# Patient Record
Sex: FEMALE | Race: BLACK OR AFRICAN AMERICAN | NOT HISPANIC OR LATINO | Employment: UNEMPLOYED | URBAN - METROPOLITAN AREA
[De-identification: names, ages, dates, MRNs, and addresses within clinical notes are randomized per-mention and may not be internally consistent; named-entity substitution may affect disease eponyms.]

---

## 2019-01-25 ENCOUNTER — RECORDS - HEALTHEAST (OUTPATIENT)
Dept: LAB | Facility: CLINIC | Age: 59
End: 2019-01-25

## 2019-01-25 LAB
ANION GAP SERPL CALCULATED.3IONS-SCNC: 9 MMOL/L (ref 5–18)
BASOPHILS # BLD AUTO: 0 THOU/UL (ref 0–0.2)
BASOPHILS NFR BLD AUTO: 0 % (ref 0–2)
BUN SERPL-MCNC: 16 MG/DL (ref 8–22)
CALCIUM SERPL-MCNC: 9.4 MG/DL (ref 8.5–10.5)
CHLORIDE BLD-SCNC: 108 MMOL/L (ref 98–107)
CO2 SERPL-SCNC: 23 MMOL/L (ref 22–31)
CREAT SERPL-MCNC: 0.95 MG/DL (ref 0.6–1.1)
EOSINOPHIL # BLD AUTO: 0.1 THOU/UL (ref 0–0.4)
EOSINOPHIL NFR BLD AUTO: 3 % (ref 0–6)
ERYTHROCYTE [DISTWIDTH] IN BLOOD BY AUTOMATED COUNT: 12.9 % (ref 11–14.5)
GFR SERPL CREATININE-BSD FRML MDRD: >60 ML/MIN/1.73M2
GLUCOSE BLD-MCNC: 84 MG/DL (ref 70–125)
HCT VFR BLD AUTO: 34.9 % (ref 35–47)
HGB BLD-MCNC: 11.5 G/DL (ref 12–16)
LYMPHOCYTES # BLD AUTO: 1.4 THOU/UL (ref 0.8–4.4)
LYMPHOCYTES NFR BLD AUTO: 32 % (ref 20–40)
MCH RBC QN AUTO: 31.4 PG (ref 27–34)
MCHC RBC AUTO-ENTMCNC: 33 G/DL (ref 32–36)
MCV RBC AUTO: 95 FL (ref 80–100)
MONOCYTES # BLD AUTO: 0.4 THOU/UL (ref 0–0.9)
MONOCYTES NFR BLD AUTO: 9 % (ref 2–10)
NEUTROPHILS # BLD AUTO: 2.4 THOU/UL (ref 2–7.7)
NEUTROPHILS NFR BLD AUTO: 56 % (ref 50–70)
PLATELET # BLD AUTO: 197 THOU/UL (ref 140–440)
PMV BLD AUTO: 11.7 FL (ref 8.5–12.5)
POTASSIUM BLD-SCNC: 4.1 MMOL/L (ref 3.5–5)
RBC # BLD AUTO: 3.66 MILL/UL (ref 3.8–5.4)
SODIUM SERPL-SCNC: 140 MMOL/L (ref 136–145)
WBC: 4.4 THOU/UL (ref 4–11)

## 2019-02-11 ENCOUNTER — RECORDS - HEALTHEAST (OUTPATIENT)
Dept: LAB | Facility: CLINIC | Age: 59
End: 2019-02-11

## 2019-02-11 LAB
CHOLEST SERPL-MCNC: 161 MG/DL
FASTING STATUS PATIENT QL REPORTED: NORMAL
HDLC SERPL-MCNC: 60 MG/DL
LDLC SERPL CALC-MCNC: 71 MG/DL
TRIGL SERPL-MCNC: 149 MG/DL

## 2019-04-26 ENCOUNTER — HOSPITAL ENCOUNTER (EMERGENCY)
Facility: CLINIC | Age: 59
Discharge: OTHER INSTITUTION | End: 2019-04-27
Attending: EMERGENCY MEDICINE | Admitting: EMERGENCY MEDICINE
Payer: COMMERCIAL

## 2019-04-26 DIAGNOSIS — F20.0 ACUTE EXACERBATION OF CHRONIC PARANOID SCHIZOPHRENIA (H): ICD-10-CM

## 2019-04-26 PROCEDURE — 90791 PSYCH DIAGNOSTIC EVALUATION: CPT

## 2019-04-26 PROCEDURE — 99285 EMERGENCY DEPT VISIT HI MDM: CPT | Mod: 25

## 2019-04-26 RX ORDER — DIPHENHYDRAMINE HCL 25 MG
25 CAPSULE ORAL ONCE
Status: DISCONTINUED | OUTPATIENT
Start: 2019-04-26 | End: 2019-04-27 | Stop reason: HOSPADM

## 2019-04-26 RX ORDER — OLANZAPINE 10 MG/1
10 TABLET, ORALLY DISINTEGRATING ORAL
Status: DISCONTINUED | OUTPATIENT
Start: 2019-04-26 | End: 2019-04-27 | Stop reason: HOSPADM

## 2019-04-26 RX ORDER — TRIHEXYPHENIDYL HYDROCHLORIDE 5 MG/1
5 TABLET ORAL ONCE
Status: DISCONTINUED | OUTPATIENT
Start: 2019-04-26 | End: 2019-04-27 | Stop reason: HOSPADM

## 2019-04-26 RX ORDER — OLANZAPINE 10 MG/2ML
10 INJECTION, POWDER, FOR SOLUTION INTRAMUSCULAR
Status: DISCONTINUED | OUTPATIENT
Start: 2019-04-26 | End: 2019-04-27 | Stop reason: HOSPADM

## 2019-04-26 RX ORDER — CHLORPROMAZINE HYDROCHLORIDE 50 MG/1
400 TABLET, FILM COATED ORAL ONCE
Status: DISCONTINUED | OUTPATIENT
Start: 2019-04-26 | End: 2019-04-27 | Stop reason: HOSPADM

## 2019-04-26 RX ORDER — OLANZAPINE 10 MG/1
10 TABLET, ORALLY DISINTEGRATING ORAL AT BEDTIME
Status: DISCONTINUED | OUTPATIENT
Start: 2019-04-26 | End: 2019-04-26

## 2019-04-26 RX ORDER — LANOLIN ALCOHOL/MO/W.PET/CERES
3 CREAM (GRAM) TOPICAL
Status: DISCONTINUED | OUTPATIENT
Start: 2019-04-26 | End: 2019-04-27 | Stop reason: HOSPADM

## 2019-04-26 ASSESSMENT — ENCOUNTER SYMPTOMS
DIARRHEA: 0
ABDOMINAL PAIN: 0
HEADACHES: 0

## 2019-04-27 ENCOUNTER — HOSPITAL ENCOUNTER (INPATIENT)
Facility: CLINIC | Age: 59
LOS: 39 days | Discharge: HOME | DRG: 885 | End: 2019-06-05
Attending: PSYCHIATRY & NEUROLOGY | Admitting: PSYCHIATRY & NEUROLOGY
Payer: COMMERCIAL

## 2019-04-27 VITALS
WEIGHT: 140 LBS | SYSTOLIC BLOOD PRESSURE: 164 MMHG | RESPIRATION RATE: 24 BRPM | OXYGEN SATURATION: 98 % | HEART RATE: 106 BPM | DIASTOLIC BLOOD PRESSURE: 86 MMHG | TEMPERATURE: 98.4 F

## 2019-04-27 DIAGNOSIS — E61.8: ICD-10-CM

## 2019-04-27 DIAGNOSIS — E78.5 HYPERLIPIDEMIA LDL GOAL <100: ICD-10-CM

## 2019-04-27 DIAGNOSIS — I10 HYPERTENSION, UNSPECIFIED TYPE: ICD-10-CM

## 2019-04-27 DIAGNOSIS — R68.2 DRY MOUTH: Primary | ICD-10-CM

## 2019-04-27 DIAGNOSIS — E56.9 VITAMIN DEFICIENCY: ICD-10-CM

## 2019-04-27 DIAGNOSIS — F20.0 SCHIZOPHRENIA, PARANOID TYPE (H): ICD-10-CM

## 2019-04-27 PROCEDURE — 99207 ZZC CONSULT E&M CHANGED TO INITIAL LEVEL: CPT | Performed by: PHYSICIAN ASSISTANT

## 2019-04-27 PROCEDURE — 99222 1ST HOSP IP/OBS MODERATE 55: CPT | Performed by: PHYSICIAN ASSISTANT

## 2019-04-27 PROCEDURE — 25000132 ZZH RX MED GY IP 250 OP 250 PS 637: Performed by: NURSE PRACTITIONER

## 2019-04-27 PROCEDURE — 99223 1ST HOSP IP/OBS HIGH 75: CPT | Mod: AI | Performed by: NURSE PRACTITIONER

## 2019-04-27 PROCEDURE — 12400001 ZZH R&B MH UMMC

## 2019-04-27 RX ORDER — TRIHEXYPHENIDYL HYDROCHLORIDE 5 MG/1
5 TABLET ORAL 2 TIMES DAILY
Status: DISCONTINUED | OUTPATIENT
Start: 2019-04-27 | End: 2019-04-30

## 2019-04-27 RX ORDER — DIPHENHYDRAMINE HCL 25 MG
25 CAPSULE ORAL EVERY 4 HOURS PRN
Status: DISCONTINUED | OUTPATIENT
Start: 2019-04-27 | End: 2019-05-01

## 2019-04-27 RX ORDER — ATORVASTATIN CALCIUM 40 MG/1
40 TABLET, FILM COATED ORAL EVERY EVENING
Status: DISCONTINUED | OUTPATIENT
Start: 2019-04-27 | End: 2019-06-05 | Stop reason: HOSPADM

## 2019-04-27 RX ORDER — CHOLECALCIFEROL (VITAMIN D3) 1250 MCG
50000 CAPSULE ORAL
Status: DISCONTINUED | OUTPATIENT
Start: 2019-04-27 | End: 2019-06-05 | Stop reason: HOSPADM

## 2019-04-27 RX ORDER — HYDROXYZINE HYDROCHLORIDE 25 MG/1
25 TABLET, FILM COATED ORAL EVERY 4 HOURS PRN
Status: DISCONTINUED | OUTPATIENT
Start: 2019-04-27 | End: 2019-06-05 | Stop reason: HOSPADM

## 2019-04-27 RX ORDER — MULTIVITAMIN,THERAPEUTIC
1 TABLET ORAL DAILY
Status: DISCONTINUED | OUTPATIENT
Start: 2019-04-27 | End: 2019-06-05 | Stop reason: HOSPADM

## 2019-04-27 RX ORDER — DOCUSATE SODIUM 100 MG/1
100 CAPSULE, LIQUID FILLED ORAL 2 TIMES DAILY PRN
Status: DISCONTINUED | OUTPATIENT
Start: 2019-04-27 | End: 2019-06-05 | Stop reason: HOSPADM

## 2019-04-27 RX ORDER — LOSARTAN POTASSIUM 50 MG/1
100 TABLET ORAL DAILY
Status: DISCONTINUED | OUTPATIENT
Start: 2019-04-27 | End: 2019-06-05 | Stop reason: HOSPADM

## 2019-04-27 RX ORDER — ACETAMINOPHEN 325 MG/1
650 TABLET ORAL EVERY 4 HOURS PRN
Status: DISCONTINUED | OUTPATIENT
Start: 2019-04-27 | End: 2019-06-05 | Stop reason: HOSPADM

## 2019-04-27 RX ORDER — OLANZAPINE 10 MG/1
10 TABLET ORAL
Status: DISCONTINUED | OUTPATIENT
Start: 2019-04-27 | End: 2019-06-05 | Stop reason: HOSPADM

## 2019-04-27 RX ORDER — ALUMINA, MAGNESIA, AND SIMETHICONE 2400; 2400; 240 MG/30ML; MG/30ML; MG/30ML
30 SUSPENSION ORAL EVERY 4 HOURS PRN
Status: DISCONTINUED | OUTPATIENT
Start: 2019-04-27 | End: 2019-06-05 | Stop reason: HOSPADM

## 2019-04-27 RX ORDER — TEMAZEPAM 15 MG/1
15 CAPSULE ORAL
Status: DISCONTINUED | OUTPATIENT
Start: 2019-04-27 | End: 2019-06-05 | Stop reason: HOSPADM

## 2019-04-27 RX ORDER — OLANZAPINE 10 MG/2ML
10 INJECTION, POWDER, FOR SOLUTION INTRAMUSCULAR
Status: DISCONTINUED | OUTPATIENT
Start: 2019-04-27 | End: 2019-06-05 | Stop reason: HOSPADM

## 2019-04-27 RX ORDER — METOPROLOL SUCCINATE 50 MG/1
50 TABLET, EXTENDED RELEASE ORAL DAILY
Status: DISCONTINUED | OUTPATIENT
Start: 2019-04-27 | End: 2019-06-05 | Stop reason: HOSPADM

## 2019-04-27 RX ORDER — CHLORPROMAZINE HYDROCHLORIDE 25 MG/1
25 TABLET, FILM COATED ORAL 3 TIMES DAILY
Status: DISCONTINUED | OUTPATIENT
Start: 2019-04-27 | End: 2019-04-28

## 2019-04-27 RX ORDER — CALCIUM CARBONATE 500(1250)
500 TABLET ORAL 2 TIMES DAILY WITH MEALS
Status: DISCONTINUED | OUTPATIENT
Start: 2019-04-27 | End: 2019-05-16

## 2019-04-27 RX ADMIN — NICOTINE POLACRILEX 2 MG: 2 GUM, CHEWING BUCCAL at 12:49

## 2019-04-27 ASSESSMENT — ACTIVITIES OF DAILY LIVING (ADL)
FALL_HISTORY_WITHIN_LAST_SIX_MONTHS: NO
SWALLOWING: 0-->SWALLOWS FOODS/LIQUIDS WITHOUT DIFFICULTY
DRESS: PROMPTS
TRANSFERRING: 0-->INDEPENDENT
DRESS: 0-->INDEPENDENT
DRESS: SCRUBS (BEHAVIORAL HEALTH);INDEPENDENT
BATHING: 0-->INDEPENDENT
LAUNDRY: UNABLE TO COMPLETE
COGNITION: 0 - NO COGNITION ISSUES REPORTED
TOILETING: 0-->INDEPENDENT
RETIRED_COMMUNICATION: 0-->UNDERSTANDS/COMMUNICATES WITHOUT DIFFICULTY
AMBULATION: 0-->INDEPENDENT
ORAL_HYGIENE: PROMPTS
ORAL_HYGIENE: INDEPENDENT
RETIRED_EATING: 0-->INDEPENDENT
HYGIENE/GROOMING: PROMPTS
HYGIENE/GROOMING: INDEPENDENT

## 2019-04-27 ASSESSMENT — MIFFLIN-ST. JEOR: SCORE: 1212.31
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
## 2019-04-27 NOTE — ED NOTES
Video observation initiated, patient informed.  Patient did not verbalize understanding of this information.    Charissa Garcia RN

## 2019-04-27 NOTE — ED NOTES
"Patient cooperatively changed into  scrubs however refused to remove her moe shorts stating \"these are my underwear\"  Security wanded and check patient's pockets as well as safety precaution.  "

## 2019-04-27 NOTE — PROGRESS NOTES
"ADMISSION    59yo Black female admitted on a 72H hold from [facility] ED. Pt arrived per EMS on a gurney.     called the police yesterday when pt became increasingly agitated and threatened to kill him.    Pt reportedly has a long history of Paranoid Schizophrenia and was last hospitalized in February of this year at [facility].  Pt has been non compliant with her medications and according to her  she has not taken them for the past 2 weeks.   also reports that she has not slept for 3 days.    On admission pt is directable and cooperative with safety search and vitals. However she became agitated when staff attempted to interview her displaying loud, abrupt and repetitive speech. She was extremely guarded and had difficulty answering even basic questions.  Such as when asked which  country she was from she repeatedly states \"I am an American citizen!\" \"I am an American Citizen!\". She is an unreliable historian in that she answers \"no\" to all questions related to any mental health issues she might be having.  She denies that she is on any medications (though records indicate otherwise) saying \"water is my medication\".  She also denies any medical concerns though records indicate that she has taken antihypertensive and related medications.     Following attempted interview pt was given a brief orientation to the unit and was settled to bed due to the hour of the day.  "
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
## 2019-04-27 NOTE — ED NOTES
Attempt made to call RN report to [facility] 12; staff is currently unable to take report at this time and reportedly will not be able to take report until after midnight.

## 2019-04-27 NOTE — PLAN OF CARE
"Patient has show more agitation as the day progressed. She is refusing to take her scheduled medications.  She is yelling when talking to staff. She refused to acknowledge what country she is from. She stated \"I am an American citizen and I speak English.\"  Patient does speak English but due to the pressured speech she can be difficult to understand.She is spending most free time in her room.  Has needed redirection when yelling at staff in [facility].  An attempt to talk to her family will be made when their phone numbers are obtained.    A second set of vitals was taken at 1300. Her BP was down slightly.  She continues to refuse all medications.  Internal Medicine and Noemy Jack  are aware of situation and can be contacted if vitals go back up.  "
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
## 2019-04-27 NOTE — PROGRESS NOTES
BP recheck at 1715 was 177/94 and . Pt is denying any pain or discomfort. Denies any symptoms. Will continue to monitor closely. Dr Birnk informed.  1930: Pt refused recheck of BP.

## 2019-04-27 NOTE — ED NOTES
"Patient refuses bedtime medication that was ordered after multiple administration attempts patient states \"The only medication I'm to take is water\"  \"My doctor said I am to take only water\"  \"God told me to stop taking all of my medication.\"    "

## 2019-04-27 NOTE — PROGRESS NOTES
04/27/19 0548   Patient Belongings   Did you bring any home meds/supplements to the hospital?  No   Patient Belongings locker;remains with patient   Patient Belongings Remaining with Patient other (see comments)  (Black hair net.)   Patient Belongings Put in Hospital Secure Location (Security or Locker, etc.) clothing;plastic bag;other (see comments);shoes   Belongings Search Yes   Clothing Search Yes   Second Staff Sarah and Bettye   Comment Patient came in pink underwear short, a pair of sock, and a pair of grey tennis shoe.  There was no money and no cell phone in belonging.   A               5.26 daughter brought in purple jacket, 3 shirts, 2 pants, belt, cell phone, , black wallet- in locker    Security- [facility] debit visa, 2 [place] licenses, $8, 1.62 cents      Admission:  I am responsible for any personal items that are not sent to the safe or pharmacy.  [facility] is not responsible for loss, theft or damage of any property in my possession.    Signature:  _________________________________ Date: _______  Time: _____                                              Staff Signature:  ____________________________ Date: ________  Time: _____      2nd Staff person, if patient is unable/unwilling to sign:    Signature: ________________________________ Date: ________  Time: _____     Discharge:  [facility] has returned all of my personal belongings:    Signature: _________________________________ Date: ________  Time: _____                                          Staff Signature:  ____________________________ Date: ________  Time: _____       ALL ITEMS ACCOUNTED FOR AT TIME OF TRANSFER TO

## 2019-04-27 NOTE — ED NOTES
"Patient sitting on floor despite provided bed and chair that are available.  Patient intermittently yells out for needed requests such as \"food\" \"water\"  Patient repetitive with topics and thoughts during conversation.  Patient is easily excitable however able to be redirected easily with multiple attempts.  "

## 2019-04-27 NOTE — PROGRESS NOTES
"  Initial Psychosocial Assessment    I have reviewed the chart, met with the patient, and developed Care Plan. Information for assessment was obtained from: Pt, medical record      Presenting Problem:     Per H&P:Michelle Pino is a 58-year-old female admitted to [address] on 4/26/2019.  She was admitted on a 72-hour hold through [facility], brought in via EMS after her  made multiple calls to the police due to the patient's bizarre behaviors, level of agitation and threats to kill him.  She informed her  that she believed he had killed her parents and her sister and intended to kill her as well.  She had been leaving the stove on for hours.  When the police arrived, she screamed and threatened to kill them.  She was handcuffed for transport.   [place] [facility] [place] December 2018 through January 2019 in the context of medication noncompliance and was committed and Jarvised.  She had not been taking her medications x 2 weeks prior to her present admission and informed ER staff that \"my doctor said I am only to take water\" and that \"God told me to stop taking all of my medication.\"  Thus far on the unit she has been refusing all medications.  She was guarded and participated minimally in the admission assessment.       Writer met with patient in her room to complete assessment. Patient declined to turn the TV down so writer could hear her. Patient appeared confused. Her answers were difficult for writer to understand due to heavy accent and trying to hear over the TV. Most of the assessment is taken from the chart.       History of Mental Health and Chemical Dependency:  [place] January 2019- Patient was committed and patino      Significant Life Events   (Illness, Abuse, Trauma, Death): not able to assess    Family: patient is  with two children per notes    Living Situation: lives with "       Educational Background: patient stated she went to  and some college       Financial Status: not able to assess    Legal Issues[facility]  [facility]    Ethnic/Cultural Issues: no issues    Spiritual Orientation:  Baptist      Service History: not able to assess    Social Functioning (organization, interests): not able to assess    Current Treatment Providers are:    Dr. Yoshi Stevens's [facility]    Social Service Assessment/Plan:   Provide a psychological assessment and manage medications per psychiatry. CTC to contact CM to obtain civil commitment paperwork. CTC to contact  for additional information. Staff to provide safe environment and observe for behavior changes.

## 2019-04-27 NOTE — ED NOTES
Patient arrives via EMS in handcuffs secondary to erratic and threatening behavior on scene.  ED staff and security to bedside, patient verbalizes circumstances surrounding removal of restraints; verbalized understanding.  EMS removed their handcuffs and patient was slid over to ED bed at this time.  Patient cooperative with staff.

## 2019-04-27 NOTE — ED NOTES
Bed: BH3  Expected date: 4/26/19  Expected time: 8:20 PM  Means of arrival: Ambulance  Comments:  HEMS [id number] MH eval; restrained

## 2019-04-27 NOTE — PROGRESS NOTES
Pt seen to have consistently elevated BP (see flow sheet). Pt's 1300 BP: 175/92, P: 104. Pt denies headache, pounding in ears and SOB. Pt continues to refuse BP medications (see MAR).

## 2019-04-27 NOTE — PROGRESS NOTES
"Patient refused to take her scheduled AM medications.  She is Jarvised but we do not have a copy of the Hatch order to see what meds are included. She states that the pills are \"poison.\"  She said that water is her only medicine.  She also said that she needs to eat lunch and dinner to be strong enough to go home because she has lost weight.  She denied that she has had trouble sleeping.  Patient did give consent for staff to talk to her daughter.  This was done in front of writer, [facility] and Noemy Jack, LILIANA.    We will make an effort to obtain a copy of the current Hatch.  "

## 2019-04-27 NOTE — ED NOTES
Bed placement:  [place] [address], Dr. Zaman accepting physician.  RN report to be called to [phone].

## 2019-04-27 NOTE — PROGRESS NOTES
"Dr Brink called about pt BP  180/108.  She was cooperative with vitals but refused medication of any kind.  When the issue was strongly encouraged, she stepped suddenly to writer and yelled \"medication is poison\".  She is rambling about needing to \"go to gym and exercise\"  Inferring that is what she does to manage her blood pressure.  "

## 2019-04-28 PROCEDURE — 25000128 H RX IP 250 OP 636: Performed by: NURSE PRACTITIONER

## 2019-04-28 PROCEDURE — 25000128 H RX IP 250 OP 636: Performed by: PSYCHIATRY & NEUROLOGY

## 2019-04-28 PROCEDURE — 12400001 ZZH R&B MH UMMC

## 2019-04-28 RX ORDER — CHLORPROMAZINE HYDROCHLORIDE 25 MG/ML
25 INJECTION INTRAMUSCULAR 3 TIMES DAILY
Status: DISCONTINUED | OUTPATIENT
Start: 2019-04-28 | End: 2019-04-29

## 2019-04-28 RX ORDER — CHLORPROMAZINE HYDROCHLORIDE 25 MG/1
25 TABLET, FILM COATED ORAL 3 TIMES DAILY
Status: DISCONTINUED | OUTPATIENT
Start: 2019-04-28 | End: 2019-04-29

## 2019-04-28 RX ADMIN — CHLORPROMAZINE HYDROCHLORIDE 25 MG: 25 INJECTION INTRAMUSCULAR at 19:25

## 2019-04-28 RX ADMIN — OLANZAPINE 10 MG: 10 INJECTION, POWDER, FOR SOLUTION INTRAMUSCULAR at 21:08

## 2019-04-28 RX ADMIN — CHLORPROMAZINE HYDROCHLORIDE 25 MG: 25 INJECTION INTRAMUSCULAR at 13:05

## 2019-04-28 ASSESSMENT — ACTIVITIES OF DAILY LIVING (ADL)
ORAL_HYGIENE: PROMPTS
HYGIENE/GROOMING: PROMPTS
LAUNDRY: UNABLE TO COMPLETE
ORAL_HYGIENE: PROMPTS
DRESS: SCRUBS (BEHAVIORAL HEALTH)
DRESS: SCRUBS (BEHAVIORAL HEALTH);PROMPTS
HYGIENE/GROOMING: PROMPTS

## 2019-04-28 NOTE — PROGRESS NOTES
"Pt was irritable and verbally abusive to staff. Observed paranoid when pt or staff members were out in the milieu with her she would yell get away. Pt was given IM today and after pt stated that \"you guys are trying to kill me\". Anytime pt was redirected or told no pt would get really irritated and yell at pt. Writer was unable to do a in person check in- AFSHAN SI/SIB/AH/VH, depression or anxiety      04/28/19 1421   Behavioral Health   Hallucinations denies / not responding to hallucinations   Thinking poor concentration   Orientation person: oriented   Memory confabulation   Insight poor   Judgement impaired   Affect irritable   Mood irritable   Hygiene well groomed   Suicidality other (see comments)  (AFSHAN)   1. Wish to be Dead   (AFSHAN )   2. Non-Specific Active Suicidal Thoughts    (AFSHAN)   Self Injury other (see comment)  (AFSHAN)   Elopement   (none observed )   Activity restless;isolative   Speech rambling;pressured;incoherent   Medication Sensitivity no stated side effects;no observed side effects   Psychomotor / Gait balanced;steady   Activities of Daily Living   Hygiene/Grooming prompts   Oral Hygiene prompts   Dress scrubs (behavioral health);prompts   Room Organization prompts     "

## 2019-04-28 NOTE — PROGRESS NOTES
Pt has been in room most of the shift except dinner and riding on stationary bike.  The rest of the evening, she was lying on bed talking to herself.  She is uncooperative with most requests and refuses medication as she thinks it is poison.  Her conversation is disorganized and pressured.

## 2019-04-28 NOTE — PROGRESS NOTES
Pt is consistently and strongly refusing to allow vital signs assessment or medication administration. Pt is yelling loudly at staff and other patients. Pt is argumentative with redirection at times. Staff helped pt fill our ROIs for pt's children, at pt's request. Pt was cooperative with this and expressed understanding the intent of ROIs.   Pt currently taking a shower. At this time pt denies any concerns associated with hypertension. Pt reports she is constipated and declined all interventions offered by writer. Pt also refused lab draw. Writer will continue to pursue vital signs assessment and offer medications. Lab rescheduled tests for tomorrow morning, as labs were fasting.    At approx 1040 pt came out of her room, demanding to see the Dr. because she wants to be discharged. Writer informed pt that her dr would be back tomorrow. Pt then yelled very loudly at writer and took steps toward writer with clenched fists. Writer and other staff directed pt back to her room, and pt complied. Currently sitting on her bed.   Writer paged on-call provider to inquire about emergency medication order. Pt is consistently refusing assessments and medications vital to her physical and mental welfare, and her behavior is aggressive and threatening toward others. According to Epic documentation and pt's daughter Laquita, pt has a Hatch order. We are awaiting paperwork for this. Nursing continues to monitor closely.      1300- thorazine 25mg IM administered per physicians order. PO form was offered and pt refused while yelling loudly and slapping writer's hand away. Pt was speaking in rapid, pressured tone and content was nonsensical. Code green was paged to ensure adequate staff presence for patient and staff safety while medication was given. Staff placed hands on pt's arms and legs to ensure safety and prevent injury. Pt did not resist IM administration and experienced no adverse sequelae. Pt currently resting in her room.  Writer will continue to monitor closely.

## 2019-04-28 NOTE — PROGRESS NOTES
"   04/27/19 2116   Behavioral Health   Hallucinations appears responding   Thinking poor concentration;distractable;delusional;paranoid   Orientation person: oriented   Memory confabulation   Insight poor   Judgement impaired   Eye Contact at examiner   Affect tense;irritable   Mood labile;irritable   Physical Appearance/Attire untidy;disheveled   Hygiene neglected grooming - unclean body, hair, teeth   Suicidality other (see comments)  (AFSHAN)   1. Wish to be Dead   (AFSHAN)   2. Non-Specific Active Suicidal Thoughts    (AFSHAN)   Self Injury other (see comment)  (AFSHAN)   Elopement   (none noted)   Activity isolative;restless   Speech pressured;rambling;incoherent   Medication Sensitivity no stated side effects;no observed side effects   Psychomotor / Gait balanced;steady   Activities of Daily Living   Hygiene/Grooming prompts   Oral Hygiene prompts   Dress scrubs (behavioral health);independent   Laundry unable to complete   Room Organization independent     Pt remained isolative to her room. Pt was agitated, labile and irritable throughout the shift. Author attempted to check in with pt. Pt stated she was feeling \"sleepy,\" and made delusional statements like, \"My  is dead.\" Pt was telling a story, which was essentially incoherent, and author would attempt to ask a question. Pt would become very angry and tell author to listen, and at one point got off the bed and started coming towards author yelling with her finger raised. At this point author backed out of the room and ended the conversation.  "

## 2019-04-29 LAB
ALBUMIN SERPL-MCNC: 3.7 G/DL (ref 3.4–5)
ALP SERPL-CCNC: 92 U/L (ref 40–150)
ALT SERPL W P-5'-P-CCNC: 31 U/L (ref 0–50)
ANION GAP SERPL CALCULATED.3IONS-SCNC: 9 MMOL/L (ref 3–14)
AST SERPL W P-5'-P-CCNC: 19 U/L (ref 0–45)
BASOPHILS # BLD AUTO: 0 10E9/L (ref 0–0.2)
BASOPHILS NFR BLD AUTO: 0.2 %
BILIRUB SERPL-MCNC: 0.7 MG/DL (ref 0.2–1.3)
BUN SERPL-MCNC: 15 MG/DL (ref 7–30)
CALCIUM SERPL-MCNC: 8.8 MG/DL (ref 8.5–10.1)
CHLORIDE SERPL-SCNC: 105 MMOL/L (ref 94–109)
CHOLEST SERPL-MCNC: 207 MG/DL
CO2 SERPL-SCNC: 27 MMOL/L (ref 20–32)
CREAT SERPL-MCNC: 1.1 MG/DL (ref 0.52–1.04)
DIFFERENTIAL METHOD BLD: NORMAL
EOSINOPHIL # BLD AUTO: 0.1 10E9/L (ref 0–0.7)
EOSINOPHIL NFR BLD AUTO: 1.6 %
ERYTHROCYTE [DISTWIDTH] IN BLOOD BY AUTOMATED COUNT: 13.5 % (ref 10–15)
GFR SERPL CREATININE-BSD FRML MDRD: 56 ML/MIN/{1.73_M2}
GLUCOSE SERPL-MCNC: 90 MG/DL (ref 70–99)
HCT VFR BLD AUTO: 44.9 % (ref 35–47)
HDLC SERPL-MCNC: 64 MG/DL
HGB BLD-MCNC: 15.2 G/DL (ref 11.7–15.7)
IMM GRANULOCYTES # BLD: 0 10E9/L (ref 0–0.4)
IMM GRANULOCYTES NFR BLD: 0.2 %
LDLC SERPL CALC-MCNC: 123 MG/DL
LYMPHOCYTES # BLD AUTO: 2.3 10E9/L (ref 0.8–5.3)
LYMPHOCYTES NFR BLD AUTO: 42 %
MCH RBC QN AUTO: 30.3 PG (ref 26.5–33)
MCHC RBC AUTO-ENTMCNC: 33.9 G/DL (ref 31.5–36.5)
MCV RBC AUTO: 90 FL (ref 78–100)
MONOCYTES # BLD AUTO: 0.4 10E9/L (ref 0–1.3)
MONOCYTES NFR BLD AUTO: 7.7 %
NEUTROPHILS # BLD AUTO: 2.6 10E9/L (ref 1.6–8.3)
NEUTROPHILS NFR BLD AUTO: 48.3 %
NONHDLC SERPL-MCNC: 143 MG/DL
NRBC # BLD AUTO: 0 10*3/UL
NRBC BLD AUTO-RTO: 0 /100
PLATELET # BLD AUTO: 190 10E9/L (ref 150–450)
POTASSIUM SERPL-SCNC: 3.4 MMOL/L (ref 3.4–5.3)
PROT SERPL-MCNC: 7.5 G/DL (ref 6.8–8.8)
RBC # BLD AUTO: 5.01 10E12/L (ref 3.8–5.2)
SODIUM SERPL-SCNC: 141 MMOL/L (ref 133–144)
TRIGL SERPL-MCNC: 98 MG/DL
TSH SERPL DL<=0.005 MIU/L-ACNC: 0.67 MU/L (ref 0.4–4)
WBC # BLD AUTO: 5.5 10E9/L (ref 4–11)

## 2019-04-29 PROCEDURE — 85025 COMPLETE CBC W/AUTO DIFF WBC: CPT | Performed by: PSYCHIATRY & NEUROLOGY

## 2019-04-29 PROCEDURE — 80061 LIPID PANEL: CPT | Performed by: PSYCHIATRY & NEUROLOGY

## 2019-04-29 PROCEDURE — 25000132 ZZH RX MED GY IP 250 OP 250 PS 637: Performed by: NURSE PRACTITIONER

## 2019-04-29 PROCEDURE — 12400001 ZZH R&B MH UMMC

## 2019-04-29 PROCEDURE — 25000132 ZZH RX MED GY IP 250 OP 250 PS 637: Performed by: PSYCHIATRY & NEUROLOGY

## 2019-04-29 PROCEDURE — 25000128 H RX IP 250 OP 636: Performed by: PSYCHIATRY & NEUROLOGY

## 2019-04-29 PROCEDURE — 84443 ASSAY THYROID STIM HORMONE: CPT | Performed by: PSYCHIATRY & NEUROLOGY

## 2019-04-29 PROCEDURE — 99233 SBSQ HOSP IP/OBS HIGH 50: CPT | Performed by: PSYCHIATRY & NEUROLOGY

## 2019-04-29 PROCEDURE — 80053 COMPREHEN METABOLIC PANEL: CPT | Performed by: PSYCHIATRY & NEUROLOGY

## 2019-04-29 PROCEDURE — 36415 COLL VENOUS BLD VENIPUNCTURE: CPT | Performed by: PSYCHIATRY & NEUROLOGY

## 2019-04-29 RX ORDER — CHLORPROMAZINE HYDROCHLORIDE 25 MG/1
50 TABLET, FILM COATED ORAL 3 TIMES DAILY
Status: DISCONTINUED | OUTPATIENT
Start: 2019-04-29 | End: 2019-04-29

## 2019-04-29 RX ORDER — CHLORPROMAZINE HYDROCHLORIDE 25 MG/ML
50 INJECTION INTRAMUSCULAR 3 TIMES DAILY
Status: DISCONTINUED | OUTPATIENT
Start: 2019-04-29 | End: 2019-04-29

## 2019-04-29 RX ORDER — CHLORPROMAZINE HYDROCHLORIDE 25 MG/ML
25 INJECTION INTRAMUSCULAR 3 TIMES DAILY
Status: DISCONTINUED | OUTPATIENT
Start: 2019-04-29 | End: 2019-04-30

## 2019-04-29 RX ORDER — CHLORPROMAZINE HYDROCHLORIDE 25 MG/1
50 TABLET, FILM COATED ORAL 3 TIMES DAILY
Status: DISCONTINUED | OUTPATIENT
Start: 2019-04-29 | End: 2019-04-30

## 2019-04-29 RX ADMIN — NICOTINE POLACRILEX 2 MG: 2 GUM, CHEWING BUCCAL at 11:37

## 2019-04-29 RX ADMIN — TRIHEXYPHENIDYL HYDROCHLORIDE 5 MG: 5 TABLET ORAL at 08:54

## 2019-04-29 RX ADMIN — CHLORPROMAZINE HYDROCHLORIDE 50 MG: 25 TABLET, SUGAR COATED ORAL at 14:41

## 2019-04-29 RX ADMIN — OLANZAPINE 10 MG: 10 INJECTION, POWDER, FOR SOLUTION INTRAMUSCULAR at 00:17

## 2019-04-29 RX ADMIN — CHLORPROMAZINE HYDROCHLORIDE 50 MG: 25 TABLET, SUGAR COATED ORAL at 18:25

## 2019-04-29 RX ADMIN — OLANZAPINE 10 MG: 10 INJECTION, POWDER, FOR SOLUTION INTRAMUSCULAR at 19:08

## 2019-04-29 RX ADMIN — CHLORPROMAZINE HYDROCHLORIDE 50 MG: 25 TABLET, SUGAR COATED ORAL at 08:50

## 2019-04-29 ASSESSMENT — ACTIVITIES OF DAILY LIVING (ADL)
DRESS: SCRUBS (BEHAVIORAL HEALTH)
HYGIENE/GROOMING: INDEPENDENT
ORAL_HYGIENE: INDEPENDENT

## 2019-04-29 NOTE — PLAN OF CARE
BEHAVIORAL TEAM DISCUSSION    Participants: Dr. Ida Zaman, Mathieu Henry RN, Kal Karimi RN, [facility]- Opal Cooper LMFT, [facility]   Progress: continue to assess,  pt just admitted over the weekend.    Continued Stay Criteria/Rationale: pt had behavioral code today, continues to be agitated and need inpatient hospitalization for mental symptoms and safety.  Medical/Physical: see medical chart   Precautions:   Behavioral Orders   Procedures    Assault precautions    Code 1 - Restrict to Unit    Elopement precautions    Routine Programming     As clinically indicated    Status 15     Every 15 minutes.     Plan: asked OP CCM to revoke pt's provisional discharge.  Rationale for change in precautions or plan: no change

## 2019-04-29 NOTE — PLAN OF CARE
"Pt continues to have symptoms of psychosis and behavioral dysregulation. Pt is tense and irritable throughout the shift. Pt was very resistant to taking her patino'd medications this shift. Pt took both doses under threat of getting the injection. The afternoon dose required staff to call a code in order to convince her to take the medication. Pt did not display physically aggressive behavior this shift but she was extremely agitated when she interacted with staff. Pt refused physical assessment including vital signs. Pt unwilling to talk about her blood pressure, when asked she yelled at RN writer, \"NO, NO, NO, Go away.\" She got out her bed and raised her hands so RN writer terminated the interview. No other physical health concern or side effects from medications noted.   "

## 2019-04-29 NOTE — PROGRESS NOTES
This writer spoke to pt's OP CCM - she is revoking provisional discharge and will submit this paperwork for that.

## 2019-04-29 NOTE — PROVIDER NOTIFICATION
04/29/19 0015   Seclusion or Restraint Order   Order Obtained Yes   Pt entered the lounge loudly asking to use the BR.  Staff opened the BR door for her but she declined to enter the BR indicating she wanted another staff to open the door for her.  When the original staff remained with the door open pt pulled down her scrubs in the hallway and squatted as if she would urinate on the floor of the chen.  Pt was physically escorted into the BR and assisted with sitting on the toilet.  After pt voided on the toilet she came out into the hallway yelling and attempted to strike staff.  She then entered her room and attempted to bang her door X3.  Her behavior is threatening and she is in poor control.  Additional staff was called and pt was given emergency IM medication.  Pt was uncooperative with medication and continued to attempt to strike staff.  Hands on restraint used for medication administration.

## 2019-04-29 NOTE — PROVIDER NOTIFICATION
04/28/19 2130   Restraint Monitoring Q15 Minutes   Psychological Status O  (responding to internal stimuli)   Pt is laying on the mattress and alternating between crying and laughing. Pt is continually responding to internal stimuli and refusing to respond verbally to writer.

## 2019-04-29 NOTE — PROVIDER NOTIFICATION
"   04/28/19 2221   Debriefing   Debriefing DO   Does patient understand why the event happened? Yes   Does patient agree to safe behaviors? Yes   What can we do differently so this doesn't happen again? Patient unable to answer   Plan of care reviewed and modified Yes   Pt is calm at this time. She is asking to go to her room and sleep. Pt responds \"yes\" when asked if she can remain calm and not threaten or hit anyone. Seclusion discontinued at this time.  "

## 2019-04-29 NOTE — PROVIDER NOTIFICATION
"   04/28/19 2100   Justification   Clinical Justification Others   Pt sat down on the lounge floor hallway and started screaming \"I like cheese! I like cheese!\" Staff attempted to redirect her from sitting in the middle of the hallway, for safety. Pt stood up and started boxing at staff, stating she was going to teach staff how to box. She went up to two separate staff and started boxing at their faces. She was having significant difficulty being redirected, and continued the behavior. Due to another agitated patient being in the lounge (XG), staff attempted to redirect pt to her room. Peer was eventually redirected to his room. Due to continued agitation and threatening behaviors, code 21 was called and pt received Zyprexa 10 mg IM. Pt was walked to seclusion, as she continued to attempt to come into [facility] in an agitated, physically posturing manner. Dr Brink gave order for seclusion.  "

## 2019-04-29 NOTE — PROVIDER NOTIFICATION
"   04/28/19 1935   Debriefing   Debriefing DO   Does patient understand why the event happened? No   Does patient agree to safe behaviors? Yes   What can we do differently so this doesn't happen again? Patient unable to answer   Plan of care reviewed and modified Yes   Staff discontinued holding pt's door shut, as she agreed to not be aggressive towards others. Pt then walked to the bathroom, though she threatened that staff would be \"dead\" if they came into her room again or gave her an IM.     "

## 2019-04-29 NOTE — PROGRESS NOTES
Called [facility] Atty's office to request a copy of pt's commitment and patino order.  The pt is Committed as Mentally Ill to [facility] [place] [facility] and the Commissioner.  She will need a local remote PD.    Her committed started 12/31/2018 and expires 6/28/2019.  Jarvised Meds include:  Thorazine, Haldol, Risperdal and Abilify.       Left vm for CCM- Asked to revoke the PD.  : Minna Calles, [phone], Fax: [phone].

## 2019-04-29 NOTE — PROVIDER NOTIFICATION
"   04/28/19 1925   Justification   Clinical Justification Others   Pt refusing PO neuroleptic emergency medication as scheduled. Code green called to administer IM as ordered. Pt initially stayed on her bed and IM was administered, however as staff were leaving her room, she became agitated and started kicking at staff. Pt's door held shut, effectively continuing seclusion event. She continued pushing and kicking at door. She then asked to use the restroom. Agreed to remain safe, and door was opened. Pt walked to the restroom. Continued to be verbally agitated at times, though she did not engage in further aggression. Told staff \"if you give me another shot, you'll be dead.\"   "

## 2019-04-29 NOTE — PROGRESS NOTES
"[facility], [place]   Psychiatric Progress Note  Hospital Day: 2        Interim History:   The patient's care was discussed with the treatment team during the daily team meeting and/or staff's chart notes were reviewed.  Staff report patient required seclusion x 2 yesterday for aggressive/violent behaviors. She is isolative to her room, easily agitated, labile, and irritable. She reports that medication is \"poison.\" She has had consistently elevated BP though refuses to take BP medications. She made delusional statements about her  being dead. She has pressured speech and tangential thought process. She is uncooperative with most requests. She refused all scheduled neuroleptic medications throughout weekend (emergency scheduled and thus was given IM Thorazine), though accepted oral thorazine this AM.     Upon interview, the patient was initially lying in bed with eyes closed. She did not respond to questions after multiple attempts. I attempted to meet with her in the early afternoon and she was eating lunch alone in the lounge. As I approached her, she said \"Leave me the hell alone I am eating!\" I introduced myself and informed her that this is her opportunity to ask questions/address concerns. She said \"Get away I am eating lunch!\" Interview was terminated per her request.         Medications:       atorvastatin  40 mg Oral QPM     calcium carbonate (OS-ALEK) 500 mg (elemental) tablet  500 mg Oral BID w/meals     chlorproMAZINE  50 mg Intramuscular TID    Or     chlorproMAZINE  50 mg Oral TID     cholecalciferol  50,000 Units Oral Q7 Days     losartan  100 mg Oral Daily     metoprolol succinate ER  50 mg Oral Daily     multivitamin, therapeutic  1 tablet Oral Daily     trihexyphenidyl  5 mg Oral BID          Allergies:     Allergies   Allergen Reactions     Lisinopril Cough          Labs:     Recent Results (from the past 24 hour(s))   CBC with platelets differential    " Collection Time: 04/29/19  7:48 AM   Result Value Ref Range    WBC 5.5 4.0 - 11.0 10e9/L    RBC Count 5.01 3.8 - 5.2 10e12/L    Hemoglobin 15.2 11.7 - 15.7 g/dL    Hematocrit 44.9 35.0 - 47.0 %    MCV 90 78 - 100 fl    MCH 30.3 26.5 - 33.0 pg    MCHC 33.9 31.5 - 36.5 g/dL    RDW 13.5 10.0 - 15.0 %    Platelet Count 190 150 - 450 10e9/L    Diff Method Automated Method     % Neutrophils 48.3 %    % Lymphocytes 42.0 %    % Monocytes 7.7 %    % Eosinophils 1.6 %    % Basophils 0.2 %    % Immature Granulocytes 0.2 %    Nucleated RBCs 0 0 /100    Absolute Neutrophil 2.6 1.6 - 8.3 10e9/L    Absolute Lymphocytes 2.3 0.8 - 5.3 10e9/L    Absolute Monocytes 0.4 0.0 - 1.3 10e9/L    Absolute Eosinophils 0.1 0.0 - 0.7 10e9/L    Absolute Basophils 0.0 0.0 - 0.2 10e9/L    Abs Immature Granulocytes 0.0 0 - 0.4 10e9/L    Absolute Nucleated RBC 0.0    Comprehensive metabolic panel    Collection Time: 04/29/19  7:48 AM   Result Value Ref Range    Sodium 141 133 - 144 mmol/L    Potassium 3.4 3.4 - 5.3 mmol/L    Chloride 105 94 - 109 mmol/L    Carbon Dioxide 27 20 - 32 mmol/L    Anion Gap 9 3 - 14 mmol/L    Glucose 90 70 - 99 mg/dL    Urea Nitrogen 15 7 - 30 mg/dL    Creatinine 1.10 (H) 0.52 - 1.04 mg/dL    GFR Estimate 56 (L) >60 mL/min/[1.73_m2]    GFR Estimate If Black 65 >60 mL/min/[1.73_m2]    Calcium 8.8 8.5 - 10.1 mg/dL    Bilirubin Total 0.7 0.2 - 1.3 mg/dL    Albumin 3.7 3.4 - 5.0 g/dL    Protein Total 7.5 6.8 - 8.8 g/dL    Alkaline Phosphatase 92 40 - 150 U/L    ALT 31 0 - 50 U/L    AST 19 0 - 45 U/L   Lipid panel    Collection Time: 04/29/19  7:48 AM   Result Value Ref Range    Cholesterol 207 (H) <200 mg/dL    Triglycerides 98 <150 mg/dL    HDL Cholesterol 64 >49 mg/dL    LDL Cholesterol Calculated 123 (H) <100 mg/dL    Non HDL Cholesterol 143 (H) <130 mg/dL   TSH with free T4 reflex and/or T3 as indicated    Collection Time: 04/29/19  7:48 AM   Result Value Ref Range    TSH 0.67 0.40 - 4.00 mU/L          Psychiatric  "Examination:     BP (!) 177/92 (BP Location: Right arm)   Pulse 104   Temp 98.4  F (36.9  C) (Oral)   Resp 16   Ht 1.6 m (5' 3\")   Wt 65.3 kg (144 lb)   SpO2 100%   BMI 25.51 kg/m    Weight is 144 lbs 0 oz  Body mass index is 25.51 kg/m .    Weight over time:  Vitals:    04/27/19 0144   Weight: 65.3 kg (144 lb)       Orthostatic Vitals     None            Cardiometabolic risk assessment. 04/29/19      Reviewed patient profile for cardiometabolic risk factors    Date taken /Value  REFERENCE RANGE   Abdominal Obesity  (Waist Circumference)   See nursing flowsheet Women ?35 in (88 cm)   Men ?40 in (102 cm)      Triglycerides  Triglycerides   Date Value Ref Range Status   04/29/2019 98 <150 mg/dL Final       ?150 mg/dL (1.7 mmol/L) or current treatment for elevated triglycerides   HDL cholesterol  HDL Cholesterol   Date Value Ref Range Status   04/29/2019 64 >49 mg/dL Final   ]   Women <50 mg/dL (1.3 mmol/L) in women or current treatment for low HDL cholesterol  Men <40 mg/dL (1 mmol/L) in men or current treatment for low HDL cholesterol     Fasting plasma glucose (FPG) Lab Results   Component Value Date    GLC 90 04/29/2019      FPG ?100 mg/dL (5.6 mmol/L) or treatment for elevated blood glucose   Blood pressure  BP Readings from Last 3 Encounters:   04/27/19 (!) 177/92   04/26/19 164/86    Blood pressure ?130/85 mmHg or treatment for elevated blood pressure   Family History  See family history     Appearance: awake, alert and unkempt  Attitude:  guarded and uncooperative  Eye Contact:  poor   Mood:  angry  Affect:  mood congruent, intensity is heightened and labile  Speech:  rambling  Language: fluent and intact in English  Psychomotor, Gait, Musculoskeletal:  no evidence of tardive dyskinesia, dystonia, or tics  Throught Process:  disorganized and illogical  Associations:  no loose associations, though difficult to fully assess  Thought Content:  patient appears to be responding to internal stimuli  Insight:  " limited  Judgement:  limited  Oriented to:  unable to fully assess  Attention Span and Concentration:  limited  Recent and Remote Memory:  limited  Fund of Knowledge:  low-normal    Clinical Global Impressions  First:  Considering your total clinical experience with this particular patient population, how severe are the patient's symptoms at this time?: 7 (04/27/19 0750)  Compared to the patient's condition at the START of treatment, this patient's condition is:: 4 (04/27/19 0750)  Most recent:  Considering your total clinical experience with this particular patient population, how severe are the patient's symptoms at this time?: 7 (04/27/19 0750)  Compared to the patient's condition at the START of treatment, this patient's condition is:: 4 (04/27/19 0750)           Precautions:     Behavioral Orders   Procedures     Assault precautions     Code 1 - Restrict to Unit     Elopement precautions     Routine Programming     As clinically indicated     Status 15     Every 15 minutes.          Diagnoses:     Schizophrenia  Hypertension  Hyperlipidemia  CVA 2017  Right tibial plateau fracture s/p ORIF 1/8/2019         Assessment & Plan:     Assessment and hospital summary:  Michelle Pino is a 58-year-old female admitted to [facility] on 4/26/2019.  She was admitted on a 72-hour hold through [facility], brought in via EMS after her  made multiple calls to the police due to the patient's bizarre behaviors, level of agitation and threats to kill him.  She informed her  that she believed he had killed her parents and her sister and intended to kill her as well.  She had been leaving the stove on for hours.  When the police arrived, she screamed and threatened to kill them.  She was handcuffed for transport.   She was most recently hospitalized at [facility] [place] [place] for about 6 weeks from December 2018 through January 2019 in the context of medication noncompliance and was committed and Jarvised.  She  had not been taking her medications x 2 weeks prior to her present admission.    Target psychiatric symptoms and interventions:  Increase Thorazine to 50 mg TID with IM backup 25 mg TID. Plan to increase as tolerated. Discussed with PharmD. Appreciate assistance.    Medical Problems and Treatments:  Poorly controlled HTN. Pt declining all scheduled anti-hypertensives. Will continue to monitor.    Behavioral/Psychological/Social:  Encourage participation in groups after improvement    Legal: Revoking PD. Hatch in place with following medications: Thorazine, Haldol, Risperdal, Abilify    Safety:  - Continue precautions as noted above  - Status 15 minute checks    Disposition:    Gabby Zaman MD  [facility]

## 2019-04-29 NOTE — PROGRESS NOTES
"Patient presents as guarded, paranoid, and hypervigilant with no appreciable insight into her mental health relapse.  Her mood is quite labile at this time, and her behavior is unpredictable.  She is angry, hostile, and irritable on approach.  Her speech is loud and pressured.  Given her recent history of aggressive behaviors, RN writer approached Michelle in her room accompanied by two male staff members.  She became very agitated when offered her HS medications, initially referring to them as \"poison\" and adamantly refusing to accept them. At this point, she made an aggressive gesture toward RN writer- suddenly lunging toward me while swinging a clenched fist wildly- and she was given very firm verbal redirection to refrain from this or any other aggressive behavior.   When it was further explained to Michelle that she will receive an IM injection of the Thorazine if she does not accept it orally, she reluctantly agreed to take it.  She adamantly refused to accept any other medications.  She then made several derogatory remarks toward RN writer, and she demanded that unit staff leave her room at once.  About 5 minutes later, she emerged from her room, calmly and politely asking a staff member if there were any groups this evening.  Later in the shift, when prompted to cooperate with vital signs assessment, she refused to allow her vital signs to be assessed.  She has been refusing to answer any interview questions, making it difficult to conduct a MSE.  She has also refused to provide a urine specimen for task completion of the UA.  Will continue to monitor closely.  "

## 2019-04-29 NOTE — PROVIDER NOTIFICATION
"   04/28/19 2140   Seclusion or Restraint Order   In Person Face to Face Assessment Conducted Yes-Eval of pt's immediate situation, reaction to intervention, complete review of systems assessment, behavioral assessment & review/assessment of hx, drugs & meds, recent labs, etc, behavioral condition, need to continue/terminate restraint/seclusion   Patient Experienced No adverse physical outcome from seclusion/restraint initiation   Continuation of Seclus/Restraint indicated at this time Yes     Face to face examination completed and on call provider, Dr Brink, notified of results.  Pt is lying on mat.  She is able to communicate and move around.  She had urinated in corner of room.  That was cleaned by staff.  When asked if she knew why she was brought to the room, stated \"I am in the hospital\".  No physical injuries observed or stated.  "

## 2019-04-29 NOTE — PROVIDER NOTIFICATION
04/29/19 0015   Seclusion or Restraint Order   In Person Face to Face Assessment Conducted Yes-Eval of pt's immediate situation, reaction to intervention, complete review of systems assessment, behavioral assessment & review/assessment of hx, drugs & meds, recent labs, etc, behavioral condition, need to continue/terminate restraint/seclusion   Pt experienced no injury during medication administration.  Dr. Brink is aware of pt's status.

## 2019-04-29 NOTE — PROVIDER NOTIFICATION
"   04/28/19 1925   Seclusion or Restraint Order   In Person Face to Face Assessment Conducted Yes-Eval of pt's immediate situation, reaction to intervention, complete review of systems assessment, behavioral assessment & review/assessment of hx, drugs & meds, recent labs, etc, behavioral condition, need to continue/terminate restraint/seclusion   Patient Experienced No adverse physical outcome from seclusion/restraint initiation   Continuation of Seclus/Restraint indicated at this time No   Describe actions taken physical restraint to administer emergency medications     On call provider Dr. Brink notified of face to face results with no injuries noted. Physical restraint had been discontinued after administration of the medications. Patient after discontinuation of physical restraint angry not indicating any injuries only stating to staff \"nobody touch me again or die\". Patient was given space and able to calm and go to room.  "

## 2019-04-29 NOTE — PLAN OF CARE
Problem: Psychotic Symptoms  Goal: Psychotic Symptoms  Description  Signs and symptoms of listed problems will be absent or manageable.  Outcome: No Change   Pt continues to be tense, angry, and hostile on approach. Thought process is paranoid and delusional. She is physically threatening and aggressive at times. She spent the first half of the shift laying in bed, and observed to be responding to internal stimuli. She becomes agitated and hostile when RN offers scheduled medications or prns. She is refusing all medications. Pt received scheduled Thorazine IM, due to refusing PO Thorazine (ordered as neuroleptic emergency). No observed adverse effects at this time. She is refusing all vitals assessments. Will continue to monitor closely for safety.

## 2019-04-30 PROCEDURE — 25000132 ZZH RX MED GY IP 250 OP 250 PS 637: Performed by: PSYCHIATRY & NEUROLOGY

## 2019-04-30 PROCEDURE — 99233 SBSQ HOSP IP/OBS HIGH 50: CPT | Performed by: PSYCHIATRY & NEUROLOGY

## 2019-04-30 PROCEDURE — 12400001 ZZH R&B MH UMMC

## 2019-04-30 PROCEDURE — 25000132 ZZH RX MED GY IP 250 OP 250 PS 637: Performed by: NURSE PRACTITIONER

## 2019-04-30 RX ORDER — CHLORPROMAZINE HYDROCHLORIDE 25 MG/ML
25 INJECTION INTRAMUSCULAR AT BEDTIME
Status: DISCONTINUED | OUTPATIENT
Start: 2019-04-30 | End: 2019-04-30

## 2019-04-30 RX ORDER — HALOPERIDOL 5 MG/1
5 TABLET ORAL 2 TIMES DAILY
Status: DISCONTINUED | OUTPATIENT
Start: 2019-04-30 | End: 2019-04-30

## 2019-04-30 RX ORDER — POLYETHYLENE GLYCOL 3350 17 G/17G
17 POWDER, FOR SOLUTION ORAL DAILY
Status: DISCONTINUED | OUTPATIENT
Start: 2019-05-01 | End: 2019-05-15

## 2019-04-30 RX ORDER — HALOPERIDOL 5 MG/ML
5 INJECTION INTRAMUSCULAR 2 TIMES DAILY
Status: DISCONTINUED | OUTPATIENT
Start: 2019-04-30 | End: 2019-05-06

## 2019-04-30 RX ORDER — HALOPERIDOL 5 MG/1
5 TABLET ORAL 2 TIMES DAILY
Status: DISCONTINUED | OUTPATIENT
Start: 2019-04-30 | End: 2019-05-06

## 2019-04-30 RX ORDER — FLUORIDE TOOTHPASTE
15 TOOTHPASTE DENTAL 4 TIMES DAILY PRN
Status: DISCONTINUED | OUTPATIENT
Start: 2019-04-30 | End: 2019-06-05 | Stop reason: HOSPADM

## 2019-04-30 RX ORDER — TRIHEXYPHENIDYL HYDROCHLORIDE 5 MG/1
5 TABLET ORAL 3 TIMES DAILY
Status: DISCONTINUED | OUTPATIENT
Start: 2019-04-30 | End: 2019-05-16

## 2019-04-30 RX ORDER — CHLORPROMAZINE HYDROCHLORIDE 25 MG/ML
25 INJECTION INTRAMUSCULAR AT BEDTIME
Status: DISCONTINUED | OUTPATIENT
Start: 2019-04-30 | End: 2019-05-10

## 2019-04-30 RX ADMIN — HALOPERIDOL 5 MG: 5 TABLET ORAL at 13:43

## 2019-04-30 RX ADMIN — CALCIUM 500 MG: 500 TABLET ORAL at 18:40

## 2019-04-30 RX ADMIN — CHLORPROMAZINE HYDROCHLORIDE 150 MG: 100 TABLET, FILM COATED ORAL at 20:34

## 2019-04-30 RX ADMIN — TRIHEXYPHENIDYL HYDROCHLORIDE 5 MG: 5 TABLET ORAL at 13:43

## 2019-04-30 RX ADMIN — CHLORPROMAZINE HYDROCHLORIDE 50 MG: 25 TABLET, SUGAR COATED ORAL at 10:56

## 2019-04-30 RX ADMIN — NICOTINE POLACRILEX 2 MG: 2 GUM, CHEWING BUCCAL at 12:54

## 2019-04-30 RX ADMIN — MAGNESIUM HYDROXIDE 30 ML: 400 SUSPENSION ORAL at 09:50

## 2019-04-30 RX ADMIN — TRIHEXYPHENIDYL HYDROCHLORIDE 5 MG: 5 TABLET ORAL at 20:34

## 2019-04-30 RX ADMIN — NICOTINE POLACRILEX 2 MG: 2 GUM, CHEWING BUCCAL at 11:30

## 2019-04-30 ASSESSMENT — ACTIVITIES OF DAILY LIVING (ADL)
LAUNDRY: UNABLE TO COMPLETE
ORAL_HYGIENE: INDEPENDENT
HYGIENE/GROOMING: INDEPENDENT
DRESS: SCRUBS (BEHAVIORAL HEALTH)

## 2019-04-30 NOTE — PROGRESS NOTES
Ex Parte Order for Emergency return to facility was received today.  Therefore, her provisional discharge is revoked.  She is technically committed to [facility], but court order reads she be held here.

## 2019-04-30 NOTE — PROGRESS NOTES
"[facility], [place]   Psychiatric Progress Note  Hospital Day: 3        Interim History:   The patient's care was discussed with the treatment team during the daily team meeting and/or staff's chart notes were reviewed.  Staff report patient continues to exhibit symptoms of psychosis and behavioral dysregulation. She remains tense and very irritable. She exhibits paranoia, mood lability, hostility, and intermittent aggressive behavior. She continues to refer to medications as \"poison.\" When offered at bedtime medications, she lunged toward RN with clenched fists. She barricaded herself in the bathroom. She was subsequently placed in seclusion. While in seclusion, she was uncooperative, spitting on seclusion room door, ground, seclusion mat, etc.     Upon interview, the patient was very agitated. She was yelling and making nonsensical statements. She would not answer my questions directly. She reported dry mouth and constipation, and states that she did not want to take oral neuroleptics until she was given a medication for constipation. At one point, she quickly attempted to get out of her bed toward this writer. She was agitated when I would not sit down. She appeared confused about my role and focused mostly on physical health concerns. She said that she was given thorazine by her outpatient provider because \"I was mad and crazy.\" She denies any mental health concerns currently. She abruptly yelled \"Leave now!\" Writer then exited her room for safety.          Medications:       atorvastatin  40 mg Oral QPM     calcium carbonate (OS-ALEK) 500 mg (elemental) tablet  500 mg Oral BID w/meals     chlorproMAZINE  150 mg Oral At Bedtime    Or     chlorproMAZINE  25 mg Intramuscular At Bedtime     cholecalciferol  50,000 Units Oral Q7 Days     haloperidol  5 mg Oral BID    Or     haloperidol lactate  5 mg Intramuscular BID     losartan  100 mg Oral Daily     metoprolol succinate ER  50 mg Oral " "Daily     multivitamin, therapeutic  1 tablet Oral Daily     trihexyphenidyl  5 mg Oral TID          Allergies:     Allergies   Allergen Reactions     Lisinopril Cough          Labs:     No results found for this or any previous visit (from the past 24 hour(s)).       Psychiatric Examination:     BP (!) 177/92 (BP Location: Right arm)   Pulse 104   Temp 98.4  F (36.9  C) (Oral)   Resp 16   Ht 1.6 m (5' 3\")   Wt 65.3 kg (144 lb)   SpO2 100%   BMI 25.51 kg/m    Weight is 144 lbs 0 oz  Body mass index is 25.51 kg/m .    Weight over time:  Vitals:    04/27/19 0144   Weight: 65.3 kg (144 lb)       Orthostatic Vitals     None            Cardiometabolic risk assessment. 04/29/19      Reviewed patient profile for cardiometabolic risk factors    Date taken /Value  REFERENCE RANGE   Abdominal Obesity  (Waist Circumference)   See nursing flowsheet Women ?35 in (88 cm)   Men ?40 in (102 cm)      Triglycerides  Triglycerides   Date Value Ref Range Status   04/29/2019 98 <150 mg/dL Final       ?150 mg/dL (1.7 mmol/L) or current treatment for elevated triglycerides   HDL cholesterol  HDL Cholesterol   Date Value Ref Range Status   04/29/2019 64 >49 mg/dL Final   ]   Women <50 mg/dL (1.3 mmol/L) in women or current treatment for low HDL cholesterol  Men <40 mg/dL (1 mmol/L) in men or current treatment for low HDL cholesterol     Fasting plasma glucose (FPG) Lab Results   Component Value Date    GLC 90 04/29/2019      FPG ?100 mg/dL (5.6 mmol/L) or treatment for elevated blood glucose   Blood pressure  BP Readings from Last 3 Encounters:   04/27/19 (!) 177/92   04/26/19 164/86    Blood pressure ?130/85 mmHg or treatment for elevated blood pressure   Family History  See family history     Appearance: awake, alert and unkempt  Attitude:  guarded and uncooperative, agitated, demanding  Eye Contact:  poor   Mood:  angry  Affect:  mood congruent, intensity is heightened and labile  Speech:  rambling  Language: fluent and intact in " English  Psychomotor, Gait, Musculoskeletal:  no evidence of tardive dyskinesia, dystonia, or tics  Throught Process:  disorganized and illogical  Associations:  no loose associations, though difficult to fully assess  Thought Content:  patient appears to be responding to internal stimuli  Insight:  limited  Judgement:  limited  Oriented to:  unable to fully assess  Attention Span and Concentration:  limited  Recent and Remote Memory:  limited  Fund of Knowledge:  low-normal    Clinical Global Impressions  First:  Considering your total clinical experience with this particular patient population, how severe are the patient's symptoms at this time?: 7 (04/27/19 0750)  Compared to the patient's condition at the START of treatment, this patient's condition is:: 4 (04/27/19 0750)  Most recent:  Considering your total clinical experience with this particular patient population, how severe are the patient's symptoms at this time?: 7 (04/27/19 0750)  Compared to the patient's condition at the START of treatment, this patient's condition is:: 4 (04/27/19 0750)           Precautions:     Behavioral Orders   Procedures     Assault precautions     Code 1 - Restrict to Unit     Elopement precautions     Routine Programming     As clinically indicated     Status 15     Every 15 minutes.          Diagnoses:     Schizophrenia  Hypertension  Hyperlipidemia  CVA 2017  Right tibial plateau fracture s/p ORIF 1/8/2019         Assessment & Plan:     Assessment and hospital summary:  Michelle Pino is a 58-year-old female admitted to [address] on 4/26/2019.  She was admitted on a 72-hour hold through [facility], brought in via EMS after her  made multiple calls to the police due to the patient's bizarre behaviors, level of agitation and threats to kill him.  She informed her  that she believed he had killed her parents and her sister and intended to kill her as well.  She had been leaving the stove  on for hours.  When the police arrived, she screamed and threatened to kill them.  She was handcuffed for transport.   She was most recently hospitalized at [facility] [place] about 6 weeks from December 2018 through January 2019 in the context of medication noncompliance and was committed and Jarvised.  She had not been taking her medications x 2 weeks prior to her present admission.    Target psychiatric symptoms and interventions:  Will schedule Thorazine 150 mg at bedtime, backup with 25 mg IM. Jarvised medication.  Will add Haldol 5 mg BID (0800 and 1400) to target ongoing aggression and psychosis while monitoring closely for side effects. Jarvised medication. Will plan on initiating Haldol dec after ensuring she is able to tolerate oral medication. She had been on Haldol Dec for period of time w/o significant side effects per chart review.  Will increase Artane to 5 mg TID for EPSE (none noted during interview)    Medical Problems and Treatments:  Poorly controlled HTN. Pt declining all scheduled anti-hypertensives. Will continue to monitor.  Dry mouth- Add biotene QID prn  Constipation-Schedule Miralax 17 g daily      Behavioral/Psychological/Social:  Encourage participation in groups after improvement    Legal: Revoking PD. Hatch in place with following medications: Thorazine, Haldol, Risperdal, Abilify    Safety:  - Continue precautions as noted above  - Status 15 minute checks    Disposition:    Gabby Zaman MD  [facility]

## 2019-04-30 NOTE — PROGRESS NOTES
"Pt at seclusion window, banging on door.  Repeatedly asking to open the door, and making threatening statements--\"you can't do this, you see when I get out.  What's your name?\"  Pt then began spitting on floor and walls.  "

## 2019-04-30 NOTE — PROVIDER NOTIFICATION
"   04/29/19 1915   Justification   Clinical Justification Others     Patient became abruptly agitated in [facility], demanding to be let into the bathroom.  Unit staff began to open the bathroom door while simultaneously providing reassurance and support.  Michelle continued to escalate.  A female staff member attempted to provide verbal de-escalation; however, Michelle became extremely aggressive very quickly, swinging her clenched fist toward the female staff member's face- which she missed- and hitting the staff member in the arm.  At this point, a duress beeper was triggered and a \"Code 21\" was soon called overheard.  Michelle then elected to barricade herself in the bathroom in [facility] as the code team assembled.  Eventually, Michelle was advised that unit staff would be opening the door.  Michelle could be seen standing in front of the sink and dumping water on her head.  She would not follow instructions to walk out of the bathroom.  Eventually, unit staff were able to walk in, take control of her arms, and walk her safely down to the seclusion room.  Once in the seclusion room, Michelle was given an IM injection of PRN Zyprexa 10 mg.  No apparent injury occurred during the initiation of seclusion.  RN writer reviewed these events with Dr. Zaman and obtained an order for seclusion with a start time of 19:15.  Will continue to monitor closely.  "

## 2019-04-30 NOTE — PROGRESS NOTES
RN writer made another attempt to engage in debriefing with Michelle and take the next steps toward processing her out of seclusion.  Unfortunately, she remains agitated and continues to engage in threatening behavior. Michelle repeatedly made direct death threats toward RN writer.  She is either unable or unwilling to process the events leading up to her seclusion event.  Will continue her seclusion for now.

## 2019-04-30 NOTE — PROVIDER NOTIFICATION
04/29/19 1931   Seclusion or Restraint Order   In Person Face to Face Assessment Conducted Yes-Eval of pt's immediate situation, reaction to intervention, complete review of systems assessment, behavioral assessment & review/assessment of hx, drugs & meds, recent labs, etc, behavioral condition, need to continue/terminate restraint/seclusion   Patient Experienced No adverse physical outcome from seclusion/restraint initiation   Continuation of Seclus/Restraint indicated at this time Yes     Patient was sitting on the seclusion room mat, facing the window.  She appeared in NAD.  When writer would ask the patient a question, the patient would spit on the seclusion room, door, the ground, seclusion, mat, etc.  She would not process with writer.  She would not answer if she had an injury, but she had no apparent injury.    Writer assessed patient as still a threat to others.  Seclusion still indicated at this time.    Writer reviewed patient's chart.    On call provider was updated on the results of the face to face and that there were no apparent injuries to the patient and none to staff.

## 2019-04-30 NOTE — PROGRESS NOTES
04/30/19 1300   Behavioral Health   Hallucinations denies / not responding to hallucinations   Thinking confused;paranoid   Orientation person: oriented;place: oriented   Memory baseline memory   Insight insight appropriate to situation;insight appropriate to events   Judgement impaired   Eye Contact at examiner   Affect irritable   Mood irritable   Physical Appearance/Attire appears stated age;attire appropriate to age and situation   Suicidality   (denies)   Speech incoherent;pressured     Pt was irritable and verbally abusive to staff and doctor,she refused to listen to the doctor and never cooperative in taking her medications saying that its a poison.pt complained of constipation but was later given medication to take care of that. Pt ate all her complete meals and went back to her room.pt did tell staff that she is okay and only needs to rest. No SI,SIB,Observed.

## 2019-04-30 NOTE — PROVIDER NOTIFICATION
04/29/19 2144   Restraint Monitoring Q15 Minutes   Psychological Status O  (Pt is calm, agrees to safe behavior)   Physical Comfort Other  (Pt does not appear in any acute physical distress)   Circulation NS   Continuous Observation Yes   Restraint Type   Seclusion (BH) Discontinued   Debriefing   Debriefing DO   Does patient understand why the event happened? Yes   Does patient agree to safe behaviors? Yes   What can we do differently so this doesn't happen again? Patient refuses to answer   Plan of care reviewed and modified Yes

## 2019-05-01 PROCEDURE — 25000128 H RX IP 250 OP 636: Performed by: PSYCHIATRY & NEUROLOGY

## 2019-05-01 PROCEDURE — 12400001 ZZH R&B MH UMMC

## 2019-05-01 RX ORDER — DIPHENHYDRAMINE HCL 25 MG
50 CAPSULE ORAL EVERY 4 HOURS PRN
Status: DISCONTINUED | OUTPATIENT
Start: 2019-05-01 | End: 2019-06-05 | Stop reason: HOSPADM

## 2019-05-01 RX ORDER — DIPHENHYDRAMINE HYDROCHLORIDE 50 MG/ML
25 INJECTION INTRAMUSCULAR; INTRAVENOUS EVERY 4 HOURS PRN
Status: DISCONTINUED | OUTPATIENT
Start: 2019-05-01 | End: 2019-06-05 | Stop reason: HOSPADM

## 2019-05-01 RX ADMIN — HALOPERIDOL LACTATE 5 MG: 5 INJECTION, SOLUTION INTRAMUSCULAR at 15:31

## 2019-05-01 RX ADMIN — OLANZAPINE 10 MG: 10 INJECTION, POWDER, FOR SOLUTION INTRAMUSCULAR at 11:50

## 2019-05-01 RX ADMIN — DIPHENHYDRAMINE HYDROCHLORIDE 25 MG: 50 INJECTION, SOLUTION INTRAMUSCULAR; INTRAVENOUS at 20:29

## 2019-05-01 RX ADMIN — HALOPERIDOL LACTATE 5 MG: 5 INJECTION, SOLUTION INTRAMUSCULAR at 09:31

## 2019-05-01 RX ADMIN — CHLORPROMAZINE HYDROCHLORIDE 25 MG: 25 INJECTION INTRAMUSCULAR at 20:17

## 2019-05-01 ASSESSMENT — ACTIVITIES OF DAILY LIVING (ADL)
ORAL_HYGIENE: PROMPTS
ORAL_HYGIENE: PROMPTS
HYGIENE/GROOMING: PROMPTS
DRESS: SCRUBS (BEHAVIORAL HEALTH)
DRESS: SCRUBS (BEHAVIORAL HEALTH)
HYGIENE/GROOMING: PROMPTS
LAUNDRY: UNABLE TO COMPLETE

## 2019-05-01 NOTE — PLAN OF CARE
"Patient isolative to room majority of the evening. Patient early in the evening was out in the milieu appearing tense with pressured speech at times yelling without provocations. PT while in the milieu was using the phone talking to her son, during which she was heard yelling and appearing agitated. During the conversation she requested the RN writer talk to her son. He was seeking an update on her care. Son was concerned of patients unwillingness to let staff assess vital signs and address hypertension that was noted in past days charting. Son attempted to call back to encourage PT to cooperate with vitals monitoring thought after the phone call patient again refused assessment stating \"go away\". Patient later in the evening was offered and accepting of her HS psych medications with some slight hesitation and paranoia of accepting water from the RN. PT refused scheduled lipitor and after medications refused to check in and again told staff to \"go away\". PT not displaying observable signs of hypertension and stated no related symptoms. No observed aggression, SI/SIB, or thoughts of harming others observed this shift.  "

## 2019-05-01 NOTE — PROGRESS NOTES
Patient refused her scheduled AM meds. After three attempts to get her to take oral meds she was given an IM of Haldol which is in place due to her Hatch order.  As the morning progressed she began yelling and was very disruptive.  She was asked repeatedly to go to her room because of her high level of agitation to 2:1 SIO patient in lounge area Michelle needed to go to her room.  She refused. A short time later she requested that writer/RN meet with her in her room.  She immediately said that she wanted to leave the hospital. RN responded by saying that she would need to talk with the MD about what her discontinue plan is. She then abruptly lunged toward this writer. As writer moved away from patient she then also lunged at a second staff. The door to her room was closed and she attempted to come out of room. At that time lauren Weinberg was called. SInce only two staff responded a 21 was called. A total of only 3 staff responded.  Once patient was on backboard more staff including Security responded. Patient was given an IM of Zyprexa at that time due to her out of control behavior and her lunges at staff.   Writer attempted to debrief patient numerous times.  She was spitting at window as staff attempted to talk with her. She yelled and hissed at staff repeatedly.  She was told that she needed to take her scheduled medication prior to coming out and she said she would not.  Ultimately she was given her scheduled Haldol IM since there is a Hatch. She remained in seclusion at end of shift.

## 2019-05-01 NOTE — PROGRESS NOTES
"Approached patient to assess mental health status, safety and readiness for reintegration into the milieu. Michelle continues to bang on the door to seclusion, and is shouting \"let me out!\" She appears to have significant difficulty tracking, and repeatedly shouted \"Do you want to die!? You want to die. If you dont get me out of here you will die!\" Attempted to assess thoughts of harming others, and Michelle is unable to participate in the assessment. Continues to shout belligerently, making threats and is unable to verbalize understanding of or ability to engage in safe behaviors. She remains at imminent risk of harming others. No overt physical distress observed, however unable to assess VS due to refusal and ongoing agitation. Order for seclusion recently renewed by previous shift. Will continue to monitor closely and reassess.   "

## 2019-05-01 NOTE — PROVIDER NOTIFICATION
"   05/01/19 4131   Debriefing   Debriefing DO   Does patient understand why the event happened? Patient unable to answer   Does patient agree to safe behaviors? Patient unable to answer   What can we do differently so this doesn't happen again? Patient unable to answer   Plan of care reviewed and modified Yes     Michelle remains tense and shouting when responding to questions though is no longer pounding on doors or threatening people to to \"kill\" others. She remains disorganized, continues to shout \"let me out! I need to shit!\"  and is unable to verbalize understanding of events leading to seclusion and is unable to describe alternative methods of meeting her needs. However, she has demonstrated improved behavioral control and denies wanting to hurt others at this time. Seclusion discontinued and patient walked back to her room, given water, a snack and used the restroom.   "

## 2019-05-01 NOTE — PROVIDER NOTIFICATION
05/01/19 1200   Seclusion or Restraint Order   In Person Face to Face Assessment Conducted Yes-Eval of pt's immediate situation, reaction to intervention, complete review of systems assessment, behavioral assessment & review/assessment of hx, drugs & meds, recent labs, etc, behavioral condition, need to continue/terminate restraint/seclusion   Patient Experienced No adverse physical outcome from seclusion/restraint initiation   Continuation of Seclus/Restraint indicated at this time Yes   Pt is lying on seclusion room mattress and refusing to respond verbally to writer. Pt does not appear to be in any physical distress and does not appear to have any injuries. All MD orders, labs, and medication reviewed. Physician notified of face to face results. Will continue to monitor closely for any signs of distress.

## 2019-05-02 LAB
ALBUMIN UR-MCNC: NEGATIVE MG/DL
AMORPH CRY #/AREA URNS HPF: ABNORMAL /HPF
APPEARANCE UR: ABNORMAL
BILIRUB UR QL STRIP: NEGATIVE
COLOR UR AUTO: ABNORMAL
GLUCOSE UR STRIP-MCNC: NEGATIVE MG/DL
HGB UR QL STRIP: NEGATIVE
KETONES UR STRIP-MCNC: NEGATIVE MG/DL
LEUKOCYTE ESTERASE UR QL STRIP: NEGATIVE
NITRATE UR QL: NEGATIVE
PH UR STRIP: 7.5 PH (ref 5–7)
RBC #/AREA URNS AUTO: <1 /HPF (ref 0–2)
SOURCE: ABNORMAL
SP GR UR STRIP: 1.01 (ref 1–1.03)
SQUAMOUS #/AREA URNS AUTO: <1 /HPF (ref 0–1)
TRANS CELLS #/AREA URNS HPF: <1 /HPF (ref 0–1)
UROBILINOGEN UR STRIP-MCNC: NORMAL MG/DL (ref 0–2)
WBC #/AREA URNS AUTO: 1 /HPF (ref 0–5)

## 2019-05-02 PROCEDURE — G0177 OPPS/PHP; TRAIN & EDUC SERV: HCPCS

## 2019-05-02 PROCEDURE — 12400001 ZZH R&B MH UMMC

## 2019-05-02 PROCEDURE — 25000132 ZZH RX MED GY IP 250 OP 250 PS 637: Performed by: NURSE PRACTITIONER

## 2019-05-02 PROCEDURE — 25000132 ZZH RX MED GY IP 250 OP 250 PS 637: Performed by: PSYCHIATRY & NEUROLOGY

## 2019-05-02 PROCEDURE — 99233 SBSQ HOSP IP/OBS HIGH 50: CPT | Mod: GC | Performed by: PSYCHIATRY & NEUROLOGY

## 2019-05-02 PROCEDURE — 81001 URINALYSIS AUTO W/SCOPE: CPT | Performed by: PSYCHIATRY & NEUROLOGY

## 2019-05-02 RX ADMIN — CALCIUM 500 MG: 500 TABLET ORAL at 18:00

## 2019-05-02 RX ADMIN — TRIHEXYPHENIDYL HYDROCHLORIDE 5 MG: 5 TABLET ORAL at 08:24

## 2019-05-02 RX ADMIN — OLANZAPINE 10 MG: 10 TABLET, FILM COATED ORAL at 00:35

## 2019-05-02 RX ADMIN — HALOPERIDOL 5 MG: 5 TABLET ORAL at 08:24

## 2019-05-02 RX ADMIN — HALOPERIDOL 5 MG: 5 TABLET ORAL at 13:24

## 2019-05-02 RX ADMIN — TEMAZEPAM 15 MG: 15 CAPSULE ORAL at 00:35

## 2019-05-02 RX ADMIN — CHLORPROMAZINE HYDROCHLORIDE 150 MG: 100 TABLET, FILM COATED ORAL at 19:18

## 2019-05-02 ASSESSMENT — ACTIVITIES OF DAILY LIVING (ADL)
DRESS: SCRUBS (BEHAVIORAL HEALTH)
ORAL_HYGIENE: INDEPENDENT
LAUNDRY: UNABLE TO COMPLETE
HYGIENE/GROOMING: INDEPENDENT

## 2019-05-02 NOTE — PROVIDER NOTIFICATION
"   05/01/19 2045   Seclusion or Restraint Order   In Person Face to Face Assessment Conducted Yes-Eval of pt's immediate situation, reaction to intervention, complete review of systems assessment, behavioral assessment & review/assessment of hx, drugs & meds, recent labs, etc, behavioral condition, need to continue/terminate restraint/seclusion   Patient Experienced No adverse physical outcome from seclusion/restraint initiation   Continuation of Seclus/Restraint indicated at this time Yes   Pt is sitting on the seclusion room mattress, angrily staring at writer. When writer asks pt if she has any pain or discomfort, she yells \"Bastard! Idiot! Get out of here!\" Refuses to answer face to face questions. Pt does not appear to have any injuries and does not appear to be in any distress. Will continue to monitor closely and assess. MD orders, medications, and lab results reviewed. Physician notified of face to face results.  "

## 2019-05-02 NOTE — PROGRESS NOTES
PT pushed the urine on her floor towards the door. She hollered at staff through the glass and pounded on the glass and spit on the glass window.
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
## 2019-05-02 NOTE — PROGRESS NOTES
Staff report patient is laying on the shira, with the leg wrap covering herself. She refuses to speak with this writer, and during attempts she either ignores staff, or shouts for them to let her out of the seclusion room. She refuses to participate in discussion of safety and appears paranoid, tense and unable to verbalize or demonstrate ability to engage in safe behaviors at this time. Will continue to monitor closely and reassess.

## 2019-05-02 NOTE — PROVIDER NOTIFICATION
"   05/02/19 1149   Vital Signs   Pulse 88   Pulse/Heart Rate Source Radial   /85  (manual)   BP Location Right arm     Patient allowed a manual BP check, because it was \"the right equipment\".  Patient denied blurred vision, headache, dizziness, and pain.  "
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
## 2019-05-02 NOTE — PROVIDER NOTIFICATION
"Again attempted to assess safety and when asked if she was calm, she said \"no!\" Then pt began shouting, threatening to \"kill that nurse!\" Michlele continued to shout, effectively ending the conversation. Will continue to monitor.   "

## 2019-05-02 NOTE — PROGRESS NOTES
Pt standing at the door and yelling at staff. Pt continues to screaming and pounding the door continuously for approximately 10 minutes. Pt is name calling and cursing at staff.

## 2019-05-02 NOTE — PROGRESS NOTES
Pt is pounding the door and screaming continuously. Pt spilled/flooded water mixed with saliva under the seclusion door towards the staff.

## 2019-05-02 NOTE — PROGRESS NOTES
At 2330 pt was standing at the door of the seclusion room yelling, demanding to be let out and use the toilet. There was a bedpan in the room. Pt turned away and began yelling at the wall, denied hearing voices when asked who she was talking to. Discontinuation criteria explained to pt again. Pt did calm herself over about 1/2 hr and laid down to rest. After 30 min of rest, criteria explained again including taking PO meds, Zyprexa 10 mg and Restoril 15 mg. Pt was told that she would have a BR in a new room and could go there to sleep. Pt refused initially but then accepted the meds PO, cooperated with mouth check and used the toilet in the seclusion area. Unable to obtain UA despite collection pan in use (will position differently in her room). Pt walked to rm 154 without comment. SIO continues. Pt seems to respond to  Americans with more anger and paranoia so staff assignments adjusted to reduce stimulation. Pt sat on bed looking out the window for awhile then laid down and appeared to sleep. Has awakened and used the BR x2 and returned to bed.

## 2019-05-02 NOTE — PROVIDER NOTIFICATION
"   05/02/19 0045   Debriefing   Debriefing DO   Patient initially led out of seclusion after she took 2 PO medications and then went into the bathroom at approx. 12:40PM.  Patient did void in the bathroom, but apparently did not void into the \"hat\" that was in the toilet (attempted to get a UA). Patient then led to her new room (one with a bathroom) by her RN, and settled into her room.  "

## 2019-05-02 NOTE — PROGRESS NOTES
Pt urinated and then drove the urine content towards the door aiming to slide it under the seclusion door with  the intent to expose urine to staff sitting by the door (this case this writer).

## 2019-05-03 PROCEDURE — 25000132 ZZH RX MED GY IP 250 OP 250 PS 637: Performed by: NURSE PRACTITIONER

## 2019-05-03 PROCEDURE — 25000132 ZZH RX MED GY IP 250 OP 250 PS 637: Performed by: PSYCHIATRY & NEUROLOGY

## 2019-05-03 PROCEDURE — 12400001 ZZH R&B MH UMMC

## 2019-05-03 PROCEDURE — 99232 SBSQ HOSP IP/OBS MODERATE 35: CPT | Mod: GC | Performed by: PSYCHIATRY & NEUROLOGY

## 2019-05-03 RX ADMIN — HALOPERIDOL 5 MG: 5 TABLET ORAL at 08:41

## 2019-05-03 RX ADMIN — CHLORPROMAZINE HYDROCHLORIDE 150 MG: 100 TABLET, FILM COATED ORAL at 19:12

## 2019-05-03 RX ADMIN — NICOTINE POLACRILEX 2 MG: 2 GUM, CHEWING BUCCAL at 16:53

## 2019-05-03 RX ADMIN — HALOPERIDOL 5 MG: 5 TABLET ORAL at 13:35

## 2019-05-03 NOTE — PLAN OF CARE
"48 hour nursing assessment    SI/SIB/HI: none reported or observed  Hallucinations: pt does not appear to be responding to internal stimuli  Depression: none reported   Anxiety: none reported   Other symptoms reported: pt shows some paranoia (angry at staff for taking lid off of meal tray, wanting drinking water to be boiled), is isolative to room and suspicious of others. Pt is taking medications PO though needs strong encouragement.    Shift summary:  Pt presents with tense, angry and irritable affect. Pt continues to decline VS assessment and all meds that are not on patino order. Pt's verbal interactions with staff are short and insulting (calling staff \"idiots\", etc). No overt threats noted. Pt did grab at writer's hand during med administration this morning but not make any attempts to cause injury. Pt tried to take a chair into her room, but was cooperative with redirection. Pt asked for meals and snacks, ate in the milieu, declined water as \"it needs to be boiled\", and returned to room after eating. Pt spent almost the entire shift in her room.   SIO was discontinued at 1030 this morning as pt has shown no aggressive behavior and been cooperative with redirection since yesterday. Staff continue to monitor patient closely for safety. Pt has been taking PO medications. Suicide assessment not required after SIO discontinuation per policy as pt was on SIO for aggressive behavior only. Pt is not on suicide or SIB precautions.   No other concerns at this time. Nursing will continue to monitor and assess.   "

## 2019-05-03 NOTE — PROGRESS NOTES
Pt presented to the morning OT group, first asking for nail polish a few times, then to play bingo.  When told we could do bingo another time soon, pt was easily redirected with another activity.  Spent most of the session calmly looking through glass beads, and was able to select ones she liked, and set up on a board, then string independently.  Little conversation, appeared relaxed by the [place] music a peer was selecting on the ipad.  Chose to go back to her room when project was completed.
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
## 2019-05-04 PROCEDURE — 12400001 ZZH R&B MH UMMC

## 2019-05-04 PROCEDURE — 25000132 ZZH RX MED GY IP 250 OP 250 PS 637: Performed by: PSYCHIATRY & NEUROLOGY

## 2019-05-04 RX ADMIN — CHLORPROMAZINE HYDROCHLORIDE 150 MG: 100 TABLET, FILM COATED ORAL at 18:20

## 2019-05-04 RX ADMIN — HALOPERIDOL 5 MG: 5 TABLET ORAL at 15:03

## 2019-05-04 RX ADMIN — HALOPERIDOL 5 MG: 5 TABLET ORAL at 08:34

## 2019-05-04 ASSESSMENT — ACTIVITIES OF DAILY LIVING (ADL)
LAUNDRY: UNABLE TO COMPLETE
DRESS: SCRUBS (BEHAVIORAL HEALTH)
HYGIENE/GROOMING: PROMPTS
ORAL_HYGIENE: PROMPTS

## 2019-05-04 NOTE — PROGRESS NOTES
"Patient was isolative to her room throughout the shift.  Labile and irritable mood when interacting with staff, calling them \"idiots\".  She displayed no insight into mental, or physical health, refusing VS and physical health meds.  Patient was extremely agitated during HS medication pass, specifically when checking that she was not diverting medications.  When writer asked patient to open her mouth, the patient started yelling and then patient swung  a bedsheet at writer, but remained on her bed and was yelling  Stupid! Idiot! Get out! .  Writer assessed that patient had taken the medications as she was yelling loudly and persistently and had drank ample water with her medications.  Patient did not rush at writer and self-limited as writer left the room, staying on her bed, so seclusion was not initiated.    "

## 2019-05-04 NOTE — PROGRESS NOTES
"Patient presents as loud, hostile, agitated, and belligerent.  She became extremely disruptive in the milieu this morning while perseverating on obtaining immediate discharge.  She could be seen loitering in the hallway, near the far end of pod #1, and in close proximity to the unit exit doors.  She could be seen turning in circles and chanting something that could not be deciphered while making bizarre motions with her hands.  RN writer approached her and offered morning medications, which she initially refused to accept.  She continued to refuse- even when informed that she is court-ordered to take the Haldol.  At this point, Michelle was advised that she would be receiving an IM injection of the Haldol, per her Hatch order.  Michelle then agreed to take the Haldol, but she demanded that RN writer place the medication cup on the floor prior to administration.  This request was refused out of concern for infection control.  With extensive encouragement, Michelle eventually took the oral dose of Haldol; however, she referred to this medication as \"poison\" and threatened to kill RN writer.  It took very firm redirection to get Michelle away from the unit exit doors and to get safely back on lounge #2.  At this point, the unit dividing doors were closed.  Michelle continued to perseverate on obtaining immediate discharge.  She repeatedly stated, \"I want to go home right now!\", with loud, pressured speech.  Review of her legal status and giving her a copy of her civil commitment paperwork was not sufficient to reduce the perseverative behavior.  She has no appreciable insight into her psychotic symptoms or legal status.  She refused vital signs assessment and to accept any of her medications that were not forced through court order.  Michelle continues on elopement and assault precautions.  Will continue to monitor closely.  "

## 2019-05-04 NOTE — PROGRESS NOTES
Pt was irritable and tense each time interaction was initiated by staff. Pt was observed laying across the vent in her room by the window instead of her bed, with blankets wrapped around her. Pt would stare into space as she did this, and was rambling, appeared to be responding to AH. Pt refused dinner this evening.      05/04/19 1837   Behavioral Health   Hallucinations auditory;appears responding   Thinking confused;distractable;poor concentration;delusional;paranoid   Orientation person: oriented;place: oriented;situation, disoriented   Memory other (see comment)  (devyn)   Insight poor   Judgement impaired   Eye Contact at examiner;staring;into space   Affect angry;blunted, flat;tense;irritable   Mood labile   Physical Appearance/Attire disheveled;untidy   Hygiene neglected grooming - unclean body, hair, teeth   1. Wish to be Dead No   2. Non-Specific Active Suicidal Thoughts  No   Activity isolative;withdrawn   Speech rambling;pressured;flight of ideas   Medication Sensitivity no observed side effects   Psychomotor / Gait balanced;steady   Activities of Daily Living   Hygiene/Grooming prompts   Oral Hygiene prompts   Dress scrubs (behavioral health)   Laundry unable to complete   Room Organization independent

## 2019-05-05 PROCEDURE — 25000132 ZZH RX MED GY IP 250 OP 250 PS 637: Performed by: NURSE PRACTITIONER

## 2019-05-05 PROCEDURE — 25000132 ZZH RX MED GY IP 250 OP 250 PS 637: Performed by: PSYCHIATRY & NEUROLOGY

## 2019-05-05 PROCEDURE — 12400001 ZZH R&B MH UMMC

## 2019-05-05 RX ADMIN — NICOTINE POLACRILEX 2 MG: 2 GUM, CHEWING BUCCAL at 17:57

## 2019-05-05 RX ADMIN — CHLORPROMAZINE HYDROCHLORIDE 150 MG: 100 TABLET, FILM COATED ORAL at 19:00

## 2019-05-05 RX ADMIN — HALOPERIDOL 5 MG: 5 TABLET ORAL at 14:07

## 2019-05-05 RX ADMIN — CALCIUM 500 MG: 500 TABLET ORAL at 17:57

## 2019-05-05 RX ADMIN — HALOPERIDOL 5 MG: 5 TABLET ORAL at 09:09

## 2019-05-05 ASSESSMENT — ACTIVITIES OF DAILY LIVING (ADL)
DRESS: SCRUBS (BEHAVIORAL HEALTH)
DRESS: INDEPENDENT;SCRUBS (BEHAVIORAL HEALTH)
ORAL_HYGIENE: PROMPTS
HYGIENE/GROOMING: PROMPTS
ORAL_HYGIENE: INDEPENDENT
LAUNDRY: UNABLE TO COMPLETE
HYGIENE/GROOMING: INDEPENDENT
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
## 2019-05-05 NOTE — PROGRESS NOTES
Pt in and out of sleep all day, ate some of her meals.  Angry, irritable, tense.  Fowl language with staff.  No SI/SIB observed.       05/05/19 1400   Behavioral Health   Hallucinations appears responding   Thinking poor concentration;paranoid;distractable   Insight poor   Judgement impaired   Affect angry;tense;irritable   Mood labile;irritable   Physical Appearance/Attire attire appropriate to age and situation   Hygiene neglected grooming - unclean body, hair, teeth   Suicidality other (see comments)  (none observed)   1. Wish to be Dead No   2. Non-Specific Active Suicidal Thoughts  No   Self Injury other (see comment)  (none observed)   Activity isolative   Speech flight of ideas;rambling;pressured   Medication Sensitivity no observed side effects   Psychomotor / Gait balanced;steady   Psycho Education   Type of Intervention 1:1 intervention   Response observes from a distance   Activities of Daily Living   Hygiene/Grooming independent   Oral Hygiene independent   Dress independent;scrubs (behavioral health)   Room Organization independent   Behavioral Health Interventions   Social and Therapeutic Interventions (Psychotic Symptoms) encourage effective boundaries with peers;encourage socialization with peers

## 2019-05-06 PROCEDURE — 99233 SBSQ HOSP IP/OBS HIGH 50: CPT | Mod: GC | Performed by: PSYCHIATRY & NEUROLOGY

## 2019-05-06 PROCEDURE — 25000132 ZZH RX MED GY IP 250 OP 250 PS 637: Performed by: STUDENT IN AN ORGANIZED HEALTH CARE EDUCATION/TRAINING PROGRAM

## 2019-05-06 PROCEDURE — 25000132 ZZH RX MED GY IP 250 OP 250 PS 637: Performed by: PSYCHIATRY & NEUROLOGY

## 2019-05-06 PROCEDURE — 12400001 ZZH R&B MH UMMC

## 2019-05-06 RX ORDER — HALOPERIDOL 5 MG/ML
10 INJECTION INTRAMUSCULAR
Status: DISCONTINUED | OUTPATIENT
Start: 2019-05-06 | End: 2019-05-06

## 2019-05-06 RX ORDER — HALOPERIDOL 10 MG/1
10 TABLET ORAL
Status: DISCONTINUED | OUTPATIENT
Start: 2019-05-06 | End: 2019-05-06

## 2019-05-06 RX ORDER — HALOPERIDOL 10 MG/1
10 TABLET ORAL AT BEDTIME
Status: DISCONTINUED | OUTPATIENT
Start: 2019-05-06 | End: 2019-05-06

## 2019-05-06 RX ORDER — HALOPERIDOL 5 MG/ML
10 INJECTION INTRAMUSCULAR AT BEDTIME
Status: DISCONTINUED | OUTPATIENT
Start: 2019-05-06 | End: 2019-05-09

## 2019-05-06 RX ORDER — HALOPERIDOL 5 MG/ML
5 INJECTION INTRAMUSCULAR DAILY
Status: DISCONTINUED | OUTPATIENT
Start: 2019-05-07 | End: 2019-05-07

## 2019-05-06 RX ORDER — HALOPERIDOL 5 MG/1
5 TABLET ORAL DAILY
Status: DISCONTINUED | OUTPATIENT
Start: 2019-05-07 | End: 2019-05-07

## 2019-05-06 RX ORDER — HALOPERIDOL 10 MG/1
10 TABLET ORAL AT BEDTIME
Status: DISCONTINUED | OUTPATIENT
Start: 2019-05-06 | End: 2019-05-09

## 2019-05-06 RX ADMIN — HALOPERIDOL 10 MG: 10 TABLET ORAL at 19:09

## 2019-05-06 RX ADMIN — OLANZAPINE 10 MG: 10 TABLET, FILM COATED ORAL at 12:37

## 2019-05-06 RX ADMIN — CHLORPROMAZINE HYDROCHLORIDE 150 MG: 100 TABLET, FILM COATED ORAL at 19:09

## 2019-05-06 RX ADMIN — HALOPERIDOL 5 MG: 5 TABLET ORAL at 09:21

## 2019-05-06 ASSESSMENT — ACTIVITIES OF DAILY LIVING (ADL)
DRESS: INDEPENDENT
ORAL_HYGIENE: INDEPENDENT
HYGIENE/GROOMING: INDEPENDENT
DRESS: INDEPENDENT
HYGIENE/GROOMING: INDEPENDENT
ORAL_HYGIENE: INDEPENDENT
LAUNDRY: UNABLE TO COMPLETE
LAUNDRY: UNABLE TO COMPLETE

## 2019-05-06 NOTE — PROGRESS NOTES
Patient cooperatively took am scheduled Haldol. Patient refused all other scheduled medications and refused vital signs. Patient requesting to speak with the doctor when they arrive.

## 2019-05-06 NOTE — PROGRESS NOTES
05/06/19 1505   Behavioral Health   Hallucinations appears responding   Thinking delusional;distractable;confused;poor concentration   Orientation place: oriented;date: oriented   Memory confabulation;baseline memory   Insight poor   Judgement impaired   Eye Contact at examiner   Affect full range affect;tense;irritable   Mood irritable   Hygiene other (see comment)   1. Wish to be Dead No   2. Non-Specific Active Suicidal Thoughts  No   Activities of Daily Living   Hygiene/Grooming independent   Oral Hygiene independent   Dress independent   Laundry unable to complete   Room Organization independent   Patient had poor boundaries with staff and others during this shift. She wouldn't listen to staff's redirections and at some point staff had to walk her to her room because she was being disruptive in the [facility] [place]. She ate all meals without any issues and she is currently in her room.
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
## 2019-05-06 NOTE — PLAN OF CARE
Problem: Psychotic Symptoms  Goal: Psychotic Symptoms  Outcome: No Change     Patient was isolative and withdrawn to her room all shift.  She only entered the milieu to ask for nicotine gum.  She was less irritable this evening and had only one instance of calling staff ?stupid? and slamming her room door.  She appears to have trouble expressing her needs.  Writer offered to have an  scheduled, but patient adamantly declined.  She denied all mental health concerns.  She was guarded with her answers.  She eventually told writer to ?get out? cutting the interview short.    Patient did not argue about taking her HS Thorazine.  She stated it was helpful, but was unable to elaborate.  She denied feelings of sedation, or other medication side effects.  She denied acute physical concerns.    Patient accepted her 1800 calcium supplement, because she wanted to keep her bones strong.  She refused VS assessment (manual blood pressure) and other physical health medications, because ?I have a strong heart?.  She denied that she has a history of a stroke and appeared suspicious of writer when the question was asked.
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
## 2019-05-07 PROCEDURE — 12400001 ZZH R&B MH UMMC

## 2019-05-07 PROCEDURE — 25000132 ZZH RX MED GY IP 250 OP 250 PS 637: Performed by: PSYCHIATRY & NEUROLOGY

## 2019-05-07 PROCEDURE — 99233 SBSQ HOSP IP/OBS HIGH 50: CPT | Mod: GC | Performed by: PSYCHIATRY & NEUROLOGY

## 2019-05-07 PROCEDURE — 25000132 ZZH RX MED GY IP 250 OP 250 PS 637: Performed by: STUDENT IN AN ORGANIZED HEALTH CARE EDUCATION/TRAINING PROGRAM

## 2019-05-07 PROCEDURE — 25000132 ZZH RX MED GY IP 250 OP 250 PS 637: Performed by: NURSE PRACTITIONER

## 2019-05-07 RX ORDER — HALOPERIDOL 10 MG/1
10 TABLET ORAL DAILY
Status: DISCONTINUED | OUTPATIENT
Start: 2019-05-08 | End: 2019-05-09

## 2019-05-07 RX ORDER — HALOPERIDOL 5 MG/ML
10 INJECTION INTRAMUSCULAR DAILY
Status: DISCONTINUED | OUTPATIENT
Start: 2019-05-08 | End: 2019-05-09

## 2019-05-07 RX ADMIN — NICOTINE POLACRILEX 2 MG: 2 GUM, CHEWING BUCCAL at 20:26

## 2019-05-07 RX ADMIN — CHLORPROMAZINE HYDROCHLORIDE 150 MG: 100 TABLET, FILM COATED ORAL at 21:02

## 2019-05-07 RX ADMIN — METOPROLOL SUCCINATE 50 MG: 50 TABLET, EXTENDED RELEASE ORAL at 08:52

## 2019-05-07 RX ADMIN — HALOPERIDOL 5 MG: 5 TABLET ORAL at 08:22

## 2019-05-07 RX ADMIN — TRIHEXYPHENIDYL HYDROCHLORIDE 5 MG: 5 TABLET ORAL at 09:07

## 2019-05-07 RX ADMIN — HALOPERIDOL 10 MG: 10 TABLET ORAL at 21:02

## 2019-05-07 ASSESSMENT — ACTIVITIES OF DAILY LIVING (ADL)
ORAL_HYGIENE: INDEPENDENT
ORAL_HYGIENE: INDEPENDENT
DRESS: SCRUBS (BEHAVIORAL HEALTH)
HYGIENE/GROOMING: INDEPENDENT
LAUNDRY: WITH SUPERVISION
DRESS: SCRUBS (BEHAVIORAL HEALTH);INDEPENDENT
HYGIENE/GROOMING: HANDWASHING;INDEPENDENT
LAUNDRY: UNABLE TO COMPLETE

## 2019-05-07 NOTE — PROGRESS NOTES
05/07/19 1440   Behavioral Health   Thoughts/Cognition (WDL) WDL   Hallucinations denies / not responding to hallucinations   Thinking poor concentration   Orientation person: oriented   Memory baseline memory   Insight poor   Judgement impaired   Eye Contact at examiner   Affect/Mood (WDL) WDL   Affect irritable;blunted, flat   Mood irritable   ADL Assessment (WDL) WDL   Physical Appearance/Attire attire appropriate to age and situation   Hygiene neglected grooming - unclean body, hair, teeth   Suicidality (WDL) WDL   Suicidality other (see comments)  (AFSHAN)   Wish to be Dead Description   (AFSHAN)   Activities of Daily Living   Hygiene/Grooming independent   Oral Hygiene independent   Dress scrubs (behavioral health);independent   Laundry with supervision   Room Organization independent       Patient was irritable and demanding for the entirety of the shift. Patient would shout at staff. Patient allowed staff to take her vitals and talked to her doctor but would not answer questions from staff. Patient was visible out in the milieu but she was not social with staff or other patients. Patient did not receive visitors during this shift. Patient made a couple of phone calls but kept to herself for the rest of the day. Patient did not agree to check in with staff therefore staff was unable to assess for SI/SIB but staff did not notice any signs to harm herself. Patient did not communicate any concerns to staff.

## 2019-05-07 NOTE — PROGRESS NOTES
Pt isolative to room, sleeping most of evening.  Irritable during periods awake.  Difficult when taking medications, spat at nurse.  No SI/SIB observed.       05/06/19 2200   Behavioral Health   Hallucinations appears responding   Thinking distractable;delusional;poor concentration   Orientation place: oriented   Insight poor   Judgement impaired   Eye Contact at examiner   Affect irritable   Mood irritable   Physical Appearance/Attire untidy   Hygiene neglected grooming - unclean body, hair, teeth;body odor   Suicidality other (see comments)  (none observed)   1. Wish to be Dead No   2. Non-Specific Active Suicidal Thoughts  No   Self Injury other (see comment)  (none observed)   Activity isolative;withdrawn   Speech pressured   Medication Sensitivity no observed side effects   Psychomotor / Gait balanced;steady   Psycho Education   Type of Intervention 1:1 intervention   Response observes from a distance   Activities of Daily Living   Hygiene/Grooming independent   Oral Hygiene independent   Dress independent   Laundry unable to complete   Room Organization independent   Behavioral Health Interventions   Social and Therapeutic Interventions (Psychotic Symptoms) encourage socialization with peers;encourage effective boundaries with peers;encourage participation in therapeutic groups and milieu activities

## 2019-05-07 NOTE — PROGRESS NOTES
"Nursing, psych associate, and Neville met with patient his morning in her room. Patient pleaded to be discharged. Patient eventually agreed to take her scheduled Haldol and metoprolol with at least 30 minutes of encouragement. Patient allowed VS to also be taken. Patient HTN. Sitting /87 P 128   Standing /84 P unable to obtain. Patient then attempted to follow MD down the chen while requesting to discharge. Patient then yelled \"kill everyone here\" and proceeded to tell multiple staff that they \"smell\". Patient redirected to her room.    "

## 2019-05-07 NOTE — PROGRESS NOTES
"Writer brought patient her HS Hatch medications.  Patient initiallyresisted to taking the medications, but then took the Thorazine with no prompting.  Patient then refused to take the Hatch Haldol.  Writer provided education about Hatch medication.     Patient then spat 3 times at writer in quick succession, calling writer a \"bastard\" and that he smelled \"like rubbish\".  Writer retreated to the doorway, but patient then took the PO medication and laid down.  Patient remained in her room.  "

## 2019-05-07 NOTE — PROGRESS NOTES
Patient requested a list of her currently prescribed medications, and this request was granted.  Upon review of the medication list, Michelle noted that she did not have Artane this morning and would like to take it now.  Her scheduled AM dose of Artane was then given without incident.  Will continue to monitor closely, encourage adherence with medications and vital signs assessment, providing patient education as indicated and tolerated by the patient.
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
## 2019-05-08 PROCEDURE — 25000128 H RX IP 250 OP 636: Performed by: STUDENT IN AN ORGANIZED HEALTH CARE EDUCATION/TRAINING PROGRAM

## 2019-05-08 PROCEDURE — 25000132 ZZH RX MED GY IP 250 OP 250 PS 637: Performed by: PSYCHIATRY & NEUROLOGY

## 2019-05-08 PROCEDURE — 25000132 ZZH RX MED GY IP 250 OP 250 PS 637: Performed by: NURSE PRACTITIONER

## 2019-05-08 PROCEDURE — 25000128 H RX IP 250 OP 636: Performed by: PSYCHIATRY & NEUROLOGY

## 2019-05-08 PROCEDURE — 12400001 ZZH R&B MH UMMC

## 2019-05-08 PROCEDURE — 25000132 ZZH RX MED GY IP 250 OP 250 PS 637: Performed by: STUDENT IN AN ORGANIZED HEALTH CARE EDUCATION/TRAINING PROGRAM

## 2019-05-08 RX ADMIN — CHLORPROMAZINE HYDROCHLORIDE 150 MG: 100 TABLET, FILM COATED ORAL at 21:13

## 2019-05-08 RX ADMIN — TRIHEXYPHENIDYL HYDROCHLORIDE 5 MG: 5 TABLET ORAL at 08:39

## 2019-05-08 RX ADMIN — DIPHENHYDRAMINE HYDROCHLORIDE 25 MG: 50 INJECTION, SOLUTION INTRAMUSCULAR; INTRAVENOUS at 21:16

## 2019-05-08 RX ADMIN — HALOPERIDOL 10 MG: 10 TABLET ORAL at 08:39

## 2019-05-08 RX ADMIN — HALOPERIDOL LACTATE 10 MG: 5 INJECTION, SOLUTION INTRAMUSCULAR at 21:16

## 2019-05-08 RX ADMIN — METOPROLOL SUCCINATE 50 MG: 50 TABLET, EXTENDED RELEASE ORAL at 08:39

## 2019-05-08 ASSESSMENT — ACTIVITIES OF DAILY LIVING (ADL)
ORAL_HYGIENE: INDEPENDENT
HYGIENE/GROOMING: INDEPENDENT
DRESS: SCRUBS (BEHAVIORAL HEALTH)
LAUNDRY: UNABLE TO COMPLETE

## 2019-05-08 NOTE — PROGRESS NOTES
"Brief Progress Note  5/8/2019    59yo F with h/o schizophrenia, HTN, admitted with psychosis, agitation. Ongoing irritability noted by staff, making frequent demands to staff, no aggression in last 24h hours. Today, remains preoccupied with frustration over team recommending Haldol, stating \"I take Thorazine, not Haldol!\" Calm initially though ongoing lability; quiet speech initially, and quickly escalates during interview. Able to remain behaviorally appropriate. Denies medication side effects; VSS, now taking scheduled metoprolol. No changes to plan today, see most recent MD progress note 5/7/2019 for details.     Hai Carrillo MD  PGY2 psychiatry resident  "

## 2019-05-08 NOTE — PLAN OF CARE
Patient visible in the milieu majority of the evening appearing isolative to self refusing conversations with staff and peers. Upon approach throughout the evening patient would often ignore and not verbally respond or make eye contact with staff sometimes walking away when staff attempted to talk to her. Patient spent the majority of the evening sitting in a chair outside of her door often staring at staff and peers. Patient had numerous phone call from family which she was accepting of and demonstrated no agitation. Patient was cooperative with vital signs this evening accepting with a female psych associate. No hypertension or symptoms of noted this shift. Patient refused all but her Hatch ordered Haldol and Thorazine which she was accepting of when informed that they were court ordered. Patient denied and demonstrated no side effects this shift. Patient refused to check in with staff. No observed SI/SIB, AH/VH, or threatening behavior noted this shift. Patient still exhibiting tense and at times irritable behavior at times yelling at staff as they attempted to talk to her.

## 2019-05-09 PROCEDURE — 25000132 ZZH RX MED GY IP 250 OP 250 PS 637: Performed by: NURSE PRACTITIONER

## 2019-05-09 PROCEDURE — 25000132 ZZH RX MED GY IP 250 OP 250 PS 637: Performed by: PSYCHIATRY & NEUROLOGY

## 2019-05-09 PROCEDURE — 99233 SBSQ HOSP IP/OBS HIGH 50: CPT | Mod: GC | Performed by: PSYCHIATRY & NEUROLOGY

## 2019-05-09 PROCEDURE — 25000132 ZZH RX MED GY IP 250 OP 250 PS 637: Performed by: STUDENT IN AN ORGANIZED HEALTH CARE EDUCATION/TRAINING PROGRAM

## 2019-05-09 PROCEDURE — 12400001 ZZH R&B MH UMMC

## 2019-05-09 RX ORDER — HALOPERIDOL DECANOATE 100 MG/ML
100 INJECTION INTRAMUSCULAR
Status: DISCONTINUED | OUTPATIENT
Start: 2019-05-10 | End: 2019-06-04

## 2019-05-09 RX ORDER — HALOPERIDOL 10 MG/1
20 TABLET ORAL AT BEDTIME
Status: DISCONTINUED | OUTPATIENT
Start: 2019-05-09 | End: 2019-05-30

## 2019-05-09 RX ORDER — HALOPERIDOL 5 MG/ML
10 INJECTION INTRAMUSCULAR AT BEDTIME
Status: DISCONTINUED | OUTPATIENT
Start: 2019-05-09 | End: 2019-05-30

## 2019-05-09 RX ADMIN — CHLORPROMAZINE HYDROCHLORIDE 150 MG: 100 TABLET, FILM COATED ORAL at 22:03

## 2019-05-09 RX ADMIN — ATORVASTATIN CALCIUM 40 MG: 40 TABLET, FILM COATED ORAL at 22:03

## 2019-05-09 RX ADMIN — HALOPERIDOL 10 MG: 10 TABLET ORAL at 09:26

## 2019-05-09 RX ADMIN — METOPROLOL SUCCINATE 50 MG: 50 TABLET, EXTENDED RELEASE ORAL at 09:15

## 2019-05-09 RX ADMIN — TRIHEXYPHENIDYL HYDROCHLORIDE 5 MG: 5 TABLET ORAL at 09:15

## 2019-05-09 RX ADMIN — HALOPERIDOL 20 MG: 10 TABLET ORAL at 22:04

## 2019-05-09 RX ADMIN — THERA TABS 1 TABLET: TAB at 09:29

## 2019-05-09 RX ADMIN — NICOTINE POLACRILEX 2 MG: 2 GUM, CHEWING BUCCAL at 08:15

## 2019-05-09 ASSESSMENT — ACTIVITIES OF DAILY LIVING (ADL)
DRESS: SCRUBS (BEHAVIORAL HEALTH)
LAUNDRY: UNABLE TO COMPLETE
HYGIENE/GROOMING: INDEPENDENT
ORAL_HYGIENE: INDEPENDENT

## 2019-05-09 NOTE — PLAN OF CARE
Problem: Psychotic Symptoms  Goal: Psychotic Symptoms  Outcome: No Change    Patient had a good start to the evening shift.  She asked to have her VS checked unprompted.  She was visible on the periphery of the milieu, sitting on a chair and observing the hallway.  She said ?please? and ?thank you? to staff.  Mood was calm.  At 2100, Patient accepted her HS Thorazine, but refused Hatch Haldol and started spitting and yelling at staff (see previous notes).  Haldol given with a code team with Benadryl in addition, due to agitation.   Patient did not require seclusion.    Patient denied physical concerns and medication side effects.

## 2019-05-09 NOTE — PROVIDER NOTIFICATION
"   05/08/19 2130   Seclusion or Restraint Order   Length of Order 4   Order Obtained Yes   Attending Physician Notified Yes   Attending Physician's Name Debbie Naegele     Patient was offered her Hatch Haldol.  Patient refused to take the medication PO. She stated \"That is not my medication\".  She was given education that it was a Hatch medications.  She did not believe writer.  Writer re-approached the patient and she again refused the medication and then started spitting at writer.    A code green was paged.  A code 21 was paged, due to a poor response to the code green.  Patient was again offered to take the PO medication. She refused and was yelling and agitated.  The code team used [facility] protocol and escorted the patient to her bed and used a physical hold.  Unit RNs administered Hatch Haldol and PRN benadryl to target agitation.  As the code team left the patient's room, the patient continued to yell, but remained in her room.    The on-call provider(Naegele) was notified of the incident.  An order for the physical hold was placed, starting at 2130. The order ended at 2131.  Provider was notified that there were no apparent injuries to the patient and none to staff.  "

## 2019-05-09 NOTE — PROGRESS NOTES
"Writer met with patient along with the doctors as patient has been unwilling to speak to this writer all morning. Patient insisted that she did not need to take her metoprolol as she is no longer feeling stroke symptoms. Education provided to patient about this. Patient also proceeded to refuse Haldol and insisted that the doctor was lying about the haldol being on her patino order. She stated that she believes the haldol is causing her to loose weight. Patient was reminded that she refused breakfast this morning and perhaps skipping meals might be contributing to her weight loss. Patient had no insight towards this. Patient proceeded to also beg the doctor for discharge. She told the doctor that if she continued to prescribe her haldol, which she feels is a medication she \"does not need\", the doctor would \"loose your license\".   Patient eventually agreed to take the metoprolol and artate, but she threw the haldol pill at writer. She stated it was not on her court order. She stated if writer brought her a shot \"you will die, you will all die\"  Writer brought patient a copy of her court order with her Jarvised medications highlighted, including the haldol.  Patient asked if she took the haldol when she could discharge. Writer reiterated what the doctor had told her, that she needed to continue to take the haldol every day without issue and then she would be safe to eventually discharge. After almost 40 minutes of talking to patient, she agreed to take the PO haldol. She stated she is expecting to now \"discharge next week\".   "

## 2019-05-09 NOTE — PROGRESS NOTES
"[facility], [place]   Psychiatric Progress Note  Hospital Day: 12        Interim History:   The patient's care was discussed with the treatment team during the daily team meeting and/or staff's chart notes were reviewed.  Staff report patient refused Hatch medications yesterday evening, required Code 21 response and administration of Hatch medication and PRN benadryl with physical hold. She did not require seclusion. No further acute events.     Upon interview, patient refusing to meet with resident until attending present. She endorses a good night's sleep, and denies any acute concerns. Denies medication side effects including headache, vision changes, dizziness, stiffness, difficulty ambulating.   Discussed importance of taking prescribed medical and psychiatric medications. Patient perseverative regarding her stroke history and then her history of tibial plateau fracture. Attempted to have a discussion about plan to initiate Haldol Decanoate per Hatch order. Patient becomes agitated and adamant that Haldol is the wrong medication for her. States \"I lose weight on Haldol!\" States \"I take Thorazine for mental health!\" Discussed that per chart review, team notes that she has had a period of stability in the past on BAIG Haldol. She continues to firmly state \"No Haldol!\" \"You will lose your license if you give me Haldol!\" \"Discharge me!\" She agrees with plan to review a copy of the Hatch order. No other issues discussed today.          Medications:       atorvastatin  40 mg Oral QPM     calcium carbonate (OS-ALEK) 500 mg (elemental) tablet  500 mg Oral BID w/meals     chlorproMAZINE  150 mg Oral At Bedtime    Or     chlorproMAZINE  25 mg Intramuscular At Bedtime     cholecalciferol  50,000 Units Oral Q7 Days     haloperidol  20 mg Oral At Bedtime    Or     haloperidol lactate  10 mg Intramuscular At Bedtime     [START ON 5/10/2019] haloperidol decanoate  100 mg Intramuscular Q30 Days " "    losartan  100 mg Oral Daily     metoprolol succinate ER  50 mg Oral Daily     multivitamin, therapeutic  1 tablet Oral Daily     polyethylene glycol  17 g Oral Daily     trihexyphenidyl  5 mg Oral TID          Allergies:     Allergies   Allergen Reactions     Lisinopril Cough          Labs:     No results found for this or any previous visit (from the past 24 hour(s)).       Psychiatric Examination:     /71 (BP Location: Right arm)   Pulse 70   Temp 98.5  F (36.9  C) (Tympanic)   Resp 16   Ht 1.6 m (5' 3\")   Wt 65.3 kg (144 lb)   SpO2 100%   BMI 25.51 kg/m    Weight is 144 lbs 0 oz  Body mass index is 25.51 kg/m .    Weight over time:  Vitals:    04/27/19 0144   Weight: 65.3 kg (144 lb)       Orthostatic Vitals     None        Cardiometabolic risk assessment. 04/29/19    Reviewed patient profile for cardiometabolic risk factors    Date taken /Value  REFERENCE RANGE   Abdominal Obesity  (Waist Circumference)   See nursing flowsheet Women ?35 in (88 cm)   Men ?40 in (102 cm)      Triglycerides  Triglycerides   Date Value Ref Range Status   04/29/2019 98 <150 mg/dL Final       ?150 mg/dL (1.7 mmol/L) or current treatment for elevated triglycerides   HDL cholesterol  HDL Cholesterol   Date Value Ref Range Status   04/29/2019 64 >49 mg/dL Final   ]   Women <50 mg/dL (1.3 mmol/L) in women or current treatment for low HDL cholesterol  Men <40 mg/dL (1 mmol/L) in men or current treatment for low HDL cholesterol     Fasting plasma glucose (FPG) Lab Results   Component Value Date    GLC 90 04/29/2019      FPG ?100 mg/dL (5.6 mmol/L) or treatment for elevated blood glucose   Blood pressure  BP Readings from Last 3 Encounters:   05/08/19 133/71   04/26/19 164/86    Blood pressure ?130/85 mmHg or treatment for elevated blood pressure   Family History  See family history     Appearance: Awake, alert, dressed in hospital scrubs, seated in chair outside bedroom door, moves into room during interview, initially " "seated but stands and points hand at interviewer repeatedly throughout  Attitude: irritable  Eye Contact:  Poor, looking down for most of interview  Mood:  \"fine\"  Affect:  Incongruent with stated mood, Irritable, labile, periods of heightened intensity  Speech:  Elevated rate and volume, stuttering, difficult at times to understand  Language: fluent and intact in English  Psychomotor, Gait, Musculoskeletal:  no evidence of tardive dyskinesia, dystonia, or tics  Throught Process: tangential, concrete, disorganized and illogical  Associations:  no loose associations, though difficult to fully assess  Thought Content:  No SI/HI elicited, no AVH/paranoia elicited, hyperfocused on discharge  Insight:  limited with regards to MH symptoms necessitating hospitalization and medication adjustments and goals needed to meet in order to justify discharge  Judgement:  limited  Oriented to:  unable to fully assess  Attention Span and Concentration:  limited  Recent and Remote Memory:  limited  Fund of Knowledge:  low-normal    Clinical Global Impressions  First:  Considering your total clinical experience with this particular patient population, how severe are the patient's symptoms at this time?: 7 (04/27/19 0750)  Compared to the patient's condition at the START of treatment, this patient's condition is:: 4 (04/27/19 0750)  Most recent:  Considering your total clinical experience with this particular patient population, how severe are the patient's symptoms at this time?: 7 (04/27/19 0750)  Compared to the patient's condition at the START of treatment, this patient's condition is:: 4 (04/27/19 0750)           Precautions:     Behavioral Orders   Procedures     Assault precautions     Code 1 - Restrict to Unit     Elopement precautions     Routine Programming     As clinically indicated     Status 15     Every 15 minutes.          Diagnoses:     Schizophrenia  Hypertension  Hyperlipidemia  CVA 2017  Right tibial plateau fracture " s/p ORIF 1/8/2019         Assessment & Plan:     Assessment and hospital summary:  Michelle Pino is a 58-year-old female admitted to [facility] 12[facility] 4/26/2019 on a 72-hour hold from [facility] ER BIBA in handcuffs after her  made multiple calls to the police due to the patient's bizarre behaviors, level of agitation and threats to kill him.  She informed her  that she believed he had killed her parents and her sister and intended to kill her as well.  She had been leaving the stove on for hours.  When the police arrived, she screamed and threatened to kill them. She was most recently hospitalized at [facility] for about 6 weeks from December 2018 through January 2019 in the context of medication noncompliance and was committed and Jarvised.  She had not been taking her medications x 2 weeks prior to her present admission.    Target psychiatric symptoms and interventions:  Scheduled Thorazine 150 mg at bedtime, backup with 25 mg IM. Jarvised medication.  Consolidate Haldol to 20 mg at bedtime to target ongoing aggression and psychosis. Jarvised medication.   Initiate Haldol Dec 100 mg IM tomorrow 5/10. She had been on Haldol Dec for period of time w/o significant side effects per chart review.  Will increase Artane to 5 mg TID for EPSE (none noted during interview)    Medical Problems and Treatments:  Poorly controlled HTN. Pt declining scheduled Losartan, Atorvastatin, now taking Metoprolol with improvement in SBPs/DBPs. Will continue to monitor. Requested Nursing make attempt for manual blood pressure cuffs if refusing machine.  Dry mouth- Add biotene QID prn  Constipation-Schedule Miralax 17 g daily    Behavioral/Psychological/Social:  Encourage participation in groups after improvement    Legal: Revoking PD. Hatch in place with following medications: Thorazine, Haldol, Risperdal, Abilify    Safety:  - Continue precautions as noted above  - Status 15 minute checks  - SIO for assault risk, severe  intrusiveness    Disposition: pending further evaluation, treatment, and improvement in episodic behavioral dysregulation    Pt seen and discussed with my attending, Ida Zaman MD*  Hia Carrillo MD  PGY2 Resident  Pager: [phone]

## 2019-05-10 PROCEDURE — 12400001 ZZH R&B MH UMMC

## 2019-05-10 PROCEDURE — G0177 OPPS/PHP; TRAIN & EDUC SERV: HCPCS

## 2019-05-10 PROCEDURE — 25000132 ZZH RX MED GY IP 250 OP 250 PS 637: Performed by: STUDENT IN AN ORGANIZED HEALTH CARE EDUCATION/TRAINING PROGRAM

## 2019-05-10 PROCEDURE — 99233 SBSQ HOSP IP/OBS HIGH 50: CPT | Performed by: PSYCHIATRY & NEUROLOGY

## 2019-05-10 PROCEDURE — 25000128 H RX IP 250 OP 636: Performed by: STUDENT IN AN ORGANIZED HEALTH CARE EDUCATION/TRAINING PROGRAM

## 2019-05-10 PROCEDURE — 25000132 ZZH RX MED GY IP 250 OP 250 PS 637: Performed by: PSYCHIATRY & NEUROLOGY

## 2019-05-10 PROCEDURE — 25000132 ZZH RX MED GY IP 250 OP 250 PS 637: Performed by: NURSE PRACTITIONER

## 2019-05-10 RX ADMIN — TRIHEXYPHENIDYL HYDROCHLORIDE 5 MG: 5 TABLET ORAL at 21:50

## 2019-05-10 RX ADMIN — METOPROLOL SUCCINATE 50 MG: 50 TABLET, EXTENDED RELEASE ORAL at 08:16

## 2019-05-10 RX ADMIN — LOSARTAN POTASSIUM 100 MG: 100 TABLET, FILM COATED ORAL at 08:27

## 2019-05-10 RX ADMIN — NICOTINE POLACRILEX 2 MG: 2 GUM, CHEWING BUCCAL at 08:49

## 2019-05-10 RX ADMIN — HALOPERIDOL DECANOATE 100 MG: 100 INJECTION INTRAMUSCULAR at 10:38

## 2019-05-10 RX ADMIN — TRIHEXYPHENIDYL HYDROCHLORIDE 5 MG: 5 TABLET ORAL at 08:16

## 2019-05-10 RX ADMIN — ATORVASTATIN CALCIUM 40 MG: 40 TABLET, FILM COATED ORAL at 21:50

## 2019-05-10 RX ADMIN — CALCIUM 500 MG: 500 TABLET ORAL at 08:26

## 2019-05-10 RX ADMIN — CHLORPROMAZINE HYDROCHLORIDE 150 MG: 100 TABLET, FILM COATED ORAL at 21:50

## 2019-05-10 RX ADMIN — THERA TABS 1 TABLET: TAB at 08:26

## 2019-05-10 RX ADMIN — HALOPERIDOL 20 MG: 10 TABLET ORAL at 21:50

## 2019-05-10 ASSESSMENT — ACTIVITIES OF DAILY LIVING (ADL)
HYGIENE/GROOMING: INDEPENDENT
DRESS: SCRUBS (BEHAVIORAL HEALTH)
ORAL_HYGIENE: INDEPENDENT
DRESS: SCRUBS (BEHAVIORAL HEALTH)
ORAL_HYGIENE: INDEPENDENT
HYGIENE/GROOMING: INDEPENDENT

## 2019-05-10 NOTE — PROGRESS NOTES
"Pt had asked to play bingo last week.  When invited to group today (she has not been coming at all), she asked to play bingo.  Writer agreed to play bingo in the afternoon group, which we did.    Pt participated, quiet, no conversation or interaction with writer or peers, though followed along, watched 2 bingo cards carefully, and called out \"bingo\" at appropriate times.  Independently selected prizes, asked if there was gum for a prize a couple times, though accepting that there wasn't without getting angry.  "

## 2019-05-10 NOTE — PROGRESS NOTES
Patient approached writer and asked to talk. She proceeded to state that for the past 2 nights a poisonous snake has been entering her room. She stated she must change rooms.

## 2019-05-10 NOTE — PROGRESS NOTES
"Patient told this writer the same story that she told the other nurse earlier in the shift that there were snakes in her room during the night.  She was told that she is experiencing hallucinations and that there were not snakes inside her room.  When writer approached patient with her AM meds a male staff was also present. Patient immediately told the male staff member to \"Go away.\"  Patient was yelling at staff and told that if she continued to yell and be disruptive she would need to go to her room. Patient yelled briefly then took her meds.  Patient was moved to different room due to the needs of another patient not because of her concerns about snakes.  Patient moved a chair in front of her room and sat there for most of the day.  Patient denied suicidal ideation. When asked about hallucinations she said \"I don't want to talk to you..\"  "

## 2019-05-10 NOTE — PLAN OF CARE
Pt spent most of this evening in the [facility] area watching TV. She was calm and pleasant but became agitated when staff tried to engage her in conversation.  She refused her scheduled 1800 hrs calcium carbonate. Noted some paranoia type behaviors as she requested that her blood pressure be check several times this shift.  She became agitated when her blood pressure reading was 150/80. She asked that another blood pressure machine be used to recheck her blood pressure. No significant difference with the two reading, pt became agitated wondering why her blood pressure was high today compared to yesterday. She insisted that her blood pressure was 125/65 yesterday.  Pt shows understanding that anxiety may elevate her blood pressure. Pt took her scheduled bedtime medication at 10 pm except for Artane.  No aggressive behaviors towards staff or peers. Pt refused 1:1 conservation this evening.

## 2019-05-10 NOTE — PROGRESS NOTES
"[facility], [place]   Psychiatric Progress Note  Hospital Day: 13        Interim History:   The patient's care was discussed with the treatment team during the daily team meeting and/or staff's chart notes were reviewed.  Staff report patient was agitated and verbally abusive/threatening to staff for most of the day yesterday. In the evening, she was calm and pleasant unless staff attempted to engage her in conversation. No aggressive behaviors overnight. No behavioral codes. Patient expressed concern about poisonous snakes in her room and requested room change.     Upon interview, patient was calmer and mostly cooperative. She remained seated on her bed in her room. She said that she is concerned that her  is dead. She said that her  wanted to be , \"and so they murdered him.\" She said that she suspects this is why she has not heard from him. She also said that he has been paying for their apartment with \"bounced checks\" for the past 3 years, and because of this, they have been evicted from their apartment. She said that her  wanted her to go with him elsewhere, though she refused \"so he brought me here instead.\" She said that she did not want to leave her apartment. She again expressed desire to return home as soon as possible. She agreed to take scheduled medications. She did admit that she feels the medications have been helpful. She understands that Haldol is on the Hatch. She did not voice any concerns about Haldol Dec today and appears to be amenable with plan to initiate. She denies any side effects or acute medical concerns. She agreed to notify staff if feeling unsafe. Room change was made. She had no additional requests or concerns.          Medications:       atorvastatin  40 mg Oral QPM     calcium carbonate (OS-ALEK) 500 mg (elemental) tablet  500 mg Oral BID w/meals     chlorproMAZINE  150 mg Oral At Bedtime    Or     " "chlorproMAZINE  25 mg Intramuscular At Bedtime     cholecalciferol  50,000 Units Oral Q7 Days     haloperidol  20 mg Oral At Bedtime    Or     haloperidol lactate  10 mg Intramuscular At Bedtime     haloperidol decanoate  100 mg Intramuscular Q30 Days     losartan  100 mg Oral Daily     metoprolol succinate ER  50 mg Oral Daily     multivitamin, therapeutic  1 tablet Oral Daily     polyethylene glycol  17 g Oral Daily     trihexyphenidyl  5 mg Oral TID          Allergies:     Allergies   Allergen Reactions     Lisinopril Cough          Labs:     No results found for this or any previous visit (from the past 24 hour(s)).       Psychiatric Examination:     /79 (BP Location: Left arm)   Pulse 74   Temp 97.6  F (36.4  C) (Tympanic)   Resp 16   Ht 1.6 m (5' 3\")   Wt 65.3 kg (144 lb)   SpO2 98%   BMI 25.51 kg/m    Weight is 144 lbs 0 oz  Body mass index is 25.51 kg/m .    Weight over time:  Vitals:    04/27/19 0144   Weight: 65.3 kg (144 lb)       Orthostatic Vitals     None        Cardiometabolic risk assessment. 04/29/19    Reviewed patient profile for cardiometabolic risk factors    Date taken /Value  REFERENCE RANGE   Abdominal Obesity  (Waist Circumference)   See nursing flowsheet Women ?35 in (88 cm)   Men ?40 in (102 cm)      Triglycerides  Triglycerides   Date Value Ref Range Status   04/29/2019 98 <150 mg/dL Final       ?150 mg/dL (1.7 mmol/L) or current treatment for elevated triglycerides   HDL cholesterol  HDL Cholesterol   Date Value Ref Range Status   04/29/2019 64 >49 mg/dL Final   ]   Women <50 mg/dL (1.3 mmol/L) in women or current treatment for low HDL cholesterol  Men <40 mg/dL (1 mmol/L) in men or current treatment for low HDL cholesterol     Fasting plasma glucose (FPG) Lab Results   Component Value Date    GLC 90 04/29/2019      FPG ?100 mg/dL (5.6 mmol/L) or treatment for elevated blood glucose   Blood pressure  BP Readings from Last 3 Encounters:   05/09/19 147/79   04/26/19 164/86    " "Blood pressure ?130/85 mmHg or treatment for elevated blood pressure   Family History  See family history     Appearance: Awake, alert, dressed in hospital scrubs, seated on bed  Attitude: more cooperative, calmer  Eye Contact:  better  Mood:  \"fine\"  Affect:  Incongruent with stated mood, Irritable, labile, periods of heightened intensity  Speech:  Elevated rate and volume, stuttering, difficult at times to understand  Language: fluent and intact in English  Psychomotor, Gait, Musculoskeletal:  no evidence of tardive dyskinesia, dystonia, or tics  Throught Process: tangential, concrete, disorganized and illogical  Associations:  no loose associations, though difficult to fully assess  Thought Content:  No SI/HI elicited, patient reports visual hallucinations of snakes and delusional thinking  Insight:  limited with regards to MH symptoms necessitating hospitalization and medication adjustments and goals needed to meet in order to justify discharge  Judgement:  limited  Oriented to:  unable to fully assess  Attention Span and Concentration:  limited  Recent and Remote Memory:  limited  Fund of Knowledge:  low-normal    Clinical Global Impressions  First:  Considering your total clinical experience with this particular patient population, how severe are the patient's symptoms at this time?: 7 (04/27/19 0750)  Compared to the patient's condition at the START of treatment, this patient's condition is:: 4 (04/27/19 0750)  Most recent:  Considering your total clinical experience with this particular patient population, how severe are the patient's symptoms at this time?: 7 (04/27/19 0750)  Compared to the patient's condition at the START of treatment, this patient's condition is:: 4 (04/27/19 0750)           Precautions:     Behavioral Orders   Procedures     Assault precautions     Code 1 - Restrict to Unit     Elopement precautions     Routine Programming     As clinically indicated     Status 15     Every 15 minutes. "          Diagnoses:     Schizophrenia  Hypertension  Hyperlipidemia  CVA 2017  Right tibial plateau fracture s/p ORIF 1/8/2019         Assessment & Plan:     Assessment and hospital summary:  Michelle Pino is a 58-year-old female admitted to [facility] 12N 4/26/2019 on a 72-hour hold from [facility] ER BIBA in handcuffs after her  made multiple calls to the police due to the patient's bizarre behaviors, level of agitation and threats to kill him.  She informed her  that she believed he had killed her parents and her sister and intended to kill her as well.  She had been leaving the stove on for hours.  When the police arrived, she screamed and threatened to kill them. She was most recently hospitalized at [facility] for about 6 weeks from December 2018 through January 2019 in the context of medication noncompliance and was committed and Jarvised.  She had not been taking her medications x 2 weeks prior to her present admission.    Target psychiatric symptoms and interventions:  Continue Thorazine 150 mg at bedtime  Continue Haldol to 20 mg at bedtime to target ongoing aggression and psychosis. Jarvised medication.   Initiate Haldol Dec 100 mg IM today 5/10. She had been on Haldol Dec for period of time w/o significant side effects per chart review.  Continue Artane 5 mg TID for EPSE (none noted during interview)    Medical Problems and Treatments:  Poorly controlled HTN. Pt declining scheduled Losartan, Atorvastatin, now taking Metoprolol with improvement in SBPs/DBPs. Will continue to monitor. Requested Nursing make attempt for manual blood pressure cuffs if refusing machine.  Dry mouth- Add biotene QID prn  Constipation-Schedule Miralax 17 g daily    Behavioral/Psychological/Social:  Encourage participation in groups after improvement    Legal: Committed with Hatch in place with following medications: Thorazine, Haldol, Risperdal, Abilify    Safety:  - Continue precautions as noted above  - Status 15  minute checks  - SIO for assault risk, severe intrusiveness    Disposition: pending further evaluation, treatment, and improvement in episodic behavioral dysregulation    Gabby Zaman MD  [facility]

## 2019-05-11 PROCEDURE — 25000132 ZZH RX MED GY IP 250 OP 250 PS 637: Performed by: STUDENT IN AN ORGANIZED HEALTH CARE EDUCATION/TRAINING PROGRAM

## 2019-05-11 PROCEDURE — 25000132 ZZH RX MED GY IP 250 OP 250 PS 637: Performed by: PSYCHIATRY & NEUROLOGY

## 2019-05-11 PROCEDURE — 12400001 ZZH R&B MH UMMC

## 2019-05-11 PROCEDURE — 25000132 ZZH RX MED GY IP 250 OP 250 PS 637: Performed by: NURSE PRACTITIONER

## 2019-05-11 RX ADMIN — HALOPERIDOL 20 MG: 10 TABLET ORAL at 21:12

## 2019-05-11 RX ADMIN — NICOTINE POLACRILEX 2 MG: 2 GUM, CHEWING BUCCAL at 07:44

## 2019-05-11 RX ADMIN — POLYETHYLENE GLYCOL 3350 17 G: 17 POWDER, FOR SOLUTION ORAL at 08:50

## 2019-05-11 RX ADMIN — LOSARTAN POTASSIUM 100 MG: 100 TABLET, FILM COATED ORAL at 08:50

## 2019-05-11 RX ADMIN — METOPROLOL SUCCINATE 50 MG: 50 TABLET, EXTENDED RELEASE ORAL at 08:50

## 2019-05-11 RX ADMIN — CALCIUM 500 MG: 500 TABLET ORAL at 08:50

## 2019-05-11 RX ADMIN — TRIHEXYPHENIDYL HYDROCHLORIDE 5 MG: 5 TABLET ORAL at 21:12

## 2019-05-11 RX ADMIN — THERA TABS 1 TABLET: TAB at 08:50

## 2019-05-11 RX ADMIN — ATORVASTATIN CALCIUM 40 MG: 40 TABLET, FILM COATED ORAL at 21:12

## 2019-05-11 RX ADMIN — CHOLECALCIFEROL CAP 1.25 MG (50000 UNIT) 50000 UNITS: 1.25 CAP at 08:51

## 2019-05-11 RX ADMIN — TRIHEXYPHENIDYL HYDROCHLORIDE 5 MG: 5 TABLET ORAL at 08:50

## 2019-05-11 RX ADMIN — CALCIUM 500 MG: 500 TABLET ORAL at 18:26

## 2019-05-11 RX ADMIN — CHLORPROMAZINE HYDROCHLORIDE 150 MG: 100 TABLET, FILM COATED ORAL at 21:12

## 2019-05-11 ASSESSMENT — ACTIVITIES OF DAILY LIVING (ADL)
ORAL_HYGIENE: INDEPENDENT
DRESS: SCRUBS (BEHAVIORAL HEALTH)
HYGIENE/GROOMING: INDEPENDENT
HYGIENE/GROOMING: INDEPENDENT
DRESS: SCRUBS (BEHAVIORAL HEALTH)
ORAL_HYGIENE: INDEPENDENT

## 2019-05-11 NOTE — PLAN OF CARE
Patient has shown slight improvement as evidenced by no episodes of agitation, yelling or threatening today.  She took all of her meds cooperatively.  She continues to spend much of the day sitting on a chair that she has placed in the hallway in front of her room. She later did sit in the lounge when there were no other patients present.  Patient did not say much when she was asked if she thougth the medications were helping and asked about hallucinations.  Patient did deny suicidal ideation.

## 2019-05-11 NOTE — PROGRESS NOTES
Pt was visible in the milieu and social with selected staff. Pt was calm and pleasant. No agitation or paranoia type behaviors this evening. She refused her scheduled calcium carbonate. Good appetite as she ate 100% of her dinner. She took her HS scheduled medication and denied SE s.

## 2019-05-12 PROCEDURE — 25000132 ZZH RX MED GY IP 250 OP 250 PS 637: Performed by: STUDENT IN AN ORGANIZED HEALTH CARE EDUCATION/TRAINING PROGRAM

## 2019-05-12 PROCEDURE — 25000132 ZZH RX MED GY IP 250 OP 250 PS 637: Performed by: NURSE PRACTITIONER

## 2019-05-12 PROCEDURE — 25000132 ZZH RX MED GY IP 250 OP 250 PS 637: Performed by: PSYCHIATRY & NEUROLOGY

## 2019-05-12 PROCEDURE — 12400001 ZZH R&B MH UMMC

## 2019-05-12 RX ADMIN — HALOPERIDOL 20 MG: 10 TABLET ORAL at 20:52

## 2019-05-12 RX ADMIN — TRIHEXYPHENIDYL HYDROCHLORIDE 5 MG: 5 TABLET ORAL at 08:45

## 2019-05-12 RX ADMIN — ATORVASTATIN CALCIUM 40 MG: 40 TABLET, FILM COATED ORAL at 20:52

## 2019-05-12 RX ADMIN — CHLORPROMAZINE HYDROCHLORIDE 150 MG: 100 TABLET, FILM COATED ORAL at 20:51

## 2019-05-12 RX ADMIN — TRIHEXYPHENIDYL HYDROCHLORIDE 5 MG: 5 TABLET ORAL at 20:51

## 2019-05-12 RX ADMIN — METOPROLOL SUCCINATE 50 MG: 50 TABLET, EXTENDED RELEASE ORAL at 08:45

## 2019-05-12 ASSESSMENT — ACTIVITIES OF DAILY LIVING (ADL)
LAUNDRY: UNABLE TO COMPLETE
HYGIENE/GROOMING: INDEPENDENT
DRESS: SCRUBS (BEHAVIORAL HEALTH)
LAUNDRY: UNABLE TO COMPLETE
HYGIENE/GROOMING: INDEPENDENT
ORAL_HYGIENE: INDEPENDENT
DRESS: INDEPENDENT
ORAL_HYGIENE: INDEPENDENT

## 2019-05-12 NOTE — PROGRESS NOTES
Pt spent most of the shift in the lounge watching the news, withdrawn from peers. Pt appeared calmer today and less labile, was seen laughing with staff. No concerns to be noted this shift.       05/12/19 1409   Behavioral Health   Hallucinations denies / not responding to hallucinations   Thinking distractable;poor concentration;paranoid   Orientation person: oriented;place: oriented   Memory other (see comment)  (devyn)   Insight poor   Judgement impaired   Eye Contact at examiner   Affect blunted, flat;tense   Mood irritable;labile   Physical Appearance/Attire attire appropriate to age and situation   Hygiene other (see comment)  (adequate)   Suicidality other (see comments)  (none stated)   1. Wish to be Dead No   2. Non-Specific Active Suicidal Thoughts  No   Self Injury other (see comment)  (denies)   Elopement   (none indicated)   Activity withdrawn   Speech rambling;clear;coherent   Psychomotor / Gait balanced;steady   Activities of Daily Living   Hygiene/Grooming independent   Oral Hygiene independent   Dress independent   Laundry unable to complete   Room Organization independent

## 2019-05-12 NOTE — PROGRESS NOTES
"Pt was present in the milieu for the majority of the evening, but was socially withdrawn. She spent most of her time sitting on a chair that she positioned outside of her room, scanning the environment. She is tense and guarded. Her affect is blunted, flat, and she often becomes irritable when engaged in conversation. At one point, the lounge needed to be shut down due to an urgent situation, and pt refused to return to her room. Instead, she remained seated in the lounge, and said something to the affect of, \"If the governor is not here, then I will not be governed!\". Pt refused several attempted check-ins with staff, \"shooing\" them away, and stating, \"I just want to get out of here.\" This writer did not observe pt responding to internal stimuli during this shift. No observed SI/SIB/HI. No restraints or seclusions.   "

## 2019-05-13 PROCEDURE — 25000132 ZZH RX MED GY IP 250 OP 250 PS 637: Performed by: NURSE PRACTITIONER

## 2019-05-13 PROCEDURE — 12400001 ZZH R&B MH UMMC

## 2019-05-13 PROCEDURE — 99232 SBSQ HOSP IP/OBS MODERATE 35: CPT | Performed by: PSYCHIATRY & NEUROLOGY

## 2019-05-13 PROCEDURE — 25000132 ZZH RX MED GY IP 250 OP 250 PS 637: Performed by: PSYCHIATRY & NEUROLOGY

## 2019-05-13 PROCEDURE — 25000132 ZZH RX MED GY IP 250 OP 250 PS 637: Performed by: STUDENT IN AN ORGANIZED HEALTH CARE EDUCATION/TRAINING PROGRAM

## 2019-05-13 RX ORDER — HYDROXYZINE HYDROCHLORIDE 25 MG/1
25-50 TABLET, FILM COATED ORAL EVERY 4 HOURS PRN
Status: DISCONTINUED | OUTPATIENT
Start: 2019-05-13 | End: 2019-05-13 | Stop reason: CLARIF

## 2019-05-13 RX ADMIN — CHLORPROMAZINE HYDROCHLORIDE 150 MG: 100 TABLET, FILM COATED ORAL at 20:45

## 2019-05-13 RX ADMIN — NICOTINE POLACRILEX 2 MG: 2 GUM, CHEWING BUCCAL at 16:52

## 2019-05-13 RX ADMIN — NICOTINE POLACRILEX 2 MG: 2 GUM, CHEWING BUCCAL at 07:57

## 2019-05-13 RX ADMIN — HALOPERIDOL 20 MG: 10 TABLET ORAL at 20:45

## 2019-05-13 RX ADMIN — POLYETHYLENE GLYCOL 3350 17 G: 17 POWDER, FOR SOLUTION ORAL at 08:38

## 2019-05-13 RX ADMIN — THERA TABS 1 TABLET: TAB at 08:39

## 2019-05-13 RX ADMIN — TRIHEXYPHENIDYL HYDROCHLORIDE 5 MG: 5 TABLET ORAL at 20:45

## 2019-05-13 RX ADMIN — TRIHEXYPHENIDYL HYDROCHLORIDE 5 MG: 5 TABLET ORAL at 08:39

## 2019-05-13 RX ADMIN — ATORVASTATIN CALCIUM 40 MG: 40 TABLET, FILM COATED ORAL at 20:45

## 2019-05-13 RX ADMIN — CALCIUM 500 MG: 500 TABLET ORAL at 08:38

## 2019-05-13 RX ADMIN — METOPROLOL SUCCINATE 50 MG: 50 TABLET, EXTENDED RELEASE ORAL at 08:38

## 2019-05-13 RX ADMIN — LOSARTAN POTASSIUM 100 MG: 100 TABLET, FILM COATED ORAL at 08:39

## 2019-05-13 ASSESSMENT — ACTIVITIES OF DAILY LIVING (ADL)
ORAL_HYGIENE: INDEPENDENT
HYGIENE/GROOMING: INDEPENDENT
LAUNDRY: UNABLE TO COMPLETE
DRESS: SCRUBS (BEHAVIORAL HEALTH)

## 2019-05-13 NOTE — PROGRESS NOTES
Patient sat in the lounge and watched television. She was withdrawn, but appeared less tense than previous days. No seclusions or restraint, and no visitors this evening.        05/12/19 2200   Behavioral Health   Hallucinations denies / not responding to hallucinations   Thinking poor concentration   Orientation person: oriented;place: oriented   Insight poor   Judgement impaired   Eye Contact at examiner   Affect blunted, flat   Mood mood is calm   Physical Appearance/Attire attire appropriate to age and situation   Hygiene neglected grooming - unclean body, hair, teeth;body odor   1. Wish to be Dead No   2. Non-Specific Active Suicidal Thoughts  No   Self Injury other (see comment)  (none reported or observed )   Activity withdrawn   Speech rambling   Psychomotor / Gait balanced;steady   Activities of Daily Living   Hygiene/Grooming independent   Oral Hygiene independent   Dress scrubs (behavioral health)   Laundry unable to complete   Room Organization independent

## 2019-05-13 NOTE — PROGRESS NOTES
"[facility], [place]   Psychiatric Progress Note  Hospital Day: 16        Interim History:   The patient's care was discussed with the treatment team during the daily team meeting and/or staff's chart notes were reviewed.  Staff report patient has been more visible in the milieu and social with select that staff.  Behavior is improving with no evidence of significant aggressive behavior or need for seclusion/restraints.  Patient has good appetite and is sleeping well.  Has been more accepting of scheduled neuroleptic medications.  No observed SI/HI.  Appears less tense than previous days.  She is now consistently taking metoprolol, though only intermittently taking Cozaar.  Intermittently taking Artane.    Upon interview, patient was calmer and mostly cooperative. She continues to voice concerns that her  is dead.  When asked where she plans on living after discharge, she encourage this writer to contact \"[facility] the [facility].\"  She states that it is a facility in [place] and that she has been there in the past, though she was unable to recall the name.  She states that she has no desire to contact her , and explains that she has been hospitalized in the past for extended periods of time and her  never has visited her.  She feels that he does not support her or care about her.  She is amenable with plan for care team to contact family members to discuss aftercare planning.  Patient said repeatedly \"I need to be discharged by Thursday.\"  She was informed that this may or may not be a possibility, and will be dependent on how she is responding to medications and whether or not there is a safe aftercare plan in place.  She accepted this information without any evidence of agitation.  She acknowledged that Haldol has been helpful at managing her symptoms.  She denies SI/HI.  She denies side effects from medications.  She had no additional requests or " "concerns.           Medications:       atorvastatin  40 mg Oral QPM     calcium carbonate (OS-ALEK) 500 mg (elemental) tablet  500 mg Oral BID w/meals     chlorproMAZINE  150 mg Oral At Bedtime     cholecalciferol  50,000 Units Oral Q7 Days     haloperidol  20 mg Oral At Bedtime    Or     haloperidol lactate  10 mg Intramuscular At Bedtime     haloperidol decanoate  100 mg Intramuscular Q30 Days     losartan  100 mg Oral Daily     metoprolol succinate ER  50 mg Oral Daily     multivitamin, therapeutic  1 tablet Oral Daily     polyethylene glycol  17 g Oral Daily     trihexyphenidyl  5 mg Oral TID          Allergies:     Allergies   Allergen Reactions     Lisinopril Cough          Labs:     No results found for this or any previous visit (from the past 24 hour(s)).       Psychiatric Examination:     /52   Pulse 77   Temp 97  F (36.1  C) (Tympanic)   Resp 16   Ht 1.6 m (5' 3\")   Wt 65.3 kg (144 lb)   SpO2 97%   BMI 25.51 kg/m    Weight is 144 lbs 0 oz  Body mass index is 25.51 kg/m .    Weight over time:  Vitals:    04/27/19 0144   Weight: 65.3 kg (144 lb)       Orthostatic Vitals     None        Cardiometabolic risk assessment. 04/29/19    Reviewed patient profile for cardiometabolic risk factors    Date taken /Value  REFERENCE RANGE   Abdominal Obesity  (Waist Circumference)   See nursing flowsheet Women ?35 in (88 cm)   Men ?40 in (102 cm)      Triglycerides  Triglycerides   Date Value Ref Range Status   04/29/2019 98 <150 mg/dL Final       ?150 mg/dL (1.7 mmol/L) or current treatment for elevated triglycerides   HDL cholesterol  HDL Cholesterol   Date Value Ref Range Status   04/29/2019 64 >49 mg/dL Final   ]   Women <50 mg/dL (1.3 mmol/L) in women or current treatment for low HDL cholesterol  Men <40 mg/dL (1 mmol/L) in men or current treatment for low HDL cholesterol     Fasting plasma glucose (FPG) Lab Results   Component Value Date    GLC 90 04/29/2019      FPG ?100 mg/dL (5.6 mmol/L) or treatment " "for elevated blood glucose   Blood pressure  BP Readings from Last 3 Encounters:   19 127/52   19 164/86    Blood pressure ?130/85 mmHg or treatment for elevated blood pressure   Family History  See family history     Appearance: Awake, alert, dressed in hospital scrubs, seated on bed  Attitude: more cooperative, calmer, smiled appropriately at times  Eye Contact:  better  Mood:  \"fine\"  Affect: Congruent with stated mood, less irritable, brighter  Speech:  Normal rate and volume, stuttering, difficult at times to understand  Language: fluent and intact in English  Psychomotor, Gait, Musculoskeletal:  no evidence of tardive dyskinesia, dystonia, or tics  Throught Process: linear, goal oriented, illogical  Associations:  no loose associations, though difficult to fully assess  Thought Content:  No SI/HI elicited, evidence of delusional thinking as she continues to suspect that her  is   Insight: fair  Judgement:  fair  Oriented to:  Person, place  Attention Span and Concentration: fair  Recent and Remote Memory:  limited  Fund of Knowledge:  low-normal    Clinical Global Impressions  First:  Considering your total clinical experience with this particular patient population, how severe are the patient's symptoms at this time?: 7 (19)  Compared to the patient's condition at the START of treatment, this patient's condition is:: 4 (19)  Most recent:  Considering your total clinical experience with this particular patient population, how severe are the patient's symptoms at this time?: 7 (19)  Compared to the patient's condition at the START of treatment, this patient's condition is:: 4 (19)           Precautions:     Behavioral Orders   Procedures     Assault precautions     Code 1 - Restrict to Unit     Elopement precautions     Routine Programming     As clinically indicated     Status 15     Every 15 minutes.          Diagnoses:     Schizophrenia, " decompensated  Hypertension  Hyperlipidemia  CVA 2017  Right tibial plateau fracture s/p ORIF 1/8/2019         Assessment & Plan:     Assessment and hospital summary:  Michelle Pino is a 58-year-old female admitted to [facility] 12N 4/26/2019 on a 72-hour hold from [facility] ER BIBA in handcuffs after her  made multiple calls to the police due to the patient's bizarre behaviors, level of agitation and threats to kill him.  She informed her  that she believed he had killed her parents and her sister and intended to kill her as well.  She had been leaving the stove on for hours.  When the police arrived, she screamed and threatened to kill them. She was most recently hospitalized at [facility] for about 6 weeks from December 2018 through January 2019 in the context of medication noncompliance and was committed and Jarvised.  She had not been taking her medications x 2 weeks prior to her present admission.    Target psychiatric symptoms and interventions:  No medication changes will be made today  Continue Thorazine 150 mg at bedtime  Continue Haldol to 20 mg at bedtime to target ongoing aggression and psychosis. Jarvised medication.    Initiated Haldol Dec 100 mg IM on 5/10. She had been on Haldol Dec for period of time w/o significant side effects per chart review.  Continue Artane 5 mg TID for EPSE (none noted during interview)    Medical Problems and Treatments:  Poorly controlled HTN. Pt declining scheduled Losartan, Atorvastatin, now taking Metoprolol with improvement in SBPs/DBPs. Will continue to monitor. Requested Nursing make attempt for manual blood pressure cuffs if refusing machine.  Dry mouth- Added biotene QID prn  Constipation-Scheduled Miralax 17 g daily     Behavioral/Psychological/Social:  Encourage participation in groups    Legal: Committed with Hatch in place with following medications: Thorazine, Haldol, Risperdal, Abilify    Safety:  - Continue precautions as noted above  - Status  15 minute checks    Disposition: pending further evaluation, treatment, and improvement in episodic behavioral dysregulation and development of safe aftercare plan.       Gabby Zaman MD  [facility] Psychiatry

## 2019-05-13 NOTE — PLAN OF CARE
RN Assessment: Patient was calm and cooperative during the shift. Took her morning medication with no issue. Denied having SI/HI/SIB/Hallucinations. She denied having medication side effects. She was isolative and withdrawn much of the shift. Patient making progress towards treatment goal. She was encouraged to socialize with peers and attend OT groups. Patient did not require R&S to manage her behavior or maintain safety in the unit. Staff will continue to monitor.

## 2019-05-14 PROCEDURE — 25000132 ZZH RX MED GY IP 250 OP 250 PS 637: Performed by: NURSE PRACTITIONER

## 2019-05-14 PROCEDURE — 25000132 ZZH RX MED GY IP 250 OP 250 PS 637: Performed by: STUDENT IN AN ORGANIZED HEALTH CARE EDUCATION/TRAINING PROGRAM

## 2019-05-14 PROCEDURE — 25000132 ZZH RX MED GY IP 250 OP 250 PS 637: Performed by: PSYCHIATRY & NEUROLOGY

## 2019-05-14 PROCEDURE — 25000128 H RX IP 250 OP 636: Performed by: PSYCHIATRY & NEUROLOGY

## 2019-05-14 PROCEDURE — 99233 SBSQ HOSP IP/OBS HIGH 50: CPT | Mod: GC | Performed by: PSYCHIATRY & NEUROLOGY

## 2019-05-14 PROCEDURE — 12400001 ZZH R&B MH UMMC

## 2019-05-14 RX ORDER — HALOPERIDOL DECANOATE 100 MG/ML
100 INJECTION INTRAMUSCULAR ONCE
Status: COMPLETED | OUTPATIENT
Start: 2019-05-14 | End: 2019-05-14

## 2019-05-14 RX ADMIN — ATORVASTATIN CALCIUM 40 MG: 40 TABLET, FILM COATED ORAL at 21:01

## 2019-05-14 RX ADMIN — TRIHEXYPHENIDYL HYDROCHLORIDE 5 MG: 5 TABLET ORAL at 21:01

## 2019-05-14 RX ADMIN — HALOPERIDOL DECANOATE 100 MG: 100 INJECTION INTRAMUSCULAR at 14:45

## 2019-05-14 RX ADMIN — CHLORPROMAZINE HYDROCHLORIDE 150 MG: 100 TABLET, FILM COATED ORAL at 21:01

## 2019-05-14 RX ADMIN — CALCIUM 500 MG: 500 TABLET ORAL at 08:42

## 2019-05-14 RX ADMIN — HALOPERIDOL 20 MG: 10 TABLET ORAL at 21:01

## 2019-05-14 RX ADMIN — TRIHEXYPHENIDYL HYDROCHLORIDE 5 MG: 5 TABLET ORAL at 08:42

## 2019-05-14 RX ADMIN — THERA TABS 1 TABLET: TAB at 08:42

## 2019-05-14 RX ADMIN — METOPROLOL SUCCINATE 50 MG: 50 TABLET, EXTENDED RELEASE ORAL at 08:42

## 2019-05-14 RX ADMIN — LOSARTAN POTASSIUM 100 MG: 100 TABLET, FILM COATED ORAL at 08:42

## 2019-05-14 ASSESSMENT — ACTIVITIES OF DAILY LIVING (ADL)
ORAL_HYGIENE: PROMPTS
DRESS: SCRUBS (BEHAVIORAL HEALTH)
HYGIENE/GROOMING: PROMPTS
LAUNDRY: UNABLE TO COMPLETE
DRESS: INDEPENDENT
ORAL_HYGIENE: INDEPENDENT
HYGIENE/GROOMING: INDEPENDENT

## 2019-05-14 NOTE — PROGRESS NOTES
"[facility], [place]   Psychiatric Progress Note  Hospital Day: 17        Interim History:   The patient's care was discussed with the treatment team during the daily team meeting and/or staff's chart notes were reviewed.  Staff report patient was withdrawn, calm and cooperative yesterday. Took AM medications without issue, denied SI/HI/SIB/Hallucinations. Denied medication side effects. Met with financial dept to work on [employer] application. Became agitated and verbally agitated when asked about marital status. No other acute events.     Upon interview, patient seen in her room, seated on bed. She states she's doing \"good\" today and denies any medication side effects. She is sleeping well and feels rested this am. She endorsed ongoing belief that he  is dead. She states \"He is in the mortuary.\" States that he wanted to become \"[employer]\" and was assassinated by Americans. She gives team permission to reach out to her family for collateral information. Discussed administration of a second dose of Haldol Dec for a target loading dose of 200 mg. She states \"you are trying to kill me with Haldol\" and remains adamantly opposed to taking any more Haldol. Attempted to have further discussion surrounding this Hatch medication, however patient began verbally escalating and demanding that resident exit her room. Interview was terminated at this time.          Medications:       atorvastatin  40 mg Oral QPM     calcium carbonate (OS-ALEK) 500 mg (elemental) tablet  500 mg Oral BID w/meals     chlorproMAZINE  150 mg Oral At Bedtime     cholecalciferol  50,000 Units Oral Q7 Days     haloperidol  20 mg Oral At Bedtime    Or     haloperidol lactate  10 mg Intramuscular At Bedtime     haloperidol decanoate  100 mg Intramuscular Q30 Days     losartan  100 mg Oral Daily     metoprolol succinate ER  50 mg Oral Daily     multivitamin, therapeutic  1 tablet Oral Daily     polyethylene glycol  17 " "g Oral Daily     trihexyphenidyl  5 mg Oral TID          Allergies:     Allergies   Allergen Reactions     Lisinopril Cough          Labs:     No results found for this or any previous visit (from the past 24 hour(s)).       Psychiatric Examination:     /52   Pulse 77   Temp 99.1  F (37.3  C) (Tympanic)   Resp 16   Ht 1.6 m (5' 3\")   Wt 65.3 kg (144 lb)   SpO2 99%   BMI 25.51 kg/m    Weight is 144 lbs 0 oz  Body mass index is 25.51 kg/m .    Weight over time:  Vitals:    04/27/19 0144   Weight: 65.3 kg (144 lb)       Orthostatic Vitals     None        Cardiometabolic risk assessment. 04/29/19    Reviewed patient profile for cardiometabolic risk factors    Date taken /Value  REFERENCE RANGE   Abdominal Obesity  (Waist Circumference)   See nursing flowsheet Women ?35 in (88 cm)   Men ?40 in (102 cm)      Triglycerides  Triglycerides   Date Value Ref Range Status   04/29/2019 98 <150 mg/dL Final       ?150 mg/dL (1.7 mmol/L) or current treatment for elevated triglycerides   HDL cholesterol  HDL Cholesterol   Date Value Ref Range Status   04/29/2019 64 >49 mg/dL Final   ]   Women <50 mg/dL (1.3 mmol/L) in women or current treatment for low HDL cholesterol  Men <40 mg/dL (1 mmol/L) in men or current treatment for low HDL cholesterol     Fasting plasma glucose (FPG) Lab Results   Component Value Date    GLC 90 04/29/2019      FPG ?100 mg/dL (5.6 mmol/L) or treatment for elevated blood glucose   Blood pressure  BP Readings from Last 3 Encounters:   05/12/19 127/52   04/26/19 164/86    Blood pressure ?130/85 mmHg or treatment for elevated blood pressure   Family History  See family history     Appearance: Awake, alert, dressed in hospital scrubs, seated on bed  Attitude: cooperative and calm initially, though becomes agitated during discussion of treatment recommendations  Eye Contact:  better  Mood:  \"fine\"  Affect: Congruent with stated mood, brighter, smiles at appropriate times occasionally during " interview, increased irritability at end of interview  Speech:  Normal rate and volume, stuttering, difficult at times to understand  Language: fluent and intact in English  Psychomotor, Gait, Musculoskeletal:  no evidence of tardive dyskinesia, dystonia, or tics  Throught Process: linear, goal oriented, illogical  Associations:  no loose associations, though difficult to fully assess  Thought Content:  No SI/HI elicited, evidence of delusional thinking as she continues to suspect that her  is   Insight: poor  Judgement:  fair  Oriented to:  Person, place  Attention Span and Concentration: fair  Recent and Remote Memory:  limited  Fund of Knowledge:  low-normal    Clinical Global Impressions  First:  Considering your total clinical experience with this particular patient population, how severe are the patient's symptoms at this time?: 7 (19)  Compared to the patient's condition at the START of treatment, this patient's condition is:: 4 (19)  Most recent:  Considering your total clinical experience with this particular patient population, how severe are the patient's symptoms at this time?: 7 (19)  Compared to the patient's condition at the START of treatment, this patient's condition is:: 4 (19)           Precautions:     Behavioral Orders   Procedures     Assault precautions     Code 1 - Restrict to Unit     Elopement precautions     Routine Programming     As clinically indicated     Status 15     Every 15 minutes.          Diagnoses:     Schizophrenia, decompensated  Hypertension  Hyperlipidemia  CVA 2017  Right tibial plateau fracture s/p ORIF 2019         Assessment & Plan:     Assessment and hospital summary:  Michelle Pino is a 58-year-old female admitted to [facility] 12[facility] 2019 on a 72-hour hold from [facility] ER BIBA in handcuffs after her  made multiple calls to the police due to the patient's bizarre behaviors, level of  agitation and threats to kill him.  She informed her  that she believed he had killed her parents and her sister and intended to kill her as well.  She had been leaving the stove on for hours.  When the police arrived, she screamed and threatened to kill them. She was most recently hospitalized at [facility] for about 6 weeks from December 2018 through January 2019 in the context of medication noncompliance and was committed and Jarvised.  She had not been taking her medications x 2 weeks prior to her present admission.    Target psychiatric symptoms and interventions:  No changes today  Continue Thorazine 150 mg at bedtime  Continue Haldol to 20 mg at bedtime to target ongoing aggression and psychosis. Jarvised medication.    Initiated Haldol Dec 100 mg IM on 5/10. She had been on Haldol Dec for period of time w/o significant side effects per chart review.  Continue Artane 5 mg TID for EPSE (none noted during interview)    Medical Problems and Treatments:  Poorly controlled HTN. Pt declining scheduled Losartan, Atorvastatin, now taking Metoprolol with improvement in SBPs/DBPs. Will continue to monitor. Requested Nursing make attempt for manual blood pressure cuffs if refusing machine.  Dry mouth- Added biotene QID prn  Constipation-Scheduled Miralax 17 g daily     Behavioral/Psychological/Social:  Encourage participation in groups, medication compliance    Legal: Committed with Hatch in place with following medications: Thorazine, Haldol, Risperdal, Abilify    Safety:  - Continue precautions as noted above  - Status 15 minute checks    Disposition: pending further evaluation, treatment, and improvement in episodic behavioral dysregulation and development of safe aftercare plan.       Pt seen and discussed with my attending, Ida Khoury*  Hai Carrillo MD  PGY2 Resident  Pager: [phone]

## 2019-05-14 NOTE — PROGRESS NOTES
[facility] financial department was on the units this evening to help patient apply for [employer] insurance. She became agitated and verbally aggressive when asked about her current marital status. The interview discontinued as she became more agitated and uncooperative to answer questions.  Pt s daughter (Laquita Payne [phone]) called this evening; she wanted Dr. Oro to call her tomorrow morning. No change with patient presentation status. Flat and blunted affect. She declined to check in with staff. She refused her scheduled calcium carbonate; she wanted the medication to be scheduled with her HS medication. She took her scheduled HS medication without any issue. Vital sign stable. She denied any discomfort. Good appetite as she ate 100% of her dinner.

## 2019-05-14 NOTE — PROGRESS NOTES
05/14/19 1521   Significant Event   Significant Event Other (see comments)  (shift summary)   Pt had no aggressive behaviors this shift. She was calm and in and out of the milieu appropriately. However, twice when she was asked to do something by staff (go to her room for safety from an aggressive pt) she did not respond whatsoever, staring into space. She did interact with some staff at some times, but was unit(s) uncooperative/unresponsive these two times, for no known reason.

## 2019-05-14 NOTE — PLAN OF CARE
BEHAVIORAL TEAM DISCUSSION    Participants: Dr. Ida Zaman, Hai Carrillo MD (Resident), Aide Edwards RN, TRAVON- BOB Orozco, [facility]   Progress: pt still exhibiting paranoia, agitation and mh symptoms.   Continued Stay Criteria/Rationale: pt needs further stabilization of MH symptoms   Medical/Physical: see medical chart   Precautions:   Behavioral Orders   Procedures    Assault precautions    Code 1 - Restrict to Unit    Elopement precautions    Routine Programming     As clinically indicated    Status 15     Every 15 minutes.     Plan: plan is for likely discharge home with additional follow-up in the community.   Rationale for change in precautions or plan: no change.

## 2019-05-14 NOTE — PROGRESS NOTES
Left  for pt's Daugther Laquita Graf  - Phone: [phone].  Went straight to  asked for call back.

## 2019-05-15 PROCEDURE — 12400001 ZZH R&B MH UMMC

## 2019-05-15 PROCEDURE — 25000132 ZZH RX MED GY IP 250 OP 250 PS 637: Performed by: PSYCHIATRY & NEUROLOGY

## 2019-05-15 PROCEDURE — 25000132 ZZH RX MED GY IP 250 OP 250 PS 637: Performed by: NURSE PRACTITIONER

## 2019-05-15 PROCEDURE — 25000132 ZZH RX MED GY IP 250 OP 250 PS 637: Performed by: STUDENT IN AN ORGANIZED HEALTH CARE EDUCATION/TRAINING PROGRAM

## 2019-05-15 RX ORDER — POLYETHYLENE GLYCOL 3350 17 G/17G
17 POWDER, FOR SOLUTION ORAL DAILY PRN
Status: DISCONTINUED | OUTPATIENT
Start: 2019-05-15 | End: 2019-06-05 | Stop reason: HOSPADM

## 2019-05-15 RX ADMIN — LOSARTAN POTASSIUM 100 MG: 100 TABLET, FILM COATED ORAL at 08:15

## 2019-05-15 RX ADMIN — THERA TABS 1 TABLET: TAB at 08:15

## 2019-05-15 RX ADMIN — CALCIUM 500 MG: 500 TABLET ORAL at 08:15

## 2019-05-15 RX ADMIN — CHLORPROMAZINE HYDROCHLORIDE 150 MG: 100 TABLET, FILM COATED ORAL at 20:57

## 2019-05-15 RX ADMIN — TRIHEXYPHENIDYL HYDROCHLORIDE 5 MG: 5 TABLET ORAL at 08:15

## 2019-05-15 RX ADMIN — METOPROLOL SUCCINATE 50 MG: 50 TABLET, EXTENDED RELEASE ORAL at 08:35

## 2019-05-15 RX ADMIN — TRIHEXYPHENIDYL HYDROCHLORIDE 5 MG: 5 TABLET ORAL at 20:57

## 2019-05-15 RX ADMIN — HALOPERIDOL 20 MG: 10 TABLET ORAL at 20:57

## 2019-05-15 RX ADMIN — ATORVASTATIN CALCIUM 40 MG: 40 TABLET, FILM COATED ORAL at 20:57

## 2019-05-15 ASSESSMENT — ACTIVITIES OF DAILY LIVING (ADL)
HYGIENE/GROOMING: INDEPENDENT
DRESS: INDEPENDENT
HYGIENE/GROOMING: INDEPENDENT
DRESS: INDEPENDENT
DRESS: INDEPENDENT
HYGIENE/GROOMING: INDEPENDENT
ORAL_HYGIENE: INDEPENDENT
LAUNDRY: UNABLE TO COMPLETE

## 2019-05-15 NOTE — PROGRESS NOTES
Pt was visible in the milieu, however was not social with peers, and minimal interactions with staff.  Affect appeared blunted, mood was calm.  Pt ate most of dinner, had visitors--went well.  No psychotic behaviors observed, denies SI/SIB.  No behavioral concerns this shift.     05/14/19 2200   Behavioral Health   Hallucinations denies / not responding to hallucinations   Orientation person: oriented;place: oriented   Insight poor   Judgement impaired   Eye Contact at floor;into space   Affect blunted, flat   Mood mood is calm   Physical Appearance/Attire attire appropriate to age and situation   Hygiene neglected grooming - unclean body, hair, teeth   1. Wish to be Dead No   2. Non-Specific Active Suicidal Thoughts  No   Self Injury other (see comment)   Activity isolative;withdrawn   Speech rambling   Medication Sensitivity no stated side effects;no observed side effects   Psychomotor / Gait balanced;steady   Activities of Daily Living   Hygiene/Grooming independent   Oral Hygiene independent   Dress independent   Laundry unable to complete   Room Organization prompts

## 2019-05-15 NOTE — PROGRESS NOTES
"Brief Progress note    Spoke with daughterLaquita on phone. She states that patient has stated she is discharging this week. Patient has also told her that her father is  and his burial is next week. She states this is not true, and has been concerning to daughter. She notes they don't get along well, but there's been no history of physical abuse. States she's always seen her  as her enemy. Daughter states that her father is now very fed up with dealing with patient. He is planning on  from patient and leaving the home they live in. She states \"it's been very hard on the family.\" She notes a history of similar delusional paranoia; patient has accused daughter of \"being in agreement with Dad to kill her\" in the past.      Discussed risks and benefits of treatment plan including transition to [facility], daughter agreeable to this plan. She is very relieved that team is not planning on discharge at this time. She states that family is very concerned about her returning home, and that at this time, that is not an option. They have been telling her to move out for some time, and now that their father is planning on leaving, patient's children do not feel that it would be safe for her to return home.     She is planning on visiting some time late next week, and would be happy to arrange a family meeting as these plans are firmed up.     Hai Carrillo MD  PGY2 Psychiatry Resident  "

## 2019-05-15 NOTE — PROGRESS NOTES
"Patient was sitting in the lounge area watching TV and this writer brought patient her lunch. Patient immediatly got agitated with writing saying \"why did you take the cover off, you idiot\" staff told patient that taking covers off tray is the unit rule. Patient got up from her seat and walked toward staff and got close to staff's face and  said \"don't you know am the president of the world? You stupid nigger! You are going to hell, you good for nothing dumb idiot\" staff tried to redirect patient but patient refused to listen to staff's redirection and continued calling staff a nigger, a bitch and more. Staff walked away and [facility]. Change RN was notified of this and RN talked with patient.  "

## 2019-05-15 NOTE — PLAN OF CARE
"Pt has been ut in the lounge for most of the shift. Pt doesn't talk to other pt's but will talk to some staff. Pt had an issue at lunch time with the cover being taken off her food, see PA note. Pt was sent to her room for a time out. When pt returned she was appropriate. Pt refused her 2nd dose of Artane this afternoon saying \"I only take it 2 times per day, let me talk to the Dr.\" Told pt that is right to refuse this medication but she should talk to the Dr about it tomorrow. Pt denies all MH sx. Will continue to monitor for safety.   "

## 2019-05-16 PROCEDURE — 25000132 ZZH RX MED GY IP 250 OP 250 PS 637: Performed by: STUDENT IN AN ORGANIZED HEALTH CARE EDUCATION/TRAINING PROGRAM

## 2019-05-16 PROCEDURE — 12400001 ZZH R&B MH UMMC

## 2019-05-16 PROCEDURE — 25000132 ZZH RX MED GY IP 250 OP 250 PS 637: Performed by: NURSE PRACTITIONER

## 2019-05-16 PROCEDURE — 99232 SBSQ HOSP IP/OBS MODERATE 35: CPT | Mod: GC | Performed by: PSYCHIATRY & NEUROLOGY

## 2019-05-16 PROCEDURE — 25000132 ZZH RX MED GY IP 250 OP 250 PS 637: Performed by: PSYCHIATRY & NEUROLOGY

## 2019-05-16 RX ORDER — CALCIUM CARBONATE 500(1250)
500 TABLET ORAL 2 TIMES DAILY WITH MEALS
Status: DISCONTINUED | OUTPATIENT
Start: 2019-05-16 | End: 2019-06-05 | Stop reason: HOSPADM

## 2019-05-16 RX ORDER — TRIHEXYPHENIDYL HYDROCHLORIDE 5 MG/1
5 TABLET ORAL 2 TIMES DAILY
Status: DISCONTINUED | OUTPATIENT
Start: 2019-05-16 | End: 2019-06-05 | Stop reason: HOSPADM

## 2019-05-16 RX ADMIN — TRIHEXYPHENIDYL HYDROCHLORIDE 5 MG: 5 TABLET ORAL at 08:49

## 2019-05-16 RX ADMIN — THERA TABS 1 TABLET: TAB at 08:49

## 2019-05-16 RX ADMIN — METOPROLOL SUCCINATE 50 MG: 50 TABLET, EXTENDED RELEASE ORAL at 08:49

## 2019-05-16 RX ADMIN — CALCIUM 500 MG: 500 TABLET ORAL at 08:49

## 2019-05-16 RX ADMIN — CALCIUM 500 MG: 500 TABLET ORAL at 20:38

## 2019-05-16 RX ADMIN — HALOPERIDOL 20 MG: 10 TABLET ORAL at 20:38

## 2019-05-16 RX ADMIN — ATORVASTATIN CALCIUM 40 MG: 40 TABLET, FILM COATED ORAL at 20:38

## 2019-05-16 RX ADMIN — TRIHEXYPHENIDYL HYDROCHLORIDE 5 MG: 5 TABLET ORAL at 20:38

## 2019-05-16 RX ADMIN — CHLORPROMAZINE HYDROCHLORIDE 150 MG: 100 TABLET, FILM COATED ORAL at 20:38

## 2019-05-16 RX ADMIN — LOSARTAN POTASSIUM 100 MG: 100 TABLET, FILM COATED ORAL at 08:49

## 2019-05-16 ASSESSMENT — ACTIVITIES OF DAILY LIVING (ADL)
DRESS: SCRUBS (BEHAVIORAL HEALTH)
HYGIENE/GROOMING: INDEPENDENT
LAUNDRY: UNABLE TO COMPLETE
DRESS: SCRUBS (BEHAVIORAL HEALTH)
HYGIENE/GROOMING: INDEPENDENT
ORAL_HYGIENE: INDEPENDENT
ORAL_HYGIENE: INDEPENDENT

## 2019-05-16 NOTE — PROGRESS NOTES
Pt was visible in the milieu this evening, watching television, not social with peers.  Affect appears blunted.  No psychotic symptoms observed.  No verbal outbursts or behavioral concerns this shift.     05/15/19 2100   Behavioral Health   Hallucinations denies / not responding to hallucinations   Orientation person: oriented;place: oriented   Insight poor   Judgement impaired   Eye Contact at floor;into space   Affect blunted, flat   Mood mood is calm   Physical Appearance/Attire appears stated age   Hygiene neglected grooming - unclean body, hair, teeth   1. Wish to be Dead No   2. Non-Specific Active Suicidal Thoughts  No   Activity isolative;withdrawn   Medication Sensitivity no stated side effects;no observed side effects   Psychomotor / Gait balanced;steady   Activities of Daily Living   Hygiene/Grooming independent   Oral Hygiene independent   Dress independent   Laundry unable to complete   Room Organization independent

## 2019-05-16 NOTE — PROGRESS NOTES
This writer left  for pt's Stevan Bueno- explained that we feel like this pt will need Adult foster care/group home or assisted living.  Told her that we would ask she look into funding/CADI for possibly placement.  Explained family does not feel she can care for self,  is filing for divorce.

## 2019-05-16 NOTE — PROGRESS NOTES
"[facility], [place]   Psychiatric Progress Note  Hospital Day: 19        Interim History:   The patient's care was discussed with the treatment team during the daily team meeting and/or staff's chart notes were reviewed.  Staff report patient continues to exhibit occasional periods of agitation. Acting contrary toward staff when request made that patient go to her room to facilitate patient care with a peer. Was asked to go to her room after using a racial slur toward another patient yesterday, and did so willingly. Refused groups, mostly withdrawn when in milieu.     Upon interview, patient states she is \"good.\" Denies AVH or paranoia. States her mood is \"good.\" Reports ongoing frustration with being in the hospital, and wants to be discharged home immediately. Curious about why she was admitted when she didn't have health insurance. Denied having had any MH symptoms prior to admission, and denies any difficulties caring for herself or others prior to admission. Adamant that she shouldn't have been admitted without health insurance. Had discussion with patient that treatment team would like to aid her in applying for insurance while in the hospital. She states she needs to be discharged to get health insurance because only she is aware of where certain legal documents are kept in her home. At one point, states \"my  does not know where my medical passport is, only I know.\" Denies any medication side effects. No other issues discussed.          Medications:       atorvastatin  40 mg Oral QPM     calcium carbonate (OS-ALEK) 500 mg (elemental) tablet  500 mg Oral BID w/meals     chlorproMAZINE  150 mg Oral At Bedtime     cholecalciferol  50,000 Units Oral Q7 Days     haloperidol  20 mg Oral At Bedtime    Or     haloperidol lactate  10 mg Intramuscular At Bedtime     haloperidol decanoate  100 mg Intramuscular Q30 Days     losartan  100 mg Oral Daily     metoprolol succinate ER  50 mg " "Oral Daily     multivitamin, therapeutic  1 tablet Oral Daily     trihexyphenidyl  5 mg Oral BID          Allergies:     Allergies   Allergen Reactions     Lisinopril Cough          Labs:     No results found for this or any previous visit (from the past 24 hour(s)).       Psychiatric Examination:     /56   Pulse 62   Temp 98.2  F (36.8  C) (Tympanic)   Resp 16   Ht 1.6 m (5' 3\")   Wt 65.3 kg (144 lb)   SpO2 96%   BMI 25.51 kg/m    Weight is 144 lbs 0 oz  Body mass index is 25.51 kg/m .    Weight over time:  Vitals:    04/27/19 0144   Weight: 65.3 kg (144 lb)       Orthostatic Vitals     None        Cardiometabolic risk assessment. 04/29/19    Reviewed patient profile for cardiometabolic risk factors    Date taken /Value  REFERENCE RANGE   Abdominal Obesity  (Waist Circumference)   See nursing flowsheet Women ?35 in (88 cm)   Men ?40 in (102 cm)      Triglycerides  Triglycerides   Date Value Ref Range Status   04/29/2019 98 <150 mg/dL Final       ?150 mg/dL (1.7 mmol/L) or current treatment for elevated triglycerides   HDL cholesterol  HDL Cholesterol   Date Value Ref Range Status   04/29/2019 64 >49 mg/dL Final   ]   Women <50 mg/dL (1.3 mmol/L) in women or current treatment for low HDL cholesterol  Men <40 mg/dL (1 mmol/L) in men or current treatment for low HDL cholesterol     Fasting plasma glucose (FPG) Lab Results   Component Value Date    GLC 90 04/29/2019      FPG ?100 mg/dL (5.6 mmol/L) or treatment for elevated blood glucose   Blood pressure  BP Readings from Last 3 Encounters:   05/16/19 128/56   04/26/19 164/86    Blood pressure ?130/85 mmHg or treatment for elevated blood pressure   Family History  See family history     Appearance: Awake, alert, dressed in hospital scrubs, seated on bed  Attitude: cooperative though intermittently irritable  Eye Contact:  Fair, looking down much of interview  Mood:  \"good\"  Affect: incongruent with stated mood, irritable though less so than last week, more " easily self regulates when becoming upset  Speech:  Normal rate and volume, stuttering, difficult at times to understand  Language: fluent and intact in English  Psychomotor, Gait, Musculoskeletal:  no evidence of tardive dyskinesia, dystonia, or tics  Throught Process: linear and goal oriented toward discharge, however very concrete about discharge plans, illogical  Associations:  no loose associations, though difficult to fully assess  Thought Content:  No SI/HI elicited. Discussed  as though he is living today, which is reality based but at odds with delusional statements about his being  made in the past few days  Insight: poor  Judgement:  fair  Oriented to:  Person, place  Attention Span and Concentration: fair  Recent and Remote Memory:  limited  Fund of Knowledge:  low-normal    Clinical Global Impressions  First:  Considering your total clinical experience with this particular patient population, how severe are the patient's symptoms at this time?: 7 (19)  Compared to the patient's condition at the START of treatment, this patient's condition is:: 4 (19)  Most recent:  Considering your total clinical experience with this particular patient population, how severe are the patient's symptoms at this time?: 7 (19)  Compared to the patient's condition at the START of treatment, this patient's condition is:: 4 (19)           Precautions:     Behavioral Orders   Procedures     Assault precautions     Code 1 - Restrict to Unit     Elopement precautions     Routine Programming     As clinically indicated     Status 15     Every 15 minutes.          Diagnoses:     Schizophrenia, decompensated  Hypertension  Hyperlipidemia  CVA 2017  Right tibial plateau fracture s/p ORIF 2019         Assessment & Plan:     Assessment and hospital summary:  Michelle Pino is a 58-year-old female admitted to [facility] 12[facility] 2019 on a 72-hour hold from [facility] ER  BIBA in handcuffs after her  made multiple calls to the police due to the patient's bizarre behaviors, level of agitation and threats to kill him.  She informed her  that she believed he had killed her parents and her sister and intended to kill her as well.  She had been leaving the stove on for hours.  When the police arrived, she screamed and threatened to kill them. She was most recently hospitalized at [facility] for about 6 weeks from December 2018 through January 2019 in the context of medication noncompliance and was committed and Jarvised.  She had not been taking her medications x 2 weeks prior to her present admission.    Target psychiatric symptoms and interventions:  No changes today  Continue Thorazine 150 mg at bedtime  Continue Haldol to 20 mg at bedtime to target ongoing aggression and psychosis. Jarvised medication.    Initiated Haldol Dec 100 mg IM on 5/10, second 100 mg loading dose administered 5/14. She had been on Haldol Dec for period of time w/o significant side effects per chart review.  Decrease Artane 5 mg to BID for EPSE (none noted during interview, pt preference to decrease)    Medical Problems and Treatments:  Poorly controlled HTN. Pt now compliant with scheduled Losartan, Atorvastatin, Metoprolol, with sustained improvement in BPs. Will continue to monitor. Requested Nursing make attempt for manual blood pressure cuffs if refusing machine.  Dry mouth- Added biotene QID prn  Constipation-changed Miralax 17 g daily to PRN given pt request     Behavioral/Psychological/Social:  Encourage participation in groups, medication compliance    Legal: Committed with Hatch in place with following medications: Thorazine, Haldol, Risperdal, Abilify    Safety:  - Continue precautions as noted above  - Status 15 minute checks    Disposition: pending further evaluation, treatment, and improvement in episodic behavioral dysregulation and development of safe aftercare plan. Given current level  of function, returning to home would not be appropriate from a safety perspective. May consider transfer to less restrictive unit, possibly geriatric unit, moving forward.    Pt seen and discussed with my attending, Ida Khoury*  Hai Carrillo MD  PGY2 Resident  Pager: [phone]

## 2019-05-16 NOTE — PROGRESS NOTES
Pt had mostly calm shift, had some trouble following direction to stay in room while lounge was closed down for other patients to be out of room. Refuses to check in with staff, although says they are doing okay and deny having any concerns at this time. Paranoid behaviors, ex: not allowing certain staff to open bathroom door for her.No obvious signs of responding.      05/15/19 2200   Behavioral Health   Hallucinations denies / not responding to hallucinations   Thinking paranoid   Orientation person: oriented;place: oriented   Insight poor   Judgement impaired   Eye Contact at examiner   Affect tense   Mood irritable   Physical Appearance/Attire attire appropriate to age and situation   Hygiene neglected grooming - unclean body, hair, teeth   1. Wish to be Dead No   2. Non-Specific Active Suicidal Thoughts  No   Self Injury other (see comment)  (none )   Activity withdrawn   Speech clear;coherent   Medication Sensitivity no observed side effects   Psychomotor / Gait balanced;steady   Psycho Education   Type of Intervention 1:1 intervention   Response refuses   Group Therapy Session   Group Attendance refused to attend group session   Activities of Daily Living   Hygiene/Grooming independent   Oral Hygiene independent   Dress independent   Room Organization independent

## 2019-05-17 PROCEDURE — 12400001 ZZH R&B MH UMMC

## 2019-05-17 PROCEDURE — 25000132 ZZH RX MED GY IP 250 OP 250 PS 637: Performed by: PSYCHIATRY & NEUROLOGY

## 2019-05-17 PROCEDURE — 25000132 ZZH RX MED GY IP 250 OP 250 PS 637: Performed by: NURSE PRACTITIONER

## 2019-05-17 PROCEDURE — 99232 SBSQ HOSP IP/OBS MODERATE 35: CPT | Mod: GC | Performed by: PSYCHIATRY & NEUROLOGY

## 2019-05-17 PROCEDURE — G0177 OPPS/PHP; TRAIN & EDUC SERV: HCPCS

## 2019-05-17 PROCEDURE — 25000132 ZZH RX MED GY IP 250 OP 250 PS 637: Performed by: STUDENT IN AN ORGANIZED HEALTH CARE EDUCATION/TRAINING PROGRAM

## 2019-05-17 RX ADMIN — TRIHEXYPHENIDYL HYDROCHLORIDE 5 MG: 5 TABLET ORAL at 21:12

## 2019-05-17 RX ADMIN — CHLORPROMAZINE HYDROCHLORIDE 150 MG: 100 TABLET, FILM COATED ORAL at 21:12

## 2019-05-17 RX ADMIN — METOPROLOL SUCCINATE 50 MG: 50 TABLET, EXTENDED RELEASE ORAL at 08:46

## 2019-05-17 RX ADMIN — TRIHEXYPHENIDYL HYDROCHLORIDE 5 MG: 5 TABLET ORAL at 08:47

## 2019-05-17 RX ADMIN — CALCIUM 500 MG: 500 TABLET ORAL at 21:12

## 2019-05-17 RX ADMIN — ATORVASTATIN CALCIUM 40 MG: 40 TABLET, FILM COATED ORAL at 21:12

## 2019-05-17 RX ADMIN — HALOPERIDOL 20 MG: 10 TABLET ORAL at 21:12

## 2019-05-17 RX ADMIN — THERA TABS 1 TABLET: TAB at 08:47

## 2019-05-17 RX ADMIN — CALCIUM 500 MG: 500 TABLET ORAL at 08:47

## 2019-05-17 RX ADMIN — LOSARTAN POTASSIUM 100 MG: 100 TABLET, FILM COATED ORAL at 08:46

## 2019-05-17 ASSESSMENT — ACTIVITIES OF DAILY LIVING (ADL)
LAUNDRY: UNABLE TO COMPLETE
HYGIENE/GROOMING: INDEPENDENT
DRESS: SCRUBS (BEHAVIORAL HEALTH)
ORAL_HYGIENE: INDEPENDENT
DRESS: SCRUBS (BEHAVIORAL HEALTH)
ORAL_HYGIENE: INDEPENDENT
HYGIENE/GROOMING: INDEPENDENT

## 2019-05-17 NOTE — PROGRESS NOTES
"[facility], [place]   Psychiatric Progress Note  Hospital Day: 20        Interim History:   The patient's care was discussed with the treatment team during the daily team meeting and/or staff's chart notes were reviewed.  Staff report patient spent most of day in lounge watching TV, withdrawn to self, guarded when engaging with staff. Met with unit CTC who discussed placement options considered, explained to her family's concerns for her ability to care for herself, and that her  is filing for divorce.     Upon interview, patient states she is doing \"fine\". Slept well last night, denies any paranoia, low mood, AVH, SI or HI. Denies medication side effects including stiffness, difficulty walking, vision changes, lightheadedness. States medications are helpful, but is unable to describe benefits any further. Had discussion surrounding disposition planning. Informed patient that it is treatment team's recommendation that she not discharge to home given concerns for her ability to care for herself, concerns for lack of appropriate supports in this environment. Patient visibly frustrated by this discussion, repeating \"no, no, no\" and stating that treatment team is a \"disgrace, a disgrace to [place], a disgrace to the world.\" She agrees with plan to discuss this issue further with her family and follow up with team after this weekend. No other issues discussed.          Medications:       atorvastatin  40 mg Oral QPM     calcium carbonate (OS-ALEK) 500 mg (elemental) tablet  500 mg Oral BID w/meals     chlorproMAZINE  150 mg Oral At Bedtime     cholecalciferol  50,000 Units Oral Q7 Days     haloperidol  20 mg Oral At Bedtime    Or     haloperidol lactate  10 mg Intramuscular At Bedtime     haloperidol decanoate  100 mg Intramuscular Q30 Days     losartan  100 mg Oral Daily     metoprolol succinate ER  50 mg Oral Daily     multivitamin, therapeutic  1 tablet Oral Daily     trihexyphenidyl " " 5 mg Oral BID          Allergies:     Allergies   Allergen Reactions     Lisinopril Cough          Labs:     No results found for this or any previous visit (from the past 24 hour(s)).       Psychiatric Examination:     /63 (BP Location: Left arm)   Pulse 67   Temp 98.2  F (36.8  C) (Tympanic)   Resp 16   Ht 1.6 m (5' 3\")   Wt 65.3 kg (144 lb)   SpO2 96%   BMI 25.51 kg/m    Weight is 144 lbs 0 oz  Body mass index is 25.51 kg/m .    Weight over time:  Vitals:    04/27/19 0144   Weight: 65.3 kg (144 lb)       Orthostatic Vitals     None        Cardiometabolic risk assessment. 04/29/19    Reviewed patient profile for cardiometabolic risk factors    Date taken /Value  REFERENCE RANGE   Abdominal Obesity  (Waist Circumference)   See nursing flowsheet Women ?35 in (88 cm)   Men ?40 in (102 cm)      Triglycerides  Triglycerides   Date Value Ref Range Status   04/29/2019 98 <150 mg/dL Final       ?150 mg/dL (1.7 mmol/L) or current treatment for elevated triglycerides   HDL cholesterol  HDL Cholesterol   Date Value Ref Range Status   04/29/2019 64 >49 mg/dL Final   ]   Women <50 mg/dL (1.3 mmol/L) in women or current treatment for low HDL cholesterol  Men <40 mg/dL (1 mmol/L) in men or current treatment for low HDL cholesterol     Fasting plasma glucose (FPG) Lab Results   Component Value Date    GLC 90 04/29/2019      FPG ?100 mg/dL (5.6 mmol/L) or treatment for elevated blood glucose   Blood pressure  BP Readings from Last 3 Encounters:   05/17/19 124/63   04/26/19 164/86    Blood pressure ?130/85 mmHg or treatment for elevated blood pressure   Family History  See family history     Appearance: Awake, alert, dressed in hospital scrubs, seated on bed, standing occasionally when upset, grooming fair  Attitude: cooperative though intermittently irritable at points  Eye Contact:  Fair, at times intense  Mood:  \"fine\"  Affect: congruent with stated mood although irritable at points, continues to more easily self " regulates when becoming upset  Speech:  Normal rate and volume, stuttering, difficult at times to understand  Language: fluent and intact in English  Psychomotor, Gait, Musculoskeletal:  no evidence of tardive dyskinesia, dystonia, or tics  Throught Process: goal oriented toward discharge, illogical and concrete  Associations:  no loose associations, though difficult to fully assess  Thought Content:  No SI/HI elicited. No AVH or paranoid TC elicited today. Perseverative on discharging to home, unable to provide reasonable explanation of her thoughts surrounding the appropriateness of doing so  Insight: poor  Judgement:  Fair with regards to regulating behaviors while inpatient versus her   Initial presentation  Oriented to:  Person, place  Attention Span and Concentration: fair  Recent and Remote Memory:  limited  Fund of Knowledge:  low-normal    Clinical Global Impressions  First:  Considering your total clinical experience with this particular patient population, how severe are the patient's symptoms at this time?: 7 (04/27/19 0750)  Compared to the patient's condition at the START of treatment, this patient's condition is:: 4 (04/27/19 0750)  Most recent:  Considering your total clinical experience with this particular patient population, how severe are the patient's symptoms at this time?: 7 (04/27/19 0750)  Compared to the patient's condition at the START of treatment, this patient's condition is:: 4 (04/27/19 0750)           Precautions:     Behavioral Orders   Procedures     Assault precautions     Code 1 - Restrict to Unit     Elopement precautions     Routine Programming     As clinically indicated     Status 15     Every 15 minutes.          Diagnoses:     Schizophrenia, decompensated  Hypertension  Hyperlipidemia  CVA 2017  Right tibial plateau fracture s/p ORIF 1/8/2019         Assessment & Plan:     Assessment and hospital summary:  Michelle Pino is a 58-year-old female admitted to [facility]  12N 4/26/2019 on a 72-hour hold from [facility] ER BIBA in handcuffs after her  made multiple calls to the police due to the patient's bizarre behaviors, level of agitation and threats to kill him.  She informed her  that she believed he had killed her parents and her sister and intended to kill her as well.  She had been leaving the stove on for hours.  When the police arrived, she screamed and threatened to kill them. She was most recently hospitalized at [facility] for about 6 weeks from December 2018 through January 2019 in the context of medication noncompliance and was committed and Jarvised.  She had not been taking her medications x 2 weeks prior to her present admission.    Target psychiatric symptoms and interventions:  No changes today  Continue Thorazine 150 mg at bedtime  Continue Haldol to 20 mg at bedtime to target ongoing aggression and psychosis. Jarvised medication.    Initiated Haldol Dec 100 mg IM on 5/10, second 100 mg loading dose administered 5/14. She had been on Haldol Dec for period of time w/o significant side effects per chart review.  Decrease Artane 5 mg to BID for EPSE (asymptomatic per patient report)    Medical Problems and Treatments:  Poorly controlled HTN. Pt now compliant with scheduled Losartan, Atorvastatin, Metoprolol, with sustained improvement in BPs. Will continue to monitor. Requested Nursing make attempt for manual blood pressure cuffs if refusing machine.  Dry mouth- Added biotene QID prn  Constipation-changed Miralax 17 g daily to PRN given pt request     Behavioral/Psychological/Social:  Encourage participation in groups, medication compliance    Legal: Committed with Hatch in place with following medications: Thorazine, Haldol, Risperdal, Abilify    Safety:  - Continue precautions as noted above  - Status 15 minute checks    Disposition: exact date pending further evaluation, treatment, and improvement in episodic behavioral dysregulation and development of safe  aftercare plan. Given current level of function, returning to home would not be appropriate from a safety perspective. May consider transfer to less restrictive unit, possibly geriatric unit, moving forward. Placing referrals for CADI and group home placement    Pt seen and discussed with my attending, Ida Khoury*  Hai Carrillo MD  PGY2 Resident  Pager: [phone]

## 2019-05-17 NOTE — PROGRESS NOTES
Patient sat in lounge the entirety of the shift watching TV. She made no contact with other patients on the unit nor with staff at any point during the shift. Very guarded when speaking with staff not willing to answer any questions.

## 2019-05-17 NOTE — PLAN OF CARE
Problem: Psychotic Symptoms  Goal: Psychotic Symptoms  Outcome: No Change     Patient was visible in the milieu, but socially withdrawn and guarded from peers and staff.  She had a flat affect on approach, but was labile and irritable for extended conversations.   She mostly gave minimal answers.  She denied mental health concerns.  She had a moment of agitation and shouted at the treatment team.  No overt signs of psychosis (no placement of curses on staff, etc.)    Patient was compliant with her medications as prescribed.  She denied acute physical concerns and medication side effects.

## 2019-05-18 PROCEDURE — 25000132 ZZH RX MED GY IP 250 OP 250 PS 637: Performed by: STUDENT IN AN ORGANIZED HEALTH CARE EDUCATION/TRAINING PROGRAM

## 2019-05-18 PROCEDURE — 25000132 ZZH RX MED GY IP 250 OP 250 PS 637: Performed by: NURSE PRACTITIONER

## 2019-05-18 PROCEDURE — 25000132 ZZH RX MED GY IP 250 OP 250 PS 637: Performed by: PSYCHIATRY & NEUROLOGY

## 2019-05-18 PROCEDURE — 12400001 ZZH R&B MH UMMC

## 2019-05-18 RX ADMIN — THERA TABS 1 TABLET: TAB at 08:57

## 2019-05-18 RX ADMIN — ATORVASTATIN CALCIUM 40 MG: 40 TABLET, FILM COATED ORAL at 21:03

## 2019-05-18 RX ADMIN — CHOLECALCIFEROL CAP 1.25 MG (50000 UNIT) 50000 UNITS: 1.25 CAP at 09:02

## 2019-05-18 RX ADMIN — CHLORPROMAZINE HYDROCHLORIDE 150 MG: 100 TABLET, FILM COATED ORAL at 21:03

## 2019-05-18 RX ADMIN — HALOPERIDOL 20 MG: 10 TABLET ORAL at 21:03

## 2019-05-18 RX ADMIN — CALCIUM 500 MG: 500 TABLET ORAL at 21:03

## 2019-05-18 RX ADMIN — CALCIUM 500 MG: 500 TABLET ORAL at 08:57

## 2019-05-18 RX ADMIN — TRIHEXYPHENIDYL HYDROCHLORIDE 5 MG: 5 TABLET ORAL at 08:58

## 2019-05-18 RX ADMIN — METOPROLOL SUCCINATE 50 MG: 50 TABLET, EXTENDED RELEASE ORAL at 08:58

## 2019-05-18 RX ADMIN — TRIHEXYPHENIDYL HYDROCHLORIDE 5 MG: 5 TABLET ORAL at 21:03

## 2019-05-18 RX ADMIN — LOSARTAN POTASSIUM 100 MG: 100 TABLET, FILM COATED ORAL at 08:58

## 2019-05-18 ASSESSMENT — ACTIVITIES OF DAILY LIVING (ADL)
LAUNDRY: WITH SUPERVISION
HYGIENE/GROOMING: INDEPENDENT
ORAL_HYGIENE: INDEPENDENT
ORAL_HYGIENE: INDEPENDENT
HYGIENE/GROOMING: INDEPENDENT
LAUNDRY: WITH SUPERVISION
DRESS: INDEPENDENT
DRESS: INDEPENDENT

## 2019-05-18 NOTE — PROGRESS NOTES
Pt refused to check-in with this writer. Pt spent the day in the milieu, mostly sitting in the lounge. She is compliant, but irritable. She often refuses to engage with others. This writer was unable to assess SI/SIB/HI or AVH, however pt is able to answer questions appropriately when she does engage, and there were no incidences of aggression this shift. Pt did not appear to be responding to internal stimuli this evening. Nothing further to note.         05/17/19 2200   Behavioral Health   Hallucinations denies / not responding to hallucinations   Thinking poor concentration   Orientation person: oriented;place: oriented   Memory baseline memory   Insight poor   Judgement impaired   Eye Contact at examiner   Affect blunted, flat;irritable   Mood mood is calm   Physical Appearance/Attire attire appropriate to age and situation   Hygiene other (see comment)  (fair)   Suicidality other (see comments)  (AFSHAN)   1. Wish to be Dead   (AFSHAN)   2. Non-Specific Active Suicidal Thoughts    (AFSHAN)   Self Injury other (see comment)  (AFSHAN)   Activity withdrawn   Speech clear;coherent   Medication Sensitivity no stated side effects;no observed side effects   Psychomotor / Gait balanced;steady   Activities of Daily Living   Hygiene/Grooming independent   Oral Hygiene independent   Dress scrubs (behavioral health)   Room Organization independent

## 2019-05-18 NOTE — PROGRESS NOTES
"   05/18/19 1417   Behavioral Health   Hallucinations denies / not responding to hallucinations   Thinking intact   Orientation person: oriented;place: oriented;date: oriented;time: oriented   Memory other (see comment)  (AFSHAN)   Insight other (see comment)  (AFSHAN)   Judgement intact   Eye Contact at examiner   Affect blunted, flat   Mood mood is calm   Physical Appearance/Attire attire appropriate to age and situation;appears stated age   Hygiene other (see comment)  (fair)   Suicidality other (see comments)  (Pt denies.)   Wish to be Dead Description no   Non-Specific Active Suicidal Thought Description no   Self Injury other (see comment)  (Pt denies.)   Elopement   (Pt didn't exhibit these behaviors this shift.)   Activity other (see comment)  (quiet but present in milieu)   Speech coherent;clear   Psychomotor / Gait balanced;steady   Coping/Psychosocial   Verbalized Emotional State happiness;hopefulness;other (see comments)  (\"feeling good\")   Activities of Daily Living   Hygiene/Grooming independent   Oral Hygiene independent   Dress independent   Laundry with supervision   Room Organization independent   Significant Event   Significant Event Other (see comments)  (Shift Summary)   Behavioral Health Interventions   Social and Therapeutic Interventions (Psychotic Symptoms) encourage socialization with peers;encourage effective boundaries with peers;encourage participation in therapeutic groups and milieu activities   Pt denies SI and SIB thoughts. Pt states that she doesn't feel depressed or anxious. Pt states that she feels good-happy and hopeful. Pt wants to know what she needs to do in order to discharge; she really wants to go home. She would like her RNs and/or provider to discuss this with her. Pt was quiet but present in milieu throughout the shift. Pt was polite, pleasant and cooperative with the writer this shift.  "

## 2019-05-19 PROCEDURE — 25000132 ZZH RX MED GY IP 250 OP 250 PS 637: Performed by: NURSE PRACTITIONER

## 2019-05-19 PROCEDURE — 25000132 ZZH RX MED GY IP 250 OP 250 PS 637: Performed by: STUDENT IN AN ORGANIZED HEALTH CARE EDUCATION/TRAINING PROGRAM

## 2019-05-19 PROCEDURE — 25000132 ZZH RX MED GY IP 250 OP 250 PS 637: Performed by: PSYCHIATRY & NEUROLOGY

## 2019-05-19 PROCEDURE — 12400001 ZZH R&B MH UMMC

## 2019-05-19 RX ADMIN — ATORVASTATIN CALCIUM 40 MG: 40 TABLET, FILM COATED ORAL at 20:45

## 2019-05-19 RX ADMIN — TRIHEXYPHENIDYL HYDROCHLORIDE 5 MG: 5 TABLET ORAL at 20:45

## 2019-05-19 RX ADMIN — CHLORPROMAZINE HYDROCHLORIDE 150 MG: 100 TABLET, FILM COATED ORAL at 20:44

## 2019-05-19 RX ADMIN — TRIHEXYPHENIDYL HYDROCHLORIDE 5 MG: 5 TABLET ORAL at 08:30

## 2019-05-19 RX ADMIN — CALCIUM 500 MG: 500 TABLET ORAL at 20:45

## 2019-05-19 RX ADMIN — HALOPERIDOL 20 MG: 10 TABLET ORAL at 20:45

## 2019-05-19 RX ADMIN — CALCIUM 500 MG: 500 TABLET ORAL at 08:30

## 2019-05-19 RX ADMIN — THERA TABS 1 TABLET: TAB at 08:30

## 2019-05-19 ASSESSMENT — ACTIVITIES OF DAILY LIVING (ADL)
HYGIENE/GROOMING: INDEPENDENT
ORAL_HYGIENE: INDEPENDENT
DRESS: SCRUBS (BEHAVIORAL HEALTH)

## 2019-05-19 NOTE — PROGRESS NOTES
Pt denied thoughts of SI/SIB. Pt was active in milieu during earlier parts of the shift, but spent majority of evening shift in her room.     05/18/19 5819   Behavioral Health   Hallucinations denies / not responding to hallucinations   Thinking intact   Orientation person: oriented;place: oriented;date: oriented;time: oriented   Memory other (see comment)  (AFSHAN)   Insight insight appropriate to events   Judgement intact   Eye Contact at examiner   Affect blunted, flat   Mood mood is calm   Physical Appearance/Attire attire appropriate to age and situation   Hygiene well groomed   1. Wish to be Dead No   2. Non-Specific Active Suicidal Thoughts  No   Self Injury other (see comment)  (denies)   Activity other (see comment)  (active in milieu)   Speech clear;coherent   Psychomotor / Gait balanced;steady   Activities of Daily Living   Hygiene/Grooming independent   Oral Hygiene independent   Dress independent   Laundry with supervision   Room Organization independent

## 2019-05-19 NOTE — PLAN OF CARE
"Pt is present in the milieu, but not interacting with other patients and only selective staff.  She continues to have poor insight into her mental health, and her need to be in the hospital.  She reports that she does not need to be here and doesn't understand why she can't just go home now.  She was extremely difficult to get her to go to her room during a situation when the lounge needed to be closed.  She argued with staff and said \"Is that patient a killer? If not, then I do not need to leave the area.\"  She went on to say that we force her to shout and that she was never a shouter prior to being held in the hospital.  She takes no responsibility for her actions PTA or while on the unit.  She was medication compliant and denies SI/SIB/HI or any psychotic symptoms.    "

## 2019-05-20 PROCEDURE — 99232 SBSQ HOSP IP/OBS MODERATE 35: CPT | Mod: GC | Performed by: PSYCHIATRY & NEUROLOGY

## 2019-05-20 PROCEDURE — 12400001 ZZH R&B MH UMMC

## 2019-05-20 PROCEDURE — 25000132 ZZH RX MED GY IP 250 OP 250 PS 637: Performed by: STUDENT IN AN ORGANIZED HEALTH CARE EDUCATION/TRAINING PROGRAM

## 2019-05-20 PROCEDURE — H2032 ACTIVITY THERAPY, PER 15 MIN: HCPCS

## 2019-05-20 PROCEDURE — G0177 OPPS/PHP; TRAIN & EDUC SERV: HCPCS

## 2019-05-20 PROCEDURE — 25000132 ZZH RX MED GY IP 250 OP 250 PS 637: Performed by: NURSE PRACTITIONER

## 2019-05-20 PROCEDURE — 25000132 ZZH RX MED GY IP 250 OP 250 PS 637: Performed by: PSYCHIATRY & NEUROLOGY

## 2019-05-20 RX ADMIN — THERA TABS 1 TABLET: TAB at 08:45

## 2019-05-20 RX ADMIN — TRIHEXYPHENIDYL HYDROCHLORIDE 5 MG: 5 TABLET ORAL at 20:59

## 2019-05-20 RX ADMIN — TRIHEXYPHENIDYL HYDROCHLORIDE 5 MG: 5 TABLET ORAL at 08:45

## 2019-05-20 RX ADMIN — HALOPERIDOL 20 MG: 10 TABLET ORAL at 20:59

## 2019-05-20 RX ADMIN — CHLORPROMAZINE HYDROCHLORIDE 150 MG: 100 TABLET, FILM COATED ORAL at 20:59

## 2019-05-20 RX ADMIN — METOPROLOL SUCCINATE 50 MG: 50 TABLET, EXTENDED RELEASE ORAL at 08:45

## 2019-05-20 RX ADMIN — LOSARTAN POTASSIUM 100 MG: 100 TABLET, FILM COATED ORAL at 08:45

## 2019-05-20 RX ADMIN — CALCIUM 500 MG: 500 TABLET ORAL at 20:59

## 2019-05-20 RX ADMIN — CALCIUM 500 MG: 500 TABLET ORAL at 08:45

## 2019-05-20 RX ADMIN — ATORVASTATIN CALCIUM 40 MG: 40 TABLET, FILM COATED ORAL at 20:59

## 2019-05-20 ASSESSMENT — ACTIVITIES OF DAILY LIVING (ADL)
DRESS: SCRUBS (BEHAVIORAL HEALTH)
LAUNDRY: UNABLE TO COMPLETE
LAUNDRY: WITH SUPERVISION
DRESS: INDEPENDENT;SCRUBS (BEHAVIORAL HEALTH)
HYGIENE/GROOMING: INDEPENDENT
HYGIENE/GROOMING: INDEPENDENT
ORAL_HYGIENE: INDEPENDENT
ORAL_HYGIENE: INDEPENDENT

## 2019-05-20 NOTE — PROGRESS NOTES
"[facility], [place]   Psychiatric Progress Note  Hospital Day: 23        Interim History:   The patient's care was discussed with the treatment team during the daily team meeting and/or staff's chart notes were reviewed.  Staff report patient compliant with medications over weekend. Refused check ins intermittently, refused to engage with others intermittently. Noted to be irritable however no incidences of aggression. Demonstrated poor insight with staff regarding why she is still in hospital. At one point, staff noted difficulty getting patient to go into her room at a point when milieu needed to be be closed. Per verbal report from nursing, patient now more consistently cooperative with vitals and medications.    Upon interview, patient states she is doing \"good.\" She slept well and is feeling rested. She was happy to share awards including a journal and a thermos which she won playing [employer] this weekend. Also shared some artwork which she'd done in group over the weekend. She states she enjoyed participating in these activities. She denies any current mental health issues. States the medications \"are good\" and denies having any stiffness, difficulty moving limbs, difficulty walking. Again discussed treatment team recommendations that she discharge to a transitional care unit such as a group home or adult foster care. Patient continues to not be in agreement with this. States she has multiple important documents at home such as her passport, of which only she known the location. Expresses concern that she is going to lose her apartment if she does not discharge and pay back the $1317 which she owes for rent by end of this week. Team discussed plan to involve her  as well as her children in disposition coordination and planning. No other issues discussed.          Medications:       atorvastatin  40 mg Oral QPM     calcium carbonate (OS-ALEK) 500 mg (elemental) tablet  500 " "mg Oral BID w/meals     chlorproMAZINE  150 mg Oral At Bedtime     cholecalciferol  50,000 Units Oral Q7 Days     haloperidol  20 mg Oral At Bedtime    Or     haloperidol lactate  10 mg Intramuscular At Bedtime     haloperidol decanoate  100 mg Intramuscular Q30 Days     losartan  100 mg Oral Daily     metoprolol succinate ER  50 mg Oral Daily     multivitamin, therapeutic  1 tablet Oral Daily     trihexyphenidyl  5 mg Oral BID          Allergies:     Allergies   Allergen Reactions     Lisinopril Cough          Labs:     No results found for this or any previous visit (from the past 24 hour(s)).       Psychiatric Examination:     /66 (BP Location: Left arm)   Pulse 64   Temp 97.1  F (36.2  C) (Tympanic)   Resp 16   Ht 1.6 m (5' 3\")   Wt 65.3 kg (144 lb)   SpO2 99%   BMI 25.51 kg/m    Weight is 144 lbs 0 oz  Body mass index is 25.51 kg/m .    Weight over time:  Vitals:    04/27/19 0144   Weight: 65.3 kg (144 lb)       Orthostatic Vitals     None        Cardiometabolic risk assessment. 04/29/19    Reviewed patient profile for cardiometabolic risk factors    Date taken /Value  REFERENCE RANGE   Abdominal Obesity  (Waist Circumference)   See nursing flowsheet Women ?35 in (88 cm)   Men ?40 in (102 cm)      Triglycerides  Triglycerides   Date Value Ref Range Status   04/29/2019 98 <150 mg/dL Final       ?150 mg/dL (1.7 mmol/L) or current treatment for elevated triglycerides   HDL cholesterol  HDL Cholesterol   Date Value Ref Range Status   04/29/2019 64 >49 mg/dL Final   ]   Women <50 mg/dL (1.3 mmol/L) in women or current treatment for low HDL cholesterol  Men <40 mg/dL (1 mmol/L) in men or current treatment for low HDL cholesterol     Fasting plasma glucose (FPG) Lab Results   Component Value Date    GLC 90 04/29/2019      FPG ?100 mg/dL (5.6 mmol/L) or treatment for elevated blood glucose   Blood pressure  BP Readings from Last 3 Encounters:   05/20/19 133/66   04/26/19 164/86    Blood pressure ?130/85 " "mmHg or treatment for elevated blood pressure   Family History  See family history     Appearance: Awake, alert, dressed in hospital scrubs and yellow sweatshirt, seated on bed, grooming fair  Attitude: cooperative, more cheerful initially, though intermittently irritable at points  Eye Contact:  Fair, less intense  Mood:  \"good\"  Affect: congruent with stated mood. Although irritable at points, less intense than previous days. Much brighter at initiation of interview than any other point in admission, smiling and cheerfully discussing bingo.   Speech:  Normal rate and volume, stuttering, difficult at times to understand  Language: fluent and intact in English  Psychomotor, Gait, Musculoskeletal:  no evidence of tardive dyskinesia, dystonia, or tics  Throught Process: goal oriented toward discharge, illogical and concrete  Associations:  no loose associations, though difficult to fully assess  Thought Content:  No SI/HI elicited. No AVH or paranoid TC elicited today. Perseverative on discharging to home, unable to provide reasonable explanation of her thoughts surrounding the appropriateness of doing so  Insight: poor  Judgement:  Fair with regards to regulating behaviors while inpatient versus her   Initial presentation  Oriented to:  Person, place  Attention Span and Concentration: fair  Recent and Remote Memory:  limited  Fund of Knowledge:  low-normal    Clinical Global Impressions  First:  Considering your total clinical experience with this particular patient population, how severe are the patient's symptoms at this time?: 7 (04/27/19 0750)  Compared to the patient's condition at the START of treatment, this patient's condition is:: 4 (04/27/19 0750)  Most recent:  Considering your total clinical experience with this particular patient population, how severe are the patient's symptoms at this time?: 7 (04/27/19 0750)  Compared to the patient's condition at the START of treatment, this patient's condition is:: " 4 (04/27/19 8450)           Precautions:     Behavioral Orders   Procedures     Assault precautions     Code 1 - Restrict to Unit     Elopement precautions     Routine Programming     As clinically indicated     Status 15     Every 15 minutes.          Diagnoses:     Schizophrenia, decompensated  Hypertension  Hyperlipidemia  CVA 2017  Right tibial plateau fracture s/p ORIF 1/8/2019         Assessment & Plan:     Assessment and hospital summary:  Michelle Pino is a 58-year-old female admitted to [address] 4/26/2019 on a 72-hour hold from [facility] ER BIBA in handcuffs after her  made multiple calls to the police due to the patient's bizarre behaviors, level of agitation and threats to kill him.  She informed her  that she believed he had killed her parents and her sister and intended to kill her as well.  She had been leaving the stove on for hours.  When the police arrived, she screamed and threatened to kill them. [place] [facility] [place] from December 2018 through January 2019 in the context of medication noncompliance and was committed and Jarvised.  She had not been taking her medications x 2 weeks prior to her present admission.    Target psychiatric symptoms and interventions:  No changes today  Continue Thorazine 150 mg at bedtime  Continue Haldol to 20 mg at bedtime to target ongoing aggression and psychosis. Jarvised medication.    Initiated Haldol Dec 100 mg IM q30d on 5/10, second 100 mg loading dose administered 5/14. She had been on Haldol Dec for period of time w/o significant side effects per chart review. Next dose due 6/9  Decrease Artane 5 mg to BID for EPSE (asymptomatic per patient report)    Medical Problems and Treatments:  Poorly controlled HTN. Pt now compliant with scheduled Losartan, Atorvastatin, Metoprolol, with sustained improvement in BPs. Will continue to monitor. Requested Nursing make attempt for manual blood pressure cuffs  if refusing machine.  Dry mouth- Added biotene QID prn  Constipation-changed Miralax 17 g daily to PRN given pt request     Behavioral/Psychological/Social:  Encourage participation in groups, medication compliance    Legal: Committed with Hatch in place with following medications: Thorazine, Haldol, Risperdal, Abilify    Safety:  - Continue precautions as noted above  - Status 15 minute checks    Disposition: exact date pending further evaluation, treatment, and improvement in episodic behavioral dysregulation and development of safe aftercare plan. Given current level of function, returning to home would not be appropriate from a safety perspective. May consider transfer to less restrictive unit, possibly geriatric unit, moving forward. Placing referrals for CADI and group home placement    Pt seen and discussed with my attending, Ida Khoury*  Hai Carrillo MD  PGY2 Resident  Pager: [phone]

## 2019-05-20 NOTE — PROGRESS NOTES
Pt had a quiet shift; pt observed flat. Pt was present in the milieu and went to groups. Pt ate her meals today, showered and clipped her nails. Writer was unable to AFSHAN depression, anxiety, SI/SIB/AH/ and VH.        05/20/19 1420   Behavioral Health   Hallucinations denies / not responding to hallucinations   Thinking distractable   Orientation person: oriented;place: oriented;date: oriented;time: oriented   Memory baseline memory   Insight poor;denial of illness   Judgement impaired   Affect blunted, flat;irritable   Mood mood is calm   Physical Appearance/Attire attire appropriate to age and situation   Hygiene well groomed   Suicidality other (see comments)  (AFSHAN )   1. Wish to be Dead   (AFSHAN)   2. Non-Specific Active Suicidal Thoughts    (AFSHAN)   Self Injury other (see comment)  (AFSHAN)   Elopement   (none observed )   Activity other (see comment)  (present in the milieu and groups )   Speech coherent;clear   Medication Sensitivity no observed side effects;no stated side effects   Psychomotor / Gait balanced;steady   Activities of Daily Living   Hygiene/Grooming independent   Oral Hygiene independent   Dress scrubs (behavioral health)   Laundry unable to complete   Room Organization independent

## 2019-05-20 NOTE — PROGRESS NOTES
"Participated in Music Therapy group with focus on mood elevation, validation and decreasing anxiety and improved group cohesiveness. Engaged and cooperative in music listening interventions.   Showed progress in session goals.  More passive participation in group, but did request \"Shinto music\".    "

## 2019-05-20 NOTE — PLAN OF CARE
Problem: OT General Care Plan  Goal: OT Goal 1  Description  Work with pt to increase awareness of how symptoms can impact performance on goal-directed tasks and life management skills. Will provide opportunities to build on coping strategies to manage symptoms during hospitalization and after d/c.      Pt attended 2 of 2 OT groups.  Pt attended the OT discussion group, which involved an abstract thought as patients were given a list of 40 road signs with instruction to select once each to represent their past, present,and future.  Pt had difficulty understanding exactly what she was to do, and instead circled signs she recognized that were near her home.  Pt was eager to share her signs, at one point she somewhat interrupted a peer, asking if he would be talking the whole time, as she wanted to share her answers too.    Later attended OT clinic group, took time to carefully plan out a pattern for a bracelet, and was content with the design.  Worked quietly while others selected music and sang along.  Pt opted to pass each time peers asked if she had music she wanted to listen to.      Outcome: Improving

## 2019-05-21 PROCEDURE — 25000132 ZZH RX MED GY IP 250 OP 250 PS 637: Performed by: PSYCHIATRY & NEUROLOGY

## 2019-05-21 PROCEDURE — 25000132 ZZH RX MED GY IP 250 OP 250 PS 637: Performed by: NURSE PRACTITIONER

## 2019-05-21 PROCEDURE — 99232 SBSQ HOSP IP/OBS MODERATE 35: CPT | Mod: GC | Performed by: PSYCHIATRY & NEUROLOGY

## 2019-05-21 PROCEDURE — G0177 OPPS/PHP; TRAIN & EDUC SERV: HCPCS

## 2019-05-21 PROCEDURE — 12400001 ZZH R&B MH UMMC

## 2019-05-21 PROCEDURE — 99207 ZZC CDG-UP CODE -MED NECESSITY: CPT | Performed by: PSYCHIATRY & NEUROLOGY

## 2019-05-21 PROCEDURE — 25000132 ZZH RX MED GY IP 250 OP 250 PS 637: Performed by: STUDENT IN AN ORGANIZED HEALTH CARE EDUCATION/TRAINING PROGRAM

## 2019-05-21 RX ADMIN — CALCIUM 500 MG: 500 TABLET ORAL at 20:59

## 2019-05-21 RX ADMIN — THERA TABS 1 TABLET: TAB at 08:35

## 2019-05-21 RX ADMIN — TRIHEXYPHENIDYL HYDROCHLORIDE 5 MG: 5 TABLET ORAL at 20:59

## 2019-05-21 RX ADMIN — ATORVASTATIN CALCIUM 40 MG: 40 TABLET, FILM COATED ORAL at 20:58

## 2019-05-21 RX ADMIN — HALOPERIDOL 20 MG: 10 TABLET ORAL at 20:58

## 2019-05-21 RX ADMIN — METOPROLOL SUCCINATE 50 MG: 50 TABLET, EXTENDED RELEASE ORAL at 08:34

## 2019-05-21 RX ADMIN — CALCIUM 500 MG: 500 TABLET ORAL at 08:35

## 2019-05-21 RX ADMIN — LOSARTAN POTASSIUM 100 MG: 100 TABLET, FILM COATED ORAL at 08:35

## 2019-05-21 RX ADMIN — CHLORPROMAZINE HYDROCHLORIDE 150 MG: 100 TABLET, FILM COATED ORAL at 20:58

## 2019-05-21 RX ADMIN — TRIHEXYPHENIDYL HYDROCHLORIDE 5 MG: 5 TABLET ORAL at 08:34

## 2019-05-21 ASSESSMENT — ACTIVITIES OF DAILY LIVING (ADL)
HYGIENE/GROOMING: INDEPENDENT
DRESS: INDEPENDENT
HYGIENE/GROOMING: INDEPENDENT
ORAL_HYGIENE: INDEPENDENT
LAUNDRY: UNABLE TO COMPLETE
DRESS: SCRUBS (BEHAVIORAL HEALTH)
ORAL_HYGIENE: INDEPENDENT
LAUNDRY: WITH SUPERVISION

## 2019-05-21 NOTE — PLAN OF CARE
Problem: OT General Care Plan  Goal: OT Goal 1  Description  Work with pt to increase awareness of how symptoms can impact performance on goal-directed tasks and life management skills. Will provide opportunities to build on coping strategies to manage symptoms during hospitalization and after d/c.      Pt attended 3 of 3 OT groups.  Pt is very attentive during discussion gorups - works hard on worksheet during group, frequently asking writer for help understanding things, and with spelling.  Eager to share her answers, even though she tends to write the same sort of answer for most questions.  Groups members are very supportive of pt, and give her time to share answers, which appear beneficial for her, and seem to increase her recent group frequency.    Pt participated in an active gross motor toss game in leisure group, smiled as she came from behind and scored higher, beating all the young males she was playing against.  Laughed together with group as she was named the champion.    In the afternoon, pt was the only person in OT clinic group, some difficulty selecting a task project, though once set up by writer, took her time with a painting project, then proceeded to carefully decorate with gemstones in an alternating pattern.  Looking forward to finishing tomorrow.  Pt came to the afternoon group at 1330, still carrying her worksheet she completed at 1015 this morning - seemed proud of her work.    Outcome: Improving

## 2019-05-21 NOTE — PROGRESS NOTES
[facility], [place]   Psychiatric Progress Note  Hospital Day: 24        Interim History:   The patient's care was discussed with the treatment team during the daily team meeting and/or staff's chart notes were reviewed.  Staff report patient had quiet shift during day, took shower and performed self cares. Attended OT group and noted to have difficulty understanding instructions. Attended music therapy and demonstrated passive participation. Mostly withdrawn in lounge, blunted and irritable affect noted by staff. This am, did not verbally engage with nursing during interactions.     Upon interview, patient seen in milieu. When asked to join team for interview, she luke from her chair, walked to her room, closed the door behind team, and sat down on bed. She then looked down at floor, glancing periodically at attending. Asked patient how she was feeling, and patient did not respond. Team stated that patient appeared sad, and patient did not respond. Informed patient that team would like to schedule a family meeting later this week, and asked patient her thoughts on this. Again, patient did provide a verbal or nonverbal response. Informed patient that this was her opportunity to discuss her care with team, and after she did not respond, interview was ended.          Medications:       atorvastatin  40 mg Oral QPM     calcium carbonate (OS-ALEK) 500 mg (elemental) tablet  500 mg Oral BID w/meals     chlorproMAZINE  150 mg Oral At Bedtime     cholecalciferol  50,000 Units Oral Q7 Days     haloperidol  20 mg Oral At Bedtime    Or     haloperidol lactate  10 mg Intramuscular At Bedtime     haloperidol decanoate  100 mg Intramuscular Q30 Days     losartan  100 mg Oral Daily     metoprolol succinate ER  50 mg Oral Daily     multivitamin, therapeutic  1 tablet Oral Daily     trihexyphenidyl  5 mg Oral BID          Allergies:     Allergies   Allergen Reactions     Lisinopril Cough          Labs:  "    No results found for this or any previous visit (from the past 24 hour(s)).       Psychiatric Examination:     /65   Pulse 78   Temp 98.6  F (37  C)   Resp 16   Ht 1.6 m (5' 3\")   Wt 65.3 kg (144 lb)   SpO2 99%   BMI 25.51 kg/m    Weight is 144 lbs 0 oz  Body mass index is 25.51 kg/m .    Weight over time:  Vitals:    04/27/19 0144   Weight: 65.3 kg (144 lb)       Orthostatic Vitals     None        Cardiometabolic risk assessment. 04/29/19    Reviewed patient profile for cardiometabolic risk factors    Date taken /Value  REFERENCE RANGE   Abdominal Obesity  (Waist Circumference)   See nursing flowsheet Women ?35 in (88 cm)   Men ?40 in (102 cm)      Triglycerides  Triglycerides   Date Value Ref Range Status   04/29/2019 98 <150 mg/dL Final       ?150 mg/dL (1.7 mmol/L) or current treatment for elevated triglycerides   HDL cholesterol  HDL Cholesterol   Date Value Ref Range Status   04/29/2019 64 >49 mg/dL Final   ]   Women <50 mg/dL (1.3 mmol/L) in women or current treatment for low HDL cholesterol  Men <40 mg/dL (1 mmol/L) in men or current treatment for low HDL cholesterol     Fasting plasma glucose (FPG) Lab Results   Component Value Date    GLC 90 04/29/2019      FPG ?100 mg/dL (5.6 mmol/L) or treatment for elevated blood glucose   Blood pressure  BP Readings from Last 3 Encounters:   05/21/19 128/65   04/26/19 164/86    Blood pressure ?130/85 mmHg or treatment for elevated blood pressure   Family History  See family history     Appearance: Awake, alert, dressed in hospital scrubs and yellow sweatshirt, wearing hair net, grooming fair  Attitude: uncooperative  Eye Contact:  poor  Mood:  \"...\"  Affect: blunted appearing, frustrated appearing, unable to further assess   Speech:  Nonverbal for duration of interview  Language: unable to assess  Psychomotor, Gait, Musculoskeletal:  no evidence of tardive dyskinesia, dystonia, or tics  Throught Process: unable to assess  Associations:  Unable to " assess  Thought Content:  Unable to assess  Insight: unable to assess  Judgement:  Fair with regards to behavioral regulation on unit. Unable to further assess  Oriented to:    Attention Span and Concentration: Unable to assess  Recent and Remote Memory:  Unable to assess  Fund of Knowledge:  Low normal    Clinical Global Impressions  First:  Considering your total clinical experience with this particular patient population, how severe are the patient's symptoms at this time?: 7 (04/27/19 0750)  Compared to the patient's condition at the START of treatment, this patient's condition is:: 4 (04/27/19 0750)  Most recent:  Considering your total clinical experience with this particular patient population, how severe are the patient's symptoms at this time?: 7 (04/27/19 0750)  Compared to the patient's condition at the START of treatment, this patient's condition is:: 4 (04/27/19 0750)           Precautions:     Behavioral Orders   Procedures     Assault precautions     Code 1 - Restrict to Unit     Elopement precautions     Routine Programming     As clinically indicated     Status 15     Every 15 minutes.          Diagnoses:     Schizophrenia, decompensated  Hypertension  Hyperlipidemia  CVA 2017  Right tibial plateau fracture s/p ORIF 1/8/2019         Assessment & Plan:     Assessment and hospital summary:  Michelle Pino is a 58-year-old female admitted to [facility] 12 4/26/2019 on a 72-hour hold from [facility] ER BIBA in handcuffs after her  made multiple calls to the police due to the patient's bizarre behaviors, level of agitation and threats to kill him.  She informed her  that she believed he had killed her parents and her sister and intended to kill her as well.  She had been leaving the stove on for hours.  When the police arrived, she screamed and threatened to kill them. She was most recently hospitalized at [facility] for about 6 weeks from December 2018 through January 2019 in the context of  medication noncompliance and was committed and Jarvised.  She had not been taking her medications x 2 weeks prior to her present admission.    Target psychiatric symptoms and interventions:  No changes today  Continue Thorazine 150 mg at bedtime  Continue Haldol to 20 mg at bedtime to target ongoing aggression and psychosis. Jarvised medication.    Initiated Haldol Dec 100 mg IM q30d on 5/10, second 100 mg loading dose administered 5/14. She had been on Haldol Dec for period of time w/o significant side effects per chart review. Next dose due 6/9  Decrease Artane 5 mg to BID for EPSE (asymptomatic per patient report)    Medical Problems and Treatments:  Poorly controlled HTN. Pt now compliant with scheduled Losartan, Atorvastatin, Metoprolol, with sustained improvement in BPs. Will continue to monitor. Requested Nursing make attempt for manual blood pressure cuffs if refusing machine.  Dry mouth- Added biotene QID prn  Constipation-changed Miralax 17 g daily to PRN given pt request     Behavioral/Psychological/Social:  Encourage participation in groups, medication compliance    Legal: Committed with Hatch in place with following medications: Thorazine, Haldol, Risperdal, Abilify    Safety:  - Continue precautions as noted above  - Status 15 minute checks    Disposition: exact date pending further evaluation, treatment, and improvement in episodic behavioral dysregulation and development of safe aftercare plan. Given current level of function, returning to home would not be appropriate from a safety perspective. Will attempt to schedule family meeting later this week.  May consider transfer to less restrictive unit, possibly geriatric unit, moving forward. Placing referrals for CADI and group home placement.     Pt seen and discussed with my attending, Ida Khoury*  Hai Carrillo MD  PGY2 Resident  Pager: [phone]

## 2019-05-21 NOTE — PROGRESS NOTES
Brief Progress Note  5/21/19    Spoke with Laquita, patient's daughter, at [phone]. She is currently planning on coming to [place] Friday 5/24 and returning home Monday 5/27. She is open to scheduling a family meeting either on the phone or in person. She is hopeful that other siblings can be involved over the phone as well. Provided an update on Michelle's clinical progress, and current treatment team recommendation that she eventually discharge to a residential care facility such as adult foster care or a group home. Laquita agrees with this plan. She continues to feel that patient returning home would be unsafe. She again notes that her father's current plan is to file for divorce and not involve himself in Michelle's care moving forward. Patient plans to further consider travel options and possible times to meet with team. She would like to speak with unit CTC about possibilities for a family meeting.     Hai Carrillo MD  PGY2 Psychiatry Resident

## 2019-05-21 NOTE — PLAN OF CARE
BEHAVIORAL TEAM DISCUSSION    Participants: Dr. Ida Zaman, Hai Carrillo MD (Resident), Radha Nuno RN, [facility]- Opal Cooper LM, Formerly named [facility]  Progress: pt is gradually improving  Continued Stay Criteria/Rationale: pt is under commitment and needs further MH stabilization with safe discharge plan  Medical/Physical: see medical chart   Precautions:   Behavioral Orders   Procedures    Assault precautions    Code 1 - Restrict to Unit    Elopement precautions    Routine Programming     As clinically indicated    Status 15     Every 15 minutes.     Plan: plan is for coordinate with OP CCM to get group home/AFC in place.   Rationale for change in precautions or plan: no change

## 2019-05-21 NOTE — PROGRESS NOTES
Isolative to self in lounge. Not social. Mostly mute, except short reply and request. Blunted, irritable affect. Calm, no outbursts. Ate meal.       05/20/19 2000   Behavioral Health   Hallucinations denies / not responding to hallucinations   Thinking intact   Orientation person: oriented;place: oriented;date: oriented;time: oriented   Memory baseline memory   Insight denial of illness   Judgement impaired   Eye Contact out of corner of eyes   Affect blunted, flat;irritable   Mood mood is calm   Physical Appearance/Attire attire appropriate to age and situation   Hygiene other (see comment)  (adequate)   Activity isolative;withdrawn   Speech clear;other (see comments)  (short request and reply)   Psychomotor / Gait balanced;steady   Activities of Daily Living   Hygiene/Grooming independent   Oral Hygiene independent   Dress independent;scrubs (behavioral health)   Laundry with supervision   Room Organization independent

## 2019-05-21 NOTE — PROGRESS NOTES
Phone conference set up with pt's Daughter and son.  They will need to to it via phone conference.   Daughter Elizabeth's number- - [phone]  Son Scott's number - [phone]    Friday @ 1pm.

## 2019-05-21 NOTE — PLAN OF CARE
48 hour assessment:  Pt refuses to talk with writer. Pt has blunted, flat affect. Pt was irritable and withdrawn this shift. Pt was in milieu for meals.  Pt was cooperative during medication administration.

## 2019-05-22 PROCEDURE — 25000132 ZZH RX MED GY IP 250 OP 250 PS 637: Performed by: NURSE PRACTITIONER

## 2019-05-22 PROCEDURE — 12400001 ZZH R&B MH UMMC

## 2019-05-22 PROCEDURE — 25000132 ZZH RX MED GY IP 250 OP 250 PS 637: Performed by: PSYCHIATRY & NEUROLOGY

## 2019-05-22 PROCEDURE — 25000132 ZZH RX MED GY IP 250 OP 250 PS 637: Performed by: STUDENT IN AN ORGANIZED HEALTH CARE EDUCATION/TRAINING PROGRAM

## 2019-05-22 PROCEDURE — G0177 OPPS/PHP; TRAIN & EDUC SERV: HCPCS

## 2019-05-22 RX ADMIN — LOSARTAN POTASSIUM 100 MG: 100 TABLET, FILM COATED ORAL at 08:22

## 2019-05-22 RX ADMIN — CALCIUM 500 MG: 500 TABLET ORAL at 21:00

## 2019-05-22 RX ADMIN — CALCIUM 500 MG: 500 TABLET ORAL at 08:22

## 2019-05-22 RX ADMIN — THERA TABS 1 TABLET: TAB at 08:22

## 2019-05-22 RX ADMIN — CHLORPROMAZINE HYDROCHLORIDE 150 MG: 100 TABLET, FILM COATED ORAL at 21:00

## 2019-05-22 RX ADMIN — TRIHEXYPHENIDYL HYDROCHLORIDE 5 MG: 5 TABLET ORAL at 08:22

## 2019-05-22 RX ADMIN — METOPROLOL SUCCINATE 50 MG: 50 TABLET, EXTENDED RELEASE ORAL at 08:22

## 2019-05-22 RX ADMIN — ATORVASTATIN CALCIUM 40 MG: 40 TABLET, FILM COATED ORAL at 21:00

## 2019-05-22 RX ADMIN — TRIHEXYPHENIDYL HYDROCHLORIDE 5 MG: 5 TABLET ORAL at 21:00

## 2019-05-22 RX ADMIN — HALOPERIDOL 20 MG: 10 TABLET ORAL at 21:00

## 2019-05-22 ASSESSMENT — ACTIVITIES OF DAILY LIVING (ADL)
LAUNDRY: WITH SUPERVISION
LAUNDRY: WITH SUPERVISION
HYGIENE/GROOMING: INDEPENDENT
DRESS: SCRUBS (BEHAVIORAL HEALTH)
ORAL_HYGIENE: INDEPENDENT
ORAL_HYGIENE: INDEPENDENT
DRESS: SCRUBS (BEHAVIORAL HEALTH)
HYGIENE/GROOMING: INDEPENDENT

## 2019-05-22 NOTE — PLAN OF CARE
Problem: OT General Care Plan  Goal: OT Goal 1  Description  Work with pt to increase awareness of how symptoms can impact performance on goal-directed tasks and life management skills. Will provide opportunities to build on coping strategies to manage symptoms during hospitalization and after d/c.      Pt attended 2 of 3 OT groups.  When peers in the morning group were asking to play a game, instead of having a discussion group, pt suggested bingo.  Peers agreed, and pt seemed pleased to play a couple rounds of bingo again.    When done, pt asked to finish her picture frame, and carefully finished gluing gemstones in lines all over the frame.  Received many positive compliments on her work and smiled.  Outcome: Improving

## 2019-05-22 NOTE — PLAN OF CARE
Pt affect blunted and tense. Irritable on approach. Reports frustration that she won't be discharging this week, and disagrees with discharge disposition. She had no behavioral outbursts or agitation. Visible in the lounge most of the evening, though she was withdrawn and quiet. Quiet on approach, and gave brief answers during check in with staff. She was compliant with scheduled medications and denies any side effects. She declined music therapy group this afternoon. Will continue to monitor closely.

## 2019-05-22 NOTE — PROGRESS NOTES
Pt was visible but withdrawn . Irritable and dismissive on approach , limited interaction with staff and peers. Pt attended group and played BinYattos, Pt also made a picture frame. AFSHAN SI/SIB thoughts and all mental health symptoms , declined 1:1 check in . Pt ate all meals and completed personal hygiene needs, no further concerns.          05/22/19 1344   Behavioral Health   Hallucinations denies / not responding to hallucinations   Thinking poor concentration   Orientation time: oriented;date: oriented;place: oriented;person: oriented   Memory baseline memory   Insight poor;denial of illness   Judgement impaired   Eye Contact at floor   Affect irritable;blunted, flat   Mood irritable   Physical Appearance/Attire attire appropriate to age and situation   Hygiene other (see comment)  (adequte)   Suicidality other (see comments)  (AFSHAN PT declined check in )   1. Wish to be Dead   (AFSHAN pt declined )   Wish to be Dead Description   (none stated)   2. Non-Specific Active Suicidal Thoughts    (AFSHAN pt declined check in )   Self Injury other (see comment)  (AFSHAN pt declined check in )   Elopement Statements about wanting to leave   Activity other (see comment)  (visible attending groups, limited interaction with peers)   Speech rambling   Medication Sensitivity no stated side effects;no observed side effects   Psychomotor / Gait balanced;steady   Psycho Education   Type of Intervention other (see comment)  (Pt declined)   Response refuses   Hours 0.5   Treatment Detail   (declined check in )   Activities of Daily Living   Hygiene/Grooming independent   Oral Hygiene independent   Dress scrubs (behavioral health)   Laundry with supervision   Room Organization independent

## 2019-05-22 NOTE — PROGRESS NOTES
Brief Progress Note  May 22, 2019     57yo F with acute decompensation of chronic schizophrenia, stabilizing after initiation of BAIG Haldol. Ongoing safety concerns for ability to care for self at home, necessitating consideration of dispo to adult foster care vs group home.  Irritable and withdrawn yesterday, though did attend and participate in all OT groups yesterday. Found eating breakfast seated in milieu this morning. She continues to be unwilling to engage verbally in interview. Informed her that a family meeting has been arranged at end of this week. No changes to plan per most recent MD progress note dated 5/21/19.     Hai Carrillo MD  PGY2 Psychiatry Resident

## 2019-05-22 NOTE — PROGRESS NOTES
Pt's OP CCM responded to me saying that she has had issues engaging with Michelle in the community as well.  She is planning to visit her tomorrow and will try to discuss options for housing with her, CADI etc.  IF her  divorces her she will also lose her insurance and will need to apply for MA.  She will update me after she meets with her.

## 2019-05-22 NOTE — PROGRESS NOTES
This writer left  for pt's CCM Minna Bueno.  She has concerns about if the pt will be willing to complete a needed [facility]'s assessment for obtaining funding for AF/.  IF she is unwilling to do this or not agreeable to these placements this could create a barrier.       Team will discuss these things with her family tomorrow.

## 2019-05-23 PROCEDURE — 12400001 ZZH R&B MH UMMC

## 2019-05-23 PROCEDURE — G0177 OPPS/PHP; TRAIN & EDUC SERV: HCPCS

## 2019-05-23 PROCEDURE — 99231 SBSQ HOSP IP/OBS SF/LOW 25: CPT | Mod: GC | Performed by: PSYCHIATRY & NEUROLOGY

## 2019-05-23 PROCEDURE — 25000132 ZZH RX MED GY IP 250 OP 250 PS 637: Performed by: NURSE PRACTITIONER

## 2019-05-23 PROCEDURE — 25000132 ZZH RX MED GY IP 250 OP 250 PS 637: Performed by: STUDENT IN AN ORGANIZED HEALTH CARE EDUCATION/TRAINING PROGRAM

## 2019-05-23 PROCEDURE — 25000132 ZZH RX MED GY IP 250 OP 250 PS 637: Performed by: HOSPITALIST

## 2019-05-23 PROCEDURE — 25000132 ZZH RX MED GY IP 250 OP 250 PS 637: Performed by: PSYCHIATRY & NEUROLOGY

## 2019-05-23 PROCEDURE — H2032 ACTIVITY THERAPY, PER 15 MIN: HCPCS

## 2019-05-23 RX ORDER — PROCHLORPERAZINE MALEATE 5 MG
5 TABLET ORAL EVERY 6 HOURS PRN
Status: DISCONTINUED | OUTPATIENT
Start: 2019-05-23 | End: 2019-06-05 | Stop reason: HOSPADM

## 2019-05-23 RX ORDER — BISACODYL 10 MG
10 SUPPOSITORY, RECTAL RECTAL DAILY PRN
Status: DISCONTINUED | OUTPATIENT
Start: 2019-05-23 | End: 2019-06-05 | Stop reason: HOSPADM

## 2019-05-23 RX ORDER — HYDRALAZINE HYDROCHLORIDE 10 MG/1
10 TABLET, FILM COATED ORAL 3 TIMES DAILY PRN
Status: DISCONTINUED | OUTPATIENT
Start: 2019-05-23 | End: 2019-05-24

## 2019-05-23 RX ADMIN — CALCIUM 500 MG: 500 TABLET ORAL at 08:44

## 2019-05-23 RX ADMIN — METOPROLOL SUCCINATE 50 MG: 50 TABLET, EXTENDED RELEASE ORAL at 08:44

## 2019-05-23 RX ADMIN — MAGNESIUM HYDROXIDE 30 ML: 400 SUSPENSION ORAL at 19:14

## 2019-05-23 RX ADMIN — TRIHEXYPHENIDYL HYDROCHLORIDE 5 MG: 5 TABLET ORAL at 21:20

## 2019-05-23 RX ADMIN — THERA TABS 1 TABLET: TAB at 08:44

## 2019-05-23 RX ADMIN — POLYETHYLENE GLYCOL 3350 17 G: 17 POWDER, FOR SOLUTION ORAL at 19:14

## 2019-05-23 RX ADMIN — ATORVASTATIN CALCIUM 40 MG: 40 TABLET, FILM COATED ORAL at 21:20

## 2019-05-23 RX ADMIN — CHLORPROMAZINE HYDROCHLORIDE 150 MG: 100 TABLET, FILM COATED ORAL at 21:19

## 2019-05-23 RX ADMIN — TRIHEXYPHENIDYL HYDROCHLORIDE 5 MG: 5 TABLET ORAL at 08:46

## 2019-05-23 RX ADMIN — LOSARTAN POTASSIUM 100 MG: 100 TABLET, FILM COATED ORAL at 08:46

## 2019-05-23 RX ADMIN — HALOPERIDOL 20 MG: 10 TABLET ORAL at 21:20

## 2019-05-23 RX ADMIN — HYDRALAZINE HYDROCHLORIDE 10 MG: 10 TABLET ORAL at 19:41

## 2019-05-23 RX ADMIN — CALCIUM 500 MG: 500 TABLET ORAL at 21:20

## 2019-05-23 NOTE — PROGRESS NOTES
CTC: Family meeting will occur with the physician/team tomorrow at 1:00pm. Daughter is planning on visiting next week.

## 2019-05-23 NOTE — PLAN OF CARE
Pt was visible in the milieu watching TV with peers. She was calm and pleasant. No Symptoms of psychosis observed. Flat and blunted affect. Pt attends both groups. She denied any discomfort and remains medication compliant. She denies all mental health symptoms and does not appear responding to internal stimuli. Good hygiene maintenance.

## 2019-05-23 NOTE — PLAN OF CARE
"  Problem: OT General Care Plan  Goal: OT Goal 1  Description  Work with pt to increase awareness of how symptoms can impact performance on goal-directed tasks and life management skills. Will provide opportunities to build on coping strategies to manage symptoms during hospitalization and after d/c.      Pt attended the morning OT group - there was one seat left at the table across from a peer, though stated \"I'm not sitting here, he called me [the n-word] yesterday\" to which peer responded \"well, you were calling me the n-word\", the two started to briefly argue about who started it (no charting about any incident from yesterday), though were quickly redirected.  Michelle was set up with an 11 x 17 canvas painting of an owl a few feet away, and proceeded to work on it.  Appeared relaxed and calm, smiled as a few staff walked by and commented on the nice job she was doing.    Outcome: Improving     "

## 2019-05-23 NOTE — PROGRESS NOTES
She complained of being sick to her stomach after having pudding for snacks. She later vomited twices. Pt blood pressure was elevated at the time she was vomiting 199/84, 60 and temp 98.2. Pt denied having diarrhea and body ache/pain. She was given a can of ginger ale which helped to settle her stomach.  Pt blood pressure rechecked 163/73, 56 and temp 98.2. Pt denied any discomfort. She was resting comfortably in her room at this time.     Dr. Oro was updated, she gave the order to check patient blood pressure every two hours, and if systolic is over 180 and diastolic 110 to call internal medicine.     Dr. Augustine later called back and wanted nursing staff to update internal medicine because of pt history of stroke.    Pt reassessed for possible sign and symptoms of stroke. Pt vital sign stable 151/79, 61, 97.8 and 16. Upon assessment, pt had no numbness or weakness of the face, arm or leg. No confusion, trouble speaking or understanding speech. No problem seeing in one or both eyes. She denied dizziness and headache.     Internal medicine updated and orders to give hydralazine for SBP more than 150.  Give Milk of Magnesia and Miralax for constipation due to her hx of constipation. Nursing staff will continue to monitor patient closely.

## 2019-05-23 NOTE — PROGRESS NOTES
Pt was isolative to room and withdrawn throughout shift.  Pt occasionally came out and sat in lounge.  Pt did not report SI/SIB or hallucinations.  Pt had adequate hygiene and declined to check in with writer.     05/22/19 7952   Behavioral Health   Hallucinations other (see comment)  (AFSHAN)   Thinking poor concentration   Orientation person: oriented   Memory baseline memory   Insight poor   Judgement impaired   Eye Contact at floor   Affect blunted, flat;irritable   Mood irritable   Physical Appearance/Attire attire appropriate to age and situation   Hygiene other (see comment)  (adequate)   Suicidality other (see comments)  (none stated)   1. Wish to be Dead No   2. Non-Specific Active Suicidal Thoughts  No   Enviromental Risk Factors None   Self Injury other (see comment)  (none stated)   Elopement Statements about wanting to leave   Activity withdrawn   Speech rambling   Medication Sensitivity no stated side effects   Psychomotor / Gait balanced;steady   Psycho Education   Type of Intervention other (see comment)   Response refuses   Safety   Suicidality Status 15   Assault status 15   Elopement status 15   Activities of Daily Living   Hygiene/Grooming independent   Oral Hygiene independent   Dress scrubs (behavioral health)   Laundry with supervision   Room Organization independent

## 2019-05-23 NOTE — PROGRESS NOTES
Pt quietly joined the session and occasionally smiled.  Minimal personal expression.         05/23/19 1330   Dance Movement Therapy   Type of Intervention structured groups   Response participates with encouragement   Hours 0.5

## 2019-05-23 NOTE — PROGRESS NOTES
"[facility], [place]   Psychiatric Progress Note  Hospital Day: 26        Interim History:   The patient's care was discussed with the treatment team during the daily team meeting and/or staff's chart notes were reviewed.  Staff report patient visible in milieu but largely withdrawn, irritable on approach. Did attend [employer], and made a picture frame in group. Completed personal hygeine ADLs.  Declined repeated check-ins with staff.     Upon interview, patient invites team into her room, where she is seated on her bed.  She is noted to be smiling, and proudly shows team a picture frame which she made in group yesterday.  She states she plans to use the picture frame for a picture of her , herself, and her children which she has at home.  She states that she is doing \"much better\" today, and apologizes to team for being \"upset on Tuesday\" and not willing to talk to team.  She states that she has \"looked to my heart\" and is no longer feeling upset about being in the hospital. She states her symptoms are well controlled on medication, and in particular she is feeling less agitated. She provides an example in which a peer directed use of the 'n-word' toward her, and she was able to ignore this rather than become agitated.  Team spent time unpacking this incident with patient, thanked her for her ability to remain calm, and requested that she reach out to staff should such an incident occur in the future. She denies any medication side effects.  Discussed tentative plan for family meeting over the phone tomorrow, as well as a potential transfer to a less restrictive unit such as  after this meeting.  Patient is in agreement with this plan.  No other issues discussed.         Medications:       atorvastatin  40 mg Oral QPM     calcium carbonate (OS-ALEK) 500 mg (elemental) tablet  500 mg Oral BID w/meals     chlorproMAZINE  150 mg Oral At Bedtime     cholecalciferol  50,000 Units Oral Q7 " "Days     haloperidol  20 mg Oral At Bedtime    Or     haloperidol lactate  10 mg Intramuscular At Bedtime     haloperidol decanoate  100 mg Intramuscular Q30 Days     losartan  100 mg Oral Daily     metoprolol succinate ER  50 mg Oral Daily     multivitamin, therapeutic  1 tablet Oral Daily     trihexyphenidyl  5 mg Oral BID          Allergies:     Allergies   Allergen Reactions     Lisinopril Cough          Labs:     No results found for this or any previous visit (from the past 24 hour(s)).       Psychiatric Examination:     /59 (BP Location: Left arm)   Pulse 69   Temp 97.2  F (36.2  C) (Oral)   Resp 16   Ht 1.6 m (5' 3\")   Wt 65.3 kg (144 lb)   SpO2 99%   BMI 25.51 kg/m    Weight is 144 lbs 0 oz  Body mass index is 25.51 kg/m .    Weight over time:  Vitals:    04/27/19 0144   Weight: 65.3 kg (144 lb)       Orthostatic Vitals     None        Cardiometabolic risk assessment. 04/29/19    Reviewed patient profile for cardiometabolic risk factors    Date taken /Value  REFERENCE RANGE   Abdominal Obesity  (Waist Circumference)   See nursing flowsheet Women ?35 in (88 cm)   Men ?40 in (102 cm)      Triglycerides  Triglycerides   Date Value Ref Range Status   04/29/2019 98 <150 mg/dL Final       ?150 mg/dL (1.7 mmol/L) or current treatment for elevated triglycerides   HDL cholesterol  HDL Cholesterol   Date Value Ref Range Status   04/29/2019 64 >49 mg/dL Final   ]   Women <50 mg/dL (1.3 mmol/L) in women or current treatment for low HDL cholesterol  Men <40 mg/dL (1 mmol/L) in men or current treatment for low HDL cholesterol     Fasting plasma glucose (FPG) Lab Results   Component Value Date    GLC 90 04/29/2019      FPG ?100 mg/dL (5.6 mmol/L) or treatment for elevated blood glucose   Blood pressure  BP Readings from Last 3 Encounters:   05/22/19 134/59   04/26/19 164/86    Blood pressure ?130/85 mmHg or treatment for elevated blood pressure   Family History  See family history     Appearance: Awake, " "alert, dressed in hospital scrubs and yellow sweatshirt, wearing hair net, grooming fair  Attitude: cooperative  Eye Contact:  good  Mood:  \"much better\"  Affect: Congruent, remains blunted, however much more bright and pleasant than previous days, smiling and joking with team. No irritability  Speech: Normal rate and volume, not perseverative today  Language: Speaks English appropriately with accent  Psychomotor, Gait, Musculoskeletal:  no evidence of tardive dyskinesia, dystonia, or tics  Throught Process: Remains disorganized and concrete, however more organized than previous days  Associations: No loose associations  Thought Content: No SI, HI elicited.  Denies auditory visual hallucinations, paranoia.  Expresses remorse regarding her unwillingness to engage with team in previous days, is apologetic, and appears to express increased willingness to work with team moving forward  Insight: Poor, improving from previous days  Judgement:  Fair, improving   Oriented to person, place, time, situation  Attention Span and Concentration: Fair, improved  Recent and Remote Memory: Intact  Fund of Knowledge:  Low normal    Clinical Global Impressions  First:  Considering your total clinical experience with this particular patient population, how severe are the patient's symptoms at this time?: 7 (04/27/19 0750)  Compared to the patient's condition at the START of treatment, this patient's condition is:: 4 (04/27/19 0750)  Most recent:  Considering your total clinical experience with this particular patient population, how severe are the patient's symptoms at this time?: 7 (04/27/19 0750)  Compared to the patient's condition at the START of treatment, this patient's condition is:: 4 (04/27/19 0750)           Precautions:     Behavioral Orders   Procedures     Assault precautions     Code 1 - Restrict to Unit     Elopement precautions     Routine Programming     As clinically indicated     Status 15     Every 15 minutes.       "    Diagnoses:     Schizophrenia, decompensated  Hypertension  Hyperlipidemia  CVA 2017  Right tibial plateau fracture s/p ORIF 1/8/2019         Assessment & Plan:     Assessment and hospital summary:  Michelle Pino is a 58-year-old female admitted to [facility] 12N 4/26/2019 on a 72-hour hold from [facility] ER BIBA in handcuffs after her  made multiple calls to the police due to the patient's bizarre behaviors, level of agitation and threats to kill him.  She informed her  that she believed he had killed her parents and her sister and intended to kill her as well.  She had been leaving the stove on for hours.  When the police arrived, she screamed and threatened to kill them. She was most recently hospitalized at [facility] for about 6 weeks from December 2018 through January 2019 in the context of medication noncompliance and was committed and Jarvised.  She had not been taking her medications x 2 weeks prior to her present admission.    Target psychiatric symptoms and interventions:  No changes today  Continue Thorazine 150 mg at bedtime  Continue Haldol to 20 mg at bedtime to target ongoing aggression and psychosis. Jarvised medication.    Initiated Haldol Dec 100 mg IM q30d on 5/10, second 100 mg loading dose administered 5/14. She had been on Haldol Dec for period of time w/o significant side effects per chart review. Next dose due 6/9  Decrease Artane 5 mg to BID for EPSE (asymptomatic per patient report)    Medical Problems and Treatments:  Poorly controlled HTN. Pt now compliant with scheduled Losartan, Atorvastatin, Metoprolol, with sustained improvement in BPs. Will continue to monitor. Requested Nursing make attempt for manual blood pressure cuffs if refusing machine.  Dry mouth- Added biotene QID prn  Constipation-changed Miralax 17 g daily to PRN given pt request     Behavioral/Psychological/Social:  Encourage participation in groups, medication compliance    Legal: Committed with Hatch in  place with following medications: Thorazine, Haldol, Risperdal, Abilify    Safety:  - Continue precautions as noted above  - Status 15 minute checks    Disposition: Exact date pending further evaluation, treatment, and improvement in episodic behavioral dysregulation and development of safe aftercare plan. Given current level of function, returning to home would not be appropriate from a safety perspective.  Family meeting scheduled for tomorrow.  Will then pursue transfer to less restrictive unit, possibly geriatric unit. Placing referrals for CADI and group home placement.     Pt seen and discussed with my attending, Ida Khoury*  Hai Carrillo MD  PGY2 Resident  Pager: [phone]

## 2019-05-23 NOTE — PROGRESS NOTES
Pt vomited after eating pudding. BP checked was high afterwards. Until then it was well controlled on losartan and metoprolol. She has not pooped for few days. She is also on multiple psychiatry medications.    In general looks like BP was controlled well. My be BP got high due to vomiting. Give prn compazine. Give MOM and Miralax to move bowel. If does not work, give suppository or enema.     An hour after vomiting subsided, the BP has come down to 151 systolic over 75 own its own. Likely it will settle down. We will not add schedule medication for now. Add prn hydralazine. If BP is sustained high, then we will add Amlodipine 5 mg po daily as a next step in BP management. hydrochlorothiazide 12.5 mg is another option, but if she is not drinking enough, may not be best choice.     IM to do full consult tomorrow. Call MOD if continues to have issues.     Dr Benites,   MOD

## 2019-05-23 NOTE — PROGRESS NOTES
"Behavioral Health  Note    Behavioral Health  Spirituality Group Note    [address]    Name: Michelle Pino YOB: 1960   MRN: [id number] Age: 58 year old      Patient attended -led group, which included discussion of spirituality, coping with illness and building resilience.    Patient attended group for 1.0 hrs.    The patient actively participated in group discussion and patient demonstrated an appreciation of topic's application for their personal circumstances. Michelle talked about her gratitude for \"God,\" children in her neighborhood, and her family. She was a pleasant and thoughtful participant in group. She noted after group that she is an [employer], and feels good support from her  and others. She agreed that she would like to be kept in prayer.      Thao Russo MDiv, [facility]  Lead , Adult Behavioral Health  Pager [phone]          "

## 2019-05-24 ENCOUNTER — TELEPHONE (OUTPATIENT)
Dept: BEHAVIORAL HEALTH | Facility: CLINIC | Age: 59
End: 2019-05-24

## 2019-05-24 PROCEDURE — 25000132 ZZH RX MED GY IP 250 OP 250 PS 637: Performed by: NURSE PRACTITIONER

## 2019-05-24 PROCEDURE — 25000132 ZZH RX MED GY IP 250 OP 250 PS 637: Performed by: STUDENT IN AN ORGANIZED HEALTH CARE EDUCATION/TRAINING PROGRAM

## 2019-05-24 PROCEDURE — 25000132 ZZH RX MED GY IP 250 OP 250 PS 637: Performed by: PSYCHIATRY & NEUROLOGY

## 2019-05-24 PROCEDURE — 99232 SBSQ HOSP IP/OBS MODERATE 35: CPT | Mod: GC | Performed by: PSYCHIATRY & NEUROLOGY

## 2019-05-24 PROCEDURE — 12400001 ZZH R&B MH UMMC

## 2019-05-24 RX ADMIN — TRIHEXYPHENIDYL HYDROCHLORIDE 5 MG: 5 TABLET ORAL at 08:24

## 2019-05-24 RX ADMIN — ATORVASTATIN CALCIUM 40 MG: 40 TABLET, FILM COATED ORAL at 21:43

## 2019-05-24 RX ADMIN — CALCIUM 500 MG: 500 TABLET ORAL at 08:24

## 2019-05-24 RX ADMIN — CALCIUM 500 MG: 500 TABLET ORAL at 21:44

## 2019-05-24 RX ADMIN — TRIHEXYPHENIDYL HYDROCHLORIDE 5 MG: 5 TABLET ORAL at 21:44

## 2019-05-24 RX ADMIN — HALOPERIDOL 20 MG: 10 TABLET ORAL at 21:43

## 2019-05-24 RX ADMIN — LOSARTAN POTASSIUM 100 MG: 100 TABLET, FILM COATED ORAL at 08:24

## 2019-05-24 RX ADMIN — METOPROLOL SUCCINATE 50 MG: 50 TABLET, EXTENDED RELEASE ORAL at 08:24

## 2019-05-24 RX ADMIN — THERA TABS 1 TABLET: TAB at 08:24

## 2019-05-24 RX ADMIN — CHLORPROMAZINE HYDROCHLORIDE 150 MG: 100 TABLET, FILM COATED ORAL at 21:44

## 2019-05-24 NOTE — PROGRESS NOTES
2030 hrs.  Pt denied any discomfort. She was resting comfortable in her bed. She is fully alert and oriented. PRN MOM, Miralax and hydralazine was given at 1941 hrs. Vital sign at 2030 hrs 149/75, 71, 16 and temp 98.1. Nursing staff will continue to monitor closely.

## 2019-05-24 NOTE — PROGRESS NOTES
"[facility], [place]   Psychiatric Progress Note  Hospital Day: 27        Interim History:   The patient's care was discussed with the treatment team during the daily team meeting and/or staff's chart notes were reviewed.  Staff report patient calm yesterday, visible in milieu, watching TV. Attended all groups, engaged in activities. Briefly argued with peer, next to whom was the only available seat in group, stating she didn't want to sit there because that patient called her \"the n-word.\" Was provided another seat, and worked calmly and pleasantly in group. Later in afternoon, patient c/o feeling sick to stomach after pudding; vomited twice, and noted to have BPs elevated at the time to 199/84. Discussed with internal medicine, who thought likely elevated BPs secondary to emesis. IM started PRN hydralazine for elevated BPs, gave MOM and Miralax, as well as compazine. IM recommended a formal IM consult in AM. BPs noted to be downtrending through rest of evening. No acute events through rest of evening.     Upon interview, patient states she is doing well today. Notes she had emesis x2 last night, none since that time. Denies nausea presently, and ate a good breakfast. States she also had a large BM last night and is not currently feeling constipated. Agrees to see internal medicine today. Denies vision changes, headache, LOC, dizziness. Denies medication side effects. States medications are helping her to be calm, and feels that she is making good progress. Agreeable with plan to participate in family meeting today, and then to potentially transfer to a less restrictive unit such as  (pending arrangement). No other issues discussed today.     Family Meeting occurred with treatment team, patient, and family (via conference call). Discussed current plan to continue with Haldol Decanoate, pursue transfer to more appropriate milieu such as , and plan for disposition to group home for " "higher level of care. Daughter Laquita continues to feel strongly that going home would not be safe and that patient needs a supervised living situation. Patient agrees with plan for transfer but continues to not be on board with plan for group home. She admitted that she does not currently have an apartment, and made delusional statements that \"marty has provided me a home,\" \"I am a Armstrong,\" \"ask Trump and Obama, go on, ask Trump and Obama.\" Patient and family are both ultimately agreeable with plan to pursue transfer to a less restrictive unit, and patient agrees with plan to continue to process team's recommendation for group home placement.          Medications:       atorvastatin  40 mg Oral QPM     calcium carbonate (OS-ALEK) 500 mg (elemental) tablet  500 mg Oral BID w/meals     chlorproMAZINE  150 mg Oral At Bedtime     cholecalciferol  50,000 Units Oral Q7 Days     haloperidol  20 mg Oral At Bedtime    Or     haloperidol lactate  10 mg Intramuscular At Bedtime     haloperidol decanoate  100 mg Intramuscular Q30 Days     losartan  100 mg Oral Daily     metoprolol succinate ER  50 mg Oral Daily     multivitamin, therapeutic  1 tablet Oral Daily     trihexyphenidyl  5 mg Oral BID          Allergies:     Allergies   Allergen Reactions     Lisinopril Cough          Labs:     No results found for this or any previous visit (from the past 24 hour(s)).       Psychiatric Examination:     /74 (BP Location: Right arm)   Pulse 72   Temp 98.2  F (36.8  C) (Tympanic)   Resp 16   Ht 1.6 m (5' 3\")   Wt 65.3 kg (144 lb)   SpO2 99%   BMI 25.51 kg/m    Weight is 144 lbs 0 oz  Body mass index is 25.51 kg/m .    Weight over time:  Vitals:    04/27/19 0144   Weight: 65.3 kg (144 lb)       Orthostatic Vitals     None        Cardiometabolic risk assessment. 04/29/19    Reviewed patient profile for cardiometabolic risk factors    Date taken /Value  REFERENCE RANGE   Abdominal Obesity  (Waist Circumference)   See nursing " "flowsheet Women ?35 in (88 cm)   Men ?40 in (102 cm)      Triglycerides  Triglycerides   Date Value Ref Range Status   04/29/2019 98 <150 mg/dL Final       ?150 mg/dL (1.7 mmol/L) or current treatment for elevated triglycerides   HDL cholesterol  HDL Cholesterol   Date Value Ref Range Status   04/29/2019 64 >49 mg/dL Final   ]   Women <50 mg/dL (1.3 mmol/L) in women or current treatment for low HDL cholesterol  Men <40 mg/dL (1 mmol/L) in men or current treatment for low HDL cholesterol     Fasting plasma glucose (FPG) Lab Results   Component Value Date    GLC 90 04/29/2019      FPG ?100 mg/dL (5.6 mmol/L) or treatment for elevated blood glucose   Blood pressure  BP Readings from Last 3 Encounters:   05/23/19 133/74   04/26/19 164/86    Blood pressure ?130/85 mmHg or treatment for elevated blood pressure   Family History  See family history     Appearance: Awake, alert, dressed in hospital scrubs and yellow sweatshirt, wearing hair net, grooming fair  Attitude: cooperative  Eye Contact:  good  Mood:  \"good\"  Affect: Congruent, remains blunted, however remains more bright and pleasant than previous days, smiling frequently, no irritability  Speech: Normal rate and volume, not perseverative today  Language: Speaks English appropriately with accent  Psychomotor, Gait, Musculoskeletal:  no evidence of tardive dyskinesia, dystonia, or tics  Throught Process: Remains disorganized and concrete, however more organized than previous days  Associations: No loose associations  Thought Content: No SI, HI elicited.  Denies auditory visual hallucinations, paranoia. Made delusional and grandiose statements during team meeting that \"I am queen\" and that presidents melisa and Jermain have provided a house for her to live in after discharge.   Insight: Poor, improving from previous days  Judgement:  Fair, improving with regards to sustained period of safe behaviors on unit. Ongoing poor judgement with regard to discharge " placement  Oriented to person, place, time, situation  Attention Span and Concentration: Fair, improved  Recent and Remote Memory: Intact  Fund of Knowledge:  Low normal    Clinical Global Impressions  First:  Considering your total clinical experience with this particular patient population, how severe are the patient's symptoms at this time?: 7 (04/27/19 0750)  Compared to the patient's condition at the START of treatment, this patient's condition is:: 4 (04/27/19 0750)  Most recent:  Considering your total clinical experience with this particular patient population, how severe are the patient's symptoms at this time?: 7 (04/27/19 0750)  Compared to the patient's condition at the START of treatment, this patient's condition is:: 4 (04/27/19 0750)           Precautions:     Behavioral Orders   Procedures     Assault precautions     Code 1 - Restrict to Unit     Elopement precautions     Routine Programming     As clinically indicated     Status 15     Every 15 minutes.          Diagnoses:     Schizophrenia, decompensated  Hypertension  Hyperlipidemia  CVA 2017  Right tibial plateau fracture s/p ORIF 1/8/2019         Assessment & Plan:     Assessment and hospital summary:  Michelle Pino is a 58-year-old female admitted to [facility] 12N 4/26/2019 on a 72-hour hold from [facility] ER BIBA in handcuffs after her  made multiple calls to the police due to the patient's bizarre behaviors, level of agitation and threats to kill him.  She informed her  that she believed he had killed her parents and her sister and intended to kill her as well.  She had been leaving the stove on for hours.  When the police arrived, she screamed and threatened to kill them. She was most recently hospitalized at [facility] for about 6 weeks from December 2018 through January 2019 in the context of medication noncompliance and was committed and Jarvised.  She had not been taking her medications x 2 weeks prior to her present  admission.    Target psychiatric symptoms and interventions:  No changes today  Continue Thorazine 150 mg at bedtime  Continue Haldol to 20 mg at bedtime to target ongoing aggression and psychosis. Jarvised medication.    Initiated Haldol Dec 100 mg IM q30d on 5/10, second 100 mg loading dose administered 5/14. She had been on Haldol Dec for period of time w/o significant side effects per chart review. Next dose due 6/9  Decrease Artane 5 mg to BID for EPSE (asymptomatic per patient report)    Medical Problems and Treatments:  Poorly controlled HTN. Pt now compliant with scheduled Losartan, Atorvastatin, Metoprolol, with sustained improvement in BPs. Will continue to monitor. Requested Nursing make attempt for manual blood pressure cuffs if refusing machine.  Dry mouth- Added biotene QID prn  Constipation-changed Miralax 17 g daily to PRN given pt request     Behavioral/Psychological/Social:  Encourage participation in groups, medication compliance    Legal: Committed with Hatch in place with following medications: Thorazine, Haldol, Risperdal, Abilify    Safety:  - Continue precautions as noted above  - Status 15 minute checks    Disposition: Exact date pending further evaluation, treatment, and improvement in episodic behavioral dysregulation and development of safe aftercare plan. Given current level of function, returning to home would not be appropriate from a safety perspective.  Family meeting today. Will then pursue transfer to less restrictive unit, possibly geriatric unit. Placing referrals for CADI and group home placement.     Pt seen and discussed with my attending, Ida Khoury*  Hai Carrillo MD  PGY2 Resident  Pager: [phone]

## 2019-05-24 NOTE — CONSULTS
"Brief medicine follow up note:  - Please refer to initial consult note dated 4/27 by Helen Mao. Primary team has asked medicine to see pt regarding elevated BPs, including SBP 190s last night. However, after review of her BPs since admission, there have been more BPs at goal than elevated BPs. Upon entering pt's room with female RN, pt states \"I don't want to talk to you\", despite introducing myself and why I am seeing her. In interim, continue scheduled PTA Cozaar and metoprolol as ordered. Continue with prn oral hydralazine ordered last night by moonlighter but increase dose and change parameters of when to give. Medicine will continue to follow pt with daily chart check of her BPs. Please feel free to call with questions.     ADDENDUM: BPs since isolated  several days ago now relatively stable and at goal. Medicine signing off. Please re-consult medicine if BPs persistently >150/100.       Casper Hdz PA-C  Internal Medicine Hospitalist   [facility] Hospitalist group  [phone]     "

## 2019-05-24 NOTE — TELEPHONE ENCOUNTER
S: Spoke with Riley about transferring this pt to . Stated she will have the charge nurse review the chart and let us know if it is an appropriate transfer. Awaiting call back.

## 2019-05-24 NOTE — TELEPHONE ENCOUNTER
S: Dr. Zaman called stating this pt is appropriate to transfer off of station 12.     B: Pt is stable and not needing a high acuity setting. Junie thinks that 3b would be a good spot and pt can have a roommate. Pt does have a conference call for a family meeting at 1pm and pt would be ready to transfer after. Intake will reach out to provider for a doc to doc.     R:

## 2019-05-24 NOTE — PROGRESS NOTES
"   05/23/19 1942   Behavioral Health   Hallucinations denies / not responding to hallucinations   Thinking poor concentration;confused   Orientation person: oriented;place: oriented;date: oriented;time: oriented   Memory baseline memory   Insight poor   Judgement   (unable to assess)   Eye Contact at examiner   Affect blunted, flat;irritable   Mood mood is calm;irritable   Physical Appearance/Attire disheveled   Hygiene other (see comment)  (adequately groomed)   Suicidality other (see comments)  (denies)   1. Wish to be Dead No   2. Non-Specific Active Suicidal Thoughts  No   Enviromental Risk Factors None   Self Injury other (see comment)  (denies)   Elopement   (no indication of increased elopement risk)   Activity isolative;withdrawn   Speech pressured   Medication Sensitivity no stated side effects;no observed side effects   Psychomotor / Gait balanced;steady   Michelle spent much of the shift in her room. She states that she is feeling ill; she vomited earlier in the shift and states that she continues to feel nauseated. She denies depression, anxiety, hallucinations, or suicidal ideation. She states that she is feeling less depressed than she was at the beginning of her hospitalization; she believes that medications are helping her. She at times seemed to have difficulty understanding staff. She was difficult to understand at times. She appeared confused during her check-in and became frustrated with staff, saying \"Repeat the question\" several times.    "

## 2019-05-25 PROCEDURE — 25000132 ZZH RX MED GY IP 250 OP 250 PS 637: Performed by: STUDENT IN AN ORGANIZED HEALTH CARE EDUCATION/TRAINING PROGRAM

## 2019-05-25 PROCEDURE — H2032 ACTIVITY THERAPY, PER 15 MIN: HCPCS

## 2019-05-25 PROCEDURE — 25000132 ZZH RX MED GY IP 250 OP 250 PS 637: Performed by: PSYCHIATRY & NEUROLOGY

## 2019-05-25 PROCEDURE — 12400002 ZZH R&B MH SENIOR/ADOLESCENT

## 2019-05-25 PROCEDURE — 25000132 ZZH RX MED GY IP 250 OP 250 PS 637: Performed by: NURSE PRACTITIONER

## 2019-05-25 RX ADMIN — TRIHEXYPHENIDYL HYDROCHLORIDE 5 MG: 5 TABLET ORAL at 21:28

## 2019-05-25 RX ADMIN — METOPROLOL SUCCINATE 50 MG: 50 TABLET, EXTENDED RELEASE ORAL at 08:00

## 2019-05-25 RX ADMIN — LOSARTAN POTASSIUM 100 MG: 100 TABLET, FILM COATED ORAL at 08:00

## 2019-05-25 RX ADMIN — TRIHEXYPHENIDYL HYDROCHLORIDE 5 MG: 5 TABLET ORAL at 08:00

## 2019-05-25 RX ADMIN — CHOLECALCIFEROL CAP 1.25 MG (50000 UNIT) 50000 UNITS: 1.25 CAP at 08:06

## 2019-05-25 RX ADMIN — CHLORPROMAZINE HYDROCHLORIDE 150 MG: 100 TABLET, FILM COATED ORAL at 21:27

## 2019-05-25 RX ADMIN — CALCIUM 500 MG: 500 TABLET ORAL at 08:00

## 2019-05-25 RX ADMIN — THERA TABS 1 TABLET: TAB at 08:00

## 2019-05-25 RX ADMIN — HALOPERIDOL 20 MG: 10 TABLET ORAL at 21:27

## 2019-05-25 RX ADMIN — CALCIUM 500 MG: 500 TABLET ORAL at 21:28

## 2019-05-25 RX ADMIN — ATORVASTATIN CALCIUM 40 MG: 40 TABLET, FILM COATED ORAL at 21:28

## 2019-05-25 ASSESSMENT — ACTIVITIES OF DAILY LIVING (ADL)
DRESS: SCRUBS (BEHAVIORAL HEALTH)
ORAL_HYGIENE: PROMPTS
DRESS: SCRUBS (BEHAVIORAL HEALTH)
DRESS: SCRUBS (BEHAVIORAL HEALTH);INDEPENDENT
ORAL_HYGIENE: PROMPTS
HYGIENE/GROOMING: PROMPTS
HYGIENE/GROOMING: PROMPTS
ORAL_HYGIENE: INDEPENDENT
HYGIENE/GROOMING: INDEPENDENT
LAUNDRY: UNABLE TO COMPLETE

## 2019-05-25 NOTE — PROGRESS NOTES
Pt transfers to  from st 12. Pt is cooperative and directable. Pt denies hallucinations,Pt denies feeling suicidal. Pt with History of high blood pressure. BP currently 149/73. Pt is confused as to date/day.

## 2019-05-25 NOTE — PLAN OF CARE
Patient was asking when she was going to transfer to 3[facility]. Report from last evening was that 3B declined the transfer.  This morning call from intake reported that patient was accepted for transfer.  Patient was happy about news that she was being transferred. She is cooperative with taking her scheduled meds. Also cooperative with having vitals taken to monitor her blood pressure.  Affect remains flat majority of time.  She will smile when given compliments especially for things like the picture frame that she made in OT.  Patient continues to sit in the lounge for most of the shift. She generally does not interact with others unless initiated by others.  Report was given to VALERIE Tan on [facility].

## 2019-05-25 NOTE — TELEPHONE ENCOUNTER
R: 11:30pm RN called from unit 3B reports unit can't accommodate currently no private room available   Unit 12 notified

## 2019-05-25 NOTE — PLAN OF CARE
Pt had a pleasant evening shift. She came to the [place] window requesting when she will be transferring to station 3B. Pt blood pressure is within normal range 137/68 and 56, 149/76 and 61 this evening. She took her scheduled medication without any issue. Pt denied any discomfort. Pt had no emesis this evening. Pt had an order to check vital sign every 4 hrs. See protocol for elevated blood pressure. No agitation or aggression. She denied SI/SIB.

## 2019-05-26 PROCEDURE — 25000132 ZZH RX MED GY IP 250 OP 250 PS 637: Performed by: STUDENT IN AN ORGANIZED HEALTH CARE EDUCATION/TRAINING PROGRAM

## 2019-05-26 PROCEDURE — 12400002 ZZH R&B MH SENIOR/ADOLESCENT

## 2019-05-26 PROCEDURE — H2032 ACTIVITY THERAPY, PER 15 MIN: HCPCS

## 2019-05-26 PROCEDURE — 25000132 ZZH RX MED GY IP 250 OP 250 PS 637: Performed by: PSYCHIATRY & NEUROLOGY

## 2019-05-26 PROCEDURE — 25000132 ZZH RX MED GY IP 250 OP 250 PS 637: Performed by: NURSE PRACTITIONER

## 2019-05-26 RX ADMIN — CALCIUM 500 MG: 500 TABLET ORAL at 08:55

## 2019-05-26 RX ADMIN — CHLORPROMAZINE HYDROCHLORIDE 150 MG: 100 TABLET, FILM COATED ORAL at 21:47

## 2019-05-26 RX ADMIN — METOPROLOL SUCCINATE 50 MG: 50 TABLET, EXTENDED RELEASE ORAL at 08:55

## 2019-05-26 RX ADMIN — TRIHEXYPHENIDYL HYDROCHLORIDE 5 MG: 5 TABLET ORAL at 21:49

## 2019-05-26 RX ADMIN — LOSARTAN POTASSIUM 100 MG: 100 TABLET, FILM COATED ORAL at 08:55

## 2019-05-26 RX ADMIN — CALCIUM 500 MG: 500 TABLET ORAL at 21:49

## 2019-05-26 RX ADMIN — THERA TABS 1 TABLET: TAB at 08:55

## 2019-05-26 RX ADMIN — ATORVASTATIN CALCIUM 40 MG: 40 TABLET, FILM COATED ORAL at 21:48

## 2019-05-26 RX ADMIN — TRIHEXYPHENIDYL HYDROCHLORIDE 5 MG: 5 TABLET ORAL at 08:55

## 2019-05-26 RX ADMIN — HALOPERIDOL 20 MG: 10 TABLET ORAL at 21:47

## 2019-05-26 ASSESSMENT — MIFFLIN-ST. JEOR: SCORE: 1258.12

## 2019-05-26 ASSESSMENT — ACTIVITIES OF DAILY LIVING (ADL)
HYGIENE/GROOMING: INDEPENDENT
DRESS: INDEPENDENT
DRESS: INDEPENDENT
ORAL_HYGIENE: INDEPENDENT
ORAL_HYGIENE: INDEPENDENT
LAUNDRY: UNABLE TO COMPLETE
HYGIENE/GROOMING: INDEPENDENT

## 2019-05-26 NOTE — PLAN OF CARE
Pt in her room awake prior to dinner, pt was expecting a visit from daughter. Pt quiet, withdrawn, guarded, and flat affect. Pt daughter and one other male visitor spent about 1-1.5 hour with pt, pt appeared supported by her visitors and told writer she enjoyed the company. Pt observed in the milieu, mostly the lounge, the rest of the evening until HS. Pt sat amongst peers and staff but did not socialize. Pt attended music therapy, pt observed smiling while the song she chose played. Pt asked staff if her daughter could bring in a bag for her. Staff told pt yes, but that bag would need to be stored in her locker. Writer overheard pt calling daughter, pt asked daughter to bring her bag. Pt was cooperative and calm throughout the shift.

## 2019-05-26 NOTE — PROGRESS NOTES
Pt has been up in lounge much of shift. Pt present at meals x 2 and appears to be eating/drinking well.Pt, when approached to talk, somewhat irritated at question if she had any thoughts about suicide. Pt denies feeling suicidal. Pt also denies having any hallucinations. Pt presently watching T.V.

## 2019-05-26 NOTE — PROGRESS NOTES
"Participated in Music Therapy group with focus on mood elevation, validation and decreasing anxiety and improved group cohesiveness. Engaged and cooperative in music listening interventions.   Showed progress in session goals.     Was unable to identify a song she wanted to hear, but was able to specify \"Anglican music\", so \"I Smile\" by Shashank Marques was played and she did smile along to the song.  Quiet.  "

## 2019-05-27 PROCEDURE — 25000132 ZZH RX MED GY IP 250 OP 250 PS 637: Performed by: STUDENT IN AN ORGANIZED HEALTH CARE EDUCATION/TRAINING PROGRAM

## 2019-05-27 PROCEDURE — 25000132 ZZH RX MED GY IP 250 OP 250 PS 637: Performed by: PHYSICIAN ASSISTANT

## 2019-05-27 PROCEDURE — 25000132 ZZH RX MED GY IP 250 OP 250 PS 637: Performed by: HOSPITALIST

## 2019-05-27 PROCEDURE — 25000132 ZZH RX MED GY IP 250 OP 250 PS 637: Performed by: NURSE PRACTITIONER

## 2019-05-27 PROCEDURE — 25000132 ZZH RX MED GY IP 250 OP 250 PS 637: Performed by: PSYCHIATRY & NEUROLOGY

## 2019-05-27 PROCEDURE — 12400002 ZZH R&B MH SENIOR/ADOLESCENT

## 2019-05-27 RX ADMIN — CALCIUM 500 MG: 500 TABLET ORAL at 21:44

## 2019-05-27 RX ADMIN — HYDRALAZINE HYDROCHLORIDE 25 MG: 25 TABLET, FILM COATED ORAL at 03:04

## 2019-05-27 RX ADMIN — PROCHLORPERAZINE MALEATE 5 MG: 5 TABLET, FILM COATED ORAL at 14:03

## 2019-05-27 RX ADMIN — ATORVASTATIN CALCIUM 40 MG: 40 TABLET, FILM COATED ORAL at 21:44

## 2019-05-27 RX ADMIN — CALCIUM 500 MG: 500 TABLET ORAL at 09:44

## 2019-05-27 RX ADMIN — METOPROLOL SUCCINATE 50 MG: 50 TABLET, EXTENDED RELEASE ORAL at 09:44

## 2019-05-27 RX ADMIN — DIPHENHYDRAMINE HYDROCHLORIDE 50 MG: 25 CAPSULE ORAL at 04:40

## 2019-05-27 RX ADMIN — TRIHEXYPHENIDYL HYDROCHLORIDE 5 MG: 5 TABLET ORAL at 09:43

## 2019-05-27 RX ADMIN — LOSARTAN POTASSIUM 100 MG: 100 TABLET, FILM COATED ORAL at 09:44

## 2019-05-27 RX ADMIN — TRIHEXYPHENIDYL HYDROCHLORIDE 5 MG: 5 TABLET ORAL at 21:43

## 2019-05-27 RX ADMIN — CHLORPROMAZINE HYDROCHLORIDE 150 MG: 100 TABLET, FILM COATED ORAL at 21:43

## 2019-05-27 RX ADMIN — HALOPERIDOL 20 MG: 10 TABLET ORAL at 21:43

## 2019-05-27 RX ADMIN — THERA TABS 1 TABLET: TAB at 09:44

## 2019-05-27 RX ADMIN — PROCHLORPERAZINE MALEATE 5 MG: 5 TABLET, FILM COATED ORAL at 03:04

## 2019-05-27 ASSESSMENT — ACTIVITIES OF DAILY LIVING (ADL)
DRESS: INDEPENDENT
ORAL_HYGIENE: INDEPENDENT
HYGIENE/GROOMING: INDEPENDENT

## 2019-05-27 NOTE — PROGRESS NOTES
"Patient has been sitting in the milieu the entire shift. She is not social with peers or staff. She did attend one group [employer]. When this writer attempted to check in with her she was reluctant. She denies SI and SIB. She denies anxiety and depression. She denies pain. She states she slept well last night. Her appetite is good. When asked if she had concerns she stated \"yes, I want to go home\". Her affect is tense and her mood is irritable.   "

## 2019-05-27 NOTE — PROGRESS NOTES
0235 Vomited large amount of previously ingested food on the floor. Reported feeling dizzy.  /89 P 77  0304 Hydralazine 25mg and compazine 5 mg given.  Few seconds after, pt vomited, vomitus mostly saliva. Pills did not come out.  0435 /73 P 73  Still complained of feeling dizzy, restless in bed.  0440 Benadryl 50 mg given.  Encouraged to call for assistance when getting up specially when feeling dizzy.  Made comfortable in bed.

## 2019-05-27 NOTE — PROGRESS NOTES
"   05/27/19 1200   Behavioral Health   Hallucinations denies / not responding to hallucinations   Thinking distractable;poor concentration   Orientation person: oriented;place: oriented   Memory baseline memory   Insight poor   Judgement impaired   Eye Contact at examiner   Affect blunted, flat;irritable   Mood irritable   Physical Appearance/Attire attire appropriate to age and situation   Hygiene well groomed   Suicidality other (see comments)  (None)   1. Wish to be Dead No   2. Non-Specific Active Suicidal Thoughts  No     Pt stayed in room for most of the day. Pt did not attend any meetings that were held. Pt was irritable when staff asked which country she was from, saying \"why does it matter where I am from, I am sick\". Pt ate breakfast and lunch in her room. Pt complained of feeling dizzy still stating \"the room is spinning\" and she is also still feeling nauseous as well. Pt stated that she was not feeling depressed but that she is feeling sad today. Pt denied any SI/SIB as well as no auditory or visual hallucinations.  "

## 2019-05-27 NOTE — PLAN OF CARE
"Patient had emesis following breakfast this am. Was given Compazine 5 mg and Hydralazine 25 mg at 0304 this am. Patient states \"That made me dizzy feeling.\" BP on day shift this am =164/83, P=72. Morning medications were give when patient was not nauseated. Had another emesis after lunch. Called Pharmacist for Compazine refill, did not come soon, called Pharmacy Tech. Line, did not arrive, and called again. Compazine 5 mg  this afternoon when med arrived with relief.   "

## 2019-05-27 NOTE — PROGRESS NOTES
05/26/19 2200   Therapeutic Recreation   Type of Intervention structured groups   Activity game   Response Participates, initiates socially appropriate   Hours 1     Pt participated in Therapeutic Recreation group with focus on leisure participation and strategic cognitive reasoning.  Engaged and cooperative in group recreational intervention via a group game.  Pt remained focused and engaged throughout full duration of group. Showed progress in session goals. Pt mood was calm.

## 2019-05-28 PROCEDURE — 25000132 ZZH RX MED GY IP 250 OP 250 PS 637: Performed by: STUDENT IN AN ORGANIZED HEALTH CARE EDUCATION/TRAINING PROGRAM

## 2019-05-28 PROCEDURE — 99232 SBSQ HOSP IP/OBS MODERATE 35: CPT | Performed by: PSYCHIATRY & NEUROLOGY

## 2019-05-28 PROCEDURE — 12400002 ZZH R&B MH SENIOR/ADOLESCENT

## 2019-05-28 PROCEDURE — 25000132 ZZH RX MED GY IP 250 OP 250 PS 637: Performed by: PSYCHIATRY & NEUROLOGY

## 2019-05-28 PROCEDURE — 25000132 ZZH RX MED GY IP 250 OP 250 PS 637: Performed by: NURSE PRACTITIONER

## 2019-05-28 RX ADMIN — METOPROLOL SUCCINATE 50 MG: 50 TABLET, EXTENDED RELEASE ORAL at 08:45

## 2019-05-28 RX ADMIN — TRIHEXYPHENIDYL HYDROCHLORIDE 5 MG: 5 TABLET ORAL at 21:31

## 2019-05-28 RX ADMIN — CALCIUM 500 MG: 500 TABLET ORAL at 21:32

## 2019-05-28 RX ADMIN — CALCIUM 500 MG: 500 TABLET ORAL at 08:45

## 2019-05-28 RX ADMIN — THERA TABS 1 TABLET: TAB at 08:45

## 2019-05-28 RX ADMIN — LOSARTAN POTASSIUM 100 MG: 100 TABLET, FILM COATED ORAL at 08:44

## 2019-05-28 RX ADMIN — TRIHEXYPHENIDYL HYDROCHLORIDE 5 MG: 5 TABLET ORAL at 08:45

## 2019-05-28 RX ADMIN — ATORVASTATIN CALCIUM 40 MG: 40 TABLET, FILM COATED ORAL at 21:31

## 2019-05-28 RX ADMIN — CHLORPROMAZINE HYDROCHLORIDE 150 MG: 100 TABLET, FILM COATED ORAL at 21:30

## 2019-05-28 RX ADMIN — HALOPERIDOL 20 MG: 10 TABLET ORAL at 21:32

## 2019-05-28 ASSESSMENT — MIFFLIN-ST. JEOR: SCORE: 1246.33

## 2019-05-28 ASSESSMENT — ACTIVITIES OF DAILY LIVING (ADL)
DRESS: INDEPENDENT
HYGIENE/GROOMING: INDEPENDENT
DRESS: INDEPENDENT
HYGIENE/GROOMING: INDEPENDENT
LAUNDRY: UNABLE TO COMPLETE
ORAL_HYGIENE: INDEPENDENT
ORAL_HYGIENE: INDEPENDENT

## 2019-05-28 NOTE — PROGRESS NOTES
"[facility], [place]   Psychiatric Progress Note  Hospital Day: 31        Interim History:   The patient's care was discussed with the treatment team during the daily team meeting and/or staff's chart notes were reviewed.  Staff report patient is complaining of nausea. Had emesis on Sunday. She reported dizziness this AM. No emesis today.    Upon interview, the patient states that she wants to go home. She does not feel that a group home is right for her. She believes that her psychosis will return if she goes to a group home. \"How can the mother of Mima live in a group home?\" She indicates that I should speak with \"Trump or Obama\" to have it verified that the [facility] government will be providing a place for her. She believes that she already has a place she just hasn't been told where it is.    Psychiatric Symptoms: ongoing delusions    Medication side effects reported: none    Other issues reported by patient: nausea, vomiting, dizziness         Medications:       atorvastatin  40 mg Oral QPM     calcium carbonate (OS-ALEK) 500 mg (elemental) tablet  500 mg Oral BID w/meals     chlorproMAZINE  150 mg Oral At Bedtime     cholecalciferol  50,000 Units Oral Q7 Days     haloperidol  20 mg Oral At Bedtime    Or     haloperidol lactate  10 mg Intramuscular At Bedtime     haloperidol decanoate  100 mg Intramuscular Q30 Days     losartan  100 mg Oral Daily     metoprolol succinate ER  50 mg Oral Daily     multivitamin, therapeutic  1 tablet Oral Daily     trihexyphenidyl  5 mg Oral BID          Allergies:     Allergies   Allergen Reactions     Lisinopril Cough          Labs:   No results found for this or any previous visit (from the past 48 hour(s)).       Psychiatric Examination:     /68   Pulse 80   Temp 97.8  F (36.6  C) (Tympanic)   Resp 16   Ht 1.6 m (5' 3\")   Wt 68.7 kg (151 lb 8 oz)   SpO2 99%   BMI 26.84 kg/m    Weight is 151 lbs 8 oz  Body mass index is 26.84 " kg/m .    Orthostatic Vitals       Most Recent      Sitting Orthostatic /73 05/28 0854    Sitting Orthostatic Pulse (bpm) 76 05/28 0854    Standing Orthostatic BP --  Comment: Unable to stand 05/27 1214            Appearance: awake, alert, adequately groomed and dressed in hospital scrubs  Attitude:  cooperative  Eye Contact:  good  Mood:  anxious  Affect:  mood congruent  Speech:  clear, coherent and normal prosody  Language: fluent and intact in English  Psychomotor, Gait, Musculoskeletal:  no evidence of tardive dyskinesia, dystonia, or tics and intact station, gait and muscle tone  Throught Process:  illogical  Associations:  loosening of associations present  Thought Content:  ongoing delusions  Insight:  absent  Judgement:  limited  Oriented to:  person and place  Attention Span and Concentration:  intact  Recent and Remote Memory:  intact  Fund of Knowledge:  appropriate    Clinical Global Impressions  First:  Considering your total clinical experience with this particular patient population, how severe are the patient's symptoms at this time?: 7 (04/27/19 0750)  Compared to the patient's condition at the START of treatment, this patient's condition is:: 4 (04/27/19 0750)  Most recent:  Considering your total clinical experience with this particular patient population, how severe are the patient's symptoms at this time?: 7 (05/28/19 1304)  Compared to the patient's condition at the START of treatment, this patient's condition is:: 3 (05/28/19 1304)           Precautions:     Behavioral Orders   Procedures     Assault precautions     Code 1 - Restrict to Unit     Elopement precautions     Routine Programming     As clinically indicated     Status 15     Every 15 minutes.          Diagnoses:   Schizophrenia  Hypertension  Hyperlipidemia  CVA 2017  Right tibial plateau fracture s/p ORIF 1/8/2019           Assessment & Plan:       Target psychiatric symptoms and interventions:  Ongoing psychosis  -continue  with current medications.  -patient continues to be delusional and requires a high level of care outside of the hospital.    Medical Problems and Treatments:  Dizziness, nausea, vomiting - continue to monitor as improved this AM.    Behavioral/Psychological/Social:  Encourage unit programming        Disposition Plan   Reason for ongoing admission: is unable to care for self due to severe psychosis or tad  Discharge location: group home  Discharge Medications: not ordered  Follow-up Appointments: not scheduled  Legal Status: full commitment  Entered by: Jarett Pack on 5/28/2019 at 1:04 PM

## 2019-05-28 NOTE — PROGRESS NOTES
No emesis this evening. Med compliant. Isolative to her bed. Denies SI/wish to die. Denies depression.

## 2019-05-28 NOTE — PROGRESS NOTES
Phone call with ASHVIN Minna Goins ([phone]) to inform that patient was transferred to  and to provide unit contact information. Writer informed Minna that care team is recommending Adult Foster Care or group home and requested Minna start the CADI process. Minna agreed to refer patient for CADI and this writer will seek consent form patient for CADI assessment.    Writer met with patient to discuss discharge plans. Patient reported that her  is being evicted from their apartment and the government is providing a place for her. She was unable to provide an address or location of the apartment. She was willing to sign an JESSICA for [facility] to determine her housing status. Writer asked for an JESSICA to give consent for a CADI assessment which will provide funding for customized housing or group home. Patient reported that she did not want to go to a group home and continues to insist the [facility] already has housing for her.

## 2019-05-28 NOTE — PROGRESS NOTES
05/28/19 1200   Behavioral Health   Hallucinations denies / not responding to hallucinations   Thinking poor concentration   Orientation person: oriented;place: oriented   Memory baseline memory   Insight poor   Judgement impaired   Eye Contact at examiner   Affect blunted, flat;sad   Mood mood is calm   Physical Appearance/Attire attire appropriate to age and situation   Hygiene well groomed   Suicidality other (see comments)  (None)   1. Wish to be Dead No   2. Non-Specific Active Suicidal Thoughts  No   Activities of Daily Living   Hygiene/Grooming independent   Oral Hygiene independent   Dress independent   Laundry unable to complete   Room Organization independent     Pt was isolative but active in the milieu today. Pt had a good appetite eating breakfast this morning. Pt is feeling sad today but has denied any depression. Pt has also denied any SI/SIB as well as no auditory or visual hallucinations. Pt is complaining of dizziness and feeling somewhat unsteady. Pt is also stating that she feels tired today as well.

## 2019-05-29 PROCEDURE — 12400002 ZZH R&B MH SENIOR/ADOLESCENT

## 2019-05-29 PROCEDURE — 25000132 ZZH RX MED GY IP 250 OP 250 PS 637: Performed by: STUDENT IN AN ORGANIZED HEALTH CARE EDUCATION/TRAINING PROGRAM

## 2019-05-29 PROCEDURE — 25000132 ZZH RX MED GY IP 250 OP 250 PS 637: Performed by: NURSE PRACTITIONER

## 2019-05-29 PROCEDURE — 25000132 ZZH RX MED GY IP 250 OP 250 PS 637: Performed by: PSYCHIATRY & NEUROLOGY

## 2019-05-29 RX ADMIN — CHLORPROMAZINE HYDROCHLORIDE 150 MG: 100 TABLET, FILM COATED ORAL at 21:39

## 2019-05-29 RX ADMIN — METOPROLOL SUCCINATE 50 MG: 50 TABLET, EXTENDED RELEASE ORAL at 09:08

## 2019-05-29 RX ADMIN — HALOPERIDOL 20 MG: 10 TABLET ORAL at 21:39

## 2019-05-29 RX ADMIN — CALCIUM 500 MG: 500 TABLET ORAL at 21:39

## 2019-05-29 RX ADMIN — TRIHEXYPHENIDYL HYDROCHLORIDE 5 MG: 5 TABLET ORAL at 21:39

## 2019-05-29 RX ADMIN — CALCIUM 500 MG: 500 TABLET ORAL at 09:08

## 2019-05-29 RX ADMIN — ATORVASTATIN CALCIUM 40 MG: 40 TABLET, FILM COATED ORAL at 21:39

## 2019-05-29 RX ADMIN — THERA TABS 1 TABLET: TAB at 09:09

## 2019-05-29 RX ADMIN — TRIHEXYPHENIDYL HYDROCHLORIDE 5 MG: 5 TABLET ORAL at 09:09

## 2019-05-29 RX ADMIN — LOSARTAN POTASSIUM 100 MG: 100 TABLET, FILM COATED ORAL at 09:08

## 2019-05-29 ASSESSMENT — ACTIVITIES OF DAILY LIVING (ADL)
ORAL_HYGIENE: INDEPENDENT
HYGIENE/GROOMING: INDEPENDENT
LAUNDRY: UNABLE TO COMPLETE
DRESS: INDEPENDENT
ORAL_HYGIENE: INDEPENDENT
HYGIENE/GROOMING: INDEPENDENT
DRESS: INDEPENDENT

## 2019-05-29 NOTE — PROGRESS NOTES
More engaging with writer. Med compliant. Denies depression/SI/wish to die. Isolative to her bed except for dinner.

## 2019-05-29 NOTE — PLAN OF CARE
"48 HOUR ASSESSMENT   Patient's mood, anxiety, thoughts and behavior continue to be assessed.   Encourage participation in groups and developing healthy coping skills. She denies concerns regarding sleep, appetite. She does report feeling dizzy.      Goal for Shift:  talk to the doctor about dizziness     Goal status:  not met    D: Pt woke up around 0800 and completed morning vitals and received morning medication. 0800 BP was 149/83 ans 1200 BP was 143/83. Patient continues to report feeling dizzy. Writer encouraged patients to drink fluids and provided education regarding the side effects of beta blockers. Patients affect is flat.   Patient is guarded in conversation. Writer attempted to inquire about discharge plans but patient stated \"The  is working on it\" and would not offer any additional information. Documentation from yesterday indicates the patient believes she has government housing in place by [employer] or [employer] which indicates some delusional thinking is still present.   Patient is isolative and did not attend groups.   Patient continues to be medication compliant. She slept 8 hours last night.   Patient denies SI/SIB/HI.,  anxiety, depression, Ah, VH.     A: Provided active listening, emotional encouragement and goal setting, safety planning safety checks q 15min, and medication administration.    R: Patient was behaviorally appropriate during this shift. Did not require any restraints or seclusion.    "

## 2019-05-30 PROCEDURE — 25000132 ZZH RX MED GY IP 250 OP 250 PS 637: Performed by: STUDENT IN AN ORGANIZED HEALTH CARE EDUCATION/TRAINING PROGRAM

## 2019-05-30 PROCEDURE — 99231 SBSQ HOSP IP/OBS SF/LOW 25: CPT | Performed by: PSYCHIATRY & NEUROLOGY

## 2019-05-30 PROCEDURE — 12400002 ZZH R&B MH SENIOR/ADOLESCENT

## 2019-05-30 PROCEDURE — 25000132 ZZH RX MED GY IP 250 OP 250 PS 637: Performed by: PSYCHIATRY & NEUROLOGY

## 2019-05-30 PROCEDURE — 25000132 ZZH RX MED GY IP 250 OP 250 PS 637: Performed by: NURSE PRACTITIONER

## 2019-05-30 RX ORDER — HALOPERIDOL 5 MG/ML
15 INJECTION INTRAMUSCULAR AT BEDTIME
Status: DISCONTINUED | OUTPATIENT
Start: 2019-05-30 | End: 2019-06-05 | Stop reason: HOSPADM

## 2019-05-30 RX ADMIN — THERA TABS 1 TABLET: TAB at 08:36

## 2019-05-30 RX ADMIN — LOSARTAN POTASSIUM 100 MG: 100 TABLET, FILM COATED ORAL at 08:36

## 2019-05-30 RX ADMIN — HALOPERIDOL 15 MG: 10 TABLET ORAL at 21:27

## 2019-05-30 RX ADMIN — TRIHEXYPHENIDYL HYDROCHLORIDE 5 MG: 5 TABLET ORAL at 08:36

## 2019-05-30 RX ADMIN — ATORVASTATIN CALCIUM 40 MG: 40 TABLET, FILM COATED ORAL at 21:28

## 2019-05-30 RX ADMIN — CALCIUM 500 MG: 500 TABLET ORAL at 08:35

## 2019-05-30 RX ADMIN — TRIHEXYPHENIDYL HYDROCHLORIDE 5 MG: 5 TABLET ORAL at 21:28

## 2019-05-30 RX ADMIN — METOPROLOL SUCCINATE 50 MG: 50 TABLET, EXTENDED RELEASE ORAL at 08:36

## 2019-05-30 RX ADMIN — CALCIUM 500 MG: 500 TABLET ORAL at 21:27

## 2019-05-30 RX ADMIN — CHLORPROMAZINE HYDROCHLORIDE 150 MG: 100 TABLET, FILM COATED ORAL at 21:27

## 2019-05-30 ASSESSMENT — ACTIVITIES OF DAILY LIVING (ADL)
HYGIENE/GROOMING: INDEPENDENT
DRESS: INDEPENDENT
HYGIENE/GROOMING: INDEPENDENT
ORAL_HYGIENE: INDEPENDENT
DRESS: INDEPENDENT
ORAL_HYGIENE: INDEPENDENT

## 2019-05-30 ASSESSMENT — MIFFLIN-ST. JEOR: SCORE: 1249.96

## 2019-05-30 NOTE — PROGRESS NOTES
Phone call with ASHVIN Goins to discuss discharge planning. Plan is to for Minna to come Monday at noon for meeting with patient and CTC to make discharge plan.    Court paperwork received indicating there will be a court hearing on June 17 to determine patient's continued need for commitment.

## 2019-05-30 NOTE — PROGRESS NOTES
05/29/19 2054   Behavioral Health   Hallucinations denies / not responding to hallucinations   Thinking poor concentration;distractable   Orientation person: oriented;place: oriented   Memory baseline memory   Insight poor   Judgement impaired   Eye Contact at examiner   Affect blunted, flat   Mood mood is calm   Physical Appearance/Attire appears stated age   Hygiene well groomed  (pt showered earlier in day)   Suicidality other (see comments)  (pt denies)   1. Wish to be Dead No   2. Non-Specific Active Suicidal Thoughts  No   Self Injury other (see comment)  (pt denies)   Elopement   (none)   Activity isolative;withdrawn   Speech clear;coherent;pressured   Medication Sensitivity no stated side effects;no observed side effects   Psychomotor / Gait balanced;steady   Activities of Daily Living   Hygiene/Grooming independent   Oral Hygiene independent   Dress independent   Laundry unable to complete   Room Organization independent       Pt denies SI/SIB. Pt denies feeling anxious or depressed. She has been isolative and withdrawn this shift, only coming out for meals and snacks. She denies having and problems with sleep or appetite.

## 2019-05-30 NOTE — PROGRESS NOTES
Pt stated she feels good. Pt stated she has felt dizzy today. Pt present in milieu though withdrawn, did not attend groups. Pt denied depression, anxiety, SI, intent to harm self or others, or hallucinations.       05/30/19 1417   Behavioral Health   Hallucinations denies / not responding to hallucinations   Thinking distractable   Orientation person: oriented;place: oriented;date: oriented   Memory baseline memory   Insight insight appropriate to situation   Judgement impaired   Eye Contact at examiner   Affect blunted, flat   Mood mood is calm   Physical Appearance/Attire attire appropriate to age and situation   Hygiene well groomed   Suicidality other (see comments)  (Denies)   1. Wish to be Dead No   2. Non-Specific Active Suicidal Thoughts  No   Self Injury other (see comment)  (Denies)   Elopement   (None)   Activity withdrawn   Speech clear;coherent   Medication Sensitivity other (see comment)  (Dizzy)   Psychomotor / Gait steady;balanced   Psycho Education   Type of Intervention 1:1 intervention   Response participates, initiates socially appropriate   Hours 0.5   Treatment Detail Check-in   Safety   Suicidality Status 15   Activities of Daily Living   Hygiene/Grooming independent   Oral Hygiene independent   Dress independent   Room Organization independent   Activity   Activity Assistance Provided independent

## 2019-05-30 NOTE — PROGRESS NOTES
"[facility], [place]   Psychiatric Progress Note  Hospital Day: 33        Interim History:       As per nurse/psych associate note: Pt denies SI/SIB. Pt denies feeling anxious or depressed. She has been isolative and withdrawn this shift, only coming out for meals and snacks. She denies having and problems with sleep or appetite.     As per today's interview: Patient was seen sitting by herself in the milieu. She was quietly observing others, but appeared tense and withdrawn. She was open to speak to me today. She was rather guarded upon interview, and did not reveal much to me regarding her psychiatric symptoms. Observations from staff did mention the patient still delusional. The patient's main complaint was her dizziness/nausea, which seems to have improved relative to yesterday. Denies SI/HI/AH/VH.             Psychiatric Symptoms: ongoing delusions    Medication side effects reported: none    Other issues reported by patient: nausea, vomiting, dizziness         Medications:       atorvastatin  40 mg Oral QPM     calcium carbonate (OS-ALEK) 500 mg (elemental) tablet  500 mg Oral BID w/meals     chlorproMAZINE  150 mg Oral At Bedtime     cholecalciferol  50,000 Units Oral Q7 Days     haloperidol  15 mg Oral At Bedtime    Or     haloperidol lactate  15 mg Intramuscular At Bedtime     haloperidol decanoate  100 mg Intramuscular Q30 Days     losartan  100 mg Oral Daily     metoprolol succinate ER  50 mg Oral Daily     multivitamin, therapeutic  1 tablet Oral Daily     trihexyphenidyl  5 mg Oral BID          Allergies:     Allergies   Allergen Reactions     Lisinopril Cough          Labs:   No results found for this or any previous visit (from the past 48 hour(s)).       Psychiatric Examination:     /76   Pulse 67   Temp 95.6  F (35.3  C) (Oral)   Resp 16   Ht 1.6 m (5' 3\")   Wt 69.1 kg (152 lb 4.8 oz)   SpO2 94%   BMI 26.98 kg/m    Weight is 152 lbs 4.8 oz  Body mass index is " 26.98 kg/m .    Orthostatic Vitals       Most Recent      Sitting Orthostatic /73 05/28 0854    Sitting Orthostatic Pulse (bpm) 76 05/28 0854    Standing Orthostatic BP --  Comment: Unable to stand 05/27 1214            Appearance: awake, alert, adequately groomed and dressed in hospital scrubs  Attitude:  cooperative  Eye Contact:  good  Mood:  anxious  Affect:  mood congruent  Speech:  clear, coherent and normal prosody  Language: fluent and intact in English  Psychomotor, Gait, Musculoskeletal:  no evidence of tardive dyskinesia, dystonia, or tics and intact station, gait and muscle tone  Throught Process:  illogical  Associations:  loosening of associations present  Thought Content:  ongoing delusions. Denies SI/HI/AH/VH.   Insight:  absent  Judgement:  limited  Oriented to:  person and place  Attention Span and Concentration:  intact  Recent and Remote Memory:  intact  Fund of Knowledge:  appropriate    Clinical Global Impressions  First:  Considering your total clinical experience with this particular patient population, how severe are the patient's symptoms at this time?: 7 (04/27/19 0750)  Compared to the patient's condition at the START of treatment, this patient's condition is:: 4 (04/27/19 0750)  Most recent:  Considering your total clinical experience with this particular patient population, how severe are the patient's symptoms at this time?: 7 (05/28/19 1304)  Compared to the patient's condition at the START of treatment, this patient's condition is:: 3 (05/28/19 1304)           Precautions:     Behavioral Orders   Procedures     Assault precautions     Code 1 - Restrict to Unit     Elopement precautions     Routine Programming     As clinically indicated     Status 15     Every 15 minutes.          Diagnoses:   Schizophrenia  Hypertension  Hyperlipidemia  CVA 2017  Right tibial plateau fracture s/p ORIF 1/8/2019           Assessment & Plan:       The patient is committed and is getting treatment  for her psychosis via neuroleptic medications and the stable hospital setting. Due to her current delusions, the elevated state of that her behavior can become on the outside, along with uncertain dispositional plan, having the patient hospitalized would be the safest place for her to be. We will continue to monitor, make medication adjustment and hope for further stability and planning of her longitudinal dispositional care.     Target psychiatric symptoms and interventions:  Ongoing psychosis  -continue with current medications.  -patient continues to be delusional and requires a high level of care outside of the hospital.    Medical Problems and Treatments:  Dizziness, nausea, vomiting - continue to monitor as improved this AM.    Behavioral/Psychological/Social:  Encourage unit programming        Disposition Plan   Reason for ongoing admission: is unable to care for self due to severe psychosis or tad  Discharge location: group home  Discharge Medications: not ordered  Follow-up Appointments: not scheduled  Legal Status: full commitment  Entered by: Jj Brink on 5/28/2019 at 1:25 PM

## 2019-05-30 NOTE — PLAN OF CARE
Problem: Psychotic Symptoms  Goal: Psychotic Symptoms  Description  Signs and symptoms of listed problems will be absent or manageable.  Outcome: Improving  Goal: Social and Therapeutic (Psychotic Symptoms)  Description  Signs and symptoms of listed problems will be absent or manageable.  Outcome: Improving     Problem: Behavior Regulation Impairment (Disruptive Behavior)  Goal: Improved Impulse and Aggression Control (Disruptive Behavior)  Outcome: Improving     Problem: Emotion/Temperament Impairment (Disruptive Behavior)  Goal: Improved Emotion/Temperament/Mood Symptoms (Disruptive Behavior)  Outcome: Improving   Pt has been calm and cooperative.  Pleasant when approached.  Conversation has been appropriate.

## 2019-05-30 NOTE — PLAN OF CARE
Problem: Adult Behavioral Health Plan of Care  Goal: Team Discussion  Description  Team Plan:  Outcome: Improving  Note:   BEHAVIORAL TEAM DISCUSSION    Participants: Dr. Jj Brink MD< Lauren Cha RN  Progress: Some improvement  Continued Stay Criteria/Rationale: Delusions  Medical/Physical: Hypertension, Hyperlipidemia, CVA 2017, Right tibial plateau fracture s/p ORIF 1/8/2019    Precautions: Behavioral Orders   Procedures    Assault precautions    Code 1 - Restrict to Unit    Elopement precautions    Routine Programming     As clinically indicated    Status 15     Every 15 minutes.     Plan: Psychiatric Assessment. Medication Management. Therapeutic Mileu. Individual and group support.   Rationale for change in precautions or plan: No change

## 2019-05-31 PROCEDURE — 99231 SBSQ HOSP IP/OBS SF/LOW 25: CPT | Performed by: PSYCHIATRY & NEUROLOGY

## 2019-05-31 PROCEDURE — 25000132 ZZH RX MED GY IP 250 OP 250 PS 637: Performed by: STUDENT IN AN ORGANIZED HEALTH CARE EDUCATION/TRAINING PROGRAM

## 2019-05-31 PROCEDURE — 12400002 ZZH R&B MH SENIOR/ADOLESCENT

## 2019-05-31 PROCEDURE — 25000132 ZZH RX MED GY IP 250 OP 250 PS 637: Performed by: PSYCHIATRY & NEUROLOGY

## 2019-05-31 PROCEDURE — 25000132 ZZH RX MED GY IP 250 OP 250 PS 637: Performed by: NURSE PRACTITIONER

## 2019-05-31 RX ADMIN — METOPROLOL SUCCINATE 50 MG: 50 TABLET, EXTENDED RELEASE ORAL at 08:31

## 2019-05-31 RX ADMIN — LOSARTAN POTASSIUM 100 MG: 100 TABLET, FILM COATED ORAL at 08:31

## 2019-05-31 RX ADMIN — CALCIUM 500 MG: 500 TABLET ORAL at 08:30

## 2019-05-31 RX ADMIN — ATORVASTATIN CALCIUM 40 MG: 40 TABLET, FILM COATED ORAL at 21:01

## 2019-05-31 RX ADMIN — CHLORPROMAZINE HYDROCHLORIDE 150 MG: 100 TABLET, FILM COATED ORAL at 21:01

## 2019-05-31 RX ADMIN — TRIHEXYPHENIDYL HYDROCHLORIDE 5 MG: 5 TABLET ORAL at 21:01

## 2019-05-31 RX ADMIN — THERA TABS 1 TABLET: TAB at 08:31

## 2019-05-31 RX ADMIN — CALCIUM 500 MG: 500 TABLET ORAL at 21:02

## 2019-05-31 RX ADMIN — HALOPERIDOL 15 MG: 10 TABLET ORAL at 21:01

## 2019-05-31 RX ADMIN — TRIHEXYPHENIDYL HYDROCHLORIDE 5 MG: 5 TABLET ORAL at 08:31

## 2019-05-31 ASSESSMENT — ACTIVITIES OF DAILY LIVING (ADL)
DRESS: SCRUBS (BEHAVIORAL HEALTH)
DRESS: SCRUBS (BEHAVIORAL HEALTH)
ORAL_HYGIENE: INDEPENDENT
ORAL_HYGIENE: INDEPENDENT
HYGIENE/GROOMING: INDEPENDENT
LAUNDRY: UNABLE TO COMPLETE
HYGIENE/GROOMING: INDEPENDENT

## 2019-05-31 NOTE — PROGRESS NOTES
Phone call with patient's daughter Elizabeth's number ([phone]) to report that insurance coverage ended on May 29 and inquired about family plan for patient. She reported there are no definite plans yet. [facility] asked her to work on it over the weekend and plans to call her Monday.    LVM with ASHVIN Goins to update on lack of insurance coverage and patient ready for discharge. Requested help with discharge plan.

## 2019-05-31 NOTE — PLAN OF CARE
Patient calm and cooperative, visible in milieu,isolated at times.  Flat affect, denies SI/SIB, depression/anxiety.  Patient told staff she's happy today.   Answered No to some of the question asked.  No aggressive behavior noted so far.  Patient resting comfortably in room.  Will continue to monitor closely.

## 2019-05-31 NOTE — PROVIDER NOTIFICATION
05/30/19 1600   Behavioral Health   Thoughts/Cognition (WDL) ex   Hallucinations denies / not responding to hallucinations   Thinking intact   Orientation person: oriented;place: oriented;date: oriented   Memory baseline memory   Insight insight appropriate to situation   Judgement impaired   Eye Contact at examiner   Affect/Mood (WDL) WDL   Affect blunted, flat   Mood mood is calm   ADL Assessment (WDL) WDL   Physical Appearance/Attire attire appropriate to age and situation   Hygiene well groomed   Suicidality (WDL) WDL   Suicidality other (see comments)  (denies)   1. Wish to be Dead No   2. Non-Specific Active Suicidal Thoughts  No   Elopement (WDL) WDL   Activity (WDL) WDL   Speech (WDL) WDL   Speech clear;coherent   Medication Sensitivity (WDL) WDL   Psychomotor Gait (WDL) WDL   Psychomotor / Gait steady   Overt Agression (WDL) WDL     Pt has been calm, cooperative, quiet on the unit.  She spends most of her free time in her room. Observed to be sitting on her bed.  She did receive a call from a family member.  Her interaction is appropriate.  She is guarded, but pleasant.  Appetite good and gait is steady.  Refused 8 pm blood pressure check.

## 2019-05-31 NOTE — PROGRESS NOTES
[facility], [place]   Psychiatric Progress Note  Hospital Day: 34        Interim History:       As per nurse/psych associate note: Patient calm and cooperative, visible in milieu,isolated at times.  Flat affect, denies SI/SIB, depression/anxiety.  Patient told staff she's happy today.   Answered No to some of the question asked.  No aggressive behavior noted so far.  Patient resting comfortably in room.  Will continue to monitor closely.        As per today's interview: Patient was seen sitting by herself in the milieu. She was quietly observing others, but appeared tense and withdrawn. She was open to speak to me today. She was rather guarded upon interview, and did not reveal much to me regarding her psychiatric symptoms. Observations from staff did mention the patient still somewhat delusional. The patient's main complaint was her dizziness/nausea, which seems to have improved relative to yesterday. Denies SI/HI/AH/VH.             Psychiatric Symptoms: ongoing delusions    Medication side effects reported: none    Other issues reported by patient: nausea, vomiting, dizziness         Medications:       atorvastatin  40 mg Oral QPM     calcium carbonate (OS-ALEK) 500 mg (elemental) tablet  500 mg Oral BID w/meals     chlorproMAZINE  150 mg Oral At Bedtime     cholecalciferol  50,000 Units Oral Q7 Days     haloperidol  15 mg Oral At Bedtime    Or     haloperidol lactate  15 mg Intramuscular At Bedtime     haloperidol decanoate  100 mg Intramuscular Q30 Days     losartan  100 mg Oral Daily     metoprolol succinate ER  50 mg Oral Daily     multivitamin, therapeutic  1 tablet Oral Daily     trihexyphenidyl  5 mg Oral BID          Allergies:     Allergies   Allergen Reactions     Lisinopril Cough          Labs:   No results found for this or any previous visit (from the past 48 hour(s)).       Psychiatric Examination:     /69   Pulse 69   Temp 98.4  F (36.9  C) (Oral)   Resp 16   Ht  "1.6 m (5' 3\")   Wt 69.1 kg (152 lb 4.8 oz)   SpO2 99%   BMI 26.98 kg/m    Weight is 152 lbs 4.8 oz  Body mass index is 26.98 kg/m .    Orthostatic Vitals       Most Recent      Sitting Orthostatic /73 05/28 0854    Sitting Orthostatic Pulse (bpm) 76 05/28 0854    Standing Orthostatic BP --  Comment: Unable to stand 05/27 1214            Appearance: awake, alert, adequately groomed and dressed in hospital scrubs  Attitude:  cooperative  Eye Contact:  good  Mood:  anxious  Affect:  mood congruent  Speech:  clear, coherent and normal prosody  Language: fluent and intact in English  Psychomotor, Gait, Musculoskeletal:  no evidence of tardive dyskinesia, dystonia, or tics and intact station, gait and muscle tone  Throught Process:  illogical  Associations:  loosening of associations present  Thought Content:  ongoing delusions. Denies SI/HI/AH/VH.   Insight:  absent  Judgement:  limited  Oriented to:  person and place  Attention Span and Concentration:  intact  Recent and Remote Memory:  intact  Fund of Knowledge:  appropriate    Clinical Global Impressions  First:  Considering your total clinical experience with this particular patient population, how severe are the patient's symptoms at this time?: 7 (04/27/19 0750)  Compared to the patient's condition at the START of treatment, this patient's condition is:: 4 (04/27/19 0750)  Most recent:  Considering your total clinical experience with this particular patient population, how severe are the patient's symptoms at this time?: 7 (05/28/19 1304)  Compared to the patient's condition at the START of treatment, this patient's condition is:: 3 (05/28/19 1304)           Precautions:     Behavioral Orders   Procedures     Assault precautions     Code 1 - Restrict to Unit     Elopement precautions     Routine Programming     As clinically indicated     Status 15     Every 15 minutes.          Diagnoses:   Schizophrenia  Hypertension  Hyperlipidemia  CVA 2017  Right " tibial plateau fracture s/p ORIF 1/8/2019           Assessment & Plan:       The patient is committed and is getting treatment for her psychosis via neuroleptic medications and the stable hospital setting. Due to her current delusions, the elevated state of that her behavior can become on the outside, along with uncertain dispositional plan, having the patient hospitalized would be the safest place for her to be. The patient is not allowed to be back home since she is , her ex- is being evicted, and the lack of adequate/safe placement for patient. We will continue to monitor, make medication adjustment and hope for further stability and planning of her longitudinal dispositional care.     Target psychiatric symptoms and interventions:  Ongoing psychosis  -continue with current medications.  -patient continues to be delusional and requires a high level of care outside of the hospital.    Medical Problems and Treatments:  Dizziness, nausea, vomiting - continue to monitor as improved this AM.    Behavioral/Psychological/Social:  Encourage unit programming        Disposition Plan   Reason for ongoing admission: is unable to care for self due to severe psychosis or tad  Discharge location: group home  Discharge Medications: not ordered  Follow-up Appointments: not scheduled  Legal Status: full commitment  Entered by: Jj Brink on 5/28/2019 at 5:26 PM

## 2019-05-31 NOTE — PLAN OF CARE
Problem: Adult Behavioral Health Plan of Care  Goal: Adheres to Safety Considerations for Self and Others  Outcome: Improving     Problem: Adult Behavioral Health Plan of Care  Goal: Optimized Coping Skills in Response to Life Stressors  Outcome: Improving     Problem: Psychotic Symptoms  Goal: Psychotic Symptoms  Description  Signs and symptoms of listed problems will be absent or manageable.  Outcome: Improving   Pt is improving.  Her behavior on the unit is appropriate.  Likes to isolate in her room.

## 2019-06-01 PROCEDURE — 12400002 ZZH R&B MH SENIOR/ADOLESCENT

## 2019-06-01 PROCEDURE — 25000132 ZZH RX MED GY IP 250 OP 250 PS 637: Performed by: PSYCHIATRY & NEUROLOGY

## 2019-06-01 PROCEDURE — 25000132 ZZH RX MED GY IP 250 OP 250 PS 637: Performed by: NURSE PRACTITIONER

## 2019-06-01 PROCEDURE — 25000132 ZZH RX MED GY IP 250 OP 250 PS 637: Performed by: STUDENT IN AN ORGANIZED HEALTH CARE EDUCATION/TRAINING PROGRAM

## 2019-06-01 RX ADMIN — LOSARTAN POTASSIUM 100 MG: 100 TABLET, FILM COATED ORAL at 08:32

## 2019-06-01 RX ADMIN — METOPROLOL SUCCINATE 50 MG: 50 TABLET, EXTENDED RELEASE ORAL at 08:32

## 2019-06-01 RX ADMIN — CALCIUM 500 MG: 500 TABLET ORAL at 21:20

## 2019-06-01 RX ADMIN — TRIHEXYPHENIDYL HYDROCHLORIDE 5 MG: 5 TABLET ORAL at 08:32

## 2019-06-01 RX ADMIN — ATORVASTATIN CALCIUM 40 MG: 40 TABLET, FILM COATED ORAL at 21:20

## 2019-06-01 RX ADMIN — THERA TABS 1 TABLET: TAB at 08:32

## 2019-06-01 RX ADMIN — CALCIUM 500 MG: 500 TABLET ORAL at 08:32

## 2019-06-01 RX ADMIN — TRIHEXYPHENIDYL HYDROCHLORIDE 5 MG: 5 TABLET ORAL at 21:19

## 2019-06-01 RX ADMIN — CHOLECALCIFEROL CAP 1.25 MG (50000 UNIT) 50000 UNITS: 1.25 CAP at 08:33

## 2019-06-01 RX ADMIN — HALOPERIDOL 15 MG: 10 TABLET ORAL at 21:19

## 2019-06-01 RX ADMIN — CHLORPROMAZINE HYDROCHLORIDE 150 MG: 100 TABLET, FILM COATED ORAL at 21:20

## 2019-06-01 ASSESSMENT — ACTIVITIES OF DAILY LIVING (ADL)
HYGIENE/GROOMING: INDEPENDENT
ORAL_HYGIENE: INDEPENDENT
DRESS: INDEPENDENT
LAUNDRY: WITH SUPERVISION

## 2019-06-01 NOTE — PROGRESS NOTES
06/01/19 1327   Behavioral Health   Hallucinations denies / not responding to hallucinations   Thinking poor concentration   Orientation person: oriented;date: oriented   Memory baseline memory   Insight poor   Judgement impaired   Eye Contact at examiner   Affect blunted, flat   Mood mood is calm   Physical Appearance/Attire attire appropriate to age and situation   Hygiene well groomed   Suicidality other (see comments)  (Denies)   1. Wish to be Dead No   2. Non-Specific Active Suicidal Thoughts  No   Self Injury other (see comment)  (Denies)   Elopement   (None observed)   Activity withdrawn   Speech coherent;clear   Medication Sensitivity no observed side effects;no stated side effects   Psychomotor / Gait steady;balanced   Activities of Daily Living   Hygiene/Grooming independent   Oral Hygiene independent   Dress independent   Laundry with supervision   Room Organization loli Martinez had a calm but withdrawn shift this morning. She was observed spending time in the milieu early this morning, watching the news and waiting for breakfast. She remained quiet while in the lounge, ate breakfast, and retired to her room. She remained awake in her room until lunch, at which time she came out and ate. Afterwards she went back to her room. She initially denied to check in but she ended up answering this writer's questions. She denied SI, SIB, anxiety, depression, and hallucinations. She denied pain and said she is doing well. She presented with a flat affect but polite when approached. There were no behavioral concerns this evening.

## 2019-06-01 NOTE — PROGRESS NOTES
05/31/19 1900   Behavioral [facility]   Hallucinations denies / not responding to hallucinations   Thinking poor concentration   Orientation place: oriented;date: oriented   Memory baseline memory   Insight poor   Judgement impaired   Affect blunted, flat;tense   Mood mood is calm   Physical Appearance/Attire attire appropriate to age and situation   Hygiene well groomed   Suicidality   (unable to assess )   Self Injury   (none observed )   Elopement   (none )   Activity withdrawn;isolative   Speech clear   Medication Sensitivity no stated side effects;no observed side effects   Psychomotor / Gait balanced;steady   Activities of Daily Living   Hygiene/Grooming independent   Oral Hygiene independent   Dress scrubs (behavioral health)   Laundry unable to complete   Room Organization independent   Activity   Activity Assistance Provided independent     Pt was pretty isolative and withdrawn in her room most of the shift this evening. Pt came out for meals but did not attend any groups. Pt refused to check-in thus unable to assess any further. There were no concerns observed and none stated.

## 2019-06-02 PROCEDURE — 12400002 ZZH R&B MH SENIOR/ADOLESCENT

## 2019-06-02 PROCEDURE — 25000132 ZZH RX MED GY IP 250 OP 250 PS 637: Performed by: NURSE PRACTITIONER

## 2019-06-02 PROCEDURE — 25000132 ZZH RX MED GY IP 250 OP 250 PS 637: Performed by: PSYCHIATRY & NEUROLOGY

## 2019-06-02 PROCEDURE — 25000132 ZZH RX MED GY IP 250 OP 250 PS 637: Performed by: STUDENT IN AN ORGANIZED HEALTH CARE EDUCATION/TRAINING PROGRAM

## 2019-06-02 RX ADMIN — CHLORPROMAZINE HYDROCHLORIDE 150 MG: 100 TABLET, FILM COATED ORAL at 21:24

## 2019-06-02 RX ADMIN — ATORVASTATIN CALCIUM 40 MG: 40 TABLET, FILM COATED ORAL at 21:25

## 2019-06-02 RX ADMIN — TRIHEXYPHENIDYL HYDROCHLORIDE 5 MG: 5 TABLET ORAL at 21:26

## 2019-06-02 RX ADMIN — LOSARTAN POTASSIUM 100 MG: 100 TABLET, FILM COATED ORAL at 08:44

## 2019-06-02 RX ADMIN — METOPROLOL SUCCINATE 50 MG: 50 TABLET, EXTENDED RELEASE ORAL at 08:43

## 2019-06-02 RX ADMIN — THERA TABS 1 TABLET: TAB at 08:43

## 2019-06-02 RX ADMIN — CALCIUM 500 MG: 500 TABLET ORAL at 08:43

## 2019-06-02 RX ADMIN — HALOPERIDOL 15 MG: 10 TABLET ORAL at 21:25

## 2019-06-02 RX ADMIN — TRIHEXYPHENIDYL HYDROCHLORIDE 5 MG: 5 TABLET ORAL at 08:43

## 2019-06-02 RX ADMIN — CALCIUM 500 MG: 500 TABLET ORAL at 21:24

## 2019-06-02 ASSESSMENT — ACTIVITIES OF DAILY LIVING (ADL)
ORAL_HYGIENE: INDEPENDENT
LAUNDRY: WITH SUPERVISION
HYGIENE/GROOMING: INDEPENDENT
DRESS: INDEPENDENT
HYGIENE/GROOMING: INDEPENDENT
DRESS: SCRUBS (BEHAVIORAL HEALTH);INDEPENDENT
ORAL_HYGIENE: INDEPENDENT

## 2019-06-02 ASSESSMENT — MIFFLIN-ST. JEOR: SCORE: 1255.4

## 2019-06-02 NOTE — PLAN OF CARE
"  Problem: Behavior Regulation Impairment (Disruptive Behavior)  Goal: Improved Impulse and Aggression Control (Disruptive Behavior)  Outcome: Improving   Nursing assessment    Pt observed to not have impulse or aggressive behavior. Pt was med compliant and ate 100% of her meals.  Pt has flat, blunted affect, but did smile when checking in with writer. Pt currently denies SI/SIB/hallucinations/anxiety/depression.  However, pt disclosed that her  is not supportive and \"threw away the love.\" Pt looked sad when talking about her marriage. Pt reports her children are supportive and she plans to move to [place] when she discharges.      Pt reports her medications are effective and she has no adverse side effects. States she no longer feels dizzy. Pt is happy that her blood pressure is within normal range.    Pt is quiet and keeps to self on the unit. Stated she will join groups.    VSS     06/02/19 0838   Vital Signs   Temp 96.9  F (36.1  C)   Temp src Tympanic   Resp 16   Pulse 66   Pulse/Heart Rate Source Monitor   /65   BP Location Right arm   Sitting Orthostatic BP   Sitting Orthostatic /65   Sitting Orthostatic Pulse 66 bpm   Standing Orthostatic BP   Standing Orthostatic /68   Oxygen Therapy   SpO2 98 %   O2 Device None (Room air)   Pain/Comfort   Patient Currently in Pain denies         "

## 2019-06-03 PROCEDURE — 90853 GROUP PSYCHOTHERAPY: CPT

## 2019-06-03 PROCEDURE — 25000132 ZZH RX MED GY IP 250 OP 250 PS 637: Performed by: PSYCHIATRY & NEUROLOGY

## 2019-06-03 PROCEDURE — 25000132 ZZH RX MED GY IP 250 OP 250 PS 637: Performed by: STUDENT IN AN ORGANIZED HEALTH CARE EDUCATION/TRAINING PROGRAM

## 2019-06-03 PROCEDURE — G0177 OPPS/PHP; TRAIN & EDUC SERV: HCPCS

## 2019-06-03 PROCEDURE — 25000132 ZZH RX MED GY IP 250 OP 250 PS 637: Performed by: NURSE PRACTITIONER

## 2019-06-03 PROCEDURE — 12400002 ZZH R&B MH SENIOR/ADOLESCENT

## 2019-06-03 PROCEDURE — 99232 SBSQ HOSP IP/OBS MODERATE 35: CPT | Performed by: NURSE PRACTITIONER

## 2019-06-03 RX ADMIN — CALCIUM 500 MG: 500 TABLET ORAL at 08:32

## 2019-06-03 RX ADMIN — ATORVASTATIN CALCIUM 40 MG: 40 TABLET, FILM COATED ORAL at 21:37

## 2019-06-03 RX ADMIN — THERA TABS 1 TABLET: TAB at 08:32

## 2019-06-03 RX ADMIN — TRIHEXYPHENIDYL HYDROCHLORIDE 5 MG: 5 TABLET ORAL at 21:37

## 2019-06-03 RX ADMIN — CALCIUM 500 MG: 500 TABLET ORAL at 21:37

## 2019-06-03 RX ADMIN — CHLORPROMAZINE HYDROCHLORIDE 150 MG: 100 TABLET, FILM COATED ORAL at 21:37

## 2019-06-03 RX ADMIN — HALOPERIDOL 15 MG: 10 TABLET ORAL at 21:37

## 2019-06-03 RX ADMIN — TRIHEXYPHENIDYL HYDROCHLORIDE 5 MG: 5 TABLET ORAL at 08:32

## 2019-06-03 RX ADMIN — LOSARTAN POTASSIUM 100 MG: 100 TABLET, FILM COATED ORAL at 08:32

## 2019-06-03 RX ADMIN — METOPROLOL SUCCINATE 50 MG: 50 TABLET, EXTENDED RELEASE ORAL at 08:32

## 2019-06-03 ASSESSMENT — ACTIVITIES OF DAILY LIVING (ADL)
LAUNDRY: UNABLE TO COMPLETE
HYGIENE/GROOMING: INDEPENDENT
ORAL_HYGIENE: INDEPENDENT
DRESS: SCRUBS (BEHAVIORAL HEALTH);INDEPENDENT
LAUNDRY: UNABLE TO COMPLETE
HYGIENE/GROOMING: INDEPENDENT
DRESS: SCRUBS (BEHAVIORAL HEALTH)
ORAL_HYGIENE: INDEPENDENT

## 2019-06-03 NOTE — PROGRESS NOTES
06/03/19 1407   Behavioral [facility]   Hallucinations denies / not responding to hallucinations   Thinking poor concentration;distractable;confused   Orientation person: oriented;place: oriented   Memory baseline memory   Insight poor   Judgement impaired   Eye Contact at examiner   Affect blunted, flat   Mood mood is calm   Physical Appearance/Attire attire appropriate to age and situation   Hygiene well groomed   Suicidality other (see comments)  (denied)   1. Wish to be Dead No   2. Non-Specific Active Suicidal Thoughts  No   Speech coherent;rambling   Psychomotor / Gait balanced;steady   Psycho Education   Type of Intervention structured groups   Response participates with encouragement   Hours 0.5   Activities of Daily Living   Hygiene/Grooming independent   Oral Hygiene independent   Dress scrubs (behavioral health);independent   Laundry unable to complete   Room Organization independent   Activity   Activity Assistance Provided independent     Pt denied SI/SIB/HI. Pt attended some groups and both meals. Pt is withdraw. Pt is hard to understand at times due to rambling. Pt was pleasant and cooperative. Pt is wanting to go home but has not heard anything about her discharge yet. No concerns or issues to report otherwise.

## 2019-06-03 NOTE — PROGRESS NOTES
"[facility]   Psychiatric Progress Note        Interim History:   From H&P:  Michelle Pino is a 58-year-old female admitted to [facility] on 4/26/2019.  She was admitted on a 72-hour hold through [facility] ER, brought in via EMS after her  made multiple calls to the police due to the patient's bizarre behaviors, level of agitation and threats to kill him.  She informed her  that she believed he had killed her parents and her sister and intended to kill her as well.  She had been leaving the stove on for hours.  When the police arrived, she screamed and threatened to kill them.  She was handcuffed for transport.   She was most recently hospitalized at [facility] for about 6 weeks from December 2018 through January 2019 in the context of medication noncompliance and was committed and Jarvised.  She had not been taking her medications x 2 weeks prior to her present admission and informed ER staff that \"my doctor said I am only to take water\" and that \"God told me to stop taking all of my medication.\"  Thus far on the unit she has been refusing all medications.  She was guarded and participated minimally in the admission assessment.    The patient's care was discussed with the treatment team during the daily team meeting and/or staff's chart notes were reviewed.  Staff report patient has been calm, pleasant, cooperative. Patient is attending groups and participating appropriately. Patient is eating well and taking medications as prescribed. Denies MH symptoms. Slept all night.     Met with patient. She is guarded. Does not want to respond to questions about her family or place of origin. Worries that she has seen too many providers since admission. Denies any mental health symptoms. Eating and sleeping well. Discussed discharge. She is planning to go to [place] and live with her son.          Medications:       atorvastatin  40 mg Oral QPM     calcium " carbonate (OS-ALEK) 500 mg (elemental) tablet  500 mg Oral BID w/meals     chlorproMAZINE  150 mg Oral At Bedtime     cholecalciferol  50,000 Units Oral Q7 Days     haloperidol  15 mg Oral At Bedtime    Or     haloperidol lactate  15 mg Intramuscular At Bedtime     haloperidol decanoate  100 mg Intramuscular Q30 Days     losartan  100 mg Oral Daily     metoprolol succinate ER  50 mg Oral Daily     multivitamin, therapeutic  1 tablet Oral Daily     trihexyphenidyl  5 mg Oral BID          Allergies:     Allergies   Allergen Reactions     Lisinopril Cough          Labs:     No results found for this or any previous visit (from the past 672 hour(s)).         Psychiatric Examination:   Temp: 97.9  F (36.6  C) Temp src: Tympanic BP: 132/72 Pulse: 74   Resp: 16 SpO2: 97 % O2 Device: None (Room air)    Weight is 153 lbs 8 oz  Body mass index is 27.19 kg/m .    Appearance: adequately groomed, awake, alert, cooperative, mild distress and normal weight  Attitude:  cooperative and guarded  Eye Contact:  good  Mood:  good  Affect:  appropriate and in normal range  Speech:  clear, coherent  Psychomotor Behavior:  no evidence of tardive dyskinesia, dystonia, or tics  Throught Process:  logical and goal oriented  Associations:  no loose associations  Thought Content:  no evidence of suicidal ideation or homicidal ideation  Insight:  good  Judgement:  intact  Oriented to:  time, person, and place  Attention Span and Concentration:  intact  Recent and Remote Memory:  intact         Precautions:     Behavioral Orders   Procedures     Assault precautions     Code 1 - Restrict to Unit     Elopement precautions     Routine Programming     As clinically indicated     Status 15     Every 15 minutes.          DIagnoses:   Schizophrenia  Hypertension  Hyperlipidemia  CVA 2017  Right tibial plateau fracture s/p ORIF 1/8/2019         Plan:   --Thorazine 150 mg, at bedtime  --Haldol 15 mg, at bedtime  --Haldol Decanoate, injection, 100 mg, q30  days. Last 5/10/19  --Artane 5 mg, bid  --PRN: Benadryl, Hydroxyzine, Zyprexa, Restoril     Disposition Plan   Reason for ongoing admission: is unable to care for self due to paranoid schizophrenia and noncompliance with medications.   Disposition: TBD  Estimated length of stay: TBD  Legal Status:  Committed and Jarvicesd.   Discharge will be granted once the psychotic and mood symptoms improved.    Riley MONDRAGON, CNP  Date: 06/03/19  Time: 2:11 PM

## 2019-06-03 NOTE — PROGRESS NOTES
Spent more time out of her room after dinner. Denies depression. Denies SI /wish to die. Pleasant with writer. Med compliant.

## 2019-06-03 NOTE — PLAN OF CARE
"Problem: OT General Care Plan  Goal: OT Goal 1  Work with pt to increase awareness of how symptoms can impact performance on goal-directed tasks and life management skills. Will provide opportunities to build on coping strategies to manage symptoms during hospitalization and after d/c.      Pt attended 1 out of 2 OT groups offered. Pt actively participated in a structured occupational therapy group with a focus on self-awareness and social engagement. Pt appeared comfortable sharing positive personal information, and was respectful in listening and responding to peers. Pt identified her son as an important part of her support system, and smiled while explaining \"he's my best friend.\" She shared that \"cooking\" is a hobby and strength of hers. Attentive and engaged for the full duration of group. Pleasant and appeared to brighten in interactions. Pt was given and completed a written self-assessment form. OT staff reviewed with pt and explained the value of having them involved in their treatment plan, and provided options to meet current needs/self-identified goals. Pt stated reason for admission was \"I am in the hospital to improve my health.\" Selected goals including improve self-esteem, improve time management, improve problem solving and independence, and express needs more assertively.     PLAN: Will provide structure, support, and encouragement. Offer education on coping strategies and life management skills. Assist pt to increase self awareness regarding mental health issues and expand ideas. Will assess further in the areas of organization, problem solving, and concentration.            "

## 2019-06-03 NOTE — PROGRESS NOTES
Care Conference with ASHVIN Goins, her colleague Adelia, [facility] and patient to discuss discharge plans. Patient reported that she can go to her son's home In [place] temporarily while her family looks for housing in [place]. Patient signed an JESSICA for her son and writer called to verify. He verified that patient can stay at his home and a family friend Yosi will pick her up for the hospital to transport her to the airport. He plans to purchase a ticket for her arrival on Wednesday evening when he is back in town. Minna and Adelia were in agreement with the plan as long as their supervisor approves. All were in agreement that there are no other options due to lack of funding. Her  makes too much for her to qualify for MA.    Phone call with patient's daughter Laquita to discuss discharge plans. She reported that the family wants to pay for a senior apartment for patient. Writer referred her to [facility] ot help them secure housing.

## 2019-06-04 PROCEDURE — 12400002 ZZH R&B MH SENIOR/ADOLESCENT

## 2019-06-04 PROCEDURE — 25000132 ZZH RX MED GY IP 250 OP 250 PS 637: Performed by: STUDENT IN AN ORGANIZED HEALTH CARE EDUCATION/TRAINING PROGRAM

## 2019-06-04 PROCEDURE — 25000132 ZZH RX MED GY IP 250 OP 250 PS 637: Performed by: NURSE PRACTITIONER

## 2019-06-04 PROCEDURE — 99232 SBSQ HOSP IP/OBS MODERATE 35: CPT | Performed by: NURSE PRACTITIONER

## 2019-06-04 PROCEDURE — 25000132 ZZH RX MED GY IP 250 OP 250 PS 637: Performed by: PSYCHIATRY & NEUROLOGY

## 2019-06-04 PROCEDURE — G0177 OPPS/PHP; TRAIN & EDUC SERV: HCPCS

## 2019-06-04 RX ORDER — LOSARTAN POTASSIUM 100 MG/1
100 TABLET ORAL DAILY
Qty: 30 TABLET | Refills: 0 | Status: ON HOLD | OUTPATIENT
Start: 2019-06-04 | End: 2019-10-23

## 2019-06-04 RX ORDER — HALOPERIDOL DECANOATE 100 MG/ML
100 INJECTION INTRAMUSCULAR
Status: DISCONTINUED | OUTPATIENT
Start: 2019-06-05 | End: 2019-06-05 | Stop reason: HOSPADM

## 2019-06-04 RX ORDER — HALOPERIDOL DECANOATE 100 MG/ML
100 INJECTION INTRAMUSCULAR
Qty: 1 ML | Refills: 11 | Status: ON HOLD
Start: 2019-06-05 | End: 2019-10-23

## 2019-06-04 RX ORDER — MULTIVITAMIN,THERAPEUTIC
1 TABLET ORAL DAILY
Qty: 30 TABLET | Refills: 0 | Status: ON HOLD | OUTPATIENT
Start: 2019-06-04 | End: 2019-10-23

## 2019-06-04 RX ORDER — HALOPERIDOL 5 MG/1
15 TABLET ORAL AT BEDTIME
Qty: 15 TABLET | Refills: 0 | Status: ON HOLD | OUTPATIENT
Start: 2019-06-04 | End: 2019-10-23

## 2019-06-04 RX ORDER — CALCIUM CARBONATE 500(1250)
500 TABLET ORAL 2 TIMES DAILY WITH MEALS
Qty: 60 TABLET | Refills: 0 | Status: ON HOLD | OUTPATIENT
Start: 2019-06-04 | End: 2019-10-23

## 2019-06-04 RX ORDER — TRIHEXYPHENIDYL HYDROCHLORIDE 5 MG/1
5 TABLET ORAL 2 TIMES DAILY
Qty: 60 TABLET | Refills: 0 | Status: ON HOLD | OUTPATIENT
Start: 2019-06-04 | End: 2019-10-23

## 2019-06-04 RX ORDER — CHLORPROMAZINE HYDROCHLORIDE 50 MG/1
150 TABLET, FILM COATED ORAL AT BEDTIME
Qty: 90 TABLET | Refills: 0 | Status: ON HOLD | OUTPATIENT
Start: 2019-06-04 | End: 2019-10-23

## 2019-06-04 RX ORDER — ATORVASTATIN CALCIUM 40 MG/1
40 TABLET, FILM COATED ORAL EVERY EVENING
Qty: 30 TABLET | Refills: 0 | Status: ON HOLD | OUTPATIENT
Start: 2019-06-04 | End: 2019-10-23

## 2019-06-04 RX ORDER — FLUORIDE TOOTHPASTE
15 TOOTHPASTE DENTAL 4 TIMES DAILY PRN
Qty: 1 BOTTLE | Refills: 0 | Status: ON HOLD | OUTPATIENT
Start: 2019-06-04 | End: 2019-10-23

## 2019-06-04 RX ORDER — METOPROLOL SUCCINATE 50 MG/1
50 TABLET, EXTENDED RELEASE ORAL DAILY
Qty: 30 TABLET | Refills: 0 | Status: ON HOLD | OUTPATIENT
Start: 2019-06-04 | End: 2019-10-23

## 2019-06-04 RX ADMIN — HALOPERIDOL 15 MG: 10 TABLET ORAL at 21:46

## 2019-06-04 RX ADMIN — TRIHEXYPHENIDYL HYDROCHLORIDE 5 MG: 5 TABLET ORAL at 08:21

## 2019-06-04 RX ADMIN — CALCIUM 500 MG: 500 TABLET ORAL at 21:47

## 2019-06-04 RX ADMIN — LOSARTAN POTASSIUM 100 MG: 100 TABLET, FILM COATED ORAL at 08:20

## 2019-06-04 RX ADMIN — ATORVASTATIN CALCIUM 40 MG: 40 TABLET, FILM COATED ORAL at 21:47

## 2019-06-04 RX ADMIN — CALCIUM 500 MG: 500 TABLET ORAL at 08:20

## 2019-06-04 RX ADMIN — METOPROLOL SUCCINATE 50 MG: 50 TABLET, EXTENDED RELEASE ORAL at 08:21

## 2019-06-04 RX ADMIN — CHLORPROMAZINE HYDROCHLORIDE 150 MG: 100 TABLET, FILM COATED ORAL at 21:47

## 2019-06-04 RX ADMIN — TRIHEXYPHENIDYL HYDROCHLORIDE 5 MG: 5 TABLET ORAL at 21:47

## 2019-06-04 RX ADMIN — THERA TABS 1 TABLET: TAB at 08:21

## 2019-06-04 ASSESSMENT — ACTIVITIES OF DAILY LIVING (ADL)
HYGIENE/GROOMING: PROMPTS
ORAL_HYGIENE: INDEPENDENT
LAUNDRY: UNABLE TO COMPLETE
DRESS: SCRUBS (BEHAVIORAL HEALTH)
ORAL_HYGIENE: INDEPENDENT
HYGIENE/GROOMING: INDEPENDENT
DRESS: INDEPENDENT

## 2019-06-04 ASSESSMENT — MIFFLIN-ST. JEOR: SCORE: 1265.38

## 2019-06-04 NOTE — PLAN OF CARE
Problem: OT General Care Plan  Goal: OT Goal 1  Work with pt to increase awareness of how symptoms can impact performance on goal-directed tasks and life management skills. Will provide opportunities to build on coping strategies to manage symptoms during hospitalization and after d/c.      Pt attended 1 out of 2 OT groups offered. Pt actively participated in occupational therapy clinic. Pt was able to ask for assistance as needed, and returned to a creative expression task with assistance in task set-up. Pt demonstrated good focus and planning. Pt mostly kept to herself throughout this group, though accepted compliments from peers regarding her project. Calm, pleasant, and cooperative throughout this group. Appeared to brighten on approach.

## 2019-06-04 NOTE — PLAN OF CARE
Patient pleasant and cooperative, visible in milieu.  Flat affect, denies SI/SIB, depression/anxiety.  Good appetite at breakfast and lunch.  Took scheduled medications with no problem.  Patient looking forward to discharge tomorrow, Wednesday.  No aggressive behavior noted.  Will continue to monitor closely.

## 2019-06-04 NOTE — PROGRESS NOTES
"   06/03/19 1900   Group Therapy Session   Group Attendance attended group session   Total Time (minutes) 50   Group Type psychotherapeutic   Group Topic Covered emotions/expression   Patient Participation/Contribution cooperative with task;other (see comments)  (withdrawn)   Patient participated in psychotherapy group which focused on feelings expression in-the-moment through drawing and then processing her \"feelings heart.\" Michelle seemed somewhat confused about the activity but began to understand as others shared their pictures. She presented as depressed, withdrawn, affect flat.    "

## 2019-06-04 NOTE — DISCHARGE INSTRUCTIONS
Behavioral Discharge Planning and Instructions      Summary:  You were admitted on 4/27/2019  due to Schizophrenia.  You were treated by Dr. Ida Zaman MD and Riley MONDRAGON DNP and discharged on 6/5/2019 from [facility] [address] to your son's home:   [address], [place] [address]  Phone: [phone]    Principal Diagnosis:   Schizophrenia      Health Care Follow-up Appointments:     : Minna Goins with [facility], [phone], Fax: [phone].     No appointments were scheduled due to patient discharging to son's home in [place].  Resource for Senior Housing: [facility],  [phone]    Attend all scheduled appointments with your outpatient providers. Call at least 24 hours in advance if you need to reschedule an appointment to ensure continued access to your outpatient providers.   Major Treatments, Procedures and Findings:  You were provided with: a psychiatric assessment, assessed for medical stability, medication evaluation and/or management, group therapy and milieu management    Symptoms to Report: feeling more aggressive, increased confusion, losing more sleep, mood getting worse or thoughts of suicide    Early warning signs can include: increased depression or anxiety sleep disturbances increased thoughts or behaviors of suicide or self-harm  increased unusual thinking, such as paranoia or hearing voices    Safety and Wellness:  Take all medicines as directed.  Make no changes unless your doctor suggests them.      Follow treatment recommendations.  Refrain from alcohol and non-prescribed drugs.  If there is a concern for safety, call 911.    Resources:   Crisis Intervention: 333.968.5938 or 426-965-0697 (TTY: 837.688.8774).  Call anytime for help.  National [employer] on Mental Illness (www.mn.trevor.org): 423.951.6016 or 228-042-2025.  [place] Association for Children's Mental Health (www.macmh.org): 352.839.5946.  Alcoholics Anonymous (www.alcoholics-anonymous.org):  "Check your phone book for your local chapter.  Suicide Awareness Voices of Education (SAVE) (www.save.org): 386-578-KWDA (4232)  National Suicide Prevention Line (www.mentalhealthmn.org): 898-585-SEMM (0319)  Mental Health Consumer/Survivor Network of [place] (www.mhcsn.net): 777.400.6318 or 283-680-7588  Mental Health Association of [place] (www.mentalhealth.org): 181.817.3801 or 248-370-7660  [facility] Crisis (COPE) Response - Adult 941 708-2992  [facility] Crisis Response - Adult 790 306-1599  [facility] Rapid Response 688-475-5583  Text 4 Life: txt \"LIFE\" to 77066 for immediate support and crisis intervention  Crisis text line: Text \"[place]\" to 958675. Free, confidential, 24/7.  Crisis Intervention: 470.558.1544 or 224-930-7960. Call anytime for help.   [facility] Crisis Team - Child: [phone]  [facility] Mental Health Crisis Response Team - Child: [phone]      The treatment team has appreciated the opportunity to work with you.     If you have any questions or concerns our unit number is [phone].      "

## 2019-06-04 NOTE — PROGRESS NOTES
Patient reported to writer that her son scheduled a flight to [place] tomorrow around 7:30 pm and her friend Yosi will pick her up at 1:00 pm so she can pack her belongings before heading to the airport. Writer left voice message with patient's son Alisia ([phone]) requesting a call back to confirm the plan.    Writer called [facility] to coordinate the provisional discharge. Missy spoke to Vinny Solorzano RN and explained situation. Vinny requested PD be emailed to Marcie Dixon for viewing and recommendations on the wording. Marcie approved the wording of the PD.

## 2019-06-04 NOTE — PLAN OF CARE
Problem: Psychotic Symptoms  Goal: Psychotic Symptoms  Description  Signs and symptoms of listed problems will be absent or manageable.  Outcome: Improving  Note:   No delusional statements observed. Denies having hallucinations.     Problem: Emotion/Temperament Impairment (Disruptive Behavior)  Goal: Improved Emotion/Temperament/Mood Symptoms (Disruptive Behavior)  Outcome: Improving  Note:   Patient appears to be flat and neutral/somewhat depressed, however smiles in interactions with writer. She denies feeling anxious or depressed, and denies having suicidal ideation. She speaks softly, and spends time sitting in the lounge by herself. She shares with writer that Yosi is able to pick her up from the hospital at 1pm on Wednesday, she is to be at the airport at 4pm, and her flight leaves to [place] at 7:36pm. She wanted me to share this with the provider and the CTC. When asked the last time she saw her son, she said it was in December and smiles while sharing that she is excited to see him.

## 2019-06-04 NOTE — PROGRESS NOTES
"[facility]   Psychiatric Progress Note        Interim History:   From H&P:  Michelle Pino is a 58-year-old female admitted to [facility] on 4/26/2019.  She was admitted on a 72-hour hold through [facility] ER, brought in via EMS after her  made multiple calls to the police due to the patient's bizarre behaviors, level of agitation and threats to kill him.  She informed her  that she believed he had killed her parents and her sister and intended to kill her as well.  She had been leaving the stove on for hours.  When the police arrived, she screamed and threatened to kill them.  She was handcuffed for transport.   She was most recently hospitalized at [facility] for about 6 weeks from December 2018 through January 2019 in the context of medication noncompliance and was committed and Jar[place].  She had not been taking her medications x 2 weeks prior to her present admission and informed ER staff that \"my doctor said I am only to take water\" and that \"God told me to stop taking all of my medication.\"  Thus far on the unit she has been refusing all medications.  She was guarded and participated minimally in the admission assessment.    The patient's care was discussed with the treatment team during the daily team meeting and/or staff's chart notes were reviewed.  Staff report patient has been calm, pleasant, cooperative. Patient is attending groups and participating appropriately. Patient is eating well and taking medications as prescribed. Denies MH symptoms. Slept all night.     Met with patient. Doing well. Smiling. Still guarded. Provides minimal or no responses to questions. Feels good and ready to discharge. The patient is aware that she will have Haldol Decanoate injection tomorrow and discharge in the afternoon.          Medications:       atorvastatin  40 mg Oral QPM     calcium carbonate (OS-ALEK) 500 mg (elemental) tablet  500 mg Oral BID " w/meals     chlorproMAZINE  150 mg Oral At Bedtime     cholecalciferol  50,000 Units Oral Q7 Days     haloperidol  15 mg Oral At Bedtime    Or     haloperidol lactate  15 mg Intramuscular At Bedtime     haloperidol decanoate  100 mg Intramuscular Q30 Days     losartan  100 mg Oral Daily     metoprolol succinate ER  50 mg Oral Daily     multivitamin, therapeutic  1 tablet Oral Daily     trihexyphenidyl  5 mg Oral BID          Allergies:     Allergies   Allergen Reactions     Lisinopril Cough          Labs:     No results found for this or any previous visit (from the past 672 hour(s)).         Psychiatric Examination:   Temp: 97.1  F (36.2  C) Temp src: Tympanic BP: 138/74 Pulse: 69   Resp: 16 SpO2: 100 % O2 Device: None (Room air)    Weight is 153 lbs 8 oz  Body mass index is 27.19 kg/m .    Appearance: adequately groomed, awake, alert, cooperative, mild distress and normal weight  Attitude:  cooperative and guarded  Eye Contact:  good  Mood:  good  Affect:  appropriate and in normal range  Speech:  clear, coherent  Psychomotor Behavior:  no evidence of tardive dyskinesia, dystonia, or tics  Throught Process:  logical and goal oriented  Associations:  no loose associations  Thought Content:  no evidence of suicidal ideation or homicidal ideation  Insight:  good  Judgement:  intact  Oriented to:  time, person, and place  Attention Span and Concentration:  intact  Recent and Remote Memory:  intact         Precautions:     Behavioral Orders   Procedures     Assault precautions     Code 1 - Restrict to Unit     Elopement precautions     Routine Programming     As clinically indicated     Status 15     Every 15 minutes.          DIagnoses:   Schizophrenia  Hypertension  Hyperlipidemia  CVA 2017  Right tibial plateau fracture s/p ORIF 1/8/2019         Plan:   --Thorazine 150 mg, at bedtime  --Haldol 15 mg, at bedtime  --Haldol Decanoate, injection, 100 mg, q30 days. Will have an injection on 6/5/19 before discharge.    --Artane 5 mg, bid  --PRN: Benadryl, Hydroxyzine, Zyprexa, Restoril     Disposition Plan   Reason for ongoing admission: is unable to care for self due to paranoid schizophrenia and noncompliance with medications.   Disposition: TBD  Estimated length of stay: TBD  Legal Status:  Committed and Jarvicesd.   Discharge will be granted once the psychotic and mood symptoms improved.      Riley MONDRAGON CNP  Date: 06/04/19  Time: 1:51 PM

## 2019-06-05 VITALS
TEMPERATURE: 98.1 F | DIASTOLIC BLOOD PRESSURE: 71 MMHG | WEIGHT: 155.7 LBS | HEART RATE: 70 BPM | SYSTOLIC BLOOD PRESSURE: 131 MMHG | BODY MASS INDEX: 27.59 KG/M2 | OXYGEN SATURATION: 100 % | HEIGHT: 63 IN | RESPIRATION RATE: 16 BRPM

## 2019-06-05 PROCEDURE — 25000132 ZZH RX MED GY IP 250 OP 250 PS 637: Performed by: NURSE PRACTITIONER

## 2019-06-05 PROCEDURE — 25000128 H RX IP 250 OP 636: Performed by: NURSE PRACTITIONER

## 2019-06-05 PROCEDURE — G0177 OPPS/PHP; TRAIN & EDUC SERV: HCPCS

## 2019-06-05 PROCEDURE — 99238 HOSP IP/OBS DSCHRG MGMT 30/<: CPT | Performed by: NURSE PRACTITIONER

## 2019-06-05 PROCEDURE — 25000132 ZZH RX MED GY IP 250 OP 250 PS 637: Performed by: STUDENT IN AN ORGANIZED HEALTH CARE EDUCATION/TRAINING PROGRAM

## 2019-06-05 RX ADMIN — THERA TABS 1 TABLET: TAB at 08:41

## 2019-06-05 RX ADMIN — METOPROLOL SUCCINATE 50 MG: 50 TABLET, EXTENDED RELEASE ORAL at 08:41

## 2019-06-05 RX ADMIN — LOSARTAN POTASSIUM 100 MG: 100 TABLET, FILM COATED ORAL at 08:41

## 2019-06-05 RX ADMIN — CALCIUM 500 MG: 500 TABLET ORAL at 08:41

## 2019-06-05 RX ADMIN — HALOPERIDOL DECANOATE 100 MG: 100 INJECTION INTRAMUSCULAR at 08:44

## 2019-06-05 RX ADMIN — TRIHEXYPHENIDYL HYDROCHLORIDE 5 MG: 5 TABLET ORAL at 08:41

## 2019-06-05 ASSESSMENT — ACTIVITIES OF DAILY LIVING (ADL)
DRESS: INDEPENDENT
ORAL_HYGIENE: INDEPENDENT
HYGIENE/GROOMING: INDEPENDENT

## 2019-06-05 NOTE — DISCHARGE SUMMARY
"Psychiatric Discharge Summary    Michelle Pino MRN# [id number]   Age: 58 year old YOB: 1960     Date of Admission:  4/27/2019  Date of Discharge:  6/5/2019  1:16 PM  Admitting Provider:  Miguel Sotomayor MD  Discharge Provider:  ALANNA Sol CNP         Event Leading to Hospitalization:   Michelle Pino is a 58-year-old female admitted to [address] on 4/26/2019.  She was admitted on a 72-hour hold through [facility] ER, brought in via EMS after her  made multiple calls to the police due to the patient's bizarre behaviors, level of agitation and threats to kill him.  She informed her  that she believed he had killed her parents and her sister and intended to kill her as well.  She had been leaving the stove on for hours.  When the police arrived, she screamed and threatened to kill them.  She was handcuffed for transport.   She was most recently hospitalized at [facility] for about 6 weeks from December 2018 through January 2019 in the context of medication noncompliance and was committed and Jarvised.  She had not been taking her medications x 2 weeks prior to her present admission and informed ER staff that \"my doctor said I am only to take water\" and that \"God told me to stop taking all of my medication.\"  Thus far on the unit she has been refusing all medications.  She was guarded and participated minimally in the admission assessment.Her  reports she has been sleeping poorly, but she states her sleep is adequate.  She has paranoid thoughts that medications are poison.  She also has paranoid thoughts regarding her .  She denies hallucinations.  She said her mood is \"okay.\"  She says she has been eating well.  She denies suicidal and homicidal ideation.  No further information was able to be obtained due to her level of cooperation.        See Admission note by Erin Jack APRN CNP, on 4/27/19 for additional details.          DIagnoses: "   Schizophrenia  Hypertension  Hyperlipidemia  CVA 2017  Right tibial plateau fracture s/p ORIF 1/8/2019           Labs:     Recent Results (from the past 1008 hour(s))   CBC with platelets differential    Collection Time: 04/29/19  7:48 AM   Result Value Ref Range    WBC 5.5 4.0 - 11.0 10e9/L    RBC Count 5.01 3.8 - 5.2 10e12/L    Hemoglobin 15.2 11.7 - 15.7 g/dL    Hematocrit 44.9 35.0 - 47.0 %    MCV 90 78 - 100 fl    MCH 30.3 26.5 - 33.0 pg    MCHC 33.9 31.5 - 36.5 g/dL    RDW 13.5 10.0 - 15.0 %    Platelet Count 190 150 - 450 10e9/L    Diff Method Automated Method     % Neutrophils 48.3 %    % Lymphocytes 42.0 %    % Monocytes 7.7 %    % Eosinophils 1.6 %    % Basophils 0.2 %    % Immature Granulocytes 0.2 %    Nucleated RBCs 0 0 /100    Absolute Neutrophil 2.6 1.6 - 8.3 10e9/L    Absolute Lymphocytes 2.3 0.8 - 5.3 10e9/L    Absolute Monocytes 0.4 0.0 - 1.3 10e9/L    Absolute Eosinophils 0.1 0.0 - 0.7 10e9/L    Absolute Basophils 0.0 0.0 - 0.2 10e9/L    Abs Immature Granulocytes 0.0 0 - 0.4 10e9/L    Absolute Nucleated RBC 0.0    Comprehensive metabolic panel    Collection Time: 04/29/19  7:48 AM   Result Value Ref Range    Sodium 141 133 - 144 mmol/L    Potassium 3.4 3.4 - 5.3 mmol/L    Chloride 105 94 - 109 mmol/L    Carbon Dioxide 27 20 - 32 mmol/L    Anion Gap 9 3 - 14 mmol/L    Glucose 90 70 - 99 mg/dL    Urea Nitrogen 15 7 - 30 mg/dL    Creatinine 1.10 (H) 0.52 - 1.04 mg/dL    GFR Estimate 56 (L) >60 mL/min/[1.73_m2]    GFR Estimate If Black 65 >60 mL/min/[1.73_m2]    Calcium 8.8 8.5 - 10.1 mg/dL    Bilirubin Total 0.7 0.2 - 1.3 mg/dL    Albumin 3.7 3.4 - 5.0 g/dL    Protein Total 7.5 6.8 - 8.8 g/dL    Alkaline Phosphatase 92 40 - 150 U/L    ALT 31 0 - 50 U/L    AST 19 0 - 45 U/L   Lipid panel    Collection Time: 04/29/19  7:48 AM   Result Value Ref Range    Cholesterol 207 (H) <200 mg/dL    Triglycerides 98 <150 mg/dL    HDL Cholesterol 64 >49 mg/dL    LDL Cholesterol Calculated 123 (H) <100 mg/dL    Non  HDL Cholesterol 143 (H) <130 mg/dL   TSH with free T4 reflex and/or T3 as indicated    Collection Time: 04/29/19  7:48 AM   Result Value Ref Range    TSH 0.67 0.40 - 4.00 mU/L   UA with Microscopic    Collection Time: 05/02/19  7:15 AM   Result Value Ref Range    Color Urine Light Yellow     Appearance Urine Slightly Cloudy     Glucose Urine Negative NEG^Negative mg/dL    Bilirubin Urine Negative NEG^Negative    Ketones Urine Negative NEG^Negative mg/dL    Specific Gravity Urine 1.010 1.003 - 1.035    Blood Urine Negative NEG^Negative    pH Urine 7.5 (H) 5.0 - 7.0 pH    Protein Albumin Urine Negative NEG^Negative mg/dL    Urobilinogen mg/dL Normal 0.0 - 2.0 mg/dL    Nitrite Urine Negative NEG^Negative    Leukocyte Esterase Urine Negative NEG^Negative    Source Midstream Urine     WBC Urine 1 0 - 5 /HPF    RBC Urine <1 0 - 2 /HPF    Squamous Epithelial /HPF Urine <1 0 - 1 /HPF    Transitional Epi <1 0 - 1 /HPF    Amorphous Crystals Few (A) NEG^Negative /HPF              Consults:   Consultation during this admission received from internal medicine.  No medical intervention was indicated.           Hospital Course:   Michelle Pino was initially admitted to station 12 and later transferred to [address] [place] [address].  She has been committed and [place] in early January.  Her provisional discharge was revoked.  The patient is still under commitment until the end of June.  The patient was placed under status 15 (15 minute checks) to ensure patient safety.     The following medication changes took place:   --Thorazine 150 mg, at bedtime  --Haldol 15 mg, at bedtime  --Haldol Decanoate, injection, 100 mg, q30 days. Will have an injection on 6/5/19 before discharge.   --Artane 5 mg, bid  --PRN: Benadryl, Hydroxyzine, Zyprexa, Restoril  The patient tolerated medications well. Reported mood symptoms resolved.  The patient continued to be somewhat guarded.  The responses to questions were minimal.  The patient was semi-active on the unit.  She  preferred to stay in her room and reads the Bible.  She was pleasant and cooperative.  Patient was excited to be discharged.  She appears tired however, reported sleeping all night and having good energy.  She denied any mental health symptoms including auditory visual hallucinations, paranoia, delusions, suicidal and homicidal ideation.  She denies depression and anxiety. Future oriented, feeling hopeful for the future. Appetite was intact. The patient was compliant with medications and care.     Michelle Pino was released to the care of her friend who will drive her to the airport.  To the patient will stay with her son in [place] for about 3 weeks.  Patient reports that she will be back to [place] within a month.  She will follow-up with her psychiatrist, Dr. Zabala.  The patient was sent out with a month supply of her medications including an additional dose of Haldol decanoate.  Patient received Haldol decanoate this morning.  Her next injection is due on July 5.. At the time of this encounter, Michelle Pino was determined to not be a danger to herself or others and symptoms did not meet criteria for involuntary hospitalization.      Safety plan, post discharge recommendations and relapse prevention were discussed with the patient. The patient agreed to call 911 or present to ED if symptoms worsen or developed thoughts of suicide, self harm or homicide.  The patient agreed to continue medications and outpatient care.         Discharge Medications:     Discharge Medication List as of 6/5/2019  1:10 PM      START taking these medications    Details   artificial saliva (BIOTENE DRY MOUTHWASH) LIQD liquid Swish and spit 15 mLs in mouth 4 times daily as needed for dry mouth, Disp-1 Bottle, R-0, E-Prescribe      atorvastatin (LIPITOR) 40 MG tablet Take 1 tablet (40 mg) by mouth every evening, Disp-30 tablet, R-0, E-Prescribe      calcium carbonate 500 mg, elemental, (OSCAL;OYSTER SHELL CALCIUM) 500 MG tablet Take 1  tablet (500 mg) by mouth 2 times daily (with meals), Disp-60 tablet, R-0, E-Prescribe      chlorproMAZINE (THORAZINE) 50 MG tablet Take 3 tablets (150 mg) by mouth At Bedtime, Disp-90 tablet, R-0, E-Prescribe      cholecalciferol (CHOLECALCIFEROL) 08852 units capsule Take 1 capsule (50,000 Units) by mouth every 7 days, Disp-4 capsule, R-0, E-Prescribe      haloperidol (HALDOL) 5 MG tablet Take 3 tablets (15 mg) by mouth At Bedtime, Disp-15 tablet, R-0, E-PrescribeTake 2 tab for 5 days, than 1 tab for 5 days, than stop.      haloperidol decanoate (HALDOL DECANOATE) 100 MG/ML injection Inject 100 mg into the muscle every 30 days, Disp-1 mL, R-11, No Print Out      losartan (COZAAR) 100 MG tablet Take 1 tablet (100 mg) by mouth daily, Disp-30 tablet, R-0, E-Prescribe      metoprolol succinate ER (TOPROL-XL) 50 MG 24 hr tablet Take 1 tablet (50 mg) by mouth daily, Disp-30 tablet, R-0, E-Prescribe      multivitamin, therapeutic (THERA-VIT) TABS tablet Take 1 tablet by mouth daily, Disp-30 tablet, R-0, E-Prescribe      trihexyphenidyl (ARTANE) 5 MG tablet Take 1 tablet (5 mg) by mouth 2 times daily, Disp-60 tablet, R-0, E-Prescribe                  Psychiatric and Physical Examinations:   Appearance:  adequately groomed, dressed in hospital scrubs, appeared as age stated, awake, alert, cooperative, no apparent distress and normal weight  Attitude:  cooperative  Eye Contact:  good  Mood:  good  Affect:  intensity is flat  Speech:  decreased prosody  Psychomotor Behavior:  no evidence of tardive dyskinesia, dystonia, or tics  Thought Process:  linear  Associations:  no loose associations  Thought Content:  no evidence of suicidal ideation or homicidal ideation, no auditory hallucinations present and no visual hallucinations present  Insight:  fair  Judgment:  fair  Oriented to:  time, person, and place  Attention Span and Concentration:  intact  Recent and Remote Memory:  fair  Language and Fund of Knowledge:  low-normal  Muscle Strength and Tone: normal  Gait and Station: Normal  Vitals:    06/03/19 2127 06/04/19 0820 06/04/19 1648 06/05/19 0826   BP: 138/74  131/71    BP Location: Right arm      Pulse: 69  70    Resp:   16    Temp: 97.1  F (36.2  C) 98.6  F (37  C) 98.5  F (36.9  C) 98.1  F (36.7  C)   TempSrc: Tympanic Tympanic Tympanic Tympanic   SpO2: 100% 99% 98% 100%   Weight:  70.6 kg (155 lb 11.2 oz)     Height:                Discharge Plan:     Follow up Appointment:  : Minna Calles, [phone], Fax: [phone].      No appointments were scheduled due to patient discharging to son's home in [place].  Resource for Senior Housing: [facility],  [phone]    Attestation:  The patient has been seen and evaluated by me,  Riley MONDRAGON, CNP

## 2019-06-05 NOTE — PROGRESS NOTES
Patient appeared responding but denied this later in check in. She was pleasant and appropriate on the unit this shift. Denies Si and SIB as well as depression or anxiety. Flat affect but pleasant.

## 2019-06-05 NOTE — PROGRESS NOTES
Attended 1 of 2 OT groups. She worked quietly and independently on a familiar one step task. She took time to plan colors and followed through in an organized manner with color combinations. She stated looking forward to discharge with having the opportunity to garden and cook. Affect brightened some with interactions. She asked for clarifications or for comments to be repeated more slowly when seeming needed. Answers were brief. She was pleasant with others.

## 2019-06-05 NOTE — PROGRESS NOTES
Pt is discharged. She is with a family friend who is transporting her to the airport. Pt flying to [place]. Son will pick pt up at the airport. Pt has her meds, AVS, and all belongings.

## 2019-06-05 NOTE — PROGRESS NOTES
Writer met with the patient to cover the Provisional Discharge Agreement. Patient agreed and signed the PD. A copy of the PD was faxed to ASHVIN Goins for her signature. Writer LVM with Minna to inform of fax and request she fax it back with her signature.    Writer gave copy of Provisional Discharge to patient.    Update: Phone call with ASHVIN Goins who confirmed that her supervisor approved of the plan and they are going with it. She will sign PD and fax to writer.    Phone call with patient's daughter Laquita ([phone]) to confirm the plan for patient to discharge at 1:00 with  by Yosi who will take her to airport for flight to [place]. She confirmed the plan.

## 2019-06-05 NOTE — PROGRESS NOTES
Discussed discharge plan of Pt discharging at 1300, being picked up by family friend, driven to airport where Pt will embark on airplane to [place] where her son will meet her, with supervisor Thao FERRERA,  Pili CASTILLO, and risk management [facility]. Discharge is a go as long as [place]  is on board with plan.  Pili CASTILLOreports this is so.     Pt up for breakfast this AM,med compliant including Haldol Decanoate. Pt reports being happy with discharge plan.

## 2019-09-21 ENCOUNTER — HOSPITAL ENCOUNTER (INPATIENT)
Facility: CLINIC | Age: 59
LOS: 33 days | Discharge: HOME | DRG: 885 | End: 2019-10-25
Attending: FAMILY MEDICINE | Admitting: PSYCHIATRY & NEUROLOGY
Payer: COMMERCIAL

## 2019-09-21 DIAGNOSIS — F20.9 SCHIZOPHRENIA, UNSPECIFIED TYPE (H): ICD-10-CM

## 2019-09-21 DIAGNOSIS — F20.0 SCHIZOPHRENIA, PARANOID TYPE (H): ICD-10-CM

## 2019-09-21 DIAGNOSIS — E78.5 HYPERLIPIDEMIA LDL GOAL <100: ICD-10-CM

## 2019-09-21 DIAGNOSIS — R45.1 AGITATION: ICD-10-CM

## 2019-09-21 LAB
AMPHETAMINES UR QL SCN: NEGATIVE
BARBITURATES UR QL: NEGATIVE
BENZODIAZ UR QL: NEGATIVE
CANNABINOIDS UR QL SCN: NEGATIVE
COCAINE UR QL: NEGATIVE
ETHANOL UR QL SCN: NEGATIVE
OPIATES UR QL SCN: NEGATIVE

## 2019-09-21 PROCEDURE — 25000128 H RX IP 250 OP 636: Performed by: FAMILY MEDICINE

## 2019-09-21 PROCEDURE — 96372 THER/PROPH/DIAG INJ SC/IM: CPT | Performed by: FAMILY MEDICINE

## 2019-09-21 PROCEDURE — 96374 THER/PROPH/DIAG INJ IV PUSH: CPT | Performed by: FAMILY MEDICINE

## 2019-09-21 PROCEDURE — 80307 DRUG TEST PRSMV CHEM ANLYZR: CPT | Performed by: FAMILY MEDICINE

## 2019-09-21 PROCEDURE — 80320 DRUG SCREEN QUANTALCOHOLS: CPT | Performed by: FAMILY MEDICINE

## 2019-09-21 PROCEDURE — 99284 EMERGENCY DEPT VISIT MOD MDM: CPT | Mod: Z6 | Performed by: FAMILY MEDICINE

## 2019-09-21 PROCEDURE — 99285 EMERGENCY DEPT VISIT HI MDM: CPT | Mod: 25 | Performed by: FAMILY MEDICINE

## 2019-09-21 RX ORDER — HALOPERIDOL 5 MG/ML
5 INJECTION INTRAMUSCULAR ONCE
Status: COMPLETED | OUTPATIENT
Start: 2019-09-21 | End: 2019-09-21

## 2019-09-21 RX ORDER — LORAZEPAM 2 MG/ML
1 INJECTION INTRAMUSCULAR ONCE
Status: COMPLETED | OUTPATIENT
Start: 2019-09-21 | End: 2019-09-21

## 2019-09-21 RX ORDER — OLANZAPINE 10 MG/2ML
INJECTION, POWDER, FOR SOLUTION INTRAMUSCULAR
Status: DISCONTINUED
Start: 2019-09-21 | End: 2019-09-21 | Stop reason: HOSPADM

## 2019-09-21 RX ORDER — LORAZEPAM 1 MG/1
1 TABLET ORAL ONCE
Status: DISCONTINUED | OUTPATIENT
Start: 2019-09-21 | End: 2019-09-21

## 2019-09-21 RX ORDER — HALOPERIDOL 5 MG/ML
INJECTION INTRAMUSCULAR
Status: DISCONTINUED
Start: 2019-09-21 | End: 2019-09-21 | Stop reason: HOSPADM

## 2019-09-21 RX ORDER — LORAZEPAM 2 MG/ML
INJECTION INTRAMUSCULAR
Status: DISCONTINUED
Start: 2019-09-21 | End: 2019-09-21 | Stop reason: HOSPADM

## 2019-09-21 RX ORDER — HALOPERIDOL 5 MG/ML
10 INJECTION INTRAMUSCULAR ONCE
Status: COMPLETED | OUTPATIENT
Start: 2019-09-21 | End: 2019-09-21

## 2019-09-21 RX ADMIN — HALOPERIDOL LACTATE 5 MG: 5 INJECTION, SOLUTION INTRAMUSCULAR at 13:57

## 2019-09-21 RX ADMIN — LORAZEPAM 1 MG: 2 INJECTION INTRAMUSCULAR; INTRAVENOUS at 11:00

## 2019-09-21 RX ADMIN — HALOPERIDOL LACTATE 5 MG: 5 INJECTION INTRAMUSCULAR at 15:45

## 2019-09-21 RX ADMIN — HALOPERIDOL LACTATE 10 MG: 5 INJECTION, SOLUTION INTRAMUSCULAR at 10:59

## 2019-09-21 RX ADMIN — LORAZEPAM 1 MG: 2 INJECTION INTRAMUSCULAR; INTRAVENOUS at 15:46

## 2019-09-21 NOTE — ED PROVIDER NOTES
History     Chief Complaint   Patient presents with     Manic Behavior     not taking meds or sleeping     HPI  Michelle Pino is a 59 year old female who came by police and paramedics. Son found agitated and violent at home. Son reports not taking medications.   The pt was last admitted to [facility] June 2019. At that time she was discharged with thorazine daily, haldol daily and IM haldol monthly.  I have reviewed the Medications, Allergies, Past Medical and Surgical History, and Social History in the Epic system.  The pt has a hx of htn and there is notes of an old cva in 2017.   The present medications see discharge summary- losartan 100 mg every day and metoprolol er 50 mg per day    No hx of street drugs or etoh  Review of Systems   Reason unable to perform ROS: too agitated and agressive.       Physical Exam   BP: (pt is not cooperative and is refusing to cooperate)      Physical Exam   Constitutional: She appears well-developed and well-nourished.   Yelling and screaming in broken English- difficult to understand    Physically aggressive refusing to let go of purse and actively striking out when attempting to take   Nursing note and vitals reviewed.      ED Course        Procedures        21 called. The pt given 10 mg of haldol and 1 mg of ativan IM at approximately 1045 pm  At 130 pm the pt is sleepy and much more calm when not stimulated but still refusing blood draw and vital signs. Striking out against nurse. Will give another dose of im haldol 5 mg  Hopefully this will allow vitals and blood draw and provide me with safety more through physical exam    At 330 the pt is still pacing and violent in spite of 15 mg haldol IM and 1 mg of ativan  Will give another 5 mg im haldol and 1 mg of IM ativan  Care to Dr Arenas  72 hours hold and admission requested  Labs Ordered and Resulted from Time of ED Arrival Up to the Time of Departure from the ED   DRUG ABUSE SCREEN 6 CHEM DEP URINE ([facility])   CBC WITH PLATELETS  DIFFERENTIAL   COMPREHENSIVE METABOLIC PANEL            Assessments & Plan (with Medical Decision Making)   Agitation with hx of schizophrenia- likely not taking medications at home  72 hour hold    I have reviewed the nursing notes.    I have reviewed the findings, diagnosis, plan and need for follow up with the patient.    New Prescriptions    No medications on file       Final diagnoses:   Agitation       9/21/2019   [facility], [place], EMERGENCY DEPARTMENT     Andrews Mariscal MD  09/21/19 1756       Andrews Mariscal MD  09/21/19 1485
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
## 2019-09-21 NOTE — ED NOTES
"Code 21 called for medication administration, pt immediately got up from cart and requested to go to bathroom, pt returned to room chanting \"you are going to die.\"  "

## 2019-09-21 NOTE — ED NOTES
Pt continues to come out of room and chant loudly, unable to understand pt, pt will not lie on cart for medication administration, continues to move in and out of room.

## 2019-09-21 NOTE — ED NOTES
Patient continues to come out of room and yell at staff. Refuses to lie down on bed. patient sits on edge of bed

## 2019-09-21 NOTE — ED NOTES
Pt starting to appear sleepy, placed on cart with side rails x 2, son Emelia arrived and requested to be called with any updates, he states she is unable to live in her apartment and requesting a  be involved for placement upon discharge.

## 2019-09-21 NOTE — ED NOTES
Patient was ssearched and mecication given. Patient was able to sit and chair, asking frequently for her purse. Still refusing to answer questions or have vs taken

## 2019-09-21 NOTE — ED NOTES
Upon arrival, patient is yelling at staff and is not willing to follow staff directions. MD in the room with sushant. Pt is able to answer MD questions easily.

## 2019-09-21 NOTE — ED NOTES
ED to Behavioral Floor Handoff    SITUATION  Michelle Pino is a 59 year old female who speaks Data Unavailable and lives in a home alone The patient arrived in the ED by ambulance from home with a complaint of Manic Behavior (not taking meds or sleeping)  .The patient's current symptoms started/worsened 3 week(s) ago and during this time the symptoms have increased.   In the ED, pt was diagnosed with   Final diagnoses:   Agitation        Initial vitals were: BP: (pt is not cooperative and is refusing to cooperate)   --------  Is the patient diabetic? No   If yes, last blood glucose? --     If yes, was this treated in the ED? --  --------  Is the patient inebriated (ETOH) No or Impaired on other substances? No  MSSA done? N/A  Last MSSA score: --    Were withdrawal symptoms treated? N/A  Does the patient have a seizure history? No. If yes, date of most recent seizure--  --------  Is the patient patient experiencing suicidal ideation? denies current or recent suicidal ideation     Homicidal ideation? denies current or recent homicidal ideation or behaviors.    Self-injurious behavior/urges? denies current or recent self injurious behavior or ideation.  ------  Was pt aggressive in the ED No  Was a code called Yes  Is the pt now cooperative? No  -------  Meds given in ED:   Medications   OLANZapine (zyPREXA) 10 MG injection (  Not Given 9/21/19 1101)   LORazepam (ATIVAN) 2 MG/ML injection (  Not Given 9/21/19 1100)   haloperidol lactate (HALDOL) injection 10 mg (10 mg Intramuscular Given 9/21/19 1059)   LORazepam (ATIVAN) injection 1 mg (1 mg Intramuscular Given 9/21/19 1100)   haloperidol lactate (HALDOL) injection 5 mg (5 mg Intravenous Given 9/21/19 1357)      Family present during ED course? No  Family currently present? No    BACKGROUND  Does the patient have a cognitive impairment or developmental disability? No  Allergies:   Allergies   Allergen Reactions     Lisinopril Cough   .   Social demographics are   Social  History     Socioeconomic History     Marital status: Not on file     Spouse name: Not on file     Number of children: Not on file     Years of education: Not on file     Highest education level: Not on file   Occupational History     Not on file   Social Needs     Financial resource strain: Not on file     Food insecurity:     Worry: Not on file     Inability: Not on file     Transportation needs:     Medical: Not on file     Non-medical: Not on file   Tobacco Use     Smoking status: Not on file   Substance and Sexual Activity     Alcohol use: Not on file     Drug use: Not on file     Sexual activity: Not on file   Lifestyle     Physical activity:     Days per week: Not on file     Minutes per session: Not on file     Stress: Not on file   Relationships     Social connections:     Talks on phone: Not on file     Gets together: Not on file     Attends Hinduism service: Not on file     Active member of club or organization: Not on file     Attends meetings of clubs or organizations: Not on file     Relationship status: Not on file     Intimate partner violence:     Fear of current or ex partner: Not on file     Emotionally abused: Not on file     Physically abused: Not on file     Forced sexual activity: Not on file   Other Topics Concern     Not on file   Social History Narrative     Not on file        ASSESSMENT  Labs results   Labs Ordered and Resulted from Time of ED Arrival Up to the Time of Departure from the ED   DRUG ABUSE SCREEN 6 CHEM DEP URINE ([facility])   CBC WITH PLATELETS DIFFERENTIAL   COMPREHENSIVE METABOLIC PANEL      Imaging Studies: No results found for this or any previous visit (from the past 24 hour(s)).   Most recent vital signs LMP  (LMP Unknown)    Abnormal labs/tests/findings requiring intervention:---   Pain control: pt had none  Nausea control: pt had none    RECOMMENDATION  Are any infection precautions needed (MRSA, VRE, etc.)? No If yes, what infection? --  ---  Does the patient have  mobility issues? independently. If yes, what device does the pt use? ---  ---  Is patient on 72 hour hold or commitment? No If on 72 hour hold, have hold and rights been given to patient? N/A  Are admitting orders written if after 10 p.m. ?N/A  Tasks needing to be completed:---     Mylene Main RN   [facility]-- [phone] [place] [facility]   [phone] [facility]

## 2019-09-21 NOTE — ED NOTES
Patient continues to refuse to be searched. MD at beside. Code 21 called for medication and search

## 2019-09-21 NOTE — ED NOTES
Bed: ED12  Expected date: 9/21/19  Expected time: 10:03 AM  Means of arrival: Ambulance  Comments:  [id number] Manic, possibly schizo, hold

## 2019-09-22 PROCEDURE — 25000128 H RX IP 250 OP 636: Performed by: FAMILY MEDICINE

## 2019-09-22 PROCEDURE — 12400001 ZZH R&B MH UMMC

## 2019-09-22 RX ORDER — TRIHEXYPHENIDYL HYDROCHLORIDE 5 MG/1
5 TABLET ORAL 2 TIMES DAILY
Status: DISCONTINUED | OUTPATIENT
Start: 2019-09-22 | End: 2019-10-25 | Stop reason: HOSPADM

## 2019-09-22 RX ORDER — CHOLECALCIFEROL (VITAMIN D3) 1250 MCG
50000 CAPSULE ORAL
Status: DISCONTINUED | OUTPATIENT
Start: 2019-09-22 | End: 2019-10-25 | Stop reason: HOSPADM

## 2019-09-22 RX ORDER — HYDROXYZINE HYDROCHLORIDE 25 MG/1
25 TABLET, FILM COATED ORAL EVERY 4 HOURS PRN
Status: DISCONTINUED | OUTPATIENT
Start: 2019-09-22 | End: 2019-10-25 | Stop reason: HOSPADM

## 2019-09-22 RX ORDER — LORAZEPAM 2 MG/1
2 TABLET ORAL EVERY 4 HOURS PRN
Status: DISCONTINUED | OUTPATIENT
Start: 2019-09-22 | End: 2019-10-25 | Stop reason: HOSPADM

## 2019-09-22 RX ORDER — ATORVASTATIN CALCIUM 10 MG/1
40 TABLET, FILM COATED ORAL EVERY EVENING
Status: DISCONTINUED | OUTPATIENT
Start: 2019-09-22 | End: 2019-10-25 | Stop reason: HOSPADM

## 2019-09-22 RX ORDER — FLUORIDE TOOTHPASTE
15 TOOTHPASTE DENTAL 4 TIMES DAILY PRN
Status: DISCONTINUED | OUTPATIENT
Start: 2019-09-22 | End: 2019-10-25 | Stop reason: HOSPADM

## 2019-09-22 RX ORDER — HALOPERIDOL 5 MG/ML
5 INJECTION INTRAMUSCULAR EVERY 4 HOURS PRN
Status: DISCONTINUED | OUTPATIENT
Start: 2019-09-22 | End: 2019-10-25 | Stop reason: HOSPADM

## 2019-09-22 RX ORDER — MULTIVITAMIN,THERAPEUTIC
1 TABLET ORAL DAILY
Status: DISCONTINUED | OUTPATIENT
Start: 2019-09-22 | End: 2019-10-25 | Stop reason: HOSPADM

## 2019-09-22 RX ORDER — DIPHENHYDRAMINE HCL 50 MG
50 CAPSULE ORAL EVERY 4 HOURS PRN
Status: DISCONTINUED | OUTPATIENT
Start: 2019-09-22 | End: 2019-10-25 | Stop reason: HOSPADM

## 2019-09-22 RX ORDER — LOSARTAN POTASSIUM 100 MG/1
100 TABLET ORAL DAILY
Status: DISCONTINUED | OUTPATIENT
Start: 2019-09-22 | End: 2019-10-25 | Stop reason: HOSPADM

## 2019-09-22 RX ORDER — ALUMINA, MAGNESIA, AND SIMETHICONE 2400; 2400; 240 MG/30ML; MG/30ML; MG/30ML
30 SUSPENSION ORAL EVERY 4 HOURS PRN
Status: DISCONTINUED | OUTPATIENT
Start: 2019-09-22 | End: 2019-10-25 | Stop reason: HOSPADM

## 2019-09-22 RX ORDER — ACETAMINOPHEN 325 MG/1
650 TABLET ORAL EVERY 4 HOURS PRN
Status: DISCONTINUED | OUTPATIENT
Start: 2019-09-22 | End: 2019-10-25 | Stop reason: HOSPADM

## 2019-09-22 RX ORDER — LORAZEPAM 2 MG/ML
2 INJECTION INTRAMUSCULAR EVERY 4 HOURS PRN
Status: DISCONTINUED | OUTPATIENT
Start: 2019-09-22 | End: 2019-10-25 | Stop reason: HOSPADM

## 2019-09-22 RX ORDER — HALOPERIDOL 5 MG/1
5 TABLET ORAL EVERY 4 HOURS PRN
Status: DISCONTINUED | OUTPATIENT
Start: 2019-09-22 | End: 2019-10-25 | Stop reason: HOSPADM

## 2019-09-22 RX ORDER — OLANZAPINE 10 MG/2ML
10 INJECTION, POWDER, FOR SOLUTION INTRAMUSCULAR ONCE
Status: COMPLETED | OUTPATIENT
Start: 2019-09-22 | End: 2019-09-22

## 2019-09-22 RX ORDER — CALCIUM CARBONATE 500(1250)
500 TABLET ORAL 2 TIMES DAILY WITH MEALS
Status: DISCONTINUED | OUTPATIENT
Start: 2019-09-22 | End: 2019-10-25 | Stop reason: HOSPADM

## 2019-09-22 RX ORDER — TRAZODONE HYDROCHLORIDE 50 MG/1
50 TABLET, FILM COATED ORAL
Status: DISCONTINUED | OUTPATIENT
Start: 2019-09-22 | End: 2019-10-25 | Stop reason: HOSPADM

## 2019-09-22 RX ORDER — METOPROLOL SUCCINATE 25 MG/1
50 TABLET, EXTENDED RELEASE ORAL DAILY
Status: DISCONTINUED | OUTPATIENT
Start: 2019-09-22 | End: 2019-10-25 | Stop reason: HOSPADM

## 2019-09-22 RX ORDER — BISACODYL 10 MG
10 SUPPOSITORY, RECTAL RECTAL DAILY PRN
Status: DISCONTINUED | OUTPATIENT
Start: 2019-09-22 | End: 2019-10-25 | Stop reason: HOSPADM

## 2019-09-22 RX ORDER — DIPHENHYDRAMINE HYDROCHLORIDE 50 MG/ML
50 INJECTION INTRAMUSCULAR; INTRAVENOUS EVERY 6 HOURS PRN
Status: DISCONTINUED | OUTPATIENT
Start: 2019-09-22 | End: 2019-10-25 | Stop reason: HOSPADM

## 2019-09-22 RX ADMIN — OLANZAPINE 10 MG: 10 INJECTION, POWDER, LYOPHILIZED, FOR SOLUTION INTRAMUSCULAR at 14:31

## 2019-09-22 ASSESSMENT — ACTIVITIES OF DAILY LIVING (ADL)
DRESS: 0-->INDEPENDENT
BATHING: 0-->INDEPENDENT
COGNITION: 2 - DIFFICULTY WITH ORGANIZING THOUGHTS
AMBULATION: 0-->INDEPENDENT
TRANSFERRING: 0-->INDEPENDENT
TOILETING: 0-->INDEPENDENT
FALL_HISTORY_WITHIN_LAST_SIX_MONTHS: NO
RETIRED_COMMUNICATION: 0-->UNDERSTANDS/COMMUNICATES WITHOUT DIFFICULTY
SWALLOWING: 0-->SWALLOWS FOODS/LIQUIDS WITHOUT DIFFICULTY
RETIRED_EATING: 0-->INDEPENDENT

## 2019-09-22 NOTE — ED NOTES
"Patient awoke yelling claming \"I'm praying.\" Patient declined all comfort measures claiming 'It's dirty.\" patient given phone and spoke with son. Patient upset claming doctor \"is a liar and stupid.\" Patient sitting in chair in room.   "

## 2019-09-22 NOTE — ED NOTES
Pt sitting on the end of the cart with her eyes closed.  MD udated on pt's poor intake status.  Dr Ramone jon who is assigned to pt if she gets admitted to station 12.

## 2019-09-22 NOTE — ED NOTES
"Patient yelling loudly, this writer asked if there is something I can do for patient, \"I'm praying.\" Patient standing in room, patient redirectable to lay in bed.   "
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
## 2019-09-22 NOTE — ED NOTES
Aide from station 12 called to get report. Nurse to talk with staff and call back when patient can transport to unit.

## 2019-09-22 NOTE — ED NOTES
Pt given phone and she talked to her brother.  Brother states she will eat rice with chicken and propel water.

## 2019-09-22 NOTE — ED NOTES
Patient family member Elmer called wanting to speak to patient. Patient sleeping and family informed patient will given the message when patient available. Elmer # [phone]

## 2019-09-22 NOTE — ED NOTES
Spoke with Dr Pack.  No change in care plan.  States you can not force people to eat or drink.  States eventually they will start taking fluids.  States people generally will not go without food for too long that they will starve themselves to death.

## 2019-09-22 NOTE — DOWNTIME EVENT NOTE
The EMR was down for 3.5 hours on 9/22/2019.    Myriam Duarte was responsible for completing the paper charting during this time period.     The following information was re-entered into the system by LENO CAPPS RN: Flowsheet data    The following information will remain in the paper chart: n/a    LENO CAPPS RN  9/22/2019

## 2019-09-23 PROCEDURE — 12400001 ZZH R&B MH UMMC

## 2019-09-23 PROCEDURE — 99223 1ST HOSP IP/OBS HIGH 75: CPT | Mod: AI | Performed by: PSYCHIATRY & NEUROLOGY

## 2019-09-23 ASSESSMENT — ACTIVITIES OF DAILY LIVING (ADL)
HYGIENE/GROOMING: PROMPTS
DRESS: PROMPTS
DRESS: PROMPTS
LAUNDRY: UNABLE TO COMPLETE
ORAL_HYGIENE: PROMPTS
ORAL_HYGIENE: PROMPTS
HYGIENE/GROOMING: PROMPTS

## 2019-09-23 NOTE — PROGRESS NOTES
09/22/19 2028   Patient Belongings   Did you bring any home meds/supplements to the hospital?  Yes   Disposition of meds  Sent to security/pharmacy per site process   Patient Belongings remains with patient;locker;sent to security per site process   Patient Belongings Remaining with Patient other (see comments)   Patient Belongings Put in Hospital Secure Location (Security or Locker, etc.) cash/credit card;cell phone/electronics;clothing;keys;medication(s);money (see comment);shoes;purse/wallet;other (see comments)   Belongings Search Yes   Clothing Search Yes   Second Staff Shanta REYES and Noemy PHELPS     With patient: Wig/hair net    In patient locker: silver Samsung cell phone, white phone /wall block, black wallet, 1 set keys, lip gloss, black purse, 2 [place] ID, black sandals, black jacket, purple coat, 1 pair black gloves, miscellaneous change, toothbrush, toothpaste, bible, 1 pair tennis shoes, comb, body lotion, 1.5 rolls of quarters, 3 long sleeve tops, 3 pair pants, 2 pair underwear, 1 t-shirt, 1 pair shorts    To security (#[id number]): 1 metro mobility card, 1 metro transit card, 1 [employer] card (7253), 1 visa debit card (4868), $26 (1's), $30 (5's) $40 (20's) (total $96 cash)    [facility] #[id number] - Patient's medications, list inside and in paper chart.    A               Admission:  I am responsible for any personal items that are not sent to the safe or pharmacy.  [facility] is not responsible for loss, theft or damage of any property in my possession.    Signature:  _________________________________ Date: _______  Time: _____                                              Staff Signature:  ____________________________ Date: ________  Time: _____      2nd Staff person, if patient is unable/unwilling to sign:    Signature: ________________________________ Date: ________  Time: _____     Discharge:  [facility] has returned all of my personal belongings:    Signature: _________________________________ Date: ________   Time: _____                                          Staff Signature:  ____________________________ Date: ________  Time: _____

## 2019-09-23 NOTE — PLAN OF CARE
BEHAVIORAL TEAM DISCUSSION    Participants: TRAVON Randall, Jarett Pack MD,  Tom Mace RN  Progress: Minimal, just admitted  Anticipated length of stay: Unknown  Continued Stay Criteria/Rationale: Pt is psychotic  Medical/Physical: None  Precautions:   Behavioral Orders   Procedures    Assault precautions    Code 1 - Restrict to Unit    Elopement precautions    Routine Programming     As clinically indicated    Status 15     Every 15 minutes.     Plan: Psychiatric evaluation, medication management, case management  Rationale for change in precautions or plan: No changes.
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
## 2019-09-23 NOTE — PROGRESS NOTES
09/23/19 1352   Significant Event   Significant Event Other (see comments)  (shift summary)   Pt was intermittently sleeping and out in the lounge this shift. When out in the lounge she was tense and irritable though had no anger outbursts. Hygiene maintenance is poor.

## 2019-09-23 NOTE — PROGRESS NOTES
Patient presents as guarded, paranoid, and disorganized with a tense affect.  She refused to answer any interview questions from RN writer and adamantly refused to accept any of her scheduled AM medications.  She likewise refused to cooperate with routine vital signs assessment.  She was noted to be eating and drinking well this morning with the majority of her breakfast meal tray consumed.  She consumed her oatmeal, banana bread, and blueberry muffin.  She drank the entire container of her Lactaid milk.  Currently, she is in the shower.  Will continue to monitor closely.

## 2019-09-23 NOTE — PROGRESS NOTES
Huber from [facility] Pre-Petition will come Tuesday morning to complete pre-petition screen.

## 2019-09-24 PROCEDURE — 99232 SBSQ HOSP IP/OBS MODERATE 35: CPT | Mod: GC | Performed by: PSYCHIATRY & NEUROLOGY

## 2019-09-24 PROCEDURE — 12400001 ZZH R&B MH UMMC

## 2019-09-24 ASSESSMENT — ACTIVITIES OF DAILY LIVING (ADL)
DRESS: SCRUBS (BEHAVIORAL HEALTH)
HYGIENE/GROOMING: PROMPTS
ORAL_HYGIENE: PROMPTS

## 2019-09-24 NOTE — PROGRESS NOTES
Pt withdrawn and isolative to room most of shift.  When pt did interact with staff, pt presented with pressured speech and expressed that she wanted to go home.  Pt was agitated, refused vital signs, declined comfort measures.  No SI/SIB reported.  Pt seen talking to herself in her room throughout shift when pt wasn't sleeping.       09/23/19 2137   Behavioral Health   Hallucinations appears responding   Thinking delusional;paranoid;poor concentration   Orientation situation, disoriented   Memory confabulation   Insight poor   Judgement impaired   Eye Contact at examiner   Affect tense   Mood labile;irritable   Physical Appearance/Attire untidy   Hygiene body odor   Suicidality other (see comments)  (none stated)   1. Wish to be Dead (Past Month) No   2. Non-Specific Active Suicidal Thoughts (Past Month) No   Enviromental Risk Factors None   Self Injury other (see comment)  (none stated or observed)   Elopement Statements about wanting to leave   Activity withdrawn   Speech pressured;rambling   Medication Sensitivity no stated side effects   Psychomotor / Gait balanced;agitated   Safety   Assault status 15;private room   Elopement status 15;no shoes;behavioral scrubs (pajamas);signs posted on unit entrance / exit doors   Activities of Daily Living   Hygiene/Grooming prompts   Oral Hygiene prompts   Dress prompts   Laundry unable to complete   Room Organization prompts

## 2019-09-24 NOTE — PROGRESS NOTES
"[facility], [place]   Psychiatric Progress Note  Hospital Day: 2        Interim History:   The patient's care was discussed with the treatment team during the daily team meeting and/or staff's chart notes were reviewed.  Staff report patient has been intermittently irritable, isolative and withdrawn. She was observed to be talking to herself on several occasions. Declined vitals and comfort measures.   Slept 2 hours (09/24/19 0600)     Upon interview, Michelle initially answered \"good\" to all questions regarding sleep, mood, anxiety and physical symptoms. She showed the card given to her by the pre-petition screener and explained that she was previously on commitment and that she does not want to be committed again. She said her commitment ended in June and that she had to take medications while comitted but that food is medicine for her. She contradicted herself within the interview, at one point saying she was taking medications and at another point saying she is not and that she does not want them. She became upset when discussing the commitment and said again that she does not want it and that she is going to fuad the hospital. She denied being aggressive towards her son and stated that she was just trying to talk to him.     Psychiatric Symptoms: irritability, delusions, paranoia, poor hygiene, disorganization    Medication side effects reported: None   Other issues reported by patient: None          Medications:       atorvastatin  40 mg Oral QPM     calcium carbonate 500 mg (elemental)  500 mg Oral BID w/meals     chlorproMAZINE  150 mg Oral At Bedtime     cholecalciferol  50,000 Units Oral Q7 Days     losartan  100 mg Oral Daily     metoprolol succinate ER  50 mg Oral Daily     multivitamin, therapeutic  1 tablet Oral Daily     trihexyphenidyl  5 mg Oral BID          Allergies:     Allergies   Allergen Reactions     Lisinopril Cough          Labs:   No results found for this or any " "previous visit (from the past 48 hour(s)).       Psychiatric Examination:     BP (!) 146/105   Pulse 132   Temp 100  F (37.8  C) (Tympanic)   Resp 18   LMP  (LMP Unknown)   SpO2 100%   Weight is 0 lbs 0 oz  There is no height or weight on file to calculate BMI.    Orthostatic Vitals     None        Appearance:  female who appears younger then her stated age, wearing a cap over her hair, is awake and alert   Attitude:  Somewhat cooperative and guarded   Eye Contact:  intense at times   Mood:  \"good\"  Affect:  intensity is blunted  Speech: some dysarthria and a thick accent making her difficulty to understand at times   Psychomotor Behavior:  no evidence of tardive dyskinesia, dystonia, or tics  Thought Process:  logical and linear, minimal disorganization   Associations:  no loose associations  Thought Content:  no evidence of suicidal ideation or homicidal ideation, no auditory hallucinations present, no visual hallucinations present and possible deulsional thought content regarding medications being poisioned etc.   Insight:  poor, denies MH symptoms   Judgment:  poor, as evidenced by poor social boundaries and declining medications   Oriented to:  time, person, and place  Attention Span and Concentration:  sufficient enough to participate in the interview   Recent and Remote Memory:  fair  Language:  english with appropriate and vocabulary  Fund of Knowledge: low-normal  Muscle Strength and Tone: normal  Gait and Station: Normal    Clinical Global Impressions  First:     Most recent:            Precautions:     Behavioral Orders   Procedures     Assault precautions     Code 1 - Restrict to Unit     Elopement precautions     Routine Programming     As clinically indicated     Status 15     Every 15 minutes.          Diagnoses:      Agitation  Schizophrenia, unspecified type (H)         Assessment & Plan:   Assessment and hospital summary:  Michelle Pino is a 59 year old female with prior psychiatric " diagnosis of schizophrenia and schizoaffective disorder who presents with psychosis and violence in the context of medication non-adherence. Her last psychiatric hospitalization was in from April-June of this year. She was most recently followed by Dr. Yoshi Zabala at [facility]. Current psychosocial stressors include chronic mental health issues, family dynamics and medical issues which she has been coping with by discontinuing her medications. Patient's support system includes family, county and outpatient team.  Substance use does not appear to be playing a contributing role in the patient's presentation.  There is genetic loading for none known. Medical history does appear to be significant for Vitamin D deficiency and prior CVA. The MSE on admission was notable for lack of SI, HI and evidence of psychosis. She denies self injurious behaviors. Her current presentation is consistent with her historic diagnosis of schizophrenia.     Target psychiatric symptoms and interventions:  Psychosis   - continue Thorazine 150 mg at bedtime (patient has been declining to take)   - petition for commitment filed on 9/23  - PO/IM B52's PRN for agitation or aggression     Medical Problems and Treatments:  No acute medical issues at this time.      #. Hyperlipidemia- continue PTA atorvastatin   #. Vitamin D Deficiency - continue PTA Cholecalciferol   #. Hypertension- continue PTA metoprolol      Behavioral/Psychological/Social:  Encourage unit programming    There has been no seclusion or restraints used in the past 24 hours    Disposition Plan   Reason for ongoing admission: poses an imminent risk to others and is unable to care for self due to severe psychosis or tad  Discharge location: [facility] facility  Discharge Medications: not ordered  Follow-up Appointments: not scheduled  Legal Status: 72 hour hold  Entered by: Bijal Varner on 09/24/2019at 8:16 AM         The patient was seen and plan was discussed with the attending  physician.    Bijal Varner MD  Psychiatry PGY-2 Resident

## 2019-09-24 NOTE — PLAN OF CARE
Patient presents as angry, tense, and irritable with no appreciable insight into her chronic mental and physical health conditions.  He affect and eye contact are notable for increased intensity.  She continues to refuse scheduled medications, routine vital signs assessment, and the RN admission assessment.  She has been loud, belligerent, and verbally aggressive at times this shift; however, she has not directly threatened anyone today and denies any self harm or aggressive ideations.  At other times, she presents as socially withdrawn and isolates in her room.  She remains quite psychotic- disorganized, paranoid, and clearly RIS (as evidenced by holding conversations with unseen persons in her room).  She has been eating and drinking in adequate amounts.  Her personal hygiene maintenance remains poor.  She denies any acute physical concerns.

## 2019-09-24 NOTE — PROGRESS NOTES
Patient slept for 2 hours this shift. Intermittently, she was noted to be talking loudly to herself in her room. Patient was asked if she needed assistance from staff, but she became agitated and told staff to leave here alone. Staff then left her alone. She later calmed down on her own. Otherwise, no other complaints or concerns voiced by patient or noted by staff. Will continue to monitor and update if there are changes.

## 2019-09-25 PROCEDURE — 99233 SBSQ HOSP IP/OBS HIGH 50: CPT | Mod: GC | Performed by: PSYCHIATRY & NEUROLOGY

## 2019-09-25 PROCEDURE — 25000132 ZZH RX MED GY IP 250 OP 250 PS 637: Performed by: PSYCHIATRY & NEUROLOGY

## 2019-09-25 PROCEDURE — 12400001 ZZH R&B MH UMMC

## 2019-09-25 ASSESSMENT — ACTIVITIES OF DAILY LIVING (ADL)
DRESS: SCRUBS (BEHAVIORAL HEALTH)
HYGIENE/GROOMING: INDEPENDENT
ORAL_HYGIENE: INDEPENDENT
ORAL_HYGIENE: INDEPENDENT
DRESS: SCRUBS (BEHAVIORAL HEALTH)
ORAL_HYGIENE: INDEPENDENT
LAUNDRY: UNABLE TO COMPLETE
HYGIENE/GROOMING: INDEPENDENT
LAUNDRY: UNABLE TO COMPLETE
HYGIENE/GROOMING: INDEPENDENT
DRESS: SCRUBS (BEHAVIORAL HEALTH)

## 2019-09-25 NOTE — PROGRESS NOTES
09/25/19 1100   Behavioral Health   Hallucinations appears responding   Thinking poor concentration;paranoid   Orientation person: oriented;place: oriented;date: oriented;time: oriented   Memory baseline memory   Insight denial of illness   Judgement impaired   Eye Contact at examiner   Affect full range affect   Mood irritable   Physical Appearance/Attire disheveled   Hygiene neglected grooming - unclean body, hair, teeth   1. Wish to be Dead (Past Month) No   2. Non-Specific Active Suicidal Thoughts (Past Month) No   Self Injury other (see comment)  (denies)   Activity isolative   Speech tangential   Psychomotor / Gait balanced   Activities of Daily Living   Hygiene/Grooming independent   Oral Hygiene independent   Dress scrubs (behavioral health)   Laundry unable to complete   Room Organization independent   Patient stayed in her room for most part of her shift. She came out a few times to request for some items and medication. She refused to provide answers to staff's questions and demanded to only talk to her Doctor. She appear irritable and was heard yelling at the TV in her room.

## 2019-09-25 NOTE — PROGRESS NOTES
09/24/19 2100   Behavioral Health   Hallucinations appears responding   Thinking paranoid;delusional   Orientation person: oriented;place: oriented   Memory short term   Insight denial of illness   Judgement impaired   Eye Contact at examiner   Affect angry;tense;full range affect   Mood irritable   Physical Appearance/Attire untidy   Hygiene body odor   1. Wish to be Dead (Past Month) No   2. Non-Specific Active Suicidal Thoughts (Past Month) No   Self Injury other (see comment)  (Denies)   Activity isolative;withdrawn   Speech pressured;rambling   Psychomotor / Gait agitated     Pt. Eating and Sleeping habits are standard. Pt. Was irritable and argumentative towards staff. Pt. Appears responding while in room alone. Observed pt. Screaming at the TV for multiple hours of shift. Pt. Had multiple outbursts of anger in response to redirection. Denies SI and SIB.

## 2019-09-25 NOTE — PROVIDER NOTIFICATION
"Pt was up all night, sleeping zero hour last night. Pt was pacing her room, talking to herself. Pt refused prn meds, stating \" I'm good.\"  Pt looks preoccupied and very tired.   "
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
## 2019-09-25 NOTE — PROGRESS NOTES
Summer with [facility] notified by phone that patient is on a court hold as of 9/25/2019 at 1608.

## 2019-09-26 PROCEDURE — 12400001 ZZH R&B MH UMMC

## 2019-09-26 ASSESSMENT — ACTIVITIES OF DAILY LIVING (ADL)
DRESS: STREET CLOTHES;SCRUBS (BEHAVIORAL HEALTH)
DRESS: SCRUBS (BEHAVIORAL HEALTH)
LAUNDRY: WITH SUPERVISION
ORAL_HYGIENE: INDEPENDENT
LAUNDRY: UNABLE TO COMPLETE
ORAL_HYGIENE: INDEPENDENT
HYGIENE/GROOMING: INDEPENDENT
HYGIENE/GROOMING: INDEPENDENT

## 2019-09-26 NOTE — PROGRESS NOTES
Court papers were received today.   Pt's prelim is set for Monday Sept 30th at 9am.    Final (if needed) will be Thursday Oct 3rd.    Note:  *Pt's address is [address]  [facility]  [id number]     called and left a message for pt's Stevan RAE Minna Goins  [phone].   Update given.  I also called and spoke with pt's son, Emelia.   I gave him an update.

## 2019-09-26 NOTE — PLAN OF CARE
Pt was irritable throughout the evening. Pt had requested multiple times to go into locker and was concerned about when her court date would be. Pt was able to be redirected and calmed down when staff gave her time alone. Pt denied all meds and vitals this evening. Pt spent most the evening in her room other than meal time and when requesting items from staff. Pt appeared to be responding in room talking to self and pointing towards items in her room but pt denied any AH/VH. Pt denies any SI or SIB at this time. Pt was put on a court hold today at 1608. Pt has not yet been informed until paperwork has been received from [facility]. Pt is currently resting in her room.

## 2019-09-26 NOTE — PROGRESS NOTES
Pt was irritable on approach all shift. Pt was withdrawn and isolative to her room , except during meals and request. Pt was verbally aggressive and demanding  when making request and snatched items from writer once request was met . Pt was observed talking and laughing to herself while in her room and also during meals in the dinner area. Denied SI/SIB thoughts, HI and all mental health symptoms, dismissive on approach . No further concerns.        09/26/19 1407   Behavioral Health   Hallucinations appears responding   Thinking poor concentration   Orientation person: oriented;place: oriented;date: oriented;time: oriented   Memory baseline memory   Insight poor;denial of illness   Judgement impaired   Eye Contact staring   Affect irritable;tense;angry   Mood irritable   Physical Appearance/Attire attire appropriate to age and situation   Hygiene other (see comment)  (adequate)   Suicidality other (see comments)  (Pt denies)   1. Wish to be Dead (Past Month) No   Wish to be Dead Description (Recent)   (none stated or observed)   2. Non-Specific Active Suicidal Thoughts (Past Month) No   Self Injury other (see comment)  (none atated or observed)   Activity isolative;withdrawn;hyperactive (agitated, impulsive)   Speech pressured;rambling   Medication Sensitivity no stated side effects;no observed side effects   Psychomotor / Gait balanced;steady   Psycho Education   Type of Intervention other (see comment)  (observational/check in )   Response refuses   Treatment Detail   (observational )   Activities of Daily Living   Hygiene/Grooming independent   Oral Hygiene independent   Dress street clothes;scrubs (behavioral health)   Laundry with supervision   Room Organization independent

## 2019-09-26 NOTE — PROGRESS NOTES
"[facility], [place]   Psychiatric Progress Note  Hospital Day: 4        Interim History:   The patient's care was discussed with the treatment team during the daily team meeting and/or staff's chart notes were reviewed.  Staff report patient has been isolative, irritable and fixated on leaving. She declined to check in with staff, asking only to speak with her doctor. She was observed to be in yelling at the TV in her room. She has been eating and drinking. She has declined all medications and vital signs assessments. She was somewhat agitated when she received court hold documents and wanted to know the name of the deputy who delivered him. She asked to have him come back in the room which staff declined. She did eventually deescalate. She was observed this morning to be yelling loudly at the med room door requesting to have access to her locker.     Slept 2 hours (09/26/19 0600)     Michelle was interviewed in her room. Writer asked to check in and she loudly and rapidly stated \"what do you want?  Why are you here?\"  She stood up from her bed and declined to sit down when asked instead walking closer towards the writer.  She stated \"I do not want to go to court.  I want to wear my own clothes!\"  Writer backed up and asked her to sit back down however she stated \"No!\" and continued to come closer yelling \"I want my own clothes!\" several times before saying \"Go away\" at which point the interview was terminated.     Psychiatric Symptoms: irritability, delusions, paranoia, poor hygiene, disorganization, response to internal stimuli     Medication side effects reported: None, patient is not taking any medications   Other issues reported by patient: None          Medications:       atorvastatin  40 mg Oral QPM     calcium carbonate 500 mg (elemental)  500 mg Oral BID w/meals     chlorproMAZINE  150 mg Oral At Bedtime     cholecalciferol  50,000 Units Oral Q7 Days     losartan  100 mg Oral Daily "     metoprolol succinate ER  50 mg Oral Daily     multivitamin, therapeutic  1 tablet Oral Daily     trihexyphenidyl  5 mg Oral BID          Allergies:     Allergies   Allergen Reactions     Lisinopril Cough          Labs:   No results found for this or any previous visit (from the past 48 hour(s)).          Psychiatric Examination:     BP (!) 146/105   Pulse 132   Temp 100  F (37.8  C) (Tympanic)   Resp 18   LMP  (LMP Unknown)   SpO2 100%   Weight is 0 lbs 0 oz  There is no height or weight on file to calculate BMI.    Orthostatic Vitals     None             Orthostatic Vitals     None      Appearance:  female who appears her stated age, wearing a cap over her hair, is awake and alert   Attitude:  uncooperative and guarded   Eye Contact:  intense  Mood: appears angry   Affect:  irritable and angry appearing, intensity is heightened, labile    Speech: some dysarthria and a thick accent making her difficulty to understand at times   Psychomotor Behavior:  no evidence of tardive dyskinesia, dystonia, or tics  Thought Process:   Perseverative on certain subjects, somewhat disorganized  Associations:  no loose associations  Thought Content:  no evidence of suicidal ideation or homicidal ideation, appears to be responding to internal stimuli as evident by her having conversations with people who are not there, yelling at the TV  Insight:  poor, denies MH symptoms   Judgment:  poor, as evidenced by poor social boundaries and declining medications   Oriented to:  time, person, and place  Attention Span and Concentration:  sufficient enough to participate in the interview   Recent and Remote Memory:  fair  Language:  english with appropriate and vocabulary  Fund of Knowledge: low-normal  Muscle Strength and Tone: normal  Gait and Station: Normal    Clinical Global Impressions  First:  Considering your total clinical experience with this particular patient population, how severe are the patient's symptoms at this  time?: 7 (09/25/19 1414)  Compared to the patient's condition at the START of treatment, this patient's condition is:: 4 (09/25/19 1414)  Most recent:  Considering your total clinical experience with this particular patient population, how severe are the patient's symptoms at this time?: 7 (09/25/19 1414)  Compared to the patient's condition at the START of treatment, this patient's condition is:: 4 (09/25/19 1414)         Precautions:     Behavioral Orders   Procedures     Assault precautions     Code 1 - Restrict to Unit     Elopement precautions     Routine Programming     As clinically indicated     Status 15     Every 15 minutes.          Diagnoses:      Agitation  Schizophrenia, unspecified type (H)         Assessment & Plan:   Assessment and hospital summary:  Michelle Pino is a 59 year old female with prior psychiatric diagnosis of schizophrenia and schizoaffective disorder who presents with psychosis and violence in the context of medication non-adherence. Her last psychiatric hospitalization was from April-June of this year. She was most recently followed by Dr. Yoshi Zabala at [facility]. Current psychosocial stressors include chronic mental health issues, family dynamics and medical issues. Patient's support system includes family, county and outpatient team. Substance use does not appear to be playing a contributing role in the patient's presentation.  There is no known genetic loading. Medical history does appear to be significant for Vitamin D deficiency and prior CVA. The MSE on admission was notable for lack of SI or HI, psychosis, irritability and lack of insight. She denies self injurious behaviors. Her current presentation is consistent with a schizophrenia spectrum disorder. It is unclear if there is a mood component to her illness.     Target psychiatric symptoms and interventions:  Psychosis with aggression   - continue Thorazine 150 mg at bedtime (patient has been declining to take)   - PO/IM  B52's PRN for agitation or aggression     Medical Problems and Treatments:  No acute medical issues at this time.      #. Hyperlipidemia- continue PTA atorvastatin   #. Vitamin D Deficiency - continue PTA Cholecalciferol   #. Hypertension- continue PTA metoprolol      Behavioral/Psychological/Social:  Encourage unit programming    There has been no seclusion or restraints used in the past 24 hours    Disposition Plan   Reason for ongoing admission: poses an imminent risk to others and is unable to care for self due to severe psychosis or tad  Discharge location: [facility] facility  Discharge Medications: not ordered  Follow-up Appointments: not scheduled  Legal Status: court hold as of 9/25/19 - preliminary hearing set for Monday Sept 30th at 9am.    Final (if needed) will be Thursday Oct 3rd.  Entered by: Bijal Varner on 09/26/2019at 8:22 AM       Bijal Varner MD  Psychiatry PGY-2 Resident

## 2019-09-27 PROCEDURE — 99232 SBSQ HOSP IP/OBS MODERATE 35: CPT | Mod: GC | Performed by: PSYCHIATRY & NEUROLOGY

## 2019-09-27 PROCEDURE — 12400001 ZZH R&B MH UMMC

## 2019-09-27 PROCEDURE — 25000128 H RX IP 250 OP 636: Performed by: PSYCHIATRY & NEUROLOGY

## 2019-09-27 RX ADMIN — LORAZEPAM 2 MG: 2 INJECTION INTRAMUSCULAR; INTRAVENOUS at 06:02

## 2019-09-27 RX ADMIN — HALOPERIDOL LACTATE 5 MG: 5 INJECTION, SOLUTION INTRAMUSCULAR at 06:02

## 2019-09-27 RX ADMIN — DIPHENHYDRAMINE HYDROCHLORIDE 50 MG: 50 INJECTION, SOLUTION INTRAMUSCULAR; INTRAVENOUS at 06:06

## 2019-09-27 ASSESSMENT — ACTIVITIES OF DAILY LIVING (ADL)
HYGIENE/GROOMING: INDEPENDENT;PROMPTS
HYGIENE/GROOMING: INDEPENDENT;PROMPTS
LAUNDRY: UNABLE TO COMPLETE
LAUNDRY: WITH SUPERVISION
ORAL_HYGIENE: PROMPTS
DRESS: SCRUBS (BEHAVIORAL HEALTH)
ORAL_HYGIENE: INDEPENDENT
DRESS: SCRUBS (BEHAVIORAL HEALTH);STREET CLOTHES

## 2019-09-27 NOTE — PROGRESS NOTES
RN writer attempted to meet with patient to complete admission assessment. Patient refusing to talk to writer and and unwilling to answer any of the assessment questions.

## 2019-09-27 NOTE — PROGRESS NOTES
"   09/26/19 1938   Behavioral Health   Hallucinations appears responding   Thinking paranoid;delusional;distractable;confused   Orientation person: oriented   Memory baseline memory;confabulation;other (see comment)   Insight poor   Judgement impaired   Eye Contact at examiner   Affect angry;tense;full range affect;irritable   Mood irritable   Hygiene body odor   Activities of Daily Living   Hygiene/Grooming independent   Oral Hygiene independent   Dress scrubs (behavioral health)   Laundry unable to complete   Room Organization independent   Pt spent most of the evening pacing the hallway. She has an angry affect and yelled at staff saying \"get out of my room stupid girl\" while attempting to get out of her bed and heaven staff. Staff walked out of her room and could hear patient still yelling and swearing. She did not take a shower during this shift and has BETZY. She ate dinner without any issue.   "
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
## 2019-09-28 PROCEDURE — 12400001 ZZH R&B MH UMMC

## 2019-09-28 ASSESSMENT — ACTIVITIES OF DAILY LIVING (ADL)
DRESS: INDEPENDENT
ORAL_HYGIENE: INDEPENDENT
LAUNDRY: UNABLE TO COMPLETE
HYGIENE/GROOMING: HANDWASHING
HYGIENE/GROOMING: INDEPENDENT
ORAL_HYGIENE: INDEPENDENT
DRESS: SCRUBS (BEHAVIORAL HEALTH)

## 2019-09-28 NOTE — PLAN OF CARE
"Patient isolative to self in room majority of the evening. Patient visible in the milieu during meals and requests. Patient upon approach appeared less tense and demanding than in past days. PT still refuses vitals, medications, and verbal assessments denying any symptoms. Patient visibly walking with a limp on her right leg. Upon assessment patient acknowledges the symptom but refuses assessment or intervention by RN writer stating \"I am a doctor, I will fix it\". Patient ADL's appeared neglected and she refused any prompting by staff for showering or dental hygiene.  "

## 2019-09-28 NOTE — PROGRESS NOTES
09/28/19 1514   Behavioral [facility]   Hallucinations denies / not responding to hallucinations   Thinking poor concentration   Orientation time: oriented;date: oriented;person: oriented;place: oriented   Memory baseline memory   Insight poor   Judgement impaired   Eye Contact at examiner;staring   Affect blunted, flat   Mood mood is calm   Physical Appearance/Attire attire appropriate to age and situation   Hygiene neglected grooming - unclean body, hair, teeth   Suicidality other (see comments)  (none observed )   1. Wish to be Dead (Past Month) No   2. Non-Specific Active Suicidal Thoughts (Past Month) No   Self Injury other (see comment)  (none observed )   Elopement   (none observed )   Activity isolative   Speech coherent;clear   Medication Sensitivity no stated side effects;no observed side effects   Psychomotor / Gait balanced;steady   Psycho Education   Type of Intervention 1:1 intervention   Response refuses   Hours 0.5   Treatment Detail   (check in )   Activities of Daily Living   Hygiene/Grooming handwashing   Oral Hygiene independent   Dress scrubs (behavioral health)   Room Organization independent     Pt was isolative in her room and refused to check in with staff. Pt was flat affect and staff did not observed any SI/SIB this shift. Staff offer showering , but Michelle refused to take it.

## 2019-09-29 PROCEDURE — 12400001 ZZH R&B MH UMMC

## 2019-09-29 ASSESSMENT — ACTIVITIES OF DAILY LIVING (ADL)
LAUNDRY: UNABLE TO COMPLETE
DRESS: INDEPENDENT
ORAL_HYGIENE: INDEPENDENT
HYGIENE/GROOMING: INDEPENDENT
ORAL_HYGIENE: INDEPENDENT
HYGIENE/GROOMING: INDEPENDENT
DRESS: SCRUBS (BEHAVIORAL HEALTH)
LAUNDRY: UNABLE TO COMPLETE

## 2019-09-29 NOTE — PROGRESS NOTES
"   09/29/19 1522   Behavioral Health   Hallucinations appears responding   Thinking poor concentration   Orientation other (see comment)  (AFSHAN)   Memory other (see comment)  (AFSHAN)   Insight poor   Judgement impaired   Eye Contact at examiner   Affect blunted, flat   Mood labile   Physical Appearance/Attire untidy   Hygiene neglected grooming - unclean body, hair, teeth   Suicidality other (see comments)  (None Observed)   1. Wish to be Dead (Past Month) No   2. Non-Specific Active Suicidal Thoughts (Past Month) No   Self Injury other (see comment)  (None Observed)   Elopement   (None Observed)   Activity isolative;withdrawn   Speech pressured   Medication Sensitivity no observed side effects   Psychomotor / Gait balanced;steady   Psycho Education   Type of Intervention 1:1 intervention   Response refuses   Hours 0.5   Treatment Detail Check-In   Activities of Daily Living   Hygiene/Grooming independent   Oral Hygiene independent   Dress independent   Laundry unable to complete   Room Organization independent     Pt was isolative in her room for the majority of the shift. Pt only came out of her room for meals, medications, and to check the time. Throughout the shift, pt was observed smiling, laughing, and talking to herself in her room. Pt also yelled racial slurs to a staff in two separate occasions. Pt yelled comments, such as \"nigger nigger and go back to [place].\"   "

## 2019-09-29 NOTE — PLAN OF CARE
Pt was isolative to her room this evening. She is disorganized and had no insight into her current mental status. She was observed standing in front of the TV in her room talking back at the TV. She refused to answer questions regarding her ongoing mental health issue. She yells at this writer, asking this writer to leave her room. She refused all scheduled medication. Poor appetite and poor hygiene maintenance.

## 2019-09-30 PROCEDURE — 99232 SBSQ HOSP IP/OBS MODERATE 35: CPT | Mod: GC | Performed by: PSYCHIATRY & NEUROLOGY

## 2019-09-30 PROCEDURE — 12400001 ZZH R&B MH UMMC

## 2019-09-30 ASSESSMENT — ACTIVITIES OF DAILY LIVING (ADL)
HYGIENE/GROOMING: HANDWASHING;INDEPENDENT
LAUNDRY: WITH SUPERVISION
HYGIENE/GROOMING: INDEPENDENT
DRESS: SCRUBS (BEHAVIORAL HEALTH);INDEPENDENT
LAUNDRY: UNABLE TO COMPLETE
ORAL_HYGIENE: INDEPENDENT
DRESS: SCRUBS (BEHAVIORAL HEALTH);INDEPENDENT
ORAL_HYGIENE: INDEPENDENT
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
## 2019-09-30 NOTE — PROGRESS NOTES
"[facility], [place]   Psychiatric Progress Note  Hospital Day: 8        Interim History:   The patient's care was discussed with the treatment team during the daily team meeting and/or staff's chart notes were reviewed.  Staff report that through the weekend Michelle was isolative to her room aside from meals and requests.  She has declined vital sign assessment, medications and check ins with staff at times becoming verbally aggressive during staff attempts to check in.  On 2 occasions she yelled racial slurs at a staff member.  She has appeared to be responding to internal stimuli and has been observed talking to her TV and arguing with a commercial.  She received no PRNs over the weekend.    Slept 0 hours (09/30/19 0600)     Michelle was interviewed in her room.  She reported that it is \"cold and my heart\"testing to her chest and that she is concerned she may be getting pneumonia.  She denied having any symptoms of pneumonia and declined to have any vitals assessed.  She reported that she needs to leave so she can drink a warm African drink to make herself better.  She perseverated on this topic for some time.  She then switched over to the subject of court and her commitment and stated numerous times that she does not want to go to court and that she does not want to be committed and that she does not want to take any medications.  She was encouraged to talk to her  which she had previously declined to do.  She continued to perseverate on the subject and the interview was terminated after attempts at explanation were unsuccessful.    Psychiatric Symptoms: irritability, aggressive behavior, delusions, paranoia, poor hygiene, disorganization, response to internal stimuli     Medication side effects reported: None, patient is not taking any medications   Other issues reported by patient: None          Medications:       atorvastatin  40 mg Oral QPM     calcium carbonate 500 mg " "(elemental)  500 mg Oral BID w/meals     chlorproMAZINE  150 mg Oral At Bedtime     cholecalciferol  50,000 Units Oral Q7 Days     losartan  100 mg Oral Daily     metoprolol succinate ER  50 mg Oral Daily     multivitamin, therapeutic  1 tablet Oral Daily     trihexyphenidyl  5 mg Oral BID          Allergies:     Allergies   Allergen Reactions     Lisinopril Cough          Labs:   No results found for this or any previous visit (from the past 48 hour(s)).          Psychiatric Examination:     BP (!) 146/105   Pulse 132   Temp 100  F (37.8  C) (Tympanic)   Resp 18   LMP  (LMP Unknown)   SpO2 100%   Weight is 0 lbs 0 oz  There is no height or weight on file to calculate BMI.    Orthostatic Vitals     None        Appearance:  female who appears her stated age, wearing a cap over her hair, resting in bed prior to the interview, malodorous   Attitude:  uncooperative and guarded   Eye Contact:  intense  Mood: \"I'm good\"  Affect:  labile, between calm and irritable, intensity is heightened  Speech: some dysarthria and a thick accent making her difficulty to understand at times   Psychomotor Behavior:  no evidence of tardive dyskinesia, dystonia, or tics  Thought Process:   Perseverative on certain subjects, somewhat disorganized  Associations:  no loose associations  Thought Content:  no evidence of suicidal ideation or homicidal ideation, appears to be responding to internal stimuli at times   Insight:  poor, denies MH symptoms   Judgment:  poor, as evidenced by poor social boundaries, declining medications and to talk with her , declines medical assessment   Oriented to:  time, person, and place  Attention Span and Concentration:  sufficient enough to participate in the interview   Recent and Remote Memory:  fair  Language:  english with appropriate and vocabulary  Fund of Knowledge: low-normal  Muscle Strength and Tone: normal  Gait and Station: Normal    Clinical Global " Impressions  First:  Considering your total clinical experience with this particular patient population, how severe are the patient's symptoms at this time?: 7 (09/25/19 1414)  Compared to the patient's condition at the START of treatment, this patient's condition is:: 4 (09/25/19 1414)  Most recent:  Considering your total clinical experience with this particular patient population, how severe are the patient's symptoms at this time?: 7 (09/25/19 1414)  Compared to the patient's condition at the START of treatment, this patient's condition is:: 4 (09/25/19 1414)           Precautions:     Behavioral Orders   Procedures     Assault precautions     Code 1 - Restrict to Unit     Elopement precautions     Routine Programming     As clinically indicated     Status 15     Every 15 minutes.          Diagnoses:   Agitation  Schizophrenia, unspecified type (H)         Assessment & Plan:   Assessment and hospital summary:  Michelle Pino is a 59 year old female with prior psychiatric diagnosis of schizophrenia and schizoaffective disorder who presents with psychosis and violence in the context of medication non-adherence. Her last psychiatric hospitalization was from April-June of this year. She was most recently followed by Dr. Yoshi Zabala at [facility]. Current psychosocial stressors include chronic mental health issues, family dynamics and medical issues. Patient's support system includes family, county and outpatient team. Substance use does not appear to be playing a contributing role in the patient's presentation.  There is no known genetic loading. Medical history does appear to be significant for Vitamin D deficiency and prior CVA. The MSE on admission was notable for lack of SI or HI, psychosis, irritability, aggression and lack of insight. She denied self injurious behaviors. Her current presentation is consistent with a schizophrenia spectrum disorder. It is unclear if there is a mood component to her illness. Her  ongoing decreased need for sleep does raise concern for this.     Target psychiatric symptoms and interventions:  Psychosis with aggression   - continue Thorazine 150 mg at bedtime (patient has been consistently declined to take)    - PO/IM B52's PRN for agitation or aggression     Medical Problems and Treatments:  No acute medical issues at this time.      #. Hyperlipidemia- continue PTA atorvastatin   #. Vitamin D Deficiency - continue PTA Cholecalciferol   #. Hypertension- continue PTA metoprolol      Behavioral/Psychological/Social:  Encourage unit programming    She has not been in seclusion or restraints in the past 24 hours     Disposition Plan   Reason for ongoing admission: poses an imminent risk to others and is unable to care for self due to severe psychosis or tad  Discharge location: [facility] facility  Discharge Medications: not ordered  Follow-up Appointments: not scheduled  Legal Status: court hold as of 9/25/19 - preliminary hearing set was today Monday Sept 30th at 9am.    Final (if needed) will be Thursday Oct 3rd.  Entered by: Bijal Varner on 09/30/2019at 8:23 AM     The patient was seen and the plan was discussed with the attending physician.     Bijal Varner MD  Psychiatry PGY-2 Resident

## 2019-09-30 NOTE — PROGRESS NOTES
"Patient has not slept at all during the night. She has been isolative to her room, frequently very loud, and when asked by staff to please quiet down she has yelled \"I am praying\".  She appeared to be responding to auditory & visual hallucinations, and was even noted to be standing in front of her TV and arguing with a commercial.  She at times was standing motionless in front of her door and appeared to be staring out at the hallway. Patient was difficult to redirect with becoming more quiet, but finally did become so.  Reassurance given & attempts to update patient on the date & time, but patient simply yelled at staff \"NO!\" & turned around giving staff her back.  "

## 2019-09-30 NOTE — PLAN OF CARE
RN ASSESSMENT   Patient isolative to self and her room this shift. Appears responding when in her room, taking to self loudly, arguing, sometimes waling. She did not go to court today. Patient  refused all cares and refused all scheduled medications. She refused phone call from her . Denied suicidal thoughts, and told this writer to leave her alone. Mood labile, mostly irritable and angry.  Demonstrated agitation this shift, yelling at the provider and pointing her fingers at the provider. Affect angry and tense. She verbalized frustration with court commitment procedure. Insight poor. Sleep remains poor. Stayed awake all night last night and only slept 3 hours a night before. Current legal status court hold. She continues to refuse VS assessment, despite all attempts. Last VS elevated and abnormal on 9/22 BP (!) 146/105   Pulse 132   Temp 100  F (37.8  C) (Tympanic)   Resp 18   LMP  (LMP Unknown)   SpO2 100%   Refer to case manger notes for discharge planing and recommendations. Continue with current treatment plan and recommendations. Continue to monitor and reassess symptoms. Monitor response to medications. Monitor progress towards treatment goals. Encourage groups and participation.

## 2019-10-01 PROCEDURE — 12400001 ZZH R&B MH UMMC

## 2019-10-01 PROCEDURE — 99232 SBSQ HOSP IP/OBS MODERATE 35: CPT | Mod: GC | Performed by: PSYCHIATRY & NEUROLOGY

## 2019-10-01 ASSESSMENT — ACTIVITIES OF DAILY LIVING (ADL)
HYGIENE/GROOMING: PROMPTS;INDEPENDENT
ORAL_HYGIENE: INDEPENDENT;PROMPTS
DRESS: SCRUBS (BEHAVIORAL HEALTH)
ORAL_HYGIENE: PROMPTS
DRESS: SCRUBS (BEHAVIORAL HEALTH)
HYGIENE/GROOMING: PROMPTS

## 2019-10-01 NOTE — PROGRESS NOTES
"Writer simply went to show pt she had a phone message [from her son, though was not even able to tell her that] as she was standing in the hallway by her room, though she started screaming at writer \"get away from me, who do you think you are, you are not my nurse!\" And continued walking toward writing yelling.  "

## 2019-10-01 NOTE — PROGRESS NOTES
Pt isolative, withdrawn to room all shift, resting, sleeping on and off.  Pt declined when offered dinner.  Neglected grooming.  No SI/SIB or medication side effects observed.         09/30/19 2100   Behavioral Health   Insight poor   Judgement impaired   Eye Contact at examiner   Affect blunted, flat   Mood labile   Physical Appearance/Attire untidy   Hygiene neglected grooming - unclean body, hair, teeth   Suicidality other (see comments)  (none observed)   1. Wish to be Dead (Past Month) No   2. Non-Specific Active Suicidal Thoughts (Past Month) No   Self Injury other (see comment)  (none observed)   Activity isolative;withdrawn   Speech clear;coherent   Medication Sensitivity no observed side effects   Psychomotor / Gait balanced;steady   Psycho Education   Type of Intervention 1:1 intervention   Response observes from a distance   Activities of Daily Living   Hygiene/Grooming independent   Oral Hygiene independent   Dress scrubs (behavioral health);independent   Laundry unable to complete   Room Organization independent   Behavioral Health Interventions   Social and Therapeutic Interventions (Psychotic Symptoms) encourage socialization with peers;encourage participation in therapeutic groups and milieu activities

## 2019-10-01 NOTE — PROGRESS NOTES
10/01/19 7628   Significant Event   Significant Event Other (see comments)  (shift summary)   Pt was very irritable, tense, and paranoid this shift. During any interaction initiated by another person she would begin shouting loudly at them. She has also been spending the majority of the day close to the exit doors, hypervigilant. Staff redirected and watched her closely, and she did not make any actual attempts to elope. At one point she was hovering closely over a peer who was on the phone. When redirected by staff she glared at staff and silently moved away.  Refusing all vital signs and all social interactions whatsoever.

## 2019-10-01 NOTE — PLAN OF CARE
BEHAVIORAL TEAM DISCUSSION    Participants: dr hicks, resident dr thurston, shilo constantino rn, benja antunez [facility]    Progress: None/poor.   Remains floridly psychotic, often agitated, and refuses all meds.   She mostly isolates to her room.   She is observed talking to herself, laughing to self, arguing with TV, and wailing sometimes.   Poor ADL's, poor sleep, poor appitite, poor insight.   She is not connecting with others - staff nor peers.  She refused to go to her prelim hearing yesterday and refused to talk with her  who called her.    Anticipated length of stay: unknown  Continued Stay Criteria/Rationale: see above  Medical/Physical: no new issues  Precautions:   Behavioral Orders   Procedures    Assault precautions    Code 1 - Restrict to Unit    Elopement precautions    Routine Programming     As clinically indicated    Status 15     Every 15 minutes.     Plan:    Meds per psychiatrists.    Seeking MI commitment and Hatch order so we can force treatment.    Discharge plan is unable to be worked on at this time as pt is too psychotic and agitated.      She will either go back to her son's apartment in [place], or accept an IRT's placement, go to a friends/family members, or go to a homeless shelter.  Assist with outpt psychiatry appointment.  Final commitment hearing is Thursday Oct 3rd at 1:30pm    Rationale for change in precautions or plan: no change at this time.

## 2019-10-01 NOTE — PROGRESS NOTES
"[facility], [place]   Psychiatric Progress Note  Hospital Day: 9        Interim History:   The patient's care was discussed with the treatment team during the daily team meeting and/or staff's chart notes were reviewed.  Staff report that Michelle was isolative, withdrawn and irritable with poor hygiene.  She was observed to be responding to internal stimuli.  She declined to have any dinner, vital signs assessments or medications.     Slept 0 hours (09/30/19 2100)     Michelle was interviewed in her room.  She was sitting on her bed prior to the interview then stood up when the team came. She shouted \"What do you want?! What do you want to check?\" Attempted to engage her in an interview however she just stood there with a blank look on her face and did not answer questions. Team ultimately terminated the interview at which point she said \"Don't come back! Don't come back! I'm not talking to you!\"    Psychiatric Symptoms: irritability, aggressive behavior, delusions, paranoia, poor hygiene, disorganization, response to internal stimuli     Medication side effects reported: None, patient is not taking any medications   Other issues reported by patient: None          Medications:       atorvastatin  40 mg Oral QPM     calcium carbonate 500 mg (elemental)  500 mg Oral BID w/meals     chlorproMAZINE  150 mg Oral At Bedtime     cholecalciferol  50,000 Units Oral Q7 Days     losartan  100 mg Oral Daily     metoprolol succinate ER  50 mg Oral Daily     multivitamin, therapeutic  1 tablet Oral Daily     trihexyphenidyl  5 mg Oral BID          Allergies:     Allergies   Allergen Reactions     Lisinopril Cough          Labs:   No results found for this or any previous visit (from the past 48 hour(s)).          Psychiatric Examination:     BP (!) 146/105   Pulse 132   Temp 100  F (37.8  C) (Tympanic)   Resp 18   LMP  (LMP Unknown)   SpO2 100%   Weight is 0 lbs 0 oz  There is no height or weight on " file to calculate BMI.    Orthostatic Vitals     None        Appearance:  female who appears her stated age, wearing a cap over her hair, resting in bed prior to the interview, stood up during interview with blank stare on her face, malodorous   Attitude:  uncooperative and guarded   Eye Contact:  intense  Mood: Did not respond  Affect:  labile, irritable, upset, intensity is heightened  Speech: some dysarthria and a thick accent making her difficulty to understand at times   Psychomotor Behavior:  no evidence of tardive dyskinesia, dystonia, or tics  Thought Process:   Perseverative on certain subjects, somewhat disorganized  Associations:  no loose associations  Thought Content:  no evidence of suicidal ideation or homicidal ideation, appears to be responding to internal stimuli at times   Insight:  poor, denies MH symptoms   Judgment:  poor, as evidenced by poor social boundaries, declining medications and to talk with her , declines medical assessment   Oriented to:  time, person, and place  Attention Span and Concentration:  sufficient enough to participate in the interview   Recent and Remote Memory:  fair  Language:  english with appropriate and vocabulary  Fund of Knowledge: low-normal  Muscle Strength and Tone: normal  Gait and Station: Normal    Clinical Global Impressions  First:  Considering your total clinical experience with this particular patient population, how severe are the patient's symptoms at this time?: 7 (09/25/19 1414)  Compared to the patient's condition at the START of treatment, this patient's condition is:: 4 (09/25/19 1414)  Most recent:  Considering your total clinical experience with this particular patient population, how severe are the patient's symptoms at this time?: 7 (09/25/19 1414)  Compared to the patient's condition at the START of treatment, this patient's condition is:: 4 (09/25/19 1414)           Precautions:     Behavioral Orders   Procedures     Assault  precautions     Code 1 - Restrict to Unit     Elopement precautions     Routine Programming     As clinically indicated     Status 15     Every 15 minutes.          Diagnoses:   Agitation  Schizophrenia, unspecified type (H)         Assessment & Plan:   Assessment and hospital summary:  Michelle Pino is a 59 year old female with prior psychiatric diagnosis of schizophrenia and schizoaffective disorder who presents with psychosis and violence in the context of medication non-adherence. Her last psychiatric hospitalization was from April-June of this year. She was most recently followed by Dr. Yoshi Zabala at [facility]. Current psychosocial stressors include chronic mental health issues, family dynamics and medical issues. Patient's support system includes family, county and outpatient team. Substance use does not appear to be playing a contributing role in the patient's presentation.  There is no known genetic loading. Medical history does appear to be significant for Vitamin D deficiency and prior CVA. The MSE on admission was notable for lack of SI or HI, psychosis, irritability, aggression and lack of insight. She denied self injurious behaviors. Her current presentation is consistent with a schizophrenia spectrum disorder. It is unclear if there is a mood component to her illness. Her ongoing decreased need for sleep does raise concern for this.     Target psychiatric symptoms and interventions:  Psychosis with aggression   - continue Thorazine 150 mg at bedtime (patient has been consistently declined to take)    - PO/IM B52's PRN for agitation or aggression     Medical Problems and Treatments:  No acute medical issues at this time.      #. Hyperlipidemia- continue PTA atorvastatin   #. Vitamin D Deficiency - continue PTA Cholecalciferol   #. Hypertension- continue PTA metoprolol      Behavioral/Psychological/Social:  Encourage unit programming    She has not been in seclusion or restraints in the past 24 hours      Disposition Plan   Reason for ongoing admission: poses an imminent risk to others and is unable to care for self due to severe psychosis or tad  Discharge location: [facility] facility  Discharge Medications: not ordered  Follow-up Appointments: not scheduled  Legal Status: court hold as of 9/25/19 - preliminary hearing set was today Monday Sept 30th at 9am.    Final (if needed) will be Thursday Oct 3rd.  Entered by: Bijal Varner on 10/01/2019at 8:26 AM     The patient was seen and the plan was discussed with the attending physician.     Bijal Varner MD  Psychiatry PGY-2 Resident

## 2019-10-01 NOTE — PROGRESS NOTES
Pt is isolative and withdrawn to her room. She was dismissive when this writer tried to engage her in conversation. She appeared responding to internal stimuli as she was observed talking to herself. She refused her scheduled medication.  She did not go to court today. No agitation or aggression this evening. Poor appetite as she refused her dinner.  Poor hygiene maintenance.  No SI/SIB.

## 2019-10-02 PROCEDURE — 12400001 ZZH R&B MH UMMC

## 2019-10-02 PROCEDURE — 25000128 H RX IP 250 OP 636: Performed by: STUDENT IN AN ORGANIZED HEALTH CARE EDUCATION/TRAINING PROGRAM

## 2019-10-02 PROCEDURE — 99233 SBSQ HOSP IP/OBS HIGH 50: CPT | Mod: GC | Performed by: PSYCHIATRY & NEUROLOGY

## 2019-10-02 RX ORDER — CHLORPROMAZINE HYDROCHLORIDE 25 MG/ML
50 INJECTION INTRAMUSCULAR 2 TIMES DAILY
Status: DISCONTINUED | OUTPATIENT
Start: 2019-10-02 | End: 2019-10-07

## 2019-10-02 RX ADMIN — CHLORPROMAZINE HYDROCHLORIDE 50 MG: 25 INJECTION INTRAMUSCULAR at 20:25

## 2019-10-02 ASSESSMENT — ACTIVITIES OF DAILY LIVING (ADL)
HYGIENE/GROOMING: INDEPENDENT
ORAL_HYGIENE: PROMPTS
ORAL_HYGIENE: INDEPENDENT
HYGIENE/GROOMING: PROMPTS
DRESS: INDEPENDENT
DRESS: SCRUBS (BEHAVIORAL HEALTH)

## 2019-10-02 NOTE — PROGRESS NOTES
"Pt was isolative and withdrawn this shift. Pt is very paranoid and guarded. She spent almost the entire shift hiding/sleeping in a corner of her room where it is difficult to see her. Pt is irritable upon approach. She refused to check in with this writer, shouting, \"Get out of my room!\" when writer approached her. Pt appears to be responding to internal stimuli. She is slow to respond to questions. Pt refused dinner. No signs of SI/SIB/HI.  "

## 2019-10-02 NOTE — PROGRESS NOTES
"[facility], [place]   Psychiatric Progress Note  Hospital Day: 10        Interim History:   The patient's care was discussed with the treatment team during the daily team meeting and/or staff's chart notes were reviewed.  Staff report that Michelle continues to be isolative, paranoid and irritable with poor hygiene.  She spent part of the day near the exit doors appearing hypervigilant but did not attempt to elope.  She also spent part of the day in her room in the corner where it is hard to see her.  She was observed to be responding to internal stimuli.  She declined to have any meals, vital signs assessments or medications.  Nursing reports that she has been observed to eat or drink for several days.     This morning requested to call a cab so she can leave.      Slept 5.5 hours (10/02/19 0600)     Michelle was interviewed in her room.  She was laying in the corner of her room prior to the interview.  She reported that she is \"good\"and that she slept \"good.  \"She reported that she wants to go home to eat food and get a job. Team expressed our concerns that she is not eating or drinking for the past few days.  She stated \"I am not going to eat and drink here because the food is giving me a cold in my heart.\"  Encouraged her to allow staff to check her vital signs however she declined this as well.  She was reminded of her court day tomorrow to which she replied \"I am not going to court.  I do not want commitment.\"     Psychiatric Symptoms: irritability, aggressive behavior, delusions, paranoia, poor hygiene, disorganization, response to internal stimuli     Medication side effects reported: None, patient is not taking any medications   Other issues reported by patient: None          Medications:       atorvastatin  40 mg Oral QPM     calcium carbonate 500 mg (elemental)  500 mg Oral BID w/meals     chlorproMAZINE  150 mg Oral At Bedtime     cholecalciferol  50,000 Units Oral Q7 Days     " losartan  100 mg Oral Daily     metoprolol succinate ER  50 mg Oral Daily     multivitamin, therapeutic  1 tablet Oral Daily     trihexyphenidyl  5 mg Oral BID          Allergies:     Allergies   Allergen Reactions     Lisinopril Cough          Labs:   No results found for this or any previous visit (from the past 48 hour(s)).          Psychiatric Examination:     BP (!) 146/105   Pulse 132   Temp 100  F (37.8  C) (Tympanic)   Resp 18   LMP  (LMP Unknown)   SpO2 100%   Weight is 0 lbs 0 oz  There is no height or weight on file to calculate BMI.    Orthostatic Vitals     None        Appearance:  female who appears her stated age, wearing a cap over her hair, laying on the floor in the corner of her room prior to the interview, malodorous   Attitude:  uncooperative and guarded   Eye Contact:  intense  Mood: Did not respond  Affect:  labile, irritable, upset, intensity is heightened  Speech: some dysarthria and a thick accent making her difficulty to understand at times   Psychomotor Behavior:  no evidence of tardive dyskinesia, dystonia, or tics  Thought Process:   Perseverative on certain subjects, somewhat disorganized  Associations:  no loose associations  Thought Content:  no evidence of suicidal ideation or homicidal ideation, appears to be responding to internal stimuli at times   Insight:  poor, denies MH symptoms   Judgment:  poor, as evidenced by poor social boundaries, declining medications and to eat or drink.   Oriented to:  time, person, and place  Attention Span and Concentration:  sufficient enough to participate in the interview   Recent and Remote Memory:  fair  Language:  english with appropriate and vocabulary  Fund of Knowledge: low-normal  Muscle Strength and Tone: normal  Gait and Station: Normal    Clinical Global Impressions  First:  Considering your total clinical experience with this particular patient population, how severe are the patient's symptoms at this time?: 7 (09/25/19  1414)  Compared to the patient's condition at the START of treatment, this patient's condition is:: 4 (09/25/19 1414)  Most recent:  Considering your total clinical experience with this particular patient population, how severe are the patient's symptoms at this time?: 7 (09/25/19 1414)  Compared to the patient's condition at the START of treatment, this patient's condition is:: 4 (09/25/19 1414)         Precautions:     Behavioral Orders   Procedures     Assault precautions     Code 1 - Restrict to Unit     Elopement precautions     Routine Programming     As clinically indicated     Status 15     Every 15 minutes.          Diagnoses:   Agitation  Schizophrenia, unspecified type (H)         Assessment & Plan:   Assessment and hospital summary:  Michelle Pino is a 59 year old female with prior psychiatric diagnosis of schizophrenia and schizoaffective disorder who presents with psychosis and violence in the context of medication non-adherence. Her last psychiatric hospitalization was from April-June of this year. She was most recently followed by Dr. Yoshi Zabala at [facility]. Current psychosocial stressors include chronic mental health issues, family dynamics and medical issues. Patient's support system includes family, county and outpatient team. Substance use does not appear to be playing a contributing role in the patient's presentation.  There is no known genetic loading. Medical history does appear to be significant for Vitamin D deficiency and prior CVA. The MSE on admission was notable for lack of SI or HI, psychosis, irritability, aggression and lack of insight. She denied self injurious behaviors. Her current presentation is consistent with a schizophrenia spectrum disorder. It is unclear if there is a mood component to her illness. Her ongoing decreased need for sleep does raise concern for this.     Target psychiatric symptoms and interventions:  Psychosis with aggression, refusal to eat food or drink fluids    - Thorazine 75 mg PO BID or 50 mg BID IM backup  - PO/IM B52's PRN for agitation or aggression   - Emergency forced medications were initiated on 10/2/19 due to her ongoing refusal to eat or drink for at least 3 days due to paranoid ideation. She has been witnessed to be unsteady on her feet and has refused to allow any medical or vital signs assessments to be completed. There is concern of imminent harm to herself due to medical complications from the above, hence the initiation of EFMs prior to her Hatch hearing which is scheduled for 10/3.     Medical Problems and Treatments:  No acute medical issues at this time.      #. Hyperlipidemia- continue PTA atorvastatin   #. Vitamin D Deficiency - continue PTA Cholecalciferol   #. Hypertension- continue PTA metoprolol      Behavioral/Psychological/Social:  Encourage unit programming    She has not been in seclusion or restraints in the past 24 hours     Disposition Plan   Reason for ongoing admission: poses an imminent risk to others and is unable to care for self due to severe psychosis or tad  Discharge location: [facility] facility  Discharge Medications: not ordered  Follow-up Appointments: not scheduled  Legal Status: court hold as of 9/25/19 - preliminary hearing was Monday Sept 30th. Final hearing is Thursday Oct 3rd @1:30PM   Entered by: Bijal Varner on 10/02/2019at 8:20 AM     The patient was seen and the plan was discussed with the attending physician.     Bijal Varner MD  Psychiatry PGY-2 Resident

## 2019-10-02 NOTE — PROGRESS NOTES
Diagnostic Assessment was completed and faxed yesterday to [facility].    ([facility] [employer] closed this case prior to pt's admission.)

## 2019-10-02 NOTE — PROGRESS NOTES
Several staff as well as RN writer approached patient encouraging and prompting to eat dinner. Patient in all attempts yelled staff out of her room. RN writer attempted to leave the tray in her room but patient immediately returned it untouched. Will continue to monitor.

## 2019-10-02 NOTE — PLAN OF CARE
"  Pt presents with distractible thinking, poor concentration, delusional thought pattern, and paranoid behavior. Affect angry, tense and irritable. Mood irritable. Pt untidy and neglecting personal hygiene. Activity isolative, withdrawn and hyperactive. Speech pressured.     Pt seen to be highly observant while staring at individuals in milieu. Pt seen to loiter near exit doors at times but shows improvement in this behavior from yesterday am shift. Pt hypervigilant of individuals going by her door or entering/exiting the unit. Pt heard screaming \"get away from my door!\" When staff walks by door at times.      Pt seen pacing in the chen at times. Pt seen standing in her room yelling \"stop it grandma! Stop it grandma!\" While being directly in front of her TV and pointing at the TV.     Pt continues to refuse vital sign assessment, medications and meals. Pt states she will not take medications, eat or have vitals assessed but wants to discharge.     At approximately 1045, Pt stated to staff that she wanted a cab called so she could leave the facility.\"  "

## 2019-10-03 PROCEDURE — 25000132 ZZH RX MED GY IP 250 OP 250 PS 637: Performed by: STUDENT IN AN ORGANIZED HEALTH CARE EDUCATION/TRAINING PROGRAM

## 2019-10-03 PROCEDURE — 25000128 H RX IP 250 OP 636: Performed by: STUDENT IN AN ORGANIZED HEALTH CARE EDUCATION/TRAINING PROGRAM

## 2019-10-03 PROCEDURE — 12400001 ZZH R&B MH UMMC

## 2019-10-03 RX ADMIN — CHLORPROMAZINE HYDROCHLORIDE 50 MG: 25 INJECTION INTRAMUSCULAR at 09:43

## 2019-10-03 RX ADMIN — CHLORPROMAZINE HYDROCHLORIDE 75 MG: 50 TABLET, SUGAR COATED ORAL at 20:17

## 2019-10-03 ASSESSMENT — ACTIVITIES OF DAILY LIVING (ADL)
ORAL_HYGIENE: PROMPTS
LAUNDRY: UNABLE TO COMPLETE
LAUNDRY: UNABLE TO COMPLETE
HYGIENE/GROOMING: PROMPTS
DRESS: SCRUBS (BEHAVIORAL HEALTH)
DRESS: SCRUBS (BEHAVIORAL HEALTH)
ORAL_HYGIENE: PROMPTS
HYGIENE/GROOMING: PROMPTS

## 2019-10-03 NOTE — PROGRESS NOTES
Pt refused to go to court multiple times today. Multiple staff attempted to speak with Pt to go to court. Pt refused to dress in street clothes to prepare for court. [facility] staff present at 1305 to transport Pt to court. Pt refused. Pt continued to refuse to have vital sign assessment, eat food, drink water and take scheduled medications (Pt did receive emergent IM Thorazine). Pt educated on importance of proper self care (eating, drinking, medication, vital sign assessment, etc.) by multiple staff throughout shift.

## 2019-10-03 NOTE — PROVIDER NOTIFICATION
"   10/02/19 2030   Seclusion or Restraint Order   Length of Order 4   Order Obtained Yes   Attending Physician Notified Yes   Attending Physician's Name Bran   Leadership   Seclus/Restraint >12H or 2x/24H Notified   Assessment   Less Restrictive Alternative Verbal de-escalation;Immediate action required, no least restrictive measures could be attempted   Justification   Clinical Justification Others   Education   Discontinuation Criteria Other (see comments)  (physical hold by staff to administer IM emergency medication)   Criteria Explained Yes   Patient's Response RT   Family Notification D   Restraint Monitoring Q15 Minutes   Psychological Status YE   Debriefing   Debriefing DO   Does patient understand why the event happened? No   Does patient agree to safe behaviors? Patient unable to answer   What can we do differently so this doesn't happen again? Patient unable to answer   Plan of care reviewed and modified Yes     Due to patients poor self care and no observed intake in 3 day provider ordered administration of IM Thorazine if patient refusing oral form of medication. 2 RN's attempted on several occasions to offer and educate patient of medication with patient responding in all situation by yelling and demanding to get out of her room. Code green which was increased to a code 21 called due to patient standing appearing to be attempting to barricade door yelling \"paralyzed, call the police\". With presence of code team patient still refusing to accept medication. Staff made entry into patients room and took control of patients arm walking her to her bed to facilitate administration by RN writer of Intramuscular Thorazine. Medication administered and staff able to exit patient room with no further issues. No further physical hold, restraints, or seclusion needed after administration of medication.  "

## 2019-10-03 NOTE — PROGRESS NOTES
"   10/02/19 0512   Behavioral Health   Hallucinations denies / not responding to hallucinations   Thinking paranoid;delusional;confused;distractable   Orientation person: oriented   Insight poor;denial of illness   Judgement impaired   Eye Contact at examiner;staring   Affect irritable;tense;angry   Mood anxious;irritable   Physical Appearance/Attire disheveled   Hygiene neglected grooming - unclean body, hair, teeth   Suicidality other (see comments)  (AFSHAN)   Elopement   (No signs.)   Activity withdrawn;isolative   Speech rambling;pressured   Medication Sensitivity no observed side effects;no stated side effects   Psychomotor / Gait balanced;steady   Activities of Daily Living   Hygiene/Grooming prompts   Oral Hygiene prompts   Dress scrubs (behavioral health)   Room Organization independent   Activity   Activity Assistance Provided independent     Pt came out of room a few times, but writer could not make out what she was saying. Pt did not seem to understand  what a room search was, even after writer explained it to her. Pt was asked if she wanted to eat her dinner, she   said \"no eat.\" A few minutes later RN brought in tray and uncovered meal in front of her. Pt shouted at RN and   shortly after brought her tray out. Her meal was undisturbed. She mumbled something to writer and went back   into her room.  "

## 2019-10-03 NOTE — PROVIDER NOTIFICATION
In person face to face assessment completed for physical hold for administering Hatch. Patient experienced no adverse physical outcome from restraint. MD Varner aware. Continuation of restraints not indicated once Hatch IM medication was given.       10/03/19 0956   Seclusion or Restraint Order   In Person Face to Face Assessment Conducted Yes-Eval of pt's immediate situation, reaction to intervention, complete review of systems assessment, behavioral assessment & review/assessment of hx, drugs & meds, recent labs, etc, behavioral condition, need to continue/terminate restraint/seclusion   Patient Experienced No adverse physical outcome from seclusion/restraint initiation   Continuation of Seclus/Restraint indicated at this time No   Describe actions taken physical hold to administer Hatch medications

## 2019-10-03 NOTE — PROVIDER NOTIFICATION
10/03/19 0955   Justification   Clinical Justification Others     Pt currently ordered Thorazine BID for emergent medication (see MAR). Pt refused PO medication X4 during morning. Pt was given moderate time in between multiple offerings of the PO medication. Writer attempted to educate Pt on importance of medication intake. Pt shows no evidence of learning. Writer also attempted distraction and empathetic listening during PO offerings. Code green called. Pt was in her room while staff and writer entered. Pt seen sitting in the corner of her floor, under her TV. Writer offered PO medication (fourth time). Pt refused. Staff went hands on using [employer] training. Pt brought to her bed and put in prone position. IM medication administered. Physical hold was from [id number]. Pt was refusing medication/process with raised and pressured voice. No injury occurred. Provider aware of situation.

## 2019-10-03 NOTE — PROGRESS NOTES
"[facility], [place]   Psychiatric Progress Note  Hospital Day: 11        Interim History:   The patient's care was discussed with the treatment team during the daily team meeting and/or staff's chart notes were reviewed.  Staff report that Michelle has been isolative, tense, paranoid, distracted and irritable with poor hygiene.  Several attempts were made to encourage her to eat and drink however she has continued to decline, often yelling at staff.  When staff approached her regarding medications she declined orals and was educated about the IM backup.  Ultimately a code green was called and escalated to a code 21 after she attempted to barricade her door.  She continued to refuse medications despite the code team being present and IM Thorazine was ultimately delivered with help with the code team.  Please see nursing documentation for further details.  She has continued to decline all other medications and vital sign assessments.    This morning she was out in the chen making racial slurs towards a staff member and she declined oral meds 3 times.     Slept 5 hours (10/03/19 0600)     Today Michelle reports her mood is \"good\" and that she slept last night. She said she is upset because the staff gave her an IM medication. Discussed our concerns that she is not eating or drinking and that this can be dangerous and potentially result in death. She continued to state that she will not eat or drink. She asked to discharge and was reminded that she has a court hearing this afternoon. She stated she does not want to go to court and that she wants to discharge. She began raising her voice and walking towards writer so the interview was terminated and asking me to enter her room.     Psychiatric Symptoms: irritability, aggressive behavior, delusions, paranoia, poor hygiene, disorganization, response to internal stimuli     Medication side effects reported: None, patient is not taking any medications " "  Other issues reported by patient: None          Medications:       atorvastatin  40 mg Oral QPM     calcium carbonate 500 mg (elemental)  500 mg Oral BID w/meals     chlorproMAZINE  75 mg Oral BID    Or     chlorproMAZINE  50 mg Intramuscular BID     cholecalciferol  50,000 Units Oral Q7 Days     losartan  100 mg Oral Daily     metoprolol succinate ER  50 mg Oral Daily     multivitamin, therapeutic  1 tablet Oral Daily     trihexyphenidyl  5 mg Oral BID          Allergies:     Allergies   Allergen Reactions     Lisinopril Cough          Labs:   No results found for this or any previous visit (from the past 48 hour(s)).          Psychiatric Examination:     BP (!) 146/105   Pulse 132   Temp 100  F (37.8  C) (Tympanic)   Resp 18   LMP  (LMP Unknown)   SpO2 100%   Weight is 0 lbs 0 oz  There is no height or weight on file to calculate BMI.    Orthostatic Vitals     None        Appearance:  female who appears her stated age, wearing a cap over her hair, laying on the floor in the corner of her room prior to the interview, wakes easily to door knock, malodorous   Attitude:  uncooperative and guarded   Eye Contact:  intense  Mood: \"good\"  Affect:  labile, irritable, upset, intensity is heightened  Speech: some dysarthria and a thick accent making her difficulty to understand at times   Psychomotor Behavior:  no evidence of tardive dyskinesia, dystonia, or tics  Thought Process:   Perseverative on certain subjects, somewhat disorganized  Associations:  no loose associations  Thought Content:  no evidence of suicidal ideation or homicidal ideation, appears to be responding to internal stimuli at times   Insight:  poor, denies MH symptoms   Judgment:  poor, as evidenced by poor social boundaries, declining medications and to eat or drink.   Oriented to:  time, person, and place  Attention Span and Concentration:  sufficient enough to participate in the interview   Recent and Remote Memory:  fair  Language:  " english with appropriate and vocabulary  Fund of Knowledge: low-normal  Muscle Strength and Tone: normal  Gait and Station: Normal    Clinical Global Impressions  First:  Considering your total clinical experience with this particular patient population, how severe are the patient's symptoms at this time?: 7 (09/25/19 1414)  Compared to the patient's condition at the START of treatment, this patient's condition is:: 4 (09/25/19 1414)  Most recent:  Considering your total clinical experience with this particular patient population, how severe are the patient's symptoms at this time?: 7 (09/25/19 1414)  Compared to the patient's condition at the START of treatment, this patient's condition is:: 4 (09/25/19 1414)         Precautions:     Behavioral Orders   Procedures     Assault precautions     Code 1 - Restrict to Unit     Elopement precautions     Routine Programming     As clinically indicated     Status 15     Every 15 minutes.          Diagnoses:   Agitation  Schizophrenia, unspecified type (H)         Assessment & Plan:   Assessment and hospital summary:  Michelle Pino is a 59 year old female with prior psychiatric diagnosis of schizophrenia and schizoaffective disorder who presents with psychosis and violence in the context of medication non-adherence. Her last psychiatric hospitalization was from April-June of this year. She was most recently followed by Dr. Yoshi Zabala at [facility]. Current psychosocial stressors include chronic mental health issues, family dynamics and medical issues. Patient's support system includes family, county and outpatient team. Substance use does not appear to be playing a contributing role in the patient's presentation.  There is no known genetic loading. Medical history does appear to be significant for Vitamin D deficiency and prior CVA. The MSE on admission was notable for lack of SI or HI, psychosis, irritability, aggression and lack of insight. She denied self injurious  behaviors. Her current presentation is consistent with a schizophrenia spectrum disorder. It is unclear if there is a mood component to her illness. Her ongoing decreased need for sleep does raise concern for this.     Target psychiatric symptoms and interventions:  Psychosis with aggression, refusal to eat food or drink fluids   - Thorazine 75 mg PO BID or 50 mg BID IM backup  - PO/IM B52's PRN for agitation or aggression   - Emergency forced medications were initiated on 10/2/19 due to her ongoing refusal to eat or drink for at least 3 days due to paranoid ideation. She has been witnessed to be unsteady on her feet and has refused to allow any medical or vital signs assessments to be completed. There is concern of imminent harm to herself due to medical complications from the above, hence the initiation of EFMs prior to her Hatch hearing which is scheduled for 10/3.     Medical Problems and Treatments:  No acute medical issues at this time.      #. Hyperlipidemia- continue PTA atorvastatin   #. Vitamin D Deficiency - continue PTA Cholecalciferol   #. Hypertension- continue PTA metoprolol      Behavioral/Psychological/Social:  Encourage unit programming    She has not been in seclusion or restraints in the past 24 hours     Disposition Plan   Reason for ongoing admission: poses an imminent risk to self, poses an imminent risk to others and is unable to care for self due to severe psychosis or tad  Discharge location: [facility] facility  Discharge Medications: not ordered  Follow-up Appointments: not scheduled  Legal Status: court hold as of 9/25/19 - preliminary hearing was Monday Sept 30th. Final hearing is today - Thursday Oct 3rd @1:30PM   Entered by: Bijal Varner on 10/03/2019at 8:16 AM     The patient was seen independently by the resident.     Bijal Varner MD  Psychiatry PGY-2 Resident

## 2019-10-03 NOTE — PROVIDER NOTIFICATION
"   10/03/19 0957   Debriefing   Debriefing DO   Does patient understand why the event happened? No   Does patient agree to safe behaviors? Patient unable to answer   What can we do differently so this doesn't happen again? Patient unable to answer   Plan of care reviewed and modified Yes     Physical hold from [id number] for IM Thorazine administration (see MAR and writer's previous note for clinical justification). Staff able to use [employer] training to go hands on for injection. Staff able to exit room without incident. Pt shows no evidence of learning. Pt unable to communicate what can be done differently. Pt exhibits inability to answer debriefing questions. Pt seen to exit room after medication administration. Pt seen to be irritable and posturing; however, seclusion was not needed. Pt continues to be perseverative on not eating, drinking, having vital signs assessed and taking medications. Pt states \"no , no , no . Committment, Committment, Commitment\" when writer spoke with her about going to court today.  "

## 2019-10-03 NOTE — PROVIDER NOTIFICATION
"   10/02/19 2031   Seclusion or Restraint Order   In Person Face to Face Assessment Conducted Yes-Eval of pt's immediate situation, reaction to intervention, complete review of systems assessment, behavioral assessment & review/assessment of hx, drugs & meds, recent labs, etc, behavioral condition, need to continue/terminate restraint/seclusion   Patient Experienced No adverse physical outcome from seclusion/restraint initiation   Continuation of Seclus/Restraint indicated at this time No       Patient was lying on the ground in her room, reporting she was \"Sleeping\".  Patient denied pain.  She appeared in no acute distress.      She stated she was unsure why she received IM medications.  Writer told the patient that she had not been eating, or drinking and the purpose the neuroleptic emergency.  Patient responded with \"I am never going to drink or eat\".      On-call provider (Dr. Sotomayor) notified of the results of the face to face.  "

## 2019-10-03 NOTE — PROGRESS NOTES
Patient appears to have slept a total of 5 hours during the night - mainly at the start of the night.  Patient appeared to be talking to herself a lot when awake, and is now talking to the TV.  Appears less angry than previously, but still refusing all food or fluids offered.

## 2019-10-04 PROCEDURE — 25000132 ZZH RX MED GY IP 250 OP 250 PS 637: Performed by: PSYCHIATRY & NEUROLOGY

## 2019-10-04 PROCEDURE — 25000132 ZZH RX MED GY IP 250 OP 250 PS 637: Performed by: STUDENT IN AN ORGANIZED HEALTH CARE EDUCATION/TRAINING PROGRAM

## 2019-10-04 PROCEDURE — 99233 SBSQ HOSP IP/OBS HIGH 50: CPT | Mod: GC | Performed by: PSYCHIATRY & NEUROLOGY

## 2019-10-04 PROCEDURE — 12400001 ZZH R&B MH UMMC

## 2019-10-04 RX ADMIN — CHLORPROMAZINE HYDROCHLORIDE 75 MG: 50 TABLET, SUGAR COATED ORAL at 20:19

## 2019-10-04 RX ADMIN — CHLORPROMAZINE HYDROCHLORIDE 75 MG: 50 TABLET, SUGAR COATED ORAL at 09:56

## 2019-10-04 ASSESSMENT — ACTIVITIES OF DAILY LIVING (ADL)
DRESS: INDEPENDENT
ORAL_HYGIENE: INDEPENDENT
HYGIENE/GROOMING: INDEPENDENT
LAUNDRY: UNABLE TO COMPLETE
DRESS: SCRUBS (BEHAVIORAL HEALTH)
ORAL_HYGIENE: INDEPENDENT;PROMPTS
LAUNDRY: UNABLE TO COMPLETE
HYGIENE/GROOMING: INDEPENDENT;PROMPTS

## 2019-10-04 NOTE — PLAN OF CARE
Problem: Psychotic Symptoms  Goal: Psychotic Symptoms  Outcome: Improving     Patient had a much improved shift.  She ate part (75%) of her supper and drank a cup of tea plus one additional cup of water at 2100.  Patient currently has a neuroleptic emergency, as she had not been eating, or drinking.  Patient took her Thorazine PO, but stated  This is too much for me, it is making my brain into air .  She refused all other medications.  Patient had two moments of agitation, where she was yelling on the phone  I kill you! I kill you! I kill you!   Patient continued to yell into the phone, even once the phone was turned off.  She persistently asked to be discharged.    Patient refused VS.  She reports dry skin, but refused unit supply lotion.

## 2019-10-04 NOTE — PROGRESS NOTES
"[facility], [place]   Psychiatric Progress Note  Hospital Day: 12        Interim History:   The patient's care was discussed with the treatment team during the daily team meeting and/or staff's chart notes were reviewed.  Staff report that Michelle has been isolative, tense, paranoid and irritable with poor hygiene.  A code green with hands on was required in the morning for administration of IM Thorazine.  She declined to go to court on the 's office arrived.  She was heard yelling \"I kill you!\" repeatedly on the phone and also yelled derogatory things at staff.  She continued to refuse eating or drinking during the day and will not allow her vital signs to be taken.  She did eat 75% of her dinner and had a cup of tea. She did except p.o. Thorazine in the evening though declined to take all the medications.    Slept 4.25 hours (10/04/19 0600)     Today Michelle reports her mood is \"good.\" She asked the team why were are meeting with her. She stated that \"I did not go to court yesterday.\"  She stated \"no\" when asked if she is having problems with her medications.  She stated \"I am 100% okay.  I do not need to be in the hospital.  \"She asked how long she would be in the hospital for and became very upset when told was expected to be a couple of weeks.  She began yelling at the team that she does not want to stay here that long and the interview was terminated.  She continued to follow up team down the hallway yelling at them.    Psychiatric Symptoms: irritability, aggressive behavior, delusions, paranoia, poor hygiene, disorganization, response to internal stimuli, minimal eating or drinking     Medication side effects reported: None, patient is not taking any medications   Other issues reported by patient: None          Medications:       atorvastatin  40 mg Oral QPM     calcium carbonate 500 mg (elemental)  500 mg Oral BID w/meals     chlorproMAZINE  75 mg Oral BID    Or     " "chlorproMAZINE  50 mg Intramuscular BID     cholecalciferol  50,000 Units Oral Q7 Days     losartan  100 mg Oral Daily     metoprolol succinate ER  50 mg Oral Daily     multivitamin, therapeutic  1 tablet Oral Daily     trihexyphenidyl  5 mg Oral BID          Allergies:     Allergies   Allergen Reactions     Lisinopril Cough          Labs:   No results found for this or any previous visit (from the past 48 hour(s)).          Psychiatric Examination:     BP (!) 146/105   Pulse 132   Temp 100  F (37.8  C) (Tympanic)   Resp 18   LMP  (LMP Unknown)   SpO2 100%   Weight is 0 lbs 0 oz  There is no height or weight on file to calculate BMI.    Orthostatic Vitals     None      Appearance:  female who appears her stated age, wearing a cap over her hair, staring ahead and sitting on her bed, malodorous with poor hygiene   Attitude:  uncooperative and guarded   Eye Contact:  intense  Mood: \"good\" \"I'm 100% okay\"  Affect:  labile, irritable, upset, intensity is heightened  Speech: some dysarthria and a thick accent making her difficulty to understand at times   Psychomotor Behavior:  no evidence of tardive dyskinesia, dystonia, or tics  Thought Process:   Perseverative on certain subjects, somewhat disorganized  Associations:  no loose associations  Thought Content:  no evidence of suicidal ideation or homicidal ideation, appears to be responding to internal stimuli at times.   Insight:  poor, denies MH symptoms   Judgment:  poor, as evidenced by poor social boundaries, making derogatory comments towards staff  Oriented to:  time, person, and place  Attention Span and Concentration:  sufficient enough to participate in the interview   Recent and Remote Memory:  fair  Language:  English with appropriate and vocabulary  Fund of Knowledge: low-normal  Muscle Strength and Tone: normal  Gait and Station: Normal    Clinical Global Impressions  First:  Considering your total clinical experience with this particular patient " population, how severe are the patient's symptoms at this time?: 7 (09/25/19 1414)  Compared to the patient's condition at the START of treatment, this patient's condition is:: 4 (09/25/19 1414)  Most recent:  Considering your total clinical experience with this particular patient population, how severe are the patient's symptoms at this time?: 7 (09/25/19 1414)  Compared to the patient's condition at the START of treatment, this patient's condition is:: 4 (09/25/19 1414)         Precautions:     Behavioral Orders   Procedures     Assault precautions     Code 1 - Restrict to Unit     Elopement precautions     Routine Programming     As clinically indicated     Status 15     Every 15 minutes.          Diagnoses:   Agitation  Schizophrenia, unspecified type (H)         Assessment & Plan:   Assessment and hospital summary:  Michelle Pino is a 59 year old female with prior psychiatric diagnosis of schizophrenia and schizoaffective disorder who presents with psychosis and violence in the context of medication non-adherence. Her last psychiatric hospitalization was from April-June of this year. She was most recently followed by Dr. Yoshi Zabala at [facility]. Current psychosocial stressors include chronic mental health issues, family dynamics and medical issues. Patient's support system includes family, county and outpatient team. Substance use does not appear to be playing a contributing role in the patient's presentation.  There is no known genetic loading. Medical history does appear to be significant for Vitamin D deficiency and prior CVA. The MSE on admission was notable for lack of SI or HI, psychosis, irritability, aggression and lack of insight. She denied self injurious behaviors. Her current presentation is consistent with a schizophrenia spectrum disorder. It is unclear if there is a mood component to her illness. Her ongoing decreased need for sleep does raise concern for this.     Target psychiatric symptoms and  interventions:  Psychosis with aggression, refusal to eat food or drink fluids   - Thorazine 75 mg PO BID or 50 mg BID IM backup   - PO/IM B52's PRN for agitation or aggression     Medical Problems and Treatments:  #concern for malnutrition - patient declined to eat or drink for several days. Decline any vitals assessments or labs.   - nutrition consulted      #. Hyperlipidemia- continue PTA atorvastatin   #. Vitamin D Deficiency - continue PTA Cholecalciferol   #. Hypertension- continue PTA metoprolol      Behavioral/Psychological/Social:  Encourage unit programming    She has not been in seclusion or restraints in the past 24 hours     Disposition Plan   Reason for ongoing admission: poses an imminent risk to self, poses an imminent risk to others and is unable to care for self due to severe psychosis or tad  Discharge location: [facility] facility  Discharge Medications: not ordered  Follow-up Appointments: not scheduled  Legal Status: full commitment with Hatch for Thorazine, Zyprexa, Risperidone, Haldol as of Entered by: Bijal Varner on 10/04/2019at 12:04 PM     The patient was seen and discussed with attending physician.     Bijal Varner MD  Psychiatry PGY-2 Resident

## 2019-10-04 NOTE — PROGRESS NOTES
"CLINICAL NUTRITION SERVICES - ASSESSMENT NOTE     Nutrition Prescription    RECOMMENDATIONS FOR MDs/PROVIDERS TO ORDER:  Encourage PO intake    Malnutrition Status:    Patient does not meet two of the above criteria necessary for diagnosing malnutrition but is at risk for malnutrition    Recommendations already ordered by Registered Dietitian (RD):  None at this time     Future/Additional Recommendations:  Continue to monitor PO intake and wt trends  Adjust supplement regimen as desired by the pt  Calculate estimated needs when wt is documented  Re-offer supplements as able      REASON FOR ASSESSMENT  Michelle Pino is a/an 59 year old female assessed by the dietitian for Provider Order - Patient has not been eating or drinking    NUTRITION HISTORY  Pt refused RD visit so a nutrition history could not be obtained.     CURRENT NUTRITION ORDERS  Diet: Regular  Intake/Tolerance:   - Per RN note from 10/3: Pt continued to refuse to have vital sign assessment, eat food, drink water and take scheduled medications (Pt did receive emergent IM Thorazine). Pt educated on importance of proper self care (eating, drinking, medication, vital sign assessment, etc.) by multiple staff throughout shift.  - Per staff verbal report from 10/4, pt ate dinner last night as well as breakfast and lunch today. She has eaten >50% of these meals and ate 3 oranges as a snack today as well. Pt paranoid that she is being poisoned here so staff does not thing she would consume any supplements.      LABS  Labs reviewed    MEDICATIONS  Medications reviewed  Thera-vit   Vit D3    ANTHROPOMETRICS  Height:  Ht Readings from Last 1 Encounters:   04/27/19 1.6 m (5' 3\")     Most Recent Weight: no wt documented within the last 3 months      IBW: 52.3 kg  BMI: unable to assess d/t no wt documented within the last 3 months   Weight History: Between the last two documented wts, the pt has gained wt.   Wt Readings from Last 10 Encounters:   06/04/19 70.6 kg (155 " lb 11.2 oz)   04/27/19 63.5 kg (140 lb)     Dosing Weight: unable to determine d/t no wt documented within the last 3 months    ASSESSED NUTRITION NEEDS  Estimated Energy Needs: (25 - 30 kcals/kg)  Justification: Maintenance  Estimated Protein Needs: (0.8 - 1 grams of pro/kg)  Justification: Maintenance  Estimated Fluid Needs: (1 mL/kcal)   Justification: Maintenance    PHYSICAL FINDINGS  See malnutrition section below.    MALNUTRITION  % Intake: < 75% for >/= 1 month (non-severe)  % Weight Loss: Unable to assess  Subcutaneous Fat Loss: None observed  Muscle Loss: None observed  Fluid Accumulation/Edema: None noted  Malnutrition Diagnosis: Patient does not meet two of the above criteria necessary for diagnosing malnutrition but is at risk for malnutrition    NUTRITION DIAGNOSIS  Predicted inadequate nutrient intake related to pt eating well now but h/o poor PO.    INTERVENTIONS  Implementation  - Nutrition Education: Unable to complete due to pt's mental status   - Order wt check    Goals  Patient to consume % of nutritionally adequate meal trays TID, or the equivalent with supplements/snacks.     Monitoring/Evaluation  Progress toward goals will be monitored and evaluated per protocol.      Myriam Hyman RD, LD  Pager: [phone]

## 2019-10-04 NOTE — PROGRESS NOTES
Pt observed to eat approximately 75% of breakfast, and nearly all of lunch.  Pt also had five oranges, and ate again at snack time without prompting.

## 2019-10-04 NOTE — PROGRESS NOTES
"   10/04/19 1320   Behavioral Health   Hallucinations appears responding   Thinking distractable;delusional;paranoid;poor concentration   Orientation other (see comment)  (AFSHAN)   Memory other (see comment)  (AFSHAN)   Insight poor   Judgement impaired   Eye Contact at examiner   Affect irritable;tense;angry   Mood anxious;irritable   Physical Appearance/Attire appears stated age;attire appropriate to age and situation   Hygiene neglected grooming - unclean body, hair, teeth   Suicidality other (see comments)  (None stated)   Wish to be Dead Description (Recent)   (none stated)   Non-Specific Active Suicidal Thought Description (Recent)   (none stated)   Self Injury other (see comment)  (None stated)   Elopement   (Pt didn't exhibit these behaviors this shift.)   Activity restless;hyperactive (agitated, impulsive)   Speech pressured;rambling;clear;coherent   Psychomotor / Gait steady;balanced   Coping/Psychosocial   Verbalized Emotional State other (see comments)  (none stated)   Activities of Daily Living   Hygiene/Grooming independent;prompts   Oral Hygiene independent;prompts   Dress scrubs (behavioral health)   Laundry unable to complete   Room Organization independent   Significant Event   Significant Event Other (see comments)  (Shift Summary)   Behavioral Health Interventions   Social and Therapeutic Interventions (Psychotic Symptoms) encourage socialization with peers;encourage effective boundaries with peers;encourage participation in therapeutic groups and milieu activities   Pt is still refusing her medications. Pt is tense and irritable. Pt is fixated on her room smelling and her bathroom being dirty despite the fact that housekeeping clean both for her today. Pt was also fixated on the other provider that's not her provider as well as certain staff and was walking up to them and calling them \"trash\". Pt still isn't eating very much but did ask for and eat some oranges.   "

## 2019-10-05 PROCEDURE — 25000128 H RX IP 250 OP 636: Performed by: PSYCHIATRY & NEUROLOGY

## 2019-10-05 PROCEDURE — 12400001 ZZH R&B MH UMMC

## 2019-10-05 PROCEDURE — 25000132 ZZH RX MED GY IP 250 OP 250 PS 637: Performed by: PSYCHIATRY & NEUROLOGY

## 2019-10-05 RX ADMIN — CHLORPROMAZINE HYDROCHLORIDE 75 MG: 50 TABLET, SUGAR COATED ORAL at 09:12

## 2019-10-05 RX ADMIN — CHLORPROMAZINE HYDROCHLORIDE 50 MG: 25 INJECTION INTRAMUSCULAR at 20:51

## 2019-10-05 ASSESSMENT — ACTIVITIES OF DAILY LIVING (ADL)
ORAL_HYGIENE: INDEPENDENT
ORAL_HYGIENE: INDEPENDENT
HYGIENE/GROOMING: INDEPENDENT
DRESS: SCRUBS (BEHAVIORAL HEALTH)
LAUNDRY: UNABLE TO COMPLETE
HYGIENE/GROOMING: INDEPENDENT

## 2019-10-05 NOTE — PROGRESS NOTES
"Patient presents as angry, tense, and irritable with no appreciable insight into her mental and physical health.  She continues to refuse medications, vital signs assessment, and attempts to obtain her current weight.  She appears to be eating and drinking in adequate amounts.  She reluctantly accepted her scheduled AM dose of Thorazine, but only after being informed that she does not have the right to refuse this Hatch order medication- and that she would be receiving the IM back-up if she does not comply with oral medication administration.  She eventually agreed to accept the oral tablets of Thorazine; however, she was quite agitated and verbally aggressive while taking it, repeatedly insulting RN writer (\"you smell!\") and yelling various obscenities in this writer's face.  Over the course of the next couple hours, Michelle was loitering by the medication room windows and demanding immediate discharge.  Attempts to educate her on her current legal status were ineffective.  "

## 2019-10-05 NOTE — PROGRESS NOTES
Pt appears to be consistently tense and irritable. She walks up and down the chen to the [facility] window rambling and demanding to be discharged. Pt did eat her lunch this shift. She was irritating other patients in the milieu because of her behaviors.        10/05/19 1400   Behavioral Health   Hallucinations appears responding   Thinking distractable;delusional;paranoid   Orientation person: oriented;place: oriented   Insight denial of illness   Judgement impaired   Eye Contact at examiner   Affect angry;tense;irritable   Mood anxious;irritable   Physical Appearance/Attire disheveled;untidy   Hygiene neglected grooming - unclean body, hair, teeth   Activities of Daily Living   Hygiene/Grooming independent   Oral Hygiene independent   Dress scrubs (behavioral health)

## 2019-10-05 NOTE — PLAN OF CARE
Patient remains isolated to room, occasionally coming out of room to yell at staff and demand things. Appears guarded, paranoid. Angry, tense affect, irritable mood. Writer attempted to give patient HS medication, patient became manipulative, telling writer to come back at ten, attempting to get writer to come into her room and then into her bathroom and then finally coming to the door to take medication. Patient yelled at writer throughout medication administration, attempted to get close to writer and point finger in writer's face, and then abruptly grabbed medication from writer's hand but only took two of the medications in the cup and refused the rest. Patient did take her scheduled Thorazine but refused her Lipitor and Artane. Because of aggressive behavior of patient, writer was unable to fully watch and ensure patient actually swallowed the medications she took. Patient became volatile and shut the door to her room after accepting medications. Patient appears untidy, malodorous, changed into clean scrubs but refuses to shower. Eats and drinks minimally. Will continue to monitor for safety.

## 2019-10-06 PROCEDURE — 25000128 H RX IP 250 OP 636: Performed by: PSYCHIATRY & NEUROLOGY

## 2019-10-06 PROCEDURE — 12400001 ZZH R&B MH UMMC

## 2019-10-06 RX ADMIN — CHLORPROMAZINE HYDROCHLORIDE 50 MG: 25 INJECTION INTRAMUSCULAR at 17:38

## 2019-10-06 RX ADMIN — CHLORPROMAZINE HYDROCHLORIDE 50 MG: 25 INJECTION INTRAMUSCULAR at 08:54

## 2019-10-06 ASSESSMENT — ACTIVITIES OF DAILY LIVING (ADL)
HYGIENE/GROOMING: INDEPENDENT
DRESS: SCRUBS (BEHAVIORAL HEALTH)
HYGIENE/GROOMING: INDEPENDENT
ORAL_HYGIENE: INDEPENDENT
ORAL_HYGIENE: INDEPENDENT
DRESS: SCRUBS (BEHAVIORAL HEALTH)
LAUNDRY: UNABLE TO COMPLETE
LAUNDRY: UNABLE TO COMPLETE

## 2019-10-06 NOTE — PROGRESS NOTES
10/06/19 1500   Behavioral Health   Hallucinations appears responding   Thinking poor concentration;paranoid;delusional   Orientation person: oriented;place: oriented   Memory temporary   Insight denial of illness   Judgement impaired   Eye Contact at examiner   Affect angry;irritable   Mood irritable   Physical Appearance/Attire disheveled;untidy   Hygiene neglected grooming - unclean body, hair, teeth   Suicidality other (see comments)  (no behavior observed, pt wouldn't check in)   1. Wish to be Dead (Past Month) No   2. Non-Specific Active Suicidal Thoughts (Past Month) No   Self Injury other (see comment)  (none observed)   Elopement Statements about wanting to leave   Activity isolative   Speech rambling;tangential;pressured   Medication Sensitivity no observed side effects   Psychomotor / Gait balanced;steady   Activities of Daily Living   Hygiene/Grooming independent   Oral Hygiene independent   Dress scrubs (behavioral health)   Laundry unable to complete   Room Organization independent       Patient is agitated and inappropriate. Pt wants an orange for breakfast, lunch and dinner if that need is not met she will start cursing at staff. Pt said many inappropriate things to writer. Pt does not seem to get better and does not seek treatment from person of color. Does not seek help or respond to people of color. Pt is responding to internal stimuli and is agitated every hour. No SI observed, per observation. Pt would not check in. No SIB, per observation also. Pt made inappropriate sexual comments and gestures to writer. Pt kept rambling and would not stop unless orange was mentioned as a rewarding behavior unable for her to stop cursing at staff, and to stop being inappropriate.

## 2019-10-06 NOTE — PROVIDER NOTIFICATION
10/06/19 1735   Restraint Type   Seclusion (BH) Discontinued   Debriefing   Debriefing DO   Does patient understand why the event happened? Patient refuses to answer   Does patient agree to safe behaviors? Patient refuses to answer   What can we do differently so this doesn't happen again? Patient refuses to answer   Plan of care reviewed and modified Yes     Physical hold lasted for 5 minutes.  The physical hold was released and patient was able to reenter the milieu.  Patient was agitated, but was able to be verbally de-escalated.    Patient was unable to process why the Hatch medications were administered, or why she can't enter the hallway during a behavioral code.  Patient denies having a mental illness.
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
## 2019-10-06 NOTE — PROVIDER NOTIFICATION
"   10/05/19 2032   Seclusion or Restraint Order   In Person Face to Face Assessment Conducted Yes-Eval of pt's immediate situation, reaction to intervention, complete review of systems assessment, behavioral assessment & review/assessment of hx, drugs & meds, recent labs, etc, behavioral condition, need to continue/terminate restraint/seclusion   Patient Experienced No adverse physical outcome from seclusion/restraint initiation   Continuation of Seclus/Restraint indicated at this time No     RN face to face:  Patient had been physically held to give Hatch Thorazine IM, after she had refused it multiple times.  Face to face is after the physical hold was completed.  Patient was extremely tense, yelling down the hallway after the medication was administered, \"Fuck you I put a curse on you and I hope God slays you!\".  Patient \"mooned\" the code team.  Patient stated that she could \"Fight staff\", but ultimately returned to her room of her own volition.  She denied pain.  About 30 minutes later she was in the milieu and calmer.      Dr. Pack was notified of the results of the face to face and that there was no apparent injury to the patient and none to staff.  "

## 2019-10-06 NOTE — PROVIDER NOTIFICATION
10/05/19 2032   Debriefing   Debriefing DO   Does patient understand why the event happened? Other (comment)   Does patient agree to safe behaviors? Patient refuses to answer   What can we do differently so this doesn't happen again? Patient refuses to answer   Plan of care reviewed and modified Yes     Debriefing occurred with patient at 2032. Patient refuses to answer if she understands why the event happened. Patient refuses to agree to safe behaviors but is not attempting to harm anyone. Patient refuses to answer when asked what can be done differently so to avoid this from happening again. Plan of care reviewed and modified. Will continue to monitor for safety.

## 2019-10-06 NOTE — PROVIDER NOTIFICATION
10/06/19 0845 10/06/19 0850   Justification   Clinical Justification Others  --    Restraint Type   Other (Comment)  --  Discontinued   Debriefing   Debriefing  --  DO   Does patient understand why the event happened?  --  Patient refuses to answer   Does patient agree to safe behaviors?  --  Patient refuses to answer   What can we do differently so this doesn't happen again?  --  Patient refuses to answer   Plan of care reviewed and modified  --  Yes
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
## 2019-10-06 NOTE — PROVIDER NOTIFICATION
"   10/05/19 2030   Seclusion or Restraint Order   Length of Order 4   Order Obtained Yes   Attending Physician Notified Yes   Attending Physician's Name Dr. Pack   Leadership   Seclus/Restraint >12H or 2x/24H Notified   Assessment   Less Restrictive Alternative Decreased stimulation;Verbal de-escalation;Reassurance / Support;Modified programming;Reality orientation   Risk Factors N   Justification   Clinical Justification Others   Education   Discontinuation Criteria Cessation of behavior that precipitated seclusion or restraint   Criteria Explained Yes   Patient's Response NL   Family Notification D   Restraint Type   Other (Comment) Start  (Physical hold)     Patient has scheduled Hatch medication Thorazine 75mg. Patient was offered PO medication by writer twice on two separate occasions and by two other RN's also on two separate occasions. Patient was standing in room, refusing medication, yelling at staff reporting, \"I am not going to take the medication. I am not going to get a shot, I am not going to take the medication.\" Patient reported to Charge RN that she had already taken medications, that staff had been putting medications in her food, and reported, \"that's why the orange is the color orange, from the medication.\" Code green called to initiate physical hold for administration of medication, IM Thorazine 50mg. Order received from MD Jarett Pack. Will continue to monitor for safety.   "

## 2019-10-06 NOTE — PROVIDER NOTIFICATION
"   10/06/19 0845   Justification   Clinical Justification Others     Patient has been agitated, hostile, and verbally aggressive throughout the morning hours.  She has been offered oral Thorazine- a Hatch ordered medication- several times this morning.  Initially, she was stating that she would take it later in the morning- after she finished eating her orange.  She then asked for several additional oranges and RN writer allowed her to finish consuming them.  Once Michelle had finished eating, she was once again prompted to accept the Thorazine orally.  At this point, she became extremely agitated, pointing a finger at RN writer and accusing writer of mal-intent and being a barrier to discharge from the hospital (patient states, \"you told Dr. Pack not to discharge me!\").  Patient was given several additional opportunities to accept the Thorazine orally, but she continued to insult RN writer while referring to the Thorazine as \"rubbish!\" and \"poison!\".  At this point, the decision was made to give the IM back-up.  A \"code green\" was called to facilitate safe medication administration.  RN writer then gave the Thorazine by IM injection in the setting of a physical hold.  Michelle was agitated at the time of the injection, but she was not physically aggressive; as such, she was deemed to not represent an imminent risk of danger to self or others.  Seclusion was not indicated.  RN writer reviewed these events with on-call provider, Dr. Dumont.  Order for physical hold obtained.    "
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
## 2019-10-07 PROCEDURE — 99233 SBSQ HOSP IP/OBS HIGH 50: CPT | Performed by: PSYCHIATRY & NEUROLOGY

## 2019-10-07 PROCEDURE — 25000128 H RX IP 250 OP 636: Performed by: PSYCHIATRY & NEUROLOGY

## 2019-10-07 PROCEDURE — H2032 ACTIVITY THERAPY, PER 15 MIN: HCPCS

## 2019-10-07 PROCEDURE — 12400001 ZZH R&B MH UMMC

## 2019-10-07 RX ORDER — CHLORPROMAZINE HYDROCHLORIDE 100 MG/1
100 TABLET, FILM COATED ORAL 2 TIMES DAILY
Status: DISCONTINUED | OUTPATIENT
Start: 2019-10-07 | End: 2019-10-25 | Stop reason: HOSPADM

## 2019-10-07 RX ORDER — CHLORPROMAZINE HYDROCHLORIDE 25 MG/ML
50 INJECTION INTRAMUSCULAR 2 TIMES DAILY
Status: DISCONTINUED | OUTPATIENT
Start: 2019-10-07 | End: 2019-10-08

## 2019-10-07 RX ADMIN — CHLORPROMAZINE HYDROCHLORIDE 50 MG: 25 INJECTION INTRAMUSCULAR at 09:00

## 2019-10-07 RX ADMIN — CHLORPROMAZINE HYDROCHLORIDE 50 MG: 25 INJECTION INTRAMUSCULAR at 20:43

## 2019-10-07 ASSESSMENT — ACTIVITIES OF DAILY LIVING (ADL)
HYGIENE/GROOMING: INDEPENDENT
LAUNDRY: UNABLE TO COMPLETE
ORAL_HYGIENE: INDEPENDENT
DRESS: INDEPENDENT
ORAL_HYGIENE: INDEPENDENT
HYGIENE/GROOMING: INDEPENDENT
DRESS: SCRUBS (BEHAVIORAL HEALTH)

## 2019-10-07 NOTE — PROVIDER NOTIFICATION
"   10/07/19 0855   Justification   Clinical Justification Others     Patient denies that she is currently under civil commitment with a Hatch order.  RN writer provided her with the court documents to confirm her legal status and inability to refuse certain scheduled neuroleptic medications, including Thorazine.  Despite these interventions, Michelle is refusing to accept oral Thorazine- a Hatch ordered medication- which has been offered several times this morning.   Patient was given several additional opportunities to accept the Thorazine orally, but she continued to refuse.  At this point, the decision was made to give the IM back-up.  A \"code green\" was called to facilitate safe medication administration.  RN writer then gave the Thorazine by IM injection in the setting of a physical hold.  Michelle was agitated at the time of the injection, but she was not physically aggressive; as such, she was deemed to not represent an imminent risk of danger to self or others.  Seclusion was not indicated.  RN writer reviewed these events with attending psychiatrist, Dr. Pack.  Order for physical hold obtained.    "
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
## 2019-10-07 NOTE — PLAN OF CARE
Patient was out of room majority of shift, in milieu yet isolated to self. Appeared tired, frequently resting in chair in lounge. Patient refused vital signs when asked by both psych associate and writer several times. Patient refused PO Calcium, and refused to take PO Hatch medication Thorazine when offered several times. Patient refused to make eye contact or answer this writer when approached about taking her PO medication. Code green called and IM Hatch medication Thorazine given, see note regarding restraint. Patient refused check in with this writer. Blunted affect, calm mood due to being tired yet appears irritated when spoken to. Continues to present with poor hygiene and grooming. Will continue to monitor for safety.

## 2019-10-07 NOTE — PROGRESS NOTES
Pt has been present in the milieu. She ate her breakfast this morning. She was more calm this morning and then escalated when she was offered to take her medication. Pt presents a blunted/flat affect.        10/07/19 1300   Behavioral Health   Hallucinations appears responding   Thinking distractable;delusional   Orientation person: oriented;place: oriented   Memory confabulation   Insight poor   Judgement impaired   Affect blunted, flat;tense;irritable   Mood irritable;mood is calm   Physical Appearance/Attire untidy;disheveled   Hygiene neglected grooming - unclean body, hair, teeth   Activities of Daily Living   Hygiene/Grooming independent   Oral Hygiene independent   Dress scrubs (behavioral health)

## 2019-10-08 PROCEDURE — 99233 SBSQ HOSP IP/OBS HIGH 50: CPT | Mod: GC | Performed by: PSYCHIATRY & NEUROLOGY

## 2019-10-08 PROCEDURE — 25000128 H RX IP 250 OP 636: Performed by: STUDENT IN AN ORGANIZED HEALTH CARE EDUCATION/TRAINING PROGRAM

## 2019-10-08 PROCEDURE — 25000128 H RX IP 250 OP 636: Performed by: PSYCHIATRY & NEUROLOGY

## 2019-10-08 PROCEDURE — 12400001 ZZH R&B MH UMMC

## 2019-10-08 PROCEDURE — 25000132 ZZH RX MED GY IP 250 OP 250 PS 637: Performed by: PSYCHIATRY & NEUROLOGY

## 2019-10-08 RX ORDER — HALOPERIDOL 5 MG/1
5 TABLET ORAL 2 TIMES DAILY
Status: DISCONTINUED | OUTPATIENT
Start: 2019-10-08 | End: 2019-10-09

## 2019-10-08 RX ORDER — HALOPERIDOL 5 MG/ML
5 INJECTION INTRAMUSCULAR 2 TIMES DAILY
Status: DISCONTINUED | OUTPATIENT
Start: 2019-10-08 | End: 2019-10-09

## 2019-10-08 RX ADMIN — CHLORPROMAZINE HYDROCHLORIDE 50 MG: 25 INJECTION INTRAMUSCULAR at 09:00

## 2019-10-08 RX ADMIN — HALOPERIDOL LACTATE 5 MG: 5 INJECTION, SOLUTION INTRAMUSCULAR at 20:55

## 2019-10-08 ASSESSMENT — ACTIVITIES OF DAILY LIVING (ADL)
DRESS: SCRUBS (BEHAVIORAL HEALTH)
HYGIENE/GROOMING: INDEPENDENT;PROMPTS
DRESS: SCRUBS (BEHAVIORAL HEALTH)
ORAL_HYGIENE: PROMPTS;INDEPENDENT
HYGIENE/GROOMING: INDEPENDENT
ORAL_HYGIENE: INDEPENDENT
LAUNDRY: UNABLE TO COMPLETE

## 2019-10-08 NOTE — PROGRESS NOTES
"[facility], [place]   Psychiatric Progress Note  Hospital Day: 15        Interim History:   The patient's care was discussed with the treatment team during the daily team meeting and/or staff's chart notes were reviewed.  Staff report that patient continues to decline oral medications at times. She has required injections. She was making very vulgar sexually explicit comments toward staff over the weekend.     Slept 4.5 hours (10/07/19 0600)      Michelle remains adamant that she can leave. She denies that there is a court order for medications. I brought her a copy of the court order including Hatch. She then asked me to show her the part that discussed the medications. She then said that it wasn't true and that she wasn't really committed. She repeated \"discharge me\" over and over quite loudly as I walked away due to her increasing agitation.     Psychiatric Symptoms: irritability, aggressive behavior, delusions, paranoia, poor hygiene, disorganization, response to internal stimuli, minimal eating or drinking      Medication side effects reported: None seen thus far.   Other issues reported by patient: None          Medications:       atorvastatin  40 mg Oral QPM     calcium carbonate 500 mg (elemental)  500 mg Oral BID w/meals     chlorproMAZINE  100 mg Oral BID    Or     chlorproMAZINE  50 mg Intramuscular BID     cholecalciferol  50,000 Units Oral Q7 Days     losartan  100 mg Oral Daily     metoprolol succinate ER  50 mg Oral Daily     multivitamin, therapeutic  1 tablet Oral Daily     trihexyphenidyl  5 mg Oral BID          Allergies:     Allergies   Allergen Reactions     Lisinopril Cough          Labs:   No results found for this or any previous visit (from the past 48 hour(s)).       Psychiatric Examination:     BP (!) 146/105   Pulse 132   Temp 100  F (37.8  C) (Tympanic)   Resp 18   LMP  (LMP Unknown)   SpO2 100%   Weight is 0 lbs 0 oz  There is no height or weight on file " to calculate BMI.    Orthostatic Vitals     None            Appearance: awake, alert, dressed in hospital scrubs and unkempt  Attitude:  uncooperative  Eye Contact:  intense  Mood:  angry  Affect:  labile  Speech:  pressured speech  Language: fluent and intact in English  Psychomotor, Gait, Musculoskeletal:  no evidence of tardive dyskinesia, dystonia, or tics and intact station, gait and muscle tone  Throught Process:  disorganized and illogical  Associations:  loosening of associations present  Thought Content:  patient appears to be responding to internal stimuli and paranoid  Insight:  absent  Judgement:  poor  Oriented to:  time, person, and place  Attention Span and Concentration:  limited  Recent and Remote Memory:  limited  Fund of Knowledge:  adequate    Clinical Global Impressions  First:  Considering your total clinical experience with this particular patient population, how severe are the patient's symptoms at this time?: 7 (09/25/19 1414)  Compared to the patient's condition at the START of treatment, this patient's condition is:: 4 (09/25/19 1414)  Most recent:  Considering your total clinical experience with this particular patient population, how severe are the patient's symptoms at this time?: 7 (10/03/19 1213)  Compared to the patient's condition at the START of treatment, this patient's condition is:: 4 (10/03/19 1213)           Precautions:     Behavioral Orders   Procedures     Assault precautions     Code 1 - Restrict to Unit     Elopement precautions     Routine Programming     As clinically indicated     Sexual precautions     Status 15     Every 15 minutes.          Diagnoses:      Schizophrenia         Assessment & Plan:       Target psychiatric symptoms and interventions:  Psychosis  -increase oral Thorazine to 100 mg BID and continue IM backup of 50 mg  -continue with haloperidol, lorazepam, diphenhydramine as needed    Medical Problems and Treatments:  Continue to encourage PO  intake    Behavioral/Psychological/Social:  Encourage unit programming    Patient has required temporary restraints for Hatch medications. Oral dose increased to target psychosis.    Patient was sexually inappropriate with staff, added sexual precautions.        Disposition Plan   Reason for ongoing admission: is unable to care for self due to severe psychosis or tad  Discharge location: TBD. Likely IRTS  Discharge Medications: not ordered  Follow-up Appointments: not scheduled  Legal Status: full commitment with Hatch for olanzapine, chlorpromazine, risperidone, haloperidol  Entered by: Jarett Pack on 10/7/2019 at 8:51 PM

## 2019-10-08 NOTE — PROGRESS NOTES
called and spoke with pt's son, Emelia Pino.    He is in [place] and tells me the rent is still being paid for the  apartment in [place].    But he and the rest of the family don't think patient should return there at time of discharge -- at least not until she is stable on meds and hopefully successful at an IRT's first.  I told him we agree.   I also helped him understand, we cannot force her to go to an IRT's, even under commitment.   So we are hoping with symptom stabilization, she will agree to it and then we can refer her.

## 2019-10-08 NOTE — PROVIDER NOTIFICATION
10/07/19 2046   Restraint Type   Other (Comment) Discontinued   Debriefing   Debriefing DO   Does patient understand why the event happened? Patient refuses to answer   Does patient agree to safe behaviors? Patient refuses to answer   What can we do differently so this doesn't happen again? Patient refuses to answer   Plan of care reviewed and modified Yes     Restraints discontinued at 2046, debriefing occurred. Patient refuses to answer if she understands why event happened, patient refuses to answer if she agrees to safe behavior, patient refuses to answer what could be done differently so this does not happen again. Plan of care reviewed and modified as needed. Will continue to monitor for safety.

## 2019-10-08 NOTE — PROGRESS NOTES
"   10/08/19 1111 Behavioral Health   Hallucinations denies / not responding to hallucinations   Insight poor;denial of illness   Judgement impaired   Affect tense;irritable;angry   Mood labile;irritable   1. Wish to be Dead (Past Month) No   2. Non-Specific Active Suicidal Thoughts (Past Month) No   Elopement Statements about wanting to leave   Activity withdrawn   Speech coherent   Psycho Education   Type of Intervention 1:1 intervention   Response refuses   Activities of Daily Living   Hygiene/Grooming independent   Oral Hygiene independent   Dress scrubs (behavioral health)   Room Organization independent   Pt continues to be labile and irritable. She spent most of the shift in the lounge, but she has minimal interactions with peers and staff. She has a low frustration tolerance and demands that her needs are met immediately. Pt refused her medications this morning and so was given a shot. Afterwards she was shouting and cursing staff. Stating repeatedly, \"You're going to die today!\" Pt was redirected and was able to calm and maintain safe behavior. Pt has poor insight. She denies all symptoms and does not seem to understand that she is court ordered to take medications.   "

## 2019-10-08 NOTE — PROVIDER NOTIFICATION
"   10/08/19 0900   Justification   Clinical Justification Others        Patient presents as irritable and physically tense with no appreciable insight into her psychotic symptoms or current legal status.  Patient continues to deny that she is currently under civil commitment with a Hatch order.  RN writer provided her with the court documents to confirm her legal status and inability to refuse certain scheduled neuroleptic medications, including Thorazine.  Despite these interventions, Michelle is refusing to accept oral Thorazine- a Hatch ordered medication- which has been offered several times this morning.   Patient was given several additional opportunities to accept the Thorazine orally, but she continued to refuse.  At this point, the decision was made to give the IM back-up.  A \"code green\" was called to facilitate safe medication administration.  Nursing staff then gave the Thorazine by IM injection in the setting of a physical hold.  No apparent injury occurred during the initiation of the brief physical hold.  Michelle was agitated at the time of the injection, but she was not physically aggressive; as such, she was deemed to not represent an imminent risk of danger to self or others.  Seclusion was not indicated.  RN writer reviewed these events with attending psychiatrist, Dr. Varner.  Order for physical hold obtained.    "

## 2019-10-08 NOTE — PROVIDER NOTIFICATION
10/08/19 0905   Restraint Type   Other (Comment) Discontinued   Debriefing   Debriefing DO   Does patient understand why the event happened? Patient refuses to answer   Does patient agree to safe behaviors? Patient refuses to answer   What can we do differently so this doesn't happen again? Patient refuses to answer   Plan of care reviewed and modified Yes

## 2019-10-08 NOTE — PLAN OF CARE
BEHAVIORAL TEAM DISCUSSION    Participants:  dr hicks, resident dr thurston, blas lehman rn, aspen calixto rn, benja antunez [facility]  Progress:   None/poor.  She remains psychotic, angry, hostile, agitated, physically aggressive toward staff, uncooperative with treatment, and hypersexual.    Anticipated length of stay:  unknown  Continued Stay Criteria/Rationale:   not stable  Medical/Physical:  no new issues  Precautions:   Behavioral Orders   Procedures    Assault precautions    Code 1 - Restrict to Unit    Elopement precautions    Routine Programming     As clinically indicated    Sexual precautions    Status 15     Every 15 minutes.     Plan: Med adjustments are underway.    Haldol being added.  Pt is completely unable to engage in conversations regarding discharge/aftercare planning because she's too psychotic and hostile.  As of 10-3-19, pt is committed as [facility] to [facility].   Hatch order includes:  Thorazine, Zyprexa, Risperdal, and Haldol.     called and spoke with pt's son, Emelia Pino.    He is in [place] and tells me the rent is still being paid for the  apartment in [place].    But he and the rest of the family don't think patient should return there at time of discharge -- at least not until she is stable on meds and hopefully successful at an IRT's first.  I told him we agree.   I also helped him understand, we cannot force her to go to an IRT's, even under commitment.   So we are hoping with symptom stabilization, she will agree to it and then we can refer her.     Rationale for change in precautions or plan: no change at this time

## 2019-10-08 NOTE — PROVIDER NOTIFICATION
10/07/19 2040   Seclusion or Restraint Order   Length of Order 4   Order Obtained Yes   Attending Physician Notified Yes   Attending Physician's Name MD Ida Zaman   Leadership   Seclus/Restraint >12H or 2x/24H Notified   Assessment   Less Restrictive Alternative Medication administration;Reassurance / Support;Modified programming;Reality orientation;Decreased stimulation   Risk Factors N   Justification   Clinical Justification Others   Education   Discontinuation Criteria Other (see comments)  (Discontinuation criteria until after med admin)   Criteria Explained Yes   Patient's Response RT   Family Notification D   Restraint Type   Wrist - R (BH) Start   Wrist - L (BH) Start   Ankle - R (BH) Start   Ankle - L (BH) Start     Code green called due to patient's refusal of PO Hatch medication, Patient continued to refuse PO medication and was asked to walk to room several times and refused. Patient was put in physical hold at 2040, and began struggling and refusing to comply with walking to room. Patient was placed in five points on backboard and brought to room, medication was administered. MD Ida Zaman made aware and order received. Will continue to monitor for safety.

## 2019-10-08 NOTE — PROGRESS NOTES
"[facility], [place]   Psychiatric Progress Note  Hospital Day: 16        Interim History:   The patient's care was discussed with the treatment team during the daily team meeting and/or staff's chart notes were reviewed.  Staff report that patient continues to decline oral medications at times. She required a code green to be called and a physical hold to be used for administration of IM thorazine on both occasions yesterday. She has been isolative. She continues to deny that she is committed. Staff reports she has been eating & drinking.     Slept Night Time # Hours: 6.25 hours (10/08/19 0600)      Michelle was eating an apple in the lounge prior to the interview. She continued to cut the apple with a plastic knife and take bites, looking down and not speaking as team attempted to engage her. She was informed that we're working towards getting her on an appropriate medication regimen so she can leave the hospital. Mentioned that we were considering adding haldol since she has previously done well on this. She was calm for a few more moments then began to scream \"You're a prostitute!!\" multiple times. At that point the interview was terminated.      Psychiatric Symptoms: irritability, aggressive behavior, delusions, paranoia, poor hygiene, disorganization, response to internal stimuli, minimal eating or drinking (improved)     Medication side effects reported: None seen thus far.   Other issues reported by patient: None          Medications:       atorvastatin  40 mg Oral QPM     calcium carbonate 500 mg (elemental)  500 mg Oral BID w/meals     chlorproMAZINE  100 mg Oral BID    Or     chlorproMAZINE  50 mg Intramuscular BID     cholecalciferol  50,000 Units Oral Q7 Days     losartan  100 mg Oral Daily     metoprolol succinate ER  50 mg Oral Daily     multivitamin, therapeutic  1 tablet Oral Daily     trihexyphenidyl  5 mg Oral BID          Allergies:     Allergies   Allergen Reactions     " Lisinopril Cough          Labs:   No results found for this or any previous visit (from the past 48 hour(s)).       Psychiatric Examination:     BP (!) 146/105   Pulse 132   Temp 100  F (37.8  C) (Tympanic)   Resp 18   LMP  (LMP Unknown)   SpO2 100%   Weight is 0 lbs 0 oz  There is no height or weight on file to calculate BMI.    Orthostatic Vitals     None        Appearance: awake, alert, dressed in hospital scrubs and unkempt. Cutting up an apple and eating it.   Attitude:  uncooperative, guarded   Eye Contact:  Poor, looks down the entire time.   Mood:  angry  Affect:  labile, upset   Speech:  pressured speech, frequently yells   Language: fluent and intact in English  Psychomotor, Gait, Musculoskeletal:  no evidence of tardive dyskinesia, dystonia, or tics and intact station, gait and muscle tone  Throught Process:  disorganized and illogical  Associations:  loosening of associations present  Thought Content:  patient appears to be responding to internal stimuli and paranoid  Insight:  absent  Judgement:  poor  Oriented to:  Not formally tested, sensorium appears intact   Attention Span and Concentration:  limited  Recent and Remote Memory:  limited  Fund of Knowledge:  Not formally tested    Clinical Global Impressions  First:  First:  Considering your total clinical experience with this particular patient population, how severe are the patient's symptoms at this time?: 7 (09/25/19 1414)  Compared to the patient's condition at the START of treatment, this patient's condition is:: 4 (09/25/19 1414)  Most recent:  Considering your total clinical experience with this particular patient population, how severe are the patient's symptoms at this time?: 7 (10/03/19 1213)  Compared to the patient's condition at the START of treatment, this patient's condition is:: 4 (10/03/19 1213)         Precautions:     Behavioral Orders   Procedures     Assault precautions     Code 1 - Restrict to Unit     Elopement  precautions     Routine Programming     As clinically indicated     Sexual precautions     Status 15     Every 15 minutes.          Diagnoses:   Schizophrenia         Assessment & Plan:   Assessment and hospital summary:  Michelle Pino is a 59 year old female with prior psychiatric diagnosis of schizophrenia and schizoaffective disorder who presents with psychosis and violence in the context of medication non-adherence. Her last psychiatric hospitalization was from April-June of this year. She was most recently followed by Dr. Yoshi Zabala at [facility]. Current psychosocial stressors include chronic mental health issues, family dynamics and medical issues. Patient's support system includes family, county and outpatient team. Substance use does not appear to be playing a contributing role in the patient's presentation.  There is no known genetic loading. Medical history does appear to be significant for Vitamin D deficiency and prior CVA. The MSE on admission was notable for lack of SI or HI, psychosis, irritability, aggression and lack of insight. She denied self injurious behaviors. Her current presentation is consistent with a schizophrenia spectrum disorder. It is unclear if there is a mood component to her illness. Her ongoing decreased need for sleep does raise concern for this.       Target psychiatric symptoms and interventions:  Psychosis  -continue oral Thorazine to 100 mg BID - discontinued IM backup of 50 mg  - start haldol 5 mg PO BID with IM back up   -continue with haloperidol, lorazepam, diphenhydramine as needed    Medical Problems and Treatments:  Continue to encourage PO intake    Behavioral/Psychological/Social:  Encourage unit programming    Patient has required temporary restraints for Hatch medications. Oral dose increased to target psychosis.    Patient has been making sexually inappropriate comments toward staff and remains on sexual precautions.     Disposition Plan   Reason for ongoing  admission: is unable to care for self due to severe psychosis or tad  Discharge location: TBD. Likely IRTS  Discharge Medications: not ordered  Follow-up Appointments: not scheduled  Legal Status: full commitment with Hatch for olanzapine, chlorpromazine, risperidone, haloperidol  Entered by: Bijal Varner on 10/08/2019at 8:32 AM        Patient was seen and plan was discussed with the attending physician.     Bijal Varner MD  Psychiatry PGY-2 Resident

## 2019-10-09 PROCEDURE — 99232 SBSQ HOSP IP/OBS MODERATE 35: CPT | Mod: GC | Performed by: PSYCHIATRY & NEUROLOGY

## 2019-10-09 PROCEDURE — 25000128 H RX IP 250 OP 636: Performed by: STUDENT IN AN ORGANIZED HEALTH CARE EDUCATION/TRAINING PROGRAM

## 2019-10-09 PROCEDURE — 12400001 ZZH R&B MH UMMC

## 2019-10-09 RX ORDER — HALOPERIDOL 5 MG/1
5 TABLET ORAL DAILY
Status: DISCONTINUED | OUTPATIENT
Start: 2019-10-10 | End: 2019-10-11

## 2019-10-09 RX ORDER — HALOPERIDOL 5 MG/ML
10 INJECTION INTRAMUSCULAR AT BEDTIME
Status: DISCONTINUED | OUTPATIENT
Start: 2019-10-09 | End: 2019-10-11

## 2019-10-09 RX ORDER — HALOPERIDOL 5 MG/ML
5 INJECTION INTRAMUSCULAR DAILY
Status: DISCONTINUED | OUTPATIENT
Start: 2019-10-10 | End: 2019-10-11

## 2019-10-09 RX ORDER — HALOPERIDOL 10 MG/1
10 TABLET ORAL AT BEDTIME
Status: DISCONTINUED | OUTPATIENT
Start: 2019-10-09 | End: 2019-10-11

## 2019-10-09 RX ADMIN — HALOPERIDOL LACTATE 5 MG: 5 INJECTION, SOLUTION INTRAMUSCULAR at 09:03

## 2019-10-09 RX ADMIN — HALOPERIDOL LACTATE 10 MG: 5 INJECTION, SOLUTION INTRAMUSCULAR at 21:10

## 2019-10-09 ASSESSMENT — ACTIVITIES OF DAILY LIVING (ADL)
DRESS: SCRUBS (BEHAVIORAL HEALTH)
ORAL_HYGIENE: INDEPENDENT;PROMPTS
LAUNDRY: UNABLE TO COMPLETE
DRESS: SCRUBS (BEHAVIORAL HEALTH)
HYGIENE/GROOMING: INDEPENDENT;PROMPTS
HYGIENE/GROOMING: PROMPTS;INDEPENDENT
ORAL_HYGIENE: PROMPTS;INDEPENDENT
LAUNDRY: UNABLE TO COMPLETE

## 2019-10-09 NOTE — PROGRESS NOTES
10/09/19 1300   Behavioral Health   Hallucinations denies / not responding to hallucinations   Thinking poor concentration   Orientation date: oriented;place: oriented;time: oriented   Memory baseline memory   Insight poor;denial of illness   Judgement impaired   Eye Contact out of corner of eyes;staring   Affect angry;tense;irritable   Mood irritable   Physical Appearance/Attire attire appropriate to age and situation   Hygiene neglected grooming - unclean body, hair, teeth   Suicidality   (no statements made)   Self Injury   (none observed)   Elopement Statements about wanting to leave   Activity isolative;withdrawn   Speech coherent;pressured   Medication Sensitivity no observed side effects;no stated side effects   Psychomotor / Gait balanced;steady   Psycho Education   Type of Intervention 1:1 intervention   Response refuses   Hours 0.5   Activities of Daily Living   Hygiene/Grooming independent;prompts   Oral Hygiene independent;prompts   Dress scrubs (behavioral health)   Laundry unable to complete   Room Organization independent     At the beginning of the shift, pt had to be redirected many times for use of inappropriate language. She was very oppositional to staff's request. Pt was visible in the milieu, but not present in groups. There were no behaviors of SI and SIB observed, nor any feelings of depression or anxiety stated.

## 2019-10-09 NOTE — PROGRESS NOTES
"[facility], [place]   Psychiatric Progress Note  Hospital Day: 17        Interim History:   The patient's care was discussed with the treatment team during the daily team meeting and/or staff's chart notes were reviewed.  Staff report that Michelle has continued to be irritable, tense and paranoid.  She frequently yells at staff, sometimes obscene things. She required a code green physical hold to be used for both administrations of her IM medications yesterday. She is eating and drinking. She continues to have poor insight into her mental health symptoms and the fact that she is under commitment.    Slept Night Time # Hours: 5 hours (10/09/19 0600)      Michelle was interviewed in her room.  She agreed that she has been sleeping better.  She denied that she has any nightmares or flashbacks.  She reports that her mood is \"good \".  Attempted to discuss her medications and encouraged her to take them in order to work towards being discharged.  She stated she does not want to take medications because she is eating now and does not understand why she is committed.  She then yelled several times \"I am eating! Discharge me now!\"  She  began walking towards the team yelling and demanding to be discharged.  As providers backed away she walked closer and reached out and grabbed Dr. Pack's wrist though was redirected from this behavior. She asked the team to return to her room however her offer was declined and the interview was ended. She continued to follow the team part way down the hallway.      Psychiatric Symptoms: irritability, aggressive behavior, delusions, paranoia, poor hygiene, disorganization, response to internal stimuli, minimal eating or drinking (improved)     Medication side effects reported: None seen thus far.   Other issues reported by patient: None          Medications:       atorvastatin  40 mg Oral QPM     calcium carbonate 500 mg (elemental)  500 mg Oral BID w/meals     " "chlorproMAZINE  100 mg Oral BID     cholecalciferol  50,000 Units Oral Q7 Days     haloperidol  5 mg Oral BID    Or     haloperidol lactate  5 mg Intramuscular BID     losartan  100 mg Oral Daily     metoprolol succinate ER  50 mg Oral Daily     multivitamin, therapeutic  1 tablet Oral Daily     trihexyphenidyl  5 mg Oral BID          Allergies:     Allergies   Allergen Reactions     Lisinopril Cough          Labs:   No results found for this or any previous visit (from the past 48 hour(s)).       Psychiatric Examination:     BP (!) 146/105   Pulse 132   Temp 100  F (37.8  C) (Tympanic)   Resp 18   LMP  (LMP Unknown)   SpO2 100%   Weight is 0 lbs 0 oz  There is no height or weight on file to calculate BMI.    Orthostatic Vitals     None        Appearance: awake, alert, dressed in hospital scrubs and unkempt   Attitude:  uncooperative, guarded   Eye Contact:  Poor, looks down the entire time.   Mood:  \"good\"   Affect:  labile, was calm for about one minute at the beginning of the interview before becoming upset & agitated   Speech:  pressured speech, frequently yells   Language: fluent and intact in English  Psychomotor, Gait, Musculoskeletal:  no evidence of tardive dyskinesia, dystonia, or tics and intact station, gait and muscle tone  Throught Process:  disorganized and illogical  Associations:  no loose associations  Thought Content:  patient appears to be responding to internal stimuli and paranoid  Insight:  absent  Judgement:  poor   Oriented to:  Not formally tested, sensorium appears intact   Attention Span and Concentration:  limited  Recent and Remote Memory:  limited  Fund of Knowledge:  Not formally tested    Clinical Global Impressions  First:  Considering your total clinical experience with this particular patient population, how severe are the patient's symptoms at this time?: 7 (09/25/19 1414)  Compared to the patient's condition at the START of treatment, this patient's condition is:: 4 " (09/25/19 2757)  Most recent:  Considering your total clinical experience with this particular patient population, how severe are the patient's symptoms at this time?: 7 (10/03/19 1213)  Compared to the patient's condition at the START of treatment, this patient's condition is:: 4 (10/03/19 1213)         Precautions:     Behavioral Orders   Procedures     Assault precautions     Code 1 - Restrict to Unit     Elopement precautions     Routine Programming     As clinically indicated     Sexual precautions     Status 15     Every 15 minutes.          Diagnoses:   Schizophrenia         Assessment & Plan:   Assessment and hospital summary:  Michelle Pino is a 59 year old female with prior psychiatric diagnosis of schizophrenia and schizoaffective disorder who presents with psychosis and violence in the context of medication non-adherence. Her last psychiatric hospitalization was from April-June of this year. She was most recently followed by Dr. Yoshi Zabala at [facility]. Current psychosocial stressors include chronic mental health issues, family dynamics and medical issues. Patient's support system includes family, county and outpatient team. Substance use does not appear to be playing a contributing role in the patient's presentation.  There is no known genetic loading. Medical history does appear to be significant for Vitamin D deficiency and prior CVA. The MSE on admission was notable for lack of SI or HI, psychosis, irritability, aggression and lack of insight. She denied self injurious behaviors. Her current presentation is consistent with a schizophrenia spectrum disorder. It is unclear if there is a mood component to her illness. Her ongoing decreased need for sleep does raise concern for this.     Target psychiatric symptoms and interventions:  Psychosis  -continue oral Thorazine to 100 mg BID - discontinued IM backup of 50 mg on 10/8, switched to haldol IM   -increase haldol to 5 mg PO QAM & 10 mg PO QPM with IM  back up per Hatch order  -continue with haloperidol, lorazepam, diphenhydramine as needed    Medical Problems and Treatments:  Continue to encourage PO intake    Behavioral/Psychological/Social:  Encourage unit programming    Patient has required temporary restraints for Hatch medications. Oral dose increased to target psychosis.    Patient has been making sexually inappropriate comments toward staff and remains on sexual precautions.     Disposition Plan   Reason for ongoing admission: is unable to care for self due to severe psychosis or tad   Discharge location: TBD. Likely IRTS  Discharge Medications: not ordered  Follow-up Appointments: not scheduled  Legal Status: full commitment with [facility] for olanzapine, chlorpromazine, risperidone, haloperidol  Entered by: Bijal Varner on 10/09/2019at 8:24 AM        Patient was seen and plan was discussed with the attending physician.     Bijal Varner MD  Psychiatry PGY-2 Resident

## 2019-10-09 NOTE — PROVIDER NOTIFICATION
10/09/19 0915   Justification   Clinical Justification Others     Patient was agitated and shouting in the unit. Offered morning medications (orally) twice but refused all of them and tried to hit staff. Code green called and physical restraint initiated per [facility] to administer Hatch IM Haldol with staff holding arms and legs. Once medication administered physical restraint discontinued with no further problems. On call provider Dr. Varner notified and provided orders for the restraint.

## 2019-10-09 NOTE — PROVIDER NOTIFICATION
"Face To Face Assessment:  Writer was present during procedure where Michelle was being physically held by Arsalan Weinberg Staff in order to administer Hatch ordered IM medication as Pt continually refused oral medication ordered.    Pt struggled somewhat, moving her hips back and forth. Staff was able to still Michelle's movement so IM haldol could be administered. Pt did not appear to have sustained any injury. Staff physical hold was released and staff left pt's room when med administration was completed. Pt came out of her room shortly after staff left and she made arm gestures and said \"I'm cursing all of you\". On call provider Dr. Mcdonnell was paged and when he phoned in at 21:10 he was informed of the physical hold and of Pt's physical condition.     "

## 2019-10-09 NOTE — PROVIDER NOTIFICATION
10/08/19 2055   Seclusion or Restraint Order   Length of Order 4   Order Obtained Yes   Attending Physician Notified Yes   Attending Physician's Name Dr. Mcdonnell   Continuation of Seclus/Restraint indicated at this time No   Describe actions taken physical hold per [facility] while patient in bed to administer Hatch IM medication   Leadership   Seclus/Restraint >12H or 2x/24H Notified   Assessment   Less Restrictive Alternative Medication administration;Reality orientation;Reassurance / Support   Risk Factors N   Justification   Clinical Justification Others   Education   Discontinuation Criteria Other (see comments)  (Hatch medication administered and physical hold then ended)   Criteria Explained Yes   Patient's Response NL   Family Notification D   Restraint Monitoring Q15 Minutes   Psychological Status YE;O  (spitting at staff)   Physical Comfort D   Circulation NS   Restraint Type   Wrist - R (BH) Start   Wrist - L (BH) Start   Ankle - R (BH) Start   Ankle - L (BH) Start   Debriefing   Debriefing DO   Does patient understand why the event happened? Patient refuses to answer   Does patient agree to safe behaviors? Patient refuses to answer   What can we do differently so this doesn't happen again? Patient refuses to answer   Plan of care reviewed and modified Yes     Physical restraint with staff holding arms and legs per [facility] to administer Hatch ordered IM Haldol. Patient refused several attempts by 2 separate RN's to offer oral forms of medication. When staff went in to aske patient again to take medications without physical hold she spit at staff and staff took control. Patient initially attempted to resist restraint. Once medication administered physical restraint discontinued with no further problems. On call provider Dr. Mcdonnell notified and provided orders for the restraint.

## 2019-10-09 NOTE — PROVIDER NOTIFICATION
10/09/19 0917   Seclusion or Restraint Order   In Person Face to Face Assessment Conducted Yes-Eval of pt's immediate situation, reaction to intervention, complete review of systems assessment, behavioral assessment & review/assessment of hx, drugs & meds, recent labs, etc, behavioral condition, need to continue/terminate restraint/seclusion   Patient Experienced No adverse physical outcome from seclusion/restraint initiation   Continuation of Seclus/Restraint indicated at this time No   Describe actions taken physical hold to administer Jarvised IM medications     RN face to face assessment completed. Patient refused her PO scheduled Jarvised medication, which required a physical hold do administer the medication IM. No s/s of injury. MD aware. Continue to monitor and assess.

## 2019-10-09 NOTE — PLAN OF CARE
Patient isolative to room all evening only coming into the milieu during meals. Patient upon approach calm, appearing paranoid, and agitated often demanding staff to leave her room. Patient verbalized no symptoms but demonstrating paranoid behavior and showing no insight into hospitalization or commitment. PT had several RN's attempt to administer HS Hatch medication but she refused and demanded staff to leave her room in all attempts, in in one instance turned away from staff appearing to aim her flatulence in staff's direction. Code green and physical hold to administer Hatch medication.

## 2019-10-10 PROCEDURE — 25000128 H RX IP 250 OP 636: Performed by: STUDENT IN AN ORGANIZED HEALTH CARE EDUCATION/TRAINING PROGRAM

## 2019-10-10 PROCEDURE — 12400001 ZZH R&B MH UMMC

## 2019-10-10 PROCEDURE — 25000132 ZZH RX MED GY IP 250 OP 250 PS 637: Performed by: PSYCHIATRY & NEUROLOGY

## 2019-10-10 RX ADMIN — HALOPERIDOL LACTATE 5 MG: 5 INJECTION, SOLUTION INTRAMUSCULAR at 09:19

## 2019-10-10 RX ADMIN — HALOPERIDOL LACTATE 10 MG: 5 INJECTION, SOLUTION INTRAMUSCULAR at 20:16

## 2019-10-10 ASSESSMENT — ACTIVITIES OF DAILY LIVING (ADL)
LAUNDRY: UNABLE TO COMPLETE
ORAL_HYGIENE: INDEPENDENT
HYGIENE/GROOMING: INDEPENDENT
ORAL_HYGIENE: INDEPENDENT
DRESS: SCRUBS (BEHAVIORAL HEALTH);INDEPENDENT
DRESS: SCRUBS (BEHAVIORAL HEALTH)
LAUNDRY: UNABLE TO COMPLETE
HYGIENE/GROOMING: INDEPENDENT

## 2019-10-10 NOTE — PROGRESS NOTES
Pt remained in room by corner side/comes out during meal times, pt dismissive/guarded/affect angry/tense/controlled behavior with no outburst, pt encouraged to groups/ADLs/vital signs, pt needs are offered/known.     10/10/19 1257   Behavioral Health   Thoughts/Cognition (WDL) ex   Hallucinations denies / not responding to hallucinations   Thinking poor concentration;confused;paranoid   Orientation person: oriented;place: oriented   Memory baseline memory   Insight denial of illness   Judgement impaired   Eye Contact at examiner   Affect/Mood (WDL) ex   Affect angry;tense;irritable   Mood irritable   ADL Assessment (WDL) ex   Physical Appearance/Attire attire appropriate to age and situation   Hygiene neglected grooming - unclean body, hair, teeth   Suicidality (WDL) WDL   Suicidality other (see comments)  (none)   1. Wish to be Dead (Past Month) No   2. Non-Specific Active Suicidal Thoughts (Past Month) No   Enviromental Risk Factors None   Self Injury other (see comment)  (none)   Elopement (WDL) WDL   Activity (WDL) WDL   Activity isolative;withdrawn;hyperactive (agitated, impulsive)   Speech (WDL) ex   Speech pressured   Medication Sensitivity (WDL) WDL   Psychomotor Gait (WDL) WDL   Overt Agression (WDL) WDL   Substance Withdrawal   Substance Withdrawal None   Activities of Daily Living   Hygiene/Grooming independent   Oral Hygiene independent   Dress scrubs (behavioral health);independent   Laundry unable to complete   Room Organization independent   Activity   Activity Assistance Provided independent   Behavioral Health Interventions   Social and Therapeutic Interventions (Psychotic Symptoms) encourage socialization with peers;encourage effective boundaries with peers;encourage participation in therapeutic groups and milieu activities

## 2019-10-10 NOTE — PROVIDER NOTIFICATION
10/09/19 2107   Seclusion or Restraint Order   In Person Face to Face Assessment Conducted Yes-Eval of pt's immediate situation, reaction to intervention, complete review of systems assessment, behavioral assessment & review/assessment of hx, drugs & meds, recent labs, etc, behavioral condition, need to continue/terminate restraint/seclusion   Patient Experienced No adverse physical outcome from seclusion/restraint initiation   Continuation of Seclus/Restraint indicated at this time No   Pt does not appear to have any injuries from physical hold completed for medication administration. Pt does not appear to be in any distress. All lab results, medication orders, and MD orders reviewed. Dr Sotomayor notified of face to face results. Once physical hold for medication administration was completed, restraint discontinued.

## 2019-10-10 NOTE — PROGRESS NOTES
"[facility], [place]   Psychiatric Progress Note  Hospital Day: 18        Interim History:   The patient's care was discussed with the treatment team during the daily team meeting and/or staff's chart notes were reviewed.  Staff report that Michelle has continued to be isolative, tense and paranoid.  She declined check ins and continues to frequently yell at staff, sometimes obscene things. She has continued to decline any PO medications and has required code greens with a physical hold to be used for administrations of her IM Hatch medications.     Slept Night Time # Hours: 1.75 hours (10/10/19 0600)      Michelle was interviewed in her room where she was again laying on blankets in the corner where it is difficult to see her. After knocking on her door she said \"who is it?\" and What do you want? Then when informed she stated \"I don't want to talk to you. I want to talk to Dr. Pack.\" She was informed he is not here today at which point she stated \"I want to discharge. I don't want to talk to you.\" Her wishes were respected and the interview was ended.      Psychiatric Symptoms: irritability, aggressive behavior, delusions, paranoia, poor hygiene, disorganization, response to internal stimuli, minimal eating or drinking (improved)     Medication side effects reported: None seen thus far.   Other issues reported by patient: None          Medications:       atorvastatin  40 mg Oral QPM     calcium carbonate 500 mg (elemental)  500 mg Oral BID w/meals     chlorproMAZINE  100 mg Oral BID     cholecalciferol  50,000 Units Oral Q7 Days     haloperidol  10 mg Oral At Bedtime    Or     haloperidol lactate  10 mg Intramuscular At Bedtime     haloperidol  5 mg Oral Daily    Or     haloperidol lactate  5 mg Intramuscular Daily     losartan  100 mg Oral Daily     metoprolol succinate ER  50 mg Oral Daily     multivitamin, therapeutic  1 tablet Oral Daily     trihexyphenidyl  5 mg Oral BID          " "Allergies:     Allergies   Allergen Reactions     Lisinopril Cough          Labs:   No results found for this or any previous visit (from the past 48 hour(s)).       Psychiatric Examination:     BP (!) 146/105   Pulse 132   Temp 100  F (37.8  C) (Tympanic)   Resp 18   LMP  (LMP Unknown)   SpO2 100%   Weight is 0 lbs 0 oz  There is no height or weight on file to calculate BMI.    Orthostatic Vitals     None        Appearance: awake, alert, dressed in hospital scrubs and unkempt   Attitude:  uncooperative, guarded   Eye Contact:  Poor, looks down the entire time.   Mood:  \"okay\"  Affect:  Tense, sligtly more controlled and less labile. Did not yell during the interview   Speech: somewhat pressured speech  Language: fluent and intact in English  Psychomotor, Gait, Musculoskeletal:  no evidence of tardive dyskinesia, dystonia, or tics and intact station, gait and muscle tone  Throught Process:  disorganized and illogical  Associations:  no loose associations  Thought Content:  patient appears to be responding to internal stimuli and paranoid  Insight:  absent  Judgement:  poor   Oriented to:  Not formally tested, sensorium appears intact   Attention Span and Concentration:  limited  Recent and Remote Memory:  limited  Fund of Knowledge:  Not formally tested    Clinical Global Impressions  First:  Considering your total clinical experience with this particular patient population, how severe are the patient's symptoms at this time?: 7 (09/25/19 1414)  Compared to the patient's condition at the START of treatment, this patient's condition is:: 4 (09/25/19 1414)  Most recent:  Considering your total clinical experience with this particular patient population, how severe are the patient's symptoms at this time?: 7 (10/03/19 1213)  Compared to the patient's condition at the START of treatment, this patient's condition is:: 4 (10/03/19 1213)         Precautions:     Behavioral Orders   Procedures     Assault precautions "     Code 1 - Restrict to Unit     Elopement precautions     Routine Programming     As clinically indicated     Sexual precautions     Status 15     Every 15 minutes.          Diagnoses:   Schizophrenia         Assessment & Plan:   Assessment and hospital summary:  Michelle Pino is a 59 year old female with prior psychiatric diagnosis of schizophrenia and schizoaffective disorder who presents with psychosis and violence in the context of medication non-adherence. Her last psychiatric hospitalization was from April-June of this year. She was most recently followed by Dr. Yoshi Zabala at [facility]. Current psychosocial stressors include chronic mental health issues, family dynamics and medical issues. Patient's support system includes family, county and outpatient team. Substance use does not appear to be playing a contributing role in the patient's presentation.  There is no known genetic loading. Medical history does appear to be significant for Vitamin D deficiency and prior CVA. The MSE on admission was notable for lack of SI or HI, psychosis, irritability, aggression and lack of insight. She denied self injurious behaviors. Her current presentation is consistent with a schizophrenia spectrum disorder. It is unclear if there is a mood component to her illness. Her ongoing decreased need for sleep does raise concern for this.     Target psychiatric symptoms and interventions:  Psychosis  -continue oral Thorazine to 100 mg BID - discontinued IM backup of 50 mg on 10/8, switched to haldol IM   -continue haldol to 5 mg PO QAM & 10 mg PO QPM with IM back up per [facility] order  -continue with haloperidol, lorazepam, diphenhydramine as needed    Medical Problems and Treatments:  Continue to encourage PO intake    Behavioral/Psychological/Social:  Encourage unit programming    Patient has required temporary restraints for Hatch medications. Oral dose increased to target psychosis.    Disposition Plan   Reason for ongoing  admission: is unable to care for self due to severe psychosis or tad   Discharge location: TBD. Likely IRTS  Discharge Medications: not ordered  Follow-up Appointments: not scheduled  Legal Status: full commitment with [facility] for olanzapine, chlorpromazine, risperidone, haloperidol  Entered by: Bijal Varner on 10/10/2019at 8:36 AM        Patient was seen independently by the resident.     Bijal Varner MD  Psychiatry PGY-2 Resident

## 2019-10-10 NOTE — PROVIDER NOTIFICATION
10/09/19 2106   Seclusion or Restraint Order   Length of Order 4   Order Obtained Yes   Attending Physician Notified Yes   Attending Physician's Name Bran   Describe actions taken physical hold per [facility] to administer Patino medication by IM   Leadership   Seclus/Restraint >12H or 2x/24H Notified   Assessment   Less Restrictive Alternative Medication administration;Reality orientation;Reassurance / Support;Immediate action required, no least restrictive measures could be attempted   Risk Factors N   Justification   Clinical Justification Others  (Hands on for staff safety to administer IM patino medication)   Education   Discontinuation Criteria Other (see comments)  (physical hold only during IM administration)   Criteria Explained Yes   Patient's Response NL   Family Notification D   Restraint Type   Wrist - R (BH) Start   Wrist - L (BH) Start   Ankle - R (BH) Start   Ankle - L (BH) Start   Debriefing   Debriefing DO   Does patient understand why the event happened? Patient refuses to answer   Does patient agree to safe behaviors? Patient refuses to answer   What can we do differently so this doesn't happen again? Patient refuses to answer   Plan of care reviewed and modified Yes     Due to patient refusing on several attempts oral form of court ordered Patino medication haldol code green and hands on intervention needed to administer IM medication. Upon several attempts the talk/educate patient about the oral form of the medication and indication by court to be administered patient responds by refusing to talk, demanding staff leave her room by yelling and placing blanket over her head. Code green called and staff took control of arms and legs to safely administer IM medication. Upon completion of med administration physical hold by staff discontinued. On call provider Dr. Sotomayor notified and authorized orders.

## 2019-10-10 NOTE — PROVIDER NOTIFICATION
10/10/19 0915   Justification   Clinical Justification Others     Pt was offered her patino medication orally and refused to take it. Pt was educated about patino medication. Pt became hostile and escalated to yelling at staff. It was decided at that time that pt needed to receive her patino medication by injection. A code green was called. Staff brought pt to her room and she was briefly put in a physical hold until her patino medication of 5 mg Haldol was administered. Pt was released. Pt appeared not to experience any physical injury from the physical hold. Got an order from Dr. Varner for the physical hold.

## 2019-10-10 NOTE — PLAN OF CARE
Patient isolative to room all evening only in the chen during meals. Patient upon approach in room agitated and yelling at staff refusing verbal assessments and Patino ordered medications. Patient unwilling to participate in verbal assessment with no stated symptoms or evidence of insight into care. Patient majority of the evening appears irritable, tense, and paranoid. Patient much of the evening was observed walking around her room with a blanket covering her head. Patient refused several attempts to offer oral form of paitno ordered medications resulting in a code green with physical hold to administer IM medication. Patient ADL's appear poor with notable body odor and no cooperation with prompting.

## 2019-10-11 PROCEDURE — 99232 SBSQ HOSP IP/OBS MODERATE 35: CPT | Mod: GC | Performed by: PSYCHIATRY & NEUROLOGY

## 2019-10-11 PROCEDURE — 25000128 H RX IP 250 OP 636: Performed by: STUDENT IN AN ORGANIZED HEALTH CARE EDUCATION/TRAINING PROGRAM

## 2019-10-11 PROCEDURE — 12400001 ZZH R&B MH UMMC

## 2019-10-11 RX ORDER — HALOPERIDOL 5 MG/ML
15 INJECTION INTRAMUSCULAR AT BEDTIME
Status: DISCONTINUED | OUTPATIENT
Start: 2019-10-11 | End: 2019-10-14

## 2019-10-11 RX ADMIN — HALOPERIDOL LACTATE 15 MG: 5 INJECTION, SOLUTION INTRAMUSCULAR at 20:39

## 2019-10-11 ASSESSMENT — ACTIVITIES OF DAILY LIVING (ADL)
LAUNDRY: UNABLE TO COMPLETE
ORAL_HYGIENE: INDEPENDENT
HYGIENE/GROOMING: INDEPENDENT
ORAL_HYGIENE: INDEPENDENT
LAUNDRY: UNABLE TO COMPLETE
HYGIENE/GROOMING: INDEPENDENT
DRESS: SCRUBS (BEHAVIORAL HEALTH)
DRESS: SCRUBS (BEHAVIORAL HEALTH)

## 2019-10-11 NOTE — PROGRESS NOTES
No change in presentation. Pt is tense and dismissive this evening.  She remains disorganized and responding to internal. She was isolative to her room for most of this evening. She refused to check in with this RN writer. She refused oral medication. Holden Brown forced. Pt hygiene maintenance remains poor at this time.

## 2019-10-11 NOTE — PLAN OF CARE
"48 hour nursing assessment completed. Patient awake and sitting in her room for the majority of the shift. Patient is not observed yelling or screaming during this shift. Patient is not interested in engaging with staff or other patients, however she does appear to be calmer during this shift than previous shifts. Writer approaches patient to check in and she states, \"who are you\" \"don't see me\" \"I don't need you\" \"leave now\". Patient stated this to writer, but did not yell. Writer left patient alone. Patient was sitting in her room on her floor on blankets. Patient refused all am medications. Scheduled am haldol changed to larger HS dose, so no Jarvised medications scheduled during this shift. No s/s SI/SIB. Continue to monitor and assess.   "

## 2019-10-11 NOTE — PROGRESS NOTES
Pt has a newly assigned [facility] CM:  Artemio Villa at [phone].   Fx:  [phone].   He plans to meet/visit pt on Tues Oct 15th at 1pm.

## 2019-10-11 NOTE — PROGRESS NOTES
"[facility], [place]   Psychiatric Progress Note  Hospital Day: 19        Interim History:   The patient's care was discussed with the treatment team during the daily team meeting and/or staff's chart notes were reviewed.  Staff report that Michelle has mostly kept to herself aside from coming out of her room for meals. She declined check ins and vitals assessments though was less frequently yelling at staff. She has continued declining all medications and has required code greens with a physical hold to be used for administrations of her IM Hatch medications.     Slept Night Time # Hours: 6 hours (10/11/19 0600)      Michelle was interviewed in her room. She asked \"what did you come for?\" She was asked if she still feels like she has a cold though did not respond to this question. She then asked \"when are you going to discharge me?\" She was informed about concerns that she is not taking medications she then began to yell \"No commitment! No commitment!\" multiple times and was pointing at the team and walking toward them. We backed up out of the room and she continued to follow down the hallway and yelled the same statement 10-12 times \"Dr. Pack discharge me!\"      Psychiatric Symptoms: irritability, aggressive behavior, delusions, paranoia, poor hygiene, disorganization, response to internal stimuli, minimal eating or drinking (improved)     Medication side effects reported: None seen thus far   Other issues reported by patient: None          Medications:       atorvastatin  40 mg Oral QPM     calcium carbonate 500 mg (elemental)  500 mg Oral BID w/meals     chlorproMAZINE  100 mg Oral BID     cholecalciferol  50,000 Units Oral Q7 Days     haloperidol  10 mg Oral At Bedtime    Or     haloperidol lactate  10 mg Intramuscular At Bedtime     haloperidol  5 mg Oral Daily    Or     haloperidol lactate  5 mg Intramuscular Daily     losartan  100 mg Oral Daily     metoprolol succinate ER  50 mg Oral " "Daily     multivitamin, therapeutic  1 tablet Oral Daily     trihexyphenidyl  5 mg Oral BID          Allergies:     Allergies   Allergen Reactions     Lisinopril Cough          Labs:   No results found for this or any previous visit (from the past 48 hour(s)).       Psychiatric Examination:     BP (!) 146/105   Pulse 132   Temp 100  F (37.8  C) (Tympanic)   Resp 18   LMP  (LMP Unknown)   SpO2 100%   Weight is 0 lbs 0 oz  There is no height or weight on file to calculate BMI.    Orthostatic Vitals     None        Appearance: awake, alert, dressed in hospital scrubs and unkempt, poor hygiene   Attitude:  uncooperative, guarded   Eye Contact:  Intense at times   Mood:  \"discharge me\"   Affect:  Tense, intermittently appears less labile. Yelling during the interview today. Speech: somewhat pressured speech, repeats the same phrases multiple times   Language: fluent and intact in English  Psychomotor, Gait, Musculoskeletal:  no evidence of tardive dyskinesia, dystonia, or tics and intact station, gait and muscle tone  Throught Process:  disorganized and illogical  Associations:  no loose associations  Thought Content:  patient appears to be responding to internal stimuli and paranoid  Insight:  absent  Judgement:  poor   Oriented to:  Not formally tested, sensorium appears intact   Attention Span and Concentration:  limited  Recent and Remote Memory:  limited  Fund of Knowledge:  Not formally tested    Clinical Global Impressions  First:  Considering your total clinical experience with this particular patient population, how severe are the patient's symptoms at this time?: 7 (09/25/19 1414)  Compared to the patient's condition at the START of treatment, this patient's condition is:: 4 (09/25/19 1414)  Most recent:  Considering your total clinical experience with this particular patient population, how severe are the patient's symptoms at this time?: 7 (10/03/19 1213)  Compared to the patient's condition at the START " of treatment, this patient's condition is:: 4 (10/03/19 1213)         Precautions:     Behavioral Orders   Procedures     Assault precautions     Code 1 - Restrict to Unit     Elopement precautions     Routine Programming     As clinically indicated     Sexual precautions     Status 15     Every 15 minutes.          Diagnoses:   Schizophrenia         Assessment & Plan:   Assessment and hospital summary:  Michelle Pino is a 59 year old female with prior psychiatric diagnosis of schizophrenia and schizoaffective disorder who presents with psychosis and violence in the context of medication non-adherence. Her last psychiatric hospitalization was from April-June of this year. She was most recently followed by Dr. Yoshi Zabala at [facility]. Current psychosocial stressors include chronic mental health issues, family dynamics and medical issues. Patient's support system includes family, county and outpatient team. Substance use does not appear to be playing a contributing role in the patient's presentation.  There is no known genetic loading. Medical history does appear to be significant for Vitamin D deficiency and prior CVA. The MSE on admission was notable for lack of SI or HI, psychosis, irritability, aggression and lack of insight. She denied self injurious behaviors. Her current presentation is consistent with a schizophrenia spectrum disorder.    Target psychiatric symptoms and interventions:  Psychosis  Of note, regimen she was stable on at discharge from her last hospitalization was Haldol 15 mg at bedtime, Thorazine 150 mg at bedtime and Haldol Decanoate 100 mg Q30 days     -continue oral Thorazine to 100 mg BID - discontinued IM backup of 50 mg on 10/8, switched to haldol IM   -switch haldol to all at bedtime dosing 15 mg PO QHS to minimize IM injections IM back up per Hatch order  -continue with haloperidol, lorazepam, diphenhydramine as needed    Medical Problems and Treatments:  Continue to encourage PO  intake    Behavioral/Psychological/Social:  Encourage unit programming    Patient has required temporary restraints for Hatch medications.     Disposition Plan   Reason for ongoing admission: is unable to care for self due to severe psychosis or tad   Discharge location: TBD. Likely [facility]  Discharge Medications: not ordered  Follow-up Appointments: not scheduled  Legal Status: full commitment with Hatch for olanzapine, chlorpromazine, risperidone, haloperidol  Entered by: Bijal Varner on 10/11/2019at 8:30 AM        The patient was seen and the plan was discussed with the attending physician.     Bijal Varner MD  Psychiatry PGY-2 Resident

## 2019-10-11 NOTE — PROGRESS NOTES
Pt stayed in room sitting on blanket on floor. Out briefly to ask about dinner. Code called to administer evening medications.     10/10/19 2100   Behavioral Health   Hallucinations denies / not responding to hallucinations   Thinking confused;paranoid;poor concentration   Orientation   (difficult to assess, generally non-verbal on approach)   Memory baseline memory   Insight denial of illness;poor   Judgement impaired   Eye Contact at examiner   Affect angry;tense;irritable   Mood irritable   Physical Appearance/Attire other (see comment)  (wnl)   Hygiene neglected grooming - unclean body, hair, teeth   Suicidality   (declined to answer)   Self Injury other (see comment)  (none observed or reported)   Elopement   (none noted)   Activity isolative;withdrawn;refusal   Speech pressured   Psycho Education   Type of Intervention 1:1 intervention   Response refuses   Hours 0.5   Treatment Detail check in   Activities of Daily Living   Hygiene/Grooming independent   Oral Hygiene independent   Dress scrubs (behavioral health)   Laundry unable to complete   Room Organization independent

## 2019-10-12 PROCEDURE — 25000128 H RX IP 250 OP 636: Performed by: STUDENT IN AN ORGANIZED HEALTH CARE EDUCATION/TRAINING PROGRAM

## 2019-10-12 PROCEDURE — 12400001 ZZH R&B MH UMMC

## 2019-10-12 RX ADMIN — HALOPERIDOL LACTATE 15 MG: 5 INJECTION, SOLUTION INTRAMUSCULAR at 20:08

## 2019-10-12 ASSESSMENT — ACTIVITIES OF DAILY LIVING (ADL)
HYGIENE/GROOMING: INDEPENDENT;PROMPTS
HYGIENE/GROOMING: INDEPENDENT
ORAL_HYGIENE: INDEPENDENT
DRESS: SCRUBS (BEHAVIORAL HEALTH)
ORAL_HYGIENE: INDEPENDENT;PROMPTS
LAUNDRY: UNABLE TO COMPLETE
DRESS: SCRUBS (BEHAVIORAL HEALTH)

## 2019-10-12 NOTE — PROVIDER NOTIFICATION
10/11/19 2040   Justification   Clinical Justification Others     Pt refused oral Hatch medication. Code green called and Hatch IM Stephanie forced. Pt was held down to force the medication.

## 2019-10-12 NOTE — PROGRESS NOTES
"Pt is isolative and withdrawn. Her affect is guarded. She appears paranoid, and slightly tense. Pt asked this writer repeatedly when dinner would arrive and was visibly upset each time as writer explained what time dinner usually is served. Pt continues to sit in the corner of her room on the floor with a blanket. As writer knocked on door to check in, pt shouted, \"Who is it? Who is there? Who are you??\" Pt then stopped yelling and said, \"Do not chart on me. I am fine.\" Pt thanked writer as writer left the room. No signs of SI/SIB/HI. Pt appears to be preoccupied with internal stimuli. Nothing further to report at this time.   "

## 2019-10-12 NOTE — PROVIDER NOTIFICATION
10/11/19 2045   Debriefing   Debriefing DO   Does patient understand why the event happened? Patient refuses to answer   Does patient agree to safe behaviors? Patient refuses to answer   What can we do differently so this doesn't happen again? Patient refuses to answer   Plan of care reviewed and modified Yes   Pt refused to debriefing.

## 2019-10-12 NOTE — PROGRESS NOTES
"   10/12/19 1511   Significant Event   Significant Event Other (see comments)  (shift summary)   Pt was isolative to room for entire shift. For a couple of hours she stood inside her room at the door and every time someone walked by she would yell \"Get out of here!\" She became even angrier and more adamant when staff had to enter her room for environmental checks, and during rounds. Did not come out of room except to retrieve meals.   "

## 2019-10-12 NOTE — PROGRESS NOTES
Pt remains disorganized and agitated. She is responding to internal stimuli. She is withdrawn and isolative to her room. Pt affect was tense and guarded. She was dismissive and verbally aggressive when this writer tried to engage her in conversation  stop talking to me! Go back to your country . She came out of her room to ask for her dinner tray and became upset with staff when told that the dinner tray is not on the unit yet. Pt continues to sit in the corner of her room on the floor with a blanket and became upset when staff tried to check up on her. Pt refused all scheduled medication, and Hatch Haldol was forced. Pt is not attending to personal hygiene at this time. No SI/SIB.

## 2019-10-12 NOTE — PROVIDER NOTIFICATION
10/11/19 2045   Seclusion or Restraint Order   In Person Face to Face Assessment Conducted Yes-Eval of pt's immediate situation, reaction to intervention, complete review of systems assessment, behavioral assessment & review/assessment of hx, drugs & meds, recent labs, etc, behavioral condition, need to continue/terminate restraint/seclusion   Patient Experienced No adverse physical outcome from seclusion/restraint initiation     In person face-to-face assessment conducted. Patient does not appear to have experienced any adverse physical outcome from restraint or appear to be in any distress.  All lab results, medication orders, and MD orders reviewed. On call provider Michelle Jack notified of face to face results. Once physical hold for medication administration was completed, restraint discontinued. Will continue to monitor for safety.

## 2019-10-13 PROCEDURE — 25000128 H RX IP 250 OP 636: Performed by: PSYCHIATRY & NEUROLOGY

## 2019-10-13 PROCEDURE — 12400001 ZZH R&B MH UMMC

## 2019-10-13 PROCEDURE — 25000132 ZZH RX MED GY IP 250 OP 250 PS 637: Performed by: STUDENT IN AN ORGANIZED HEALTH CARE EDUCATION/TRAINING PROGRAM

## 2019-10-13 RX ADMIN — HALOPERIDOL 15 MG: 5 TABLET ORAL at 19:52

## 2019-10-13 RX ADMIN — LORAZEPAM 2 MG: 2 INJECTION INTRAMUSCULAR; INTRAVENOUS at 12:20

## 2019-10-13 RX ADMIN — HALOPERIDOL LACTATE 5 MG: 5 INJECTION, SOLUTION INTRAMUSCULAR at 12:20

## 2019-10-13 RX ADMIN — DIPHENHYDRAMINE HYDROCHLORIDE 50 MG: 50 INJECTION, SOLUTION INTRAMUSCULAR; INTRAVENOUS at 12:20

## 2019-10-13 ASSESSMENT — ACTIVITIES OF DAILY LIVING (ADL)
ORAL_HYGIENE: INDEPENDENT
ORAL_HYGIENE: INDEPENDENT
LAUNDRY: UNABLE TO COMPLETE
HYGIENE/GROOMING: INDEPENDENT
DRESS: SCRUBS (BEHAVIORAL HEALTH)
LAUNDRY: UNABLE TO COMPLETE
HYGIENE/GROOMING: INDEPENDENT
DRESS: SCRUBS (BEHAVIORAL HEALTH)

## 2019-10-13 NOTE — PROGRESS NOTES
"   10/13/19 1317   Behavioral Health   Hallucinations appears responding   Thinking delusional;paranoid;poor concentration;confused   Orientation person: oriented   Memory baseline memory   Insight poor   Judgement impaired   Eye Contact at examiner   Affect irritable;blunted, flat;angry;tense   Mood labile;irritable   Hygiene body odor   Activities of Daily Living   Hygiene/Grooming independent   Oral Hygiene independent   Dress scrubs (behavioral health)   Laundry unable to complete   Room Organization independent   Pt. Was unable to follow direction or listen to staff's redirections. She was preoccupy most of the morning pacing the hallway/ standing in from of the med window saying \"where is Stefan my brother\". Staff asked if she needed anything but instead she told staff \"shut up nigger and go back to your country\". She ate all meals without any issues. She did not take a shower during this shift and she has BETZY.   "

## 2019-10-13 NOTE — PROGRESS NOTES
Patient was easily agitated from the start of the shift.  She began making inappropriate racist remarks to a new patient.  She was asked to go to her room so that an altercation would not occur.  Patient would not follow through with with staff directive.  She was wandering about the unit and yelling at patients and staff.  She refused to take her scheduled medications in the AM which she refuses daily. Patient then began approaching and harassing the .  She again was asked to go to her room. She did not follow through.  Code was called on another patient and Michelle refused to go to her room. At that time decision was made to give PRN. Pt refused oral meds. Was given IM Haldol, Ativan and Benadryl. She was cooperative when being given IM. She was not restrained after getting the injections.

## 2019-10-13 NOTE — PROVIDER NOTIFICATION
10/13/19 1230   Seclusion or Restraint Order   In Person Face to Face Assessment Conducted Yes-Eval of pt's immediate situation, reaction to intervention, complete review of systems assessment, behavioral assessment & review/assessment of hx, drugs & meds, recent labs, etc, behavioral condition, need to continue/terminate restraint/seclusion   Patient Experienced No adverse physical outcome from seclusion/restraint initiation   Continuation of Seclus/Restraint indicated at this time No   Describe actions taken Physical hold for IM medication     RN face to face assessment completed. No seclusion or restraints. Physical hold for IM PRN medications only. No s/s of injury observed. Continue to monitor and assess.

## 2019-10-13 NOTE — PROVIDER NOTIFICATION
10/12/19 2006   Seclusion or Restraint Order   In Person Face to Face Assessment Conducted Yes-Eval of pt's immediate situation, reaction to intervention, complete review of systems assessment, behavioral assessment & review/assessment of hx, drugs & meds, recent labs, etc, behavioral condition, need to continue/terminate restraint/seclusion   Patient Experienced No adverse physical outcome from seclusion/restraint initiation     In person face-to-face assessment completed. Patient does not appear to have experienced any adverse physical outcome or distress from restraint.  All lab results, medication orders, and MD orders reviewed. On call MD Miguel Sotomayor notified of face to face results. Once physical hold for medication administration was completed, restraint discontinued. Will continue to monitor for safety.

## 2019-10-13 NOTE — PLAN OF CARE
Pt was isolative to her room and withdrawn. She spent most of this evening sitting in the corner of her room on the floor with a blanket and became upset when staff tried to check up on her. Affect was tense and guarded. She was rude and dismissive upon approach  get out of my room. Stefan, go back to your country . Pt appetite is improving; she came out of her room to request dinner and was argumentative with staff when told dinner tray will be on the unit around 5:30 pm. Pt ate about 70% of her dinner. She refused HS scheduled medication, Hatch Haldol, IM, was forced. Pt is not attending to personal hygiene at this time. No SI/SIB.

## 2019-10-14 PROCEDURE — 99232 SBSQ HOSP IP/OBS MODERATE 35: CPT | Performed by: PSYCHIATRY & NEUROLOGY

## 2019-10-14 PROCEDURE — 25000132 ZZH RX MED GY IP 250 OP 250 PS 637: Performed by: STUDENT IN AN ORGANIZED HEALTH CARE EDUCATION/TRAINING PROGRAM

## 2019-10-14 PROCEDURE — H2032 ACTIVITY THERAPY, PER 15 MIN: HCPCS

## 2019-10-14 PROCEDURE — 25000132 ZZH RX MED GY IP 250 OP 250 PS 637: Performed by: PSYCHIATRY & NEUROLOGY

## 2019-10-14 PROCEDURE — 12400001 ZZH R&B MH UMMC

## 2019-10-14 RX ORDER — HALOPERIDOL 5 MG/ML
10 INJECTION INTRAMUSCULAR AT BEDTIME
Status: DISCONTINUED | OUTPATIENT
Start: 2019-10-14 | End: 2019-10-25 | Stop reason: HOSPADM

## 2019-10-14 RX ADMIN — HALOPERIDOL 15 MG: 10 TABLET ORAL at 18:46

## 2019-10-14 RX ADMIN — CHLORPROMAZINE HYDROCHLORIDE 100 MG: 100 TABLET, SUGAR COATED ORAL at 13:54

## 2019-10-14 ASSESSMENT — ACTIVITIES OF DAILY LIVING (ADL)
HYGIENE/GROOMING: INDEPENDENT
HYGIENE/GROOMING: HANDWASHING;INDEPENDENT
DRESS: SCRUBS (BEHAVIORAL HEALTH)
DRESS: SCRUBS (BEHAVIORAL HEALTH)
ORAL_HYGIENE: INDEPENDENT
LAUNDRY: WITH SUPERVISION
ORAL_HYGIENE: INDEPENDENT

## 2019-10-14 NOTE — PROGRESS NOTES
"CLINICAL NUTRITION SERVICES - REASSESSMENT NOTE     Nutrition Prescription    RECOMMENDATIONS FOR MDs/PROVIDERS TO ORDER:  Encourage po intakes of a variety of foods    Malnutrition Status:    Patient does not meet two of the criteria necessary for diagnosing malnutrition but is at risk for malnutrition    Recommendations already ordered by Registered Dietitian (RD):  Allow for pt to request 2 oranges at meal times, and send 2 oranges TID at snack times per pt/staff request     Future/Additional Recommendations:  Continue to monitor PO intakes  Calculate estimated needs when weight available   Re-offer supplements as able      EVALUATION OF THE PROGRESS TOWARD GOALS   Diet: Regular with 2 oranges on meal trays   Intake: Pt reports meal intakes are \"fine\", prefers oranges and requests to be offered more oranges. Refused to be weighed even with RD encouragement, stating \"you do not need this\". Offered supplements, pt refused.  Nursing staff report pt is taking more of meals now, and they also request to allow pt more oranges as she take these well and is constantly asking for them.      NEW FINDINGS   Wt Readings from Last 10 Encounters:   06/04/19 70.6 kg (155 lb 11.2 oz)   04/27/19 63.5 kg (140 lb)     MALNUTRITION  % Intake: Unable to assess  % Weight Loss: Unable to assess - pt refuses to be weighed   Subcutaneous Fat Loss: Unable to assess  Muscle Loss: None observed  Fluid Accumulation/Edema: None noted  Malnutrition Diagnosis: Patient does not meet two of the above criteria necessary for diagnosing malnutrition but is at risk for malnutrition    Previous Goals   Patient to consume % of nutritionally adequate meal trays TID, or the equivalent with supplements/snacks.  Evaluation: Somewhat improving    Previous Nutrition Diagnosis  Predicted inadequate nutrient intake related to pt eating well now but h/o poor PO.  Evaluation: Improving    CURRENT NUTRITION DIAGNOSIS  Predicted inadequate nutrient intake " related to pt varying acceptance of meals and pt preferences and  as evidenced by staff report of varying intakes and pt requests for specific food items.       INTERVENTIONS  Implementation  Modify composition of meals/snacks to allow pt request for ample supple of oranges - allowed 2 oranges at meals and will add oranges as snacks TID.     Goals  Patient to consume % of nutritionally adequate meal trays TID, or the equivalent with supplements/snacks.    Monitoring/Evaluation  Progress toward goals will be monitored and evaluated per protocol.

## 2019-10-14 NOTE — PLAN OF CARE
"Pt was isolative and withdrawn for most of this evening. She requests to talk with this RN writer. Pt asked what she needs to do for her to discharge from the hospital. After a lengthy conversation, she agreed to take oral scheduled Haldol, which she took without any problem. She refused other meds. During the discussion, pt appeared internal preoccupied with Catholic issue  God asked me not to take those medications. Only Germain is the answer. These people are trying to kill me with those medications, but my God is bigger than them\".  Pt is insisting that she is not committed and Hatch. She said her 72 hrs hold already . Insight into her mental status remains poor.  She was seen sleeping on her bed this evening for the first time since admission. She ate about 50% of her dinner. Pt hygiene maintenance remains poor, and she had multiple layers of cloths.  "

## 2019-10-14 NOTE — PLAN OF CARE
JAYLONAR      S = Situation:     Patient declined all of her medications during the a.m. shift today. She later approached RN and requested that she takes her thorazine. Accepted medication without any problems. She declined to meet with me this shift, and accepted Haldol PO from another nurse while I was on break. She wanted make sure that I document that she was medication compliant tonight so that her provider can see it tomorrow. Mostly isolative to her room with minimal milieu appearance. Affect blunted, hostile and tense  and mood congruent. Mood irritable. She remains non compliant with her physical health medication and vs assessment.  Patient sleep remains variable and poor, randing from 2-5 hours per night. Appetite adequate She does prefer oranges with every meal and snack and met with dietitian today to accomodate her request. Patient denies current or recent suicidal ideation . I was unable to discuss goals with pateint due to her level of cooperation.   B  = Background:     Legal Status: Patient is committed and Jarvised.  VS: Has been generally not cooperative with nursing care and assessment. Last VS from 9/22/2019 : BP (!) 146/105   Pulse 132   Temp 100  F (37.8  C) (Tympanic)   Resp 18   LMP  (LMP Unknown)   SpO2 100%    We do not have a elana and the weight on the patient as she has been refusing ( Last documented refusal 10/14/2019 )  Diet: Patient receives  2 oranges at meal times, and also 2 oranges TID at snack times   A  =  Assessment:     Paranoid. Uncooperative. Partial and inconsistent medication compliance. Poor sleep.   R =   Request or Recommendation:     Refer to case manger notes for discharge planing and recommendations. Continue with current treatment plan and recommendations. Continue to monitor and reassess symptoms. Monitor adherence and response to medications. Monitor progress towards treatment goals. Encourage groups and participation.

## 2019-10-14 NOTE — PROGRESS NOTES
"   10/14/19 1251   Behavioral Health   Hallucinations denies / not responding to hallucinations   Thinking other (see comment)  (appears fairly intact this shift)   Orientation other (see comment)  (AFSHAN)   Memory other (see comment)  (AFSHAN)   Insight poor   Judgement   (fair)   Eye Contact at examiner   Affect blunted, flat;full range affect   Mood mood is calm   Physical Appearance/Attire appears stated age;attire appropriate to age and situation   Hygiene well groomed   Suicidality other (see comments)  (Pt denies.)   Wish to be Dead Description (Recent) no   Non-Specific Active Suicidal Thought Description (Recent) no   Self Injury other (see comment)  (Pt denies.)   Elopement   (Pt didn't exhibit these behaviors this shift.)   Activity isolative;withdrawn;other (see comment)  (however also attended a group)   Speech coherent;clear   Psychomotor / Gait balanced;steady   Coping/Psychosocial   Verbalized Emotional State other (see comments)  (\"feeling fine\")   Activities of Daily Living   Hygiene/Grooming independent   Oral Hygiene independent   Dress scrubs (behavioral health)   Room Organization independent   Significant Event   Significant Event Other (see comments)  (Shift Summary)   Behavioral Health Interventions   Social and Therapeutic Interventions (Psychotic Symptoms) encourage socialization with peers;encourage effective boundaries with peers;encourage participation in therapeutic groups and milieu activities   Pt denies SI and SIB thoughts. Pt states that she feels fine. Pt was seen eating her lunch, which is an improvement from some previous shifts when she was only eating fruit. Pt has appeared fairly calm this shift so far. Pt was somewhat withdrawn to her room but also was present in OT group and in the lounge at times. Pt appears to be doing much better than in past shifts.  "

## 2019-10-15 PROCEDURE — G0177 OPPS/PHP; TRAIN & EDUC SERV: HCPCS

## 2019-10-15 PROCEDURE — 25000132 ZZH RX MED GY IP 250 OP 250 PS 637: Performed by: STUDENT IN AN ORGANIZED HEALTH CARE EDUCATION/TRAINING PROGRAM

## 2019-10-15 PROCEDURE — 25000132 ZZH RX MED GY IP 250 OP 250 PS 637: Performed by: PSYCHIATRY & NEUROLOGY

## 2019-10-15 PROCEDURE — 12400001 ZZH R&B MH UMMC

## 2019-10-15 PROCEDURE — 99231 SBSQ HOSP IP/OBS SF/LOW 25: CPT | Performed by: PSYCHIATRY & NEUROLOGY

## 2019-10-15 RX ADMIN — CHLORPROMAZINE HYDROCHLORIDE 100 MG: 100 TABLET, SUGAR COATED ORAL at 08:36

## 2019-10-15 RX ADMIN — HALOPERIDOL 15 MG: 10 TABLET ORAL at 19:21

## 2019-10-15 ASSESSMENT — ACTIVITIES OF DAILY LIVING (ADL)
DRESS: INDEPENDENT
HYGIENE/GROOMING: INDEPENDENT
LAUNDRY: WITH SUPERVISION
LAUNDRY: WITH SUPERVISION
ORAL_HYGIENE: INDEPENDENT
HYGIENE/GROOMING: INDEPENDENT
ORAL_HYGIENE: INDEPENDENT
DRESS: INDEPENDENT

## 2019-10-15 NOTE — PROGRESS NOTES
Pt independently came to the morning OT group, asked to make a bracelet, then made a necklace.  Independently selected beads and created a simple pattern, and later strung beads.  Politely asked for help as needed, calling writer by name.

## 2019-10-15 NOTE — PROGRESS NOTES
"[facility], [place]   Psychiatric Progress Note  Hospital Day: 22        Interim History:   The patient's care was discussed with the treatment team during the daily team meeting and/or staff's chart notes were reviewed.  Staff report that Michelle has finally started to improve. Took oral medications the night before. Attended groups this AM.       Michelle asks about length of stay. I indicate that for many people, these medications may take 2 to 3 weeks for effect. She states \"Three weeks!?\" but is able to remain calm overall. I indicate that there is no set time and that her duration of stay will be based on how well she is doing. She walks me down to the group area to point out the project she made.     Psychiatric Symptoms: Ongoing psychosis, agitation.     Medication side effects reported: None seen thus far   Other issues reported by patient: None          Medications:       atorvastatin  40 mg Oral QPM     calcium carbonate 500 mg (elemental)  500 mg Oral BID w/meals     chlorproMAZINE  100 mg Oral BID     cholecalciferol  50,000 Units Oral Q7 Days     haloperidol lactate  10 mg Intramuscular At Bedtime    Or     haloperidol  15 mg Oral At Bedtime     losartan  100 mg Oral Daily     metoprolol succinate ER  50 mg Oral Daily     multivitamin, therapeutic  1 tablet Oral Daily     trihexyphenidyl  5 mg Oral BID          Allergies:     Allergies   Allergen Reactions     Lisinopril Cough          Labs:   No results found for this or any previous visit (from the past 48 hour(s)).       Psychiatric Examination:     BP (!) 146/105   Pulse 132   Temp 100  F (37.8  C) (Tympanic)   Resp 18   LMP  (LMP Unknown)   SpO2 100%   Weight is 0 lbs 0 oz  There is no height or weight on file to calculate BMI.    Orthostatic Vitals     None        Appearance: awake, alert, dressed in hospital scrubs and unkempt, poor hygiene   Attitude:  cooperative, guarded   Eye Contact:  Intense at times   Mood: " improved, still angry  Affect:  Less labile  Speech: somewhat not pressured toda  Language: fluent and intact in English  Psychomotor, Gait, Musculoskeletal:  no evidence of tardive dyskinesia, dystonia, or tics and intact station, gait and muscle tone  Throught Process:  improved organization  Associations:  no loose associations  Thought Content:  patient appears to be responding to internal stimuli and paranoid  Insight:  absent  Judgement:  poor   Oriented to:  Not formally tested, sensorium appears intact   Attention Span and Concentration:  limited  Recent and Remote Memory:  limited  Fund of Knowledge:  Not formally tested    Clinical Global Impressions  First:  Considering your total clinical experience with this particular patient population, how severe are the patient's symptoms at this time?: 7 (09/25/19 1414)  Compared to the patient's condition at the START of treatment, this patient's condition is:: 4 (09/25/19 1414)  Most recent:  Considering your total clinical experience with this particular patient population, how severe are the patient's symptoms at this time?: 7 (10/14/19 1240)  Compared to the patient's condition at the START of treatment, this patient's condition is:: 4 (10/14/19 1240)           Precautions:     Behavioral Orders   Procedures     Assault precautions     Code 1 - Restrict to Unit     Elopement precautions     Routine Programming     As clinically indicated     Sexual precautions     Status 15     Every 15 minutes.          Diagnoses:   Schizophrenia         Assessment & Plan:   Assessment and hospital summary:  Michelle Pino is a 59 year old female with prior psychiatric diagnosis of schizophrenia and schizoaffective disorder who presents with psychosis and violence in the context of medication non-adherence. Her last psychiatric hospitalization was from April-June of this year. She was most recently followed by Dr. Yoshi Zabala at [facility]. Current psychosocial stressors  include chronic mental health issues, family dynamics and medical issues. Patient's support system includes family, county and outpatient team. Substance use does not appear to be playing a contributing role in the patient's presentation.  There is no known genetic loading. Medical history does appear to be significant for Vitamin D deficiency and prior CVA. The MSE on admission was notable for lack of SI or HI, psychosis, irritability, aggression and lack of insight. She denied self injurious behaviors. Her current presentation is consistent with a schizophrenia spectrum disorder.    Target psychiatric symptoms and interventions:  Psychosis  Of note, regimen she was stable on at discharge from her last hospitalization was Haldol 15 mg at bedtime, Thorazine 150 mg at bedtime and Haldol Decanoate 100 mg Q30 days     -continue oral Thorazine to 100 mg BID  -haldol 15 mg PO QHS to minimize IM injections IM back up per [facility] order  -continue with haloperidol, lorazepam, diphenhydramine as needed    Medical Problems and Treatments:  Continue to encourage PO intake    Behavioral/Psychological/Social:  Encourage unit programming    Patient has required temporary restraints for [facility] medications.     Disposition Plan   Reason for ongoing admission: is unable to care for self due to severe psychosis or tad   Discharge location: TBD. Likely IRTS  Discharge Medications: not ordered  Follow-up Appointments: not scheduled  Legal Status: full commitment with [facility] for olanzapine, chlorpromazine, risperidone, haloperidol  Entered by: Jarett Pack on 10/15/2019at 11:27 AM

## 2019-10-15 NOTE — PROGRESS NOTES
Pt did not formally attend the OT group, though approached writer toward the end of group, and pointed to a coloring sheet, and asked for one.  Proceeded to color for 10 minutes, then asked writer if she should writer her name on it.  Replied it was up to her, and she signed her name on the bottom, and asked for tape.  Pt then hung it in the lounge.    This is the first time since admission she has spoken to writer, she normally ignores me, or has raised her voice at me if she perceives I am in the lounge too long doing group. She appears much more calm, and has expressed interest in coming to group again tomorrow and doing a beading project, as she remembers doing that the last time she was here.

## 2019-10-15 NOTE — PLAN OF CARE
BEHAVIORAL TEAM DISCUSSION    Participants: jose west rn, benja antunez [facility]  Progress: Pt has just started to improve.   There is now partial medication compliance regarding oral tablets as opposed to needing every dose of neuroleptics forced, under the authorization of the [facility] court order.    Her psychosis has improved.    Less paranoid, fewer [employer] and paranoid delusions.   Fewer instances of denying reality, eg, court/commitment orders.   She is more complete meals rather than just oranges.  Yesterday, she attended a group and participated, which is a big improvement.    She is starting to ask about discharge.   Her sleep is still rather poor.    Anticipated length of stay: a week  Continued Stay Criteria/Rationale: needs more stabilization  Medical/Physical:  no new issues  Precautions:   Behavioral Orders   Procedures    Assault precautions    Code 1 - Restrict to Unit    Elopement precautions    Routine Programming     As clinically indicated    Sexual precautions    Status 15     Every 15 minutes.     Plan:Meds per dr hicks.    As of 10-3-19, pt is committed as MI to [facility] [facility], through [place] [address] [facility].   There is also a [facility] order which includes Thorazine, Zyprexa, Risperdal, and Haldol.   I will now begin talking with pt about IRT's referral and whether she'd consider that.    Pt has a newly assigned [facility] CM:  Artemio Villa at [phone].  Pt will need Provisional Discharge Agreement at the time of discharge.    Rationale for change in precautions or plan:   no change at this time.

## 2019-10-15 NOTE — PROGRESS NOTES
"Pt was visible in and out of the milieu, blunted/flat affect . Pt attended activity group and was visible for meals, minimal interaction with staff and peers. Declined to check in with staff \"get out of my face\". No behavioral concerns .          10/15/19 1440   Behavioral Health   Hallucinations denies / not responding to hallucinations   Thinking poor concentration   Orientation person: oriented;place: oriented;date: oriented;time: oriented   Memory baseline memory   Insight poor   Judgement impaired   Eye Contact at examiner   Affect blunted, flat;tense   Mood mood is calm   Physical Appearance/Attire attire appropriate to age and situation   Hygiene other (see comment)  (adequate)   Suicidality other (see comments)  (AFSHAN pt decline check in )   Self Injury other (see comment)  (no stated or observed.)   Activity withdrawn   Medication Sensitivity no stated side effects;no observed side effects   Psychomotor / Gait steady;balanced   Psycho Education   Type of Intervention other (see comment)  (observational)   Response refuses   Activities of Daily Living   Hygiene/Grooming independent   Oral Hygiene independent   Dress independent   Laundry with supervision   Room Organization independent     "

## 2019-10-16 PROCEDURE — 99233 SBSQ HOSP IP/OBS HIGH 50: CPT | Performed by: PSYCHIATRY & NEUROLOGY

## 2019-10-16 PROCEDURE — 25000132 ZZH RX MED GY IP 250 OP 250 PS 637: Performed by: STUDENT IN AN ORGANIZED HEALTH CARE EDUCATION/TRAINING PROGRAM

## 2019-10-16 PROCEDURE — 12400001 ZZH R&B MH UMMC

## 2019-10-16 PROCEDURE — 25000132 ZZH RX MED GY IP 250 OP 250 PS 637: Performed by: PSYCHIATRY & NEUROLOGY

## 2019-10-16 RX ADMIN — CHLORPROMAZINE HYDROCHLORIDE 100 MG: 100 TABLET, SUGAR COATED ORAL at 08:40

## 2019-10-16 RX ADMIN — HALOPERIDOL 15 MG: 10 TABLET ORAL at 19:06

## 2019-10-16 ASSESSMENT — ACTIVITIES OF DAILY LIVING (ADL)
HYGIENE/GROOMING: INDEPENDENT
LAUNDRY: UNABLE TO COMPLETE
HYGIENE/GROOMING: INDEPENDENT
LAUNDRY: UNABLE TO COMPLETE
ORAL_HYGIENE: INDEPENDENT
DRESS: SCRUBS (BEHAVIORAL HEALTH)
DRESS: SCRUBS (BEHAVIORAL HEALTH);INDEPENDENT
ORAL_HYGIENE: INDEPENDENT

## 2019-10-16 NOTE — PLAN OF CARE
Pt continues to isolate to her room for most of the day. She was irritable on approach this morning, yelling at writer to leave her room so she could finish breakfast. She later approached the medication room and requested her scheduled Thorazine. She refused all other morning medications, and continues to refuse vitals assessment. She reports that her main concerns are that she gets two oranges with each meal, and that she wants to discharge as soon as possible. Shows minimal insight into her mental health symptoms, continuing to deny the need for any medications, though she does agree to take [facility] medications. No aggression observed. No side effects observed from medications. Will continue to monitor closely.

## 2019-10-16 NOTE — PROGRESS NOTES
Pt continues to be isolative and withdrawn to her room. Pt was highly irritable with writer when I introduced myself to her. Later, pt was polite with writer when medications and oranges were brought to her. Pt denies all mental health symptoms. Pt denies pain and states she is eating and sleeping well. No behavioral concerns this shift.     Pt refused all HS medications besides Haldol.

## 2019-10-16 NOTE — PROGRESS NOTES
[facility], [place]   Psychiatric Progress Note  Hospital Day: 24        Interim History:   The patient's care was discussed with the treatment team during the daily team meeting and/or staff's chart notes were reviewed.  Staff report patient remains irritable. She demands to leave. She continues to lack insight and believes that she can just go home. She doesn't believe she has a mental illness and although she is taking medications, she doesn't want to do so. She has been eating more. Although she takes her psych medications, she refuses her medications for her medical issues. She continues to refuse vital signs.    Upon interview, the patient again demands to leave. She doesn't believe that she is under commitment. She doesn't believe that she needs medications. She indicates that she won't take them later. Patient is agitated and terminates the interview due to my indication she isn't ready for discharge.    Psychiatric Symptoms: paranoia, lack of insight as evidenced by denial she is under commitment and unwillingness to accept medications for medical issues.    Medication side effects reported: none reported or observed    Other issues reported by patient: none         Medications:       atorvastatin  40 mg Oral QPM     calcium carbonate 500 mg (elemental)  500 mg Oral BID w/meals     chlorproMAZINE  100 mg Oral BID     cholecalciferol  50,000 Units Oral Q7 Days     haloperidol lactate  10 mg Intramuscular At Bedtime    Or     haloperidol  15 mg Oral At Bedtime     losartan  100 mg Oral Daily     metoprolol succinate ER  50 mg Oral Daily     multivitamin, therapeutic  1 tablet Oral Daily     trihexyphenidyl  5 mg Oral BID          Allergies:     Allergies   Allergen Reactions     Lisinopril Cough          Labs:   No results found for this or any previous visit (from the past 48 hour(s)).       Psychiatric Examination:       Recent Vital signs:                   Height: (refused )  "Weight: (refused )  Estimated body mass index is 27.58 kg/m  as calculated from the following:    Height as of 4/27/19: 1.6 m (5' 3\").    Weight as of 6/4/19: 70.6 kg (155 lb 11.2 oz).      Orthostatic Vitals     None            Appearance: awake, alert, dressed in hospital scrubs and unkempt  Attitude:  uncooperative  Eye Contact:  intense  Mood:  angry  Affect:  labile  Speech:  angry tone. No longer repeating same phrase continuously  Language: fluent and intact in English  Psychomotor, Gait, Musculoskeletal:  no evidence of tardive dyskinesia, dystonia, or tics and intact station, gait and muscle tone  Throught Process:  illogical  Associations:  no loose associations  Thought Content:  paranoia evident. Denies hallucinations. Denies SI and HI  Insight:  absent  Judgement:  poor  Oriented to:  person and place  Attention Span and Concentration:  limited  Recent and Remote Memory:  limited  Fund of Knowledge:  Adequate      Clinical Global Impressions  First:  Considering your total clinical experience with this particular patient population, how severe are the patient's symptoms at this time?: 7 (09/25/19 1414)  Compared to the patient's condition at the START of treatment, this patient's condition is:: 4 (09/25/19 1414)  Most recent:  Considering your total clinical experience with this particular patient population, how severe are the patient's symptoms at this time?: 7 (10/14/19 1240)  Compared to the patient's condition at the START of treatment, this patient's condition is:: 4 (10/14/19 1240)           Precautions:     Behavioral Orders   Procedures     Assault precautions     Code 1 - Restrict to Unit     Elopement precautions     Routine Programming     As clinically indicated     Sexual precautions     Status 15     Every 15 minutes.          Diagnoses:      Schizophrenia paranoid type  Hypertension  Hyperlipidemia  Vitamin D deficiency         Assessment & Plan:   Assessment and hospital " summary:  59-year-old woman admitted due to decompensation of schizophrenia in setting of discontinuation of medications. She appears to have stopped them as soon as her commitment . Currently, she lacks any insight into medical or psychiatric issues. She is only taking medications on [facility] and nothing else. She would be unable to care for herself in her current state and requires further stabilization. When she begins to show substantial improvement we can start decanoate again.    Target psychiatric symptoms and interventions:  Ongoing psychosis  -continue thorazine 100 mg BID  -continue haloperidol 15 mg at bedtime with IM backup of 10 mg if she refuses  -continue artane to prevent EPSE    Historically stabilized on Thorazine 150 mg at bedtime and haloperidol decanoate 100 mg q30 days.    Medical Problems and Treatments:  Continue to offer medications for medical issues. She is currently refusing them.    Behavioral/Psychological/Social:  Encourage unit programming          Disposition Plan   Reason for ongoing admission: is unable to care for self due to severe psychosis or tad  Discharge location: TBD. Possibly home if son continues to pay for appartment. Patient unwilling to consier IRTS in current state.  Discharge Medications: not ordered  Follow-up Appointments: not scheduled  Legal Status: full commitment with [facility] for olanzapine, chlorpromazine, risperidone, haloperidol  Entered by: Jarett Pack on 10/16/2019 at 3:29 PM

## 2019-10-16 NOTE — PROGRESS NOTES
"Late entry:  Patient came out of her room, appeared somewhat anxious, but then asked for \"??Orange juice??\". Patient then kept saying \"I did not have a snack, I did not have a snack tonight\".  Reassurance given to patient that it was OK to ask for what she needed, and staff finally understood that she wanted an Orange.  Patient was informed by staff that she really looked a lot better & that they (staff) were glad to see her looking improved. Also that another staff went into the kitchen to find her an Orange, and then came back with an orange.  Patient gave a big smile and thanked staff for getting her her orange, then told staff \"good night & thank you\" and went back to her room.    "

## 2019-10-17 PROCEDURE — 12400001 ZZH R&B MH UMMC

## 2019-10-17 PROCEDURE — 25000132 ZZH RX MED GY IP 250 OP 250 PS 637: Performed by: PSYCHIATRY & NEUROLOGY

## 2019-10-17 PROCEDURE — 25000132 ZZH RX MED GY IP 250 OP 250 PS 637: Performed by: STUDENT IN AN ORGANIZED HEALTH CARE EDUCATION/TRAINING PROGRAM

## 2019-10-17 PROCEDURE — G0177 OPPS/PHP; TRAIN & EDUC SERV: HCPCS

## 2019-10-17 RX ADMIN — HALOPERIDOL 15 MG: 10 TABLET ORAL at 20:13

## 2019-10-17 RX ADMIN — CHLORPROMAZINE HYDROCHLORIDE 100 MG: 100 TABLET, SUGAR COATED ORAL at 08:33

## 2019-10-17 ASSESSMENT — ACTIVITIES OF DAILY LIVING (ADL)
LAUNDRY: UNABLE TO COMPLETE
HYGIENE/GROOMING: INDEPENDENT
HYGIENE/GROOMING: INDEPENDENT;PROMPTS
DRESS: SCRUBS (BEHAVIORAL HEALTH)
ORAL_HYGIENE: INDEPENDENT;PROMPTS
DRESS: INDEPENDENT
ORAL_HYGIENE: INDEPENDENT

## 2019-10-17 NOTE — PROGRESS NOTES
"Patient only appears to have slept a total of 2.5 hrs during the night, but was noted to be actually sleeping in her bed, instead of on her blanket, on the floor. Patient refused all PRNs offered, but did tell this RN \"thank you\" for offering the medications.  "

## 2019-10-17 NOTE — PROGRESS NOTES
Pt was perseverating on seeing Dr Pack this morning. When reminded that Dr Pack was off today, she then started going to every RN on the unit and demanding that they discharge her. She was tense and irritable. Pt then became fixated on a specific RN, repeating their name over and over for at least 20 minutes while standing at the medication room. She finally agreed to walk away after much encouragement from staff. She continued to approach staff throughout the day and demand that she be discharged. Pt was compliant with scheduled Thorazine this morning, and declined all other medications.

## 2019-10-17 NOTE — PROGRESS NOTES
Pt requested to talk with this writer and complained that the doctor refused to discharge her this morning. She stated that she has a lot to take care of at home. She was worried about her electric bill and other personal things she needs to do at home. She was argumentative when told she is not stable enough for discharge at this time. She only took her scheduled HS Haldol and refused other scheduled medications. Good appetite. Hygiene maintenance is still poor at this time.

## 2019-10-17 NOTE — PROGRESS NOTES
"Pt's [employer] [employer] CM, Artemio Villa at [phone] did come to meet and visit with pt on Tues Oct 15th.   It did not go well.   Pt sent him away.   Then the next day on Wednesday, pt told me she wants him to come back so she can talk to him.        Pt also told me yesterday, she will not consider an IRT's placement but intends go back to the apartment in [place].       I had a phone discussion earlier this week with pt's son, Emelia.  The family wants pt to go to an IRT's and assumed she could be \"forced\" into one as a result of being committed.   I helped him understand she would have to agree to the placement and with more treatment and stabilization, she may end up agreeing to it.   I also informed pt's son, that pt now has a Counts include [facility] beds at the [facility] assigned to her for ongoing support in the community and to monitor how she's doing.  Pt's son appreciated the update.          "

## 2019-10-17 NOTE — PROGRESS NOTES
"Pt was very irritable at the beginning of the shift, when she was out of her room.  When staff made an attempt to check in, pt became angry + told staff, \"leave me alone, leave me alone!!!\"  No information could be obtained from pt.  "

## 2019-10-17 NOTE — PLAN OF CARE
Problem: OT General Care Plan  Goal: OT Goal 1  Description  Assist pt to improve social interaction skills by encouraging group attendance to to promote socialization and communicate needs effectively.     Pt was at [facility] window repeating herself (saying a name?) for over 10 minutes (no nurses in med room) - writer was unable to distract her by inviting her to attend group. Pt said she would be there later, that she needed to talk to her nurse first.    Pt did eventually come to group, and remembered the project she asked about yesterday.  Proceeded to paint a large poster that was printed on card stock for the next 45 minutes.  Seemed to appreciate positive feedback on her work.  Pt asked that her painting, and two other large posters she colored be hung up in the lounge next to another picture she had colored.  Pointed to exactly where she wanted them hung.  Smiled when told it was like her own gallery.  Outcome: No Change

## 2019-10-18 PROCEDURE — 99233 SBSQ HOSP IP/OBS HIGH 50: CPT | Performed by: PSYCHIATRY & NEUROLOGY

## 2019-10-18 PROCEDURE — 12400001 ZZH R&B MH UMMC

## 2019-10-18 PROCEDURE — 25000132 ZZH RX MED GY IP 250 OP 250 PS 637: Performed by: PSYCHIATRY & NEUROLOGY

## 2019-10-18 PROCEDURE — H2032 ACTIVITY THERAPY, PER 15 MIN: HCPCS

## 2019-10-18 PROCEDURE — G0177 OPPS/PHP; TRAIN & EDUC SERV: HCPCS

## 2019-10-18 PROCEDURE — 25000132 ZZH RX MED GY IP 250 OP 250 PS 637: Performed by: STUDENT IN AN ORGANIZED HEALTH CARE EDUCATION/TRAINING PROGRAM

## 2019-10-18 RX ADMIN — CHLORPROMAZINE HYDROCHLORIDE 100 MG: 100 TABLET, SUGAR COATED ORAL at 08:48

## 2019-10-18 RX ADMIN — HALOPERIDOL 15 MG: 10 TABLET ORAL at 20:38

## 2019-10-18 ASSESSMENT — ACTIVITIES OF DAILY LIVING (ADL)
ORAL_HYGIENE: INDEPENDENT
ORAL_HYGIENE: INDEPENDENT
DRESS: SCRUBS (BEHAVIORAL HEALTH)
HYGIENE/GROOMING: INDEPENDENT
DRESS: INDEPENDENT
HYGIENE/GROOMING: INDEPENDENT
LAUNDRY: UNABLE TO COMPLETE

## 2019-10-18 NOTE — PROGRESS NOTES
10/17/19 2121   Significant Event   Significant Event   (shift summary)     Pt was isolative and withdrawn during the shift, sleeping/napping in her room.  Demanding staff to let her leave the hospital.  Flat/blunted affect, tense.  No stated SI, SIB or hallucinations.

## 2019-10-18 NOTE — PROGRESS NOTES
Michelle approached the medication window and requested to talk with this RN writer. She stated,  can you discharge me because Ramone refused to discharge me. That Ramone is wicked and mean person . This writer explained to Michelle that Dr. Pack would discharge her when she is more stable. She was informed that the nurses could not discharge a patient without a doctor s order. Pt was not happy with the response and walked away without saying a word. She took her HS scheduled Haldol and refused other meds. Pt appeared to be cheeking her Haldol. She only took the medication with a small sip of water and refused mouth check. This writer spent time talking to her so that the medication can dissolve in her mouth before leaving her room.  I suggest the Haldol tablet switched to liquid form.  Pt is responding less to internal stimuli. She no longer sleeps on the floor of her room. Good appetite. Hygiene maintenance is still poor at this time. No SI/SIB.

## 2019-10-18 NOTE — PLAN OF CARE
"  Problem: OT General Care Plan  Goal: OT Goal 1  Description  Assist pt to improve social interaction skills by encouraging group attendance to to promote socialization and communicate needs effectively.     Pt attended afternoon OT group.  Worked quietly painting another large poster, though would ask writer for supplies, and when it was time for snack.  Appeared relaxed and content.  Offered opportunities to listen to music of choice, though she declined, stated music that was playing was fine.  A special doughnut treat was brought in for snack, pt had 2, and seemed happy.  When artwork was finished, she indicated she wanted it added to her other pictures on the wall.  Staff were brought over to see her \"gallery\" and she smiled.  Outcome: No Change     "

## 2019-10-18 NOTE — PLAN OF CARE
Unremarkable shift. Patient has a tense affect. Only compliant with neuroleptic medication, refused all others. Intermittently present in milieu. Will continue to monitor and assess.

## 2019-10-18 NOTE — PROGRESS NOTES
"[facility], [place]   Psychiatric Progress Note  Hospital Day: 26        Interim History:   The patient's care was discussed with the treatment team during the daily team meeting and/or staff's chart notes were reviewed.  Staff report patient is still tense. Denied that her RN today was actually her nurse.    Upon interview, the patient is coloring at table in lounge. She still is refusing all non-psychiatric medications, labs, and vitals. She agrees to allow me to get some blood work and will accept vitals tomorrow if it helps her to get discharged.    Psychiatric Symptoms: paranoia, lack of insight as evidenced by denial she is under commitment and unwillingness to accept medications for medical issues.    Medication side effects reported: none reported or observed    Other issues reported by patient: none         Medications:       atorvastatin  40 mg Oral QPM     calcium carbonate 500 mg (elemental)  500 mg Oral BID w/meals     chlorproMAZINE  100 mg Oral BID     cholecalciferol  50,000 Units Oral Q7 Days     haloperidol lactate  10 mg Intramuscular At Bedtime    Or     haloperidol  15 mg Oral At Bedtime     losartan  100 mg Oral Daily     metoprolol succinate ER  50 mg Oral Daily     multivitamin, therapeutic  1 tablet Oral Daily     trihexyphenidyl  5 mg Oral BID          Allergies:     Allergies   Allergen Reactions     Lisinopril Cough          Labs:   No results found for this or any previous visit (from the past 48 hour(s)).       Psychiatric Examination:       Recent Vital signs:                   Height: (refused ) Weight: (refused )  Estimated body mass index is 27.58 kg/m  as calculated from the following:    Height as of 4/27/19: 1.6 m (5' 3\").    Weight as of 6/4/19: 70.6 kg (155 lb 11.2 oz).      Orthostatic Vitals     None            Appearance: awake, alert, dressed in hospital scrubs and unkempt  Attitude:  cooperative  Eye Contact:  better  Mood:  angry  Affect:  " more controlled but still reactive at times  Speech:  normal prosody  Language: fluent and intact in English  Psychomotor, Gait, Musculoskeletal:  no evidence of tardive dyskinesia, dystonia, or tics and intact station, gait and muscle tone  Throught Process:  illogical  Associations:  no loose associations  Thought Content:  paranoia evident. Denies hallucinations. Denies SI and HI  Insight:  absent  Judgement:  poor  Oriented to:  person and place  Attention Span and Concentration:  limited  Recent and Remote Memory:  limited  Fund of Knowledge:  Adequate      Clinical Global Impressions  First:  Considering your total clinical experience with this particular patient population, how severe are the patient's symptoms at this time?: 7 (19 141)  Compared to the patient's condition at the START of treatment, this patient's condition is:: 4 (19)  Most recent:  Considering your total clinical experience with this particular patient population, how severe are the patient's symptoms at this time?: 7 (10/14/19 124)  Compared to the patient's condition at the START of treatment, this patient's condition is:: 4 (10/14/19 1240)           Precautions:     Behavioral Orders   Procedures     Assault precautions     Code 1 - Restrict to Unit     Elopement precautions     Routine Programming     As clinically indicated     Sexual precautions     Status 15     Every 15 minutes.          Diagnoses:      Schizophrenia paranoid type  Hypertension  Hyperlipidemia  Vitamin D deficiency         Assessment & Plan:   Assessment and hospital summary:  59-year-old woman admitted due to decompensation of schizophrenia in setting of discontinuation of medications. She appears to have stopped them as soon as her commitment . Currently, she lacks any insight into medical or psychiatric issues. She is only taking medications on [facility] and nothing else. She would be unable to care for herself in her current state and  requires further stabilization. When she begins to show substantial improvement we can start decanoate again.    Target psychiatric symptoms and interventions:  Ongoing psychosis  -continue thorazine 100 mg BID  -continue haloperidol 15 mg at bedtime with IM backup of 10 mg if she refuses  -continue artane to prevent EPSE    Historically stabilized on Thorazine 150 mg at bedtime and haloperidol decanoate 100 mg q30 days.    Medical Problems and Treatments:  Continue to offer medications for medical issues. She is currently refusing them.    Order CMB, lipids, TSH, CBC with diff    Behavioral/Psychological/Social:  Encourage unit programming          Disposition Plan   Reason for ongoing admission: is unable to care for self due to severe psychosis or tad  Discharge location: TBD. Possibly home if son continues to pay for appartment. Patient unwilling to consier IRTS in current state.  Discharge Medications: not ordered  Follow-up Appointments: not scheduled  Legal Status: full commitment with [facility] for olanzapine, chlorpromazine, risperidone, haloperidol  Entered by: Jarett Pack on 10/18/2019 at 4:36 PM

## 2019-10-19 LAB
ALBUMIN SERPL-MCNC: 3.1 G/DL (ref 3.4–5)
ALP SERPL-CCNC: 69 U/L (ref 40–150)
ALT SERPL W P-5'-P-CCNC: 60 U/L (ref 0–50)
ANION GAP SERPL CALCULATED.3IONS-SCNC: 6 MMOL/L (ref 3–14)
AST SERPL W P-5'-P-CCNC: 40 U/L (ref 0–45)
BASOPHILS # BLD AUTO: 0 10E9/L (ref 0–0.2)
BASOPHILS NFR BLD AUTO: 0.2 %
BILIRUB SERPL-MCNC: 0.4 MG/DL (ref 0.2–1.3)
BUN SERPL-MCNC: 9 MG/DL (ref 7–30)
CALCIUM SERPL-MCNC: 8.5 MG/DL (ref 8.5–10.1)
CHLORIDE SERPL-SCNC: 109 MMOL/L (ref 94–109)
CHOLEST SERPL-MCNC: 208 MG/DL
CO2 SERPL-SCNC: 27 MMOL/L (ref 20–32)
CREAT SERPL-MCNC: 0.87 MG/DL (ref 0.52–1.04)
DIFFERENTIAL METHOD BLD: NORMAL
EOSINOPHIL # BLD AUTO: 0 10E9/L (ref 0–0.7)
EOSINOPHIL NFR BLD AUTO: 0.4 %
ERYTHROCYTE [DISTWIDTH] IN BLOOD BY AUTOMATED COUNT: 12.9 % (ref 10–15)
GFR SERPL CREATININE-BSD FRML MDRD: 74 ML/MIN/{1.73_M2}
GLUCOSE SERPL-MCNC: 104 MG/DL (ref 70–99)
HCT VFR BLD AUTO: 37.7 % (ref 35–47)
HDLC SERPL-MCNC: 56 MG/DL
HGB BLD-MCNC: 12.5 G/DL (ref 11.7–15.7)
IMM GRANULOCYTES # BLD: 0 10E9/L (ref 0–0.4)
IMM GRANULOCYTES NFR BLD: 0.2 %
LDLC SERPL CALC-MCNC: 129 MG/DL
LYMPHOCYTES # BLD AUTO: 1.4 10E9/L (ref 0.8–5.3)
LYMPHOCYTES NFR BLD AUTO: 26 %
MCH RBC QN AUTO: 30.9 PG (ref 26.5–33)
MCHC RBC AUTO-ENTMCNC: 33.2 G/DL (ref 31.5–36.5)
MCV RBC AUTO: 93 FL (ref 78–100)
MONOCYTES # BLD AUTO: 0.3 10E9/L (ref 0–1.3)
MONOCYTES NFR BLD AUTO: 6.1 %
NEUTROPHILS # BLD AUTO: 3.6 10E9/L (ref 1.6–8.3)
NEUTROPHILS NFR BLD AUTO: 67.1 %
NONHDLC SERPL-MCNC: 152 MG/DL
NRBC # BLD AUTO: 0 10*3/UL
NRBC BLD AUTO-RTO: 0 /100
PLATELET # BLD AUTO: 167 10E9/L (ref 150–450)
POTASSIUM SERPL-SCNC: 3.8 MMOL/L (ref 3.4–5.3)
PROT SERPL-MCNC: 6 G/DL (ref 6.8–8.8)
RBC # BLD AUTO: 4.05 10E12/L (ref 3.8–5.2)
SODIUM SERPL-SCNC: 142 MMOL/L (ref 133–144)
T4 FREE SERPL-MCNC: 0.77 NG/DL (ref 0.76–1.46)
TRIGL SERPL-MCNC: 117 MG/DL
TSH SERPL DL<=0.005 MIU/L-ACNC: 0.32 MU/L (ref 0.4–4)
WBC # BLD AUTO: 5.4 10E9/L (ref 4–11)

## 2019-10-19 PROCEDURE — 80053 COMPREHEN METABOLIC PANEL: CPT | Performed by: PSYCHIATRY & NEUROLOGY

## 2019-10-19 PROCEDURE — 36415 COLL VENOUS BLD VENIPUNCTURE: CPT | Performed by: PSYCHIATRY & NEUROLOGY

## 2019-10-19 PROCEDURE — 12400001 ZZH R&B MH UMMC

## 2019-10-19 PROCEDURE — 84443 ASSAY THYROID STIM HORMONE: CPT | Performed by: PSYCHIATRY & NEUROLOGY

## 2019-10-19 PROCEDURE — 80061 LIPID PANEL: CPT | Performed by: PSYCHIATRY & NEUROLOGY

## 2019-10-19 PROCEDURE — 84439 ASSAY OF FREE THYROXINE: CPT | Performed by: PSYCHIATRY & NEUROLOGY

## 2019-10-19 PROCEDURE — 85025 COMPLETE CBC W/AUTO DIFF WBC: CPT | Performed by: PSYCHIATRY & NEUROLOGY

## 2019-10-19 PROCEDURE — 25000132 ZZH RX MED GY IP 250 OP 250 PS 637: Performed by: PSYCHIATRY & NEUROLOGY

## 2019-10-19 PROCEDURE — H2032 ACTIVITY THERAPY, PER 15 MIN: HCPCS

## 2019-10-19 PROCEDURE — 25000132 ZZH RX MED GY IP 250 OP 250 PS 637: Performed by: STUDENT IN AN ORGANIZED HEALTH CARE EDUCATION/TRAINING PROGRAM

## 2019-10-19 RX ADMIN — HALOPERIDOL 15 MG: 10 TABLET ORAL at 19:33

## 2019-10-19 RX ADMIN — CHLORPROMAZINE HYDROCHLORIDE 100 MG: 100 TABLET, SUGAR COATED ORAL at 08:15

## 2019-10-19 ASSESSMENT — ACTIVITIES OF DAILY LIVING (ADL)
DRESS: SCRUBS (BEHAVIORAL HEALTH)
HYGIENE/GROOMING: INDEPENDENT
DRESS: SCRUBS (BEHAVIORAL HEALTH)
ORAL_HYGIENE: INDEPENDENT
HYGIENE/GROOMING: INDEPENDENT
ORAL_HYGIENE: INDEPENDENT

## 2019-10-19 NOTE — PROGRESS NOTES
Pt has been isolative and withdrawn in her room for most of evening shift. Pt was more willing to converse with staff. No aggressive behaviors.

## 2019-10-19 NOTE — PLAN OF CARE
Patient was isolated and withdrawn to her room for the entire shift. Pt was quite irritable with RN writer during our few interactions.  She was only willing to take her schedule Thorazine and refused all other medications.  Patient was unwilling to answer any of writer other questions.  No behavioral outburst today.

## 2019-10-20 PROCEDURE — 25000132 ZZH RX MED GY IP 250 OP 250 PS 637: Performed by: PSYCHIATRY & NEUROLOGY

## 2019-10-20 PROCEDURE — 25000132 ZZH RX MED GY IP 250 OP 250 PS 637: Performed by: STUDENT IN AN ORGANIZED HEALTH CARE EDUCATION/TRAINING PROGRAM

## 2019-10-20 PROCEDURE — 12400001 ZZH R&B MH UMMC

## 2019-10-20 RX ADMIN — CHLORPROMAZINE HYDROCHLORIDE 100 MG: 100 TABLET, SUGAR COATED ORAL at 08:53

## 2019-10-20 RX ADMIN — HALOPERIDOL 15 MG: 10 TABLET ORAL at 20:05

## 2019-10-20 ASSESSMENT — ACTIVITIES OF DAILY LIVING (ADL)
DRESS: INDEPENDENT
LAUNDRY: UNABLE TO COMPLETE
DRESS: SCRUBS (BEHAVIORAL HEALTH)
ORAL_HYGIENE: INDEPENDENT
ORAL_HYGIENE: INDEPENDENT
HYGIENE/GROOMING: INDEPENDENT
HYGIENE/GROOMING: INDEPENDENT

## 2019-10-20 NOTE — PROGRESS NOTES
Pt isolative to room for nearly the entire shift. Ignored this writer during check-in. Blunted, flat affect. No seclusions or restraints.

## 2019-10-20 NOTE — PROGRESS NOTES
10/19/19 2200   Art Therapy   Type of Intervention structured groups   Response Participates with encouragement     Hours 1   Treatment Detail    Art Therapy - journal making   Problem-   Schizophrenia  Hypertension  Hyperlipidemia  CVA 2017  Right tibial plateau fracture s/p ORIF 1/8/2019    Goal-[place], express, regulate, sublimate through Art Therapy directives.     Outcome- Pt was quietly engaged. She enjoyed decorating a journal cover.

## 2019-10-20 NOTE — PLAN OF CARE
"Pt continues to remain isolated and withdrawn to her room.  She presents as flat, but calm today. She had better communication and organization of thoughts. She is untidy and somewhat disheveled.   She did allow RN writer to take her vitals signs today, something she refuses most days.  When asked if I could get her v/s patient said, \"yes, and give it to Dr. Pack.\" She also thanked another staff member today, something that she doesn't often do.  Patient was willing to take her Thorazine, but no other medications. Pt denies SI/SIB/HI.   "

## 2019-10-21 PROCEDURE — 25000132 ZZH RX MED GY IP 250 OP 250 PS 637: Performed by: STUDENT IN AN ORGANIZED HEALTH CARE EDUCATION/TRAINING PROGRAM

## 2019-10-21 PROCEDURE — 12400001 ZZH R&B MH UMMC

## 2019-10-21 PROCEDURE — 99233 SBSQ HOSP IP/OBS HIGH 50: CPT | Performed by: PSYCHIATRY & NEUROLOGY

## 2019-10-21 PROCEDURE — 25000132 ZZH RX MED GY IP 250 OP 250 PS 637: Performed by: PSYCHIATRY & NEUROLOGY

## 2019-10-21 PROCEDURE — G0177 OPPS/PHP; TRAIN & EDUC SERV: HCPCS

## 2019-10-21 RX ADMIN — HALOPERIDOL 15 MG: 10 TABLET ORAL at 20:33

## 2019-10-21 RX ADMIN — CHLORPROMAZINE HYDROCHLORIDE 100 MG: 100 TABLET, SUGAR COATED ORAL at 08:51

## 2019-10-21 ASSESSMENT — ACTIVITIES OF DAILY LIVING (ADL)
HYGIENE/GROOMING: INDEPENDENT
DRESS: INDEPENDENT
ORAL_HYGIENE: INDEPENDENT
LAUNDRY: WITH SUPERVISION
HYGIENE/GROOMING: INDEPENDENT
DRESS: SCRUBS (BEHAVIORAL HEALTH)
LAUNDRY: UNABLE TO COMPLETE
ORAL_HYGIENE: INDEPENDENT

## 2019-10-21 NOTE — PROGRESS NOTES
[facility], [place]   Psychiatric Progress Note  Hospital Day: 29        Interim History:   The patient's care was discussed with the treatment team during the daily team meeting and/or staff's chart notes were reviewed.  Staff reports that patient agreed to blood draws and vitals x1. She made sure that staff told this writer that she accepted those assessments. She continues to refuse all non-psychiatric medications.    Upon interview, the patient indicates that she needs discharge. I try to explain my concern about low protein/albumin along with her high cholesterol and blood pressure. She is unwilling to have this conversation and indicates that there is nothing wrong with her. She demands discharge. I explain that given her state now and before admission, I believe that placement in IRTS will be needed and that she is not even ready to go there due to refusal to accept cares. I encouraged her to speak with her son about this as he had indicated concerns about her coming home. Patient was agitated and yelling. The interview had to be terminated early as patient became more aggressive as time went on.    Psychiatric Symptoms: paranoia, agitation, poor insight, poor food intake, refuses to take non-psychiatric medications.    Medication side effects reported: none reported or observed    Other issues reported by patient: none         Medications:       atorvastatin  40 mg Oral QPM     calcium carbonate 500 mg (elemental)  500 mg Oral BID w/meals     chlorproMAZINE  100 mg Oral BID     cholecalciferol  50,000 Units Oral Q7 Days     haloperidol lactate  10 mg Intramuscular At Bedtime    Or     haloperidol  15 mg Oral At Bedtime     losartan  100 mg Oral Daily     metoprolol succinate ER  50 mg Oral Daily     multivitamin, therapeutic  1 tablet Oral Daily     trihexyphenidyl  5 mg Oral BID          Allergies:     Allergies   Allergen Reactions     Lisinopril Cough          Labs:     Recent  Results (from the past 168 hour(s))   Comprehensive metabolic panel    Collection Time: 10/19/19  7:33 AM   Result Value Ref Range    Sodium 142 133 - 144 mmol/L    Potassium 3.8 3.4 - 5.3 mmol/L    Chloride 109 94 - 109 mmol/L    Carbon Dioxide 27 20 - 32 mmol/L    Anion Gap 6 3 - 14 mmol/L    Glucose 104 (H) 70 - 99 mg/dL    Urea Nitrogen 9 7 - 30 mg/dL    Creatinine 0.87 0.52 - 1.04 mg/dL    GFR Estimate 74 >60 mL/min/[1.73_m2]    GFR Estimate If Black 86 >60 mL/min/[1.73_m2]    Calcium 8.5 8.5 - 10.1 mg/dL    Bilirubin Total 0.4 0.2 - 1.3 mg/dL    Albumin 3.1 (L) 3.4 - 5.0 g/dL    Protein Total 6.0 (L) 6.8 - 8.8 g/dL    Alkaline Phosphatase 69 40 - 150 U/L    ALT 60 (H) 0 - 50 U/L    AST 40 0 - 45 U/L   CBC with platelets differential    Collection Time: 10/19/19  7:33 AM   Result Value Ref Range    WBC 5.4 4.0 - 11.0 10e9/L    RBC Count 4.05 3.8 - 5.2 10e12/L    Hemoglobin 12.5 11.7 - 15.7 g/dL    Hematocrit 37.7 35.0 - 47.0 %    MCV 93 78 - 100 fl    MCH 30.9 26.5 - 33.0 pg    MCHC 33.2 31.5 - 36.5 g/dL    RDW 12.9 10.0 - 15.0 %    Platelet Count 167 150 - 450 10e9/L    Diff Method Automated Method     % Neutrophils 67.1 %    % Lymphocytes 26.0 %    % Monocytes 6.1 %    % Eosinophils 0.4 %    % Basophils 0.2 %    % Immature Granulocytes 0.2 %    Nucleated RBCs 0 0 /100    Absolute Neutrophil 3.6 1.6 - 8.3 10e9/L    Absolute Lymphocytes 1.4 0.8 - 5.3 10e9/L    Absolute Monocytes 0.3 0.0 - 1.3 10e9/L    Absolute Eosinophils 0.0 0.0 - 0.7 10e9/L    Absolute Basophils 0.0 0.0 - 0.2 10e9/L    Abs Immature Granulocytes 0.0 0 - 0.4 10e9/L    Absolute Nucleated RBC 0.0    TSH with free T4 reflex    Collection Time: 10/19/19  7:33 AM   Result Value Ref Range    TSH 0.32 (L) 0.40 - 4.00 mU/L   Lipid panel reflex to direct LDL    Collection Time: 10/19/19  7:33 AM   Result Value Ref Range    Cholesterol 208 (H) <200 mg/dL    Triglycerides 117 <150 mg/dL    HDL Cholesterol 56 >49 mg/dL    LDL Cholesterol Calculated 129  (H) <100 mg/dL    Non HDL Cholesterol 152 (H) <130 mg/dL   T4 free    Collection Time: 10/19/19  7:33 AM   Result Value Ref Range    T4 Free 0.77 0.76 - 1.46 ng/dL            Psychiatric Examination:       BP (!) 156/91   Pulse 116   Temp 100  F (37.8  C) (Tympanic)   Resp 18   LMP  (LMP Unknown)   SpO2 100%       Orthostatic Vitals       Most Recent      Sitting Orthostatic BP --  Comment: pt refused 10/21 0700    Sitting Orthostatic Pulse (bpm) --  Comment: pt refused 10/21 0700    Standing Orthostatic BP --  Comment: pt refused 10/21 0700    Standing Orthostatic Pulse (bpm) --  Comment: pt refused 10/21 0700              Appearance: awake, alert, dressed in hospital scrubs and unkempt  Attitude:  evasive  Eye Contact:  looking around room  Mood:  irritable  Affect:  tense and guarded  Speech:  paucity of speech  Language: fluent and intact in English  Psychomotor, Gait, Musculoskeletal:  no evidence of tardive dyskinesia, dystonia, or tics and intact station, gait and muscle tone  Throught Process:  illogical  Associations:  no loose associations  Thought Content:  paranoia evident. Denies hallucinations. Denies SI and HI  Insight:  absent  Judgement:  poor  Oriented to:  person and place  Attention Span and Concentration:  limited  Recent and Remote Memory:  limited  Fund of Knowledge:  Adequate      Clinical Global Impressions  First:  Considering your total clinical experience with this particular patient population, how severe are the patient's symptoms at this time?: 7 (09/25/19 1414)  Compared to the patient's condition at the START of treatment, this patient's condition is:: 4 (09/25/19 1414)  Most recent:  Considering your total clinical experience with this particular patient population, how severe are the patient's symptoms at this time?: 7 (10/14/19 1240)  Compared to the patient's condition at the START of treatment, this patient's condition is:: 4 (10/14/19 1240)           Precautions:      Behavioral Orders   Procedures     Assault precautions     Code 1 - Restrict to Unit     Elopement precautions     Routine Programming     As clinically indicated     Sexual precautions     Status 15     Every 15 minutes.          Diagnoses:      Schizophrenia paranoid type  Hypertension  Hyperlipidemia  Malnutrition  Vitamin D deficiency         Assessment & Plan:   Assessment and hospital summary:  59-year-old woman admitted due to decompensation of schizophrenia in setting of discontinuation of medications. She appears to have stopped them as soon as her commitment . Currently, she lacks any insight into medical or psychiatric issues. She is only taking medications on [facility] and nothing else. She would be unable to care for herself in her current state and requires further stabilization. When she begins to show substantial improvement we can start decanoate again.    Target psychiatric symptoms and interventions:  Ongoing psychosis  -continue thorazine 100 mg BID  -continue haloperidol 15 mg at bedtime with IM backup of 10 mg if she refuses  -continue artane to prevent EPSE    Historically stabilized on Thorazine 150 mg at bedtime and haloperidol decanoate 100 mg q30 days.    Medical Problems and Treatments:  Continue to offer medications for medical issues. She is currently refusing them.    Reviewed labs and vitals as documented above (10/21):  -hyperlipidemia and hypertension: continue to encourage medications  -malnutrition: obtain nutrition consult for recommendations    Behavioral/Psychological/Social:  Encourage unit programming      Disposition Plan   Reason for ongoing admission: is unable to care for self due to severe psychosis or tad  Discharge location: ideally IRTS placement if we can get family to talk her into accepting this.  Discharge Medications: not ordered  Follow-up Appointments: not scheduled  Legal Status: full commitment with [facility] for olanzapine, chlorpromazine,  risperidone, haloperidol  Entered by: Jarett Pack on 10/21/2019 at 4:12 PM

## 2019-10-21 NOTE — PROGRESS NOTES
10/21/19 1406   Behavioral Health   Hallucinations denies / not responding to hallucinations   Thinking distractable;poor concentration   Orientation person: oriented;place: oriented;date: oriented;time: oriented   Memory baseline memory   Insight poor   Judgement impaired   Eye Contact at examiner   Affect blunted, flat   Mood anxious;mood is calm   Physical Appearance/Attire attire appropriate to age and situation;appears stated age   Hygiene other (see comment)  (fair)   Suicidality other (see comments)  (none stated)   Wish to be Dead Description (Recent)   (none stated)   Non-Specific Active Suicidal Thought Description (Recent)   (none stated)   Self Injury other (see comment)  (none stated)   Elopement   (Pt didn't exhibit these behaviors this shift.)   Activity withdrawn;other (see comment)  (present in milieu but withdrawn)   Speech clear;coherent   Psychomotor / Gait balanced;steady   Coping/Psychosocial   Verbalized Emotional State other (see comments)  (none stated)   Activities of Daily Living   Hygiene/Grooming independent   Oral Hygiene independent   Dress independent   Laundry with supervision   Room Organization independent   Significant Event   Significant Event Other (see comments)  (Shift summary)   Behavioral Health Interventions   Social and Therapeutic Interventions (Psychotic Symptoms) encourage socialization with peers;encourage effective boundaries with peers;encourage participation in therapeutic groups and milieu activities   Pt is still refusing/declining most of her medications. Pt is present in milieu but withdrawn and guarded with staff and peers. Pt appears to be eating adequately. Pt was fairly calm and non-disruptive during this shift.

## 2019-10-22 PROCEDURE — 25000132 ZZH RX MED GY IP 250 OP 250 PS 637: Performed by: STUDENT IN AN ORGANIZED HEALTH CARE EDUCATION/TRAINING PROGRAM

## 2019-10-22 PROCEDURE — 25000132 ZZH RX MED GY IP 250 OP 250 PS 637: Performed by: PSYCHIATRY & NEUROLOGY

## 2019-10-22 PROCEDURE — G0177 OPPS/PHP; TRAIN & EDUC SERV: HCPCS

## 2019-10-22 PROCEDURE — 99232 SBSQ HOSP IP/OBS MODERATE 35: CPT | Performed by: PSYCHIATRY & NEUROLOGY

## 2019-10-22 PROCEDURE — 12400001 ZZH R&B MH UMMC

## 2019-10-22 RX ORDER — HALOPERIDOL DECANOATE 100 MG/ML
100 INJECTION INTRAMUSCULAR ONCE
Status: COMPLETED | OUTPATIENT
Start: 2019-10-23 | End: 2019-10-23

## 2019-10-22 RX ADMIN — HALOPERIDOL 15 MG: 10 TABLET ORAL at 19:35

## 2019-10-22 RX ADMIN — CHLORPROMAZINE HYDROCHLORIDE 100 MG: 100 TABLET, SUGAR COATED ORAL at 09:08

## 2019-10-22 ASSESSMENT — ACTIVITIES OF DAILY LIVING (ADL)
LAUNDRY: UNABLE TO COMPLETE
HYGIENE/GROOMING: INDEPENDENT
LAUNDRY: UNABLE TO COMPLETE
ORAL_HYGIENE: INDEPENDENT
DRESS: SCRUBS (BEHAVIORAL HEALTH)
ORAL_HYGIENE: INDEPENDENT
DRESS: SCRUBS (BEHAVIORAL HEALTH);INDEPENDENT
HYGIENE/GROOMING: INDEPENDENT

## 2019-10-22 NOTE — PROGRESS NOTES
"[facility], [place]   Psychiatric Progress Note  Hospital Day: 30        Interim History:   The patient's care was discussed with the treatment team during the daily team meeting and/or staff's chart notes were reviewed.  Staff reports that the patient has been attending groups. Still demanding to leave. She has again refused vitals and all non-psychiatric medications.     Upon interview, the patient states that she doesn't need her medications for cholesterol or blood pressure because she only took them for stroke and she doesn't have stroke symptoms. I tried to explain that we often use these medications to prevent severe issues down the road. She was unwilling to accept this. She states that when she spoke with her son yesterday, he did not say that she needed an IRTS and was only here because the doctors said she needed to remain. I met with patient right after lunch and she cleared her plate.    Psychiatric Symptoms: chronic paranoia and general mistrust of the \"system\"    Medication side effects reported: none reported or observed    Other issues reported by patient: none         Medications:       atorvastatin  40 mg Oral QPM     calcium carbonate 500 mg (elemental)  500 mg Oral BID w/meals     chlorproMAZINE  100 mg Oral BID     cholecalciferol  50,000 Units Oral Q7 Days     haloperidol lactate  10 mg Intramuscular At Bedtime    Or     haloperidol  15 mg Oral At Bedtime     losartan  100 mg Oral Daily     metoprolol succinate ER  50 mg Oral Daily     multivitamin, therapeutic  1 tablet Oral Daily     trihexyphenidyl  5 mg Oral BID          Allergies:     Allergies   Allergen Reactions     Lisinopril Cough          Labs:     Recent Results (from the past 168 hour(s))   Comprehensive metabolic panel    Collection Time: 10/19/19  7:33 AM   Result Value Ref Range    Sodium 142 133 - 144 mmol/L    Potassium 3.8 3.4 - 5.3 mmol/L    Chloride 109 94 - 109 mmol/L    Carbon Dioxide 27 20 - " 32 mmol/L    Anion Gap 6 3 - 14 mmol/L    Glucose 104 (H) 70 - 99 mg/dL    Urea Nitrogen 9 7 - 30 mg/dL    Creatinine 0.87 0.52 - 1.04 mg/dL    GFR Estimate 74 >60 mL/min/[1.73_m2]    GFR Estimate If Black 86 >60 mL/min/[1.73_m2]    Calcium 8.5 8.5 - 10.1 mg/dL    Bilirubin Total 0.4 0.2 - 1.3 mg/dL    Albumin 3.1 (L) 3.4 - 5.0 g/dL    Protein Total 6.0 (L) 6.8 - 8.8 g/dL    Alkaline Phosphatase 69 40 - 150 U/L    ALT 60 (H) 0 - 50 U/L    AST 40 0 - 45 U/L   CBC with platelets differential    Collection Time: 10/19/19  7:33 AM   Result Value Ref Range    WBC 5.4 4.0 - 11.0 10e9/L    RBC Count 4.05 3.8 - 5.2 10e12/L    Hemoglobin 12.5 11.7 - 15.7 g/dL    Hematocrit 37.7 35.0 - 47.0 %    MCV 93 78 - 100 fl    MCH 30.9 26.5 - 33.0 pg    MCHC 33.2 31.5 - 36.5 g/dL    RDW 12.9 10.0 - 15.0 %    Platelet Count 167 150 - 450 10e9/L    Diff Method Automated Method     % Neutrophils 67.1 %    % Lymphocytes 26.0 %    % Monocytes 6.1 %    % Eosinophils 0.4 %    % Basophils 0.2 %    % Immature Granulocytes 0.2 %    Nucleated RBCs 0 0 /100    Absolute Neutrophil 3.6 1.6 - 8.3 10e9/L    Absolute Lymphocytes 1.4 0.8 - 5.3 10e9/L    Absolute Monocytes 0.3 0.0 - 1.3 10e9/L    Absolute Eosinophils 0.0 0.0 - 0.7 10e9/L    Absolute Basophils 0.0 0.0 - 0.2 10e9/L    Abs Immature Granulocytes 0.0 0 - 0.4 10e9/L    Absolute Nucleated RBC 0.0    TSH with free T4 reflex    Collection Time: 10/19/19  7:33 AM   Result Value Ref Range    TSH 0.32 (L) 0.40 - 4.00 mU/L   Lipid panel reflex to direct LDL    Collection Time: 10/19/19  7:33 AM   Result Value Ref Range    Cholesterol 208 (H) <200 mg/dL    Triglycerides 117 <150 mg/dL    HDL Cholesterol 56 >49 mg/dL    LDL Cholesterol Calculated 129 (H) <100 mg/dL    Non HDL Cholesterol 152 (H) <130 mg/dL   T4 free    Collection Time: 10/19/19  7:33 AM   Result Value Ref Range    T4 Free 0.77 0.76 - 1.46 ng/dL            Psychiatric Examination:       Vital signs: Patient has begun to refuse vitals  again after consenting one time since admission        Appearance: awake, alert, dressed in hospital scrubs and unkempt  Attitude:  cooperative  Eye Contact:  intense  Mood:  better  Affect:  mood congruent  Speech:  normal prosody  Language: fluent and intact in English  Psychomotor, Gait, Musculoskeletal:  no evidence of tardive dyskinesia, dystonia, or tics and intact station, gait and muscle tone  Throught Process:  illogical  Associations:  no loose associations  Thought Content:  paranoia evident. Denies hallucinations. Denies SI and HI  Insight:  absent  Judgement:  fair  Oriented to:  time, person, and place  Attention Span and Concentration:  fair  Recent and Remote Memory:  fair  Fund of Knowledge:  Adequate    Clinical Global Impressions  First:  Considering your total clinical experience with this particular patient population, how severe are the patient's symptoms at this time?: 7 (19)  Compared to the patient's condition at the START of treatment, this patient's condition is:: 4 (19)  Most recent:  Considering your total clinical experience with this particular patient population, how severe are the patient's symptoms at this time?: 7 (10/22/19 1628)  Compared to the patient's condition at the START of treatment, this patient's condition is:: 3 (10/22/19 1628)             Precautions:     Behavioral Orders   Procedures     Assault precautions     Code 1 - Restrict to Unit     Elopement precautions     Routine Programming     As clinically indicated     Sexual precautions     Status 15     Every 15 minutes.          Diagnoses:      Schizophrenia paranoid type  Hypertension  Hyperlipidemia  Malnutrition  Vitamin D deficiency         Assessment & Plan:   Assessment and hospital summary:  59-year-old woman admitted due to decompensation of schizophrenia in setting of discontinuation of medications. She appears to have stopped them as soon as her commitment . Currently, she  lacks any insight into medical or psychiatric issues. She is only taking medications on [facility] and nothing else. Patient does have a history of complying with treatment while under active court order. She is not agreeable to IRTS placement. Family had indicated desire for IRTS, however when patient calls, family defers to the doctor on this decision. She will never agree without a united front. As such, at this time will work on discharge to home. Asked CTC to contact [facility] on 10/22 to work on PD home with the planned targetted case management. Will discharge once that is established.    Target psychiatric symptoms and interventions:  Ongoing psychosis  -continue thorazine 100 mg BID  -continue haloperidol 15 mg at bedtime with IM backup of 10 mg if she refuses  -start haloperidol decanoate 100 mg q30 days.  -continue artane to prevent EPSE    Historically stabilized on Thorazine 150 mg at bedtime and haloperidol decanoate 100 mg q30 days.    Medical Problems and Treatments:  Continue to offer medications for medical issues. She is currently refusing them.    Reviewed labs and vitals on (10/21):  -hyperlipidemia and hypertension: continue to encourage medications  -malnutrition: follow nutrition recs. Patient has started to eat better with improvement in psychosis so this will improve.    Behavioral/Psychological/Social:  Encourage unit programming      Disposition Plan   Reason for ongoing admission: Just clearing back to baseline. Chronic psychosis. At this time will arrange PD with outpatient .  Discharge location: home with targetted case management  Discharge Medications: not ordered  Follow-up Appointments: not scheduled  Legal Status: full commitment with [facility] for olanzapine, chlorpromazine, risperidone, haloperidol  Entered by: Jarett Pack on 10/22/2019 at 4:22 PM

## 2019-10-22 NOTE — PLAN OF CARE
BEHAVIORAL TEAM DISCUSSION    Participants:    dr pack, sylvain lehman rn, benja antunez [facility]  Progress:   Pt is improving.    Going to groups.   Interacting more with others although she often does like her alone time in her room.   Thoughts are more organized.   Her mood is more stable.  Taking neuroleptics but refuses other meds.    Anticipated length of stay:    Probably just a few more days  Continued Stay Criteria/Rationale:  encourage compliance with al meds.     Treat symptoms for a little while longer.    Medical/Physical:    Per Dr Pack's note:  Hypertension  Hyperlipidemia  Malnutrition  Vitamin D deficiency    Precautions:   Behavioral Orders   Procedures    Assault precautions    Code 1 - Restrict to Unit    Elopement precautions    Routine Programming     As clinically indicated    Sexual precautions    Status 15     Every 15 minutes.     Plan:      Meds per dr pack.    Provisionally Discharge her to the apartment in [place] - she has been extremely consistent in refusing IRT's placement.   I've talked with her many times about it and so has Dr Pack.    I explained this to both her son and her daughter from [place].  She has a [employer] CM with whom she has already seen twice in the hospital.   He will continue to provide support services for her in the community.    Rationale for change in precautions or plan:  no change at this time

## 2019-10-22 NOTE — PROGRESS NOTES
CLINICAL NUTRITION SERVICES - REASSESSMENT NOTE     Nutrition Prescription    RECOMMENDATIONS FOR MDs/PROVIDERS TO ORDER:  Encourage po intakes of a variety of foods    Malnutrition Status:    Patient does not meet two of the above criteria necessary for diagnosing malnutrition    Recommendations already ordered by Registered Dietitian (RD):  Only order 1 orange with each snack     Future/Additional Recommendations:  Continue to monitor PO intakes  Calculate estimated needs when weight available   Re-offer supplements as able      Reason for evaluation: The patient's care was discussed with the treatment team during the daily team meeting and/or staff's chart notes were reviewed.  Staff reports that mother's meeting didn't go well. She said that patient was doing very poorly and requested that patient     EVALUATION OF THE PROGRESS TOWARD GOALS   Diet: Regular, 2 Oranges at 10 AM, 2 PM, and 8 PM  Intake: Per MD note from 10/21, pt has poor PO intake. Per Psych Associate note from 10/21, pt is eating adequately.  Pt reports having a good appetite and eating TID meals. She says she eats the majority of food on her tray and consumes about 8 oranges/day. She denies any diarrhea and likes snacking on the oranges.       NEW FINDINGS   No recorded wts this admission.     MALNUTRITION  % Intake: No decreased intake noted  % Weight Loss: Unable to assess  Subcutaneous Fat Loss: None observed  Muscle Loss: None observed  Fluid Accumulation/Edema: None noted  Malnutrition Diagnosis: Patient does not meet two of the above criteria necessary for diagnosing malnutrition    Previous Goals   Patient to consume % of nutritionally adequate meal trays TID, or the equivalent with supplements/snacks.  Evaluation: Met    Previous Nutrition Diagnosis  Predicted inadequate nutrient intake related to pt varying acceptance of meals and pt preferences and  as evidenced by staff report of varying intakes and pt requests for specific food  items.    Evaluation: No change    CURRENT NUTRITION DIAGNOSIS  Predicted inadequate nutrient intake related to pt varying acceptance of meals and pt preferences and  as evidenced by staff report of varying intakes and pt requests for specific food items.      INTERVENTIONS  Implementation  - Modify composition of meals/snacks   - Nutrition education - provided education on the importance of adequate intake and roll of RD. Pt agreed to try and eat almost all of the food on her tray before eating the oranges.     Goals  Patient to consume % of nutritionally adequate meal trays TID, or the equivalent with supplements/snacks.    Monitoring/Evaluation  Progress toward goals will be monitored and evaluated per protocol.        Myriam Hyman RD, LD  Pager: [phone]

## 2019-10-22 NOTE — PLAN OF CARE
"  Problem: OT General Care Plan  Goal: OT Goal 1  Description  Assist pt to improve social interaction skills by encouraging group attendance to to promote socialization and communicate needs effectively.     Pt attends all OT groups, continues to be more engaged to the best of her abilities as she understands more of what the different groups are about.  Consistently friendly with writer, asks for help as needed, and seeks reassurance.  Pt acknowledges all the artwork she has done makes her feel good, when asked if she does such things at home, she replies, \"yes\".    Outcome: Improving     "

## 2019-10-22 NOTE — PLAN OF CARE
Problem: Mental State/Mood Impairment (Psychotic Signs/Symptoms)  Goal: Improved Mental State and Mood (Psychotic Signs/Symptoms)  Outcome: Improving     Patient presents as calm, with a flat affect.  She appears to have steady improvement in mood over the last 4 days.  She said  please  and  thank you .   Less impulsive/agitated with peers and staff.  Patient isolated to her room for much of the shift, spending the evening watching TV.  She denied SI/SIB/HI.  Denied AH/VH/Paranoia.  No overt signs of psychosis.  In denial of extent of her mental illness and has poor insight.  Patient stated she only wants to discharge to her apartment.      Patient accepted her [facility] Haldol, but refused all medications.  She refused a VS assessment.  She denied acute medical concerns and medication side effects.

## 2019-10-22 NOTE — PLAN OF CARE
"Pt was isolative to her room for most of the morning, though she did attend OT group and participated appropriately. Quiet and withdrawn when in the milieu. She was tense and somewhat irritable this morning when she was encouraged to eat her breakfast tray. Stated that she only needs \"two oranges,\" though it appeared that she ate at least part of her tray. Pt reports that has no mental health issues and needs to be discharged from the hospital today. Becomes more irritable when encouraged to allow staff to monitor her vitals, and when encouraged to take her other medications (not just [facility] meds). No other agitation observed. She accepted scheduled Thorazine, and declined all other medications. Denies any side effects, and none were observed. Will continue to monitor closely and encourage adequate nutrition.   "

## 2019-10-23 PROCEDURE — 25000128 H RX IP 250 OP 636: Performed by: PSYCHIATRY & NEUROLOGY

## 2019-10-23 PROCEDURE — 25000132 ZZH RX MED GY IP 250 OP 250 PS 637: Performed by: PSYCHIATRY & NEUROLOGY

## 2019-10-23 PROCEDURE — 12400001 ZZH R&B MH UMMC

## 2019-10-23 PROCEDURE — G0177 OPPS/PHP; TRAIN & EDUC SERV: HCPCS

## 2019-10-23 PROCEDURE — 25000132 ZZH RX MED GY IP 250 OP 250 PS 637: Performed by: STUDENT IN AN ORGANIZED HEALTH CARE EDUCATION/TRAINING PROGRAM

## 2019-10-23 PROCEDURE — 99231 SBSQ HOSP IP/OBS SF/LOW 25: CPT | Performed by: PSYCHIATRY & NEUROLOGY

## 2019-10-23 PROCEDURE — H2032 ACTIVITY THERAPY, PER 15 MIN: HCPCS

## 2019-10-23 RX ORDER — HALOPERIDOL 5 MG/1
15 TABLET ORAL AT BEDTIME
Qty: 90 TABLET | Refills: 0 | Status: ON HOLD | OUTPATIENT
Start: 2019-10-23 | End: 2021-04-14

## 2019-10-23 RX ORDER — CHLORPROMAZINE HYDROCHLORIDE 100 MG/1
100 TABLET, FILM COATED ORAL 2 TIMES DAILY
Qty: 60 TABLET | Refills: 0 | Status: ON HOLD | OUTPATIENT
Start: 2019-10-23 | End: 2021-08-25

## 2019-10-23 RX ORDER — ATORVASTATIN CALCIUM 40 MG/1
40 TABLET, FILM COATED ORAL EVERY EVENING
Qty: 30 TABLET | Refills: 0 | Status: ON HOLD | OUTPATIENT
Start: 2019-10-23 | End: 2021-04-14

## 2019-10-23 RX ORDER — HALOPERIDOL DECANOATE 100 MG/ML
100 INJECTION INTRAMUSCULAR
Qty: 1 ML | Refills: 11 | Status: ON HOLD | DISCHARGE
Start: 2019-11-20 | End: 2021-04-14

## 2019-10-23 RX ADMIN — HALOPERIDOL 15 MG: 10 TABLET ORAL at 18:47

## 2019-10-23 RX ADMIN — CHLORPROMAZINE HYDROCHLORIDE 100 MG: 100 TABLET, SUGAR COATED ORAL at 08:34

## 2019-10-23 RX ADMIN — HALOPERIDOL DECANOATE 100 MG: 100 INJECTION INTRAMUSCULAR at 08:35

## 2019-10-23 ASSESSMENT — ACTIVITIES OF DAILY LIVING (ADL)
LAUNDRY: UNABLE TO COMPLETE
HYGIENE/GROOMING: INDEPENDENT
LAUNDRY: UNABLE TO COMPLETE
HYGIENE/GROOMING: INDEPENDENT
ORAL_HYGIENE: INDEPENDENT
ORAL_HYGIENE: INDEPENDENT
DRESS: SCRUBS (BEHAVIORAL HEALTH);INDEPENDENT
DRESS: SCRUBS (BEHAVIORAL HEALTH)

## 2019-10-23 NOTE — PROGRESS NOTES
Pt isolated to her room and slept for the majority of this shift. Pt refused check-in, but was more polite and pleasant than in writer's previous encounters. Pt does not appear to be responding to AH, shows no behaviors indicative of SI/SIB, and has remained calm throughout this shift. Pt declined snack, but ate her dinner meal. Pt affect blunted & flat.There were no other behavioral concerns of note to this writer during this shift.     10/22/19 2000   Behavioral Health   Hallucinations denies / not responding to hallucinations   Thinking distractable;poor concentration   Orientation person: oriented   Memory baseline memory   Insight poor   Judgement impaired   Eye Contact at examiner   Affect blunted, flat   Mood mood is calm   Physical Appearance/Attire attire appropriate to age and situation   Hygiene other (see comment)  (adequate)   Suicidality other (see comments)  (AFSHAN; RN notified)   Elopement Statements about wanting to leave   Activity isolative;withdrawn   Speech clear;coherent   Medication Sensitivity no stated side effects;no observed side effects   Psychomotor / Gait balanced;steady   Psycho Education   Type of Intervention 1:1 intervention   Response refuses   Treatment Detail   (check-in)   Activities of Daily Living   Hygiene/Grooming independent   Oral Hygiene independent   Dress scrubs (behavioral health);independent   Laundry unable to complete   Room Organization independent

## 2019-10-23 NOTE — PROGRESS NOTES
10/23/19 1400   Psycho Education   Type of Intervention structured groups   Response participates with cues/redirection   Hours 0.5   This patient participated in process group on the topic of managing anger.  With prompts she participated and answered questions. Smiling and polite during group.  Pt endorsed that she has been having anxiety when she tries to sleep.  This writer passed this onto the pt's RN.

## 2019-10-23 NOTE — PROGRESS NOTES
Pt remained in room bedrest sleep/awake/isolative/withdrawn, pt calm/affect blunted flat/controlled behavior with no outburst, pt denies of SI/SIB, pt dismissive/guarded/eats in room, pt needs are offered/known.     10/23/19 1300   Behavioral Health   Thoughts/Cognition (WDL) ex   Hallucinations denies / not responding to hallucinations   Thinking delusional;paranoid   Orientation person: oriented;place: oriented   Memory baseline memory   Insight poor   Judgement impaired   Eye Contact at examiner   Affect/Mood (WDL) ex   Affect blunted, flat   Mood mood is calm   ADL Assessment (WDL) ex   Physical Appearance/Attire attire appropriate to age and situation   Hygiene neglected grooming - unclean body, hair, teeth   Suicidality (WDL) WDL   Suicidality other (see comments)  (none)   1. Wish to be Dead (Past Month) No   2. Non-Specific Active Suicidal Thoughts (Past Month) No   Enviromental Risk Factors None   Self Injury other (see comment)  (none)   Elopement (WDL) WDL   Activity (WDL) Ex   Activity isolative;withdrawn   Speech (WDL) WDL   Medication Sensitivity (WDL) WDL   Psychomotor Gait (WDL) WDL   Overt Agression (WDL) WDL   Substance Withdrawal   Substance Withdrawal None   Activities of Daily Living   Hygiene/Grooming independent   Oral Hygiene independent   Dress scrubs (behavioral health);independent   Laundry unable to complete   Room Organization independent   Activity   Activity Assistance Provided independent   Behavioral Health Interventions   Social and Therapeutic Interventions (Psychotic Symptoms) encourage socialization with peers;encourage effective boundaries with peers;encourage participation in therapeutic groups and milieu activities

## 2019-10-23 NOTE — PROGRESS NOTES
"[facility], [place]   Psychiatric Progress Note  Hospital Day: 31        Interim History:   The patient's care was discussed with the treatment team during the daily team meeting and/or staff's chart notes were reviewed.  Staff reports that she was resistant, but ultimately accepted her decanoate this AM. She declined HS thorazine but accepted the haloperidol.    Upon interview, the patient was calm in group. She was glad to know that we will work to send her home as soon as she can visit with her  and we can get provisional discharge agreed upon. She indicated that she will take all medications at home as ordered.    Psychiatric Symptoms: chronic paranoia and general mistrust of the \"system\"    Medication side effects reported: none reported or observed    Other issues reported by patient: none         Medications:       atorvastatin  40 mg Oral QPM     calcium carbonate 500 mg (elemental)  500 mg Oral BID w/meals     chlorproMAZINE  100 mg Oral BID     cholecalciferol  50,000 Units Oral Q7 Days     haloperidol lactate  10 mg Intramuscular At Bedtime    Or     haloperidol  15 mg Oral At Bedtime     losartan  100 mg Oral Daily     metoprolol succinate ER  50 mg Oral Daily     multivitamin, therapeutic  1 tablet Oral Daily     trihexyphenidyl  5 mg Oral BID          Allergies:     Allergies   Allergen Reactions     Lisinopril Cough          Labs:     No results found for this or any previous visit (from the past 48 hour(s)).            Psychiatric Examination:       Vital signs: Patient has been refusing vitals again after consenting one time since admission        Appearance: awake, alert, adequately groomed and dressed in hospital scrubs  Attitude:  cooperative  Eye Contact:  better  Mood:  sad   Affect:  intensity is blunted  Speech:  normal prosody  Language: fluent and intact in English  Psychomotor, Gait, Musculoskeletal:  no evidence of tardive dyskinesia, dystonia, or " tics and intact station, gait and muscle tone  Throught Process:  goal oriented  Associations:  no loose associations  Thought Content:  less paranoid appearing today. Continues to deny SI, HI, or AVH  Insight:  absent  Judgement:  fair given level of insight. She does accept the ordered neuroleptics for the most part  Oriented to:  time, person, and place  Attention Span and Concentration:  fair  Recent and Remote Memory:  fair  Fund of Knowledge:  Adequate    Clinical Global Impressions  First:  Considering your total clinical experience with this particular patient population, how severe are the patient's symptoms at this time?: 7 (19)  Compared to the patient's condition at the START of treatment, this patient's condition is:: 4 (19)  Most recent:  Considering your total clinical experience with this particular patient population, how severe are the patient's symptoms at this time?: 7 (10/22/19 1628)  Compared to the patient's condition at the START of treatment, this patient's condition is:: 3 (10/22/19 1628)                   Precautions:     Behavioral Orders   Procedures     Assault precautions     Code 1 - Restrict to Unit     Elopement precautions     Routine Programming     As clinically indicated     Sexual precautions     Status 15     Every 15 minutes.          Diagnoses:   Schizophrenia paranoid type  Hypertension  Hyperlipidemia  Malnutrition  Vitamin D deficiency                 Assessment & Plan:   Assessment and hospital summary:  59-year-old woman admitted due to decompensation of schizophrenia in setting of discontinuation of medications. She appears to have stopped them as soon as her commitment . Currently, she lacks any insight into medical or psychiatric issues. She is only taking medications on [facility] and nothing else. Patient does have a history of complying with treatment while under active court order. She is not agreeable to IRTS placement. Family had  indicated desire for IRTS, however when patient calls, family defers to the doctor on this decision. She will never agree without a united front. As such, at this time will work on discharge to home. Asked CTC to contact [facility] on 10/22 to work on PD home with the planned targetted case management. Will discharge once that is established.    Target psychiatric symptoms and interventions:  Ongoing psychosis  -continue thorazine 100 mg BID  -continue haloperidol 15 mg at bedtime with IM backup of 10 mg if she refuses  -start haloperidol decanoate 100 mg q30 days.  -continue artane to prevent EPSE    Historically stabilized on Thorazine 150 mg at bedtime and haloperidol decanoate 100 mg q30 days.    Medical Problems and Treatments:  Continue to offer medications for medical issues. She is currently refusing them.    Reviewed labs and vitals on (10/21):  -hyperlipidemia and hypertension: continue to encourage medications  -malnutrition: follow nutrition recs. Patient has started to eat better with improvement in psychosis so this will improve.    Behavioral/Psychological/Social:  Encourage unit programming      Disposition Plan   Reason for ongoing admission: Just clearing back to baseline. Chronic psychosis. At this time will arrange PD with outpatient .  Discharge location: home with targetted case management  Discharge Medications: not ordered  Follow-up Appointments: not scheduled  Legal Status: full commitment with [facility] for olanzapine, chlorpromazine, risperidone, haloperidol  Entered by: Jarett Pack on 10/22/2019 at 1:17 PM

## 2019-10-23 NOTE — DISCHARGE INSTRUCTIONS
Behavioral Discharge Planning and Instructions      Summary:  You were admitted to [facility] due to psychosis, agitation, and being off of medications.    You were under the care of Dr Pack and Resident Dr Varner.    You were uncooperative in the hospital and refusing medication.    Therefore an MI petition for commitment, and Hatch, was pursued.   This resulted in an MI commitment as of 10-3-19 to [place] and the Commissioner of Human Services.   There is also a Hatch order which includes:  Thorazine, Zyprexa, Risperdal, and Haldol.  These court order are through [facility].    You are being Provisionally Discharged today to your apartment:   [address] [place] [address]., [address]   [place].      Main Diagnosis: Schizophrenia, unspecified type       Mental Health Follow-Up:    [facility]:   Dr. Candi Del Real, DO - Next appointment:   Thursday November 21st at 10:30am.  [address], [address]  ([facility]) [address]     [place]  Phone:   [phone]            FAX:  [phone]  *Please arrive a few minutes early!      Attend all scheduled appointments with your outpatient providers. Call at least 24 hours in advance if you need to reschedule an appointment to ensure continued access to your outpatient providers.   Major Treatments, Procedures and Findings:  You were provided with: a psychiatric assessment, assessed for medical stability, medication evaluation and/or management and group therapy    Symptoms to Report: feeling more aggressive, increased confusion, losing more sleep, mood getting worse or thoughts of suicide    Early warning signs can include: increased depression or anxiety sleep disturbances increased thoughts or behaviors of suicide or self-harm  increased unusual thinking, such as paranoia or hearing voices    Safety and Wellness:  Take all medicines as directed.  Make no changes unless your doctor suggests them.      Follow treatment recommendations.  Refrain from alcohol and  non-prescribed drugs.  If there is a concern for safety, call 911.    Resources:  COPE (Community Outreach for Psychiatric Emergencies): 885.206.6618.  Available 24-hours a day, 7 days a week.  COPE professionals are available to go where you are.   Telephone consultations are also available.    Crisis Intervention: 179.125.6566 or 620-522-4549 (TTY: 867.717.8928).  Call anytime for help.  National [place] on Mental Illness (www.[place].trevor.org): 258.796.4151 or 451-424-9250.  National Suicide Prevention Line (www.mentalhealthmn.org): 767-472-MVRH (7034)  Mental Health Consumer/Survivor Network of [place] (www.mhcsn.net): 370.151.6873 or 528-103-7001  Mental Health Association of [place] (www.mentalhealth.org): 976.134.3423 or 787-650-9384  Self- Management and Recovery Training., SMART-- Toll free: [phone]  www.[employer].org    The treatment team has appreciated the opportunity to work with you. If you have any questions or concerns our unit number is [phone].

## 2019-10-23 NOTE — PROGRESS NOTES
Patient refused scheduled HS Thorazine, which is a Hatch medication that does not have a back-up for refusal, but took scheduled HS Haldol which has a back-up for refusal. Writer contacted on call MD Roman Mcdonnell regarding patient's refusal of HS Thorazine. MD Mcdonnell reported that it would not be necessary to force IM medication because patient was compliant with scheduled HS Haldol, and for team to reassess tomorrow if needed. Will continue to monitor for safety.

## 2019-10-24 VITALS
SYSTOLIC BLOOD PRESSURE: 156 MMHG | HEART RATE: 116 BPM | RESPIRATION RATE: 18 BRPM | TEMPERATURE: 100 F | DIASTOLIC BLOOD PRESSURE: 91 MMHG | OXYGEN SATURATION: 100 %

## 2019-10-24 PROCEDURE — 12400001 ZZH R&B MH UMMC

## 2019-10-24 PROCEDURE — 25000132 ZZH RX MED GY IP 250 OP 250 PS 637: Performed by: STUDENT IN AN ORGANIZED HEALTH CARE EDUCATION/TRAINING PROGRAM

## 2019-10-24 PROCEDURE — 25000132 ZZH RX MED GY IP 250 OP 250 PS 637: Performed by: PSYCHIATRY & NEUROLOGY

## 2019-10-24 RX ADMIN — HALOPERIDOL 15 MG: 10 TABLET ORAL at 19:01

## 2019-10-24 RX ADMIN — CHLORPROMAZINE HYDROCHLORIDE 100 MG: 100 TABLET, SUGAR COATED ORAL at 08:30

## 2019-10-24 RX ADMIN — CHLORPROMAZINE HYDROCHLORIDE 100 MG: 100 TABLET, SUGAR COATED ORAL at 19:01

## 2019-10-24 RX ADMIN — TRAZODONE HYDROCHLORIDE 50 MG: 50 TABLET ORAL at 19:01

## 2019-10-24 ASSESSMENT — ACTIVITIES OF DAILY LIVING (ADL)
HYGIENE/GROOMING: INDEPENDENT
ORAL_HYGIENE: INDEPENDENT
ORAL_HYGIENE: INDEPENDENT
DRESS: SCRUBS (BEHAVIORAL HEALTH)
DRESS: SCRUBS (BEHAVIORAL HEALTH);INDEPENDENT
HYGIENE/GROOMING: INDEPENDENT
LAUNDRY: UNABLE TO COMPLETE

## 2019-10-24 NOTE — PROVIDER NOTIFICATION
RN writer reviewed patient's current presentation with coverage psychiatrist, Dr. Zaman, to determine whether it was appropriate for patient to discharge today, noting that a discharge order had not been placed by patient's attending psychiatrist, Dr. Pack.  The following concerns were brought forward to Dr. Zaman for consideration: patient slept poorly last night with only 2 hours of sleep noted during the NOC shift of 10/24/19; patient refused to accept her scheduled Hatch ordered medication, Thorazine, at HS yesterday, 10/23/19; patient continues to refuse all offers of vital signs assessment- and all of her physical health medications- despite having suffered a stroke in 2017; patient continues to present as paranoid, guarded, and disorganized with no appreciable insight into her mental and physical health.  Dr. Zaman, having cared for this patient during a recent hospitalization, was aware of the case and agreed that it would be appropriate for Michelle to remain in the hospital for another day.  When Dr. Pack returns to duty tomorrow, 10/25/19, he will be able to assess Michelle for discharge readiness.

## 2019-10-24 NOTE — PLAN OF CARE
Problem: Mental State/Mood Impairment (Psychotic Signs/Symptoms)  Goal: Improved Mental State and Mood (Psychotic Signs/Symptoms)  Outcome: Improving    Patient was more visible in the milieu and attended multiple groups today.  She was calm and cooperative with staff.  No overt signs of psychosis.   No statements of SI/SIB/HI.  She endorsed she has anxiety at night, but declined PRN medications.      Compliant with Hatch Haldol.  Reluctantly accepted Haldol decanoate on day shift.  Refused all physical health meds.    Denied acute physical concerns.  Refused VS.    Patient met with her outpatient  and will provisionally discharge on 10/24.  Patient is excited to discharge and was thankful to staff.  Her discharge medications are on the unit.

## 2019-10-24 NOTE — PLAN OF CARE
"Patient presents as anxious, disorganized, and perseverative.  She has been loitering around the medication room window for nearly the entire shift, continuously demanding to speak with RN writer and CTC about her discharge plan.  She denies having refused to accept her Thorazine last night.  She reports that she only takes Thorazine once daily and insists that nursing staff are lying to Dr. Pack.  Although Michelle was quite demanding today with frequent requests and continual reassurance needed, she was able to maintain behavioral control with no episodes of aggressive behavior.  Michelle denies that she is experiencing any psychotic symptoms or aggressive ideations.  She denies any adverse side effects from her current medication regimen.  She denies any acute physical concerns.  She continues to refuse offers of vital signs assessment and feels that she does not need any of her physical health medications.  She reports, \"I have not had a stroke in years, so I don't need any of those medications!\".  Her insight remains quite poor.  "

## 2019-10-24 NOTE — PROGRESS NOTES
10/23/19 2200   Art Therapy   Type of Intervention structured groups   Response Participates with encouragement     Hours 1   Treatment Detail    Art Therapy -nature mandala   Problem-   Schizophrenia  Hypertension  Hyperlipidemia  CVA 2017  Right tibial plateau fracture s/p ORIF 1/8/2019     Goal-[place], express, regulate, sublimate through Art Therapy directives.     Outcome- Pt was quietly engaged. Writer helped her understand this concept and gave her ideas and she smiled and seemed very pleased with her project.

## 2019-10-25 PROCEDURE — G0177 OPPS/PHP; TRAIN & EDUC SERV: HCPCS

## 2019-10-25 PROCEDURE — 99239 HOSP IP/OBS DSCHRG MGMT >30: CPT | Performed by: PSYCHIATRY & NEUROLOGY

## 2019-10-25 PROCEDURE — 25000132 ZZH RX MED GY IP 250 OP 250 PS 637: Performed by: PSYCHIATRY & NEUROLOGY

## 2019-10-25 PROCEDURE — 25000132 ZZH RX MED GY IP 250 OP 250 PS 637: Performed by: STUDENT IN AN ORGANIZED HEALTH CARE EDUCATION/TRAINING PROGRAM

## 2019-10-25 RX ADMIN — THERA TABS 1 TABLET: TAB at 08:19

## 2019-10-25 RX ADMIN — CHLORPROMAZINE HYDROCHLORIDE 100 MG: 100 TABLET, SUGAR COATED ORAL at 08:19

## 2019-10-25 RX ADMIN — CALCIUM 500 MG: 500 TABLET ORAL at 08:19

## 2019-10-25 NOTE — PLAN OF CARE
Pt had a pleasant evening. She was isolative to room and withdrawn. She was upset that discharge did not happen today. She was hoping to be discharged tomorrow morning back to her house. She took scheduled Haldol and Thorazine. She also requests for PRN Trazadone to help sleep. This RN writer noted some improvement with pt psychotic symptoms, but she still appeared anxious and somewhat disorganized. She denied any mental health issues and side effects of her medication. No agitation or aggression this shift. Possible discharge tomorrow morning. No SI/SIB.

## 2019-10-25 NOTE — PROGRESS NOTES
Patient discharged today to return home. She had all of her valuables returned with the exception of two old medications that were not meds she is currently taking. Pt denies any suicide ideation, SIB or HI.  She took [employer] [facility] home.  She was given a copy of the AVS and a copy of her provisional discharge paperwork.

## 2019-10-28 ENCOUNTER — ALLIED HEALTH/NURSE VISIT (OUTPATIENT)
Dept: PHARMACY | Facility: CLINIC | Age: 59
End: 2019-10-28
Payer: COMMERCIAL

## 2019-10-28 DIAGNOSIS — F20.0 SCHIZOPHRENIA, PARANOID TYPE (H): Primary | ICD-10-CM

## 2019-10-28 DIAGNOSIS — I25.10 CORONARY ARTERY DISEASE, ANGINA PRESENCE UNSPECIFIED, UNSPECIFIED VESSEL OR LESION TYPE, UNSPECIFIED WHETHER NATIVE OR TRANSPLANTED HEART: ICD-10-CM

## 2019-10-28 PROCEDURE — 99605 MTMS BY PHARM NP 15 MIN: CPT | Performed by: PHARMACIST

## 2019-10-28 SDOH — HEALTH STABILITY: MENTAL HEALTH: HOW OFTEN DO YOU HAVE A DRINK CONTAINING ALCOHOL?: NEVER

## 2019-10-28 NOTE — PROGRESS NOTES
SUBJECTIVE/OBJECTIVE:                Michelle Pino is a 59 year old female called for a transitions of care visit.  She was discharged from [facility] on 10/25/19 for agitation, violence and psychosis likely due to medication non-adherence.     Chief Complaint: REBECCA.    Allergies/ADRs: None  Tobacco:  reports that she has never smoked. She does not have any smokeless tobacco history on file.  Alcohol: Social History    Substance and Sexual Activity      Alcohol use: Never        Frequency: Never    Caffeine: 2 cups/day of tea  Activity: she exercises at home by walking  PMH: Reviewed in Epic    Medication Adherence/Access:  no issues reported    Schizophrenia: current medications include thorazine 100 mg at twice daily, haloperidol decanoate 100 mg every 30 days and haloperidol 15 mg at bedtime. She says she feels better now, she can eat whatever and go wherever she wants to go. She reports sleeping well. She does endorse increased hunger but no CNS side effects.  She is able to repeat back her [place] [facility] and she reports that she is doing so much better than before she went into the hospital.     She has follow-up scheduled for the following:    Mental Health Follow-Up:  [facility]: Dr. Candi Del Real, DO - Next appointment: Thursday November 21st at 10:30am.  [address] ([facility]) [address] [place]  Phone: [phone] FAX: [phone]    Her next dose of haloperidol decanoate is due 11/20, per hospital notes. She is on commitment currently and has a Hatch order to chlorpromazine, olanzapine, haloperidol and risperidone. She has a history of medication non-adherence which has resulted in multiple hospitalizations.     CAD: Current medications include atorvastatin 40 mg daily (not currently taking), metoprolol XL 50 mg daily (disconinued in the hospital). She does not take atorvastatin 40 mg daily because they did not send it home with her from the hospital.     Recent Labs   Lab Test  "10/19/19  0733 04/29/19  0748   CHOL 208* 207*   HDL 56 64   * 123*   TRIG 117 98       Today's Vitals: LMP  (LMP Unknown)  - telemed    BP Readings from Last 1 Encounters:   06/04/19 131/71     Pulse Readings from Last 1 Encounters:   06/04/19 70     Wt Readings from Last 1 Encounters:   06/04/19 155 lb 11.2 oz (70.6 kg)     Ht Readings from Last 1 Encounters:   04/27/19 5' 3\" (1.6 m)     Estimated body mass index is 27.58 kg/m  as calculated from the following:    Height as of 4/27/19: 5' 3\" (1.6 m).    Weight as of 6/4/19: 155 lb 11.2 oz (70.6 kg).    Temp Readings from Last 1 Encounters:       ASSESSMENT:               Medication Adherence: good, no issues identified    Schizophrenia: Improving. Patient would benefit from follow-up with psychiatry as directed and continued adherence to her medications for mental health.     CAD: Needs improvement. Patient would benefit from establishing care with a primary care provider to help with her other chronic conditions.     PLAN:              Post Discharge Medication Reconciliation Status: discharge medications reconciled, continue medications without change.    1. [place] [facility] [place].  2. Keep up the great work with taking medications.     I spent 15 minutes with this patient today. A copy of the visit note was provided to the patient's psychiatry provider.    Will follow up as needed per care team.    The patient declined a summary of these recommendations as an after visit summary.    Verenice Reynolds, Pharm.D., [facility]  Medication Therapy Management Pharmacist  Page/VM:  [phone]  "

## 2019-10-29 NOTE — DISCHARGE SUMMARY
Psychiatric Discharge Summary    Michelle Pino MRN# [id number]   Age: 59 year old YOB: 1960     Date of Admission:  9/21/2019  Date of Discharge:  10/25/2019  Admitting Physician:  Jarett Pack MD  Discharge Physician:  Jarett Pack MD          Event Leading to Hospitalization:   Michelle Pino is a 59 year old female with prior psychiatric diagnoses of schizophrenia vs. Schizoaffective disorder, bipolar type admitted from the   ED on 9/22/19 after presenting on 9/21 due to concern for agitation & violence and psychosis in the context of medication non-adherence, negative utox screen and ongoing psychosocial stressors related to recently moving out of her husbands home to live on her own in an apartment.        See Admission note by No att. providers Juarez Pack MD on 9/23/2019 for additional details.          Diagnoses:     Schizophrenia paranoid type  Hypertension  Hyperlipidemia  Malnutrition  Vitamin D deficiency         Labs:     Results for orders placed or performed during the hospital encounter of 09/21/19   Drug abuse screen 6 urine (tox)   Result Value Ref Range    Amphetamine Qual Urine Negative NEG^Negative    Barbiturates Qual Urine Negative NEG^Negative    Benzodiazepine Qual Urine Negative NEG^Negative    Cannabinoids Qual Urine Negative NEG^Negative    Cocaine Qual Urine Negative NEG^Negative    Ethanol Qual Urine Negative NEG^Negative    Opiates Qualitative Urine Negative NEG^Negative   Comprehensive metabolic panel   Result Value Ref Range    Sodium 142 133 - 144 mmol/L    Potassium 3.8 3.4 - 5.3 mmol/L    Chloride 109 94 - 109 mmol/L    Carbon Dioxide 27 20 - 32 mmol/L    Anion Gap 6 3 - 14 mmol/L    Glucose 104 (H) 70 - 99 mg/dL    Urea Nitrogen 9 7 - 30 mg/dL    Creatinine 0.87 0.52 - 1.04 mg/dL    GFR Estimate 74 >60 mL/min/[1.73_m2]    GFR Estimate If Black 86 >60 mL/min/[1.73_m2]    Calcium 8.5 8.5 - 10.1 mg/dL    Bilirubin Total 0.4 0.2 - 1.3 mg/dL    Albumin 3.1  (L) 3.4 - 5.0 g/dL    Protein Total 6.0 (L) 6.8 - 8.8 g/dL    Alkaline Phosphatase 69 40 - 150 U/L    ALT 60 (H) 0 - 50 U/L    AST 40 0 - 45 U/L   CBC with platelets differential   Result Value Ref Range    WBC 5.4 4.0 - 11.0 10e9/L    RBC Count 4.05 3.8 - 5.2 10e12/L    Hemoglobin 12.5 11.7 - 15.7 g/dL    Hematocrit 37.7 35.0 - 47.0 %    MCV 93 78 - 100 fl    MCH 30.9 26.5 - 33.0 pg    MCHC 33.2 31.5 - 36.5 g/dL    RDW 12.9 10.0 - 15.0 %    Platelet Count 167 150 - 450 10e9/L    Diff Method Automated Method     % Neutrophils 67.1 %    % Lymphocytes 26.0 %    % Monocytes 6.1 %    % Eosinophils 0.4 %    % Basophils 0.2 %    % Immature Granulocytes 0.2 %    Nucleated RBCs 0 0 /100    Absolute Neutrophil 3.6 1.6 - 8.3 10e9/L    Absolute Lymphocytes 1.4 0.8 - 5.3 10e9/L    Absolute Monocytes 0.3 0.0 - 1.3 10e9/L    Absolute Eosinophils 0.0 0.0 - 0.7 10e9/L    Absolute Basophils 0.0 0.0 - 0.2 10e9/L    Abs Immature Granulocytes 0.0 0 - 0.4 10e9/L    Absolute Nucleated RBC 0.0    TSH with free T4 reflex   Result Value Ref Range    TSH 0.32 (L) 0.40 - 4.00 mU/L   Lipid panel reflex to direct LDL   Result Value Ref Range    Cholesterol 208 (H) <200 mg/dL    Triglycerides 117 <150 mg/dL    HDL Cholesterol 56 >49 mg/dL    LDL Cholesterol Calculated 129 (H) <100 mg/dL    Non HDL Cholesterol 152 (H) <130 mg/dL   T4 free   Result Value Ref Range    T4 Free 0.77 0.76 - 1.46 ng/dL              Consults:   No consultations were requested during this admission         Hospital Course:   Michelle Pino was admitted to [facility] with attending Jarett Pack MD on a 72 hour mental health hold. The patient was placed under status 15 (15 minute checks) to ensure patient safety.   Petition for commitment was filed and she was provisionally discharged with active commitment and Hatch order.    The patient was initially only taking chlorpromazine, as she declined other options. She was very paranoid and agitated. She would regularly heaven  "people out of her room or down the chen. She lacked any insight into her condition. She repeatedly would indicate that the fact that her commitment ended meant she no longer had need of medications. For this reason, it is vitally important that the patient's commitment is extended. She seems to take medications based on court order but will not do it otherwise.    After [place] came through, we were able to get her onto haloperidol. Doses for chlorpromazine and haloperidol noted below. She was given a decanoate injection just prior to the day of discharge.    During her stay she refused almost all assessments. She accepted vitals and blood draw once only near her discharge date. She refused any medications not on the court order. I tried to explain that her cholesterol was high, however she simply said she only took those medications for \"stroke\" and did not have any \"stroke symptoms\".     Patient also refused to consent to any sort of IRTS placement. As such, she had to remain in the hospital until it was believed she would have a successful discharge. In the end, she was still denying that she had a mental illness, however she agreed to take medications per court orders. She was eating most of her meal trays, and all in all seemed to be at a state that would allow her to safely remain independent at home with targeted case management.    Michelle Pino was released to home. At the time of discharge Michelle Pino was determined to not be a danger to herself or others.          Discharge Medications:     Discharge Medication List as of 10/25/2019  8:52 AM      CONTINUE these medications which have CHANGED    Details   atorvastatin (LIPITOR) 40 MG tablet Take 1 tablet (40 mg) by mouth every evening, Disp-30 tablet, R-0, E-Prescribe      chlorproMAZINE (THORAZINE) 100 MG tablet Take 1 tablet (100 mg) by mouth 2 times daily, Disp-60 tablet, R-0, E-Prescribe      haloperidol (HALDOL) 5 MG tablet Take 3 tablets (15 mg) by " mouth At Bedtime, Disp-90 tablet, R-0, E-Prescribe      haloperidol decanoate (HALDOL DECANOATE) 100 MG/ML injection Inject 100 mg into the muscle every 28 days, Disp-1 mL, R-11, Transitional         STOP taking these medications       artificial saliva (BIOTENE DRY MOUTHWASH) LIQD liquid Comments:   Reason for Stopping:         calcium carbonate 500 mg, elemental, (OSCAL;OYSTER SHELL CALCIUM) 500 MG tablet Comments:   Reason for Stopping:         cholecalciferol (CHOLECALCIFEROL) 77636 units capsule Comments:   Reason for Stopping:         losartan (COZAAR) 100 MG tablet Comments:   Reason for Stopping:         metoprolol succinate ER (TOPROL-XL) 50 MG 24 hr tablet Comments:   Reason for Stopping:         multivitamin, therapeutic (THERA-VIT) TABS tablet Comments:   Reason for Stopping:         trihexyphenidyl (ARTANE) 5 MG tablet Comments:   Reason for Stopping:                    Psychiatric Examination:   Appearance:  awake, alert, adequately groomed and dressed in hospital scrubs  Attitude:  cooperative  Eye Contact:  good  Mood:  euthymic  Affect:  intensity is blunted  Speech:  clear, coherent  Psychomotor Behavior:  no evidence of tardive dyskinesia, dystonia, or tics  Thought Process:  logical, linear and goal oriented  Associations:  no loose associations  Thought Content:  no evidence of suicidal ideation or homicidal ideation and no evidence of psychotic thought  Insight:  limited  Judgment:  fair  Oriented to:  time, person, and place  Attention Span and Concentration:  intact  Recent and Remote Memory:  intact  Language: Able to name objects, Able to repeat phrases and Able to read and write  Fund of Knowledge: appropriate  Muscle Strength and Tone: normal  Gait and Station: Normal         Discharge Plan:   Psychiatric Appointments: Dr. Candi Del Real DO on 11/21/19      Attestation:  The patient has been seen and evaluated by me,  Jarett Pack MD  On the day of discharge, I saw the patient and  performed the above examination, reviewed discharge medications, reviewed follow-up plan, and assessed safety for discharge. I spent greater than 30 minutes on these tasks.

## 2019-12-19 NOTE — PROGRESS NOTES
Pt declined 1:1 check in with writer, dismissive on approach. Pt appeared to be experiencing pain in her leg,she was observed dragging her foot while  walking. Pt was visible for all meals , presents with poor personal hygiene . AFSHAN SI/SIB thoughts , HI and all mental health symptoms .              09/27/19 1457   Behavioral Health   Hallucinations denies / not responding to hallucinations   Thinking poor concentration   Orientation other (see comment);time: oriented;place: oriented;date: oriented;person: oriented   Insight denial of illness   Judgement impaired   Eye Contact at floor   Affect blunted, flat;tense   Mood mood is calm   Physical Appearance/Attire attire appropriate to age and situation   Hygiene well groomed   Suicidality other (see comments)  (AFSHAN pt declined to check in )   1. Wish to be Dead (Past Month) No   Wish to be Dead Description (Recent)   (AFSHAN)   Self Injury other (see comment)  (none stated or observed.)   Activity hyperactive (agitated, impulsive)   Speech mute   Medication Sensitivity no stated side effects;no observed side effects   Psychomotor / Gait foot dragging   Psycho Education   Type of Intervention   (observational)   Response refuses   Treatment Detail   (observational)   Activities of Daily Living   Hygiene/Grooming independent;prompts   Oral Hygiene independent   Dress scrubs (behavioral health);street clothes   Laundry with supervision   Room Organization independent      No

## 2020-02-28 ENCOUNTER — RECORDS - HEALTHEAST (OUTPATIENT)
Dept: ADMINISTRATIVE | Facility: OTHER | Age: 60
End: 2020-02-28

## 2020-02-29 ENCOUNTER — RECORDS - HEALTHEAST (OUTPATIENT)
Dept: ADMINISTRATIVE | Facility: OTHER | Age: 60
End: 2020-02-29

## 2020-03-03 ENCOUNTER — RECORDS - HEALTHEAST (OUTPATIENT)
Dept: ADMINISTRATIVE | Facility: OTHER | Age: 60
End: 2020-03-03

## 2020-10-01 ENCOUNTER — AMBULATORY - HEALTHEAST (OUTPATIENT)
Dept: BEHAVIORAL HEALTH | Facility: CLINIC | Age: 60
End: 2020-10-01

## 2020-10-03 ENCOUNTER — AMBULATORY - HEALTHEAST (OUTPATIENT)
Dept: BEHAVIORAL HEALTH | Facility: CLINIC | Age: 60
End: 2020-10-03

## 2021-04-13 ENCOUNTER — TELEPHONE (OUTPATIENT)
Dept: BEHAVIORAL HEALTH | Facility: CLINIC | Age: 61
End: 2021-04-13
Payer: COMMERCIAL

## 2021-04-13 ENCOUNTER — TELEPHONE (OUTPATIENT)
Dept: BEHAVIORAL HEALTH | Facility: CLINIC | Age: 61
End: 2021-04-13

## 2021-04-13 ENCOUNTER — HOSPITAL ENCOUNTER (INPATIENT)
Facility: CLINIC | Age: 61
LOS: 155 days | Discharge: NURSING, REHAB OR LONG-TERM CARE | End: 2021-09-16
Attending: EMERGENCY MEDICINE | Admitting: PSYCHIATRY & NEUROLOGY
Payer: COMMERCIAL

## 2021-04-13 DIAGNOSIS — F25.0 SCHIZOAFFECTIVE DISORDER, BIPOLAR TYPE (H): ICD-10-CM

## 2021-04-13 DIAGNOSIS — I10 HYPERTENSION, UNSPECIFIED TYPE: ICD-10-CM

## 2021-04-13 DIAGNOSIS — F20.0 SCHIZOPHRENIA, PARANOID TYPE (H): ICD-10-CM

## 2021-04-13 DIAGNOSIS — F20.0 PARANOID SCHIZOPHRENIA (H): Primary | ICD-10-CM

## 2021-04-13 DIAGNOSIS — Z20.822 COVID-19 RULED OUT BY LABORATORY TESTING: ICD-10-CM

## 2021-04-13 LAB
LABORATORY COMMENT REPORT: NORMAL
SARS-COV-2 RNA RESP QL NAA+PROBE: NEGATIVE
SPECIMEN SOURCE: NORMAL

## 2021-04-13 PROCEDURE — C9803 HOPD COVID-19 SPEC COLLECT: HCPCS | Performed by: EMERGENCY MEDICINE

## 2021-04-13 PROCEDURE — 87635 SARS-COV-2 COVID-19 AMP PRB: CPT | Performed by: EMERGENCY MEDICINE

## 2021-04-13 PROCEDURE — 96372 THER/PROPH/DIAG INJ SC/IM: CPT | Performed by: EMERGENCY MEDICINE

## 2021-04-13 PROCEDURE — 99285 EMERGENCY DEPT VISIT HI MDM: CPT | Performed by: EMERGENCY MEDICINE

## 2021-04-13 PROCEDURE — 250N000011 HC RX IP 250 OP 636: Performed by: EMERGENCY MEDICINE

## 2021-04-13 PROCEDURE — 99285 EMERGENCY DEPT VISIT HI MDM: CPT | Mod: 25 | Performed by: EMERGENCY MEDICINE

## 2021-04-13 RX ORDER — HALOPERIDOL 5 MG/ML
5 INJECTION INTRAMUSCULAR ONCE
Status: DISCONTINUED | OUTPATIENT
Start: 2021-04-13 | End: 2021-04-13

## 2021-04-13 RX ORDER — HALOPERIDOL 5 MG/ML
5 INJECTION INTRAMUSCULAR ONCE
Status: COMPLETED | OUTPATIENT
Start: 2021-04-13 | End: 2021-04-13

## 2021-04-13 RX ORDER — HALOPERIDOL 5 MG/1
5 TABLET ORAL ONCE
Status: DISCONTINUED | OUTPATIENT
Start: 2021-04-13 | End: 2021-04-13

## 2021-04-13 RX ADMIN — HALOPERIDOL LACTATE 5 MG: 5 INJECTION, SOLUTION INTRAMUSCULAR at 19:53

## 2021-04-13 NOTE — ED NOTES
EMS returned for signature on the Pt and gave report.    [facility] [place] [facility] [place] [facility]  Pt as outlined in the Transport hold paper work has been declining and seems to be not taking her meds and the staff person who came to see the Pt today placed the pt on a transport hold and sent the pt in for further evaluation.     Vitals taken by EMS PTA    /68  p99 O2 sat 99 Resp 16     BS 77

## 2021-04-13 NOTE — ED NOTES
Bed: ED16C  Expected date:   Expected time:   Means of arrival:   Comments:  [place] [phone] psych eval

## 2021-04-13 NOTE — ED TRIAGE NOTES
Pt was arrived by another RN but EMS report was missed. Pt arrives with paper work [place] [facility] [address] [facility] [address].  Pt reportedly has been off her meds and has been contacting her son and yelling at him.  Pt was sent in on a hold to be evaluated and became agitated with police and arrived in 4pts. Pt was taken out of restraints and remained calm.    Pt upon arrival is non verbal with staff sitting in a chair, refusing to answer questions with her fist clenched and arms crossed.  Pt will not consent to having her vitals taken.

## 2021-04-13 NOTE — ED PROVIDER NOTES
[facility] EMERGENCY DEPARTMENT ([facility])  4/13/21    History     Chief Complaint   Patient presents with     Paranoid     The history is provided by medical records and the EMS personnel. The history is limited by the condition of the patient.     Michelle Pino is a 60 year old female with a medical history significant for HLD, HTN, and schizophrenia who presents to the emergency department for evaluation of nausea.  Patient [facility]. Patient reportedly has not been taking her medications and has been contacting her son and yelling at him.  Patient was sent in on a hold to be evaluated and became agitated with police.  Upon arrival, patient is nonverbal with staff, refusing to answer questions.  Unable to obtain any history from the patient.  Of note, the patient has had similar episodes of noncompliance and catatonia or psychosis.    Past Medical History:   Diagnosis Date     Hyperlipidemia      Hypertension      Schizophrenia (H)        Past Surgical History:   Procedure Laterality Date     OPEN REDUCTION INTERNAL FIXATION TIBIAL PLATEAU Right 01/08/2019    at [facility], fracture occured during psychiatric restraining       No family history on file.    Social History     Tobacco Use     Smoking status: Never Smoker   Substance Use Topics     Alcohol use: Never     Frequency: Never       No current facility-administered medications for this encounter.      Current Outpatient Medications   Medication     atorvastatin (LIPITOR) 40 MG tablet     chlorproMAZINE (THORAZINE) 100 MG tablet     haloperidol (HALDOL) 5 MG tablet     haloperidol decanoate (HALDOL DECANOATE) 100 MG/ML injection        Allergies   Allergen Reactions     Lisinopril Cough       Review of Systems   Unable to perform ROS: Psychiatric disorder         Physical Exam   BP: (Pt refusing)  Resp: 16  Physical Exam  Vitals signs and nursing note reviewed.   Constitutional:       General: She is awake.       Appearance: Normal appearance.   HENT:      Head: Normocephalic and atraumatic.   Eyes:      Conjunctiva/sclera: Conjunctivae normal.   Pulmonary:      Effort: Pulmonary effort is normal.   Neurological:      General: No focal deficit present.      Mental Status: She is alert.   Psychiatric:         Mood and Affect: Affect is flat.         Speech: She is noncommunicative.         Behavior: Behavior is uncooperative.         ED Course      Procedures             Results for orders placed or performed during the hospital encounter of 04/13/21   Asymptomatic SARS-CoV-2 COVID-19 Virus (Coronavirus) by PCR     Status: None    Specimen: Nasopharyngeal   Result Value Ref Range    SARS-CoV-2 Virus Specimen Source Nasopharyngeal     SARS-CoV-2 PCR Result NEGATIVE     SARS-CoV-2 PCR Comment (Note)      Medications   haloperidol lactate (HALDOL) injection 5 mg (5 mg Intramuscular Given 4/13/21 1953)        Assessments & Plan (with Medical Decision Making)   Patient was seen.  Patient refused any type of examination.  Visual inspection of the patient shows no evidence of trauma, she is not in respiratory distress.  We were able to give 5 mg of IM Haldol with assistance.  However, the patient continued to refuse all labs or evaluation.  This appears consistent with prior episodes.  The patient will be admitted to the hospital on a 72-hour hold.  She is certainly high risk for harm to self or others if she is discharged.  Patient continued to remain calm and nonverbal without escalation while in the emergency department.    I have reviewed the nursing notes. I have reviewed the findings, diagnosis, plan and need for follow up with the patient.    New Prescriptions    No medications on file       Final diagnoses:   Paranoid schizophrenia (H)       --  Linda Spencer DO  [facility] EMERGENCY DEPARTMENT  4/13/2021     Linda Spencer DO  04/14/21 0041

## 2021-04-14 PROCEDURE — 250N000011 HC RX IP 250 OP 636: Performed by: PSYCHIATRY & NEUROLOGY

## 2021-04-14 PROCEDURE — 99221 1ST HOSP IP/OBS SF/LOW 40: CPT | Mod: AI | Performed by: PSYCHIATRY & NEUROLOGY

## 2021-04-14 PROCEDURE — 250N000013 HC RX MED GY IP 250 OP 250 PS 637: Performed by: PSYCHIATRY & NEUROLOGY

## 2021-04-14 PROCEDURE — 124N000002 HC R&B MH UMMC

## 2021-04-14 PROCEDURE — 99207 PR DOWN CODE DUE TO INITIAL EXAM: CPT | Performed by: PSYCHIATRY & NEUROLOGY

## 2021-04-14 RX ORDER — AMOXICILLIN 250 MG
1 CAPSULE ORAL 2 TIMES DAILY PRN
Status: DISCONTINUED | OUTPATIENT
Start: 2021-04-14 | End: 2021-09-16 | Stop reason: HOSPADM

## 2021-04-14 RX ORDER — LORAZEPAM 2 MG/ML
1 INJECTION INTRAMUSCULAR EVERY 4 HOURS PRN
Status: DISCONTINUED | OUTPATIENT
Start: 2021-04-14 | End: 2021-04-20

## 2021-04-14 RX ORDER — DIPHENHYDRAMINE HYDROCHLORIDE 50 MG/ML
50 INJECTION INTRAMUSCULAR; INTRAVENOUS EVERY 4 HOURS PRN
Status: DISCONTINUED | OUTPATIENT
Start: 2021-04-14 | End: 2021-04-14

## 2021-04-14 RX ORDER — HALOPERIDOL 5 MG/1
5 TABLET ORAL EVERY 4 HOURS PRN
Status: DISCONTINUED | OUTPATIENT
Start: 2021-04-14 | End: 2021-04-26

## 2021-04-14 RX ORDER — PALIPERIDONE 9 MG/1
9 TABLET, EXTENDED RELEASE ORAL DAILY
Status: ON HOLD | COMMUNITY
End: 2021-08-25

## 2021-04-14 RX ORDER — OLANZAPINE 10 MG/1
10 TABLET ORAL 3 TIMES DAILY PRN
Status: DISCONTINUED | OUTPATIENT
Start: 2021-04-14 | End: 2021-09-16 | Stop reason: HOSPADM

## 2021-04-14 RX ORDER — LORAZEPAM 1 MG/1
1 TABLET ORAL EVERY 4 HOURS PRN
Status: DISCONTINUED | OUTPATIENT
Start: 2021-04-14 | End: 2021-04-20

## 2021-04-14 RX ORDER — TRIHEXYPHENIDYL HYDROCHLORIDE 2 MG/1
2 TABLET ORAL 2 TIMES DAILY
Status: ON HOLD | COMMUNITY
End: 2021-08-25

## 2021-04-14 RX ORDER — POLYETHYLENE GLYCOL 3350 17 G/17G
17 POWDER, FOR SOLUTION ORAL DAILY
Status: ON HOLD | COMMUNITY
End: 2021-08-25

## 2021-04-14 RX ORDER — MAGNESIUM HYDROXIDE/ALUMINUM HYDROXICE/SIMETHICONE 120; 1200; 1200 MG/30ML; MG/30ML; MG/30ML
30 SUSPENSION ORAL EVERY 4 HOURS PRN
Status: DISCONTINUED | OUTPATIENT
Start: 2021-04-14 | End: 2021-09-16 | Stop reason: HOSPADM

## 2021-04-14 RX ORDER — HALOPERIDOL 5 MG/ML
5 INJECTION INTRAMUSCULAR EVERY 4 HOURS PRN
Status: DISCONTINUED | OUTPATIENT
Start: 2021-04-14 | End: 2021-04-26

## 2021-04-14 RX ORDER — DIPHENHYDRAMINE HCL 50 MG
50 CAPSULE ORAL EVERY 4 HOURS PRN
Status: DISCONTINUED | OUTPATIENT
Start: 2021-04-14 | End: 2021-04-14

## 2021-04-14 RX ORDER — HALOPERIDOL 5 MG/ML
5 INJECTION INTRAMUSCULAR ONCE
Status: DISCONTINUED | OUTPATIENT
Start: 2021-04-14 | End: 2021-04-14

## 2021-04-14 RX ORDER — LOSARTAN POTASSIUM 100 MG/1
100 TABLET ORAL DAILY
Status: ON HOLD | COMMUNITY
End: 2021-08-25

## 2021-04-14 RX ORDER — LORAZEPAM 1 MG/1
1-2 TABLET ORAL EVERY 4 HOURS PRN
Status: DISCONTINUED | OUTPATIENT
Start: 2021-04-14 | End: 2021-04-14

## 2021-04-14 RX ORDER — DIPHENHYDRAMINE HCL 25 MG
25 CAPSULE ORAL EVERY 4 HOURS PRN
Status: DISCONTINUED | OUTPATIENT
Start: 2021-04-14 | End: 2021-09-16 | Stop reason: HOSPADM

## 2021-04-14 RX ORDER — LORAZEPAM 2 MG/ML
2 INJECTION INTRAMUSCULAR ONCE
Status: DISCONTINUED | OUTPATIENT
Start: 2021-04-14 | End: 2021-04-14

## 2021-04-14 RX ORDER — PALIPERIDONE 9 MG/1
9 TABLET, EXTENDED RELEASE ORAL DAILY
Status: DISCONTINUED | OUTPATIENT
Start: 2021-04-14 | End: 2021-04-20

## 2021-04-14 RX ORDER — OLANZAPINE 7.5 MG/1
7.5 TABLET, FILM COATED ORAL 2 TIMES DAILY
Status: ON HOLD | COMMUNITY
End: 2021-08-25

## 2021-04-14 RX ORDER — CHLORPROMAZINE HYDROCHLORIDE 100 MG/1
100 TABLET, FILM COATED ORAL 2 TIMES DAILY
Status: DISCONTINUED | OUTPATIENT
Start: 2021-04-14 | End: 2021-04-14

## 2021-04-14 RX ORDER — POLYETHYLENE GLYCOL 3350 17 G/17G
17 POWDER, FOR SOLUTION ORAL DAILY
Status: DISCONTINUED | OUTPATIENT
Start: 2021-04-14 | End: 2021-05-26

## 2021-04-14 RX ORDER — CHLORPROMAZINE HYDROCHLORIDE 100 MG/1
100 TABLET, FILM COATED ORAL AT BEDTIME
Status: DISCONTINUED | OUTPATIENT
Start: 2021-04-14 | End: 2021-04-20

## 2021-04-14 RX ORDER — METOPROLOL SUCCINATE 50 MG/1
50 TABLET, EXTENDED RELEASE ORAL DAILY
Status: DISCONTINUED | OUTPATIENT
Start: 2021-04-14 | End: 2021-04-30

## 2021-04-14 RX ORDER — LOSARTAN POTASSIUM 100 MG/1
100 TABLET ORAL DAILY
Status: DISCONTINUED | OUTPATIENT
Start: 2021-04-14 | End: 2021-09-16 | Stop reason: HOSPADM

## 2021-04-14 RX ORDER — METOPROLOL SUCCINATE 50 MG/1
50 TABLET, EXTENDED RELEASE ORAL DAILY
Status: ON HOLD | COMMUNITY
End: 2021-08-25

## 2021-04-14 RX ORDER — HYDROXYZINE HYDROCHLORIDE 25 MG/1
25-50 TABLET, FILM COATED ORAL EVERY 4 HOURS PRN
Status: DISCONTINUED | OUTPATIENT
Start: 2021-04-14 | End: 2021-09-16 | Stop reason: HOSPADM

## 2021-04-14 RX ORDER — LORAZEPAM 2 MG/ML
1-2 INJECTION INTRAMUSCULAR EVERY 4 HOURS PRN
Status: DISCONTINUED | OUTPATIENT
Start: 2021-04-14 | End: 2021-04-14

## 2021-04-14 RX ORDER — DIPHENHYDRAMINE HYDROCHLORIDE 50 MG/ML
25 INJECTION INTRAMUSCULAR; INTRAVENOUS EVERY 4 HOURS PRN
Status: DISCONTINUED | OUTPATIENT
Start: 2021-04-14 | End: 2021-09-16 | Stop reason: HOSPADM

## 2021-04-14 RX ORDER — ACETAMINOPHEN 325 MG/1
650 TABLET ORAL EVERY 4 HOURS PRN
Status: DISCONTINUED | OUTPATIENT
Start: 2021-04-14 | End: 2021-09-16 | Stop reason: HOSPADM

## 2021-04-14 RX ORDER — TRIHEXYPHENIDYL HYDROCHLORIDE 2 MG/1
2 TABLET ORAL 2 TIMES DAILY
Status: DISCONTINUED | OUTPATIENT
Start: 2021-04-14 | End: 2021-04-26

## 2021-04-14 RX ORDER — OLANZAPINE 10 MG/2ML
10 INJECTION, POWDER, FOR SOLUTION INTRAMUSCULAR 3 TIMES DAILY PRN
Status: DISCONTINUED | OUTPATIENT
Start: 2021-04-14 | End: 2021-09-16 | Stop reason: HOSPADM

## 2021-04-14 RX ADMIN — DIPHENHYDRAMINE HYDROCHLORIDE 50 MG: 50 INJECTION, SOLUTION INTRAMUSCULAR; INTRAVENOUS at 13:12

## 2021-04-14 RX ADMIN — HALOPERIDOL 5 MG: 5 TABLET ORAL at 13:03

## 2021-04-14 RX ADMIN — LORAZEPAM 2 MG: 2 INJECTION INTRAMUSCULAR; INTRAVENOUS at 13:12

## 2021-04-14 RX ADMIN — HALOPERIDOL LACTATE 5 MG: 5 INJECTION, SOLUTION INTRAMUSCULAR at 13:12

## 2021-04-14 ASSESSMENT — ACTIVITIES OF DAILY LIVING (ADL)
WALKING_OR_CLIMBING_STAIRS_DIFFICULTY: NO
TOILETING_ISSUES: NO
DIFFICULTY_EATING/SWALLOWING: NO
DIFFICULTY_COMMUNICATING: NO
FALL_HISTORY_WITHIN_LAST_SIX_MONTHS: NO
CONCENTRATING,_REMEMBERING_OR_MAKING_DECISIONS_DIFFICULTY: NO
WEAR_GLASSES_OR_BLIND: NO
DRESSING/BATHING_DIFFICULTY: NO
DOING_ERRANDS_INDEPENDENTLY_DIFFICULTY: NO

## 2021-04-14 NOTE — ED NOTES
Pt. still refusing labs, VS and to give UA.  Out of room periodically asking for phone.  Pt. will only speak when she wants.  Have asked pt. numerous times if she needs anything and pt. will just stare at writer and not answer, but when she need something she can speak without difficulty.  Has remained calm, dozed off and on.  Pt. still awaiting placement.

## 2021-04-14 NOTE — ED NOTES
Patient refused to go to station 12, writer tried to reason and explain to the patient that she is on a hold and that she does not have the option to stay in the ED or leave the hospital until the doctor makes that decision. Show of force with other staff to encourage patient to cooperate. Patient continued to refuse. She allowed the staff to place her in restraints and on the bed. Dr. Cruz was notified and assessed the patient within 2 minutes of initiation of restraints. Dr. Cruz said not to give medications because the patient was not fighting against restraints. Patient went up to station 12 with multiple staff members.

## 2021-04-14 NOTE — TELEPHONE ENCOUNTER
R: 824am- Intake spoke with unit regarding acuity level and staff. Unit reported they are not staffed to take a 2:1.  829am: Intake left message for NM regarding staffing and pt acuity.   1025am: Intake spoke with NM, request out to staffing for st 12.  1027am-12/chidi  Intake paged provider  10:40am: provider accepted  10:42am: pt placed in que, unit notified, can not take until they get more staff. ED notified as well.  11:10am: Unit called intake and reported they are able to take report for pt now. ED notified.

## 2021-04-14 NOTE — PROGRESS NOTES
Patient was brought to [facility] from the ED via restraint board and gurney.  See ED notes.  Patient was placed in seclusion upon arrival to [facility].   She was hollering and refusing to follow staff directions.  Dr Zaman  Recommended that she be given Haldol Benadryl and Ativan. Pt refused the oral so IM was given. Patient said she needed to go to bathroom so staff ask patient  to move away from the seclusion room door so that a bedpan could be put in.  It took several minutes for her to comply with moving away from door.  Patient was given clean scrubs to put on which she eventually did.  She was then escorted to her room.  She fell asleep within minutes of laying down on bed.

## 2021-04-14 NOTE — ED NOTES
PT continue to refuse vitals and would pull away from this writer when attempting to do so.  Pt was offered PO med but would not state whether she would take it or not. Provider made aware and code green called and Pt given IM med with only minor resistance on the Pt part. Pt being monitored for effect.

## 2021-04-14 NOTE — ED NOTES
This writer went to room and assessed if vitals could be done. Pt withdrawn from this RN and refused to cooperate. Pt will not answer questions. Pt refuses blood work. Pt offered food/beverage but will not respond to writer. Food/beverage brought in to pt anyway.

## 2021-04-14 NOTE — PHARMACY-ADMISSION MEDICATION HISTORY
Admission Medication History Completed by Pharmacy    See [facility] Admission Navigator for allergy information, preferred outpatient pharmacy, prior to admission medications and immunization status.     Medication History Sources:     [facility] Paperwork    Discharge from [facility] on 10/27/2020    Changes made to PTA medication list (reason):    Added: Zyprexa, Invega, Artane, losartan, metoprolol succinate    Deleted: atorvastatin, haloperidol    Changed: chlorpromazine -> 100 mg PO QHS    Additional Information:    Patient not interviewed as part of this medication history. Unable to assess patient compliance with medications. Please discuss any OTC/non-prescription medication use and last doses with the patient that may not be reflected in the list below.    Prior to Admission medications    Medication Sig   chlorproMAZINE (THORAZINE) 100 MG tablet Take 100 mg by mouth At Bedtime    losartan (COZAAR) 100 MG tablet Take 100 mg by mouth daily   metoprolol succinate ER (TOPROL-XL) 50 MG 24 hr tablet Take 50 mg by mouth daily   OLANZapine (ZYPREXA) 7.5 MG tablet Take 7.5 mg by mouth 2 times daily   paliperidone ER (INVEGA) 9 MG 24 hr tablet Take 9 mg by mouth daily   polyethylene glycol (MIRALAX) 17 g packet Take 17 g by mouth daily   trihexyphenidyl (ARTANE) 2 MG tablet Take 2 mg by mouth 2 times daily       Date completed: 04/14/21    Medication history completed by: Kourtney Dc, PharmD

## 2021-04-14 NOTE — DISCHARGE INSTRUCTIONS
Behavioral Discharge Planning and Instructions    Summary: You were admitted on 4/13/2021 due to tad, psychosis, and agitation in the context of medication non-adherence.  You were treated by Ida Zaman MD and Verenice Oquendo MD and discharged on 9/16/21 from [facility] to:  [facility]:  [address] [facility] [place], [place]:  # [phone]:  Provisionally Discharge and Change of Status Report Completed and sent to [facility].    Main Diagnosis:   Schizoaffective disorder, bipolar type  Latent tuberculosis   Hypertension   Dyslipidemia  Hx of CVA    Medical Follow-up Appointment:   Monday, 10/11/21 @ 10am:  In Person Appointment:  Provider:  Janey Booker MD  [facility]:  [address] [facility] [address], [place], [place] , [address]:  Phone:  [phone]    Fax:  [phone]    [facility]:  # [phone]:   Clinic Address:  [address] [place] [address], [address], [facility]    Provider:  Jaren Osei MD:  Video Appointment:  10/29/21 @ 10am:  The clinic will call group home to set-up this appointment:  The number provided to the clinic is the group home number. Please ensure treatment decisions are approved by her son if Michelle remains under guardianship.    [facility] ACT Team:   Act Team will continue to work with patient upon her discharge from the hospital and coordinate care with [facility] staff.  /RN Kourtney Rizo will call the group home directly and set-up psychiatry/medication management follow-up appointment.     [facility] ACT Team    Psychiatrist;   Stevenson Pedraza @ [phone]   CM/RN:  Kourtney Rizo @ [phone]  :  Mary Kay Lynch @ [phone]     There is a Guardianship Hearing scheduled September 24, 2021 @ 8:30am:  Contact the Court @ [phone] if you do not have access to the internet or unable to connect by video and audio.      Attend all scheduled  appointments with your outpatient providers. Call at least 24 hours in advance if you need to reschedule an appointment to ensure continued access to your outpatient providers.     Major Treatments, Procedures and Findings:  You were provided with: a psychiatric assessment, assessed for medical stability, medication evaluation and/or management, group therapy and milieu management    Symptoms to Report: feeling more aggressive, increased confusion, losing more sleep, mood getting worse or thoughts of suicide    Early warning signs can include: increased depression or anxiety sleep disturbances increased thoughts or behaviors of suicide or self-harm  increased unusual thinking, such as paranoia or hearing voices    Safety and Wellness:  Take all medicines as directed.  Make no changes unless your doctor suggests them.      Follow treatment recommendations.  Refrain from alcohol and non-prescribed drugs.  Ask your support system to help you reduce your access to items that could harm yourself or others. Items could include:  Firearms  Medicines (both prescribed and over-the-counter)  Knives and other sharp objects  Ropes and like materials  Car keys  If there is a concern for safety, call 911. If there is a concern for safety, call 911.    Resources:   Crisis Intervention: 972.301.5448 or 480-167-7901 (TTY: 502.780.3558).  Call anytime for help.  National [place] on Mental Illness (www.[place].trevor.org): 438.104.3658 or 692-054-3433.  [place] Association for Children's Mental Health (www.macmh.org): 504.456.2507.  Suicide Awareness Voices of Education (SAVE) (www.save.org): 234-732-IVZM (1024)  National Suicide Prevention Line (www.mentalhealthmn.org): 591-782-CUYW (0929)  Mental Health Consumer/Survivor Network of [place] (www.mhcsn.net): 869.611.9770 or 000-923-3915  Mental Health Association of [place] (www.mentalhealth.org): 996.670.2853 or 279-927-3615  Self- Management and Recovery Training., SMART-- Toll free: 973.495.1207   "www.c-crowd.[employer]  [facility] Crisis (COPE) Response - Adult 471 957-9445  Text 4 Life: txt \"LIFE\" to 89695 for immediate support and crisis intervention  Crisis text line: Text \"[place]\" to 450956. Free, confidential, 24/7.  Crisis Intervention: 798.635.5631 or 566-447-0970. Call anytime for help.     General Medication Instructions:   See your medication sheet(s) for instructions.   Take all medicines as directed.  Make no changes unless your doctor suggests them.   Go to all your doctor visits.  Be sure to have all your required lab tests. This way, your medicines can be refilled on time.  Do not use any drugs not prescribed by your doctor.  Avoid alcohol.    Advance Directives:   Scanned document on file with [email or web address]? No scanned doc  Is document scanned? Pt unable to confirm  Honoring Choices Your Rights Handout: Informed and given  Was more information offered? Pt declined    The Treatment team has appreciated the opportunity to work with you. If you have any questions or concerns about your recent admission, you can contact the unit which can receive your call 24 hours a day, 7 days a week. They will be able to get in touch with a Provider if needed. The unit number is [phone]  "
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
## 2021-04-14 NOTE — ED NOTES
Within an hour after restraint an in person face to face assessment was completed at 12 noon, including an evaluation of the patient's immediate reaction to the intervention, behavioral assessment and review/assessment of history, drugs and medications, recent labs, etc., and behavioral condition.  The patient experienced: No adverse physical outcome from seclusion/restraint initiation.  The intervention of restraint or seclusion needs to continue  Antonina Cruz MD .       Antonina Cruz MD  04/14/21 2618

## 2021-04-14 NOTE — PLAN OF CARE
Current Diagnosis/Procedure:  Schizoaffective Disorder, Bipolar Type    Work Completed:    Writer faxed over petition for MI Commitment w/ [facility] to [facility]. Writer spoke with patient's ACT Team through [facility] (Nurse is Hanny Rizo- ([phone]),  is Mary Kay Lynch ([phone]). Writer left a message for patient's CM/Nurse Hanny requesting a call back to discuss patient and petition for commitment.       Discharge plan or goal:  No discharge plans at present time.      Barriers to discharge:  Continued need for stabilization of severity of illness and medication management. Patient has been in seclusion since admission to station 12.

## 2021-04-14 NOTE — PROGRESS NOTES
A               Admission:  I am responsible for any personal items that are not sent to the safe or pharmacy.  Michelle is not responsible for loss, theft or damage of any property in my possession.    Placed in patient locker:  Black skinny jeans  Purple/lilac long sleeve plaid shirt with liner and cee  Black and white scarf/head covering  Hairnet  Open toe/back pumps   Samsung phone and   Glasses in case  Black face mask  Small red spiral notebook    Sent to security in envelope #[id number]:  [employer] Credit card  $193.00 [place] currency  1050 Rachel ([place] currency)  Blank checks   04/14/21 1349   Patient Belongings   Did you bring any home meds/supplements to the hospital?  No   Patient Belongings locker;sent to security per site process   Patient Belongings Put in Hospital Secure Location (Security or Locker, etc.) cash/credit card;cell phone/electronics;clothing;glasses;keys;money (see comment);purse/wallet;shoes;wallet   Belongings Search Yes  (Patient has $193 U.S. Currency and 1050 Rachel([place] currency) sent security)   Clothing Search Yes   Second Staff Dixon S.     1 container of nail polish      Signature:  _________________________________ Date: _______  Time: _____                                              Staff Signature:  ____________________________ Date: ________  Time: _____      2nd Staff person, if patient is unable/unwilling to sign:    Signature: ________________________________ Date: ________  Time: _____     Discharge:  Michelle has returned all of my personal belongings:    Signature: _________________________________ Date: ________  Time: _____                                          Staff Signature:  ____________________________ Date: ________  Time: _____

## 2021-04-14 NOTE — ED NOTES
"When this writer brought food/blanket pt stood up \"I need to go to the bathroom.\" Pt directed to bathroom and requested a sample from pt, pt refused by not responding or taking cup. Pt walked back to room followed direction of this writer.   "

## 2021-04-14 NOTE — ED PROVIDER NOTES
[facility] ED Mental Health Handoff Note:       Brief HPI:  This is a 60 year old female signed out to me by Dr. Spencer.  See initial ED Provider note for full details of the presentation. Interval history is pertinent forno acute issues overnigh .    Home meds reviewed and ordered/administered: No - has been off all meds for a period of time.    Medically stable for inpatient mental health admission: Yes.    Evaluated by mental health: Yes. The recommendation is for inpatient mental health treatment. Bed search in process    Safety concerns: At the time I received sign out, there were no safety concerns.    Hold Status:  Active Orders   Legal    Emergency Hospitalization Hold (72 Hr Hold)     Frequency: Effective Now     Start Date/Time: 04/13/21 2238      Number of Occurrences: Until Specified    Health Officer Authority to Detain (OSCAR)     Frequency: Effective Now     Start Date/Time: 04/13/21 1819      Number of Occurrences: Until Specified     Order Comments: This patient presented with circumstances that have led me to be reasonably suspicious that the patient is experiencing psychosis. The patient's judgment to this situation appears to be impaired. Given the circumstances in which the patient presented, it is likely that the patient is at significant risk of harming self or others if this situation is not investigated further. I am highly concerned that the patient is mentally ill and currently cannot safely care for oneself. This represents endangerment to the patient's well-being and safety, and I am placing a Health Officer Authority hold upon the patient at this time.              Exam:   Patient Vitals for the past 24 hrs:   Resp   04/13/21 2348 16       Patient rested overnight.    ED Course:    Medications   haloperidol lactate (HALDOL) injection 5 mg (5 mg Intramuscular Given 4/13/21 1953)            There were no significant events during my shift.    Patient was signed out to the [facility]  provider, Dr. Cruz.      Impression:    ICD-10-CM    1. Paranoid schizophrenia (H)  F20.0 Asymptomatic SARS-CoV-2 COVID-19 Virus (Coronavirus) by PCR       Plan:    1. Awaiting inpatient mental health admission/transfer.      RESULTS:   Results for orders placed or performed during the hospital encounter of 04/13/21 (from the past 24 hour(s))   Asymptomatic SARS-CoV-2 COVID-19 Virus (Coronavirus) by PCR     Status: None    Collection Time: 04/13/21  9:04 PM    Specimen: Nasopharyngeal   Result Value Ref Range    SARS-CoV-2 Virus Specimen Source Nasopharyngeal     SARS-CoV-2 PCR Result NEGATIVE     SARS-CoV-2 PCR Comment (Note)              MD Nohemy Callaway, Katherine Walton MD  04/14/21 1110

## 2021-04-14 NOTE — ED NOTES
[facility] ED Mental Health Handoff Note:       Brief HPI:  This is a 60 year old female signed out to me by Dr. Slater.  See initial ED Provider note for full details of the presentation.  Patient with paranoid schizophrenia off medications.  Placed on a 72-hour hold yesterday for admission.  Currently awaiting bed availability.    Home meds reviewed and ordered/administered: Yes    Medically stable for inpatient mental health admission: Yes.    Evaluated by mental health: Yes. The recommendation is for inpatient mental health treatment. Bed search in process    Safety concerns: At the time I received sign out, there were no safety concerns.    Hold Status:  Active Orders   Legal    Emergency Hospitalization Hold (72 Hr Hold)     Frequency: Effective Now     Start Date/Time: 04/13/21 2238      Number of Occurrences: Until Specified    Health Officer Authority to Detain (OSCAR)     Frequency: Effective Now     Start Date/Time: 04/13/21 1819      Number of Occurrences: Until Specified     Order Comments: This patient presented with circumstances that have led me to be reasonably suspicious that the patient is experiencing psychosis. The patient's judgment to this situation appears to be impaired. Given the circumstances in which the patient presented, it is likely that the patient is at significant risk of harming self or others if this situation is not investigated further. I am highly concerned that the patient is mentally ill and currently cannot safely care for oneself. This represents endangerment to the patient's well-being and safety, and I am placing a Health Officer Authority hold upon the patient at this time.              Exam:   Patient Vitals for the past 24 hrs:   Resp   04/13/21 2348 16           ED Course:    Medications   haloperidol lactate (HALDOL) injection 5 mg (5 mg Intramuscular Given 4/13/21 1953)     The patient has been refusing all vital signs and laboratory studies.  She does have a  negative Covid test.  She was assigned a bed on station 12 at approximately 11:15 AM and will be admitted to a mental health unit on a 72-hour hold.     The patient refused to be admitted and refused to cooperate.  She became agitated.  She is on a 72-hour hold for admission.    A code green was called on the patient during the admission process. The patient was placed in restraints for admission. She was not agitated on being taken upstairs. He did not require additional sedation medication.          Impression:    ICD-10-CM    1. Paranoid schizophrenia (H)  F20.0 Asymptomatic SARS-CoV-2 COVID-19 Virus (Coronavirus) by PCR       Plan:    1. Admitted/transferred to inpatient mental health bed.      RESULTS:   Results for orders placed or performed during the hospital encounter of 04/13/21 (from the past 24 hour(s))   Asymptomatic SARS-CoV-2 COVID-19 Virus (Coronavirus) by PCR     Status: None    Collection Time: 04/13/21  9:04 PM    Specimen: Nasopharyngeal   Result Value Ref Range    SARS-CoV-2 Virus Specimen Source Nasopharyngeal     SARS-CoV-2 PCR Result NEGATIVE     SARS-CoV-2 PCR Comment (Note)          This note was created in part by the use of Dragon voice recognition dictation system. Inadvertent grammatical errors and typographical errors may still exist.  MD Antonina Beltran MD Moettus, Alda L, MD  04/14/21 1855

## 2021-04-14 NOTE — ED NOTES
ED to Behavioral Floor Handoff    SITUATION  Michelle Pino is a 60 year old female who speaks English and lives in a home with others The patient arrived in the ED by ambulance from home with a complaint of Paranoid  .The patient's current symptoms started/worsened 1 week(s) ago and during this time the symptoms have increased.   In the ED, pt was diagnosed with   Final diagnoses:   Paranoid schizophrenia (H)        Initial vitals were: BP: (Pt refusing)  Resp: 16   --------  Is the patient diabetic? No   If yes, last blood glucose? --     If yes, was this treated in the ED? --  --------  Is the patient inebriated (ETOH) No or Impaired on other substances? No  MSSA done? N/A  Last MSSA score: --    Were withdrawal symptoms treated? N/A  Does the patient have a seizure history? No. If yes, date of most recent seizure--  --------  Is the patient patient experiencing suicidal ideation? denies current or recent suicidal ideation     Homicidal ideation? denies current or recent homicidal ideation or behaviors.    Self-injurious behavior/urges? denies current or recent self injurious behavior or ideation.  ------  Was pt aggressive in the ED No  Was a code called No  Is the pt now cooperative? Yes  -------  Meds given in ED:   Medications   haloperidol lactate (HALDOL) injection 5 mg (5 mg Intramuscular Given 4/13/21 1953)      Family present during ED course? No  Family currently present? No    BACKGROUND  Does the patient have a cognitive impairment or developmental disability? Yes  Allergies:   Allergies   Allergen Reactions     Lisinopril Cough   .   Social demographics are   Social History     Socioeconomic History     Marital status:      Spouse name: None     Number of children: None     Years of education: None     Highest education level: None   Occupational History     None   Social Needs     Financial resource strain: None     Food insecurity     Worry: None     Inability: None     Transportation needs      Medical: None     Non-medical: None   Tobacco Use     Smoking status: Never Smoker   Substance and Sexual Activity     Alcohol use: Never     Frequency: Never     Drug use: Never     Sexual activity: Never   Lifestyle     Physical activity     Days per week: None     Minutes per session: None     Stress: None   Relationships     Social connections     Talks on phone: None     Gets together: None     Attends Orthodox service: None     Active member of club or organization: None     Attends meetings of clubs or organizations: None     Relationship status: None     Intimate partner violence     Fear of current or ex partner: None     Emotionally abused: None     Physically abused: None     Forced sexual activity: None   Other Topics Concern     Parent/sibling w/ CABG, MI or angioplasty before 65F 55M? Not Asked   Social History Narrative     None        ASSESSMENT  Labs results   Labs Ordered and Resulted from Time of ED Arrival Up to the Time of Departure from the ED   SARS-COV-2 (COVID-19) VIRUS RT-PCR   CBC WITH PLATELETS DIFFERENTIAL   BASIC METABOLIC PANEL   DRUG ABUSE SCREEN 6 CHEM DEP URINE ([facility])   DOCUMENT IN LEGAL HOLD NAVIGATOR   DOCUMENT      Imaging Studies: No results found for this or any previous visit (from the past 24 hour(s)).   Most recent vital signs Resp 16    Abnormal labs/tests/findings requiring intervention:---   Pain control: pt had none  Nausea control: pt had none    RECOMMENDATION  Are any infection precautions needed (MRSA, VRE, etc.)? No If yes, what infection? --  ---  Does the patient have mobility issues? independently. If yes, what device does the pt use? ---  ---  Is patient on 72 hour hold or commitment? Yes If on 72 hour hold, have hold and rights been given to patient? Yes  Are admitting orders written if after 10 p.m. ?N/A  Tasks needing to be completed:---     Tracee Ho, VALERIE   ascom--    [phone] [place] [facility]   [phone] [facility] ED

## 2021-04-14 NOTE — TELEPHONE ENCOUNTER
Collateral from [facility]    Pt commitment  3 weeks ago  Stopped taking medication and seeing providers  Pt was found catatonic today on floor when EMS arrived  Pt was needed to be restrained to get into ambulance  Pt has hx of schizophrenia  Pt is not medicated  When pt is not medicated her aggression and agitation increase.  [facility] advises another petition commitment.     Oncall ACT with [facility] @ [phone], After hours to follow prompts.    Pt son can be reached @ [phone]
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
## 2021-04-14 NOTE — ED NOTES
Pt with head nods was willing to have her nose swab only after being told that if she didn't I would have to have staff come back and help me get the swab because she has to be admitted.  Pt when then asked about labs shook her head no turned away from this writer.  ED provider made aware.

## 2021-04-15 PROCEDURE — 124N000002 HC R&B MH UMMC

## 2021-04-15 PROCEDURE — 99233 SBSQ HOSP IP/OBS HIGH 50: CPT | Performed by: PSYCHIATRY & NEUROLOGY

## 2021-04-15 PROCEDURE — 250N000013 HC RX MED GY IP 250 OP 250 PS 637: Performed by: PSYCHIATRY & NEUROLOGY

## 2021-04-15 ASSESSMENT — ACTIVITIES OF DAILY LIVING (ADL)
LAUNDRY: UNABLE TO COMPLETE
HYGIENE/GROOMING: PROMPTS;INDEPENDENT
DRESS: INDEPENDENT;SCRUBS (BEHAVIORAL HEALTH)
LAUNDRY: UNABLE TO COMPLETE
ORAL_HYGIENE: PROMPTS;INDEPENDENT
DRESS: INDEPENDENT;PROMPTS
ORAL_HYGIENE: INDEPENDENT;PROMPTS
HYGIENE/GROOMING: INDEPENDENT;PROMPTS

## 2021-04-15 NOTE — PLAN OF CARE
"Dicussed with patient his/her Personal Plan of Care.      \"Reasons you are in the hospital;\" The patient identifies the following reasons for current hospitalization:     Writer unable to meet with patient due to being in seclusion and severity of current level of psychosis.     \"Goals for Discharge\" The patient identifies the following goals for discharge:    Writer unable to meet with patient due to being in seclusion and severity of current level of psychosis.   "

## 2021-04-15 NOTE — PLAN OF CARE
"  Problem: Adult Inpatient Plan of Care  Goal: Plan of Care Review  Outcome: No Change     Problem: Behavior Regulation Impairment (Psychotic Signs/Symptoms)  Goal: Improved Behavioral Control (Psychotic Signs/Symptoms)  Outcome: No Change     Problem: Mood Impairment (Psychotic Signs/Symptoms)  Goal: Improved Mood Symptoms (Psychotic Signs/Symptoms)  Outcome: No Change     NURSING ASSESSMENT    MENTAL HEALTH  Pt denies SI/SIB/HI/Hallucinations/anxiety/depression. Pt's affect looks guarded and suspicious. When asked about pt's mood, pt replied \"I'm fine.\" During rounds, RN would observe pt talking to self in room.     Pt completed a phone call with Screener today.    Pt did not attend groups today.    Appetite = eating and tolerating meals    Sleep = 1.25hrs; however pt slept during evening shift yesterday    ADLs = independent; disheveled appearance; RN offered a shower and pt declined.    Medication side effects = N/A pt refused to take all medications, stating pt does not take any medications.      VITAL SIGNS  Pt refused. Will attempt again later. Need baseline vitals.    PLAN  Continue with plan of care. Labs rescheduled for tomorrow AM (fasting labs). Encourage shower/ADLs. Reevaluate SIO tomorrow.  "

## 2021-04-15 NOTE — PLAN OF CARE
Initial Psychosocial Assessment    I have reviewed the chart, met with the patient, and developed Care Plan.  Information for assessment was obtained from: Chart Review and ACT Team      Presenting Problem:  Patient is a 60-year-old female who was brought to [facility] on 4/13 after police evaluated her for a welfare at her family s request. Per chart review her family had begun receiving odd phone calls, and her daughter reported getting agitated and angry calls from her that are typically indicative of medication nonadherence. In addition, her caretaker through [facility] ACT Team had reported that she had begun isolating and was very irritable with staff. Patient has a long history of hospitalization and commitment for mental health symptoms related to Schizoaffective Disorder and noncompliance with medications. ACT team staff later reported that they discovered patient had not been taking her medications. When the police arrived patient began fighting with them and had to be restrained. Patient has been committed as mentally ill 5 times in the past 20 years through [facility]. Since patient has arrived at [facility] ER she has been refusing all labs, medications and cares. She required restraints in order to be admitted to Station 12 and was in seclusion once. Patient continues to refuse medications and cares since arrival.     Patient is currently on a 72-hour-hold that expires 4/16/21 @ 0768. Petition for MI Commitment w/ [facility] was initiated.     History of Mental Health and Chemical Dependency:  MH: Current diagnosis of Schizoaffective Disorder, Bipolar Type. Patient has been committed 5 times since 1992 and requires an ACT team through [facility] in order to maintain stability in the community. Patient has been hospitalized 6 times in the past two years, [facility] 3x- [facility]. Last hospitalization for Schizoaffective  Disorder was 2020 at [facility]. MI Commitment has  in the past 6 months.     Family Description (Constellation, Family Psychiatric History):  Patient is originally from [place] and moved [place] the [place] in . She was raised by both parents, and is currently estranged from her  with whom she has 3 children. Patient is close with her son Emelia, who is listed as her emergency contact and pays for her apartment. Emelia lives in [place] (chart review states both).      Significant Life Events (Illness, Abuse, Trauma, Death):  Unable to assess at present time due to severity of patient's symptoms. Could not find any significant events listed in previous records.     Living Situation:  Apartment in [place] paid by her son.    Educational Background:  Chart review indicates some college. Completed HS.     Occupational History:  Chart review indicates she worked at [employer] historically.     Financial Status:  [email or web address]    Legal Issues:  Petition for MI Commitment w/ [facility] initiated on 21.     Ethnic/Cultural Issues:  Unknown.     Spiritual Orientation:  Chart review indicates Scientologist.      Service History:  None per chart review.     Social Functioning (organization, interests):  Notes indicate patient is isolative, doesn't have much socializing besides ACT Team and son.     Current Treatment Providers are:  [facility] ACT Team:  - Nurse/Covering CM: Kourtney Richardson ([phone])  - : Mary Kay Lynch ([phone]).   - Psychiatrist: Stevenson Pedraza ([phone]).    Social Service Assessment/Plan:  Patient has been admitted for psychiatric stabilization due to psychosis in the context of noncompliance with medications. Patient will have psychiatric assessment and medication management by the psychiatrist. Medications will be reviewed and adjusted per MD as indicated. The treatment team will continue to assess and stabilize the patient's mental  health symptoms with the use of medications and therapeutic programming. Hospital staff will provide a safe environment and a therapeutic milieu. Staff will continue to assess patient as needed. Patient will be encouraged to participate in unit groups and activities. Patient will receive individual and group support on the unit. CTC will do individual inpatient treatment planning and after care planning. CTC will discuss options for increasing community supports with the patient. CTC will coordinate with outpatient providers and will place referrals to ensure appropriate follow up care is in place.

## 2021-04-15 NOTE — PLAN OF CARE
"Writer met with patient who presented as disorganized, confused and malodorous. Patient repeatedly stated \" discharge me\", and was not responsive to writer's assurances that it was not up to them to discharge her. Patient expressed anger when told that someone from [facility] was going to call her this afternoon in regard to pre-petition for commitment, repeating \"Commitment, commitment, COMMITMENT\". She eventually agreed to speak with them when they called. Patient was quite difficult to understand throughout interaction.   "
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
## 2021-04-15 NOTE — PLAN OF CARE
BEHAVIORAL TEAM DISCUSSION    Participants: Lucille Sales, [facility], LADC, Ida Zaman MD, Claudio Lindsey, RN.   Progress: Initial team note  Anticipated length of stay: 1-2 weeks  Continued Stay Criteria/Rationale: Petition for MI Commitment w/ hatch submitted to [employer] on 4/14/21. Patient is on a 72-hour-hold until 4/16 @ 2238.   Medical/Physical: Hypertension, Dyslipidemia  Precautions:   Behavioral Orders   Procedures    Assault precautions    Code 1 - Restrict to Unit    Routine Programming     As clinically indicated    Status 15     Every 15 minutes.    Status Individual Observation     2:1, at least one female staff. Patient SIO status reviewed with team/RN.  Please also refer to RN/team documentation for add'l detail.    -SIO staff to monitor following which have contributed to patient being on SIO:  Aggression toward others  -Possible interventions SIO staff could use to support patient's treatment progress:  Nonpharmacologic and pharmacologic interventions  -When following observed, team will review discontinuation of SIO:  Absence of aggression/severe agitation for > 24 hours     Order Specific Question:   CONTINUOUS 24 hours / day     Answer:   5 feet     Order Specific Question:   Indications for SIO     Answer:   Assault risk     Plan: Hospital is waiting to hear if [employer] is going to support the MI w/ Hatch petition for commitment, and if patient will be placed on a court hold.   Rationale for change in precautions or plan: Initial team note.

## 2021-04-15 NOTE — PLAN OF CARE
Problem: Behavior Regulation Impairment (Psychotic Signs/Symptoms)  Goal: Improved Behavioral Control (Psychotic Signs/Symptoms)  Outcome: No Change    Pt was in seclusion during the day shift, and Ativan, Benadryl, and Haldol were given for agitation. Pt slept through this evening. She appears very sedated from the PRN Ativan, Benadryl, and Haldol given during the day shift. This RN writer tried to wake pt up for his scheduled medication/dinner but was very sedated and fell back to sleep. Pt is breathing well upon assessment and resting comfortably. She appears catching up with sleep. Dr. Zaman updated about sedation from PRN medication given during the day shift, and the dosage of Ativan, Benadryl, and Haldol decreased.

## 2021-04-15 NOTE — PROGRESS NOTES
"[facility], [place]   Psychiatric Progress Note  Hospital Day: 1        Interim History:   The patient's care was discussed with the treatment team during the daily team meeting and/or staff's chart notes were reviewed.  Staff report patient slept through much of the evening and appeared sedated from Haldol/Benadryl/Ativan prn. Due to sedation, at bedtime medications were held. However, patient then only slept 1.75 hours overnight. She was seated awake in bed and mumbling words to herself. She appears to be responding to internal stimuli. Appears guarded and suspicious. Patient did not report any acute medical concerns. No behavioral issues during evening or night shift, including violent or aggressive behaviors. Patient did require seclusion or restraints on 4/14 during day shift. Patient is exhibiting signs/sx of psychosis or tad. Patient is not medication adherent. She declined scheduled medications this morning. She also declined scheduled blood draw. Patient is not attending groups. Patient is not sleeping well. Patient is not eating adequately, eating small amounts from meal tray. Patient is not attending to ADLs.    Upon interview, the patient was sitting in her room watching TV. She was very irritable on approach. She declined to turn her TV volume down. She asked me repeatedly who I was. I showed patient my badge multiple times and reassured her that I am her provider in the hospital. I also reminded her that I have cared for her in the past. She repeatedly said \"Liar! You are not a doctor!\" She asked where I went to school. Despite my answer, she again yelled \"Liar!\" She confirmed that she is no longer working with the ACT because \"My commitment ended on March 26th. What is commitment? What is commitment?!\" She was becoming increasingly agitated with further questioning. When asked if she had any physical concerns, she stood up abruptly from a seated position, began jumping " "up and down while saying \"I'm physically okay\" repeatedly in a very agitated tone. Interview was then terminated at that time. Patient had no further questions or concerns.           Medications:       chlorproMAZINE  100 mg Oral At Bedtime     losartan  100 mg Oral Daily     metoprolol succinate ER  50 mg Oral Daily     OLANZapine  7.5 mg Oral BID     paliperidone ER  9 mg Oral Daily     polyethylene glycol  17 g Oral Daily     trihexyphenidyl  2 mg Oral BID          Allergies:     Allergies   Allergen Reactions     Lisinopril Cough          Labs:   No results found for this or any previous visit (from the past 24 hour(s)).       Psychiatric Examination:     Resp 16   Weight is 0 lbs 0 oz  There is no height or weight on file to calculate BMI.    Weight over time:  There were no vitals filed for this visit.    Orthostatic Vitals     None            Cardiometabolic risk assessment. 04/15/21      Reviewed patient profile for cardiometabolic risk factors    Date taken /Value  REFERENCE RANGE   Abdominal Obesity  (Waist Circumference)   See nursing flowsheet Women ?35 in (88 cm)   Men ?40 in (102 cm)      Triglycerides  Triglycerides   Date Value Ref Range Status   10/19/2019 117 <150 mg/dL Final       ?150 mg/dL (1.7 mmol/L) or current treatment for elevated triglycerides   HDL cholesterol  HDL Cholesterol   Date Value Ref Range Status   10/19/2019 56 >49 mg/dL Final   ]   Women <50 mg/dL (1.3 mmol/L) in women or current treatment for low HDL cholesterol  Men <40 mg/dL (1 mmol/L) in men or current treatment for low HDL cholesterol     Fasting plasma glucose (FPG) Lab Results   Component Value Date     10/19/2019      FPG ?100 mg/dL (5.6 mmol/L) or treatment for elevated blood glucose   Blood pressure  BP Readings from Last 3 Encounters:   06/04/19 131/71   04/26/19 164/86        Blood pressure ?130/85 mmHg or treatment for elevated blood pressure   Family History  See family history     Appearance: awake, " alert, unkempt and disheveled   Attitude:  guarded and uncooperative  Eye Contact:  intense  Mood:  angry, agitated  Affect:  mood congruent, intensity is heightened and reactive  Speech:  mumbling, raised volume, rapid, accent present  Language: fluent and intact in English  Psychomotor, Gait, Musculoskeletal:  no evidence of tardive dyskinesia, dystonia, or tics and physical agitation  Throught Process:  disorganized, illogical and tangental  Associations:  loosening of associations present  Thought Content:  no evidence of suicidal ideation or homicidal ideation, patient appears to be responding to internal stimuli and exhibiting significant paranoia and delusional thought content  Insight:  limited  Judgement:  limited  Oriented to:  AFSHAN  Attention Span and Concentration:  limited  Recent and Remote Memory:  limited  Fund of Knowledge:  AFSHAN    Clinical Global Impressions  First:7   Most recent:6              Precautions:     Behavioral Orders   Procedures     Assault precautions     Code 1 - Restrict to Unit     Routine Programming     As clinically indicated     Status 15     Every 15 minutes.     Status Individual Observation     2:1, at least one female staff. Patient SIO status reviewed with team/RN.  Please also refer to RN/team documentation for add'l detail.    -SIO staff to monitor following which have contributed to patient being on SIO:  Aggression toward others  -Possible interventions SIO staff could use to support patient's treatment progress:  Nonpharmacologic and pharmacologic interventions  -When following observed, team will review discontinuation of SIO:  Absence of aggression/severe agitation for > 24 hours     Order Specific Question:   CONTINUOUS 24 hours / day     Answer:   5 feet     Order Specific Question:   Indications for SIO     Answer:   Assault risk          Diagnoses:     Schizoaffective Disorder, Bipolar Type, decompensated  HTN  Dyslipidemia  Hx of CVA in 2017         Assessment  & Plan:     Assessment and hospital summary:  This patient is a 60 year old -American female with history of Schizoaffective Disorder, bipolar type, previous commitments who presented to ED with tad, psychosis, and agitation in context of medication non-adherence and recent expiration of MI commitment. Symptoms and presentation at this time is most consistent with Schizoaffective Disorder, Bipolar Type. We have obtained most recent medication regimen from patient's ACT team, and regimen was restarted. Inpatient psychiatric hospitalization is warranted at this time for safety, stabilization, and possible adjustment in medications. Pt is on 72 hr hold. Petitioned for MI commitment with Hatch through [employer] on 4/14 due to dangerousness, inability to care for self, active symptoms of psychosis and tad and refusal to accept proposed treatment.    On admission, PTA medications were restarted. However, patient has been declining all scheduled medications.     Target psychiatric symptoms and interventions:  - Thorazine 100 mg at bedtime  - Zyprexa 7.5 mg BID  - Invega 9 mg daily  - Artane 2 mg BID     Resume hydroxyzine 25-50 mg q4h prn for acute anxiety  Resume Trazodone 50 mg at bedtime prn for sleep disturbances  Resume Zyprexa 10 mg TID prn for severe agitation  Resume Haldol/Ativan/Benadryl q4h prn for agitation     Acute Medical Problems and Treatments:  HTN:  - Metoprolol succinate ER 50 mg daily  - Cozaar 100 mg daily     CVA:  - Aspirin 81 mg daily     Constipation:  - Miralax 17 mg daily     Routine labs have been ordered for the a.m., including CMP, CBC with differential, TSH, lipid profile.  Routine urine drug screen also ordered. Patient currently declining blood draw.     Behavioral/Psychological/Social:  - Encourage unit programming    Safety:  - Continue precautions as noted above  - Status 15 minute checks  - Safety precautions include: assault precautions  - Continue precautions as noted  above  - Status 15 minute checks  - Because of behavior leading to a seclusion or restraint episode on 4/14/21, we have made the following change to the treatment plan: placed patient on 2:1, added prn emergency medications, and restarted PTA medication regimen. Filed for commitment and Hatch.      SIO: 2:1, at least one female staff. Patient SIO status reviewed with team/RN.  Please also refer to RN/team documentation for add'l detail.     -SIO staff to monitor following which have contributed to patient being on SIO:  Aggression toward others  -Possible interventions SIO staff could use to support patient's treatment progress:  Nonpharmacologic and pharmacologic interventions  -When following observed, team will review discontinuation of SIO:  Absence of aggression/severe agitation for > 24 hours    Legal Status: 72 hour hold. Petitioning for MI commitment with Hatch (Haldol, Zyprexa, Invega, and Thorazine) through [facility]    Disposition Plan   Reason for ongoing admission: poses an imminent risk to self, poses an imminent risk to others and is unable to care for self due to severe psychosis or tad  Discharge location: TBD. Patient has ACT team.  Discharge Medications: not ordered  Follow-up Appointments: not scheduled    Entered by: Ida Zaman on 4/16/2021 at 10:11 AM

## 2021-04-15 NOTE — PLAN OF CARE
Current Diagnosis/Procedure:  Schizoaffective Disorder, Bipolar Type     Work Completed:    Writer received VM from Tania Gonzalez ([phone]) w/ [facility] pre-petition screening who indicated they would call patient around 1 PM today. Writer shared this information with patient who eventually agreed to speak with them. Writer completed team note, psychosocial assessment and PPC. Writer spoke with Kourtney Rizo, patient's CM/Nurse with [facility] team. She states that they are supportive of the commitment, and indicated that they were disappointed when the last commitment was dropped. Kourtney reports the best way to get a hold of the team is through calling the  ([phone]) and having them reach out to her at home.      Discharge plan or goal:  No discharge plans at present time.       Barriers to discharge:  Continued need for stabilization of severity of illness and medication management. [facility] is reviewing the petition for commitment at present time.

## 2021-04-16 PROCEDURE — 124N000002 HC R&B MH UMMC

## 2021-04-16 PROCEDURE — 99233 SBSQ HOSP IP/OBS HIGH 50: CPT | Performed by: PSYCHIATRY & NEUROLOGY

## 2021-04-16 ASSESSMENT — ACTIVITIES OF DAILY LIVING (ADL)
DRESS: SCRUBS (BEHAVIORAL HEALTH)
HYGIENE/GROOMING: INDEPENDENT;PROMPTS
DRESS: SCRUBS (BEHAVIORAL HEALTH)
ORAL_HYGIENE: INDEPENDENT;PROMPTS
HYGIENE/GROOMING: INDEPENDENT;PROMPTS
ORAL_HYGIENE: INDEPENDENT;PROMPTS

## 2021-04-16 NOTE — PLAN OF CARE
Problem: Sleep Disturbance (Psychotic Signs/Symptoms)  Goal: Improved Sleep (Psychotic Signs/Symptoms)  Outcome: No Change       Pt was up all night, in her room watching TV. Pt declines Insomnia medications stating that she is okay. No PRN was administered tonight.   Pt slept for 0 hours

## 2021-04-16 NOTE — PROGRESS NOTES
Jadyn from [facility] court called placing pt on a court hold starting today 4/16/21 at 1550.

## 2021-04-16 NOTE — PLAN OF CARE
Problem: Cognitive Impairment (Psychotic Signs/Symptoms)  Goal: Optimal Cognitive Function (Psychotic Signs/Symptoms)  Outcome: No Change   Pt is isolative to her room, sleeping on and off. She was seen watching TV when not sleeping. Upon observation and assessment, she appears responding to internal stimuli as she was observed talking to herself. She seems very guarded and suspicious of staff. She became very agitated when this writer tried to engage her in conservation and offers her HS scheduled medication. She screams at this writer,  get out of my room. I am not taking any medication. You and that female doctor are trying my authority. I am not talking with you and that doctor.  only Lucille I will talk with . Pt extremely agitated and refused HS medication. Conversation/assessment was ended due to her extreme agitation. Patient is currently on a 72-hour-hold that expires 4/16/21 @ 8484. Petition for MI Commitment w/ [employer] was initiated. Pt on SIO 2:1 due to physical aggression. Appetite is good; she is eating and drinking well.

## 2021-04-16 NOTE — PLAN OF CARE
Work Completed: attended team and reviewed chart notes.    PPS screener Tania Gonzalez called to say that the petition was supported and that paperwork will be faxed to the unit this afternoon.    Discharge plan or goal: pt will need stabilization. She will most likely return home with the ACT team in place.                Barriers to discharge: pt needs medications for stabilization. Pt not accepting medications at this time.

## 2021-04-16 NOTE — PROGRESS NOTES
"[facility], [place]   Psychiatric Progress Note  Hospital Day: 2        Interim History:   The patient's care was discussed with the treatment team during the daily team meeting and/or staff's chart notes were reviewed.  Staff report patient appears to be responding to internal stimuli. Appears very guarded and suspicious. Patient did not report any acute medical concerns. Patient becomes very agitated when offered medications. No overt aggression toward others. Patient did not require seclusion or restraints on 4/14 during day shift. Patient is exhibiting signs/sx of psychosis or tad. Patient is not medication adherent. She declined scheduled medications. She also declined scheduled blood draw. Patient is not attending groups. Patient is not sleeping well. Slept for 0 hours overnight. Did not accept offered prn medications. Patient is not eating adequately, eating small amounts from meal tray, though is eating and drinking on daily basis. Patient is not attending to ADLs.    Upon interview, the patient was very agitated. She repeatedly said \"You're lying! Youre not a doctor! Youre lying! Get out of here now!\" She would not allow writer to ask additional questions. She appeared very paranoid and suspicious of writer. Interview was then terminated at that time. Patient had no further questions or concerns.           Medications:       chlorproMAZINE  100 mg Oral At Bedtime     losartan  100 mg Oral Daily     metoprolol succinate ER  50 mg Oral Daily     OLANZapine  7.5 mg Oral BID     paliperidone ER  9 mg Oral Daily     polyethylene glycol  17 g Oral Daily     trihexyphenidyl  2 mg Oral BID          Allergies:     Allergies   Allergen Reactions     Lisinopril Cough          Labs:   No results found for this or any previous visit (from the past 24 hour(s)).       Psychiatric Examination:     Resp 16   Weight is 0 lbs 0 oz  There is no height or weight on file to calculate BMI.    Weight " over time:  There were no vitals filed for this visit.    Orthostatic Vitals     None            Cardiometabolic risk assessment. 04/15/21      Reviewed patient profile for cardiometabolic risk factors    Date taken /Value  REFERENCE RANGE   Abdominal Obesity  (Waist Circumference)   See nursing flowsheet Women ?35 in (88 cm)   Men ?40 in (102 cm)      Triglycerides  Triglycerides   Date Value Ref Range Status   10/19/2019 117 <150 mg/dL Final       ?150 mg/dL (1.7 mmol/L) or current treatment for elevated triglycerides   HDL cholesterol  HDL Cholesterol   Date Value Ref Range Status   10/19/2019 56 >49 mg/dL Final   ]   Women <50 mg/dL (1.3 mmol/L) in women or current treatment for low HDL cholesterol  Men <40 mg/dL (1 mmol/L) in men or current treatment for low HDL cholesterol     Fasting plasma glucose (FPG) Lab Results   Component Value Date     10/19/2019      FPG ?100 mg/dL (5.6 mmol/L) or treatment for elevated blood glucose   Blood pressure  BP Readings from Last 3 Encounters:   06/04/19 131/71   04/26/19 164/86    Blood pressure ?130/85 mmHg or treatment for elevated blood pressure   Family History  See family history     Appearance: awake, alert, unkempt and disheveled   Attitude:  guarded and uncooperative  Eye Contact:  intense  Mood:  angry, agitated  Affect:  mood congruent, intensity is heightened and reactive  Speech:  mumbling, raised volume, rapid, accent present  Language: fluent and intact in English  Psychomotor, Gait, Musculoskeletal:  no evidence of tardive dyskinesia, dystonia, or tics and physical agitation  Throught Process:  disorganized, illogical and tangental  Associations:  loosening of associations present  Thought Content:  no evidence of suicidal ideation or homicidal ideation, patient appears to be responding to internal stimuli and exhibiting significant paranoia and delusional thought content  Insight:  limited  Judgement:  limited  Oriented to:  AFSHAN  Attention Span and  Concentration:  limited  Recent and Remote Memory:  limited  Fund of Knowledge:  AFSHAN    Clinical Global Impressions  First:7   Most recent:6              Precautions:     Behavioral Orders   Procedures     Assault precautions     Code 1 - Restrict to Unit     Routine Programming     As clinically indicated     Status 15     Every 15 minutes.          Diagnoses:     Schizoaffective Disorder, Bipolar Type, decompensated  HTN  Dyslipidemia  Hx of CVA in 2017         Assessment & Plan:     Assessment and hospital summary:  This patient is a 60 year old -American female with history of Schizoaffective Disorder, bipolar type, previous commitments who presented to ED with tad, psychosis, and agitation in context of medication non-adherence and recent expiration of MI commitment. Symptoms and presentation at this time is most consistent with Schizoaffective Disorder, Bipolar Type. We have obtained most recent medication regimen from patient's ACT team, and regimen was restarted. Inpatient psychiatric hospitalization is warranted at this time for safety, stabilization, and possible adjustment in medications. Pt is on 72 hr hold. Petitioned for MI commitment with [employer] through [facility] on 4/14 due to dangerousness, inability to care for self, active symptoms of psychosis and tad and refusal to accept proposed treatment.    On admission, PTA medications were restarted. However, patient has been declining all scheduled medications. She remains very symptomatic, but she is currently not meeting criteria for a psychiatric emergency.     Target psychiatric symptoms and interventions:  - Thorazine 100 mg at bedtime  - Zyprexa 7.5 mg BID  - Invega 9 mg daily  - Artane 2 mg BID     Resume hydroxyzine 25-50 mg q4h prn for acute anxiety  Resume Trazodone 50 mg at bedtime prn for sleep disturbances  Resume Zyprexa 10 mg TID prn for severe agitation  Resume Haldol/Ativan/Benadryl q4h prn for agitation     Acute Medical  Problems and Treatments:  HTN:  - Metoprolol succinate ER 50 mg daily  - Cozaar 100 mg daily     CVA:  - Aspirin 81 mg daily     Constipation:  - Miralax 17 mg daily     Routine labs have been ordered for the a.m., including CMP, CBC with differential, TSH, lipid profile.  Routine urine drug screen also ordered. Patient currently declining blood draw.     Behavioral/Psychological/Social:  - Encourage unit programming    Safety:  - Continue precautions as noted above  - Status 15 minute checks  - Safety precautions include: assault precautions  - Continue precautions as noted above  - Status 15 minute checks  - Because of behavior leading to a seclusion or restraint episode on 4/14/21, we have made the following change to the treatment plan: placed patient on 2:1, added prn emergency medications, and restarted PTA medication regimen. Filed for commitment and Hatch.      SIO: Will discontinue 2:1 SIO as patient has not acted out violently or aggressively since her admission. She has remained mostly isolative to her room.     Legal Status: 72 hour hold. Petitioning for MI commitment with [facility] (Haldol, Zyprexa, Invega, and Thorazine) through [facility]    Disposition Plan   Reason for ongoing admission: poses an imminent risk to self, poses an imminent risk to others and is unable to care for self due to severe psychosis or tad  Discharge location: D. Patient has ACT team.  Discharge Medications: not ordered  Follow-up Appointments: not scheduled    Entered by: Ida Zaman on 4/16/2021 at 1:15 PM

## 2021-04-17 PROCEDURE — 124N000002 HC R&B MH UMMC

## 2021-04-17 ASSESSMENT — ACTIVITIES OF DAILY LIVING (ADL)
ORAL_HYGIENE: INDEPENDENT
DRESS: SCRUBS (BEHAVIORAL HEALTH)
HYGIENE/GROOMING: INDEPENDENT
ORAL_HYGIENE: INDEPENDENT
HYGIENE/GROOMING: INDEPENDENT
DRESS: SCRUBS (BEHAVIORAL HEALTH)

## 2021-04-17 NOTE — PLAN OF CARE
Problem: Sleep Disturbance (Psychotic Signs/Symptoms)  Goal: Improved Sleep (Psychotic Signs/Symptoms)  Outcome: No Change     Pt continues to have difficult falling a sleep, and declining PRN insomnia medication. Pt stayed up most of the night, in her room and at some point she took  a pillow and slept on the floor for an hour.   Pt slept for 1 hour tonight

## 2021-04-17 NOTE — PLAN OF CARE
"Patient presents as quiet, guarded, and socially withdrawn.  She slept poorly last night with only one hour of sleep noted during the NOC shift of 4/17/21.  She denies all symptoms of mental illness, but she is psychotic appearing.  She continues to refuse all of offers of medication- scheduled and PRN- stating, \"I don't need it!\".  Her affect and eye contact are notable for increased intensity.  She did not demonstrate any signs of agitation or aggressive behavior this shift.  Her food and fluid intake was minimal, as she mostly refused her meal trays.  She refused to cooperate with vital signs assessment.    "

## 2021-04-17 NOTE — PLAN OF CARE
Pt was isolated and withdrawn to her room for the entire day. For part of the evening she was just standing in her room with her head down.   She refused lab, any mediations, or a shower. Patient is quite malodorous and has extremely poor ADLs.  She did eat her meals and has a good appetite.  She answered all of her assessment question by shaking her head no this evening.  She denied any SI/SIB/HI or any psychotic symptoms, but does appear to be responding to internal stimuli.

## 2021-04-18 PROCEDURE — 250N000013 HC RX MED GY IP 250 OP 250 PS 637: Performed by: PSYCHIATRY & NEUROLOGY

## 2021-04-18 PROCEDURE — 124N000002 HC R&B MH UMMC

## 2021-04-18 RX ADMIN — OLANZAPINE 7.5 MG: 2.5 TABLET ORAL at 22:58

## 2021-04-18 ASSESSMENT — ACTIVITIES OF DAILY LIVING (ADL)
ORAL_HYGIENE: INDEPENDENT
HYGIENE/GROOMING: INDEPENDENT
DRESS: SCRUBS (BEHAVIORAL HEALTH)
ORAL_HYGIENE: INDEPENDENT
DRESS: SCRUBS (BEHAVIORAL HEALTH)
HYGIENE/GROOMING: INDEPENDENT

## 2021-04-18 NOTE — PLAN OF CARE
"Patient presents as guarded, paranoid and disorganized with no appreciable insight into her manic/ psychotic symptoms.  She slept very poorly during the NOC shift of 4/18/21 with 0 hours of sleep noted.  Michelle has been mostly isolative to her room this shift where she can be seen holding conversations with unseen person.  She appears to be attending to internal stimuli with .  When visible in the milieu, she has been verbally aggressive and demanding discharge.  For an extended period of time this AM, Michelle could be seen standing next to the unit dividing doors and repeatedly stating, \"Open it!  Open it!  Open it now!\".  She has not been redirectable.  When staff attempt to review the plan of care with Michelle- or even try to offer her reassurance, emotional support, and validation- she becomes further agitated.  She entered the personal space of RN writer with a clenched fist raised and repeatedly called RN writer derogatory names, accusing this writer of mal-intent: \"Liar!  Liar!  Nito of all Lies!\".  Michelle is angry, tense, and hostile on approach.  She refused her vital signs assessment and labs this AM.  She continues to refuse all offers of scheduled and PRN medications.  She has been unwilling to accept any food or fluids this entire shift.            "

## 2021-04-18 NOTE — PLAN OF CARE
"Pt was isolative and withdrawn to her room for the entire evening.  She continues to refuse vital signs, medications, or to answer many assessment questions.  When RN writer attempted to check in with patient and offer her medications, she became quite tense.  Patient refused medications and quickly got up and started yelling at writer.  Patient said, \"I want to speak to Lucille!\"  Patient informed her  would return on Monday.  Patient then started yelling, \"It's Monday! It's Monday!\"  Patient followed RN out of her room continuing to yell \"It's Monday.\"  Patient was eventually deescalated by staff and returned to her room.  Patient continues to deny any mental health symptoms, but appears to be actively responding to internal stimuli.    "

## 2021-04-18 NOTE — PLAN OF CARE
"  Problem: Sleep Disturbance (Psychotic Signs/Symptoms)  Goal: Improved Sleep (Psychotic Signs/Symptoms)  Outcome: No Change      Pt was up all night, declined PRN intervention stating that \"I'm okay.\" Pt was in and out of her room, dancing while chanting delusional statements like \"My Lord, My Lord, I killed my .\" continuously, but was not being violent or aggressive towards staff. Pt was easily redirectable to her room, but as soon as she got there she came out. No PRN administered this shift.   Pt did not sleep at all. (slept 0 hours)  "

## 2021-04-19 PROCEDURE — 99233 SBSQ HOSP IP/OBS HIGH 50: CPT | Performed by: PSYCHIATRY & NEUROLOGY

## 2021-04-19 PROCEDURE — 124N000002 HC R&B MH UMMC

## 2021-04-19 PROCEDURE — 250N000011 HC RX IP 250 OP 636: Performed by: PSYCHIATRY & NEUROLOGY

## 2021-04-19 RX ADMIN — LORAZEPAM 1 MG: 2 INJECTION INTRAMUSCULAR; INTRAVENOUS at 20:56

## 2021-04-19 RX ADMIN — HALOPERIDOL LACTATE 5 MG: 5 INJECTION, SOLUTION INTRAMUSCULAR at 20:56

## 2021-04-19 RX ADMIN — DIPHENHYDRAMINE HYDROCHLORIDE 25 MG: 50 INJECTION, SOLUTION INTRAMUSCULAR; INTRAVENOUS at 20:56

## 2021-04-19 ASSESSMENT — ACTIVITIES OF DAILY LIVING (ADL)
HYGIENE/GROOMING: INDEPENDENT
HYGIENE/GROOMING: INDEPENDENT
DRESS: SCRUBS (BEHAVIORAL HEALTH)
DRESS: SCRUBS (BEHAVIORAL HEALTH)
LAUNDRY: UNABLE TO COMPLETE
ORAL_HYGIENE: INDEPENDENT
ORAL_HYGIENE: INDEPENDENT

## 2021-04-19 NOTE — PLAN OF CARE
"  Problem: Sleep Disturbance (Psychotic Signs/Symptoms)  Goal: Improved Sleep (Psychotic Signs/Symptoms)  Outcome: No Change       Pt was awake most of the night, coming to the launch and making demands like: \"I want to make an international phone call, I want my diary and cell phone,\" in a loud voice. Pt was redirected to her room but kept coming outside. No physical aggression noted. Pt refused dinner, but reported being hungry at around 0430H, and was provided with snacks. Pt requested for medications, RN gave Pt Zyprexa PRN, Pt refused the medication stating that she wants her morning meds not Zyprexa before going back to her room.  Pt slept for 1.5 hours.  "

## 2021-04-19 NOTE — PROGRESS NOTES
"[facility], [place]   Psychiatric Progress Note  Hospital Day: 5        Interim History:   The patient's care was discussed with the treatment team during the daily team meeting and/or staff's chart notes were reviewed.  Staff report patient appears to be responding to internal stimuli. Appears very guarded and suspicious. Patient did not report any acute medical concerns. Patient becomes very agitated when offered medications. No overt aggression toward others. Patient did not require seclusion or restraints over the weekend. Patient is exhibiting signs/sx of psychosis or tad. Patient is not medication adherent. She declined scheduled medications. She also declined scheduled blood draw. Patient is not attending groups. Patient is not sleeping well. Did not accept offered prn medications. Patient is eating small amounts of food throughout the day. Taking in fluids. Patient is not attending to ADLs. She is declining labs and vitals. Able to make her needs known.     Upon interview, the patient was staring at this writer intensely through the window of her room. Nursing staff and PA entered her room and patient was initially barricading herself. She then said repeatedly \"I went poopoo. I couldn't hold it.\" Staff discovered that she had defecated in her room. She requested another room switch back to a room with a bathroom. She was quite agitated when I encouraged her to accept scheduled medications. She replied \"Food is my medicine! No medicine! No medicine!\" She denied SI/HI, AH, VH. She denied physical discomfort. She was not fully oriented to time/date/place. She was able to tell me that she is at [facility] in [place] and that it is April, 2021. She did not know city, specific date, or time. She became increasingly agitated with questions. Patient had no further questions or concerns.  Of note, patient appeared restless with evidence of hyperkinesis (excessive, purposeless movements of both " arms) and restlessness. She remains quite agitated throughout the meeting.          Medications:       chlorproMAZINE  100 mg Oral At Bedtime     losartan  100 mg Oral Daily     metoprolol succinate ER  50 mg Oral Daily     OLANZapine  7.5 mg Oral BID     paliperidone ER  9 mg Oral Daily     polyethylene glycol  17 g Oral Daily     trihexyphenidyl  2 mg Oral BID          Allergies:     Allergies   Allergen Reactions     Lisinopril Cough          Labs:   No results found for this or any previous visit (from the past 24 hour(s)).       Psychiatric Examination:     Resp 16   Weight is 0 lbs 0 oz  There is no height or weight on file to calculate BMI.    Weight over time:  There were no vitals filed for this visit.    Orthostatic Vitals     None            Cardiometabolic risk assessment. 04/15/21      Reviewed patient profile for cardiometabolic risk factors    Date taken /Value  REFERENCE RANGE   Abdominal Obesity  (Waist Circumference)   See nursing flowsheet Women ?35 in (88 cm)   Men ?40 in (102 cm)      Triglycerides  Triglycerides   Date Value Ref Range Status   10/19/2019 117 <150 mg/dL Final       ?150 mg/dL (1.7 mmol/L) or current treatment for elevated triglycerides   HDL cholesterol  HDL Cholesterol   Date Value Ref Range Status   10/19/2019 56 >49 mg/dL Final   ]   Women <50 mg/dL (1.3 mmol/L) in women or current treatment for low HDL cholesterol  Men <40 mg/dL (1 mmol/L) in men or current treatment for low HDL cholesterol     Fasting plasma glucose (FPG) Lab Results   Component Value Date     10/19/2019      FPG ?100 mg/dL (5.6 mmol/L) or treatment for elevated blood glucose   Blood pressure  BP Readings from Last 3 Encounters:   06/04/19 131/71   04/26/19 164/86    Blood pressure ?130/85 mmHg or treatment for elevated blood pressure   Family History  See family history     Appearance: awake, alert, unkempt and disheveled   Attitude:  guarded and uncooperative  Eye Contact:  intense  Mood:  angry,  agitated  Affect:  mood congruent, intensity is heightened and reactive  Speech:  mumbling, raised volume, rapid, accent present  Language: fluent and intact in English  Psychomotor, Gait, Musculoskeletal: Stereotypic, purposeless movements in upper extremities throughout entire interview. no evidence of tardive dyskinesia, dystonia, or tics and physical agitation  Throught Process:  disorganized, illogical and tangental  Associations:  loosening of associations present  Thought Content:  no evidence of suicidal ideation or homicidal ideation, patient appears to be responding to internal stimuli and exhibiting significant paranoia and delusional thought content  Insight:  limited  Judgement:  limited  Oriented to:  See above  Attention Span and Concentration:  limited  Recent and Remote Memory:  limited  Fund of Knowledge:  AFSHAN    Clinical Global Impressions  First:7   Most recent:6              Precautions:     Behavioral Orders   Procedures     Assault precautions     Code 1 - Restrict to Unit     Routine Programming     As clinically indicated     Status 15     Every 15 minutes.          Diagnoses:     Schizoaffective Disorder, Bipolar Type, decompensated  Excited Catatonia  HTN  Dyslipidemia  Hx of CVA in 2017         Assessment & Plan:     Assessment and hospital summary:  This patient is a 60 year old -American female with history of Schizoaffective Disorder, bipolar type, previous commitments who presented to ED with tad, psychosis, and agitation in context of medication non-adherence and recent expiration of MI commitment. Symptoms and presentation at this time is most consistent with Schizoaffective Disorder, Bipolar Type. We have obtained most recent medication regimen from patient's ACT team, and regimen was restarted. Inpatient psychiatric hospitalization is warranted at this time for safety, stabilization, and possible adjustment in medications. Pt is on 72 hr hold. Petitioned for MI commitment  with [facility] through [facility] on 4/14 due to dangerousness, inability to care for self, active symptoms of psychosis and tad and refusal to accept proposed treatment.    On admission, PTA medications were restarted. However, patient has been declining all scheduled medications despite significant encouragement from staff and this provider. She remains very symptomatic, but she is currently not meeting criteria for a psychiatric emergency.  Patient is also exhibiting signs of excited catatonia, including excessive and purposeless motor activity in both the upper and lower limbs (hyperkinesis), restlessness, stereotypy, impulsivity, and frenzy. She is currently declining vital signs and labs, though is eating and drinking daily.     Target psychiatric symptoms and interventions:  - Thorazine 100 mg at bedtime  - Zyprexa 7.5 mg BID  - Invega 9 mg daily  - Artane 2 mg BID     Resume hydroxyzine 25-50 mg q4h prn for acute anxiety  Resume Trazodone 50 mg at bedtime prn for sleep disturbances  Resume Zyprexa 10 mg TID prn for severe agitation  Resume Haldol/Ativan/Benadryl q4h prn for agitation     Acute Medical Problems and Treatments:  HTN:  - Metoprolol succinate ER 50 mg daily  - Cozaar 100 mg daily     CVA:  - Aspirin 81 mg daily     Constipation:  - Miralax 17 mg daily     Routine labs have been ordered for the a.m., including CMP, CBC with differential, TSH, lipid profile.  Routine urine drug screen also ordered. Patient currently declining blood draw.     Behavioral/Psychological/Social:  - Encourage unit programming    Safety:  - Continue precautions as noted above  - Status 15 minute checks  - Safety precautions include: assault precautions  - Continue precautions as noted above  - Status 15 minute checks  - Because of behavior leading to a seclusion or restraint episode on 4/14/21, we have made the following change to the treatment plan: placed patient on 2:1, added prn emergency medications, and restarted  PTA medication regimen. Filed for commitment and [facility].     Legal Status: 72 hour hold. Petitioning for MI commitment with [facility] (Haldol, Zyprexa, Invega, and Thorazine) through [facility]    Disposition Plan   Reason for ongoing admission: poses an imminent risk to self, poses an imminent risk to others and is unable to care for self due to severe psychosis or tad  Discharge location: TBD. Patient has ACT team.  Discharge Medications: not ordered  Follow-up Appointments: not scheduled    Entered by: Ida Zaman on 4/19/2021 at 8:40 AM

## 2021-04-19 NOTE — PLAN OF CARE
Patient presents as grossly disorganized, irritable, and perseverative with no appreciable insight into her illness.  She has been extremely demanding and disruptive in the milieu for most of this shift.  Her level of agitation and belligerent behavior has been extremely triggering to her peers on the unit.  Michelle has not been receptive to staff redirection.   We moved her to pod #1 this AM, as a male peer on pod #1 verbalized significant frustration with her excessive noise and disruptive presence on the unit.  Unfortunately, we were unable to place her in a room with a bathroom.  At some point, Michelle urinated and defecated on the floor of her room.  We were eventually able to make some room changes and get Michelle into a room with a bathroom.  A deputy from [facility] served court hold paperwork this afternoon and Michelle became extremely agitated and demanded that he leave her alone.  She would not accept the patient copy of her court paperwork.  This will be made available to her when she is receptive.  She has been verbally aggressive and making menacing gestures at various times this shift, but she has not engaged in any physically aggressive behavior.  She continues to refuse all offers of medication- scheduled and PRN.  She likewise has been refusing to cooperate with labs and vital signs assessment.  Her food and fluid intake was sufficient today, but this has been a significant concern in recent days.  She denies any acute physical concerns at this time.

## 2021-04-19 NOTE — PROGRESS NOTES
Pt approached staff at 2250 and asked for medication. Writer offered her scheduled medication. She said she would take Zyprexa after staff give her an orange. Staff gave her an orange as requested, and she returned to the [facility] a few minutes later and asked for Zyprexa. She accepted scheduled Zyprexa 7.5 mg PO, but declined all other medications.

## 2021-04-19 NOTE — PLAN OF CARE
"Pt continues to refuse vital signs, medications, assessments, and now is refusing food.  RN writer attempted to bring patient her dinner tray and she yelled at writer, \"I am not hungry!\"  Writer then said, why don't I leave it incase you change your mind.  Patient then quickly got up, posturing toward writer with clinched fists and yelling \"get out of my room!\"  Patient did not eat or drink anything on evening shift.  She was not willing to take a phone call from her son.  Every attempt writer made to interact with patient resulted in patient yelling and posturing towards writer.  She remained mostly isolated and withdrawn to her room, but came out a few times to yell at staff.  She did not make any suicidal statements.  She continues to appear to be responding to internal stimuli.   "

## 2021-04-19 NOTE — PROGRESS NOTES
Pt approached staff and stated that she wanted medicine. Pt then refused her Zyprexa stating that she wanted her AM medication Invega. Wtr explained that it was too early to administer the Invega and pt stated that she would wait.

## 2021-04-19 NOTE — PLAN OF CARE
Current Diagnosis/Procedure:  Schizoaffective Disorder, Bipolar Type     Work Completed:    Patient was served Petition for Commitment paperwork today by [facility]. Patient refused to accept the paperwork and afterwards pushed up against the door whenever writer approached her as a means of not letting anyone in her room. Patient has her preliminary hearing 4/21 @ 1 PM and final hearing on 4/26 TBD. Patient continues to refuse medications and vitals, is observed to be in almost constant movement, declines to engage in ADL's, and is nonsensical in verbal communication with staff.       Discharge plan or goal:  No discharge plans at present time.       Barriers to discharge:  Continued need for stabilization of severity of illness and medication management. Patient is currently on a court hold through [facility].

## 2021-04-19 NOTE — PLAN OF CARE
Writer attempted to have multiple conversations with patient, but it was difficult due to the tangential and disorganized speech patterns that she presented with. Patient repeated the same word and phrases repeatedly including saying writer's name over and over again, calling writer a liar and becoming increasingly angry when writer told them it was Monday. Writer provided patient with the name and the contact number for Cristian Brand, her court appointed  for petition for commitment.

## 2021-04-19 NOTE — PROGRESS NOTES
"Pt perseverating on being moved to a room with a toilet. Repeating, \"I need toilet, I go poo poo.\" Refusing to use the communal bathroom in the hallway. Standing in the hallway, with repetitive arm motions, repeating the above line. Pt then moved into room 138. Pt repeating, \"Where is my toothbrush? Where is my toothbrush?\" Toothbrush provided. \"Where is my toothpaste? Where is my toothpaste?\" Toothpaste provided. \"I need apple juice. I need apple juice.\" Apple juice provided. Pt is very malodorous. Refusing to shower or change scrubs after having a bowel movement on the floor of her previous room. Pt allowing writer to go in and out of her room without incident. Repetitive arm movements have not stopped since late morning. When writer asked pt why she is moving her arms, she said, \"It's just exercise.\" When asked if she can stop and put her arms at her side, she said no.   "

## 2021-04-20 PROCEDURE — 99233 SBSQ HOSP IP/OBS HIGH 50: CPT | Performed by: PSYCHIATRY & NEUROLOGY

## 2021-04-20 PROCEDURE — 124N000002 HC R&B MH UMMC

## 2021-04-20 PROCEDURE — 250N000011 HC RX IP 250 OP 636: Performed by: PSYCHIATRY & NEUROLOGY

## 2021-04-20 RX ORDER — LORAZEPAM 2 MG/1
2 TABLET ORAL EVERY 4 HOURS PRN
Status: DISCONTINUED | OUTPATIENT
Start: 2021-04-20 | End: 2021-09-16 | Stop reason: HOSPADM

## 2021-04-20 RX ORDER — HALOPERIDOL 5 MG/ML
5 INJECTION INTRAMUSCULAR 2 TIMES DAILY
Status: DISCONTINUED | OUTPATIENT
Start: 2021-04-20 | End: 2021-04-26

## 2021-04-20 RX ORDER — HALOPERIDOL 5 MG/1
5 TABLET ORAL 2 TIMES DAILY
Status: DISCONTINUED | OUTPATIENT
Start: 2021-04-20 | End: 2021-04-26

## 2021-04-20 RX ORDER — LORAZEPAM 2 MG/ML
2 INJECTION INTRAMUSCULAR EVERY 4 HOURS PRN
Status: DISCONTINUED | OUTPATIENT
Start: 2021-04-20 | End: 2021-09-16 | Stop reason: HOSPADM

## 2021-04-20 RX ADMIN — OLANZAPINE 10 MG: 10 INJECTION, POWDER, LYOPHILIZED, FOR SOLUTION INTRAMUSCULAR at 01:06

## 2021-04-20 RX ADMIN — DIPHENHYDRAMINE HYDROCHLORIDE 25 MG: 50 INJECTION, SOLUTION INTRAMUSCULAR; INTRAVENOUS at 07:18

## 2021-04-20 RX ADMIN — HALOPERIDOL LACTATE 5 MG: 5 INJECTION, SOLUTION INTRAMUSCULAR at 07:17

## 2021-04-20 RX ADMIN — HALOPERIDOL LACTATE 5 MG: 5 INJECTION, SOLUTION INTRAMUSCULAR at 21:56

## 2021-04-20 RX ADMIN — DIPHENHYDRAMINE HYDROCHLORIDE 25 MG: 50 INJECTION, SOLUTION INTRAMUSCULAR; INTRAVENOUS at 21:56

## 2021-04-20 RX ADMIN — LORAZEPAM 2 MG: 2 INJECTION INTRAMUSCULAR; INTRAVENOUS at 07:18

## 2021-04-20 ASSESSMENT — ACTIVITIES OF DAILY LIVING (ADL)
ORAL_HYGIENE: INDEPENDENT
HYGIENE/GROOMING: INDEPENDENT
DRESS: SCRUBS (BEHAVIORAL HEALTH)
ORAL_HYGIENE: INDEPENDENT;PROMPTS
LAUNDRY: UNABLE TO COMPLETE
HYGIENE/GROOMING: INDEPENDENT;PROMPTS
DRESS: SCRUBS (BEHAVIORAL HEALTH);INDEPENDENT;PROMPTS

## 2021-04-20 NOTE — PROVIDER NOTIFICATION
"   04/19/21 2135   Seclusion or Restraint Order   Length of Order 4   Order Obtained Yes   Attending Physician Notified Yes   Attending Physician's Name Dr. Chatman   Leadership   Seclus/Restraint >12H or 2x/24H Notified   Assessment   Less Restrictive Alternative Decreased stimulation;Verbal de-escalation;Medication administration;Reality orientation;Modified programming;Reassurance / Support   Risk Factors N   Justification   Clinical Justification All       Patient had seclusion discontinued and was given shower supplies per her request.  Patient then demanded to shower \"in my room, on the other side of the doors\".  Patient continued to demand to leave.  She continued to posture at staff.  She was swinging her arms wildly and yelling at staff.  Staff attempted to verbally de-escalate the patient.  She continued to physically posture and advance towards staff.  Writer and another staff walked patient to her room and safely exited.  Seclusion was initiated at 2135.  Patient stood at the door and repeated phrases (\"Obama, Obama, Obama\", etc.).    On-call provider (Dr. Chatman) was notified of the restraint and that there were no apparent injuries to the patient and none to staff.  "

## 2021-04-20 NOTE — PROVIDER NOTIFICATION
"   04/19/21 2145   Seclusion or Restraint Order   In Person Face to Face Assessment Conducted Yes-Eval of pt's immediate situation, reaction to intervention, complete review of systems assessment, behavioral assessment & review/assessment of hx, drugs & meds, recent labs, etc, behavioral condition, need to continue/terminate restraint/seclusion   Patient Experienced No adverse physical outcome from seclusion/restraint initiation   Continuation of Seclus/Restraint indicated at this time Yes   Pt continues to appear tense and agitated. She is stating \"open the door!\" when writer attempts to complete face to face assessment. Pt does not appear to have any injuries and does not appear to be in any physical distress. She is refusing to answer any assessment questions and will not contract for safe behaviors. Seclusion continuation warranted at this time for safety. All medications, Physician orders, and lab results reviewed. Dr Chatman notified of face to face results.  "

## 2021-04-20 NOTE — PLAN OF CARE
"  Problem: Sleep Disturbance (Psychotic Signs/Symptoms)  Goal: Improved Sleep (Psychotic Signs/Symptoms)  Outcome: Declining       Pt was awake at the beginning of the shift in her room. Pt was moved to pod 1 room 138 by the previous shift because she was agitating another peer on Pod 2. Tonight Pt was constantly exit seeking by trying to open all doors including Pt's doors stating that \"Open this door, I want to leave Now!\" At some point Pt tried to get into  the med room while RN was coming out. Pt is at risk of elopement and  intrusiveness. RN made the decision to temporarily move Pt to Pod 2 room 154 for safety. [facility], hence not able to place Pt there. Pt is verbally inappropriate calling staff, \"idiots, demons, devils.\" Pt is agitated, requests for a shower, but in condition that she changes to her home clothes. Pt was reminded of the Unit policy, that all Patients wear the provided scrubs, then she declined the shower. Pt is increasingly agitated and yelling on top of her voice, disturbing other Pts. Attempts to de-escalate Pt was unsuccessful, because the Pt became more agitated. RN offered Patient PRN medication for agitation Pt declined stating that \"Any medication they put in my mouth makes me poop.\" Writer explained to Pt that there are medications called stool softeners which makes one poop and Zyprexa is not one of them. Pt still said No, while banging her door. Writer told Pt that another option is to take the medication by injection, Pt stated, \"Only if it is not the infectious vaccine.\" Writer assured Pt that the medication is not a vaccine. For safety, the unit staff, held  Pt and RN administered Zyprexa 10 mg IM at 0106H. Pt slept for 1 hr after the PRN and was woke up at 0245H.  Pt slept for 1.5 hours   "

## 2021-04-20 NOTE — PROVIDER NOTIFICATION
"   04/19/21 2105   Seclusion or Restraint Order   In Person Face to Face Assessment Conducted Yes-Eval of pt's immediate situation, reaction to intervention, complete review of systems assessment, behavioral assessment & review/assessment of hx, drugs & meds, recent labs, etc, behavioral condition, need to continue/terminate restraint/seclusion   Patient Experienced No adverse physical outcome from seclusion/restraint initiation   Continuation of Seclus/Restraint indicated at this time Yes   Pt experienced no adverse physical outcomes as a result of the restraint or seclusion.  Patient is unable and/or unwilling to answer assessment questions at this time. She does not appear to be in any discomfort or pain.  Patient is currently standing at door. She continues to yell at staff and is repeating \"Jessica Aly. Let me out.  Mmmm. Jessica Aly.\"  Continuation of seclusion is appropriate at this time as patient continues to be agitated, disorganized, and is not redirectable.  Provider notified of face to face results.   "

## 2021-04-20 NOTE — PROVIDER NOTIFICATION
"   04/19/21 2123   Restraint Type   Seclusion () Discontinued   Debriefing   Debriefing DO   Does patient understand why the event happened? Yes   Does patient agree to safe behaviors? Yes   What can we do differently so this doesn't happen again? Patient unable to answer   Plan of care reviewed and modified Yes  (offered shower supplies)     Patient had been in seclusion for about 30 minutes. She was still chanting; however, when asked if she would be safe she said \"Yes I will be safe.  I will be nice\".  Patient stated she wanted to shower \"because I stink so bad\".  Assessed patient as no longer a threat to others.    Seclusion was discontinued.  Patient was brought shower supplies.  "

## 2021-04-20 NOTE — PROGRESS NOTES
"[facility], [place]   Psychiatric Progress Note  Hospital Day: 6        Interim History:   The patient's care was discussed with the treatment team during the daily team meeting and/or staff's chart notes were reviewed.  Staff report patient appears to be responding to internal stimuli. Appears very guarded and suspicious. Patient did not report any acute medical concerns. Patient becomes very agitated when offered medications. No overt aggression toward others. Patient did require seclusion overnight. She postured at staff repeatedly when they attempted to redirect her from unit doors. Patient is exhibiting signs/sx of psychosis or tad. Patient is not medication adherent. She declined scheduled medications. Patient is not attending groups. Patient is not sleeping well. Did not accept offered prn medications. Patient is eating small amounts of food throughout the day. Taking in some fluids. Patient is not attending to ADLs. She is declining labs and vitals. Attempted to elope on several occasions overnight.      Upon interview, the patient was standing in the lounge area, repeatedly making nonsensical statements. She said \"They drugged me! They drugged me!\" She denied physical pain. She said \"I am not sick!\" She denied feeling uncomfortable and denied being in any distress despite ongoing evidence of physical restlessness and agitation. She said that she is thinking clearly. She is expressing desire to take a shower and to discharge. She also said \"I can't breathe I cant breathe\" repeatedly. Breathing is unlabored. Of note, patient again appeared restless with evidence of hyperkinesis (excessive, purposeless movements of both arms) and restlessness. She remains quite agitated throughout the meeting. She was encouraged to accept medications per my strong recommendation. She continues to deny need for medications.     Met with patient again in the afternoon after receiving prn IM " "Haldol/Ativan/Benadryl. She appears confused and disoriented. She said repeatedly \"I want to go outside and see my friend in the kitchen.\" Reassurance and support provided, though patient becomes easily agitated while making nonsensical statements. Continues to loiter near exit doors. Denied physical pain. Less physical agitation noted.          Medications:       - Psychiatric Emergency -   Does not apply See Admin Instructions     haloperidol  5 mg Oral BID    Or     haloperidol lactate  5 mg Intramuscular BID     losartan  100 mg Oral Daily     metoprolol succinate ER  50 mg Oral Daily     polyethylene glycol  17 g Oral Daily     trihexyphenidyl  2 mg Oral BID          Allergies:     Allergies   Allergen Reactions     Lisinopril Cough          Labs:   No results found for this or any previous visit (from the past 24 hour(s)).       Psychiatric Examination:     Resp 16   Weight is 0 lbs 0 oz  There is no height or weight on file to calculate BMI.    Weight over time:  There were no vitals filed for this visit.    Orthostatic Vitals     None            Cardiometabolic risk assessment. 04/15/21      Reviewed patient profile for cardiometabolic risk factors    Date taken /Value  REFERENCE RANGE   Abdominal Obesity  (Waist Circumference)   See nursing flowsheet Women ?35 in (88 cm)   Men ?40 in (102 cm)      Triglycerides  Triglycerides   Date Value Ref Range Status   10/19/2019 117 <150 mg/dL Final       ?150 mg/dL (1.7 mmol/L) or current treatment for elevated triglycerides   HDL cholesterol  HDL Cholesterol   Date Value Ref Range Status   10/19/2019 56 >49 mg/dL Final   ]   Women <50 mg/dL (1.3 mmol/L) in women or current treatment for low HDL cholesterol  Men <40 mg/dL (1 mmol/L) in men or current treatment for low HDL cholesterol     Fasting plasma glucose (FPG) Lab Results   Component Value Date     10/19/2019      FPG ?100 mg/dL (5.6 mmol/L) or treatment for elevated blood glucose   Blood pressure  BP " Readings from Last 3 Encounters:   06/04/19 131/71   04/26/19 164/86    Blood pressure ?130/85 mmHg or treatment for elevated blood pressure   Family History  See family history     Appearance: awake, alert, unkempt and disheveled   Attitude:  guarded and uncooperative  Eye Contact:  intense  Mood:  angry, agitated  Affect:  mood congruent, intensity is heightened and reactive  Speech:  mumbling, raised volume, rapid, accent present  Language: fluent and intact in English  Psychomotor, Gait, Musculoskeletal: Stereotypic, purposeless movements in upper extremities throughout entire interview. no evidence of tardive dyskinesia, dystonia, or tics and physical agitation  Throught Process:  disorganized, illogical and tangental  Associations:  loosening of associations present  Thought Content:  no evidence of suicidal ideation or homicidal ideation, patient appears to be responding to internal stimuli and exhibiting significant paranoia and delusional thought content  Insight:  limited  Judgement:  limited  Oriented to:  See above  Attention Span and Concentration:  limited  Recent and Remote Memory:  limited  Fund of Knowledge:  AFSHAN    Clinical Global Impressions  First:7   Most recent:6              Precautions:     Behavioral Orders   Procedures     Assault precautions     Code 1 - Restrict to Unit     Elopement precautions     Routine Programming     As clinically indicated     Status 15     Every 15 minutes.          Diagnoses:     Schizoaffective Disorder, Bipolar Type, decompensated  Excited Catatonia  HTN  Dyslipidemia  Hx of CVA in 2017         Assessment & Plan:     Assessment and hospital summary:  This patient is a 60 year old -American female with history of Schizoaffective Disorder, bipolar type, previous commitments who presented to ED with tad, psychosis, and agitation in context of medication non-adherence and recent expiration of MI commitment. Symptoms and presentation at this time is most  consistent with Schizoaffective Disorder, Bipolar Type. We have obtained most recent medication regimen from patient's ACT team, and regimen was restarted. Inpatient psychiatric hospitalization is warranted at this time for safety, stabilization, and possible adjustment in medications. Pt is on 72 hr hold. Petitioned for MI commitment with [facility] through [facility] on 4/14 due to dangerousness, inability to care for self, active symptoms of psychosis and tad and refusal to accept proposed treatment.    On admission, PTA medications were restarted. However, patient has been declining all scheduled medications despite significant encouragement from staff and this provider. She remains very symptomatic, but she is currently not meeting criteria for a psychiatric emergency.  Patient is also exhibiting signs of excited catatonia, including excessive and purposeless motor activity in both the upper and lower limbs (hyperkinesis), restlessness, stereotypy, impulsivity, and frenzy. She is currently declining vital signs and labs, though is eating and drinking daily.     Target psychiatric symptoms and interventions:  - Psychiatric emergency declared on 4/20 due to aggression toward others in context of severe psychosis and excited catatonia.   - Start Haldol 5 mg BID and backup with IM Haldol 5 mg BID  - Discontinue Thorazine 100 mg at bedtime d/t consistent refusal  - Discontinue Zyprexa 7.5 mg BID d/t consistent refusal  - Discontinue Invega 9 mg daily  - Artane 2 mg BID  - Place on 1:1 for need for constant redirection, intrusive behaviors, loitering near unit doors, and aggression toward others.      Resume hydroxyzine 25-50 mg q4h prn for acute anxiety  Resume Trazodone 50 mg at bedtime prn for sleep disturbances  Resume Zyprexa 10 mg TID prn for severe agitation  Resume Haldol/Ativan/Benadryl q4h prn for agitation     Acute Medical Problems and Treatments:  HTN:  - Metoprolol succinate ER 50 mg daily  - Cozaar 100  mg daily     CVA:  - Aspirin 81 mg daily     Constipation:  - Miralax 17 mg daily     Routine labs have been ordered, including CMP, CBC with differential, TSH, lipid profile.  Routine urine drug screen also ordered. Patient currently declining blood draw. She is also declining vitals.     Behavioral/Psychological/Social:  - Encourage unit programming    Safety:  - Continue precautions as noted above  - Status 15 minute checks  - Safety precautions include: assault precautions  - Continue precautions as noted above  - Status 15 minute checks  - Because of behavior leading to a seclusion or restraint episode on 4/19/21, we have made the following change to the treatment plan: placed patient on 1:1, declared psychiatric emergency.     Legal Status: Court hold. Petitioning for MI commitment with [facility] (Haldol, Zyprexa, Invega, and Thorazine) through [facility]    Disposition Plan   Reason for ongoing admission: poses an imminent risk to self, poses an imminent risk to others and is unable to care for self due to severe psychosis or tad  Discharge location: TBD. Patient has ACT team.  Discharge Medications: not ordered  Follow-up Appointments: not scheduled    Entered by: Ida Zaman on 4/20/2021 at 7:21 AM

## 2021-04-20 NOTE — PROGRESS NOTES
Pt came out of her room at 2345 and walked to the entrance doors and refused to step away. Pt was demanding that we open the doors so she can leave and demanding staff keys. Pt postured at staff when they attempted to redirect and Writer then walked over to her and attempted to deescalate Pt by offering to walk her back to her room. Pt appeared very confused about where her room was and continued to check doors on the way back to her room. Nurse came out of the nursing station and Pt ran to the door and attempted to push her way in until writer grabbed her and nurse was able to pull door shut. Pt is very loud and disruptive to all sleeping Pts and the more that redirection happens the more loud she becomes. Pt was moved to Pod 1 due to an argument with another Pt on the unit. But it was decided by night staff with her attempts to get into other Pt rooms and then the nurse station that it would be safer to have her on Pod 2 for elopement reasons when there is so much traffic coming on and off the unit.    Pt is making inaudible requests and when staff attempts to ask questions or redirect Pt becomes more agitated and more Volital.

## 2021-04-20 NOTE — PROVIDER NOTIFICATION
"   04/19/21 2055   Seclusion or Restraint Order   Length of Order 4   Order Obtained Yes   Attending Physician Notified Yes   Attending Physician's Name Dr. Chatman   Leadership   Seclus/Restraint >12H or 2x/24H Notified   Assessment   Less Restrictive Alternative Decreased stimulation;Verbal de-escalation;Reality orientation;Modified programming;Reassurance / Support   Risk Factors N   Justification   Clinical Justification All     Writer offered patient PRN and then scheduled PO medications for increasing agitation around 2000.  Patient declined both.  Patient presented as agitated, paranoid, and responding to internal stimuli.  She had excessive movements.  She would barricade her room door when staff came near the door.    At 2030 patient was offered a snack, which she refused.  Writer provided education about maintaining intake, as she has not eaten this shift.  Patient stated she would eat an orange  and entered the milieu; however, she  walked to the entrance doors and demanded that staff let her leave (\"NURSE! Use your key and let me leave!\").  She was then chanting and repeating phrases continously.  She was told she could not leave due to her legal status.  She told writer \"you are going to die\" and \"my son is going to kill you\".  Despite verbal de-escalation, patient would not leave the doorway.  As staff attempted to move around her (to enter and exit the milieu), she attempted to swat at staff with her hands and would heaven after them for a few steps.  She continued to be physically tense and chant at staff.  She was moving her hands in weird  patterns.  Patient was assessed as a threat to others, for attempting to swat staff near her and a threat to herself due to elopement concerns.    A code 21 was paged.  Code team walked patient to her room.  Writer administered PRN IM benadryl/haldol/ativan to target agitation and psychosis.  Staff released the physical hold and left the room. We attempted to " have the patient remain in her room without seclusion, but she continued to push towards and grab at staff.  Seclusion was initiated at 2055.    On-call provider (Dr. Conner) was notified of the restraint.  An order for seclusion was placed.  Notified that there were no apparent injuries to the patient and none to staff.

## 2021-04-20 NOTE — PLAN OF CARE
Current Diagnosis/Procedure:  Schizoaffective Disorder, Bipolar Type     Work Completed:    Patient was discussed in team meeting. Patient was placed on a psychiatric emergency yesterday evening due to severe agitation and threatening behaviors towards staff. Patient has received combinations of Haldol, Benadryl and Ativan but continues to exhibit signs of extreme excited catatonia and remains extremely confused and agitated. Writer had to assist multiple times throughout the day to get the patient back into her room as she demanded to leave and stood by the exit door. Patient also began to undress herself in the hallway but was receptive to writer's encouragement to return to her room. Writer updated Adelia Esquivel, the [facility] DA who is working on the commitment case, and indicated that Michelle is extremely supportive of the patient being placed on a MI commitment w/ [facility]. Patient continues to refuse all medications, including her blood pressure medications and vitals.      Discharge plan or goal:  No discharge plans at present time.       Barriers to discharge:  Continued need for stabilization of severity of illness and medication management. Patient is currently on a court hold through [facility].

## 2021-04-20 NOTE — PLAN OF CARE
Problem: Behavior Regulation Impairment (Psychotic Signs/Symptoms)  Goal: Improved Behavioral Control (Psychotic Signs/Symptoms)  Outcome: Declining    Patient was isolative to her room.  AOx1 only - did not believe she was in a hospital, or [place].  Thought it was morning, stating she needs breakfast.  She presents as extremely tense, with hyperkinetic movements and reactivity to interacting with staff.  Hypervigilant and paranoid.  She would not let staff enter her room.  She would race to her room door and barricade it with her body weight when staff approached and then returned to sitting on her bed when staff left.  She did not appear to be an imminent threat to herself or others at that point.       Patient refused all offered PRN and scheduled medications.  She states  my food is my medicine .  Patient refused her supper, but requested an orange and water; however, she then refused that as well.   She napped for about 30 minutes.  Disheveled.       Patient agitated at approx 2055.  Demanding to leave and posturing at staff.  Code 21 paged.  Walked to her room and given PRN IM benadryl/haldol/ativan.  Door locked as she tried to grab onto staff as they left.  Patient ate some snacks while in seclusion (three yudelka cracker packets, 1 ice cream, and 1 juice).     At 2120 she appeared to be improved, stating she would be  nice  and  safe .  Stated she needed to shower because  I stink .  Seclusion was discontinued, but patient immediately demanded to leave and posture at staff.  Patient was walked back to her room at 2135 and seclusion restarted.

## 2021-04-20 NOTE — PROVIDER NOTIFICATION
04/19/21 2245   Restraint Type   Seclusion (BH) Discontinued   Debriefing   Debriefing DO   Does patient understand why the event happened? Other (comment)  (sleeping)   Does patient agree to safe behaviors? Other (comment)  (sleeping)   What can we do differently so this doesn't happen again? Patient unable to answer   Plan of care reviewed and modified Yes       Patient had been sleeping for about 40 minutes.  Assessed as no longer a threat to others.  Seclusion was discontinued.

## 2021-04-20 NOTE — PLAN OF CARE
Patient presents as disorganized, restless, and labile with no appreciable insight into her illness.  Michelle continues on elopement precautions.  She became agitated this AM, around the [id number] and was loitering near the unit exit doors while demanding to be discharged.  Given her intrusive and aggressive behaviors- combined with her behaviors concerning for elopement- she was placed on SIO this AM.  Michelle continues to refuse all offers of medication- scheduled and PRN.  She did receive an IM injection of Haldol, Benadryl, and Ativan at 07:18 this AM.  She continues to sleep poorly with only 1.5 hours of sleep noted on the NOC shift of 4/20/21.  She has taken a few brief naps this shift, but she is very restless and tends to awaken within 15 to 30 minutes.  She appears very confused and disoriented today.  Sleep deprivation is evident, and Michelle has been pacing in the hallway at various times this shift with her eyes closed.  She has been slurring her words and her speech has been incoherent at times.  Unit staff were able to get Michelle into the shower this shift, as she was very malodorous and unkempt.  Michelle has been verbally aggressive and has engaged in some menacing gestures this shift when agitated; however, she has not demonstrated any physically aggressive behavior this shift.  She has required very frequent redirection to maintain appropriate social boundaries and to refrain from loitering near the unit exit doors.  Her food and fluid intake has been adequate this shift.  She denies any acute physical concerns, but she continues to refuse her antihypertensive regimen and vital signs assessment.

## 2021-04-21 PROCEDURE — 250N000011 HC RX IP 250 OP 636: Performed by: PSYCHIATRY & NEUROLOGY

## 2021-04-21 PROCEDURE — 99233 SBSQ HOSP IP/OBS HIGH 50: CPT | Performed by: PSYCHIATRY & NEUROLOGY

## 2021-04-21 PROCEDURE — 124N000002 HC R&B MH UMMC

## 2021-04-21 PROCEDURE — 250N000013 HC RX MED GY IP 250 OP 250 PS 637: Performed by: PSYCHIATRY & NEUROLOGY

## 2021-04-21 RX ORDER — LORAZEPAM 1 MG/1
1 TABLET ORAL 3 TIMES DAILY
Status: DISCONTINUED | OUTPATIENT
Start: 2021-04-21 | End: 2021-05-06

## 2021-04-21 RX ADMIN — HALOPERIDOL LACTATE 5 MG: 5 INJECTION, SOLUTION INTRAMUSCULAR at 08:20

## 2021-04-21 RX ADMIN — DIPHENHYDRAMINE HYDROCHLORIDE 25 MG: 50 INJECTION, SOLUTION INTRAMUSCULAR; INTRAVENOUS at 19:55

## 2021-04-21 RX ADMIN — HALOPERIDOL LACTATE 5 MG: 5 INJECTION, SOLUTION INTRAMUSCULAR at 19:47

## 2021-04-21 RX ADMIN — LORAZEPAM 2 MG: 2 INJECTION INTRAMUSCULAR; INTRAVENOUS at 19:55

## 2021-04-21 RX ADMIN — OLANZAPINE 10 MG: 10 TABLET, FILM COATED ORAL at 09:03

## 2021-04-21 ASSESSMENT — ACTIVITIES OF DAILY LIVING (ADL)
DRESS: SCRUBS (BEHAVIORAL HEALTH)
HYGIENE/GROOMING: PROMPTS
LAUNDRY: UNABLE TO COMPLETE
ORAL_HYGIENE: PROMPTS

## 2021-04-21 NOTE — PROVIDER NOTIFICATION
04/21/21 1045   Restraint Type   Seclusion () Continued     Attempted to discontinue seclusion. Patient remains very agitated and refused to communicate at this time.

## 2021-04-21 NOTE — PLAN OF CARE
communicated with Adelia Esquivel, the [facility] DA assigned to patient's commitment case, that as of 12:30 PM the patient is refusing to attend her examination and preliminary hearing scheduled for this afternoon.      spoke with Kourtney from patient's ACT team and updated her on how she is currently doing.  and Kourtney agreed to check in once a week.

## 2021-04-21 NOTE — PLAN OF CARE
Problem: Behavior Regulation Impairment (Psychotic Signs/Symptoms)  Goal: Improved Behavioral Control (Psychotic Signs/Symptoms)  Outcome: No Change  Flowsheets (Taken 4/20/2021 2240)  Mutually Determined Action Steps (Improved Behavioral Control): verbalizes personal treatment goal  Patient is on SIO 1:1 as precaution against assault, elopement, and intrusiveness. She spent most of the shift in her room and remains irritable and disorganized but was less hyper-verbal. She does seem to be experiencing racing thoughts and responding to internal stimuli but no sign of SIB observed. Patient declined her scheduled HS PO medications but was accepting of IM Haldol and Benadryl with some prompts. She ate meals and snacks and had a relatively uneventful evening requiring no PRN medication, seclusion or restraint.

## 2021-04-21 NOTE — PLAN OF CARE
The patient continues to have bizarre behavior and is difficult to redirect this shift. She is very hostile and accusatory towards staff. She is often yelling at someone about watching her or keeping her illegally. She frequently demands her pants and purse so she can leave. She briefly required seclusion intervention due to extreme hostility and physically pushing staff. She was unable to fully process when discontinuing seclusion due to her hostility towards RN writer. She refused all oral medications except for when she requested oral Zyprexa which she took without issue. She does show some improvement in level of agitation and cooperativeness after medications. She continues to make odd ritualistic movements that appear to be dancing or pounding of her hands. She did not sleep at all this shift. She did have adequate oral intake including lunch and dinner and several juices. No physical health concerns or side effects from medications noted this shift.

## 2021-04-21 NOTE — PROVIDER NOTIFICATION
"   04/21/21 1015   Seclusion or Restraint Order   In Person Face to Face Assessment Conducted Yes-Eval of pt's immediate situation, reaction to intervention, complete review of systems assessment, behavioral assessment & review/assessment of hx, drugs & meds, recent labs, etc, behavioral condition, need to continue/terminate restraint/seclusion   Patient Experienced No adverse physical outcome from seclusion/restraint initiation   Continuation of Seclus/Restraint indicated at this time Yes     Pt refused to answer questions or discuss circumstances leading to seclusion. Attempted to discuss discontinuation criteria and patient states, \"give me my pants and my purse.\" Attempted to explain why that was not possible she yelled, \"get the fuck away from me then.\" and refused to communicate further. Pt continues to present imminent risk of harm to others given level of agitation. No harm from initiation of seclusion. s  "

## 2021-04-21 NOTE — PROVIDER NOTIFICATION
04/21/21 1000   Justification   Clinical Justification All   Pt has been increasing agitated over the past 15 minutes. She is demanding her purse and for staff to let her leave. She was standing at the unit doors, swinging her arms aggressively and demanding to be discharged. Once redirected back to her room, she continues to be agitated and started charging at staff in an aggressive manner and was refusing redirection. All further de-escalation attempts unsuccessful. Seclusion initiated at 1000 due to aggressive behavior towards staff. No hands on by staff occurred. Dr Zaman notified of seclusion incident, who gave order. Pt had received morning medications within the last two hours, so it was too soon for additional prn medications.

## 2021-04-21 NOTE — PROGRESS NOTES
"[facility], [place]   Psychiatric Progress Note  Hospital Day: 7        Interim History:   The patient's care was discussed with the treatment team during the daily team meeting and/or staff's chart notes were reviewed.  Staff report patient appears to be responding to internal stimuli. Appears very guarded and suspicious. Patient did not report any acute medical concerns. No overt aggression toward others. Patient did require seclusion again this morning. She is very perseverative on discharge, demanding access to her purse and her cell phone. She is pushing staff away when they attempt to redirect her away from unit doors. Patient is exhibiting signs/sx of psychosis or tad. She remains quite disorganized and intermittently agitated. Patient is not medication adherent. She declined scheduled medications. Given IM Haldol in context of psychiatric emergency. Patient is not attending groups. Patient is not sleeping well, slept 0 hours on night shift. Patient is eating small amounts of food throughout the day. Taking in some fluids. Patient is not attending to ADLs. She is declining labs and vitals. Attempted to elope on several occasions in the past 24 hours. Remains on SIO.     Upon interview, the patient met with this writer in her room. She was standing in her room, hunched over with hair in her face. She repeatedly said \"I want to go home. Release me!\" She expressed desire for discharge so that she could go grocery shopping. She said that she is now willing to take medications and thus should be released. Explained that she will remain in the hospital through commitment process. She became increasingly agitated. She reports good sleep. She denies AH, VH, paranoia. She is alert and oriented to person and place, not time (she did know it is April, 2021 though did not know time or date). She approached this writer multiple times in the hallway, requesting access to her cell phone and " "other personal items. Again requesting discharge from the hospital. I again encouraged her to accept scheduled medications. Encouraged adequate po intake. She yelled \"I am eating! I am fine! I am not sick!\" Continues to have very poor insight/judgment, though slightly improved today with regards to thought process and agitation. She denied acute medical concerns. Denied physical pain.          Medications:       - Psychiatric Emergency -   Does not apply See Admin Instructions     haloperidol  5 mg Oral BID    Or     haloperidol lactate  5 mg Intramuscular BID     LORazepam  1 mg Oral TID     losartan  100 mg Oral Daily     metoprolol succinate ER  50 mg Oral Daily     polyethylene glycol  17 g Oral Daily     trihexyphenidyl  2 mg Oral BID          Allergies:     Allergies   Allergen Reactions     Lisinopril Cough          Labs:   No results found for this or any previous visit (from the past 24 hour(s)).       Psychiatric Examination:     Resp 16   Weight is 0 lbs 0 oz  There is no height or weight on file to calculate BMI.    Weight over time:  Vitals:       Orthostatic Vitals     None            Cardiometabolic risk assessment. 04/15/21      Reviewed patient profile for cardiometabolic risk factors    Date taken /Value  REFERENCE RANGE   Abdominal Obesity  (Waist Circumference)   See nursing flowsheet Women ?35 in (88 cm)   Men ?40 in (102 cm)      Triglycerides  Triglycerides   Date Value Ref Range Status   10/19/2019 117 <150 mg/dL Final       ?150 mg/dL (1.7 mmol/L) or current treatment for elevated triglycerides   HDL cholesterol  HDL Cholesterol   Date Value Ref Range Status   10/19/2019 56 >49 mg/dL Final   ]   Women <50 mg/dL (1.3 mmol/L) in women or current treatment for low HDL cholesterol  Men <40 mg/dL (1 mmol/L) in men or current treatment for low HDL cholesterol     Fasting plasma glucose (FPG) Lab Results   Component Value Date     10/19/2019      FPG ?100 mg/dL (5.6 mmol/L) or treatment for " elevated blood glucose   Blood pressure  BP Readings from Last 3 Encounters:   06/04/19 131/71   04/26/19 164/86    Blood pressure ?130/85 mmHg or treatment for elevated blood pressure   Family History  See family history     Appearance: awake, alert, unkempt and disheveled   Attitude:  guarded and uncooperative  Eye Contact:  intense  Mood:  angry  Affect:  mood congruent, intensity is heightened and reactive  Speech:  mumbling, raised volume, rapid, accent present  Language: fluent and intact in English  Psychomotor, Gait, Musculoskeletal: No stereotypic movements noted on examination today. Possible stiffness. no evidence of tardive dyskinesia, dystonia, or tics and physical agitation  Throught Process:  disorganized, illogical and tangental  Associations:  loosening of associations present  Thought Content:  no evidence of suicidal ideation or homicidal ideation, patient appears to be responding to internal stimuli and exhibiting significant paranoia and delusional thought content  Insight:  limited  Judgement:  limited  Oriented to:  See above  Attention Span and Concentration:  limited  Recent and Remote Memory:  limited  Fund of Knowledge:  AFSHAN    Clinical Global Impressions  First:7   Most recent:6              Precautions:     Behavioral Orders   Procedures     Assault precautions     Code 1 - Restrict to Unit     Elopement precautions     Routine Programming     As clinically indicated     Status 15     Every 15 minutes.     Status Individual Observation     Patient SIO status reviewed with team/RN.  Please also refer to RN/team documentation for add'l detail.    -SIO staff to monitor following which have contributed to patient being on SIO:  Patient is extremely disorganized, agitated, intrusive, attempting to elope. She requires constant redirection from staff to maintain safety of herself and others  -Possible interventions SIO staff could use to support patient's treatment progress:  Pharmacologic and  nonpharmacologic coping strategies, redirection, support, reassurance  -When following observed, team will review discontinuation of SIO:  Absence of above behaviors for > 24 hours     Order Specific Question:   CONTINUOUS 24 hours / day     Answer:   5 feet     Order Specific Question:   Indications for SIO     Answer:   Assault risk     Order Specific Question:   Indications for SIO     Answer:   Severe intrusiveness          Diagnoses:     Schizoaffective Disorder, Bipolar Type, decompensated  Excited Catatonia  HTN  Dyslipidemia  Hx of CVA in 2017         Assessment & Plan:     Assessment and hospital summary:  This patient is a 60 year old -American female with history of Schizoaffective Disorder, bipolar type, previous commitments who presented to ED with tad, psychosis, and agitation in context of medication non-adherence and recent expiration of MI commitment. Symptoms and presentation at this time is most consistent with Schizoaffective Disorder, Bipolar Type. We have obtained most recent medication regimen from patient's ACT team, and regimen was restarted. Inpatient psychiatric hospitalization is warranted at this time for safety, stabilization, and possible adjustment in medications. Pt is on 72 hr hold. Petitioned for MI commitment with [facility] through [facility] on 4/14 due to dangerousness, inability to care for self, active symptoms of psychosis and tad and refusal to accept proposed treatment.    On admission, PTA medications were restarted. However, patient has been declining all scheduled medications despite significant encouragement from staff and this provider. She remains very symptomatic, but she is currently not meeting criteria for a psychiatric emergency.  Patient is also exhibiting signs of excited catatonia, including excessive and purposeless motor activity in both the upper and lower limbs (hyperkinesis), restlessness, stereotypy, impulsivity, and frenzy. She is currently  declining vital signs and labs, though is eating and drinking daily. She is currently declining scheduled po medications. Psychiatric emergency declared on 4/20 due to aggression toward others in context of severe psychosis and suspected excited catatonia. Ativan 1 mg TID was also added. Discontinued PTA Invega, Zyprexa, and thorazine on 4/20 due to consistent refusal.     Target psychiatric symptoms and interventions:  - Start Ativan 1 mg TID  - Resume Haldol 5 mg BID and backup with IM Haldol 5 mg BID. Would consider optimizing further if needed. Patient will benefit from BAIG prior to discharge if tolerating. Positive response noted during past hospitalizations.   - Discontinued PTA Thorazine 100 mg at bedtime d/t consistent refusal  - Discontinued PTA Zyprexa 7.5 mg BID d/t consistent refusal  - Discontinued  Invega 9 mg daily d/t consistent refusal  - Artane 2 mg BID    Resume hydroxyzine 25-50 mg q4h prn for acute anxiety  Resume Trazodone 50 mg at bedtime prn for sleep disturbances  Resume Zyprexa 10 mg TID prn for severe agitation  Resume Haldol/Ativan/Benadryl q4h prn for agitation     Acute Medical Problems and Treatments:  HTN:  - Metoprolol succinate ER 50 mg daily  - Cozaar 100 mg daily     CVA:  - Aspirin 81 mg daily     Constipation:  - Miralax 17 mg daily     Routine labs have been ordered, including CMP, CBC with differential, TSH, lipid profile.  Routine urine drug screen also ordered. Patient currently declining blood draw. She is also declining vitals.     Behavioral/Psychological/Social:  - Encourage unit programming    Safety:  - Continue precautions as noted above  - Status 15 minute checks  - Safety precautions include: assault precautions  - Continue precautions as noted above  - Status 15 minute checks  - Because of behavior leading to a seclusion or restraint episode on 4/19/21, we have made the following change to the treatment plan: placed patient on 1:1, declared psychiatric emergency.    - 1:1 for need for constant redirection, intrusive behaviors, loitering near unit doors, and aggression toward others.     Legal Status: Court hold. Petitioning for MI commitment with [facility] (Haldol, Zyprexa, Invega, and Thorazine) through [facility]    Disposition Plan   Reason for ongoing admission: poses an imminent risk to self, poses an imminent risk to others and is unable to care for self due to severe psychosis or tad  Discharge location: TBD. Patient has ACT team.  Discharge Medications: not ordered  Follow-up Appointments: not scheduled    Entered by: Ida Zaman on 4/21/2021 at 5:10 PM

## 2021-04-21 NOTE — PLAN OF CARE
Current Diagnosis/Procedure:  Schizoaffective Disorder, Bipolar Type     Work Completed:    Patient was discussed in team meeting. She refused to attend her preliminary hearing today with [facility] in regard to pre-petition commitment, and became very agitated when speaking with her  on the phone. She remains confused, disorganized and engaged in repetitive movements with her arms and hands. Patient continues to be on a psychiatric emergency and is receiving Haldol 5mg 2x/day. Minimal improvement has been noted.      Discharge plan or goal:  No discharge plans at present time.       Barriers to discharge:  Continued need for stabilization of severity of illness and medication

## 2021-04-21 NOTE — PLAN OF CARE
"  Problem: Sleep Disturbance (Psychotic Signs/Symptoms)  Goal: Improved Sleep (Psychotic Signs/Symptoms)  Outcome: Declining     Pt is on SIO 1: 1 for intrusiveness, elopement, and verbal aggressive. Pt was up all night, frequently coming out of her room making demands to use the phone, to get access to her cell phone and diary and staff reminded pt that phones are activated at 0730H. Pt appeared drowsed as she walked unsteadily at the hallway with eyes closed, and she could pause quietly and appear like she was a sleep. Staff frequently redirected Pt back to her room advised Pt to try and get some sleep and Pt responded \"I'm not tired, I don't want to sleep.\" Pt is at high risk of falling. No PRN was administered this shift. We'll continue to monitor.  Pt slept for 0 hours.  "

## 2021-04-22 ENCOUNTER — MEDICAL CORRESPONDENCE (OUTPATIENT)
Dept: HEALTH INFORMATION MANAGEMENT | Facility: CLINIC | Age: 61
End: 2021-04-22

## 2021-04-22 PROCEDURE — 99207 PR CDG-MDM COMPONENT: MEETS HIGH - UP CODED: CPT | Performed by: PSYCHIATRY & NEUROLOGY

## 2021-04-22 PROCEDURE — 250N000011 HC RX IP 250 OP 636: Performed by: PSYCHIATRY & NEUROLOGY

## 2021-04-22 PROCEDURE — 124N000002 HC R&B MH UMMC

## 2021-04-22 PROCEDURE — 99233 SBSQ HOSP IP/OBS HIGH 50: CPT | Performed by: PSYCHIATRY & NEUROLOGY

## 2021-04-22 RX ADMIN — HALOPERIDOL LACTATE 5 MG: 5 INJECTION, SOLUTION INTRAMUSCULAR at 09:55

## 2021-04-22 RX ADMIN — DIPHENHYDRAMINE HYDROCHLORIDE 25 MG: 50 INJECTION, SOLUTION INTRAMUSCULAR; INTRAVENOUS at 10:04

## 2021-04-22 RX ADMIN — HALOPERIDOL LACTATE 5 MG: 5 INJECTION, SOLUTION INTRAMUSCULAR at 20:23

## 2021-04-22 RX ADMIN — LORAZEPAM 2 MG: 2 INJECTION INTRAMUSCULAR; INTRAVENOUS at 09:55

## 2021-04-22 ASSESSMENT — ACTIVITIES OF DAILY LIVING (ADL)
ORAL_HYGIENE: PROMPTS
DRESS: SCRUBS (BEHAVIORAL HEALTH)
HYGIENE/GROOMING: PROMPTS
LAUNDRY: UNABLE TO COMPLETE

## 2021-04-22 NOTE — PROVIDER NOTIFICATION
04/21/21 2000   Seclusion or Restraint Order   Length of Order 4   Order Obtained Yes   Attending Physician Notified Yes   Attending Physician's Name Guero   Leadership   Seclus/Restraint >12H or 2x/24H Notified   Assessment   Less Restrictive Alternative Decreased stimulation;Verbal de-escalation;Reality orientation;Modified programming;Reassurance / Support   Risk Factors N   Justification   Clinical Justification All     Patient is on SIO 1:1.  She was in her room.  Patient was offered her neuroleptic emergency medication.  Patient stood up from her bed and walked towards writer then started chanting with her hands extended towards writer.  Patient was provided education that she needed to take the medication, or she would receive an IM.  Patient continued to chant for about three minutes.  She then picked up the pills and water and threw them at writer.  She entered into the milieu and was demanding to leave.  She was pushing at the unit doors and pulling at the door to the staff break room.  She continued to chant.  Two staff attempted to verbally de-escalate patient.  She could not deescalate.  A code green was paged.    Patient was walked to her room by the code team.  Writer administered scheduled neuroleptic emergency haldol and PRN benadryl/ativan IM to target agitation.  Seclusion was initiated as patient followed after staff as they exited the room.    On-call provider (Guero) was notified of the restraint.   An order was placed starting at 2000.  Notified that there were no apparent injuries to the patient and none to staff.
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
## 2021-04-22 NOTE — PLAN OF CARE
Patient continues on SIO to manage this patient's aggressive behavior.  Michelle continues to refuse all offers of scheduled and PRN medications.  She required a brief physical hold to safely receive her IM injection of Haldol.  She became aggressive and required seclusion this shift.  Michelle continues to refuse vital signs assessment.  She has been eating, drinking and sleeping in adequate amounts.  See previous notes filed by RN writer this shift for further details.

## 2021-04-22 NOTE — PROVIDER NOTIFICATION
04/22/21 1239   Restraint Type   Seclusion (BH) Discontinued   Debriefing   Debriefing DO   Does patient understand why the event happened? Patient unable to answer   Does patient agree to safe behaviors? Patient unable to answer   What can we do differently so this doesn't happen again? Patient unable to answer   Plan of care reviewed and modified Yes     Pt was able to follow RN writer's direction to refrain from barricading the door (she had been sitting on the floor with her back to the door, preventing staff from entering the room).  She eventually followed direction to get up off the floor and sit on her bed.  She was able to do this calmly.  RN writer was able to deliver her meal tray without incident.  Michelle is either unable or unwilling to engage in debriefing and process the events leading up to her seclusion episode with RN writer.  She no longer appears to represent an imminent risk of danger to unit staff and peers; as such, her seclusion was discontinued.  She remains on SIO.  Will continue to monitor closely.

## 2021-04-22 NOTE — PROVIDER NOTIFICATION
04/21/21 2130   Restraint Type   Seclusion (BH) Discontinued   Debriefing   Debriefing DO   Does patient understand why the event happened? Patient unable to answer   Does patient agree to safe behaviors? Patient unable to answer   What can we do differently so this doesn't happen again? Patient unable to answer   Plan of care reviewed and modified Yes       Patient had been sleeping for about 30 minutes.  She was assessed as no longer a threat to others. Seclusion was discontinued.     Patient continues on SIO 1:1.

## 2021-04-22 NOTE — PROVIDER NOTIFICATION
04/22/21 0955   Seclusion or Restraint Order   In Person Face to Face Assessment Conducted Yes-Eval of pt's immediate situation, reaction to intervention, complete review of systems assessment, behavioral assessment & review/assessment of hx, drugs & meds, recent labs, etc, behavioral condition, need to continue/terminate restraint/seclusion   Patient Experienced No adverse physical outcome from seclusion/restraint initiation   Continuation of Seclus/Restraint indicated at this time No   Describe actions taken physical hold discontinued once medications administered   Physical hold completed to facilitate administration of neuroleptic emergency medications per Physician order. Pt does not appear to have any injuries and does not appear to be in any physical distress. All lab results, medication orders, and Physician orders reviewed. Dr Bertrand notified of face to face results.

## 2021-04-22 NOTE — PROGRESS NOTES
[facility], [place]   Psychiatric Progress Note  Hospital Day: 8        Interim History:   The patient's care was discussed with the treatment team during the daily team meeting and/or staff's chart notes were reviewed.  Staff report patient is still quite symptomatic, possibly to the point of catatonia. Has been irritable, paranoid and refusing the PO Haldol. Has required seclusion.     Upon interview, the patient does not answer any questions. She is holding her arms out with hands clenched and shakes them up and down. Does not answer questions about sleep, appetite, mood, or suicidality.          Medications:       - Psychiatric Emergency -   Does not apply See Admin Instructions     haloperidol  5 mg Oral BID    Or     haloperidol lactate  5 mg Intramuscular BID     LORazepam  1 mg Oral TID     losartan  100 mg Oral Daily     metoprolol succinate ER  50 mg Oral Daily     polyethylene glycol  17 g Oral Daily     trihexyphenidyl  2 mg Oral BID          Allergies:     Allergies   Allergen Reactions     Lisinopril Cough          Labs:   No results found for this or any previous visit (from the past 24 hour(s)).       Psychiatric Examination:     Resp 16   Weight is 0 lbs 0 oz  There is no height or weight on file to calculate BMI.    Weight over time:  Vitals:       Orthostatic Vitals     None            Cardiometabolic risk assessment. 04/15/21      Reviewed patient profile for cardiometabolic risk factors    Date taken /Value  REFERENCE RANGE   Abdominal Obesity  (Waist Circumference)   See nursing flowsheet Women ?35 in (88 cm)   Men ?40 in (102 cm)      Triglycerides  Triglycerides   Date Value Ref Range Status   10/19/2019 117 <150 mg/dL Final       ?150 mg/dL (1.7 mmol/L) or current treatment for elevated triglycerides   HDL cholesterol  HDL Cholesterol   Date Value Ref Range Status   10/19/2019 56 >49 mg/dL Final   ]   Women <50 mg/dL (1.3 mmol/L) in women or current treatment for  low HDL cholesterol  Men <40 mg/dL (1 mmol/L) in men or current treatment for low HDL cholesterol     Fasting plasma glucose (FPG) Lab Results   Component Value Date     10/19/2019      FPG ?100 mg/dL (5.6 mmol/L) or treatment for elevated blood glucose   Blood pressure  BP Readings from Last 3 Encounters:   06/04/19 131/71   04/26/19 164/86    Blood pressure ?130/85 mmHg or treatment for elevated blood pressure   Family History  See family history     Appearance: awake, alert, unkempt and disheveled   Attitude:  guarded and uncooperative  Eye Contact:  poor   Mood:  no response  Affect:  intensity is flat and reactive  Speech:  mumbling,  Language: fluent and intact in English  Psychomotor, Gait, Musculoskeletal: No stereotypic movements noted on examination today. Possible stiffness. no evidence of tardive dyskinesia, dystonia, or tics and physical agitation  Throught Process:  disorganized, illogical and tangental  Associations:  loosening of associations present  Thought Content:  no evidence of suicidal ideation or homicidal ideation, patient appears to be responding to internal stimuli and exhibiting significant paranoia and delusional thought content  Insight:  limited  Judgement:  limited  Oriented to:  See above  Attention Span and Concentration:  limited  Recent and Remote Memory:  limited  Fund of Knowledge:  AFSHAN    Clinical Global Impressions  First:7   Most recent:6              Precautions:     Behavioral Orders   Procedures     Assault precautions     Code 1 - Restrict to Unit     Elopement precautions     Routine Programming     As clinically indicated     Status 15     Every 15 minutes.     Status Individual Observation     Patient SIO status reviewed with team/RN.  Please also refer to RN/team documentation for add'l detail.    -SIO staff to monitor following which have contributed to patient being on SIO:  Patient is extremely disorganized, agitated, intrusive, attempting to elope. She  requires constant redirection from staff to maintain safety of herself and others  -Possible interventions SIO staff could use to support patient's treatment progress:  Pharmacologic and nonpharmacologic coping strategies, redirection, support, reassurance  -When following observed, team will review discontinuation of SIO:  Absence of above behaviors for > 24 hours     Order Specific Question:   CONTINUOUS 24 hours / day     Answer:   5 feet     Order Specific Question:   Indications for SIO     Answer:   Assault risk     Order Specific Question:   Indications for SIO     Answer:   Severe intrusiveness          Diagnoses:     Schizoaffective Disorder, Bipolar Type, decompensated  Excited Catatonia  HTN  Dyslipidemia  Hx of CVA in 2017         Assessment & Plan:     Assessment and hospital summary:  This patient is a 60 year old -American female with history of Schizoaffective Disorder, bipolar type, previous commitments who presented to ED with tad, psychosis, and agitation in context of medication non-adherence and recent expiration of MI commitment. Symptoms and presentation at this time is most consistent with Schizoaffective Disorder, Bipolar Type. We have obtained most recent medication regimen from patient's ACT team, and regimen was restarted. Inpatient psychiatric hospitalization is warranted at this time for safety, stabilization, and possible adjustment in medications. Pt is on 72 hr hold. Petitioned for MI commitment with [facility] on 4/14 due to dangerousness, inability to care for self, active symptoms of psychosis and tad and refusal to accept proposed treatment.    On admission, PTA medications were restarted. However, patient has been declining all scheduled medications despite significant encouragement from staff and this provider. She remains very symptomatic, but she was not meeting criteria for a psychiatric emergency.  Patient is also exhibiting signs of excited  catatonia, including excessive and purposeless motor activity in both the upper and lower limbs (hyperkinesis), restlessness, stereotypy, impulsivity, and frenzy. She is currently declining vital signs and labs, though is eating and drinking daily. She is currently declining scheduled po medications. Psychiatric emergency declared on 4/20 due to aggression toward others in context of severe psychosis and suspected excited catatonia. Ativan 1 mg TID was also added. Discontinued PTA Invega, Zyprexa, and thorazine on 4/20 due to consistent refusal.     Target psychiatric symptoms and interventions:  - Started Ativan 1 mg TID  - Resume Haldol 5 mg BID and backup with IM Haldol 5 mg BID. Would consider optimizing further if needed. Patient will benefit from BAIG prior to discharge if tolerating. Positive response noted during past hospitalizations.   - Discontinued PTA Thorazine 100 mg at bedtime d/t consistent refusal  - Discontinued PTA Zyprexa 7.5 mg BID d/t consistent refusal  - Discontinued  Invega 9 mg daily d/t consistent refusal  - Artane 2 mg BID    Resume hydroxyzine 25-50 mg q4h prn for acute anxiety  Resume Trazodone 50 mg at bedtime prn for sleep disturbances  Resume Zyprexa 10 mg TID prn for severe agitation  Resume Haldol/Ativan/Benadryl q4h prn for agitation     Acute Medical Problems and Treatments:  HTN:  - Metoprolol succinate ER 50 mg daily  - Cozaar 100 mg daily     CVA:  - Aspirin 81 mg daily     Constipation:  - Miralax 17 mg daily     Routine labs have been ordered, including CMP, CBC with differential, TSH, lipid profile.  Routine urine drug screen also ordered. Patient currently declining blood draw. She is also declining vitals.     Behavioral/Psychological/Social:  - Encourage unit programming    Safety:  - Continue precautions as noted above  - Status 15 minute checks  - Safety precautions include: assault precautions  - Continue precautions as noted above  - Status 15 minute checks  - Because  of behavior leading to a seclusion or restraint episode on 4/19/21, we have made the following change to the treatment plan: placed patient on 1:1, declared psychiatric emergency.   - 1:1 needed for constant redirection, intrusive behaviors, loitering near unit doors, and aggression toward others.     Legal Status: Court hold. Petitioning for MI commitment with [facility] (Haldol, Zyprexa, Invega, and Thorazine) through [facility]    Disposition Plan   Reason for ongoing admission: poses an imminent risk to self, poses an imminent risk to others and is unable to care for self due to severe psychosis or tad  Discharge location: TBD. Patient has ACT team.  Discharge Medications: not ordered  Follow-up Appointments: not scheduled    Entered by: Luis Bertrand on 4/22/2021 at 6:12 AM

## 2021-04-22 NOTE — PROVIDER NOTIFICATION
04/21/21 2003   Seclusion or Restraint Order   In Person Face to Face Assessment Conducted Yes-Eval of pt's immediate situation, reaction to intervention, complete review of systems assessment, behavioral assessment & review/assessment of hx, drugs & meds, recent labs, etc, behavioral condition, need to continue/terminate restraint/seclusion   Patient Experienced No adverse physical outcome from seclusion/restraint initiation   Continuation of Seclus/Restraint indicated at this time Yes     Face to face assessment complete with no observed or reported adverse physical outcome and continuation of seclusion still indicated at the time of assessment. Upon approach by RN writer patient at seclusion door with both hands out towards RN writer continuously repeating a nonsensical chant. Patient during the assessment attempted several times to pull the room door open. Patient refused to answer assessment questions only responding with the continued chant. On call provider IBAN Jack notified of face to face assessment. Will continue to monitor.

## 2021-04-22 NOTE — PROGRESS NOTES
NOC Shift Report     Pt in bed at beginning of shift, breathing quiet and unlabored. Pt slept through shift. Pt slept 7 hours.      No pt complaints or concerns at this time.      No PRNs given. Will continue to monitor.

## 2021-04-22 NOTE — PLAN OF CARE
Problem: Behavior Regulation Impairment (Psychotic Signs/Symptoms)  Goal: Improved Behavioral Control (Psychotic Signs/Symptoms)  Outcome: No Change    Patient on SIO 1:1 for assault risk.  Patient was isolative to her room, sitting on her bed, with her hair hanging over her face.  She was AOx3 - she did not know the date, but knew it was April 2021.  She presents as irritable, hypervigilant, and paranoid.  Many times, when staff attempted to interact with her, she would slam her room door in their face.  Refusing all education.  Multiple statements that she should discharge.     Patient refused her HS neuroleptic emergency medications.  She was acutely agitated.  Code green paged and writer administered Haldol with PRN Benadryl/Ativan to target agitation.   Seclusion initiated at 2000 and ended at 2130.     Patient refused VS.  Poor intake.  No hygiene maintenance/refusal - noted to have old dried, ice cream splatters in her hair.

## 2021-04-22 NOTE — PROVIDER NOTIFICATION
"   04/22/21 1130   Seclusion or Restraint Order   In Person Face to Face Assessment Conducted Yes-Eval of pt's immediate situation, reaction to intervention, complete review of systems assessment, behavioral assessment & review/assessment of hx, drugs & meds, recent labs, etc, behavioral condition, need to continue/terminate restraint/seclusion   Patient Experienced No adverse physical outcome from seclusion/restraint initiation   Continuation of Seclus/Restraint indicated at this time Yes   Pt is sitting against her room door, and refusing to answer assessment questions. She is repeating \"go away!\" She does not appear to be in any physical distress and is denying pain. Lab results, medications, and all MD orders reviewed. Dr Bertrand notified of face to face results.   "

## 2021-04-23 PROCEDURE — 250N000013 HC RX MED GY IP 250 OP 250 PS 637: Performed by: PSYCHIATRY & NEUROLOGY

## 2021-04-23 PROCEDURE — 250N000011 HC RX IP 250 OP 636: Performed by: PSYCHIATRY & NEUROLOGY

## 2021-04-23 PROCEDURE — 99233 SBSQ HOSP IP/OBS HIGH 50: CPT | Performed by: PSYCHIATRY & NEUROLOGY

## 2021-04-23 PROCEDURE — 124N000002 HC R&B MH UMMC

## 2021-04-23 RX ADMIN — HALOPERIDOL LACTATE 5 MG: 5 INJECTION, SOLUTION INTRAMUSCULAR at 02:43

## 2021-04-23 RX ADMIN — HALOPERIDOL LACTATE 5 MG: 5 INJECTION, SOLUTION INTRAMUSCULAR at 19:58

## 2021-04-23 RX ADMIN — LORAZEPAM 2 MG: 2 INJECTION INTRAMUSCULAR; INTRAVENOUS at 09:29

## 2021-04-23 RX ADMIN — LORAZEPAM 2 MG: 2 INJECTION INTRAMUSCULAR; INTRAVENOUS at 02:47

## 2021-04-23 RX ADMIN — DIPHENHYDRAMINE HYDROCHLORIDE 25 MG: 50 INJECTION, SOLUTION INTRAMUSCULAR; INTRAVENOUS at 02:44

## 2021-04-23 RX ADMIN — DIPHENHYDRAMINE HYDROCHLORIDE 25 MG: 50 INJECTION, SOLUTION INTRAMUSCULAR; INTRAVENOUS at 09:28

## 2021-04-23 RX ADMIN — HALOPERIDOL LACTATE 5 MG: 5 INJECTION, SOLUTION INTRAMUSCULAR at 09:26

## 2021-04-23 ASSESSMENT — ACTIVITIES OF DAILY LIVING (ADL)
LAUNDRY: UNABLE TO COMPLETE
DRESS: SCRUBS (BEHAVIORAL HEALTH)
DRESS: SCRUBS (BEHAVIORAL HEALTH)
HYGIENE/GROOMING: INDEPENDENT
ORAL_HYGIENE: INDEPENDENT;PROMPTS
HYGIENE/GROOMING: INDEPENDENT;PROMPTS
ORAL_HYGIENE: INDEPENDENT

## 2021-04-23 NOTE — PROVIDER NOTIFICATION
04/23/21 0324   Seclusion or Restraint Order   In Person Face to Face Assessment Conducted Yes-Eval of pt's immediate situation, reaction to intervention, complete review of systems assessment, behavioral assessment & review/assessment of hx, drugs & meds, recent labs, etc, behavioral condition, need to continue/terminate restraint/seclusion   Patient Experienced No adverse physical outcome from seclusion/restraint initiation   Continuation of Seclus/Restraint indicated at this time Yes     Patient was sitting on the mattress in the seclusion room. Patient was asked if had any pain or injury. Patient did not respond. Patient then got up and stood up while in seclusion. Patient was informed about the reason for seclusion which was threatening staff and that if patient would not threaten staff again, so that patient can be processed out of seclusion, but patient refused to answer.  No injury noted or reported. Seclusion is indicated to continue at this time due to patient's threatening and combative behavior. Also, least restrictive measures were done prior to seclusion, which were unsuccessful. Provider was made aware of the results of the face-to-face assessment and need for seclusion. Will continue to monitor and reassess.

## 2021-04-23 NOTE — PROGRESS NOTES
[facility], [place]   Psychiatric Progress Note  Hospital Day: 9        Interim History:   The patient's care was discussed with the treatment team during the daily team meeting and/or staff's chart notes were reviewed.  Staff report patient is doing better. Is open to discussing taking medications more although has ended up in restraints/seclusion several times. Has final hearing on Monday.     Upon interview, the patient says that she doesn't want to take her medications. Did say that she slept well and is eating better. Denies SI or HI. Denies AH or VH. Says that she would like to be discharged. Discussed that she will need to be stabilized on medications. Patient initially agreeable to take PO medications after breakfast but then declined. Attempted to convince her of the need for these but patient focused on her 's passing and being discharged.          Medications:       - Psychiatric Emergency -   Does not apply See Admin Instructions     haloperidol  5 mg Oral BID    Or     haloperidol lactate  5 mg Intramuscular BID     LORazepam  1 mg Oral TID     losartan  100 mg Oral Daily     metoprolol succinate ER  50 mg Oral Daily     polyethylene glycol  17 g Oral Daily     trihexyphenidyl  2 mg Oral BID          Allergies:     Allergies   Allergen Reactions     Lisinopril Cough          Labs:   No results found for this or any previous visit (from the past 24 hour(s)).       Psychiatric Examination:     Resp 16   Weight is 0 lbs 0 oz  There is no height or weight on file to calculate BMI.    Weight over time:  Vitals:       Orthostatic Vitals     None            Cardiometabolic risk assessment. 04/15/21      Reviewed patient profile for cardiometabolic risk factors    Date taken /Value  REFERENCE RANGE   Abdominal Obesity  (Waist Circumference)   See nursing flowsheet Women ?35 in (88 cm)   Men ?40 in (102 cm)      Triglycerides  Triglycerides   Date Value Ref Range Status  "  10/19/2019 117 <150 mg/dL Final       ?150 mg/dL (1.7 mmol/L) or current treatment for elevated triglycerides   HDL cholesterol  HDL Cholesterol   Date Value Ref Range Status   10/19/2019 56 >49 mg/dL Final   ]   Women <50 mg/dL (1.3 mmol/L) in women or current treatment for low HDL cholesterol  Men <40 mg/dL (1 mmol/L) in men or current treatment for low HDL cholesterol     Fasting plasma glucose (FPG) Lab Results   Component Value Date     10/19/2019      FPG ?100 mg/dL (5.6 mmol/L) or treatment for elevated blood glucose   Blood pressure  BP Readings from Last 3 Encounters:   06/04/19 131/71   04/26/19 164/86    Blood pressure ?130/85 mmHg or treatment for elevated blood pressure   Family History  See family history     Appearance: awake, alert, unkempt and disheveled   Attitude:  guarded and more cooperative  Eye Contact:  poor   Mood:  \"Okay\"  Affect:  intensity is flat and reactive  Speech:  mumbling,  Language: fluent and intact in English  Psychomotor, Gait, Musculoskeletal: No stereotypic movements noted on examination today. Possible stiffness. no evidence of tardive dyskinesia, dystonia, or tics and physical agitation  Throught Process:  disorganized, illogical and tangental  Associations:  loosening of associations present  Thought Content:  no evidence of suicidal ideation or homicidal ideation, patient appears to be responding to internal stimuli and exhibiting significant paranoia and delusional thought content  Insight:  limited  Judgement:  limited  Oriented to:  See above  Attention Span and Concentration:  limited  Recent and Remote Memory:  limited  Fund of Knowledge:  AFSHAN    Clinical Global Impressions  First:7   Most recent:6              Precautions:     Behavioral Orders   Procedures     Assault precautions     Code 1 - Restrict to Unit     Elopement precautions     Routine Programming     As clinically indicated     Status 15     Every 15 minutes.     Status Individual Observation "     Patient SIO status reviewed with team/RN.  Please also refer to RN/team documentation for add'l detail.    -SIO staff to monitor following which have contributed to patient being on SIO:  Patient is extremely disorganized, agitated, intrusive, attempting to elope. She requires constant redirection from staff to maintain safety of herself and others  -Possible interventions SIO staff could use to support patient's treatment progress:  Pharmacologic and nonpharmacologic coping strategies, redirection, support, reassurance  -When following observed, team will review discontinuation of SIO:  Absence of above behaviors for > 24 hours     Order Specific Question:   CONTINUOUS 24 hours / day     Answer:   5 feet     Order Specific Question:   Indications for SIO     Answer:   Assault risk     Order Specific Question:   Indications for SIO     Answer:   Severe intrusiveness          Diagnoses:     Schizoaffective Disorder, Bipolar Type, decompensated  Excited Catatonia  HTN  Dyslipidemia  Hx of CVA in 2017         Assessment & Plan:     Assessment and hospital summary:  This patient is a 60 year old -American female with history of Schizoaffective Disorder, bipolar type, previous commitments who presented to ED with tad, psychosis, and agitation in context of medication non-adherence and recent expiration of MI commitment. Symptoms and presentation at this time is most consistent with Schizoaffective Disorder, Bipolar Type. We have obtained most recent medication regimen from patient's ACT team, and regimen was restarted. Inpatient psychiatric hospitalization is warranted at this time for safety, stabilization, and possible adjustment in medications. Pt is on 72 hr hold. Petitioned for MI commitment with [facility] through [facility] on 4/14 due to dangerousness, inability to care for self, active symptoms of psychosis and tad and refusal to accept proposed treatment.    On admission, PTA medications were  restarted. However, patient has been declining all scheduled medications despite significant encouragement from staff and this provider. She remains very symptomatic, but she was not meeting criteria for a psychiatric emergency.  Patient is also exhibiting signs of excited catatonia, including excessive and purposeless motor activity in both the upper and lower limbs (hyperkinesis), restlessness, stereotypy, impulsivity, and frenzy. She is currently declining vital signs and labs, though is eating and drinking daily. She is currently declining scheduled po medications. Psychiatric emergency declared on 4/20 due to aggression toward others in context of severe psychosis and suspected excited catatonia. Ativan 1 mg TID was also added. Discontinued PTA Invega, Zyprexa, and thorazine on 4/20 due to consistent refusal.     Target psychiatric symptoms and interventions:  - Started Ativan 1 mg TID  - Resume Haldol 5 mg BID and backup with IM Haldol 5 mg BID. Would consider optimizing further if needed. Patient will benefit from BAIG prior to discharge if tolerating. Positive response noted during past hospitalizations.   - Discontinued PTA Thorazine 100 mg at bedtime d/t consistent refusal  - Discontinued PTA Zyprexa 7.5 mg BID d/t consistent refusal  - Discontinued  Invega 9 mg daily d/t consistent refusal  - Artane 2 mg BID    Resume hydroxyzine 25-50 mg q4h prn for acute anxiety  Resume Trazodone 50 mg at bedtime prn for sleep disturbances  Resume Zyprexa 10 mg TID prn for severe agitation  Resume Haldol/Ativan/Benadryl q4h prn for agitation     Acute Medical Problems and Treatments:  HTN:  - Metoprolol succinate ER 50 mg daily  - Cozaar 100 mg daily     CVA:  - Aspirin 81 mg daily     Constipation:  - Miralax 17 mg daily     Routine labs have been ordered, including CMP, CBC with differential, TSH, lipid profile.  Routine urine drug screen also ordered. Patient currently declining blood draw. She is also declining  vitals.     Behavioral/Psychological/Social:  - Encourage unit programming    Safety:  - Continue precautions as noted above  - Status 15 minute checks  - Safety precautions include: assault precautions  - Continue precautions as noted above  - Status 15 minute checks  - Because of behavior leading to a seclusion or restraint episode on 4/19/21, we have made the following change to the treatment plan: placed patient on 1:1, declared psychiatric emergency.   - 1:1 needed for constant redirection, intrusive behaviors, loitering near unit doors, and aggression toward others.     Legal Status: Court hold. Petitioning for MI commitment with [facility] (Haldol, Zyprexa, Invega, and Thorazine) through [facility]    Disposition Plan   Reason for ongoing admission: poses an imminent risk to self, poses an imminent risk to others and is unable to care for self due to severe psychosis or tad  Discharge location: TBD. Patient has ACT team.  Discharge Medications: not ordered  Follow-up Appointments: not scheduled    Entered by: Luis Bertrand on 4/23/2021 at 9:53 AM

## 2021-04-23 NOTE — PLAN OF CARE
Current Diagnosis/Procedure:  Schizoaffective Disorder, Bipolar Type     Work Completed:    Patient was discussed in team meeting. She continues to refuse medications and has been placed in seclusion as recently as early this morning due to becoming combative with staff. Minimal improvement have been observed as she is no longer engaging in repetitive physical movements, and appears overall less agitated. Patient has her final hearing with [facility] for MI Commitment brenda/ [facility] on 4/26/21.      Discharge plan or goal:  No discharge plans at present time.       Barriers to discharge:  Continued need for stabilization of severity of illness and medication. Patient is on a court hold through [facility].

## 2021-04-23 NOTE — PROGRESS NOTES
Patient refused to take her scheduled AM meds. She is currently on a neuroleptic e mergency She was reminded that she would receive an injection if she didn't take the oral meds. She said that her food is her medication. Patient was encouraged multiple times to take the meds orally.  She was sitting in lounge and refused to go to her room so eventually she was told that staff would put hands on and escort her to her room so that she could safely receive the injection.   She was then walked to her room and hands remained on until after the injection was given.  She followed staff directive to remain in bed until all staff were out of her room.  Patient wandered between her room and the lounge throughout the morning. She napped in the afternoon. She refused her 1400 dose of Ativan. No IM is scheduled to be given if she refuses to take it.  Patient was observed walking in hallway late in shift.  She was walking very slowly with head looking down.  She was encouraged to rest in her room which she agreed to do.

## 2021-04-23 NOTE — PROVIDER NOTIFICATION
04/23/21 0300   Seclusion or Restraint Order   Order Obtained Yes   Dr. Reeder, physician on-call contacted and agreed with RN and ANS assessment of placing patient into seclusion & forced IM medications given.  Order given for up to 4 hours if needed for seclusion.

## 2021-04-23 NOTE — PLAN OF CARE
Patient continues to be on SIO with 1:1 staffing for safety as ordered. Patient appeared to be asleep for 3.25 hours. Will continue to monitor and update if there are changes.
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
## 2021-04-23 NOTE — PROVIDER NOTIFICATION
04/22/21 2125   Debriefing   Debriefing DO   Does patient understand why the event happened? Patient unable to answer   Does patient agree to safe behaviors? Patient unable to answer   What can we do differently so this doesn't happen again? Other (comment)   Plan of care reviewed and modified Yes   Pt too disorganized and unable to participate in debriefing. She is calm and pleasant at this time. Clinical decision made and seclusion discontinued. She will continue on SIO 1:1.

## 2021-04-23 NOTE — PROVIDER NOTIFICATION
"   04/23/21 0410   Restraint Type   Seclusion (BH) Discontinued   Debriefing   Debriefing DO   Does patient understand why the event happened? Patient unable to answer   Does patient agree to safe behaviors? Yes   What can we do differently so this doesn't happen again? Other (comment)  (Patient unable to answer. However, agreed to be calm.)   Plan of care reviewed and modified Yes     Patient was calmer and yelling at staff during debriefing, not answering the questions, but not threatening. Patient continues to be disorganized, however calmer. Patient agreed to safe behaviors. Patient was asked if in pain or experienced any injury. Patient said \"no\". Patient does not appear to be in pain and no injury noted. Seclusion discontinued and patient walked with staff from seclusion to assigned room being calm. Continues to be on SIO with 1:1 staffing as ordered.  "
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
## 2021-04-24 PROCEDURE — 124N000002 HC R&B MH UMMC

## 2021-04-24 PROCEDURE — 250N000013 HC RX MED GY IP 250 OP 250 PS 637: Performed by: PSYCHIATRY & NEUROLOGY

## 2021-04-24 PROCEDURE — 250N000011 HC RX IP 250 OP 636: Performed by: PSYCHIATRY & NEUROLOGY

## 2021-04-24 RX ADMIN — LORAZEPAM 2 MG: 2 INJECTION INTRAMUSCULAR; INTRAVENOUS at 15:49

## 2021-04-24 RX ADMIN — DIPHENHYDRAMINE HYDROCHLORIDE 25 MG: 50 INJECTION, SOLUTION INTRAMUSCULAR; INTRAVENOUS at 15:49

## 2021-04-24 RX ADMIN — HALOPERIDOL LACTATE 5 MG: 5 INJECTION, SOLUTION INTRAMUSCULAR at 15:48

## 2021-04-24 RX ADMIN — LORAZEPAM 1 MG: 1 TABLET ORAL at 13:03

## 2021-04-24 RX ADMIN — HALOPERIDOL LACTATE 5 MG: 5 INJECTION, SOLUTION INTRAMUSCULAR at 08:28

## 2021-04-24 ASSESSMENT — ACTIVITIES OF DAILY LIVING (ADL)
HYGIENE/GROOMING: PROMPTS
HYGIENE/GROOMING: PROMPTS
ORAL_HYGIENE: PROMPTS
DRESS: SCRUBS (BEHAVIORAL HEALTH)
ORAL_HYGIENE: PROMPTS
DRESS: SCRUBS (BEHAVIORAL HEALTH)
LAUNDRY: UNABLE TO COMPLETE

## 2021-04-24 NOTE — PLAN OF CARE
Problem: Sleep Disturbance (Psychotic Signs/Symptoms)  Goal: Improved Sleep (Psychotic Signs/Symptoms)  Outcome: No Change  Patient was awake most of the night. She had difficulty falling asleep and slept for about 1.75 hours. She was observed cleaning her room; requested a new set of linen and changed the linen on her bed. Patient was seen sitting on her bed and sleeping; was offered PRN medication to help facilitate sleep, she declined. She was offered the kenroy-chair so that she'll be in a reclining position and not fall; she accept it, sat upright in it and still didn't sleep. Patient had a relatively quiet night, though she is still not getting adequate sleep.
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
## 2021-04-24 NOTE — PLAN OF CARE
Problem: Behavior Regulation Impairment (Psychotic Signs/Symptoms)  Goal: Improved Behavioral Control (Psychotic Signs/Symptoms)  Outcome: No Change    Pt is isolative and withdrawn to her room. She is tensed and irritable upon approach. Flat, blunted and tensed affect. Her speech is pressured and ramble. She appeared confused and disorganized. She seemed to be responding to internal stimuli. No aggression or seclusion this evening. Pt refused Oral Psychiatric Emergency medication despite multiple encouragements from this writer. Code green called, and IM Psychiatric Emergency medication administered. No aggression or seclusion this evening. She refused PRN medication for agitation. She continues to decline vital signs. Pt is on SIO 1:1 for assault risk. Her appetite is good; she ate 80% of her dinner.  Her hygiene maintenance remains poor.

## 2021-04-24 NOTE — PROGRESS NOTES
Patient is due for her 7-day covid test but declined, having the test done.  Will continue to monitor.

## 2021-04-24 NOTE — PROVIDER NOTIFICATION
04/24/21 9036   Debriefing   Debriefing DO   Does patient understand why the event happened? Patient unable to answer   Does patient agree to safe behaviors? Patient refuses to answer   What can we do differently so this doesn't happen again? Patient refuses to answer   Plan of care reviewed and modified Yes      Pt too disorganized to engage in meaningful conversation at this time.

## 2021-04-24 NOTE — PROVIDER NOTIFICATION
04/24/21 1645   Restraint Type   Seclusion () Discontinued   Pt calmed at this time, but her affect is still very tensed. She is resting comfortably on her bed. She is too disorganized to engage in meaningful conversation, and she refused to talk when this writer tried to process out her of seclusion. Clinical decision made and seclusion discontinued at 1645 hour.
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
## 2021-04-25 PROCEDURE — 124N000002 HC R&B MH UMMC

## 2021-04-25 PROCEDURE — 250N000011 HC RX IP 250 OP 636: Performed by: PSYCHIATRY & NEUROLOGY

## 2021-04-25 RX ADMIN — DIPHENHYDRAMINE HYDROCHLORIDE 25 MG: 50 INJECTION, SOLUTION INTRAMUSCULAR; INTRAVENOUS at 09:03

## 2021-04-25 RX ADMIN — HALOPERIDOL LACTATE 5 MG: 5 INJECTION, SOLUTION INTRAMUSCULAR at 20:02

## 2021-04-25 RX ADMIN — LORAZEPAM 2 MG: 2 INJECTION INTRAMUSCULAR; INTRAVENOUS at 09:03

## 2021-04-25 RX ADMIN — HALOPERIDOL LACTATE 5 MG: 5 INJECTION, SOLUTION INTRAMUSCULAR at 09:04

## 2021-04-25 ASSESSMENT — ACTIVITIES OF DAILY LIVING (ADL)
LAUNDRY: UNABLE TO COMPLETE
ORAL_HYGIENE: PROMPTS
HYGIENE/GROOMING: PROMPTS
DRESS: SCRUBS (BEHAVIORAL HEALTH)
ORAL_HYGIENE: PROMPTS
HYGIENE/GROOMING: PROMPTS
DRESS: SCRUBS (BEHAVIORAL HEALTH)

## 2021-04-25 NOTE — PROGRESS NOTES
Pt deep in sleep due to the PRN medication given earlier this shift. Scheduled emergency medication (Haldol) held per on-call MD instruction. PRN Haldol given at 3:49 pm

## 2021-04-25 NOTE — PLAN OF CARE
Problem: Sleep Disturbance (Psychotic Signs/Symptoms)  Goal: Improved Sleep (Psychotic Signs/Symptoms)  Outcome: Improving  Patient was asleep at the beginning of the shift; awoke at about 0100 and was up for all but an hour of the rest of the night. She declined offers of PRN medications and sleepy-time tea to help with sleep. Patient stayed in her room throughout the night and had an uneventful night.

## 2021-04-25 NOTE — PROGRESS NOTES
Michelle slept through this evening.  scheduled medication not given.  PRN (IM) Ativan, Benadryl, and Haldol were administered earlier due to extreme agitation. She refused dinner.
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
## 2021-04-26 PROCEDURE — 99233 SBSQ HOSP IP/OBS HIGH 50: CPT | Performed by: PSYCHIATRY & NEUROLOGY

## 2021-04-26 PROCEDURE — 250N000011 HC RX IP 250 OP 636: Performed by: PSYCHIATRY & NEUROLOGY

## 2021-04-26 PROCEDURE — 124N000002 HC R&B MH UMMC

## 2021-04-26 RX ORDER — HALOPERIDOL 5 MG/1
5 TABLET ORAL DAILY
Status: DISCONTINUED | OUTPATIENT
Start: 2021-04-27 | End: 2021-04-26

## 2021-04-26 RX ORDER — OLANZAPINE 10 MG/1
10 TABLET, ORALLY DISINTEGRATING ORAL 2 TIMES DAILY
Status: DISCONTINUED | OUTPATIENT
Start: 2021-04-26 | End: 2021-07-26

## 2021-04-26 RX ORDER — BENZTROPINE MESYLATE 1 MG/1
1 TABLET ORAL 2 TIMES DAILY
Status: DISCONTINUED | OUTPATIENT
Start: 2021-04-26 | End: 2021-05-03

## 2021-04-26 RX ORDER — HALOPERIDOL 5 MG/ML
5 INJECTION INTRAMUSCULAR DAILY
Status: DISCONTINUED | OUTPATIENT
Start: 2021-04-27 | End: 2021-04-26

## 2021-04-26 RX ORDER — OLANZAPINE 10 MG/2ML
10 INJECTION, POWDER, FOR SOLUTION INTRAMUSCULAR 2 TIMES DAILY
Status: DISCONTINUED | OUTPATIENT
Start: 2021-04-26 | End: 2021-07-26

## 2021-04-26 RX ORDER — HALOPERIDOL 5 MG/ML
10 INJECTION INTRAMUSCULAR AT BEDTIME
Status: DISCONTINUED | OUTPATIENT
Start: 2021-04-26 | End: 2021-04-26

## 2021-04-26 RX ORDER — HALOPERIDOL 10 MG/1
10 TABLET ORAL AT BEDTIME
Status: DISCONTINUED | OUTPATIENT
Start: 2021-04-26 | End: 2021-04-26

## 2021-04-26 RX ORDER — BENZTROPINE MESYLATE 1 MG/ML
2 INJECTION, SOLUTION INTRAMUSCULAR; INTRAVENOUS DAILY PRN
Status: DISCONTINUED | OUTPATIENT
Start: 2021-04-26 | End: 2021-09-16 | Stop reason: HOSPADM

## 2021-04-26 RX ADMIN — HALOPERIDOL LACTATE 5 MG: 5 INJECTION, SOLUTION INTRAMUSCULAR at 08:01

## 2021-04-26 RX ADMIN — OLANZAPINE 10 MG: 10 INJECTION, POWDER, LYOPHILIZED, FOR SOLUTION INTRAMUSCULAR at 21:18

## 2021-04-26 RX ADMIN — BENZTROPINE MESYLATE 2 MG: 1 INJECTION INTRAMUSCULAR; INTRAVENOUS at 18:06

## 2021-04-26 ASSESSMENT — ACTIVITIES OF DAILY LIVING (ADL)
HYGIENE/GROOMING: PROMPTS
LAUNDRY: UNABLE TO COMPLETE
DRESS: SCRUBS (BEHAVIORAL HEALTH)
DRESS: SCRUBS (BEHAVIORAL HEALTH)
ORAL_HYGIENE: PROMPTS
ORAL_HYGIENE: PROMPTS
HYGIENE/GROOMING: PROMPTS

## 2021-04-26 NOTE — PLAN OF CARE
Pt awake at start of shift. Breathing quiet and unlabored.     Pt awake throughout most of the HS. She continues to sit on the edge of her bed and sleep intermittently.     Appears to have slept 2 hours.     Pt continues on 1:1 SIO with 5ft staff space distance.     Pt on  ASSAULT and ELOPEMENT precautions in addition to single room order. Any related events noted above.     Will continue to monitor and assess.     Problem: Sleep Disturbance (Psychotic Signs/Symptoms)  Goal: Improved Sleep (Psychotic Signs/Symptoms)  Outcome: No Change

## 2021-04-26 NOTE — PLAN OF CARE
"Current Diagnosis/Procedure:  Schizoaffective Disorder, Bipolar Type     Work Completed:    Patient was discussed in team meeting. Patient attended her final hearing today at 11:15 AM with the assistance of CTC. Patient struggled to understand the process of the court, and eventually had to be muted by the court in order for things to proceed. Patient repeatedly asked for \"Barack Obama to my \", and became angry when the  stated they could not allow this. Patient was difficult to understand, mumbling disorganized words and expressing a tangential thought process. As of 3 PM the unit has not received final paperwork on her MI Commitment and Hatch. Patient has not been responding to antipsychotic medications and it was discussed in treatment team today that ECT may need to be initiated.     Discharge plan or goal:  No discharge plans at present time.       Barriers to discharge:  Continued need for stabilization of severity of illness and medication. Patient has been committed as MI w/ Hatch through [facility].   "
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
## 2021-04-26 NOTE — PLAN OF CARE
"The patient continues to do overall poorly. She required IM medication due to refusing her scheduled psychiatric emergency medications. She was calm for most of the morning following that. Later she became more agitated. She continued to have stereotyped movements and made repeated phrases such as \"see my lord\" and other sentences that could not be understood. She had to be frequently redirected away from the unit doors due to standing and making repeated movements near them. She refused all oral medications. She was noted to be looking upward continuously on several occassions. Concerns were noted to the MD. She denies physical health concerns or any kind. She did drink two apple juices and ate an orange this shift but total intake is minimal. Pt continues to refuse all VS and labs.  "

## 2021-04-27 PROCEDURE — 250N000011 HC RX IP 250 OP 636: Performed by: PSYCHIATRY & NEUROLOGY

## 2021-04-27 PROCEDURE — 99233 SBSQ HOSP IP/OBS HIGH 50: CPT | Performed by: PSYCHIATRY & NEUROLOGY

## 2021-04-27 PROCEDURE — 124N000002 HC R&B MH UMMC

## 2021-04-27 RX ADMIN — OLANZAPINE 10 MG: 10 INJECTION, POWDER, LYOPHILIZED, FOR SOLUTION INTRAMUSCULAR at 20:48

## 2021-04-27 RX ADMIN — OLANZAPINE 10 MG: 10 INJECTION, POWDER, LYOPHILIZED, FOR SOLUTION INTRAMUSCULAR at 10:26

## 2021-04-27 ASSESSMENT — ACTIVITIES OF DAILY LIVING (ADL)
HYGIENE/GROOMING: PROMPTS
HYGIENE/GROOMING: PROMPTS
ORAL_HYGIENE: PROMPTS
DRESS: PROMPTS
ORAL_HYGIENE: PROMPTS
LAUNDRY: WITH SUPERVISION
DRESS: SCRUBS (BEHAVIORAL HEALTH)
LAUNDRY: UNABLE TO COMPLETE

## 2021-04-27 NOTE — PROGRESS NOTES
"[facility], [place]   Psychiatric Progress Note  Hospital Day: 13        Interim History:   The patient's care was discussed with the treatment team during the daily team meeting and/or staff's chart notes were reviewed.  Staff report patient continues to decline all oral medications. She became increasingly agitated during evening shift. She continued to have stereotypic movements and made repeated phrases such as \"see my lord\" and nonsensical statements. She had to be frequently redirected away from the unit doors due to standing and making repeated movements near them. She grabbed a male staff arm and asked the staff to let her off the unit.  Pt is still very disorganized, restless and confused. She was noted to be looking upward continuously on several occasions. It appeared to worsen throughout evening and IM Cogentin was administered with noted resolution and less discomfort. Intake remains minimal (consumed two apple juices and an orange yesterday during day shift, though did eat 100% of her dinner and drank one 8 oz milk). She spoke with her son over the phone for > 20 minutes. Michelle slept for 3.25 hours.     Upon interview, the patient reports that she would like to be discharged. She does not understand the commitment process and repeatedly states that she is not under a commitment or court hold. She tells me that she stopped her medications because the commitment ; \"the  said stop all medications and just monitor symptoms.\" She said that she is \"100% fine.\" Denies that she has had recent psychotic symptoms or is currently experiencing psychosis. She is very concerned that her  is dead (past delusion), and asks repeatedly \"Should I kill my children?\" as she believes they are lying to her or withholding information. She is very much convinced that he has passed away, and did agree to sign JESSICA for her son for further information/clarification. She said that she " will not take any medications orally, and notes that IMs have been painful. She was informed that she would not be discharged from an inpatient setting until she is consistently taking medications orally. She became more agitated and terminated interview with writer. She had no additional questions or concerns. Continues to have very poor insight, though thought process is more organized today.          Medications:       - Psychiatric Emergency -   Does not apply See Admin Instructions     benztropine  1 mg Oral BID     LORazepam  1 mg Oral TID     losartan  100 mg Oral Daily     metoprolol succinate ER  50 mg Oral Daily     OLANZapine zydis  10 mg Oral BID    Or     OLANZapine  10 mg Intramuscular BID     polyethylene glycol  17 g Oral Daily          Allergies:     Allergies   Allergen Reactions     Lisinopril Cough          Labs:   No results found for this or any previous visit (from the past 24 hour(s)).       Psychiatric Examination:     Resp 16   Weight is 0 lbs 0 oz  There is no height or weight on file to calculate BMI.    Weight over time:  Vitals:       Orthostatic Vitals     None            Cardiometabolic risk assessment. 04/15/21      Reviewed patient profile for cardiometabolic risk factors    Date taken /Value  REFERENCE RANGE   Abdominal Obesity  (Waist Circumference)   See nursing flowsheet Women ?35 in (88 cm)   Men ?40 in (102 cm)      Triglycerides  Triglycerides   Date Value Ref Range Status   10/19/2019 117 <150 mg/dL Final       ?150 mg/dL (1.7 mmol/L) or current treatment for elevated triglycerides   HDL cholesterol  HDL Cholesterol   Date Value Ref Range Status   10/19/2019 56 >49 mg/dL Final   ]   Women <50 mg/dL (1.3 mmol/L) in women or current treatment for low HDL cholesterol  Men <40 mg/dL (1 mmol/L) in men or current treatment for low HDL cholesterol     Fasting plasma glucose (FPG) Lab Results   Component Value Date     10/19/2019      FPG ?100 mg/dL (5.6 mmol/L) or treatment  "for elevated blood glucose   Blood pressure  BP Readings from Last 3 Encounters:   06/04/19 131/71   04/26/19 164/86    Blood pressure ?130/85 mmHg or treatment for elevated blood pressure   Family History  See family history     Appearance: awake, alert, unkempt and disheveled   Attitude:  guarded and more cooperative  Eye Contact:  Good. No evidence of ocylogyric crisis today.  Mood:  \"100% fine!\"  Affect:  intensity is flat and reactive  Speech: strong accent, raised volume, rapid when agitated  Language: fluent and intact in English  Psychomotor, Gait, Musculoskeletal: No stereotypic movements noted on examination today. Possible stiffness. no evidence of tardive dyskinesia, dystonia, or tics and physical agitation  Throught Process:  disorganized, illogical and tangental  Associations:  loosening of associations present  Thought Content:  no evidence of suicidal ideation or homicidal ideation, patient appears to be responding to internal stimuli and exhibiting significant paranoia and delusional thought content  Insight:  limited  Judgement:  limited  Oriented to:  See above  Attention Span and Concentration:  limited  Recent and Remote Memory:  limited  Fund of Knowledge:  AFSHAN    Clinical Global Impressions  First:7   Most recent:6              Precautions:     Behavioral Orders   Procedures     Assault precautions     Code 1 - Restrict to Unit     Elopement precautions     Routine Programming     As clinically indicated     Status 15     Every 15 minutes.          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 Symptoms and presentation at this time is most consistent with Schizoaffective Disorder, Bipolar Type. We have obtained most recent medication regimen from patient's ACT team, and regimen was restarted. Inpatient psychiatric hospitalization is warranted at this time for safety, stabilization, and possible adjustment in medications. Pt is on 72 hr hold. [facility] due to dangerousness, inability to care for self, active symptoms of psychosis and tad and refusal to accept proposed treatment.    On admission, PTA medications were restarted. However, patient has been declining all scheduled medications despite significant encouragement from staff and this provider. She remains very symptomatic, but she was not meeting criteria for a psychiatric emergency.  Patient is also exhibiting signs of excited catatonia, including excessive and purposeless motor activity in both the upper and lower limbs (hyperkinesis), restlessness, stereotypy, impulsivity, and frenzy. She is currently declining vital signs and labs, though is eating and drinking daily. She is currently declining scheduled po medications. Psychiatric emergency declared on 4/20 due to aggression toward others in context of severe psychosis and suspected excited catatonia. Ativan 1 mg TID was also added. Discontinued PTA Invega, Zyprexa, and thorazine on 4/20 due to consistent refusal.     On 4/26, it was noted that patient frequently had upward gaze while walking up and down the unit. She did not appear to be distressed. She reported that she was looking at \"my god.\" It is possible that she is responding to internal stimuli, though also possible she is experiencing signs of oculogyric crisis as this presentation is new since initiation of Haldol, which she has only been accepting IM. Haldol was subsequently discontinued during day shift on 4/26 and " scheduled Zyprexa was initiated on emergency basis. Oral Cogentin was also scheduled, though patient declined. On the evening of 4/26, patient's gaze was fixed in upward position for several hours and she appeared to be experiencing discomfort. IM Cogentin was administered with noted resolution. Oral intake remains poor and she continues to decline oral medications.     Target psychiatric symptoms and interventions:  - Oral Zyprexa 10 mg BID OR Zyprexa 10 mg IM. Hatch now in place.  - Ativan 1 mg TID. Patient declining.   - Cogentin 1 mg BID  - Discontinued Artane d/t consistent refusal  - Cogentin 2 mg IM daily prn for evidence acute dystonic reaction or ocylogyric crisis    Resume hydroxyzine 25-50 mg q4h prn for acute anxiety  Resume Trazodone 50 mg at bedtime prn for sleep disturbances  Resume Zyprexa 10 mg TID prn for severe agitation  Resume Ativan/Benadryl q4h prn for agitation     Acute Medical Problems and Treatments:  HTN:  - Metoprolol succinate ER 50 mg daily  - Cozaar 100 mg daily     CVA:  - Aspirin 81 mg daily     Constipation:  - Miralax 17 mg daily     Routine labs have been ordered, including CMP, CBC with differential, TSH, lipid profile.  Routine urine drug screen also ordered. Patient currently declining blood draw. She is also declining vitals.     Behavioral/Psychological/Social:  - Encourage unit programming    Safety:  - Continue precautions as noted above  - Status 15 minute checks  - Safety precautions include: assault and elopement precautions  - Continue precautions as noted above  - Status 15 minute checks  - Discontinued 1:1 on 4/26 as presence of 1:1 staff appears to be worsening patient's paranoia and agitation.     Legal Status: Committed as MI with Hatch in place for Haldol, Zyprexa, Invega, and Thorazine through [facility]    Disposition Plan   Reason for ongoing admission: poses an imminent risk to self, poses an imminent risk to others and is unable to care for self due  to severe psychosis or tad  Discharge location: TBD. Patient has ACT team.  Discharge Medications: not ordered  Follow-up Appointments: not scheduled    Entered by: Ida Zaman on 4/27/2021 at 8:31 AM

## 2021-04-27 NOTE — PLAN OF CARE
"  Problem: Cognitive Impairment (Psychotic Signs/Symptoms)  Goal: Optimal Cognitive Function (Psychotic Signs/Symptoms)  Outcome: No Change     Upon assessment this evening, pt is experiencing severe oculogyric crisis with both eyes rolled backward while staring at the ceiling for several hours. Pt appeared very uncomfortable and extremely agitated.  She has been on Haldol since admission and the Haldol was discontinued this morning by her primary provider due to EPSE.      On-call MD notified of the severe oculogyric crisis and discomfort. He gave an order for the IM Cogentin to be given.  After 10 minutes of administering the IM Cogentin,  pt eyes returned to normal with less discomfort.      She spent hours standing by the main entrance door requesting to be discharged. She grabbed a male staff arm and asked the staff to let her off the unit.       Pt is still very disorganized, restless and confused. She continues making repeated phrases when engaging in conversation, such as \"Stefan, you are a liar. Stefan, you are a liar. Stefan, you are a liar\". She asked to talk with her family member, and her son was called. They talked for about 20 minutes.     She ate 100% of her dinner and drank 1 8oz milk. She was seen going in and out of her bathroom.     She refused scheduled oral HS medication. She stated that she is no longer committed and did not need to take medication. [facility] called and IM Zyprexa (Emergency medication given) administered.    "

## 2021-04-27 NOTE — PLAN OF CARE
Current Diagnosis/Procedure:  Schizoaffective Disorder, Bipolar Type     Work Completed:    Patient was discussed in team meeting. She has been showing some improvement in clarity and mood, but continues to refuse medications/vitals, and wants to leave. Writer attempted to provide patient with commitment and sukumar paperwork this morning but she refused to accept it. Patient continues to state that her commitment ended in March of this year and that she is no longer on one. Patient has not been responding to antipsychotic medications and it was discussed in treatment team today that ECT may need to be initiated. Writer completed team note.      Discharge plan or goal:  No discharge plans at present time.       Barriers to discharge:  Continued need for stabilization of severity of illness and medication. Patient has been committed as MI w/ Sukumar through [facility].

## 2021-04-27 NOTE — PLAN OF CARE
Pt slept for 3.25 hours. She stayed in her room with the exception of checking the time, no behavioral issues or concerns. No PRNs given, will continue to monitor.

## 2021-04-27 NOTE — PLAN OF CARE
Patient continues to do poorly. Refusing mediation saying that her food is her medication. IM Zyprexa given per patino order. Spent majority of the day in her room, sitting in lounge briefly. Patient ate two full trays along with 2 apple juices. Continue with POC, notify MD of changes.

## 2021-04-27 NOTE — PLAN OF CARE
BEHAVIORAL TEAM DISCUSSION    Participants: Lucille Sales, LPCC, LADC, Ida Zaman MD, Kim Patton, RN.   Progress: Minimal improvement.  Anticipated length of stay: Unknown  Continued Stay Criteria/Rationale: Patient was committed as Mentally Ill w/ Hatch in place on 4/26/2021. She continues to demonstrate very minimal improvement with the current neuroleptics, and team is evaluating the need for ECT in the future in order to stabilize.   Medical/Physical: Uncontrolled Hypertension, R/O Oculogyric crisis  Precautions:   Behavioral Orders   Procedures    Assault precautions    Code 1 - Restrict to Unit    Elopement precautions    Routine Programming     As clinically indicated    Status 15     Every 15 minutes.     Plan: Patient will remain hospitalized for ongoing assessment and medication management until she is able to stabilize enough for a discharge plan.   Rationale for change in precautions or plan: No change in plan.

## 2021-04-28 PROCEDURE — 250N000011 HC RX IP 250 OP 636: Performed by: PSYCHIATRY & NEUROLOGY

## 2021-04-28 PROCEDURE — 124N000002 HC R&B MH UMMC

## 2021-04-28 PROCEDURE — 99233 SBSQ HOSP IP/OBS HIGH 50: CPT | Performed by: PSYCHIATRY & NEUROLOGY

## 2021-04-28 RX ADMIN — OLANZAPINE 10 MG: 10 INJECTION, POWDER, LYOPHILIZED, FOR SOLUTION INTRAMUSCULAR at 09:49

## 2021-04-28 RX ADMIN — OLANZAPINE 10 MG: 10 INJECTION, POWDER, LYOPHILIZED, FOR SOLUTION INTRAMUSCULAR at 20:44

## 2021-04-28 ASSESSMENT — ACTIVITIES OF DAILY LIVING (ADL)
HYGIENE/GROOMING: PROMPTS
LAUNDRY: UNABLE TO COMPLETE
ORAL_HYGIENE: PROMPTS
DRESS: PROMPTS
HYGIENE/GROOMING: PROMPTS
LAUNDRY: UNABLE TO COMPLETE
DRESS: PROMPTS
ORAL_HYGIENE: PROMPTS

## 2021-04-28 NOTE — PROVIDER NOTIFICATION
04/28/21 0600   Sleep/Rest/Relaxation   Sleep/Rest/Relaxation unable to stay asleep   Night Time # Hours 2.25 hours       Pt had a quiet night with no behavior or safety concerns. But apparently pt had sleep disturbances that prevented her from falling and staying asleep last night. Pt was calm in her room and refused prn medications.

## 2021-04-28 NOTE — PROVIDER NOTIFICATION
04/27/21 2145   Justification   Clinical Justification Others     Pt was agitated and talking to herself. Refused her scheduled Zyprexa. Therapeuticl hold applied for IM administration of Zyprexa. Provider notified. Pt sustained no injury.

## 2021-04-28 NOTE — PROVIDER NOTIFICATION
04/28/21 0950   Debriefing   Debriefing DO   Does patient understand why the event happened? Patient refuses to answer   Does patient agree to safe behaviors? Patient refuses to answer   What can we do differently so this doesn't happen again? Patient refuses to answer   Plan of care reviewed and modified Yes        Staff hands on for less than 5 minutes. Attempted to debrief and patient continues to refuse.

## 2021-04-28 NOTE — PROVIDER NOTIFICATION
04/28/21 0950   Justification   Clinical Justification Others     Patient continues to refuse PO meds. Given Jarvised medication per order. Staff went hands on while patient laid in bed, was willing to lay on her side for IM injection.

## 2021-04-28 NOTE — PLAN OF CARE
Unremarkable shift. Patient continues to be very minimal in interactions. Continues to refuse all PO mediations, IM med given per Hatch orders. Eating meals well, I&O's discontinued. Continue with  POC, notify MD of changes.

## 2021-04-28 NOTE — PLAN OF CARE
Problem: Adult Inpatient Plan of Care  Goal: Plan of Care Review  Outcome: No Change    Pt was noted to be talking to herself in her room. Refused all scheduled medications. Therapeutic hold was initiated to force IM Zyprexa with a code green. Provider notified. Pt was somewhat agitated in the process. She ate all of her dinner. Still appeared to be confused and disorganized.   Plan: Status 15s; Build trust with pt. Continue to build on strengths. Encourage compliance and healthy coping.

## 2021-04-28 NOTE — PROGRESS NOTES
"[facility], [place]   Psychiatric Progress Note  Hospital Day: 14        Interim History:   The patient's care was discussed with the treatment team during the daily team meeting and/or staff's chart notes were reviewed.  Staff report patient continues to exhibit symptoms/signs of psychosis, including responding to internal stimuli, disorganized thought process, delusional thought content, paranoia, and intermittent agitation. She continues to decline Jarvised oral medications and has thus been given IM medications consistently. No evidence of oculogyric crisis. Patient slept 2.25 hours overnight.     Upon interview, Michelle was initially sitting calmly in bed. She reported that she is \"good.\" She denies sleep disturbances. She said that she \"smells\" and needs to take a bath, but is unwilling to shower in the hospital. She repeatedly asked to go home. She said \"Please, I beg you!\" She quickly grabbed to writer's hands and arms with tight . She said \"You are my friend you are my friend! I beg you, let me go!\" She was redirected without evidence of severe agitation. She was tearful, again asking repeatedly to be discharged to home. She does not believe she is under commitment and thus does not believe she needs to take medications. Writer attempted to educate patient several times without success. Insight remains very poor. She denies all mental health symptoms, including SI/HI.          Medications:       benztropine  1 mg Oral BID     LORazepam  1 mg Oral TID     losartan  100 mg Oral Daily     metoprolol succinate ER  50 mg Oral Daily     OLANZapine zydis  10 mg Oral BID    Or     OLANZapine  10 mg Intramuscular BID     polyethylene glycol  17 g Oral Daily          Allergies:     Allergies   Allergen Reactions     Lisinopril Cough          Labs:   No results found for this or any previous visit (from the past 24 hour(s)).       Psychiatric Examination:     Resp 16   Weight is 0 lbs 0 " "oz  There is no height or weight on file to calculate BMI.    Weight over time:  Vitals:       Orthostatic Vitals     None            Cardiometabolic risk assessment. 04/15/21      Reviewed patient profile for cardiometabolic risk factors    Date taken /Value  REFERENCE RANGE   Abdominal Obesity  (Waist Circumference)   See nursing flowsheet Women ?35 in (88 cm)   Men ?40 in (102 cm)      Triglycerides  Triglycerides   Date Value Ref Range Status   10/19/2019 117 <150 mg/dL Final       ?150 mg/dL (1.7 mmol/L) or current treatment for elevated triglycerides   HDL cholesterol  HDL Cholesterol   Date Value Ref Range Status   10/19/2019 56 >49 mg/dL Final   ]   Women <50 mg/dL (1.3 mmol/L) in women or current treatment for low HDL cholesterol  Men <40 mg/dL (1 mmol/L) in men or current treatment for low HDL cholesterol     Fasting plasma glucose (FPG) Lab Results   Component Value Date     10/19/2019      FPG ?100 mg/dL (5.6 mmol/L) or treatment for elevated blood glucose   Blood pressure  BP Readings from Last 3 Encounters:   06/04/19 131/71   04/26/19 164/86    Blood pressure ?130/85 mmHg or treatment for elevated blood pressure   Family History  See family history     Appearance: awake, alert, unkempt and disheveled   Attitude:  guarded and more cooperative  Eye Contact:  Good. No evidence of ocylogyric crisis today.  Mood:  \"good\"  Affect:  intensity is flat and reactive  Speech: strong accent, raised volume, rapid when agitated  Language: fluent and intact in English  Psychomotor, Gait, Musculoskeletal: No stereotypic movements noted on examination today. Possible stiffness. no evidence of tardive dyskinesia, dystonia, or tics and physical agitation  Throught Process:  disorganized, illogical and tangental  Associations:  loosening of associations present  Thought Content:  no evidence of suicidal ideation or homicidal ideation, patient appears to be responding to internal stimuli and exhibiting significant " paranoia and delusional thought content  Insight:  limited  Judgement:  limited  Oriented to:  See above  Attention Span and Concentration:  limited  Recent and Remote Memory:  limited  Fund of Knowledge:  AFSHAN    Clinical Global Impressions  First:7   Most recent:6              Precautions:     Behavioral Orders   Procedures     Assault precautions     Code 1 - Restrict to Unit     Elopement precautions     Routine Programming     As clinically indicated     Status 15     Every 15 minutes.          Diagnoses:     Schizoaffective Disorder, Bipolar Type, decompensated  Excited Catatonia  HTN  Dyslipidemia  Hx of CVA in 2017  Suspect Oculogyric crisis 2/2 Haldol         Assessment & Plan:     Assessment and hospital summary:  This patient is a 60 year old -American female with history of Schizoaffective Disorder, bipolar type, previous commitments who presented to ED with tad, psychosis, and agitation in context of medication non-adherence and recent expiration of MI commitment. Symptoms and presentation at this time is most consistent with Schizoaffective Disorder, Bipolar Type. We have obtained most recent medication regimen from patient's ACT team, and regimen was restarted. Inpatient psychiatric hospitalization is warranted at this time for safety, stabilization, and possible adjustment in medications. Pt is on 72 hr hold. Petitioned for MI commitment with Holden through [facility] on 4/14 due to dangerousness, inability to care for self, active symptoms of psychosis and tad and refusal to accept proposed treatment.    On admission, PTA medications were restarted. However, patient has been declining all scheduled medications despite significant encouragement from staff and this provider. She remains very symptomatic, but she was not meeting criteria for a psychiatric emergency.  Patient is also exhibiting signs of excited catatonia, including excessive and purposeless motor activity in both the upper  "and lower limbs (hyperkinesis), restlessness, stereotypy, impulsivity, and frenzy. She is currently declining vital signs and labs, though is eating and drinking daily. She is currently declining scheduled po medications. Psychiatric emergency declared on 4/20 due to aggression toward others in context of severe psychosis and suspected excited catatonia. Ativan 1 mg TID was also added. Discontinued PTA Invega, Zyprexa, and thorazine on 4/20 due to consistent refusal.     On 4/26, it was noted that patient frequently had upward gaze while walking up and down the unit. She did not appear to be distressed. She reported that she was looking at \"my god.\" It is possible that she is responding to internal stimuli, though also possible she is experiencing signs of oculogyric crisis as this presentation is new since initiation of Haldol, which she has only been accepting IM. Haldol was subsequently discontinued during day shift on 4/26 and scheduled Zyprexa was initiated on emergency basis. Oral Cogentin was also scheduled, though patient declined. On the evening of 4/26, patient's gaze was fixed in upward position for several hours and she appeared to be experiencing discomfort. IM Cogentin was administered with noted resolution. Oral intake remains poor and she continues to decline oral medications. Partial improvements have been observed since switch to scheduled Zyprexa.     Target psychiatric symptoms and interventions:  - Oral Zyprexa 10 mg BID OR Zyprexa 10 mg IM. Hatch now in place.  - Ativan 1 mg TID. Patient declining.   - Cogentin 1 mg BID  - Discontinued Artane d/t consistent refusal  - Cogentin 2 mg IM daily prn for evidence acute dystonic reaction or ocylogyric crisis    Resume hydroxyzine 25-50 mg q4h prn for acute anxiety  Resume Trazodone 50 mg at bedtime prn for sleep disturbances  Resume Zyprexa 10 mg TID prn for severe agitation  Resume Ativan/Benadryl q4h prn for agitation     Acute Medical Problems and " Treatments:  HTN:  - Metoprolol succinate ER 50 mg daily  - Cozaar 100 mg daily     CVA:  - Aspirin 81 mg daily     Constipation:  - Miralax 17 mg daily     Routine labs have been ordered, including CMP, CBC with differential, TSH, lipid profile.  Routine urine drug screen also ordered. Patient currently declining blood draw. She is also declining vitals.     Behavioral/Psychological/Social:  - Encourage unit programming    Safety:  - Continue precautions as noted above  - Status 15 minute checks  - Safety precautions include: assault and elopement precautions  - Continue precautions as noted above  - Status 15 minute checks  - Discontinued 1:1 on 4/26 as presence of 1:1 staff appears to be worsening patient's paranoia and agitation.     Legal Status: Committed as MI with Hatch in place for Haldol, Zyprexa, Invega, and Thorazine through [facility]    Disposition Plan   Reason for ongoing admission: poses an imminent risk to self, poses an imminent risk to others and is unable to care for self due to severe psychosis or tad  Discharge location: D. Patient has ACT team.  Discharge Medications: not ordered  Follow-up Appointments: not scheduled    Entered by: Ida Zaman on 4/28/2021 at 8:10 AM

## 2021-04-28 NOTE — PROVIDER NOTIFICATION
04/28/21 0950   Seclusion or Restraint Order   In Person Face to Face Assessment Conducted Yes-Eval of pt's immediate situation, reaction to intervention, complete review of systems assessment, behavioral assessment & review/assessment of hx, drugs & meds, recent labs, etc, behavioral condition, need to continue/terminate restraint/seclusion   Patient Experienced No adverse physical outcome from seclusion/restraint initiation   Continuation of Seclus/Restraint indicated at this time No   Physical hold completed for med administration only. Pt denies any injuries or pain from physical hold. All medications, MD orders, and lab results reviewed. Dr Zaman notified of face to face results.

## 2021-04-29 PROCEDURE — 124N000002 HC R&B MH UMMC

## 2021-04-29 PROCEDURE — 250N000011 HC RX IP 250 OP 636: Performed by: PSYCHIATRY & NEUROLOGY

## 2021-04-29 PROCEDURE — 99233 SBSQ HOSP IP/OBS HIGH 50: CPT | Performed by: PSYCHIATRY & NEUROLOGY

## 2021-04-29 PROCEDURE — 250N000013 HC RX MED GY IP 250 OP 250 PS 637: Performed by: PSYCHIATRY & NEUROLOGY

## 2021-04-29 RX ADMIN — OLANZAPINE 10 MG: 10 INJECTION, POWDER, LYOPHILIZED, FOR SOLUTION INTRAMUSCULAR at 08:18

## 2021-04-29 RX ADMIN — BENZTROPINE MESYLATE 1 MG: 1 TABLET ORAL at 18:23

## 2021-04-29 RX ADMIN — OLANZAPINE 10 MG: 10 TABLET, ORALLY DISINTEGRATING ORAL at 17:50

## 2021-04-29 RX ADMIN — LORAZEPAM 1 MG: 1 TABLET ORAL at 18:23

## 2021-04-29 ASSESSMENT — ACTIVITIES OF DAILY LIVING (ADL)
LAUNDRY: UNABLE TO COMPLETE
DRESS: SCRUBS (BEHAVIORAL HEALTH);PROMPTS
DRESS: SCRUBS (BEHAVIORAL HEALTH)
ORAL_HYGIENE: PROMPTS
HYGIENE/GROOMING: PROMPTS
LAUNDRY: UNABLE TO COMPLETE
HYGIENE/GROOMING: PROMPTS
ORAL_HYGIENE: PROMPTS

## 2021-04-29 NOTE — PLAN OF CARE
Problem: Cognitive Impairment (Psychotic Signs/Symptoms)  Goal: Optimal Cognitive Function (Psychotic Signs/Symptoms)  Outcome: No Change     SI/Self harm:  none reported or observed  Aggression/agitation/HI:  pt will raise her voice and become verbally disruptive, but no physical agitation or aggression noted today  AVH:  pt does not appear to be responding, denies hallucinations  Sleep: 4.75 hours documented on NOC shift  PRN Med: No PRNs administered this shift  Medication AE: none reported or observed. Pt is declining all non-patino medications  Physical Complaints/Issues: none  I & O: eating and drinking well  ADLs: needs prompts  Vitals:  pt refused VS assessment this morning.  COVID 19 Assessment:  pt tested negative on 4/13/21. Not currently showing any s/s of COVID19 infection  Milieu Participation: minimal to none. Pt is visible at times though not interacting much with others, keeping herself.  Behavior: tense, irritable, requesting to go home and make international calls. Pt's request for international calls was denied at this time as pt stated she wanted to call a family friend and tell them her  is dead, which is a frequent delusion for pt. Writer explained to pt that her request will be reevaluated tomorrow. Pt continues to decline all medications, and thus receives IM olanzapine per Patino order.     Writer attempted to complete admission questionnaire with pt, but she does not answer questions due to continued confusion, disorganization and paranoia  No other concerns at this time. Nursing will continue to monitor and assess.

## 2021-04-29 NOTE — PROVIDER NOTIFICATION
04/29/21 0820   Debriefing   Debriefing DO   Does patient understand why the event happened? Patient refuses to answer   Does patient agree to safe behaviors? Patient refuses to answer   What can we do differently so this doesn't happen again? Patient refuses to answer   Plan of care reviewed and modified Yes     Pt held for less than one one minute to administer Hatch-ordered medication. Hold discontinued immediately after medication given.

## 2021-04-29 NOTE — PROVIDER NOTIFICATION
"   04/29/21 0832   Seclusion or Restraint Order   In Person Face to Face Assessment Conducted Yes-Eval of pt's immediate situation, reaction to intervention, complete review of systems assessment, behavioral assessment & review/assessment of hx, drugs & meds, recent labs, etc, behavioral condition, need to continue/terminate restraint/seclusion   Patient Experienced No adverse physical outcome from seclusion/restraint initiation   Continuation of Seclus/Restraint indicated at this time No     Patient was no longer being physically held, as it was released after the IM.    -Patient was sitting in the lounge, with her head resting on the table.  She denied pain, or injury.  Writer asked patient if she knew why she received an IM - she stated \"no\".  Writer told patient she had a Hatch order and needed to take the PO dose, or she would receive a shot.  Patient stated \"I can't take the pill, because I need to go home\".  Patient refused further teaching, just stating, \"I am waiting for Dr. Zaman\".    Writer reviewed patient's chart.    Dr. Zaman was notified of the results of the face to face and that that there were no apparent injuries to the patient and none to staff.  "

## 2021-04-29 NOTE — PROVIDER NOTIFICATION
04/28/21 2045   Debriefing   Debriefing DO   Does patient understand why the event happened? Patient refuses to answer   Does patient agree to safe behaviors? Patient refuses to answer   What can we do differently so this doesn't happen again? Patient refuses to answer   Plan of care reviewed and modified Yes     Pt refused to engage in debriefing.

## 2021-04-29 NOTE — PROVIDER NOTIFICATION
04/29/21 0820   Justification   Clinical Justification Others  (NA- held for 1 minute to administer medication)     Pt refused scheduled Hatch-ordered olanzapine. Pt walked to her room and laid down on her bed. Two staff members gently held her arms in place for safety during IM medication administration. Restraint lasted less than one minute. Nursing will continue to monitor and assess pt response.

## 2021-04-29 NOTE — PLAN OF CARE
Current Diagnosis/Procedure:  Schizoaffective Disorder, Bipolar Type     Work Completed:    Patient was discussed in team meeting. She has been showing some improvement in clarity and mood, but continues to refuse medications/vitals, and wants to leave. Patient continues to refuse to accept that she has been placed on a new commitment w/ Hatch. Writer observed patient to present with a more positive mood and clearer head than she has throughout her hospital stay.     Discharge plan or goal:  Patient will discharge to apartment with ACT team in place if she is able to stabilize and continue to take medications.       Barriers to discharge:  Continued need for stabilization of severity of illness and medication. Patient has been committed as MI w/ Hatch through [facility].

## 2021-04-29 NOTE — PROVIDER NOTIFICATION
"   04/28/21 2045   Seclusion or Restraint Order   In Person Face to Face Assessment Conducted Yes-Eval of pt's immediate situation, reaction to intervention, complete review of systems assessment, behavioral assessment & review/assessment of hx, drugs & meds, recent labs, etc, behavioral condition, need to continue/terminate restraint/seclusion   Patient Experienced No adverse physical outcome from seclusion/restraint initiation   Continuation of Seclus/Restraint indicated at this time No     Patient was on her bed.  When asked about pain, or injury, patient told writer \"Get out of my room\".  Would not engage further.  Patient had no apparent injuries.    Writer reviewed patient's chart.    Dr. Zaman was notified of the results of the face to face and that there were no apparent injuries to the patient and none to staff.  "

## 2021-04-29 NOTE — PLAN OF CARE
Writer spoke with patient's ACT Team nurse Kourtney, who was checking in to get an update on the patient. Writer passed along the information that due to patient's refusal to take oral medications, and lack of responsiveness from current IM neuroleptics that the hospital provider is considering pursuing a Mcgrath-Kohler. Kourtney asked that the ACT Team Office Coordination be contacted with any updates ([phone]) in the mean time, and that patient's CM  will be the one checking in with writer on a weekly basis going forward.

## 2021-04-29 NOTE — PROGRESS NOTES
"[facility], [place]   Psychiatric Progress Note  Hospital Day: 15        Interim History:   The patient's care was discussed with the treatment team during the daily team meeting and/or staff's chart notes were reviewed.  Staff report patient continues to exhibit symptoms/signs of psychosis, including responding to internal stimuli, disorganized thought process, delusional thought content, paranoia, and intermittent agitation. She continues to decline all medications, including Jarvised oral medications and has thus been given IM medications consistently. No evidence of oculogyric crisis. Patient slept 4.75 hours overnight.     Upon interview, Michelle asks to have access to her cell phone so that she can make a call to her 's friend to inform him/her that her  is dead (this has been a longstanding delusion and has not been confirmed). She said repeatedly that \"food is my medicine,\" and \"I will never ever ever take pills!\" She did not comment as to why other than to say that she is no longer under a commitment. She then said \"Are you trying to kill me? You're killing me! Injections kill!\" She did not respond to attempts at reassurance. She does not believe she has a mental illness and continues to request discharge. She denies all mental health symptoms, including SI/HI.          Medications:       benztropine  1 mg Oral BID     LORazepam  1 mg Oral TID     losartan  100 mg Oral Daily     metoprolol succinate ER  50 mg Oral Daily     OLANZapine zydis  10 mg Oral BID    Or     OLANZapine  10 mg Intramuscular BID     polyethylene glycol  17 g Oral Daily          Allergies:     Allergies   Allergen Reactions     Lisinopril Cough          Labs:   No results found for this or any previous visit (from the past 24 hour(s)).       Psychiatric Examination:     Resp 16   Weight is 0 lbs 0 oz  There is no height or weight on file to calculate BMI.    Weight over time:  Vitals: " "      Orthostatic Vitals     None            Cardiometabolic risk assessment. 04/15/21      Reviewed patient profile for cardiometabolic risk factors    Date taken /Value  REFERENCE RANGE   Abdominal Obesity  (Waist Circumference)   See nursing flowsheet Women ?35 in (88 cm)   Men ?40 in (102 cm)      Triglycerides  Triglycerides   Date Value Ref Range Status   10/19/2019 117 <150 mg/dL Final       ?150 mg/dL (1.7 mmol/L) or current treatment for elevated triglycerides   HDL cholesterol  HDL Cholesterol   Date Value Ref Range Status   10/19/2019 56 >49 mg/dL Final   ]   Women <50 mg/dL (1.3 mmol/L) in women or current treatment for low HDL cholesterol  Men <40 mg/dL (1 mmol/L) in men or current treatment for low HDL cholesterol     Fasting plasma glucose (FPG) Lab Results   Component Value Date     10/19/2019      FPG ?100 mg/dL (5.6 mmol/L) or treatment for elevated blood glucose   Blood pressure  BP Readings from Last 3 Encounters:   06/04/19 131/71   04/26/19 164/86    Blood pressure ?130/85 mmHg or treatment for elevated blood pressure   Family History  See family history     Appearance: awake, alert, unkempt and disheveled   Attitude:  guarded and more cooperative  Eye Contact:  Good. No evidence of ocylogyric crisis today.  Mood:  \"good\"  Affect:  intensity is flat and reactive  Speech: strong accent, raised volume, rapid when agitated  Language: fluent and intact in English  Psychomotor, Gait, Musculoskeletal: No stereotypic movements noted on examination today. Possible stiffness. no evidence of tardive dyskinesia, dystonia, or tics and physical agitation  Throught Process:  disorganized, illogical and tangental  Associations:  loosening of associations present  Thought Content:  no evidence of suicidal ideation or homicidal ideation, patient appears to be responding to internal stimuli and exhibiting significant paranoia and delusional thought content  Insight:  limited  Judgement:  limited  Oriented " to:  See above  Attention Span and Concentration:  limited  Recent and Remote Memory:  limited  Fund of Knowledge:  AFSHAN    Clinical Global Impressions  First:7   Most recent:6              Precautions:     Behavioral Orders   Procedures     Assault precautions     Code 1 - Restrict to Unit     Elopement precautions     Routine Programming     As clinically indicated     Status 15     Every 15 minutes.          Diagnoses:     Schizoaffective Disorder, Bipolar Type, decompensated  Excited Catatonia  HTN  Dyslipidemia  Hx of CVA in 2017  Suspect Oculogyric crisis 2/2 Haldol         Assessment & Plan:     Assessment and hospital summary:  This patient is a 60 year old -American female with history of Schizoaffective Disorder, bipolar type, previous commitments who presented to ED with tad, psychosis, and agitation in context of medication non-adherence and recent expiration of MI commitment. Symptoms and presentation at this time is most consistent with Schizoaffective Disorder, Bipolar Type. We have obtained most recent medication regimen from patient's ACT team, and regimen was restarted. Inpatient psychiatric hospitalization is warranted at this time for safety, stabilization, and possible adjustment in medications. Pt is on 72 hr hold. Petitioned for MI commitment with [facility] through [facility] on 4/14 due to dangerousness, inability to care for self, active symptoms of psychosis and tad and refusal to accept proposed treatment.    On admission, PTA medications were restarted. However, patient has been declining all scheduled medications despite significant encouragement from staff and this provider. She remains very symptomatic, but she was not meeting criteria for a psychiatric emergency.  Patient is also exhibiting signs of excited catatonia, including excessive and purposeless motor activity in both the upper and lower limbs (hyperkinesis), restlessness, stereotypy, impulsivity, and frenzy. She is  "currently declining vital signs and labs, though is eating and drinking daily. She is currently declining scheduled po medications. Psychiatric emergency declared on 4/20 due to aggression toward others in context of severe psychosis and suspected excited catatonia. Ativan 1 mg TID was also added. Discontinued PTA Invega, Zyprexa, and thorazine on 4/20 due to consistent refusal.     On 4/26, it was noted that patient frequently had upward gaze while walking up and down the unit. She did not appear to be distressed. She reported that she was looking at \"my god.\" It is possible that she is responding to internal stimuli, though also possible she is experiencing signs of oculogyric crisis as this presentation is new since initiation of Haldol, which she has only been accepting IM. Haldol was subsequently discontinued during day shift on 4/26 and scheduled Zyprexa was initiated on emergency basis. Oral Cogentin was also scheduled, though patient declined. On the evening of 4/26, patient's gaze was fixed in upward position for several hours and she appeared to be experiencing discomfort. IM Cogentin was administered with noted resolution. Oral intake remains poor and she continues to decline oral medications. Partial improvements have been observed since switch to scheduled Zyprexa.     Target psychiatric symptoms and interventions:  - Oral Zyprexa 10 mg BID OR Zyprexa 10 mg IM. Hatch now in place.  - Ativan 1 mg TID. Patient declining.   - Cogentin 1 mg BID  - Discontinued Artane d/t consistent refusal  - Cogentin 2 mg IM daily prn for evidence acute dystonic reaction or ocylogyric crisis  - If patient continues to decline oral medications, will pursue Alcides Kohler.     Resume hydroxyzine 25-50 mg q4h prn for acute anxiety  Resume Trazodone 50 mg at bedtime prn for sleep disturbances  Resume Zyprexa 10 mg TID prn for severe agitation  Resume Ativan/Benadryl q4h prn for agitation     Acute Medical Problems and " Treatments:  HTN:  - Metoprolol succinate ER 50 mg daily  - Cozaar 100 mg daily     CVA:  - Aspirin 81 mg daily     Constipation:  - Miralax 17 mg daily     Routine labs have been ordered, including CMP, CBC with differential, TSH, lipid profile.  Routine urine drug screen also ordered. Patient currently declining blood draw. She is also declining vitals.     Behavioral/Psychological/Social:  - Encourage unit programming    Safety:  - Continue precautions as noted above  - Status 15 minute checks  - Safety precautions include: assault and elopement precautions  - Continue precautions as noted above  - Status 15 minute checks  - Discontinued 1:1 on 4/26 as presence of 1:1 staff appears to be worsening patient's paranoia and agitation.     Legal Status: Committed as MI with Hatch in place for Haldol, Zyprexa, Invega, and Thorazine through [facility]    Disposition Plan   Reason for ongoing admission: poses an imminent risk to self, poses an imminent risk to others and is unable to care for self due to severe psychosis or tad  Discharge location: D. Patient has ACT team.  Discharge Medications: not ordered  Follow-up Appointments: not scheduled    Entered by: Ida Zaman on 4/29/2021 at 3:36 PM

## 2021-04-29 NOTE — PLAN OF CARE
Problem: Sleep Disturbance (Psychotic Signs/Symptoms)  Goal: Improved Sleep (Psychotic Signs/Symptoms)  Outcome: Improving     Pt was in bed at the beginning of the shift, pt came out for snacks and to check the time, which she did quietly. No behavioral concerns noted, No PRN administered.   Pt slept for 4.75 hours.

## 2021-04-29 NOTE — PROVIDER NOTIFICATION
04/28/21 2030   Justification   Clinical Justification Others   Michelle is still very disorganized and confused. She had poor insight into her current mental health. She spent most of this evening in her room watching TV. She is visible in the milieu during dinner and snack time. She ate 100%.      Upon approached this evening, she became agitated and asked this writer to leave her room. She refused nursing assessment and oral scheduled HS medication. Multiple attempts by nursing staff to give patient oral scheduled HS medication but refused. Pt is committed and Hatch. Code green called, and IM Zyprexa (Hatch) medication administered. No side effects of

## 2021-04-29 NOTE — PLAN OF CARE
Current Diagnosis/Procedure:  Schizoaffective Disorder, Bipolar Type     Work Completed:    Patient continues to refuse take neuroleptic medications, wants to discharge without services in place and is still actively psychotic. As patient is demonstrating resistance to improvement through Neuroleptic medications, and continues to refuse oral medications, staff have discussed possibly requesting a Mcgrath-Kohler Commitment through [facility].      Discharge plan or goal:  Patient will discharge to apartment with ACT team in place if she is able to stabilize and continue to take medications.       Barriers to discharge:  Continued need for stabilization of severity of illness and medication. Patient has been committed as MI w/ Hatch through [facility]. Patient is not responding to the IM neuroleptics she is currently being given.

## 2021-04-30 PROCEDURE — 99233 SBSQ HOSP IP/OBS HIGH 50: CPT | Performed by: PSYCHIATRY & NEUROLOGY

## 2021-04-30 PROCEDURE — 250N000013 HC RX MED GY IP 250 OP 250 PS 637: Performed by: PSYCHIATRY & NEUROLOGY

## 2021-04-30 PROCEDURE — 124N000002 HC R&B MH UMMC

## 2021-04-30 RX ORDER — METOPROLOL SUCCINATE 25 MG/1
25 TABLET, EXTENDED RELEASE ORAL 2 TIMES DAILY
Status: DISCONTINUED | OUTPATIENT
Start: 2021-04-30 | End: 2021-05-03

## 2021-04-30 RX ADMIN — OLANZAPINE 10 MG: 10 TABLET, ORALLY DISINTEGRATING ORAL at 19:55

## 2021-04-30 RX ADMIN — OLANZAPINE 10 MG: 10 TABLET, ORALLY DISINTEGRATING ORAL at 08:48

## 2021-04-30 RX ADMIN — LORAZEPAM 1 MG: 1 TABLET ORAL at 19:55

## 2021-04-30 RX ADMIN — LORAZEPAM 1 MG: 1 TABLET ORAL at 08:48

## 2021-04-30 ASSESSMENT — ACTIVITIES OF DAILY LIVING (ADL)
ORAL_HYGIENE: PROMPTS
HYGIENE/GROOMING: PROMPTS
DRESS: SCRUBS (BEHAVIORAL HEALTH)
LAUNDRY: UNABLE TO COMPLETE
HYGIENE/GROOMING: PROMPTS
ORAL_HYGIENE: PROMPTS
DRESS: SCRUBS (BEHAVIORAL HEALTH)
LAUNDRY: UNABLE TO COMPLETE

## 2021-04-30 NOTE — PLAN OF CARE
Current Diagnosis/Procedure:  Schizoaffective Disorder, Bipolar Type     Work Completed:    Patient was discussed in treatment team today and met with provider and CTC. Patient remains actively psychotic, continues to refuse medications and lacks the insight that she is mentally ill and under a commitment. Hospital team is considering petitioning for a Mcgrath-Kohler commitment through [facility] as she is not responding to current neuroleptics and is refusing to take any oral medications at this time. Writer spoe with patient's son Emelia about her belief that her  (ex-) is dead, and he denies this to be true. Emelia indicated that whenever the patient gets sick that she believes that her former  is dead and will not believe Emelia when he tells her otherwise. Emelia expressed concern that it is not helpful for patient to be discharged to the community with an ACT team, as time and time again she becomes noncompliant with medications as soon as her commitment's end. He feels that she would benefit more from being placed in a long term structured setting like a group home or adult foster care. Emelia also requested that writer speak with the [facility] and patient's ACT team about having her placed on a year long commitment instead of 6 months. Writer explained that the team is looking into starting ECT for his mother, and he requested that her provider call him when he has time to discuss ECT in more detail. Emelia's number is [phone].      Discharge plan or goal:  Patient will either discharge to her apartment with ACT team in place, or a group home may be looked into.       Barriers to discharge:  Continued need for stabilization of severity of illness and medication. Patient has been committed as MI w/ Hatch through [facility]. Patient is not responding to the IM neuroleptics she is currently being given.

## 2021-04-30 NOTE — PLAN OF CARE
Pt thought process remains delusional and paranoid. She believes that she is no longer on a commitment and she would have to consent to further commitment and treatment. Refuses all education offered. Speech pressured and rambling. Eye contact staring. She refuses VS assessment and BP medication, and has been given education about risks of refusing this medication by Nursing and Dr Zaman. BP elevated last evening. Continues to make statements about her  being , and is requesting to make international phone calls to inform family members of his death. Michelle accepted scheduled PO Zyprexa and Ativan this morning, but declined all other medications. She is denying all side effects. Unable to assess VS due to refusal. Hygiene poor, pt remains disheveled. States again that we need to discharge her so she can shower at home. Appetite and fluid intake good today. Will continue to monitor closely for safety.

## 2021-04-30 NOTE — PLAN OF CARE
Pt awake  at start of shift.     While sleeping breathing was quiet and unlabored.   Appears to have slept 3.5 hours.     Pt on  ASSAULT and  ELOPEMENT precautions in addition to single room order. Any related events noted above.     Will continue to monitor and assess.     Problem: Sleep Disturbance (Psychotic Signs/Symptoms)  Goal: Improved Sleep (Psychotic Signs/Symptoms)  Outcome: Improving

## 2021-04-30 NOTE — PLAN OF CARE
Problem: Mood Impairment (Psychotic Signs/Symptoms)  Goal: Improved Mood Symptoms (Psychotic Signs/Symptoms)  Outcome: No Change    Michelle continues exhibiting symptoms/signs of psychosis, and she is extremely disorganized during this shift.  She is observed responding to internal stimuli. She came out of her room, stated she could hear her daughter crying for help. She insists writer call her daughter to confirm that she is safe. This writer placed a call to her daughter, and she confirmed that she is safe and at work.     Pt is very restless as she spent hours dancing vigorously in the hallway. She said she hears her Muslim members singing and dancing to the song. She spent hours by the med window making repeated phrases and requesting to be let off the unit for Muslim.  Stefan, let me off the unit!!! Stefan, let me off the unit!!! Stefan, take me to the bus stop!!!  Stefan, take me to the bus stop .       She is irritable, restless, confused, and agitated. Her thought process was very delusional and disorganized. Her speech is pressured and tangential.       She took scheduled oral Zyprexa, Ativan, and Cogentin. She allowed vital signs (T=98.5, R=16, P=121, B/P= 165/83) and her blood pressure and pulse on the high side.  She was hoping to be let off the unit after taking the oral medication and vital signs.       She needed frequent redirection away from the med window and was told that discharge is not possible at this time. Appetite is good; she ate about 80% of her dinner. Hygiene is poor; she refused to shower and stated that she needs to go home before she can shower. No aggressive behaviors towards staff or peers. No SI/SIB.

## 2021-04-30 NOTE — PROGRESS NOTES
"[facility], [place]   Psychiatric Progress Note  Hospital Day: 16        Interim History:   The patient's care was discussed with the treatment team during the daily team meeting and/or staff's chart notes were reviewed.  Staff report patient continues to exhibit symptoms/signs of psychosis, including responding to internal stimuli, disorganized thought process, delusional thought content, paranoia, and intermittent agitation. With significant prompting, she did accept oral at bedtime medications. Pt was very restless as she spent hours dancing vigorously in the hallway. She said she hears her Qu[employer] members singing and dancing to the song. She spent hours by the [employer] making repeated phrases and requesting to be let off the unit for Quaker. Appetite is good. Hygiene is poor. Sleep remains poor; slept 3.5 hours overnight. No aggression toward others.     Upon interview, Michelle appears improved today with noticeably brighter affect. She appeared calmer and more receptive to writer's recommendations overall. She said that she spoke with the \" and he told me to work with you.\" She said that since that time, she has agreed to take scheduled medications. However, she does not wish to take metoprolol because \"I don't take it at home, and I don't have high blood pressure at home!\" Attempted to discuss risks associated with untreated HTN, including risk of having another stroke. She was not receptive, and repeatedly said \"I will not have stroke.\" She is hoping for discharge on Monday. She still does not believe that she is under a commitment or that she has a mental illness. She does not wish to work with ACT team upon discharge for that reason. She said that she has not showered because she needs \"special products\" for her hair and her skin. She notes that her skin is sensitive. I offered to discuss with PharmD, and she smiled and said that all of her hair and skin products " "are imported. She does not wish to use anything else, and does not have anyone available to bring her products to the hospital so that she may use them here. Patient reports good appetite. She smiled again and said \"I don't even think I will fit into my pants anymore. My appetite is too good!\" She denies significant side effects. She denies acute medical concerns, including changes in vision, speech, lightheadedness/dizziness, chest pain, SOB, difficulty swallowing, neck pain, tongue swelling. She denies all mental health symptoms, including SI/HI.          Medications:       benztropine  1 mg Oral BID     LORazepam  1 mg Oral TID     losartan  100 mg Oral Daily     metoprolol succinate ER  50 mg Oral Daily     OLANZapine zydis  10 mg Oral BID    Or     OLANZapine  10 mg Intramuscular BID     polyethylene glycol  17 g Oral Daily          Allergies:     Allergies   Allergen Reactions     Haldol [Haloperidol]      Patient previously tolerated haldol, though developed oculogyric crises during hospital stay on 4/26/21 on total daily dose of 10 mg.     Lisinopril Cough          Labs:   No results found for this or any previous visit (from the past 24 hour(s)).       Psychiatric Examination:     BP (!) 165/83   Pulse 121   Temp 98.5  F (36.9  C) (Tympanic)   Resp 16   SpO2 98%   Weight is 0 lbs 0 oz  There is no height or weight on file to calculate BMI.    Weight over time:  Vitals:       Orthostatic Vitals     None            Cardiometabolic risk assessment. 04/15/21      Reviewed patient profile for cardiometabolic risk factors    Date taken /Value  REFERENCE RANGE   Abdominal Obesity  (Waist Circumference)   See nursing flowsheet Women ?35 in (88 cm)   Men ?40 in (102 cm)      Triglycerides  Triglycerides   Date Value Ref Range Status   10/19/2019 117 <150 mg/dL Final       ?150 mg/dL (1.7 mmol/L) or current treatment for elevated triglycerides   HDL cholesterol  HDL Cholesterol   Date Value Ref Range Status "   10/19/2019 56 >49 mg/dL Final   ]   Women <50 mg/dL (1.3 mmol/L) in women or current treatment for low HDL cholesterol  Men <40 mg/dL (1 mmol/L) in men or current treatment for low HDL cholesterol     Fasting plasma glucose (FPG) Lab Results   Component Value Date     10/19/2019      FPG ?100 mg/dL (5.6 mmol/L) or treatment for elevated blood glucose   Blood pressure  BP Readings from Last 3 Encounters:   04/29/21 (!) 165/83   06/04/19 131/71   04/26/19 164/86    Blood pressure ?130/85 mmHg or treatment for elevated blood pressure   Family History  See family history     Appearance: awake, alert, unkempt and disheveled   Attitude:  guarded and more cooperative  Eye Contact:  Good. No evidence of ocylogyric crisis today.  Mood:  better  Affect:  Mood congruent, brighter  Speech: strong accent, raised volume, rapid when agitated  Language: fluent and intact in English  Psychomotor, Gait, Musculoskeletal: No stereotypic movements noted on examination today. Possible stiffness. no evidence of tardive dyskinesia, dystonia, or tics and physical agitation  Throught Process:  disorganized, illogical and tangental  Associations:  No loosening of associations  Thought Content:  no evidence of suicidal ideation or homicidal ideation, no overtly delusional thought content. Paranoia noted as patient quested whether her ACT team were entering her home while she is in the hospital and questioned who called the police before she came to the hospital  Insight:  limited  Judgement:  limited  Oriented to:  See above  Attention Span and Concentration:  limited  Recent and Remote Memory:  limited  Fund of Knowledge:  AFSHAN    Clinical Global Impressions  First:7   Most recent:6              Precautions:     Behavioral Orders   Procedures     Assault precautions     Code 1 - Restrict to Unit     Elopement precautions     Routine Programming     As clinically indicated     Status 15     Every 15 minutes.          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 Symptoms and presentation at this time is most consistent with Schizoaffective Disorder, Bipolar Type. We have obtained most recent medication regimen from patient's ACT team, and regimen was restarted. Inpatient psychiatric hospitalization is warranted at this time for safety, stabilization, and possible adjustment in medications. Pt is on 72 hr hold. Petitioned for MI commitment with [facility] through [facility] on 4/14 due to dangerousness, inability to care for self, active symptoms of psychosis and tad and refusal to accept proposed treatment.    On admission, PTA medications were restarted. However, patient has been declining all scheduled medications despite significant encouragement from staff and this provider. She remains very symptomatic, but she was not meeting criteria for a psychiatric emergency.  Patient is also exhibiting signs of excited catatonia, including excessive and purposeless motor activity in both the upper and lower limbs (hyperkinesis), restlessness, stereotypy, impulsivity, and frenzy. She is currently declining vital signs and labs, though is eating and drinking daily. She is currently declining scheduled po medications. Psychiatric emergency declared on 4/20 due to aggression toward others in context of severe psychosis and suspected excited catatonia. Ativan 1 mg TID was also added. Discontinued PTA Invega, Zyprexa, and thorazine on 4/20 due to consistent refusal.     On 4/26, it was noted  "that patient frequently had upward gaze while walking up and down the unit. She did not appear to be distressed. She reported that she was looking at \"my god.\" It is possible that she is responding to internal stimuli, though also possible she is experiencing signs of oculogyric crisis as this presentation is new since initiation of Haldol, which she has only been accepting IM. Haldol was subsequently discontinued during day shift on 4/26 and scheduled Zyprexa was initiated on emergency basis. Oral Cogentin was also scheduled, though patient declined. On the evening of 4/26, patient's gaze was fixed in upward position for several hours and she appeared to be experiencing discomfort. IM Cogentin was administered with noted resolution. Oral intake remains poor and she continues to decline oral medications. Partial improvements have been observed since switch to scheduled Zyprexa.     Target psychiatric symptoms and interventions:  - Oral Zyprexa 10 mg BID OR Zyprexa 10 mg IM. Hatch now in place.  - Ativan 1 mg TID. Patient declining.   - Cogentin 1 mg BID  - Discontinued Artane d/t consistent refusal  - Cogentin 2 mg IM daily prn for evidence acute dystonic reaction or oculogyric crisis  - If patient continues to decline oral medications, will pursue Alcides Kohler.     Resume hydroxyzine 25-50 mg q4h prn for acute anxiety  Resume Trazodone 50 mg at bedtime prn for sleep disturbances  Resume Zyprexa 10 mg TID prn for severe agitation  Resume Ativan/Benadryl q4h prn for agitation     Acute Medical Problems and Treatments:  HTN:  - Metoprolol succinate ER 50 mg daily: Will modify to 25 mg BID as patient has been non-adherent.   - Cozaar 100 mg daily     CVA:  - Aspirin 81 mg daily     Constipation:  - Miralax 17 mg daily     Routine labs have been ordered, including CMP, CBC with differential, TSH, lipid profile.  Routine urine drug screen also ordered. Patient currently declining blood draw. She is also declining " vitals.     Behavioral/Psychological/Social:  - Encourage unit programming    Safety:  - Continue precautions as noted above  - Status 15 minute checks  - Safety precautions include: assault and elopement precautions  - Continue precautions as noted above  - Status 15 minute checks  - Discontinued 1:1 on 4/26 as presence of 1:1 staff appears to be worsening patient's paranoia and agitation.     Legal Status: Committed as MI with Hatch in place for Haldol, Zyprexa, Invega, and Thorazine through [facility]    Disposition Plan   Reason for ongoing admission: poses an imminent risk to self, poses an imminent risk to others and is unable to care for self due to severe psychosis or tad  Discharge location: TBD. Patient has ACT team.  Discharge Medications: not ordered  Follow-up Appointments: not scheduled    Entered by: Ida Zaman on 4/30/2021 at 8:58 AM

## 2021-05-01 PROCEDURE — 124N000002 HC R&B MH UMMC

## 2021-05-01 PROCEDURE — 250N000013 HC RX MED GY IP 250 OP 250 PS 637: Performed by: PSYCHIATRY & NEUROLOGY

## 2021-05-01 RX ADMIN — OLANZAPINE 10 MG: 10 TABLET, ORALLY DISINTEGRATING ORAL at 18:19

## 2021-05-01 RX ADMIN — OLANZAPINE 10 MG: 10 TABLET, ORALLY DISINTEGRATING ORAL at 08:34

## 2021-05-01 ASSESSMENT — ACTIVITIES OF DAILY LIVING (ADL)
ORAL_HYGIENE: PROMPTS
ORAL_HYGIENE: PROMPTS
LAUNDRY: UNABLE TO COMPLETE
DRESS: SCRUBS (BEHAVIORAL HEALTH)
HYGIENE/GROOMING: PROMPTS
DRESS: SCRUBS (BEHAVIORAL HEALTH)
HYGIENE/GROOMING: PROMPTS

## 2021-05-01 NOTE — PLAN OF CARE
Pt has been isolative to her room all evening, napping at times. Affect blunted, and appears anxious on approach. She did not have any agitation or aggressive behaviors this evening. Pt continues to deny all mental health symptoms, and states that her commitment is over. Also states she is not required to take medications, though she accepted scheduled PO Zyprexa and Ativan at HS. No observed medication side effects. She refused all other medications, including metoprolol. Refused to allow VS assessment this evening. Hygiene poor. Appetite and fluid intake good.

## 2021-05-01 NOTE — PLAN OF CARE
"Patient presents as paranoid, delusional, and disorganized with no appreciable insight into her manic and psychotic symptoms.  She had her mattress tipped onto its side and it was hanging off the side of her wooden bed platform.  RN writer was unable to visualize her from the window looking into her room, so RN bobr entered Michelle's room and looked to see if she was lying next to the platform and beneath the mattress.  She was not found in her room, so RN bobr started to walk out and was accosted by Michelle- who was in a highly agitated state- during my attempt to exit her room.  Her speech remains pressured and rambling at times, so it was difficult to decipher everything she was trying to say.  It was apparent that she was suspicious of this writer and felt it was inappropriate that I had entered her room.  She was making menacing gestures and impulsively threatening violence for having entered her room without her permission.  She responded poorly to verbal de-escalation or redirection.  She is illogical and seems unable to comprehend that unit staff do enter patient rooms to check on patients and/ or perform environmental safety checks.  RN bobr had to walk away and terminate the conversation, as Michelle was continuing to escalate.  After eating breakfast, Michelle seemed slightly more calm.  She approached the medication room window and asked to see her medications intact in their packaging.  RN writer met with patient and we reviewed all of her medications together while they were still intact in the packaging.  Michelle became agitated once again, questioning what psychiatric medications had been ordered.  She repeatedly stated, \"I take Invega- not Zyprexa!\".  Eventually, she agreed to take the Zyprexa orally, but this required extensive patient education on what medications have been ordered by the court.  She was unwilling to consider taking any of the other medications.  She refused the Losartan and was not " "receptive to patient education on the risks of untreated HTN- especially given her past medical history of having a stroke in 2017.  She stated, \"I don't need it, I don't have high blood pressure!\".  She refused vital signs assessment when RN writer suggested that we could check her BP this morning to see where it is at.  She would not give a coherent reason for refusing her other scheduled medications.  Michelle appears to be eating and drinking well at this point.  Her personal hygiene maintenance is quite poor.  She denies any acute physical concerns or medication SE's.    Addendum 13:38  RN writer approached Michelle and offered her 14:00 scheduled medication, Ativan.  Michelle became extremely agitated and aggressive.  She adamantly refused to accept her scheduled dose of Ativan and repeatedly stated, \"I don't have afternoon medications!  Why are you offering me afternoon medications?  You are wrong!  I don't have afternoon medications!\".  RN writer offered to print out a list of scheduled medications for her review, but she refused this offer.  She got into the personal space of RN writer and extended her finger in RN writer's face- in a hostile and belligerent manner- while refusing to accept her medication and refusing to engage in any further plan of care discussion.  Shortly after this,  from her ACT team called and asked to speak with her.  Michelle refused to take this call and repeatedly stated, \"I don't work with the ACT team!  I fired the ACT team!  I don't need the ACT team!\".   was given an update with regard to Mcihelle's refusal to participate.  "

## 2021-05-02 PROCEDURE — 250N000013 HC RX MED GY IP 250 OP 250 PS 637: Performed by: PSYCHIATRY & NEUROLOGY

## 2021-05-02 PROCEDURE — 124N000002 HC R&B MH UMMC

## 2021-05-02 RX ADMIN — OLANZAPINE 10 MG: 10 TABLET, ORALLY DISINTEGRATING ORAL at 18:58

## 2021-05-02 RX ADMIN — OLANZAPINE 10 MG: 10 TABLET, ORALLY DISINTEGRATING ORAL at 08:29

## 2021-05-02 ASSESSMENT — ACTIVITIES OF DAILY LIVING (ADL)
HYGIENE/GROOMING: PROMPTS
ORAL_HYGIENE: PROMPTS
HYGIENE/GROOMING: PROMPTS
ORAL_HYGIENE: PROMPTS
LAUNDRY: UNABLE TO COMPLETE
DRESS: SCRUBS (BEHAVIORAL HEALTH)
DRESS: SCRUBS (BEHAVIORAL HEALTH)

## 2021-05-02 NOTE — PLAN OF CARE
"Pt was angry and tense for most of the evening. Prior to dinner, pt was at the RN window frequently and yelling that she should be taking Invega, not Zyprexa. She refused all offers of prn medications for agitation. She did not engage in physically aggressive behaviors. She continued yelling off and on until dinner arrived, repeating phrases. \"I won't take anything, just Zyprexa!\" \"I want dinner now!\" \"Don't open medication, show me!\" After eating dinner, pt demanded to take HS Zyprexa early. She declined all other scheduled medications and refused to allow VS assessment. Hygiene remains disheveled and malodorous. Again states that she will shower at home only. Appetite and fluid intake adequate.  "

## 2021-05-02 NOTE — PLAN OF CARE
Pt asleep  at start of shift. Breathing quiet and unlabored.     Appears to have slept 6.25 hours.     Pt on  ASSAULT and ELOPEMENT precautions in addition to single room order. Any related events noted above.     Will continue to monitor and assess.     Problem: Sleep Disturbance (Psychotic Signs/Symptoms)  Goal: Improved Sleep (Psychotic Signs/Symptoms)  Outcome: No Change

## 2021-05-02 NOTE — PROVIDER NOTIFICATION
05/02/21 0931   Vital Signs   Pulse 102   Pulse Rate Source Monitor   BP (!) 191/95     Patient approached the medication room window and demanded her scheduled AM medications that she had previously refused to take.  RN writer provided patient education on the importance of getting a current snapshot of BP prior to administering multiple antihypertensive medications (Losartan, Metoprolol), and Michelle eventually agreed to let unit staff check her BP. RN writer reviewed each of her scheduled AM medications with Michelle, showing her the medication intact in the packaging prior to removing it and placing it into a medication cup in her presence.  Michelle then grabbed the cup- which contained Cogentin, Ativan, Metoprolol, and Losartan- and she sprinted back to her room.  RN writer ran out of the medication room and pursued Michelle down the hallway and into her room.  Michelle was then observed tossing all of these medications into her toilet.  Given this concerning event, we will closely monitor Michelle for drug diversion.  RN writer reviewed these events with on-call psychiatrist, Dr. Chatman, who indicated that, at this point, all we can do is monitor closely, encourage her compliance with antihypertensive medications, and provide patient education on the importance of medication compliance.

## 2021-05-02 NOTE — PLAN OF CARE
"Patient presents as labile and disorganized with very poor insight into her manic and psychotic symptoms.  She remains paranoid and wants to see her medications intact in their packaging before she will accept them.  RN writer met with patient and we reviewed all of her scheduled medications together while they were still intact in the packaging.  Michelle became agitated, questioning what medications had been ordered.  Any attempt to further discuss what clinical indications exist for each of her prescribed medications was met with increased agitation.  She repeatedly stated, \"I only take Zyprexa!\".  She was unwilling to consider taking any of the other medications.  She refused the Losartan and Metoprolol, and she was not receptive to patient education on the risks of untreated HTN.  She stated, \"I don't need it, I don't have high blood pressure!\".  She refused vital signs assessment when RN writer suggested that we could check her BP this morning to see where it is at.  She would not give a coherent reason for refusing her other scheduled medications.  She was informed that it would be a safety concern to consider discharging her while she was refusing treatment of her chronic physical health conditions- and that her persistent refusals to accept scheduled medications would likely prolong hospitalization.  Michelle was encouraged to update me should she reconsider her decision to refuse her antihypertensive (and other scheduled) medications, as they can certainly be made available later in the day.  Michelle appears to be eating and drinking well at this point.  Her personal hygiene maintenance is poor, and she is malodorous.  She denies any acute physical concerns or medication SE's.    Later in the shift, Michelle approached RN writer and stated that she would now accept the medications she had refused earlier this AM.  RN writer encouraged her to have her blood pressure checked prior to dosing.  She reluctantly agreed, but " only after receiving extensive encouragement to cooperate with this.  Michelle then watched each of her scheduled medications be taken out of their packaging and placed into a medication cup.  Michelle then ran to her room with the medication cup in hand.  She was observed tossing all of these medications into her toilet.  She appeared agitated and refused to discuss these concerning behaviors.  RN writer updated on-call psychiatrist, Dr. Chatman, to report the elevated BP revealed by vital signs assessment (191/95) and to obtain further orders.  Cheeking precautions initiated.   Transposition Flap Text: The defect edges were debeveled with a #15 scalpel blade.  Given the location of the defect and the proximity to free margins a transposition flap was deemed most appropriate.  Using a sterile surgical marker, an appropriate transposition flap was drawn incorporating the defect.    The area thus outlined was incised deep to adipose tissue with a #15 scalpel blade.  The skin margins were undermined to an appropriate distance in all directions utilizing iris scissors.

## 2021-05-03 PROCEDURE — 99207 PR NO CHARGE LOS: CPT | Performed by: PHYSICIAN ASSISTANT

## 2021-05-03 PROCEDURE — 124N000002 HC R&B MH UMMC

## 2021-05-03 PROCEDURE — 99233 SBSQ HOSP IP/OBS HIGH 50: CPT | Performed by: PSYCHIATRY & NEUROLOGY

## 2021-05-03 PROCEDURE — 250N000013 HC RX MED GY IP 250 OP 250 PS 637: Performed by: PSYCHIATRY & NEUROLOGY

## 2021-05-03 RX ORDER — METOPROLOL SUCCINATE 50 MG/1
50 TABLET, EXTENDED RELEASE ORAL DAILY
Status: DISCONTINUED | OUTPATIENT
Start: 2021-05-03 | End: 2021-06-28

## 2021-05-03 RX ORDER — PALIPERIDONE 3 MG/1
3 TABLET, EXTENDED RELEASE ORAL DAILY
Status: DISCONTINUED | OUTPATIENT
Start: 2021-05-03 | End: 2021-05-05

## 2021-05-03 RX ADMIN — OLANZAPINE 10 MG: 10 TABLET, ORALLY DISINTEGRATING ORAL at 19:00

## 2021-05-03 RX ADMIN — OLANZAPINE 10 MG: 10 TABLET, ORALLY DISINTEGRATING ORAL at 08:24

## 2021-05-03 ASSESSMENT — ACTIVITIES OF DAILY LIVING (ADL)
DRESS: SCRUBS (BEHAVIORAL HEALTH)
ORAL_HYGIENE: PROMPTS
HYGIENE/GROOMING: PROMPTS
ORAL_HYGIENE: PROMPTS
LAUNDRY: UNABLE TO COMPLETE
HYGIENE/GROOMING: PROMPTS
DRESS: SCRUBS (BEHAVIORAL HEALTH)

## 2021-05-03 NOTE — PLAN OF CARE
Pt asleep at start of shift. Breathing quiet and unlabored.     Woke up intermittently and was observed chanting and moving her arms to her chanting.     Pt requested and was given a drink and an orange.     Appears to have slept 4.5 hours.        Pt on  ASSAULT, CHEEKING, and ELOPEMENT precautions in addition to single room order. Any related events noted above.     Will continue to monitor and assess.   Problem: Sleep Disturbance (Psychotic Signs/Symptoms)  Goal: Improved Sleep (Psychotic Signs/Symptoms)  Outcome: No Change

## 2021-05-03 NOTE — PROGRESS NOTES
Late entry: During shift change at 2330, pt was swinging her arms rapidly and repeatedly making punching motions at staff. She was moving arms in a robotic manner. She required firm redirection to move away from staff.

## 2021-05-03 NOTE — PROGRESS NOTES
"[facility], [place]   Psychiatric Progress Note  Hospital Day: 19        Interim History:   The patient's care was discussed with the treatment team during the daily team meeting and/or staff's chart notes were reviewed.  Staff report patient continues to exhibit symptoms/signs of psychosis, including responding to internal stimuli, disorganized thought process, delusional thought content, paranoia, and intermittent agitation. She was much improved on Friday after taking scheduled Ativan, though has since refused it and appears to be decompensating with emerging signs of catatonia (stereotypy). She is eating and drinking in good amounts. Not showering. Continues to have very poor insight and judgment.     Upon interview, Michelle appeared more agitated and irritable. She said that she will accept Invega but not Zyprexa. She then questioned whether or not staff were truly giving her Zyprexa over the weekend. She said that she will not take anti-hypertensive medications because \"I can walk and so I am not having a stroke!\" She became increasingly agitated when writer educated her on risk of stroke, importance of medication adherence, etc. She was very focused on discharge and again was informed that she would not be discharged until accepting all scheduled medications. She denies significant side effects. She denies acute medical concerns, including changes in vision, speech, lightheadedness/dizziness, chest pain, SOB, difficulty swallowing, neck pain, tongue swelling. She denies all mental health symptoms, including SI/HI.          Medications:       benztropine  1 mg Oral BID     LORazepam  1 mg Oral TID     losartan  100 mg Oral Daily     metoprolol succinate ER  25 mg Oral BID     OLANZapine zydis  10 mg Oral BID    Or     OLANZapine  10 mg Intramuscular BID     polyethylene glycol  17 g Oral Daily          Allergies:     Allergies   Allergen Reactions     Haldol [Haloperidol]      Patient " previously tolerated haldol, though developed oculogyric crises during hospital stay on 4/26/21 on total daily dose of 10 mg.     Lisinopril Cough          Labs:   No results found for this or any previous visit (from the past 24 hour(s)).       Psychiatric Examination:     BP (!) 168/100   Pulse 114   Temp 98.7  F (37.1  C) (Tympanic)   Resp 16   SpO2 100%   Weight is 0 lbs 0 oz  There is no height or weight on file to calculate BMI.    Weight over time:  Vitals:       Orthostatic Vitals     None            Cardiometabolic risk assessment. 04/15/21      Reviewed patient profile for cardiometabolic risk factors    Date taken /Value  REFERENCE RANGE   Abdominal Obesity  (Waist Circumference)   See nursing flowsheet Women ?35 in (88 cm)   Men ?40 in (102 cm)      Triglycerides  Triglycerides   Date Value Ref Range Status   10/19/2019 117 <150 mg/dL Final       ?150 mg/dL (1.7 mmol/L) or current treatment for elevated triglycerides   HDL cholesterol  HDL Cholesterol   Date Value Ref Range Status   10/19/2019 56 >49 mg/dL Final   ]   Women <50 mg/dL (1.3 mmol/L) in women or current treatment for low HDL cholesterol  Men <40 mg/dL (1 mmol/L) in men or current treatment for low HDL cholesterol     Fasting plasma glucose (FPG) Lab Results   Component Value Date     10/19/2019      FPG ?100 mg/dL (5.6 mmol/L) or treatment for elevated blood glucose   Blood pressure  BP Readings from Last 3 Encounters:   05/03/21 (!) 168/100   06/04/19 131/71   04/26/19 164/86    Blood pressure ?130/85 mmHg or treatment for elevated blood pressure   Family History  See family history     Appearance: awake, alert, unkempt and disheveled   Attitude:  guarded and more cooperative  Eye Contact:  Good. No evidence of ocylogyric crisis today.  Mood:  Irritable and agitated  Affect:  Mood congruent, more agitated today  Speech: strong accent, raised volume, rapid when agitated  Language: fluent and intact in English  Psychomotor, Gait,  Musculoskeletal: No stereotypic movements noted on examination today. Possible stiffness. no evidence of tardive dyskinesia, dystonia, or tics and physical agitation  Throught Process:  disorganized, illogical and tangental  Associations:  No loosening of associations  Thought Content:  no evidence of suicidal ideation or homicidal ideation, no overtly delusional thought content. Paranoia noted as she is questioning whether staff are giving her prescribed medications  Insight:  limited  Judgement:  limited  Oriented to:  See above  Attention Span and Concentration:  limited  Recent and Remote Memory:  limited  Fund of Knowledge:  AFSHAN    Clinical Global Impressions  First:7   Most recent:6              Precautions:     Behavioral Orders   Procedures     Assault precautions     Cheeking Precautions (behavioral units)     Patient Observed swallowing PO medications; Patient asked to drink water after swallowing medication; Patient in Staff line of sight for 15 minutes after medication given; Mouth checks after PO administration (patient asked to open mouth and stick out their tongue).     Code 1 - Restrict to Unit     Elopement precautions     Routine Programming     As clinically indicated     Status 15     Every 15 minutes.          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 Symptoms and presentation at this time is most consistent with Schizoaffective Disorder, Bipolar Type. We have obtained most recent medication regimen from patient's ACT team, and  "regimen was restarted. Inpatient psychiatric hospitalization is warranted at this time for safety, stabilization, and possible adjustment in medications. Pt is on 72 hr hold. Petitioned for MI commitment with [facility] through [facility] on 4/14 due to dangerousness, inability to care for self, active symptoms of psychosis and tad and refusal to accept proposed treatment.    On admission, PTA medications were restarted. However, patient has been declining all scheduled medications despite significant encouragement from staff and this provider. She remains very symptomatic, but she was not meeting criteria for a psychiatric emergency.  Patient is also exhibiting signs of excited catatonia, including excessive and purposeless motor activity in both the upper and lower limbs (hyperkinesis), restlessness, stereotypy, impulsivity, and frenzy. She is currently declining vital signs and labs, though is eating and drinking daily. She is currently declining scheduled po medications. Psychiatric emergency declared on 4/20 due to aggression toward others in context of severe psychosis and suspected excited catatonia. Ativan 1 mg TID was also added. Discontinued PTA Invega, Zyprexa, and thorazine on 4/20 due to consistent refusal.     On 4/26, it was noted that patient frequently had upward gaze while walking up and down the unit. She did not appear to be distressed. She reported that she was looking at \"my god.\" It is possible that she is responding to internal stimuli, though also possible she is experiencing signs of oculogyric crisis as this presentation is new since initiation of Haldol, which she has only been accepting IM. Haldol was subsequently discontinued during day shift on 4/26 and scheduled Zyprexa was initiated on emergency basis. Oral Cogentin was also scheduled, though patient declined. On the evening of 4/26, patient's gaze was fixed in upward position for several hours and she appeared to be experiencing " discomfort. IM Cogentin was administered with noted resolution. Oral intake remains poor and she continues to decline oral medications. Partial improvements have been observed since switch to scheduled Zyprexa.     Overall improvement in patient's agitation and suspected catatonia noted on 4/30 after patient accepted two doses of Ativan. However, since declining again, she appears to be again decompensating.     Target psychiatric symptoms and interventions:  - Start Invega 3 mg daily with plan to titrate to lowest effective dose and administer BAIG  - Oral Zyprexa 10 mg BID OR Zyprexa 10 mg IM. Hatch now in place.  - Ativan 1 mg TID. Patient again declining.   - Cogentin 1 mg BID. Discontinue due to consistent refusal.   - Discontinued Artane d/t consistent refusal  - Cogentin 2 mg IM daily prn for evidence acute dystonic reaction or oculogyric crisis  - If patient continues to decline oral medications, will pursue Mcgrath Kohler.     Resume hydroxyzine 25-50 mg q4h prn for acute anxiety  Resume Trazodone 50 mg at bedtime prn for sleep disturbances  Resume Zyprexa 10 mg TID prn for severe agitation  Resume Ativan/Benadryl q4h prn for agitation. WOULD GIVE PRN ATIVAN FIRST FOR AGITATION.      Acute Medical Problems and Treatments:  HTN:  - Metoprolol succinate ER 50 mg daily: Modified to 25 mg BID as patient has been non-adherent.    - Cozaar 100 mg daily  - Please see note from IM dated 5/3/21. Discussed care with them and they are recommending the following:  Recommendations:   - Given the degree of her hypertension I would proceed with her home antihypertensive regimen of losartan 100 mg daily and metoprolol succinate 50 mg daily when patient is agreeable to taking. There currently is not an emergent life-threatening indication to force these medications.    - I placed hold parameters on the antihypertensives (hold for SBP <110)   - Please contact Medicine if SBP is consistently <110 or >150 once she is taking  losartan and metoprolol regularly   - Please notify Medicine for any chest pain/pressure, headaches, vision changes, or other concerning cardiopulmonary symptoms     Medicine will sign off. Please do not hesitate to contact if new questions or concerns arise.      Helen Mao PA-C     CVA:  - Aspirin 81 mg daily     Constipation:  - Miralax 17 mg daily     Routine labs have been ordered, including CMP, CBC with differential, TSH, lipid profile.  Routine urine drug screen also ordered. Patient currently declining blood draw. She is also declining vitals.     Behavioral/Psychological/Social:  - Encourage unit programming    Safety:  - Continue precautions as noted above  - Status 15 minute checks  - Safety precautions include: assault and elopement precautions  - Continue precautions as noted above  - Status 15 minute checks  - Discontinued 1:1 on 4/26 as presence of 1:1 staff appears to be worsening patient's paranoia and agitation.     Legal Status: Committed as MI with Hatch in place for Haldol, Zyprexa, Invega, and Thorazine through [facility]    Disposition Plan   Reason for ongoing admission: poses an imminent risk to self, poses an imminent risk to others and is unable to care for self due to severe psychosis or tad  Discharge location: TBD. Patient has ACT team.  Discharge Medications: not ordered  Follow-up Appointments: not scheduled    Entered by: Ida Zaman on 5/3/2021 at 8:57 AM      > 35 minutes total time that was spent and over 50% of this time was spent in counseling and coordination of care.

## 2021-05-03 NOTE — PLAN OF CARE
Problem: Adult Inpatient Plan of Care  Goal: Plan of Care Review  Outcome: No Change  Flowsheets  Taken 5/2/2021 2000  Progress: no change    Pt affect tense and angry. Pt is irritable on approach. No aggressive behaviors observed, though she has been agitated at times and yelling at staff. She refused all attempts to obtain VS. Education given regarding pt's elevated BP on day shift and risk for stroke per history. She refused BP medication and was only willing to accept PO Zyprexa. Hygiene disheveled and malodorous. Appetite good this evening. She has spent brief periods in the lounge, but otherwise isolating to room. Will continue to monitor closely.

## 2021-05-03 NOTE — PROGRESS NOTES
Brief Medicine Note    Contacted by Psychiatry regarding hypertension.     Patient with a history of hypertension maintained on losartan 100 mg daily and metoprolol succinate 50 mg daily. She has not taken her antihypertensives in at least 3 weeks. She has been declining these medications since admission in the setting of acute psychiatric decline. Her blood pressure has been elevated.     Recommendations:   - Given the degree of her hypertension I would proceed with her home antihypertensive regimen of losartan 100 mg daily and metoprolol succinate 50 mg daily when patient is agreeable to taking. There currently is not an emergent life-threatening indication to force these medications.    - I placed hold parameters on the antihypertensives (hold for SBP <110)   - Please contact Medicine if SBP is consistently <110 or >150 once she is taking losartan and metoprolol regularly   - Please notify Medicine for any chest pain/pressure, headaches, vision changes, or other concerning cardiopulmonary symptoms    Medicine will sign off. Please do not hesitate to contact if new questions or concerns arise.     Helen Mao PA-C  Hospitalist Service  Pager: [phone]

## 2021-05-03 NOTE — PLAN OF CARE
Current Diagnosis/Procedure:  Schizoaffective Disorder, Bipolar Type     Work Completed:    Patient was discussed in treatment team today and met with provider and CTC. Patient has been willing to take oral Zyprexa over the weekend, but refused to take Invega 3 mg today when provider added it to her scheduled medications. Patient presents with improved tad and psychosis, but remains paranoid and lacks any discernable insight into her mental illness and need for treatment. If patient continues to refuse medications or does not show any sign of improvement she may be recommended for ECT. Team discussed the possibility of group home placement versus going back to her apartment w/ her ACT team present. Provider will reach out to patient's son Emelia to further discuss ECT and answer questions.      Discharge plan or goal:  Patient will either discharge to her apartment with ACT team in place, or a group home may be looked into.       Barriers to discharge:  Continued need for stabilization of severity of illness and medication. Patient has been committed as MI w/ [facility] through [facility]. Patient continues to be noncompliant with some of her recommended medications.

## 2021-05-03 NOTE — PROVIDER NOTIFICATION
05/03/21 0801   Vital Signs   Temp 98.7  F (37.1  C)   Temp src Tympanic   Resp 16   Pulse 114   Pulse Rate Source Monitor   BP (!) 168/100   Oxygen Therapy   SpO2 100 %   O2 Device None (Room air)   Pain/Comfort   Patient Currently in Pain denies     Patient continues to refuse her antihypertensive regimen despite many attempts from RN writer to encourage medication compliance and provide education on the risks of untreated HTN.  Patient remains disorganized, paranoid, hostile and belligerent.  RN writer notified patient's attending psychiatrist, Dr. Zaman, of the abnormal BP and pulse noted above.  Will continue to monitor closely and encourage medication compliance.

## 2021-05-03 NOTE — PLAN OF CARE
"Patient presents as paranoid, delusional, and disorganized with very poor insight into her mental and physical health recovery.  She is hostile and belligerent on approach.  She has been verbally aggressive and demanding this shift.  She appears physically tense and becomes agitated within seconds of almost any interaction with her.  Michelle initially refused to accept any of her scheduled medications, with the exception of Zyprexa, which she did accept orally without incident.  About 30 minutes later, she approached RN writer and stated that she would accept \"my other medications\".  Given the cheeking precautions that were instituted yesterday, RN writer had another staff member lock Michelle's door prior to medication administration in an attempt to keep her in the line of sight for 15 minutes after dosing- consistent with the physician order that is now in place- to reduce the likelihood of drug diversion.  Unfortunately, this seemed to trigger Michelle who became very upset when the door to her room was locked.  She then refused to accept the medications.  RN writer attempted to problem solve with the patient, but Michelle reported that her room is the only place she would accept her remaining scheduled medications.  She made this same statement yesterday, and then proceeded to throw her medications in the toilet.  Given that the \"blind corner\" in Michelle's room is where she typically spends her time- and the importance of keeping Michelle in the line of sight for 15 minutes after medication administration- Michelle's request to take her medications in her room was denied.  She was encouraged to accept her medications either in the lounge or at the medication room window when she is ready.  At this point, Michelle adamantly refused to accept her medications.  Attempts at patient education on the risks of untreated HTN were met with escalating aggressive behaviors (yelling, menacing gestures, and getting into the personal space of RN writer). " " Later in the shift, around 12:15, RN writer attempted to administer Michelle's newly prescribed medication, Invega, but she refused to accept it.  She stated, \"I only take Invega in the morning\" and pointed at the clock.  She became agitated and was gesturing for me to leave her side.  As RN writer honored her request and walked away, Michelle could be heard yelling \"Get away from me stupid teacher!\".   Will continue to monitor closely and encourage medication compliance.  "

## 2021-05-04 PROCEDURE — 124N000002 HC R&B MH UMMC

## 2021-05-04 PROCEDURE — 250N000013 HC RX MED GY IP 250 OP 250 PS 637: Performed by: PSYCHIATRY & NEUROLOGY

## 2021-05-04 PROCEDURE — 250N000013 HC RX MED GY IP 250 OP 250 PS 637: Performed by: PHYSICIAN ASSISTANT

## 2021-05-04 PROCEDURE — 99233 SBSQ HOSP IP/OBS HIGH 50: CPT | Performed by: PSYCHIATRY & NEUROLOGY

## 2021-05-04 RX ORDER — TRIHEXYPHENIDYL HYDROCHLORIDE 2 MG/1
2 TABLET ORAL 2 TIMES DAILY
Status: DISCONTINUED | OUTPATIENT
Start: 2021-05-04 | End: 2021-05-06

## 2021-05-04 RX ADMIN — METOPROLOL SUCCINATE 50 MG: 50 TABLET, EXTENDED RELEASE ORAL at 12:50

## 2021-05-04 RX ADMIN — LORAZEPAM 1 MG: 1 TABLET ORAL at 19:48

## 2021-05-04 RX ADMIN — OLANZAPINE 10 MG: 10 TABLET, ORALLY DISINTEGRATING ORAL at 08:37

## 2021-05-04 RX ADMIN — OLANZAPINE 10 MG: 10 TABLET, ORALLY DISINTEGRATING ORAL at 19:48

## 2021-05-04 RX ADMIN — LOSARTAN POTASSIUM 100 MG: 100 TABLET, FILM COATED ORAL at 12:48

## 2021-05-04 RX ADMIN — PALIPERIDONE 3 MG: 3 TABLET, EXTENDED RELEASE ORAL at 08:37

## 2021-05-04 RX ADMIN — LORAZEPAM 1 MG: 1 TABLET ORAL at 12:44

## 2021-05-04 RX ADMIN — TRIHEXYPHENIDYL HYDROCHLORIDE 2 MG: 2 TABLET ORAL at 19:48

## 2021-05-04 ASSESSMENT — ACTIVITIES OF DAILY LIVING (ADL)
DRESS: SCRUBS (BEHAVIORAL HEALTH)
HYGIENE/GROOMING: PROMPTS
ORAL_HYGIENE: PROMPTS

## 2021-05-04 NOTE — PLAN OF CARE
"Patient presents as guarded, paranoid, disorganized and uncooperative with poor insight into her chronic mental and physical health conditions.  She continues to argue about her medications, questioning what has been ordered and when she should and should not be taking select medications.  She accepted her Invega, and eventually her AM dose of Zyprexa, but only with extensive encouragement from RN writer and her attending psychiatrist, Dr. Zaman.  Michelle adamantly refused to accept her remaining scheduled medications.  When RN writer attempted to provide education on the risks of untreated HTN, Michelle repeatedly stated that she does not have high blood pressure.  She reports that \"if I did have high blood pressure, I wouldn't be walking around like you see me doing right now!\".  Patient then retreated to her room and would become agitated when RN writer would make another attempt to check-in with her.  Michelle approached the medication room window and asked to speak with RN writer this afternoon, shortly after eating lunch.  She indicated that she would now be willing to accept her \"other meds\".  We discussed the importance of getting a current BP reading prior to dosing, as she had refused vitals this AM.  Michelle cooperated with this, but she seems paranoid about one of the vital signs monitors; as such, we presented her with two options by rolling out both monitors and letting her pick the one she was most comfortable with (she prefers the \"Kidd Kellie\" monitor).  Michelle's BP was 168/100 prior to dosing.  She accepted her Losartan (08:00 dose given late), Metoprolol (08:00 dose given late), and Ativan (14:00 dose given early at 12:48) without incident.  She did not appear agitated during this process.  Will continue to monitor closely and encourage her to remain medication compliant.    Addendum 14:49  RN writer approached Michelle to administer her 14:00 dose of Artane.  She was napping in her room at the time of this " "attempted medication administration.  She awoke from her nap and seemed somewhat confused about what I was offering her.  She refused to accept her Artane at this time, but she reported that she would take it \"at bedtime\".  She appeared less tense and agitated when compared to her presentation earlier in the shift.    "

## 2021-05-04 NOTE — PLAN OF CARE
Current Diagnosis/Procedure:  Schizoaffective Disorder, Bipolar Type     Work Completed:    Patient was discussed in treatment team today and met with provider and CTC. Patient has been observed to be agitated, angry and confused with staff, and when approached by writer demanded they leave her alone. Due to lack of improvement and ongoing refusal of neuroleptic medications the hospital team has decided to initiate a Mcgrath-Kohler petition through [facility]. They are also exploring the possibility of requesting Ativan be added to the list of medications on the commitment paperwork d/t concerns of excited catatonia and possible need for it during the context of ECT treatments.      Discharge plan or goal:  Patient will either discharge to her apartment with ACT team in place, or a group home may be looked into.       Barriers to discharge:  Continued need for stabilization of severity of illness and medication. Patient has been committed as MI w/ H[facility] through [facility]. Patient continues to be noncompliant with some of her recommended medications.

## 2021-05-04 NOTE — PROGRESS NOTES
"[facility], [place]   Psychiatric Progress Note  Hospital Day: 20        Interim History:   The patient's care was discussed with the treatment team during the daily team meeting and/or staff's chart notes were reviewed.  Staff report patient continues to exhibit symptoms/signs of psychosis, including responding to internal stimuli, disorganized thought process, delusional thought content, paranoia, and intermittent agitation. She was much improved on Friday after taking scheduled Ativan, though has since refused it and appears to be decompensating with re-emerging signs of catatonia (stereotypy). She is eating and drinking in good amounts. Not showering. Continues to have very poor insight and judgment. More agitated c/t end of last week.     Upon interview, Michelle approached me in the chen this morning before team meeting. She repeatedly stated that she does not take Zyprexa in the morning and does not take Ativan. She also requested Artane. She said \"Ativan is not a psychiatry medicine!\" I explained that Ativan is indeed a psychotropic medication and that we have observed improvements when she accepts it. She did not report any concerns about side effects. She became increasingly agitated and called this writer a liar. She then pointed her finger very close to this writer's face and interview was terminated.     Writer again attempted to meet with patient while she was sitting calmly in her room. She said that she does not want to take any medications other than Zyprexa, Invega, and Artane. She did not elaborate other than to say that she is concerned that \"all the medications will slow me down.\" When asked why she is declining her anti-hypertensive medications specifically,she said \"Because I can walk! I don't have high blood pressure!\" She was again informed that this writer is not comfortable discharding her from the hospital if she is not taking medications as prescribed. I was " "informed by nursing staff that Michelle then complied with vital signs check and medications administration, including anti-hypertensives and Ativan. She denies significant side effects. She denies acute medical concerns, including changes in vision, speech, lightheadedness/dizziness, chest pain, SOB, difficulty swallowing, neck pain, tongue swelling. She denies all mental health symptoms, including SI/HI.    I spoke with patient's son, Emelia, over the phone for > 30 minutes. Discussed hospital course, concerns about medication non-adherence, and physical health concerns. Emelia said that he is in full support of forced Ativan if patient declines and as her surrogate decision maker as patient clearly does not have capacity to consent. She does not believe she has a mental illness and does not understand indications, R/B of medications prescribed. After discussing R/B/A of Ativan with Emelia, he said \"I shawn you permission to force it by injection if needed.\" He is hoping to also avoid ECT if possible for treatment of catatonia.           Medications:       LORazepam  1 mg Oral TID     losartan  100 mg Oral Daily     metoprolol succinate ER  50 mg Oral Daily     OLANZapine zydis  10 mg Oral BID    Or     OLANZapine  10 mg Intramuscular BID     paliperidone  3 mg Oral Daily     polyethylene glycol  17 g Oral Daily          Allergies:     Allergies   Allergen Reactions     Haldol [Haloperidol]      Patient previously tolerated haldol, though developed oculogyric crises during hospital stay on 4/26/21 on total daily dose of 10 mg.     Lisinopril Cough          Labs:   No results found for this or any previous visit (from the past 24 hour(s)).       Psychiatric Examination:     BP (!) 169/84   Pulse 118   Temp 98.7  F (37.1  C) (Tympanic)   Resp 16   SpO2 100%   Weight is 0 lbs 0 oz  There is no height or weight on file to calculate BMI.    Weight over time:  Vitals:       Orthostatic Vitals     None    "         Cardiometabolic risk assessment. 04/15/21      Reviewed patient profile for cardiometabolic risk factors    Date taken /Value  REFERENCE RANGE   Abdominal Obesity  (Waist Circumference)   See nursing flowsheet Women ?35 in (88 cm)   Men ?40 in (102 cm)      Triglycerides  Triglycerides   Date Value Ref Range Status   10/19/2019 117 <150 mg/dL Final       ?150 mg/dL (1.7 mmol/L) or current treatment for elevated triglycerides   HDL cholesterol  HDL Cholesterol   Date Value Ref Range Status   10/19/2019 56 >49 mg/dL Final   ]   Women <50 mg/dL (1.3 mmol/L) in women or current treatment for low HDL cholesterol  Men <40 mg/dL (1 mmol/L) in men or current treatment for low HDL cholesterol     Fasting plasma glucose (FPG) Lab Results   Component Value Date     10/19/2019      FPG ?100 mg/dL (5.6 mmol/L) or treatment for elevated blood glucose   Blood pressure  BP Readings from Last 3 Encounters:   05/03/21 (!) 169/84   06/04/19 131/71   04/26/19 164/86    Blood pressure ?130/85 mmHg or treatment for elevated blood pressure   Family History  See family history     Appearance: awake, alert, unkempt and disheveled   Attitude:  guarded and more cooperative  Eye Contact:  Good. No evidence of ocylogyric crisis  Mood:  Irritable and agitated  Affect:  Mood congruent, more agitated today  Speech: strong accent, raised volume, rapid when agitated  Language: fluent and intact in English  Psychomotor, Gait, Musculoskeletal: No stereotypic movements noted on examination today. Possible stiffness. no evidence of tardive dyskinesia, dystonia, or tics and physical agitation  Throught Process:  disorganized, illogical and tangental  Associations:  No loosening of associations  Thought Content:  no evidence of suicidal ideation or homicidal ideation, no overtly delusional thought content. Paranoia noted as she is questioning whether staff are giving her prescribed medications  Insight:  limited  Judgement:   limited  Oriented to:  See above  Attention Span and Concentration:  limited  Recent and Remote Memory:  limited  Fund of Knowledge:  AFSHAN    Clinical Global Impressions  First:7   Most recent:6              Precautions:     Behavioral Orders   Procedures     Assault precautions     Cheeking Precautions (behavioral units)     Patient Observed swallowing PO medications; Patient asked to drink water after swallowing medication; Patient in Staff line of sight for 15 minutes after medication given; Mouth checks after PO administration (patient asked to open mouth and stick out their tongue).     Code 1 - Restrict to Unit     Elopement precautions     Routine Programming     As clinically indicated     Status 15     Every 15 minutes.          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 Symptoms and presentation at this time is most consistent with Schizoaffective Disorder, Bipolar Type. We have obtained most recent medication regimen from patient's ACT team, and regimen was restarted. Inpatient psychiatric hospitalization is warranted at this time for safety, stabilization, and possible adjustment in medications. Pt is on 72 hr hold. Petitioned for MI commitment with [facility] through [facility] on 4/14 due to dangerousness, inability to care for self, active symptoms of psychosis and tad and refusal to accept proposed treatment.    On admission, PTA medications were restarted. However, patient has been declining all scheduled medications despite significant  "encouragement from staff and this provider. She remains very symptomatic, but she was not meeting criteria for a psychiatric emergency.  Patient is also exhibiting signs of excited catatonia, including excessive and purposeless motor activity in both the upper and lower limbs (hyperkinesis), restlessness, stereotypy, impulsivity, and frenzy. She is currently declining vital signs and labs, though is eating and drinking daily. She is currently declining scheduled po medications. Psychiatric emergency declared on 4/20 due to aggression toward others in context of severe psychosis and suspected excited catatonia. Ativan 1 mg TID was also added. Discontinued PTA Invega, Zyprexa, and thorazine on 4/20 due to consistent refusal.     On 4/26, it was noted that patient frequently had upward gaze while walking up and down the unit. She did not appear to be distressed. She reported that she was looking at \"my god.\" It is possible that she is responding to internal stimuli, though also possible she is experiencing signs of oculogyric crisis as this presentation is new since initiation of Haldol, which she has only been accepting IM. Haldol was subsequently discontinued during day shift on 4/26 and scheduled Zyprexa was initiated on emergency basis. Oral Cogentin was also scheduled, though patient declined. On the evening of 4/26, patient's gaze was fixed in upward position for several hours and she appeared to be experiencing discomfort. IM Cogentin was administered with noted resolution. Oral intake remains poor and she continues to decline oral medications. Partial improvements have been observed since switch to scheduled Zyprexa.     Overall improvement in patient's agitation and suspected catatonia noted on 4/30 after patient accepted two doses of Ativan. However, since declining again, she appears to be again decompensating.      Target psychiatric symptoms and interventions:  - Increase Invega to 6 mg daily with plan to " titrate to lowest effective dose and administer BAIG (initiated on 5/3). Patient tolerated a dose of 9 mg daily w/o side effects.   - Oral Zyprexa 10 mg BID OR Zyprexa 10 mg IM. Hatch now in place.  - Ativan 1 mg TID. Patient again declining.   - Restart Artane 2 mg BID per pt request  - Cogentin 2 mg IM daily prn for evidence acute dystonic reaction or oculogyric crisis  - Because patient is only intermittently accepting scheduled Ativan and some symptoms of catatonia are re-emerging, will pursue Mcgrath Kohler today.     Resume hydroxyzine 25-50 mg q4h prn for acute anxiety  Resume Trazodone 50 mg at bedtime prn for sleep disturbances  Resume Zyprexa 10 mg TID prn for severe agitation  Resume Ativan/Benadryl q4h prn for agitation. WOULD GIVE PRN ATIVAN FIRST FOR AGITATION.      Acute Medical Problems and Treatments:  HTN:  - Metoprolol succinate ER 50 mg daily: Modified to 25 mg BID as patient has been non-adherent.    - Cozaar 100 mg daily  - Please see note from IM dated 5/3/21. Discussed care with them and they are recommending the following:  Recommendations:   - Given the degree of her hypertension I would proceed with her home antihypertensive regimen of losartan 100 mg daily and metoprolol succinate 50 mg daily when patient is agreeable to taking. There currently is not an emergent life-threatening indication to force these medications.    - I placed hold parameters on the antihypertensives (hold for SBP <110)   - Please contact Medicine if SBP is consistently <110 or >150 once she is taking losartan and metoprolol regularly   - Please notify Medicine for any chest pain/pressure, headaches, vision changes, or other concerning cardiopulmonary symptoms     Medicine will sign off. Please do not hesitate to contact if new questions or concerns arise.      Helen Mao PA-C     CVA:  - Aspirin 81 mg daily     Constipation:  - Miralax 17 mg daily     Routine labs have been ordered, including CMP, CBC with  differential, TSH, lipid profile.  Routine urine drug screen also ordered. Patient currently declining blood draw. She is also declining vitals.     Behavioral/Psychological/Social:  - Encourage unit programming    Safety:  - Continue precautions as noted above  - Status 15 minute checks  - Safety precautions include: assault and elopement precautions  - Continue precautions as noted above  - Status 15 minute checks  - Discontinued 1:1 on 4/26 as presence of 1:1 staff appears to be worsening patient's paranoia and agitation.     Legal Status: Committed as MI with Hatch in place for Haldol, Zyprexa, Invega, and Thorazine through [facility]    Disposition Plan   Reason for ongoing admission: poses an imminent risk to self, poses an imminent risk to others and is unable to care for self due to severe psychosis or tad  Discharge location: TBD. Patient has ACT team.  Discharge Medications: not ordered  Follow-up Appointments: not scheduled    Entered by: Ida Zaman on 5/4/2021 at 8:59 AM      > 60 minutes total time that was spent and over 50% of this time was spent in counseling and coordination of care.

## 2021-05-04 NOTE — PLAN OF CARE
"Pt was agitated and verbally aggressive this afternoon. She was sitting at the edge of [facility], and yelling at staff and peers as they walked by. Yelling at staff \"go back to your station!\" \"leave! Go away!\" as they were monitoring the [facility]. She attempted to follow RN into peer's room to complete VS assessment, demanding that RN take her vitals immediately. Becoming physically intrusive and threatening. Pt became more agitated when redirection given for safety. Staff moved away from [facility] and ensured peers were safe and then stopped verbally interacting with her. She eventually calmed and no further intervention was needed. She requested VS be taken this evening; /84, . She refused all interventions and education related to hypertension. Refuses to answer any assessment questions, saying \"go away!\" Pt was compliant with scheduled Zyprexa, but declined all other medications. She refused prn medications offered for agitation. Pt is eating and drinking in adequate amounts. Hygiene is very poor and malodorous. She is refusing to complete ADLs, saying she will only shower at her home.   "

## 2021-05-04 NOTE — PLAN OF CARE
BEHAVIORAL TEAM DISCUSSION    Participants: Lucille Sales, [facility], LADC, Ida Zaman MD, Tom Mace, RN.   Progress: Continues to refuse medications, actively psychotic, not improving.   Anticipated length of stay: Unknown  Continued Stay Criteria/Rationale: Patient is currently on a Full MI Commitment w/ [facility] through [facility] but is refusing neuroleptics more often than not. Has appeared to decompensate since last week, becoming more paranoid, agitated and confused. Team has decided to initiate petition for Mcgrath-Kohler.   Medical/Physical: Hypertension  Precautions:   Behavioral Orders   Procedures    Assault precautions    Cheeking Precautions (behavioral units)     Patient Observed swallowing PO medications; Patient asked to drink water after swallowing medication; Patient in Staff line of sight for 15 minutes after medication given; Mouth checks after PO administration (patient asked to open mouth and stick out their tongue).    Code 1 - Restrict to Unit    Elopement precautions    Routine Programming     As clinically indicated    Status 15     Every 15 minutes.     Plan: Patient will be petitioned on for Mcgrath Kohler, and begin receiving ECT treatments. Staff are also considering requesting Ativan be added to the commitment paperwork due to concerns about excited Catatonia.   Rationale for change in precautions or plan: Patient has not shown improvement and continues to refuse neuroleptics, therefore a Mcgrath-Kohler is being petitioned on.

## 2021-05-04 NOTE — PLAN OF CARE
Night Shift Summary (5/3/21 into 05/04/21)    Pt in bed sleeping at start of shift. Breathing quiet and unlabored. Awoke early on and had difficulty falling back asleep. Mostly stayed in her room. Appears to have slept 2.5 hours.    Assault, Elopement and Cheeking precautions, with no related events occurring this shift.     Will continue to monitor and assess.       Problem: Behavioral Health Plan of Care  Goal: Absence of New-Onset Illness or Injury  Outcome: Improving     Problem: Sleep Disturbance (Psychotic Signs/Symptoms)  Goal: Improved Sleep (Psychotic Signs/Symptoms)  Outcome: Declining

## 2021-05-05 PROCEDURE — 250N000013 HC RX MED GY IP 250 OP 250 PS 637: Performed by: PHYSICIAN ASSISTANT

## 2021-05-05 PROCEDURE — 124N000002 HC R&B MH UMMC

## 2021-05-05 PROCEDURE — 250N000013 HC RX MED GY IP 250 OP 250 PS 637: Performed by: PSYCHIATRY & NEUROLOGY

## 2021-05-05 PROCEDURE — 250N000013 HC RX MED GY IP 250 OP 250 PS 637: Performed by: INTERNAL MEDICINE

## 2021-05-05 PROCEDURE — 99233 SBSQ HOSP IP/OBS HIGH 50: CPT | Performed by: PSYCHIATRY & NEUROLOGY

## 2021-05-05 RX ORDER — CLONIDINE HYDROCHLORIDE 0.1 MG/1
0.2 TABLET ORAL ONCE
Status: COMPLETED | OUTPATIENT
Start: 2021-05-05 | End: 2021-05-05

## 2021-05-05 RX ORDER — HYDRALAZINE HYDROCHLORIDE 25 MG/1
25 TABLET, FILM COATED ORAL 4 TIMES DAILY PRN
Status: DISCONTINUED | OUTPATIENT
Start: 2021-05-05 | End: 2021-05-06

## 2021-05-05 RX ORDER — PALIPERIDONE 3 MG/1
6 TABLET, EXTENDED RELEASE ORAL DAILY
Status: DISCONTINUED | OUTPATIENT
Start: 2021-05-06 | End: 2021-05-10

## 2021-05-05 RX ADMIN — LORAZEPAM 1 MG: 1 TABLET ORAL at 18:35

## 2021-05-05 RX ADMIN — LORAZEPAM 1 MG: 1 TABLET ORAL at 14:22

## 2021-05-05 RX ADMIN — OLANZAPINE 10 MG: 10 TABLET, ORALLY DISINTEGRATING ORAL at 18:35

## 2021-05-05 RX ADMIN — TRIHEXYPHENIDYL HYDROCHLORIDE 2 MG: 2 TABLET ORAL at 08:32

## 2021-05-05 RX ADMIN — CLONIDINE HYDROCHLORIDE 0.2 MG: 0.1 TABLET ORAL at 20:31

## 2021-05-05 RX ADMIN — METOPROLOL SUCCINATE 50 MG: 50 TABLET, EXTENDED RELEASE ORAL at 08:32

## 2021-05-05 RX ADMIN — OLANZAPINE 10 MG: 10 TABLET, ORALLY DISINTEGRATING ORAL at 08:32

## 2021-05-05 RX ADMIN — PALIPERIDONE 3 MG: 3 TABLET, EXTENDED RELEASE ORAL at 08:32

## 2021-05-05 RX ADMIN — LOSARTAN POTASSIUM 100 MG: 100 TABLET, FILM COATED ORAL at 08:32

## 2021-05-05 RX ADMIN — TRIHEXYPHENIDYL HYDROCHLORIDE 2 MG: 2 TABLET ORAL at 18:35

## 2021-05-05 RX ADMIN — LORAZEPAM 1 MG: 1 TABLET ORAL at 08:32

## 2021-05-05 ASSESSMENT — ACTIVITIES OF DAILY LIVING (ADL)
DRESS: SCRUBS (BEHAVIORAL HEALTH)
LAUNDRY: UNABLE TO COMPLETE
HYGIENE/GROOMING: PROMPTS
HYGIENE/GROOMING: PROMPTS
DRESS: SCRUBS (BEHAVIORAL HEALTH)
ORAL_HYGIENE: PROMPTS
ORAL_HYGIENE: PROMPTS

## 2021-05-05 NOTE — PLAN OF CARE
Current Diagnosis/Procedure:  Schizoaffective Disorder, Bipolar Type     Work Completed:    Patient was discussed in treatment team today and met with provider and CTC. After much persistence on the part of staff patient has been compliant with medications in the last 24 hours, including Neuroleptics, Benzodiazepines, and physical health medications. She did refuse her afternoon dose of Ativan because she forgot it was scheduled 3 times a day. Writer faxed over the adhoclabsition to [facility] Attorney's Office. For the time being patient's son Emelia has been determined to be a surrogate decision maker, as patient currently lacks the ability to consent. Patient remains agitated, paranoid and very disorganized.      Discharge plan or goal:  Patient will either discharge to her apartment with ACT team in place, or a group home may be looked into.       Barriers to discharge:  Continued need for stabilization of severity of illness and medication. Patient has been committed as MI w/ H[facility] through [facility]. Patient continues to be noncompliant with some of her recommended medications.

## 2021-05-05 NOTE — PLAN OF CARE
Pt isolated and was withdrawn for the majority of evening shift. Pt ate her supper and was medication compliant. Pt came to the window for her medication but while administering them pt was difficult and demanding. At one point pt stated they would not take the medications and the changed her mind again and took them. Pt speech is pressured and rambles for long periods of time. Pt was not easily redirectable away from the window after medications were administered. Pt eventually went back to her room to watch tv. Will continue to monitor.

## 2021-05-05 NOTE — PLAN OF CARE
Pt asleep at start of shift.     While sleeping breathing quiet and unlabored.     Appears to have slept 2.5 hours but stayed in her room all HS only coming out for water.     Pt on  ASSAULT, CHEEKING and ELOPEMENT, precautions in addition to single room order. Any related events noted above.     Will continue to monitor and assess.     Problem: Sleep Disturbance (Psychotic Signs/Symptoms)  Goal: Improved Sleep (Psychotic Signs/Symptoms)  Outcome: Declining

## 2021-05-05 NOTE — PLAN OF CARE
Pt tense and irritable on approach. She did allow VS assessment this morning; /88, HR 83. Pt declined miralax, but accepted all other scheduled medications this morning, including BP medications. Declined afternoon dose of Ativan, saying she only takes in the morning and evening. She approached writer about 30 minutes later and agreed to take 1400 dose of Ativan. Dismissive of education attempts by Nursing. She requested writer to list her medications Continues to demonstrate poor insight into her mental health symptoms and denies any illness. No adverse effects observed from medications. Appetite and fluid intake good. Hygiene poor and she is declining to shower.

## 2021-05-05 NOTE — PLAN OF CARE
BEHAVIORAL TEAM DISCUSSION    Participants: Lucille Sales, [facility], LADC, Ida Zaman MD, Rosalinda Pineda, RN, Kal Karimi, RN.   Progress: Minimal improvement, is reluctantly accepting medications.   Anticipated length of stay: Unknown  Continued Stay Criteria/Rationale: Patient remains acutely psychotic and would be unable to stay safe in a community setting.   Medical/Physical: Hypertension  Precautions:   Behavioral Orders   Procedures    Assault precautions    Cheeking Precautions (behavioral units)     Patient Observed swallowing PO medications; Patient asked to drink water after swallowing medication; Patient in Staff line of sight for 15 minutes after medication given; Mouth checks after PO administration (patient asked to open mouth and stick out their tongue).    Code 1 - Restrict to Unit    Elopement precautions    Routine Programming     As clinically indicated    Status 15     Every 15 minutes.     Plan: Patient will remain hospitalized until symptoms improve at which point she will likely be discharged to her apartment w/ ACT team. Hospital team has submitted a Mcgrath Kholer petition to [facility].   Rationale for change in precautions or plan: Patient is having minimal improvement under current neuroleptics, therefore ECT may be needed.

## 2021-05-05 NOTE — PROGRESS NOTES
"[facility], [place]   Psychiatric Progress Note  Hospital Day: 21        Interim History:   The patient's care was discussed with the treatment team during the daily team meeting and/or staff's chart notes were reviewed.  Staff report patient continues to exhibit symptoms/signs of psychosis, including responding to internal stimuli, disorganized thought process, delusional thought content, paranoia, and intermittent agitation. She was much improved on Friday after taking scheduled Ativan, though has since refused it and appears to be decompensating with re-emerging signs of catatonia (stereotypy). She is eating and drinking in good amounts. Not showering. Continues to have very poor insight and judgment. More agitated c/t end of last week. Pt reluctantly willing to accept all medications.     Upon interview, Michelle was sleeping calmly in her room. She was much calmer today with brighter affect shortly after receiving oral Ativan. She agreed that she is feeling better since starting oral Ativan. She said that she wants to go home and take a shower at home. She was informed that if she continues to accept oral medications, she may be discharged next week back to home with ACT team support. Michelle then became agitated and said \"No! I am not on commitment and I am done with the ACT team!\" She denied SI, SIB, and HI. She denied paranoia, AH/VH, acute medical concerns. Denies acute physical pain. She had no additional questions or concerns. Highly perseverative on discharge.  Discussed recommendation to increase Invega to 6 mg daily. She was not opposed to this.          Medications:       LORazepam  1 mg Oral TID     losartan  100 mg Oral Daily     metoprolol succinate ER  50 mg Oral Daily     OLANZapine zydis  10 mg Oral BID    Or     OLANZapine  10 mg Intramuscular BID     paliperidone  3 mg Oral Daily     polyethylene glycol  17 g Oral Daily     trihexyphenidyl  2 mg Oral BID          " Allergies:     Allergies   Allergen Reactions     Haldol [Haloperidol]      Patient previously tolerated haldol, though developed oculogyric crises during hospital stay on 4/26/21 on total daily dose of 10 mg.     Lisinopril Cough          Labs:   No results found for this or any previous visit (from the past 24 hour(s)).       Psychiatric Examination:     BP (!) 171/88   Pulse 83   Temp 98.5  F (36.9  C) (Oral)   Resp 16   SpO2 100%   Weight is 0 lbs 0 oz  There is no height or weight on file to calculate BMI.    Weight over time:  Vitals:       Orthostatic Vitals     None            Cardiometabolic risk assessment. 04/15/21      Reviewed patient profile for cardiometabolic risk factors    Date taken /Value  REFERENCE RANGE   Abdominal Obesity  (Waist Circumference)   See nursing flowsheet Women ?35 in (88 cm)   Men ?40 in (102 cm)      Triglycerides  Triglycerides   Date Value Ref Range Status   10/19/2019 117 <150 mg/dL Final       ?150 mg/dL (1.7 mmol/L) or current treatment for elevated triglycerides   HDL cholesterol  HDL Cholesterol   Date Value Ref Range Status   10/19/2019 56 >49 mg/dL Final   ]   Women <50 mg/dL (1.3 mmol/L) in women or current treatment for low HDL cholesterol  Men <40 mg/dL (1 mmol/L) in men or current treatment for low HDL cholesterol     Fasting plasma glucose (FPG) Lab Results   Component Value Date     10/19/2019      FPG ?100 mg/dL (5.6 mmol/L) or treatment for elevated blood glucose   Blood pressure  BP Readings from Last 3 Encounters:   05/05/21 (!) 171/88   06/04/19 131/71   04/26/19 164/86    Blood pressure ?130/85 mmHg or treatment for elevated blood pressure   Family History  See family history     Appearance: awake, alert, unkempt and disheveled   Attitude:  guarded and more cooperative  Eye Contact:  Good. No evidence of ocylogyric crisis  Mood:  calmer  Affect:  Mood congruent, brighter. Smiled on a couple of occasions.   Speech: strong accent, normal volume,  rapid when agitated  Language: fluent and intact in English  Psychomotor, Gait, Musculoskeletal: No stereotypic movements noted on examination today. No evidence of tardive dyskinesia, dystonia, or tics. No physical agitation.   Throught Process:  disorganized, illogical and tangental  Associations:  No loosening of associations  Thought Content:  no evidence of suicidal ideation or homicidal ideation, no overtly delusional thought content. Paranoia noted as she is questioning whether staff are giving her prescribed medications  Insight:  limited  Judgement:  limited  Oriented to:  See above  Attention Span and Concentration:  limited  Recent and Remote Memory:  limited  Fund of Knowledge:  normal    Clinical Global Impressions  First:7   Most recent:5              Precautions:     Behavioral Orders   Procedures     Assault precautions     Cheeking Precautions (behavioral units)     Patient Observed swallowing PO medications; Patient asked to drink water after swallowing medication; Patient in Staff line of sight for 15 minutes after medication given; Mouth checks after PO administration (patient asked to open mouth and stick out their tongue).     Code 1 - Restrict to Unit     Elopement precautions     Routine Programming     As clinically indicated     Status 15     Every 15 minutes.          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 Symptoms and presentation at this time is most consistent with Schizoaffective Disorder, Bipolar Type. We have obtained  "most recent medication regimen from patient's ACT team, and regimen was restarted. Inpatient psychiatric hospitalization is warranted at this time for safety, stabilization, and possible adjustment in medications. Pt is on 72 hr hold. Petitioned for MI commitment with [facility] through [facility] on 4/14 due to dangerousness, inability to care for self, active symptoms of psychosis and tad and refusal to accept proposed treatment.    On admission, PTA medications were restarted. However, patient has been declining all scheduled medications despite significant encouragement from staff and this provider. She remains very symptomatic, but she was not meeting criteria for a psychiatric emergency.  Patient is also exhibiting signs of excited catatonia, including excessive and purposeless motor activity in both the upper and lower limbs (hyperkinesis), restlessness, stereotypy, impulsivity, and frenzy. She is currently declining vital signs and labs, though is eating and drinking daily. She is currently declining scheduled po medications. Psychiatric emergency declared on 4/20 due to aggression toward others in context of severe psychosis and suspected excited catatonia. Ativan 1 mg TID was also added. Discontinued PTA Invega, Zyprexa, and thorazine on 4/20 due to consistent refusal.     On 4/26, it was noted that patient frequently had upward gaze while walking up and down the unit. She did not appear to be distressed. She reported that she was looking at \"my god.\" It is possible that she is responding to internal stimuli, though also possible she is experiencing signs of oculogyric crisis as this presentation is new since initiation of Haldol, which she has only been accepting IM. Haldol was subsequently discontinued during day shift on 4/26 and scheduled Zyprexa was initiated on emergency basis. Oral Cogentin was also scheduled, though patient declined. On the evening of 4/26, patient's gaze was fixed in upward " position for several hours and she appeared to be experiencing discomfort. IM Cogentin was administered with noted resolution. Oral intake remains poor and she continues to decline oral medications. Partial improvements have been observed since switch to scheduled Zyprexa.     Overall improvement in patient's agitation and suspected catatonia noted on 4/30 after patient accepted two doses of Ativan. However, since declining again, she appears to be again decompensating.      Target psychiatric symptoms and interventions:  - Increase Invega to 6 mg daily with plan to titrate to lowest effective dose and administer BAIG (initiated on 5/3). Patient tolerated a dose of 9 mg daily w/o side effects.   - Oral Zyprexa 10 mg BID OR Zyprexa 10 mg IM. Hatch now in place.  - Ativan 1 mg TID. Patient again declining.   - Restart Artane 2 mg BID per pt request  - Cogentin 2 mg IM daily prn for evidence acute dystonic reaction or oculogyric crisis  - Because patient is only intermittently accepting scheduled Ativan and some symptoms of catatonia are re-emerging, pursued [facility] for ECT     Resume hydroxyzine 25-50 mg q4h prn for acute anxiety  Resume Trazodone 50 mg at bedtime prn for sleep disturbances  Resume Zyprexa 10 mg TID prn for severe agitation  Resume Ativan/Benadryl q4h prn for agitation. WOULD GIVE PRN ATIVAN FIRST FOR AGITATION.      Acute Medical Problems and Treatments:  HTN:  - Metoprolol succinate ER 50 mg daily: Modified to 25 mg BID as patient has been non-adherent.    - Cozaar 100 mg daily  - Please see note from IM dated 5/3/21. Discussed care with them and they are recommending the following:  Recommendations:   - Given the degree of her hypertension I would proceed with her home antihypertensive regimen of losartan 100 mg daily and metoprolol succinate 50 mg daily when patient is agreeable to taking. There currently is not an emergent life-threatening indication to force these medications.    - I  placed hold parameters on the antihypertensives (hold for SBP <110)   - Please contact Medicine if SBP is consistently <110 or >150 once she is taking losartan and metoprolol regularly   - Please notify Medicine for any chest pain/pressure, headaches, vision changes, or other concerning cardiopulmonary symptoms     Medicine will sign off. Please do not hesitate to contact if new questions or concerns arise.      Helen Mao PA-C     CVA:  - Aspirin 81 mg daily     Constipation:  - Miralax 17 mg daily     Routine labs have been ordered, including CMP, CBC with differential, TSH, lipid profile.  Routine urine drug screen also ordered. Patient currently declining blood draw. She is also declining vitals.     Behavioral/Psychological/Social:  - Encourage unit programming    Safety:  - Continue precautions as noted above  - Status 15 minute checks  - Safety precautions include: assault and elopement precautions  - Continue precautions as noted above  - Status 15 minute checks  - Discontinued 1:1 on 4/26 as presence of 1:1 staff appears to be worsening patient's paranoia and agitation.     Legal Status: Committed as MI with Hatch in place for Haldol, Zyprexa, Invega, and Thorazine through [facility]    Disposition Plan   Reason for ongoing admission: poses an imminent risk to self, poses an imminent risk to others and is unable to care for self due to severe psychosis or tad  Discharge location: TBD. Patient has ACT team.  Discharge Medications: not ordered  Follow-up Appointments: not scheduled    Entered by: Ida Zaman on 5/5/2021 at 9:10 AM      > 45 minutes total time that was spent and over 50% of this time was spent in counseling and coordination of care.

## 2021-05-06 PROCEDURE — 124N000002 HC R&B MH UMMC

## 2021-05-06 PROCEDURE — 250N000013 HC RX MED GY IP 250 OP 250 PS 637: Performed by: PSYCHIATRY & NEUROLOGY

## 2021-05-06 PROCEDURE — 99233 SBSQ HOSP IP/OBS HIGH 50: CPT | Performed by: PSYCHIATRY & NEUROLOGY

## 2021-05-06 PROCEDURE — 250N000013 HC RX MED GY IP 250 OP 250 PS 637: Performed by: PHYSICIAN ASSISTANT

## 2021-05-06 PROCEDURE — 99207 PR NO CHARGE LOS: CPT | Performed by: PHYSICIAN ASSISTANT

## 2021-05-06 RX ORDER — HYDRALAZINE HYDROCHLORIDE 25 MG/1
25 TABLET, FILM COATED ORAL 4 TIMES DAILY PRN
Status: DISCONTINUED | OUTPATIENT
Start: 2021-05-06 | End: 2021-07-12

## 2021-05-06 RX ORDER — LORAZEPAM 1 MG/1
1 TABLET ORAL 3 TIMES DAILY
Status: DISCONTINUED | OUTPATIENT
Start: 2021-05-06 | End: 2021-05-19

## 2021-05-06 RX ORDER — LORAZEPAM 2 MG/ML
1 INJECTION INTRAMUSCULAR 3 TIMES DAILY
Status: DISCONTINUED | OUTPATIENT
Start: 2021-05-06 | End: 2021-05-19

## 2021-05-06 RX ORDER — TRIHEXYPHENIDYL HYDROCHLORIDE 2 MG/1
2 TABLET ORAL 2 TIMES DAILY PRN
Status: DISCONTINUED | OUTPATIENT
Start: 2021-05-06 | End: 2021-09-16 | Stop reason: HOSPADM

## 2021-05-06 RX ADMIN — LOSARTAN POTASSIUM 100 MG: 100 TABLET, FILM COATED ORAL at 09:13

## 2021-05-06 RX ADMIN — LORAZEPAM 1 MG: 1 TABLET ORAL at 14:26

## 2021-05-06 RX ADMIN — PALIPERIDONE 6 MG: 3 TABLET, EXTENDED RELEASE ORAL at 09:14

## 2021-05-06 RX ADMIN — OLANZAPINE 10 MG: 10 TABLET, ORALLY DISINTEGRATING ORAL at 09:14

## 2021-05-06 RX ADMIN — LORAZEPAM 1 MG: 1 TABLET ORAL at 19:33

## 2021-05-06 RX ADMIN — LORAZEPAM 1 MG: 1 TABLET ORAL at 09:13

## 2021-05-06 RX ADMIN — OLANZAPINE 10 MG: 10 TABLET, ORALLY DISINTEGRATING ORAL at 19:34

## 2021-05-06 RX ADMIN — METOPROLOL SUCCINATE 50 MG: 50 TABLET, EXTENDED RELEASE ORAL at 09:13

## 2021-05-06 ASSESSMENT — ACTIVITIES OF DAILY LIVING (ADL)
HYGIENE/GROOMING: PROMPTS
DRESS: SCRUBS (BEHAVIORAL HEALTH)
DRESS: SCRUBS (BEHAVIORAL HEALTH)
ORAL_HYGIENE: PROMPTS
HYGIENE/GROOMING: PROMPTS
ORAL_HYGIENE: PROMPTS
LAUNDRY: UNABLE TO COMPLETE

## 2021-05-06 NOTE — PLAN OF CARE
Pt asleep  at start of shift. Breathing quiet and unlabored.     Appears to have slept 4.25 hours.     Pt on  ASSAULT, CHEEKING and ELOPEMENT, precautions in addition to single room order. Any related events noted above.     Will continue to monitor and assess.   Problem: Sleep Disturbance (Psychotic Signs/Symptoms)  Goal: Improved Sleep (Psychotic Signs/Symptoms)  Outcome: Declining

## 2021-05-06 NOTE — PLAN OF CARE
"  Problem: Decreased Participation and Engagement (Psychotic Signs/Symptoms)  Goal: Increased Participation and Engagement (Psychotic Signs/Symptoms)  Outcome: No Change     Problem: Adult Inpatient Plan of Care  Goal: Plan of Care Review  Outcome: Improving  Flowsheets  Taken 5/5/2021 2249  Progress: improving  Taken 5/5/2021 2244  Plan of Care Reviewed With: patient    Patient isolative to room all evening only in the milieu upon meals and when seeking staff assistance. Patient upon approach blunted, tense, and pressured in speech upon approach. Patient stating \"I am fine\", and uncooperative upon attempted verbal check. Patient had no observed SI/SIB, AH/VH, anxiety, depression, or dangerous behaviors. Patient does display paranoia, moving her bedding to a corner of her room appearing to avoid staff eye contact during check. Patient independent with approaching medication window and accepting of HS medications. Patient vitals appeared above ordered parameters with BP of 197/93 and pulse of 125. On call medical provider Dr. Rodriguez contacted ordering one time dose of Clonidine and continued monitoring until systolic less then 150. Patient with encouragement accepting of medication and at approximately 2115 had BP with ordered parameters.  PRN Hydralazine ordered to be given for future systolic over 160.     "

## 2021-05-06 NOTE — PROGRESS NOTES
RN: patient with increased agitation, high BP.   Ordered clonidine 0.2 mg po x 1. PRN hydralazine for sbp>160  Check Q 30 minutes until BP <170 systolic.   Treat agitation, psychiatry problem per primary team.     Vitals:    05/05/21 1900 05/05/21 2020 05/05/21 2043 05/05/21 2115   BP: (!) 167/102 (!) 197/93 (!) 179/110 (!) 155/80   BP Location:  Left arm Left arm Left arm   Pulse: 73 125     Resp:       Temp: 97.6  F (36.4  C)      TempSrc: Oral      SpO2: 100%        Cross Cover    Ernesto Rodriguez MD  [facility]  Contact information available via [facility] Paging/Directory

## 2021-05-06 NOTE — PROGRESS NOTES
Patient upon 1900 vitals checks had elevated BP of 167/102 P-73. Patient at this time irritable and tense not accepting of second assessment. Patient approached again in her room later where she appeared to be sleeping with her bedding on the floor. Patient cooperative with vital signs reassessment with increasing BP of 197/93 P-125. On call medical provider Dr. Rodriguez contacted. He ordered for one time dose of Clonidine 0.2 mg be given and BP reassessed every 30 minutes. Patient initially refused to take the medication denying any symptoms of HTN and stating discomfort with automated vitals monitor. RN writer offered to take a manual BP which patient was accepting of. Manual BP of 179/110 observed and shared with patient, which at this time patient was accepting of the medication. Will continue to monitor. BP monitoring continued per physician verbal orders of systolic less then 170. At 2115 on patient was reassessed twice with systolic pressures less than 170 observed. At this time 30 minute checks discontinued and patient resumed sleeping.

## 2021-05-06 NOTE — PROGRESS NOTES
"[facility], [place]   Psychiatric Progress Note  Hospital Day: 22        Interim History:   The patient's care was discussed with the treatment team during the daily team meeting and/or staff's chart notes were reviewed.  Staff report IM was notified for significantly elevated BPs and tachycardia. Pt was reluctantly accepting of clonidine 0.2 mg. Patient had no observed SI/SIB, AH/VH, anxiety, depression, or dangerous behaviors. Patient does display paranoia, moving her bedding to a corner of her room appearing to avoid staff eye contact. Pt appears to have slept 4.25 hours.     Upon interview, Michelle was initially lying in the floor, comfortably, in her room. She mentioned that she typically sleeps on the floor in her home because it is more comfortable for her. She states that she feels \"better.\" She denied SI, SIB, and HI. She denied paranoia, AH/VH, acute medical concerns. Denies acute physical pain. She had no additional questions or concerns. Less perseverative on discharge.          Medications:       LORazepam  1 mg Oral TID     losartan  100 mg Oral Daily     metoprolol succinate ER  50 mg Oral Daily     OLANZapine zydis  10 mg Oral BID    Or     OLANZapine  10 mg Intramuscular BID     paliperidone  6 mg Oral Daily     polyethylene glycol  17 g Oral Daily     trihexyphenidyl  2 mg Oral BID          Allergies:     Allergies   Allergen Reactions     Haldol [Haloperidol]      Patient previously tolerated haldol, though developed oculogyric crises during hospital stay on 4/26/21 on total daily dose of 10 mg.     Lisinopril Cough          Labs:   No results found for this or any previous visit (from the past 24 hour(s)).       Psychiatric Examination:     BP (!) 150/90 (BP Location: Left arm)   Pulse 125   Temp 97.6  F (36.4  C) (Oral)   Resp 16   SpO2 100%   Weight is 0 lbs 0 oz  There is no height or weight on file to calculate BMI.    Weight over time:  Vitals:       Orthostatic " Vitals     None            Cardiometabolic risk assessment. 04/15/21      Reviewed patient profile for cardiometabolic risk factors    Date taken /Value  REFERENCE RANGE   Abdominal Obesity  (Waist Circumference)   See nursing flowsheet Women ?35 in (88 cm)   Men ?40 in (102 cm)      Triglycerides  Triglycerides   Date Value Ref Range Status   10/19/2019 117 <150 mg/dL Final       ?150 mg/dL (1.7 mmol/L) or current treatment for elevated triglycerides   HDL cholesterol  HDL Cholesterol   Date Value Ref Range Status   10/19/2019 56 >49 mg/dL Final   ]   Women <50 mg/dL (1.3 mmol/L) in women or current treatment for low HDL cholesterol  Men <40 mg/dL (1 mmol/L) in men or current treatment for low HDL cholesterol     Fasting plasma glucose (FPG) Lab Results   Component Value Date     10/19/2019      FPG ?100 mg/dL (5.6 mmol/L) or treatment for elevated blood glucose   Blood pressure  BP Readings from Last 3 Encounters:   05/05/21 (!) 150/90   06/04/19 131/71   04/26/19 164/86    Blood pressure ?130/85 mmHg or treatment for elevated blood pressure   Family History  See family history     Appearance: awake, alert, unkempt and disheveled   Attitude:  guarded and more cooperative  Eye Contact:  Good. No evidence of ocylogyric crisis  Mood:  calmer  Affect:  Mood congruent, brighter. Smiled on a couple of occasions.   Speech: strong accent, normal volume, rapid when agitated  Language: fluent and intact in English  Psychomotor, Gait, Musculoskeletal: No stereotypic movements noted on examination today. No evidence of tardive dyskinesia, dystonia, or tics. No physical agitation.   Throught Process:  Linear, goal oriented.  Associations:  No loosening of associations  Thought Content:  no evidence of suicidal ideation or homicidal ideation, no overtly delusional or paranoid thought content today.   Insight:  limited  Judgement:  limited  Oriented to:  See above  Attention Span and Concentration:  limited  Recent and  Remote Memory:  limited  Fund of Knowledge:  normal    Clinical Global Impressions  First:7   Most recent:5              Precautions:     Behavioral Orders   Procedures     Assault precautions     Cheeking Precautions (behavioral units)     Patient Observed swallowing PO medications; Patient asked to drink water after swallowing medication; Patient in Staff line of sight for 15 minutes after medication given; Mouth checks after PO administration (patient asked to open mouth and stick out their tongue).     Code 1 - Restrict to Unit     Elopement precautions     Routine Programming     As clinically indicated     Status 15     Every 15 minutes.          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 Symptoms and presentation at this time is most consistent with Schizoaffective Disorder, Bipolar Type. We have obtained most recent medication regimen from patient's ACT team, and regimen was restarted. Inpatient psychiatric hospitalization is warranted at this time for safety, stabilization, and possible adjustment in medications. Pt is on 72 hr hold. Petitioned for MI commitment with [facility] through [facility] on 4/14 due to dangerousness, inability to care for self, active symptoms of psychosis and tad and refusal to accept proposed treatment.    On admission, PTA medications were restarted. However, patient has been declining all scheduled medications despite significant encouragement from staff and this provider. She remains very symptomatic, but she was not  "meeting criteria for a psychiatric emergency.  Patient is also exhibiting signs of excited catatonia, including excessive and purposeless motor activity in both the upper and lower limbs (hyperkinesis), restlessness, stereotypy, impulsivity, and frenzy. She is currently declining vital signs and labs, though is eating and drinking daily. She is currently declining scheduled po medications. Psychiatric emergency declared on 4/20 due to aggression toward others in context of severe psychosis and suspected excited catatonia. Ativan 1 mg TID was also added. Discontinued PTA Invega, Zyprexa, and thorazine on 4/20 due to consistent refusal.     On 4/26, it was noted that patient frequently had upward gaze while walking up and down the unit. She did not appear to be distressed. She reported that she was looking at \"my god.\" It is possible that she is responding to internal stimuli, though also possible she is experiencing signs of oculogyric crisis as this presentation is new since initiation of Haldol, which she has only been accepting IM. Haldol was subsequently discontinued during day shift on 4/26 and scheduled Zyprexa was initiated on emergency basis. Oral Cogentin was also scheduled, though patient declined. On the evening of 4/26, patient's gaze was fixed in upward position for several hours and she appeared to be experiencing discomfort. IM Cogentin was administered with noted resolution. Oral intake remains poor and she continues to decline oral medications. Partial improvements have been observed since switch to scheduled Zyprexa.     Overall improvement in patient's agitation and suspected catatonia noted on 4/30 after patient accepted two doses of Ativan. However, since declining again, she appears to be again decompensating.      Target psychiatric symptoms and interventions:  - Resume Invega 6 mg daily with plan to titrate to lowest effective dose and administer BAIG (initiated on 5/3). Patient tolerated a " dose of 9 mg daily w/o side effects.   - Oral Zyprexa 10 mg BID OR Zyprexa 10 mg IM. Hatch now in place.  - Ativan 1 mg TID. Patient now accepting, however, she does not have decisional making capacity at this time. She does not believe she has a mental illness, including catatonia. She does not fully understand risks associated with inadequate treatment of catatonia. Discussed with her son, who is acting as surrogate decision maker and he is in full support of forced scheduled IM Ativan if patient declines oral formulation.   - Artane 2 mg BID per pt request. Will adjust to prn as patient is now declining  - Cogentin 2 mg IM daily prn for evidence acute dystonic reaction or oculogyric crisis  - Because patient is only intermittently accepting scheduled Ativan and some symptoms of catatonia are re-emerging, pursued [facility] for ECT     Resume hydroxyzine 25-50 mg q4h prn for acute anxiety  Resume Trazodone 50 mg at bedtime prn for sleep disturbances  Resume Zyprexa 10 mg TID prn for severe agitation  Resume Ativan/Benadryl q4h prn for agitation. WOULD GIVE PRN ATIVAN FIRST FOR AGITATION.      Acute Medical Problems and Treatments:  HTN:  - Due to ongoing HTN and need for urgent IM intervention last evening, will formally consult IM. Appreciate assistance.   - Metoprolol succinate ER 50 mg daily: Modified to 25 mg BID as patient has been non-adherent.    - Cozaar 100 mg daily  - Please see note from IM dated 5/3/21. Discussed care with them and they are recommending the following:  Recommendations:   - Given the degree of her hypertension I would proceed with her home antihypertensive regimen of losartan 100 mg daily and metoprolol succinate 50 mg daily when patient is agreeable to taking. There currently is not an emergent life-threatening indication to force these medications.    - I placed hold parameters on the antihypertensives (hold for SBP <110)   - Please contact Medicine if SBP is consistently <110 or  >150 once she is taking losartan and metoprolol regularly   - Please notify Medicine for any chest pain/pressure, headaches, vision changes, or other concerning cardiopulmonary symptoms     Medicine will sign off. Please do not hesitate to contact if new questions or concerns arise.      Helen Mao PA-C     CVA:  - Aspirin 81 mg daily     Constipation:  - Miralax 17 mg daily     Routine labs have been ordered, including CMP, CBC with differential, TSH, lipid profile.  Routine urine drug screen also ordered. Patient currently declining blood draw. She is also declining vitals.     Behavioral/Psychological/Social:  - Encourage unit programming    Safety:  - Continue precautions as noted above  - Status 15 minute checks  - Safety precautions include: assault and elopement precautions  - Continue precautions as noted above  - Status 15 minute checks  - Discontinued 1:1 on 4/26 as presence of 1:1 staff appears to be worsening patient's paranoia and agitation.     Legal Status: Committed as MI with Hatch in place for Haldol, Zyprexa, Invega, and Thorazine through [facility]. Filed [facility].     Disposition Plan   Reason for ongoing admission: poses an imminent risk to self, poses an imminent risk to others and is unable to care for self due to severe psychosis or tad  Discharge location: TBD. Patient has ACT team.  Discharge Medications: not ordered  Follow-up Appointments: not scheduled    Entered by: Ida Zaman on 5/6/2021 at 8:08 AM      > 40 minutes total time that was spent and over 50% of this time was spent in counseling and coordination of care.

## 2021-05-06 NOTE — CONSULTS
Medicine Consult Note    Consulted by Dr. Zaman of Psychiatry regarding elevated blood pressure.     Michelle Pino is a 60 year old female with a history of schizoaffective disorder, HTN, CVA (2017) who was admitted to inpatient behavioral health with tad, psychosis, and agitation. Blood pressure has been persistently elevated since admission. The patient has a history of hypertension and is maintained on losartan 100 mg daily,  Metoprolol XL 50 mg daily. She was initially non-compliant with her blood pressure medications. She has now been taking the losartan and metoprolol for the past 2 days.     She is still quite decompensated from a psychiatric standpoint. She has been agitated and tense. Discussed with RN, they have seen some improvement in her behaviors since starting Ativan and Invega (now taking BP meds), though she continues to have frequent periods of agitation.     Hypertension: Her agitation, psychiatric decompensation are likely driving her BP elevations. She is asymptomatic.     - Treatment of agitation per Psychiatry   - I would expect her blood pressure to improve as her agitation and psychiatric decompensation resolve. In the interim it is most appropriate to treat with PRN hydralazine, will monitor BP trend over the next few days and consider increasing metoprolol dose if BP is persistently severely elevated and requiring frequent PRN hydralazine.    - Continue PTA losartan, metoprolol   - Hydralazine 25 mg QID PRN for SBP >160 or DBP >110    Tachycardia: Occurring intermittently, most likely secondary to agitation. Invega can also cause tachycardia (up to 16%) but would expect her to be persistently tachycardic.    - Continue to monitor   -  Notify Medicine for HR sustained >130 and/or if she becomes symptomatic (chest pain/pressure, palpitations, etc.)      Medicine will peripherally follow. Please do not hesitate to contact if new questions or concerns arise.     Helen Mao,  DERIC  Hospitalist Service  Pager: [phone]

## 2021-05-06 NOTE — PLAN OF CARE
"Patient presents as guarded, paranoid, and irritable with poor insight into her mental and physical health recovery.  She continues to show signs of agitation and requires redirection for engaging in verbally aggressive behavior.  She remains hesitant to accept all of her currently scheduled medications.  She did accept her Invega, Zyprexa, Ativan, Losartan, and Metoprolol this AM, but she refused her Miralax and Artane.  She does not offer a response when asked whether she is having regular bowel movements.  With regard to the Artane, Michelle fidelina reports \"I prescribed that!- not the doctor!- I told her to order that, and I can tell her to stop it!  I do not need it!  I'm not having side effects!\".  Michelle once again showed some paranoia with regard to the vital signs monitors, refusing to allow unit staff to assess her blood pressure.  She eventually accepted a manual blood pressure cuff, but this did require extensive encouragement from unit staff to gain her compliance.  HTN noted prior to administering her scheduled AM antihypertensive regimen.  RN writer attempted to recheck Michelle's blood pressure later in the morning, around 11:00, but Michelle refused.  She was in the midst of ripping up her menu for tomorrow and perseverating on obtaining 2% milk.  She states, \"My insurance is paying for my hospital stay!  If I want 2% milk, you have to give it to me!\".    Will make another attempt to reassess her BP later in the shift.  "

## 2021-05-06 NOTE — PLAN OF CARE
"Current Diagnosis/Procedure:  Schizoaffective Disorder, Bipolar Type     Work Completed:    Patient was discussed in treatment team today and met with provider and CTC. Patient has been mostly compliant with her medications and vitals, but does continue to refuse at times. She is isolating herself in her room and has been observed to be engaging in rhythmic positions, possible as a result of her ongoing Catatonia. She remains paranoid and psychotic, attempting to cast \"rich spells\" on a psych associate.     Discharge plan or goal:  Patient will either discharge to her apartment with ACT team in place, or a group home may be looked into.       Barriers to discharge:  Continued need for stabilization of severity of illness and medication. Patient has been committed as MI w/ [facility] through [facility]. Patient continues to be noncompliant with some of her recommended medications.   "

## 2021-05-07 PROCEDURE — 99233 SBSQ HOSP IP/OBS HIGH 50: CPT | Performed by: PSYCHIATRY & NEUROLOGY

## 2021-05-07 PROCEDURE — 250N000013 HC RX MED GY IP 250 OP 250 PS 637: Performed by: PSYCHIATRY & NEUROLOGY

## 2021-05-07 PROCEDURE — 250N000013 HC RX MED GY IP 250 OP 250 PS 637: Performed by: PHYSICIAN ASSISTANT

## 2021-05-07 PROCEDURE — 124N000002 HC R&B MH UMMC

## 2021-05-07 RX ADMIN — PALIPERIDONE 6 MG: 3 TABLET, EXTENDED RELEASE ORAL at 08:34

## 2021-05-07 RX ADMIN — LORAZEPAM 1 MG: 1 TABLET ORAL at 08:34

## 2021-05-07 RX ADMIN — LORAZEPAM 1 MG: 1 TABLET ORAL at 13:50

## 2021-05-07 RX ADMIN — LOSARTAN POTASSIUM 100 MG: 100 TABLET, FILM COATED ORAL at 08:34

## 2021-05-07 RX ADMIN — METOPROLOL SUCCINATE 50 MG: 50 TABLET, EXTENDED RELEASE ORAL at 08:34

## 2021-05-07 RX ADMIN — LORAZEPAM 1 MG: 1 TABLET ORAL at 19:09

## 2021-05-07 RX ADMIN — OLANZAPINE 10 MG: 10 TABLET, ORALLY DISINTEGRATING ORAL at 19:09

## 2021-05-07 RX ADMIN — OLANZAPINE 10 MG: 10 TABLET, ORALLY DISINTEGRATING ORAL at 08:34

## 2021-05-07 ASSESSMENT — ACTIVITIES OF DAILY LIVING (ADL)
DRESS: SCRUBS (BEHAVIORAL HEALTH)
LAUNDRY: UNABLE TO COMPLETE
HYGIENE/GROOMING: PROMPTS
DRESS: SCRUBS (BEHAVIORAL HEALTH)
LAUNDRY: UNABLE TO COMPLETE
ORAL_HYGIENE: PROMPTS
ORAL_HYGIENE: PROMPTS
HYGIENE/GROOMING: PROMPTS

## 2021-05-07 NOTE — PLAN OF CARE
Current Diagnosis/Procedure:  Schizoaffective Disorder, Bipolar Type     Work Completed:    Patient was discussed in treatment team today and met with provider and CTC. Patient continues to be compliant with medications, but remains isolative, guarded, irritable and angry. She is floridly psychotic and is exhibiting ongoing symptoms of excited catatonia, as exhibited by repetitive bodily movements that she will engage in for hours. Patient spends most of the day asleep but sleeps poorly at night.Writer contacted [facility] to make sure they received the [facility] petition, and they reported receiving it on 5/6. The  stated that they only have ECT hearings on Fridays, and that a  will need to be appointed on patient's behalf.      Discharge plan or goal:  Patient will either discharge to her apartment with ACT team in place, or a group home may be looked into.       Barriers to discharge:  Continued need for stabilization of severity of illness and medication. Patient has been committed as MI w/ [facility] through [facility]. Patient continues to be noncompliant with some of her recommended medications.

## 2021-05-07 NOTE — PLAN OF CARE
Unremarkable shift. Patient continues to be paranoid, guarded and isolative. /78 this AM. She did take her meds this morning even though they were already opened, she just insisted on seeing package. Patient is eating fine. Spent majority of the day in her room laying in the corner. No other concerns, does appear to be improving.

## 2021-05-07 NOTE — PROGRESS NOTES
"[facility], [place]   Psychiatric Progress Note  Hospital Day: 23        Interim History:   The patient's care was discussed with the treatment team during the daily team meeting and/or staff's chart notes were reviewed.  Staff report IM was notified for significantly elevated BPs and tachycardia. Pt was reluctantly accepting of clonidine 0.2 mg. Patient had no observed SI/SIB, AH/VH, anxiety, depression, or dangerous behaviors. Patient does display paranoia, moving her bedding to a corner of her room appearing to avoid staff eye contact. Pt appears to have slept 2.5 hours.     Upon interview, Michelle was sitting at the lunch table. She initially appeared to be responding to internal stimuli. When asked patient whom she was speaking to, she smiled and said \"Don't worry. I am just praying over my food.\" She denies AH. Affect was brighter. She admits that she feels much better today, and feels she is at baseline. She denies sleep disturbances, but did note that she has been napping frequently throughout the day. Educated on sleep hygiene. She denied SI, SIB, and HI. She denied paranoia, AH/VH, acute medical concerns. She was pleased to share that her blood pressure is going down. Denies acute physical pain. She had no additional questions or concerns. Less perseverative on discharge and willing to take her medications. She became irritable when writer explained that the plan is for her to continue working with her ACT team. She said \"I am done with them.\" When asked why, she said that she is \"embarassed because they make me take my medications in the parking lot.\" She said that they will not go into her home any longer because of COVID-19. She said that they do not help her with anything but her medications. She prefers to have a  only. I explained that in order for her to return to her apartment she must work with her ACT team. She was somewhat more receptive to this. She was " quite polite throughout most of the interview. She smiled intermittently. Thought process was more organized. She was informed that if she remains medication adherent, she will likely be discharged later next week.          Medications:       LORazepam  1 mg Oral TID    Or     LORazepam  1 mg Intramuscular TID     losartan  100 mg Oral Daily     metoprolol succinate ER  50 mg Oral Daily     OLANZapine zydis  10 mg Oral BID    Or     OLANZapine  10 mg Intramuscular BID     paliperidone  6 mg Oral Daily     polyethylene glycol  17 g Oral Daily          Allergies:     Allergies   Allergen Reactions     Haldol [Haloperidol]      Patient previously tolerated haldol, though developed oculogyric crises during hospital stay on 4/26/21 on total daily dose of 10 mg.     Lisinopril Cough          Labs:   No results found for this or any previous visit (from the past 24 hour(s)).       Psychiatric Examination:     /88   Pulse 125   Temp 97.9  F (36.6  C)   Resp 16   SpO2 99%   Weight is 0 lbs 0 oz  There is no height or weight on file to calculate BMI.    Weight over time:  Vitals:       Orthostatic Vitals     None            Cardiometabolic risk assessment. 04/15/21      Reviewed patient profile for cardiometabolic risk factors    Date taken /Value  REFERENCE RANGE   Abdominal Obesity  (Waist Circumference)   See nursing flowsheet Women ?35 in (88 cm)   Men ?40 in (102 cm)      Triglycerides  Triglycerides   Date Value Ref Range Status   10/19/2019 117 <150 mg/dL Final       ?150 mg/dL (1.7 mmol/L) or current treatment for elevated triglycerides   HDL cholesterol  HDL Cholesterol   Date Value Ref Range Status   10/19/2019 56 >49 mg/dL Final   ]   Women <50 mg/dL (1.3 mmol/L) in women or current treatment for low HDL cholesterol  Men <40 mg/dL (1 mmol/L) in men or current treatment for low HDL cholesterol     Fasting plasma glucose (FPG) Lab Results   Component Value Date     10/19/2019      FPG ?100 mg/dL (5.6  mmol/L) or treatment for elevated blood glucose   Blood pressure  BP Readings from Last 3 Encounters:   05/07/21 138/88   06/04/19 131/71   04/26/19 164/86    Blood pressure ?130/85 mmHg or treatment for elevated blood pressure   Family History  See family history     Appearance: awake, alert, unkempt and disheveled   Attitude:  More cooperative, polite  Eye Contact:  Good. No evidence of ocylogyric crisis  Mood:  calmer  Affect:  Mood congruent, brighter. Smiled on a couple of occasions.   Speech: strong accent, normal volume, rapid when agitated  Language: fluent and intact in English  Psychomotor, Gait, Musculoskeletal: No stereotypic movements noted on examination today. No evidence of tardive dyskinesia, dystonia, or tics. No physical agitation.   Throught Process:  Linear, goal oriented.  Associations:  No loosening of associations  Thought Content:  no evidence of suicidal ideation or homicidal ideation, no overtly delusional or paranoid thought content today.   Insight:  limited though improved  Judgement:  Fair, is now accepting of all scheduled medications.   Oriented to:  Person, place, time  Attention Span and Concentration: fair, improved  Recent and Remote Memory:  Fair, improved  Fund of Knowledge:  normal    Clinical Global Impressions  First:7   Most recent:5              Precautions:     Behavioral Orders   Procedures     Assault precautions     Cheeking Precautions (behavioral units)     Patient Observed swallowing PO medications; Patient asked to drink water after swallowing medication; Patient in Staff line of sight for 15 minutes after medication given; Mouth checks after PO administration (patient asked to open mouth and stick out their tongue).     Code 1 - Restrict to Unit     Elopement precautions     Routine Programming     As clinically indicated     Status 15     Every 15 minutes.          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 Symptoms and presentation at this time is most consistent with Schizoaffective Disorder, Bipolar Type. We have obtained most recent medication regimen from patient's ACT team, and regimen was restarted. Inpatient psychiatric hospitalization is warranted at this time for safety, stabilization, and possible adjustment in medications. Pt is on 72 hr hold. Petitioned for MI commitment with [facility] through [facility] on 4/14 due to dangerousness, inability to care for self, active symptoms of psychosis and tad and refusal to accept proposed treatment.    On admission, PTA medications were restarted. However, patient has been declining all scheduled medications despite significant encouragement from staff and this provider. She remains very symptomatic, but she was not meeting criteria for a psychiatric emergency.  Patient is also exhibiting signs of excited catatonia, including excessive and purposeless motor activity in both the upper and lower limbs (hyperkinesis), restlessness, stereotypy, impulsivity, and frenzy. She is currently declining vital signs and labs, though is eating and drinking daily. She is currently declining scheduled po medications. Psychiatric emergency declared on 4/20 due to aggression toward others in context of severe psychosis and suspected excited catatonia. Ativan 1 mg TID was also added. Discontinued PTA Invega, Zyprexa, and thorazine on 4/20 due to consistent refusal.     On 4/26, it was noted that patient frequently had upward gaze while walking up and down the  "unit. She did not appear to be distressed. She reported that she was looking at \"my god.\" It is possible that she is responding to internal stimuli, though also possible she is experiencing signs of oculogyric crisis as this presentation is new since initiation of Haldol, which she has only been accepting IM. Haldol was subsequently discontinued during day shift on 4/26 and scheduled Zyprexa was initiated on emergency basis. Oral Cogentin was also scheduled, though patient declined. On the evening of 4/26, patient's gaze was fixed in upward position for several hours and she appeared to be experiencing discomfort. IM Cogentin was administered with noted resolution. Oral intake remains poor and she continues to decline oral medications. Partial improvements have been observed since switch to scheduled Zyprexa.     Overall improvement in patient's agitation and suspected catatonia noted on 4/30 after patient accepted two doses of Ativan. However, since declining again, she appears to be again decompensating.      Target psychiatric symptoms and interventions:  - Resume Invega 6 mg daily with plan to titrate to lowest effective dose and administer BAIG (initiated on 5/3 and increased on 5/6). Patient tolerated a dose of 9 mg daily w/o side effects.   - Oral Zyprexa 10 mg BID OR Zyprexa 10 mg IM. Hatch now in place.  - Ativan 1 mg TID. Patient now accepting, however, she does not have decisional making capacity at this time. She does not believe she has a mental illness, including catatonia. She does not fully understand risks associated with inadequate treatment of catatonia. Discussed with her son, who is acting as surrogate decision maker and he is in full support of forced scheduled IM Ativan if patient declines oral formulation.   - Artane 2 mg BID per pt request. Will adjust to prn as patient is now declining  - Cogentin 2 mg IM daily prn for evidence acute dystonic reaction or oculogyric crisis  - Because patient " was only intermittently accepting scheduled Ativan and some symptoms of catatonia are re-emerging, pursued [facility] for ECT     Resume hydroxyzine 25-50 mg q4h prn for acute anxiety  Resume Trazodone 50 mg at bedtime prn for sleep disturbances  Resume Zyprexa 10 mg TID prn for severe agitation  Resume Ativan/Benadryl q4h prn for agitation. WOULD GIVE PRN ATIVAN FIRST FOR AGITATION.      Acute Medical Problems and Treatments:  HTN, improving:  - IM consulted formally on 5/6.  - Metoprolol succinate ER 50 mg daily: Modified to 25 mg BID as patient has been non-adherent.    - Cozaar 100 mg daily  - Please see note from IM dated 5/3/21 and 5/6/21.     CVA:  - Aspirin 81 mg daily     Chronic Constipation:  - Miralax 17 mg daily     Routine labs have been ordered, including CMP, CBC with differential, TSH, lipid profile.  Routine urine drug screen also ordered. Patient currently declining blood draw. She is also declining vitals.     Behavioral/Psychological/Social:  - Encourage unit programming    Safety:  - Continue precautions as noted above  - Status 15 minute checks  - Safety precautions include: assault and elopement precautions  - Continue precautions as noted above  - Status 15 minute checks  - Discontinued 1:1 on 4/26 as presence of 1:1 staff appears to be worsening patient's paranoia and agitation.     Legal Status: Committed as MI with Hatch in place for Haldol, Zyprexa, Invega, and Thorazine through [facility]. Filed [facility].     Disposition Plan   Reason for ongoing admission: poses an imminent risk to self, poses an imminent risk to others and is unable to care for self due to severe psychosis or tad  Discharge location: TBD. Patient has ACT team. Possibly home next week with ACT team supports if ongoing improvement noted.   Discharge Medications: not ordered  Follow-up Appointments: not scheduled    Entered by: Ida Zaman on 5/7/2021 at 9:20 AM      > 30 minutes total time that  was spent and over 50% of this time was spent in counseling and coordination of care.

## 2021-05-07 NOTE — PLAN OF CARE
"  Problem: Cognitive Impairment (Psychotic Signs/Symptoms)  Goal: Optimal Cognitive Function (Psychotic Signs/Symptoms)  Outcome: No Change     Pt is isolative and withdrawn to her room. She is guarded, tensed, and paranoid upon approached. She is observed sleeping on the floor of her room and dismissive when this writer tried to engage her in conversation,  Leave my room TOMAS; I am not talking with you\". She is yelling and screaming at writer and the conversation was ended.  She took scheduled medication without any issue, and no side effect of her medication is observed or reported. She appeared to be responding to internal stimuli when visible in the milieu. Hygiene maintenance is poor as she appeared untidy and disheveled. Blood pressure 148/82 this shift.  No aggressive behaviors towards staff or peers.     "

## 2021-05-07 NOTE — PLAN OF CARE
Michelle appeared to sleep a total of 2.5 hours this night shift.  Difficult time falling so sleep and staying asleep.  No prns or snacks given or requested.

## 2021-05-08 PROCEDURE — 99207 PR NO CHARGE LOS: CPT | Performed by: PHYSICIAN ASSISTANT

## 2021-05-08 PROCEDURE — 250N000013 HC RX MED GY IP 250 OP 250 PS 637: Performed by: PSYCHIATRY & NEUROLOGY

## 2021-05-08 PROCEDURE — 250N000013 HC RX MED GY IP 250 OP 250 PS 637: Performed by: PHYSICIAN ASSISTANT

## 2021-05-08 PROCEDURE — 124N000002 HC R&B MH UMMC

## 2021-05-08 RX ADMIN — LOSARTAN POTASSIUM 100 MG: 100 TABLET, FILM COATED ORAL at 08:59

## 2021-05-08 RX ADMIN — LORAZEPAM 1 MG: 1 TABLET ORAL at 14:11

## 2021-05-08 RX ADMIN — LORAZEPAM 1 MG: 1 TABLET ORAL at 09:00

## 2021-05-08 RX ADMIN — METOPROLOL SUCCINATE 50 MG: 50 TABLET, EXTENDED RELEASE ORAL at 09:01

## 2021-05-08 RX ADMIN — OLANZAPINE 10 MG: 10 TABLET, ORALLY DISINTEGRATING ORAL at 20:23

## 2021-05-08 RX ADMIN — LORAZEPAM 1 MG: 1 TABLET ORAL at 20:23

## 2021-05-08 RX ADMIN — PALIPERIDONE 6 MG: 3 TABLET, EXTENDED RELEASE ORAL at 08:59

## 2021-05-08 RX ADMIN — OLANZAPINE 10 MG: 10 TABLET, ORALLY DISINTEGRATING ORAL at 09:00

## 2021-05-08 ASSESSMENT — ACTIVITIES OF DAILY LIVING (ADL)
DRESS: SCRUBS (BEHAVIORAL HEALTH)
ORAL_HYGIENE: PROMPTS
HYGIENE/GROOMING: PROMPTS
LAUNDRY: UNABLE TO COMPLETE
LAUNDRY: UNABLE TO COMPLETE
DRESS: SCRUBS (BEHAVIORAL HEALTH);INDEPENDENT
ORAL_HYGIENE: PROMPTS
HYGIENE/GROOMING: PROMPTS

## 2021-05-08 NOTE — PLAN OF CARE
Problem: Adult Inpatient Plan of Care  Goal: Optimal Comfort and Wellbeing  Outcome: No Change     Problem: Behavioral Health Plan of Care  Goal: Adheres to Safety Considerations for Self and Others  Outcome: No Change     Problem: Behavioral Health Plan of Care  Goal: Absence of New-Onset Illness or Injury  Outcome: No Change     Pt appeared alert and oriented to place and self throughout shift.  She was mostly isolative in her room throughout the day, however she was visible in the milieu for medications and meals.  Pt's appearance is disheveled and speech is pressured.  She ate meals and was med compliant.  Pt denied SI/HI/SIB.  Pt denied any psychosis, although she appeared to be responding to some internal stimuli.  Pt denies any acute physical health concerns, pain, or side effects from medications.

## 2021-05-08 NOTE — PROGRESS NOTES
Brief Medicine Note    Medicine following blood pressure peripherally. Patient with intermittent blood pressure elevations. Per chart review the patient is still decompensated appearing tense, guarded, disheveled at times.     I still feel that her BP elevations are due to her psychiatric decompensation and acute tad. I would prefer to hold off on increasing her scheduled antihypertensive medication dosing at this time due to concern of BP dropping too low once she has stabilized.     In the interim it is appropriate to continue with PRN hydralazine for acute BP elevations. Continue PTA losartan and metoprolol.     Please contact Medicine if BP is consistently >140/90 once she has stabilized with tad resolved. Medicine will sign off.     Helen Mao PA-C  Hospitalist Service  Pager: [phone]

## 2021-05-08 NOTE — PLAN OF CARE
Problem: Cognitive Impairment (Psychotic Signs/Symptoms)  Goal: Optimal Cognitive Function (Psychotic Signs/Symptoms)  Outcome: Improving     Pt is isolative and withdrawn to her room. She is guarded and tensed upon approach and refused to engage in conversation. She slept for most of this evening. She came to the med window for her bedtime medication and took the medication without any issue. No side effects of her medication were observed or report. Appetite is good; she ate 100% of her dinner and drinking well. Hygiene maintenance is poor as she appeared untidy and disheveled. Blood pressure 136/74 this evening.  No aggressive behaviors towards staff or peers. No SI/SIB.

## 2021-05-08 NOTE — PLAN OF CARE
Problem: Sleep Disturbance (Psychotic Signs/Symptoms)  Goal: Improved Sleep (Psychotic Signs/Symptoms)  Outcome: Improving  Flowsheets (Taken 5/8/2021 0620)  Mutually Determined Action Steps (Improved Sleep): sleeps 4-6 hours at night  Patient had an uneventful night; she slept for about 5.5 hours. No problems observed or reported.

## 2021-05-09 PROCEDURE — 124N000002 HC R&B MH UMMC

## 2021-05-09 PROCEDURE — 250N000013 HC RX MED GY IP 250 OP 250 PS 637: Performed by: PHYSICIAN ASSISTANT

## 2021-05-09 PROCEDURE — 250N000013 HC RX MED GY IP 250 OP 250 PS 637: Performed by: PSYCHIATRY & NEUROLOGY

## 2021-05-09 RX ADMIN — PALIPERIDONE 6 MG: 3 TABLET, EXTENDED RELEASE ORAL at 08:27

## 2021-05-09 RX ADMIN — LORAZEPAM 1 MG: 1 TABLET ORAL at 15:08

## 2021-05-09 RX ADMIN — LORAZEPAM 1 MG: 1 TABLET ORAL at 08:27

## 2021-05-09 RX ADMIN — LORAZEPAM 1 MG: 1 TABLET ORAL at 19:13

## 2021-05-09 RX ADMIN — OLANZAPINE 10 MG: 10 TABLET, ORALLY DISINTEGRATING ORAL at 19:13

## 2021-05-09 RX ADMIN — LOSARTAN POTASSIUM 100 MG: 100 TABLET, FILM COATED ORAL at 08:27

## 2021-05-09 RX ADMIN — OLANZAPINE 10 MG: 10 TABLET, ORALLY DISINTEGRATING ORAL at 08:27

## 2021-05-09 RX ADMIN — METOPROLOL SUCCINATE 50 MG: 50 TABLET, EXTENDED RELEASE ORAL at 08:27

## 2021-05-09 ASSESSMENT — ACTIVITIES OF DAILY LIVING (ADL)
DRESS: SCRUBS (BEHAVIORAL HEALTH);PROMPTS;INDEPENDENT
DRESS: SCRUBS (BEHAVIORAL HEALTH)
ORAL_HYGIENE: PROMPTS
HYGIENE/GROOMING: HANDWASHING;INDEPENDENT;PROMPTS
ORAL_HYGIENE: PROMPTS
LAUNDRY: UNABLE TO COMPLETE
LAUNDRY: UNABLE TO COMPLETE
HYGIENE/GROOMING: INDEPENDENT

## 2021-05-09 NOTE — PLAN OF CARE
Problem: Psychomotor Impairment (Psychotic Signs/Symptoms)  Goal: Improved Psychomotor Symptoms (Psychotic Signs/Symptoms)  Outcome: No Change   Pt is calm and pleasant. She spent most of this evening in her room, sleeping on the floor. She denies any discomfort/pain. Flat and blunted affect.  No agitation/aggressive behaviors this evening. She took scheduled medication without any issue and denied SE s of her medication.       Pt hygiene maintenance remains very poor, and she is malodorous. She refused to shower or change her scrub this evening. Appetite is good; she is eating well and drinking well. She ate 100% of her dinner.

## 2021-05-09 NOTE — PLAN OF CARE
Problem: Adult Inpatient Plan of Care  Goal: Optimal Comfort and Wellbeing  Outcome: No Change     Problem: Restraint/Seclusion for Violent Self-Destructive Behavior  Goal: Prevent future episodes of restraint or seclusion  Description: Identify nonphysical alternatives to restraint or seclusion.  Identify additional de-escalation supportive measures to use as alternatives to restraint or seclusion.  Outcome: No Change     Problem: Behavioral Health Plan of Care  Goal: Adheres to Safety Considerations for Self and Others  Outcome: No Change     Problem: Sleep Disturbance (Psychotic Signs/Symptoms)  Goal: Improved Sleep (Psychotic Signs/Symptoms)  Outcome: No Change     Pt appeared to be alert and oriented to place and self throughout shift.  She was primarily isolative in her room throughout the day, however she was visible in the milieu for meals and medications.  Pt appears untidy and needs prompts to perform ADLs.  Her speech is pressured and seems to be responding to internal stimuli.  Internal stimuli seems to be heightened compared to yesterday, which is causing some agitation with pt.  Pt is denying PRNs for such evens, although she is name calling peers and staff.  Pt ate meals and is medication compliant with scheduled medications.  Pt denied SI/HI/SIB.  Pt denied psychosis, although, as stated above, appears to be responding to a heightened internal stimuli.  She denied any acute physical health concerns, pain, or side effects from medications.

## 2021-05-09 NOTE — PLAN OF CARE
Problem: Sleep Disturbance (Psychotic Signs/Symptoms)  Goal: Improved Sleep (Psychotic Signs/Symptoms)  Outcome: Improving  Flowsheets (Taken 5/8/2021 0620)  Mutually Determined Action Steps (Improved Sleep): sleeps 4-6 hours at night  Patient slept for about 5 hours during the night. No problem identified or reported.

## 2021-05-10 PROCEDURE — 250N000013 HC RX MED GY IP 250 OP 250 PS 637: Performed by: PHYSICIAN ASSISTANT

## 2021-05-10 PROCEDURE — 250N000013 HC RX MED GY IP 250 OP 250 PS 637: Performed by: PSYCHIATRY & NEUROLOGY

## 2021-05-10 PROCEDURE — 124N000002 HC R&B MH UMMC

## 2021-05-10 PROCEDURE — 99233 SBSQ HOSP IP/OBS HIGH 50: CPT | Performed by: PSYCHIATRY & NEUROLOGY

## 2021-05-10 RX ORDER — PALIPERIDONE 9 MG/1
9 TABLET, EXTENDED RELEASE ORAL DAILY
Status: DISCONTINUED | OUTPATIENT
Start: 2021-05-11 | End: 2021-05-11

## 2021-05-10 RX ADMIN — LOSARTAN POTASSIUM 100 MG: 100 TABLET, FILM COATED ORAL at 08:21

## 2021-05-10 RX ADMIN — PALIPERIDONE 6 MG: 3 TABLET, EXTENDED RELEASE ORAL at 08:21

## 2021-05-10 RX ADMIN — LORAZEPAM 1 MG: 1 TABLET ORAL at 08:21

## 2021-05-10 RX ADMIN — OLANZAPINE 10 MG: 10 TABLET, ORALLY DISINTEGRATING ORAL at 08:21

## 2021-05-10 RX ADMIN — LORAZEPAM 1 MG: 1 TABLET ORAL at 13:47

## 2021-05-10 RX ADMIN — LORAZEPAM 1 MG: 1 TABLET ORAL at 19:05

## 2021-05-10 RX ADMIN — METOPROLOL SUCCINATE 50 MG: 50 TABLET, EXTENDED RELEASE ORAL at 08:21

## 2021-05-10 RX ADMIN — OLANZAPINE 10 MG: 10 TABLET, ORALLY DISINTEGRATING ORAL at 19:04

## 2021-05-10 NOTE — PROGRESS NOTES
"[facility], [place]   Psychiatric Progress Note  Hospital Day: 26        Interim History:   The patient's care was discussed with the treatment team during the daily team meeting and/or staff's chart notes were reviewed.  Staff report pt was isolative and withdrawn through much of the weekend. She appeared to be responding to internal stimuli. She was quite irritable at times, though did not exhibit significant agitation or aggression. No need for seclusion or restraints. No need for prn medications. Appetite is good. ADLs are poor. Pt has not showered since admission. BP is within normal limits. Slept 5 hours overnight.     Upon interview, Michelle was in her room. She said that she was irritable over the weekend because her skin is \"itchy.\" She was encouraged to shower, though declines. She said that she only wants to shower at home, though could not say why. She could not identify any other reasons for increased irritability over the weekend. She wants to go home ASAP. When informed that she may not be discharged this week, she became abruptly agitated and said \"You are killing me!\" She continues to be reluctant about working with ACT team upon discharge. She continues to deny that she is currently under a commitment, but does state that she would be willing to take medications upon discharge. I informed her that I am recommending initiation of Invega Sustenna. She said \"I don't want shots!\" I attempted to discuss benefits of BAIG. She later approached me in the chen and said \"Call ACT team and tell them to bring in the injection now.\" She is more receptive at this time, though again states that she wants to be discharged ASAP. She is in agreement with plan to further optimize Invega. Denies side effects at this time.          Medications:       LORazepam  1 mg Oral TID    Or     LORazepam  1 mg Intramuscular TID     losartan  100 mg Oral Daily     metoprolol succinate ER  50 mg Oral Daily "     OLANZapine zydis  10 mg Oral BID    Or     OLANZapine  10 mg Intramuscular BID     paliperidone  6 mg Oral Daily     polyethylene glycol  17 g Oral Daily          Allergies:     Allergies   Allergen Reactions     Haldol [Haloperidol]      Patient previously tolerated haldol, though developed oculogyric crises during hospital stay on 4/26/21 on total daily dose of 10 mg.     Lisinopril Cough          Labs:   No results found for this or any previous visit (from the past 24 hour(s)).       Psychiatric Examination:     BP 99/66 (BP Location: Right arm)   Pulse 78   Temp 97.5  F (36.4  C) (Tympanic)   Resp 16   SpO2 98%   Weight is 0 lbs 0 oz  There is no height or weight on file to calculate BMI.    Weight over time:  Vitals:       Orthostatic Vitals     None            Cardiometabolic risk assessment. 04/15/21      Reviewed patient profile for cardiometabolic risk factors    Date taken /Value  REFERENCE RANGE   Abdominal Obesity  (Waist Circumference)   See nursing flowsheet Women ?35 in (88 cm)   Men ?40 in (102 cm)      Triglycerides  Triglycerides   Date Value Ref Range Status   10/19/2019 117 <150 mg/dL Final       ?150 mg/dL (1.7 mmol/L) or current treatment for elevated triglycerides   HDL cholesterol  HDL Cholesterol   Date Value Ref Range Status   10/19/2019 56 >49 mg/dL Final   ]   Women <50 mg/dL (1.3 mmol/L) in women or current treatment for low HDL cholesterol  Men <40 mg/dL (1 mmol/L) in men or current treatment for low HDL cholesterol     Fasting plasma glucose (FPG) Lab Results   Component Value Date     10/19/2019      FPG ?100 mg/dL (5.6 mmol/L) or treatment for elevated blood glucose   Blood pressure  BP Readings from Last 3 Encounters:   05/10/21 99/66   06/04/19 131/71   04/26/19 164/86    Blood pressure ?130/85 mmHg or treatment for elevated blood pressure   Family History  See family history     Appearance: awake, alert, unkempt and disheveled   Attitude: Irritable, dismissive  Eye  Contact:  Good. No evidence of ocylogyric crisis  Mood:  irritable  Affect:  Mood congruent, agitated   Speech: strong accent, normal volume, rapid when agitated  Language: fluent and intact in English  Psychomotor, Gait, Musculoskeletal: No stereotypic movements noted on examination today. No evidence of tardive dyskinesia, dystonia, or tics. No physical agitation.   Throught Process:  Linear, goal oriented.  Associations:  No loosening of associations  Thought Content:  no evidence of suicidal ideation or homicidal ideation, no overtly delusional or paranoid thought content today.   Insight:  limited though improved  Judgement:  Fair, is now accepting of all scheduled medications.   Oriented to:  Person, place, time  Attention Span and Concentration: fair, improved  Recent and Remote Memory:  Fair, improved  Fund of Knowledge:  normal    Clinical Global Impressions  First:7   Most recent:5              Precautions:     Behavioral Orders   Procedures     Assault precautions     Cheeking Precautions (behavioral units)     Patient Observed swallowing PO medications; Patient asked to drink water after swallowing medication; Patient in Staff line of sight for 15 minutes after medication given; Mouth checks after PO administration (patient asked to open mouth and stick out their tongue).     Code 1 - Restrict to Unit     Elopement precautions     Routine Programming     As clinically indicated     Status 15     Every 15 minutes.          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 Symptoms and presentation at this time is most consistent with Schizoaffective Disorder, Bipolar Type. We have obtained most recent medication regimen from patient's ACT team, and regimen was restarted. Inpatient psychiatric hospitalization is warranted at this time for safety, stabilization, and possible adjustment in medications. Pt is on 72 hr hold. Petitioned for MI commitment with [facility] through [facility] on 4/14 due to dangerousness, inability to care for self, active symptoms of psychosis and tad and refusal to accept proposed treatment.    On admission, PTA medications were restarted. However, patient has been declining all scheduled medications despite significant encouragement from staff and this provider. She remains very symptomatic, but she was not meeting criteria for a psychiatric emergency.  Patient is also exhibiting signs of excited catatonia, including excessive and purposeless motor activity in both the upper and lower limbs (hyperkinesis), restlessness, stereotypy, impulsivity, and frenzy. She is currently declining vital signs and labs, though is eating and drinking daily. She is currently declining scheduled po medications. Psychiatric emergency declared on 4/20 due to aggression toward others in context of severe psychosis and suspected excited catatonia. Ativan 1 mg TID was also added. Discontinued PTA Invega, Zyprexa, and thorazine on 4/20 due to consistent refusal.     On 4/26, it was noted that patient frequently had upward gaze while walking up and down the unit. She did not appear to be distressed. She reported that she was looking at \"my god.\" It is possible that she is responding to internal stimuli, though also possible she is experiencing signs of oculogyric crisis as this presentation is new since initiation of Haldol, which she has only been accepting IM. Haldol was subsequently discontinued during day shift on 4/26 and scheduled Zyprexa was " initiated on emergency basis. Oral Cogentin was also scheduled, though patient declined. On the evening of 4/26, patient's gaze was fixed in upward position for several hours and she appeared to be experiencing discomfort. IM Cogentin was administered with noted resolution. Oral intake remains poor and she continues to decline oral medications. Partial improvements have been observed since switch to scheduled Zyprexa.     Overall improvement in patient's agitation and suspected catatonia noted on 4/30 after patient accepted two doses of Ativan. Last week she began declining Ativan again with noted decompensation. Patient now accepting, however, she does not have decisional making capacity at this time. She does not believe she has a mental illness, including catatonia. She does not fully understand risks associated with inadequate treatment of catatonia. Discussed with her son, who is acting as surrogate decision maker and he is in full support of forced scheduled IM Ativan if patient declines oral formulation.     Target psychiatric symptoms and interventions:  - Increase Invega to 9 mg daily with plan to titrate to lowest effective dose and administer BAIG (initiated on 5/3 and increased on 5/6). Patient tolerated a dose of 9 mg daily w/o side effects.   - Coordinate with ACT team to obtain Invega Sustenna 234 mg to be given on day 1 and 156 mg to be given on day 8.   - Oral Zyprexa 10 mg BID OR Zyprexa 10 mg IM. [facility] now in place.  - Ativan 1 mg TID OR Ativan IM 1 mg TID  - Artane 2 mg BID prn  - Cogentin 2 mg IM daily prn for evidence acute dystonic reaction or oculogyric crisis  - Because patient was only intermittently accepting scheduled Ativan and some symptoms of catatonia are re-emerging, pursued [facility] for ECT. Awaiting outcome.   Resume hydroxyzine 25-50 mg q4h prn for acute anxiety  Resume Trazodone 50 mg at bedtime prn for sleep disturbances  Resume Zyprexa 10 mg TID prn for severe  agitation  Resume Ativan/Benadryl q4h prn for agitation. WOULD GIVE PRN ATIVAN FIRST FOR AGITATION.      Acute Medical Problems and Treatments:  HTN, resolved:  - IM consulted formally on 5/6.  - Metoprolol succinate ER 50 mg daily: Modified to 25 mg BID as patient has been non-adherent.    - Cozaar 100 mg daily  - Please see note from IM dated 5/3/21 and 5/6/21.     CVA:  - Aspirin 81 mg daily     Chronic Constipation:  - Miralax 17 mg daily     Routine labs have been ordered, including CMP, CBC with differential, TSH, lipid profile.  Routine urine drug screen also ordered. Patient currently declining blood draw. She is also declining vitals.     Behavioral/Psychological/Social:  - Encourage unit programming    Safety:  - Continue precautions as noted above  - Status 15 minute checks  - Safety precautions include: assault and elopement precautions  - Continue precautions as noted above  - Status 15 minute checks  - Discontinued 1:1 on 4/26 as presence of 1:1 staff appears to be worsening patient's paranoia and agitation.     Legal Status: Committed as MI with [facility] in place for Haldol, Zyprexa, Invega, and Thorazine through [facility]. Filed [facility].     Disposition Plan   Reason for ongoing admission: poses an imminent risk to self, poses an imminent risk to others and is unable to care for self due to severe psychosis or tad  Discharge location: Home with ACT team support. Possibly home within one week with ACT team supports if ongoing improvement noted.   Discharge Medications: not ordered  Follow-up Appointments: not scheduled    Entered by: Ida Zaman on 5/10/2021 at 9:39 AM      > 30 minutes total time that was spent and over 50% of this time was spent in counseling and coordination of care.

## 2021-05-10 NOTE — PLAN OF CARE
Problem: Adult Inpatient Plan of Care  Goal: Readiness for Transition of Care  Outcome: No Change   Patient has been medication compliant. She does allow b/p readings with manual b/p cuff only. She has not showered during the course of this shift. When prompted to take a shower she goes in and stands without cleaning her body. She paces the halls. She spends time in the lounge with others but minimally engages with staff and peers. She does make her needs known. Eating and drinking in good amounts.

## 2021-05-10 NOTE — PLAN OF CARE
Pt asleep at start of shift. Breathing quiet and unlabored.     Pt requested and was given two oranges in the AM.     Appears to have slept 5 hours.     Pt on  ASSAULT, CHEEKING, and ELOPEMENT precautions in addition to single room order. Any related events noted above.     Will continue to monitor and assess.   Problem: Sleep Disturbance (Psychotic Signs/Symptoms)  Goal: Improved Sleep (Psychotic Signs/Symptoms)  Outcome: Improving

## 2021-05-10 NOTE — PLAN OF CARE
Writer left a VM with Kourtney gutierrez/ patient's ACT Team ([phone]), requesting that they fill her Invega Sustenna 234 mg and 156 mg outside of the hospital and then bring it the patient in the hospital where staff can administer it.

## 2021-05-10 NOTE — PLAN OF CARE
Current Diagnosis/Procedure:  Schizoaffective Disorder, Bipolar Type     Work Completed:    Patient was discussed in treatment team today and met with provider and CTC. Patient remains isolative, is rarely seen out of her room, and does not wish to interact with staff. She has remained compliant with her medications throughout the past week, but has displayed minimal improvement. Writer left a VM for patient's ACT Team CM Kourtnye Rizo asking if they could order IM Invega and bring it to the hospital so it could be administered by staff here. Writer has not heard back from ACT team at this time. Writer would also like to speak with ACT team about extending commitment or possibility of petitioning before a commitment ends if she is not doing well. Patient has a history of doing well on county commitments but becoming noncompliant with medications as soon as they end.      Discharge plan or goal:  Patient will either discharge to her apartment with ACT team in place, or a group home may be looked into.       Barriers to discharge:  Continued need for stabilization of severity of illness and medication. Patient has been committed as MI w/ [facility] through [facility]. Patient continues to be noncompliant with some of her recommended medications.

## 2021-05-10 NOTE — PLAN OF CARE
Problem: Behavior Regulation Impairment (Psychotic Signs/Symptoms)  Goal: Improved Behavioral Control (Psychotic Signs/Symptoms)  Outcome: No Change     Pt spent most of this evening in her room, sleeping on the floor. She is isolative and withdrawn this weekend. She only came out of her room for dinner and bedtime medication. She appeared to be responding to internal stimuli. She refused nursing assessments  don t ask me stupid questions. Leave my room now .  Blood pressure 142/80 this evening.       Pt hygiene maintenance remains very poor, and she is malodorous. She refused to shower or change her scrub. Appetite is good; she is eating well and drinking well. She ate 100% of her dinner.

## 2021-05-11 PROCEDURE — 250N000013 HC RX MED GY IP 250 OP 250 PS 637: Performed by: PHYSICIAN ASSISTANT

## 2021-05-11 PROCEDURE — 250N000013 HC RX MED GY IP 250 OP 250 PS 637: Performed by: PSYCHIATRY & NEUROLOGY

## 2021-05-11 PROCEDURE — 124N000002 HC R&B MH UMMC

## 2021-05-11 PROCEDURE — 99233 SBSQ HOSP IP/OBS HIGH 50: CPT | Performed by: PSYCHIATRY & NEUROLOGY

## 2021-05-11 RX ORDER — TRIHEXYPHENIDYL HYDROCHLORIDE 2 MG/1
2 TABLET ORAL 2 TIMES DAILY
Status: DISCONTINUED | OUTPATIENT
Start: 2021-05-11 | End: 2021-05-24

## 2021-05-11 RX ORDER — PALIPERIDONE 6 MG/1
6 TABLET, EXTENDED RELEASE ORAL DAILY
Status: DISCONTINUED | OUTPATIENT
Start: 2021-05-12 | End: 2021-05-14

## 2021-05-11 RX ORDER — BENZTROPINE MESYLATE 2 MG/1
2 TABLET ORAL ONCE
Status: DISCONTINUED | OUTPATIENT
Start: 2021-05-11 | End: 2021-05-14

## 2021-05-11 RX ADMIN — OLANZAPINE 10 MG: 10 TABLET, ORALLY DISINTEGRATING ORAL at 08:32

## 2021-05-11 RX ADMIN — LOSARTAN POTASSIUM 100 MG: 100 TABLET, FILM COATED ORAL at 08:32

## 2021-05-11 RX ADMIN — LORAZEPAM 1 MG: 1 TABLET ORAL at 19:25

## 2021-05-11 RX ADMIN — LORAZEPAM 1 MG: 1 TABLET ORAL at 08:32

## 2021-05-11 RX ADMIN — LORAZEPAM 1 MG: 1 TABLET ORAL at 14:50

## 2021-05-11 RX ADMIN — METOPROLOL SUCCINATE 50 MG: 50 TABLET, EXTENDED RELEASE ORAL at 08:48

## 2021-05-11 RX ADMIN — PALIPERIDONE 9 MG: 9 TABLET, EXTENDED RELEASE ORAL at 08:32

## 2021-05-11 RX ADMIN — OLANZAPINE 10 MG: 10 TABLET, ORALLY DISINTEGRATING ORAL at 19:24

## 2021-05-11 ASSESSMENT — ACTIVITIES OF DAILY LIVING (ADL)
LAUNDRY: UNABLE TO COMPLETE
HYGIENE/GROOMING: PROMPTS
ORAL_HYGIENE: PROMPTS
DRESS: PROMPTS
DRESS: INDEPENDENT
HYGIENE/GROOMING: PROMPTS
LAUNDRY: UNABLE TO COMPLETE
ORAL_HYGIENE: PROMPTS

## 2021-05-11 NOTE — PLAN OF CARE
"  Problem: Cognitive Impairment (Psychotic Signs/Symptoms)  Goal: Optimal Cognitive Function (Psychotic Signs/Symptoms)  Outcome: No Change  Pt is isolative and withdrawn to her room, napping on and off. She came out for dinner and bedtime medication. She took scheduled medication without any issue. Pt refused to shower and became very argumentative when asked to shower \"I import my soap and cream from [place]. I did not trust the hospital soap and cream. I cannot take a shower here. I will take a shower when I get back to my apartment\". She is malodorous.  Appetite is good; she is eating well and drinking well. She ate 100% of her dinner.    "

## 2021-05-11 NOTE — PLAN OF CARE
Work Completed: WR contacted ACT RN Kourtney [phone] and  Mary Kay [phone] to request Invega 234mg/156mg be delivered to the hospital by ACT team. WR had to leave voice messages.     Received copy of court documents with upcoming [place] hearing. Pt was served a copy.     Discharge plan or goal: Patient will either discharge to her apartment with ACT team in place, or a group home may be looked into.                 Barriers to discharge: Continued need for stabilization of severity of illness and medication. Patient has been committed as MI w/ [facility] through [facility]. Patient continues to be noncompliant with some of her recommended medications

## 2021-05-11 NOTE — PLAN OF CARE
"RN Note:    Pt was irritable and less than cooperative while interacting with the writer. Pt did not answer questions pertaining to orientation. Pt was alert. Pt denied having SI, HI, thoughts of SIB, and hallucinations. Pt appeared to be responding to internal stimuli. Writer observed pt talking to self on multiple occasions. Pt observed in room talking to self and in the lounge talking to self. Pt would repeat the same words multiple times while talking to self. Pt denied having physical pain. Pt denied having medical concerns. When pt was asked about sleep, pt stated she slept \" okay.\" Pt did not answer questions pertaining to medication efficacy or medication side effects. Pt's psychiatric provider believes that pt my be experiencing oculogyric crisis. Writer did witness upward deviation of pt's eyes. Writer's observations were reported to pt's psychiatric provider. Provider ordered a one time administration of Cogentin 2 mg PO for this condition. Pt declined the medication from the writer and from another RN on the unit. Pt was provided an opportunity to ask the writer questions. Pt denied having questions for the writer. Pt was isolative and withdrawn this shift. Continue to monitor for safety and changes in medical condition. Pt declined the Miralax ordered at AM medication pass.    Incidents to note:    When writer attempted to give pt the one time dose of Cogentin pt became agitated. Pt pointed in the writer's face and yelled, \" You gave me poison. The Invega is poison.\" Writer reassured pt that the medication being offered was Cogentin, and writer showed the pt the medication packaging. Pt stated, \" Get away from me. Get out of here.\" This incident took place in the presence of pt's psychiatric provider. When the writer offered the pt the Ativan that is ordered for 1400, pt declined. Pt stated that the writer was trying to poison her. Pt told the writer to get away from her in a loud voice. Charge RN " notified of medication refusal. Charge RN offered the pt the oral Ativan. Pt declined. Code green was called for IM medication administration, and when Charge RN told pt that she needed to go to her room for the injection, pt asked for the oral Ativan. Pt came to the medication window and took the oral Ativan.

## 2021-05-11 NOTE — PROGRESS NOTES
"[facility], [place]   Psychiatric Progress Note  Hospital Day: 27        Interim History:   The patient's care was discussed with the treatment team during the daily team meeting and/or staff's chart notes were reviewed.  Staff report pt was isolative and withdrawn. She took scheduled medication without any issue. Pt refused to shower and became very argumentative when asked to shower \"I import my soap and cream from [place]. I did not trust the hospital soap and cream. I cannot take a shower here. I will take a shower when I get back to my apartment\". She is malodorous.  Appetite is good; she is eating well and drinking well. She ate 100% of her dinner. She was quite irritable at times, though did not exhibit significant agitation or aggression. No need for seclusion or restraints. No need for prn medications. Appetite is good. ADLs are poor. Pt has not showered since admission. BP is within normal limits. Slept 5.5 hours overnight. Pt had first dose of Invega 9 mg daily today.     Upon interview, Michelle only opened her door a crack. She would not allow writer to enter. She said \"What do you want? I am fine!\" Writer observed evidence of oculogyric crisis (similar in presentation to when she was on Haldol) in context of recent administration of higher dose of oral Invega. I asked her about any changes in vision or eye movements. She said \"Do you have any changes in vision? I am fine! My eyes are fine!\" She declined formal interview. She then was observed to be standing at her doorway and swaying back and forth, possibly responding to internal stimuli.     Writer and RN entered patient's room again this afternoon after she declined one time dose oral Cogentin 2mg, which was ordered after staff continued to observe intermittent eye rolling/upward gaze. Patient was quite agitated and refused medication. She said \"You are poisoning me! The medication I had this morning was not what I had " "yesterday. It is poison! I was flipping around in my room!\" She did not elaborate. She began yelling and pointing her finger directly in our faces. She again said \"All the medicine is poison! You are poisoning me! When writer explained that she was given both AM Zyprexa and Invega, she said \"Rubbish! You liar! You are the devil in flesh!\" She then shut the door of her room.          Medications:       LORazepam  1 mg Oral TID    Or     LORazepam  1 mg Intramuscular TID     losartan  100 mg Oral Daily     metoprolol succinate ER  50 mg Oral Daily     OLANZapine zydis  10 mg Oral BID    Or     OLANZapine  10 mg Intramuscular BID     paliperidone ER  9 mg Oral Daily     polyethylene glycol  17 g Oral Daily          Allergies:     Allergies   Allergen Reactions     Haldol [Haloperidol]      Patient previously tolerated haldol, though developed oculogyric crises during hospital stay on 4/26/21 on total daily dose of 10 mg.     Lisinopril Cough          Labs:   No results found for this or any previous visit (from the past 24 hour(s)).       Psychiatric Examination:     /82   Pulse 80   Temp 97.5  F (36.4  C) (Tympanic)   Resp 16   SpO2 98%   Weight is 0 lbs 0 oz  There is no height or weight on file to calculate BMI.    Weight over time:  Vitals:       Orthostatic Vitals     None            Cardiometabolic risk assessment. 04/15/21      Reviewed patient profile for cardiometabolic risk factors    Date taken /Value  REFERENCE RANGE   Abdominal Obesity  (Waist Circumference)   See nursing flowsheet Women ?35 in (88 cm)   Men ?40 in (102 cm)      Triglycerides  Triglycerides   Date Value Ref Range Status   10/19/2019 117 <150 mg/dL Final       ?150 mg/dL (1.7 mmol/L) or current treatment for elevated triglycerides   HDL cholesterol  HDL Cholesterol   Date Value Ref Range Status   10/19/2019 56 >49 mg/dL Final   ]   Women <50 mg/dL (1.3 mmol/L) in women or current treatment for low HDL cholesterol  Men <40 mg/dL (1 " mmol/L) in men or current treatment for low HDL cholesterol     Fasting plasma glucose (FPG) Lab Results   Component Value Date     10/19/2019      FPG ?100 mg/dL (5.6 mmol/L) or treatment for elevated blood glucose   Blood pressure  BP Readings from Last 3 Encounters:   05/11/21 132/82   06/04/19 131/71   04/26/19 164/86    Blood pressure ?130/85 mmHg or treatment for elevated blood pressure   Family History  See family history     Appearance: awake, alert, unkempt and disheveled   Attitude: Irritable, dismissive, agitated  Eye Contact:  Good. Evidence of ocylogyric crisis as eyes were both in upward gaze intermittently  Mood:  Irritable, agitated  Affect:  Mood congruent, agitated   Speech: strong accent, normal volume, rapid when agitated  Language: fluent and intact in English  Psychomotor, Gait, Musculoskeletal: No stereotypic movements noted on examination today. No evidence of tardive dyskinesia, dystonia, or tics. No physical agitation.   Throught Process:  Linear, goal oriented.  Associations:  No loosening of associations  Thought Content:  no evidence of suicidal ideation or homicidal ideation, evidence of paranoia  Insight:  limited  Judgement:  limited  Oriented to:  Person, place, time  Attention Span and Concentration: fair, improved  Recent and Remote Memory:  Fair, improved  Fund of Knowledge:  normal    Clinical Global Impressions  First:7   Most recent:5              Precautions:     Behavioral Orders   Procedures     Assault precautions     Cheeking Precautions (behavioral units)     Patient Observed swallowing PO medications; Patient asked to drink water after swallowing medication; Patient in Staff line of sight for 15 minutes after medication given; Mouth checks after PO administration (patient asked to open mouth and stick out their tongue).     Code 1 - Restrict to Unit     Elopement precautions     Routine Programming     As clinically indicated     Status 15     Every 15 minutes.           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 Symptoms and presentation at this time is most consistent with Schizoaffective Disorder, Bipolar Type. We have obtained most recent medication regimen from patient's ACT team, and regimen was restarted. Inpatient psychiatric hospitalization is warranted at this time for safety, stabilization, and possible adjustment in medications. Pt is on 72 hr hold. Petitioned for MI commitment with [facility] through [facility] on 4/14 due to dangerousness, inability to care for self, active symptoms of psychosis and tad and refusal to accept proposed treatment.    On admission, PTA medications were restarted. However, patient has been declining all scheduled medications despite significant encouragement from staff and this provider. She remains very symptomatic, but she was not meeting criteria for a psychiatric emergency.  Patient is also exhibiting signs of excited catatonia, including excessive and purposeless motor activity in both the upper and lower limbs (hyperkinesis), restlessness, stereotypy, impulsivity, and frenzy. She is currently declining vital signs and labs, though is eating and drinking daily. She is currently declining scheduled po medications. Psychiatric emergency declared on 4/20 due to aggression toward others in context of severe psychosis and suspected excited catatonia. Ativan 1 mg TID was also added. Discontinued PTA Invega, Zyprexa, and thorazine on 4/20 due to consistent refusal.     On  "4/26, it was noted that patient frequently had upward gaze while walking up and down the unit. She did not appear to be distressed. She reported that she was looking at \"my god.\" It is possible that she is responding to internal stimuli, though also possible she is experiencing signs of oculogyric crisis as this presentation is new since initiation of Haldol, which she has only been accepting IM. Haldol was subsequently discontinued during day shift on 4/26 and scheduled Zyprexa was initiated on emergency basis. Oral Cogentin was also scheduled, though patient declined. On the evening of 4/26, patient's gaze was fixed in upward position for several hours and she appeared to be experiencing discomfort. IM Cogentin was administered with noted resolution. Oral intake remains poor and she continues to decline oral medications. Partial improvements have been observed since switch to scheduled Zyprexa. After patient improved, she was more receptive to reinitiating oral Invega, which was initiated on 5/3 and titrated to PTA dose of 9 mg daily on 5/10. Plan is for ACT team to bring in loading dose of Invega Sustenna.    Overall improvement in patient's agitation and suspected catatonia noted on 4/30 after patient accepted two doses of Ativan. Last week she began declining Ativan again with noted decompensation. Patient now accepting, however, she does not have decisional making capacity at this time. She does not believe she has a mental illness, including catatonia. She does not fully understand risks associated with inadequate treatment of catatonia. Discussed with her son who is acting as surrogate decision maker and he is in full support of forced scheduled IM Ativan if patient declines oral formulation. Also consulted with our legal team.     Target psychiatric symptoms and interventions:  - Oral Cogentin 2 mg ordered for oculogyric crisis  - Schedule Artane 2 mg BID (pt had been accepting intermittently but most " recently asked that it be discontinued)  - Reduce Invega back to 6 mg daily (initiated on 5/3 and last increased on 5/10) due to evidence of oculogyric crisis today. Of note, patient tolerated a dose of 9 mg daily w/o side effects. Discussed this plan with PharmD who agrees.   - Coordinating with ACT team to obtain Invega Sustenna 234 mg to be given on day 1 and 156 mg to be given on day 8. However, may need to hold off on administering given side effect of oculogyric crisis  - Oral Zyprexa 10 mg BID OR Zyprexa 10 mg IM. Hatch now in place.  - Ativan 1 mg TID OR Ativan IM 1 mg TID (see above). Patient may not decline.     - Because patient was only intermittently accepting scheduled Ativan and some symptoms of catatonia are re-emerging, pursued Alcides Kohler for ECT. Awaiting outcome.     Resume Cogentin 2 mg IM daily prn for evidence acute dystonic reaction or oculogyric crisis  Resume Artane 2 mg BID prn  Resume hydroxyzine 25-50 mg q4h prn for acute anxiety  Resume Trazodone 50 mg at bedtime prn for sleep disturbances  Resume Zyprexa 10 mg TID prn for severe agitation  Resume Ativan/Benadryl q4h prn for agitation. WOULD GIVE PRN ATIVAN FIRST FOR AGITATION.      Acute Medical Problems and Treatments:  HTN, resolved:  - IM consulted formally on 5/6.  - Metoprolol succinate ER 50 mg daily: Modified to 25 mg BID as patient has been non-adherent.    - Cozaar 100 mg daily  - Please see note from IM dated 5/3/21 and 5/6/21.     CVA:  - Aspirin 81 mg daily     Chronic Constipation:  - Miralax 17 mg daily     Routine labs have been ordered, including CMP, CBC with differential, TSH, lipid profile.  Routine urine drug screen also ordered. Patient currently declining blood draw. She is also declining vitals.     Behavioral/Psychological/Social:  - Encourage unit programming    Safety:  - Continue precautions as noted above  - Status 15 minute checks  - Safety precautions include: assault and elopement precautions  -  Continue precautions as noted above  - Status 15 minute checks  - Discontinued 1:1 on 4/26 as presence of 1:1 staff appears to be worsening patient's paranoia and agitation.     Legal Status: Committed as MI with Hatch in place for Haldol, Zyprexa, Invega, and Thorazine through [facility]. Filed Mcgrath Kohler through [facility] and awaiting outcome.     Disposition Plan   Reason for ongoing admission: poses an imminent risk to self, poses an imminent risk to others and is unable to care for self due to severe psychosis or tad  Discharge location: Home with ACT team support. Possibly home within one week with ACT team supports if ongoing improvement noted.   Discharge Medications: not ordered  Follow-up Appointments: not scheduled    Entered by: Ida Zaman on 5/11/2021 at 9:09 AM      > 30 minutes total time that was spent and over 50% of this time was spent in counseling and coordination of care.

## 2021-05-11 NOTE — PLAN OF CARE
Pt slept approximately 5.5 hours. No concerns at this time, no PRNs given. Will continue to monitor.

## 2021-05-12 PROCEDURE — 99233 SBSQ HOSP IP/OBS HIGH 50: CPT | Performed by: PSYCHIATRY & NEUROLOGY

## 2021-05-12 PROCEDURE — 124N000002 HC R&B MH UMMC

## 2021-05-12 PROCEDURE — 250N000013 HC RX MED GY IP 250 OP 250 PS 637: Performed by: PSYCHIATRY & NEUROLOGY

## 2021-05-12 PROCEDURE — 250N000013 HC RX MED GY IP 250 OP 250 PS 637: Performed by: PHYSICIAN ASSISTANT

## 2021-05-12 RX ADMIN — LORAZEPAM 1 MG: 1 TABLET ORAL at 19:15

## 2021-05-12 RX ADMIN — LORAZEPAM 1 MG: 1 TABLET ORAL at 08:52

## 2021-05-12 RX ADMIN — LORAZEPAM 1 MG: 1 TABLET ORAL at 14:11

## 2021-05-12 RX ADMIN — OLANZAPINE 10 MG: 10 TABLET, ORALLY DISINTEGRATING ORAL at 19:15

## 2021-05-12 RX ADMIN — OLANZAPINE 10 MG: 10 TABLET, ORALLY DISINTEGRATING ORAL at 08:52

## 2021-05-12 RX ADMIN — LOSARTAN POTASSIUM 100 MG: 100 TABLET, FILM COATED ORAL at 08:52

## 2021-05-12 RX ADMIN — METOPROLOL SUCCINATE 50 MG: 50 TABLET, EXTENDED RELEASE ORAL at 08:52

## 2021-05-12 ASSESSMENT — ACTIVITIES OF DAILY LIVING (ADL)
HYGIENE/GROOMING: PROMPTS
LAUNDRY: UNABLE TO COMPLETE
DRESS: SCRUBS (BEHAVIORAL HEALTH)
DRESS: SCRUBS (BEHAVIORAL HEALTH)
ORAL_HYGIENE: PROMPTS
HYGIENE/GROOMING: PROMPTS
LAUNDRY: UNABLE TO COMPLETE
ORAL_HYGIENE: PROMPTS

## 2021-05-12 NOTE — PLAN OF CARE
Problem: Behavior Regulation Impairment (Psychotic Signs/Symptoms)  Goal: Improved Behavioral Control (Psychotic Signs/Symptoms)  Outcome: No Change    Patient was mostly isolative to her room, sleeping for much of the shift.  In the morning when in the lounge she was praying loudly and appeared responding to IS.  In the afternoon she entered the lounge and was less symptomatic.   She was paranoid and mistrusting of staff.  She requested her BP be taken with a manual cuff- when told the reading was 150/85, she refused to believe that was correct, demanding it be retaken until it was  below 140 .  Told writer multiple times  I need to leave the hospital.  I can take these medications at home! Tell the doctor! .  No insight into her illness.     Patient accepted part of her AM medications - refused Invega, calling it  poison  in reference to the oculogyric crisis she had the previous day.  Denied all physical health concerns.  She denied pain.       At 1400 patient accepted her afternoon dose of Ativan.  She was noticeably calmer.  Did not appear responding to IS.  She was able to express her needs clearly.  She states she wants to discharge home, so she can shower and use her personal soap.

## 2021-05-12 NOTE — PLAN OF CARE
"  Problem: Adult Inpatient Plan of Care  Goal: Plan of Care Review  Outcome: No Change    Pt was isolative and withdrawn almost the entire shift. Did not appear to have oculogyric crisis this evening. Was irritable and verbally abusive to writer. \" Get out, you stupid!\"  Pt refused check-in with writer. Refused BP checks. Was reluctantly medication compliant. No change in behavior noted this shift.   Plan: Status 15s; Build trust with pt. Continue to build on strengths. Encourage compliance and healthy coping.        "

## 2021-05-12 NOTE — PROVIDER NOTIFICATION
05/12/21 0600   Sleep/Rest/Relaxation   Sleep/Rest/Relaxation appears asleep   Night Time # Hours 6.5 hours       Pt had a quiet night with no behavior or safety issues. Was observed sleeping during the safety checks.

## 2021-05-12 NOTE — PLAN OF CARE
Current Diagnosis/Procedure:  Schizoaffective Disorder, Bipolar Type     Work Completed:    Patient was discussed in treatment team today and met with provider and CTC. Writer received phone call from patient's ACT Team psychiatrist Dr. Stevenson Pedraza who indicated an order for Invega Sustenna 234 mg and 156 mg had been placed at the pharmacy they use, and that the ACT Team nurse Kourtney would bring it to the hospital when they are ready. Dr. Pedraza agreed that patient may be a good candidate for ECT, due to the oculogyric emergencies that have occurred from both Haldol and oral Invega. [facility] has set a date for Mcgrath-Kohler trial, 5/21 @ 10:00 AM.       Discharge plan or goal:  Patient will either discharge to her apartment with ACT team in place, or a group home may be looked into.       Barriers to discharge:  Continued need for stabilization of severity of illness and medication. Patient has been committed as MI w/ Hatch through [facility]. Patient continues to be noncompliant with some of her recommended medications.

## 2021-05-12 NOTE — PLAN OF CARE
Writer spoke with patient's ACT Team psychiatrist Dr. Stevenson Pedraza ([phone]), who reports that he ordered the Invega Sustenna 234 mg, as well as the follow up injection. He indicated that when the pharmacy has filled the order that the ACT team will bring it to [facility]. Writer did inform him of the oculogyric crisis from the oral Invega that occurred the day before, and that patient has a Mcgrath-Kohler hearing on 5/21/21. Dr. Pedraza agreed that patient may be a good candidate for ECT if she continues to be resistant to neuroleptic medications.

## 2021-05-12 NOTE — PROGRESS NOTES
[facility], [place]   Psychiatric Progress Note  Hospital Day: 28        Interim History:   The patient's care was discussed with the treatment team during the daily team meeting and/or staff's chart notes were reviewed.  Staff report pt had a relatively uneventful evening. Patient declined Invega.    Upon interview, Michelle was quite pressured and focused on discharge. She was rambling about the Invega and stating that she wasn't under commitment and didn't need to be in the hospital. She spoke a lot about police, but I was unable to understand what she was saying.          Medications:       benztropine  2 mg Oral Once     LORazepam  1 mg Oral TID    Or     LORazepam  1 mg Intramuscular TID     losartan  100 mg Oral Daily     metoprolol succinate ER  50 mg Oral Daily     OLANZapine zydis  10 mg Oral BID    Or     OLANZapine  10 mg Intramuscular BID     paliperidone ER  6 mg Oral Daily     polyethylene glycol  17 g Oral Daily     trihexyphenidyl  2 mg Oral BID          Allergies:     Allergies   Allergen Reactions     Haldol [Haloperidol]      Patient previously tolerated haldol, though developed oculogyric crises during hospital stay on 4/26/21 on total daily dose of 10 mg.     Lisinopril Cough          Labs:   No results found for this or any previous visit (from the past 24 hour(s)).       Psychiatric Examination:     BP (!) 150/85 (BP Location: Left arm)   Pulse 80   Temp 97.5  F (36.4  C) (Tympanic)   Resp 16   SpO2 98%   Weight is 0 lbs 0 oz  There is no height or weight on file to calculate BMI.    Weight over time:  Vitals:       Orthostatic Vitals     None            Cardiometabolic risk assessment. 04/15/21      Reviewed patient profile for cardiometabolic risk factors    Date taken /Value  REFERENCE RANGE   Abdominal Obesity  (Waist Circumference)   See nursing flowsheet Women ?35 in (88 cm)   Men ?40 in (102 cm)      Triglycerides  Triglycerides   Date Value Ref Range  Status   10/19/2019 117 <150 mg/dL Final       ?150 mg/dL (1.7 mmol/L) or current treatment for elevated triglycerides   HDL cholesterol  HDL Cholesterol   Date Value Ref Range Status   10/19/2019 56 >49 mg/dL Final   ]   Women <50 mg/dL (1.3 mmol/L) in women or current treatment for low HDL cholesterol  Men <40 mg/dL (1 mmol/L) in men or current treatment for low HDL cholesterol     Fasting plasma glucose (FPG) Lab Results   Component Value Date     10/19/2019      FPG ?100 mg/dL (5.6 mmol/L) or treatment for elevated blood glucose   Blood pressure  BP Readings from Last 3 Encounters:   05/12/21 (!) 150/85   06/04/19 131/71   04/26/19 164/86    Blood pressure ?130/85 mmHg or treatment for elevated blood pressure   Family History  See family history     Appearance: awake, alert and disheveled   Attitude: Cooperative  Eye Contact:  Intense. Normal gaze today  Mood:  Irritable, agitated  Affect:  Mood congruent, agitated   Speech: strong accent, loud, rapid when agitated  Language: fluent and intact in English  Psychomotor, Gait, Musculoskeletal: No stereotypic movements noted on examination today. No evidence of tardive dyskinesia, dystonia, or tics. No physical agitation.   Throught Process:  Linear, goal oriented.  Associations:  No loosening of associations  Thought Content:  no evidence of suicidal ideation or homicidal ideation, evidence of paranoia  Insight:  limited  Judgement:  limited  Oriented to:  Person, place, time  Attention Span and Concentration: fair, improved  Recent and Remote Memory:  Fair, improved  Fund of Knowledge:  normal    Clinical Global Impressions  First:  Considering your total clinical experience with this particular patient population, how severe are the patient's symptoms at this time?: 7 (04/16/21 1428)  Compared to the patient's condition at the START of treatment, this patient's condition is: 4 (04/16/21 1428)  Most recent:  Considering your total clinical experience with  this particular patient population, how severe are the patient's symptoms at this time?: 6 (05/05/21 0909)  Compared to the patient's condition at the START of treatment, this patient's condition is: 3 (05/05/21 0909)                Precautions:     Behavioral Orders   Procedures     Assault precautions     Cheeking Precautions (behavioral units)     Patient Observed swallowing PO medications; Patient asked to drink water after swallowing medication; Patient in Staff line of sight for 15 minutes after medication given; Mouth checks after PO administration (patient asked to open mouth and stick out their tongue).     Code 1 - Restrict to Unit     Elopement precautions     Routine Programming     As clinically indicated     Status 15     Every 15 minutes.          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 Symptoms and presentation at this time is most consistent with Schizoaffective Disorder, Bipolar Type. We have obtained most recent medication regimen from patient's ACT team, and regimen was initially restarted. Inpatient psychiatric hospitalization is warranted at this time for safety, stabilization, and possible adjustment in medications. Pt is committed. We have petitioned for Mcgrath Calderon due to lack of improvement and side effects from medications.    Hospital Course:  On admission, PTA medications were restarted. However, patient had been declining all scheduled medications despite significant encouragement from staff and provider. Psychiatric  "emergency declared on 4/20 due to aggression toward others in context of severe psychosis and suspected excited catatonia. Ativan 1 mg TID was also added. Discontinued PTA Invega, Zyprexa, and thorazine on 4/20 due to consistent refusal.     On 4/26, it was noted that patient frequently had upward gaze while walking up and down the unit. She did not appear to be distressed. She reported that she was looking at \"my god.\" It was determined to be oculogyric crisis secondary to IM haloperidol. Haldol was subsequently discontinued during day shift on 4/26 and scheduled Zyprexa was initiated on emergency basis. Oral Cogentin was also scheduled, though patient declined. On the evening of 4/26, patient's gaze was fixed in upward position for several hours and she appeared to be experiencing discomfort. IM Cogentin was administered with noted resolution. Partial improvements weew observed after switch to scheduled Zyprexa. After patient improved, she was more receptive to reinitiating oral Invega, which was initiated on 5/3 and titrated to PTA dose of 9 mg daily on 5/10. Plan was for ACT team to bring in loading dose of Invega Sustenna. Patient had signs of oculogyric crisis again with Invega. Oral dose decreased to 6 mg after this.    Overall improvement in patient's agitation and suspected catatonia noted on 4/30 after patient accepted two doses of Ativan. She began declining Ativan again with noted decompensation. Patient now accepting, however, she does not have decisional making capacity at this time. She does not believe she has a mental illness, including catatonia. She does not fully understand risks associated with inadequate treatment of catatonia. Discussed with her son who is acting as surrogate decision maker and he is in full support of forced scheduled IM Ativan if patient declines oral formulation. Also consulted with our legal team.     Target psychiatric symptoms and interventions:    - Scheduled Artane 2 " mg BID (pt had been accepting intermittently but most recently asked that it be discontinued)  - Reduced Invega to 6 mg daily (initiated on 5/3 and last increased on 5/10) due to evidence of oculogyric crisis today. Of note, patient tolerated a dose of 9 mg daily w/o side effects in the past. Discussed this plan with PharmD who agrees.   - Coordinating with ACT team to obtain Invega Sustenna 234 mg to be given on day 1 and 156 mg to be given on day 8. However, may need to hold off on administering given side effect of oculogyric crisis  - Oral Zyprexa 10 mg BID OR Zyprexa 10 mg IM. Hatch now in place.  - Ativan 1 mg TID OR Ativan IM 1 mg TID (see above). Patient may not decline.     - Because patient was only intermittently accepting scheduled Ativan and some symptoms of catatonia are re-emerging, pursued Mcgrath Kohler for ECT. Awaiting outcome.     Continue Cogentin 2 mg IM daily prn for evidence acute dystonic reaction or oculogyric crisis  Continue Artane 2 mg BID prn  Continue hydroxyzine 25-50 mg q4h prn for acute anxiety  Continue Trazodone 50 mg at bedtime prn for sleep disturbances  Continue Zyprexa 10 mg TID prn for severe agitation  Continue Ativan/Benadryl q4h prn for agitation. WOULD GIVE PRN ATIVAN FIRST FOR AGITATION.      Acute Medical Problems and Treatments:  HTN, resolved:  - IM consulted formally on 5/6.  - Metoprolol succinate ER 50 mg daily: Modified to 25 mg BID as patient has been non-adherent.    - Cozaar 100 mg daily  - Please see note from IM dated 5/3/21 and 5/6/21.     CVA:  - Aspirin 81 mg daily     Chronic Constipation:  - Miralax 17 mg daily     Routine labs have been ordered, including CMP, CBC with differential, TSH, lipid profile.  Routine urine drug screen also ordered. Patient currently declining blood draw. She is also declining vitals.     Behavioral/Psychological/Social:  - Encourage unit programming    Safety:  - Continue precautions as noted above  - Status 15 minute  checks  - Safety precautions include: assault and elopement precautions  - Continue precautions as noted above  - Status 15 minute checks  - Discontinued 1:1 on 4/26 as presence of 1:1 staff appears to be worsening patient's paranoia and agitation.     Legal Status: Committed as MI with Hatch in place for Haldol, Zyprexa, Invega, and Thorazine through [facility]. Filed Mcgrath Kohler through [facility] and awaiting outcome.     Disposition Plan   Reason for ongoing admission: poses an imminent risk to self, poses an imminent risk to others and is unable to care for self due to severe psychosis or tad  Discharge location: Home with ACT team support. Possibly home within one week with ACT team supports if ongoing improvement noted.   Discharge Medications: not ordered  Follow-up Appointments: not scheduled    Entered by: Jarett Pack on 5/12/2021 at 12:18 PM      > 30 minutes total time that was spent and over 50% of this time was spent in counseling and coordination of care.

## 2021-05-13 PROCEDURE — 250N000013 HC RX MED GY IP 250 OP 250 PS 637: Performed by: PSYCHIATRY & NEUROLOGY

## 2021-05-13 PROCEDURE — 250N000013 HC RX MED GY IP 250 OP 250 PS 637: Performed by: PHYSICIAN ASSISTANT

## 2021-05-13 PROCEDURE — 124N000002 HC R&B MH UMMC

## 2021-05-13 PROCEDURE — 99232 SBSQ HOSP IP/OBS MODERATE 35: CPT | Performed by: PSYCHIATRY & NEUROLOGY

## 2021-05-13 RX ADMIN — LORAZEPAM 1 MG: 1 TABLET ORAL at 19:32

## 2021-05-13 RX ADMIN — PALIPERIDONE 6 MG: 6 TABLET, EXTENDED RELEASE ORAL at 08:08

## 2021-05-13 RX ADMIN — LORAZEPAM 1 MG: 1 TABLET ORAL at 08:08

## 2021-05-13 RX ADMIN — OLANZAPINE 10 MG: 10 TABLET, ORALLY DISINTEGRATING ORAL at 19:32

## 2021-05-13 RX ADMIN — LORAZEPAM 1 MG: 1 TABLET ORAL at 14:24

## 2021-05-13 RX ADMIN — METOPROLOL SUCCINATE 50 MG: 50 TABLET, EXTENDED RELEASE ORAL at 08:08

## 2021-05-13 RX ADMIN — OLANZAPINE 10 MG: 10 TABLET, ORALLY DISINTEGRATING ORAL at 08:08

## 2021-05-13 RX ADMIN — LOSARTAN POTASSIUM 100 MG: 100 TABLET, FILM COATED ORAL at 08:08

## 2021-05-13 ASSESSMENT — ACTIVITIES OF DAILY LIVING (ADL)
DRESS: SCRUBS (BEHAVIORAL HEALTH)
HYGIENE/GROOMING: PROMPTS
ORAL_HYGIENE: PROMPTS
LAUNDRY: UNABLE TO COMPLETE

## 2021-05-13 NOTE — PLAN OF CARE
Work Completed:Received a phone call from ACT Team RN, [phone]..  She reports they have the invega injection 2 doses and the   will anisa it here tomorrow.  Attended Team Meeting, reviewed chart.    Discharge plan or goal: A final decision regarding discharge dispositin has not been made.  It will either be current living situation or to a Group Home.                Barriers to discharge: Pt is not stable.  She is in need of ongoing meication management.

## 2021-05-13 NOTE — PLAN OF CARE
Problem: Behavior Regulation Impairment (Psychotic Signs/Symptoms)  Goal: Improved Behavioral Control (Psychotic Signs/Symptoms)  Outcome: No Change  Pt is isolative and withdrawn to her room. She is calm and pleasant until this writer approached her for 1:1 conversation. She is tensed, guarded and agitated through the conversation. She denied all mental health symptoms and accused nursing staff of false documentation on her. She remains paranoid about her blood pressure and will only allow manual blood pressure check. She denied having hx of hypertension and was agitated when told her blood pressure was high this evening (148/98).  She refused scheduled Artane for her blood pressure. She took the rest of her HS medication without any issue, and no SE's was observed or reported. Her speech is pressured. Mcgrath-Kohler Hearing coming up on 5/21/21 with [facility]. Hygiene maintenance remains poor, and she refused to shower this evening. No SI/SIB.

## 2021-05-13 NOTE — PROGRESS NOTES
[facility], [place]   Psychiatric Progress Note  Hospital Day: 29        Interim History:   The patient's care was discussed with the treatment team during the daily team meeting and/or staff's chart notes were reviewed.  Staff report patient continues to lack insight. She appears to respond to internal stimuli. She expressed belief that Invega was poison. Patient also believes that her blood pressure checks are incorrect.    Upon interview, Michelle still denies that she's under commitment. She claims that she doesn't need to be here and denies all psychiatric symptoms. She is quite intense during the conversation. I explain that we still need to find a medicine that is going to help stabilize her symptoms prior to discharge. She doesn't agree, but ultimately is accepting.         Medications:       benztropine  2 mg Oral Once     LORazepam  1 mg Oral TID    Or     LORazepam  1 mg Intramuscular TID     losartan  100 mg Oral Daily     metoprolol succinate ER  50 mg Oral Daily     OLANZapine zydis  10 mg Oral BID    Or     OLANZapine  10 mg Intramuscular BID     paliperidone ER  6 mg Oral Daily     polyethylene glycol  17 g Oral Daily     trihexyphenidyl  2 mg Oral BID          Allergies:     Allergies   Allergen Reactions     Haldol [Haloperidol]      Patient previously tolerated haldol, though developed oculogyric crises during hospital stay on 4/26/21 on total daily dose of 10 mg.     Lisinopril Cough          Labs:   No results found for this or any previous visit (from the past 24 hour(s)).       Psychiatric Examination:     BP (!) 140/82   Pulse 71   Temp 98.1  F (36.7  C) (Oral)   Resp 16   SpO2 98%   Weight is 0 lbs 0 oz  There is no height or weight on file to calculate BMI.    Weight over time:  Vitals:       Orthostatic Vitals     None            Cardiometabolic risk assessment. 04/15/21      Reviewed patient profile for cardiometabolic risk factors    Date taken /Value   REFERENCE RANGE   Abdominal Obesity  (Waist Circumference)   See nursing flowsheet Women ?35 in (88 cm)   Men ?40 in (102 cm)      Triglycerides  Triglycerides   Date Value Ref Range Status   10/19/2019 117 <150 mg/dL Final       ?150 mg/dL (1.7 mmol/L) or current treatment for elevated triglycerides   HDL cholesterol  HDL Cholesterol   Date Value Ref Range Status   10/19/2019 56 >49 mg/dL Final   ]   Women <50 mg/dL (1.3 mmol/L) in women or current treatment for low HDL cholesterol  Men <40 mg/dL (1 mmol/L) in men or current treatment for low HDL cholesterol     Fasting plasma glucose (FPG) Lab Results   Component Value Date     10/19/2019      FPG ?100 mg/dL (5.6 mmol/L) or treatment for elevated blood glucose   Blood pressure  BP Readings from Last 3 Encounters:   05/13/21 (!) 140/82   06/04/19 131/71   04/26/19 164/86    Blood pressure ?130/85 mmHg or treatment for elevated blood pressure   Family History  See family history     Appearance: awake, alert and disheveled   Attitude: Cooperative  Eye Contact:  Intense. Normal gaze today  Mood: iritable  Affect:  Mood congruent  Speech: strong accent, loud at times  Language: fluent and intact in English  Psychomotor, Gait, Musculoskeletal: No stereotypic movements noted on examination today. No evidence of tardive dyskinesia, dystonia, or tics. No physical agitation.   Throught Process:  Linear, goal oriented.  Associations:  No loosening of associations  Thought Content:  no evidence of suicidal ideation or homicidal ideation, evidence of paranoia  Insight:  limited  Judgement:  limited  Oriented to:  Person, place, time  Attention Span and Concentration: fair, improved  Recent and Remote Memory:  Fair, improved  Fund of Knowledge:  normal    Clinical Global Impressions  First:  Considering your total clinical experience with this particular patient population, how severe are the patient's symptoms at this time?: 7 (04/16/21 3617)  Compared to the patient's  condition at the START of treatment, this patient's condition is: 4 (04/16/21 1428)  Most recent:  Considering your total clinical experience with this particular patient population, how severe are the patient's symptoms at this time?: 6 (05/05/21 0909)  Compared to the patient's condition at the START of treatment, this patient's condition is: 3 (05/05/21 0909)                Precautions:     Behavioral Orders   Procedures     Assault precautions     Cheeking Precautions (behavioral units)     Patient Observed swallowing PO medications; Patient asked to drink water after swallowing medication; Patient in Staff line of sight for 15 minutes after medication given; Mouth checks after PO administration (patient asked to open mouth and stick out their tongue).     Code 1 - Restrict to Unit     Elopement precautions     Routine Programming     As clinically indicated     Status 15     Every 15 minutes.          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 Symptoms and presentation at this time is most consistent with Schizoaffective Disorder, Bipolar Type. We have obtained most recent medication regimen from patient's ACT team, and regimen was initially restarted. Inpatient psychiatric hospitalization is warranted at this time for safety, stabilization, and possible adjustment in medications. Pt is committed. We have petitioned for Mcgrath Calderon due to lack of improvement and side effects from medications.    Hospital Course:  On admission, PTA medications were  "restarted. However, patient had been declining all scheduled medications despite significant encouragement from staff and provider. Psychiatric emergency declared on 4/20 due to aggression toward others in context of severe psychosis and suspected excited catatonia. Ativan 1 mg TID was also added. Discontinued PTA Invega, Zyprexa, and thorazine on 4/20 due to consistent refusal.     On 4/26, it was noted that patient frequently had upward gaze while walking up and down the unit. She did not appear to be distressed. She reported that she was looking at \"my god.\" It was determined to be oculogyric crisis secondary to IM haloperidol. Haldol was subsequently discontinued during day shift on 4/26 and scheduled Zyprexa was initiated on emergency basis. Oral Cogentin was also scheduled, though patient declined. On the evening of 4/26, patient's gaze was fixed in upward position for several hours and she appeared to be experiencing discomfort. IM Cogentin was administered with noted resolution. Partial improvements weew observed after switch to scheduled Zyprexa. After patient improved, she was more receptive to reinitiating oral Invega, which was initiated on 5/3 and titrated to PTA dose of 9 mg daily on 5/10. Plan was for ACT team to bring in loading dose of Invega Sustenna. Patient had signs of oculogyric crisis again with Invega. Oral dose decreased to 6 mg after this.    Overall improvement in patient's agitation and suspected catatonia noted on 4/30 after patient accepted two doses of Ativan. She began declining Ativan again with noted decompensation. Patient now accepting, however, she does not have decisional making capacity at this time. She does not believe she has a mental illness, including catatonia. She does not fully understand risks associated with inadequate treatment of catatonia. Discussed with her son who is acting as surrogate decision maker and he is in full support of forced scheduled IM Ativan if " patient declines oral formulation. Also consulted with our legal team.     Target psychiatric symptoms and interventions:    - Scheduled Artane 2 mg BID (pt had been accepting intermittently but most recently asked that it be discontinued)  - Reduced Invega to 6 mg daily (initiated on 5/3 and last increased on 5/10) due to evidence of oculogyric crisis today. Of note, patient tolerated a dose of 9 mg daily w/o side effects in the past. Discussed this plan with PharmD who agrees.   - Coordinating with ACT team to obtain Invega Sustenna 234 mg to be given on day 1 and 156 mg to be given on day 8. However, may need to hold off on administering given side effect of oculogyric crisis  - Oral Zyprexa 10 mg BID OR Zyprexa 10 mg IM. Hatch now in place.  - Ativan 1 mg TID OR Ativan IM 1 mg TID (see above). Patient may not decline.     - Because patient was only intermittently accepting scheduled Ativan and some symptoms of catatonia are re-emerging, pursued Alcides Kohler for ECT. Awaiting outcome.     Continue Cogentin 2 mg IM daily prn for evidence acute dystonic reaction or oculogyric crisis  Continue Artane 2 mg BID prn  Continue hydroxyzine 25-50 mg q4h prn for acute anxiety  Continue Trazodone 50 mg at bedtime prn for sleep disturbances  Continue Zyprexa 10 mg TID prn for severe agitation  Continue Ativan/Benadryl q4h prn for agitation. WOULD GIVE PRN ATIVAN FIRST FOR AGITATION.      Acute Medical Problems and Treatments:  HTN, resolved:  - IM consulted formally on 5/6.  - Metoprolol succinate ER 50 mg daily: Modified to 25 mg BID as patient has been non-adherent.    - Cozaar 100 mg daily  - Please see note from IM dated 5/3/21 and 5/6/21.     CVA:  - Aspirin 81 mg daily     Chronic Constipation:  - Miralax 17 mg daily     Routine labs have been ordered, including CMP, CBC with differential, TSH, lipid profile.  Routine urine drug screen also ordered. Patient currently declining blood draw. She is also declining  vitals.     Behavioral/Psychological/Social:  - Encourage unit programming    Safety:  - Continue precautions as noted above  - Status 15 minute checks  - Safety precautions include: assault and elopement precautions  - Continue precautions as noted above  - Status 15 minute checks  - Discontinued 1:1 on 4/26 as presence of 1:1 staff appears to be worsening patient's paranoia and agitation.     Legal Status: Committed as MI with Hatch in place for Haldol, Zyprexa, Invega, and Thorazine through [facility]. Filed Mcgrath Edita through [facility] and awaiting outcome.     Disposition Plan   Reason for ongoing admission: poses an imminent risk to self, poses an imminent risk to others and is unable to care for self due to severe psychosis or tad  Discharge location: Home with ACT team support. Possibly home within one week with ACT team supports if ongoing improvement noted.   Discharge Medications: not ordered  Follow-up Appointments: not scheduled    Entered by: Jarett Pack on 5/13/2021 at 12:40 PM      > 20 minutes total time that was spent and over 50% of this time was spent in counseling and coordination of care.

## 2021-05-13 NOTE — PLAN OF CARE
Problem: Sleep Disturbance (Psychotic Signs/Symptoms)  Goal: Improved Sleep (Psychotic Signs/Symptoms)  Outcome: No Change       Pt appeared sleeping for 5.5 hours, and was seen laying down on the floor (where she sleeps) quietly awake at some point of the night. No behavioral issues noted. No PRN administered this shift. Pt slept for 5.5 hours.

## 2021-05-13 NOTE — PLAN OF CARE
"Patient continues to be guarded and paranoid. She is very intense and escalates quickly if you do not tell her what she wants to hear. Continues to need her medications opened in front of her and refused her artane stating \"that is not my medicine!!!\" Otherwise patient is eating and drinking. Mostly isolative to rooms except for meals and meds. Continue with POC, notify MD of changes.  "

## 2021-05-14 PROCEDURE — 99233 SBSQ HOSP IP/OBS HIGH 50: CPT | Performed by: PSYCHIATRY & NEUROLOGY

## 2021-05-14 PROCEDURE — 124N000002 HC R&B MH UMMC

## 2021-05-14 PROCEDURE — 250N000013 HC RX MED GY IP 250 OP 250 PS 637: Performed by: PSYCHIATRY & NEUROLOGY

## 2021-05-14 PROCEDURE — 250N000013 HC RX MED GY IP 250 OP 250 PS 637: Performed by: PHYSICIAN ASSISTANT

## 2021-05-14 RX ADMIN — LORAZEPAM 1 MG: 1 TABLET ORAL at 13:58

## 2021-05-14 RX ADMIN — METOPROLOL SUCCINATE 50 MG: 50 TABLET, EXTENDED RELEASE ORAL at 09:31

## 2021-05-14 RX ADMIN — OLANZAPINE 10 MG: 10 TABLET, ORALLY DISINTEGRATING ORAL at 09:31

## 2021-05-14 RX ADMIN — PALIPERIDONE 6 MG: 6 TABLET, EXTENDED RELEASE ORAL at 09:31

## 2021-05-14 RX ADMIN — TRIHEXYPHENIDYL HYDROCHLORIDE 2 MG: 2 TABLET ORAL at 20:30

## 2021-05-14 RX ADMIN — LORAZEPAM 1 MG: 1 TABLET ORAL at 20:30

## 2021-05-14 RX ADMIN — OLANZAPINE 10 MG: 10 TABLET, ORALLY DISINTEGRATING ORAL at 20:30

## 2021-05-14 RX ADMIN — TRIHEXYPHENIDYL HYDROCHLORIDE 2 MG: 2 TABLET ORAL at 09:32

## 2021-05-14 RX ADMIN — LOSARTAN POTASSIUM 100 MG: 100 TABLET, FILM COATED ORAL at 09:30

## 2021-05-14 RX ADMIN — LORAZEPAM 1 MG: 1 TABLET ORAL at 09:31

## 2021-05-14 ASSESSMENT — ACTIVITIES OF DAILY LIVING (ADL)
DRESS: SCRUBS (BEHAVIORAL HEALTH)
DRESS: INDEPENDENT
LAUNDRY: UNABLE TO COMPLETE
LAUNDRY: UNABLE TO COMPLETE
HYGIENE/GROOMING: PROMPTS
ORAL_HYGIENE: PROMPTS
ORAL_HYGIENE: PROMPTS
HYGIENE/GROOMING: PROMPTS

## 2021-05-14 NOTE — PLAN OF CARE
"  Problem: Behavior Regulation Impairment (Psychotic Signs/Symptoms)  Goal: Improved Behavioral Control (Psychotic Signs/Symptoms)  Outcome: No Change   Pt is isolative and withdrawn to her room. She spent most of this evening napping on and off while lying on the floor.  She is calm and pleasant until approached and dismissive.  She only came out of her room for dinner and bedtime medication. She appeared to be responding to internal stimuli. She remains paranoid about her blood pressure and will only allow a manual blood pressure check. She became agitated when told her blood pressure reading is 142/90 \"You are a liar; why only your blood pressure is always high. My blood pressure is good this morning, and I take 2 blood pressure medication in the morning. Get away from my side\".       Pt hygiene maintenance remains very poor, and she is malodorous. She refused to shower or change her scrub. Appetite is good; she is eating well and drinking well. She ate 100% of her dinner.    "

## 2021-05-14 NOTE — PLAN OF CARE
Pt asleep at start of shift. Breathing quiet and unlabored.     Appears to have slept 3.5 hours.     Pt on ASSAULT, CHEEKING, and ELOPEMENT precautions in addition to single room order. Any related events noted above.     Will continue to monitor and assess.     Problem: Sleep Disturbance (Psychotic Signs/Symptoms)  Goal: Improved Sleep (Psychotic Signs/Symptoms)  Outcome: Declining

## 2021-05-14 NOTE — PROGRESS NOTES
[facility], [place]   Psychiatric Progress Note  Hospital Day: 30        Interim History:   The patient's care was discussed with the treatment team during the daily team meeting and/or staff's chart notes were reviewed.  Staff report patient continues to decline the Artane. PA on the unit states that patient has continue to have eyes roll up into head through the day. Patient continues to claim she the high blood pressure readings are a lie.    Upon interview, Michelle denies all psychiatric problems. She denies problems with her eyes. She denies need for hospitalization. She tells me that she is not under a commitment.         Medications:       benztropine  2 mg Oral Once     LORazepam  1 mg Oral TID    Or     LORazepam  1 mg Intramuscular TID     losartan  100 mg Oral Daily     metoprolol succinate ER  50 mg Oral Daily     OLANZapine zydis  10 mg Oral BID    Or     OLANZapine  10 mg Intramuscular BID     paliperidone ER  6 mg Oral Daily     polyethylene glycol  17 g Oral Daily     trihexyphenidyl  2 mg Oral BID          Allergies:     Allergies   Allergen Reactions     Haldol [Haloperidol]      Patient previously tolerated haldol, though developed oculogyric crises during hospital stay on 4/26/21 on total daily dose of 10 mg.     Lisinopril Cough          Labs:   No results found for this or any previous visit (from the past 24 hour(s)).       Psychiatric Examination:     BP (!) 152/88   Pulse 90   Temp 98.1  F (36.7  C) (Oral)   Resp 16   SpO2 98%   Weight is 0 lbs 0 oz  There is no height or weight on file to calculate BMI.    Weight over time:  Vitals:       Orthostatic Vitals     None            Cardiometabolic risk assessment. 04/15/21      Reviewed patient profile for cardiometabolic risk factors    Date taken /Value  REFERENCE RANGE   Abdominal Obesity  (Waist Circumference)   See nursing flowsheet Women ?35 in (88 cm)   Men ?40 in (102 cm)      Triglycerides  Triglycerides    Date Value Ref Range Status   10/19/2019 117 <150 mg/dL Final       ?150 mg/dL (1.7 mmol/L) or current treatment for elevated triglycerides   HDL cholesterol  HDL Cholesterol   Date Value Ref Range Status   10/19/2019 56 >49 mg/dL Final   ]   Women <50 mg/dL (1.3 mmol/L) in women or current treatment for low HDL cholesterol  Men <40 mg/dL (1 mmol/L) in men or current treatment for low HDL cholesterol     Fasting plasma glucose (FPG) Lab Results   Component Value Date     10/19/2019      FPG ?100 mg/dL (5.6 mmol/L) or treatment for elevated blood glucose   Blood pressure  BP Readings from Last 3 Encounters:   05/14/21 (!) 152/88   06/04/19 131/71   04/26/19 164/86    Blood pressure ?130/85 mmHg or treatment for elevated blood pressure   Family History  See family history     Appearance: awake, alert and disheveled   Attitude: Cooperative  Eye Contact:  Intense. Normal gaze during exam today  Mood: iritable  Affect:  Mood congruent  Speech: strong accent,   Language: fluent and intact in English  Psychomotor, Gait, Musculoskeletal: No stereotypic movements noted on examination today. No evidence of tardive dyskinesia, dystonia, or tics. No physical agitation.   Throught Process:  Linear, goal oriented.  Associations:  No loosening of associations  Thought Content:  no evidence of suicidal ideation or homicidal ideation, evidence of paranoia  Insight:  limited  Judgement:  limited  Oriented to:  Person, place, time  Attention Span and Concentration: fair, improved  Recent and Remote Memory:  Fair, improved  Fund of Knowledge:  normal    Clinical Global Impressions  First:  Considering your total clinical experience with this particular patient population, how severe are the patient's symptoms at this time?: 7 (04/16/21 1428)  Compared to the patient's condition at the START of treatment, this patient's condition is: 4 (04/16/21 1428)  Most recent:  Considering your total clinical experience with this  particular patient population, how severe are the patient's symptoms at this time?: 7 (05/13/21 0953)  Compared to the patient's condition at the START of treatment, this patient's condition is: 6 (05/13/21 0953)                  Precautions:     Behavioral Orders   Procedures     Assault precautions     Cheeking Precautions (behavioral units)     Patient Observed swallowing PO medications; Patient asked to drink water after swallowing medication; Patient in Staff line of sight for 15 minutes after medication given; Mouth checks after PO administration (patient asked to open mouth and stick out their tongue).     Code 1 - Restrict to Unit     Elopement precautions     Routine Programming     As clinically indicated     Status 15     Every 15 minutes.          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 Symptoms and presentation at this time is most consistent with Schizoaffective Disorder, Bipolar Type. We have obtained most recent medication regimen from patient's ACT team, and regimen was initially restarted. Inpatient psychiatric hospitalization is warranted at this time for safety, stabilization, and possible adjustment in medications. Pt is committed. We have petitioned for Alcides Calderon due to lack of improvement and side effects from medications.    Hospital Course:  On admission, PTA medications were restarted. However, patient had been declining all scheduled medications despite significant encouragement from staff and provider. Psychiatric  "emergency declared on 4/20 due to aggression toward others in context of severe psychosis and suspected excited catatonia. Ativan 1 mg TID was also added. Discontinued PTA Invega, Zyprexa, and thorazine on 4/20 due to consistent refusal.     On 4/26, it was noted that patient frequently had upward gaze while walking up and down the unit. She did not appear to be distressed. She reported that she was looking at \"my god.\" It was determined to be oculogyric crisis secondary to IM haloperidol. Haldol was subsequently discontinued during day shift on 4/26 and scheduled Zyprexa was initiated on emergency basis. Oral Cogentin was also scheduled, though patient declined. On the evening of 4/26, patient's gaze was fixed in upward position for several hours and she appeared to be experiencing discomfort. IM Cogentin was administered with noted resolution. Partial improvements weew observed after switch to scheduled Zyprexa. After patient improved, she was more receptive to reinitiating oral Invega, which was initiated on 5/3 and titrated to PTA dose of 9 mg daily on 5/10. Plan was for ACT team to bring in loading dose of Invega Sustenna. Patient had signs of oculogyric crisis again with Invega. Oral dose decreased to 6 mg after this. Invega was stopped on 5/14 due to ongoing signs of problems related to eye movement.    Overall improvement in patient's agitation and suspected catatonia noted on 4/30 after patient accepted two doses of Ativan. She began declining Ativan again with noted decompensation. Patient now accepting, however, she does not have decisional making capacity at this time. She does not believe she has a mental illness, including catatonia. She does not fully understand risks associated with inadequate treatment of catatonia. Discussed with her son who is acting as surrogate decision maker and he is in full support of forced scheduled IM Ativan if patient declines oral formulation. Also consulted with our " legal team.     Target psychiatric symptoms and interventions:      - Discontinue Invega due to ongoing eye issues.   - Oral Zyprexa 10 mg BID OR Zyprexa 10 mg IM. Hatch now in place.  - Ativan 1 mg TID OR Ativan IM 1 mg TID (see above). Patient may not decline.     - Because patient was only intermittently accepting scheduled Ativan and some symptoms of catatonia are re-emerging, pursued Alcides Kohler for ECT. Awaiting outcome.     Continue Cogentin 2 mg IM daily prn for evidence acute dystonic reaction or oculogyric crisis  Continue Artane 2 mg BID prn - patient often refusing this  Continue hydroxyzine 25-50 mg q4h prn for acute anxiety  Continue Trazodone 50 mg at bedtime prn for sleep disturbances  Continue Zyprexa 10 mg TID prn for severe agitation  Continue Ativan/Benadryl q4h prn for agitation. WOULD GIVE PRN ATIVAN FIRST FOR AGITATION.      Acute Medical Problems and Treatments:  HTN, resolved:  - IM consulted formally on 5/6.  - Metoprolol succinate ER 50 mg daily: Modified to 25 mg BID as patient has been non-adherent.    - Cozaar 100 mg daily  - Please see note from IM dated 5/3/21 and 5/6/21.     CVA:  - Aspirin 81 mg daily     Chronic Constipation:  - Miralax 17 mg daily     Routine labs have been ordered, including CMP, CBC with differential, TSH, lipid profile.  Routine urine drug screen also ordered. Patient currently declining blood draw. She is also declining vitals.     Behavioral/Psychological/Social:  - Encourage unit programming    Safety:  - Continue precautions as noted above  - Status 15 minute checks  - Safety precautions include: assault and elopement precautions  - Continue precautions as noted above  - Status 15 minute checks  - Discontinued 1:1 on 4/26 as presence of 1:1 staff appears to be worsening patient's paranoia and agitation.     Legal Status: Committed as MI with Hatch in place for Haldol, Zyprexa, Invega, and Thorazine through [facility]. Filed Alcides Kohler through  [facility] and awaiting outcome.     Disposition Plan   Reason for ongoing admission: poses an imminent risk to self, poses an imminent risk to others and is unable to care for self due to severe psychosis or tad  Discharge location: Home with ACT team support. Possibly home within one week with ACT team supports if ongoing improvement noted.   Discharge Medications: not ordered  Follow-up Appointments: not scheduled    Entered by: Jarett Pack on 5/14/2021 at 11:24 AM      > 20 minutes total time that was spent and over 50% of this time was spent in counseling and coordination of care.

## 2021-05-14 NOTE — PLAN OF CARE
Current Diagnosis/Procedure:  Schizoaffective Disorder, Bipolar Type     Work Completed:    Patient was discussed in treatment team today and met with provider and CTC.  from patient's ACT Team dropped off 234 mg and 156 mg of Invega Sustenna which writer gave to current nurse. Per 's report the ACT Team is likely going to recommend that she needs a higher level of care, as they don't feel that living independently with an ACT Team in place is no longer sufficient. Writer and  agreed to discuss setting up a Care Conference w/ patient's hospital team and ACT team to revisit discharge planning.      Discharge plan or goal:  Patient will either discharge to her apartment with ACT team in place, or a group home may be looked into.       Barriers to discharge:  Continued need for stabilization of severity of illness and medication. Patient has been committed as MI w/ [facility] through [facility]. A court hearing for Tiara petition has been set up for 5/21 @ 11:15 AM.

## 2021-05-14 NOTE — PLAN OF CARE
"Continues to be isolative and withdrawn to room. She is eating majority of her meals. Patient is med compliant and did take her artane for the first time today. She remains paranoid regarding her BP and will only allow her nurse to do a manual BP check, it remains elevated at 152/88, despite a recheck. Patient stated \"I am on 2 blood pressure mediations. That is wrong. You do not know what you're doing.\" Patient refusing to attend to ADL's. Appears to be responding to internal stimuli at times. Staff did report one episode of eyes rolling back into her head, RN has not witnessed, MD aware. Invega has been discontinued. Outside staff did drop off Invega sustenna for patient and has been placed in discharge mediation drawer. Continue with POC. Notify MD of changes.  "

## 2021-05-15 PROCEDURE — 250N000013 HC RX MED GY IP 250 OP 250 PS 637: Performed by: PHYSICIAN ASSISTANT

## 2021-05-15 PROCEDURE — 124N000002 HC R&B MH UMMC

## 2021-05-15 PROCEDURE — 250N000013 HC RX MED GY IP 250 OP 250 PS 637: Performed by: PSYCHIATRY & NEUROLOGY

## 2021-05-15 RX ADMIN — LOSARTAN POTASSIUM 100 MG: 100 TABLET, FILM COATED ORAL at 08:47

## 2021-05-15 RX ADMIN — LORAZEPAM 1 MG: 1 TABLET ORAL at 19:55

## 2021-05-15 RX ADMIN — TRIHEXYPHENIDYL HYDROCHLORIDE 2 MG: 2 TABLET ORAL at 19:55

## 2021-05-15 RX ADMIN — LORAZEPAM 1 MG: 1 TABLET ORAL at 13:47

## 2021-05-15 RX ADMIN — OLANZAPINE 10 MG: 10 TABLET, ORALLY DISINTEGRATING ORAL at 19:55

## 2021-05-15 RX ADMIN — TRIHEXYPHENIDYL HYDROCHLORIDE 2 MG: 2 TABLET ORAL at 08:31

## 2021-05-15 RX ADMIN — METOPROLOL SUCCINATE 50 MG: 50 TABLET, EXTENDED RELEASE ORAL at 08:47

## 2021-05-15 RX ADMIN — LORAZEPAM 1 MG: 1 TABLET ORAL at 08:31

## 2021-05-15 RX ADMIN — OLANZAPINE 10 MG: 10 TABLET, ORALLY DISINTEGRATING ORAL at 08:31

## 2021-05-15 ASSESSMENT — ACTIVITIES OF DAILY LIVING (ADL)
ORAL_HYGIENE: PROMPTS
ORAL_HYGIENE: PROMPTS
DRESS: SCRUBS (BEHAVIORAL HEALTH)
DRESS: SCRUBS (BEHAVIORAL HEALTH)
HYGIENE/GROOMING: PROMPTS
HYGIENE/GROOMING: PROMPTS
LAUNDRY: UNABLE TO COMPLETE
LAUNDRY: UNABLE TO COMPLETE

## 2021-05-15 NOTE — PLAN OF CARE
Pt was out in the milieu throughout the evening, but she is withdrawn and doesn't socialize with peers or staff. Appeared tense and irritable. Speech pressured. After dinner, pt isolated to her room for the rest of evening, napping. She was much more pleasant when awakened for snack and HS medications. Pt continues to deny need for medications and denies all mental health symptoms. Appears internally preoccupied at times. She was cooperative with scheduled medications, and had no observed side effects. Scheduled invega was discontinued today due to eye rolling; pt has history of oculogyric crisis. Refused VS assessment. Hygiene remains poor. Appetite and fluid intake good.

## 2021-05-15 NOTE — PLAN OF CARE
Problem: Sleep Disturbance (Psychotic Signs/Symptoms)  Goal: Improved Sleep (Psychotic Signs/Symptoms)  Outcome: Improving     Pt slept for 6.5 hours. No concerns noted this shift. No PRN administered.

## 2021-05-15 NOTE — PLAN OF CARE
Pt was seen in the milieu at times and she also spent time isolative in her room. Pt affect was blunted, tense, and mood was irritable. Speech was pressured. Pt refused to have her VS taken by the monitor and demanded to have it taken manually, which she complied. Pt's BP was 142/78. Pt denied pain and side effects. None observed. Pt was demanding regarding her medications and shouted when she had to wait for them. Pt stated she doesn't need to take medication. Pt refused COVID test and shower. Pt is disheveled. Pt denied all MH sxs. Pt appeared to be internally focused and possible RIS at times. Pt ate all meals and appeared to have a ritual prayer she says prior to eating. Pt continues to demand orange juice; however, she is less demanding than on previous days.

## 2021-05-16 PROCEDURE — 250N000013 HC RX MED GY IP 250 OP 250 PS 637: Performed by: PSYCHIATRY & NEUROLOGY

## 2021-05-16 PROCEDURE — 124N000002 HC R&B MH UMMC

## 2021-05-16 PROCEDURE — 250N000013 HC RX MED GY IP 250 OP 250 PS 637: Performed by: PHYSICIAN ASSISTANT

## 2021-05-16 RX ADMIN — LORAZEPAM 1 MG: 1 TABLET ORAL at 14:07

## 2021-05-16 RX ADMIN — OLANZAPINE 10 MG: 10 TABLET, ORALLY DISINTEGRATING ORAL at 20:25

## 2021-05-16 RX ADMIN — LOSARTAN POTASSIUM 100 MG: 100 TABLET, FILM COATED ORAL at 08:40

## 2021-05-16 RX ADMIN — LORAZEPAM 1 MG: 1 TABLET ORAL at 20:25

## 2021-05-16 RX ADMIN — LORAZEPAM 1 MG: 1 TABLET ORAL at 08:40

## 2021-05-16 RX ADMIN — OLANZAPINE 10 MG: 10 TABLET, ORALLY DISINTEGRATING ORAL at 08:40

## 2021-05-16 RX ADMIN — TRIHEXYPHENIDYL HYDROCHLORIDE 2 MG: 2 TABLET ORAL at 08:40

## 2021-05-16 RX ADMIN — TRIHEXYPHENIDYL HYDROCHLORIDE 2 MG: 2 TABLET ORAL at 20:25

## 2021-05-16 RX ADMIN — METOPROLOL SUCCINATE 50 MG: 50 TABLET, EXTENDED RELEASE ORAL at 08:40

## 2021-05-16 ASSESSMENT — ACTIVITIES OF DAILY LIVING (ADL)
HYGIENE/GROOMING: PROMPTS
ORAL_HYGIENE: PROMPTS
ORAL_HYGIENE: PROMPTS
HYGIENE/GROOMING: PROMPTS
DRESS: SCRUBS (BEHAVIORAL HEALTH)
DRESS: INDEPENDENT

## 2021-05-16 NOTE — PLAN OF CARE
Pt was out in the lounge at the start of shift, and then isolated to her room for the rest of evening. When in the milieu, pt did not socialize with peers or staff. Affect blunted. Appears tense at times. Less irritable overall this evening. Pt remains disheveled and malodorous, and becomes more irritable when encouraged to attend to ADLs. Appetite good. She declined VS assessment this evening. Compliant with scheduled medications, and denies any side effects.

## 2021-05-16 NOTE — PLAN OF CARE
Patient presents as guarded, paranoid, and socially withdrawn.  She has been mostly isolative to her room this shift.  When visible in the milieu, she keeps to herself and is rarely seen interacting with staff or peers.  She is irritable and demanding on approach.  She appears physically tense.  She remains paranoid about the vital signs monitor, and she will only allow her blood pressure to be taken manually.  When RN writer accommodated this request- and found her blood pressure to be elevated at 162/86- Michelle became agitated, accused RN writer of lying and falsely reporting an elevated BP.  Her insight into her mental and physical health recovery remains very poor.  She angrily refused to cooperate with a temperature assessment or oxygen saturation check.  She refused to cooperate with a COVID swab.  She seems to be eating and drinking in adequate amounts this shift.  She denies any acute physical concerns.  She accepted all of her scheduled medications, and no adverse side effects have been reported or observed this shift.  She declined RN writer's offer of vital signs reassessment later in the shift.

## 2021-05-16 NOTE — PLAN OF CARE
Pt asleep at start of shift. Breathing quiet and unlabored.       Appears to have slept 4.75 hours.        Pt on ASSAULT,  CHEEKING, and ELOPEMENT precautions in addition to single room order. Any related events noted above.     Will continue to monitor and assess.   Problem: Sleep Disturbance (Psychotic Signs/Symptoms)  Goal: Improved Sleep (Psychotic Signs/Symptoms)  Outcome: Improving

## 2021-05-17 PROCEDURE — 250N000013 HC RX MED GY IP 250 OP 250 PS 637: Performed by: PHYSICIAN ASSISTANT

## 2021-05-17 PROCEDURE — 99232 SBSQ HOSP IP/OBS MODERATE 35: CPT | Performed by: PSYCHIATRY & NEUROLOGY

## 2021-05-17 PROCEDURE — 250N000013 HC RX MED GY IP 250 OP 250 PS 637: Performed by: PSYCHIATRY & NEUROLOGY

## 2021-05-17 PROCEDURE — 124N000002 HC R&B MH UMMC

## 2021-05-17 RX ADMIN — LORAZEPAM 1 MG: 1 TABLET ORAL at 09:00

## 2021-05-17 RX ADMIN — TRIHEXYPHENIDYL HYDROCHLORIDE 2 MG: 2 TABLET ORAL at 09:00

## 2021-05-17 RX ADMIN — METOPROLOL SUCCINATE 50 MG: 50 TABLET, EXTENDED RELEASE ORAL at 09:01

## 2021-05-17 RX ADMIN — OLANZAPINE 10 MG: 10 TABLET, ORALLY DISINTEGRATING ORAL at 09:00

## 2021-05-17 RX ADMIN — LORAZEPAM 1 MG: 1 TABLET ORAL at 14:12

## 2021-05-17 RX ADMIN — OLANZAPINE 10 MG: 10 TABLET, ORALLY DISINTEGRATING ORAL at 19:02

## 2021-05-17 RX ADMIN — LOSARTAN POTASSIUM 100 MG: 100 TABLET, FILM COATED ORAL at 09:00

## 2021-05-17 RX ADMIN — TRIHEXYPHENIDYL HYDROCHLORIDE 2 MG: 2 TABLET ORAL at 19:02

## 2021-05-17 RX ADMIN — LORAZEPAM 1 MG: 1 TABLET ORAL at 19:02

## 2021-05-17 ASSESSMENT — ACTIVITIES OF DAILY LIVING (ADL)
HYGIENE/GROOMING: PROMPTS
LAUNDRY: UNABLE TO COMPLETE
DRESS: SCRUBS (BEHAVIORAL HEALTH)
HYGIENE/GROOMING: PROMPTS
ORAL_HYGIENE: PROMPTS
DRESS: SCRUBS (BEHAVIORAL HEALTH)
ORAL_HYGIENE: PROMPTS

## 2021-05-17 NOTE — PLAN OF CARE
Patient presents as guarded, paranoid, irritable and socially withdrawn.  She continues to isolate in her room.  Her sleep hygiene is poor, and she has been demonstrating a pattern of napping during the day with poor sleep at night.  She remains hostile and demanding on approach.  She appears physically tense.  She remains paranoid about the vital signs monitor, but she will cooperate with manual BP checks.  Her insight into her mental and physical health recovery remains very poor.  She refused to cooperate with a temperature assessment or oxygen saturation check.  She will not cooperate with a COVID swab.  She is eating and drinking in adequate amounts.  She denies any acute physical concerns at this time.  She accepted all of her scheduled medications, and no adverse side effects have been reported or observed this shift.

## 2021-05-17 NOTE — PLAN OF CARE
Writer left  for Kourtney Rizo (patient's ACT Team Nurse/CM), requesting to set up a care conference to discuss patient being referred to a higher level of care in the community. Writer requested Wednesday at 1 PM in the VM, but stated Thursday could work as well.

## 2021-05-17 NOTE — PLAN OF CARE
Pt asleep at start of shift. Breathing quiet and unlabored.     Appears to have slept 2.75 hours.     Pt on  ASSAULT, CHEEKING, and ELOPEMENT precautions in addition to single room order. Any related events noted above.     Will continue to monitor and assess.     Problem: Sleep Disturbance (Psychotic Signs/Symptoms)  Goal: Improved Sleep (Psychotic Signs/Symptoms)  Outcome: Declining

## 2021-05-17 NOTE — PLAN OF CARE
"  Problem: Behavior Regulation Impairment (Psychotic Signs/Symptoms)  Goal: Improved Behavioral Control (Psychotic Signs/Symptoms)  Outcome: No Change     Patient spent most of the shift in her room.  She has a pallet on the floor.  Her mattress is up and against the window; TV is on.  Patient is approached for check in.  Patient fidelina said, \"Where have you been? I came looking for you twice to take my medication.\" Patient is given the choice to come to the med window for medication administration or for her meds to be brought to her.  Patient agrees to come to the window and accept her medication.  Patient is asked to confirm swallowing of her meds per MD order.  Patient fidelina agreed to open her mouth and quickly walked away.  Patient refused to answer questions for a complete assessment this shift.  Patient has not exhibited any unsafe behaviors, however, the patient is irritable.  Patient is compliant with meds.  Continue with plan of care.   "

## 2021-05-17 NOTE — PLAN OF CARE
Current Diagnosis/Procedure:  Schizoaffective Disorder, Bipolar Type     Work Completed:    Patient was discussed in treatment team today and met with provider and CTC. Team agreed to set up a care conference with patient's ACT Team later in the week to discuss whether or not patient will continue working with ACT Team once she has stabilized. ACT Team has expressed concerns that she may need a higher level of care.Patient's son Emelia left  with writer earlier today ([phone]), writer will call back tomorrow to discuss patient's current symptoms.     Discharge plan or goal:  Patient will either discharge to her apartment with ACT team in place, or a group home may be looked into.       Barriers to discharge:  Continued need for stabilization of severity of illness and medication. Patient has been committed as MI w/ [facility] through [facility]. A court hearing for Tiara velez has been set up for 5/21 @ 11:15 AM.

## 2021-05-17 NOTE — PROGRESS NOTES
"[facility], [place]   Psychiatric Progress Note  Hospital Day: 33        Interim History:   The patient's care was discussed with the treatment team during the daily team meeting and/or staff's chart notes were reviewed.  Staff report patient is medication adherent. She is irritable, paranoid, and guarded. She is not attending to ADLs.     Upon interview, Michelle denies all psychiatric problems. She said \"I am not mentally ill.\" She said that she is not willing to work with ACT team because she is not on a commitment. When I informed her that she is in fact on a commitment, she replied \"Rubbish! Liar!\" She denied acute physical concerns. She denies problems with her eyes. She denies need for hospitalization. She was agitated and terminated interview.          Medications:       LORazepam  1 mg Oral TID    Or     LORazepam  1 mg Intramuscular TID     losartan  100 mg Oral Daily     metoprolol succinate ER  50 mg Oral Daily     OLANZapine zydis  10 mg Oral BID    Or     OLANZapine  10 mg Intramuscular BID     polyethylene glycol  17 g Oral Daily     trihexyphenidyl  2 mg Oral BID          Allergies:     Allergies   Allergen Reactions     Haldol [Haloperidol]      Patient previously tolerated haldol, though developed oculogyric crises during hospital stay on 4/26/21 on total daily dose of 10 mg.     Lisinopril Cough          Labs:   No results found for this or any previous visit (from the past 24 hour(s)).       Psychiatric Examination:     BP (!) 142/82 (BP Location: Left arm)   Pulse 88   Temp 98.1  F (36.7  C) (Oral)   Resp 16   SpO2 98%   Weight is 0 lbs 0 oz  There is no height or weight on file to calculate BMI.    Weight over time:  Vitals:       Orthostatic Vitals     None            Cardiometabolic risk assessment. 04/15/21      Reviewed patient profile for cardiometabolic risk factors    Date taken /Value  REFERENCE RANGE   Abdominal Obesity  (Waist Circumference)   See nursing " flowsheet Women ?35 in (88 cm)   Men ?40 in (102 cm)      Triglycerides  Triglycerides   Date Value Ref Range Status   10/19/2019 117 <150 mg/dL Final       ?150 mg/dL (1.7 mmol/L) or current treatment for elevated triglycerides   HDL cholesterol  HDL Cholesterol   Date Value Ref Range Status   10/19/2019 56 >49 mg/dL Final   ]   Women <50 mg/dL (1.3 mmol/L) in women or current treatment for low HDL cholesterol  Men <40 mg/dL (1 mmol/L) in men or current treatment for low HDL cholesterol     Fasting plasma glucose (FPG) Lab Results   Component Value Date     10/19/2019      FPG ?100 mg/dL (5.6 mmol/L) or treatment for elevated blood glucose   Blood pressure  BP Readings from Last 3 Encounters:   05/17/21 (!) 142/82   06/04/19 131/71   04/26/19 164/86    Blood pressure ?130/85 mmHg or treatment for elevated blood pressure   Family History  See family history     Appearance: awake, alert and disheveled   Attitude: Cooperative  Eye Contact:  Intense. Normal gaze during exam today  Mood: iritable  Affect:  Mood congruent  Speech: strong accent,   Language: fluent and intact in English  Psychomotor, Gait, Musculoskeletal: No stereotypic movements noted on examination today. No evidence of tardive dyskinesia, dystonia, or tics. No physical agitation.   Throught Process:  Linear, goal oriented.  Associations:  No loosening of associations  Thought Content:  no evidence of suicidal ideation or homicidal ideation, evidence of paranoia  Insight:  limited  Judgement:  limited  Oriented to:  Person, place, time  Attention Span and Concentration: fair, improved  Recent and Remote Memory:  Fair, improved  Fund of Knowledge:  normal    Clinical Global Impressions  First:  Considering your total clinical experience with this particular patient population, how severe are the patient's symptoms at this time?: 7 (04/16/21 9588)  Compared to the patient's condition at the START of treatment, this patient's condition is: 4  (04/16/21 3802)  Most recent:  Considering your total clinical experience with this particular patient population, how severe are the patient's symptoms at this time?: 7 (05/13/21 0953)  Compared to the patient's condition at the START of treatment, this patient's condition is: 6 (05/13/21 0953)                  Precautions:     Behavioral Orders   Procedures     Assault precautions     Cheeking Precautions (behavioral units)     Patient Observed swallowing PO medications; Patient asked to drink water after swallowing medication; Patient in Staff line of sight for 15 minutes after medication given; Mouth checks after PO administration (patient asked to open mouth and stick out their tongue).     Code 1 - Restrict to Unit     Elopement precautions     Routine Programming     As clinically indicated     Status 15     Every 15 minutes.          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 Symptoms and presentation at this time is most consistent with Schizoaffective Disorder, Bipolar Type. We have obtained most recent medication regimen from patient's ACT team, and regimen was initially restarted. Inpatient psychiatric hospitalization is warranted at this time for safety, stabilization, and possible adjustment in medications. Pt is committed. We have petitioned for Mc[facility] Calderon due to lack of improvement and side effects from medications.    Hospital Course:  On admission, PTA medications were restarted. However, patient had been declining all scheduled  "medications despite significant encouragement from staff and provider. Psychiatric emergency declared on 4/20 due to aggression toward others in context of severe psychosis and suspected excited catatonia. Ativan 1 mg TID was also added. Discontinued PTA Invega, Zyprexa, and thorazine on 4/20 due to consistent refusal.     On 4/26, it was noted that patient frequently had upward gaze while walking up and down the unit. She did not appear to be distressed. She reported that she was looking at \"my god.\" It was determined to be oculogyric crisis secondary to IM haloperidol. Haldol was subsequently discontinued during day shift on 4/26 and scheduled Zyprexa was initiated on emergency basis. Oral Cogentin was also scheduled, though patient declined. On the evening of 4/26, patient's gaze was fixed in upward position for several hours and she appeared to be experiencing discomfort. IM Cogentin was administered with noted resolution. Partial improvements weew observed after switch to scheduled Zyprexa. After patient improved, she was more receptive to reinitiating oral Invega, which was initiated on 5/3 and titrated to PTA dose of 9 mg daily on 5/10. Plan was for ACT team to bring in loading dose of Invega Sustenna. Patient had signs of oculogyric crisis again with Invega. Oral dose decreased to 6 mg after this. Invega was stopped on 5/14 due to ongoing signs of problems related to eye movement.    Overall improvement in patient's agitation and suspected catatonia noted on 4/30 after patient accepted two doses of Ativan. She began declining Ativan again with noted decompensation. Patient now accepting, however, she does not have decisional making capacity at this time. She does not believe she has a mental illness, including catatonia. She does not fully understand risks associated with inadequate treatment of catatonia. Discussed with her son who is acting as surrogate decision maker and he is in full support of forced " scheduled IM Ativan if patient declines oral formulation. Also consulted with our legal team.     Target psychiatric symptoms and interventions:  - Discontinued Invega due to ongoing eye issues.   - Oral Zyprexa 10 mg BID OR Zyprexa 10 mg IM. Hatch now in place.  - Ativan 1 mg TID OR Ativan IM 1 mg TID (see above). Patient may not decline.     - Because patient was only intermittently accepting scheduled Ativan and some symptoms of catatonia are re-emerging, pursued [facility] Kohler for ECT. Awaiting outcome.     Continue Cogentin 2 mg IM daily prn for evidence acute dystonic reaction or oculogyric crisis  Continue Artane 2 mg BID prn - patient often refusing this  Continue hydroxyzine 25-50 mg q4h prn for acute anxiety  Continue Trazodone 50 mg at bedtime prn for sleep disturbances  Continue Zyprexa 10 mg TID prn for severe agitation  Continue Ativan/Benadryl q4h prn for agitation. WOULD GIVE PRN ATIVAN FIRST FOR AGITATION.      Acute Medical Problems and Treatments:  HTN:  - IM consulted formally on 5/6.  - Metoprolol succinate ER 50 mg daily: Modified to 25 mg BID as patient has been non-adherent.    - Cozaar 100 mg daily  - Please see note from IM dated 5/3/21 and 5/6/21.     CVA:  - Aspirin 81 mg daily     Chronic Constipation:  - Miralax 17 mg daily     Routine labs have been ordered, including CMP, CBC with differential, TSH, lipid profile.  Routine urine drug screen also ordered. Patient currently declining blood draw. She is also declining vitals.     Behavioral/Psychological/Social:  - Encourage unit programming    Safety:  - Continue precautions as noted above  - Status 15 minute checks  - Safety precautions include: assault and elopement precautions  - Continue precautions as noted above  - Status 15 minute checks  - Discontinued 1:1 on 4/26 as presence of 1:1 staff appears to be worsening patient's paranoia and agitation.     Legal Status: Committed as MI with Hatch in place for Haldol, Zyprexa, Invega,  and Thorazine through [facility]. Filed Alcides Kohler through [facility] and awaiting outcome.     Disposition Plan   Reason for ongoing admission: poses an imminent risk to self, poses an imminent risk to others and is unable to care for self due to severe psychosis or tad  Discharge location: Home with ACT team support. Possibly home within one week with ACT team supports if ongoing improvement noted.   Discharge Medications: not ordered  Follow-up Appointments: not scheduled    Entered by: Ida Zaman on 5/17/2021 at 5:23 PM      > 25 minutes total time that was spent and over 50% of this time was spent in counseling and coordination of care.

## 2021-05-18 PROCEDURE — 124N000002 HC R&B MH UMMC

## 2021-05-18 PROCEDURE — 250N000013 HC RX MED GY IP 250 OP 250 PS 637: Performed by: PSYCHIATRY & NEUROLOGY

## 2021-05-18 PROCEDURE — 99232 SBSQ HOSP IP/OBS MODERATE 35: CPT | Performed by: PSYCHIATRY & NEUROLOGY

## 2021-05-18 RX ADMIN — OLANZAPINE 10 MG: 10 TABLET, ORALLY DISINTEGRATING ORAL at 09:24

## 2021-05-18 RX ADMIN — LORAZEPAM 1 MG: 1 TABLET ORAL at 09:25

## 2021-05-18 RX ADMIN — TRIHEXYPHENIDYL HYDROCHLORIDE 2 MG: 2 TABLET ORAL at 09:25

## 2021-05-18 RX ADMIN — LORAZEPAM 1 MG: 1 TABLET ORAL at 19:55

## 2021-05-18 RX ADMIN — LORAZEPAM 1 MG: 1 TABLET ORAL at 16:02

## 2021-05-18 RX ADMIN — OLANZAPINE 10 MG: 10 TABLET, ORALLY DISINTEGRATING ORAL at 19:55

## 2021-05-18 RX ADMIN — TRIHEXYPHENIDYL HYDROCHLORIDE 2 MG: 2 TABLET ORAL at 19:56

## 2021-05-18 ASSESSMENT — ACTIVITIES OF DAILY LIVING (ADL)
ORAL_HYGIENE: PROMPTS
LAUNDRY: UNABLE TO COMPLETE
HYGIENE/GROOMING: PROMPTS
DRESS: SCRUBS (BEHAVIORAL HEALTH)
ORAL_HYGIENE: PROMPTS
LAUNDRY: UNABLE TO COMPLETE
DRESS: SCRUBS (BEHAVIORAL HEALTH)
HYGIENE/GROOMING: PROMPTS

## 2021-05-18 NOTE — PROGRESS NOTES
"[facility], [place]   Psychiatric Progress Note  Hospital Day: 34        Interim History:   The patient's care was discussed with the treatment team during the daily team meeting and/or staff's chart notes were reviewed.  Staff report patient is medication adherent. She is irritable, paranoid, and guarded.  Isolative to her room.  She is not attending to ADLs.  Appears to be responding to internal stimuli.    Upon interview, Michelle was sitting in the lounge by herself eating lunch.  When asked if I could join her, she pointed at the chair and writer sat down across from her.  For the remainder of our interaction, the patient stared at this writer intensely.  She did not respond to any questions.  Later in the afternoon, the patient approached me in the hallway and directed me to her room.  She started her room and again stared at this writer for several seconds and did not respond to any questions.  When writer attempted to terminate the interview due to her lack of participation, the patient aggressively pointed at this writer while saying \"I never want to see you again!  I never want to see you again!  I never want to see you again!\"  She then said \"leave now!  Leave now!\"         Medications:       LORazepam  1 mg Oral TID    Or     LORazepam  1 mg Intramuscular TID     losartan  100 mg Oral Daily     metoprolol succinate ER  50 mg Oral Daily     OLANZapine zydis  10 mg Oral BID    Or     OLANZapine  10 mg Intramuscular BID     polyethylene glycol  17 g Oral Daily     trihexyphenidyl  2 mg Oral BID          Allergies:     Allergies   Allergen Reactions     Haldol [Haloperidol]      Patient previously tolerated haldol, though developed oculogyric crises during hospital stay on 4/26/21 on total daily dose of 10 mg.     Lisinopril Cough          Labs:   No results found for this or any previous visit (from the past 24 hour(s)).       Psychiatric Examination:     BP (!) 145/85   Pulse 66  "  Temp 98.2  F (36.8  C)   Resp 16   SpO2 98%   Weight is 0 lbs 0 oz  There is no height or weight on file to calculate BMI.    Weight over time:  Vitals:       Orthostatic Vitals     None            Cardiometabolic risk assessment. 04/15/21      Reviewed patient profile for cardiometabolic risk factors    Date taken /Value  REFERENCE RANGE   Abdominal Obesity  (Waist Circumference)   See nursing flowsheet Women ?35 in (88 cm)   Men ?40 in (102 cm)      Triglycerides  Triglycerides   Date Value Ref Range Status   10/19/2019 117 <150 mg/dL Final       ?150 mg/dL (1.7 mmol/L) or current treatment for elevated triglycerides   HDL cholesterol  HDL Cholesterol   Date Value Ref Range Status   10/19/2019 56 >49 mg/dL Final   ]   Women <50 mg/dL (1.3 mmol/L) in women or current treatment for low HDL cholesterol  Men <40 mg/dL (1 mmol/L) in men or current treatment for low HDL cholesterol     Fasting plasma glucose (FPG) Lab Results   Component Value Date     10/19/2019      FPG ?100 mg/dL (5.6 mmol/L) or treatment for elevated blood glucose   Blood pressure  BP Readings from Last 3 Encounters:   05/17/21 (!) 145/85   06/04/19 131/71   04/26/19 164/86    Blood pressure ?130/85 mmHg or treatment for elevated blood pressure   Family History  See family history     Appearance: awake, alert and disheveled   Attitude: Cooperative  Eye Contact:  Intense. Normal gaze during exam today  Mood: Very irritable  Affect:  Mood congruent  Speech: strong accent, raised volume, selectively mute  Language: fluent and intact in English  Psychomotor, Gait, Musculoskeletal: No stereotypic movements noted on examination today. No evidence of tardive dyskinesia, dystonia, or tics. No physical agitation.   Throught Process:  Linear, goal oriented.  Associations:  No loosening of associations  Thought Content:  no evidence of suicidal ideation or homicidal ideation, evidence of paranoia  Insight:  limited  Judgement:  limited  Oriented to:   Person, place, time  Attention Span and Concentration: fair, improved  Recent and Remote Memory:  Fair, improved  Fund of Knowledge:  normal    Clinical Global Impressions  First:  Considering your total clinical experience with this particular patient population, how severe are the patient's symptoms at this time?: 7 (04/16/21 1428)  Compared to the patient's condition at the START of treatment, this patient's condition is: 4 (04/16/21 1428)  Most recent:  Considering your total clinical experience with this particular patient population, how severe are the patient's symptoms at this time?: 7 (05/13/21 0953)  Compared to the patient's condition at the START of treatment, this patient's condition is: 6 (05/13/21 0953)                  Precautions:     Behavioral Orders   Procedures     Assault precautions     Cheeking Precautions (behavioral units)     Patient Observed swallowing PO medications; Patient asked to drink water after swallowing medication; Patient in Staff line of sight for 15 minutes after medication given; Mouth checks after PO administration (patient asked to open mouth and stick out their tongue).     Code 1 - Restrict to Unit     Elopement precautions     Routine Programming     As clinically indicated     Status 15     Every 15 minutes.          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 Symptoms and presentation at this time is most consistent with Schizoaffective Disorder, Bipolar Type. We have obtained most recent medication regimen  "from patient's ACT team, and regimen was initially restarted. Inpatient psychiatric hospitalization is warranted at this time for safety, stabilization, and possible adjustment in medications. Pt is committed. We have petitioned for Alcides Calderon due to lack of improvement and side effects from medications.    Hospital Course:  On admission, PTA medications were restarted. However, patient had been declining all scheduled medications despite significant encouragement from staff and provider. Psychiatric emergency declared on 4/20 due to aggression toward others in context of severe psychosis and suspected excited catatonia. Ativan 1 mg TID was also added. Discontinued PTA Invega, Zyprexa, and thorazine on 4/20 due to consistent refusal.     On 4/26, it was noted that patient frequently had upward gaze while walking up and down the unit. She did not appear to be distressed. She reported that she was looking at \"my god.\" It was determined to be oculogyric crisis secondary to IM haloperidol. Haldol was subsequently discontinued during day shift on 4/26 and scheduled Zyprexa was initiated on emergency basis. Oral Cogentin was also scheduled, though patient declined. On the evening of 4/26, patient's gaze was fixed in upward position for several hours and she appeared to be experiencing discomfort. IM Cogentin was administered with noted resolution. Partial improvements weew observed after switch to scheduled Zyprexa. After patient improved, she was more receptive to reinitiating oral Invega, which was initiated on 5/3 and titrated to PTA dose of 9 mg daily on 5/10. Plan was for ACT team to bring in loading dose of Invega Sustenna. Patient had signs of oculogyric crisis again with Invega. Oral dose decreased to 6 mg after this. Invega was stopped on 5/14 due to ongoing signs of problems related to eye movement.    Overall improvement in patient's agitation and suspected catatonia noted on 4/30 after patient accepted two " doses of Ativan. She began declining Ativan again with noted decompensation. Patient now accepting, however, she does not have decisional making capacity at this time. She does not believe she has a mental illness, including catatonia. She does not fully understand risks associated with inadequate treatment of catatonia. Discussed with her son who is acting as surrogate decision maker and he is in full support of forced scheduled IM Ativan if patient declines oral formulation. Also consulted with our legal team.     Target psychiatric symptoms and interventions:  - Discontinued Invega due to ongoing eye issues.   - Oral Zyprexa 10 mg BID OR Zyprexa 10 mg IM. Hatch now in place.  - Ativan 1 mg TID OR Ativan IM 1 mg TID (see above). Patient may not decline.     - Because patient was only intermittently accepting scheduled Ativan and some symptoms of catatonia are re-emerging, pursued [facility]ler for ECT. Awaiting outcome.     Continue Cogentin 2 mg IM daily prn for evidence acute dystonic reaction or oculogyric crisis  Continue Artane 2 mg BID prn - patient often refusing this  Continue hydroxyzine 25-50 mg q4h prn for acute anxiety  Continue Trazodone 50 mg at bedtime prn for sleep disturbances  Continue Zyprexa 10 mg TID prn for severe agitation  Continue Ativan/Benadryl q4h prn for agitation. WOULD GIVE PRN ATIVAN FIRST FOR AGITATION.      Acute Medical Problems and Treatments:  HTN:  - IM consulted formally on 5/6.  - Metoprolol succinate ER 50 mg daily: Modified to 25 mg BID as patient has been non-adherent.    - Cozaar 100 mg daily  - Please see note from IM dated 5/3/21 and 5/6/21.     CVA:  - Aspirin 81 mg daily     Chronic Constipation:  - Miralax 17 mg daily     Routine labs have been ordered, including CMP, CBC with differential, TSH, lipid profile.  Routine urine drug screen also ordered. Patient currently declining blood draw. She is also declining vitals.     Behavioral/Psychological/Social:  -  Encourage unit programming    Safety:  - Continue precautions as noted above  - Status 15 minute checks  - Safety precautions include: assault and elopement precautions  - Continue precautions as noted above  - Status 15 minute checks  - Discontinued 1:1 on 4/26 as presence of 1:1 staff appears to be worsening patient's paranoia and agitation.     Legal Status: Committed as MI with Hatch in place for Haldol, Zyprexa, Invega, and Thorazine through [facility]. Filed Mcgrath Edita through [facility] and awaiting outcome.     Disposition Plan   Reason for ongoing admission: poses an imminent risk to self, poses an imminent risk to others and is unable to care for self due to severe psychosis or tad  Discharge location: Home with ACT team support. Possibly home within one week with ACT team supports if ongoing improvement noted.  Meeting will be held with a ACT team this week  Discharge Medications: not ordered  Follow-up Appointments: not scheduled    Entered by: Ida Zaman on 5/18/2021 at 3:23 PM      > 25 minutes total time that was spent and over 50% of this time was spent in counseling and coordination of care.

## 2021-05-18 NOTE — PLAN OF CARE
Problem: Behavior Regulation Impairment (Psychotic Signs/Symptoms)  Goal: Improved Behavioral Control (Psychotic Signs/Symptoms)  Outcome: No Change    Patient isolated to her room for the majority of the shift, but did eat supper in the lounge.  She presents as flat and irritable.  Guarded to questions.  She declined a formal 1:1, but shook her head  yes  when asked if she would be safe.  Denied SI/AH.  She is malodorous and disheveled.  She is slow moving, but has a steady gait.  No yelling at writer.   Nodded in agreement when asked if she was doing better.     Patient was compliant with her medications.  No attempts at cheeking.  She denied acute medical concerns.  BP is 145/85.

## 2021-05-18 NOTE — PLAN OF CARE
Writer spoke with Bartolo Orin ([phone]), the  through [facility]. Writer provided an update on the patient for Bartolo.     Writer spoke with Marcie from [facility] in order to get information on initiating guardianship for patient's son Emelia. Mracie emailed writer information on the process which  forwarded to Emelia.

## 2021-05-18 NOTE — PROGRESS NOTES
Current Diagnosis/Procedure:  Schizoaffective Disorder, Bipolar Type     Work Completed:    Patient was discussed in team and notes were reviewed. Patient remains isolative, is convinced that she is not on a commitment and expresses that she is only taking medications as a means to leave the hospital. Writer spoke with patient's son Emelia today and provided him with an update on patient. Writer explained that the hospital team is attempting to set up a care conference with patient's ACT team with the intent of determining patients discharge plan. ACT team has voiced concerns that she will need a higher level of care, and that it may be dangerous for her to return to independent living. Patient's [facility] hearing is on 5/21, provider has still not decided whether or not ECT will be appropriate. It is likely patient will be referred to a long term living facility. Writer also encouraged Emelia to pursue guardianship over the patient. Writer will assist patient in starting that process.      Discharge plan or goal:  No discharge plans at present time.       Barriers to discharge:  Continued need for stabilization of severity of illness and medication management. Patient has [facility] hearing on Friday 5/21.

## 2021-05-18 NOTE — PLAN OF CARE
Pt slept 3.75 hours without issue. Pt remains on Assault, Elopement and Cheeking precautions. Pt remains on 15 minute checks. Will continue to monitor.

## 2021-05-18 NOTE — PLAN OF CARE
Pt spent the majority of her day sitting in the corner of her room. Pt ate her meals in the lounge area; however, she was isolative to self. Pt's affect was flat and mood appeared irritable. Pt refused VS and therefore she was not able to receive her losartan or metoprolol morning medications. Pt did not appear to be cheeking the medications she agreed to take. Pt refused a shower and a 1:1 check in. Pt appeared as though she may be RIS. Pt denied SI.  Pt's son called and was given an update on the pt and then the pt refused to talk on the phone to her son. Pt denied pain.

## 2021-05-19 PROCEDURE — 124N000002 HC R&B MH UMMC

## 2021-05-19 PROCEDURE — 250N000013 HC RX MED GY IP 250 OP 250 PS 637: Performed by: PSYCHIATRY & NEUROLOGY

## 2021-05-19 PROCEDURE — 99233 SBSQ HOSP IP/OBS HIGH 50: CPT | Performed by: PSYCHIATRY & NEUROLOGY

## 2021-05-19 RX ORDER — LORAZEPAM 2 MG/1
2 TABLET ORAL 3 TIMES DAILY
Status: DISCONTINUED | OUTPATIENT
Start: 2021-05-19 | End: 2021-06-05

## 2021-05-19 RX ORDER — LORAZEPAM 2 MG/ML
2 INJECTION INTRAMUSCULAR 3 TIMES DAILY
Status: DISCONTINUED | OUTPATIENT
Start: 2021-05-19 | End: 2021-06-05

## 2021-05-19 RX ADMIN — OLANZAPINE 10 MG: 10 TABLET, ORALLY DISINTEGRATING ORAL at 08:52

## 2021-05-19 RX ADMIN — TRIHEXYPHENIDYL HYDROCHLORIDE 2 MG: 2 TABLET ORAL at 08:52

## 2021-05-19 RX ADMIN — LORAZEPAM 1 MG: 1 TABLET ORAL at 08:52

## 2021-05-19 RX ADMIN — OLANZAPINE 10 MG: 10 TABLET, ORALLY DISINTEGRATING ORAL at 19:09

## 2021-05-19 RX ADMIN — LORAZEPAM 2 MG: 2 TABLET ORAL at 19:09

## 2021-05-19 RX ADMIN — LORAZEPAM 2 MG: 2 TABLET ORAL at 13:58

## 2021-05-19 ASSESSMENT — ACTIVITIES OF DAILY LIVING (ADL)
LAUNDRY: UNABLE TO COMPLETE
ORAL_HYGIENE: PROMPTS
DRESS: SCRUBS (BEHAVIORAL HEALTH)
DRESS: SCRUBS (BEHAVIORAL HEALTH)
ORAL_HYGIENE: PROMPTS
HYGIENE/GROOMING: PROMPTS
LAUNDRY: UNABLE TO COMPLETE
HYGIENE/GROOMING: PROMPTS
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
## 2021-05-19 NOTE — PLAN OF CARE
Problem: Sleep Disturbance (Psychotic Signs/Symptoms)  Goal: Improved Sleep (Psychotic Signs/Symptoms)  Outcome: Declining      Pt slept for 1.5 hours this shift. Pt was in her room, laying on the floor on a bed sheet (Pt never sleeps in bed or on the mattress). Pt was awake most of the night, chanting, at some point walking around her room, eating an orange, but was not disruptive. Report from previous shift indicated that Pt took a nap which may have contributed to the sleepless night. No PRN was administered this shift.    Pt slept for 1.5 hours.

## 2021-05-19 NOTE — PLAN OF CARE
Problem: Mood Impairment (Psychotic Signs/Symptoms)  Goal: Improved Mood Symptoms (Psychotic Signs/Symptoms)  Outcome: No Change    Patient spent some time in the lounge but was mostly isolative to her room.  She appears flat, but becomes irritable on approach.  She denied all mental health concerns.  Contracts to be safe.  She continually states she is ready to discharge.     She is adamant that she does not have  high blood pressure  and refuses all BP checks, including with a manual cuff.  She is paranoid that  staff change the number, to give me high blood pressure .  She was reminded that she had a stroke previously, to which patient replied she can no longer have one, because she already had a stroke.  She refused all physical health medications.  Patient states  The doctor is talking rubbish! .  Patient does not believe staff when she is reminded, she is committed.  Extremely distrusting of others.                                     Patient took select medications.  She had no attempts at cheeking.  She denied acute physical concerns and pain.  She refused to shower - she is malodorous and disheveled.       Patient accepted her newly increased dose of Ativan at 1400.  She remembered that she discussed the increase with her provider.  No agitation with taking the new dose

## 2021-05-19 NOTE — PLAN OF CARE
"  Problem: Adult Inpatient Plan of Care  Goal: Plan of Care Review  Outcome: No Change  Flowsheets (Taken 5/18/2021 2052)  Plan of Care Reviewed With: patient     Patient isolative to room all evening only coming out into the milieu to retreive meals and medications upon prompting. Patient upon approach tense and blunted in affect. Patient very brief during verbal assessment answering repetitively \"No Im okay\" and denying any symptoms or attempts by staff to prompt for activity and ADL's. Patient demonstrating paranoia refusing to accept water from RN writer insisting she gets her own. Patient accepting of HS medications with no stated or observed side effects. Patient refused attempts by staff and RN writer to assess vital signs, continuously responding \"no Im Okay\". Patient ADL's appear disheveled with patient refusing prompting for ADL's.   "
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
## 2021-05-19 NOTE — PLAN OF CARE
"  Problem: OT General Care Plan  Goal: OT Goal 1  Description: OT Goal 1  Outcome: No Change     Pt was sitting in the lounge, no other patients present.  Writer asked  if she would like to do an activity alone, or together (and gave her ideas of options available), and pt angrily snapped back \"I am doing an activity, I am sitting here!\"  "

## 2021-05-20 PROCEDURE — 124N000002 HC R&B MH UMMC

## 2021-05-20 PROCEDURE — 99233 SBSQ HOSP IP/OBS HIGH 50: CPT | Performed by: PSYCHIATRY & NEUROLOGY

## 2021-05-20 PROCEDURE — 250N000013 HC RX MED GY IP 250 OP 250 PS 637: Performed by: PSYCHIATRY & NEUROLOGY

## 2021-05-20 RX ADMIN — LORAZEPAM 2 MG: 2 TABLET ORAL at 19:15

## 2021-05-20 RX ADMIN — OLANZAPINE 10 MG: 10 TABLET, ORALLY DISINTEGRATING ORAL at 08:49

## 2021-05-20 RX ADMIN — LORAZEPAM 2 MG: 2 TABLET ORAL at 14:18

## 2021-05-20 RX ADMIN — LORAZEPAM 2 MG: 2 TABLET ORAL at 08:49

## 2021-05-20 RX ADMIN — OLANZAPINE 10 MG: 10 TABLET, ORALLY DISINTEGRATING ORAL at 19:15

## 2021-05-20 ASSESSMENT — ACTIVITIES OF DAILY LIVING (ADL)
HYGIENE/GROOMING: PROMPTS
DRESS: SCRUBS (BEHAVIORAL HEALTH)
HYGIENE/GROOMING: PROMPTS
ORAL_HYGIENE: PROMPTS
LAUNDRY: UNABLE TO COMPLETE
DRESS: SCRUBS (BEHAVIORAL HEALTH)
ORAL_HYGIENE: PROMPTS

## 2021-05-20 NOTE — PLAN OF CARE
Problem: Sleep Disturbance (Psychotic Signs/Symptoms)  Goal: Improved Sleep (Psychotic Signs/Symptoms)  Outcome: Improving     Pt slept for 6.5 hours, which is an improvement from previous days. No concerns noted. No PRN administered this shift.

## 2021-05-20 NOTE — PLAN OF CARE
Problem: Behavior Regulation Impairment (Psychotic Signs/Symptoms)  Goal: Improved Behavioral Control (Psychotic Signs/Symptoms)  Outcome: No Change    Patient was isolative to her room, sleeping for much of the shift on the ground of her room.  She presents as flat to irritable but was less tense then in the morning - and appeared improved from the morning.  She gives mostly one word or will not reply to questions asked of her.  Patient did allow for a longer extended interview while receiving her HS medications.  She denied all mental health concerns.  She states she is ready to discharge.  She was adamant she was not committed.  She again states she will note shower, unless she has her home soaps/lotions/etc.  States her son lives in [place], so no one can bring them to the hospital.       Patient was compliant with her Zydis and Ativan, refusing the Artane.   Patient did state  I don t want to be disrespectful, but I don t need the Artane .  She refused VS.

## 2021-05-20 NOTE — PLAN OF CARE
"Patient presents as paranoid, delusional, and disorganized with no appreciable insight into her mental and physical health recovery.  She has regressed and is no longer willing to cooperate with vital signs assessment; in addition, she is refusing most of her medication regimen- only accepting the Zyprexa and Ativan that are backed-up with IM injections.  She is in denial about having HTN, and therefore is unwilling to accept her antihypertensive medications.  She refused the Metoprolol and Losartan and was not receptive to patient education on the risks of untreated HTN- a significant concern given her past medical history of having a stroke in 2017.  She stated, \"I don't need it, I don't have high blood pressure!\".  She refused vital signs assessment when RN writer suggested that we could check her BP this morning to see where it is at.  Michelle refused her scheduled Artane as well.  She would not give a coherent reason for refusing this.  Michelle was mostly isolative to her room today.  She was irritable and dismissive on approach.  She reluctantly switched rooms when informed that we need her current room for a new patient and that this is not negotiable.  Michelle appears to be eating and drinking well at this point.  Her personal hygiene maintenance is quite poor, and she is malodorous.  She denies any acute physical concerns or medication SE's.  "

## 2021-05-20 NOTE — PLAN OF CARE
Assessment/Intervention/Current Symtoms and Care Coordination  -Schizoaffective Disorder, Bipolar Type  -Patient has Mcgrath-Kohler hearing on 5/21 due to resistance to neuroleptics medications.   -Patient remains symptomatic, refuses to believe she is mentally ill/has high blood pressure.   -Writer and provided completed care conference with patient's ACT Team psychiatrist Dr. Pedraza. He expressed support of current plan to initiate ECT and is hopeful that if she improves on ECT that she could potentially become a candidate for Clozapine and able to return to the community with ACT Team services in place. ACT Team also notified of patient's son (Emelia)'s intent to pursue guardianship.      Discharge Plan or Goal  -If patient responds positively to ECT, and eventually becomes accepting of Clozapine, she may be able to return to her apartment in the community with ACT Team in place.     Barriers to Discharge   - Current mental health status, refusal to comply with expectations of MI Commitment w/ Hatch, lack of appropriate discharge options.         Referral Status  -No active referrals at this point in time.        Legal Status  -Patient is committed/hatch'd through [facility]. Mcgrath-Kohler hearing is set for 5/21.

## 2021-05-20 NOTE — PROGRESS NOTES
"[facility], [place]   Psychiatric Progress Note  Hospital Day: 36        Interim History:   The patient's care was discussed with the treatment team during the daily team meeting and/or staff's chart notes were reviewed.  Staff report patient remains paranoid, irritable, guarded. She is primarily isolative to her room, frequently staring out of her window toward the hallway. Makes repetitive statements. She is not attending to ADLs. Has not showered since admission over one month ago. Pt adherent with psychotropic medications only. Refusing vital sign checks and BP medications. She was not receptive to patient education on the risks of untreated HTN- a significant concern given her past medical history of having a stroke in 2017. Eating and drinking well. Denies acute physical concerns and medication side effects.     Initially, patient was approached in the lounge. Writer attempted to engage her in conversation, though she stared at this writer and did not respond. I attempted to meet with her again on two different occasions in the afternoon, and she was sleeping on the floor in her room on both occasions. She would not respond to writer.     Meeting was held with ACT team today, including ACT team psychiatrist, Dr. Pedraza. He said that he is in \"full support\" of plan to pursue [facility] and ECT at this time. He does feel that in the past she has been discharged from the hospital while still quite symptomatic. He is hoping that ECT will be effective and that with improvement, Michelle would be more receptive to clozapine and weekly blood draws. He said that ideally she would transition to an IRTS before going back to her apartment, but also understands there may be some barriers (I.e. pt's willingness, financial concerns, etc).          Medications:       LORazepam  2 mg Oral TID    Or     LORazepam  2 mg Intramuscular TID     losartan  100 mg Oral Daily     metoprolol succinate ER  " 50 mg Oral Daily     OLANZapine zydis  10 mg Oral BID    Or     OLANZapine  10 mg Intramuscular BID     polyethylene glycol  17 g Oral Daily     trihexyphenidyl  2 mg Oral BID          Allergies:     Allergies   Allergen Reactions     Haldol [Haloperidol]      Patient previously tolerated haldol, though developed oculogyric crises during hospital stay on 4/26/21 on total daily dose of 10 mg.     Lisinopril Cough          Labs:   No results found for this or any previous visit (from the past 24 hour(s)).       Psychiatric Examination:     BP (!) 145/85   Pulse 66   Temp 98.2  F (36.8  C)   Resp 16   SpO2 98%   Weight is 0 lbs 0 oz  There is no height or weight on file to calculate BMI.    Weight over time:  Vitals:       Orthostatic Vitals     None            Cardiometabolic risk assessment. 04/15/21      Reviewed patient profile for cardiometabolic risk factors    Date taken /Value  REFERENCE RANGE   Abdominal Obesity  (Waist Circumference)   See nursing flowsheet Women ?35 in (88 cm)   Men ?40 in (102 cm)      Triglycerides  Triglycerides   Date Value Ref Range Status   10/19/2019 117 <150 mg/dL Final       ?150 mg/dL (1.7 mmol/L) or current treatment for elevated triglycerides   HDL cholesterol  HDL Cholesterol   Date Value Ref Range Status   10/19/2019 56 >49 mg/dL Final   ]   Women <50 mg/dL (1.3 mmol/L) in women or current treatment for low HDL cholesterol  Men <40 mg/dL (1 mmol/L) in men or current treatment for low HDL cholesterol     Fasting plasma glucose (FPG) Lab Results   Component Value Date     10/19/2019      FPG ?100 mg/dL (5.6 mmol/L) or treatment for elevated blood glucose   Blood pressure  BP Readings from Last 3 Encounters:   06/04/19 131/71   04/26/19 164/86    Blood pressure ?130/85 mmHg or treatment for elevated blood pressure   Family History  See family history     Appearance: awake, alert and disheveled   Attitude: Cooperative  Eye Contact:  Intense when awake and sitting in  lounge  Mood: Very irritable  Affect:  Mood congruent  Speech: unable to assess today  Language: fluent and intact in English  Psychomotor, Gait, Musculoskeletal: No stereotypic movements noted on examination today. No evidence of tardive dyskinesia, dystonia, or tics. No physical agitation.   Throught Process: unable to assess  Associations:  No loosening of associations  Thought Content:  no evidence of suicidal ideation or homicidal ideation, evidence of paranoia and guardedness  Insight:  limited  Judgement:  limited  Oriented to:  Person, place, time  Attention Span and Concentration: fair, improved  Recent and Remote Memory:  Fair, improved  Fund of Knowledge:  normal    Clinical Global Impressions  First:  Considering your total clinical experience with this particular patient population, how severe are the patient's symptoms at this time?: 7 (04/16/21 1428)  Compared to the patient's condition at the START of treatment, this patient's condition is: 4 (04/16/21 1428)  Most recent:  Considering your total clinical experience with this particular patient population, how severe are the patient's symptoms at this time?: 7 (05/13/21 0953)  Compared to the patient's condition at the START of treatment, this patient's condition is: 6 (05/13/21 0953)                  Precautions:     Behavioral Orders   Procedures     Assault precautions     Cheeking Precautions (behavioral units)     Patient Observed swallowing PO medications; Patient asked to drink water after swallowing medication; Patient in Staff line of sight for 15 minutes after medication given; Mouth checks after PO administration (patient asked to open mouth and stick out their tongue).     Code 1 - Restrict to Unit     Elopement precautions     Routine Programming     As clinically indicated     Status 15     Every 15 minutes.          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 Symptoms and presentation at this time is most consistent with Schizoaffective Disorder, Bipolar Type. We have obtained most recent medication regimen from patient's ACT team, and regimen was initially restarted. Inpatient psychiatric hospitalization is warranted at this time for safety, stabilization, and possible adjustment in medications. Pt is committed. We have petitioned for [facility] due to lack of improvement and side effects from medications.    Hospital Course:  On admission, PTA medications were restarted. However, patient had been declining all scheduled medications despite significant encouragement from staff and provider. Psychiatric emergency declared on 4/20 due to aggression toward others in context of severe psychosis and suspected excited catatonia. Ativan 1 mg TID was also added. Discontinued PTA Invega, Zyprexa, and thorazine on 4/20 due to consistent refusal.     On 4/26, it was noted that patient frequently had upward gaze while walking up and down the unit. She did not appear to be distressed. She reported that she was looking at \"my god.\" It was determined to be oculogyric crisis secondary to IM haloperidol. Haldol was subsequently discontinued during day shift on 4/26 and scheduled Zyprexa was initiated on emergency basis. Oral Cogentin was also scheduled, though patient declined. On the evening of 4/26, patient's gaze was fixed in upward position for several hours and she appeared to be experiencing discomfort. IM Cogentin was administered with noted resolution. Partial improvements weew observed after " "switch to scheduled Zyprexa. After patient improved, she was more receptive to reinitiating oral Invega, which was initiated on 5/3 and titrated to PTA dose of 9 mg daily on 5/10. Plan was for ACT team to bring in loading dose of Invega Sustenna. Patient had signs of oculogyric crisis again with Invega. Oral dose decreased to 6 mg after this. Invega was stopped on 5/14 due to ongoing signs of problems related to eye movement and concerns for oculogyric crisis.    Overall improvement in patient's agitation and suspected catatonia noted on 4/30 after patient accepted two doses of Ativan. She began declining Ativan again with noted decompensation. Patient now accepting, however, she does not have decisional making capacity at this time. She does not believe she has a mental illness, including catatonia. She does not fully understand risks associated with inadequate treatment of catatonia. Discussed with her son who is acting as surrogate decision maker and he is in full support of forced scheduled IM Ativan if patient declines oral formulation. Also consulted with our legal team.     Ativan was increased on 5/19 due to re-emerging evidence of catatonia. Meeting was held with ACT team on 5/20, including ACT team psychiatrist, Dr. Pedraza. He said that he is in \"full support\" of plan to pursue [facility] and ECT at this time. He does feel that in the past she has been discharged from the hospital while still quite symptomatic. He is hoping that ECT will be effective and that with improvement, Michelle would be more receptive to clozapine and weekly blood draws. He said that ideally she would transition to an IRTS before going back to her apartment, but also understands there may be some barriers (I.e. pt's willingness, financial concerns, etc).     Target psychiatric symptoms and interventions:   - Oral Zyprexa 10 mg BID OR Zyprexa 10 mg IM. Hatch now in place. May consider increasing further though holding off given " re-emerging catatonic sx  - Ativan 2 mg TID OR Ativan IM 2 mg TID (see above). Patient may not decline.     - Because patient was only intermittently accepting scheduled Ativan and some symptoms of catatonia are re-emerging, pursued [facility] for ECT. Awaiting outcome. Court hearing is tomorrow, 5/21. Obtain ECT consult.    Continue Cogentin 2 mg IM daily prn for evidence acute dystonic reaction or oculogyric crisis  Continue Artane 2 mg BID prn - patient often refusing this  Continue hydroxyzine 25-50 mg q4h prn for acute anxiety  Continue Trazodone 50 mg at bedtime prn for sleep disturbances  Continue Zyprexa 10 mg TID prn for severe agitation  Continue Ativan/Benadryl q4h prn for agitation. WOULD GIVE PRN ATIVAN FIRST FOR AGITATION.      Acute Medical Problems and Treatments:  HTN:  - IM consulted formally on 5/6.  - Metoprolol succinate ER 50 mg daily: Modified to 25 mg BID as patient has been non-adherent.    - Cozaar 100 mg daily  - Please see note from IM dated 5/3/21 and 5/6/21.     CVA:  - Aspirin 81 mg daily     Chronic Constipation:  - Miralax 17 mg daily     Routine labs have been ordered, including CMP, CBC with differential, TSH, lipid profile.  Routine urine drug screen also ordered. Patient currently declining blood draw. She is also declining vitals.     Behavioral/Psychological/Social:  - Encourage unit programming    Safety:  - Continue precautions as noted above  - Status 15 minute checks  - Safety precautions include: assault and elopement precautions  - Continue precautions as noted above  - Status 15 minute checks  - Discontinued 1:1 on 4/26 as presence of 1:1 staff appears to be worsening patient's paranoia and agitation.     Legal Status: Committed as MI with Hatch in place for Haldol, Zyprexa, Invega, and Thorazine through [facility]. Filed [facility] through [facility] and awaiting outcome.     Disposition Plan   Reason for ongoing admission: poses an imminent risk to  self, poses an imminent risk to others and is unable to care for self due to severe psychosis or tad  Discharge location: Home with ACT team support. Meeting will be held with a ACT team this week to discuss dispo options  Discharge Medications: not ordered  Follow-up Appointments: not scheduled    Entered by: Ida Zaman on 5/20/2021 at 9:13 AM      > 45 minutes total time that was spent and over 50% of this time was spent in counseling and coordination of care.

## 2021-05-21 LAB
ALBUMIN SERPL-MCNC: 3.6 G/DL (ref 3.4–5)
ALP SERPL-CCNC: 91 U/L (ref 40–150)
ALT SERPL W P-5'-P-CCNC: 24 U/L (ref 0–50)
ANION GAP SERPL CALCULATED.3IONS-SCNC: 8 MMOL/L (ref 3–14)
AST SERPL W P-5'-P-CCNC: 15 U/L (ref 0–45)
BASOPHILS # BLD AUTO: 0 10E9/L (ref 0–0.2)
BASOPHILS NFR BLD AUTO: 0.3 %
BILIRUB SERPL-MCNC: 0.3 MG/DL (ref 0.2–1.3)
BUN SERPL-MCNC: 16 MG/DL (ref 7–30)
CALCIUM SERPL-MCNC: 8.7 MG/DL (ref 8.5–10.1)
CHLORIDE SERPL-SCNC: 108 MMOL/L (ref 94–109)
CO2 SERPL-SCNC: 23 MMOL/L (ref 20–32)
CREAT SERPL-MCNC: 0.88 MG/DL (ref 0.52–1.04)
DIFFERENTIAL METHOD BLD: NORMAL
EOSINOPHIL # BLD AUTO: 0.1 10E9/L (ref 0–0.7)
EOSINOPHIL NFR BLD AUTO: 1.8 %
ERYTHROCYTE [DISTWIDTH] IN BLOOD BY AUTOMATED COUNT: 12.7 % (ref 10–15)
GFR SERPL CREATININE-BSD FRML MDRD: 72 ML/MIN/{1.73_M2}
GLUCOSE SERPL-MCNC: 118 MG/DL (ref 70–99)
HCT VFR BLD AUTO: 39.3 % (ref 35–47)
HGB BLD-MCNC: 13.6 G/DL (ref 11.7–15.7)
IMM GRANULOCYTES # BLD: 0 10E9/L (ref 0–0.4)
IMM GRANULOCYTES NFR BLD: 0.3 %
LYMPHOCYTES # BLD AUTO: 1.9 10E9/L (ref 0.8–5.3)
LYMPHOCYTES NFR BLD AUTO: 28.5 %
MCH RBC QN AUTO: 31.5 PG (ref 26.5–33)
MCHC RBC AUTO-ENTMCNC: 34.6 G/DL (ref 31.5–36.5)
MCV RBC AUTO: 91 FL (ref 78–100)
MONOCYTES # BLD AUTO: 0.5 10E9/L (ref 0–1.3)
MONOCYTES NFR BLD AUTO: 7.1 %
NEUTROPHILS # BLD AUTO: 4.1 10E9/L (ref 1.6–8.3)
NEUTROPHILS NFR BLD AUTO: 62 %
NRBC # BLD AUTO: 0 10*3/UL
NRBC BLD AUTO-RTO: 0 /100
PLATELET # BLD AUTO: 198 10E9/L (ref 150–450)
POTASSIUM SERPL-SCNC: 3.9 MMOL/L (ref 3.4–5.3)
PROT SERPL-MCNC: 7 G/DL (ref 6.8–8.8)
RBC # BLD AUTO: 4.32 10E12/L (ref 3.8–5.2)
SODIUM SERPL-SCNC: 139 MMOL/L (ref 133–144)
TSH SERPL DL<=0.005 MIU/L-ACNC: 0.53 MU/L (ref 0.4–4)
WBC # BLD AUTO: 6.6 10E9/L (ref 4–11)

## 2021-05-21 PROCEDURE — 84443 ASSAY THYROID STIM HORMONE: CPT | Performed by: PSYCHIATRY & NEUROLOGY

## 2021-05-21 PROCEDURE — 93010 ELECTROCARDIOGRAM REPORT: CPT | Performed by: INTERNAL MEDICINE

## 2021-05-21 PROCEDURE — 80053 COMPREHEN METABOLIC PANEL: CPT | Performed by: PSYCHIATRY & NEUROLOGY

## 2021-05-21 PROCEDURE — 36415 COLL VENOUS BLD VENIPUNCTURE: CPT | Performed by: PSYCHIATRY & NEUROLOGY

## 2021-05-21 PROCEDURE — 250N000013 HC RX MED GY IP 250 OP 250 PS 637: Performed by: PHYSICIAN ASSISTANT

## 2021-05-21 PROCEDURE — 250N000013 HC RX MED GY IP 250 OP 250 PS 637: Performed by: PSYCHIATRY & NEUROLOGY

## 2021-05-21 PROCEDURE — 85025 COMPLETE CBC W/AUTO DIFF WBC: CPT | Performed by: PSYCHIATRY & NEUROLOGY

## 2021-05-21 PROCEDURE — 99233 SBSQ HOSP IP/OBS HIGH 50: CPT | Performed by: PSYCHIATRY & NEUROLOGY

## 2021-05-21 PROCEDURE — 93005 ELECTROCARDIOGRAM TRACING: CPT

## 2021-05-21 PROCEDURE — 124N000002 HC R&B MH UMMC

## 2021-05-21 RX ADMIN — LORAZEPAM 2 MG: 2 TABLET ORAL at 09:09

## 2021-05-21 RX ADMIN — OLANZAPINE 10 MG: 10 TABLET, ORALLY DISINTEGRATING ORAL at 19:53

## 2021-05-21 RX ADMIN — OLANZAPINE 10 MG: 10 TABLET, ORALLY DISINTEGRATING ORAL at 09:10

## 2021-05-21 RX ADMIN — LORAZEPAM 2 MG: 2 TABLET ORAL at 19:53

## 2021-05-21 RX ADMIN — LORAZEPAM 2 MG: 2 TABLET ORAL at 14:07

## 2021-05-21 ASSESSMENT — ACTIVITIES OF DAILY LIVING (ADL)
DRESS: SCRUBS (BEHAVIORAL HEALTH)
HYGIENE/GROOMING: PROMPTS
ORAL_HYGIENE: PROMPTS
HYGIENE/GROOMING: PROMPTS
LAUNDRY: UNABLE TO COMPLETE
ORAL_HYGIENE: PROMPTS
DRESS: SCRUBS (BEHAVIORAL HEALTH)
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
## 2021-05-21 NOTE — PROGRESS NOTES
Patient refused to take her am scheduled medications that are not Jarvised.  SHe was irritable when interacting with staff. She had two court hearings today and refused to participate in both of them.  She was very irritable and angry as she told the court staff that she was not going to talk to them.  Just before change of shift Dr Figueroa called the unit to talk with patient so that she can hopefully be ready for ECT on Monday morning.  For the first couple minutes of the call while writer was monitoring the use of the I Pad the pt was heard talking very appropriately to the Dr. Writer was not present for the remainder of rhe call.  Patient would not talk to writer about suicide ideatiion.

## 2021-05-21 NOTE — PLAN OF CARE
Pt appeared asleep for 5.5 hours. Pt had no complaints or PRNs. No behavioral or medical events this shift.     Problem: Sleep Disturbance (Psychotic Signs/Symptoms)  Goal: Improved Sleep (Psychotic Signs/Symptoms)  Outcome: No Change

## 2021-05-21 NOTE — PLAN OF CARE
Problem: Behavior Regulation Impairment (Psychotic Signs/Symptoms)  Goal: Improved Behavioral Control (Psychotic Signs/Symptoms)  Outcome: No Change    Patient started the shift visible in the lounge, but quickly returned to her room where she she slept on the floor.  When writer asked patient questions she ignored him; however, she did come to the medication window when she was informed it was 1900 and immediately returned to her room.  She presents as irritable.      Patient accepted her HS Zyprexa and Ativan - refusing her Artane.  She did not answer questions about physical health, or medication side effects.  She refused VS assessment.     Patient had an order for an ECT consult placed.  She has her Mcgrath Kohler hearing on 5/21.
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
## 2021-05-22 PROCEDURE — 250N000013 HC RX MED GY IP 250 OP 250 PS 637: Performed by: PSYCHIATRY & NEUROLOGY

## 2021-05-22 PROCEDURE — 124N000002 HC R&B MH UMMC

## 2021-05-22 RX ADMIN — LORAZEPAM 2 MG: 2 TABLET ORAL at 20:46

## 2021-05-22 RX ADMIN — LORAZEPAM 2 MG: 2 TABLET ORAL at 08:33

## 2021-05-22 RX ADMIN — OLANZAPINE 10 MG: 10 TABLET, ORALLY DISINTEGRATING ORAL at 08:33

## 2021-05-22 RX ADMIN — LORAZEPAM 2 MG: 2 TABLET ORAL at 13:41

## 2021-05-22 RX ADMIN — OLANZAPINE 10 MG: 10 TABLET, ORALLY DISINTEGRATING ORAL at 20:46

## 2021-05-22 ASSESSMENT — ACTIVITIES OF DAILY LIVING (ADL)
ORAL_HYGIENE: PROMPTS
DRESS: SCRUBS (BEHAVIORAL HEALTH)
HYGIENE/GROOMING: PROMPTS

## 2021-05-22 NOTE — PLAN OF CARE
"  Problem: Behavioral Health Plan of Care  Goal: Optimized Coping Skills in Response to Life Stressors  Outcome: No Change   Pt was visible in the living area where she was guarded and watched TV or just looked down on the floor. Other times patient went to the room and took naps. During check in pt said she was willing to talk to only the person who can get her out of \"this place.\" Pt said she did not have the \"madness\" when asked about anxiety/depression and any thoughts of hurting self. She got agitated and stated, \"I thought you were going to help me, is this you were calling me for.\" and started walking away. She did eat her dinner. At HS pt took only her Hatch medications. Pt has ECT an upcoming ECT and needs follow-up with labs. Her EKG results were communicated to the provider. No other concerns.   "

## 2021-05-22 NOTE — PLAN OF CARE
Problem: Sleep Disturbance (Psychotic Signs/Symptoms)  Goal: Improved Sleep (Psychotic Signs/Symptoms)  Outcome: No Change  Flowsheets (Taken 5/22/2021 6148)  Mutually Determined Action Steps (Improved Sleep): sleeps 4-6 hours at night  Patient observed sleeping on a blanket on the floor in her room. She refused redirection and encouragement to sleep on her bed or the mattress. No safety concerns or PRNs administered.

## 2021-05-22 NOTE — PLAN OF CARE
"Patient is refusing all meds except for her Ativan and her Zyprexa.  She argues about the need for her to take the Ativan and Zyprexa. She will refuse until told that she will be given an injection.  She also walked away as she put the pill in her mouth and would not let this writer check her mouth.  \"Go away from me.\"  \"I don't need this medicine.\" She complained about taking the medications.  Pt declined to talk to writer during 1\"1.    Pt denies suicide ideaion.  "

## 2021-05-23 LAB — INTERPRETATION ECG - MUSE: NORMAL

## 2021-05-23 PROCEDURE — 124N000002 HC R&B MH UMMC

## 2021-05-23 PROCEDURE — 250N000013 HC RX MED GY IP 250 OP 250 PS 637: Performed by: PSYCHIATRY & NEUROLOGY

## 2021-05-23 RX ADMIN — LORAZEPAM 2 MG: 2 TABLET ORAL at 13:38

## 2021-05-23 RX ADMIN — LORAZEPAM 2 MG: 2 TABLET ORAL at 08:26

## 2021-05-23 RX ADMIN — LORAZEPAM 2 MG: 2 TABLET ORAL at 19:35

## 2021-05-23 RX ADMIN — OLANZAPINE 10 MG: 10 TABLET, ORALLY DISINTEGRATING ORAL at 19:35

## 2021-05-23 RX ADMIN — OLANZAPINE 10 MG: 10 TABLET, ORALLY DISINTEGRATING ORAL at 08:26

## 2021-05-23 ASSESSMENT — ACTIVITIES OF DAILY LIVING (ADL)
ORAL_HYGIENE: PROMPTS
HYGIENE/GROOMING: PROMPTS
DRESS: PROMPTS
DRESS: SCRUBS (BEHAVIORAL HEALTH)
HYGIENE/GROOMING: PROMPTS
ORAL_HYGIENE: PROMPTS

## 2021-05-23 NOTE — PLAN OF CARE
"  Problem: Mood Impairment (Psychotic Signs/Symptoms)  Goal: Improved Mood Symptoms (Psychotic Signs/Symptoms)  Outcome: No Change     Patient was reluctant to take the HS medications tonight. She presented to the window and stated that \"God wants me to take psychiatric medications, not stroke medication. You are giving me stroke medications.\" She the stated that the \"doctor is giving me stroke medication ever since I came to this hospital.\" Patient was given reassurance and medication educations regarding the current medication she is taking. After much hesitation, she took the scheduled meds.   Patient has not engaged with staff or peers this evening. She took her mattress off the bed and put it against the window. She took her sheets and pillow and make a makeshift bed in the corner of the room. When asked about her mood declined to discuss. No other concerns noted.   "

## 2021-05-23 NOTE — PLAN OF CARE
Problem: Sleep Disturbance (Psychotic Signs/Symptoms)  Goal: Improved Sleep (Psychotic Signs/Symptoms)  Outcome: Improving  Flowsheets (Taken 5/23/2021 0608)  Mutually Determined Action Steps (Improved Sleep): sleeps 4-6 hours at night  Patient slept for 6.75 hours. No problems or concerns observed.

## 2021-05-23 NOTE — PLAN OF CARE
"Nursing assessment completed. Patient isolative to her room throughout the majority of the shift. She was hesitant to take am medications this morning and repeated several difficult to understand statements regarding her dislike in taking medications. She did repeatedly ask \"is this a stroke medicine or a psychiatry medicine\", she then questioned if we were \"lying to her\".  She did eventually cooperate with both am and 1400 medications. Eating meals in her room. No s/s SI/SIB. Continue to monitor and assess.   "

## 2021-05-24 PROCEDURE — 99231 SBSQ HOSP IP/OBS SF/LOW 25: CPT | Performed by: NURSE PRACTITIONER

## 2021-05-24 PROCEDURE — 250N000013 HC RX MED GY IP 250 OP 250 PS 637: Performed by: PSYCHIATRY & NEUROLOGY

## 2021-05-24 PROCEDURE — 250N000013 HC RX MED GY IP 250 OP 250 PS 637: Performed by: PHYSICIAN ASSISTANT

## 2021-05-24 PROCEDURE — 99207 PR CONSULT E&M CHANGED TO SUBSEQUENT LEVEL: CPT | Performed by: NURSE PRACTITIONER

## 2021-05-24 PROCEDURE — 99233 SBSQ HOSP IP/OBS HIGH 50: CPT | Performed by: PSYCHIATRY & NEUROLOGY

## 2021-05-24 PROCEDURE — 124N000002 HC R&B MH UMMC

## 2021-05-24 PROCEDURE — 87635 SARS-COV-2 COVID-19 AMP PRB: CPT | Performed by: PSYCHIATRY & NEUROLOGY

## 2021-05-24 RX ADMIN — LORAZEPAM 2 MG: 2 TABLET ORAL at 08:34

## 2021-05-24 RX ADMIN — OLANZAPINE 10 MG: 10 TABLET, ORALLY DISINTEGRATING ORAL at 19:34

## 2021-05-24 RX ADMIN — LOSARTAN POTASSIUM 100 MG: 100 TABLET, FILM COATED ORAL at 12:13

## 2021-05-24 RX ADMIN — METOPROLOL SUCCINATE 50 MG: 50 TABLET, EXTENDED RELEASE ORAL at 12:10

## 2021-05-24 RX ADMIN — OLANZAPINE 10 MG: 10 TABLET, ORALLY DISINTEGRATING ORAL at 08:34

## 2021-05-24 RX ADMIN — LORAZEPAM 2 MG: 2 TABLET ORAL at 14:02

## 2021-05-24 RX ADMIN — LORAZEPAM 2 MG: 2 TABLET ORAL at 19:34

## 2021-05-24 ASSESSMENT — ACTIVITIES OF DAILY LIVING (ADL)
HYGIENE/GROOMING: PROMPTS
DRESS: PROMPTS
ORAL_HYGIENE: PROMPTS
LAUNDRY: UNABLE TO COMPLETE

## 2021-05-24 NOTE — PLAN OF CARE
Problem: Behavior Regulation Impairment (Psychotic Signs/Symptoms)  Goal: Improved Behavioral Control (Psychotic Signs/Symptoms)  Outcome: No Change    Pt is isolative and withdrawn to her room. She spent most of this evening napping on and off while lying on the floor. She only came out of her room for dinner and bedtime medication. She appeared to be responding to internal stimuli.  She refused vital signs. She was agitated when offered her bedtime medication. She insisted that we have been giving her stroke medication and not psychiatric medication because the medication only works for her legs and not her head. After several explanations and encouragement, she took the scheduled Ativan and Zyprexa and refused the Artane. Her appetite is good; she is eating and drinking well. No aggressive behaviors. Hygiene maintenance is poor. No SI/SIB.

## 2021-05-24 NOTE — PLAN OF CARE
Pt asleep at start of shift. Breathing quiet and unlabored.     Pt had no c/o pain or discomfort during the HS.     Appears to have slept 5 hours.     Pt on  ASSAULT, CHEEKING, and ELOPEMENT precautions in addition to single room order. Any related events noted above.     Will continue to monitor and assess.     Problem: Sleep Disturbance (Psychotic Signs/Symptoms)  Goal: Improved Sleep (Psychotic Signs/Symptoms)  Outcome: No Change

## 2021-05-24 NOTE — PROGRESS NOTES
"[facility], [place]   Psychiatric Progress Note  Hospital Day: 40        Interim History:   The patient's care was discussed with the treatment team during the daily team meeting and/or staff's chart notes were reviewed.  Staff report patient remains paranoid, irritable, guarded, delusional, and disorganized with absent insight. She is primarily isolative to her room. Sleeps on the floor in the corner of her room. She is not attending to ADLs. Has not showered since admission over one month ago. Pt adherent with psychotropic medications only, though with significant encouragement. Refusing vital sign checks and BP medications since 5/18. Appears paranoid about medications, suspecting that all medications are \"stroke\" medications. Becomes agitated at medication administration times. She was not receptive to patient education on the risks of untreated HTN- a significant concern given her past medical history of having a stroke in 2017. Eating and drinking well. Denies acute physical concerns and medication side effects. No episodes of seclusion or restraints. Slept 5 hours overnight.     During interview with patient, she asked about discharge. You said \"Your heart is so hardened! Why are you keeping me here? You want me to live here forever!\" She again said that she does not want to take BP medications. She said that she is concerned that both Zyprexa and Ativan \"have blood pressure medications inside of them.\" She again said that she does not need BP medications because she is not having any symptoms. She said that God has healed her and she will no longer have any mental or physical illnesses. Michelle denied SI, HI, AH, VH, paranoia, though made several paranoid and delusional statements. She denied side effects from medications. She denied acute medical concerns, including CP, SOB, HA, changes in vision, confusion, sedation. I did discuss Mcgrath Kohler, ECT, and IM consult today. She did " not appear to fully understand risks, benefits, alternatives. She had a difficult time distinguishing between ECT and EKG. Attempted again to provide psychoeducation, though Michelle was minimally receptive.      Spoke with patient's son, Emelia. He said that it is unusual for her to decline his phone calls, at least at this point in her hospital stay. He suspects it is a sign that she is still quite symptomatic. Discussed ECT at length with him today, and plan to proceed. He was supportive, asked appropriate questions, and is hoping ECT will be a helpful treatment intervention.            Medications:       LORazepam  2 mg Oral TID    Or     LORazepam  2 mg Intramuscular TID     losartan  100 mg Oral Daily     metoprolol succinate ER  50 mg Oral Daily     OLANZapine zydis  10 mg Oral BID    Or     OLANZapine  10 mg Intramuscular BID     polyethylene glycol  17 g Oral Daily     trihexyphenidyl  2 mg Oral BID          Allergies:     Allergies   Allergen Reactions     Haldol [Haloperidol]      Patient previously tolerated haldol, though developed oculogyric crises during hospital stay on 4/26/21 on total daily dose of 10 mg.     Lisinopril Cough          Labs:   No results found for this or any previous visit (from the past 24 hour(s)).       Psychiatric Examination:     BP (!) 145/85   Pulse 66   Temp 98.2  F (36.8  C)   Resp 16   SpO2 98%   Weight is 0 lbs 0 oz  There is no height or weight on file to calculate BMI.    Weight over time:  Vitals:       Orthostatic Vitals     None            Cardiometabolic risk assessment. 04/15/21      Reviewed patient profile for cardiometabolic risk factors    Date taken /Value  REFERENCE RANGE   Abdominal Obesity  (Waist Circumference)   See nursing flowsheet Women ?35 in (88 cm)   Men ?40 in (102 cm)      Triglycerides  Triglycerides   Date Value Ref Range Status   10/19/2019 117 <150 mg/dL Final       ?150 mg/dL (1.7 mmol/L) or current treatment for elevated triglycerides   HDL  cholesterol  HDL Cholesterol   Date Value Ref Range Status   10/19/2019 56 >49 mg/dL Final   ]   Women <50 mg/dL (1.3 mmol/L) in women or current treatment for low HDL cholesterol  Men <40 mg/dL (1 mmol/L) in men or current treatment for low HDL cholesterol     Fasting plasma glucose (FPG) Lab Results   Component Value Date     10/19/2019      FPG ?100 mg/dL (5.6 mmol/L) or treatment for elevated blood glucose   Blood pressure  BP Readings from Last 3 Encounters:   06/04/19 131/71   04/26/19 164/86    Blood pressure ?130/85 mmHg or treatment for elevated blood pressure   Family History  See family history     Appearance: awake, alert and disheveled   Attitude: somewhat cooperative, irritable and dismissive, put finger up in writer's face intermittently when agitated  Eye Contact:  Intense when awake and sitting in lounge  Mood: Irritable   Affect:  Mood congruent  Speech: unable to assess today  Language: fluent and intact in English  Psychomotor, Gait, Musculoskeletal: No stereotypic movements noted on examination today. No evidence of tardive dyskinesia, dystonia, or tics. No physical agitation.   Throught Process: unable to assess  Associations:  No loosening of associations  Thought Content:  no evidence of suicidal ideation or homicidal ideation, evidence of paranoia and guardedness  Insight:  limited  Judgement:  limited  Oriented to:  Person, place, time  Attention Span and Concentration: fair, improved  Recent and Remote Memory:  Fair, improved  Fund of Knowledge:  normal    Clinical Global Impressions  First:  Considering your total clinical experience with this particular patient population, how severe are the patient's symptoms at this time?: 7 (04/16/21 1428)  Compared to the patient's condition at the START of treatment, this patient's condition is: 4 (04/16/21 1428)  Most recent:  Considering your total clinical experience with this particular patient population, how severe are the patient's  symptoms at this time?: 7 (05/13/21 0953)  Compared to the patient's condition at the START of treatment, this patient's condition is: 6 (05/13/21 0953)           Precautions:     Behavioral Orders   Procedures     Assault precautions     Cheeking Precautions (behavioral units)     Patient Observed swallowing PO medications; Patient asked to drink water after swallowing medication; Patient in Staff line of sight for 15 minutes after medication given; Mouth checks after PO administration (patient asked to open mouth and stick out their tongue).     Code 1 - Restrict to Unit     Elopement precautions     Routine Programming     As clinically indicated     Status 15     Every 15 minutes.          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 Symptoms and presentation at this time is most consistent with Schizoaffective Disorder, Bipolar Type. We have obtained most recent medication regimen from patient's ACT team, and regimen was initially restarted. Inpatient psychiatric hospitalization is warranted at this time for safety, stabilization, and possible adjustment in medications. Pt is committed. We have petitioned for Mcgrath Calderon due to lack of improvement and side effects from medications.    Hospital Course:  On admission, PTA medications were restarted. However, patient had been declining all scheduled medications despite significant encouragement from staff and provider. Psychiatric emergency declared on 4/20 due to aggression  "toward others in context of severe psychosis and suspected excited catatonia. Ativan 1 mg TID was also added. Discontinued PTA Invega, Zyprexa, and thorazine on 4/20 due to consistent refusal.     On 4/26, it was noted that patient frequently had upward gaze while walking up and down the unit. She did not appear to be distressed. She reported that she was looking at \"my god.\" It was determined to be oculogyric crisis secondary to IM haloperidol. Haldol was subsequently discontinued during day shift on 4/26 and scheduled Zyprexa was initiated on emergency basis. Oral Cogentin was also scheduled, though patient declined. On the evening of 4/26, patient's gaze was fixed in upward position for several hours and she appeared to be experiencing discomfort. IM Cogentin was administered with noted resolution. Partial improvements weew observed after switch to scheduled Zyprexa. After patient improved, she was more receptive to reinitiating oral Invega, which was initiated on 5/3 and titrated to PTA dose of 9 mg daily on 5/10. Plan was for ACT team to bring in loading dose of Invega Sustenna. Patient had signs of oculogyric crisis again with Invega. Oral dose decreased to 6 mg after this. Invega was stopped on 5/14 due to ongoing signs of problems related to eye movement and concerns for oculogyric crisis.    Overall improvement in patient's agitation and suspected catatonia noted on 4/30 after patient accepted two doses of Ativan. She began declining Ativan again with noted decompensation. Patient now accepting, however, she does not have decisional making capacity at this time. She does not believe she has a mental illness, including catatonia. She does not fully understand risks associated with inadequate treatment of catatonia. Discussed with her son who is acting as surrogate decision maker and he is in full support of forced scheduled IM Ativan if patient declines oral formulation. Also consulted with our legal team. " "    Ativan was increased on 5/19 due to re-emerging evidence of catatonia. Meeting was held with ACT team on 5/20, including ACT team psychiatrist, Dr. Pedraza. He said that he is in \"full support\" of plan to pursue Alcides Riverappard and ECT at this time. He does feel that in the past she has been discharged from the hospital while still quite symptomatic. He is hoping that ECT will be effective and that with improvement, Michelle would be more receptive to clozapine and weekly blood draws. He said that ideally she would transition to an IRTS before going back to her apartment, but also understands there may be some barriers (I.e. pt's willingness, financial concerns, etc).     Target psychiatric symptoms and interventions:   - Discontinue Artane 2 mg BID - patient often refusing this  - Oral Zyprexa 10 mg BID OR Zyprexa 10 mg IM. Hatch now in place. May consider increasing further though holding off given re-emerging catatonic sx  - Ativan 2 mg TID OR Ativan IM 2 mg TID (see above). Patient may not decline.     - Because patient was only intermittently accepting scheduled Ativan and some symptoms of catatonia are re-emerging, pursued Alcides Kohler for ECT. She also continues to exhibit symptoms of psychosis and has not responded or has experienced side effects from multiple neuroleptic medications. Court hearing was held on 5/21. Awaiting outcome. Obtained ECT consult on 5/21/21. Please see Dr. Lubin's consultation note. Will hold off on placing IM consult for medical clearance until Alcides Kohler is approved as she may require forced exam/vitals/COVID test.     Continue Cogentin 2 mg IM daily prn for evidence acute dystonic reaction or oculogyric crisis  Continue hydroxyzine 25-50 mg q4h prn for acute anxiety  Continue Trazodone 50 mg at bedtime prn for sleep disturbances  Continue Zyprexa 10 mg TID prn for severe agitation  Continue Ativan/Benadryl q4h prn for agitation. WOULD GIVE PRN ATIVAN FIRST FOR AGITATION. "      Acute Medical Problems and Treatments:  HTN:  - IM consulted formally on 5/6.  - Metoprolol succinate ER 50 mg daily: Modified to 25 mg BID as patient has been non-adherent.    - Cozaar 100 mg daily  - Please see note from IM dated 5/3/21 and 5/6/21.  - Obtain EKG and routine labs in context of pt declining vital sign checks and anti-hypertensives and recently elevated BPs. Reviewed labs and discussed EKG findings with IM on 5/21. No urgent concerns, though IM should be notified if pt develops acute medical concerns (I.e. heart palpitations, SOB, changes in speech, AMS, confusion, CP, HA, changes in vision). Now that Alcides Kohler is in place, will place IM consult for ECT medical clearance. Appreciate Assistance.      CVA:  - Aspirin 81 mg daily     Chronic Constipation:  - Miralax 17 mg daily     Behavioral/Psychological/Social:  - Encourage unit programming    Safety:  - Continue precautions as noted above  - Status 15 minute checks  - Safety precautions include: assault and elopement precautions  - Continue precautions as noted above  - Status 15 minute checks  - Discontinued 1:1 on 4/26 as presence of 1:1 staff appears to be worsening patient's paranoia and agitation.     Legal Status: Committed as MI with Hatch in place for Haldol, Zyprexa, Invega, and Thorazine through [facility]. Filed Alcides Kohler through [facility] and approved on 5/24/21    Disposition Plan   Reason for ongoing admission: poses an imminent risk to self, poses an imminent risk to others and is unable to care for self due to severe psychosis or tad  Discharge location: Home vs IRTS with ACT team support.   Discharge Medications: not ordered  Follow-up Appointments: not scheduled    Entered by: Ida Zaman on 5/24/2021 at 8:47 AM      > 45 minutes total time that was spent and over 50% of this time was spent in counseling and coordination of care.

## 2021-05-24 NOTE — PLAN OF CARE
Assessment/Intervention/Current Symtoms and Care Coordination    Patient remains delusional and psychotic.  [facility] served patient Court Paperwork for ECT. [facility] also faxed paperwork to Dr Lubin, Alize Rubio and Anisa Alonzo for review.     Discharge Plan or Goal    TBD as IRTS placement is likely once patient stabilizes and referrals can be made. Patient chow ACT Team    Barriers to Discharge     Acuity of symptoms    Referral Status    No referrals have been made at this time      Legal Status     Committed/Hatch through [facility]

## 2021-05-24 NOTE — CONSULTS
"  [facility]  Internal Medicine Consult    Michelle Pino MRN# [id number]   Age: 60 year old YOB: 1960     Date of Admission: 4/13/2021  Date of Consult:  5/24/2021    Requesting Service: Behavioral Health - Ida Zaman MD  Reason for Consult: Pre ECT Medicine Evaluation   Indication for ECT: Madhavi and psychosis     Chief Complaint: \"why am I getting ECT?\"  HPI: Michelle Pino is a 60 year old [place] female with past medical history significant for schizoaffective disorder, HTN, CVA (2017), and HLD admitted to station 12 on 4/13/21 with madhavi, psychosis, and agitation.      Review of Systems:   Cardiovascular: negative  Pulmonary: No shortness of breath, dyspnea on exertion, cough, or hemoptysis  Neurological: negative    Past Medical History:   Prior Anesthesia: Yes  If yes, any complications: No    Prior ECT: No    Cardiovascular: CAD No, MI No, HTN Yes, CHF No, pacemaker or ICD No  Pulmonary: Asthma/COPD No, Prior respiratory failure or need for emergent intubation No, On theophylline No (note that theophylline use is a contraindication to proceeding with ECT, needs to be tapered off prior)  Neurological: Brain tumor No, TIA/CVA Yes, Dementia No, Neuromuscular Disease (including post polio syndrome) No, Seizures and/or Epilepsy No, Head Injury No, Intracranial Hemorrhage No    Past Surgical History:   Past Surgical History:   Procedure Laterality Date     OPEN REDUCTION INTERNAL FIXATION TIBIAL PLATEAU Right 01/08/2019    at [facility], fracture occured during psychiatric restraining       Allergies:      Allergies   Allergen Reactions     Haldol [Haloperidol]      Patient previously tolerated haldol, though developed oculogyric crises during hospital stay on 4/26/21 on total daily dose of 10 mg.     Lisinopril Cough       Medication list reviewed.    Physical Exam:  BP (!) 168/98   Pulse 66   Temp 98.2  F (36.8  C)   Resp 16   SpO2 98%    Cardiovascular: RRR. S1, S2. No " murmurs, rubs, gallops.   Pulmonary: Effort normal. Lungs CTAB with no wheezing, rales, rhonchi.   Neurological: A&Ox3. No focal deficits. PERRLA.     Data:  EKG:HR 84  Prolonged QTc 460  Head Imaging: n/a   Labs:   CBC:  Recent Labs   Lab Test 05/21/21  1340   WBC 6.6   RBC 4.32   HGB 13.6   HCT 39.3   MCV 91   MCH 31.5   MCHC 34.6   RDW 12.7        CMP:  Recent Labs   Lab Test 05/21/21  1340      POTASSIUM 3.9   CHLORIDE 108   ALEK 8.7   CO2 23   BUN 16   CR 0.88   *   AST 15   ALT 24   BILITOTAL 0.3   ALBUMIN 3.6   PROTTOTAL 7.0   ALKPHOS 91     HCG:   Negative    Recommendations:   Diuretics and oral hypoglycemics should be held the morning of ECT.   All other antihypertensive medications can be continued.     Patient does not have absolute medical contraindications to proceeding with ECT at this time. Treatment plan per psychiatry.       ALANNA Gerard [facility]  Hospitalist Service  Pager: [phone]

## 2021-05-24 NOTE — PLAN OF CARE
Pt is aggressive refused to check-in . Pt refused her blood pressure meds ivon she dose not need it . Ate 100% of breakfast and drank well . Pt has poor hygiene but refuses bath when offered .  pt seem more cooperative this afternoon  as she came  to the window to ask for her Ativan which took without any problem .  She is also  Now  cleared of her Mcgrath Kohler and is to start ECT on Wednesday .

## 2021-05-24 NOTE — PROGRESS NOTES
Writer emailed Court Paperwork regarding ECT to Dr Lubin, Alize Rubio and Anisa Alonzo for review.

## 2021-05-25 PROBLEM — F25.0 SCHIZOAFFECTIVE DISORDER, BIPOLAR TYPE (H): Status: ACTIVE | Noted: 2021-05-25

## 2021-05-25 PROCEDURE — 250N000013 HC RX MED GY IP 250 OP 250 PS 637: Performed by: PHYSICIAN ASSISTANT

## 2021-05-25 PROCEDURE — 250N000013 HC RX MED GY IP 250 OP 250 PS 637: Performed by: PSYCHIATRY & NEUROLOGY

## 2021-05-25 PROCEDURE — 99233 SBSQ HOSP IP/OBS HIGH 50: CPT | Performed by: PSYCHIATRY & NEUROLOGY

## 2021-05-25 PROCEDURE — 124N000002 HC R&B MH UMMC

## 2021-05-25 RX ADMIN — LOSARTAN POTASSIUM 100 MG: 100 TABLET, FILM COATED ORAL at 08:24

## 2021-05-25 RX ADMIN — OLANZAPINE 10 MG: 10 TABLET, ORALLY DISINTEGRATING ORAL at 08:24

## 2021-05-25 RX ADMIN — OLANZAPINE 10 MG: 10 TABLET, ORALLY DISINTEGRATING ORAL at 19:09

## 2021-05-25 RX ADMIN — METOPROLOL SUCCINATE 50 MG: 50 TABLET, EXTENDED RELEASE ORAL at 08:24

## 2021-05-25 RX ADMIN — LORAZEPAM 2 MG: 2 TABLET ORAL at 17:09

## 2021-05-25 RX ADMIN — LORAZEPAM 2 MG: 2 TABLET ORAL at 13:35

## 2021-05-25 RX ADMIN — LORAZEPAM 2 MG: 2 TABLET ORAL at 08:24

## 2021-05-25 NOTE — PLAN OF CARE
"  Problem: Behavioral Health Plan of Care  Goal: Plan of Care Review  Outcome: No Change  Flowsheets (Taken 5/25/2021 1723)  Plan of Care Reviewed With: patient  Patient Agreement with Plan of Care: (Pt is very irritable,always shouting at staff to leave her.) refuses to participate    Pt napped most of the shift.She came out for meds and meals only.She was irritable with tensed and pressured speech when communicating with writer.Pt is very rigid in her routine . She cont on cheeking ,assault and fall precautions.Dr Zaman was able to convinced her in taking her Ativan at 5pm instead of 7pm in preparation for her ECT tomorrow at 11:45 am. Unable to assess her with regards to psych sxs as pt would shout at writer to leave her room angrily. She was medication compliant but  suspicious.Her meds had to be opened in her presence.Pt affect is flat and blunted.No signs of pain.No c/o constipation,sleep problems or medication side effects. Second attempt made to have pt vitals obtained but she refused saying \" Do you have blood pressure medicine to give me ? Get away from here\".She was instructed and reminded about her NPO status at [facility]. Pt will be on SIO 1:1 nocs shift to day shift to ensure that she does not take anything by mouth over night. Will remove water cups and oranges from her room once she falls asleep.     "

## 2021-05-25 NOTE — PLAN OF CARE
Assessment/Intervention/Current Symtoms and Care Coordination    Patient will be starting ECT during this hospitalization. Patient remains delusional and psychotic.     Discharge Plan or Goal    Likely IRTS placement when stable     Barriers to Discharge     Acuity of symptoms and patient will start a course ECT treatments tomorrow.     Referral Status    No referrals have been made    Legal Status     Committed/Hatch through [facility]

## 2021-05-25 NOTE — PLAN OF CARE
Night Shift Summary (5/24/21 into 05/25/21)    Pt in bed sleeping at start of shift. Breathing quiet and unlabored. Appears to have slept 6.25 hours.    Assault, Elopement, Cheeking and Fall precautions, with no related events occurring this shift.     Will continue to monitor and assess.       Problem: Behavioral Health Plan of Care  Goal: Absence of New-Onset Illness or Injury  Outcome: Improving   Remains safe on the unit.     Problem: Sleep Disturbance (Psychotic Signs/Symptoms)  Goal: Improved Sleep (Psychotic Signs/Symptoms)  Outcome: Improving   Slept >6 hours.

## 2021-05-25 NOTE — PROGRESS NOTES
"[facility], [place]   Psychiatric Progress Note  Hospital Day: 41        Interim History:   The patient's care was discussed with the treatment team during the daily team meeting and/or staff's chart notes were reviewed.  Staff report patient is primarily isolative to her room. Sleeps on the floor in the corner of her room. She is not attending to ADLs. Has not showered since admission over one month ago. Pt adherent with psychotropic medications only, though with significant encouragement. She had been refusing vital sign checks and BP medications since 5/18, though accepted again yesterday after much encouragement and education. Appears paranoid about medications, suspecting that all medications are \"stroke\" medications. Eating and drinking well. Denies acute physical concerns and medication side effects. No episodes of seclusion or restraints. Slept > 6 hours overnight. Overall improvement noted since increase in Ativan. BP was 142/78 this morning.     During interview with patient, I spent a significant amount of time discussing ECT, expected duration of treatment, need for NPO, adjustments in dose administration of Ativan, indications for treatment. She did not appear to fully understand, and repeatedly asked the same questions. She initially believed that it was \"2-3 treatments.\" She became agitated when informed that it will likely be more than that. She said \"I don't like it! I don't like ECT!\" She again denied having any form of mental illness. She said \"I am not mentally ill! I have no sickness!\" She denied all mental health symptoms, including SI, HI, depression, AH, VH, delusional thinking, paranoia. She could not tell me why she continues to sleep on the floor of her room and in the corner where she is not easily visualized by staff. She again expressed desire to be discharged back to her apartment. She has no interest in pursuing higher level of care. She is reluctantly " willing to meet with members of her ACT team prior to discharge.          Medications:       LORazepam  2 mg Oral TID    Or     LORazepam  2 mg Intramuscular TID     losartan  100 mg Oral Daily     metoprolol succinate ER  50 mg Oral Daily     OLANZapine zydis  10 mg Oral BID    Or     OLANZapine  10 mg Intramuscular BID     polyethylene glycol  17 g Oral Daily          Allergies:     Allergies   Allergen Reactions     Haldol [Haloperidol]      Patient previously tolerated haldol, though developed oculogyric crises during hospital stay on 4/26/21 on total daily dose of 10 mg.     Lisinopril Cough          Labs:     Recent Results (from the past 24 hour(s))   Asymptomatic SARS-CoV-2 COVID-19 Virus (Coronavirus) by PCR    Collection Time: 05/24/21  3:25 PM    Specimen: Nasopharyngeal   Result Value Ref Range    SARS-CoV-2 Virus Specimen Source Nasopharyngeal     SARS-CoV-2 PCR Result NEGATIVE     SARS-CoV-2 PCR Comment (Note)           Psychiatric Examination:     BP (!) 148/92 (BP Location: Left arm)   Pulse 79   Temp 98.1  F (36.7  C) (Tympanic)   Resp 18   SpO2 98%   Weight is 0 lbs 0 oz  There is no height or weight on file to calculate BMI.    Weight over time:  Vitals:       Orthostatic Vitals     None            Cardiometabolic risk assessment. 04/15/21      Reviewed patient profile for cardiometabolic risk factors    Date taken /Value  REFERENCE RANGE   Abdominal Obesity  (Waist Circumference)   See nursing flowsheet Women ?35 in (88 cm)   Men ?40 in (102 cm)      Triglycerides  Triglycerides   Date Value Ref Range Status   10/19/2019 117 <150 mg/dL Final       ?150 mg/dL (1.7 mmol/L) or current treatment for elevated triglycerides   HDL cholesterol  HDL Cholesterol   Date Value Ref Range Status   10/19/2019 56 >49 mg/dL Final   ]   Women <50 mg/dL (1.3 mmol/L) in women or current treatment for low HDL cholesterol  Men <40 mg/dL (1 mmol/L) in men or current treatment for low HDL cholesterol     Fasting  plasma glucose (FPG) Lab Results   Component Value Date     10/19/2019      FPG ?100 mg/dL (5.6 mmol/L) or treatment for elevated blood glucose   Blood pressure  BP Readings from Last 3 Encounters:   05/25/21 (!) 148/92   06/04/19 131/71   04/26/19 164/86    Blood pressure ?130/85 mmHg or treatment for elevated blood pressure   Family History  See family history     Appearance: awake, alert and disheveled   Attitude: somewhat cooperative  Eye Contact: poor  Mood: calmer  Affect:  Mood congruent  Speech: accent, coherent  Language: fluent and intact in English  Psychomotor, Gait, Musculoskeletal: No stereotypic movements noted on examination today. No evidence of tardive dyskinesia, dystonia, or tics. No physical agitation.   Throught Process: unable to assess  Associations:  No loosening of associations  Thought Content:  no evidence of suicidal ideation or homicidal ideation, evidence of paranoia and guardedness  Insight:  limited  Judgement:  limited  Oriented to:  Person, place, time  Attention Span and Concentration: fair, improved  Recent and Remote Memory:  Fair, improved  Fund of Knowledge:  normal    Clinical Global Impressions  First:  Considering your total clinical experience with this particular patient population, how severe are the patient's symptoms at this time?: 7 (04/16/21 1428)  Compared to the patient's condition at the START of treatment, this patient's condition is: 4 (04/16/21 1428)  Most recent:  Considering your total clinical experience with this particular patient population, how severe are the patient's symptoms at this time?: 7 (05/13/21 0953)  Compared to the patient's condition at the START of treatment, this patient's condition is: 6 (05/13/21 0953)           Precautions:     Behavioral Orders   Procedures     Assault precautions     Cheeking Precautions (behavioral units)     Patient Observed swallowing PO medications; Patient asked to drink water after swallowing medication;  Patient in Staff line of sight for 15 minutes after medication given; Mouth checks after PO administration (patient asked to open mouth and stick out their tongue).     Code 1 - Restrict to Unit     Code 2 - 1:1 Staff Supervision     For ECT only     Electroconvulsive therapy     Series of up to 12 treatments. Begin Date: 5/26/21     Treating Psychiatrist providing ECT:  Dr. Lubin     Notified on:  5/21/21     Elopement precautions     Fall precautions     Routine Programming     As clinically indicated     Status 15     Every 15 minutes.          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 Symptoms and presentation at this time is most consistent with Schizoaffective Disorder, Bipolar Type. We have obtained most recent medication regimen from patient's ACT team, and regimen was initially restarted. Inpatient psychiatric hospitalization is warranted at this time for safety, stabilization, and possible adjustment in medications. Pt is committed. We have petitioned for [facility] due to lack of improvement and side effects from medications.    Hospital Course:  On admission, PTA medications were restarted. However, patient had been declining all scheduled medications despite significant encouragement from staff and provider. Psychiatric emergency declared on 4/20 due to aggression toward others in context of severe psychosis and suspected excited catatonia. Ativan 1 mg TID was also added. Discontinued PTA Invega, Zyprexa, and  "champ on 4/20 due to consistent refusal.     On 4/26, it was noted that patient frequently had upward gaze while walking up and down the unit. She did not appear to be distressed. She reported that she was looking at \"my god.\" It was determined to be oculogyric crisis secondary to IM haloperidol. Haldol was subsequently discontinued during day shift on 4/26 and scheduled Zyprexa was initiated on emergency basis. Oral Cogentin was also scheduled, though patient declined. On the evening of 4/26, patient's gaze was fixed in upward position for several hours and she appeared to be experiencing discomfort. IM Cogentin was administered with noted resolution. Partial improvements weew observed after switch to scheduled Zyprexa. After patient improved, she was more receptive to reinitiating oral Invega, which was initiated on 5/3 and titrated to PTA dose of 9 mg daily on 5/10. Plan was for ACT team to bring in loading dose of Invega Sustenna. Patient had signs of oculogyric crisis again with Invega. Oral dose decreased to 6 mg after this. Invega was stopped on 5/14 due to ongoing signs of problems related to eye movement and concerns for oculogyric crisis.    Overall improvement in patient's agitation and suspected catatonia noted on 4/30 after patient accepted two doses of Ativan. She began declining Ativan again with noted decompensation. Patient now accepting, however, she does not have decisional making capacity at this time. She does not believe she has a mental illness, including catatonia. She does not fully understand risks associated with inadequate treatment of catatonia. Discussed with her son who is acting as surrogate decision maker and he is in full support of forced scheduled IM Ativan if patient declines oral formulation. Also consulted with our legal team.     Ativan was increased on 5/19 due to re-emerging evidence of catatonia. Meeting was held with ACT team on 5/20, including ACT team psychiatrist, " "Dr. Pedraza. He said that he is in \"full support\" of plan to pursue [facility] Kohler and ECT at this time. He does feel that in the past she has been discharged from the hospital while still quite symptomatic. He is hoping that ECT will be effective and that with improvement, Michelle would be more receptive to clozapine and weekly blood draws. He said that ideally she would transition to an IRTS before going back to her apartment, but also understands there may be some barriers (I.e. pt's willingness, financial concerns, etc).     Target psychiatric symptoms and interventions:   - Discontinued Artane 2 mg BID per pt request - patient often refusing this  - Oral Zyprexa 10 mg BID OR Zyprexa 10 mg IM. Hatch now in place. May consider increasing further though holding off given re-emerging catatonic sx and borderline prolonged QTc  - Ativan 2 mg TID OR Ativan IM 2 mg TID (see above). Patient may not decline.   Ativan to be given at 1700 on days prior to ECT and will be held on mornings before ECT    Continue Cogentin 2 mg IM daily prn for evidence acute dystonic reaction or oculogyric crisis  Continue hydroxyzine 25-50 mg q4h prn for acute anxiety  Continue Trazodone 50 mg at bedtime prn for sleep disturbances  Continue Zyprexa 10 mg TID prn for severe agitation  Continue Ativan/Benadryl q4h prn for agitation. WOULD GIVE PRN ATIVAN FIRST FOR AGITATION.      ECT:  - Because patient was only intermittently accepting scheduled Ativan and some symptoms of catatonia are re-emerging, pursued Alc[facility] Kohler for ECT. She also continues to exhibit symptoms of psychosis and has not responded or has experienced side effects from multiple neuroleptic medications. Court hearing was held on 5/21. Alcides [facility]appard is approved. Patient is medically cleared. COVID negative on 5/24. Obtained ECT consult on 5/21/21. Please see Dr. Lubin's consultation note.   - First ECT treatment scheduled at 11:45 AM on 5/26.   - NPO 8 hours prior to " scheduled treatment. Clear liquids until 2 hours prior to treatment.   - Discussed with IM. May resume AM antihypertensives, no need to hold prior to ECT  - SIO while patient is NPO, starting at midnight      Acute Medical Problems and Treatments:  HTN:  - Metoprolol succinate ER 50 mg daily  - Cozaar 100 mg daily  - Please see note from IM dated 5/3/21, 5/6/21, and 5/24.  - Obtained EKG and routine labs on 5/21 in context of pt declining vital sign checks and anti-hypertensives and recently elevated BPs. Reviewed labs and discussed EKG findings with IM on 5/21. No urgent concerns, though IM should be notified if pt develops acute medical concerns (I.e. heart palpitations, SOB, changes in speech, AMS, confusion, CP, HA, changes in vision). Now that Alcides Kohler is in place, placed IM consult for ECT medical clearance. Appreciate Assistance. Pt is medically cleared. See IM note dated 5/24 for details.      CVA:  - Aspirin 81 mg daily     Chronic Constipation:  - Miralax 17 mg daily     Behavioral/Psychological/Social:  - Encourage unit programming    Safety:  - Continue precautions as noted above  - Status 15 minute checks  - Safety precautions include: assault and elopement precautions  - Continue precautions as noted above  - Status 15 minute checks  - Discontinued 1:1 on 4/26 as presence of 1:1 staff appears to be worsening patient's paranoia and agitation.     Legal Status: Committed as MI with Hatch in place for Haldol, Zyprexa, Invega, and Thorazine through [facility]. Filed Alcides Kohler through [facility] and approved on 5/24/21.     Disposition Plan   Reason for ongoing admission: poses an imminent risk to self, poses an imminent risk to others and is unable to care for self due to severe psychosis or tad  Discharge location: Home vs IRTS with ACT team support.   Discharge Medications: not ordered  Follow-up Appointments: not scheduled    Entered by: Ida Zaman on 5/25/2021 at 8:46 AM       > 30 minutes total time that was spent and over 50% of this time was spent in counseling and coordination of care.

## 2021-05-25 NOTE — PLAN OF CARE
"  Problem: Behavioral Health Plan of Care  Goal: Plan of Care Review  Outcome: No Change    Pt observed to be withdraw, spent most of her time in her room. She was briefly out in the milieu to eat dinner. Presents with blunted affect, mood is calm. She was pleasant upon approach. Appears disheveled and untidy. Education given on the importance of self care and hygiene. Pt still refused to have a shower but requested to change her scrubs and sweater. Verbalized \"Yah, this one dirty\". Described her day as \"I'm ok\". Eye contact improving. Pt denies SI/SIB/HI. Denies having anxiety or depression. Denies having any hallucinations.   Consumed 100% of share of dinner and took approximately 500cc of water.   Due medications given. Denies experiencing side effects.  Needs attended to. On Assault, Fall, Cheeking and Elopement precautions. Staff will continue to monitor. No further concerns noted as of this time.     "

## 2021-05-25 NOTE — PLAN OF CARE
Problem: Adult Inpatient Plan of Care  Goal: Plan of Care Review  Outcome: No Change  Flowsheets (Taken 5/25/2021 0844)  Plan of Care Reviewed With: patient  Progress: no change     Pt is suspicious and asked why RN opened the medications. RN apologized stating it was unknown that pt did not want medications opened prior to administration. RN showed pt the medication packages for pt's morning medications. Pt took medications.  Pt requested medications and vital signs to be completed after breakfast. Pt requested a manual BP only and then told Charge RN that they did not do it correctly because pt's BP should be 140 only.     Pt was observed praying in her room with blankets on the floor.     Appetite = eating and tolerating meals    Sleep = 6.25hrs    ADLs = needs encouragement; no shower this shift    Vital signs     05/25/21 0800   Vital Signs   Temp 98.1  F (36.7  C)   Temp src Tympanic   Pulse 79   Pulse Rate Source Monitor   BP (!) 148/92  (manual)   BP Location Left arm   Oxygen Therapy   SpO2 98 %   O2 Device None (Room air)     Plan  Continue with plan of care.     Pt will have ECT #1 tomorrow 11:45AM.   Please review orders for ECT preparation (Please see Dr Zaman's note/orders).    Pt needs to complete Lab draw (ordered by IM for Pre-ECT) tomorrow pre-ECT.

## 2021-05-26 ENCOUNTER — ANESTHESIA EVENT (OUTPATIENT)
Dept: BEHAVIORAL HEALTH | Facility: CLINIC | Age: 61
End: 2021-05-26

## 2021-05-26 ENCOUNTER — APPOINTMENT (OUTPATIENT)
Dept: BEHAVIORAL HEALTH | Facility: CLINIC | Age: 61
End: 2021-05-26
Payer: COMMERCIAL

## 2021-05-26 ENCOUNTER — ANESTHESIA (OUTPATIENT)
Dept: BEHAVIORAL HEALTH | Facility: CLINIC | Age: 61
End: 2021-05-26

## 2021-05-26 LAB
LABORATORY COMMENT REPORT: NORMAL
MAGNESIUM SERPL-MCNC: 2.2 MG/DL (ref 1.6–2.3)
PHOSPHATE SERPL-MCNC: 4.3 MG/DL (ref 2.5–4.5)
SARS-COV-2 RNA RESP QL NAA+PROBE: NEGATIVE
SPECIMEN SOURCE: NORMAL

## 2021-05-26 PROCEDURE — 250N000013 HC RX MED GY IP 250 OP 250 PS 637: Performed by: PHYSICIAN ASSISTANT

## 2021-05-26 PROCEDURE — GZB2ZZZ ELECTROCONVULSIVE THERAPY, BILATERAL-SINGLE SEIZURE: ICD-10-PCS | Performed by: PSYCHIATRY & NEUROLOGY

## 2021-05-26 PROCEDURE — 250N000013 HC RX MED GY IP 250 OP 250 PS 637: Performed by: PSYCHIATRY & NEUROLOGY

## 2021-05-26 PROCEDURE — 87635 SARS-COV-2 COVID-19 AMP PRB: CPT | Performed by: PSYCHIATRY & NEUROLOGY

## 2021-05-26 PROCEDURE — 258N000003 HC RX IP 258 OP 636: Performed by: STUDENT IN AN ORGANIZED HEALTH CARE EDUCATION/TRAINING PROGRAM

## 2021-05-26 PROCEDURE — 83735 ASSAY OF MAGNESIUM: CPT | Performed by: NURSE PRACTITIONER

## 2021-05-26 PROCEDURE — 250N000011 HC RX IP 250 OP 636: Performed by: STUDENT IN AN ORGANIZED HEALTH CARE EDUCATION/TRAINING PROGRAM

## 2021-05-26 PROCEDURE — 124N000002 HC R&B MH UMMC

## 2021-05-26 PROCEDURE — 250N000009 HC RX 250: Performed by: PSYCHIATRY & NEUROLOGY

## 2021-05-26 PROCEDURE — 250N000009 HC RX 250: Performed by: STUDENT IN AN ORGANIZED HEALTH CARE EDUCATION/TRAINING PROGRAM

## 2021-05-26 PROCEDURE — 99233 SBSQ HOSP IP/OBS HIGH 50: CPT | Mod: 25 | Performed by: PSYCHIATRY & NEUROLOGY

## 2021-05-26 PROCEDURE — 90870 ELECTROCONVULSIVE THERAPY: CPT

## 2021-05-26 PROCEDURE — 84100 ASSAY OF PHOSPHORUS: CPT | Performed by: NURSE PRACTITIONER

## 2021-05-26 PROCEDURE — 370N000017 HC ANESTHESIA TECHNICAL FEE, PER MIN

## 2021-05-26 RX ORDER — FLUMAZENIL 0.1 MG/ML
0.5 INJECTION, SOLUTION INTRAVENOUS
Status: COMPLETED | OUTPATIENT
Start: 2021-05-26 | End: 2021-05-26

## 2021-05-26 RX ORDER — LABETALOL HYDROCHLORIDE 5 MG/ML
INJECTION, SOLUTION INTRAVENOUS PRN
Status: DISCONTINUED | OUTPATIENT
Start: 2021-05-26 | End: 2021-05-26

## 2021-05-26 RX ORDER — ETOMIDATE 2 MG/ML
INJECTION INTRAVENOUS PRN
Status: DISCONTINUED | OUTPATIENT
Start: 2021-05-26 | End: 2021-05-26

## 2021-05-26 RX ORDER — POLYETHYLENE GLYCOL 3350 17 G/17G
17 POWDER, FOR SOLUTION ORAL DAILY PRN
Status: DISCONTINUED | OUTPATIENT
Start: 2021-05-26 | End: 2021-09-16 | Stop reason: HOSPADM

## 2021-05-26 RX ADMIN — FLUMAZENIL 0.5 MG: 0.1 INJECTION, SOLUTION INTRAVENOUS at 09:14

## 2021-05-26 RX ADMIN — OLANZAPINE 10 MG: 10 TABLET, ORALLY DISINTEGRATING ORAL at 08:47

## 2021-05-26 RX ADMIN — LORAZEPAM 2 MG: 2 TABLET ORAL at 14:23

## 2021-05-26 RX ADMIN — LABETALOL HYDROCHLORIDE 10 MG: 5 INJECTION, SOLUTION INTRAVENOUS at 09:22

## 2021-05-26 RX ADMIN — LORAZEPAM 2 MG: 2 TABLET ORAL at 19:00

## 2021-05-26 RX ADMIN — OLANZAPINE 10 MG: 10 TABLET, ORALLY DISINTEGRATING ORAL at 19:00

## 2021-05-26 RX ADMIN — METOPROLOL SUCCINATE 50 MG: 50 TABLET, EXTENDED RELEASE ORAL at 08:47

## 2021-05-26 RX ADMIN — Medication 14 MG: at 09:14

## 2021-05-26 RX ADMIN — LOSARTAN POTASSIUM 100 MG: 100 TABLET, FILM COATED ORAL at 08:47

## 2021-05-26 RX ADMIN — Medication 70 MG: at 09:14

## 2021-05-26 RX ADMIN — DEXMEDETOMIDINE HYDROCHLORIDE 20 MCG: 100 INJECTION, SOLUTION INTRAVENOUS at 09:23

## 2021-05-26 ASSESSMENT — ACTIVITIES OF DAILY LIVING (ADL)
HYGIENE/GROOMING: PROMPTS
ORAL_HYGIENE: PROMPTS
HYGIENE/GROOMING: PROMPTS
LAUNDRY: UNABLE TO COMPLETE
ORAL_HYGIENE: PROMPTS
LAUNDRY: UNABLE TO COMPLETE
DRESS: SCRUBS (BEHAVIORAL HEALTH)
DRESS: SCRUBS (BEHAVIORAL HEALTH);PROMPTS

## 2021-05-26 NOTE — PROGRESS NOTES
"[facility], [place]   Psychiatric Progress Note  Hospital Day: 42        Interim History:   The patient's care was discussed with the treatment team during the daily team meeting and/or staff's chart notes were reviewed.  Staff report patient is primarily isolative to her room. Sleeps on the floor in the corner of her room. She is not attending to ADLs. Has not showered since admission over one month ago. Appears paranoid about medications. Eating and drinking well. Denies acute physical concerns and medication side effects. No episodes of seclusion or restraints. Slept well overnight.     During interview with patient, she was initially quite agitated stating that she did not want to take her AM medications without eating food. I encouraged her to do so prior to ECT and after more education and reassurance, patient accepted. She denied acute medical concerns. She denied all sx of psychosis. She is aware that she is having ECT this morning.     Met with patient briefly after her ECT treatment. She said that she felt \"disorganized\" following the treatment as she had difficulty recalling where her room is on the unit. She denied headache, muscle stiffness, or other concerns about memory. She said repeatedly that she wants to go home. She was irritable when writer informed her that she may be in the hospital for another 1-2 weeks while making medication adjustments and administering ECT.          Medications:       flumazenil  0.5 mg Intravenous Once in ECT     LORazepam  2 mg Oral TID    Or     LORazepam  2 mg Intramuscular TID     losartan  100 mg Oral Daily     metoprolol succinate ER  50 mg Oral Daily     OLANZapine zydis  10 mg Oral BID    Or     OLANZapine  10 mg Intramuscular BID     polyethylene glycol  17 g Oral Daily          Allergies:     Allergies   Allergen Reactions     Haldol [Haloperidol]      Patient previously tolerated haldol, though developed oculogyric crises during " hospital stay on 4/26/21 on total daily dose of 10 mg.     Lisinopril Cough          Labs:     Recent Results (from the past 24 hour(s))   Magnesium    Collection Time: 05/26/21  8:24 AM   Result Value Ref Range    Magnesium 2.2 1.6 - 2.3 mg/dL   Phosphorus    Collection Time: 05/26/21  8:24 AM   Result Value Ref Range    Phosphorus 4.3 2.5 - 4.5 mg/dL          Psychiatric Examination:     BP (!) 162/88 (BP Location: Left arm)   Pulse 79   Temp 98.1  F (36.7  C) (Tympanic)   Resp 16   Wt 71 kg (156 lb 8 oz)   SpO2 98%   BMI 27.72 kg/m    Weight is 156 lbs 8 oz  Body mass index is 27.72 kg/m .    Weight over time:  Vitals:    05/25/21 0850   Weight: 71 kg (156 lb 8 oz)       Orthostatic Vitals     None            Cardiometabolic risk assessment. 04/15/21      Reviewed patient profile for cardiometabolic risk factors    Date taken /Value  REFERENCE RANGE   Abdominal Obesity  (Waist Circumference)   See nursing flowsheet Women ?35 in (88 cm)   Men ?40 in (102 cm)      Triglycerides  Triglycerides   Date Value Ref Range Status   10/19/2019 117 <150 mg/dL Final       ?150 mg/dL (1.7 mmol/L) or current treatment for elevated triglycerides   HDL cholesterol  HDL Cholesterol   Date Value Ref Range Status   10/19/2019 56 >49 mg/dL Final   ]   Women <50 mg/dL (1.3 mmol/L) in women or current treatment for low HDL cholesterol  Men <40 mg/dL (1 mmol/L) in men or current treatment for low HDL cholesterol     Fasting plasma glucose (FPG) Lab Results   Component Value Date     10/19/2019      FPG ?100 mg/dL (5.6 mmol/L) or treatment for elevated blood glucose   Blood pressure  BP Readings from Last 3 Encounters:   05/26/21 (!) 162/88   06/04/19 131/71   04/26/19 164/86    Blood pressure ?130/85 mmHg or treatment for elevated blood pressure   Family History  See family history     Appearance: awake, alert and disheveled   Attitude: somewhat cooperative  Eye Contact: poor  Mood: calmer  Affect:  Mood congruent  Speech:  accent, coherent  Language: fluent and intact in English  Psychomotor, Gait, Musculoskeletal: No stereotypic movements noted on examination today. No evidence of tardive dyskinesia, dystonia, or tics. No physical agitation.   Throught Process: unable to assess  Associations:  No loosening of associations  Thought Content:  no evidence of suicidal ideation or homicidal ideation, evidence of paranoia and guardedness  Insight:  limited  Judgement:  limited  Oriented to:  Person, place, time  Attention Span and Concentration: fair, improved  Recent and Remote Memory:  Fair, improved  Fund of Knowledge:  normal    Clinical Global Impressions  First:  Considering your total clinical experience with this particular patient population, how severe are the patient's symptoms at this time?: 7 (04/16/21 1428)  Compared to the patient's condition at the START of treatment, this patient's condition is: 4 (04/16/21 1428)  Most recent:  Considering your total clinical experience with this particular patient population, how severe are the patient's symptoms at this time?: 7 (05/13/21 0953)  Compared to the patient's condition at the START of treatment, this patient's condition is: 6 (05/13/21 0953)           Precautions:     Behavioral Orders   Procedures     Assault precautions     Cheeking Precautions (behavioral units)     Patient Observed swallowing PO medications; Patient asked to drink water after swallowing medication; Patient in Staff line of sight for 15 minutes after medication given; Mouth checks after PO administration (patient asked to open mouth and stick out their tongue).     Code 1 - Restrict to Unit     Code 2 - 1:1 Staff Supervision     For ECT only     Electroconvulsive therapy     Series of up to 12 treatments. Begin Date: 5/26/21     Treating Psychiatrist providing ECT:  Dr. Lubin     Notified on:  5/21/21     Electroconvulsive therapy     As long as we get the green light from risk management and pt is  medically cleared, begin ECT every Monday, Wednesday, and Friday     Electroconvulsive therapy     Series of up to 12 treatments. Begin Date: 5/25/21     Treating Psychiatrist providing ECT:  Amee     Notified on:  5/24/21     Elopement precautions     Fall precautions     Routine Programming     As clinically indicated     Status 15     Every 15 minutes.     Status Individual Observation     SIO while NPO for ECT. Patient SIO status reviewed with team/RN.  Please also refer to RN/team documentation for add'l detail.    -SIO staff to monitor following which have contributed to patient being on SIO:  Patient will need to be monitored closely to ensure she remains on NPO status for ECT given her poor insight, judgment, disorganized thought process  -Possible interventions SIO staff could use to support patient's treatment progress:  Monitor closely during NPO  -When following observed, team will review discontinuation of SIO:  May be discontinued after ECT treatment on 5/26     Order Specific Question:   CONTINUOUS 24 hours / day     Answer:   5 feet     Order Specific Question:   Indications for SIO     Answer:   Severe intrusiveness          Diagnoses:     Schizoaffective Disorder, Bipolar Type, decompensated  Excited Catatonia  HTN  Dyslipidemia  Hx of CVA in 2017  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 Symptoms and presentation at this time is most consistent with Schizoaffective Disorder, Bipolar Type. We have obtained most recent medication regimen from patient's ACT team, and regimen was initially restarted. Inpatient psychiatric  "hospitalization is warranted at this time for safety, stabilization, and possible adjustment in medications. Pt is committed. We have petitioned for Alcides Calderon due to lack of improvement and side effects from medications.    Hospital Course:  On admission, PTA medications were restarted. However, patient had been declining all scheduled medications despite significant encouragement from staff and provider. Psychiatric emergency declared on 4/20 due to aggression toward others in context of severe psychosis and suspected excited catatonia. Ativan 1 mg TID was also added. Discontinued PTA Invega, Zyprexa, and thorazine on 4/20 due to consistent refusal.     On 4/26, it was noted that patient frequently had upward gaze while walking up and down the unit. She did not appear to be distressed. She reported that she was looking at \"my god.\" It was determined to be oculogyric crisis secondary to IM haloperidol. Haldol was subsequently discontinued during day shift on 4/26 and scheduled Zyprexa was initiated on emergency basis. Oral Cogentin was also scheduled, though patient declined. On the evening of 4/26, patient's gaze was fixed in upward position for several hours and she appeared to be experiencing discomfort. IM Cogentin was administered with noted resolution. Partial improvements weew observed after switch to scheduled Zyprexa. After patient improved, she was more receptive to reinitiating oral Invega, which was initiated on 5/3 and titrated to PTA dose of 9 mg daily on 5/10. Plan was for ACT team to bring in loading dose of Invega Sustenna. Patient had signs of oculogyric crisis again with Invega. Oral dose decreased to 6 mg after this. Invega was stopped on 5/14 due to ongoing signs of problems related to eye movement and concerns for oculogyric crisis.    Overall improvement in patient's agitation and suspected catatonia noted on 4/30 after patient accepted two doses of Ativan. She began declining Ativan again " "with noted decompensation. Patient now accepting, however, she does not have decisional making capacity at this time. She does not believe she has a mental illness, including catatonia. She does not fully understand risks associated with inadequate treatment of catatonia. Discussed with her son who is acting as surrogate decision maker and he is in full support of forced scheduled IM Ativan if patient declines oral formulation. Also consulted with our legal team.     Ativan was increased on 5/19 due to re-emerging evidence of catatonia. Meeting was held with ACT team on 5/20, including ACT team psychiatrist, Dr. Pedraza. He said that he is in \"full support\" of plan to pursue Mcgrath Kohler and ECT at this time. He does feel that in the past she has been discharged from the hospital while still quite symptomatic. He is hoping that ECT will be effective and that with improvement, Michelle would be more receptive to clozapine and weekly blood draws. He said that ideally she would transition to an IRTS before going back to her apartment, but also understands there may be some barriers (I.e. pt's willingness, financial concerns, etc).     Target psychiatric symptoms and interventions:   - Discontinued Artane 2 mg BID per pt request - patient often refusing this  - Oral Zyprexa 10 mg BID OR Zyprexa 10 mg IM. Hatch now in place. May consider increasing further though holding off given re-emerging catatonic sx and borderline prolonged QTc  - Ativan 2 mg TID OR Ativan IM 2 mg TID (see above). Patient may not decline.   Ativan to be given at 1700 on days prior to ECT and will be held on mornings before ECT    Continue Cogentin 2 mg IM daily prn for evidence acute dystonic reaction or oculogyric crisis  Continue hydroxyzine 25-50 mg q4h prn for acute anxiety  Continue Trazodone 50 mg at bedtime prn for sleep disturbances  Continue Zyprexa 10 mg TID prn for severe agitation  Continue Ativan/Benadryl q4h prn for agitation. WOULD " GIVE PRN ATIVAN FIRST FOR AGITATION.      ECT:  - Because patient was only intermittently accepting scheduled Ativan and some symptoms of catatonia are re-emerging, pursued Alcides Kohler for ECT. She also continues to exhibit symptoms of psychosis and has not responded or has experienced side effects from multiple neuroleptic medications. Court hearing was held on 5/21. Alcides Kohler is approved. Patient is medically cleared. COVID negative on 5/24. Obtained ECT consult on 5/21/21. Please see Dr. Lubin's consultation note.   - First ECT treatment completed on 5/26.   - NPO 8 hours prior to scheduled treatment. Clear liquids until 2 hours prior to treatment.   - Discussed with IM. May resume AM antihypertensives, no need to hold prior to ECT  - SIO while patient is NPO, starting at midnight on ECT days      Acute Medical Problems and Treatments:  HTN:  - Metoprolol succinate ER 50 mg daily  - Cozaar 100 mg daily  - Please see note from IM dated 5/3/21, 5/6/21, and 5/24.  - Obtained EKG and routine labs on 5/21 in context of pt declining vital sign checks and anti-hypertensives and recently elevated BPs. Reviewed labs and discussed EKG findings with IM on 5/21. No urgent concerns, though IM should be notified if pt develops acute medical concerns (I.e. heart palpitations, SOB, changes in speech, AMS, confusion, CP, HA, changes in vision). Now that Alcides Kohler is in place, placed IM consult for ECT medical clearance. Appreciate Assistance. Pt was medically cleared for ECT. See IM note dated 5/24 for details.      CVA:  - Aspirin 81 mg daily     Chronic Constipation:  - Miralax 17 mg daily     Behavioral/Psychological/Social:  - Encourage unit programming    Safety:  - Continue precautions as noted above  - Status 15 minute checks  - Safety precautions include: assault and elopement precautions  - Continue precautions as noted above  - Status 15 minute checks  - Discontinued 1:1 on 4/26 as presence of 1:1 staff  appears to be worsening patient's paranoia and agitation.     Legal Status: Committed as MI with Hatch in place for Haldol, Zyprexa, Invega, and Thorazine through [facility]. Filed Alcides Kohler through [facility] and approved on 5/24/21.     Disposition Plan   Reason for ongoing admission: poses an imminent risk to self, poses an imminent risk to others and is unable to care for self due to severe psychosis or tad  Discharge location: Home vs IR with ACT team support.   Discharge Medications: not ordered  Follow-up Appointments: not scheduled    Entered by: Ida Zaman on 5/25/2021 at 9:35 AM      > 30 minutes total time that was spent and over 50% of this time was spent in counseling and coordination of care.

## 2021-05-26 NOTE — ANESTHESIA PREPROCEDURE EVALUATION
Anesthesia Pre-Procedure Evaluation    Patient: Michelle Pino   MRN: [id number] : 1960        Preoperative Diagnosis: * No pre-op diagnosis entered *   Procedure : * No procedures listed *     Past Medical History:   Diagnosis Date     Hyperlipidemia      Hypertension      Schizophrenia (H)       Past Surgical History:   Procedure Laterality Date     OPEN REDUCTION INTERNAL FIXATION TIBIAL PLATEAU Right 2019    at [facility] [place], fracture occured during psychiatric restraining      Allergies   Allergen Reactions     Haldol [Haloperidol]      Patient previously tolerated haldol, though developed oculogyric crises during hospital stay on 21 on total daily dose of 10 mg.     Lisinopril Cough      Social History     Tobacco Use     Smoking status: Never Smoker   Substance Use Topics     Alcohol use: Never     Frequency: Never      Wt Readings from Last 1 Encounters:   21 71 kg (156 lb 8 oz)        Anesthesia Evaluation            ROS/MED HX  ENT/Pulmonary:  - neg pulmonary ROS     Neurologic:     (+) CVA, without deficits,     Cardiovascular:     (+) Dyslipidemia hypertension-----Previous cardiac testing     METS/Exercise Tolerance: >4 METS    Hematologic:  - neg hematologic  ROS     Musculoskeletal:  - neg musculoskeletal ROS     GI/Hepatic:  - neg GI/hepatic ROS     Renal/Genitourinary:  - neg Renal ROS     Endo:  - neg endo ROS     Psychiatric/Substance Use: Comment: SCHIZOAFFECTIVE, ROSHAN    (+) psychiatric history anxiety and other (comment)     Infectious Disease:  - neg infectious disease ROS     Malignancy:  - neg malignancy ROS     Other:            Physical Exam    Airway   unable to assess   Comment: refused         Respiratory Devices and Support         Dental    unable to assess        Cardiovascular   cardiovascular exam normal          Pulmonary   pulmonary exam normal            Other findings: APPEARS FEASIBLE    OUTSIDE LABS:  CBC:   Lab Results   Component Value Date    WBC 6.6  05/21/2021    WBC 5.4 10/19/2019    HGB 13.6 05/21/2021    HGB 12.5 10/19/2019    HCT 39.3 05/21/2021    HCT 37.7 10/19/2019     05/21/2021     10/19/2019     BMP:   Lab Results   Component Value Date     05/21/2021     10/19/2019    POTASSIUM 3.9 05/21/2021    POTASSIUM 3.8 10/19/2019    CHLORIDE 108 05/21/2021    CHLORIDE 109 10/19/2019    CO2 23 05/21/2021    CO2 27 10/19/2019    BUN 16 05/21/2021    BUN 9 10/19/2019    CR 0.88 05/21/2021    CR 0.87 10/19/2019     (H) 05/21/2021     (H) 10/19/2019     COAGS: No results found for: PTT, INR, FIBR  POC: No results found for: BGM, HCG, HCGS  HEPATIC:   Lab Results   Component Value Date    ALBUMIN 3.6 05/21/2021    PROTTOTAL 7.0 05/21/2021    ALT 24 05/21/2021    AST 15 05/21/2021    ALKPHOS 91 05/21/2021    BILITOTAL 0.3 05/21/2021     OTHER:   Lab Results   Component Value Date    ALEK 8.7 05/21/2021    TSH 0.53 05/21/2021    T4 0.77 10/19/2019       Anesthesia Plan    ASA Status:  2   NPO Status:  NPO Appropriate    Anesthesia Type: General.     - Airway: Mask Only      Maintenance: TIVA.        Consents    Anesthesia Plan(s) and associated risks, benefits, and realistic alternatives discussed. Questions answered and patient/representative(s) expressed understanding.     - Discussed with:  Patient      - Extended Intubation/Ventilatory Support Discussed: No.      - Patient is DNR/DNI Status: No    Use of blood products discussed: No .     Postoperative Care    Pain management: Multi-modal analgesia.        Comments:                Cipriano Howard MD

## 2021-05-26 NOTE — PROGRESS NOTES
"Patient adequate for discharge. Report called to inpatient nurse Tasha Davis RN. VSS, A/O, IV removed.     Of note, pt confused and restless upon emergence from anesthesia. Pt refusing vitals to be checked, very difficult to redirect or console. Dr. Howard stated, \"As long as her systolic is below 200, she can go back to the floor.\" Pt was able to tolerate one blood pressure reading with constant reassurance, systolic was 179, Dr. Howard aware. Pt discharged back to station 12 with staff and security.     "

## 2021-05-26 NOTE — PLAN OF CARE
BEHAVIORAL TEAM DISCUSSION    Participants:  Brayden Driscoll, RN, Marina, RN, Sergio,  CTC  Progress:  Continuing to assess  Anticipated length of stay:  TBD  Continued Stay Criteria/Rationale: Medical/Physical:  Patient will complete a course of ECT  Precautions:   Behavioral Orders   Procedures    Assault precautions    Cheeking Precautions (behavioral units)     Patient Observed swallowing PO medications; Patient asked to drink water after swallowing medication; Patient in Staff line of sight for 15 minutes after medication given; Mouth checks after PO administration (patient asked to open mouth and stick out their tongue).    Code 1 - Restrict to Unit    Code 2 - 1:1 Staff Supervision     For ECT only    Electroconvulsive therapy     Series of up to 12 treatments. Begin Date: 5/26/21     Treating Psychiatrist providing ECT:  Dr. Lubin     Notified on:  5/21/21    Electroconvulsive therapy     As long as we get the green light from risk management and pt is medically cleared, begin ECT every Monday, Wednesday, and Friday    Electroconvulsive therapy     Series of up to 12 treatments. Begin Date: 5/25/21     Treating Psychiatrist providing ECT:  Amee     Notified on:  5/24/21    Elopement precautions    Fall precautions    Mcgrath Calderon    Routine Programming     As clinically indicated    Status 15     Every 15 minutes.    Status Individual Observation     SIO while NPO for ECT. Patient SIO status reviewed with team/RN.  Please also refer to RN/team documentation for add'l detail.    -SIO staff to monitor following which have contributed to patient being on SIO:  Patient will need to be monitored closely to ensure she remains on NPO status for ECT given her poor insight, judgment, disorganized thought process  -Possible interventions SIO staff could use to support patient's treatment progress:  Monitor closely during NPO  -When following observed, team will review discontinuation of SIO:  May be discontinued  after ECT treatment on 5/26     Order Specific Question:   CONTINUOUS 24 hours / day     Answer:   5 feet     Order Specific Question:   Indications for SIO     Answer:   Severe intrusiveness     Plan:  Attending psychiatrist sees patient daily to discuss treatment plan and to answer medication questions/concerns. Patient has started ECT during this hospitalization. Patient will continue to work with her ACT Team upon discharge. CTC will coordinate disposition and aftercare plan.   Rationale for change in precautions or plan:  No change

## 2021-05-26 NOTE — ANESTHESIA POSTPROCEDURE EVALUATION
Patient: Michelle Pino    * No procedures listed *    Diagnosis:* No pre-op diagnosis entered *  Diagnosis Additional Information: No value filed.    Anesthesia Type:  General    Note:  Disposition: Admission   Postop Pain Control: Uneventful            Sign Out: Well controlled pain   PONV:    Neuro/Psych: Uneventful            Sign Out: Acceptable/Baseline neuro status   Airway/Respiratory: Uneventful            Sign Out: Acceptable/Baseline resp. status   CV/Hemodynamics: Uneventful            Sign Out: Acceptable CV status; No obvious hypovolemia; No obvious fluid overload   Other NRE:    DID A NON-ROUTINE EVENT OCCUR?     Event details/Postop Comments:  Agitated wakeup, hypertensive. Precedex given           Last vitals:  Vitals:    05/25/21 0800 05/26/21 0854 05/26/21 0927   BP: (!) 148/92 (!) 162/88    Pulse: 79     Resp:  16 16   Temp: 36.7  C (98.1  F)  36.8  C (98.3  F)   SpO2: 98%  95%       Last vitals prior to Anesthesia Care Transfer:  CRNA VITALS  5/26/2021 0857 - 5/26/2021 0957      5/26/2021             NIBP:  (!) 179/149    Pulse:  94    NIBP Mean:  154    SpO2:  97 %    Resp Rate (observed):  (!) 1          Electronically Signed By: Cipriano Howard MD  May 26, 2021  10:01 AM

## 2021-05-26 NOTE — PLAN OF CARE
Problem: Sleep Disturbance (Psychotic Signs/Symptoms)  Goal: Improved Sleep (Psychotic Signs/Symptoms)  Outcome: Improving   Night Shift Summary (5/25/21 into 05/26/21)    Pt in bed sleeping at start of shift, breathing quiet and unlabored. Pt appeared to have slept for 6hrs thus far.NPO MN.No prn meds or any oral intake given.    Pt continues on assaut,fall, elopement and cheeking precautions, with no related events occurring this shift.SIO 1:1 sitter tonight to ensure that pt does not have any oral intake before ECT this morning at 11:45am.    Will continue to monitor and assess.

## 2021-05-26 NOTE — PROGRESS NOTES
Patient returned from ECT.  She was alert, but disorganized.  She was continually asking for discharge and to speak with her doctor.  She was told her doctor would speak with her later.    From ECT report, patient was incontinent of urine after the procedure. Patient was given a new pair of scrubs on the unit, but she declined to change.  Multiple staff members worked approached her to ask her to change, but she continued to decline.  Her pants do not appear visibly soiled.    Patient denied pain.

## 2021-05-26 NOTE — PROCEDURES
"Procedure/Surgery Information   [facility]    Bedside Procedure Note  Date of Service (when I performed the procedure): 05/26/2021    Michelle Pino is a 60 year old female patient.  1. Paranoid schizophrenia (H)    2. COVID-19 ruled out by laboratory testing    3. Schizoaffective disorder, bipolar type (H)      Past Medical History:   Diagnosis Date     Hyperlipidemia      Hypertension      Schizophrenia (H)          BP: (!) 162/88     Resp: 16          Procedures     Angel Lubin     [facility],   ECT Procedure Note   05/26/2021    Michelle Pino [id number]   60 year old 1960     Patient Status: Inpatient    Is this the first in a series of 12 treatments?  Yes    History and Physical: Reviewed in medical record    Consent: court ordered    Date Consent Signed: ECT may be administered during the commitment, which was signed 4/26/21 and expires on 10/26/21.  She can have ECT up to 3 times weekly for up to 12 treatments.        Allergies   Allergen Reactions     Haldol [Haloperidol]      Patient previously tolerated haldol, though developed oculogyric crises during hospital stay on 4/26/21 on total daily dose of 10 mg.     Lisinopril Cough       Weight:  156 lbs 8 oz     BP (!) 162/88 (BP Location: Left arm)   Pulse 79   Temp 98.1  F (36.7  C) (Tympanic)   Resp 16   Wt 71 kg (156 lb 8 oz)   SpO2 98%   BMI 27.72 kg/m      Patient Preparations: Other (comment)(none needed)         Indications for ECT:   Medications ineffective         Clinical Narrative:   Patient has bipolar tad with psychosis and is court-ordered to ECT.  NPO after 2400.  Alert and Oriented x 3.  Per unit staff last night, \"Pt napped most of the shift.She came out for meds and meals only.She was irritable with tensed and pressured speech when communicating with writer.Pt is very rigid in her routine . She cont on cheeking ,assault and fall " "precautions.Dr Zaman was able to convinced her in taking her Ativan at 5pm instead of 7pm in preparation for her ECT tomorrow at 11:45 am. Unable to assess her with regards to psych sxs as pt would shout at writer to leave her room angrily. She was medication compliant but  suspicious.Her meds had to be opened in her presence.Pt affect is flat and blunted.\"         Diagnosis:   Bipolar I manic with psychosis         Pause for the Cause:     Right patient Yes   Right procedure/laterality settings: Yes          Intra-Procedure Documentation:       ECT #: 1   Treatment number this series: 1   Total treatment number: 1     Type of ECT:  Bilateral, standard    ECT Medications:    Today: Flumazenil: 0.5mg pre-ECT  Etomidate: 14mg  Succinyl Choline: 70mg   Labetalol: 10mg post-ECT for bp 256/120  Today: Precedex: 20mcg    ECT Strip Summary:   Energy Level: 40 percent  Motor Seizure Duration: 26 seconds  EEG Seizure Duration:  30 seconds    Complications: No    Plan:   COVID test 5/26/21  Next ECT 5/28/21    Angel Lubin MD  "

## 2021-05-26 NOTE — PLAN OF CARE
Problem: Behavior Regulation Impairment (Psychotic Signs/Symptoms)  Goal: Improved Behavioral Control (Psychotic Signs/Symptoms)  Outcome: Improving    Patient completed ECT#1 today.  She needed some encouragement, but was overall cooperative with the process.  She presents as tense, but improving.  She has poor insight into her mental and physical health.  She would frequently request discharge and was not accepting of staff answers.  Overall guarded of staff, but improving.  Per report, patient was incontinent of urine in ECT, but refuses to change her pants.  Note - pants do not look soiled.  She will not shower.       Patient accepted part of her AM medications.  She would only allow a manual BP - and will tell staff they  Did it wrong  if her systolic BP >140, demanding it be retaken.   Hypertensive throughout the day (see flowsheets).  No reported headache, vision changes, but patient is guarded.

## 2021-05-26 NOTE — PLAN OF CARE
Assessment/Intervention/Current Symtoms and Care Coordination    Attended Team Meeting: Reviewed chart notes:  Patient started ECT treatment today.     Discharge Plan or Goal    Complete course of ECT and return home when stable and continue to work with her [facility] ACT Team     Barriers to Discharge     As stated above patient will complete a course of ECT while hospitalized. Patient is continuing to stabilize    Referral Status    No referrals have been made    Legal Status     Committed/Hatch/Mcgrath Kohler through [facility]

## 2021-05-26 NOTE — PROGRESS NOTES
Patient has ECT this AM.  She was able to have an earlier appointment.  She has maintained her NPO status (with only the drink she had at 0630).  She took her medications with a sip of water.      Patient allowed a manual BP, but refused all other VS.  Denied pain.    Patient left station 12 at approx 0900 in a wheelchair with a staff and .

## 2021-05-27 PROCEDURE — 99233 SBSQ HOSP IP/OBS HIGH 50: CPT | Performed by: PSYCHIATRY & NEUROLOGY

## 2021-05-27 PROCEDURE — 250N000013 HC RX MED GY IP 250 OP 250 PS 637: Performed by: PHYSICIAN ASSISTANT

## 2021-05-27 PROCEDURE — 250N000013 HC RX MED GY IP 250 OP 250 PS 637: Performed by: PSYCHIATRY & NEUROLOGY

## 2021-05-27 PROCEDURE — 124N000002 HC R&B MH UMMC

## 2021-05-27 RX ADMIN — LORAZEPAM 2 MG: 2 TABLET ORAL at 08:34

## 2021-05-27 RX ADMIN — LORAZEPAM 2 MG: 2 TABLET ORAL at 17:09

## 2021-05-27 RX ADMIN — LORAZEPAM 2 MG: 2 TABLET ORAL at 13:47

## 2021-05-27 RX ADMIN — OLANZAPINE 10 MG: 10 TABLET, ORALLY DISINTEGRATING ORAL at 17:09

## 2021-05-27 RX ADMIN — METOPROLOL SUCCINATE 50 MG: 50 TABLET, EXTENDED RELEASE ORAL at 08:34

## 2021-05-27 RX ADMIN — LOSARTAN POTASSIUM 100 MG: 100 TABLET, FILM COATED ORAL at 08:34

## 2021-05-27 RX ADMIN — OLANZAPINE 10 MG: 10 TABLET, ORALLY DISINTEGRATING ORAL at 08:34

## 2021-05-27 ASSESSMENT — ACTIVITIES OF DAILY LIVING (ADL)
LAUNDRY: UNABLE TO COMPLETE
DRESS: SCRUBS (BEHAVIORAL HEALTH)
HYGIENE/GROOMING: PROMPTS
LAUNDRY: UNABLE TO COMPLETE
DRESS: SCRUBS (BEHAVIORAL HEALTH)
ORAL_HYGIENE: PROMPTS
HYGIENE/GROOMING: PROMPTS
ORAL_HYGIENE: PROMPTS

## 2021-05-27 NOTE — PLAN OF CARE
Assessment/Intervention/Current Symtoms and Care Coordination:  Attended Team Meeting, Reviewed chart notes: Patient will have her 2nd ECT tomorrow.  Patient's symptoms appear to be improving. Pleasant and cooperative with staff but remains isolative to self.      Discharge Plan or Goal:  Return home with [facility] ACT Team in place.     Barriers to Discharge:    Patient will need to complete course of ECT.    Referral Status:  No referrals have been made    Legal Status:     Committed/Hatch through [facility]

## 2021-05-27 NOTE — PLAN OF CARE
Problem: Behavior Regulation Impairment (Psychotic Signs/Symptoms)  Goal: Improved Behavioral Control (Psychotic Signs/Symptoms)  Outcome: Improving    Patient isolated to her room for much of the shift, only entering the lounge for meals.  She was quieter and more withdrawn.  Appeared flat and depressed.  No instances of yelling.  Patient denied all mental health concerns.  Denied side effects from ECT and then specifically denied pain, headaches, and changes in vision.     Patient accepted her medications without incident.  She allowed a manual BP - 140/80 and the rest of her VS to be taken - an improvement.  Patient did not shower.   She ate her meals.     Patient is now Code status 3 and can take walks in the [place] with staff and security present.

## 2021-05-27 NOTE — PLAN OF CARE
Problem: Behavior Regulation Impairment (Psychotic Signs/Symptoms)  Goal: Improved Behavioral Control (Psychotic Signs/Symptoms)  Outcome: No Change    Patient was extremely isolated, only entering the lounge for meals and when prompted for her medications.  She is guarded.  Mood is less irritable.  She was more receptive to feedback and staff suggestions (did not yell when she was offered a chance to shower, etc.).  She denied AH and VH.  No requests to discharge.  Appeared depressed and sad.  No delusional statements to writer.     Patient accepted her HS medications.  She was hypertensive - 138/90.  She denied pain, headaches, vision changes, and all other acute medical concerns.  She denied medication side effects.  She ate her supper. She continues to refused to shower.  When asked if she would shower with soaps from home, she said  no .  Her son called and was given an update.  Patient declined a call from her ACT team .

## 2021-05-27 NOTE — PROGRESS NOTES
"[facility], [place]   Psychiatric Progress Note  Hospital Day: 43        Interim History:   The patient's care was discussed with the treatment team during the daily team meeting and/or staff's chart notes were reviewed.  Staff report patient is primarily isolative to her room. Sleeps on the floor in the corner of her room. She is not attending to ADLs. Has not showered since admission over 6 weeks ago. Eating and drinking well. Denies acute physical concerns and medication side effects. No episodes of seclusion or restraints. Slept well overnight. No complications associated with ECT. Less irritability noted. She denied pain, headaches, vision changes, and all other acute medical concerns.  Appeared depressed and sad.     During interview with patient, [place] [facility] [place]. She said that ECT went well and did recall having ECT yesterday morning. She noted that she was \"disorganized\" directly following ECT as \"I didn't even know where my room was.\" She was alert and oriented x 2. She was aware of her current location, but it took her several seconds to recall which state she resides in. When she finally recalled the state, she smiled and laughed. She added \"What is wrong with me? I knew that!\" She denied any other concerns about her memory. She notes that her mood is unchanged from previous days. She asked appropriate questions about ECT, but made it clear that she does not want to remain hospitalized throughout the duration of acute series of ECT (up to 12 treatments). She is interested in going outside to [place] this weekend. She feels this will be helpful for her. She did not make any overtly paranoid or delusional statements today. Did not appear irritable and thanked writer. She had no further questions or concerns. Denied acute medical concerns. Denied pain, headache, muscle stiffness. Notes good appetite and adequate sleep.          Medications:       " LORazepam  2 mg Oral TID    Or     LORazepam  2 mg Intramuscular TID     losartan  100 mg Oral Daily     metoprolol succinate ER  50 mg Oral Daily     OLANZapine zydis  10 mg Oral BID    Or     OLANZapine  10 mg Intramuscular BID          Allergies:     Allergies   Allergen Reactions     Haldol [Haloperidol]      Patient previously tolerated haldol, though developed oculogyric crises during hospital stay on 4/26/21 on total daily dose of 10 mg.     Lisinopril Cough          Labs:     No results found for this or any previous visit (from the past 24 hour(s)).       Psychiatric Examination:     BP (!) 140/80 (BP Location: Left arm)   Pulse 79   Temp 99  F (37.2  C) (Tympanic)   Resp 16   Wt 71 kg (156 lb 8 oz)   SpO2 98%   BMI 27.72 kg/m    Weight is 156 lbs 8 oz  Body mass index is 27.72 kg/m .    Weight over time:  Vitals:    05/25/21 0850   Weight: 71 kg (156 lb 8 oz)       Orthostatic Vitals     None            Cardiometabolic risk assessment. 04/15/21      Reviewed patient profile for cardiometabolic risk factors    Date taken /Value  REFERENCE RANGE   Abdominal Obesity  (Waist Circumference)   See nursing flowsheet Women ?35 in (88 cm)   Men ?40 in (102 cm)      Triglycerides  Triglycerides   Date Value Ref Range Status   10/19/2019 117 <150 mg/dL Final       ?150 mg/dL (1.7 mmol/L) or current treatment for elevated triglycerides   HDL cholesterol  HDL Cholesterol   Date Value Ref Range Status   10/19/2019 56 >49 mg/dL Final   ]   Women <50 mg/dL (1.3 mmol/L) in women or current treatment for low HDL cholesterol  Men <40 mg/dL (1 mmol/L) in men or current treatment for low HDL cholesterol     Fasting plasma glucose (FPG) Lab Results   Component Value Date     10/19/2019      FPG ?100 mg/dL (5.6 mmol/L) or treatment for elevated blood glucose   Blood pressure  BP Readings from Last 3 Encounters:   05/27/21 (!) 140/80   06/04/19 131/71   04/26/19 164/86    Blood pressure ?130/85 mmHg or treatment for  elevated blood pressure   Family History  See family history     Appearance: awake, alert and disheveled   Attitude: more cooperative  Eye Contact: poor  Mood: calmer  Affect:  Mood congruent, blunted though overall brighter. She smiled on one occasion.   Speech: accent, coherent  Language: fluent and intact in English  Psychomotor, Gait, Musculoskeletal: No stereotypic movements noted on examination today. No evidence of tardive dyskinesia, dystonia, or tics. No physical agitation.   Throught Process: unable to assess  Associations:  No loosening of associations  Thought Content:  no evidence of suicidal ideation or homicidal ideation, evidence of paranoia and guardedness  Insight:  limited  Judgement:  limited  Oriented to:  Person, place, time  Attention Span and Concentration: fair, improved  Recent and Remote Memory:  Fair, improved  Fund of Knowledge:  normal    Clinical Global Impressions  First:  Considering your total clinical experience with this particular patient population, how severe are the patient's symptoms at this time?: 7 (04/16/21 1428)  Compared to the patient's condition at the START of treatment, this patient's condition is: 4 (04/16/21 1428)  Most recent:  Considering your total clinical experience with this particular patient population, how severe are the patient's symptoms at this time?: 7 (05/13/21 0953)  Compared to the patient's condition at the START of treatment, this patient's condition is: 6 (05/13/21 0953)           Precautions:     Behavioral Orders   Procedures     Assault precautions     Cheeking Precautions (behavioral units)     Patient Observed swallowing PO medications; Patient asked to drink water after swallowing medication; Patient in Staff line of sight for 15 minutes after medication given; Mouth checks after PO administration (patient asked to open mouth and stick out their tongue).     Code 1 - Restrict to Unit     Code 2 - 1:1 Staff Supervision     For ECT only      Electroconvulsive therapy     Series of up to 12 treatments. Begin Date: 5/26/21     Treating Psychiatrist providing ECT:  Dr. Lubin     Notified on:  5/21/21     Electroconvulsive therapy     As long as we get the green light from risk management and pt is medically cleared, begin ECT every Monday, Wednesday, and Friday     Electroconvulsive therapy     Series of up to 12 treatments. Begin Date: 5/25/21     Treating Psychiatrist providing ECT:  Amee     Notified on:  5/24/21     Elopement precautions     Fall precautions     Mcgrath Calderon     Routine Programming     As clinically indicated     Status 15     Every 15 minutes.     Status Individual Observation     SIO while NPO for ECT. Patient SIO status reviewed with team/RN.  Please also refer to RN/team documentation for add'l detail.    -SIO staff to monitor following which have contributed to patient being on SIO:  Patient will need to be monitored closely to ensure she remains on NPO status for ECT given her poor insight, judgment, disorganized thought process  -Possible interventions SIO staff could use to support patient's treatment progress:  Monitor closely during NPO  -When following observed, team will review discontinuation of SIO:  May be discontinued after ECT treatment on 5/26     Order Specific Question:   CONTINUOUS 24 hours / day     Answer:   5 feet     Order Specific Question:   Indications for SIO     Answer:   Severe intrusiveness          Diagnoses:     Schizoaffective Disorder, Bipolar Type, decompensated  Excited Catatonia  HTN  Dyslipidemia  Hx of CVA in 2017  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 Symptoms and presentation at this time is most consistent with Schizoaffective Disorder, Bipolar Type. We have obtained most recent medication regimen from patient's ACT team, and regimen was initially restarted. Inpatient psychiatric hospitalization is warranted at this time for safety, stabilization, and possible adjustment in medications. Pt is committed. We have petitioned for Alcides Calderon due to lack of improvement and side effects from medications.    Hospital Course:  On admission, PTA medications were restarted. However, patient had been declining all scheduled medications despite significant encouragement from staff and provider. Psychiatric emergency declared on 4/20 due to aggression toward others in context of severe psychosis and suspected excited catatonia. Ativan 1 mg TID was also added. Discontinued PTA Invega, Zyprexa, and thorazine on 4/20 due to consistent refusal.     On 4/26, it was noted that patient frequently had upward gaze while walking up and down the unit. She did not appear to be distressed. She reported that she was looking at \"my god.\" It was determined to be oculogyric crisis secondary to IM haloperidol. Haldol was subsequently discontinued during day shift on 4/26 and scheduled Zyprexa was initiated on emergency basis. Oral Cogentin was also scheduled, though patient declined. On the evening of 4/26, patient's gaze was fixed in upward position for several hours and she appeared to be experiencing discomfort. IM Cogentin was administered with noted resolution. Partial improvements weew observed after switch to scheduled Zyprexa. After patient improved, she was more receptive to reinitiating oral Invega, which was initiated on 5/3 and titrated to PTA dose of 9 mg daily on 5/10. Plan was for ACT team to bring in loading dose of Invega Sustenna. Patient had signs of oculogyric crisis again with Invega. Oral dose decreased to 6 mg after this. Invega was " "stopped on 5/14 due to ongoing signs of problems related to eye movement and concerns for oculogyric crisis.    Overall improvement in patient's agitation and suspected catatonia noted on 4/30 after patient accepted two doses of Ativan. She began declining Ativan again with noted decompensation. Patient now accepting, however, she does not have decisional making capacity at this time. She does not believe she has a mental illness, including catatonia. She does not fully understand risks associated with inadequate treatment of catatonia. Discussed with her son who is acting as surrogate decision maker and he is in full support of forced scheduled IM Ativan if patient declines oral formulation. Also consulted with our legal team.     Ativan was increased on 5/19 due to re-emerging evidence of catatonia. Meeting was held with ACT team on 5/20, including ACT team psychiatrist, Dr. Pedraza. He said that he is in \"full support\" of plan to pursue Mcgrath Kohler and ECT at this time. He does feel that in the past she has been discharged from the hospital while still quite symptomatic. He is hoping that ECT will be effective and that with improvement, Michelle would be more receptive to clozapine and weekly blood draws. He said that ideally she would transition to an IRTS before going back to her apartment, but also understands there may be some barriers (I.e. pt's willingness, financial concerns, etc).     Target psychiatric symptoms and interventions:   - Discontinued Artane 2 mg BID per pt request - patient often refusing this  - Oral Zyprexa 10 mg BID OR Zyprexa 10 mg IM. Hatch now in place. May consider increasing further though holding off given re-emerging catatonic sx and borderline prolonged QTc  - Ativan 2 mg TID OR Ativan IM 2 mg TID (see above). Patient may not decline.   Ativan to be given at 1700 on days prior to ECT and will be held on mornings before ECT    Continue Cogentin 2 mg IM daily prn for evidence acute " dystonic reaction or oculogyric crisis  Continue hydroxyzine 25-50 mg q4h prn for acute anxiety  Continue Trazodone 50 mg at bedtime prn for sleep disturbances  Continue Zyprexa 10 mg TID prn for severe agitation  Continue Ativan/Benadryl q4h prn for agitation. WOULD GIVE PRN ATIVAN FIRST FOR AGITATION.      ECT:  - Because patient was only intermittently accepting scheduled Ativan and some symptoms of catatonia are re-emerging, pursued Alcides Kholer for ECT. She also continues to exhibit symptoms of psychosis and has not responded or has experienced side effects from multiple neuroleptic medications. Court hearing was held on 5/21. Alcides Riverappard is approved. Patient is medically cleared. COVID negative on 5/24. Obtained ECT consult on 5/21/21. Please see Dr. Lubin's consultation note.   - First ECT treatment completed on 5/26. Next ECT treatment scheduled for 5/28.  - NPO 8 hours prior to scheduled treatment. Clear liquids until 2 hours prior to treatment.   - Discussed with IM. May resume AM antihypertensives, no need to hold prior to ECT  - SIO while patient is NPO, starting at midnight on ECT days      Acute Medical Problems and Treatments:  HTN: Patient is now adherent with medication regimen.   - Metoprolol succinate ER 50 mg daily  - Cozaar 100 mg daily  - Please see note from IM dated 5/3/21, 5/6/21, and 5/24.  - Obtained EKG and routine labs on 5/21 in context of pt declining vital sign checks and anti-hypertensives and recently elevated BPs. Reviewed labs and discussed EKG findings with IM on 5/21. No urgent concerns, though IM should be notified if pt develops acute medical concerns (I.e. heart palpitations, SOB, changes in speech, AMS, confusion, CP, HA, changes in vision). Now that Alcides Kohler is in place, placed IM consult for ECT medical clearance. Appreciate Assistance. Pt was medically cleared for ECT. See IM note dated 5/24 for details.      CVA:  - Aspirin 81 mg daily     Chronic  Constipation:  - Miralax 17 mg daily     Behavioral/Psychological/Social:  - Encourage unit programming    Safety:  - Continue precautions as noted above  - Status 15 minute checks  - Safety precautions include: assault and elopement precautions  - Continue precautions as noted above  - Status 15 minute checks    Legal Status: Committed as MI with Hatch in place for Haldol, Zyprexa, Invega, and Thorazine through [facility]. Filed Alcides Kohler through [facility] and approved on 5/24/21.     Disposition Plan   Reason for ongoing admission: poses an imminent risk to self, poses an imminent risk to others and is unable to care for self due to severe psychosis or tad  Discharge location: Home vs IRTS with ACT team support.   Discharge Medications: not ordered  Follow-up Appointments: not scheduled    Entered by: Ida Zaman on 5/27/2021 at 9:26 AM      > 30 minutes total time that was spent and over 50% of this time was spent in counseling and coordination of care.

## 2021-05-27 NOTE — PLAN OF CARE
Pt asleep at start of shift. Breathing quiet and unlabored.     Pt had no c/o pain or discomfort during the HS.     Appears to have slept 6.25 hours.     Pt on ASSAULT, CHEEKING, ELOPEMENT, and FALL precautions in addition to single room order. Any related events noted above.     Will continue to monitor and assess.   Problem: Sleep Disturbance (Psychotic Signs/Symptoms)  Goal: Improved Sleep (Psychotic Signs/Symptoms)  Outcome: No Change

## 2021-05-28 ENCOUNTER — ANESTHESIA (OUTPATIENT)
Dept: BEHAVIORAL HEALTH | Facility: CLINIC | Age: 61
End: 2021-05-28

## 2021-05-28 ENCOUNTER — ANESTHESIA EVENT (OUTPATIENT)
Dept: BEHAVIORAL HEALTH | Facility: CLINIC | Age: 61
End: 2021-05-28

## 2021-05-28 ENCOUNTER — APPOINTMENT (OUTPATIENT)
Dept: BEHAVIORAL HEALTH | Facility: CLINIC | Age: 61
End: 2021-05-28
Payer: COMMERCIAL

## 2021-05-28 PROCEDURE — 250N000013 HC RX MED GY IP 250 OP 250 PS 637: Performed by: PHYSICIAN ASSISTANT

## 2021-05-28 PROCEDURE — 370N000017 HC ANESTHESIA TECHNICAL FEE, PER MIN

## 2021-05-28 PROCEDURE — 90870 ELECTROCONVULSIVE THERAPY: CPT

## 2021-05-28 PROCEDURE — 124N000002 HC R&B MH UMMC

## 2021-05-28 PROCEDURE — 90870 ELECTROCONVULSIVE THERAPY: CPT | Performed by: PSYCHIATRY & NEUROLOGY

## 2021-05-28 PROCEDURE — 250N000009 HC RX 250: Performed by: ANESTHESIOLOGY

## 2021-05-28 PROCEDURE — 250N000013 HC RX MED GY IP 250 OP 250 PS 637: Performed by: PSYCHIATRY & NEUROLOGY

## 2021-05-28 PROCEDURE — 250N000011 HC RX IP 250 OP 636: Performed by: ANESTHESIOLOGY

## 2021-05-28 PROCEDURE — 99233 SBSQ HOSP IP/OBS HIGH 50: CPT | Mod: 25 | Performed by: PSYCHIATRY & NEUROLOGY

## 2021-05-28 RX ORDER — ETOMIDATE 2 MG/ML
INJECTION INTRAVENOUS PRN
Status: DISCONTINUED | OUTPATIENT
Start: 2021-05-28 | End: 2021-05-28

## 2021-05-28 RX ORDER — FLUMAZENIL 0.1 MG/ML
INJECTION, SOLUTION INTRAVENOUS PRN
Status: DISCONTINUED | OUTPATIENT
Start: 2021-05-28 | End: 2021-05-28

## 2021-05-28 RX ORDER — LABETALOL HYDROCHLORIDE 5 MG/ML
10 INJECTION, SOLUTION INTRAVENOUS
Status: DISCONTINUED | OUTPATIENT
Start: 2021-05-28 | End: 2021-05-28

## 2021-05-28 RX ORDER — LABETALOL HYDROCHLORIDE 5 MG/ML
INJECTION, SOLUTION INTRAVENOUS PRN
Status: DISCONTINUED | OUTPATIENT
Start: 2021-05-28 | End: 2021-05-28

## 2021-05-28 RX ADMIN — LORAZEPAM 2 MG: 2 TABLET ORAL at 19:04

## 2021-05-28 RX ADMIN — Medication 70 MG: at 08:32

## 2021-05-28 RX ADMIN — LORAZEPAM 2 MG: 2 TABLET ORAL at 09:32

## 2021-05-28 RX ADMIN — Medication 14 MG: at 08:32

## 2021-05-28 RX ADMIN — OLANZAPINE 10 MG: 10 TABLET, ORALLY DISINTEGRATING ORAL at 07:45

## 2021-05-28 RX ADMIN — LABETALOL HYDROCHLORIDE 10 MG: 5 INJECTION, SOLUTION INTRAVENOUS at 08:33

## 2021-05-28 RX ADMIN — METOPROLOL SUCCINATE 50 MG: 50 TABLET, EXTENDED RELEASE ORAL at 07:45

## 2021-05-28 RX ADMIN — LOSARTAN POTASSIUM 100 MG: 100 TABLET, FILM COATED ORAL at 07:45

## 2021-05-28 RX ADMIN — OLANZAPINE 10 MG: 10 TABLET, ORALLY DISINTEGRATING ORAL at 19:04

## 2021-05-28 RX ADMIN — DEXMEDETOMIDINE HYDROCHLORIDE 20 MCG: 100 INJECTION, SOLUTION INTRAVENOUS at 08:35

## 2021-05-28 RX ADMIN — LORAZEPAM 2 MG: 2 TABLET ORAL at 14:00

## 2021-05-28 RX ADMIN — FLUMAZENIL 0.5 MG: 0.1 INJECTION, SOLUTION INTRAVENOUS at 08:35

## 2021-05-28 ASSESSMENT — ACTIVITIES OF DAILY LIVING (ADL)
ORAL_HYGIENE: PROMPTS
LAUNDRY: UNABLE TO COMPLETE
DRESS: SCRUBS (BEHAVIORAL HEALTH)
HYGIENE/GROOMING: PROMPTS
LAUNDRY: UNABLE TO COMPLETE
ORAL_HYGIENE: PROMPTS
DRESS: SCRUBS (BEHAVIORAL HEALTH)
HYGIENE/GROOMING: PROMPTS

## 2021-05-28 NOTE — PROGRESS NOTES
"Patient adequate for discharge. Report called to inpatient nurse Aleksandra Solorzano RN. Pt alert, she continuously repeats the same illogical sentence, unable to be redirected in conversation. IV removed. Discharged with staff and security at this time.      Of note, pt was continuously moving and tensing limbs. Writer attempted multiple times to measure blood pressure. Readings were 200s/120s, but were not accurate measurements. This was communicated to Dr. Love. She verbalized her understanding, and stated, \"We can't treat a blood pressure we know is not accurate. It will settle out when she's back on the floor, and if it continues to be high, they can treat it.\" This was communicated to Aleksandra Solorzano RN during handoff report, and she verbalized her understanding.   "

## 2021-05-28 NOTE — PLAN OF CARE
Problem: Behavior Regulation Impairment (Psychotic Signs/Symptoms)  Goal: Improved Behavioral Control (Psychotic Signs/Symptoms)  Outcome: Improving    Patient was isolative to her room, sleeping much of the shift.  She presented as flat.  She was calm.  She told staff  please ,  thank you , and  It is nice to see you  - mood was greatly improved. She did not appear responding to IS.  She made no delusional statements to writer.  She continues to sleep on the floor, in the corner of her room.  She has not showered since admission despite prompts; however, she did change her clothing today.         Patient was aware that she had ECT in the AM and was compliant with following the treatment plan.  Educated patient that that she would NPO at midnight and she verbalized understanding.  She is on SIO starting at midnight to ensure the patient maintains NPO status. Her HS medications (including Ativan) were administered at 1709 per orders.       Patient denied pain, headaches, change in vision, and medication side effects.  She refused VS when asked, stating she had already had her VS checked today.

## 2021-05-28 NOTE — PLAN OF CARE
Assessment/Intervention/Current Symtoms and Care Coordination:  Attended Team Meeting, Reviewed chart notes: Patient appears to be improving since starting ECT.      Discharge Plan or Goal:  Return to her apartment with ACT Team follow-up     Barriers to Discharge:  Although patient is improving she is continuing to stabilize. Patient will complete a course of ECT while hospitalized.     Referral Status:  No referrals have been made.     Legal Status:    Committed/Hatch/Mcgrath Kohler through [facility]

## 2021-05-28 NOTE — ANESTHESIA PREPROCEDURE EVALUATION
Anesthesia Pre-Procedure Evaluation    Patient: Michelle Pino   MRN: [id number] : 1960        Preoperative Diagnosis: * No pre-op diagnosis entered *   Procedure : * No procedures listed *     Past Medical History:   Diagnosis Date     Hyperlipidemia      Hypertension      Schizophrenia (H)       Past Surgical History:   Procedure Laterality Date     OPEN REDUCTION INTERNAL FIXATION TIBIAL PLATEAU [place] [facility] [place]      Allergies   Allergen Reactions     Haldol [Haloperidol]      Patient previously tolerated haldol, though developed oculogyric crises during hospital stay on 21 on total daily dose of 10 mg.     Lisinopril Cough      Social History     Tobacco Use     Smoking status: Never Smoker   Substance Use Topics     Alcohol use: Never     Frequency: Never      Wt Readings from Last 1 Encounters:   21 71 kg (156 lb 8 oz)        Anesthesia Evaluation   Pt has had prior anesthetic.         ROS/MED HX  ENT/Pulmonary:  - neg pulmonary ROS     Neurologic:     (+) CVA, without deficits,     Cardiovascular:     (+) Dyslipidemia hypertension-----Previous cardiac testing     METS/Exercise Tolerance: >4 METS    Hematologic:  - neg hematologic  ROS     Musculoskeletal:  - neg musculoskeletal ROS     GI/Hepatic:  - neg GI/hepatic ROS     Renal/Genitourinary:  - neg Renal ROS     Endo:  - neg endo ROS     Psychiatric/Substance Use: Comment: SCHIZOAFFECTIVE, ROSHAN    (+) psychiatric history anxiety and other (comment)     Infectious Disease:  - neg infectious disease ROS     Malignancy:  - neg malignancy ROS     Other:            Physical Exam    Airway   unable to assess   Comment: refused         Respiratory Devices and Support         Dental    unable to assess        Cardiovascular   cardiovascular exam normal          Pulmonary   pulmonary exam normal            Other findings: APPEARS FEASIBLE    OUTSIDE LABS:  CBC:   Lab Results   Component  Value Date    WBC 6.6 05/21/2021    WBC 5.4 10/19/2019    HGB 13.6 05/21/2021    HGB 12.5 10/19/2019    HCT 39.3 05/21/2021    HCT 37.7 10/19/2019     05/21/2021     10/19/2019     BMP:   Lab Results   Component Value Date     05/21/2021     10/19/2019    POTASSIUM 3.9 05/21/2021    POTASSIUM 3.8 10/19/2019    CHLORIDE 108 05/21/2021    CHLORIDE 109 10/19/2019    CO2 23 05/21/2021    CO2 27 10/19/2019    BUN 16 05/21/2021    BUN 9 10/19/2019    CR 0.88 05/21/2021    CR 0.87 10/19/2019     (H) 05/21/2021     (H) 10/19/2019     COAGS: No results found for: PTT, INR, FIBR  POC: No results found for: BGM, HCG, HCGS  HEPATIC:   Lab Results   Component Value Date    ALBUMIN 3.6 05/21/2021    PROTTOTAL 7.0 05/21/2021    ALT 24 05/21/2021    AST 15 05/21/2021    ALKPHOS 91 05/21/2021    BILITOTAL 0.3 05/21/2021     OTHER:   Lab Results   Component Value Date    ALEK 8.7 05/21/2021    PHOS 4.3 05/26/2021    MAG 2.2 05/26/2021    TSH 0.53 05/21/2021    T4 0.77 10/19/2019       Anesthesia Plan    ASA Status:  2   NPO Status:  NPO Appropriate    Anesthesia Type: General.     - Airway: Mask Only      Maintenance: TIVA.        Consents    Anesthesia Plan(s) and associated risks, benefits, and realistic alternatives discussed. Questions answered and patient/representative(s) expressed understanding.     - Discussed with:  Patient      - Extended Intubation/Ventilatory Support Discussed: No.      - Patient is DNR/DNI Status: No    Use of blood products discussed: No .     Postoperative Care    Pain management: Multi-modal analgesia.        Comments:    Standard monotors                Fartun Love MD

## 2021-05-28 NOTE — PLAN OF CARE
"Pt had ECT #2 this morning. Pt was confused and somewhat agitated when she arrived back to the unit. She repeatedly asked to go outside so she could go home. She initially refused her scheduled ativan, stated \"I don't need to take medications. I need to go home.\" She took the pill with encouragement and explanation from this RN that she would have to get an IM if she continued to refuse the PO ativan dose and that taking her medications would help her get discharged faster. She has been more calm since taking the ativan, sitting quietly in hallway next to staff. She denies having any pain or nausea. Pt stated that she thinks the ECT is helping her but it also makes her confused. No SI/SIB noted. Will continue to monitor.   "

## 2021-05-28 NOTE — ANESTHESIA POSTPROCEDURE EVALUATION
Patient: Michelle Pino    * No procedures listed *    Diagnosis:* No pre-op diagnosis entered *  Diagnosis Additional Information: No value filed.    Anesthesia Type:  General    Note:  Disposition: Inpatient   Postop Pain Control: Uneventful            Sign Out: Well controlled pain   PONV: No   Neuro/Psych: Uneventful            Sign Out: Acceptable/Baseline neuro status   Airway/Respiratory: Uneventful            Sign Out: Acceptable/Baseline resp. status   CV/Hemodynamics: Uneventful            Sign Out: Acceptable CV status; No obvious hypovolemia; No obvious fluid overload   Other NRE: NONE   DID A NON-ROUTINE EVENT OCCUR? No           Last vitals:  Vitals:    05/27/21 0835 05/28/21 0700 05/28/21 0840   BP: (!) 140/80 (!) 148/98    Pulse: 79 88 91   Resp: 16 16 20   Temp: 37.2  C (99  F) 36.7  C (98  F) 37.1  C (98.8  F)   SpO2: 98% 100% 99%       Last vitals prior to Anesthesia Care Transfer:  CRNA VITALS  5/28/2021 0809 - 5/28/2021 0855      5/28/2021             Pulse:  78    SpO2:  96 %    Resp Rate (observed):  (!) 1          Electronically Signed By: Fartun Love MD  May 28, 2021  8:55 AM

## 2021-05-28 NOTE — PROGRESS NOTES
"[facility], [place]   Psychiatric Progress Note  Hospital Day: 44        Interim History:   The patient's care was discussed with the treatment team during the daily team meeting and/or staff's chart notes were reviewed.  Staff report patient is primarily isolative to her room. Sleeps on the floor in the corner of her room. She is not attending to ADLs. Has not showered since admission over 6 weeks ago. Eating and drinking well. Denies acute physical concerns and medication side effects. No episodes of seclusion or restraints. She was noted to be more pleasant and calmer. Mood was greatly improved per staff. Slept 4.25 hrs overnight. No complications thus far with ECT with exception of \"small bite of upper lip\" this morning per Dr. Mak's note. She denied pain, headaches, vision changes, and all other acute medical concerns. No need for psychotropic prn medications.     During interview with Michelle, she had returned from ECT about 30-40 minutes earlier. She asked to speak with me as I was walking down the chen. She said that she feels \"disorganized\" again, and could again not recall where her room was located. When I pointed to her room behind her, she smiled and giggled. Discussed that short term memory loss may be a side effect from anesthesia vs ECT. She was also slightly unsteady on her feet. Affect overall significantly improved. She acknowledged that she feels better from ECT, though could not provide additional details. She denied other side effects or acute medical concerns. She was alert and oriented x 3. She asked appropriate questions about ECT. Goal oriented, discussing desire to return home. She is interested in going outside to [place] this weekend. She feels this will be helpful for her. She did not make any overtly paranoid or delusional statements today. Did not appear irritable and again thanked writer. She had no further questions or concerns. Denied pain, headache, " muscle stiffness. Did not report any pain related to bite on upper lip noted above. I did not visualize this during our interview. Notes good appetite and sleep.          Medications:       LORazepam  2 mg Oral TID    Or     LORazepam  2 mg Intramuscular TID     losartan  100 mg Oral Daily     metoprolol succinate ER  50 mg Oral Daily     OLANZapine zydis  10 mg Oral BID    Or     OLANZapine  10 mg Intramuscular BID          Allergies:     Allergies   Allergen Reactions     Haldol [Haloperidol]      Patient previously tolerated haldol, though developed oculogyric crises during hospital stay on 4/26/21 on total daily dose of 10 mg.     Lisinopril Cough          Labs:     No results found for this or any previous visit (from the past 24 hour(s)).       Psychiatric Examination:     BP (!) 148/98 (BP Location: Right arm)   Pulse 88   Temp 98  F (36.7  C) (Tympanic)   Resp 16   Wt 71 kg (156 lb 8 oz)   SpO2 100%   BMI 27.72 kg/m    Weight is 156 lbs 8 oz  Body mass index is 27.72 kg/m .    Weight over time:  Vitals:    05/25/21 0850   Weight: 71 kg (156 lb 8 oz)       Orthostatic Vitals     None            Cardiometabolic risk assessment. 04/15/21      Reviewed patient profile for cardiometabolic risk factors    Date taken /Value  REFERENCE RANGE   Abdominal Obesity  (Waist Circumference)   See nursing flowsheet Women ?35 in (88 cm)   Men ?40 in (102 cm)      Triglycerides  Triglycerides   Date Value Ref Range Status   10/19/2019 117 <150 mg/dL Final       ?150 mg/dL (1.7 mmol/L) or current treatment for elevated triglycerides   HDL cholesterol  HDL Cholesterol   Date Value Ref Range Status   10/19/2019 56 >49 mg/dL Final   ]   Women <50 mg/dL (1.3 mmol/L) in women or current treatment for low HDL cholesterol  Men <40 mg/dL (1 mmol/L) in men or current treatment for low HDL cholesterol     Fasting plasma glucose (FPG) Lab Results   Component Value Date     10/19/2019      FPG ?100 mg/dL (5.6 mmol/L) or  treatment for elevated blood glucose   Blood pressure  BP Readings from Last 3 Encounters:   05/28/21 (!) 148/98   06/04/19 131/71   04/26/19 164/86    Blood pressure ?130/85 mmHg or treatment for elevated blood pressure   Family History  See family history     Appearance: awake, alert and disheveled   Attitude: more cooperative  Eye Contact: poor  Mood: calmer  Affect:  Mood congruent, blunted though overall brighter. She smiled on one occasion.   Speech: accent, coherent  Language: fluent and intact in English  Psychomotor, Gait, Musculoskeletal: No stereotypic movements noted on examination today. No evidence of tardive dyskinesia, dystonia, or tics. No physical agitation.   Throught Process: unable to assess  Associations:  No loosening of associations  Thought Content:  no evidence of suicidal ideation or homicidal ideation, evidence of paranoia and guardedness  Insight:  limited  Judgement:  limited  Oriented to:  Person, place, time  Attention Span and Concentration: fair, improved  Recent and Remote Memory:  Fair, improved  Fund of Knowledge:  normal    Clinical Global Impressions  First:  Considering your total clinical experience with this particular patient population, how severe are the patient's symptoms at this time?: 7 (04/16/21 1428)  Compared to the patient's condition at the START of treatment, this patient's condition is: 4 (04/16/21 1428)  Most recent:  Considering your total clinical experience with this particular patient population, how severe are the patient's symptoms at this time?: 7 (05/13/21 0953)  Compared to the patient's condition at the START of treatment, this patient's condition is: 6 (05/13/21 0953)           Precautions:     Behavioral Orders   Procedures     Assault precautions     Cheeking Precautions (behavioral units)     Patient Observed swallowing PO medications; Patient asked to drink water after swallowing medication; Patient in Staff line of sight for 15 minutes after  medication given; Mouth checks after PO administration (patient asked to open mouth and stick out their tongue).     Code 1 - Restrict to Unit     Code 2 - 1:1 Staff Supervision     For ECT only     Code 3     For walks in the [place] with staff and per staff discretion     Electroconvulsive therapy     Series of up to 12 treatments. Begin Date: 5/26/21     Treating Psychiatrist providing ECT:  Dr. Lubin     Notified on:  5/21/21     Electroconvulsive therapy     As long as we get the green light from risk management and pt is medically cleared, begin ECT every Monday, Wednesday, and Friday     Electroconvulsive therapy     Series of up to 12 treatments. Begin Date: 5/25/21     Treating Psychiatrist providing ECT:  Amee     Notified on:  5/24/21     Elopement precautions     Fall precautions     Mcgrath Calderon     Routine Programming     As clinically indicated     Status 15     Every 15 minutes.     Status Individual Observation     SIO while NPO for ECT (On Mondays, Wednesdays and Fridays). PATIENT WILL NOT HAVE ECT ON Monday, MAY 31ST. Patient SIO status reviewed with team/RN.  Please also refer to RN/team documentation for add'l detail.    -SIO staff to monitor following which have contributed to patient being on SIO:  Patient will need to be monitored closely to ensure she remains on NPO status for ECT given her poor insight, judgment, disorganized thought process  -Possible interventions SIO staff could use to support patient's treatment progress:  Monitor closely during NPO  -When following observed, team will review discontinuation of SIO:  May be discontinued after ECT treatments     Order Specific Question:   CONTINUOUS 24 hours / day     Answer:   5 feet     Order Specific Question:   Indications for SIO     Answer:   Severe intrusiveness          Diagnoses:     Schizoaffective Disorder, Bipolar Type, decompensated  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 Symptoms and presentation at this time is most consistent with Schizoaffective Disorder, Bipolar Type. We have obtained most recent medication regimen from patient's ACT team, and regimen was initially restarted. Inpatient psychiatric hospitalization is warranted at this time for safety, stabilization, and possible adjustment in medications. Pt is committed. We have petitioned for Mcgrath Calderon due to lack of improvement and side effects from medications.    Hospital Course:  On admission, PTA medications were restarted. However, patient had been declining all scheduled medications despite significant encouragement from staff and provider. Psychiatric emergency declared on 4/20 due to aggression toward others in context of severe psychosis and suspected excited catatonia. Ativan 1 mg TID was also added. Discontinued PTA Invega, Zyprexa, and thorazine on 4/20 due to consistent refusal.     On 4/26, it was noted that patient frequently had upward gaze while walking up and down the unit. She did not appear to be distressed. She reported that she was looking at \"my god.\" It was determined to be oculogyric crisis secondary to IM haloperidol. Haldol was subsequently discontinued during day shift on 4/26 and scheduled Zyprexa was initiated on emergency basis. Oral Cogentin was also scheduled, though patient declined. On the evening of 4/26, patient's gaze was fixed in upward position for several hours and she appeared to be experiencing discomfort. IM Cogentin was administered with noted resolution. " "Partial improvements weew observed after switch to scheduled Zyprexa. After patient improved, she was more receptive to reinitiating oral Invega, which was initiated on 5/3 and titrated to PTA dose of 9 mg daily on 5/10. Plan was for ACT team to bring in loading dose of Invega [place]. Patient had signs of oculogyric crisis again with Invega. Oral dose decreased to 6 mg after this. Invega was stopped on 5/14 due to ongoing signs of problems related to eye movement and concerns for oculogyric crisis.    Overall improvement in patient's agitation and suspected catatonia noted on 4/30 after patient accepted two doses of Ativan. She began declining Ativan again with noted decompensation. Patient now accepting, however, she does not have decisional making capacity at this time. She does not believe she has a mental illness, including catatonia. She does not fully understand risks associated with inadequate treatment of catatonia. Discussed with her son who is acting as surrogate decision maker and he is in full support of forced scheduled IM Ativan if patient declines oral formulation. Also consulted with our legal team prior to backing oral Ativan with IM.     Ativan was increased on 5/19 due to re-emerging evidence of catatonia (long periods of staring, repetitive movements, echolalia, mutism). Meeting was held with ACT team on 5/20, including ACT team psychiatrist, Dr. Pedraza. He said that he is in \"full support\" of plan to pursue Mcgrath Kohler and ECT at this time. He does feel that in the past she has been discharged from the hospital while still quite symptomatic. He is hoping that ECT will be effective and that with improvement, Michelle would be more receptive to clozapine and weekly blood draws. He said that ideally she would transition to an IRTS before going back to her apartment, but also understands there may be some barriers (I.e. pt's willingness, financial concerns, etc).     ECT consult placed. Please see " consult note by Dr. Lubin on 5/21 for details. Alcides Riverappard was approved on 5/24 and patient was medically cleared on 5/24. ECT initiated on 5/26.     Target psychiatric symptoms and interventions:   - Oral Zyprexa 10 mg BID OR Zyprexa 10 mg IM. Hatch in place. May consider increasing further though holding off given re-emerging catatonic sx and borderline prolonged QTc   - Ativan 2 mg TID OR Ativan IM 2 mg TID (see above). Patient may not decline.   Ativan to be given at 1700 on days prior to ECT and will be held on mornings before ECT  Continue Cogentin 2 mg IM daily prn for evidence acute dystonic reaction or oculogyric crisis  Continue hydroxyzine 25-50 mg q4h prn for acute anxiety  Continue Trazodone 50 mg at bedtime prn for sleep disturbances  Continue Zyprexa 10 mg TID prn for severe agitation  Continue Ativan/Benadryl q4h prn for agitation. WOULD GIVE PRN ATIVAN FIRST FOR AGITATION unless it is after 5 pm on day prior to ECT     ECT:  - Because patient was only intermittently accepting scheduled Ativan and some symptoms of catatonia are re-emerging, pursued Alcides Kohler for ECT. She also continues to exhibit symptoms of psychosis and has not responded or has experienced side effects from multiple neuroleptic medications. Court hearing was held on 5/21. Alcides Kohler is approved. Patient is medically cleared. COVID negative on 5/24. Obtained ECT consult on 5/21/21. Please see Dr. Lubin's consultation note.   - ECT treatment #3 scheduled for 6/2. ECT is closed on Memorial Day  - NPO 8 hours prior to scheduled treatment. Clear liquids until 2 hours prior to treatment.   - Discussed with IM. May resume AM antihypertensives, no need to hold prior to ECT  - SIO while patient is NPO, starting at midnight on ECT days      Acute Medical Problems and Treatments:  HTN: Patient is now adherent with medication regimen.   - Metoprolol succinate ER 50 mg daily  - Cozaar 100 mg daily  - Please see note from IM dated  5/3/21, 5/6/21, and 5/24.  - Obtained EKG and routine labs on 5/21 in context of pt declining vital sign checks and anti-hypertensives and recently elevated BPs. Reviewed labs and discussed EKG findings with IM on 5/21. No urgent concerns, though IM should be notified if pt develops acute medical concerns (I.e. heart palpitations, SOB, changes in speech, AMS, confusion, CP, HA, changes in vision).     Now that Alcides Kohler is in place, placed IM consult for ECT medical clearance. Appreciate Assistance. Pt was medically cleared for ECT. See IM note dated 5/24 for details.      CVA:  - Aspirin 81 mg daily     Chronic Constipation:  - Miralax 17 mg daily     Behavioral/Psychological/Social:  - Encourage unit programming  - Patient is now Code 3 status and can take walks in the [place] with staff and security present per staff discretion    Safety:  - Continue precautions as noted above  - Status 15 minute checks  - Safety precautions include: assault and elopement precautions  - Continue precautions as noted above    Legal Status: Committed as MI with Hatch in place for Haldol, Zyprexa, Invega, and Thorazine through [facility]. Filed Alcides Kohler through [facility] and approved on 5/24/21.     Disposition Plan   Reason for ongoing admission: poses an imminent risk to self, poses an imminent risk to others and is unable to care for self due to severe psychosis or tad  Discharge location: Home vs IRTS with ACT team support.   Discharge Medications: not ordered  Follow-up Appointments: not scheduled    Entered by: Ida Zaman on 5/28/2021 at 8:37 AM      > 30 minutes total time that was spent and over 50% of this time was spent in counseling and coordination of care.

## 2021-05-28 NOTE — PLAN OF CARE
Problem: Sleep Disturbance (Psychotic Signs/Symptoms)  Goal: Improved Sleep (Psychotic Signs/Symptoms)  Outcome: No Change     Pt continues to sleep on the floor without a mattress. Pt was in her room through the night laying down quietly at some point she was seen awake. No behavioral concerns noted this shift, no PRN administered.    Pt slept for 4.25 hours.

## 2021-05-28 NOTE — PROCEDURES
"Procedure/Surgery Information   [facility]    Bedside Procedure Note  Date of Service (when I performed the procedure): 05/28/2021    Michelle Pino is a 60 year old female patient.  1. Paranoid schizophrenia (H)    2. COVID-19 ruled out by laboratory testing    3. Schizoaffective disorder, bipolar type (H)      Past Medical History:   Diagnosis Date     Hyperlipidemia      Hypertension      Schizophrenia (H)      Temp: 98  F (36.7  C) Temp src: Tympanic BP: (!) 148/98 Pulse: 88   Resp: 16 SpO2: 100 % O2 Device: None (Room air)      Procedures     Aubree Cisneros     [facility],   ECT Procedure Note   05/28/2021    Michelle Pino [id number]   60 year old 1960     Patient Status: Inpatient    Is this the first in a series of 12 treatments?  Yes    History and Physical: Reviewed in medical record    Consent: court ordered    Date Consent Signed: ECT may be administered during the commitment, which was signed 4/26/21 and expires on 10/26/21.  She can have ECT up to 3 times weekly for up to 12 treatments.        Allergies   Allergen Reactions     Haldol [Haloperidol]      Patient previously tolerated haldol, though developed oculogyric crises during hospital stay on 4/26/21 on total daily dose of 10 mg.     Lisinopril Cough       Weight:  156 lbs 8 oz     BP (!) 148/98 (BP Location: Right arm)   Pulse 88   Temp 98  F (36.7  C) (Tympanic)   Resp 16   Wt 71 kg (156 lb 8 oz)   SpO2 100%   BMI 27.72 kg/m      Patient Preparations: Other (comment)(none needed)         Indications for ECT:   Medications ineffective         Clinical Narrative:   Patient has bipolar tad with psychosis and is court-ordered to ECT.  NPO after 2400.  Alert and Oriented x 3.  Per unit staff last night, \"Pt napped most of the shift.She came out for meds and meals only.She was irritable with tensed and pressured speech when communicating with writer.Pt is " "very rigid in her routine . She cont on cheeking ,assault and fall precautions.Dr Zaman was able to convinced her in taking her Ativan at 5pm instead of 7pm in preparation for her ECT tomorrow at 11:45 am. Unable to assess her with regards to psych sxs as pt would shout at writer to leave her room angrily. She was medication compliant but  suspicious.Her meds had to be opened in her presence.Pt affect is flat and blunted.\"  #2 5/28/26 Had first treatment on Wednesday. Tolerated well. Patient with blunted affect. Calm.          Diagnosis:   Bipolar I manic with psychosis         Pause for the Cause:     Right patient Yes   Right procedure/laterality settings: Yes          Intra-Procedure Documentation:       ECT #: 2   Treatment number this series: 2   Total treatment number: 2     Type of ECT:  Bilateral, standard    ECT Medications:    Today: Flumazenil: 0.5mg pre-ECT  Etomidate: 14mg  Succinyl Choline: 70mg   Labetalol: 10mg     ECT Strip Summary:   Energy Level: 40 percent  Motor Seizure Duration: 25 seconds  EEG Seizure Duration: 40 seconds    Complications: Small bite of upper lip on the right.     Plan:   COVID test 5/31/21  Next ECT 6/2/21    Aubree Cisneros MD  "

## 2021-05-29 PROCEDURE — 250N000013 HC RX MED GY IP 250 OP 250 PS 637: Performed by: PSYCHIATRY & NEUROLOGY

## 2021-05-29 PROCEDURE — 250N000013 HC RX MED GY IP 250 OP 250 PS 637: Performed by: PHYSICIAN ASSISTANT

## 2021-05-29 PROCEDURE — 124N000002 HC R&B MH UMMC

## 2021-05-29 RX ADMIN — LOSARTAN POTASSIUM 100 MG: 100 TABLET, FILM COATED ORAL at 08:46

## 2021-05-29 RX ADMIN — LORAZEPAM 2 MG: 2 TABLET ORAL at 13:55

## 2021-05-29 RX ADMIN — OLANZAPINE 10 MG: 10 TABLET, ORALLY DISINTEGRATING ORAL at 19:00

## 2021-05-29 RX ADMIN — METOPROLOL SUCCINATE 50 MG: 50 TABLET, EXTENDED RELEASE ORAL at 08:46

## 2021-05-29 RX ADMIN — LORAZEPAM 2 MG: 2 TABLET ORAL at 08:46

## 2021-05-29 RX ADMIN — OLANZAPINE 10 MG: 10 TABLET, ORALLY DISINTEGRATING ORAL at 08:46

## 2021-05-29 RX ADMIN — LORAZEPAM 2 MG: 2 TABLET ORAL at 19:00

## 2021-05-29 ASSESSMENT — ACTIVITIES OF DAILY LIVING (ADL)
ORAL_HYGIENE: PROMPTS
HYGIENE/GROOMING: PROMPTS
HYGIENE/GROOMING: PROMPTS
ORAL_HYGIENE: PROMPTS
DRESS: SCRUBS (BEHAVIORAL HEALTH)
LAUNDRY: UNABLE TO COMPLETE
DRESS: SCRUBS (BEHAVIORAL HEALTH)

## 2021-05-29 NOTE — PLAN OF CARE
Problem: Behavior Regulation Impairment (Psychotic Signs/Symptoms)  Goal: Improved Behavioral Control (Psychotic Signs/Symptoms)  Outcome: Improving     Patient was isolative and withdrawn to her room, resting on the floor of her room.  She presented as calm and cooperative.  She endorsed memory loss from ECT, but otherwise reported she was tolerating it well.  She denied pain, headaches, and changes in vision.  Affect was flat, but brighter than earlier in the admission.  Did not appear to be actively responding to internal stimuli.  Denied AH/VH/SI.     Patient was compliant with her HS medications.  No medication side effects, or acute medical concerns. /98.  Patient ate her supper.  Declined to shower - first stating she did not have the right  creams , but later stating that the water would  ruin my skin .     Patient s son called and was given an update.  He plans to send soap/lotions to the unit as discussed in team.  He was also updated that visiting hours are again open and he will try and fly in from [place] to visit with her.     Note, patient is Code 3 to take walks in the [place].

## 2021-05-29 NOTE — PLAN OF CARE
"Patient presents as quiet, guarded, and socially withdrawn.  She remains paranoid about the vital signs monitor, but she was cooperative with RN writer checking her BP manually.  Michelle appears to be improving.  She seems more at ease with more affective range.  She is less irritable, more pleasant on approach.  She has been more visible in the milieu this shift.  She was offered an opportunity to enjoy some time outside, in the \"[place]\", but she declined this offer.  Michelle was encouraged to alert unit staff if she would like to pursue this later today, or at another point in time this weekend, and that unit staff would try to accommodate her providing that staffing pattern allows.  She accepted all of her scheduled medications without incident.  She did not make any overtly paranoid or delusional statements this shift.  She denies any medication SE's or acute physical concerns.    "

## 2021-05-29 NOTE — PLAN OF CARE
Problem: Sleep Disturbance (Psychotic Signs/Symptoms)  Goal: Improved Sleep (Psychotic Signs/Symptoms)  Outcome: No Change     Pt slept for 4.25 hours. No concerns noted.

## 2021-05-30 PROCEDURE — 250N000013 HC RX MED GY IP 250 OP 250 PS 637: Performed by: PHYSICIAN ASSISTANT

## 2021-05-30 PROCEDURE — 250N000013 HC RX MED GY IP 250 OP 250 PS 637: Performed by: PSYCHIATRY & NEUROLOGY

## 2021-05-30 PROCEDURE — 124N000002 HC R&B MH UMMC

## 2021-05-30 RX ADMIN — LORAZEPAM 2 MG: 2 TABLET ORAL at 08:26

## 2021-05-30 RX ADMIN — OLANZAPINE 10 MG: 10 TABLET, ORALLY DISINTEGRATING ORAL at 08:26

## 2021-05-30 RX ADMIN — METOPROLOL SUCCINATE 50 MG: 50 TABLET, EXTENDED RELEASE ORAL at 08:26

## 2021-05-30 RX ADMIN — OLANZAPINE 10 MG: 10 TABLET, ORALLY DISINTEGRATING ORAL at 18:39

## 2021-05-30 RX ADMIN — LOSARTAN POTASSIUM 100 MG: 100 TABLET, FILM COATED ORAL at 08:26

## 2021-05-30 RX ADMIN — LORAZEPAM 2 MG: 2 TABLET ORAL at 18:39

## 2021-05-30 RX ADMIN — LORAZEPAM 2 MG: 2 TABLET ORAL at 14:07

## 2021-05-30 ASSESSMENT — ACTIVITIES OF DAILY LIVING (ADL)
LAUNDRY: UNABLE TO COMPLETE
ORAL_HYGIENE: PROMPTS
HYGIENE/GROOMING: PROMPTS
HYGIENE/GROOMING: PROMPTS
ORAL_HYGIENE: PROMPTS
DRESS: SCRUBS (BEHAVIORAL HEALTH)
DRESS: SCRUBS (BEHAVIORAL HEALTH)
LAUNDRY: UNABLE TO COMPLETE

## 2021-05-30 NOTE — PLAN OF CARE
Problem: Sleep Disturbance (Psychotic Signs/Symptoms)  Goal: Improved Sleep (Psychotic Signs/Symptoms)  Outcome: No Change     Pt continuous to sleep on the floor on bed sheets. Pt stayed in her room throughout the night without any notable behaviors. No PRN was administered this shift.  Pt slept for 5.25 hours.

## 2021-05-30 NOTE — PLAN OF CARE
Problem: Cognitive Impairment (Psychotic Signs/Symptoms)  Goal: Optimal Cognitive Function (Psychotic Signs/Symptoms)  Outcome: Improving    Patient was isolative to her room, only entering the milieu for supper and to get her medications.  She presents with a brighter affect and was smiling.  She was calm and cooperative with staff.  No overt delusional, or paranoid statements.  She denied AH/VH/SI.  She stated her memory is improving in regards to the ECT treatments.  Denied pain/headache/changes in vision.       Patient was compliant with her medications as prescribed.  No acute medical concerns.  No medication side effects.  Patient again declined to shower when offered. She ate her supper.

## 2021-05-30 NOTE — PLAN OF CARE
Pt presented as flat and socially isolative. Pt polite and cooperative. Pt spent most of the day in her room sitting or sleeping on blankets in the corner on the floor. She came out of her room and sat quietly with staff in the lounge. Pt was up for breakfast and ate all of her meal. Pt declined having her BP taken by a monitor but was cooperative with having her BP taken manually. VSS. Pt medication compliant. Pt denied SI/SIB/AVH. Pt denied pain. Pt denied medication side effects. Pt ate her lunch and layed down and took a nap.

## 2021-05-31 PROCEDURE — 250N000013 HC RX MED GY IP 250 OP 250 PS 637: Performed by: PSYCHIATRY & NEUROLOGY

## 2021-05-31 PROCEDURE — 250N000013 HC RX MED GY IP 250 OP 250 PS 637: Performed by: PHYSICIAN ASSISTANT

## 2021-05-31 PROCEDURE — 124N000002 HC R&B MH UMMC

## 2021-05-31 RX ADMIN — OLANZAPINE 10 MG: 10 TABLET, ORALLY DISINTEGRATING ORAL at 18:49

## 2021-05-31 RX ADMIN — OLANZAPINE 10 MG: 10 TABLET, ORALLY DISINTEGRATING ORAL at 08:43

## 2021-05-31 RX ADMIN — LORAZEPAM 2 MG: 2 TABLET ORAL at 14:02

## 2021-05-31 RX ADMIN — LOSARTAN POTASSIUM 100 MG: 100 TABLET, FILM COATED ORAL at 08:43

## 2021-05-31 RX ADMIN — METOPROLOL SUCCINATE 50 MG: 50 TABLET, EXTENDED RELEASE ORAL at 08:44

## 2021-05-31 RX ADMIN — LORAZEPAM 2 MG: 2 TABLET ORAL at 18:49

## 2021-05-31 RX ADMIN — LORAZEPAM 2 MG: 2 TABLET ORAL at 08:43

## 2021-05-31 ASSESSMENT — ACTIVITIES OF DAILY LIVING (ADL)
DRESS: SCRUBS (BEHAVIORAL HEALTH)
HYGIENE/GROOMING: PROMPTS
ORAL_HYGIENE: PROMPTS
ORAL_HYGIENE: PROMPTS
DRESS: SCRUBS (BEHAVIORAL HEALTH)
LAUNDRY: UNABLE TO COMPLETE
HYGIENE/GROOMING: PROMPTS

## 2021-05-31 NOTE — PROGRESS NOTES
"Michelle showed interest in spending some time outside, in the \"[place]\", this morning.  She continues on Assault, Elopement, and Fall precautions, but she had not demonstrated any behaviors over the holiday weekend that would be concerning for assault or elopement.  She appears mildly sedated, and her gait is slow- but she is able to ambulate with a steady, balanced gait.  She does not appear to be at increased risk of falling this AM.  Michelle presents as calm, pleasant, and cooperative.  As such, her request to visit the [place] was granted, and she was taken off the unit in a wheelchair with two psychiatric associates and a  escorting her.  A \"[id number]\" was completed and unit staff have a copy of this in hand, along with the unit's cell phone.  "

## 2021-05-31 NOTE — PLAN OF CARE
Patient presents as quiet and socially withdrawn.  She has been more visible in the milieu in recent days, but she seems to keep to herself and is rarely seen conversing with staff or peers.  She appears to be improving with regard to her mood and affect.  She is no longer hostile or irritable.  She also seems less paranoid and suspicious of unit staff.  She does prefer to have her medications opened in her presence and her blood pressure taken manually.  She has not made any overtly paranoid or delusional statements this shift.  She accepted all of her scheduled medications without incident, and she has not c/o any adverse medications side effects.  She does appear mildly sedated, but she has been able to awake and visible in the lounge for most of this shift.  Upon returning from the [place] and eating lunch, she did take a brief nap while sitting in lounge #1.  She denies any acute physical concerns at this time.  VSS with the exception of mildly elevated BP (142/92) that was assessed prior to AM medication administration.

## 2021-05-31 NOTE — PLAN OF CARE
Problem: Sleep Disturbance (Psychotic Signs/Symptoms)  Goal: Improved Sleep (Psychotic Signs/Symptoms)  Outcome: Improving     Pt slept for 6 hours.  No concerns noted this shift, No PRN administered. Will continue to monitor.

## 2021-05-31 NOTE — PLAN OF CARE
Problem: Behavior Regulation Impairment (Psychotic Signs/Symptoms)  Goal: Improved Behavioral Control (Psychotic Signs/Symptoms)  Outcome: No Change     Patient was isolative to her room, sleeping nearly the entire shift.  She only entered the lounge to eat.  She requested to have her HS medications at 1830 and then returned to her room.  She was AOx2 - surprised that it was Sunday and that it was evening.  Accepted reorientation.  Mood was mostly flat, but with moments of being bright and laughing.     Compliant with HS medications.  /90 through manual check.  Patient continues to sleep on her room floor -stating it is  more comfortable .  Declined to have staff place her bed mattress on the bed frame.  Refused to shower and has noticeably dirty clothing with stains.  Refused a change of clothes.  Ate her supper.

## 2021-06-01 PROCEDURE — 250N000013 HC RX MED GY IP 250 OP 250 PS 637: Performed by: PHYSICIAN ASSISTANT

## 2021-06-01 PROCEDURE — 87635 SARS-COV-2 COVID-19 AMP PRB: CPT | Performed by: PSYCHIATRY & NEUROLOGY

## 2021-06-01 PROCEDURE — 250N000013 HC RX MED GY IP 250 OP 250 PS 637: Performed by: PSYCHIATRY & NEUROLOGY

## 2021-06-01 PROCEDURE — 124N000002 HC R&B MH UMMC

## 2021-06-01 PROCEDURE — 99233 SBSQ HOSP IP/OBS HIGH 50: CPT | Performed by: PSYCHIATRY & NEUROLOGY

## 2021-06-01 RX ADMIN — METOPROLOL SUCCINATE 50 MG: 50 TABLET, EXTENDED RELEASE ORAL at 08:34

## 2021-06-01 RX ADMIN — OLANZAPINE 10 MG: 10 TABLET, ORALLY DISINTEGRATING ORAL at 08:34

## 2021-06-01 RX ADMIN — LORAZEPAM 2 MG: 2 TABLET ORAL at 08:34

## 2021-06-01 RX ADMIN — LORAZEPAM 2 MG: 2 TABLET ORAL at 14:00

## 2021-06-01 RX ADMIN — LOSARTAN POTASSIUM 100 MG: 100 TABLET, FILM COATED ORAL at 08:34

## 2021-06-01 RX ADMIN — OLANZAPINE 10 MG: 10 TABLET, ORALLY DISINTEGRATING ORAL at 18:28

## 2021-06-01 RX ADMIN — LORAZEPAM 2 MG: 2 TABLET ORAL at 18:28

## 2021-06-01 ASSESSMENT — ACTIVITIES OF DAILY LIVING (ADL)
ORAL_HYGIENE: PROMPTS
DRESS: SCRUBS (BEHAVIORAL HEALTH)
DRESS: SCRUBS (BEHAVIORAL HEALTH)
HYGIENE/GROOMING: PROMPTS
ORAL_HYGIENE: PROMPTS
HYGIENE/GROOMING: PROMPTS

## 2021-06-01 NOTE — PLAN OF CARE
Assessment/Intervention/Current Symtoms and Care Coordination:  Attended Team Meeting, Reviewed chart notes: Met with patient to check-in. Patient remains disorganized/confused minimally improved. Patient will complete a course of ECT during this hospitalization.     Discharge Plan or Goal:  When stable and course of ECT is completed, patient will discharge to home and follow-up with [facility].      Barriers to Discharge:  Acuity of symptoms. Patient needs to complete course of ECT.     Referral Status:  No referrals have been made.     Legal Status:   Committed/[facility]/Mcgrath Kohler through [facility]

## 2021-06-01 NOTE — PLAN OF CARE
Pt slept 1.25 hours without issue. Pt remains on Elopement and Cheeking precautions. Pt remains on 15 minute checks. Will continue to monitor.

## 2021-06-01 NOTE — PLAN OF CARE
Patient presents as paranoid, confused, and disorganized.  She was agitated this morning, asking for help with her TV and making nonsensical statements about the phone.  She was unable to articulate just what help she was seeking.  Her speech was pressured at the time, and she did appear physically tense and agitated.  She refused most of the vital signs assessment, but she did accept her BP manually.  She accepted all of her scheduled medications without incident.  Later in the shift, she presented as calm and mildly sedated.  She slept poorly last night with only 1.5 hours noted on the NOC shift of 6/1/21.  Poor sleep hygiene appears to be the main culprit.  She denies any acute physical concerns at this time.

## 2021-06-01 NOTE — PLAN OF CARE
Problem: Behavior Regulation Impairment (Psychotic Signs/Symptoms)  Goal: Improved Behavioral Control (Psychotic Signs/Symptoms)  Outcome: No Change     Patient was more visible in the lounge, but still mostly isolative and withdrawn, sleeping for much of the shift.  When awake she was quiet, with a flat affect.  Appears sedated at times.  Slow movements.  She denied AH/VH/SI. She contracted for safety.  She believes she is ready to discharge.  She is guarded/passive oftentimes ignoring questions she does not want to answer.  She continues to request a manual BP, but does appear more trusting of staff.       Patient was compliant with her HS medications.  She denied acute medical concerns and medication side effects.  She was educated that she should shower to show she can take care of herself/to hasten discharge.  She declined to shower, but asked for a bar of soap, but did not use it.  She ate supper.  She was offered a chance to go to the [place], but she declined, stating she was tired.

## 2021-06-01 NOTE — PLAN OF CARE
BEHAVIORAL TEAM DISCUSSION    Participants: Tom Driscoll, RN, Kal, RN, Sergio Cordova CTC  Progress:  Continuing to assess  Anticipated length of stay:  Unknown at this time  Continued Stay Criteria/Rationale:  Patient will complete a course of ECT during this hospitalization  Medical/Physical:  Per Internal Medicine  Precautions:   Behavioral Orders   Procedures    Assault precautions    Cheeking Precautions (behavioral units)     Patient Observed swallowing PO medications; Patient asked to drink water after swallowing medication; Patient in Staff line of sight for 15 minutes after medication given; Mouth checks after PO administration (patient asked to open mouth and stick out their tongue).    Code 1 - Restrict to Unit    Code 2 - 1:1 Staff Supervision     For ECT only    Code 3     For walks in the [place] with staff and per staff discretion    Electroconvulsive therapy     Series of up to 12 treatments. Begin Date: 5/26/21     Treating Psychiatrist providing ECT:  Dr. Lubin     Notified on:  5/21/21    Electroconvulsive therapy     As long as we get the green light from risk management and pt is medically cleared, begin ECT every Monday, Wednesday, and Friday    Electroconvulsive therapy     Series of up to 12 treatments. Begin Date: 5/25/21     Treating Psychiatrist providing ECT:  Amee     Notified on:  5/24/21    Elopement precautions    Fall precautions    Mcgrath Calderon    Routine Programming     As clinically indicated    Status 15     Every 15 minutes.     Plan: Patient will be seen by attending psychiatrist daily. During this hospitalization patient will complete a course of ECT. Once stable patient will discharge to home and follow-up with [facility]. CTC will coordinate disposition and aftercare plan.   Rationale for change in precautions or plan:  No change

## 2021-06-01 NOTE — PLAN OF CARE
Patient has a Covid 19 swab order.  Sample is obtained and sent to lab.  Patient experienced no adverse effects.

## 2021-06-02 ENCOUNTER — ANESTHESIA (OUTPATIENT)
Dept: BEHAVIORAL HEALTH | Facility: CLINIC | Age: 61
End: 2021-06-02

## 2021-06-02 ENCOUNTER — APPOINTMENT (OUTPATIENT)
Dept: BEHAVIORAL HEALTH | Facility: CLINIC | Age: 61
End: 2021-06-02
Payer: COMMERCIAL

## 2021-06-02 ENCOUNTER — ANESTHESIA EVENT (OUTPATIENT)
Dept: BEHAVIORAL HEALTH | Facility: CLINIC | Age: 61
End: 2021-06-02

## 2021-06-02 PROCEDURE — 250N000011 HC RX IP 250 OP 636: Performed by: ANESTHESIOLOGY

## 2021-06-02 PROCEDURE — 250N000013 HC RX MED GY IP 250 OP 250 PS 637: Performed by: PSYCHIATRY & NEUROLOGY

## 2021-06-02 PROCEDURE — 370N000017 HC ANESTHESIA TECHNICAL FEE, PER MIN

## 2021-06-02 PROCEDURE — 250N000009 HC RX 250: Performed by: ANESTHESIOLOGY

## 2021-06-02 PROCEDURE — 99233 SBSQ HOSP IP/OBS HIGH 50: CPT | Mod: 25 | Performed by: PSYCHIATRY & NEUROLOGY

## 2021-06-02 PROCEDURE — 250N000013 HC RX MED GY IP 250 OP 250 PS 637: Performed by: PHYSICIAN ASSISTANT

## 2021-06-02 PROCEDURE — U0005 INFEC AGEN DETEC AMPLI PROBE: HCPCS | Performed by: PSYCHIATRY & NEUROLOGY

## 2021-06-02 PROCEDURE — U0003 INFECTIOUS AGENT DETECTION BY NUCLEIC ACID (DNA OR RNA); SEVERE ACUTE RESPIRATORY SYNDROME CORONAVIRUS 2 (SARS-COV-2) (CORONAVIRUS DISEASE [COVID-19]), AMPLIFIED PROBE TECHNIQUE, MAKING USE OF HIGH THROUGHPUT TECHNOLOGIES AS DESCRIBED BY CMS-2020-01-R: HCPCS | Performed by: PSYCHIATRY & NEUROLOGY

## 2021-06-02 PROCEDURE — 90870 ELECTROCONVULSIVE THERAPY: CPT

## 2021-06-02 PROCEDURE — 124N000002 HC R&B MH UMMC

## 2021-06-02 RX ORDER — MEPERIDINE HYDROCHLORIDE 25 MG/ML
12.5 INJECTION INTRAMUSCULAR; INTRAVENOUS; SUBCUTANEOUS
Status: DISCONTINUED | OUTPATIENT
Start: 2021-06-02 | End: 2021-06-02

## 2021-06-02 RX ORDER — NALOXONE HYDROCHLORIDE 0.4 MG/ML
0.4 INJECTION, SOLUTION INTRAMUSCULAR; INTRAVENOUS; SUBCUTANEOUS
Status: DISCONTINUED | OUTPATIENT
Start: 2021-06-02 | End: 2021-06-02

## 2021-06-02 RX ORDER — ONDANSETRON 4 MG/1
4 TABLET, ORALLY DISINTEGRATING ORAL EVERY 30 MIN PRN
Status: CANCELLED | OUTPATIENT
Start: 2021-06-02

## 2021-06-02 RX ORDER — ETOMIDATE 2 MG/ML
INJECTION INTRAVENOUS PRN
Status: DISCONTINUED | OUTPATIENT
Start: 2021-06-02 | End: 2021-06-02

## 2021-06-02 RX ORDER — NALOXONE HYDROCHLORIDE 0.4 MG/ML
0.2 INJECTION, SOLUTION INTRAMUSCULAR; INTRAVENOUS; SUBCUTANEOUS
Status: CANCELLED | OUTPATIENT
Start: 2021-06-02

## 2021-06-02 RX ORDER — CAFFEINE AND SODIUM BENZOATE 125 MG/ML
250 INJECTION, SOLUTION INTRAMUSCULAR; INTRAVENOUS
Status: CANCELLED
Start: 2021-06-02 | End: 2021-06-02

## 2021-06-02 RX ORDER — ONDANSETRON 2 MG/ML
4 INJECTION INTRAMUSCULAR; INTRAVENOUS EVERY 30 MIN PRN
Status: DISCONTINUED | OUTPATIENT
Start: 2021-06-02 | End: 2021-06-02

## 2021-06-02 RX ORDER — NALOXONE HYDROCHLORIDE 0.4 MG/ML
0.4 INJECTION, SOLUTION INTRAMUSCULAR; INTRAVENOUS; SUBCUTANEOUS
Status: CANCELLED | OUTPATIENT
Start: 2021-06-02

## 2021-06-02 RX ORDER — ONDANSETRON 4 MG/1
4 TABLET, ORALLY DISINTEGRATING ORAL EVERY 30 MIN PRN
Status: DISCONTINUED | OUTPATIENT
Start: 2021-06-02 | End: 2021-06-02

## 2021-06-02 RX ORDER — NALOXONE HYDROCHLORIDE 0.4 MG/ML
0.2 INJECTION, SOLUTION INTRAMUSCULAR; INTRAVENOUS; SUBCUTANEOUS
Status: DISCONTINUED | OUTPATIENT
Start: 2021-06-02 | End: 2021-06-02

## 2021-06-02 RX ORDER — LIDOCAINE HYDROCHLORIDE 20 MG/ML
40 INJECTION, SOLUTION EPIDURAL; INFILTRATION; INTRACAUDAL; PERINEURAL
Status: CANCELLED
Start: 2021-06-02 | End: 2021-06-02

## 2021-06-02 RX ORDER — SODIUM CHLORIDE, SODIUM LACTATE, POTASSIUM CHLORIDE, CALCIUM CHLORIDE 600; 310; 30; 20 MG/100ML; MG/100ML; MG/100ML; MG/100ML
INJECTION, SOLUTION INTRAVENOUS CONTINUOUS
Status: DISCONTINUED | OUTPATIENT
Start: 2021-06-02 | End: 2021-06-02

## 2021-06-02 RX ORDER — LABETALOL HYDROCHLORIDE 5 MG/ML
INJECTION, SOLUTION INTRAVENOUS PRN
Status: DISCONTINUED | OUTPATIENT
Start: 2021-06-02 | End: 2021-06-02

## 2021-06-02 RX ORDER — ONDANSETRON 2 MG/ML
4 INJECTION INTRAMUSCULAR; INTRAVENOUS EVERY 30 MIN PRN
Status: CANCELLED | OUTPATIENT
Start: 2021-06-02

## 2021-06-02 RX ORDER — SODIUM CHLORIDE, SODIUM LACTATE, POTASSIUM CHLORIDE, CALCIUM CHLORIDE 600; 310; 30; 20 MG/100ML; MG/100ML; MG/100ML; MG/100ML
INJECTION, SOLUTION INTRAVENOUS CONTINUOUS
Status: CANCELLED | OUTPATIENT
Start: 2021-06-02

## 2021-06-02 RX ORDER — MEPERIDINE HYDROCHLORIDE 25 MG/ML
12.5 INJECTION INTRAMUSCULAR; INTRAVENOUS; SUBCUTANEOUS
Status: CANCELLED | OUTPATIENT
Start: 2021-06-02

## 2021-06-02 RX ADMIN — LORAZEPAM 2 MG: 2 TABLET ORAL at 12:16

## 2021-06-02 RX ADMIN — METOPROLOL SUCCINATE 50 MG: 50 TABLET, EXTENDED RELEASE ORAL at 08:37

## 2021-06-02 RX ADMIN — LORAZEPAM 2 MG: 2 TABLET ORAL at 16:24

## 2021-06-02 RX ADMIN — LORAZEPAM 2 MG: 2 TABLET ORAL at 21:31

## 2021-06-02 RX ADMIN — OLANZAPINE 10 MG: 10 TABLET, ORALLY DISINTEGRATING ORAL at 08:37

## 2021-06-02 RX ADMIN — Medication 70 MG: at 11:11

## 2021-06-02 RX ADMIN — OLANZAPINE 10 MG: 10 TABLET, ORALLY DISINTEGRATING ORAL at 21:31

## 2021-06-02 RX ADMIN — LOSARTAN POTASSIUM 100 MG: 100 TABLET, FILM COATED ORAL at 08:37

## 2021-06-02 RX ADMIN — LABETALOL HYDROCHLORIDE 10 MG: 5 INJECTION, SOLUTION INTRAVENOUS at 11:12

## 2021-06-02 RX ADMIN — Medication 14 MG: at 11:11

## 2021-06-02 ASSESSMENT — ACTIVITIES OF DAILY LIVING (ADL)
LAUNDRY: UNABLE TO COMPLETE
DRESS: SCRUBS (BEHAVIORAL HEALTH)
HYGIENE/GROOMING: PROMPTS
ORAL_HYGIENE: PROMPTS
ORAL_HYGIENE: PROMPTS
HYGIENE/GROOMING: PROMPTS
DRESS: SCRUBS (BEHAVIORAL HEALTH)
LAUNDRY: UNABLE TO COMPLETE

## 2021-06-02 NOTE — ANESTHESIA POSTPROCEDURE EVALUATION
Patient: Michelle Pino    * No procedures listed *    Diagnosis:* No pre-op diagnosis entered *  Diagnosis Additional Information: No value filed.    Anesthesia Type:  General    Note:  Disposition: Outpatient   Postop Pain Control: Uneventful            Sign Out: Well controlled pain   PONV: No   Neuro/Psych: Uneventful            Sign Out: Acceptable/Baseline neuro status   Airway/Respiratory: Uneventful            Sign Out: Acceptable/Baseline resp. status   CV/Hemodynamics: Uneventful            Sign Out: Acceptable CV status; No obvious hypovolemia; No obvious fluid overload   Other NRE:    DID A NON-ROUTINE EVENT OCCUR?            Last vitals:  Vitals:    06/02/21 1116 06/02/21 1130 06/02/21 1145   BP: (!) 183/101 (!) 172/103 (!) 168/102   Pulse: 64 71 75   Resp: 24 24 22   Temp: 36.3  C (97.4  F) 36.5  C (97.7  F) 36.6  C (97.8  F)   SpO2: 100% 95% 96%       Last vitals prior to Anesthesia Care Transfer:  CRNA VITALS  6/2/2021 1045 - 6/2/2021 1145      6/2/2021             Resp Rate (observed):  10          Electronically Signed By: Monica Bo MD  June 2, 2021  1:22 PM

## 2021-06-02 NOTE — PLAN OF CARE
Problem: Adult Inpatient Plan of Care  Goal: Plan of Care Review  Outcome: No Change  Flowsheets  Taken 6/1/2021 2100 by Brady Rodriguez, RN  Plan of Care Reviewed With: patient  Taken 5/25/2021 0844 by Claudio Lindsey RN  Progress: no change     Problem: Behavior Regulation Impairment (Psychotic Signs/Symptoms)  Goal: Improved Behavioral Control (Psychotic Signs/Symptoms)  Outcome: Improving    Patient sleeping early in the evening until dinner when she was up and visible in the milieu. Patient upon approach flat in affect but less blunted and tense,  calm and cooperative with verbal assessment. Patient states understanding and acceptance with current care plan, medications and ECT. Patient denies SI/SIB, AH/VH, anxiety, depression, or thoughts of harming others. Patient accepting of HS medications with no stated or observed side effects. Patient declined prompting to complete ADL's.

## 2021-06-02 NOTE — ANESTHESIA PREPROCEDURE EVALUATION
Anesthesia Pre-Procedure Evaluation    Patient: Michelle Pino   MRN: [id number] : 1960        Preoperative Diagnosis: * No pre-op diagnosis entered *   Procedure : * No procedures listed *     Past Medical History:   Diagnosis Date     Hyperlipidemia      Hypertension      Schizophrenia (H)       Past Surgical History:   Procedure Laterality Date     OPEN REDUCTION INTERNAL FIXATION TIBIAL PLATEAU [place] [facility] [place] psychiatric [place]      Allergies   Allergen Reactions     Haldol [Haloperidol]      Patient previously tolerated haldol, though developed oculogyric crises during hospital stay on 21 on total daily dose of 10 mg.     Lisinopril Cough      Social History     Tobacco Use     Smoking status: Never Smoker   Substance Use Topics     Alcohol use: Never     Frequency: Never      Wt Readings from Last 1 Encounters:   21 71 kg (156 lb 8 oz)        Anesthesia Evaluation   Pt has had prior anesthetic.         ROS/MED HX  ENT/Pulmonary:  - neg pulmonary ROS     Neurologic:     (+) CVA, without deficits,     Cardiovascular:     (+) Dyslipidemia hypertension-----Previous cardiac testing     METS/Exercise Tolerance: >4 METS    Hematologic:  - neg hematologic  ROS     Musculoskeletal:  - neg musculoskeletal ROS     GI/Hepatic:  - neg GI/hepatic ROS     Renal/Genitourinary:  - neg Renal ROS     Endo:  - neg endo ROS     Psychiatric/Substance Use: Comment: SCHIZOAFFECTIVE, ROSHAN    (+) psychiatric history anxiety and other (comment)     Infectious Disease:  - neg infectious disease ROS     Malignancy:  - neg malignancy ROS     Other:            Physical Exam    Airway   unable to assess   Comment: refused         Respiratory Devices and Support         Dental    unable to assess        Cardiovascular   cardiovascular exam normal          Pulmonary   pulmonary exam normal            Other findings: APPEARS FEASIBLE    OUTSIDE LABS:  CBC:   Lab Results   Component  Value Date    WBC 6.6 05/21/2021    WBC 5.4 10/19/2019    HGB 13.6 05/21/2021    HGB 12.5 10/19/2019    HCT 39.3 05/21/2021    HCT 37.7 10/19/2019     05/21/2021     10/19/2019     BMP:   Lab Results   Component Value Date     05/21/2021     10/19/2019    POTASSIUM 3.9 05/21/2021    POTASSIUM 3.8 10/19/2019    CHLORIDE 108 05/21/2021    CHLORIDE 109 10/19/2019    CO2 23 05/21/2021    CO2 27 10/19/2019    BUN 16 05/21/2021    BUN 9 10/19/2019    CR 0.88 05/21/2021    CR 0.87 10/19/2019     (H) 05/21/2021     (H) 10/19/2019     COAGS: No results found for: PTT, INR, FIBR  POC: No results found for: BGM, HCG, HCGS  HEPATIC:   Lab Results   Component Value Date    ALBUMIN 3.6 05/21/2021    PROTTOTAL 7.0 05/21/2021    ALT 24 05/21/2021    AST 15 05/21/2021    ALKPHOS 91 05/21/2021    BILITOTAL 0.3 05/21/2021     OTHER:   Lab Results   Component Value Date    ALEK 8.7 05/21/2021    PHOS 4.3 05/26/2021    MAG 2.2 05/26/2021    TSH 0.53 05/21/2021    T4 0.77 10/19/2019       Anesthesia Plan    ASA Status:  2   NPO Status:  NPO Appropriate    Anesthesia Type: General.     - Airway: Mask Only      Maintenance: TIVA.        Consents    Anesthesia Plan(s) and associated risks, benefits, and realistic alternatives discussed. Questions answered and patient/representative(s) expressed understanding.     - Discussed with:  Patient      - Extended Intubation/Ventilatory Support Discussed: No.      - Patient is DNR/DNI Status: No    Use of blood products discussed: No .     Postoperative Care    Pain management: Multi-modal analgesia.        Comments:    Standard monotors                Monica Bo MD

## 2021-06-02 NOTE — PROGRESS NOTES
"[facility], [place]   Psychiatric Progress Note  Hospital Day: 49        Interim History:   The patient's care was discussed with the treatment team during the daily team meeting and/or staff's chart notes were reviewed.  Staff report patient is primarily isolative to her room. Sleeps on the floor in the corner of her room. She is not attending to ADLs. Has not showered since admission over 6 weeks ago. Eating and drinking well. Denies acute physical concerns and medication side effects. No episodes of seclusion or restraints. No agitation noted overnight.     Upon interview, Michelle reported that she was \"okay\" and that the ECT makes her \"forget myself\". She feels that it takes time to recover from ECT and stated \"I feel very disorganized\". She also reported that she slept too much. She did endorse feeling safe on the unit.          Medications:       LORazepam  2 mg Oral TID    Or     LORazepam  2 mg Intramuscular TID     losartan  100 mg Oral Daily     metoprolol succinate ER  50 mg Oral Daily     OLANZapine zydis  10 mg Oral BID    Or     OLANZapine  10 mg Intramuscular BID          Allergies:     Allergies   Allergen Reactions     Haldol [Haloperidol]      Patient previously tolerated haldol, though developed oculogyric crises during hospital stay on 4/26/21 on total daily dose of 10 mg.     Lisinopril Cough          Labs:     Recent Results (from the past 24 hour(s))   Asymptomatic SARS-CoV-2 COVID-19 Virus (Coronavirus) by PCR    Collection Time: 06/01/21  6:18 PM    Specimen: Nasopharyngeal   Result Value Ref Range    SARS-CoV-2 Virus Specimen Source Nasopharyngeal     SARS-CoV-2 PCR Result NEGATIVE     SARS-CoV-2 PCR Comment (Note)    Asymptomatic SARS-CoV-2 COVID-19 Virus (Coronavirus) by PCR    Collection Time: 06/02/21 11:13 AM    Specimen: Nasopharyngeal   Result Value Ref Range    SARS-CoV-2 Virus Specimen Source Nasopharyngeal     SARS-CoV-2 PCR Result NEGATIVE     SARS-CoV-2 " "PCR Comment       Testing was performed using the Xpert Xpress SARS-CoV-2 Assay on the Cepheid Gene-Xpert   Instrument Systems. Additional information about this Emergency Use Authorization (EUA)   assay can be found via the Lab Guide.            Psychiatric Examination:     BP (!) 168/102   Pulse 75   Temp 97.8  F (36.6  C) (Temporal)   Resp 22   Wt 71 kg (156 lb 8 oz)   SpO2 96%   BMI 27.72 kg/m    Weight is 156 lbs 8 oz  Body mass index is 27.72 kg/m .    Weight over time:  Vitals:    05/25/21 0850   Weight: 71 kg (156 lb 8 oz)       Orthostatic Vitals     None            Cardiometabolic risk assessment. 04/15/21      Reviewed patient profile for cardiometabolic risk factors    Date taken /Value  REFERENCE RANGE   Abdominal Obesity  (Waist Circumference)   See nursing flowsheet Women ?35 in (88 cm)   Men ?40 in (102 cm)      Triglycerides  Triglycerides   Date Value Ref Range Status   10/19/2019 117 <150 mg/dL Final       ?150 mg/dL (1.7 mmol/L) or current treatment for elevated triglycerides   HDL cholesterol  HDL Cholesterol   Date Value Ref Range Status   10/19/2019 56 >49 mg/dL Final   ]   Women <50 mg/dL (1.3 mmol/L) in women or current treatment for low HDL cholesterol  Men <40 mg/dL (1 mmol/L) in men or current treatment for low HDL cholesterol     Fasting plasma glucose (FPG) Lab Results   Component Value Date     10/19/2019      FPG ?100 mg/dL (5.6 mmol/L) or treatment for elevated blood glucose   Blood pressure  BP Readings from Last 3 Encounters:   06/02/21 (!) 168/102   06/04/19 131/71   04/26/19 164/86    Blood pressure ?130/85 mmHg or treatment for elevated blood pressure   Family History  See family history     Appearance: awake, alert and disheveled   Attitude: more cooperative  Eye Contact: fair  Mood: \"okay\"  Affect:  Mood congruent, blunted though overall brighter. She smiled on one occasion  Speech: accented, coherent  Language: fluent and intact in English  Psychomotor, Gait, " Musculoskeletal: No stereotypic movements noted on examination today. No evidence of tardive dyskinesia, dystonia, or tics. No physical agitation.   Throught Process: unable to assess  Associations:  No loosening of associations  Thought Content:  Denied suicidal and homicidal ideation, evidence of guardedness  Insight:  limited  Judgement:  limited  Oriented to:  Person, place, time  Attention Span and Concentration: fair, improved  Recent and Remote Memory:  Fair, improved  Fund of Knowledge:  normal    Clinical Global Impressions  First:  Considering your total clinical experience with this particular patient population, how severe are the patient's symptoms at this time?: 7 (04/16/21 1428)  Compared to the patient's condition at the START of treatment, this patient's condition is: 4 (04/16/21 1428)  Most recent:  Considering your total clinical experience with this particular patient population, how severe are the patient's symptoms at this time?: 7 (05/13/21 0953)  Compared to the patient's condition at the START of treatment, this patient's condition is: 6 (05/13/21 0953)           Precautions:     Behavioral Orders   Procedures     Assault precautions     Cheeking Precautions (behavioral units)     Patient Observed swallowing PO medications; Patient asked to drink water after swallowing medication; Patient in Staff line of sight for 15 minutes after medication given; Mouth checks after PO administration (patient asked to open mouth and stick out their tongue).     Code 1 - Restrict to Unit     Code 2 - 1:1 Staff Supervision     For ECT only     Code 3     For walks in the [place] with staff and per staff discretion     Electroconvulsive therapy     Series of up to 12 treatments. Begin Date: 5/26/21     Treating Psychiatrist providing ECT:  Dr. Lubin     Notified on:  5/21/21     Electroconvulsive therapy     As long as we get the green light from risk management and pt is medically cleared, begin ECT  every Monday, Wednesday, and Friday     Electroconvulsive therapy     Series of up to 12 treatments. Begin Date: 5/25/21     Treating Psychiatrist providing ECT:  Amee     Notified on:  5/24/21     Elopement precautions     Fall precautions     Mcgrath Calderon     Routine Programming     As clinically indicated     Status 15     Every 15 minutes.     Status Individual Observation     SIO required while patient is NPO on ECT days (midnight to ECT time on M, W, F). Patient SIO status reviewed with team/RN.  Please also refer to RN/team documentation for add'l detail.    -SIO staff to monitor following which have contributed to patient being on SIO:  Monitoring pt while NPO due to memory impairments and disorganized thought process  -Possible interventions SIO staff could use to support patient's treatment progress:  Ensure patient remains NPO  -When following observed, team will review discontinuation of SIO:  After ECT is completed     Order Specific Question:   CONTINUOUS 24 hours / day     Answer:   5 feet     Order Specific Question:   Indications for SIO     Answer:   Medical equipment / ligature risk          Diagnoses:     Schizoaffective Disorder, Bipolar Type, decompensated  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 Symptoms and presentation at this time is most consistent with Schizoaffective Disorder, Bipolar Type. We have obtained most recent medication regimen from patient's ACT team, and regimen was initially restarted. Inpatient psychiatric hospitalization  "is warranted at this time for safety, stabilization, and possible adjustment in medications. Pt is committed. We have petitioned for Alcides Calderon due to lack of improvement and side effects from medications.    Hospital Course:  On admission, PTA medications were restarted. However, patient had been declining all scheduled medications despite significant encouragement from staff and provider. Psychiatric emergency declared on 4/20 due to aggression toward others in context of severe psychosis and suspected excited catatonia. Ativan 1 mg TID was also added. Discontinued PTA Invega, Zyprexa, and thorazine on 4/20 due to consistent refusal.     On 4/26, it was noted that patient frequently had upward gaze while walking up and down the unit. She did not appear to be distressed. She reported that she was looking at \"my god.\" It was determined to be oculogyric crisis secondary to IM haloperidol. Haldol was subsequently discontinued during day shift on 4/26 and scheduled Zyprexa was initiated on emergency basis. Oral Cogentin was also scheduled, though patient declined. On the evening of 4/26, patient's gaze was fixed in upward position for several hours and she appeared to be experiencing discomfort. IM Cogentin was administered with noted resolution. Partial improvements weew observed after switch to scheduled Zyprexa. After patient improved, she was more receptive to reinitiating oral Invega, which was initiated on 5/3 and titrated to PTA dose of 9 mg daily on 5/10. Plan was for ACT team to bring in loading dose of Invega Sustenna. Patient had signs of oculogyric crisis again with Invega. Oral dose decreased to 6 mg after this. Invega was stopped on 5/14 due to ongoing signs of problems related to eye movement and concerns for oculogyric crisis.    Overall improvement in patient's agitation and suspected catatonia noted on 4/30 after patient accepted two doses of Ativan. She began declining Ativan again with noted " "decompensation. Patient now accepting, however, she does not have the capacity to consent to treatment with Ativan at this time. She does not believe she has a mental illness, including catatonia. She does not fully understand risks associated with inadequate treatment of catatonia. Discussed with her son who is acting as surrogate decision maker and he is in full support of forced scheduled IM Ativan if patient declines oral formulation. Also consulted with our legal team prior to backing oral Ativan with IM.     Ativan was increased on 5/19 due to re-emerging evidence of catatonia (long periods of staring, repetitive movements, echolalia, mutism). Meeting was held with ACT team on 5/20, including ACT team psychiatrist, Dr. Pedraza. He said that he is in \"full support\" of plan to pursue Mcgrath Kohler and ECT at this time. He does feel that in the past she has been discharged from the hospital while still quite symptomatic. He is hoping that ECT will be effective and that with improvement, Michelle would be more receptive to clozapine and weekly blood draws. He said that ideally she would transition to an IRTS before going back to her apartment, but also understands there may be some barriers (I.e. pt's willingness, financial concerns, etc).     ECT consult placed. Please see consult note by Dr. Lubin on 5/21 for details. Mcgrath Kolher was approved on 5/24 and patient was medically cleared on 5/24. ECT initiated on 5/26.     Target psychiatric symptoms and interventions:   - Oral Zyprexa 10 mg BID OR Zyprexa 10 mg IM. Hatch in place. May consider increasing further though holding off given re-emerging catatonic sx and borderline prolonged QTc   - Ativan 2 mg TID OR Ativan IM 2 mg TID (see above). Patient may not decline.   Ativan to be given at 1700 on days prior to ECT and will be held on mornings before ECT  Continue Cogentin 2 mg IM daily prn for evidence acute dystonic reaction or oculogyric crisis  Continue " hydroxyzine 25-50 mg q4h prn for acute anxiety  Continue Trazodone 50 mg at bedtime prn for sleep disturbances  Continue Zyprexa 10 mg TID prn for severe agitation  Continue Ativan/Benadryl q4h prn for agitation. WOULD GIVE PRN ATIVAN FIRST FOR AGITATION unless it is after 5 pm on day prior to ECT     ECT:  - Because patient was only intermittently accepting scheduled Ativan and some symptoms of catatonia are re-emerging, pursued Alcides Kohler for ECT. She also continues to exhibit symptoms of psychosis and has not responded or has experienced side effects from multiple neuroleptic medications. Court hearing was held on 5/21. Alcides Kohler is approved. Patient is medically cleared. COVID negative on 5/24. Obtained ECT consult on 5/21/21. Please see Dr. Lubin's consultation note.   - ECT treatment #4 scheduled for 6/4.   - NPO 8 hours prior to scheduled treatment. Clear liquids until 2 hours prior to treatment.   - Discussed with IM. May resume AM antihypertensives, no need to hold prior to ECT  - SIO while patient is NPO, starting at midnight on ECT days      Acute Medical Problems and Treatments:  HTN: Patient is now adherent with medication regimen.   - Metoprolol succinate ER 50 mg daily  - Cozaar 100 mg daily  - Please see note from IM dated 5/3/21, 5/6/21, and 5/24.  - Obtained EKG and routine labs on 5/21 in context of pt declining vital sign checks and anti-hypertensives and recently elevated BPs. Reviewed labs and discussed EKG findings with IM on 5/21. No urgent concerns, though IM should be notified if pt develops acute medical concerns (I.e. heart palpitations, SOB, changes in speech, AMS, confusion, CP, HA, changes in vision).     Now that Alcides Riverappard is in place, placed IM consult for ECT medical clearance. Appreciate Assistance. Pt was medically cleared for ECT. See IM note dated 5/24 for details.      CVA:  - Aspirin 81 mg daily     Chronic Constipation:  - Miralax 17 mg  daily     Behavioral/Psychological/Social:  - Encourage unit programming  - Patient is now Code 3 status and can take walks in the [place] with staff and security present per staff discretion    Safety:  - Continue precautions as noted above  - Status 15 minute checks  - Safety precautions include: assault and elopement precautions  - Continue precautions as noted above    Legal Status: Committed as MI with Hatch in place for Haldol, Zyprexa, Invega, and Thorazine through [facility]. Filed Aclides Kohler through [facility] and approved on 5/24/21.     Disposition Plan   Reason for ongoing admission: poses an imminent risk to self, poses an imminent risk to others and is unable to care for self due to severe psychosis or tad  Discharge location: Home vs IRTS with ACT team support.   Discharge Medications: not ordered  Follow-up Appointments: not scheduled    Entered by: Bhavya Lane on 6/1/2021 at 6:01 PM      The patient was seen and discussed with attending psychiatrist Dr. Zaman.

## 2021-06-02 NOTE — PLAN OF CARE
"Nursing assessment completed. Patient has been NPO after midnight in preporation for ECT. Michelle has been cooperative with am vitals and taking pre ECT medications. Am Ativan held. Patient went to ECT without incident. Upon her return from ECT, it was reported that pt was hypertensive post ECT and received PRN labetalol while in ECT. Upon arrival to the unit, pt's BP continued to decrease. Patient ate lunch, took her am ativan, and told writer she felt \"good\". Appears sedated, but calm and cooperative. No s/s SI/SIB. Medication compliant. Continue to monitor and assess.     Patient's brother called for an update. He asked if he could bring in or mail in snacks. Writer told him they could not be perishable. MD aware and will place a pt care order.   "

## 2021-06-02 NOTE — PLAN OF CARE
Pt asleep at start of shift. Breathing quiet and unlabored.     Pt had no c/o pain or discomfort during the HS.     Appears to have slept 6.75 hours.     Pt continues on 1:1 SIO with 5 ft staff space distance for ECT.     Pt on ASSAULT, CHEEKING, ELOPEMENT, and FALL precautions in addition to single room order. Any related events noted above.     Will continue to monitor and assess.   Problem: Sleep Disturbance (Psychotic Signs/Symptoms)  Goal: Improved Sleep (Psychotic Signs/Symptoms)  Outcome: No Change

## 2021-06-02 NOTE — PROGRESS NOTES
"[facility], [place]   Psychiatric Progress Note  Hospital Day: 48        Interim History:   The patient's care was discussed with the treatment team during the daily team meeting and/or staff's chart notes were reviewed.  Staff report patient is primarily isolative to her room. Sleeps on the floor in the corner of her room. She is not attending to ADLs. Has not showered since admission over 6 weeks ago. Eating and drinking well. Denies acute physical concerns and medication side effects. No episodes of seclusion or restraints. She was noted to be more pleasant and calmer. No significant agitation. Was able to enjoy time in the [employer] with encouragement.     During interview with Michelle, she appeared slightly more irritable with more overtly evident sx of psychosis. She mentioned that Campos Aly is her . She again stated that she is not under a commitment and thus does not need to adhere to recommendations. She said that she is not under commitment because \"I was conscious beyond a reasonable doubt!\" She repeatedly asked about changes to her medication in the context of ongoing ECT (I.e. Ativan administered at dinner time rather than at bedtime). Appears to become more anxious with any change in her routine. She was alert and oriented x 2. She believed the date was 5/27/21. She smiled when informed that it is 6/1, and said \"That is why I need to have my phone with me.\" She became quite agitated with raised volume in speech when informed that she may have a total of 12 ECT treatments if improvement is noted. Appears to have memory deficits as I have explained this to Michelle on multiple occasions. She now is again saying that she does not wish to work with ACT team. She denied side effects or acute medical concerns. She is aware that plan is for ECT tomorrow.          Medications:       LORazepam  2 mg Oral TID    Or     LORazepam  2 mg Intramuscular TID     losartan  100 mg Oral " Daily     metoprolol succinate ER  50 mg Oral Daily     OLANZapine zydis  10 mg Oral BID    Or     OLANZapine  10 mg Intramuscular BID          Allergies:     Allergies   Allergen Reactions     Haldol [Haloperidol]      Patient previously tolerated haldol, though developed oculogyric crises during hospital stay on 4/26/21 on total daily dose of 10 mg.     Lisinopril Cough          Labs:     No results found for this or any previous visit (from the past 24 hour(s)).       Psychiatric Examination:     /74 (BP Location: Right arm)   Pulse 76   Temp 96.8  F (36  C) (Tympanic)   Resp 16   Wt 71 kg (156 lb 8 oz)   SpO2 98%   BMI 27.72 kg/m    Weight is 156 lbs 8 oz  Body mass index is 27.72 kg/m .    Weight over time:  Vitals:    05/25/21 0850   Weight: 71 kg (156 lb 8 oz)       Orthostatic Vitals     None            Cardiometabolic risk assessment. 04/15/21      Reviewed patient profile for cardiometabolic risk factors    Date taken /Value  REFERENCE RANGE   Abdominal Obesity  (Waist Circumference)   See nursing flowsheet Women ?35 in (88 cm)   Men ?40 in (102 cm)      Triglycerides  Triglycerides   Date Value Ref Range Status   10/19/2019 117 <150 mg/dL Final       ?150 mg/dL (1.7 mmol/L) or current treatment for elevated triglycerides   HDL cholesterol  HDL Cholesterol   Date Value Ref Range Status   10/19/2019 56 >49 mg/dL Final   ]   Women <50 mg/dL (1.3 mmol/L) in women or current treatment for low HDL cholesterol  Men <40 mg/dL (1 mmol/L) in men or current treatment for low HDL cholesterol     Fasting plasma glucose (FPG) Lab Results   Component Value Date     10/19/2019      FPG ?100 mg/dL (5.6 mmol/L) or treatment for elevated blood glucose   Blood pressure  BP Readings from Last 3 Encounters:   06/01/21 132/74   06/04/19 131/71   04/26/19 164/86    Blood pressure ?130/85 mmHg or treatment for elevated blood pressure   Family History  See family history     Appearance: awake, alert and  disheveled   Attitude: more cooperative  Eye Contact: fair  Mood: more irritable today  Affect:  Mood congruent, blunted though overall brighter. She smiled on one occasion. Is more reactive today compared to last Friday  Speech: accent, coherent  Language: fluent and intact in English  Psychomotor, Gait, Musculoskeletal: No stereotypic movements noted on examination today. No evidence of tardive dyskinesia, dystonia, or tics. No physical agitation.   Throught Process: unable to assess  Associations:  No loosening of associations  Thought Content:  no evidence of suicidal ideation or homicidal ideation, evidence of paranoia and guardedness  Insight:  limited  Judgement:  limited  Oriented to:  Person, place, time  Attention Span and Concentration: fair, improved  Recent and Remote Memory:  Fair, improved  Fund of Knowledge:  normal    Clinical Global Impressions  First:  Considering your total clinical experience with this particular patient population, how severe are the patient's symptoms at this time?: 7 (04/16/21 1428)  Compared to the patient's condition at the START of treatment, this patient's condition is: 4 (04/16/21 1428)  Most recent:  Considering your total clinical experience with this particular patient population, how severe are the patient's symptoms at this time?: 7 (05/13/21 0953)  Compared to the patient's condition at the START of treatment, this patient's condition is: 6 (05/13/21 0953)           Precautions:     Behavioral Orders   Procedures     Assault precautions     Cheeking Precautions (behavioral units)     Patient Observed swallowing PO medications; Patient asked to drink water after swallowing medication; Patient in Staff line of sight for 15 minutes after medication given; Mouth checks after PO administration (patient asked to open mouth and stick out their tongue).     Code 1 - Restrict to Unit     Code 2 - 1:1 Staff Supervision     For ECT only     Code 3     For walks in the [place] with staff and per staff discretion     Electroconvulsive therapy     Series of up to 12 treatments. Begin Date: 5/26/21     Treating Psychiatrist providing ECT:  Dr. Lubin     Notified on:  5/21/21     Electroconvulsive therapy     As long as we get the green light from risk management and pt is medically cleared, begin ECT every Monday, Wednesday, and Friday     Electroconvulsive therapy     Series of up to 12 treatments. Begin Date: 5/25/21     Treating Psychiatrist providing ECT:  Amee     Notified on:  5/24/21     Elopement precautions     Fall precautions     Alcides Calderon     Routine Programming     As clinically indicated     Status 15     Every 15 minutes.          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 Symptoms and presentation at this time is most consistent with Schizoaffective Disorder, Bipolar Type. We have obtained most recent medication regimen from patient's ACT team, and regimen was initially restarted. Inpatient psychiatric hospitalization is warranted at this time for safety, stabilization, and possible adjustment in medications. Pt is committed. We have petitioned for Alcides Calderon due to lack of improvement and side effects from medications.    Hospital Course:  On admission, PTA medications were restarted. However, patient had been declining all scheduled medications despite significant encouragement from staff and provider. Psychiatric emergency  "declared on 4/20 due to aggression toward others in context of severe psychosis and suspected excited catatonia. Ativan 1 mg TID was also added. Discontinued PTA Invega, Zyprexa, and thorazine on 4/20 due to consistent refusal.     On 4/26, it was noted that patient frequently had upward gaze while walking up and down the unit. She did not appear to be distressed. She reported that she was looking at \"my god.\" It was determined to be oculogyric crisis secondary to IM haloperidol. Haldol was subsequently discontinued during day shift on 4/26 and scheduled Zyprexa was initiated on emergency basis. Oral Cogentin was also scheduled, though patient declined. On the evening of 4/26, patient's gaze was fixed in upward position for several hours and she appeared to be experiencing discomfort. IM Cogentin was administered with noted resolution. Partial improvements weew observed after switch to scheduled Zyprexa. After patient improved, she was more receptive to reinitiating oral Invega, which was initiated on 5/3 and titrated to PTA dose of 9 mg daily on 5/10. Plan was for ACT team to bring in loading dose of Invega Sustenna. Patient had signs of oculogyric crisis again with Invega. Oral dose decreased to 6 mg after this. Invega was stopped on 5/14 due to ongoing signs of problems related to eye movement and concerns for oculogyric crisis.    Overall improvement in patient's agitation and suspected catatonia noted on 4/30 after patient accepted two doses of Ativan. She began declining Ativan again with noted decompensation. Patient now accepting, however, she does not have decisional making capacity at this time. She does not believe she has a mental illness, including catatonia. She does not fully understand risks associated with inadequate treatment of catatonia. Discussed with her son who is acting as surrogate decision maker and he is in full support of forced scheduled IM Ativan if patient declines oral formulation. " "Also consulted with our legal team prior to backing oral Ativan with IM.     Ativan was increased on 5/19 due to re-emerging evidence of catatonia (long periods of staring, repetitive movements, echolalia, mutism). Meeting was held with ACT team on 5/20, including ACT team psychiatrist, Dr. Pedraza. He said that he is in \"full support\" of plan to pursue Mcgrath Kohler and ECT at this time. He does feel that in the past she has been discharged from the hospital while still quite symptomatic. He is hoping that ECT will be effective and that with improvement, Michelle would be more receptive to clozapine and weekly blood draws. He said that ideally she would transition to an IRTS before going back to her apartment, but also understands there may be some barriers (I.e. pt's willingness, financial concerns, etc).     ECT consult placed. Please see consult note by Dr. Lubin on 5/21 for details. Mcgrath Kohler was approved on 5/24 and patient was medically cleared on 5/24. ECT initiated on 5/26.     Target psychiatric symptoms and interventions:   - Oral Zyprexa 10 mg BID OR Zyprexa 10 mg IM. Hatch in place. May consider increasing further though holding off given re-emerging catatonic sx and borderline prolonged QTc   - Ativan 2 mg TID OR Ativan IM 2 mg TID (see above). Patient may not decline.   Ativan to be given at 1700 on days prior to ECT and will be held on mornings before ECT  Continue Cogentin 2 mg IM daily prn for evidence acute dystonic reaction or oculogyric crisis  Continue hydroxyzine 25-50 mg q4h prn for acute anxiety  Continue Trazodone 50 mg at bedtime prn for sleep disturbances  Continue Zyprexa 10 mg TID prn for severe agitation  Continue Ativan/Benadryl q4h prn for agitation. WOULD GIVE PRN ATIVAN FIRST FOR AGITATION unless it is after 5 pm on day prior to ECT     ECT:  - Because patient was only intermittently accepting scheduled Ativan and some symptoms of catatonia are re-emerging, pursued Mcgrath Kohler " for ECT. She also continues to exhibit symptoms of psychosis and has not responded or has experienced side effects from multiple neuroleptic medications. Court hearing was held on 5/21. Alcides Kohler is approved. Patient is medically cleared. COVID negative on 5/24. Obtained ECT consult on 5/21/21. Please see Dr. Lubin's consultation note.   - ECT treatment #3 scheduled for 6/2.   - NPO 8 hours prior to scheduled treatment. Clear liquids until 2 hours prior to treatment.   - Discussed with IM. May resume AM antihypertensives, no need to hold prior to ECT  - SIO while patient is NPO, starting at midnight on ECT days      Acute Medical Problems and Treatments:  HTN: Patient is now adherent with medication regimen.   - Metoprolol succinate ER 50 mg daily  - Cozaar 100 mg daily  - Please see note from IM dated 5/3/21, 5/6/21, and 5/24.  - Obtained EKG and routine labs on 5/21 in context of pt declining vital sign checks and anti-hypertensives and recently elevated BPs. Reviewed labs and discussed EKG findings with IM on 5/21. No urgent concerns, though IM should be notified if pt develops acute medical concerns (I.e. heart palpitations, SOB, changes in speech, AMS, confusion, CP, HA, changes in vision).     Now that Alcides Kohler is in place, placed IM consult for ECT medical clearance. Appreciate Assistance. Pt was medically cleared for ECT. See IM note dated 5/24 for details.      CVA:  - Aspirin 81 mg daily     Chronic Constipation:  - Miralax 17 mg daily     Behavioral/Psychological/Social:  - Encourage unit programming  - Patient is now Code 3 status and can take walks in the [place] with staff and security present per staff discretion    Safety:  - Continue precautions as noted above  - Status 15 minute checks  - Safety precautions include: assault and elopement precautions  - Continue precautions as noted above    Legal Status: Committed as MI with Hatch in place for Haldol, Zyprexa, Invega, and Thorazine  through [facility]. Filed Alcides Kohler through [facility] and approved on 5/24/21.     Disposition Plan   Reason for ongoing admission: poses an imminent risk to self, poses an imminent risk to others and is unable to care for self due to severe psychosis or tad  Discharge location: Home vs IRTS with ACT team support.   Discharge Medications: not ordered  Follow-up Appointments: not scheduled    Entered by: Ida Zaman on 6/1/2021 at 8:17 PM      > 30 minutes total time that was spent and over 50% of this time was spent in counseling and coordination of care.

## 2021-06-02 NOTE — PROGRESS NOTES
Patients VSS, awake, IV removed, meets phase 2 criteria and is able to move to st 12 at this time. Report given.

## 2021-06-02 NOTE — PROCEDURES
"Procedure/Surgery Information   [facility]    Bedside Procedure Note  Date of Service (when I performed the procedure): 06/02/2021    Michelle Pino is a 60 year old female patient.  1. Paranoid schizophrenia (H)    2. COVID-19 ruled out by laboratory testing    3. Schizoaffective disorder, bipolar type (H)      Past Medical History:   Diagnosis Date     Hyperlipidemia      Hypertension      Schizophrenia (H)      Temp: 96.8  F (36  C) Temp src: Tympanic BP: 132/74 Pulse: 76   Resp: 16 SpO2: 98 % O2 Device: None (Room air)      Procedures     Angel Lubin     [facility],   ECT Procedure Note   06/02/2021    Michelle Pino [id number]   60 year old 1960     Patient Status: Inpatient    Is this the first in a series of 12 treatments?  No    History and Physical: Reviewed in medical record    Consent: court ordered    Date Consent Signed: ECT may be administered during the commitment, which was signed 4/26/21 and expires on 10/26/21.  She can have ECT up to 3 times weekly for up to 12 treatments.        Allergies   Allergen Reactions     Haldol [Haloperidol]      Patient previously tolerated haldol, though developed oculogyric crises during hospital stay on 4/26/21 on total daily dose of 10 mg.     Lisinopril Cough       Weight:  156 lbs 8 oz     /74 (BP Location: Right arm)   Pulse 76   Temp 96.8  F (36  C) (Tympanic)   Resp 16   Wt 71 kg (156 lb 8 oz)   SpO2 98%   BMI 27.72 kg/m      Patient Preparations: Other (comment)(none needed)         Indications for ECT:   Medications ineffective         Clinical Narrative:   Patient has bipolar tad with psychosis and is court-ordered to ECT.  NPO after 2400.  Alert and Oriented x 3.  Per unit staff, \"Patient sleeping early in the evening until dinner when she was up and visible in the milieu. Patient upon approach flat in affect but less blunted and tense,  calm and " "cooperative with verbal assessment. Patient states understanding and acceptance with current care plan, medications and ECT. Patient denies SI/SIB, AH/VH, anxiety, depression, or thoughts of harming others. Patient accepting of HS medications with no stated or observed side effects. Patient declined prompting to complete ADL's.\"  Pt says she has no appetite.  She says her mood is fine.           Diagnosis:   Bipolar I manic with psychosis         Pause for the Cause:     Right patient Yes   Right procedure/laterality settings: Yes          Intra-Procedure Documentation:       ECT #: 3   Treatment number this series: 3   Total treatment number: 3     Type of ECT:  Bilateral, standard    ECT Medications:   Next Tx add:  Caffeine: 250mg   Labetalol: 10mg pre-ECT for high bp (increase to 20mg next Tx)  Next Tx add:  Lidocaine: 40mg  Etomidate: 14mg  Succinyl Choline: 70mg     ECT Strip Summary:   Energy Level: 50 percent  Motor Seizure Duration:  22 seconds  EEG Seizure Duration:   29 seconds    Complications:  none    Plan:   COVID test 6/2/21  Next ECT 6/4/21    Angel Lubin MD  "

## 2021-06-02 NOTE — PLAN OF CARE
Assessment/Intervention/Current Symtoms and Care Coordination:  Attended Team Meeting, Reviewed chart notes: Patient appears slightly improved. Patient will have her 3rd ECT today.     Discharge Plan or Goal:  Return home with [facility] ACT Team in place    Barriers to Discharge:  Patient needs to complete scheduled course of ECT during this hospitalization.     Referral Status:  No referrals have been made.      Legal Status:     Committed/Hatch/Mcgrath Kohler through [facility]

## 2021-06-02 NOTE — ANESTHESIA POSTPROCEDURE EVALUATION
Patient: Michelle Pino    * No procedures listed *    Diagnosis:* No pre-op diagnosis entered *  Diagnosis Additional Information: No value filed.    Anesthesia Type:  General    Note:     Postop Pain Control: Uneventful            Sign Out: Well controlled pain   PONV:    Neuro/Psych: Uneventful            Sign Out: Acceptable/Baseline neuro status   Airway/Respiratory: Uneventful            Sign Out: Acceptable/Baseline resp. status   CV/Hemodynamics: Uneventful            Sign Out: Acceptable CV status; No obvious hypovolemia; No obvious fluid overload   Other NRE:    DID A NON-ROUTINE EVENT OCCUR?            Last vitals:  Vitals:    06/02/21 1116 06/02/21 1130 06/02/21 1145   BP: (!) 183/101 (!) 172/103 (!) 168/102   Pulse: 64 71 75   Resp: 24 24 22   Temp: 36.3  C (97.4  F) 36.5  C (97.7  F) 36.6  C (97.8  F)   SpO2: 100% 95% 96%       Last vitals prior to Anesthesia Care Transfer:  CRNA VITALS  6/2/2021 1045 - 6/2/2021 1145      6/2/2021             Resp Rate (observed):  10          Electronically Signed By: Monica Bo MD  June 2, 2021  1:21 PM

## 2021-06-03 PROCEDURE — 124N000002 HC R&B MH UMMC

## 2021-06-03 PROCEDURE — 250N000013 HC RX MED GY IP 250 OP 250 PS 637: Performed by: PHYSICIAN ASSISTANT

## 2021-06-03 PROCEDURE — 250N000013 HC RX MED GY IP 250 OP 250 PS 637: Performed by: PSYCHIATRY & NEUROLOGY

## 2021-06-03 PROCEDURE — 99233 SBSQ HOSP IP/OBS HIGH 50: CPT | Mod: GC | Performed by: PSYCHIATRY & NEUROLOGY

## 2021-06-03 RX ADMIN — OLANZAPINE 10 MG: 10 TABLET, ORALLY DISINTEGRATING ORAL at 17:21

## 2021-06-03 RX ADMIN — METOPROLOL SUCCINATE 50 MG: 50 TABLET, EXTENDED RELEASE ORAL at 08:46

## 2021-06-03 RX ADMIN — LORAZEPAM 2 MG: 2 TABLET ORAL at 17:20

## 2021-06-03 RX ADMIN — LORAZEPAM 2 MG: 2 TABLET ORAL at 14:02

## 2021-06-03 RX ADMIN — OLANZAPINE 10 MG: 10 TABLET, ORALLY DISINTEGRATING ORAL at 08:46

## 2021-06-03 RX ADMIN — LOSARTAN POTASSIUM 100 MG: 100 TABLET, FILM COATED ORAL at 08:46

## 2021-06-03 RX ADMIN — LORAZEPAM 2 MG: 2 TABLET ORAL at 08:46

## 2021-06-03 ASSESSMENT — ACTIVITIES OF DAILY LIVING (ADL)
DRESS: SCRUBS (BEHAVIORAL HEALTH)
DRESS: SCRUBS (BEHAVIORAL HEALTH)
ORAL_HYGIENE: PROMPTS
HYGIENE/GROOMING: PROMPTS
ORAL_HYGIENE: PROMPTS
HYGIENE/GROOMING: PROMPTS
LAUNDRY: UNABLE TO COMPLETE

## 2021-06-03 NOTE — PROGRESS NOTES
"[facility], [place]   Psychiatric Progress Note  Hospital Day: 50        Interim History:   The patient's care was discussed with the treatment team during the daily team meeting and/or staff's chart notes were reviewed.  Staff report patient is primarily isolative to her room. Sleeps on the floor in the corner of her room. She is not attending to ADLs. Has not showered since admission over 6 weeks ago. Eating and drinking well. Denies acute physical concerns and medication side effects. No episodes of seclusion or restraints. No agitation noted overnight. She was noted to sleep 4 hours overnight. She is taking her medications as prescribed. Per staff, she appears to be brighter with each ECT treatment and less paranoid.     Upon interview, Michelle states that she is \"pretty good\" and that she slept well. She did wake up in the middle of the night but was able to fall asleep again. She feels that she had less confusion yesterday after ECT. She denies physical concerns, suicidal ideation, and auditory and visual hallucinations. When asked which state she is in she states \"I've forgotten\". She thinks that she could be safe and take care of herself at home. We discussed continuing ECT due to its apparent benefit and plans to meet with her ACT team next week.          Medications:       LORazepam  2 mg Oral TID    Or     LORazepam  2 mg Intramuscular TID     losartan  100 mg Oral Daily     metoprolol succinate ER  50 mg Oral Daily     OLANZapine zydis  10 mg Oral BID    Or     OLANZapine  10 mg Intramuscular BID          Allergies:     Allergies   Allergen Reactions     Haldol [Haloperidol]      Patient previously tolerated haldol, though developed oculogyric crises during hospital stay on 4/26/21 on total daily dose of 10 mg.     Lisinopril Cough          Labs:     Recent Results (from the past 24 hour(s))   Asymptomatic SARS-CoV-2 COVID-19 Virus (Coronavirus) by PCR    Collection Time: " 06/02/21 11:13 AM    Specimen: Nasopharyngeal   Result Value Ref Range    SARS-CoV-2 Virus Specimen Source Nasopharyngeal     SARS-CoV-2 PCR Result NEGATIVE     SARS-CoV-2 PCR Comment       Testing was performed using the Xpert Xpress SARS-CoV-2 Assay on the Cepheid Gene-Xpert   Instrument Systems. Additional information about this Emergency Use Authorization (EUA)   assay can be found via the Lab Guide.            Psychiatric Examination:     BP (!) 142/86 (BP Location: Left arm)   Pulse 79   Temp 98  F (36.7  C) (Temporal)   Resp 16   Wt 71 kg (156 lb 8 oz)   SpO2 96%   BMI 27.72 kg/m    Weight is 156 lbs 8 oz  Body mass index is 27.72 kg/m .    Weight over time:  Vitals:    05/25/21 0850   Weight: 71 kg (156 lb 8 oz)       Orthostatic Vitals     None            Cardiometabolic risk assessment. 04/15/21      Reviewed patient profile for cardiometabolic risk factors    Date taken /Value  REFERENCE RANGE   Abdominal Obesity  (Waist Circumference)   See nursing flowsheet Women ?35 in (88 cm)   Men ?40 in (102 cm)      Triglycerides  Triglycerides   Date Value Ref Range Status   10/19/2019 117 <150 mg/dL Final       ?150 mg/dL (1.7 mmol/L) or current treatment for elevated triglycerides   HDL cholesterol  HDL Cholesterol   Date Value Ref Range Status   10/19/2019 56 >49 mg/dL Final   ]   Women <50 mg/dL (1.3 mmol/L) in women or current treatment for low HDL cholesterol  Men <40 mg/dL (1 mmol/L) in men or current treatment for low HDL cholesterol     Fasting plasma glucose (FPG) Lab Results   Component Value Date     10/19/2019      FPG ?100 mg/dL (5.6 mmol/L) or treatment for elevated blood glucose   Blood pressure  BP Readings from Last 3 Encounters:   06/02/21 (!) 142/86   06/04/19 131/71   04/26/19 164/86    Blood pressure ?130/85 mmHg or treatment for elevated blood pressure   Family History  See family history     Appearance: alert, sitting in lounge watching tv, wrapped in blanket  Attitude:  "cooperative  Eye Contact: good  Mood: \"pretty good\"  Affect:  Mood congruent, blunted though overall brighter. Smiled multiple times throughout interview and laughed.   Speech: accented, mostly coherent although sometimes speaks very softly  Language: fluent and intact in English  Psychomotor, Gait, Musculoskeletal: No stereotypic movements noted on examination today. No evidence of tardive dyskinesia, dystonia, or tics. No physical agitation.   Throught Process: Seems logical (responds appropriately to questions).  Associations:  No loosening of associations  Thought Content:  Denied suicidal and homicidal ideation  Insight:  limited  Judgement:  limited  Oriented to:  Person, place (city, not state), time  Attention Span and Concentration: fair, improved  Recent and Remote Memory:  Fair, improved  Fund of Knowledge:  normal    Clinical Global Impressions  First:  Considering your total clinical experience with this particular patient population, how severe are the patient's symptoms at this time?: 7 (04/16/21 1428)  Compared to the patient's condition at the START of treatment, this patient's condition is: 4 (04/16/21 1428)  Most recent:  Considering your total clinical experience with this particular patient population, how severe are the patient's symptoms at this time?: 7 (05/13/21 0953)  Compared to the patient's condition at the START of treatment, this patient's condition is: 6 (05/13/21 0953)           Precautions:     Behavioral Orders   Procedures     Assault precautions     Cheeking Precautions (behavioral units)     Patient Observed swallowing PO medications; Patient asked to drink water after swallowing medication; Patient in Staff line of sight for 15 minutes after medication given; Mouth checks after PO administration (patient asked to open mouth and stick out their tongue).     Code 1 - Restrict to Unit     Code 2 - 1:1 Staff Supervision     For ECT only     Code 3     For walks in [facility] " with staff and per staff discretion     Electroconvulsive therapy     Series of up to 12 treatments. Begin Date: 5/26/21     Treating Psychiatrist providing ECT:  Dr. Lubin     Notified on:  5/21/21     Electroconvulsive therapy     As long as we get the green light from risk management and pt is medically cleared, begin ECT every Monday, Wednesday, and Friday     Electroconvulsive therapy     Series of up to 12 treatments. Begin Date: 5/25/21     Treating Psychiatrist providing ECT:  Amee     Notified on:  5/24/21     Elopement precautions     Fall precautions     Mcgrath Calderon     Routine Programming     As clinically indicated     Status 15     Every 15 minutes.     Status Individual Observation     SIO required while patient is NPO on ECT days (midnight to ECT time on M, W, F). Patient SIO status reviewed with team/RN.  Please also refer to RN/team documentation for add'l detail.    -SIO staff to monitor following which have contributed to patient being on SIO:  Monitoring pt while NPO due to memory impairments and disorganized thought process  -Possible interventions SIO staff could use to support patient's treatment progress:  Ensure patient remains NPO  -When following observed, team will review discontinuation of SIO:  After ECT is completed     Order Specific Question:   CONTINUOUS 24 hours / day     Answer:   5 feet     Order Specific Question:   Indications for SIO     Answer:   Medical equipment / ligature risk          Diagnoses:     Schizoaffective Disorder, Bipolar Type, decompensated  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 Symptoms and presentation at this time is most consistent with Schizoaffective Disorder, Bipolar Type. We have obtained most recent medication regimen from patient's ACT team, and regimen was initially restarted. Inpatient psychiatric hospitalization is warranted at this time for safety, stabilization, and possible adjustment in medications. Pt is committed. We have petitioned for Alcides Calderon due to lack of improvement and side effects from medications.    Hospital Course:  On admission, PTA medications were restarted. However, patient had been declining all scheduled medications despite significant encouragement from staff and provider. Psychiatric emergency declared on 4/20 due to aggression toward others in context of severe psychosis and suspected excited catatonia. Ativan 1 mg TID was also added. Discontinued PTA Invega, Zyprexa, and thorazine on 4/20 due to consistent refusal.     On 4/26, it was noted that patient frequently had upward gaze while walking up and down the unit. She did not appear to be distressed. She reported that she was looking at \"my god.\" It was determined to be oculogyric crisis secondary to IM haloperidol. Haldol was subsequently discontinued during day shift on 4/26 and scheduled Zyprexa was initiated on emergency basis. Oral Cogentin was also scheduled, though patient declined. On the evening of 4/26, patient's gaze was fixed in upward position for several hours and she appeared to be experiencing discomfort. IM Cogentin was administered with noted resolution. Partial improvements weew observed after switch to scheduled Zyprexa. After patient improved, she was more receptive to reinitiating oral Invega, which was initiated on 5/3 and titrated to PTA dose of 9 mg daily on 5/10. Plan was for ACT team to bring in loading dose of Invega Sustenna. Patient had signs of oculogyric crisis again with Invega. Oral dose " "decreased to 6 mg after this. Invega was stopped on 5/14 due to ongoing signs of problems related to eye movement and concerns for oculogyric crisis.    Overall improvement in patient's agitation and suspected catatonia noted on 4/30 after patient accepted two doses of Ativan. She began declining Ativan again with noted decompensation. Patient now accepting, however, she does not have the capacity to consent to treatment with Ativan at this time. She does not believe she has a mental illness, including catatonia. She does not fully understand risks associated with inadequate treatment of catatonia. Discussed with her son who is acting as surrogate decision maker and he is in full support of forced scheduled IM Ativan if patient declines oral formulation. Also consulted with our legal team prior to backing oral Ativan with IM.     Ativan was increased on 5/19 due to re-emerging evidence of catatonia (long periods of staring, repetitive movements, echolalia, mutism). Meeting was held with ACT team on 5/20, including ACT team psychiatrist, Dr. Pedraza. He said that he is in \"full support\" of plan to pursue Mcgrath Kohler and ECT at this time. He does feel that in the past she has been discharged from the hospital while still quite symptomatic. He is hoping that ECT will be effective and that with improvement, Michelle would be more receptive to clozapine and weekly blood draws. He said that ideally she would transition to an IRTS before going back to her apartment, but also understands there may be some barriers (I.e. pt's willingness, financial concerns, etc).     ECT consult placed. Please see consult note by Dr. Lubin on 5/21 for details. Mcgrath Kohler was approved on 5/24 and patient was medically cleared on 5/24. ECT initiated on 5/26.     Target psychiatric symptoms and interventions:   - Oral Zyprexa 10 mg BID OR Zyprexa 10 mg IM. Hatch in place. May consider increasing further though holding off given re-emerging " catatonic sx and borderline prolonged QTc   - Ativan 2 mg TID OR Ativan IM 2 mg TID (see above). Patient may not decline.   Ativan to be given at 1700 on days prior to ECT and will be held on mornings before ECT  Continue Cogentin 2 mg IM daily prn for evidence of acute dystonic reaction or oculogyric crisis  Continue hydroxyzine 25-50 mg q4h prn for acute anxiety  Continue Trazodone 50 mg at bedtime prn for sleep disturbances  Continue Zyprexa 10 mg TID prn for severe agitation  Continue Ativan/Benadryl q4h prn for agitation. WOULD GIVE PRN ATIVAN FIRST FOR AGITATION unless it is after 5 pm on day prior to ECT     ECT:  - Because patient was only intermittently accepting scheduled Ativan and some symptoms of catatonia are re-emerging, pursued Alcides Kohler for ECT. She also continues to exhibit symptoms of psychosis and has not responded or has experienced side effects from multiple neuroleptic medications. Court hearing was held on 5/21. Mcgrath Kohler is approved. Patient is medically cleared. COVID negative on 5/24. Obtained ECT consult on 5/21/21. Please see Dr. Lubin's consultation note.   - ECT treatment #4 scheduled for 6/4.   - NPO 8 hours prior to scheduled treatment. Clear liquids until 2 hours prior to treatment.   - Discussed with IM. May resume AM antihypertensives, no need to hold prior to ECT  - SIO while patient is NPO, starting at midnight on ECT days  - Will complete acute course in the hospital; will likely need maintenance ECT in outpatient setting.       Acute Medical Problems and Treatments:  HTN: Patient is now adherent with medication regimen.   - Metoprolol succinate ER 50 mg daily  - Cozaar 100 mg daily  - Please see note from IM dated 5/3/21, 5/6/21, and 5/24.  - Obtained EKG and routine labs on 5/21 in context of pt declining vital sign checks and anti-hypertensives and recently elevated BPs. Reviewed labs and discussed EKG findings with IM on 5/21. No urgent concerns, though IM should  be notified if pt develops acute medical concerns (I.e. heart palpitations, SOB, changes in speech, AMS, confusion, CP, HA, changes in vision).     Now that Mcgrath Kohler is in place, placed IM consult for ECT medical clearance. Appreciate Assistance. Pt was medically cleared for ECT. See IM note dated 5/24 for details.      CVA:  - Aspirin 81 mg daily     Chronic Constipation:  - Miralax 17 mg daily     Behavioral/Psychological/Social:  - Encourage unit programming  - Patient is now Code 3 status and can take walks [place] with staff and security present per staff discretion    Safety:  - Continue precautions as noted above  - Status 15 minute checks  - Safety precautions include: assault and elopement precautions  - Continue precautions as noted above    Legal Status: Committed as MI with Hatch in place for Haldol, Zyprexa, Invega, and Thorazine through [facility]. Filed Mcgrath Edita through [facility] and approved on 5/24/21.     Disposition Plan   Reason for ongoing admission: poses an imminent risk to self, poses an imminent risk to others and is unable to care for self due to severe psychosis or tad  Discharge location: Home vs IRTS with ACT team support.   Discharge Medications: not ordered  Follow-up Appointments: not scheduled    Entered by: Bhavya Lane on 6/1/2021 at 8:29 AM     Bhavya Lane MD  Psychiatry PGY-2        The patient was seen and discussed with attending psychiatrist Dr. Zaman.

## 2021-06-03 NOTE — PLAN OF CARE
Assessment/Intervention/Current Symtoms and Care Coordination:  Attended Team Meeting, Reviewed chart notes Patient appears to be improving since starting ECT.      Discharge Plan or Goal:  Patient will discharge to home when stable and continue to follow-up with her [facility] ACT Team.     Barriers to Discharge:  Patient will need to complete her scheduled course of ECT during this hospitalization. Patient is continuing to stabilize.     Referral Status:  No referrals have been made    Legal Status:   Committed/Hatch/Mcgrath Kohler through [facility]

## 2021-06-03 NOTE — PLAN OF CARE
Problem: Adult Inpatient Plan of Care  Goal: Plan of Care Review  Outcome: Improving  Flowsheets  Taken 6/2/2021 1946  Progress: improving  Taken 6/2/2021 1940  Plan of Care Reviewed With: patient     Patient isolative to room all evening sleeping only up upon prompting for meals and medications. Patient upon approach flat in affect, calm and cooperative with assessments with no observed anger or agitation. Patient stating no symptoms and acceptance of current care plan including medications and ECT treatment. Patient accepting of HS medications with no stated or observed side effects. Patient prompted for increased activity and to complete ADL's but declined this evening stating she will do it tomorrow.

## 2021-06-03 NOTE — PLAN OF CARE
Problem: Sleep Disturbance (Psychotic Signs/Symptoms)  Goal: Improved Sleep (Psychotic Signs/Symptoms)  Outcome: No Change     Pt slept for 4 hours. No concerns noted

## 2021-06-03 NOTE — PLAN OF CARE
Patient presents as quiet and socially withdrawn with a blunted affect.  She was somewhat confused this AM, asking if she was having ECT today.  She appeared more at ease when given orientation to today's date, time, and her plan of care.  Michelle continues to demonstrate improvements with regard to her mood.  She is calm, pleasant, and cooperative on approach.  She is no longer struggling with irritability.  She did not show any signs of agitation or aggressive behavior this shift.  She is much less paranoid.  She no longer appears hypervigilant and suspicious of unit staff.  She is now accepting vital signs assessment, including blood pressure checks with the monitor.  She has been accepting all of her scheduled medications without incident.  She has not c/o any adverse side effects, but she does appear mildly sedated at times.  She could be seen napping briefly in the lounge after lunch.  She denies any acute physical concerns at this time.  VSS with the exception of mildly elevated BP (142/92) that was assessed prior to AM medication administration.

## 2021-06-04 ENCOUNTER — APPOINTMENT (OUTPATIENT)
Dept: BEHAVIORAL HEALTH | Facility: CLINIC | Age: 61
End: 2021-06-04
Payer: COMMERCIAL

## 2021-06-04 ENCOUNTER — ANESTHESIA (OUTPATIENT)
Dept: BEHAVIORAL HEALTH | Facility: CLINIC | Age: 61
End: 2021-06-04

## 2021-06-04 ENCOUNTER — ANESTHESIA EVENT (OUTPATIENT)
Dept: BEHAVIORAL HEALTH | Facility: CLINIC | Age: 61
End: 2021-06-04

## 2021-06-04 PROCEDURE — 87635 SARS-COV-2 COVID-19 AMP PRB: CPT | Performed by: PSYCHIATRY & NEUROLOGY

## 2021-06-04 PROCEDURE — 90870 ELECTROCONVULSIVE THERAPY: CPT

## 2021-06-04 PROCEDURE — 250N000011 HC RX IP 250 OP 636: Performed by: STUDENT IN AN ORGANIZED HEALTH CARE EDUCATION/TRAINING PROGRAM

## 2021-06-04 PROCEDURE — 250N000009 HC RX 250: Performed by: PSYCHIATRY & NEUROLOGY

## 2021-06-04 PROCEDURE — G0177 OPPS/PHP; TRAIN & EDUC SERV: HCPCS

## 2021-06-04 PROCEDURE — 250N000013 HC RX MED GY IP 250 OP 250 PS 637: Performed by: PSYCHIATRY & NEUROLOGY

## 2021-06-04 PROCEDURE — 99233 SBSQ HOSP IP/OBS HIGH 50: CPT | Mod: 25 | Performed by: PSYCHIATRY & NEUROLOGY

## 2021-06-04 PROCEDURE — 370N000017 HC ANESTHESIA TECHNICAL FEE, PER MIN

## 2021-06-04 PROCEDURE — 124N000002 HC R&B MH UMMC

## 2021-06-04 PROCEDURE — 250N000009 HC RX 250: Performed by: STUDENT IN AN ORGANIZED HEALTH CARE EDUCATION/TRAINING PROGRAM

## 2021-06-04 RX ORDER — CAFFEINE AND SODIUM BENZOATE 125 MG/ML
250 INJECTION, SOLUTION INTRAMUSCULAR; INTRAVENOUS
Status: CANCELLED
Start: 2021-06-04 | End: 2021-06-04

## 2021-06-04 RX ORDER — NALOXONE HYDROCHLORIDE 0.4 MG/ML
0.2 INJECTION, SOLUTION INTRAMUSCULAR; INTRAVENOUS; SUBCUTANEOUS
Status: DISCONTINUED | OUTPATIENT
Start: 2021-06-04 | End: 2021-06-04

## 2021-06-04 RX ORDER — NALOXONE HYDROCHLORIDE 0.4 MG/ML
0.4 INJECTION, SOLUTION INTRAMUSCULAR; INTRAVENOUS; SUBCUTANEOUS
Status: DISCONTINUED | OUTPATIENT
Start: 2021-06-04 | End: 2021-06-04

## 2021-06-04 RX ORDER — CAFFEINE AND SODIUM BENZOATE 125 MG/ML
250 INJECTION, SOLUTION INTRAMUSCULAR; INTRAVENOUS
Status: COMPLETED | OUTPATIENT
Start: 2021-06-04 | End: 2021-06-04

## 2021-06-04 RX ORDER — LIDOCAINE HYDROCHLORIDE 20 MG/ML
INJECTION, SOLUTION INFILTRATION; PERINEURAL PRN
Status: DISCONTINUED | OUTPATIENT
Start: 2021-06-04 | End: 2021-06-04

## 2021-06-04 RX ORDER — LIDOCAINE HYDROCHLORIDE 20 MG/ML
40 INJECTION, SOLUTION EPIDURAL; INFILTRATION; INTRACAUDAL; PERINEURAL
Status: ACTIVE | OUTPATIENT
Start: 2021-06-04 | End: 2021-06-05

## 2021-06-04 RX ORDER — CAFFEINE AND SODIUM BENZOATE 125 MG/ML
750 INJECTION, SOLUTION INTRAMUSCULAR; INTRAVENOUS
Status: CANCELLED
Start: 2021-06-04 | End: 2021-06-04

## 2021-06-04 RX ORDER — ETOMIDATE 2 MG/ML
INJECTION INTRAVENOUS PRN
Status: DISCONTINUED | OUTPATIENT
Start: 2021-06-04 | End: 2021-06-04

## 2021-06-04 RX ORDER — LABETALOL HYDROCHLORIDE 5 MG/ML
INJECTION, SOLUTION INTRAVENOUS PRN
Status: DISCONTINUED | OUTPATIENT
Start: 2021-06-04 | End: 2021-06-04

## 2021-06-04 RX ADMIN — LIDOCAINE HYDROCHLORIDE 40 MG: 20 INJECTION, SOLUTION INFILTRATION; PERINEURAL at 12:21

## 2021-06-04 RX ADMIN — Medication 14 MG: at 12:21

## 2021-06-04 RX ADMIN — OLANZAPINE 10 MG: 10 TABLET, ORALLY DISINTEGRATING ORAL at 08:48

## 2021-06-04 RX ADMIN — Medication 70 MG: at 12:21

## 2021-06-04 RX ADMIN — LORAZEPAM 2 MG: 2 TABLET ORAL at 19:10

## 2021-06-04 RX ADMIN — CAFFEINE AND SODIUM BENZOATE 250 MG: 125 INJECTION, SOLUTION INTRAMUSCULAR; INTRAVENOUS at 12:17

## 2021-06-04 RX ADMIN — LABETALOL HYDROCHLORIDE 20 MG: 5 INJECTION, SOLUTION INTRAVENOUS at 12:21

## 2021-06-04 RX ADMIN — OLANZAPINE 10 MG: 10 TABLET, ORALLY DISINTEGRATING ORAL at 19:10

## 2021-06-04 ASSESSMENT — ACTIVITIES OF DAILY LIVING (ADL)
LAUNDRY: UNABLE TO COMPLETE
ORAL_HYGIENE: INDEPENDENT;PROMPTS
HYGIENE/GROOMING: INDEPENDENT;PROMPTS
DRESS: INDEPENDENT;PROMPTS

## 2021-06-04 NOTE — PROCEDURES
"Procedure/Surgery Information   [facility]    Bedside Procedure Note  Date of Service (when I performed the procedure): 06/04/2021    Michelle Pino is a 60 year old female patient.  1. Paranoid schizophrenia (H)    2. COVID-19 ruled out by laboratory testing    3. Schizoaffective disorder, bipolar type (H)      Past Medical History:   Diagnosis Date     Hyperlipidemia      Hypertension      Schizophrenia (H)                          Procedures     Angel Lubin     [facility],   ECT Procedure Note   06/04/2021    Michelle Pino [id number]   60 year old 1960     Patient Status: Inpatient    Is this the first in a series of 12 treatments?  No    History and Physical: Reviewed in medical record    Consent: court ordered    Date Consent Signed: ECT may be administered during the commitment, which was signed 4/26/21 and expires on 10/26/21.  She can have ECT up to 3 times weekly for up to 12 treatments.        Allergies   Allergen Reactions     Haldol [Haloperidol]      Patient previously tolerated haldol, though developed oculogyric crises during hospital stay on 4/26/21 on total daily dose of 10 mg.     Lisinopril Cough       Weight:  156 lbs 8 oz     BP (!) 147/85 (BP Location: Left arm)   Pulse 82   Temp 98.2  F (36.8  C) (Tympanic)   Resp 16   Wt 71 kg (156 lb 8 oz)   SpO2 98%   BMI 26.86 kg/m      Patient Preparations: Other (comment)(none needed)         Indications for ECT:   Medications ineffective         Clinical Narrative:   Patient has bipolar tad with psychosis and is court-ordered to ECT.  NPO after 2400.  Alert and Oriented x 3.  Per unit staff, \"Pt slept .25 hours.  Pt is on an SIO for safety.  NPO for ECT in the morning.  Pt wanted to go into the room acroos the chen into another pt's room.  She was redirected several times not to go into other peoples room.  She stated several times she wanted to go " "home.\"         Diagnosis:   Bipolar I manic with psychosis         Pause for the Cause:     Right patient Yes   Right procedure/laterality settings: Yes          Intra-Procedure Documentation:       ECT #: 4   Treatment number this series: 4   Total treatment number: 4     Type of ECT:  Bilateral, standard    ECT Medications:   Zyprexa: 10mg po on unit  Caffeine: 250mg  (increase to 750mg next Tx)   Labetalol: 20mg pre-ECT for high bp   Lidocaine: 40mg  Etomidate: 14mg  Succinyl Choline: 70mg     ECT Strip Summary:   Energy Level: 55 percent  Motor Seizure Duration:  15 seconds  EEG Seizure Duration:   15 seconds    Complications:  Short sz    Plan:   COVID test 6/4/21  Next ECT 6/7/21    Angel Lubin MD  "

## 2021-06-04 NOTE — PLAN OF CARE
Pt slept .25 hours.  Pt is on an SIO for safety.  NPO for ECT in the morning.  Pt wanted to go into the room acroos the chen into another pt's room.  She was redirected several times not to go into other peoples room.  She stated several times she wanted to go home.

## 2021-06-04 NOTE — PLAN OF CARE
Assessment/Intervention/Current Symtoms and Care Coordination:  Attended Team Meeting, Reviewed chart notes: Patient appears to be improving since starting course of ECT. Patient has an ACT Team through [facility] and she will continue to receive services through them upon discharge.     Discharge Plan or Goal:  Return home when stable and continue to receive services through [facility] ACT Team.     Barriers to Discharge:  Acuity of symptoms: Patient will complete a course of ECT during this hospitalization.     Referral Status:  No referrals have been made.    Legal Status:   Committed/Hatch/Mcgrath Kohler through [facility]

## 2021-06-04 NOTE — PROGRESS NOTES
"[facility], [place]   Psychiatric Progress Note  Hospital Day: 51        Interim History:   The patient's care was discussed with the treatment team during the daily team meeting and/or staff's chart notes were reviewed.  Staff report patient is primarily isolative to her room. Sleeps on the floor in the corner of her room. She is not attending to ADLs. Has not showered since admission over 6 weeks ago. Eating and drinking well. Denies acute physical concerns and medication side effects. No episodes of seclusion or restraints. No agitation noted overnight. She was noted to sleep .25 hours overnight. She is taking her medications as prescribed and was noted to appear sedated and unsteady after her evening medications last night. . Per staff, she appears to be brighter with each ECT treatment and less paranoid. This morning she was hesitant to take her medications, stating that she had already taken them, however she was agreeable to taking her Zyprexa.     Michelle was approached for interview this afternoon following ECT. She appeared confused while staff attempted to assist her in lying down. She continued to get up onto her knees and request to be propped up on pillows.     UPDATE: Approximately 20 minutes later, Michelle was observed sitting in the milieu. She reported that she was \"good\" and denied feeling disorganized. She denied pain. She was able to identify that she is in [place] however when asked where she was, she provided an unintelligible response.          Medications:       LORazepam  2 mg Oral TID    Or     LORazepam  2 mg Intramuscular TID     losartan  100 mg Oral Daily     metoprolol succinate ER  50 mg Oral Daily     OLANZapine zydis  10 mg Oral BID    Or     OLANZapine  10 mg Intramuscular BID          Allergies:     Allergies   Allergen Reactions     Haldol [Haloperidol]      Patient previously tolerated haldol, though developed oculogyric crises during hospital stay on " "4/26/21 on total daily dose of 10 mg.     Lisinopril Cough          Labs:     No results found for this or any previous visit (from the past 24 hour(s)).       Psychiatric Examination:     BP (!) 147/85 (BP Location: Left arm)   Pulse 82   Temp 98.2  F (36.8  C) (Tympanic)   Resp 16   Wt 71 kg (156 lb 8 oz)   SpO2 98%   BMI 26.86 kg/m    Weight is 156 lbs 8 oz  Body mass index is 26.86 kg/m .    Weight over time:  Vitals:    05/25/21 0850   Weight: 71 kg (156 lb 8 oz)       Orthostatic Vitals     None            Cardiometabolic risk assessment. 04/15/21      Reviewed patient profile for cardiometabolic risk factors    Date taken /Value  REFERENCE RANGE   Abdominal Obesity  (Waist Circumference)   See nursing flowsheet Women ?35 in (88 cm)   Men ?40 in (102 cm)      Triglycerides  Triglycerides   Date Value Ref Range Status   10/19/2019 117 <150 mg/dL Final       ?150 mg/dL (1.7 mmol/L) or current treatment for elevated triglycerides   HDL cholesterol  HDL Cholesterol   Date Value Ref Range Status   10/19/2019 56 >49 mg/dL Final   ]   Women <50 mg/dL (1.3 mmol/L) in women or current treatment for low HDL cholesterol  Men <40 mg/dL (1 mmol/L) in men or current treatment for low HDL cholesterol     Fasting plasma glucose (FPG) Lab Results   Component Value Date     10/19/2019      FPG ?100 mg/dL (5.6 mmol/L) or treatment for elevated blood glucose   Blood pressure  BP Readings from Last 3 Encounters:   06/03/21 (!) 147/85   06/04/19 131/71   04/26/19 164/86    Blood pressure ?130/85 mmHg or treatment for elevated blood pressure   Family History  See family history     Appearance: dressed in hospital scrubs, appeared reported age  Attitude: cooperative  Eye Contact: good  Mood: \"good\"  Affect:  Blunted, constricted  Speech: accented, mostly coherent  Language: fluent and intact in English  Psychomotor, Gait, Musculoskeletal: No abnormal movements noted while seated in milieu.   Throught Process: Answering " most questions appropriately  Associations:  No loosening of associations present  Thought Content:  No SI or HI expressed  Insight:  limited  Judgement:  limited  Oriented to: city  Attention Span and Concentration: attentive to conversation   Recent and Remote Memory: unable to assess  Fund of Knowledge:  normal    Clinical Global Impressions  First:  Considering your total clinical experience with this particular patient population, how severe are the patient's symptoms at this time?: 7 (04/16/21 1428)  Compared to the patient's condition at the START of treatment, this patient's condition is: 4 (04/16/21 1428)  Most recent:  Considering your total clinical experience with this particular patient population, how severe are the patient's symptoms at this time?: 7 (05/13/21 0953)  Compared to the patient's condition at the START of treatment, this patient's condition is: 6 (05/13/21 0953)           Precautions:     Behavioral Orders   Procedures     Assault precautions     Cheeking Precautions (behavioral units)     Patient Observed swallowing PO medications; Patient asked to drink water after swallowing medication; Patient in Staff line of sight for 15 minutes after medication given; Mouth checks after PO administration (patient asked to open mouth and stick out their tongue).     Code 1 - Restrict to Unit     Code 2 - 1:1 Staff Supervision     For ECT only     Code 3     For walks in the [place] with staff and per staff discretion     Electroconvulsive therapy     Series of up to 12 treatments. Begin Date: 5/26/21     Treating Psychiatrist providing ECT:  Dr. Lubin     Notified on:  5/21/21     Electroconvulsive therapy     As long as we get the green light from risk management and pt is medically cleared, begin ECT every Monday, Wednesday, and Friday     Electroconvulsive therapy     Series of up to 12 treatments. Begin Date: 5/25/21     Treating Psychiatrist providing ECT:  Amee     Notified on:  5/24/21      Elopement precautions     Fall precautions     Alcides Calderon     Routine Programming     As clinically indicated     Status 15     Every 15 minutes.     Status Individual Observation     SIO required while patient is NPO on ECT days (midnight to ECT time on M, W, F). Patient SIO status reviewed with team/RN.  Please also refer to RN/team documentation for add'l detail.    -SIO staff to monitor following which have contributed to patient being on SIO:  Monitoring pt while NPO due to memory impairments and disorganized thought process  -Possible interventions SIO staff could use to support patient's treatment progress:  Ensure patient remains NPO  -When following observed, team will review discontinuation of SIO:  After ECT is completed     Order Specific Question:   CONTINUOUS 24 hours / day     Answer:   5 feet     Order Specific Question:   Indications for SIO     Answer:   Medical equipment / ligature risk          Diagnoses:     Schizoaffective Disorder, Bipolar Type, decompensated  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 Symptoms and presentation at this time is most consistent with Schizoaffective Disorder, Bipolar Type. We have obtained most recent medication regimen from patient's ACT team, and regimen was initially restarted. Inpatient psychiatric hospitalization is warranted at this time for safety, stabilization, and possible adjustment in medications. Pt is committed. We have petitioned for Alcides Calderon due to lack of improvement and side effects  "from medications.    Hospital Course:  On admission, PTA medications were restarted. However, patient had been declining all scheduled medications despite significant encouragement from staff and provider. Psychiatric emergency declared on 4/20 due to aggression toward others in context of severe psychosis and suspected excited catatonia. Ativan 1 mg TID was also added. Discontinued PTA Invega, Zyprexa, and thorazine on 4/20 due to consistent refusal.     On 4/26, it was noted that patient frequently had upward gaze while walking up and down the unit. She did not appear to be distressed. She reported that she was looking at \"my god.\" It was determined to be oculogyric crisis secondary to IM haloperidol. Haldol was subsequently discontinued during day shift on 4/26 and scheduled Zyprexa was initiated on emergency basis. Oral Cogentin was also scheduled, though patient declined. On the evening of 4/26, patient's gaze was fixed in upward position for several hours and she appeared to be experiencing discomfort. IM Cogentin was administered with noted resolution. Partial improvements weew observed after switch to scheduled Zyprexa. After patient improved, she was more receptive to reinitiating oral Invega, which was initiated on 5/3 and titrated to PTA dose of 9 mg daily on 5/10. Plan was for ACT team to bring in loading dose of Invega S[place]. Patient had signs of oculogyric crisis again with Invega. Oral dose decreased to 6 mg after this. Invega was stopped on 5/14 due to ongoing signs of problems related to eye movement and concerns for oculogyric crisis.    Overall improvement in patient's agitation and suspected catatonia noted on 4/30 after patient accepted two doses of Ativan. She began declining Ativan again with noted decompensation. Patient now accepting, however, she does not have the capacity to consent to treatment with Ativan at this time. She does not believe she has a mental illness, including " "catatonia. She does not fully understand risks associated with inadequate treatment of catatonia. Discussed with her son who is acting as surrogate decision maker and he is in full support of forced scheduled IM Ativan if patient declines oral formulation. Also consulted with our legal team prior to backing oral Ativan with IM.     Ativan was increased on 5/19 due to re-emerging evidence of catatonia (long periods of staring, repetitive movements, echolalia, mutism). Meeting was held with ACT team on 5/20, including ACT team psychiatrist, Dr. Pedraza. He said that he is in \"full support\" of plan to pursue Mcgrath Kohler and ECT at this time. He does feel that in the past she has been discharged from the hospital while still quite symptomatic. He is hoping that ECT will be effective and that with improvement, Michelle would be more receptive to clozapine and weekly blood draws. He said that ideally she would transition to an IRTS before going back to her apartment, but also understands there may be some barriers (I.e. pt's willingness, financial concerns, etc).     ECT consult placed. Please see consult note by Dr. Lubin on 5/21 for details. Mcgrath Kohler was approved on 5/24 and patient was medically cleared on 5/24. ECT initiated on 5/26. She was noted to have a short seizure during ECT today.     Target psychiatric symptoms and interventions:   - Oral Zyprexa 10 mg BID OR Zyprexa 10 mg IM. Hatch in place. May consider increasing further though holding off given re-emerging catatonic sx and borderline prolonged QTc   - Ativan 2 mg TID OR Ativan IM 2 mg TID (see above). Patient may not decline.   Ativan to be given at 1700 on days prior to ECT and will be held on mornings before ECT  Continue Cogentin 2 mg IM daily prn for evidence of acute dystonic reaction or oculogyric crisis  Continue hydroxyzine 25-50 mg q4h prn for acute anxiety  Continue Trazodone 50 mg at bedtime prn for sleep disturbances  Continue Zyprexa 10 " mg TID prn for severe agitation  Continue Ativan/Benadryl q4h prn for agitation. WOULD GIVE PRN ATIVAN FIRST FOR AGITATION unless it is after 5 pm on day prior to ECT     ECT:  - Because patient was only intermittently accepting scheduled Ativan and some symptoms of catatonia are re-emerging, pursued Alcides Kohler for ECT. She also continues to exhibit symptoms of psychosis and has not responded or has experienced side effects from multiple neuroleptic medications. Court hearing was held on 5/21. Alcides Kohler is approved. Patient is medically cleared. COVID negative on 5/24. Obtained ECT consult on 5/21/21. Please see Dr. Lubin's consultation note.   - ECT treatment #5 scheduled for 6/7.   - NPO 8 hours prior to scheduled treatment. Clear liquids until 2 hours prior to treatment.   - Discussed with IM. May resume AM antihypertensives, no need to hold prior to ECT  - SIO while patient is NPO, starting at midnight on ECT days  - Will complete acute course in the hospital; will likely need maintenance ECT in outpatient setting.       Acute Medical Problems and Treatments:  HTN: Patient is now adherent with medication regimen.   - Metoprolol succinate ER 50 mg daily  - Cozaar 100 mg daily  - Please see note from IM dated 5/3/21, 5/6/21, and 5/24.  - Obtained EKG and routine labs on 5/21 in context of pt declining vital sign checks and anti-hypertensives and recently elevated BPs. Reviewed labs and discussed EKG findings with IM on 5/21. No urgent concerns, though IM should be notified if pt develops acute medical concerns (I.e. heart palpitations, SOB, changes in speech, AMS, confusion, CP, HA, changes in vision).     Now that Alcides Riverappard is in place, placed IM consult for ECT medical clearance. Appreciate Assistance. Pt was medically cleared for ECT. See IM note dated 5/24 for details.      CVA:  - Aspirin 81 mg daily     Chronic Constipation:  - Miralax 17 mg daily     Behavioral/Psychological/Social:  -  Encourage unit programming  - Patient is now Code 3 status and can take walks in the [place] with staff and security present per staff discretion    Safety:  - Continue precautions as noted above  - Status 15 minute checks  - Safety precautions include: assault and elopement precautions  - Continue precautions as noted above    Legal Status: Committed as MI with Hatch in place for Haldol, Zyprexa, Invega, and Thorazine through [facility]. Filed Alcides Kohler through [facility] and approved on 5/24/21.     Disposition Plan   Reason for ongoing admission: poses an imminent risk to self, poses an imminent risk to others and is unable to care for self due to severe psychosis or tad  Discharge location: Home with ACT team support.   Discharge Medications: not ordered  Follow-up Appointments: not scheduled    Entered by: Bhavya Lane on 6/4/2021 at 8:09 AM     Bhavya Lane MD  Psychiatry PGY-2        The patient was seen and discussed with attending psychiatrist Dr. Zaman.

## 2021-06-04 NOTE — PLAN OF CARE
Problem: Cognitive Impairment (Psychotic Signs/Symptoms)  Goal: Optimal Cognitive Function (Psychotic Signs/Symptoms)  Outcome: Improving  Pt had a pleasant evening. She sat in front of her room, watching the activities on the unit while napping on and off. Affect is bright upon approach. She took her bedtime medication around 5:15 pm in preparation for tomorrow's ECT. She appeared sedated and unstable on her feet an hour after taking her bedtime medication. Pt is NPO after midnight. Pt partially cleaned herself up but did not change her clothes. Pt is SIO tonight and tomorrow morning due to ECT.

## 2021-06-04 NOTE — ANESTHESIA PREPROCEDURE EVALUATION
Anesthesia Pre-Procedure Evaluation    Patient: Michelle Pino   MRN: [id number] : 1960        Preoperative Diagnosis: * No pre-op diagnosis entered *   Procedure : * No procedures listed *     Past Medical History:   Diagnosis Date     Hyperlipidemia      Hypertension      Schizophrenia (H)       Past Surgical History:   Procedure Laterality Date     OPEN REDUCTION INTERNAL FIXATION TIBIAL PLATEAU Right 2019    at [facility], fracture occured during psychiatric restraining      Allergies   Allergen Reactions     Haldol [Haloperidol]      Patient previously tolerated haldol, though developed oculogyric crises during hospital stay on 21 on total daily dose of 10 mg.     Lisinopril Cough      Social History     Tobacco Use     Smoking status: Never Smoker   Substance Use Topics     Alcohol use: Never     Frequency: Never      Wt Readings from Last 1 Encounters:   21 71 kg (156 lb 8 oz)        Anesthesia Evaluation   Pt has had prior anesthetic.         ROS/MED HX  ENT/Pulmonary:  - neg pulmonary ROS     Neurologic:     (+) CVA, without deficits,     Cardiovascular:     (+) Dyslipidemia hypertension-----Previous cardiac testing     METS/Exercise Tolerance: >4 METS    Hematologic:  - neg hematologic  ROS     Musculoskeletal:  - neg musculoskeletal ROS     GI/Hepatic:  - neg GI/hepatic ROS     Renal/Genitourinary:  - neg Renal ROS     Endo:  - neg endo ROS     Psychiatric/Substance Use: Comment: SCHIZOAFFECTIVE, ROSHAN    (+) psychiatric history anxiety, other (comment) and schizophrenia     Infectious Disease:  - neg infectious disease ROS     Malignancy:  - neg malignancy ROS     Other:            Physical Exam    Airway   unable to assess          Respiratory Devices and Support         Dental    unable to assess        Cardiovascular   cardiovascular exam normal          Pulmonary   pulmonary exam normal                OUTSIDE LABS:  CBC:   Lab Results   Component Value Date    WBC 6.6 2021     WBC 5.4 10/19/2019    HGB 13.6 05/21/2021    HGB 12.5 10/19/2019    HCT 39.3 05/21/2021    HCT 37.7 10/19/2019     05/21/2021     10/19/2019     BMP:   Lab Results   Component Value Date     05/21/2021     10/19/2019    POTASSIUM 3.9 05/21/2021    POTASSIUM 3.8 10/19/2019    CHLORIDE 108 05/21/2021    CHLORIDE 109 10/19/2019    CO2 23 05/21/2021    CO2 27 10/19/2019    BUN 16 05/21/2021    BUN 9 10/19/2019    CR 0.88 05/21/2021    CR 0.87 10/19/2019     (H) 05/21/2021     (H) 10/19/2019     COAGS: No results found for: PTT, INR, FIBR  POC: No results found for: BGM, HCG, HCGS  HEPATIC:   Lab Results   Component Value Date    ALBUMIN 3.6 05/21/2021    PROTTOTAL 7.0 05/21/2021    ALT 24 05/21/2021    AST 15 05/21/2021    ALKPHOS 91 05/21/2021    BILITOTAL 0.3 05/21/2021     OTHER:   Lab Results   Component Value Date    ALEK 8.7 05/21/2021    PHOS 4.3 05/26/2021    MAG 2.2 05/26/2021    TSH 0.53 05/21/2021    T4 0.77 10/19/2019       Anesthesia Plan    ASA Status:  2      Anesthesia Type: General.     - Airway: Mask Only   Induction: Intravenous.           Consents    Anesthesia Plan(s) and associated risks, benefits, and realistic alternatives discussed. Questions answered and patient/representative(s) expressed understanding.     - Discussed with:  Implied consent/emergency         Postoperative Care            Comments:                Tim Turpin MD

## 2021-06-04 NOTE — PLAN OF CARE
Patient refused to take her scheduled AM medications with the exception of Zyprexa.  She had ECT late morning today. She was incontinent of urine according to ECT nurse. When she returned to the unit in the wheel chair she appeared very sedated.  Pt was encouraged to rest in room because she appeared unsteady.  Pt went into her room but would not stay laying down. She repeatedly scruched up on her knees and wanted staff to help her up. She was told that she was too unsteady to get up at that time. She did not resume resting and finally got up on her own.  Staff remained with her since she continued to appear unsteady.  Her 1400 Ativan was held per Dr Zaman.

## 2021-06-04 NOTE — PLAN OF CARE
Problem: OT General Care Plan  Goal: OT Goal 1  Description: OT Goal 1  6/4/2021 1627 by Mare Self OT  Outcome: No Change     Pt attended group for the first time today - previous times writer has invited her to group, she has just raised her voice at writer telling me to go away.    Pt calmly sat at table, watched a mandala dotting project take place, and when encouraged to participate, did  the tool and started to dot a rock as well.  Engaged in the repetitive 'dip and dot' process for over 20 minutes while sitting calmly with writer the entire session.  Responded well to writers positive feedback.

## 2021-06-04 NOTE — PROGRESS NOTES
Patient adequate for discharge. Report called to inpatient RN Ca on [address]. VSS, A/O, IV removed. Discharged with staff at this time.

## 2021-06-04 NOTE — ANESTHESIA POSTPROCEDURE EVALUATION
Patient: Michelle Pino    * No procedures listed *    Diagnosis:* No pre-op diagnosis entered *  Diagnosis Additional Information: No value filed.    Anesthesia Type:  No value filed.    Note:  Disposition: Admission   Postop Pain Control: Uneventful            Sign Out: Well controlled pain   PONV: No   Neuro/Psych: Uneventful            Sign Out: Acceptable/Baseline neuro status   Airway/Respiratory: Uneventful            Sign Out: Acceptable/Baseline resp. status   CV/Hemodynamics: Uneventful            Sign Out: Acceptable CV status; No obvious hypovolemia; No obvious fluid overload   Other NRE: NONE   DID A NON-ROUTINE EVENT OCCUR? No           Last vitals:  Vitals:    06/04/21 0830 06/04/21 1230 06/04/21 1245   BP: (!) 156/85 (!) 168/119 (!) 114/90   Pulse: 75 86 80   Resp: 16 16 (!) 166   Temp: 37  C (98.6  F) 36.7  C (98  F)    SpO2: 97% 95% 97%       Last vitals prior to Anesthesia Care Transfer:  CRNA VITALS  6/4/2021 1200 - 6/4/2021 1255      6/4/2021             Resp Rate (observed):  (!) 1          Electronically Signed By: Tim Turpin MD  June 4, 2021  12:55 PM

## 2021-06-05 PROCEDURE — 250N000013 HC RX MED GY IP 250 OP 250 PS 637: Performed by: PHYSICIAN ASSISTANT

## 2021-06-05 PROCEDURE — 250N000013 HC RX MED GY IP 250 OP 250 PS 637: Performed by: PSYCHIATRY & NEUROLOGY

## 2021-06-05 PROCEDURE — 99233 SBSQ HOSP IP/OBS HIGH 50: CPT | Performed by: PSYCHIATRY & NEUROLOGY

## 2021-06-05 PROCEDURE — 124N000002 HC R&B MH UMMC

## 2021-06-05 RX ORDER — LORAZEPAM 2 MG/1
2 TABLET ORAL 2 TIMES DAILY
Status: DISCONTINUED | OUTPATIENT
Start: 2021-06-05 | End: 2021-06-23

## 2021-06-05 RX ORDER — LORAZEPAM 2 MG/ML
2 INJECTION INTRAMUSCULAR 2 TIMES DAILY
Status: DISCONTINUED | OUTPATIENT
Start: 2021-06-05 | End: 2021-06-23

## 2021-06-05 RX ORDER — LORAZEPAM 2 MG/ML
1 INJECTION INTRAMUSCULAR DAILY
Status: DISCONTINUED | OUTPATIENT
Start: 2021-06-06 | End: 2021-06-28

## 2021-06-05 RX ORDER — LORAZEPAM 1 MG/1
1 TABLET ORAL DAILY
Status: DISCONTINUED | OUTPATIENT
Start: 2021-06-06 | End: 2021-06-28

## 2021-06-05 RX ADMIN — METOPROLOL SUCCINATE 50 MG: 50 TABLET, EXTENDED RELEASE ORAL at 08:33

## 2021-06-05 RX ADMIN — LORAZEPAM 2 MG: 2 TABLET ORAL at 14:15

## 2021-06-05 RX ADMIN — LOSARTAN POTASSIUM 100 MG: 100 TABLET, FILM COATED ORAL at 08:33

## 2021-06-05 RX ADMIN — OLANZAPINE 10 MG: 10 TABLET, ORALLY DISINTEGRATING ORAL at 20:04

## 2021-06-05 RX ADMIN — OLANZAPINE 10 MG: 10 TABLET, ORALLY DISINTEGRATING ORAL at 08:33

## 2021-06-05 RX ADMIN — LORAZEPAM 2 MG: 2 TABLET ORAL at 20:04

## 2021-06-05 RX ADMIN — LORAZEPAM 2 MG: 2 TABLET ORAL at 08:32

## 2021-06-05 ASSESSMENT — ACTIVITIES OF DAILY LIVING (ADL)
ORAL_HYGIENE: INDEPENDENT;PROMPTS
HYGIENE/GROOMING: INDEPENDENT
HYGIENE/GROOMING: INDEPENDENT;PROMPTS
DRESS: INDEPENDENT
DRESS: SCRUBS (BEHAVIORAL HEALTH)
LAUNDRY: UNABLE TO COMPLETE
ORAL_HYGIENE: INDEPENDENT

## 2021-06-05 NOTE — PLAN OF CARE
Problem: Cognitive Impairment (Psychotic Signs/Symptoms)  Goal: Optimal Cognitive Function (Psychotic Signs/Symptoms)  Outcome: No Change  Pt had ECT done during the day shift. She denies headaches, body aches, or other discomforts. She appeared tired from the ECT and spent few hours sleeping. Her vital signs stable.     She is visible in the milieu, restless and disorganized. She is alert and oriented to name only. She asked to be let off the unit and became agitated when told she has no order for discharge. She argued that she is not in the hospital.  She said she is here to clean up her room and she is ready to leave after cleaning the room. She approached the medication window several times, requesting to be let off the unit. She spent most of this evening going from staff to staff, asking to be let off the unit.      She took scheduled medication with encouragement. Her vital signs stable. She cleaned up her room and placed her mattress on the wooden frame. She slept on her bed this evening.

## 2021-06-05 NOTE — PLAN OF CARE
"Patient slept in until approximately 0900.  She needed a little encouragement but was overall cooperative with vitals and taking her meds.  Patient initiated a conversation with one of the staff who she had not previously been social with. Michelle talked about her family and her life in [place] prior to coming to the .  The staff member told Michelle that she enjoyed the conversation and Michelle smiled at her.  The staff member said that pt had never been responsive in a positive manner despite staff's attempts throughout the hospitalization to get to know the patient. After lunch Michelle went into her room and slept for about 1.5 hours  PT did receive a call from \"Sabiha\" who identified herself as Michelle's daughter in law.  Michelle declined to take the call.  Michelle did take her scheduled 1400 Ativan without difficulty.    Michelle denies suicide ideation, homicide ideation and hallucinations.   "

## 2021-06-05 NOTE — PLAN OF CARE
Problem: Sleep Disturbance (Psychotic Signs/Symptoms)  Goal: Improved Sleep (Psychotic Signs/Symptoms)  Outcome: Improving  Flowsheets (Taken 5/23/2021 0608)  Mutually Determined Action Steps (Improved Sleep): sleeps 4-6 hours at night  Patient appeared to be sleeping during the night. No problems reported or identified. She slept for about 6.5 hours.

## 2021-06-05 NOTE — PROGRESS NOTES
"[facility], [place]   Psychiatric Progress Note  Hospital Day: 52        Interim History:   The patient's care was discussed with the treatment team during the daily team meeting and/or staff's chart notes were reviewed.  Staff report patient was quite sedated following ECT yesterday. Unsteady on her feet. Pt went into her room but would not stay laying down. She repeatedly scruched up on her knees and wanted staff to help her up. She was told that she was too unsteady to get up at that time. She also appeared to be disoriented in the evening, oriented to person only. She perseverated on leaving the hospital. Patient attended OT for the first time since admission on 6/4. Engaged in the repetitive 'dip and dot' process for over 20 minutes while sitting calmly with writer the entire session. She also slept on her mattress rather than on her floor for the first time in several weeks. Slept well overnight. Today she spoke with staff at length about her background, children, living in [place], etc. Speech is more spontaneous.     Michelle was resting in her room. Writer met with her shortly after she took her scheduled afternoon Ativan. She did appear to be mildly sedated. Upon awakening, writer did ask her orientation questions. She believed it was Aug 2nd. She could not recall name of city or state she resides in. She knows she is at \"[facility].\" At one point she asked this writer if I live in an apartment here [in the same building].\" She denied acute physical concerns. Denied pain. Notes that it is more comfortable to sleep in her mattress. She was polite and cooperative. Discussed recommendation to reduce Ativan dose. She denied feeling overly sedated.          Medications:       [START ON 6/6/2021] LORazepam  1 mg Oral Daily    Or     [START ON 6/6/2021] LORazepam  1 mg Intramuscular Daily     LORazepam  2 mg Oral BID    Or     LORazepam  2 mg Intramuscular BID     losartan  100 mg " Oral Daily     metoprolol succinate ER  50 mg Oral Daily     OLANZapine zydis  10 mg Oral BID    Or     OLANZapine  10 mg Intramuscular BID          Allergies:     Allergies   Allergen Reactions     Haldol [Haloperidol]      Patient previously tolerated haldol, though developed oculogyric crises during hospital stay on 4/26/21 on total daily dose of 10 mg.     Lisinopril Cough          Labs:     No results found for this or any previous visit (from the past 24 hour(s)).       Psychiatric Examination:     /80 (BP Location: Left arm)   Pulse 78   Temp 99.1  F (37.3  C) (Tympanic)   Resp 16   Wt 71 kg (156 lb 8 oz)   SpO2 98%   BMI 26.86 kg/m    Weight is 156 lbs 8 oz  Body mass index is 26.86 kg/m .    Weight over time:  Vitals:    05/25/21 0850   Weight: 71 kg (156 lb 8 oz)       Orthostatic Vitals     None            Cardiometabolic risk assessment. 04/15/21      Reviewed patient profile for cardiometabolic risk factors    Date taken /Value  REFERENCE RANGE   Abdominal Obesity  (Waist Circumference)   See nursing flowsheet Women ?35 in (88 cm)   Men ?40 in (102 cm)      Triglycerides  Triglycerides   Date Value Ref Range Status   10/19/2019 117 <150 mg/dL Final       ?150 mg/dL (1.7 mmol/L) or current treatment for elevated triglycerides   HDL cholesterol  HDL Cholesterol   Date Value Ref Range Status   10/19/2019 56 >49 mg/dL Final   ]   Women <50 mg/dL (1.3 mmol/L) in women or current treatment for low HDL cholesterol  Men <40 mg/dL (1 mmol/L) in men or current treatment for low HDL cholesterol     Fasting plasma glucose (FPG) Lab Results   Component Value Date     10/19/2019      FPG ?100 mg/dL (5.6 mmol/L) or treatment for elevated blood glucose   Blood pressure  BP Readings from Last 3 Encounters:   06/04/21 118/80   06/04/19 131/71   04/26/19 164/86    Blood pressure ?130/85 mmHg or treatment for elevated blood pressure   Family History  See family history     Appearance: dressed in hospital  "scrubs, appeared reported age. Initially sleeping in bed. Assessed shortly after receiving scheduled Ativan. Mildly sedated.  Attitude: cooperative  Eye Contact: good  Mood: \"good\"  Affect:  Blunted, constricted  Speech: accented, mostly coherent  Language: fluent and intact in English  Psychomotor, Gait, Musculoskeletal: No abnormal movements noted while seated in milieu.   Throught Process: Answering most questions appropriately  Associations:  No loosening of associations present  Thought Content:  No SI or HI expressed  Insight:  limited  Judgement:  limited  Oriented to: self, place (hospital but not state or city). Not oriented to time.   Attention Span and Concentration: poor due to sedation  Recent and Remote Memory: impaired  Fund of Knowledge:  normal    Clinical Global Impressions  First:  Considering your total clinical experience with this particular patient population, how severe are the patient's symptoms at this time?: 7 (04/16/21 1428)  Compared to the patient's condition at the START of treatment, this patient's condition is: 4 (04/16/21 1428)  Most recent:  Considering your total clinical experience with this particular patient population, how severe are the patient's symptoms at this time?: 7 (05/13/21 0953)  Compared to the patient's condition at the START of treatment, this patient's condition is: 6 (05/13/21 0953)           Precautions:     Behavioral Orders   Procedures     Assault precautions     Cheeking Precautions (behavioral units)     Patient Observed swallowing PO medications; Patient asked to drink water after swallowing medication; Patient in Staff line of sight for 15 minutes after medication given; Mouth checks after PO administration (patient asked to open mouth and stick out their tongue).     Code 1 - Restrict to Unit     Code 2 - 1:1 Staff Supervision     For ECT only     Code 3     For walks in [facility] with staff and per staff discretion     Electroconvulsive therapy "     Series of up to 12 treatments. Begin Date: 5/26/21     Treating Psychiatrist providing ECT:  Dr. Lubin     Notified on:  5/21/21     Electroconvulsive therapy     As long as we get the green light from risk management and pt is medically cleared, begin ECT every Monday, Wednesday, and Friday     Electroconvulsive therapy     Series of up to 12 treatments. Begin Date: 5/25/21     Treating Psychiatrist providing ECT:  Amee     Notified on:  5/24/21     Elopement precautions     Fall precautions     Mcgrath Calderon     Routine Programming     As clinically indicated     Status 15     Every 15 minutes.     Status Individual Observation     SIO required while patient is NPO on ECT days (midnight to ECT time on M, W, F). Patient SIO status reviewed with team/RN.  Please also refer to RN/team documentation for add'l detail.    -SIO staff to monitor following which have contributed to patient being on SIO:  Monitoring pt while NPO due to memory impairments and disorganized thought process  -Possible interventions SIO staff could use to support patient's treatment progress:  Ensure patient remains NPO  -When following observed, team will review discontinuation of SIO:  After ECT is completed     Order Specific Question:   CONTINUOUS 24 hours / day     Answer:   5 feet     Order Specific Question:   Indications for SIO     Answer:   Medical equipment / ligature risk          Diagnoses:     Schizoaffective Disorder, Bipolar Type, decompensated  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 Symptoms and presentation at this time is most consistent with Schizoaffective Disorder, Bipolar Type. We have obtained most recent medication regimen from patient's ACT team, and regimen was initially restarted. Inpatient psychiatric hospitalization is warranted at this time for safety, stabilization, and possible adjustment in medications. Pt is committed. We have petitioned for Alcides Calderon due to lack of improvement and side effects from medications.    Hospital Course:  On admission, PTA medications were restarted. However, patient had been declining all scheduled medications despite significant encouragement from staff and provider. Psychiatric emergency declared on 4/20 due to aggression toward others in context of severe psychosis and suspected excited catatonia. Ativan 1 mg TID was also added. Discontinued PTA Invega, Zyprexa, and thorazine on 4/20 due to consistent refusal.     On 4/26, it was noted that patient frequently had upward gaze while walking up and down the unit. She did not appear to be distressed. She reported that she was looking at \"my god.\" It was determined to be oculogyric crisis secondary to IM haloperidol. Haldol was subsequently discontinued during day shift on 4/26 and scheduled Zyprexa was initiated on emergency basis. Oral Cogentin was also scheduled, though patient declined. On the evening of 4/26, patient's gaze was fixed in upward position for several hours and she appeared to be experiencing discomfort. IM Cogentin was administered with noted resolution. Partial improvements weew observed after switch to scheduled Zyprexa. After patient improved, she was more receptive to reinitiating oral Invega, which was initiated on 5/3 and titrated to PTA dose of 9 mg daily on 5/10. Plan was for ACT team to bring in loading dose of Invega Sustenna. Patient had signs of oculogyric crisis again with Invega. Oral dose decreased to 6 mg after this. Invega was stopped on 5/14 due to " "ongoing signs of problems related to eye movement and concerns for oculogyric crisis.    Overall improvement in patient's agitation and suspected catatonia noted on 4/30 after patient accepted two doses of Ativan. She began declining Ativan again with noted decompensation. Patient now accepting, however, she does not have the capacity to consent to treatment with Ativan at this time. She does not believe she has a mental illness, including catatonia. She does not fully understand risks associated with inadequate treatment of catatonia. Discussed with her son who is acting as surrogate decision maker and he is in full support of forced scheduled IM Ativan if patient declines oral formulation. Also consulted with our legal team prior to backing oral Ativan with IM.     Ativan was increased on 5/19 due to re-emerging evidence of catatonia (long periods of staring, repetitive movements, echolalia, mutism). Meeting was held with ACT team on 5/20, including ACT team psychiatrist, Dr. Pedraza. He said that he is in \"full support\" of plan to pursue Mcgrath Kohler and ECT at this time. He does feel that in the past she has been discharged from the hospital while still quite symptomatic. He is hoping that ECT will be effective and that with improvement, Michelle would be more receptive to clozapine and weekly blood draws. He said that ideally she would transition to an IRTS before going back to her apartment, but also understands there may be some barriers (I.e. pt's willingness, financial concerns, etc).     ECT consult placed. Please see consult note by Dr. Lubin on 5/21 for details. Mcgrath Kohler was approved on 5/24 and patient was medically cleared on 5/24. ECT initiated on 5/26. She was noted to have a short seizure during ECT on 6/4. Staff note that patient appears more disoriented with memory impairment and sedation on days of ECT. This was noted on my examination again today. Improvements noted in mood, affect, social " interactions, paranoia, agitation since initiation of ECT.     Target psychiatric symptoms and interventions:   - Reduce Ativan to 2 mg BID and 1 mg in the AM. Plan to taper if patient continues to appear sedated. Patient may not decline. Ativan to be given at 1700 on days prior to ECT and will be held on mornings before ECT  - Oral Zyprexa 10 mg BID OR Zyprexa 10 mg IM. Hatch in place. May consider increasing further though holding off given re-emerging catatonic sx and borderline prolonged QTc     Continue Cogentin 2 mg IM daily prn for evidence of acute dystonic reaction or oculogyric crisis  Continue hydroxyzine 25-50 mg q4h prn for acute anxiety  Continue Trazodone 50 mg at bedtime prn for sleep disturbances  Continue Zyprexa 10 mg TID prn for severe agitation  Continue Ativan/Benadryl q4h prn for agitation. WOULD GIVE PRN ATIVAN FIRST FOR AGITATION unless it is after 5 pm on day prior to ECT     ECT:  - Because patient was only intermittently accepting scheduled Ativan and some symptoms of catatonia are re-emerging, pursued Alcides Kohler for ECT. She also continues to exhibit symptoms of psychosis and has not responded or has experienced side effects from multiple neuroleptic medications. Court hearing was held on 5/21. Alcides Riverappard is approved. Patient is medically cleared. COVID negative on 5/24. Obtained ECT consult on 5/21/21. Please see Dr. Lubin's consultation note.   - ECT treatment #5 scheduled for 6/7.   - NPO 8 hours prior to scheduled treatment. Clear liquids until 2 hours prior to treatment.   - Discussed with IM. May resume AM antihypertensives, no need to hold prior to ECT  - SIO while patient is NPO, starting at midnight on ECT days (renewed)  - Will complete acute course in the hospital; will likely need maintenance ECT in outpatient setting.       Acute Medical Problems and Treatments:  HTN: Patient is now adherent with medication regimen.   - Metoprolol succinate ER 50 mg daily  - Cozaar  100 mg daily  - Please see note from IM dated 5/3/21, 5/6/21, and 5/24.  - Obtained EKG and routine labs on 5/21 in context of pt declining vital sign checks and anti-hypertensives and recently elevated BPs. Reviewed labs and discussed EKG findings with IM on 5/21. No urgent concerns, though IM should be notified if pt develops acute medical concerns (I.e. heart palpitations, SOB, changes in speech, AMS, confusion, CP, HA, changes in vision).     Now that Alcides Kohler is in place, placed IM consult for ECT medical clearance. Appreciate Assistance. Pt was medically cleared for ECT. See IM note dated 5/24 for details.      CVA:  - Aspirin 81 mg daily     Chronic Constipation:  - Miralax 17 mg daily     Behavioral/Psychological/Social:  - Encourage unit programming  - Patient is now Code 3 status and can take walks in the [place] with staff and security present per staff discretion    Safety:  - Continue precautions as noted above  - Status 15 minute checks  - Safety precautions include: assault and elopement precautions  - Continue precautions as noted above    Legal Status: Committed as MI with Hatch in place for Haldol, Zyprexa, Invega, and Thorazine through [facility]. Filed Alcides Kohler through [facility] and approved on 5/24/21.     Disposition Plan   Reason for ongoing admission: poses an imminent risk to self, poses an imminent risk to others and is unable to care for self due to severe psychosis or tad  Discharge location: Home with ACT team support.   Discharge Medications: not ordered  Follow-up Appointments: not scheduled    Entered by: Ida Zaman on 6/5/2021 at 3:48 PM     > 30 minutes total time that was spent and over 50% of this time was spent in counseling and coordination of care.

## 2021-06-06 PROCEDURE — 250N000013 HC RX MED GY IP 250 OP 250 PS 637: Performed by: PHYSICIAN ASSISTANT

## 2021-06-06 PROCEDURE — 250N000013 HC RX MED GY IP 250 OP 250 PS 637: Performed by: PSYCHIATRY & NEUROLOGY

## 2021-06-06 PROCEDURE — 124N000002 HC R&B MH UMMC

## 2021-06-06 RX ADMIN — LOSARTAN POTASSIUM 100 MG: 100 TABLET, FILM COATED ORAL at 08:01

## 2021-06-06 RX ADMIN — LORAZEPAM 2 MG: 2 TABLET ORAL at 18:13

## 2021-06-06 RX ADMIN — OLANZAPINE 10 MG: 10 TABLET, ORALLY DISINTEGRATING ORAL at 08:01

## 2021-06-06 RX ADMIN — LORAZEPAM 1 MG: 1 TABLET ORAL at 08:01

## 2021-06-06 RX ADMIN — OLANZAPINE 10 MG: 10 TABLET, ORALLY DISINTEGRATING ORAL at 18:12

## 2021-06-06 RX ADMIN — LORAZEPAM 2 MG: 2 TABLET ORAL at 14:27

## 2021-06-06 RX ADMIN — METOPROLOL SUCCINATE 50 MG: 50 TABLET, EXTENDED RELEASE ORAL at 08:00

## 2021-06-06 ASSESSMENT — ACTIVITIES OF DAILY LIVING (ADL)
HYGIENE/GROOMING: HANDWASHING;SHOWER;PROMPTS
ORAL_HYGIENE: PROMPTS
LAUNDRY: UNABLE TO COMPLETE
LAUNDRY: UNABLE TO COMPLETE
HYGIENE/GROOMING: INDEPENDENT;PROMPTS
DRESS: SCRUBS (BEHAVIORAL HEALTH)
DRESS: SCRUBS (BEHAVIORAL HEALTH)
ORAL_HYGIENE: PROMPTS

## 2021-06-06 NOTE — PLAN OF CARE
Problem: Sleep Disturbance (Psychotic Signs/Symptoms)  Goal: Improved Sleep (Psychotic Signs/Symptoms)  Outcome: Improving  Flowsheets (Taken 6/6/2021 0630)  Mutually Determined Action Steps (Improved Sleep): sleeps 4-6 hours at night  Patient appeared to be asleep most of the night and slept for about 6.75 hours. No problems noted.

## 2021-06-06 NOTE — PLAN OF CARE
Problem: Behavior Regulation Impairment (Psychotic Signs/Symptoms)  Goal: Improved Behavioral Control (Psychotic Signs/Symptoms)  Outcome: No Change   Pt spent most of this evening in the hallway socializing with staff. She approached the medication window several times, asking this writer to let her off the unit.    She remains delusional and disorganized.  She refused to talk with her son and daughter-in-law this evening, stating that she did not know them. She took scheduled medication without any issue. She is now sleeping on her bed. ECT is scheduled for Monday morning. Blood pressure 151/86 this evening.

## 2021-06-06 NOTE — PLAN OF CARE
"  Problem: Adult Inpatient Plan of Care  Goal: Optimal Comfort and Wellbeing  Outcome: Improving  Intervention: Provide Person-Centered Care  Recent Flowsheet Documentation  Taken 6/6/2021 1000 by Brady Rodriguez RN  Trust Relationship/Rapport:    care explained    choices provided    emotional support provided    empathic listening provided    questions answered    questions encouraged    reassurance provided    thoughts/feelings acknowledged     Problem: Adult Inpatient Plan of Care  Goal: Optimal Comfort and Wellbeing  Intervention: Provide Person-Centered Care  Recent Flowsheet Documentation  Taken 6/6/2021 1000 by Brady Rodriguez RN  Trust Relationship/Rapport:    care explained    choices provided    emotional support provided    empathic listening provided    questions answered    questions encouraged    reassurance provided    thoughts/feelings acknowledged     Problem: Cognitive Impairment (Psychotic Signs/Symptoms)  Goal: Optimal Cognitive Function (Psychotic Signs/Symptoms)  Outcome: Declining  Flowsheets (Taken 6/6/2021 1232)  Mutually Determined Action Steps (Optimal Cognitive Function): follows step-by-step instructions  Intervention: Support and Promote Cognitive Ability  Recent Flowsheet Documentation  Taken 6/6/2021 1000 by Brady Rodriguez RN  Trust Relationship/Rapport:    care explained    choices provided    emotional support provided    empathic listening provided    questions answered    questions encouraged    reassurance provided    thoughts/feelings acknowledged    Patient continues to be more visible in the milieu appearing less tense and occasionally social. Patient upon approach flat in affect though brightens with conversation. Patient stating several times throughout the day \"Im ready, I want to go home\", unable to identify where home is and not oriented to current place, date, or time. When asked where the patient is currently she replies \"I don't know, this is something my  has " "made up for me\". Patients overall memory appears to be impaired unable to identify where her home is, and names of her family including son and . Patient also demonstrating a slow, shuffled gate and restlessness in legs upon standing still. Patient had no stated or observed SI/SIB, AH/VH, anxiety, depression, or thoughts of harming others. Patient cooperative with vital signs assessment and scheduled medications. Patient had notable disheveled appearance and body odor this morning, with several attempts of prompting and a female staff to  her through the shower she was able to complete ADL's.    Patient was accepting of going outside today with staff and security assistance. Patient stated going out side helped her to \"feel good\". Patient mainly kept to herself while outside and enjoyed observing the peñaloza.         "

## 2021-06-07 ENCOUNTER — APPOINTMENT (OUTPATIENT)
Dept: BEHAVIORAL HEALTH | Facility: CLINIC | Age: 61
End: 2021-06-07
Payer: COMMERCIAL

## 2021-06-07 ENCOUNTER — ANESTHESIA (OUTPATIENT)
Dept: BEHAVIORAL HEALTH | Facility: CLINIC | Age: 61
End: 2021-06-07

## 2021-06-07 ENCOUNTER — ANESTHESIA EVENT (OUTPATIENT)
Dept: BEHAVIORAL HEALTH | Facility: CLINIC | Age: 61
End: 2021-06-07

## 2021-06-07 PROCEDURE — 250N000011 HC RX IP 250 OP 636: Performed by: ANESTHESIOLOGY

## 2021-06-07 PROCEDURE — U0005 INFEC AGEN DETEC AMPLI PROBE: HCPCS | Performed by: PSYCHIATRY & NEUROLOGY

## 2021-06-07 PROCEDURE — 250N000009 HC RX 250: Performed by: ANESTHESIOLOGY

## 2021-06-07 PROCEDURE — GZB0ZZZ ELECTROCONVULSIVE THERAPY, UNILATERAL-SINGLE SEIZURE: ICD-10-PCS | Performed by: PSYCHIATRY & NEUROLOGY

## 2021-06-07 PROCEDURE — 370N000017 HC ANESTHESIA TECHNICAL FEE, PER MIN

## 2021-06-07 PROCEDURE — 90870 ELECTROCONVULSIVE THERAPY: CPT

## 2021-06-07 PROCEDURE — 250N000013 HC RX MED GY IP 250 OP 250 PS 637: Performed by: PHYSICIAN ASSISTANT

## 2021-06-07 PROCEDURE — 250N000013 HC RX MED GY IP 250 OP 250 PS 637: Performed by: PSYCHIATRY & NEUROLOGY

## 2021-06-07 PROCEDURE — 99233 SBSQ HOSP IP/OBS HIGH 50: CPT | Mod: 25 | Performed by: PSYCHIATRY & NEUROLOGY

## 2021-06-07 PROCEDURE — 124N000002 HC R&B MH UMMC

## 2021-06-07 PROCEDURE — 250N000009 HC RX 250: Performed by: PSYCHIATRY & NEUROLOGY

## 2021-06-07 PROCEDURE — H2032 ACTIVITY THERAPY, PER 15 MIN: HCPCS

## 2021-06-07 PROCEDURE — U0003 INFECTIOUS AGENT DETECTION BY NUCLEIC ACID (DNA OR RNA); SEVERE ACUTE RESPIRATORY SYNDROME CORONAVIRUS 2 (SARS-COV-2) (CORONAVIRUS DISEASE [COVID-19]), AMPLIFIED PROBE TECHNIQUE, MAKING USE OF HIGH THROUGHPUT TECHNOLOGIES AS DESCRIBED BY CMS-2020-01-R: HCPCS | Performed by: PSYCHIATRY & NEUROLOGY

## 2021-06-07 RX ORDER — CAFFEINE AND SODIUM BENZOATE 125 MG/ML
1000 INJECTION, SOLUTION INTRAMUSCULAR; INTRAVENOUS
Status: COMPLETED | OUTPATIENT
Start: 2021-06-07 | End: 2021-06-07

## 2021-06-07 RX ORDER — FLUMAZENIL 0.1 MG/ML
0.5 INJECTION, SOLUTION INTRAVENOUS
Status: COMPLETED | OUTPATIENT
Start: 2021-06-07 | End: 2021-06-07

## 2021-06-07 RX ORDER — HYDRALAZINE HYDROCHLORIDE 20 MG/ML
INJECTION INTRAMUSCULAR; INTRAVENOUS PRN
Status: DISCONTINUED | OUTPATIENT
Start: 2021-06-07 | End: 2021-06-07

## 2021-06-07 RX ORDER — LIDOCAINE HYDROCHLORIDE 20 MG/ML
INJECTION, SOLUTION INFILTRATION; PERINEURAL PRN
Status: DISCONTINUED | OUTPATIENT
Start: 2021-06-07 | End: 2021-06-07

## 2021-06-07 RX ORDER — ETOMIDATE 2 MG/ML
INJECTION INTRAVENOUS PRN
Status: DISCONTINUED | OUTPATIENT
Start: 2021-06-07 | End: 2021-06-07

## 2021-06-07 RX ORDER — CAFFEINE AND SODIUM BENZOATE 125 MG/ML
1000 INJECTION, SOLUTION INTRAMUSCULAR; INTRAVENOUS
Status: CANCELLED
Start: 2021-06-07 | End: 2021-06-07

## 2021-06-07 RX ORDER — LIDOCAINE HYDROCHLORIDE 20 MG/ML
40 INJECTION, SOLUTION EPIDURAL; INFILTRATION; INTRACAUDAL; PERINEURAL
Status: ACTIVE | OUTPATIENT
Start: 2021-06-07 | End: 2021-06-07

## 2021-06-07 RX ORDER — LABETALOL HYDROCHLORIDE 5 MG/ML
INJECTION, SOLUTION INTRAVENOUS PRN
Status: DISCONTINUED | OUTPATIENT
Start: 2021-06-07 | End: 2021-06-07

## 2021-06-07 RX ADMIN — CAFFEINE AND SODIUM BENZOATE 1000 MG: 125 INJECTION, SOLUTION INTRAMUSCULAR; INTRAVENOUS at 09:37

## 2021-06-07 RX ADMIN — FLUMAZENIL 0.5 MG: 0.1 INJECTION, SOLUTION INTRAVENOUS at 09:52

## 2021-06-07 RX ADMIN — LORAZEPAM 2 MG: 2 TABLET ORAL at 19:48

## 2021-06-07 RX ADMIN — LORAZEPAM 2 MG: 2 TABLET ORAL at 14:25

## 2021-06-07 RX ADMIN — OLANZAPINE 10 MG: 10 TABLET, ORALLY DISINTEGRATING ORAL at 11:15

## 2021-06-07 RX ADMIN — LOSARTAN POTASSIUM 100 MG: 100 TABLET, FILM COATED ORAL at 11:15

## 2021-06-07 RX ADMIN — LIDOCAINE HYDROCHLORIDE 40 MG: 20 INJECTION, SOLUTION INFILTRATION; PERINEURAL at 09:53

## 2021-06-07 RX ADMIN — LORAZEPAM 1 MG: 1 TABLET ORAL at 11:15

## 2021-06-07 RX ADMIN — METOPROLOL SUCCINATE 50 MG: 50 TABLET, EXTENDED RELEASE ORAL at 11:13

## 2021-06-07 RX ADMIN — OLANZAPINE 10 MG: 10 TABLET, ORALLY DISINTEGRATING ORAL at 19:48

## 2021-06-07 RX ADMIN — Medication 14 MG: at 09:54

## 2021-06-07 RX ADMIN — LABETALOL HYDROCHLORIDE 20 MG: 5 INJECTION, SOLUTION INTRAVENOUS at 09:50

## 2021-06-07 RX ADMIN — Medication 70 MG: at 09:54

## 2021-06-07 RX ADMIN — HYDRALAZINE HYDROCHLORIDE 10 MG: 20 INJECTION INTRAMUSCULAR; INTRAVENOUS at 10:03

## 2021-06-07 ASSESSMENT — ACTIVITIES OF DAILY LIVING (ADL)
LAUNDRY: UNABLE TO COMPLETE
DRESS: SCRUBS (BEHAVIORAL HEALTH)
HYGIENE/GROOMING: INDEPENDENT;PROMPTS
ORAL_HYGIENE: INDEPENDENT;PROMPTS
DRESS: SCRUBS (BEHAVIORAL HEALTH);INDEPENDENT
HYGIENE/GROOMING: INDEPENDENT
ORAL_HYGIENE: INDEPENDENT
LAUNDRY: UNABLE TO COMPLETE

## 2021-06-07 NOTE — PLAN OF CARE
Michelle appeared to sleep a total of 6 hours this night shifty.  No prns or snacks given or requested.  NPO for ECT this morning.

## 2021-06-07 NOTE — PLAN OF CARE
"Music Therapy Group note    Total time in session: 75-80 minutes     Number of patients in group: 5    Scope of service: behavioral     Patient progress: After group, staff member commented to MT, \"Thanks for doing group!  Michelle is smiling out there-I haven't seen Michelle smile for months!\"     Patient response/reaction to treatment intervention(s): Michelle participated passively in group today, but did appear to benefit from the music and socialization and cited above.     Vonda Hopson, [facility]  Board-Certified Music Therapist           "

## 2021-06-07 NOTE — PLAN OF CARE
Problem: Behavior Regulation Impairment (Psychotic Signs/Symptoms)  Goal: Improved Behavioral Control (Psychotic Signs/Symptoms)  Outcome: No Change     Problem: Sensory Perception Impairment (Psychotic Signs/Symptoms)  Goal: Decreased Sensory Symptoms (Psychotic Signs/Symptoms)  Outcome: No Change    Pt presented as alert and oriented to self, but not to place, throughout shift, following ECT treatment.  Pt was mostly visible on the unit, on a chair in the lounge or in her room.  With several prompts, pt was medication compliant.  She ate meals.  Pt displayed confusion about her surroundings and time.  She does not appear to be responding to any stimuli.  She denied pain.  Pt does not appear to have any acute physical health concerns or side effects from medications at this time.

## 2021-06-07 NOTE — PLAN OF CARE
BEHAVIORAL TEAM DISCUSSION    Participants:  Kal Driscoll, RN, Tasha, RN, Yessica, RN Nursing Supervisor, TRAVON Hill  Progress: Continuing to assess  Anticipated length of stay: Unknown at this time  Continued Stay Criteria/Rationale: Patient will finish course of ECT  Medical/Physical: Per Internal Medicine  Precautions:   Behavioral Orders   Procedures     Assault precautions     Cheeking Precautions (behavioral units)     Patient Observed swallowing PO medications; Patient asked to drink water after swallowing medication; Patient in Staff line of sight for 15 minutes after medication given; Mouth checks after PO administration (patient asked to open mouth and stick out their tongue).     Code 1 - Restrict to Unit     Code 2 - 1:1 Staff Supervision     For ECT only     Code 3     For walks in the [place] with staff and per staff discretion     Electroconvulsive therapy     Series of up to 12 treatments. Begin Date: 5/26/21     Treating Psychiatrist providing ECT:  Dr. Lubin     Notified on:  5/21/21     Electroconvulsive therapy     As long as we get the green light from risk management and pt is medically cleared, begin ECT every Monday, Wednesday, and Friday     Electroconvulsive therapy     Series of up to 12 treatments. Begin Date: 5/25/21     Treating Psychiatrist providing ECT:  Amee     Notified on:  5/24/21     Elopement precautions     Fall precautions     Mcgrath Calderon     Routine Programming     As clinically indicated     Status 15     Every 15 minutes.     Status Individual Observation     SIO required while patient is NPO on ECT days (midnight to ECT time on M, W, F). Patient SIO status reviewed with team/RN.  Please also refer to RN/team documentation for add'l detail.    -SIO staff to monitor following which have contributed to patient being on SIO:  Monitoring pt while NPO due to memory impairments and disorganized thought process  -Possible interventions SIO staff could use to support  patient's treatment progress:  Ensure patient remains NPO  -When following observed, team will review discontinuation of SIO:  After ECT is completed     Order Specific Question:   CONTINUOUS 24 hours / day     Answer:   5 feet     Order Specific Question:   Indications for SIO     Answer:   Medical equipment / ligature risk     Plan: Attending psychiatrist will see patient daily to assess psychiatric needs. Patient is having a course of ECT during this hospitalization.  Rationale for change in precautions or plan: No change

## 2021-06-07 NOTE — PROGRESS NOTES
"[facility], [place]   Psychiatric Progress Note  Hospital Day: 54        Interim History:   The patient's care was discussed with the treatment team during the daily team meeting and/or staff's chart notes were reviewed.  Chirag continues to be more disoriented and confused. She perseverated on leaving the hospital. She also slept on her mattress rather than on her floor for the first time in several weeks. Sleeping well. No evidence of severe agitation or aggression. Was able to go out to [place] yesterday.     Upon interview, Michelle was again disoriented, oriented to person only. She asked repeatedly where she is and whether she is at her apartment. She could not recall her home address. She did recognize this writer and referred to me as \"doctor.\" She denied acute physical concerns though did acknowledge memory impairments.     I met with Michelle again following ECT. She again asked repeatedly about returning home, her current location, and dispo plan. She had forgotten she had ECT this morning. She was calm, polite, cooperative. She denied physical pain. She denied side effects from medications. Had no additional questions or concerns for this writer. She was able to tell me the name of her son.          Medications:       lidocaine (PF)  40 mg Intravenous Once in ECT     LORazepam  1 mg Oral Daily    Or     LORazepam  1 mg Intramuscular Daily     LORazepam  2 mg Oral BID    Or     LORazepam  2 mg Intramuscular BID     losartan  100 mg Oral Daily     metoprolol succinate ER  50 mg Oral Daily     OLANZapine zydis  10 mg Oral BID    Or     OLANZapine  10 mg Intramuscular BID          Allergies:     Allergies   Allergen Reactions     Haldol [Haloperidol]      Patient previously tolerated haldol, though developed oculogyric crises during hospital stay on 4/26/21 on total daily dose of 10 mg.     Lisinopril Cough          Labs:     Recent Results (from the past 24 hour(s))   Asymptomatic " SARS-CoV-2 COVID-19 Virus (Coronavirus) by PCR    Collection Time: 06/07/21  9:50 AM    Specimen: Nasopharyngeal   Result Value Ref Range    SARS-CoV-2 Virus Specimen Source Nasopharyngeal     SARS-CoV-2 PCR Result NEGATIVE     SARS-CoV-2 PCR Comment       Testing was performed using the Xpert Xpress SARS-CoV-2 Assay on the Cepheid Gene-Xpert   Instrument Systems. Additional information about this Emergency Use Authorization (EUA)   assay can be found via the Lab Guide.            Psychiatric Examination:     BP (!) 150/110   Pulse 115   Temp 97.5  F (36.4  C)   Resp 18   Wt 71 kg (156 lb 8 oz)   SpO2 97%   BMI 26.86 kg/m    Weight is 156 lbs 8 oz  Body mass index is 26.86 kg/m .    Weight over time:  Vitals:    05/25/21 0850   Weight: 71 kg (156 lb 8 oz)       Orthostatic Vitals     None            Cardiometabolic risk assessment. 04/15/21      Reviewed patient profile for cardiometabolic risk factors    Date taken /Value  REFERENCE RANGE   Abdominal Obesity  (Waist Circumference)   See nursing flowsheet Women ?35 in (88 cm)   Men ?40 in (102 cm)      Triglycerides  Triglycerides   Date Value Ref Range Status   10/19/2019 117 <150 mg/dL Final       ?150 mg/dL (1.7 mmol/L) or current treatment for elevated triglycerides   HDL cholesterol  HDL Cholesterol   Date Value Ref Range Status   10/19/2019 56 >49 mg/dL Final   ]   Women <50 mg/dL (1.3 mmol/L) in women or current treatment for low HDL cholesterol  Men <40 mg/dL (1 mmol/L) in men or current treatment for low HDL cholesterol     Fasting plasma glucose (FPG) Lab Results   Component Value Date     10/19/2019      FPG ?100 mg/dL (5.6 mmol/L) or treatment for elevated blood glucose   Blood pressure  BP Readings from Last 3 Encounters:   06/07/21 (!) 150/110   06/04/19 131/71   04/26/19 164/86    Blood pressure ?130/85 mmHg or treatment for elevated blood pressure   Family History  See family history     Appearance: dressed in hospital scrubs, appeared  "reported age. Initially sleeping in bed. Assessed shortly after receiving scheduled Ativan. Mildly sedated.  Attitude: cooperative  Eye Contact: good  Mood: \"good\"  Affect:  Blunted, constricted  Speech: accented, mostly coherent  Language: fluent and intact in English  Psychomotor, Gait, Musculoskeletal: No abnormal movements noted while seated in milieu.   Throught Process: Answering most questions appropriately  Associations:  No loosening of associations present  Thought Content:  No SI or HI expressed  Insight:  limited  Judgement:  limited  Oriented to: self only this morning, though oriented to person and place following ECT.   Attention Span and Concentration: poor   Recent and Remote Memory: impaired  Fund of Knowledge:  normal    Clinical Global Impressions  First:  Considering your total clinical experience with this particular patient population, how severe are the patient's symptoms at this time?: 7 (04/16/21 1428)  Compared to the patient's condition at the START of treatment, this patient's condition is: 4 (04/16/21 1428)  Most recent:  Considering your total clinical experience with this particular patient population, how severe are the patient's symptoms at this time?: 7 (05/13/21 0953)  Compared to the patient's condition at the START of treatment, this patient's condition is: 6 (05/13/21 0953)           Precautions:     Behavioral Orders   Procedures     Assault precautions     Cheeking Precautions (behavioral units)     Patient Observed swallowing PO medications; Patient asked to drink water after swallowing medication; Patient in Staff line of sight for 15 minutes after medication given; Mouth checks after PO administration (patient asked to open mouth and stick out their tongue).     Code 1 - Restrict to Unit     Code 2 - 1:1 Staff Supervision     For ECT only     Code 3     For walks in the [place] with staff and per staff discretion     Electroconvulsive therapy     Series of up to 12 " treatments. Begin Date: 5/26/21     Treating Psychiatrist providing ECT:  Dr. Lubin     Notified on:  5/21/21     Electroconvulsive therapy     As long as we get the green light from risk management and pt is medically cleared, begin ECT every Monday, Wednesday, and Friday     Electroconvulsive therapy     Series of up to 12 treatments. Begin Date: 5/25/21     Treating Psychiatrist providing ECT:  Amee     Notified on:  5/24/21     Elopement precautions     Fall precautions     Mcgrath Calderon     Routine Programming     As clinically indicated     Status 15     Every 15 minutes.     Status Individual Observation     SIO required while patient is NPO on ECT days (midnight to ECT time on M, W, F). Patient SIO status reviewed with team/RN.  Please also refer to RN/team documentation for add'l detail.    -SIO staff to monitor following which have contributed to patient being on SIO:  Monitoring pt while NPO due to memory impairments and disorganized thought process  -Possible interventions SIO staff could use to support patient's treatment progress:  Ensure patient remains NPO  -When following observed, team will review discontinuation of SIO:  After ECT is completed     Order Specific Question:   CONTINUOUS 24 hours / day     Answer:   5 feet     Order Specific Question:   Indications for SIO     Answer:   Medical equipment / ligature risk          Diagnoses:     Schizoaffective Disorder, Bipolar Type, decompensated  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 Symptoms and  "presentation at this time is most consistent with Schizoaffective Disorder, Bipolar Type. We have obtained most recent medication regimen from patient's ACT team, and regimen was initially restarted. Inpatient psychiatric hospitalization is warranted at this time for safety, stabilization, and possible adjustment in medications. Pt is committed. We have petitioned for Alcides Calderon due to lack of improvement and side effects from medications.    Hospital Course:  On admission, PTA medications were restarted. However, patient had been declining all scheduled medications despite significant encouragement from staff and provider. Psychiatric emergency declared on 4/20 due to aggression toward others in context of severe psychosis and suspected excited catatonia. Ativan 1 mg TID was also added. Discontinued PTA Invega, Zyprexa, and thorazine on 4/20 due to consistent refusal.     On 4/26, it was noted that patient frequently had upward gaze while walking up and down the unit. She did not appear to be distressed. She reported that she was looking at \"my god.\" It was determined to be oculogyric crisis secondary to IM haloperidol. Haldol was subsequently discontinued during day shift on 4/26 and scheduled Zyprexa was initiated on emergency basis. Oral Cogentin was also scheduled, though patient declined. On the evening of 4/26, patient's gaze was fixed in upward position for several hours and she appeared to be experiencing discomfort. IM Cogentin was administered with noted resolution. Partial improvements weew observed after switch to scheduled Zyprexa. After patient improved, she was more receptive to reinitiating oral Invega, which was initiated on 5/3 and titrated to PTA dose of 9 mg daily on 5/10. Plan was for ACT team to bring in loading dose of Invega S[place]. Patient had signs of oculogyric crisis again with Invega. Oral dose decreased to 6 mg after this. Invega was stopped on 5/14 due to ongoing signs of " "problems related to eye movement and concerns for oculogyric crisis.    Overall improvement in patient's agitation and suspected catatonia noted on 4/30 after patient accepted two doses of Ativan. She began declining Ativan again with noted decompensation. Patient now accepting, however, she does not have the capacity to consent to treatment with Ativan at this time. She does not believe she has a mental illness, including catatonia. She does not fully understand risks associated with inadequate treatment of catatonia. Discussed with her son who is acting as surrogate decision maker and he is in full support of forced scheduled IM Ativan if patient declines oral formulation. Also consulted with our legal team prior to backing oral Ativan with IM.     Ativan was increased on 5/19 due to re-emerging evidence of catatonia (long periods of staring, repetitive movements, echolalia, mutism). Meeting was held with ACT team on 5/20, including ACT team psychiatrist, Dr. Pedraza. He said that he is in \"full support\" of plan to pursue Mcgrath Kohler and ECT at this time. He does feel that in the past she has been discharged from the hospital while still quite symptomatic. He is hoping that ECT will be effective and that with improvement, Michelle would be more receptive to clozapine and weekly blood draws. He said that ideally she would transition to an IRTS before going back to her apartment, but also understands there may be some barriers (I.e. pt's willingness, financial concerns, etc).     ECT consult placed. Please see consult note by Dr. Lubin on 5/21 for details. Mcgrath Kohler was approved on 5/24 and patient was medically cleared on 5/24. ECT initiated on 5/26. She was noted to have a short seizure during ECT on 6/4. Staff note that patient appears more disoriented with memory impairment and sedation on days of ECT. This was noted on my examination again today. Improvements noted in mood, affect, social interactions, " paranoia, agitation since initiation of ECT. Reduced Ativan on 6/5 due to sedative effects. Relayed concerns about memory impairment and confusion with Dr. Lubin. Recommended attempting unilateral ECT, which he agreed to do.     Target psychiatric symptoms and interventions:   - Resume Ativan 2 mg BID and 1 mg in the AM. Plan to taper if patient continues to appear sedated. Patient may not decline. Ativan to be given at 1700 on days prior to ECT and will be held on mornings before ECT  - Oral Zyprexa 10 mg BID OR Zyprexa 10 mg IM. Hatch in place. May consider increasing further though holding off given re-emerging catatonic sx and borderline prolonged QTc     Continue Cogentin 2 mg IM daily prn for evidence of acute dystonic reaction or oculogyric crisis  Continue hydroxyzine 25-50 mg q4h prn for acute anxiety  Continue Trazodone 50 mg at bedtime prn for sleep disturbances  Continue Zyprexa 10 mg TID prn for severe agitation  Continue Ativan/Benadryl q4h prn for agitation. WOULD GIVE PRN ATIVAN FIRST FOR AGITATION unless it is after 5 pm on day prior to ECT     ECT:  - Because patient was only intermittently accepting scheduled Ativan and some symptoms of catatonia are re-emerging, pursued Alcides Kohler for ECT. She also continues to exhibit symptoms of psychosis and has not responded or has experienced side effects from multiple neuroleptic medications. Court hearing was held on 5/21. Alcides Riverappard is approved. Patient is medically cleared. COVID negative on 5/24. Obtained ECT consult on 5/21/21. Please see Dr. Lubin's consultation note.   - ECT treatment #6 scheduled for 6/9.   - NPO 8 hours prior to scheduled treatment. Clear liquids until 2 hours prior to treatment.   - Discussed with IM. May resume AM antihypertensives, no need to hold prior to ECT  - SIO while patient is NPO, starting at midnight on ECT days (renewed)  - Will complete acute course in the hospital; will likely need maintenance ECT in  outpatient setting.   - New concerns about memory impairments: Will switch from bilateral to unilateral ECT and continue to monitor closely.       Acute Medical Problems and Treatments:  HTN: Patient is now adherent with medication regimen.   - Metoprolol succinate ER 50 mg daily  - Cozaar 100 mg daily  - Please see note from IM dated 5/3/21, 5/6/21, and 5/24.  - Obtained EKG and routine labs on 5/21 in context of pt declining vital sign checks and anti-hypertensives and recently elevated BPs. Reviewed labs and discussed EKG findings with IM on 5/21. No urgent concerns, though IM should be notified if pt develops acute medical concerns (I.e. heart palpitations, SOB, changes in speech, AMS, confusion, CP, HA, changes in vision).     CVA:  - Aspirin 81 mg daily     Chronic Constipation:  - Miralax 17 mg daily     Behavioral/Psychological/Social:  - Encourage unit programming  - Patient is now Code 3 status and can take walks in the [place] with staff and security present per staff discretion    Safety:  - Continue precautions as noted above  - Status 15 minute checks  - Safety precautions include: assault and elopement precautions  - Continue precautions as noted above    Legal Status: Committed as MI with Hatch in place for Haldol, Zyprexa, Invega, and Thorazine through [facility]. Filed Alcides Kohler through [facility] and approved on 5/24/21.     Disposition Plan   Reason for ongoing admission: poses an imminent risk to self, poses an imminent risk to others and is unable to care for self due to severe psychosis or tad  Discharge location: Home with ACT team support.   Discharge Medications: not ordered  Follow-up Appointments: not scheduled    Entered by: Ida Zaman on 6/7/2021 at 5:25 PM     > 30 minutes total time that was spent and over 50% of this time was spent in counseling and coordination of care.

## 2021-06-07 NOTE — PLAN OF CARE
Problem: Adult Inpatient Plan of Care  Goal: Absence of Hospital-Acquired Illness or Injury  Intervention: Prevent Skin Injury  Recent Flowsheet Documentation  Taken 6/6/2021 1900 by Berta Boyer RN  Body Position: position changed independently    Pt had a pleasant evening. [facility] and off. she is calm and pleasant. She is delusional and disorganized. She is alert and oriented to name only. Affects is bright when socializing with staff. She took scheduled medication without any issue, and no SE s observed or reported. Pt approached the med window several times, stating,  I have been looking for you. I need to go. I am ready now . When asked where she is going, she said home. When asked where home is, she was mute and unable to say. She was encouraged to stay here tonight and talk with her care provider in the morning. Yesterday, she refused to speak with her son and daughter-in-law, stating she did not know them.     Pt is now sleeping on her bed. ECT is scheduled for tomorrow morning. Pt is NPO after night and on SIO for the night shift and day shift in preparation for ECT. Vital signs stable this shift; 124/65, and pulse 84. No SI/SIB.

## 2021-06-07 NOTE — PROCEDURES
"Procedure/Surgery Information   [facility]    Bedside Procedure Note  Date of Service (when I performed the procedure): 06/07/2021    Michelle Pino is a 60 year old female patient.  1. Paranoid schizophrenia (H)    2. COVID-19 ruled out by laboratory testing    3. Schizoaffective disorder, bipolar type (H)      Past Medical History:   Diagnosis Date     Hyperlipidemia      Hypertension      Schizophrenia (H)      Temp: 98  F (36.7  C) Temp src: Tympanic BP: 124/65 Pulse: 84   Resp: 16 SpO2: 96 % O2 Device: None (Room air)      Procedures     Angel Lubin     [facility],   ECT Procedure Note   06/07/2021    Michelle Pino [id number]   60 year old 1960     Patient Status: Inpatient    Is this the first in a series of 12 treatments?  No    History and Physical: Reviewed in medical record    Consent: court ordered    Date Consent Signed: ECT may be administered during the commitment, which was signed 4/26/21 and expires on 10/26/21.  She can have ECT up to 3 times weekly for up to 12 treatments.        Allergies   Allergen Reactions     Haldol [Haloperidol]      Patient previously tolerated haldol, though developed oculogyric crises during hospital stay on 4/26/21 on total daily dose of 10 mg.     Lisinopril Cough       Weight:  156 lbs 8 oz     /65   Pulse 84   Temp 98  F (36.7  C) (Tympanic)   Resp 16   Wt 71 kg (156 lb 8 oz)   SpO2 96%   BMI 26.86 kg/m      Patient Preparations: Other (comment)(none needed)         Indications for ECT:   Medications ineffective         Clinical Narrative:   Patient has bipolar tad with psychosis and is court-ordered to ECT.  NPO after 2400.  Alert and Oriented x 3.  Per unit staff, \"Pt had a pleasant evening. She spent most of this evening in the lounge area napping and off. she is calm and pleasant. She is delusional and disorganized. She is alert and oriented to name only. " "Affects is bright when socializing with staff. She took scheduled medication without any issue, and no SE s observed or reported. Pt approached the med window several times, stating,  I have been looking for you. I need to go. I am ready now . When asked where she is going, she said home. When asked where home is, she was mute and unable to say.\"  Due to confusion, Dr. Zaman has asked we try unilateral ECT.  Pt says her mood is pretty good.  She was oriented to person, place, year but not month, day, or date.           Diagnosis:   Bipolar I manic with psychosis         Pause for the Cause:     Right patient Yes   Right procedure/laterality settings: Yes          Intra-Procedure Documentation:       ECT #: 5   Treatment number this series: 5   Total treatment number: 5     Type of ECT:  Right Unilateral Ultrabrief (instead of Bilateral, standard)    ECT Medications:   Zyprexa: 10mg po on unit  Caffeine: 1000mg  (increase to 1500mg next Tx)  Flumazenil: 0.5mg   Labetalol: 20mg pre-ECT for high bp   Lidocaine: 40mg  Etomidate: 14mg  Succinyl Choline: 70mg   Today:  Hydralazine: 10mg post-ECT for high bp    ECT Strip Summary:   Energy Level: 65 percent (first Tx);  100 percent (2nd Tx)  Motor Seizure Duration:  9 seconds;  8 seconds (2nd Tx)  EEG Seizure Duration:   9 seconds;  8 seconds (2nd Tx)    Complications:  Short sz with unilateral ECT.      Plan:   COVID test 6/7/21  Next ECT 6/9/21    Angel Lubin MD  "

## 2021-06-07 NOTE — ANESTHESIA POSTPROCEDURE EVALUATION
Patient: Michelle Pino    * No procedures listed *    Diagnosis:* No pre-op diagnosis entered *  Diagnosis Additional Information: No value filed.    Anesthesia Type:  General    Note:  Disposition: Admission   Postop Pain Control: Uneventful            Sign Out: Well controlled pain   PONV: No   Neuro/Psych: Uneventful            Sign Out: Acceptable/Baseline neuro status   Airway/Respiratory: Uneventful            Sign Out: Acceptable/Baseline resp. status   CV/Hemodynamics: Uneventful            Sign Out: Detailed CV status (Pt did not take AM antihypertensives)               Blood Pressure: HypERtension   Other NRE: NONE   DID A NON-ROUTINE EVENT OCCUR? No           Last vitals:  Vitals:    06/06/21 1900 06/07/21 0812 06/07/21 1007   BP: 124/65     Pulse: 84  94   Resp:   20   Temp:  37.2  C (98.9  F) 35.8  C (96.5  F)   SpO2:  97% 97%       Last vitals prior to Anesthesia Care Transfer:  CRNA VITALS  6/7/2021 0936 - 6/7/2021 1036      6/7/2021             Pulse:  83    SpO2:  97 %          Electronically Signed By: Tania Torres MD  June 7, 2021  10:43 AM

## 2021-06-07 NOTE — ANESTHESIA PREPROCEDURE EVALUATION
Anesthesia Pre-Procedure Evaluation    Patient: Michelle Pino   MRN: [id number] : 1960        Preoperative Diagnosis: * No pre-op diagnosis entered *   Procedure : * No procedures listed *     Past Medical History:   Diagnosis Date     Hyperlipidemia      Hypertension      Schizophrenia (H)       Past Surgical History:   Procedure Laterality Date     OPEN REDUCTION INTERNAL FIXATION TIBIAL PLATEAU Right 2019    at [facility], fracture occured during psychiatric restraining      Allergies   Allergen Reactions     Haldol [Haloperidol]      Patient previously tolerated haldol, though developed oculogyric crises during hospital stay on 21 on total daily dose of 10 mg.     Lisinopril Cough      Social History     Tobacco Use     Smoking status: Never Smoker   Substance Use Topics     Alcohol use: Never     Frequency: Never      Wt Readings from Last 1 Encounters:   21 71 kg (156 lb 8 oz)        Anesthesia Evaluation   Pt has had prior anesthetic.         ROS/MED HX  ENT/Pulmonary:  - neg pulmonary ROS     Neurologic:     (+) CVA, without deficits,     Cardiovascular:     (+) Dyslipidemia hypertension-----Previous cardiac testing     METS/Exercise Tolerance: >4 METS    Hematologic:  - neg hematologic  ROS     Musculoskeletal:  - neg musculoskeletal ROS     GI/Hepatic:  - neg GI/hepatic ROS     Renal/Genitourinary:  - neg Renal ROS     Endo:  - neg endo ROS     Psychiatric/Substance Use: Comment: SCHIZOAFFECTIVE, ROSHAN    (+) psychiatric history anxiety, other (comment) and schizophrenia     Infectious Disease:  - neg infectious disease ROS     Malignancy:  - neg malignancy ROS     Other:            Physical Exam    Airway   unable to assess          Respiratory Devices and Support         Dental    unable to assess        Cardiovascular   cardiovascular exam normal          Pulmonary   pulmonary exam normal                OUTSIDE LABS:  CBC:   Lab Results   Component Value Date    WBC 6.6 2021     WBC 5.4 10/19/2019    HGB 13.6 05/21/2021    HGB 12.5 10/19/2019    HCT 39.3 05/21/2021    HCT 37.7 10/19/2019     05/21/2021     10/19/2019     BMP:   Lab Results   Component Value Date     05/21/2021     10/19/2019    POTASSIUM 3.9 05/21/2021    POTASSIUM 3.8 10/19/2019    CHLORIDE 108 05/21/2021    CHLORIDE 109 10/19/2019    CO2 23 05/21/2021    CO2 27 10/19/2019    BUN 16 05/21/2021    BUN 9 10/19/2019    CR 0.88 05/21/2021    CR 0.87 10/19/2019     (H) 05/21/2021     (H) 10/19/2019     COAGS: No results found for: PTT, INR, FIBR  POC: No results found for: BGM, HCG, HCGS  HEPATIC:   Lab Results   Component Value Date    ALBUMIN 3.6 05/21/2021    PROTTOTAL 7.0 05/21/2021    ALT 24 05/21/2021    AST 15 05/21/2021    ALKPHOS 91 05/21/2021    BILITOTAL 0.3 05/21/2021     OTHER:   Lab Results   Component Value Date    ALEK 8.7 05/21/2021    PHOS 4.3 05/26/2021    MAG 2.2 05/26/2021    TSH 0.53 05/21/2021    T4 0.77 10/19/2019       Anesthesia Plan    ASA Status:  2   NPO Status:  NPO Appropriate    Anesthesia Type: General.     - Airway: Mask Only   Induction: Intravenous.   Maintenance: N/A.        Consents    Anesthesia Plan(s) and associated risks, benefits, and realistic alternatives discussed. Questions answered and patient/representative(s) expressed understanding.     - Discussed with:  Patient      - Extended Intubation/Ventilatory Support Discussed: No.      - Patient is DNR/DNI Status: No    Use of blood products discussed: No .     Postoperative Care            Comments:                    Tania Torres MD

## 2021-06-07 NOTE — PLAN OF CARE
Assessment/Intervention/Current Symtoms and Care Coordination:  Attended Team Meeting, Reviewed chart notes: Patient appears to be improving slowly. Patient continues to be somewhat confused and disoriented.      Discharge Plan or Goal:  Return home with follow-up through [facility].     Barriers to Discharge:  Patient is continuing to stabilize. She is having a course of ECT during this hospitalization.     Referral Status:  No referrals have been made.     Legal Status:  Committed/Hatch/Mcgrath Kohler through [facility]

## 2021-06-08 LAB
ALBUMIN UR-MCNC: 10 MG/DL
APPEARANCE UR: CLEAR
BACTERIA #/AREA URNS HPF: ABNORMAL /HPF
BILIRUB UR QL STRIP: NEGATIVE
COLOR UR AUTO: YELLOW
GLUCOSE UR STRIP-MCNC: NEGATIVE MG/DL
HGB UR QL STRIP: NEGATIVE
HYALINE CASTS #/AREA URNS LPF: 5 /LPF (ref 0–2)
KETONES UR STRIP-MCNC: NEGATIVE MG/DL
LEUKOCYTE ESTERASE UR QL STRIP: NEGATIVE
MUCOUS THREADS #/AREA URNS LPF: PRESENT /LPF
NITRATE UR QL: NEGATIVE
PH UR STRIP: 6.5 PH (ref 5–7)
RBC #/AREA URNS AUTO: 1 /HPF (ref 0–2)
SOURCE: ABNORMAL
SP GR UR STRIP: 1.02 (ref 1–1.03)
SQUAMOUS #/AREA URNS AUTO: 0 /HPF (ref 0–1)
UROBILINOGEN UR STRIP-MCNC: NORMAL MG/DL (ref 0–2)
WBC #/AREA URNS AUTO: 1 /HPF (ref 0–5)

## 2021-06-08 PROCEDURE — 250N000013 HC RX MED GY IP 250 OP 250 PS 637: Performed by: PHYSICIAN ASSISTANT

## 2021-06-08 PROCEDURE — 124N000002 HC R&B MH UMMC

## 2021-06-08 PROCEDURE — 81001 URINALYSIS AUTO W/SCOPE: CPT | Performed by: PSYCHIATRY & NEUROLOGY

## 2021-06-08 PROCEDURE — 250N000013 HC RX MED GY IP 250 OP 250 PS 637: Performed by: PSYCHIATRY & NEUROLOGY

## 2021-06-08 PROCEDURE — 99233 SBSQ HOSP IP/OBS HIGH 50: CPT | Performed by: PSYCHIATRY & NEUROLOGY

## 2021-06-08 RX ADMIN — LOSARTAN POTASSIUM 100 MG: 100 TABLET, FILM COATED ORAL at 09:04

## 2021-06-08 RX ADMIN — LORAZEPAM 1 MG: 1 TABLET ORAL at 09:04

## 2021-06-08 RX ADMIN — METOPROLOL SUCCINATE 50 MG: 50 TABLET, EXTENDED RELEASE ORAL at 09:04

## 2021-06-08 RX ADMIN — OLANZAPINE 10 MG: 10 TABLET, ORALLY DISINTEGRATING ORAL at 09:04

## 2021-06-08 RX ADMIN — LORAZEPAM 2 MG: 2 TABLET ORAL at 15:03

## 2021-06-08 RX ADMIN — OLANZAPINE 10 MG: 10 TABLET, ORALLY DISINTEGRATING ORAL at 20:28

## 2021-06-08 ASSESSMENT — ACTIVITIES OF DAILY LIVING (ADL)
HYGIENE/GROOMING: PROMPTS
DRESS: SCRUBS (BEHAVIORAL HEALTH);PROMPTS
HYGIENE/GROOMING: PROMPTS
ORAL_HYGIENE: PROMPTS
ORAL_HYGIENE: PROMPTS
LAUNDRY: UNABLE TO COMPLETE
DRESS: SCRUBS (BEHAVIORAL HEALTH)

## 2021-06-08 NOTE — PROGRESS NOTES
Date: 6/8/2021    Time of Call: 5:46 PM     [ TORB ] Ordering provider: Dr Zaman  Order: Hold 2000 ativan that was scheduled to be given at 1700 due to ECT because of sedation     Order received by: Jose Manuel Fuentes RN

## 2021-06-08 NOTE — PLAN OF CARE
"  Problem: Psychomotor Impairment (Psychotic Signs/Symptoms)  Goal: Improved Psychomotor Symptoms (Psychotic Signs/Symptoms)  Outcome: No Change   Michelle approached the med window asking for this RN by name. \" Stefan, where have you been? I have been looking for you all day\". When asked how I could help her, she became mute and appeared searching for words to express herself. This writer asked if she knew where she is now; she said, [facility] in [place] with a big smile. She states the name of her son without any difficulties or word searching. She is visible in the milieu, napping on and off and withdrawn. She is calm and polite upon approach. She did not appear responding to internal stimuli and no repeated statements. Vital signs stable this shift. She took scheduled medication with encouragement. No side effects of her medication were observed. She had ECT done this morning and denies headache or body ache. Pt is eating and drinking well; she ate 100% of her dinner.  No SI/SIB.    "

## 2021-06-08 NOTE — PLAN OF CARE
Problem: Behavior Regulation Impairment (Psychotic Signs/Symptoms)  Goal: Improved Behavioral Control (Psychotic Signs/Symptoms)  Outcome: No Change     Problem: Cognitive Impairment (Psychotic Signs/Symptoms)  Goal: Optimal Cognitive Function (Psychotic Signs/Symptoms)  Outcome: No Change     Pt presented as alert and oriented to self, however seemed disoriented to place intermittently throughout the shift.  Pt appeared disheveled and speech was disorganized.  She was medication compliant, but seemed to be confused as to the medications that she was taking, although there were not changes.  Pt ate breakfast and lunch.  Pt does not appear to be responding to any internal stimuli.  She was unable to participate in a nursing assessment.  Pt however denied any pain.  She does not appear to have any acute physical health concerns or side effects from medications at this time.

## 2021-06-08 NOTE — PLAN OF CARE
Problem: OT General Care Plan  Goal: OT Goal 1  Description: OT Goal 1  Outcome: No Change     Michelle was in the lounge during the 2 hours of OT groups, though did not respond to writers invitation to join group, or 1:1 offer of activity time.

## 2021-06-08 NOTE — PLAN OF CARE
Assessment/Intervention/Current Symtoms and Care Coordination:  Attended Team Meeting, Reviewed chart notes: Met with patient to check in and see how she is doing. Patient appears minimally improved. She is out in the milieu but isolative to self. Does not participate in any group activity. Patient will complete a course of ECT during this hospitalization.     Discharge Plan or Goal:  Return home when stable and follow-up with [facility].      Barriers to Discharge:  As stated above patient will complete a series of ECT treatments while hospitalized. Patient is continuing to stabilize    Referral Status:  No referrals have been made    Legal Status:  Committed/Hatch/Mcgrath colin through [facility]

## 2021-06-08 NOTE — PLAN OF CARE
Michelle slept for a total of 3.5 hours with difficulty falling to sleep and remaining asleep.  No prns of snacks given or requested.

## 2021-06-08 NOTE — PROGRESS NOTES
"[facility], [place]   Psychiatric Progress Note  Hospital Day: 55        Interim History:   The patient's care was discussed with the treatment team during the daily team meeting and/or staff's chart notes were reviewed.  Pt continues to be more disoriented and confused overall, though this appears to be waxing and waning. She was alert and oriented last evening, though this morning she appears more confused and is only oriented to self. Last evening she was visible in the milieu. Able to state her son's name without any difficulty. Recognized multiple staff members. Smiling at appropriate times. Eating and drinking well. Denies SI, SIB, and AH/VH. She does not appear to be responding to internal stimuli.     Upon interview, Michelle was again disoriented, oriented to person only. Stated that the date is August 6th. She again believed that she was in her apartment. She recognized this writer but did not recall writer's name. She smiled and said \"I don't remember.\" She does recall her son's name. She did not recall name of procedure she had yesterday. When asked her understanding of what will occur tomorrow morning, she replied \"I'll be checking the weather.\" She reports good appetite and adequate sleep. She feels safe in the hospital. She was calm, polite, cooperative. She denied physical pain. She denied side effects from medications. Had no additional questions or concerns for this writer.          Medications:       LORazepam  1 mg Oral Daily    Or     LORazepam  1 mg Intramuscular Daily     LORazepam  2 mg Oral BID    Or     LORazepam  2 mg Intramuscular BID     losartan  100 mg Oral Daily     metoprolol succinate ER  50 mg Oral Daily     OLANZapine zydis  10 mg Oral BID    Or     OLANZapine  10 mg Intramuscular BID          Allergies:     Allergies   Allergen Reactions     Haldol [Haloperidol]      Patient previously tolerated haldol, though developed oculogyric crises during hospital " "stay on 4/26/21 on total daily dose of 10 mg.     Lisinopril Cough          Labs:     No results found for this or any previous visit (from the past 24 hour(s)).       Psychiatric Examination:     /84   Pulse 130   Temp 96.6  F (35.9  C) (Tympanic)   Resp 16   Wt 71 kg (156 lb 8 oz)   SpO2 98%   BMI 26.86 kg/m    Weight is 156 lbs 8 oz  Body mass index is 26.86 kg/m .    Weight over time:  Vitals:    05/25/21 0850   Weight: 71 kg (156 lb 8 oz)       Orthostatic Vitals     None            Cardiometabolic risk assessment. 04/15/21      Reviewed patient profile for cardiometabolic risk factors    Date taken /Value  REFERENCE RANGE   Abdominal Obesity  (Waist Circumference)   See nursing flowsheet Women ?35 in (88 cm)   Men ?40 in (102 cm)      Triglycerides  Triglycerides   Date Value Ref Range Status   10/19/2019 117 <150 mg/dL Final       ?150 mg/dL (1.7 mmol/L) or current treatment for elevated triglycerides   HDL cholesterol  HDL Cholesterol   Date Value Ref Range Status   10/19/2019 56 >49 mg/dL Final   ]   Women <50 mg/dL (1.3 mmol/L) in women or current treatment for low HDL cholesterol  Men <40 mg/dL (1 mmol/L) in men or current treatment for low HDL cholesterol     Fasting plasma glucose (FPG) Lab Results   Component Value Date     10/19/2019      FPG ?100 mg/dL (5.6 mmol/L) or treatment for elevated blood glucose   Blood pressure  BP Readings from Last 3 Encounters:   06/08/21 119/84   06/04/19 131/71   04/26/19 164/86    Blood pressure ?130/85 mmHg or treatment for elevated blood pressure   Family History  See family history     Appearance: dressed in hospital scrubs, appeared reported age. Initially sleeping in bed. Assessed shortly after receiving scheduled Ativan. Mildly sedated.  Attitude: cooperative  Eye Contact: good  Mood: \"good\"  Affect:  Blunted, constricted though smiled on one occasion  Speech: accented, mostly coherent  Language: fluent and intact in English  Psychomotor, " Gait, Musculoskeletal: No abnormal movements noted while seated in milieu.   Throught Process: linear, illogical  Associations:  No loosening of associations present  Thought Content:  No SI or HI expressed  Insight:  limited  Judgement:  limited  Oriented to: self only  Attention Span and Concentration: poor   Recent and Remote Memory: impaired, worsening since initiation of ECT  Fund of Knowledge:  normal    Clinical Global Impressions  First:  Considering your total clinical experience with this particular patient population, how severe are the patient's symptoms at this time?: 7 (04/16/21 1428)  Compared to the patient's condition at the START of treatment, this patient's condition is: 4 (04/16/21 1428)  Most recent:  Considering your total clinical experience with this particular patient population, how severe are the patient's symptoms at this time?: 7 (05/13/21 0953)  Compared to the patient's condition at the START of treatment, this patient's condition is: 6 (05/13/21 0953)           Precautions:     Behavioral Orders   Procedures     Assault precautions     Cheeking Precautions (behavioral units)     Patient Observed swallowing PO medications; Patient asked to drink water after swallowing medication; Patient in Staff line of sight for 15 minutes after medication given; Mouth checks after PO administration (patient asked to open mouth and stick out their tongue).     Code 1 - Restrict to Unit     Code 2 - 1:1 Staff Supervision     For ECT only     Code 3     For walks in the [place] with staff and per staff discretion     Electroconvulsive therapy     Series of up to 12 treatments. Begin Date: 5/26/21     Treating Psychiatrist providing ECT:  Dr. Lubin     Notified on:  5/21/21     Electroconvulsive therapy     As long as we get the green light from risk management and pt is medically cleared, begin ECT every Monday, Wednesday, and Friday     Electroconvulsive therapy     Series of up to 12 treatments.  Begin Date: 5/25/21     Treating Psychiatrist providing ECT:  Amee     Notified on:  5/24/21     Elopement precautions     Fall precautions     Mcgrath Calderon     Routine Programming     As clinically indicated     Status 15     Every 15 minutes.     Status Individual Observation     SIO required while patient is NPO on ECT days (midnight to ECT time on M, W, F). Patient SIO status reviewed with team/RN.  Please also refer to RN/team documentation for add'l detail.    -SIO staff to monitor following which have contributed to patient being on SIO:  Monitoring pt while NPO due to memory impairments and disorganized thought process  -Possible interventions SIO staff could use to support patient's treatment progress:  Ensure patient remains NPO  -When following observed, team will review discontinuation of SIO:  After ECT is completed     Order Specific Question:   CONTINUOUS 24 hours / day     Answer:   5 feet     Order Specific Question:   Indications for SIO     Answer:   Medical equipment / ligature risk          Diagnoses:     Schizoaffective Disorder, Bipolar Type, decompensated  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 Symptoms and presentation at this time is most consistent with Schizoaffective Disorder, Bipolar Type. We have obtained most recent medication regimen from patient's ACT team, and regimen was initially restarted. Inpatient psychiatric hospitalization is warranted at this time for safety, stabilization, and possible adjustment in medications. Pt is  "committed. We have petitioned for Alcides Calderon due to lack of improvement and side effects from medications.    Hospital Course:  On admission, PTA medications were restarted. However, patient had been declining all scheduled medications despite significant encouragement from staff and provider. Psychiatric emergency declared on 4/20 due to aggression toward others in context of severe psychosis and suspected excited catatonia. Ativan 1 mg TID was also added. Discontinued PTA Invega, Zyprexa, and thorazine on 4/20 due to consistent refusal.     On 4/26, it was noted that patient frequently had upward gaze while walking up and down the unit. She did not appear to be distressed. She reported that she was looking at \"my god.\" It was determined to be oculogyric crisis secondary to IM haloperidol. Haldol was subsequently discontinued during day shift on 4/26 and scheduled Zyprexa was initiated on emergency basis. Oral Cogentin was also scheduled, though patient declined. On the evening of 4/26, patient's gaze was fixed in upward position for several hours and she appeared to be experiencing discomfort. IM Cogentin was administered with noted resolution. Partial improvements weew observed after switch to scheduled Zyprexa. After patient improved, she was more receptive to reinitiating oral Invega, which was initiated on 5/3 and titrated to PTA dose of 9 mg daily on 5/10. Plan was for ACT team to bring in loading dose of Invega Sustenna. Patient had signs of oculogyric crisis again with Invega. Oral dose decreased to 6 mg after this. Invega was stopped on 5/14 due to ongoing signs of problems related to eye movement and concerns for oculogyric crisis.    Overall improvement in patient's agitation and suspected catatonia noted on 4/30 after patient accepted two doses of Ativan. She began declining Ativan again with noted decompensation. Patient now accepting, however, she does not have the capacity to consent to treatment " "with Ativan at this time. She does not believe she has a mental illness, including catatonia. She does not fully understand risks associated with inadequate treatment of catatonia. Discussed with her son who is acting as surrogate decision maker and he is in full support of forced scheduled IM Ativan if patient declines oral formulation. Also consulted with our legal team prior to backing oral Ativan with IM.     Ativan was increased on 5/19 due to re-emerging evidence of catatonia (long periods of staring, repetitive movements, echolalia, mutism). Meeting was held with ACT team on 5/20, including ACT team psychiatrist, Dr. Pedraza. He said that he is in \"full support\" of plan to pursue Mcgrath Kohler and ECT at this time. He does feel that in the past she has been discharged from the hospital while still quite symptomatic. He is hoping that ECT will be effective and that with improvement, Michelle would be more receptive to clozapine and weekly blood draws. He said that ideally she would transition to an IRTS before going back to her apartment, but also understands there may be some barriers (I.e. pt's willingness, financial concerns, etc).     ECT consult placed. Please see consult note by Dr. Lubin on 5/21 for details. Mcgrath Kohler was approved on 5/24 and patient was medically cleared on 5/24. ECT initiated on 5/26. She was noted to have a short seizure during ECT on 6/4. Staff note that patient appears more disoriented with memory impairment and sedation on days of ECT. This was noted on my examination again today. Improvements noted in mood, affect, social interactions, paranoia, agitation since initiation of ECT. Reduced Ativan on 6/5 due to sedative effects. Relayed concerns about memory impairment and confusion with Dr. Lubin. Recommended attempting unilateral ECT, which he agreed to do. First unilateral ECT on 6/7. If memory impairment persists, will consider spacing out ECT vs discontinuing.     Target " psychiatric symptoms and interventions:   - Resume Ativan 2 mg BID and 1 mg in the AM. Plan to taper if patient continues to appear sedated. Patient may not decline. Ativan to be given at 1700 on days prior to ECT and will be held on mornings before ECT  - Oral Zyprexa 10 mg BID OR Zyprexa 10 mg IM. Hatch in place. May consider increasing further though holding off given re-emerging catatonic sx and borderline prolonged QTc     Continue Cogentin 2 mg IM daily prn for evidence of acute dystonic reaction or oculogyric crisis  Continue hydroxyzine 25-50 mg q4h prn for acute anxiety  Continue Trazodone 50 mg at bedtime prn for sleep disturbances  Continue Zyprexa 10 mg TID prn for severe agitation  Continue Ativan/Benadryl q4h prn for agitation. WOULD GIVE PRN ATIVAN FIRST FOR AGITATION unless it is after 5 pm on day prior to ECT     ECT:  - Because patient was only intermittently accepting scheduled Ativan and some symptoms of catatonia are re-emerging, pursued Alcides Kohler for ECT. She also continues to exhibit symptoms of psychosis and has not responded or has experienced side effects from multiple neuroleptic medications. Court hearing was held on 5/21. Alcides Kohler is approved. Patient is medically cleared. COVID negative on 5/24. Obtained ECT consult on 5/21/21. Please see Dr. Lubin's consultation note.   - ECT treatment #6 scheduled for 6/9.   - NPO 8 hours prior to scheduled treatment. Clear liquids until 2 hours prior to treatment.   - Discussed with IM. May resume AM antihypertensives, no need to hold prior to ECT  - SIO while patient is NPO, starting at midnight on ECT days (renewed)  - Will complete acute course in the hospital; will likely need maintenance ECT in outpatient setting.   - New concerns about memory impairments: Switched from bilateral to unilateral ECT and continue to monitor closely. Dr. Lubin aware.       Acute Medical Problems and Treatments:  HTN: Patient is now adherent with medication  regimen.   - Metoprolol succinate ER 50 mg daily  - Cozaar 100 mg daily  - Please see note from IM dated 5/3/21, 5/6/21, and 5/24.  - Obtained EKG and routine labs on 5/21 in context of pt declining vital sign checks and anti-hypertensives and recently elevated BPs. Reviewed labs and discussed EKG findings with IM on 5/21. No urgent concerns, though IM should be notified if pt develops acute medical concerns (I.e. heart palpitations, SOB, changes in speech, AMS, confusion, CP, HA, changes in vision).     CVA:  - Aspirin 81 mg daily     Chronic Constipation:  - Miralax 17 mg daily     Behavioral/Psychological/Social:  - Encourage unit programming  - Patient is now Code 3 status and can take walks in the [place] with staff and security present per staff discretion    Safety:  - Continue precautions as noted above  - Status 15 minute checks  - Safety precautions include: assault and elopement precautions  - Continue precautions as noted above    Legal Status: Committed as MI with Hatch in place for Haldol, Zyprexa, Invega, and Thorazine through [facility]. Filed Alcides Kohler through [facility] and approved on 5/24/21.     Disposition Plan   Reason for ongoing admission: poses an imminent risk to self, poses an imminent risk to others and is unable to care for self due to severe psychosis or tad  Discharge location: Home with ACT team support.   Discharge Medications: not ordered  Follow-up Appointments: not scheduled    Entered by: Ida Zaman on 6/8/2021 at 4:46 PM     > 30 minutes total time that was spent and over 50% of this time was spent in counseling and coordination of care.

## 2021-06-09 ENCOUNTER — APPOINTMENT (OUTPATIENT)
Dept: BEHAVIORAL HEALTH | Facility: CLINIC | Age: 61
End: 2021-06-09
Payer: COMMERCIAL

## 2021-06-09 ENCOUNTER — ANESTHESIA EVENT (OUTPATIENT)
Dept: BEHAVIORAL HEALTH | Facility: CLINIC | Age: 61
End: 2021-06-09

## 2021-06-09 ENCOUNTER — ANESTHESIA (OUTPATIENT)
Dept: BEHAVIORAL HEALTH | Facility: CLINIC | Age: 61
End: 2021-06-09

## 2021-06-09 LAB
ALBUMIN SERPL-MCNC: 3.2 G/DL (ref 3.4–5)
ALP SERPL-CCNC: 67 U/L (ref 40–150)
ALT SERPL W P-5'-P-CCNC: 26 U/L (ref 0–50)
ANION GAP SERPL CALCULATED.3IONS-SCNC: 8 MMOL/L (ref 3–14)
AST SERPL W P-5'-P-CCNC: 14 U/L (ref 0–45)
BASOPHILS # BLD AUTO: 0 10E9/L (ref 0–0.2)
BASOPHILS NFR BLD AUTO: 0.4 %
BILIRUB SERPL-MCNC: 0.2 MG/DL (ref 0.2–1.3)
BUN SERPL-MCNC: 31 MG/DL (ref 7–30)
CALCIUM SERPL-MCNC: 8.5 MG/DL (ref 8.5–10.1)
CHLORIDE SERPL-SCNC: 110 MMOL/L (ref 94–109)
CO2 SERPL-SCNC: 23 MMOL/L (ref 20–32)
CREAT SERPL-MCNC: 1.83 MG/DL (ref 0.52–1.04)
DIFFERENTIAL METHOD BLD: NORMAL
EOSINOPHIL # BLD AUTO: 0.1 10E9/L (ref 0–0.7)
EOSINOPHIL NFR BLD AUTO: 2.3 %
ERYTHROCYTE [DISTWIDTH] IN BLOOD BY AUTOMATED COUNT: 12.7 % (ref 10–15)
GFR SERPL CREATININE-BSD FRML MDRD: 30 ML/MIN/{1.73_M2}
GLUCOSE SERPL-MCNC: 107 MG/DL (ref 70–99)
HCT VFR BLD AUTO: 40.2 % (ref 35–47)
HGB BLD-MCNC: 13 G/DL (ref 11.7–15.7)
IMM GRANULOCYTES # BLD: 0 10E9/L (ref 0–0.4)
IMM GRANULOCYTES NFR BLD: 0.2 %
LYMPHOCYTES # BLD AUTO: 2.1 10E9/L (ref 0.8–5.3)
LYMPHOCYTES NFR BLD AUTO: 39.4 %
MCH RBC QN AUTO: 30.7 PG (ref 26.5–33)
MCHC RBC AUTO-ENTMCNC: 32.3 G/DL (ref 31.5–36.5)
MCV RBC AUTO: 95 FL (ref 78–100)
MONOCYTES # BLD AUTO: 0.5 10E9/L (ref 0–1.3)
MONOCYTES NFR BLD AUTO: 9.2 %
NEUTROPHILS # BLD AUTO: 2.5 10E9/L (ref 1.6–8.3)
NEUTROPHILS NFR BLD AUTO: 48.5 %
NRBC # BLD AUTO: 0 10*3/UL
NRBC BLD AUTO-RTO: 0 /100
PLATELET # BLD AUTO: 219 10E9/L (ref 150–450)
POTASSIUM SERPL-SCNC: 4.4 MMOL/L (ref 3.4–5.3)
PROT SERPL-MCNC: 6.6 G/DL (ref 6.8–8.8)
RBC # BLD AUTO: 4.23 10E12/L (ref 3.8–5.2)
SODIUM SERPL-SCNC: 141 MMOL/L (ref 133–144)
WBC # BLD AUTO: 5.2 10E9/L (ref 4–11)

## 2021-06-09 PROCEDURE — 250N000009 HC RX 250: Performed by: PSYCHIATRY & NEUROLOGY

## 2021-06-09 PROCEDURE — 250N000013 HC RX MED GY IP 250 OP 250 PS 637: Performed by: PHYSICIAN ASSISTANT

## 2021-06-09 PROCEDURE — 250N000013 HC RX MED GY IP 250 OP 250 PS 637: Performed by: PSYCHIATRY & NEUROLOGY

## 2021-06-09 PROCEDURE — 99233 SBSQ HOSP IP/OBS HIGH 50: CPT | Performed by: PSYCHIATRY & NEUROLOGY

## 2021-06-09 PROCEDURE — 36415 COLL VENOUS BLD VENIPUNCTURE: CPT | Performed by: PSYCHIATRY & NEUROLOGY

## 2021-06-09 PROCEDURE — 250N000009 HC RX 250: Performed by: ANESTHESIOLOGY

## 2021-06-09 PROCEDURE — 250N000011 HC RX IP 250 OP 636: Performed by: ANESTHESIOLOGY

## 2021-06-09 PROCEDURE — 85025 COMPLETE CBC W/AUTO DIFF WBC: CPT | Performed by: PSYCHIATRY & NEUROLOGY

## 2021-06-09 PROCEDURE — 80053 COMPREHEN METABOLIC PANEL: CPT | Performed by: PSYCHIATRY & NEUROLOGY

## 2021-06-09 PROCEDURE — 124N000002 HC R&B MH UMMC

## 2021-06-09 PROCEDURE — 90870 ELECTROCONVULSIVE THERAPY: CPT

## 2021-06-09 PROCEDURE — 370N000017 HC ANESTHESIA TECHNICAL FEE, PER MIN

## 2021-06-09 RX ORDER — HYDRALAZINE HYDROCHLORIDE 20 MG/ML
INJECTION INTRAMUSCULAR; INTRAVENOUS PRN
Status: DISCONTINUED | OUTPATIENT
Start: 2021-06-09 | End: 2021-06-09

## 2021-06-09 RX ORDER — LABETALOL HYDROCHLORIDE 5 MG/ML
INJECTION, SOLUTION INTRAVENOUS PRN
Status: DISCONTINUED | OUTPATIENT
Start: 2021-06-09 | End: 2021-06-09

## 2021-06-09 RX ORDER — CAFFEINE AND SODIUM BENZOATE 125 MG/ML
1500 INJECTION, SOLUTION INTRAMUSCULAR; INTRAVENOUS
Status: CANCELLED
Start: 2021-06-09 | End: 2021-06-09

## 2021-06-09 RX ORDER — LIDOCAINE HYDROCHLORIDE 20 MG/ML
INJECTION, SOLUTION INFILTRATION; PERINEURAL PRN
Status: DISCONTINUED | OUTPATIENT
Start: 2021-06-09 | End: 2021-06-09

## 2021-06-09 RX ORDER — ETOMIDATE 2 MG/ML
INJECTION INTRAVENOUS PRN
Status: DISCONTINUED | OUTPATIENT
Start: 2021-06-09 | End: 2021-06-09

## 2021-06-09 RX ORDER — CAFFEINE AND SODIUM BENZOATE 125 MG/ML
1500 INJECTION, SOLUTION INTRAMUSCULAR; INTRAVENOUS
Status: COMPLETED | OUTPATIENT
Start: 2021-06-09 | End: 2021-06-09

## 2021-06-09 RX ORDER — FLUMAZENIL 0.1 MG/ML
0.5 INJECTION, SOLUTION INTRAVENOUS
Status: CANCELLED
Start: 2021-06-09 | End: 2021-06-09

## 2021-06-09 RX ORDER — LIDOCAINE HYDROCHLORIDE 20 MG/ML
40 INJECTION, SOLUTION EPIDURAL; INFILTRATION; INTRACAUDAL; PERINEURAL
Status: ACTIVE | OUTPATIENT
Start: 2021-06-09 | End: 2021-06-09

## 2021-06-09 RX ORDER — LIDOCAINE HYDROCHLORIDE 20 MG/ML
40 INJECTION, SOLUTION EPIDURAL; INFILTRATION; INTRACAUDAL; PERINEURAL
Status: CANCELLED
Start: 2021-06-09 | End: 2021-06-09

## 2021-06-09 RX ORDER — FLUMAZENIL 0.1 MG/ML
0.5 INJECTION, SOLUTION INTRAVENOUS
Status: COMPLETED | OUTPATIENT
Start: 2021-06-09 | End: 2021-06-09

## 2021-06-09 RX ADMIN — CAFFEINE AND SODIUM BENZOATE 1500 MG: 125 INJECTION, SOLUTION INTRAMUSCULAR; INTRAVENOUS at 08:28

## 2021-06-09 RX ADMIN — METOPROLOL SUCCINATE 50 MG: 50 TABLET, EXTENDED RELEASE ORAL at 07:00

## 2021-06-09 RX ADMIN — LIDOCAINE HYDROCHLORIDE 40 MG: 20 INJECTION, SOLUTION INFILTRATION; PERINEURAL at 08:42

## 2021-06-09 RX ADMIN — Medication 70 MG: at 08:43

## 2021-06-09 RX ADMIN — LORAZEPAM 1 MG: 1 TABLET ORAL at 10:24

## 2021-06-09 RX ADMIN — OLANZAPINE 10 MG: 10 TABLET, ORALLY DISINTEGRATING ORAL at 20:00

## 2021-06-09 RX ADMIN — LOSARTAN POTASSIUM 100 MG: 100 TABLET, FILM COATED ORAL at 07:00

## 2021-06-09 RX ADMIN — FLUMAZENIL 0.5 MG: 0.1 INJECTION, SOLUTION INTRAVENOUS at 08:46

## 2021-06-09 RX ADMIN — OLANZAPINE 10 MG: 10 TABLET, ORALLY DISINTEGRATING ORAL at 07:00

## 2021-06-09 RX ADMIN — Medication 14 MG: at 08:41

## 2021-06-09 RX ADMIN — LABETALOL HYDROCHLORIDE 20 MG: 5 INJECTION, SOLUTION INTRAVENOUS at 08:44

## 2021-06-09 RX ADMIN — HYDRALAZINE HYDROCHLORIDE 10 MG: 20 INJECTION INTRAMUSCULAR; INTRAVENOUS at 08:44

## 2021-06-09 RX ADMIN — LORAZEPAM 2 MG: 2 TABLET ORAL at 20:00

## 2021-06-09 ASSESSMENT — ACTIVITIES OF DAILY LIVING (ADL)
HYGIENE/GROOMING: INDEPENDENT;PROMPTS
ORAL_HYGIENE: INDEPENDENT;PROMPTS
LAUNDRY: UNABLE TO COMPLETE
DRESS: INDEPENDENT

## 2021-06-09 NOTE — PLAN OF CARE
Pt has a flat and blunted affect. Pt was oriented to person and place but was confused on date, time, and situation. Pt is disorganized and speech is soft and quiet. Encouraged pt to take shower but refused. Pt ate 25% of supper. Fluids were encouraged and fluid intake was good. Pt was able to provide a clean catch urine with repeated prompts and instructions. Pt was med compliant with no cheeking noted. Pt rates anxiety at 0/10 and depression 0/10. Pt rates pain at 0/10. Pt reports no SI/HI and contracts for safety. Denies any hallucinations. Pt was sedated in the beginning of shift from 1500 ativan. 1700 ativan was held per provider. Pt was visible on unit until 1600 then returned to her room. Pt was withdrawn and isolative to her room the rest of the shift.. Continue current POC.

## 2021-06-09 NOTE — PLAN OF CARE
Assessment/Intervention/Current Symtoms and Care Coordination:  Attended Team Meeting, Reviewed chart notes: Patient is receiving a course of ECT this hospitalization. Patient has an ACT Team through [facility] and will continue with that service upon discharge.    Discharge Plan or Goal:  Return home when stable and continue services with [facility] ACT Team.     Barriers to Discharge:  Patient needs to complete her scheduled course of ECT. Patient is continuing to stabilize.     Referral Status:  No referrals have been made    Legal Status:  Committed/Hatch/Mcgrath colin through [facility]

## 2021-06-09 NOTE — ANESTHESIA PREPROCEDURE EVALUATION
Anesthesia Pre-Procedure Evaluation    Patient: Michelle Pino   MRN: [id number] : 1960        Preoperative Diagnosis: * No pre-op diagnosis entered *   Procedure : * No procedures listed *     Past Medical History:   Diagnosis Date     Hyperlipidemia      Hypertension      Schizophrenia (H)       Past Surgical History:   Procedure Laterality Date     OPEN REDUCTION INTERNAL FIXATION TIBIAL PLATEAU [place] [facility] [place]      Allergies   Allergen Reactions     Haldol [Haloperidol]      Patient previously tolerated haldol, though developed oculogyric crises during hospital stay on 21 on total daily dose of 10 mg.     Lisinopril Cough      Social History     Tobacco Use     Smoking status: Never Smoker   Substance Use Topics     Alcohol use: Never     Frequency: Never      Wt Readings from Last 1 Encounters:   21 71 kg (156 lb 8 oz)        Anesthesia Evaluation   Pt has had prior anesthetic.         ROS/MED HX  ENT/Pulmonary:  - neg pulmonary ROS     Neurologic:     (+) CVA, without deficits,     Cardiovascular:     (+) Dyslipidemia hypertension-----Previous cardiac testing     METS/Exercise Tolerance: >4 METS    Hematologic:  - neg hematologic  ROS     Musculoskeletal:  - neg musculoskeletal ROS     GI/Hepatic:  - neg GI/hepatic ROS     Renal/Genitourinary:  - neg Renal ROS     Endo:  - neg endo ROS     Psychiatric/Substance Use: Comment: SCHIZOAFFECTIVE, ROSAHN    (+) psychiatric history anxiety, other (comment) and schizophrenia     Infectious Disease:  - neg infectious disease ROS     Malignancy:  - neg malignancy ROS     Other:            Physical Exam    Airway   unable to assess          Respiratory Devices and Support         Dental    unable to assess        Cardiovascular   cardiovascular exam normal          Pulmonary   pulmonary exam normal                OUTSIDE LABS:  CBC:   Lab Results   Component Value Date    WBC 6.6 2021     WBC 5.4 10/19/2019    HGB 13.6 05/21/2021    HGB 12.5 10/19/2019    HCT 39.3 05/21/2021    HCT 37.7 10/19/2019     05/21/2021     10/19/2019     BMP:   Lab Results   Component Value Date     05/21/2021     10/19/2019    POTASSIUM 3.9 05/21/2021    POTASSIUM 3.8 10/19/2019    CHLORIDE 108 05/21/2021    CHLORIDE 109 10/19/2019    CO2 23 05/21/2021    CO2 27 10/19/2019    BUN 16 05/21/2021    BUN 9 10/19/2019    CR 0.88 05/21/2021    CR 0.87 10/19/2019     (H) 05/21/2021     (H) 10/19/2019     COAGS: No results found for: PTT, INR, FIBR  POC: No results found for: BGM, HCG, HCGS  HEPATIC:   Lab Results   Component Value Date    ALBUMIN 3.6 05/21/2021    PROTTOTAL 7.0 05/21/2021    ALT 24 05/21/2021    AST 15 05/21/2021    ALKPHOS 91 05/21/2021    BILITOTAL 0.3 05/21/2021     OTHER:   Lab Results   Component Value Date    ALEK 8.7 05/21/2021    PHOS 4.3 05/26/2021    MAG 2.2 05/26/2021    TSH 0.53 05/21/2021    T4 0.77 10/19/2019       Anesthesia Plan    ASA Status:  2   NPO Status:  NPO Appropriate    Anesthesia Type: General.     - Airway: Mask Only   Induction: Intravenous.   Maintenance: N/A.        Consents    Anesthesia Plan(s) and associated risks, benefits, and realistic alternatives discussed. Questions answered and patient/representative(s) expressed understanding.     - Discussed with:  Patient      - Extended Intubation/Ventilatory Support Discussed: No.      - Patient is DNR/DNI Status: No    Use of blood products discussed: No .     Postoperative Care            Comments:                    Monica Bo MD

## 2021-06-09 NOTE — ANESTHESIA POSTPROCEDURE EVALUATION
Patient: Michelle Pino    * No procedures listed *    Diagnosis:* No pre-op diagnosis entered *  Diagnosis Additional Information: No value filed.    Anesthesia Type:  General    Note:  Disposition: Outpatient   Postop Pain Control: Uneventful            Sign Out: Well controlled pain   PONV: No   Neuro/Psych: Uneventful            Sign Out: Acceptable/Baseline neuro status   Airway/Respiratory: Uneventful            Sign Out: Acceptable/Baseline resp. status   CV/Hemodynamics: Uneventful            Sign Out: Acceptable CV status; No obvious hypovolemia; No obvious fluid overload   Other NRE:    DID A NON-ROUTINE EVENT OCCUR?            Last vitals:  Vitals:    06/09/21 0900 06/09/21 0907 06/09/21 0915   BP: (!) 121/104 127/87 126/85   Pulse: 87 91 92   Resp: 21 15 16   Temp: 36.9  C (98.4  F) 36.3  C (97.3  F) 36.3  C (97.3  F)   SpO2: 97% 96% 96%       Last vitals prior to Anesthesia Care Transfer:  CRNA VITALS  6/9/2021 0815 - 6/9/2021 0915      6/9/2021             Resp Rate (observed):  (!) 1          Electronically Signed By: Monica Bo MD  June 9, 2021  9:22 AM

## 2021-06-09 NOTE — PROCEDURES
"Procedure/Surgery Information   [facility]    Bedside Procedure Note  Date of Service (when I performed the procedure): 06/09/2021    Michelle Pino is a 60 year old female patient.  1. Paranoid schizophrenia (H)    2. COVID-19 ruled out by laboratory testing    3. Schizoaffective disorder, bipolar type (H)      Past Medical History:   Diagnosis Date     Hyperlipidemia      Hypertension      Schizophrenia (H)      Temp: 98.7  F (37.1  C) Temp src: Oral BP: 135/76(Standing) Pulse: 98   Resp: 16 SpO2: 97 % O2 Device: None (Room air)      Procedures     Angel Lubin     [facility],   ECT Procedure Note   06/09/2021    Michelle Pino [id number]   60 year old 1960     Patient Status: Inpatient    Is this the first in a series of 12 treatments?  No    History and Physical: Reviewed in medical record    Consent: court ordered    Date Consent Signed: ECT may be administered during the commitment, which was signed 4/26/21 and expires on 10/26/21.  She can have ECT up to 3 times weekly for up to 12 treatments.        Allergies   Allergen Reactions     Haldol [Haloperidol]      Patient previously tolerated haldol, though developed oculogyric crises during hospital stay on 4/26/21 on total daily dose of 10 mg.     Lisinopril Cough       Weight:  156 lbs 8 oz     /76 (BP Location: Left arm)   Pulse 98   Temp 98.7  F (37.1  C) (Oral)   Resp 16   Wt 71 kg (156 lb 8 oz)   SpO2 97%   BMI 26.86 kg/m      Patient Preparations: Other (comment)(none needed)         Indications for ECT:   Medications ineffective         Clinical Narrative:   Patient has bipolar tad with psychosis and is court-ordered to ECT.  NPO after 2400.  Alert and Oriented x 3.  Per unit staff, \"Pt has a flat and blunted affect. Pt was oriented to person and place but was confused on date, time, and situation. Pt is disorganized and speech is soft and quiet. " "Encouraged pt to take shower but refused. Pt ate 25% of supper. Fluids were encouraged and fluid intake was good.\" Pt overall has been more disoriented.            Diagnosis:   Bipolar I manic with psychosis         Pause for the Cause:     Right patient Yes   Right procedure/laterality settings: Yes          Intra-Procedure Documentation:       ECT #: 6   Treatment number this series: 6   Total treatment number: 6     Type of ECT:  Right Unilateral Ultrabrief (instead of Bilateral, standard)    ECT Medications:   Zyprexa: 10mg po on unit  Caffeine: 1500mg   Flumazenil: 0.5mg   Labetalol: 20mg pre-ECT for high bp post-ECT  Lidocaine: 40mg  Etomidate: 14mg  Succinyl Choline: 70mg   Hydralazine: 10mg pre-ECT for high bp post-ECT    ECT Strip Summary:   Energy Level: 100 percent  Motor Seizure Duration:   11 seconds (first Tx);  20 seconds (2nd Tx)  EEG Seizure Duration:    11 seconds (first Tx);  20 seconds (2nd Tx)    Complications:  Short first Tx    Plan:   Hold ECT on Fri 6/11/21 due to confusion  COVID test 6/11/21  Next ECT tentatively on Monday 6/14/21    Angel Lubin MD  "

## 2021-06-09 NOTE — PLAN OF CARE
Problem: Adult Inpatient Plan of Care  Goal: Optimal Comfort and Wellbeing  Outcome: No Change     Problem: Cognitive Impairment (Psychotic Signs/Symptoms)  Goal: Optimal Cognitive Function (Psychotic Signs/Symptoms)  Outcome: No Change     Problem: Psychomotor Impairment (Psychotic Signs/Symptoms)  Goal: Improved Psychomotor Symptoms (Psychotic Signs/Symptoms)  Outcome: No Change     Pt presented as alert and oriented to place and self throughout shift.  She was awake from a majority of the shift, sitting on a chair in her room.  Pt when to ECT in the morning where she was accompanied by a staff member from the unit.  Pt has a 20 second seizure during the procedure.  Pt presents as untidy and disheveled and did not participate in any ADLs during the day aside from brushing his teeth.  Pt ate meals and was medication compliant.  She denied SI/HI/SIB.  Pt denied any psychosis.  She denied any acute physical health concerns, pain, or side effects from medications at this time.

## 2021-06-09 NOTE — PROGRESS NOTES
[facility], [place]   Psychiatric Progress Note  Hospital Day: 56        Interim History:   The patient's care was discussed with the treatment team during the daily team meeting and/or staff's chart notes were reviewed. Pt has a flat and blunted affect. Pt was oriented to person and place but was confused on date, time, and situation. Pt is disorganized and speech is soft and quiet. Encouraged pt to take shower but refused. Pt ate 25% of supper. Fluids were encouraged and fluid intake was good. Pt rates anxiety at 0/10 and depression 0/10. Pt rates pain at 0/10. Pt reports no SI/HI and contracts for safety. Denies any hallucinations. Pt was withdrawn and isolative to her room. Eating and drinking well. Denies SI, SIB, and AH/VH. She does not appear to be responding to internal stimuli.     Upon interview, Michelle was oriented to self and place. Today, she believed it was May, 2020. She acknowledged feeling more confused. She was calm with bright affect.  She again did not recall name of procedure she had this morning. She reports good appetite and adequate sleep. She feels safe in the hospital. She was calm, polite, cooperative. She denied physical pain. She denied side effects from medications. Had no additional questions or concerns for this writer.          Medications:       lidocaine (PF)  40 mg Intravenous Once in ECT     LORazepam  1 mg Oral Daily    Or     LORazepam  1 mg Intramuscular Daily     LORazepam  2 mg Oral BID    Or     LORazepam  2 mg Intramuscular BID     losartan  100 mg Oral Daily     metoprolol succinate ER  50 mg Oral Daily     OLANZapine zydis  10 mg Oral BID    Or     OLANZapine  10 mg Intramuscular BID          Allergies:     Allergies   Allergen Reactions     Haldol [Haloperidol]      Patient previously tolerated haldol, though developed oculogyric crises during hospital stay on 4/26/21 on total daily dose of 10 mg.     Lisinopril Cough          Labs:     No  "results found for this or any previous visit (from the past 24 hour(s)).       Psychiatric Examination:     /85   Pulse 92   Temp 97.3  F (36.3  C) (Temporal)   Resp 16   Wt 71 kg (156 lb 8 oz)   SpO2 96%   BMI 26.86 kg/m    Weight is 156 lbs 8 oz  Body mass index is 26.86 kg/m .    Weight over time:  Vitals:    05/25/21 0850   Weight: 71 kg (156 lb 8 oz)       Orthostatic Vitals     None            Cardiometabolic risk assessment. 04/15/21      Reviewed patient profile for cardiometabolic risk factors    Date taken /Value  REFERENCE RANGE   Abdominal Obesity  (Waist Circumference)   See nursing flowsheet Women ?35 in (88 cm)   Men ?40 in (102 cm)      Triglycerides  Triglycerides   Date Value Ref Range Status   10/19/2019 117 <150 mg/dL Final       ?150 mg/dL (1.7 mmol/L) or current treatment for elevated triglycerides   HDL cholesterol  HDL Cholesterol   Date Value Ref Range Status   10/19/2019 56 >49 mg/dL Final   ]   Women <50 mg/dL (1.3 mmol/L) in women or current treatment for low HDL cholesterol  Men <40 mg/dL (1 mmol/L) in men or current treatment for low HDL cholesterol     Fasting plasma glucose (FPG) Lab Results   Component Value Date     10/19/2019      FPG ?100 mg/dL (5.6 mmol/L) or treatment for elevated blood glucose   Blood pressure  BP Readings from Last 3 Encounters:   06/09/21 126/85   06/04/19 131/71   04/26/19 164/86    Blood pressure ?130/85 mmHg or treatment for elevated blood pressure   Family History  See family history     Appearance: dressed in hospital scrubs, appeared reported age. Initially sleeping in bed. Assessed shortly after receiving scheduled Ativan. Mildly sedated.  Attitude: cooperative  Eye Contact: good  Mood: \"good\"  Affect:  Blunted, constricted though smiled on one occasion  Speech: accented, mostly coherent  Language: fluent and intact in English  Psychomotor, Gait, Musculoskeletal: No abnormal movements noted while seated in milieu.   Throught Process: " linear, illogical  Associations:  No loosening of associations present  Thought Content:  No SI or HI expressed  Insight:  limited  Judgement:  limited  Oriented to: self only  Attention Span and Concentration: poor   Recent and Remote Memory: impaired, worsening since initiation of ECT  Fund of Knowledge:  normal    Clinical Global Impressions  First:  Considering your total clinical experience with this particular patient population, how severe are the patient's symptoms at this time?: 7 (04/16/21 1428)  Compared to the patient's condition at the START of treatment, this patient's condition is: 4 (04/16/21 1428)  Most recent:  Considering your total clinical experience with this particular patient population, how severe are the patient's symptoms at this time?: 7 (05/13/21 0953)  Compared to the patient's condition at the START of treatment, this patient's condition is: 6 (05/13/21 0953)           Precautions:     Behavioral Orders   Procedures     Assault precautions     Cheeking Precautions (behavioral units)     Patient Observed swallowing PO medications; Patient asked to drink water after swallowing medication; Patient in Staff line of sight for 15 minutes after medication given; Mouth checks after PO administration (patient asked to open mouth and stick out their tongue).     Code 1 - Restrict to Unit     Code 2 - 1:1 Staff Supervision     For ECT only     Code 3     For walks in the [place] with staff and per staff discretion     Electroconvulsive therapy     Series of up to 12 treatments. Begin Date: 5/26/21     Treating Psychiatrist providing ECT:  Dr. Lubin     Notified on:  5/21/21     Electroconvulsive therapy     As long as we get the green light from risk management and pt is medically cleared, begin ECT every Monday, Wednesday, and Friday     Electroconvulsive therapy     Series of up to 12 treatments. Begin Date: 5/25/21     Treating Psychiatrist providing ECT:  Amee     Notified on:  5/24/21      Elopement precautions     Fall precautions     Alcides Calderon     Routine Programming     As clinically indicated     Status 15     Every 15 minutes.     Status Individual Observation     SIO required while patient is NPO on ECT days (midnight to ECT time on M, W, F). Patient SIO status reviewed with team/RN.  Please also refer to RN/team documentation for add'l detail.    -SIO staff to monitor following which have contributed to patient being on SIO:  Monitoring pt while NPO due to memory impairments and disorganized thought process  -Possible interventions SIO staff could use to support patient's treatment progress:  Ensure patient remains NPO  -When following observed, team will review discontinuation of SIO:  After ECT is completed     Order Specific Question:   CONTINUOUS 24 hours / day     Answer:   5 feet     Order Specific Question:   Indications for SIO     Answer:   Medical equipment / ligature risk          Diagnoses:     Schizoaffective Disorder, Bipolar Type, decompensated  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 Symptoms and presentation at this time is most consistent with Schizoaffective Disorder, Bipolar Type. We have obtained most recent medication regimen from patient's ACT team, and regimen was initially restarted. Inpatient psychiatric hospitalization is warranted at this time for safety, stabilization, and possible adjustment in medications. Pt is committed. We have petitioned for Alcides Calderon due to lack of improvement and side effects  "from medications.    Hospital Course:  On admission, PTA medications were restarted. However, patient had been declining all scheduled medications despite significant encouragement from staff and provider. Psychiatric emergency declared on 4/20 due to aggression toward others in context of severe psychosis and suspected excited catatonia. Ativan 1 mg TID was also added. Discontinued PTA Invega, Zyprexa, and thorazine on 4/20 due to consistent refusal.     On 4/26, it was noted that patient frequently had upward gaze while walking up and down the unit. She did not appear to be distressed. She reported that she was looking at \"my god.\" It was determined to be oculogyric crisis secondary to IM haloperidol. Haldol was subsequently discontinued during day shift on 4/26 and scheduled Zyprexa was initiated on emergency basis. Oral Cogentin was also scheduled, though patient declined. On the evening of 4/26, patient's gaze was fixed in upward position for several hours and she appeared to be experiencing discomfort. IM Cogentin was administered with noted resolution. Partial improvements weew observed after switch to scheduled Zyprexa. After patient improved, she was more receptive to reinitiating oral Invega, which was initiated on 5/3 and titrated to PTA dose of 9 mg daily on 5/10. Plan was for ACT team to bring in loading dose of Invega S[place]enna. Patient had signs of oculogyric crisis again with Invega. Oral dose decreased to 6 mg after this. Invega was stopped on 5/14 due to ongoing signs of problems related to eye movement and concerns for oculogyric crisis.    Overall improvement in patient's agitation and suspected catatonia noted on 4/30 after patient accepted two doses of Ativan. She began declining Ativan again with noted decompensation. Patient now accepting, however, she does not have the capacity to consent to treatment with Ativan at this time. She does not believe she has a mental illness, including " "catatonia. She does not fully understand risks associated with inadequate treatment of catatonia. Discussed with her son who is acting as surrogate decision maker and he is in full support of forced scheduled IM Ativan if patient declines oral formulation. Also consulted with our legal team prior to backing oral Ativan with IM.     Ativan was increased on 5/19 due to re-emerging evidence of catatonia (long periods of staring, repetitive movements, echolalia, mutism). Meeting was held with ACT team on 5/20, including ACT team psychiatrist, Dr. Pedraza. He said that he is in \"full support\" of plan to pursue Mcgrath Kohler and ECT at this time. He does feel that in the past she has been discharged from the hospital while still quite symptomatic. He is hoping that ECT will be effective and that with improvement, Michelle would be more receptive to clozapine and weekly blood draws. He said that ideally she would transition to an IRTS before going back to her apartment, but also understands there may be some barriers (I.e. pt's willingness, financial concerns, etc).     ECT consult placed. Please see consult note by Dr. Lubin on 5/21 for details. Mcgrath Kohler was approved on 5/24 and patient was medically cleared on 5/24. ECT initiated on 5/26. She was noted to have a short seizure during ECT on 6/4. Staff note that patient appears more disoriented with memory impairment and sedation on days of ECT. This was noted on my examination again today. Improvements noted in mood, affect, social interactions, paranoia, agitation since initiation of ECT. Reduced Ativan on 6/5 due to sedative effects. Relayed concerns about memory impairment and confusion with Dr. Lubin. Recommended attempting unilateral ECT, which he agreed to do. First unilateral ECT on 6/7. If memory impairment persists, will consider spacing out ECT vs discontinuing. HOLDING ECT on 6/11.     Target psychiatric symptoms and interventions:   - Resume Ativan 2 mg BID " and 1 mg in the AM. Plan to taper if patient continues to appear sedated. Patient may not decline. Ativan to be given at 1700 on days prior to ECT and will be held on mornings before ECT  - Oral Zyprexa 10 mg BID OR Zyprexa 10 mg IM. Hatch in place. May consider increasing further though holding off given re-emerging catatonic sx and borderline prolonged QTc     Continue Cogentin 2 mg IM daily prn for evidence of acute dystonic reaction or oculogyric crisis  Continue hydroxyzine 25-50 mg q4h prn for acute anxiety  Continue Trazodone 50 mg at bedtime prn for sleep disturbances  Continue Zyprexa 10 mg TID prn for severe agitation  Continue Ativan/Benadryl q4h prn for agitation. WOULD GIVE PRN ATIVAN FIRST FOR AGITATION unless it is after 5 pm on day prior to ECT     ECT:  - Because patient was only intermittently accepting scheduled Ativan and some symptoms of catatonia are re-emerging, pursued Alcides Kohler for ECT. She also continues to exhibit symptoms of psychosis and has not responded or has experienced side effects from multiple neuroleptic medications. Court hearing was held on 5/21. Alcides Kohler is approved. Patient is medically cleared. COVID negative on 5/24. Obtained ECT consult on 5/21/21. Please see Dr. Lubin's consultation note.   - ECT treatment #7 scheduled for Monday 6/14.   - NPO 8 hours prior to scheduled treatment. Clear liquids until 2 hours prior to treatment.   - Discussed with IM. May resume AM antihypertensives, no need to hold prior to ECT  - SIO while patient is NPO, starting at midnight on ECT days (renewed)  - Will complete acute course in the hospital; will likely need maintenance ECT in outpatient setting.   - New concerns about memory impairments: Switched from bilateral to unilateral ECT and continue to monitor closely. Dr. Lubin aware.       Acute Medical Problems and Treatments:  HTN: Patient is now adherent with medication regimen.   - Metoprolol succinate ER 50 mg daily  - Cozaar  100 mg daily  - Please see note from IM dated 5/3/21, 5/6/21, and 5/24.  - Obtained EKG and routine labs on 5/21 in context of pt declining vital sign checks and anti-hypertensives and recently elevated BPs. Reviewed labs and discussed EKG findings with IM on 5/21. No urgent concerns, though IM should be notified if pt develops acute medical concerns (I.e. heart palpitations, SOB, changes in speech, AMS, confusion, CP, HA, changes in vision).     CVA:  - Aspirin 81 mg daily     Chronic Constipation:  - Miralax 17 mg daily     Behavioral/Psychological/Social:  - Encourage unit programming  - Patient is now Code 3 status and can take walks in the [place] with staff and security present per staff discretion    Safety:  - Continue precautions as noted above  - Status 15 minute checks  - Safety precautions include: assault and elopement precautions  - Continue precautions as noted above    Legal Status: Committed as MI with Hatch in place for Haldol, Zyprexa, Invega, and Thorazine through [facility]. Filed Alcides Kohler through [facility] and approved on 5/24/21.     Disposition Plan   Reason for ongoing admission: poses an imminent risk to self, poses an imminent risk to others and is unable to care for self due to severe psychosis or tad  Discharge location: Home with ACT team support.   Discharge Medications: not ordered  Follow-up Appointments: not scheduled    Entered by: Ida Zaman on 6/9/2021 at 5:21 PM     > 30 minutes total time that was spent and over 50% of this time was spent in counseling and coordination of care.

## 2021-06-09 NOTE — PROGRESS NOTES
Patients VSS, awake, IV removed, meets phase 2 criteria and is able to move to st.12 at this time. Report given.

## 2021-06-09 NOTE — PLAN OF CARE
Night Shift Summary (6/8/21 into 06/09/21)    Pt in bed sleeping at start of shift. Breathing quiet and unlabored. Pt remained NPO during the shift. Appears to have slept 7 hours.    Vitals checked at 0654: /76   Pulse 98   Temp 98.7  F  (Oral)   Resp 16  SpO2 97%      Per ECT staff, okay to give scheduled zyprexa in addition to blood pressure medications prior to ECT. Meds given at 0700 with much encouragement. Pt very irritable and oppositional. ECT flow sheet completed. Pt will void before leaving the unit. ECT scheduled for 0745.     Pt continues on 1:1 SIO with 5 foot ft staff space distance prior to and during ECT only. Assault, Elopement, Cheeking and Fall precautions, with no related events occurring this shift.     Will continue to monitor and assess.       Problem: Behavioral Health Plan of Care  Goal: Absence of New-Onset Illness or Injury  Outcome: No Change   Remains safe on the unit.     Problem: Sleep Disturbance (Psychotic Signs/Symptoms)  Goal: Improved Sleep (Psychotic Signs/Symptoms)  Outcome: Improving   Slept 7 hours.

## 2021-06-09 NOTE — PROGRESS NOTES
Writer left a voice message for ACT  requesting a call back to discuss initiating a CADI for patient in order for her to eventually have option for group home.  Patient will likely discharge back to her home with ACT Team in place but moving forward  patient will need other living situation.

## 2021-06-10 ENCOUNTER — APPOINTMENT (OUTPATIENT)
Dept: ULTRASOUND IMAGING | Facility: CLINIC | Age: 61
End: 2021-06-10
Attending: PHYSICIAN ASSISTANT
Payer: COMMERCIAL

## 2021-06-10 LAB
ANION GAP SERPL CALCULATED.3IONS-SCNC: 8 MMOL/L (ref 3–14)
BUN SERPL-MCNC: 24 MG/DL (ref 7–30)
CALCIUM SERPL-MCNC: 9.5 MG/DL (ref 8.5–10.1)
CHLORIDE SERPL-SCNC: 109 MMOL/L (ref 94–109)
CK SERPL-CCNC: 121 U/L (ref 30–225)
CO2 SERPL-SCNC: 23 MMOL/L (ref 20–32)
CREAT SERPL-MCNC: 1.13 MG/DL (ref 0.52–1.04)
CREAT SERPL-MCNC: 1.29 MG/DL (ref 0.52–1.04)
GFR SERPL CREATININE-BSD FRML MDRD: 45 ML/MIN/{1.73_M2}
GFR SERPL CREATININE-BSD FRML MDRD: 53 ML/MIN/{1.73_M2}
GLUCOSE SERPL-MCNC: 86 MG/DL (ref 70–99)
POTASSIUM SERPL-SCNC: 4 MMOL/L (ref 3.4–5.3)
SODIUM SERPL-SCNC: 140 MMOL/L (ref 133–144)
SODIUM SERPL-SCNC: 141 MMOL/L (ref 133–144)

## 2021-06-10 PROCEDURE — 82565 ASSAY OF CREATININE: CPT | Performed by: PHYSICIAN ASSISTANT

## 2021-06-10 PROCEDURE — 84295 ASSAY OF SERUM SODIUM: CPT | Performed by: PHYSICIAN ASSISTANT

## 2021-06-10 PROCEDURE — 250N000013 HC RX MED GY IP 250 OP 250 PS 637: Performed by: PHYSICIAN ASSISTANT

## 2021-06-10 PROCEDURE — 99233 SBSQ HOSP IP/OBS HIGH 50: CPT | Performed by: PSYCHIATRY & NEUROLOGY

## 2021-06-10 PROCEDURE — 99232 SBSQ HOSP IP/OBS MODERATE 35: CPT | Performed by: PHYSICIAN ASSISTANT

## 2021-06-10 PROCEDURE — 84300 ASSAY OF URINE SODIUM: CPT | Performed by: PHYSICIAN ASSISTANT

## 2021-06-10 PROCEDURE — 250N000013 HC RX MED GY IP 250 OP 250 PS 637: Performed by: PSYCHIATRY & NEUROLOGY

## 2021-06-10 PROCEDURE — 82570 ASSAY OF URINE CREATININE: CPT | Performed by: PHYSICIAN ASSISTANT

## 2021-06-10 PROCEDURE — 36415 COLL VENOUS BLD VENIPUNCTURE: CPT | Performed by: PHYSICIAN ASSISTANT

## 2021-06-10 PROCEDURE — 76770 US EXAM ABDO BACK WALL COMP: CPT

## 2021-06-10 PROCEDURE — 80048 BASIC METABOLIC PNL TOTAL CA: CPT | Performed by: PHYSICIAN ASSISTANT

## 2021-06-10 PROCEDURE — 82550 ASSAY OF CK (CPK): CPT | Performed by: PHYSICIAN ASSISTANT

## 2021-06-10 PROCEDURE — 124N000002 HC R&B MH UMMC

## 2021-06-10 PROCEDURE — 250N000011 HC RX IP 250 OP 636: Performed by: PSYCHIATRY & NEUROLOGY

## 2021-06-10 PROCEDURE — 76770 US EXAM ABDO BACK WALL COMP: CPT | Mod: 26 | Performed by: RADIOLOGY

## 2021-06-10 PROCEDURE — 258N000003 HC RX IP 258 OP 636: Performed by: PHYSICIAN ASSISTANT

## 2021-06-10 RX ORDER — LORAZEPAM 2 MG/ML
1 INJECTION INTRAMUSCULAR ONCE
Status: COMPLETED | OUTPATIENT
Start: 2021-06-10 | End: 2021-06-10

## 2021-06-10 RX ADMIN — LORAZEPAM 1 MG: 1 TABLET ORAL at 09:03

## 2021-06-10 RX ADMIN — LORAZEPAM 1 MG: 2 INJECTION INTRAMUSCULAR; INTRAVENOUS at 12:35

## 2021-06-10 RX ADMIN — SODIUM CHLORIDE, POTASSIUM CHLORIDE, SODIUM LACTATE AND CALCIUM CHLORIDE 1000 ML: 600; 310; 30; 20 INJECTION, SOLUTION INTRAVENOUS at 12:40

## 2021-06-10 RX ADMIN — LORAZEPAM 2 MG: 2 TABLET ORAL at 19:38

## 2021-06-10 RX ADMIN — OLANZAPINE 10 MG: 10 TABLET, ORALLY DISINTEGRATING ORAL at 19:38

## 2021-06-10 ASSESSMENT — ACTIVITIES OF DAILY LIVING (ADL)
LAUNDRY: UNABLE TO COMPLETE
ORAL_HYGIENE: PROMPTS
HYGIENE/GROOMING: PROMPTS
DRESS: PROMPTS

## 2021-06-10 NOTE — PROGRESS NOTES
"[facility], [place]   Psychiatric Progress Note  Hospital Day: 57        Interim History:   The patient's care was discussed with the treatment team during the daily team meeting and/or staff's chart notes were reviewed. Pt has a flat and blunted affect. Patient was oriented to self only following ECT. Staff expressed concerns about catatonia as she was sitting in one spot for extended periods of time. Denies any hallucinations. Pt was withdrawn and isolative to her room. Denies SI, SIB, and AH/VH. She does not appear to be responding to internal stimuli. Patient is much more confused this morning. Unable to attend to ADLs and unable to take her medications despite several prompts to do so. She did not sleep well last evening.     Upon interview, Michelle recognized this writer. She said that she is \"good,\" and smiled. Her responses were significantly delayed, however, and she looked at my badge multiple times. Unclear if she recognized this writer. She denied any pain. She was encouraged to take medications and drink fluids. She is aware of concerns regarding dehydration. I was present with RN to encourage medication adherence. Michelle held pills in her hand for several seconds. She did not appear to comprehend why they were being offered or how to actually take them. She then said \"these are not my medications.\"  She was calm, polite. No evidence of agitation. She denied physical pain. She denied side effects from medications. Had no additional questions or concerns for this writer.          Medications:       lactated ringers  1,000 mL Intravenous Once     LORazepam  1 mg Oral Daily    Or     LORazepam  1 mg Intramuscular Daily     LORazepam  2 mg Oral BID    Or     LORazepam  2 mg Intramuscular BID     losartan  100 mg Oral Daily     metoprolol succinate ER  50 mg Oral Daily     OLANZapine zydis  10 mg Oral BID    Or     OLANZapine  10 mg Intramuscular BID          Allergies:     Allergies "   Allergen Reactions     Haldol [Haloperidol]      Patient previously tolerated haldol, though developed oculogyric crises during hospital stay on 4/26/21 on total daily dose of 10 mg.     Lisinopril Cough          Labs:     Recent Results (from the past 24 hour(s))   CBC with platelets differential    Collection Time: 06/09/21  9:19 PM   Result Value Ref Range    WBC 5.2 4.0 - 11.0 10e9/L    RBC Count 4.23 3.8 - 5.2 10e12/L    Hemoglobin 13.0 11.7 - 15.7 g/dL    Hematocrit 40.2 35.0 - 47.0 %    MCV 95 78 - 100 fl    MCH 30.7 26.5 - 33.0 pg    MCHC 32.3 31.5 - 36.5 g/dL    RDW 12.7 10.0 - 15.0 %    Platelet Count 219 150 - 450 10e9/L    Diff Method Automated Method     % Neutrophils 48.5 %    % Lymphocytes 39.4 %    % Monocytes 9.2 %    % Eosinophils 2.3 %    % Basophils 0.4 %    % Immature Granulocytes 0.2 %    Nucleated RBCs 0 0 /100    Absolute Neutrophil 2.5 1.6 - 8.3 10e9/L    Absolute Lymphocytes 2.1 0.8 - 5.3 10e9/L    Absolute Monocytes 0.5 0.0 - 1.3 10e9/L    Absolute Eosinophils 0.1 0.0 - 0.7 10e9/L    Absolute Basophils 0.0 0.0 - 0.2 10e9/L    Abs Immature Granulocytes 0.0 0 - 0.4 10e9/L    Absolute Nucleated RBC 0.0    Comprehensive metabolic panel    Collection Time: 06/09/21  9:19 PM   Result Value Ref Range    Sodium 141 133 - 144 mmol/L    Potassium 4.4 3.4 - 5.3 mmol/L    Chloride 110 (H) 94 - 109 mmol/L    Carbon Dioxide 23 20 - 32 mmol/L    Anion Gap 8 3 - 14 mmol/L    Glucose 107 (H) 70 - 99 mg/dL    Urea Nitrogen 31 (H) 7 - 30 mg/dL    Creatinine 1.83 (H) 0.52 - 1.04 mg/dL    GFR Estimate 30 (L) >60 mL/min/[1.73_m2]    GFR Estimate If Black 34 (L) >60 mL/min/[1.73_m2]    Calcium 8.5 8.5 - 10.1 mg/dL    Bilirubin Total 0.2 0.2 - 1.3 mg/dL    Albumin 3.2 (L) 3.4 - 5.0 g/dL    Protein Total 6.6 (L) 6.8 - 8.8 g/dL    Alkaline Phosphatase 67 40 - 150 U/L    ALT 26 0 - 50 U/L    AST 14 0 - 45 U/L   Basic metabolic panel    Collection Time: 06/10/21 11:38 AM   Result Value Ref Range    Sodium 140 133 -  144 mmol/L    Potassium 4.0 3.4 - 5.3 mmol/L    Chloride 109 94 - 109 mmol/L    Carbon Dioxide 23 20 - 32 mmol/L    Anion Gap 8 3 - 14 mmol/L    Glucose 86 70 - 99 mg/dL    Urea Nitrogen 24 7 - 30 mg/dL    Creatinine 1.29 (H) 0.52 - 1.04 mg/dL    GFR Estimate 45 (L) >60 mL/min/[1.73_m2]    GFR Estimate If Black 53 (L) >60 mL/min/[1.73_m2]    Calcium 9.5 8.5 - 10.1 mg/dL   CK total    Collection Time: 06/10/21 11:38 AM   Result Value Ref Range    CK Total 121 30 - 225 U/L          Psychiatric Examination:     /88   Pulse 129   Temp 99  F (37.2  C) (Tympanic)   Resp 18   Wt 71 kg (156 lb 8 oz)   SpO2 98%   BMI 26.86 kg/m    Weight is 156 lbs 8 oz  Body mass index is 26.86 kg/m .    Weight over time:  Vitals:    05/25/21 0850   Weight: 71 kg (156 lb 8 oz)       Orthostatic Vitals     None            Cardiometabolic risk assessment. 04/15/21      Reviewed patient profile for cardiometabolic risk factors    Date taken /Value  REFERENCE RANGE   Abdominal Obesity  (Waist Circumference)   See nursing flowsheet Women ?35 in (88 cm)   Men ?40 in (102 cm)      Triglycerides  Triglycerides   Date Value Ref Range Status   10/19/2019 117 <150 mg/dL Final       ?150 mg/dL (1.7 mmol/L) or current treatment for elevated triglycerides   HDL cholesterol  HDL Cholesterol   Date Value Ref Range Status   10/19/2019 56 >49 mg/dL Final   ]   Women <50 mg/dL (1.3 mmol/L) in women or current treatment for low HDL cholesterol  Men <40 mg/dL (1 mmol/L) in men or current treatment for low HDL cholesterol     Fasting plasma glucose (FPG) Lab Results   Component Value Date     10/19/2019      FPG ?100 mg/dL (5.6 mmol/L) or treatment for elevated blood glucose   Blood pressure  BP Readings from Last 3 Encounters:   06/10/21 138/88   06/04/19 131/71   04/26/19 164/86    Blood pressure ?130/85 mmHg or treatment for elevated blood pressure   Family History  See family history     Appearance: dressed in hospital scrubs, appeared  "reported age. Disheveled.   Attitude: cooperative  Eye Contact: poor  Mood: \"good\"  Affect:  Blunted, constricted though smiled on one occasion  Speech: accented, mostly coherent  Language: fluent and intact in English  Psychomotor, Gait, Musculoskeletal: No abnormal movements noted while seated in milieu.   Throught Process: linear, illogical  Associations:  No loosening of associations present  Thought Content:  No SI or HI expressed  Insight:  limited  Judgement:  limited  Oriented to: self only  Attention Span and Concentration: poor   Recent and Remote Memory: impaired, worsening since initiation of ECT  Fund of Knowledge:  normal    Clinical Global Impressions  First:  Considering your total clinical experience with this particular patient population, how severe are the patient's symptoms at this time?: 7 (04/16/21 1428)  Compared to the patient's condition at the START of treatment, this patient's condition is: 4 (04/16/21 1428)  Most recent:  Considering your total clinical experience with this particular patient population, how severe are the patient's symptoms at this time?: 7 (05/13/21 0953)  Compared to the patient's condition at the START of treatment, this patient's condition is: 6 (05/13/21 0953)           Precautions:     Behavioral Orders   Procedures     Assault precautions     Cheeking Precautions (behavioral units)     Patient Observed swallowing PO medications; Patient asked to drink water after swallowing medication; Patient in Staff line of sight for 15 minutes after medication given; Mouth checks after PO administration (patient asked to open mouth and stick out their tongue).     Code 1 - Restrict to Unit     Code 2 - 1:1 Staff Supervision     For ECT only     Code 3     For walks in the [place] with staff and per staff discretion     Electroconvulsive therapy     Series of up to 12 treatments. Begin Date: 5/26/21     Treating Psychiatrist providing ECT:  Dr. Lubin     Notified on:  " 5/21/21     Electroconvulsive therapy     As long as we get the green light from risk management and pt is medically cleared, begin ECT every Monday, Wednesday, and Friday     Electroconvulsive therapy     Series of up to 12 treatments. Begin Date: 5/25/21     Treating Psychiatrist providing ECT:  Freddysana     Notified on:  5/24/21     Elopement precautions     Fall precautions     Mcgrath Calderon     Routine Programming     As clinically indicated     Status 15     Every 15 minutes.     Status Individual Observation     SIO needed 24/7 for assistance with ADLS and IV fluids    SIO required while patient is NPO on ECT days (midnight to ECT time on M, W, F). Patient SIO status reviewed with team/RN.  Please also refer to RN/team documentation for add'l detail.    -SIO staff to monitor following which have contributed to patient being on SIO:  Monitoring pt while NPO due to memory impairments and disorganized thought process  -Possible interventions SIO staff could use to support patient's treatment progress:  Ensure patient remains NPO  -When following observed, team will review discontinuation of SIO:  After ECT is completed     Order Specific Question:   CONTINUOUS 24 hours / day     Answer:   5 feet     Order Specific Question:   Indications for SIO     Answer:   Medical equipment / ligature risk          Diagnoses:     Schizoaffective Disorder, Bipolar Type, decompensated  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 Symptoms and presentation at this time is most  "consistent with Schizoaffective Disorder, Bipolar Type. We have obtained most recent medication regimen from patient's ACT team, and regimen was initially restarted. Inpatient psychiatric hospitalization is warranted at this time for safety, stabilization, and possible adjustment in medications. Pt is committed. We have petitioned for Alcides Calderon due to lack of improvement and side effects from medications.    Hospital Course:  On admission, PTA medications were restarted. However, patient had been declining all scheduled medications despite significant encouragement from staff and provider. Psychiatric emergency declared on 4/20 due to aggression toward others in context of severe psychosis and suspected excited catatonia. Ativan 1 mg TID was also added. Discontinued PTA Invega, Zyprexa, and thorazine on 4/20 due to consistent refusal.     On 4/26, it was noted that patient frequently had upward gaze while walking up and down the unit. She did not appear to be distressed. She reported that she was looking at \"my god.\" It was determined to be oculogyric crisis secondary to IM haloperidol. Haldol was subsequently discontinued during day shift on 4/26 and scheduled Zyprexa was initiated on emergency basis. Oral Cogentin was also scheduled, though patient declined. On the evening of 4/26, patient's gaze was fixed in upward position for several hours and she appeared to be experiencing discomfort. IM Cogentin was administered with noted resolution. Partial improvements weew observed after switch to scheduled Zyprexa. After patient improved, she was more receptive to reinitiating oral Invega, which was initiated on 5/3 and titrated to PTA dose of 9 mg daily on 5/10. Plan was for ACT team to bring in loading dose of Invega Sustenna. Patient had signs of oculogyric crisis again with Invega. Oral dose decreased to 6 mg after this. Invega was stopped on 5/14 due to ongoing signs of problems related to eye movement and " "concerns for oculogyric crisis.    Overall improvement in patient's agitation and suspected catatonia noted on 4/30 after patient accepted two doses of Ativan. She began declining Ativan again with noted decompensation. Patient now accepting, however, she does not have the capacity to consent to treatment with Ativan at this time. She does not believe she has a mental illness, including catatonia. She does not fully understand risks associated with inadequate treatment of catatonia. Discussed with her son who is acting as surrogate decision maker and he is in full support of forced scheduled IM Ativan if patient declines oral formulation. Also consulted with our legal team prior to backing oral Ativan with IM.     Ativan was increased on 5/19 due to re-emerging evidence of catatonia (long periods of staring, repetitive movements, echolalia, mutism). Meeting was held with ACT team on 5/20, including ACT team psychiatrist, Dr. Pedraza. He said that he is in \"full support\" of plan to pursue Mcgrath Kohler and ECT at this time. He does feel that in the past she has been discharged from the hospital while still quite symptomatic. He is hoping that ECT will be effective and that with improvement, Michelle would be more receptive to clozapine and weekly blood draws. He said that ideally she would transition to an IRTS before going back to her apartment, but also understands there may be some barriers (I.e. pt's willingness, financial concerns, etc).     ECT consult placed. Please see consult note by Dr. Lubin on 5/21 for details. Mcgrath Kohler was approved on 5/24 and patient was medically cleared on 5/24. ECT initiated on 5/26. She was noted to have a short seizure during ECT on 6/4. Staff note that patient appears more disoriented with memory impairment and sedation on days of ECT. This was noted on my examination again today. Improvements noted in mood, affect, social interactions, paranoia, agitation since initiation of " ECT. Reduced Ativan on 6/5 due to sedative effects. Relayed concerns about memory impairment and confusion with Dr. Lubin. Recommended attempting unilateral ECT, which he agreed to do. First unilateral ECT on 6/7. If memory impairment persists, will consider spacing out ECT vs discontinuing. HOLDING ECT on 6/11.     Target psychiatric symptoms and interventions:   - Resume Ativan 2 mg BID and 1 mg in the AM. Plan to taper if patient continues to appear sedated. Patient may not decline. Ativan to be given at 1700 on days prior to ECT and will be held on mornings before ECT. Because patient did not accept AM dose, IV Ativan 1 mg one time dose was given on 6/10.   - Oral Zyprexa 10 mg BID OR Zyprexa 10 mg IM. Hatch in place. May consider increasing further though holding off given re-emerging catatonic sx and borderline prolonged QTc     Continue Cogentin 2 mg IM daily prn for evidence of acute dystonic reaction or oculogyric crisis  Continue hydroxyzine 25-50 mg q4h prn for acute anxiety  Continue Trazodone 50 mg at bedtime prn for sleep disturbances  Continue Zyprexa 10 mg TID prn for severe agitation  Continue Ativan/Benadryl q4h prn for agitation. WOULD GIVE PRN ATIVAN FIRST FOR AGITATION unless it is after 5 pm on day prior to ECT     ECT:  - Because patient was only intermittently accepting scheduled Ativan and some symptoms of catatonia are re-emerging, pursued Alcides Kohler for ECT. She also continues to exhibit symptoms of psychosis and has not responded or has experienced side effects from multiple neuroleptic medications. Court hearing was held on 5/21. Alcides Riverappard is approved. Patient is medically cleared. COVID negative on 5/24. Obtained ECT consult on 5/21/21. Please see Dr. Lubin's consultation note.   - ECT treatment #7 scheduled for Monday 6/14, tentatively, though if memory impairment persists, will likely stop entirely.   - NPO 8 hours prior to scheduled treatment. Clear liquids until 2 hours  prior to treatment.   - Discussed with IM. May resume AM antihypertensives, no need to hold prior to ECT  - SIO while patient is NPO, starting at midnight on ECT days (renewed)  - Will complete acute course in the hospital; will likely need maintenance ECT in outpatient setting.   - New concerns about memory impairments: Switched from bilateral to unilateral ECT and continue to monitor closely. Dr. Lubin aware.       Acute Medical Problems and Treatments:  HTN: Patient is now adherent with medication regimen.   - Metoprolol succinate ER 50 mg daily  - Cozaar 100 mg daily  - Please see note from IM dated 5/3/21, 5/6/21, and 5/24.  - Obtained EKG and routine labs on 5/21 in context of pt declining vital sign checks and anti-hypertensives and recently elevated BPs. Reviewed labs and discussed EKG findings with IM on 5/21. No urgent concerns, though IM should be notified if pt develops acute medical concerns (I.e. heart palpitations, SOB, changes in speech, AMS, confusion, CP, HA, changes in vision).     CVA:  - Aspirin 81 mg daily     Chronic Constipation:  - Miralax 17 mg daily     Behavioral/Psychological/Social:  - Encourage unit programming  - Patient is now Code 3 status and can take walks in the [place] with staff and security present per staff discretion    Safety:  - Continue precautions as noted above  - Status 15 minute checks  - Safety precautions include: assault and elopement precautions  - Continue precautions as noted above    Legal Status: Committed as MI with Hatch in place for Haldol, Zyprexa, Invega, and Thorazine through [facility]. Filed Alcieds Kohler through [facility] and approved on 5/24/21.     Disposition Plan   Reason for ongoing admission: poses an imminent risk to self, poses an imminent risk to others and is unable to care for self due to severe psychosis or tad  Discharge location: Home with ACT team support.   Discharge Medications: not ordered  Follow-up Appointments: not  scheduled    Entered by: Ida Zaman on 6/10/2021 at 1:29 PM     > 30 minutes total time that was spent and over 50% of this time was spent in counseling and coordination of care.

## 2021-06-10 NOTE — PROGRESS NOTES
Writer spoke with patient's son to discuss the plan for him to become patient's guardian. Son informed CTC that he has not started this process as yet but does want to do this. Writer also spoke with ACT  to discuss CADI Waiver option as it appears patient will need assisted living or group home placement at some point. CTC will continue to update patient's son on patient's progress moving forward.

## 2021-06-10 NOTE — PLAN OF CARE
"  Problem: Behavior Regulation Impairment (Psychotic Signs/Symptoms)  Goal: Improved Behavioral Control (Psychotic Signs/Symptoms)  Outcome: Declining   During the course of the am medication pass, patient appeared confused and disorganized. Needed step by step instructions/prompts to feed self and tend to ADL's. Patient was slow to respond. Ate <15% of breakfast. Did not take am medications. Patient was offered the po medication several times over the course of a 45 minute time frame. She would look at the meds, arraigned them and put them back in the medication cup. She was offered her medications later with MD encouragement and after 20 minutes she contained to decline. She stated, \"these are not my medications, I don't take these, I am afraid of these medications.\"   Patient did allow IM to examine her. Ultrasound completed today. Patient had a large void in the toilet at the ultrasound procedure. Patient needed assistance with toileting and aren care. Step by step instructions were given and patient has difficulty following simple directions.   Labs drawn upon return from ultrasound. Patient is on SIO status d/t decompensation in mental health status. IV bolus LR will be started today for suspected dehydrations.   "

## 2021-06-10 NOTE — PLAN OF CARE
corey had ECT this morning. She denies headache, pain, or discomfort. Vital signs stable (134/64. 64). She appeared weak and catatonic. She spent most of this evening lying in bed but was not sleeping. She is very slow while walking in the hallway. She appears experiencing thought blocking during conversation. Poor appetite as she ate about 50% of her dinner. She took scheduled HS medication. Bright affect this shift.

## 2021-06-10 NOTE — PROGRESS NOTES
Writer spoke with [facility]  for Alcides Kohler to ask if he would assist patient's son regarding applying for guardianship of patient.  Writer provided patient's son's phone number and was informed he would follow-up with this request.

## 2021-06-10 NOTE — PLAN OF CARE
Assessment/Intervention/Current Symtoms and Care Coordination:  Attended Team Meeting, Reviewed chart notes: Patient appears confused and disoriented. Patient unable to care for herself and needs constant staff hands on.  Patient is having ECT but attending psychiatrist is holding ECT treatment tomorrow and will consider if patient will have a treatment on Monday.     Discharge Plan or Goal:  The discharge plan is unknown at this time due to patient's decline and inability to care for herself. Patient has an ACT Team through [facility] and [facility] has contacted them to discuss CADI Waiver in order for patient to have structured living or group home placement.     Barriers to Discharge:  Acuity of symptoms and overall decline in patient's mental and physical health.      Referral Status:  No referrals have been made     Legal Status:  Committed/Hatch/Mcgrath colin through [facility]

## 2021-06-10 NOTE — PLAN OF CARE
Problem: Sleep Disturbance (Psychotic Signs/Symptoms)  Goal: Improved Sleep (Psychotic Signs/Symptoms)  Outcome: Declining     Pt appeared awake most of the night. Pt was seen sitting and dosing off no S&S of catatonic noted. No PRN administered this shift, No other concerns noted. Will continue to monitor.

## 2021-06-10 NOTE — PROGRESS NOTES
Internal Medicine Initial Visit      Michelle Pino MRN# [id number]   YOB: 1960 Age: 60 year old   Date of Admission: 4/13/2021  PCP: Stevenson Pedraza    Referring Provider: Behavioral Health - Ida Zaman MD  Reason for Visit: General Medical Evaluation, admitted with psychosis and tad    Medicine consult for BRANDON         Assessment and Recommendations:   Michelle Pino is a 60 year old year old woman with a history of CVA (2017), schizoaffective d/o, Bipolar d/o, dyslipidemia, HTN who is admitted to station 3A with tad and psychosis.        Tad  Schizoaffective d/o  Bipolar d/o.   -Management per psychiatry team.     BRANDON  BL Cr is normal in the 0.8s and is now 1.8. Unclear cause, unknown last void.  Patient is a poor historian and does not offer much information, denies pain.   - follow up FeNa  - trend Cr  - intake and outputs  - daily weights  - avoid nephrotoxins  - Renal bladder US    HTN  bp remains normotensive  - monitor    Hx Right MCA stroke CVA (2017)  Previously on ASA and statin, no current PTA medications  - monitor    Medicine will follow up on FeNa, Renal bladder US, BMP, weights, I&O, please page with any additional concerns.     Sonya Bill PA-C  Hospitalist Service   Pager:   [facility] Paging/Directory     Reason for Admission:   Tad, psychosis         History of Present Illness:   History is obtained from the patient and medical record.     Michelle Pino is a 60 year old year old woman with a history of CVA (2017), schizoaffective d/o, Bipolar d/o, dyslipidemia, HTN who is admitted to station 3A with tad and psychosis.     Internal Medicine service was asked to see patient for a general medication evaluation. Currently, patient per primary psychiatry team appears to have an altered mental status.  The patient states she is not having pain but does not offer any additional information. Her RN is very reassuring towards her and notes she was refusing labs this a.m  .and was tachycardic.           Review of Systems:     10 point ROS was attempted and negative unless otherwise noted in HPI. Limited due to lack of patient participation           Past Medical History:   Reviewed and updated in Epic.  Past Medical History:   Diagnosis Date     Hyperlipidemia      Hypertension      Schizophrenia (H)              Past Surgical History:   Reviewed and updated in Epic.  Past Surgical History:   Procedure Laterality Date     OPEN REDUCTION INTERNAL FIXATION TIBIAL PLATEAU [place] [facility] [place] psychiatric [place]             Social History:     Social History     Tobacco Use     Smoking status: Never Smoker   Substance Use Topics     Alcohol use: Never     Frequency: Never     Drug use: Never             Family History:   Reviewed and updated in Epic.  No family history on file.          Allergies:     Allergies   Allergen Reactions     Haldol [Haloperidol]      Patient previously tolerated haldol, though developed oculogyric crises during hospital stay on 4/26/21 on total daily dose of 10 mg.     Lisinopril Cough             Medications:     Medications Prior to Admission   Medication Sig Dispense Refill Last Dose     chlorproMAZINE (THORAZINE) 100 MG tablet Take 1 tablet (100 mg) by mouth 2 times daily (Patient taking differently: Take 100 mg by mouth At Bedtime ) 60 tablet 0      losartan (COZAAR) 100 MG tablet Take 100 mg by mouth daily        metoprolol succinate ER (TOPROL-XL) 50 MG 24 hr tablet Take 50 mg by mouth daily        OLANZapine (ZYPREXA) 7.5 MG tablet Take 7.5 mg by mouth 2 times daily        paliperidone ER (INVEGA) 9 MG 24 hr tablet Take 9 mg by mouth daily        polyethylene glycol (MIRALAX) 17 g packet Take 17 g by mouth daily        trihexyphenidyl (ARTANE) 2 MG tablet Take 2 mg by mouth 2 times daily           Current Facility-Administered Medications   Medication     acetaminophen (TYLENOL) tablet 650 mg     alum & mag  hydroxide-simethicone (MAALOX) suspension 30 mL     benztropine mesylate (COGENTIN) injection 2 mg     diphenhydrAMINE (BENADRYL) capsule 25 mg    Or     diphenhydrAMINE (BENADRYL) injection 25 mg     hydrALAZINE (APRESOLINE) tablet 25 mg     hydrOXYzine (ATARAX) tablet 25-50 mg     LORazepam (ATIVAN) tablet 1 mg    Or     LORazepam (ATIVAN) injection 1 mg     LORazepam (ATIVAN) tablet 2 mg    Or     LORazepam (ATIVAN) injection 2 mg     LORazepam (ATIVAN) tablet 2 mg    Or     LORazepam (ATIVAN) injection 2 mg     losartan (COZAAR) tablet 100 mg     metoprolol succinate ER (TOPROL-XL) 24 hr tablet 50 mg     OLANZapine zydis (zyPREXA) ODT tab 10 mg    Or     OLANZapine (zyPREXA) injection 10 mg     OLANZapine (zyPREXA) tablet 10 mg    Or     OLANZapine (zyPREXA) injection 10 mg     polyethylene glycol (MIRALAX) Packet 17 g     senna-docusate (SENOKOT-S/PERICOLACE) 8.6-50 MG per tablet 1 tablet     trihexyphenidyl (ARTANE) tablet 2 mg            Physical Exam:   Blood pressure 134/68, pulse 86, temperature 99  F (37.2  C), temperature source Tympanic, resp. rate 18, weight 71 kg (156 lb 8 oz), SpO2 96 %.  Body mass index is 26.86 kg/m .  GENERAL: Alert and oriented x 3. NAD, ambulatory, cooperative  HEENT: Anicteric sclera. Mucous membranes moist. Pupils equal and round. EOMI. NC. AT.  CV: RRR. S1, S2. No murmurs appreciated.   RESPIRATORY: Effort normal on room air. Lungs CTAB with no wheezing, rales, rhonchi.   GI: Abdomen soft, non distended, non tender. NABS  MUSCULOSKELETAL: No joint swelling or tenderness.  NEUROLOGICAL: No focal deficits. Moves all extremities.   EXTREMITIES: No peripheral edema. Intact bilateral pedal pulses.   SKIN: No jaundice. No rashes.           Data:   CBC:  Recent Labs   Lab Test 06/09/21 2119   WBC 5.2   RBC 4.23   HGB 13.0   HCT 40.2   MCV 95   MCH 30.7   MCHC 32.3   RDW 12.7          CMP:  Recent Labs   Lab Test 06/09/21 2119      POTASSIUM 4.4   CHLORIDE 110*   ALEK  8.5   CO2 23   BUN 31*   CR 1.83*   *   AST 14   ALT 26   BILITOTAL 0.2   ALBUMIN 3.2*   PROTTOTAL 6.6*   ALKPHOS 67       TSH:  TSH   Date Value Ref Range Status   05/21/2021 0.53 0.40 - 4.00 mU/L Final         Unresulted Labs Ordered in the Past 30 Days of this Admission     No orders found from 3/14/2021 to 4/14/2021.

## 2021-06-11 LAB
CREAT UR-MCNC: 122 MG/DL
FRACT EXCRET NA UR+SERPL-RTO: 0.4 %
SODIUM UR-SCNC: 56 MMOL/L

## 2021-06-11 PROCEDURE — 250N000013 HC RX MED GY IP 250 OP 250 PS 637: Performed by: PSYCHIATRY & NEUROLOGY

## 2021-06-11 PROCEDURE — 99233 SBSQ HOSP IP/OBS HIGH 50: CPT | Performed by: PSYCHIATRY & NEUROLOGY

## 2021-06-11 PROCEDURE — 124N000002 HC R&B MH UMMC

## 2021-06-11 PROCEDURE — 250N000013 HC RX MED GY IP 250 OP 250 PS 637: Performed by: PHYSICIAN ASSISTANT

## 2021-06-11 RX ADMIN — LOSARTAN POTASSIUM 100 MG: 100 TABLET, FILM COATED ORAL at 08:49

## 2021-06-11 RX ADMIN — OLANZAPINE 10 MG: 10 TABLET, ORALLY DISINTEGRATING ORAL at 08:49

## 2021-06-11 RX ADMIN — OLANZAPINE 10 MG: 10 TABLET, ORALLY DISINTEGRATING ORAL at 19:36

## 2021-06-11 RX ADMIN — LORAZEPAM 2 MG: 2 TABLET ORAL at 13:37

## 2021-06-11 RX ADMIN — METOPROLOL SUCCINATE 50 MG: 50 TABLET, EXTENDED RELEASE ORAL at 08:49

## 2021-06-11 RX ADMIN — LORAZEPAM 1 MG: 1 TABLET ORAL at 08:50

## 2021-06-11 RX ADMIN — LORAZEPAM 2 MG: 2 TABLET ORAL at 19:36

## 2021-06-11 ASSESSMENT — ACTIVITIES OF DAILY LIVING (ADL)
LAUNDRY: UNABLE TO COMPLETE
HYGIENE/GROOMING: PROMPTS;WITH ASSISTANCE;WITH SUPERVISION
HYGIENE/GROOMING: PROMPTS
DRESS: PROMPTS
LAUNDRY: UNABLE TO COMPLETE
ORAL_HYGIENE: PROMPTS
ORAL_HYGIENE: PROMPTS
DRESS: PROMPTS

## 2021-06-11 NOTE — PROGRESS NOTES
S: Pt is a 60 yrs old female in the [facility] ED for psychosis, reports by Kathie at 6:39PM.     B: Pt was sent in by her ACT Team.  Pt is under a stay of commitment and they are revoking her provisional discharge.  Pt has a hx of Paranoid Schizophrenia.  She was very paranoid and delusional.  Appears disorganized and unable to to answer questions.  Pt is off her medications and was labile.  ACT team visited earlier today, Pt barricaded herself inside, yelling and in distress.  Pt has her thermometer up to 95 degrees. Pt cannot answer questions nor engage in conversation.  Pt cannot contract for safety.    Pt was previously admitted to 4500.  She is is labile, 6    They are unable to determine if Pt has any symptoms of COVID.  Not engaging during ED and assessment.   Labs are pending.   Asymptomatic COVID test was ordered.     Pt does not have any acute medical issues.  She is medically cleared and ambulates independently.      A: 72 hour hold.  Labile.  Yelling. Not violent.  Hard to redirect.      R: 7:16PM-Dr. Cowan accepts for 55C/Mike.  Units and ED notified.       9PM- ED RN called to inform Intake that Pt will be admitting to medical.   Called [phone] to update unit staff.

## 2021-06-11 NOTE — PLAN OF CARE
Night Shift Summary (6/10/21 into 06/11/21)    Pt asleep at start of shift. Breathing quiet and unlabored.     Intake 240mL  Output 75mL @ 0300    Pt still weak and requires physical assist with ADL's, drinking, eating, ambulation.     Pt had no c/o pain or discomfort during the HS.     Appears to have slept 5.25 hours.     Pt continues on 1:1 SIO with 5 ft staff space distance.    Pt on SUICIDE, ASSAULT, CHEEKING, and  ELOPEMENT precautions in addition to single room order. Any related events noted above.     Will continue to monitor and assess.   Problem: Sleep Disturbance (Psychotic Signs/Symptoms)  Goal: Improved Sleep (Psychotic Signs/Symptoms)  Outcome: No Change

## 2021-06-11 NOTE — PLAN OF CARE
Pt presented as more organized this shift. Pt was able to eat her breakfast without assistance. She ate 100% of her breakfast and drank 720 ml of fluids at breakfast. Her balance had also improved, though she was still unsteady at times. Pt showed no signs of facial drooping, slurred speech, vision changes, or arm drift. Pt received all of her morning medications. Pt agreed to have a COVID vaccine and the information was entered into    Pt denied pain.   Pt's VS were taken manually. /82, P 96, R 16, O2 100%, T 98.2.   Pt ate 100% of both meals without assistance.  Pt drank a total of 1600 ml pf fluid this shift.  Pt voided al least 2x this shift. Both were large amounts.  After much encouragement, the pt refused her daily weight measurement.     Pt sat in the lounges most of the day. Pt's affect was blunted, mood was calm in the morning and more irritable in the afternoon, possibly due to being tired. Pt would not answer additional questions during a MH assessment. Walked pt to bed around 1400, as she was more unsteady and continues to be a fall risk.    Due to the pt's disorganization and decompensation in mental health status, and because she is a fall risk, the 1:1 SIO was continued.

## 2021-06-11 NOTE — PLAN OF CARE
Assessment/Intervention/Current Symtoms and Care Coordination:  Attended Team Meeting, Reviewed chart notes: Patient did not receive ECT treatment today, but may likely receive treatment on Monday. Patient does appear somewhat better with brighter affect.     Discharge Plan or Goal:  TBD but may return home if becomes stable and care for herself as patient has an ACT Team in place through [facility]    Barriers to Discharge:  Patient receiving ECT treatments: Acuity of symptoms and inability to care for self.     Referral Status:  No referrals have been made     Legal Status:  Committed/[facility] through [facility]

## 2021-06-11 NOTE — PROGRESS NOTES
"[facility], [place]   Psychiatric Progress Note  Hospital Day: 58        Interim History:   The patient's care was discussed with the treatment team during the daily team meeting and/or staff's chart notes were reviewed. Pt has a flat and blunted affect.  Received IV bolus yesterday for BRANDON, which has since improved. Pt eating and drinking relatively well. She has remained calm and cooperative, though requires assistance with ADLs and is oriented to self only. Staff expressed concerns about ongoing evidence of catatonia, including delayed responses, prolonged sitting in one spot, slowed movements. High risk for falls as she almost fell on evening shift, unsteady on her feet. Overall, improvement noted today wrt mental status and attending to ADLs. Accepted medications this morning and last evening without issue. Remains on 1:1. Slept 5.25 hours overnight.     Upon interview, Michelle had difficulty recognizing this writer and my role. It was unclear whether she was aware she is in a hospital setting. After a significant delayed response, she was able to tell this writer that she is currently in [place]. She did mention that the \"community\" where she lives is \"not peaceful.\" She acknowledged experiencing memory deficits. She could not recall having ECT earlier this week. She did express concerns about not receiving \"the right medications.\" Reported mood was good, affect brightened at times. Evidence of psychomotor slowing and significantly delayed responses noted. She could not recall the date or month. She was calm, polite. No evidence of agitation. She denied physical pain. She denied side effects from medications. Had no additional questions or concerns for this writer.          Medications:       LORazepam  1 mg Oral Daily    Or     LORazepam  1 mg Intramuscular Daily     LORazepam  2 mg Oral BID    Or     LORazepam  2 mg Intramuscular BID     losartan  100 mg Oral Daily     " metoprolol succinate ER  50 mg Oral Daily     OLANZapine zydis  10 mg Oral BID    Or     OLANZapine  10 mg Intramuscular BID          Allergies:     Allergies   Allergen Reactions     Haldol [Haloperidol]      Patient previously tolerated haldol, though developed oculogyric crises during hospital stay on 4/26/21 on total daily dose of 10 mg.     Lisinopril Cough          Labs:     Recent Results (from the past 24 hour(s))   Fractional excretion of sodium    Collection Time: 06/10/21  9:45 PM   Result Value Ref Range    Creatinine Urine 122 mg/dL    Sodium Urine mmol/L 56 mmol/L    %FENA 0.4 %   Creatinine    Collection Time: 06/10/21 11:02 PM   Result Value Ref Range    Creatinine 1.13 (H) 0.52 - 1.04 mg/dL    GFR Estimate 53 (L) >60 mL/min/[1.73_m2]    GFR Estimate If Black 62 >60 mL/min/[1.73_m2]   Sodium    Collection Time: 06/10/21 11:02 PM   Result Value Ref Range    Sodium 141 133 - 144 mmol/L          Psychiatric Examination:     BP (!) 142/82 (BP Location: Left arm)   Pulse 96   Temp 98.2  F (36.8  C) (Tympanic)   Resp 16   Wt 71 kg (156 lb 8 oz)   SpO2 100%   BMI 26.86 kg/m    Weight is 156 lbs 8 oz  Body mass index is 26.86 kg/m .    Weight over time:  Vitals:    05/25/21 0850   Weight: 71 kg (156 lb 8 oz)       Orthostatic Vitals     None            Cardiometabolic risk assessment. 04/15/21      Reviewed patient profile for cardiometabolic risk factors    Date taken /Value  REFERENCE RANGE   Abdominal Obesity  (Waist Circumference)   See nursing flowsheet Women ?35 in (88 cm)   Men ?40 in (102 cm)      Triglycerides  Triglycerides   Date Value Ref Range Status   10/19/2019 117 <150 mg/dL Final       ?150 mg/dL (1.7 mmol/L) or current treatment for elevated triglycerides   HDL cholesterol  HDL Cholesterol   Date Value Ref Range Status   10/19/2019 56 >49 mg/dL Final   ]   Women <50 mg/dL (1.3 mmol/L) in women or current treatment for low HDL cholesterol  Men <40 mg/dL (1 mmol/L) in men or current  "treatment for low HDL cholesterol     Fasting plasma glucose (FPG) Lab Results   Component Value Date     10/19/2019      FPG ?100 mg/dL (5.6 mmol/L) or treatment for elevated blood glucose   Blood pressure  BP Readings from Last 3 Encounters:   06/11/21 (!) 142/82   06/04/19 131/71   04/26/19 164/86    Blood pressure ?130/85 mmHg or treatment for elevated blood pressure   Family History  See family history     Appearance: dressed in hospital scrubs, appeared reported age. Disheveled.   Attitude: cooperative, pleasant. No irritability.   Eye Contact: poor  Mood: \"good\"  Affect:  Blunted, constricted though smiled on one occasion  Speech: accented, mostly coherent  Language: fluent and intact in English  Psychomotor, Gait, Musculoskeletal: No abnormal movements noted while seated in milieu. Appeared to be more at ease with regards to her posture. Evidence of psychomotor slowing.   Throught Process: linear, illogical  Associations:  No loosening of associations present  Thought Content:  No SI or HI expressed  Insight:  limited  Judgement:  limited  Oriented to: self only and partially to place  Attention Span and Concentration: poor   Recent and Remote Memory: impaired, worsening since initiation of ECT  Fund of Knowledge:  normal    Clinical Global Impressions  First:  Considering your total clinical experience with this particular patient population, how severe are the patient's symptoms at this time?: 7 (04/16/21 1428)  Compared to the patient's condition at the START of treatment, this patient's condition is: 4 (04/16/21 1428)  Most recent:  Considering your total clinical experience with this particular patient population, how severe are the patient's symptoms at this time?: 7 (05/13/21 0953)  Compared to the patient's condition at the START of treatment, this patient's condition is: 6 (05/13/21 0953)           Precautions:     Behavioral Orders   Procedures     Assault precautions     Cheeking " Precautions (behavioral units)     Patient Observed swallowing PO medications; Patient asked to drink water after swallowing medication; Patient in Staff line of sight for 15 minutes after medication given; Mouth checks after PO administration (patient asked to open mouth and stick out their tongue).     Code 1 - Restrict to Unit     Code 2 - 1:1 Staff Supervision     For ECT only     Code 3     For walks in the [place] with staff and per staff discretion     Electroconvulsive therapy     Series of up to 12 treatments. Begin Date: 5/26/21     Treating Psychiatrist providing ECT:  Dr. Lubin     Notified on:  5/21/21     Electroconvulsive therapy     As long as we get the green light from risk management and pt is medically cleared, begin ECT every Monday, Wednesday, and Friday     Electroconvulsive therapy     Series of up to 12 treatments. Begin Date: 5/25/21     Treating Psychiatrist providing ECT:  Amee     Notified on:  5/24/21     Elopement precautions     Fall precautions     Mcgrath Calderon     Routine Programming     As clinically indicated     Status 15     Every 15 minutes.     Status Individual Observation     SIO needed 24/7 for assistance with ADLS and IV fluids    SIO required while patient is NPO on ECT days (midnight to ECT time on M, W, F). Patient SIO status reviewed with team/RN.  Please also refer to RN/team documentation for add'l detail.    -SIO staff to monitor following which have contributed to patient being on SIO:  Monitoring pt while NPO due to memory impairments and disorganized thought process  -Possible interventions SIO staff could use to support patient's treatment progress:  Ensure patient remains NPO  -When following observed, team will review discontinuation of SIO:  After ECT is completed     Order Specific Question:   CONTINUOUS 24 hours / day     Answer:   5 feet     Order Specific Question:   Indications for SIO     Answer:   Medical equipment / ligature risk           "Diagnoses:     Schizoaffective Disorder, Bipolar Type, decompensated  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 Symptoms and presentation at this time is most consistent with Schizoaffective Disorder, Bipolar Type. We have obtained most recent medication regimen from patient's ACT team, and regimen was initially restarted. Inpatient psychiatric hospitalization is warranted at this time for safety, stabilization, and possible adjustment in medications. Pt is committed. We have petitioned for Mcgrath Calderon due to lack of improvement and side effects from medications.    Hospital Course:  On admission, PTA medications were restarted. However, patient had been declining all scheduled medications despite significant encouragement from staff and provider. Psychiatric emergency declared on 4/20 due to aggression toward others in context of severe psychosis and suspected excited catatonia. Ativan 1 mg TID was also added. Discontinued PTA Invega, Zyprexa, and thorazine on 4/20 due to consistent refusal.     On 4/26, it was noted that patient frequently had upward gaze while walking up and down the unit. She did not appear to be distressed. She reported that she was looking at \"my god.\" It was determined to be oculogyric crisis secondary to IM haloperidol. Haldol was subsequently discontinued during day shift on 4/26 and scheduled Zyprexa was initiated on emergency basis. Oral Cogentin was also scheduled, though patient declined. On the evening of 4/26, patient's gaze was " "fixed in upward position for several hours and she appeared to be experiencing discomfort. IM Cogentin was administered with noted resolution. Partial improvements weew observed after switch to scheduled Zyprexa. After patient improved, she was more receptive to reinitiating oral Invega, which was initiated on 5/3 and titrated to PTA dose of 9 mg daily on 5/10. Plan was for ACT team to bring in loading dose of Invega Sustenna. Patient had signs of oculogyric crisis again with Invega. Oral dose decreased to 6 mg after this. Invega was stopped on 5/14 due to ongoing signs of problems related to eye movement and concerns for oculogyric crisis.    Overall improvement in patient's agitation and suspected catatonia noted on 4/30 after patient accepted two doses of Ativan. She began declining Ativan again with noted decompensation. Patient now accepting, however, she does not have the capacity to consent to treatment with Ativan at this time. She does not believe she has a mental illness, including catatonia. She does not fully understand risks associated with inadequate treatment of catatonia. Discussed with her son who is acting as surrogate decision maker and he is in full support of forced scheduled IM Ativan if patient declines oral formulation. Also consulted with our legal team prior to backing oral Ativan with IM.     Ativan was increased on 5/19 due to re-emerging evidence of catatonia (long periods of staring, repetitive movements, echolalia, mutism). Meeting was held with ACT team on 5/20, including ACT team psychiatrist, Dr. Pedraza. He said that he is in \"full support\" of plan to pursue Mcgrath Kohler and ECT at this time. He does feel that in the past she has been discharged from the hospital while still quite symptomatic. He is hoping that ECT will be effective and that with improvement, Michelle would be more receptive to clozapine and weekly blood draws. He said that ideally she would transition to an IRTS " before going back to her apartment, but also understands there may be some barriers (I.e. pt's willingness, financial concerns, etc).     ECT consult placed. Please see consult note by Dr. Lubin on 5/21 for details. Alcides Kohler was approved on 5/24 and patient was medically cleared on 5/24. ECT initiated on 5/26. She was noted to have a short seizure during ECT on 6/4. Staff note that patient appears more disoriented with memory impairment and sedation on days of ECT. This was noted on my examination again today. Improvements noted in mood, affect, social interactions, paranoia, agitation since initiation of ECT. Reduced Ativan on 6/5 due to sedative effects. Relayed concerns about memory impairment and confusion with Dr. Lubin. Recommended attempting unilateral ECT, which he agreed to do. First unilateral ECT on 6/7. If memory impairment persists, will consider spacing out ECT vs discontinuing. HOLDING ECT on 6/11.     Target psychiatric symptoms and interventions:   - Resume Ativan 2 mg BID and 1 mg in the AM. Plan to taper if patient continues to appear sedated though she does appear to be experiencing ongoing catatonic features. Patient may not decline. Ativan to be given at 1700 on days prior to ECT and will be held on mornings before ECT. Because patient did not accept AM dose, IV Ativan 1 mg one time dose was given on 6/10.   - Oral Zyprexa 10 mg BID OR Zyprexa 10 mg IM. Hatch in place. May consider increasing further though holding off given re-emerging catatonic sx and borderline prolonged QTc     Continue Cogentin 2 mg IM daily prn for evidence of acute dystonic reaction or oculogyric crisis  Continue hydroxyzine 25-50 mg q4h prn for acute anxiety  Continue Trazodone 50 mg at bedtime prn for sleep disturbances  Continue Zyprexa 10 mg TID prn for severe agitation  Continue Ativan/Benadryl q4h prn for agitation. WOULD GIVE PRN ATIVAN FIRST FOR AGITATION unless it is after 5 pm on day prior to  ECT     ECT:  - Because patient was only intermittently accepting scheduled Ativan and some symptoms of catatonia are re-emerging, pursued Alcides Kohler for ECT. She also continues to exhibit symptoms of psychosis and has not responded or has experienced side effects from multiple neuroleptic medications. Court hearing was held on 5/21. Alcides Kohler is approved. Patient is medically cleared. COVID negative on 5/24. Obtained ECT consult on 5/21/21. Please see Dr. Lubin's consultation note.   - ECT treatment #7 scheduled for Monday 6/14, tentatively, though if memory impairment persists, will likely hold off. Writer will contact unit on Sunday, 6/13 if plan is to HOLD ECT on Monday, 6/14.  - NPO 8 hours prior to scheduled treatment. Clear liquids until 2 hours prior to treatment.   - Discussed with IM. May resume AM antihypertensives, no need to hold prior to ECT  - SIO while patient is NPO, starting at midnight on ECT days (renewed)  - Will complete acute course in the hospital; will likely need maintenance ECT in outpatient setting.   - New concerns about memory impairments: Switched from bilateral to unilateral ECT on 6/7 and continue to monitor closely. Dr. Lubin aware.   - COVID test due today      Acute Medical Problems and Treatments:  BRANDON, improving:   BL Cr is normal in the 0.8s and is now 1.8. Unclear cause. Repeat this AM now improved at 1.13 after receiving fluids.   - IM Consulted on 6/10. Appreciate assistance.   - intake and outputs  - daily weights  - avoid nephrotoxins  - Renal bladder US completed on 6/10    HTN: Patient is now adherent with medication regimen.   - Metoprolol succinate ER 50 mg daily  - Cozaar 100 mg daily  - Please see note from IM dated 5/3/21, 5/6/21, and 5/24.  - Obtained EKG and routine labs on 5/21 in context of pt declining vital sign checks and anti-hypertensives and recently elevated BPs. Reviewed labs and discussed EKG findings with IM on 5/21. No urgent concerns, though  IM should be notified if pt develops acute medical concerns (I.e. heart palpitations, SOB, changes in speech, AMS, confusion, CP, HA, changes in vision).     CVA:  - Aspirin 81 mg daily     Chronic Constipation:  - Miralax 17 mg daily     Behavioral/Psychological/Social:  - Encourage unit programming  - Patient is now Code 3 status and can take walks in the [place] with staff and security present per staff discretion. This appears to be quite therapeutic for her.     Safety:  - Continue precautions as noted above  - Status 15 minute checks  - Safety precautions include: assault and elopement precautions  - Continue precautions as noted above    Legal Status: Committed as MI with Hatch in place for Haldol, Zyprexa, Invega, and Thorazine through [facility]. Filed Alcides Kohler through [facility] and approved on 5/24/21.     Disposition Plan   Reason for ongoing admission: poses an imminent risk to self, poses an imminent risk to others and is unable to care for self due to severe psychosis or tad  Discharge location: Home with ACT team support.   Discharge Medications: not ordered  Follow-up Appointments: not scheduled    Entered by: Ida Zaman on 6/11/2021 at 2:38 PM     > 30 minutes total time that was spent and over 50% of this time was spent in counseling and coordination of care.

## 2021-06-11 NOTE — PLAN OF CARE
Problem: Cognitive Impairment (Psychotic Signs/Symptoms)  Goal: Optimal Cognitive Function (Psychotic Signs/Symptoms)  Outcome: No Change  [facility] with staff while her IV bolus fluid is running. She received 1000 ml of fluid this evening due to dehydration. She is calm and pleasant. She appeared slightly disorganized and continues to need assistant and step-by-step instructions with her ADL's. Pt is very weak and unstable on her feet; she almost fell this shift, and her SIO staff had to support her and prevent her from falling. Pt is a high-risk fall. Pt's daughter and son called and talked with her this evening. Son will call MD tomorrow for an update.      Pt presents as catatonic; she spent the entire evening sitting on one spot, and her speech is soft and slow. She ate 4 slices ([employer]), Drank 4 orange juices and a glass of water 360 ml.     Urine sample collected and sent to the Lab at 2145 hrs.      Post-void residual=28 ml at 2130 hrs     Creatinine (LAB66) and Sodium (GVW650) drawn around 10:47 pm. Result pending.    Vital signs 149/79,58. She took scheduled medication without any issues.      No ECT tomorrow.

## 2021-06-12 PROCEDURE — 250N000013 HC RX MED GY IP 250 OP 250 PS 637: Performed by: PHYSICIAN ASSISTANT

## 2021-06-12 PROCEDURE — 250N000013 HC RX MED GY IP 250 OP 250 PS 637: Performed by: PSYCHIATRY & NEUROLOGY

## 2021-06-12 PROCEDURE — 124N000002 HC R&B MH UMMC

## 2021-06-12 RX ADMIN — METOPROLOL SUCCINATE 50 MG: 50 TABLET, EXTENDED RELEASE ORAL at 09:00

## 2021-06-12 RX ADMIN — OLANZAPINE 10 MG: 10 TABLET, ORALLY DISINTEGRATING ORAL at 09:01

## 2021-06-12 RX ADMIN — LORAZEPAM 1 MG: 1 TABLET ORAL at 09:01

## 2021-06-12 RX ADMIN — LORAZEPAM 2 MG: 2 TABLET ORAL at 13:22

## 2021-06-12 RX ADMIN — LORAZEPAM 2 MG: 2 TABLET ORAL at 19:05

## 2021-06-12 RX ADMIN — OLANZAPINE 10 MG: 10 TABLET, ORALLY DISINTEGRATING ORAL at 19:05

## 2021-06-12 RX ADMIN — LOSARTAN POTASSIUM 100 MG: 100 TABLET, FILM COATED ORAL at 09:01

## 2021-06-12 ASSESSMENT — ACTIVITIES OF DAILY LIVING (ADL)
ORAL_HYGIENE: WITH ASSISTANCE
DRESS: SCRUBS (BEHAVIORAL HEALTH);INDEPENDENT
ORAL_HYGIENE: INDEPENDENT;PROMPTS
LAUNDRY: UNABLE TO COMPLETE
HYGIENE/GROOMING: PROMPTS;WITH ASSISTANCE
LAUNDRY: UNABLE TO COMPLETE
HYGIENE/GROOMING: PROMPTS
DRESS: SCRUBS (BEHAVIORAL HEALTH)

## 2021-06-12 NOTE — PLAN OF CARE
"Pt continues on SIO due to catatonic symptoms and requiring assistance with ADLs. Pt gait steady and slow. Appears to have thought blocking. Smiling and bright for brief periods, though mostly flat and blunted. She needed assistance setting up her meal trays, and then ate independently. Appetite good, she ate 100% of dinner tray, two oranges, and snack. Fluid intake good, approximately 720 ml. Pt voided x2. She denies any pain or discomfort. She was compliant with medications; she had difficulty processing and needed to look at her pills for a few minutes before taking. Stated \"I need to think\" while staring at pills. Appeared to be sleeping later in evening. No agitation or aggressive behaviors.  "

## 2021-06-12 NOTE — PROGRESS NOTES
R:  10/5/20 9:45 AM  Patient accepted on 55C by Dr. Mckeon for admitting and [place] for attending  pending discharge.  Placed in 55C queue.

## 2021-06-12 NOTE — PLAN OF CARE
"Night Shift Summary (6/11/21 into 06/12/21)    Pt in bed sleeping at start of shift. Breathing quiet and unlabored. Awoke around 0115. Staff followed closely in case pt needed contact guard or assistance to prevent falls, but pt did not need help. Able to perform cares by herself, including lowering and and pulling up her pants, without falling. Voided a medium amount of urine. AFSHAN exact amount as some urine was already in hat, which staff only noticed when pt was already sitting down. Drank 240 mL of fluid; apple and orange juice. Staff asked pt if she needed anything else. Pt stated that she was just waiting for her daughter-in-law to be admitted to the hospital. Staff asked which hospital. Pt stated, \"You wouldn't know it. It[place] another state.\" Pt wanted to call this hospital but staff explained that she would need to call in the morning. Staff offered prn for sleep. Pt replied, \"I didn't ask for it\" with an irritable tone. Staff offered to tuck pt in, but pt again stated that she would wait until she found out if her daughter-in-law was accepted and chose to sit on the side of her bed. Asleep again by 0145.     Woke up around 0245 while writer was on break. Did not fall back asleep. (see Progress Note by SIO staff)      Writer observed pt come out of her room at 0525. Noted to be unsteady on her feet, tipping towards the side or back on occasion but mainly self-correcting without contact guard. Did not appear to be weak in the knees/at risk of collapse. Pt wanted to know the time. Declined to return to bed and sat on a chair in the hallway. Declined juice; \"It's too early.\" Pt noted to still have significant thought-blocking. Wanted to write something down, but when given a notebook and pencil she could not write anything. When pt did respond, responses were significantly delayed.      Appears to have slept 3.75 hours.    Pt continues on 1:1  SIO with 5 foot ft staff space distance. Assault, Elopement, " Cheeking and Fall precautions, with no related events occurring this shift.     Will continue to monitor and assess.       Problem: Sleep Disturbance (Psychotic Signs/Symptoms)  Goal: Improved Sleep (Psychotic Signs/Symptoms)  Outcome: No Change   Sleep hours per night continue to be inconsistent. Slept <6 hours.     Problem: Behavioral Health Plan of Care  Goal: Absence of New-Onset Illness or Injury  Outcome: Improving   Remains safe on the unit.     Problem: Psychomotor Impairment (Psychotic Signs/Symptoms)  Goal: Improved Psychomotor Symptoms (Psychotic Signs/Symptoms)  Outcome: Improving   Able to perform ADLs independently as they relate to toileting.

## 2021-06-12 NOTE — PROGRESS NOTES
S:Pt admitted to 3500      B: Pt was sent in by her ACT Team.  Pt is under a stay of commitment and they are revoking her provisional discharge.  Pt has a hx of Paranoid Schizophrenia.  She was very paranoid and delusional.  Appears disorganized and unable to to answer questions.  Pt is off her medications and was labile.  ACT team visited earlier today, Pt barricaded herself inside, yelling and in distress.  Pt has her thermometer up to 95 degrees. Pt cannot answer questions nor engage in conversation.  Pt cannot contract for safety.    Pt was previously admitted to 4500.  She is is labile, 6     They are unable to determine if Pt has any symptoms of COVID.  Not engaging during ED and assessment.   Labs are pending.   Asymptomatic COVID test was ordered.      Pt does not have any acute medical issues.  She is medically cleared and ambulates independently.       A: 72 hour hold.  Labile.  Yelling.  Hard to redirect.  Pt threatening to kill staff on 3500, will need 55C or 12

## 2021-06-12 NOTE — PROGRESS NOTES
"Pt slept 3.75 hours on overnight shift. Pt was restless most of the night. Pt woke to void x2. 75ml and 480ml. Pt was in the chen from 0530 on, unsteady on her feet and would doze off in the chair. Pt was focused on \"making plans to go to [facility]\". Pt asked writer if I was taking her today. Pt also asked where she was. Writer told her [facility], and she responded \"I don't know where that is,who owns this house?\" then continued talking about plans to go to [facility]. Staff gave pt a notebook so she could write down her plans, but this seemed to be hard to comprehend. Pt then stated \"I can't write, and how do I get to [facility]?\" Pt needs time between questions to answer and understand what is being asked.   "

## 2021-06-12 NOTE — PLAN OF CARE
Problem: Behavioral Health Plan of Care  Goal: Adheres to Safety Considerations for Self and Others  Outcome: No Change     Problem: Cognitive Impairment (Psychotic Signs/Symptoms)  Goal: Optimal Cognitive Function (Psychotic Signs/Symptoms)  Outcome: No Change     Problem: Psychomotor Impairment (Psychotic Signs/Symptoms)  Goal: Improved Psychomotor Symptoms (Psychotic Signs/Symptoms)  Outcome: No Change     Pt presented at alert and oriented to self throughout shift.  Pt occasionally appeared to be disoriented to place.  She was mostly visible on the unit, sitting on chairs in the first lounge, napping on occasion.  Pt appears to be disheveled and declined ADLs regardless of staff prompts.  Her speech was muffled and incoherent at times.  Pt ate meals and is being monitored for intake and output at this time.  Intake: 386  Output: 350  With several prompts, pt was compliant with VS and medications.  She does not appear to be responding to any internal stimuli.  Pt did not exhibit SI/HI/SIB behaviors.  She did not appear to have any acute physical health concerns, pain, or side effects to medications at this time.

## 2021-06-13 PROCEDURE — 250N000013 HC RX MED GY IP 250 OP 250 PS 637: Performed by: PHYSICIAN ASSISTANT

## 2021-06-13 PROCEDURE — 124N000002 HC R&B MH UMMC

## 2021-06-13 PROCEDURE — 250N000013 HC RX MED GY IP 250 OP 250 PS 637: Performed by: PSYCHIATRY & NEUROLOGY

## 2021-06-13 RX ADMIN — METOPROLOL SUCCINATE 50 MG: 50 TABLET, EXTENDED RELEASE ORAL at 10:11

## 2021-06-13 RX ADMIN — OLANZAPINE 10 MG: 10 TABLET, ORALLY DISINTEGRATING ORAL at 10:12

## 2021-06-13 RX ADMIN — OLANZAPINE 10 MG: 10 TABLET, ORALLY DISINTEGRATING ORAL at 20:48

## 2021-06-13 RX ADMIN — LORAZEPAM 2 MG: 2 TABLET ORAL at 14:03

## 2021-06-13 RX ADMIN — LOSARTAN POTASSIUM 100 MG: 100 TABLET, FILM COATED ORAL at 10:12

## 2021-06-13 RX ADMIN — LORAZEPAM 1 MG: 1 TABLET ORAL at 10:12

## 2021-06-13 RX ADMIN — LORAZEPAM 2 MG: 2 TABLET ORAL at 20:48

## 2021-06-13 ASSESSMENT — ACTIVITIES OF DAILY LIVING (ADL)
LAUNDRY: UNABLE TO COMPLETE
HYGIENE/GROOMING: PROMPTS;WITH ASSISTANCE
DRESS: SCRUBS (BEHAVIORAL HEALTH)
HYGIENE/GROOMING: PROMPTS
ORAL_HYGIENE: INDEPENDENT
ORAL_HYGIENE: PROMPTS;WITH ASSISTANCE
DRESS: SCRUBS (BEHAVIORAL HEALTH)
LAUNDRY: UNABLE TO COMPLETE

## 2021-06-13 NOTE — PLAN OF CARE
"Pt is on SIO due to catatonic symptoms. She required significant assistance in eating meals this shift; she was staring at her fork and asking \"how do I eat that?\" It took her about 45 minutes to eat half of her dinner tray, and needed prompts and assistance throughout entire process. Appears confused and needs frequent prompts to complete all ADLs. Gait unsteady at times. She spent long periods of time standing in one place and declining to sit down, though she would sway and needed hands-on assistance to prevent falls. She became significantly more unsteady after 2130, requiring frequent assistance. Staff had to physically assist her into bed and to lay down. Fluid intake fair, about 580 ml. She voided x2, approximately 450 ml. Michelle was compliant with scheduled medications. She stares at medications for several minutes before taking. No medication side effects observed.   "

## 2021-06-13 NOTE — PLAN OF CARE
Problem: Sleep Disturbance (Psychotic Signs/Symptoms)  Goal: Improved Sleep (Psychotic Signs/Symptoms)  Outcome: Improving     Problem: Cognitive Impairment (Psychotic Signs/Symptoms)  Goal: Optimal Cognitive Function (Psychotic Signs/Symptoms)  Outcome: Declining  Intervention: Support and Promote Cognitive Ability  Recent Flowsheet Documentation  Taken 6/13/2021 1019 by Tasha Davis RN  Trust Relationship/Rapport:   care explained   choices provided   emotional support provided   empathic listening provided   reassurance provided     Pt presented as oriented to self only throughout shift.  She was only isolative to her room, sleeping.  Pt ate minimal meals and was medication compliant in her room.  She was compliant with VS with writer.  Pt is on I/Os every shift:   Intake: 650cc  Output: 400cc  Pt did not exhibit any HI/SI/SIB.  Pt's affect appeared to be depressed and she was mostly slow and nonverbal in her responses to writer.  She appeared disheveled and did not perform any ADLs during this shift.  Pt denied any pain.  She did not appear to have any other physical health cnorens or side effects to medications.

## 2021-06-13 NOTE — PLAN OF CARE
Pt asleep at start of shift. Breathing quiet and unlabored.     Woke up around 0240 and voided 100mL.    Pt had no c/o pain or discomfort during the HS.     Appears to have slept 7 hours.     Pt continues on 1:1 SIO with 5ft staff space distance.    Pt on  ASSAULT, CHEEKING, ELOPEMENT, and FALL  precautions in addition to single room order. Any related events noted above.     Will continue to monitor and assess.   Problem: Sleep Disturbance (Psychotic Signs/Symptoms)  Goal: Improved Sleep (Psychotic Signs/Symptoms)  Outcome: No Change

## 2021-06-14 PROCEDURE — 124N000002 HC R&B MH UMMC

## 2021-06-14 PROCEDURE — 250N000013 HC RX MED GY IP 250 OP 250 PS 637: Performed by: PHYSICIAN ASSISTANT

## 2021-06-14 PROCEDURE — 99233 SBSQ HOSP IP/OBS HIGH 50: CPT | Mod: GC | Performed by: PSYCHIATRY & NEUROLOGY

## 2021-06-14 PROCEDURE — 250N000013 HC RX MED GY IP 250 OP 250 PS 637: Performed by: PSYCHIATRY & NEUROLOGY

## 2021-06-14 RX ADMIN — OLANZAPINE 10 MG: 10 TABLET, ORALLY DISINTEGRATING ORAL at 21:30

## 2021-06-14 RX ADMIN — LORAZEPAM 2 MG: 2 TABLET ORAL at 14:12

## 2021-06-14 RX ADMIN — LOSARTAN POTASSIUM 100 MG: 100 TABLET, FILM COATED ORAL at 10:26

## 2021-06-14 RX ADMIN — LORAZEPAM 1 MG: 1 TABLET ORAL at 10:26

## 2021-06-14 RX ADMIN — LORAZEPAM 2 MG: 2 TABLET ORAL at 21:30

## 2021-06-14 RX ADMIN — METOPROLOL SUCCINATE 50 MG: 50 TABLET, EXTENDED RELEASE ORAL at 10:26

## 2021-06-14 RX ADMIN — OLANZAPINE 10 MG: 10 TABLET, ORALLY DISINTEGRATING ORAL at 10:26

## 2021-06-14 ASSESSMENT — ACTIVITIES OF DAILY LIVING (ADL)
ORAL_HYGIENE: INDEPENDENT
HYGIENE/GROOMING: PROMPTS
LAUNDRY: UNABLE TO COMPLETE
ORAL_HYGIENE: PROMPTS;WITH ASSISTANCE
LAUNDRY: UNABLE TO COMPLETE
HYGIENE/GROOMING: PROMPTS;WITH ASSISTANCE
DRESS: SCRUBS (BEHAVIORAL HEALTH)
DRESS: SCRUBS (BEHAVIORAL HEALTH)

## 2021-06-14 NOTE — PROGRESS NOTES
"Pt slept in this morning until about 0900. Awoke and was pleasant and engaging. \"Good morning, nice to see you. Is it morning?\" Writer oriented her to time, date, place. Ate most of her breakfast and voided without prompting. With prompting, pt agreeable to doing her vital signs using the machine (versus previously insisting on manual vital signs). With prompting, pt agreeable to taking a shower and brushing her teeth. Pt requested a shower cap, indicating unable to shower unless she can keep her hair dry. Staff found a mesh cap (not a shower cap), and pt was agreeable to letting writer take the pillowcase hair tie out and putting the mesh shower cap on. Pt requested a bucket to pour the water on her body, as opposed to standing under the water. Bucket provided. Asked how to use the shower, reporting this is the first shower she's taken on the unit. Albeit few, pt has showered on the unit and perhaps does not remember. Pt went into the shower in her scrubs. Needed prompting to disrobe. Very malodorous. With encouragement, pt did disrobe and turn the shower on. Needed prompting to use soap and washcloths. 1:1 SIO staff monitoring with foot in the door due to pt being unsteady.   "

## 2021-06-14 NOTE — PLAN OF CARE
Assessment/Intervention/Current Symtoms and Care Coordination:  Attended Team Meeting, Reviewed chart notes: Patient continues to be disoriented and confused.  Patient did not receive ECT treatment today due to acuity of confusion.      Discharge Plan or Goal:  Unknown at this time as initial plan was for patient to return home and follow-up with [facility] but patient's ongoing confusion may change disposition plan.     Barriers to Discharge:  Acuity of symptoms and ongoing confusion/disorientation    Referral Status:  No referrals have been made     Legal Status:  Committed/[facility] through [facility]

## 2021-06-14 NOTE — PLAN OF CARE
Pt is on SIO due to catatonic symptoms. She continues to require assistance with all ADLs and stand by assist at times when ambulating due to fall risk. Pt slept for about 4.5 hours this evening. Staff attempted to wake her up multiple times for dinner and to use the bathroom, however she refused and went back to sleep. She slept through VS assessment. Pt agreed to sit up and take medications at HS, and then agreed to eat dinner at that time. She ate 100% of dinner tray with prompts from SIO staff. Fluid intake approximately 400 ml. She voided x1, but unable to measure exact output.  She brushed her teeth independently for an extended period of time.

## 2021-06-14 NOTE — PLAN OF CARE
"Nursing assessment completed. Patient slept in but did wake up and began eating her breakfast. He walked to her bathroom unassisted with steady gate. SIO present and stand by assist as needed. She voided a moderate amount, but it did not make it into the hat for specific measurement. Patient appears calm with poor memory. She asks writer \"have I met you before\". 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 She asks \"where did we meet\". Writer continued to provide pt with orientation to place and time. After eating breakfast, she did agree to allow staff to take her vital signs with the machine, vs maual BP, as recently preferred. She showered with significant prompting required. Please see psych associate note. Patient needed prompting on how to shower and use the shower. She did not realize she needed to take her clothes off before getting in the shower and did not know how to operate the shower. SIO staff remained with pt the entire time.   Patient had adequate input and output this shift. She ate her full breakfast and lunch meals (see flowsheet)    "

## 2021-06-14 NOTE — PLAN OF CARE
Pt asleep at start of shift. Breathing quiet and unlabored.     Pt had no c/o pain or discomfort during the HS.     Appears to have slept 7 hours.     Pt continues on 1:1 SIO with 5 ft staff space distance.     Pt on  ASSAULT, CHEEKING, ELOPEMENT, and FALL precautions in addition to single room order. Any related events noted above.     Will continue to monitor and assess.  Problem: Sleep Disturbance (Psychotic Signs/Symptoms)  Goal: Improved Sleep (Psychotic Signs/Symptoms)  Outcome: No Change

## 2021-06-14 NOTE — PROGRESS NOTES
"[facility], [place]   Psychiatric Progress Note  Hospital Day: 61        Interim History:   The patient's care was discussed with the treatment team during the daily team meeting and/or staff's chart notes were reviewed. Slept 7 hours overnight. She continues to require assistance with ADLs, as well as prompting and assistance with eating. She remains isolative to her room and has been observed to have an unsteady gait. There were no instances of agitation or aggression over the weekend. She remains on a 1:1 and is taking medications as prescribed.     Upon interview, Michelle had difficulty leading us to her room for us to speak. She was able to recognize Dr. Zaman and stated that she was \"good\". She stated that she did not know what city or state she was in, however she could identify that she is in the hospital. She reported feeling confused and thinks that it is because of \"old age\". We discussed that the confusion is likely a side effect of ECT and is expected to improve. She identified the date as the 9th and was able to guess that it was June (with prompting). After she had been oriented to the location and date, she was able to repeat these back to the treatment team (except for the year, which she alternately stated as 2014 and then 2001). She denied physical pain.     I attempted to speak with her son Emelia Pino to give him an update this afternoon, however we were not able to connect. Left voice message requesting call back to station 12.          Medications:       LORazepam  1 mg Oral Daily    Or     LORazepam  1 mg Intramuscular Daily     LORazepam  2 mg Oral BID    Or     LORazepam  2 mg Intramuscular BID     losartan  100 mg Oral Daily     metoprolol succinate ER  50 mg Oral Daily     OLANZapine zydis  10 mg Oral BID    Or     OLANZapine  10 mg Intramuscular BID          Allergies:     Allergies   Allergen Reactions     Haldol [Haloperidol]      Patient previously " "tolerated haldol, though developed oculogyric crises during hospital stay on 4/26/21 on total daily dose of 10 mg.     Lisinopril Cough          Labs:     No results found for this or any previous visit (from the past 24 hour(s)).       Psychiatric Examination:     /76 (BP Location: Left arm)   Pulse 68   Temp 97.5  F (36.4  C)   Resp 16   Wt 71 kg (156 lb 8 oz)   SpO2 99%   BMI 26.86 kg/m    Weight is 156 lbs 8 oz  Body mass index is 26.86 kg/m .    Weight over time:  Vitals:    05/25/21 0850   Weight: 71 kg (156 lb 8 oz)       Orthostatic Vitals     None            Cardiometabolic risk assessment. 04/15/21      Reviewed patient profile for cardiometabolic risk factors    Date taken /Value  REFERENCE RANGE   Abdominal Obesity  (Waist Circumference)   See nursing flowsheet Women ?35 in (88 cm)   Men ?40 in (102 cm)      Triglycerides  Triglycerides   Date Value Ref Range Status   10/19/2019 117 <150 mg/dL Final       ?150 mg/dL (1.7 mmol/L) or current treatment for elevated triglycerides   HDL cholesterol  HDL Cholesterol   Date Value Ref Range Status   10/19/2019 56 >49 mg/dL Final   ]   Women <50 mg/dL (1.3 mmol/L) in women or current treatment for low HDL cholesterol  Men <40 mg/dL (1 mmol/L) in men or current treatment for low HDL cholesterol     Fasting plasma glucose (FPG) Lab Results   Component Value Date     10/19/2019      FPG ?100 mg/dL (5.6 mmol/L) or treatment for elevated blood glucose   Blood pressure  BP Readings from Last 3 Encounters:   06/13/21 118/76   06/04/19 131/71   04/26/19 164/86    Blood pressure ?130/85 mmHg or treatment for elevated blood pressure   Family History  See family history     Appearance: dressed in hospital scrubs, appeared reported age, freshly showered, wearing hospital scrub bonnet.   Attitude: cooperative, pleasant. No irritability.   Eye Contact: poor  Mood: \"good\"  Affect:  Blunted, constricted though smiled on one occasion  Speech: accented, mostly " coherent  Language: no obvious receptive or expressive defiits  Psychomotor, Gait, Musculoskeletal: No abnormal movements noted while seated. Evidence of psychomotor slowing.   Throught Process: linear,   Associations:  No loosening of associations present  Thought Content:  No SI or HI expressed  Insight:  limited  Judgement:  limited  Oriented to: self and Dr. Zaman, partially oriented to place (hospital).   Attention Span and Concentration: poor   Recent and Remote Memory: impaired, worsening since initiation of ECT  Fund of Knowledge:  normal    Clinical Global Impressions  First:  Considering your total clinical experience with this particular patient population, how severe are the patient's symptoms at this time?: 7 (04/16/21 1428)  Compared to the patient's condition at the START of treatment, this patient's condition is: 4 (04/16/21 1428)  Most recent:  Considering your total clinical experience with this particular patient population, how severe are the patient's symptoms at this time?: 7 (05/13/21 0953)  Compared to the patient's condition at the START of treatment, this patient's condition is: 6 (05/13/21 0953)           Precautions:     Behavioral Orders   Procedures     Assault precautions     Cheeking Precautions (behavioral units)     Patient Observed swallowing PO medications; Patient asked to drink water after swallowing medication; Patient in Staff line of sight for 15 minutes after medication given; Mouth checks after PO administration (patient asked to open mouth and stick out their tongue).     Code 1 - Restrict to Unit     Code 2 - 1:1 Staff Supervision     For ECT only     Code 3     For walks in the [place] with staff and per staff discretion     Electroconvulsive therapy     Series of up to 12 treatments. Begin Date: 5/26/21     Treating Psychiatrist providing ECT:  Dr. Lubin     Notified on:  5/21/21     Electroconvulsive therapy     As long as we get the green light from risk  management and pt is medically cleared, begin ECT every Monday, Wednesday, and Friday     Electroconvulsive therapy     Series of up to 12 treatments. Begin Date: 5/25/21     Treating Psychiatrist providing ECT:  Amee     Notified on:  5/24/21     Elopement precautions     Fall precautions     Mcgrath Calderon     Routine Programming     As clinically indicated     Status 15     Every 15 minutes.     Status Individual Observation     SIO needed 24/7 for assistance with ADLS and IV fluids    SIO required while patient is NPO on ECT days (midnight to ECT time on M, W, F). Patient SIO status reviewed with team/RN.  Please also refer to RN/team documentation for add'l detail.    -SIO staff to monitor following which have contributed to patient being on SIO:  Monitoring pt while NPO due to memory impairments and disorganized thought process  -Possible interventions SIO staff could use to support patient's treatment progress:  Ensure patient remains NPO  -When following observed, team will review discontinuation of SIO:  After ECT is completed     Order Specific Question:   CONTINUOUS 24 hours / day     Answer:   5 feet     Order Specific Question:   Indications for SIO     Answer:   Medical equipment / ligature risk          Diagnoses:     Schizoaffective Disorder, Bipolar Type, decompensated  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 Symptoms and presentation at this time is most consistent with Schizoaffective Disorder, Bipolar Type. We have obtained most recent medication  "regimen from patient's ACT team, and regimen was initially restarted. Inpatient psychiatric hospitalization is warranted at this time for safety, stabilization, and possible adjustment in medications. Pt is committed. We have petitioned for Alcides Calderon due to lack of improvement and side effects from medications.    Hospital Course:  On admission, PTA medications were restarted. However, patient had been declining all scheduled medications despite significant encouragement from staff and provider. Psychiatric emergency declared on 4/20 due to aggression toward others in context of severe psychosis and suspected excited catatonia. Ativan 1 mg TID was also added. Discontinued PTA Invega, Zyprexa, and thorazine on 4/20 due to consistent refusal.     On 4/26, it was noted that patient frequently had upward gaze while walking up and down the unit. She did not appear to be distressed. She reported that she was looking at \"my god.\" It was determined to be oculogyric crisis secondary to IM haloperidol. Haldol was subsequently discontinued during day shift on 4/26 and scheduled Zyprexa was initiated on emergency basis. Oral Cogentin was also scheduled, though patient declined. On the evening of 4/26, patient's gaze was fixed in upward position for several hours and she appeared to be experiencing discomfort. IM Cogentin was administered with noted resolution. Partial improvements weew observed after switch to scheduled Zyprexa. After patient improved, she was more receptive to reinitiating oral Invega, which was initiated on 5/3 and titrated to PTA dose of 9 mg daily on 5/10. Plan was for ACT team to bring in loading dose of Invega Sustenna. Patient had signs of oculogyric crisis again with Invega. Oral dose decreased to 6 mg after this. Invega was stopped on 5/14 due to ongoing signs of problems related to eye movement and concerns for oculogyric crisis.    Overall improvement in patient's agitation and suspected " "catatonia noted on 4/30 after patient accepted two doses of Ativan. She began declining Ativan again with noted decompensation. Patient now accepting, however, she does not have the capacity to consent to treatment with Ativan at this time. She does not believe she has a mental illness, including catatonia. She does not fully understand risks associated with inadequate treatment of catatonia. Discussed with her son who is acting as surrogate decision maker and he is in full support of forced scheduled IM Ativan if patient declines oral formulation. Also consulted with our legal team prior to backing oral Ativan with IM.     Ativan was increased on 5/19 due to re-emerging evidence of catatonia (long periods of staring, repetitive movements, echolalia, mutism). Meeting was held with ACT team on 5/20, including ACT team psychiatrist, Dr. Pedraza. He said that he is in \"full support\" of plan to pursue Mcgrath Kohler and ECT at this time. He does feel that in the past she has been discharged from the hospital while still quite symptomatic. He is hoping that ECT will be effective and that with improvement, Michelle would be more receptive to clozapine and weekly blood draws. He said that ideally she would transition to an IRTS before going back to her apartment, but also understands there may be some barriers (I.e. pt's willingness, financial concerns, etc).     ECT consult placed. Please see consult note by Dr. Lubin on 5/21 for details. Mcgrath Kohler was approved on 5/24 and patient was medically cleared on 5/24. ECT initiated on 5/26. She was noted to have a short seizure during ECT on 6/4. Staff note that patient appears more disoriented with memory impairment and sedation on days of ECT. This was noted on my examination again today. Improvements noted in mood, affect, social interactions, paranoia, agitation since initiation of ECT. Reduced Ativan on 6/5 due to sedative effects. Relayed concerns about memory impairment " and confusion with Dr. Lubin. Recommended attempting unilateral ECT, which he agreed to do. First unilateral ECT on 6/7. ECT was held on 6/11 and 6/14 due to memory impairment. We continue to see evidence of catatonia (sitting in the same place for extended periods of time) as well as evidence of cognitive impairment.  If memory impairment persists, will consider spacing out ECT vs discontinuing.     Target psychiatric symptoms and interventions:   - Resume Ativan 2 mg BID and 1 mg in the AM. Plan to taper if patient continues to appear sedated though she does appear to be experiencing ongoing catatonic features. Patient may not decline. Ativan to be given at 1700 on days prior to ECT and will be held on mornings before ECT. Because patient did not accept AM dose, IV Ativan 1 mg one time dose was given on 6/10.   - Oral Zyprexa 10 mg BID OR Zyprexa 10 mg IM. Hatch in place. May consider increasing further though holding off given re-emerging catatonic sx and borderline prolonged QTc     Continue Cogentin 2 mg IM daily prn for evidence of acute dystonic reaction or oculogyric crisis  Continue hydroxyzine 25-50 mg q4h prn for acute anxiety  Continue Trazodone 50 mg at bedtime prn for sleep disturbances  Continue Zyprexa 10 mg TID prn for severe agitation  Continue Ativan/Benadryl q4h prn for agitation. WOULD GIVE PRN ATIVAN FIRST FOR AGITATION unless it is after 5 pm on day prior to ECT     ECT:  - Because patient was only intermittently accepting scheduled Ativan and some symptoms of catatonia are re-emerging, pursued Alcides Kohler for ECT. She also continues to exhibit symptoms of psychosis and has not responded or has experienced side effects from multiple neuroleptic medications. Court hearing was held on 5/21. Alcides Kohler is approved. Patient is medically cleared. COVID negative on 5/24. Obtained ECT consult on 5/21/21. Please see Dr. Lubin's consultation note.   - ECT treatment #7 scheduled for Wednesday  6/16, tentatively, though if memory impairment persists, will likely hold off.   - NPO 8 hours prior to scheduled treatment. Clear liquids until 2 hours prior to treatment.   - Discussed with IM. May resume AM antihypertensives, no need to hold prior to ECT  - SIO while patient is NPO, starting at midnight on ECT days (renewed)  - Will complete acute course in the hospital; will likely need maintenance ECT in outpatient setting.   - New concerns about memory impairments: Switched from bilateral to unilateral ECT on 6/7 and continue to monitor closely. Dr. Lubin aware.     Acute Medical Problems and Treatments:  BRANDON, improving:   BL Cr is normal in the 0.8s and is now 1.8. Unclear cause. Repeat this AM now improved at 1.13 after receiving fluids.   - IM Consulted on 6/10. Appreciate assistance.   - intake and outputs  - daily weights  - avoid nephrotoxins  - Renal bladder US completed on 6/10    HTN: Patient is now adherent with medication regimen.   - Metoprolol succinate ER 50 mg daily  - Cozaar 100 mg daily  - Please see note from IM dated 5/3/21, 5/6/21, and 5/24.  - Obtained EKG and routine labs on 5/21 in context of pt declining vital sign checks and anti-hypertensives and recently elevated BPs. Reviewed labs and discussed EKG findings with IM on 5/21. No urgent concerns, though IM should be notified if pt develops acute medical concerns (I.e. heart palpitations, SOB, changes in speech, AMS, confusion, CP, HA, changes in vision).     CVA:  - Aspirin 81 mg daily     Chronic Constipation:  - Miralax 17 mg daily     Behavioral/Psychological/Social:  - Encourage unit programming  - Patient is now Code 3 status and can take walks in the [employer] with staff and security present per staff discretion. This appears to be quite therapeutic for her.     Safety:  - Continue precautions as noted above  - Status 15 minute checks  - Safety precautions include: assault and elopement precautions  - Continue precautions as  noted above    Legal Status: Committed as MI with Hatch in place for Haldol, Zyprexa, Invega, and Thorazine through [facility]. Filed Alcides Kohler through [facility] and approved on 5/24/21.     Disposition Plan   Reason for ongoing admission: poses an imminent risk to self, poses an imminent risk to others and is unable to care for self due to severe psychosis or tad  Discharge location: Home with ACT team support.   Discharge Medications: not ordered  Follow-up Appointments: not scheduled    Entered by: Bhavya Lane on 6/11/2021 at 2:56 PM     The patient was seen and discussed with attending psychiatrist Dr. Zaman.    Bhavya Lane MD  Psychiatry PGY-2

## 2021-06-15 PROCEDURE — 250N000013 HC RX MED GY IP 250 OP 250 PS 637: Performed by: PHYSICIAN ASSISTANT

## 2021-06-15 PROCEDURE — 99233 SBSQ HOSP IP/OBS HIGH 50: CPT | Mod: GC | Performed by: PSYCHIATRY & NEUROLOGY

## 2021-06-15 PROCEDURE — 124N000002 HC R&B MH UMMC

## 2021-06-15 PROCEDURE — 250N000013 HC RX MED GY IP 250 OP 250 PS 637: Performed by: PSYCHIATRY & NEUROLOGY

## 2021-06-15 RX ADMIN — LORAZEPAM 2 MG: 2 TABLET ORAL at 15:45

## 2021-06-15 RX ADMIN — METOPROLOL SUCCINATE 50 MG: 50 TABLET, EXTENDED RELEASE ORAL at 08:53

## 2021-06-15 RX ADMIN — LORAZEPAM 1 MG: 1 TABLET ORAL at 08:53

## 2021-06-15 RX ADMIN — OLANZAPINE 10 MG: 10 TABLET, ORALLY DISINTEGRATING ORAL at 21:08

## 2021-06-15 RX ADMIN — LOSARTAN POTASSIUM 100 MG: 100 TABLET, FILM COATED ORAL at 08:53

## 2021-06-15 RX ADMIN — LORAZEPAM 2 MG: 2 TABLET ORAL at 21:08

## 2021-06-15 RX ADMIN — OLANZAPINE 10 MG: 10 TABLET, ORALLY DISINTEGRATING ORAL at 08:53

## 2021-06-15 ASSESSMENT — ACTIVITIES OF DAILY LIVING (ADL)
DRESS: SCRUBS (BEHAVIORAL HEALTH)
HYGIENE/GROOMING: PROMPTS;WITH ASSISTANCE
ORAL_HYGIENE: PROMPTS
LAUNDRY: UNABLE TO COMPLETE
HYGIENE/GROOMING: PROMPTS
DRESS: SCRUBS (BEHAVIORAL HEALTH)
ORAL_HYGIENE: PROMPTS
LAUNDRY: UNABLE TO COMPLETE

## 2021-06-15 NOTE — PLAN OF CARE
Problem: Sleep Disturbance (Psychotic Signs/Symptoms)  Goal: Improved Sleep (Psychotic Signs/Symptoms)  Outcome: Improving     Pt appears to be asleep for most of the shift. She woke up at approx 0530 to use the bathroom. She urinated in the hat and flushed it before it could be measured. No behaviors this shift.   Remains SIO d/t weakness, and assist with ADL's

## 2021-06-15 NOTE — PROGRESS NOTES
"[facility], [place]   Psychiatric Progress Note  Hospital Day: 62        Interim History:   The patient's care was discussed with the treatment team during the daily team meeting and/or staff's chart notes were reviewed. She continues to require significant assistance with ADLs. She remains isolative to her room and has been observed to have an unsteady gait. There were no instances of agitation or aggression overnight. She is eating well. Staff note that she continues to be confused. We are attempting to monitor intake and output however recently Michelle has been disposing of her urine before it can be measured. She remains on a 1:1 and is taking medications as prescribed. Vital signs are stable.      Upon interview, Michelle stated that she was \"good\" and that her mood was \"okay\". We noted that she appeared sad today and she stated that it was because she did not know where her belongings were. She was able to identify that she is in [place], [place] and that the year is 2021, however she reported difficulty remembering the date and stated that it was the month of January. She reported feeling \"a little less\" confused today. She stated that she could not remember Dr. Zaman's name.     Michelle is looking forward to taking care of herself when she leaves the hospital. She denied suicidal ideation and physical pain. At the end of the interview, she requested to have us write our names down in her notebook so that she could remember them and her affect brightened briefly.          Medications:       LORazepam  1 mg Oral Daily    Or     LORazepam  1 mg Intramuscular Daily     LORazepam  2 mg Oral BID    Or     LORazepam  2 mg Intramuscular BID     losartan  100 mg Oral Daily     metoprolol succinate ER  50 mg Oral Daily     OLANZapine zydis  10 mg Oral BID    Or     OLANZapine  10 mg Intramuscular BID          Allergies:     Allergies   Allergen Reactions     Haldol [Haloperidol]      Patient " "previously tolerated haldol, though developed oculogyric crises during hospital stay on 4/26/21 on total daily dose of 10 mg.     Lisinopril Cough          Labs:     No results found for this or any previous visit (from the past 24 hour(s)).       Psychiatric Examination:     BP (!) 142/71   Pulse 68   Temp 98.3  F (36.8  C) (Tympanic)   Resp 16   Wt 71 kg (156 lb 8 oz)   SpO2 97%   BMI 26.86 kg/m    Weight is 156 lbs 8 oz  Body mass index is 26.86 kg/m .    Weight over time:  Vitals:    05/25/21 0850   Weight: 71 kg (156 lb 8 oz)       Orthostatic Vitals     None            Cardiometabolic risk assessment. 04/15/21      Reviewed patient profile for cardiometabolic risk factors    Date taken /Value  REFERENCE RANGE   Abdominal Obesity  (Waist Circumference)   See nursing flowsheet Women ?35 in (88 cm)   Men ?40 in (102 cm)      Triglycerides  Triglycerides   Date Value Ref Range Status   10/19/2019 117 <150 mg/dL Final       ?150 mg/dL (1.7 mmol/L) or current treatment for elevated triglycerides   HDL cholesterol  HDL Cholesterol   Date Value Ref Range Status   10/19/2019 56 >49 mg/dL Final   ]   Women <50 mg/dL (1.3 mmol/L) in women or current treatment for low HDL cholesterol  Men <40 mg/dL (1 mmol/L) in men or current treatment for low HDL cholesterol     Fasting plasma glucose (FPG) Lab Results   Component Value Date     10/19/2019      FPG ?100 mg/dL (5.6 mmol/L) or treatment for elevated blood glucose   Blood pressure  BP Readings from Last 3 Encounters:   06/14/21 (!) 142/71   06/04/19 131/71   04/26/19 164/86    Blood pressure ?130/85 mmHg or treatment for elevated blood pressure   Family History  See family history     Appearance: dressed in hospital scrubs, appeared reported age, hair tied back.  Attitude: cooperative, pleasant. No irritability.   Eye Contact: poor/avoidant until end of interview, at which point she made direct eye contact with us.   Mood: \"okay\"  Affect:  Mood incongruent, " appeared depressed, constricted, though smiled and laughed at end of interview.  Speech: accented, mostly coherent, slow responses to questions  Language: no obvious receptive or expressive defiits  Psychomotor, Gait, Musculoskeletal: No abnormal movements noted while seated.   Throught Process: linear,   Associations:  No loosening of associations present  Thought Content:  Denied suicidal ideation.  Insight:  limited  Judgement:  limited  Oriented to: city, state, and year. Not oriented to month, date, or treatment team.   Attention Span and Concentration: poor   Recent and Remote Memory: impaired, worsening since initiation of ECT  Fund of Knowledge:  normal    Clinical Global Impressions  First:  Considering your total clinical experience with this particular patient population, how severe are the patient's symptoms at this time?: 7 (04/16/21 1428)  Compared to the patient's condition at the START of treatment, this patient's condition is: 4 (04/16/21 1428)  Most recent:  Considering your total clinical experience with this particular patient population, how severe are the patient's symptoms at this time?: 7 (05/13/21 0953)  Compared to the patient's condition at the START of treatment, this patient's condition is: 6 (05/13/21 0953)           Precautions:     Behavioral Orders   Procedures     Assault precautions     Cheeking Precautions (behavioral units)     Patient Observed swallowing PO medications; Patient asked to drink water after swallowing medication; Patient in Staff line of sight for 15 minutes after medication given; Mouth checks after PO administration (patient asked to open mouth and stick out their tongue).     Code 1 - Restrict to Unit     Code 2 - 1:1 Staff Supervision     For ECT only     Code 3     For walks in the [place] with staff and per staff discretion     Electroconvulsive therapy     Series of up to 12 treatments. Begin Date: 5/26/21     Treating Psychiatrist providing ECT:    Amee     Notified on:  5/21/21     Electroconvulsive therapy     As long as we get the green light from risk management and pt is medically cleared, begin ECT every Monday, Wednesday, and Friday     Electroconvulsive therapy     Series of up to 12 treatments. Begin Date: 5/25/21     Treating Psychiatrist providing ECT:  Amee     Notified on:  5/24/21     Elopement precautions     Fall precautions     Mcgrath Calderon     Routine Programming     As clinically indicated     Status 15     Every 15 minutes.     Status Individual Observation     SIO needed 24/7 for assistance with ADLS and IV fluids    SIO required while patient is NPO on ECT days (midnight to ECT time on M, W, F). Patient SIO status reviewed with team/RN.  Please also refer to RN/team documentation for add'l detail.    -SIO staff to monitor following which have contributed to patient being on SIO:  Monitoring pt while NPO due to memory impairments and disorganized thought process  -Possible interventions SIO staff could use to support patient's treatment progress:  Ensure patient remains NPO  -When following observed, team will review discontinuation of SIO:  After ECT is completed     Order Specific Question:   CONTINUOUS 24 hours / day     Answer:   5 feet     Order Specific Question:   Indications for SIO     Answer:   Medical equipment / ligature risk          Diagnoses:     Schizoaffective Disorder, Bipolar Type, decompensated  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 Symptoms and presentation at this  "time is most consistent with Schizoaffective Disorder, Bipolar Type. We have obtained most recent medication regimen from patient's ACT team, and regimen was initially restarted. Inpatient psychiatric hospitalization is warranted at this time for safety, stabilization, and possible adjustment in medications. Pt is committed. We have petitioned for Alcides Calderon due to lack of improvement and side effects from medications.    Hospital Course:  On admission, PTA medications were restarted. However, patient had been declining all scheduled medications despite significant encouragement from staff and provider. Psychiatric emergency declared on 4/20 due to aggression toward others in context of severe psychosis and suspected excited catatonia. Ativan 1 mg TID was also added. Discontinued PTA Invega, Zyprexa, and thorazine on 4/20 due to consistent refusal.     On 4/26, it was noted that patient frequently had upward gaze while walking up and down the unit. She did not appear to be distressed. She reported that she was looking at \"my god.\" It was determined to be oculogyric crisis secondary to IM haloperidol. Haldol was subsequently discontinued during day shift on 4/26 and scheduled Zyprexa was initiated on emergency basis. Oral Cogentin was also scheduled, though patient declined. On the evening of 4/26, patient's gaze was fixed in upward position for several hours and she appeared to be experiencing discomfort. IM Cogentin was administered with noted resolution. Partial improvements weew observed after switch to scheduled Zyprexa. After patient improved, she was more receptive to reinitiating oral Invega, which was initiated on 5/3 and titrated to PTA dose of 9 mg daily on 5/10. Plan was for ACT team to bring in loading dose of Invega Sustenna. Patient had signs of oculogyric crisis again with Invega. Oral dose decreased to 6 mg after this. Invega was stopped on 5/14 due to ongoing signs of problems related to eye " "movement and concerns for oculogyric crisis.    Overall improvement in patient's agitation and suspected catatonia noted on 4/30 after patient accepted two doses of Ativan. She began declining Ativan again with noted decompensation. Patient now accepting, however, she does not have the capacity to consent to treatment with Ativan at this time. She does not believe she has a mental illness, including catatonia. She does not fully understand risks associated with inadequate treatment of catatonia. Discussed with her son who is acting as surrogate decision maker and he is in full support of forced scheduled IM Ativan if patient declines oral formulation. Also consulted with our legal team prior to backing oral Ativan with IM.     Ativan was increased on 5/19 due to re-emerging evidence of catatonia (long periods of staring, repetitive movements, echolalia, mutism). Meeting was held with ACT team on 5/20, including ACT team psychiatrist, Dr. Pedraza. He said that he is in \"full support\" of plan to pursue Mcgrath Kohler and ECT at this time. He does feel that in the past she has been discharged from the hospital while still quite symptomatic. He is hoping that ECT will be effective and that with improvement, Michelle would be more receptive to clozapine and weekly blood draws. He said that ideally she would transition to an IRTS before going back to her apartment, but also understands there may be some barriers (I.e. pt's willingness, financial concerns, etc).     ECT consult placed. Please see consult note by Dr. Lubin on 5/21 for details. Mcgrath Kohler was approved on 5/24 and patient was medically cleared on 5/24. ECT initiated on 5/26. She was noted to have a short seizure during ECT on 6/4. Staff note that patient appears more disoriented with memory impairment and sedation on days of ECT. This was noted on my examination again today. Improvements noted in mood, affect, social interactions, paranoia, agitation since " initiation of ECT. Reduced Ativan on 6/5 due to sedative effects. Relayed concerns about memory impairment and confusion with Dr. Lubin. Recommended attempting unilateral ECT, which he agreed to do. First unilateral ECT on 6/7. ECT was held on 6/11 and 6/14 due to memory impairment. We will plan to hold it again tomorrow (6/16). There is ongoing discussion regarding whether there is a component of catatonia contributing to her current presentation but this is less likely since it worsened after ECT was started.  If memory impairment persists, will consider spacing out ECT vs discontinuing.    Target psychiatric symptoms and interventions:   - Resume Ativan 2 mg BID and 1 mg in the AM. Plan to taper if patient continues to appear sedated though she does appear to be experiencing ongoing catatonic features. Patient may not decline. Ativan to be given at 1700 on days prior to ECT and will be held on mornings before ECT. Because patient did not accept AM dose, IV Ativan 1 mg one time dose was given on 6/10.   - Oral Zyprexa 10 mg BID OR Zyprexa 10 mg IM. Hatch in place. May consider increasing further though holding off given re-emerging catatonic sx and borderline prolonged QTc     Continue Cogentin 2 mg IM daily prn for evidence of acute dystonic reaction or oculogyric crisis  Continue hydroxyzine 25-50 mg q4h prn for acute anxiety  Continue Trazodone 50 mg at bedtime prn for sleep disturbances  Continue Zyprexa 10 mg TID prn for severe agitation  Continue Ativan/Benadryl q4h prn for agitation. WOULD GIVE PRN ATIVAN FIRST FOR AGITATION unless it is after 5 pm on day prior to ECT     ECT:  - Because patient was only intermittently accepting scheduled Ativan and some symptoms of catatonia are re-emerging, pursued Alcides Kohler for ECT. She also continues to exhibit symptoms of psychosis and has not responded or has experienced side effects from multiple neuroleptic medications. Court hearing was held on 5/21. [facility]  [facility] is approved. Patient is medically cleared. COVID negative on 5/24. Obtained ECT consult on 5/21/21. Please see Dr. Lubin's consultation note.   - HOLD next ECT treatment (treatment #7 was scheduled for Wednesday 6/16)  - NPO 8 hours prior to scheduled treatment. Clear liquids until 2 hours prior to treatment.   - Discussed with IM. May resume AM antihypertensives, no need to hold prior to ECT  - SIO while patient is NPO, starting at midnight on ECT days (renewed)  - Will complete acute course in the hospital; will likely need maintenance ECT in outpatient setting.   - New concerns about memory impairments: Switched from bilateral to unilateral ECT on 6/7 and continue to monitor closely. Dr. Lubin aware.     Acute Medical Problems and Treatments:  BRANDON, improving:   BL Cr is normal in the 0.8s and is now 1.8. Unclear cause. Repeat this AM now improved at 1.13 after receiving fluids.   - IM Consulted on 6/10. Appreciate assistance.   - intake and outputs  - daily weights  - avoid nephrotoxins  - Renal bladder US completed on 6/10    HTN: Patient is now adherent with medication regimen.   - Metoprolol succinate ER 50 mg daily  - Cozaar 100 mg daily  - Please see note from IM dated 5/3/21, 5/6/21, and 5/24.  - Obtained EKG and routine labs on 5/21 in context of pt declining vital sign checks and anti-hypertensives and recently elevated BPs. Reviewed labs and discussed EKG findings with IM on 5/21. No urgent concerns, though IM should be notified if pt develops acute medical concerns (I.e. heart palpitations, SOB, changes in speech, AMS, confusion, CP, HA, changes in vision).     CVA:  - Aspirin 81 mg daily     Chronic Constipation:  - Miralax 17 mg daily     Behavioral/Psychological/Social:  - Encourage unit programming  - Patient is now Code 3 status and can take walks in the [place] with staff and security present per staff discretion. This appears to be quite therapeutic for her.     Safety:  - Continue  precautions as noted above  - Status 15 minute checks  - Safety precautions include: assault and elopement precautions  - Continue precautions as noted above    Legal Status: Committed as MI with Hatch in place for Haldol, Zyprexa, Invega, and Thorazine through [facility]. Filed Alcides Kohler through [facility] and approved on 5/24/21.     Disposition Plan   Reason for ongoing admission: poses an imminent risk to self, poses an imminent risk to others and is unable to care for self due to severe psychosis or tad  Discharge location: Likely group home  Discharge Medications: not ordered  Follow-up Appointments: not scheduled    Entered by: Bhavya Lane on 6/15/2021 at 8:11 AM     The patient was seen and discussed with attending psychiatrist Dr. Zaman.    Bhavya Lane MD  Psychiatry PGY-2

## 2021-06-15 NOTE — PLAN OF CARE
Assessment/Intervention/Current Symtoms and Care Coordination:  Attended Team Meeting, Reviewed chart notes:  Patient continues to be confused and disoriented. Patient is not receiving ECT at this time due to confusion.     Discharge Plan or Goal:  TBD but likely group home    Barriers to Discharge:  Acuity of symptoms      Referral Status:  No referrals have been     Legal Status:  Committed,Alcides Hatch

## 2021-06-15 NOTE — PLAN OF CARE
Pt continues on SIO due to catatonic symptoms and confusion. She slept for most of the evening shift again. Staff prompted her when dinner trays arrived, but she declined to eat at that time. She was cooperative with VS assessment and allowed staff to use the vitals machine instead of manual BP. Pt woke up and accepted HS medications and agreed to eat dinner later in evening. She did not need as many prompts or assistance, though she continues to be confused and needs reassurance at times. She ate 100% of her dinner tray. Fluid intake approximately 600 ml. She voided once, but dumped the hat out before staff could see amount. Hygiene good; she showered on day shift. She is brushing teeth independently.

## 2021-06-15 NOTE — PLAN OF CARE
"Nursing assessment completed. Patient up at the start of the shift eating her breakfast. Cooperative with vital signs and medications. She asked writer if she had \"taken these medications before\". Writer reminded her that it was a new day and that she had taken the same medications yesterday.     SIO discontinued after MD assessment. Patient is stable on her feet. She is eating, drinking, and using the bathroom appropriately. Will continue to closely monitor her and continue I/O.     Patient observed sitting on her bed staring into the hallway for over an hour. Writer initiated a conversation and told her that she was welcome to come into the lounge. She asked \"what is the lounge\". Writer brought patient into the lounge to show her. She looked around for several minutes and then stated she would go back and watch TV in her room. Writer gave patient prompting to use the bathroom, which she declined at this time.     Patient independently ate all of her lunch. This took her an extended time, however she was able to feed herself and eat without staff prompting, which is an improvement from previous days. Patient had adequate I/O during this shift (see flowsheet).      At 1430 staff alerted writer that pt had a large BM. Upon assessment, patient was standing in her bathroom holding a pair of soiled underwear. She told writer that she \"shit herself\" and \"shit the bed\". There was a large, solid stool in the pair of underwear. Staff assisted patient to clean up in the shower and staff cleaned patient's bathroom and changed her linens. Patient declined any pain or discomfort.   "

## 2021-06-16 LAB
ANION GAP SERPL CALCULATED.3IONS-SCNC: 5 MMOL/L (ref 3–14)
BUN SERPL-MCNC: 12 MG/DL (ref 7–30)
CALCIUM SERPL-MCNC: 8.9 MG/DL (ref 8.5–10.1)
CHLORIDE SERPL-SCNC: 112 MMOL/L (ref 94–109)
CO2 SERPL-SCNC: 25 MMOL/L (ref 20–32)
CREAT SERPL-MCNC: 0.89 MG/DL (ref 0.52–1.04)
GFR SERPL CREATININE-BSD FRML MDRD: 71 ML/MIN/{1.73_M2}
GLUCOSE SERPL-MCNC: 134 MG/DL (ref 70–99)
POTASSIUM SERPL-SCNC: 3.8 MMOL/L (ref 3.4–5.3)
SODIUM SERPL-SCNC: 142 MMOL/L (ref 133–144)

## 2021-06-16 PROCEDURE — 250N000013 HC RX MED GY IP 250 OP 250 PS 637: Performed by: PHYSICIAN ASSISTANT

## 2021-06-16 PROCEDURE — 124N000002 HC R&B MH UMMC

## 2021-06-16 PROCEDURE — 250N000013 HC RX MED GY IP 250 OP 250 PS 637: Performed by: PSYCHIATRY & NEUROLOGY

## 2021-06-16 PROCEDURE — 80048 BASIC METABOLIC PNL TOTAL CA: CPT | Performed by: PHYSICIAN ASSISTANT

## 2021-06-16 PROCEDURE — 36415 COLL VENOUS BLD VENIPUNCTURE: CPT | Performed by: PHYSICIAN ASSISTANT

## 2021-06-16 PROCEDURE — 99232 SBSQ HOSP IP/OBS MODERATE 35: CPT | Mod: GC | Performed by: PSYCHIATRY & NEUROLOGY

## 2021-06-16 RX ADMIN — METOPROLOL SUCCINATE 50 MG: 50 TABLET, EXTENDED RELEASE ORAL at 08:34

## 2021-06-16 RX ADMIN — LOSARTAN POTASSIUM 100 MG: 100 TABLET, FILM COATED ORAL at 08:34

## 2021-06-16 RX ADMIN — HYDRALAZINE HYDROCHLORIDE 25 MG: 25 TABLET ORAL at 17:05

## 2021-06-16 RX ADMIN — OLANZAPINE 10 MG: 10 TABLET, ORALLY DISINTEGRATING ORAL at 08:34

## 2021-06-16 RX ADMIN — LORAZEPAM 1 MG: 1 TABLET ORAL at 08:34

## 2021-06-16 RX ADMIN — LORAZEPAM 2 MG: 2 TABLET ORAL at 13:54

## 2021-06-16 RX ADMIN — OLANZAPINE 10 MG: 10 TABLET, ORALLY DISINTEGRATING ORAL at 19:01

## 2021-06-16 RX ADMIN — LORAZEPAM 2 MG: 2 TABLET ORAL at 19:00

## 2021-06-16 ASSESSMENT — ACTIVITIES OF DAILY LIVING (ADL)
ORAL_HYGIENE: PROMPTS
HYGIENE/GROOMING: PROMPTS;WITH ASSISTANCE
ORAL_HYGIENE: PROMPTS
HYGIENE/GROOMING: PROMPTS
DRESS: SCRUBS (BEHAVIORAL HEALTH)
LAUNDRY: UNABLE TO COMPLETE
LAUNDRY: UNABLE TO COMPLETE
DRESS: SCRUBS (BEHAVIORAL HEALTH)

## 2021-06-16 NOTE — PLAN OF CARE
"Nursing assessment completed. Patient spent the entire shift in her room sleeping and watching TV. She is pleasant upon approach. She is eating meals approprietly. Adequate I/O and no further episode of incontinence. Slow, but steady and balanced gait. She is compliant with her medications. She asked writer to take a soup bowl out of her room, and then started smiling and laughing and stated \"throw away the soup but not the plate\". She has no s/s of SI/SIB. Continue to monitor and assess.   "

## 2021-06-16 NOTE — PLAN OF CARE
Assessment/Intervention/Current Symtoms and Care Coordination:  Attended Team Meeting, Reviewed chart notes: Patient appears to be improving and appears more pleasant and cooperative with staff. Patient's ECT treatments have been discontinued for now.     Discharge Plan or Goal:  Patient may return home if she continues to improve and can care for herself. Writer has left messages for [facility]  and nurse manager as future plan will likely be group home placement. Patient will need a CADI Waiver for this to happen.     Barriers to Discharge:  Patient is continuing to stabilize     Referral Status:  No referrals have been made    Legal Status:  Committed/Hatch/Alcides Kohler

## 2021-06-16 NOTE — PLAN OF CARE
"  Problem: Sleep Disturbance (Psychotic Signs/Symptoms)  Goal: Improved Sleep (Psychotic Signs/Symptoms)  Outcome: Improving     Night Shift Summary (6/15/21 into 06/16/21)    Pt asleep at start of shift. Breathing quiet and unlabored.     Woke up around 0415 . Affect is flat. Reported feeling \"fine\".    Pt had no c/o pain or discomfort during the HS.     Appears to have slept 4.75 hours.       Pt continues SIO for ECT days only    No intake overnight, and 250 mL of urine this shift.     Pt on ASSAULT, CHEEKING, ELOPEMENT, FALL, and INTRUSIVE precautions. Any related events noted above.   "

## 2021-06-16 NOTE — PROGRESS NOTES
Writer left a second voice message for [facility]  requesting a call back to discuss process of initiating a CADI Waiver for patient in order to consider and coordinate group home placement.

## 2021-06-16 NOTE — PROGRESS NOTES
"[facility], [place]   Psychiatric Progress Note  Hospital Day: 63        Interim History:   The patient's care was discussed with the treatment team during the daily team meeting and/or staff's chart notes were reviewed. She remains isolative to her room. There were no instances of agitation or aggression overnight. She is eating well and was able to eat her lunch independently, which is an improvement. She had an episode of bowel incontinence yesterday. She slept 4.75 hours overnight. Intake and output continues to be monitored. Vital signs are stable and she is afebrile. She is taking her medications as prescribed. Per staff report, Michelle appears to be improving. She is doing well without 1:1 staff. She is eating and drinking well.     Upon interview, Michelle stated that she was \"fine\" and felt sad because she has not talked to her children. She wanted to review the events preceding her hospitalization and asked how she arrived at her current situation. She stated \"I'm lost and then I am found that I am in the hospital\". She was able to identify that she was in a hospital in [place], [place] and that it was June 2021, however she could not list the date. She asked for medication to bring her memory back. We discussed that while ECT had been helpful, we were seeing signs of confusion as a side effect, which we expect to improve.    Phone call with son, Emelia Pino: We discussed that it is okay for him to send soaps/shower materials to Michelle. He clarified that he does have the ACT team's contact information but that there is no longer an active release of information, so he cannot obtain information from the team. At this time, since there is a release of information for the hospital, we discussed that our team would help facilitate communication.          Medications:       LORazepam  1 mg Oral Daily    Or     LORazepam  1 mg Intramuscular Daily     LORazepam  2 mg Oral BID    Or     " LORazepam  2 mg Intramuscular BID     losartan  100 mg Oral Daily     metoprolol succinate ER  50 mg Oral Daily     OLANZapine zydis  10 mg Oral BID    Or     OLANZapine  10 mg Intramuscular BID          Allergies:     Allergies   Allergen Reactions     Haldol [Haloperidol]      Patient previously tolerated haldol, though developed oculogyric crises during hospital stay on 4/26/21 on total daily dose of 10 mg.     Lisinopril Cough          Labs:     No results found for this or any previous visit (from the past 24 hour(s)).       Psychiatric Examination:     BP (!) 155/80   Pulse 87   Temp 98  F (36.7  C) (Tympanic)   Resp 16   Wt 69.8 kg (153 lb 12.8 oz)   SpO2 99%   BMI 26.40 kg/m    Weight is 153 lbs 12.8 oz  Body mass index is 26.4 kg/m .    Weight over time:  Vitals:    05/25/21 0850 06/15/21 0811   Weight: 71 kg (156 lb 8 oz) 69.8 kg (153 lb 12.8 oz)       Orthostatic Vitals     None            Cardiometabolic risk assessment. 04/15/21      Reviewed patient profile for cardiometabolic risk factors    Date taken /Value  REFERENCE RANGE   Abdominal Obesity  (Waist Circumference)   See nursing flowsheet Women ?35 in (88 cm)   Men ?40 in (102 cm)      Triglycerides  Triglycerides   Date Value Ref Range Status   10/19/2019 117 <150 mg/dL Final       ?150 mg/dL (1.7 mmol/L) or current treatment for elevated triglycerides   HDL cholesterol  HDL Cholesterol   Date Value Ref Range Status   10/19/2019 56 >49 mg/dL Final   ]   Women <50 mg/dL (1.3 mmol/L) in women or current treatment for low HDL cholesterol  Men <40 mg/dL (1 mmol/L) in men or current treatment for low HDL cholesterol     Fasting plasma glucose (FPG) Lab Results   Component Value Date     10/19/2019      FPG ?100 mg/dL (5.6 mmol/L) or treatment for elevated blood glucose   Blood pressure  BP Readings from Last 3 Encounters:   06/15/21 (!) 155/80   06/04/19 131/71   04/26/19 164/86    Blood pressure ?130/85 mmHg or treatment for elevated  "blood pressure   Family History  See family history     Appearance: dressed in hospital scrubs, appeared reported age, hair tied back.   Attitude: cooperative, slightly irritable  Eye Contact: poor/avoidant until end of interview, at which point she made direct eye contact with us.   Mood: \"fine\"  Affect:  Mood incongruent, appeared sad, constricted, blunted  Speech: accented, mostly coherent, although was sometimes difficult to understand what she was asking or stating.  Language: no obvious receptive or expressive defiits  Psychomotor, Gait, Musculoskeletal: No abnormal movements noted while seated.   Throught Process: somewhat linear  Associations:  No loosening of associations present  Thought Content:  No suicidal ideation elicited.  Insight:  limited  Judgement:  limited  Oriented to: city, state, month, and year.   Attention Span and Concentration: poor   Recent and Remote Memory: impaired, worsening since initiation of ECT  Fund of Knowledge:  normal    Clinical Global Impressions  First:  Considering your total clinical experience with this particular patient population, how severe are the patient's symptoms at this time?: 7 (04/16/21 1428)  Compared to the patient's condition at the START of treatment, this patient's condition is: 4 (04/16/21 1428)  Most recent:  Considering your total clinical experience with this particular patient population, how severe are the patient's symptoms at this time?: 7 (05/13/21 0953)  Compared to the patient's condition at the START of treatment, this patient's condition is: 6 (05/13/21 0953)           Precautions:     Behavioral Orders   Procedures     Assault precautions     Cheeking Precautions (behavioral units)     Patient Observed swallowing PO medications; Patient asked to drink water after swallowing medication; Patient in Staff line of sight for 15 minutes after medication given; Mouth checks after PO administration (patient asked to open mouth and stick out their " tongue).     Code 1 - Restrict to Unit     Code 2 - 1:1 Staff Supervision     For ECT only     Code 3     For walks in the [facility] with staff and per staff discretion     Electroconvulsive therapy     Series of up to 12 treatments. Begin Date: 5/26/21     Treating Psychiatrist providing ECT:  Dr. Lubin     Notified on:  5/21/21     Electroconvulsive therapy     As long as we get the green light from risk management and pt is medically cleared, begin ECT every Monday, Wednesday, and Friday     Electroconvulsive therapy     Series of up to 12 treatments. Begin Date: 5/25/21     Treating Psychiatrist providing ECT:  Amee     Notified on:  5/24/21     Elopement precautions     Fall precautions     Alcides Calderon     Routine Programming     As clinically indicated     Status 15     Every 15 minutes.          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 Symptoms and presentation at this time is most consistent with Schizoaffective Disorder, Bipolar Type. We have obtained most recent medication regimen from patient's ACT team, and regimen was initially restarted. Inpatient psychiatric hospitalization is warranted at this time for safety, stabilization, and possible adjustment in medications. Pt is committed. We have petitioned for Alcides Calderon due to lack of improvement and side effects from medications.    Hospital Course:  On admission, PTA medications were restarted. However, patient had been  "declining all scheduled medications despite significant encouragement from staff and provider. Psychiatric emergency declared on 4/20 due to aggression toward others in context of severe psychosis and suspected excited catatonia. Ativan 1 mg TID was also added. Discontinued PTA Invega, Zyprexa, and thorazine on 4/20 due to consistent refusal.     On 4/26, it was noted that patient frequently had upward gaze while walking up and down the unit. She did not appear to be distressed. She reported that she was looking at \"my god.\" It was determined to be oculogyric crisis secondary to IM haloperidol. Haldol was subsequently discontinued during day shift on 4/26 and scheduled Zyprexa was initiated on emergency basis. Oral Cogentin was also scheduled, though patient declined. On the evening of 4/26, patient's gaze was fixed in upward position for several hours and she appeared to be experiencing discomfort. IM Cogentin was administered with noted resolution. Partial improvements weew observed after switch to scheduled Zyprexa. After patient improved, she was more receptive to reinitiating oral Invega, which was initiated on 5/3 and titrated to PTA dose of 9 mg daily on 5/10. Plan was for ACT team to bring in loading dose of Invega Sustenna. Patient had signs of oculogyric crisis again with Invega. Oral dose decreased to 6 mg after this. Invega was stopped on 5/14 due to ongoing signs of problems related to eye movement and concerns for oculogyric crisis.    Overall improvement in patient's agitation and suspected catatonia noted on 4/30 after patient accepted two doses of Ativan. She began declining Ativan again with noted decompensation. Patient now accepting, however, she does not have the capacity to consent to treatment with Ativan at this time. She does not believe she has a mental illness, including catatonia. She does not fully understand risks associated with inadequate treatment of catatonia. Discussed with her son " "who is acting as surrogate decision maker and he is in full support of forced scheduled IM Ativan if patient declines oral formulation. Also consulted with our legal team prior to backing oral Ativan with IM.     Ativan was increased on 5/19 due to re-emerging evidence of catatonia (long periods of staring, repetitive movements, echolalia, mutism). Meeting was held with ACT team on 5/20, including ACT team psychiatrist, Dr. Pedraza. He said that he is in \"full support\" of plan to pursue Mcgrath Kohler and ECT at this time. He does feel that in the past she has been discharged from the hospital while still quite symptomatic. He is hoping that ECT will be effective and that with improvement, Michelle would be more receptive to clozapine and weekly blood draws. He said that ideally she would transition to an IRTS before going back to her apartment, but also understands there may be some barriers (I.e. pt's willingness, financial concerns, etc).     ECT consult placed. Please see consult note by Dr. Lubin on 5/21 for details. Mcgrath Kohler was approved on 5/24 and patient was medically cleared on 5/24. ECT initiated on 5/26. She was noted to have a short seizure during ECT on 6/4. Staff note that patient appears more disoriented with memory impairment and sedation on days of ECT. This was noted on my examination again today. Improvements noted in mood, affect, social interactions, paranoia, agitation since initiation of ECT. Reduced Ativan on 6/5 due to sedative effects. Relayed concerns about memory impairment and confusion with Dr. Lubin. Recommended attempting unilateral ECT, which he agreed to do. First unilateral ECT on 6/7. ECT was held on 6/11 and 6/14 due to memory impairment. We will plan to hold it again tomorrow (6/16). There is ongoing discussion regarding whether there is a component of catatonia contributing to her current presentation but this is less likely since it worsened after ECT was started.  If memory " impairment persists, will consider spacing out ECT vs discontinuing.    Michelle appears to be decompensating somewhat since ECT has been held, however her cognitive impairment persists. At this time, it may be worth starting clozapine, which is something that has previously been considered by her ACT team.     Target psychiatric symptoms and interventions:   - Resume Ativan 2 mg BID and 1 mg in the AM. Plan to taper if patient continues to appear sedated though she does appear to be experiencing ongoing catatonic features. Patient may not decline. Ativan to be given at 1700 on days prior to ECT and will be held on mornings before ECT. Because patient did not accept AM dose, IV Ativan 1 mg one time dose was given on 6/10.   - Oral Zyprexa 10 mg BID OR Zyprexa 10 mg IM. Hatch in place. May consider increasing further though holding off given re-emerging catatonic sx and borderline prolonged QTc     Since we are considering starting clozapine, add-on WBC with diff was ordered for today.    Continue Cogentin 2 mg IM daily prn for evidence of acute dystonic reaction or oculogyric crisis  Continue hydroxyzine 25-50 mg q4h prn for acute anxiety  Continue Trazodone 50 mg at bedtime prn for sleep disturbances  Continue Zyprexa 10 mg TID prn for severe agitation  Continue Ativan/Benadryl q4h prn for agitation. WOULD GIVE PRN ATIVAN FIRST FOR AGITATION unless it is after 5 pm on day prior to ECT     ECT:  - Because patient was only intermittently accepting scheduled Ativan and some symptoms of catatonia are re-emerging, pursued Alcides Kohler for ECT. She also continues to exhibit symptoms of psychosis and has not responded or has experienced side effects from multiple neuroleptic medications. Court hearing was held on 5/21. Alcides Kohler is approved. Patient is medically cleared. COVID negative on 5/24. Obtained ECT consult on 5/21/21. Please see Dr. Lubin's consultation note.   - HOLD ECT (treatment #7 was scheduled for  Wednesday 6/16)  - NPO 8 hours prior to scheduled treatment. Clear liquids until 2 hours prior to treatment.   - Discussed with IM. May resume AM antihypertensives, no need to hold prior to ECT  - SIO while patient is NPO, starting at midnight on ECT days (renewed)  - Will complete acute course in the hospital; will likely need maintenance ECT in outpatient setting.   - New concerns about memory impairments: Switched from bilateral to unilateral ECT on 6/7 and continue to monitor closely. Dr. Lubin aware.     Acute Medical Problems and Treatments:  BRANDON, resolved:   Pre-renal in nature, likely secondary to decreased oral intake.   - IM Consulted on 6/10. Appreciate assistance. Follow-up note placed on 6/16 (no further work-up recommended).  - Discontinue intake and outputs  - Discontinue daily weights  - avoid nephrotoxins  - Renal bladder US completed on 6/10  - Encourage fluid intake.     HTN: Patient is now adherent with medication regimen.   - Metoprolol succinate ER 50 mg daily  - Cozaar 100 mg daily  - Please see note from IM dated 5/3/21, 5/6/21, and 5/24.  - Obtained EKG and routine labs on 5/21 in context of pt declining vital sign checks and anti-hypertensives and recently elevated BPs. Reviewed labs and discussed EKG findings with IM on 5/21. No urgent concerns, though IM should be notified if pt develops acute medical concerns (I.e. heart palpitations, SOB, changes in speech, AMS, confusion, CP, HA, changes in vision).     CVA:  - Aspirin 81 mg daily     Chronic Constipation:  - Miralax 17 mg daily     Behavioral/Psychological/Social:  - Encourage unit programming  - Patient is now Code 3 status and can take walks in the [employer] with staff and security present per staff discretion. This appears to be quite therapeutic for her.     Safety:  - Continue precautions as noted above  - Status 15 minute checks  - Safety precautions include: assault and elopement precautions  - Continue precautions as noted  above    Legal Status: Committed as MI with Hatch in place for Haldol, Zyprexa, Invega, and Thorazine through [facility]. Filed Alcides Kohler through [facility] and approved on 5/24/21.     Disposition Plan   Reason for ongoing admission: poses an imminent risk to self, poses an imminent risk to others and is unable to care for self due to severe psychosis or tad  Discharge location: Likely group home  Discharge Medications: not ordered  Follow-up Appointments: not scheduled     Prior to discharge, treatment team will need to coordinate with ACT team to obtain JESSICA for Michelle's son in order to facilitate ongoing involvement in care.    Entered by: Bhavya Lane on 6/16/2021 at 8:16 AM     The patient was seen and discussed with attending psychiatrist Dr. Zaman.    Bhavya Lane MD  Psychiatry PGY-2

## 2021-06-16 NOTE — PLAN OF CARE
"Nursing assessment completed. Patient woke up in a bright mood this morning, smiling at staff. She appears to have a good appetite and ate 100% of her breakfast. Appears steady on her feet.   During afternoon medication pass, patient was increasingly more resistant to take her scheduled 1400 ativan dose. She appeared to be confused of the time and stated this writer had \"already brought it to her\". When writer attempted to explain that I brought her her medications this morning and yesterday as well, she stated \"you are speaking rubbish!\". With significant encouragement, patient eventually did agree to take her afternoon medication.    Shortly after medication pass, lab came to draw patient's blood. She refused the lab draw despite staff reassurance and encouragement. She stated \"never!\", and then stated she would only agree if the \"doctor draws the blood\".   "

## 2021-06-16 NOTE — PROGRESS NOTES
Brief Medicine Note:    Notified regarding follow-up for BRANDON that medicine had previously seen patient for. Last seen by Sonya Bill PA-C on 06/10 for BRANDON. FeNA consistent with pre-renal BRANDON. Renal US with borderline echogenic kidneys with mild right-sided hydronephrosis, no visualized stones, as well as lobulated left kidney with cortical thinning scarring. Baseline Cr 0.8. Cr peaked to 1.83 and downtrended back to baseline 0.89 today.    Plan:  - No further work-up indicated at this time given BRANDON resolved  - Encourage fluid intake  - Caution with nephrotoxic agents    Internal medicine will sign off. Please reach out with any further questions or concerns.    Alexa Zabala PA-C  Internal Medicine ROBERT Hospitalist  [facility]  Pager: [phone]

## 2021-06-17 LAB
BASOPHILS # BLD AUTO: 0 10E9/L (ref 0–0.2)
BASOPHILS NFR BLD AUTO: 0.5 %
DIFFERENTIAL METHOD BLD: NORMAL
EOSINOPHIL # BLD AUTO: 0.1 10E9/L (ref 0–0.7)
EOSINOPHIL NFR BLD AUTO: 2.6 %
IMM GRANULOCYTES # BLD: 0 10E9/L (ref 0–0.4)
IMM GRANULOCYTES NFR BLD: 0.2 %
LYMPHOCYTES # BLD AUTO: 1.9 10E9/L (ref 0.8–5.3)
LYMPHOCYTES NFR BLD AUTO: 44.9 %
MONOCYTES # BLD AUTO: 0.4 10E9/L (ref 0–1.3)
MONOCYTES NFR BLD AUTO: 8.6 %
NEUTROPHILS # BLD AUTO: 1.8 10E9/L (ref 1.6–8.3)
NEUTROPHILS NFR BLD AUTO: 43.2 %
NRBC # BLD AUTO: 0 10*3/UL
NRBC BLD AUTO-RTO: 0 /100
WBC # BLD AUTO: 4.2 10E9/L (ref 4–11)

## 2021-06-17 PROCEDURE — 85004 AUTOMATED DIFF WBC COUNT: CPT | Performed by: STUDENT IN AN ORGANIZED HEALTH CARE EDUCATION/TRAINING PROGRAM

## 2021-06-17 PROCEDURE — 250N000013 HC RX MED GY IP 250 OP 250 PS 637: Performed by: PHYSICIAN ASSISTANT

## 2021-06-17 PROCEDURE — 99232 SBSQ HOSP IP/OBS MODERATE 35: CPT | Mod: GC | Performed by: PSYCHIATRY & NEUROLOGY

## 2021-06-17 PROCEDURE — 250N000013 HC RX MED GY IP 250 OP 250 PS 637: Performed by: PSYCHIATRY & NEUROLOGY

## 2021-06-17 PROCEDURE — 85048 AUTOMATED LEUKOCYTE COUNT: CPT | Performed by: STUDENT IN AN ORGANIZED HEALTH CARE EDUCATION/TRAINING PROGRAM

## 2021-06-17 PROCEDURE — 36415 COLL VENOUS BLD VENIPUNCTURE: CPT | Performed by: STUDENT IN AN ORGANIZED HEALTH CARE EDUCATION/TRAINING PROGRAM

## 2021-06-17 PROCEDURE — 124N000002 HC R&B MH UMMC

## 2021-06-17 RX ADMIN — METOPROLOL SUCCINATE 50 MG: 50 TABLET, EXTENDED RELEASE ORAL at 09:11

## 2021-06-17 RX ADMIN — LORAZEPAM 2 MG: 2 TABLET ORAL at 19:41

## 2021-06-17 RX ADMIN — LORAZEPAM 2 MG: 2 TABLET ORAL at 13:23

## 2021-06-17 RX ADMIN — OLANZAPINE 10 MG: 10 TABLET, ORALLY DISINTEGRATING ORAL at 19:41

## 2021-06-17 RX ADMIN — LORAZEPAM 1 MG: 1 TABLET ORAL at 09:12

## 2021-06-17 RX ADMIN — OLANZAPINE 10 MG: 10 TABLET, ORALLY DISINTEGRATING ORAL at 09:13

## 2021-06-17 RX ADMIN — LOSARTAN POTASSIUM 100 MG: 100 TABLET, FILM COATED ORAL at 09:13

## 2021-06-17 ASSESSMENT — ACTIVITIES OF DAILY LIVING (ADL)
DRESS: SCRUBS (BEHAVIORAL HEALTH)
DRESS: SCRUBS (BEHAVIORAL HEALTH)
LAUNDRY: UNABLE TO COMPLETE
HYGIENE/GROOMING: PROMPTS;HANDWASHING
ORAL_HYGIENE: PROMPTS
ORAL_HYGIENE: PROMPTS
HYGIENE/GROOMING: PROMPTS

## 2021-06-17 NOTE — PROGRESS NOTES
"[facility], [place]   Psychiatric Progress Note  Hospital Day: 64        Interim History:   The patient's care was discussed with the treatment team during the daily team meeting and/or staff's chart notes were reviewed. She remains isolative to her room. There were no instances of agitation or aggression overnight. She slept 6.25 hours overnight. Vital signs are stable (blood pressure improved/decreased from yesterday) and she is afebrile. She received PRN hydralazine for elevated blood pressure. She is taking her medications as prescribed, however was reluctant to take her 1400 dose of lorazepam yesterday. She initially declined a lab draw in the morning, however was later agreeable to having this done.  Per staff report, continues to appear slowed down, is responsive, and is steady on her feet. She appears flat and to have a sad affect. She continues to need things repeated, however she is better compared to last week. She demonstrates some disorganization and possibly paranoia. She continues to decline ADLs.     Upon interview, Michelle stated that she was \"fine\" however when asked about her mood she said \"I don't know\". She continues to feel confused and asks questions about how long she has been hospitalized. She knows that she is in a hospital in [place] and that it is the middle of June 2021. She was able to identify Dr. Zaman. She denied pain, auditory hallucinations, and visual hallucinations. She reported ongoing difficulty with her memory. Toward the end of our interview, she took out her notebook to write down the date on which she was admitted to the hospital, as well as today's date.         Medications:       LORazepam  1 mg Oral Daily    Or     LORazepam  1 mg Intramuscular Daily     LORazepam  2 mg Oral BID    Or     LORazepam  2 mg Intramuscular BID     losartan  100 mg Oral Daily     metoprolol succinate ER  50 mg Oral Daily     OLANZapine zydis  10 mg Oral BID    Or "     OLANZapine  10 mg Intramuscular BID          Allergies:     Allergies   Allergen Reactions     Haldol [Haloperidol]      Patient previously tolerated haldol, though developed oculogyric crises during hospital stay on 4/26/21 on total daily dose of 10 mg.     Lisinopril Cough          Labs:     Recent Results (from the past 24 hour(s))   Basic metabolic panel    Collection Time: 06/16/21 10:45 AM   Result Value Ref Range    Sodium 142 133 - 144 mmol/L    Potassium 3.8 3.4 - 5.3 mmol/L    Chloride 112 (H) 94 - 109 mmol/L    Carbon Dioxide 25 20 - 32 mmol/L    Anion Gap 5 3 - 14 mmol/L    Glucose 134 (H) 70 - 99 mg/dL    Urea Nitrogen 12 7 - 30 mg/dL    Creatinine 0.89 0.52 - 1.04 mg/dL    GFR Estimate 71 >60 mL/min/[1.73_m2]    GFR Estimate If Black 82 >60 mL/min/[1.73_m2]    Calcium 8.9 8.5 - 10.1 mg/dL   WBC and differential    Collection Time: 06/17/21  8:07 AM   Result Value Ref Range    WBC 4.2 4.0 - 11.0 10e9/L    Diff Method Automated Method     % Neutrophils 43.2 %    % Lymphocytes 44.9 %    % Monocytes 8.6 %    % Eosinophils 2.6 %    % Basophils 0.5 %    % Immature Granulocytes 0.2 %    Nucleated RBCs 0 0 /100    Absolute Neutrophil 1.8 1.6 - 8.3 10e9/L    Absolute Lymphocytes 1.9 0.8 - 5.3 10e9/L    Absolute Monocytes 0.4 0.0 - 1.3 10e9/L    Absolute Eosinophils 0.1 0.0 - 0.7 10e9/L    Absolute Basophils 0.0 0.0 - 0.2 10e9/L    Abs Immature Granulocytes 0.0 0 - 0.4 10e9/L    Absolute Nucleated RBC 0.0           Psychiatric Examination:     /71 (BP Location: Left arm)   Pulse 91   Temp 97.8  F (36.6  C) (Tympanic)   Resp 16   Wt 70.5 kg (155 lb 8 oz)   SpO2 96%   BMI 26.69 kg/m    Weight is 155 lbs 8 oz  Body mass index is 26.69 kg/m .    Weight over time:  Vitals:    05/25/21 0850 06/15/21 0811 06/16/21 1605   Weight: 71 kg (156 lb 8 oz) 69.8 kg (153 lb 12.8 oz) 70.5 kg (155 lb 8 oz)       Orthostatic Vitals     None            Cardiometabolic risk assessment. 04/15/21      Reviewed patient  "profile for cardiometabolic risk factors    Date taken /Value  REFERENCE RANGE   Abdominal Obesity  (Waist Circumference)   See nursing flowsheet Women ?35 in (88 cm)   Men ?40 in (102 cm)      Triglycerides  Triglycerides   Date Value Ref Range Status   10/19/2019 117 <150 mg/dL Final       ?150 mg/dL (1.7 mmol/L) or current treatment for elevated triglycerides   HDL cholesterol  HDL Cholesterol   Date Value Ref Range Status   10/19/2019 56 >49 mg/dL Final   ]   Women <50 mg/dL (1.3 mmol/L) in women or current treatment for low HDL cholesterol  Men <40 mg/dL (1 mmol/L) in men or current treatment for low HDL cholesterol     Fasting plasma glucose (FPG) Lab Results   Component Value Date     10/19/2019      FPG ?100 mg/dL (5.6 mmol/L) or treatment for elevated blood glucose   Blood pressure  BP Readings from Last 3 Encounters:   06/16/21 135/71   06/04/19 131/71   04/26/19 164/86    Blood pressure ?130/85 mmHg or treatment for elevated blood pressure   Family History  See family history     Appearance: dressed in hospital scrubs, appeared reported age, hair tied back.  Attitude: cooperative  Eye Contact: mostly poor/avoidant  Mood: \"I don't know\"  Affect:  Flat, constricted  Speech: accented, mostly coherent  Language: no obvious receptive or expressive defiits  Psychomotor, Gait, Musculoskeletal: No abnormal movements noted while seated.   Throught Process: linear  Associations:  No loosening of associations present  Thought Content:  Denied auditory and visual hallucinations.  Insight:  limited  Judgement:  limited  Oriented to: physician, state, month, and year.   Attention Span and Concentration: poor   Recent and Remote Memory: impaired, worsening with initiation of ECT however appears to be gradually improving  Fund of Knowledge:  normal    Clinical Global Impressions  First:  Considering your total clinical experience with this particular patient population, how severe are the patient's symptoms at " this time?: 7 (04/16/21 1428)  Compared to the patient's condition at the START of treatment, this patient's condition is: 4 (04/16/21 1428)  Most recent:  Considering your total clinical experience with this particular patient population, how severe are the patient's symptoms at this time?: 7 (05/13/21 0953)  Compared to the patient's condition at the START of treatment, this patient's condition is: 6 (05/13/21 0953)           Precautions:     Behavioral Orders   Procedures     Assault precautions     Cheeking Precautions (behavioral units)     Patient Observed swallowing PO medications; Patient asked to drink water after swallowing medication; Patient in Staff line of sight for 15 minutes after medication given; Mouth checks after PO administration (patient asked to open mouth and stick out their tongue).     Code 1 - Restrict to Unit     Code 2 - 1:1 Staff Supervision     For ECT only     Code 3     For walks in the [place] with staff and per staff discretion     Electroconvulsive therapy     Series of up to 12 treatments. Begin Date: 5/26/21     Treating Psychiatrist providing ECT:  Dr. Lubin     Notified on:  5/21/21     Electroconvulsive therapy     As long as we get the green light from risk management and pt is medically cleared, begin ECT every Monday, Wednesday, and Friday     Electroconvulsive therapy     Series of up to 12 treatments. Begin Date: 5/25/21     Treating Psychiatrist providing ECT:  Amee     Notified on:  5/24/21     Elopement precautions     Fall precautions     Mcgrath Calderon     Routine Programming     As clinically indicated     Status 15     Every 15 minutes.          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 Symptoms and presentation at this time is most consistent with Schizoaffective Disorder, Bipolar Type. We have obtained most recent medication regimen from patient's ACT team, and regimen was initially restarted. Inpatient psychiatric hospitalization is warranted at this time for safety, stabilization, and possible adjustment in medications. Pt is committed. We have petitioned for Alcides Calderon due to lack of improvement and side effects from medications.    Hospital Course:  On admission, PTA medications were restarted. However, patient had been declining all scheduled medications despite significant encouragement from staff and provider. Psychiatric emergency declared on 4/20 due to aggression toward others in context of severe psychosis and suspected excited catatonia. Ativan 1 mg TID was also added. Discontinued PTA Invega, Zyprexa, and thorazine on 4/20 due to consistent refusal.     On 4/26, it was noted that patient frequently had upward gaze while walking up and down the unit. She did not appear to be distressed. She reported that she was looking at \"my god.\" It was determined to be oculogyric crisis secondary to IM haloperidol. Haldol was subsequently discontinued during day shift on 4/26 and scheduled Zyprexa was initiated on emergency basis. Oral Cogentin was also scheduled, though patient declined. On the evening of 4/26, patient's gaze was fixed in upward position for several hours and she appeared to be experiencing discomfort. IM Cogentin was administered with noted resolution. Partial improvements weew observed after switch to scheduled Zyprexa. After patient improved, she was more receptive to reinitiating oral Invega, which was initiated on " "5/3 and titrated to PTA dose of 9 mg daily on 5/10. Plan was for ACT team to bring in loading dose of Invega [place]. Patient had signs of oculogyric crisis again with Invega. Oral dose decreased to 6 mg after this. Invega was stopped on 5/14 due to ongoing signs of problems related to eye movement and concerns for oculogyric crisis.    Overall improvement in patient's agitation and suspected catatonia noted on 4/30 after patient accepted two doses of Ativan. She began declining Ativan again with noted decompensation. Patient now accepting, however, she does not have the capacity to consent to treatment with Ativan at this time. She does not believe she has a mental illness, including catatonia. She does not fully understand risks associated with inadequate treatment of catatonia. Discussed with her son who is acting as surrogate decision maker and he is in full support of forced scheduled IM Ativan if patient declines oral formulation. Also consulted with our legal team prior to backing oral Ativan with IM.     Ativan was increased on 5/19 due to re-emerging evidence of catatonia (long periods of staring, repetitive movements, echolalia, mutism). Meeting was held with ACT team on 5/20, including ACT team psychiatrist, Dr. Pedraza. He said that he is in \"full support\" of plan to pursue Mcgrath Kohler and ECT at this time. He does feel that in the past she has been discharged from the hospital while still quite symptomatic. He is hoping that ECT will be effective and that with improvement, Michelle would be more receptive to clozapine and weekly blood draws. He said that ideally she would transition to an IRTS before going back to her apartment, but also understands there may be some barriers (I.e. pt's willingness, financial concerns, etc).     ECT consult placed. Please see consult note by Dr. Lubin on 5/21 for details. Alcides Kohler was approved on 5/24 and patient was medically cleared on 5/24. ECT initiated on " 5/26. She was noted to have a short seizure during ECT on 6/4. Staff note that patient appears more disoriented with memory impairment and sedation on days of ECT. This was noted on my examination again today. Improvements noted in mood, affect, social interactions, paranoia, agitation since initiation of ECT. Reduced Ativan on 6/5 due to sedative effects. Relayed concerns about memory impairment and confusion with Dr. Lubin. Recommended attempting unilateral ECT, which he agreed to do. First unilateral ECT on 6/7. ECT was held on 6/11 and 6/14 due to memory impairment. We will plan to hold it again tomorrow (6/16). There is ongoing discussion regarding whether there is a component of catatonia contributing to her current presentation but this is less likely since it worsened after ECT was started.  Given her level of cognitive impairment and our belief that this is due to ECT, we will not pursue any further treatments at this time. Since we have held ECT (last treatment on 6/9), her cognitive impairment has slightly improved (more oriented).     Michelle appears to be decompensating somewhat since ECT has been held, however her cognitive impairment persists. At this time, it may be worth starting clozapine, which is something that has previously been considered by her ACT team. ANC obtained on 6/16 was 1800/microL.    Target psychiatric symptoms and interventions:   - Resume Ativan 2 mg BID and 1 mg in the AM. Plan to taper if patient continues to appear sedated though she does appear to be experiencing ongoing catatonic features. Patient may not decline. Ativan to be given at 1700 on days prior to ECT and will be held on mornings before ECT. Because patient did not accept AM dose, IV Ativan 1 mg one time dose was given on 6/10.   - Oral Zyprexa 10 mg BID OR Zyprexa 10 mg IM. Hatch in place. May consider increasing further though holding off given re-emerging catatonic sx and borderline prolonged QTc     Continue  Cogentin 2 mg IM daily prn for evidence of acute dystonic reaction or oculogyric crisis  Continue hydroxyzine 25-50 mg q4h prn for acute anxiety  Continue Trazodone 50 mg at bedtime prn for sleep disturbances  Continue Zyprexa 10 mg TID prn for severe agitation  Continue Ativan/Benadryl q4h prn for agitation. WOULD GIVE PRN ATIVAN FIRST FOR AGITATION unless it is after 5 pm on day prior to ECT     ECT:  - HOLD ECT DUE TO COGNITIVE IMPAIRMENT.  - Because patient was only intermittently accepting scheduled Ativan and some symptoms of catatonia are re-emerging, pursued Alcides Kohler for ECT. She also continues to exhibit symptoms of psychosis and has not responded or has experienced side effects from multiple neuroleptic medications. Court hearing was held on 5/21. Alcides Kohler is approved. Patient is medically cleared. COVID negative on 5/24. Obtained ECT consult on 5/21/21. Please see Dr. Lubin's consultation note.   - NPO 8 hours prior to scheduled treatment. Clear liquids until 2 hours prior to treatment.   - Discussed with IM. May resume AM antihypertensives, no need to hold prior to ECT  - SIO while patient is NPO, starting at midnight on ECT days (renewed)  - Will complete acute course in the hospital; will likely need maintenance ECT in outpatient setting.   - New concerns about memory impairments: Switched from bilateral to unilateral ECT on 6/7 and continue to monitor closely. Dr. Lubin aware.     Acute Medical Problems and Treatments:  BRANDON, resolved:   Pre-renal in nature, likely secondary to decreased oral intake.   - IM Consulted on 6/10. Appreciate assistance. Follow-up note placed on 6/16 (no further work-up recommended).  - avoid nephrotoxins  - Renal bladder US completed on 6/10  - Encourage fluid intake.     HTN: Patient is now adherent with medication regimen.   - Metoprolol succinate ER 50 mg daily  - Cozaar 100 mg daily  - Please see note from IM dated 5/3/21, 5/6/21, and 5/24.  - Obtained EKG and  routine labs on 5/21 in context of pt declining vital sign checks and anti-hypertensives and recently elevated BPs. Reviewed labs and discussed EKG findings with IM on 5/21. No urgent concerns, though IM should be notified if pt develops acute medical concerns (I.e. heart palpitations, SOB, changes in speech, AMS, confusion, CP, HA, changes in vision).     CVA:  - Aspirin 81 mg daily     Chronic Constipation:  - Miralax 17 mg daily     Behavioral/Psychological/Social:  - Encourage unit programming  - Patient is now Code 3 status and can take walks in the [place] with staff and security present per staff discretion. This appears to be quite therapeutic for her.     Safety:  - Continue precautions as noted above  - Status 15 minute checks  - Safety precautions include: assault and elopement precautions  - Continue precautions as noted above    Legal Status: Committed as MI with Hatch in place for Haldol, Zyprexa, Invega, and Thorazine through [facility]. Filed Alcides Kohler through [facility] and approved on 5/24/21.     Disposition Plan   Reason for ongoing admission: poses an imminent risk to self, poses an imminent risk to others and is unable to care for self due to severe psychosis or tad  Discharge location: Likely group home  Discharge Medications: not ordered  Follow-up Appointments: not scheduled     Prior to discharge, treatment team will need to coordinate with ACT team to obtain JESSICA for Michelle's son in order to facilitate ongoing involvement in care.    Entered by: Bhavya Lane on 6/17/2021 at 8:41 AM     The patient was seen and discussed with attending psychiatrist Dr. Zaman.    Bhavya Lane MD  Psychiatry PGY-2

## 2021-06-17 NOTE — PLAN OF CARE
Patient presents as quiet and socially withdrawn with a flat affect.  She appears less confused and disorganized today, although she does seem to struggle with impoverished thought.  She is able to make her needs known, but it takes awhile for her to articulate her thoughts.  She was calm, pleasant, and cooperative on approach.  She was receptive to patient education on her currently prescribed medications and accepted a printout of her MAR report.  RN writer highlighted the dose and frequency of her scheduled medications to reinforce her plan of care.  Michelle agreed with this plan and was accepted her scheduled dose of Ativan 2 mg this afternoon without incident.  Michelle did not exhibit any signs of agitation or aggressive behavior this shift.  She received a care package from her son that came from [employer].  She allowed RN writer to review the contents of this package with her, and she hesitantly allowed RN writer to remove the plastic bags and food items that are not currently allowed (peanuts, bananas) given the allergy concerns of peers currently receiving care on the unit.  Michelle denies any acute physical concerns at this time.  She is ambulating with a balanced, steady gait.

## 2021-06-17 NOTE — PLAN OF CARE
Problem: Sleep Disturbance (Psychotic Signs/Symptoms)  Goal: Improved Sleep (Psychotic Signs/Symptoms)  Outcome: Improving     Night Shift Summary (6/16/21 into 06/17/21)    Pt asleep at start of shift. Breathing quiet and unlabored.     Pt had no c/o pain or discomfort during the HS.     Appears to have slept 6.25 hours.       Pt continues on SIO with ECT only.     Pt on ASSAULT, CHEEKING, ELOPEMENT, FALL and INTRUSIVE precautions in addition to single room order. Any related events noted above.     Will continue to monitor and assess.

## 2021-06-17 NOTE — PLAN OF CARE
Assessment/Intervention/Current Symtoms and Care Coordination:  Attended Team Meeting, Reviewed chart notes: Patient appears to be improving but remains somewhat confused. Pleasant on approach with brighter affect. CTC spoke with [facility] Team CM/Nurse to inform the Team that we are requesting a CADI Wairver be initiated as there is concern patient may be unable to live alone after discharge and may need more hands on support caring for herself.     Discharge Plan or Goal:  TBD as patient is continuing to stabilize. If patient becomes stable and able to care for self she will discharge to home with [facility] Team follow-up.      Barriers to Discharge:  Patient remains confused but improving. Patient is continuing to stabilize.      Referral Status:  No referrals have been made     Legal Status:  Committed/Hatch/Mcgrath Kohler through [facility]

## 2021-06-17 NOTE — PLAN OF CARE
Problem: Behavior Regulation Impairment (Psychotic Signs/Symptoms)  Goal: Improved Behavioral Control (Psychotic Signs/Symptoms)  Outcome: Improving     Problem: Cognitive Impairment Post-Electroconvulsive Therapy  Goal: Baseline Cognitive Function Maintained  Outcome: Improving    Patient isolative to room majority of the evening sleeping only up for meals and to use the restroom. Patient upon approach flat in affect but calm, polite, and cooperative with verbal assessment. Patient denying any symptoms of SI/SIB, AH/VH anxiety, depression, or thoughts of harming others. Patient accepting of HS medications with no stated or observed side effects. Patient cooperative with monitoring of output which was WNL this evening. Patient had elevated BP observed in the charting from days and into this evenings assessment. Patient accepting of PRN Hydralazine and cooperative with additional BP monitoring with it decreasing to WNL as night progressed. Patient intake appeared good accepting of dinner and snacks. Patient declined prompting to complete ADL's this evening. RN writer stood by and monitored patients gate appearing stable this evening with no noted unsteady gate.

## 2021-06-18 PROCEDURE — 99233 SBSQ HOSP IP/OBS HIGH 50: CPT | Mod: GC | Performed by: PSYCHIATRY & NEUROLOGY

## 2021-06-18 PROCEDURE — 250N000013 HC RX MED GY IP 250 OP 250 PS 637: Performed by: PHYSICIAN ASSISTANT

## 2021-06-18 PROCEDURE — 124N000002 HC R&B MH UMMC

## 2021-06-18 PROCEDURE — 250N000013 HC RX MED GY IP 250 OP 250 PS 637: Performed by: PSYCHIATRY & NEUROLOGY

## 2021-06-18 RX ADMIN — OLANZAPINE 10 MG: 10 TABLET, ORALLY DISINTEGRATING ORAL at 19:01

## 2021-06-18 RX ADMIN — OLANZAPINE 10 MG: 10 TABLET, ORALLY DISINTEGRATING ORAL at 08:55

## 2021-06-18 RX ADMIN — LORAZEPAM 2 MG: 2 TABLET ORAL at 14:01

## 2021-06-18 RX ADMIN — HYDRALAZINE HYDROCHLORIDE 25 MG: 25 TABLET ORAL at 19:01

## 2021-06-18 RX ADMIN — METOPROLOL SUCCINATE 50 MG: 50 TABLET, EXTENDED RELEASE ORAL at 08:55

## 2021-06-18 RX ADMIN — LORAZEPAM 1 MG: 1 TABLET ORAL at 08:55

## 2021-06-18 RX ADMIN — LORAZEPAM 2 MG: 2 TABLET ORAL at 19:01

## 2021-06-18 RX ADMIN — LOSARTAN POTASSIUM 100 MG: 100 TABLET, FILM COATED ORAL at 08:55

## 2021-06-18 ASSESSMENT — ACTIVITIES OF DAILY LIVING (ADL)
HYGIENE/GROOMING: PROMPTS
LAUNDRY: UNABLE TO COMPLETE
DRESS: SCRUBS (BEHAVIORAL HEALTH)
ORAL_HYGIENE: PROMPTS
ORAL_HYGIENE: PROMPTS
HYGIENE/GROOMING: PROMPTS
LAUNDRY: UNABLE TO COMPLETE
DRESS: SCRUBS (BEHAVIORAL HEALTH)

## 2021-06-18 NOTE — PLAN OF CARE
Assessment/Intervention/Current Symtoms and Care Coordination:  Attended Team Meeting, Reviewed chart notes: Patient isolative to self. ECT held today and no further treatments scheduled.  CTC left voice messages for [facility]  and  to discuss initiating a CADI Waiver in order to coordinate possible group home placement.     Discharge Plan or Goal:  TBD as patient remains confused and unable to care for herself, due to ongoing confusion unlikely she will be able to return home as she lives alone.     Barriers to Discharge:  Patient remains confused and requiring prompts to complete ADL's    Referral Status:  No referrals have been made    Legal Status:  Committed/Hatch/Mcgrath Kohler through [facility]

## 2021-06-18 NOTE — PROGRESS NOTES
"[facility], [place]   Psychiatric Progress Note  Hospital Day: 65        Interim History:   The patient's care was discussed with the treatment team during the daily team meeting and/or staff's chart notes were reviewed. She remains isolative to her room. There were no instances of agitation or aggression overnight. She slept 6.5 hours overnight. She declined to have her vital signs checked last night and also declined to engage in ADLs.  She is taking her medications as prescribed.  Per staff report, Michelle is isolative to her room, has been eating her meals, and seems more cognitively intact.     Upon interview, Michelle stated that she was \"good\" and that her mood was \"happy\" because she is alive. When asked about her confusion, she responded \"what confusion?\". She was wondering about where her belongings are and if they are safe. She stated that she has been trying to remember what brought her to the hospital. We spoke about medication changes that had been made during her hospitalization as well as that ECT had been discontinued due to cognitive impairment. She stated \"I have lost my memory\" and \"please find medication to my memory\". We reassured Michelle that we expect her memory will improve in the absence of ECT.          Medications:       LORazepam  1 mg Oral Daily    Or     LORazepam  1 mg Intramuscular Daily     LORazepam  2 mg Oral BID    Or     LORazepam  2 mg Intramuscular BID     losartan  100 mg Oral Daily     metoprolol succinate ER  50 mg Oral Daily     OLANZapine zydis  10 mg Oral BID    Or     OLANZapine  10 mg Intramuscular BID          Allergies:     Allergies   Allergen Reactions     Haldol [Haloperidol]      Patient previously tolerated haldol, though developed oculogyric crises during hospital stay on 4/26/21 on total daily dose of 10 mg.     Lisinopril Cough          Labs:     No results found for this or any previous visit (from the past 24 hour(s)).       Psychiatric " "Examination:     /81   Pulse 84   Temp 98.6  F (37  C) (Oral)   Resp 16   Wt 70.5 kg (155 lb 8 oz)   SpO2 96%   BMI 26.69 kg/m    Weight is 155 lbs 8 oz  Body mass index is 26.69 kg/m .    Weight over time:  Vitals:    05/25/21 0850 06/15/21 0811 06/16/21 1605   Weight: 71 kg (156 lb 8 oz) 69.8 kg (153 lb 12.8 oz) 70.5 kg (155 lb 8 oz)       Orthostatic Vitals     None            Cardiometabolic risk assessment. 04/15/21      Reviewed patient profile for cardiometabolic risk factors    Date taken /Value  REFERENCE RANGE   Abdominal Obesity  (Waist Circumference)   See nursing flowsheet Women ?35 in (88 cm)   Men ?40 in (102 cm)      Triglycerides  Triglycerides   Date Value Ref Range Status   10/19/2019 117 <150 mg/dL Final       ?150 mg/dL (1.7 mmol/L) or current treatment for elevated triglycerides   HDL cholesterol  HDL Cholesterol   Date Value Ref Range Status   10/19/2019 56 >49 mg/dL Final   ]   Women <50 mg/dL (1.3 mmol/L) in women or current treatment for low HDL cholesterol  Men <40 mg/dL (1 mmol/L) in men or current treatment for low HDL cholesterol     Fasting plasma glucose (FPG) Lab Results   Component Value Date     10/19/2019      FPG ?100 mg/dL (5.6 mmol/L) or treatment for elevated blood glucose   Blood pressure  BP Readings from Last 3 Encounters:   06/17/21 133/81   06/04/19 131/71   04/26/19 164/86    Blood pressure ?130/85 mmHg or treatment for elevated blood pressure   Family History  See family history     Appearance: dressed in hospital scrubs, appeared reported age, hair tied back.   Attitude: cooperative  Eye Contact: mostly poor/avoidant  Mood: \"I'm feeling happy\"  Affect:  Mostly flat, constricted, although does laugh and smile once.   Speech: accented, mostly coherent  Language: no obvious receptive or expressive defiits  Psychomotor, Gait, Musculoskeletal: No abnormal movements noted while seated.   Throught Process: linear  Associations:  No loosening of associations " "present  Thought Content:  No suicidal or homicidal ideation elicited.  Insight:  limited  Judgement:  limited  Oriented to: hospital and year. Stated \"I've forgotten\" when asked about city and state. Almost oriented to date (stated it was the 17th). Not oriented to month.   Attention Span and Concentration: poor   Recent and Remote Memory: impaired, worsened with initiation of ECT however appears to be gradually improving  Fund of Knowledge:  normal    Clinical Global Impressions  First:  Considering your total clinical experience with this particular patient population, how severe are the patient's symptoms at this time?: 7 (04/16/21 1428)  Compared to the patient's condition at the START of treatment, this patient's condition is: 4 (04/16/21 1428)  Most recent:  Considering your total clinical experience with this particular patient population, how severe are the patient's symptoms at this time?: 7 (05/13/21 0953)  Compared to the patient's condition at the START of treatment, this patient's condition is: 6 (05/13/21 0953)           Precautions:     Behavioral Orders   Procedures     Assault precautions     Cheeking Precautions (behavioral units)     Patient Observed swallowing PO medications; Patient asked to drink water after swallowing medication; Patient in Staff line of sight for 15 minutes after medication given; Mouth checks after PO administration (patient asked to open mouth and stick out their tongue).     Code 1 - Restrict to Unit     Code 3     For walks in [facility] with staff and per staff discretion     Electroconvulsive therapy     Series of up to 12 treatments. Begin Date: 5/26/21     Treating Psychiatrist providing ECT:  Dr. Lubin     Notified on:  5/21/21     Electroconvulsive therapy     As long as we get the green light from risk management and pt is medically cleared, begin ECT every Monday, Wednesday, and Friday     Electroconvulsive therapy     Series of up to 12 treatments. Begin " "Date: 5/25/21     Treating Psychiatrist providing ECT:  Amee     Notified on:  5/24/21     Elopement precautions     Fall precautions     Alcides Riverapard     Routine Programming     As clinically indicated     Status 15     Every 15 minutes.          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 Symptoms and presentation at this time is most consistent with Schizoaffective Disorder, Bipolar Type. We have obtained most recent medication regimen from patient's ACT team, and regimen was initially restarted. Inpatient psychiatric hospitalization is warranted at this time for safety, stabilization, and possible adjustment in medications. Pt is committed. We have petitioned for Mcgrath Calderon due to lack of improvement and side effects from medications.    Hospital Course:  On admission, PTA medications were restarted. However, patient had been declining all scheduled medications despite significant encouragement from staff and provider. Psychiatric emergency declared on 4/20 due to aggression toward others in context of severe psychosis and suspected excited catatonia. Ativan 1 mg TID was also added. Discontinued PTA Invega, Zyprexa, and thorazine on 4/20 due to consistent refusal.     On 4/26, it was noted that patient frequently had upward gaze while walking up and down the unit. She did not appear to be distressed. She reported that she was looking at \"my god.\" It was determined to be oculogyric crisis " "secondary to IM haloperidol. Haldol was subsequently discontinued during day shift on 4/26 and scheduled Zyprexa was initiated on emergency basis. Oral Cogentin was also scheduled, though patient declined. On the evening of 4/26, patient's gaze was fixed in upward position for several hours and she appeared to be experiencing discomfort. IM Cogentin was administered with noted resolution. Partial improvements weew observed after switch to scheduled Zyprexa. After patient improved, she was more receptive to reinitiating oral Invega, which was initiated on 5/3 and titrated to PTA dose of 9 mg daily on 5/10. Plan was for ACT team to bring in loading dose of Invega Sustenna. Patient had signs of oculogyric crisis again with Invega. Oral dose decreased to 6 mg after this. Invega was stopped on 5/14 due to ongoing signs of problems related to eye movement and concerns for oculogyric crisis.    Overall improvement in patient's agitation and suspected catatonia noted on 4/30 after patient accepted two doses of Ativan. She began declining Ativan again with noted decompensation. Patient now accepting, however, she does not have the capacity to consent to treatment with Ativan at this time. She does not believe she has a mental illness, including catatonia. She does not fully understand risks associated with inadequate treatment of catatonia. Discussed with her son who is acting as surrogate decision maker and he is in full support of forced scheduled IM Ativan if patient declines oral formulation. Also consulted with our legal team prior to backing oral Ativan with IM.     Ativan was increased on 5/19 due to re-emerging evidence of catatonia (long periods of staring, repetitive movements, echolalia, mutism). Meeting was held with ACT team on 5/20, including ACT team psychiatrist, Dr. Pedraza. He said that he is in \"full support\" of plan to pursue Mcgrath Kohler and ECT at this time. He does feel that in the past she has been " discharged from the hospital while still quite symptomatic. He is hoping that ECT will be effective and that with improvement, Michelle would be more receptive to clozapine and weekly blood draws. He said that ideally she would transition to an IRTS before going back to her apartment, but also understands there may be some barriers (I.e. pt's willingness, financial concerns, etc).     ECT consult placed. Please see consult note by Dr. Lubin on 5/21 for details. Alcides Kohler was approved on 5/24 and patient was medically cleared on 5/24. ECT initiated on 5/26. She was noted to have a short seizure during ECT on 6/4. Staff note that patient appears more disoriented with memory impairment and sedation on days of ECT. This was noted on my examination again today. Improvements noted in mood, affect, social interactions, paranoia, agitation since initiation of ECT. Reduced Ativan on 6/5 due to sedative effects. Relayed concerns about memory impairment and confusion with Dr. Lubin. Recommended attempting unilateral ECT, which he agreed to do. First unilateral ECT on 6/7. ECT was held on 6/11 and 6/14 due to memory impairment. We will plan to hold it again tomorrow (6/16). There is ongoing discussion regarding whether there is a component of catatonia contributing to her current presentation but this is less likely since it worsened after ECT was started.  Given her level of cognitive impairment and our belief that this is due to ECT, we will not pursue any further treatments at this time. Since we have held ECT (last treatment on 6/9), her cognitive impairment has slightly improved (more oriented).     Michelle appears to be decompensating somewhat since ECT has been held, however her cognitive impairment persists. If symptoms of psychosis re-emerge, it may be worth starting clozapine, which is something that has previously been considered by her ACT team. ANC obtained on 6/16 was 1800/microL, therefore we would want to involve  hematology prior to starting clozapine. Per conversation with pharmacy, neutropenia has been associated with olanzapine and agranulocytosis has been associated with olanzapine, lorazepam, metoprolol, and hydralazine. We will continue to monitor her hematologic labs. At this time, the primary symptoms we are observing are cognitive deficits and inability to care for self, which we would not address by changing her antipsychotic medication.    Target psychiatric symptoms and interventions:   - Resume Ativan 2 mg BID and 1 mg in the AM. Plan to taper if patient continues to appear sedated though she does appear to be experiencing ongoing catatonic features. Patient may not decline. Ativan to be given at 1700 on days prior to ECT and will be held on mornings before ECT. Because patient did not accept AM dose, IV Ativan 1 mg one time dose was given on 6/10.   - Oral Zyprexa 10 mg BID OR Zyprexa 10 mg IM. Hatch in place. May consider increasing further though holding off given re-emerging catatonic sx and borderline prolonged QTc   Continue Cogentin 2 mg IM daily prn for evidence of acute dystonic reaction or oculogyric crisis  Continue hydroxyzine 25-50 mg q4h prn for acute anxiety  Continue Trazodone 50 mg at bedtime prn for sleep disturbances  Continue Zyprexa 10 mg TID prn for severe agitation  Continue Ativan/Benadryl q4h prn for agitation. WOULD GIVE PRN ATIVAN FIRST FOR AGITATION unless it is after 5 pm on day prior to ECT     ECT:  - HOLD ECT DUE TO COGNITIVE IMPAIRMENT.  - Because patient was only intermittently accepting scheduled Ativan and some symptoms of catatonia are re-emerging, pursued Alcides Kohler for ECT. She also continues to exhibit symptoms of psychosis and has not responded or has experienced side effects from multiple neuroleptic medications. Court hearing was held on 5/21. Alcides Kohler is approved. Patient is medically cleared. COVID negative on 5/24. Obtained ECT consult on 5/21/21. Please  see Dr. Lubin's consultation note.   - NPO 8 hours prior to scheduled treatment. Clear liquids until 2 hours prior to treatment.   - Discussed with IM. May resume AM antihypertensives, no need to hold prior to ECT  - SIO while patient is NPO, starting at midnight on ECT days (renewed)  - Will complete acute course in the hospital; will likely need maintenance ECT in outpatient setting.   - New concerns about memory impairments: Switched from bilateral to unilateral ECT on 6/7 and continue to monitor closely. Dr. Lubin aware.     Acute Medical Problems and Treatments:  BRANDON, resolved:   Pre-renal in nature, likely secondary to decreased oral intake.   - IM Consulted on 6/10. Appreciate assistance. Follow-up note placed on 6/16 (no further work-up recommended).  - avoid nephrotoxins  - Renal bladder US completed on 6/10  - Encourage fluid intake.     HTN: Patient is now adherent with medication regimen.   - Metoprolol succinate ER 50 mg daily  - Cozaar 100 mg daily  - Please see note from IM dated 5/3/21, 5/6/21, and 5/24.  - Obtained EKG and routine labs on 5/21 in context of pt declining vital sign checks and anti-hypertensives and recently elevated BPs. Reviewed labs and discussed EKG findings with IM on 5/21. No urgent concerns, though IM should be notified if pt develops acute medical concerns (I.e. heart palpitations, SOB, changes in speech, AMS, confusion, CP, HA, changes in vision).     CVA:  - Aspirin 81 mg daily     Chronic Constipation:  - Miralax 17 mg daily    ANC trending downward since admission, still WNL  - CBC with differential ordered for Monday.   - Per conversation with pharmacy, medications Michelle is taking that are associated with  agranulocytosis include lorazepam (scheduled), metoprolol (scheduled), hydralazine (PRN), and olanzapine (scheduled).   - Will consult internal medicine for further guidance if Michelle declines future blood draws and we are not able to monitor her ANC.       Behavioral/Psychological/Social:  - Encourage unit programming  - Patient is now Code 3 status and can take walks in the [place] with staff and security present per staff discretion. This appears to be quite therapeutic for her.     Safety:  - Continue precautions as noted above  - Status 15 minute checks  - Safety precautions include: assault and elopement precautions  - Continue precautions as noted above    Legal Status: Committed as MI with Hatch in place for Haldol, Zyprexa, Invega, and Thorazine through [facility]. Filed Alcides Kohler through [facility] and approved on 5/24/21.     Disposition Plan   Reason for ongoing admission: poses an imminent risk to self, poses an imminent risk to others and is unable to care for self due to severe psychosis or tad  Discharge location: Group home vs home with [facility] ACT team  Discharge Medications: not ordered  Follow-up Appointments: not scheduled     Prior to discharge, treatment team will need to coordinate with ACT team to obtain JESSICA for Michelle's son in order to facilitate ongoing involvement in care.    Entered by: Bhavya Lane on 6/18/2021 at 8:43 AM     The patient was seen and discussed with attending psychiatrist Dr. Zaman.    Bhavya Lane MD  Psychiatry PGY-2

## 2021-06-18 NOTE — PLAN OF CARE
"  Problem: Adult Inpatient Plan of Care  Goal: Optimal Comfort and Wellbeing  Intervention: Provide Person-Centered Care  Recent Flowsheet Documentation  Taken 6/17/2021 1951 by Brady Rodriguez RN  Trust Relationship/Rapport:    care explained    choices provided    emotional support provided    empathic listening provided    questions answered    questions encouraged    reassurance provided    thoughts/feelings acknowledged     Problem: Adult Inpatient Plan of Care  Goal: Plan of Care Review  Outcome: No Change  Flowsheets (Taken 6/17/2021 1951)  Plan of Care Reviewed With: patient    Patient isolative to room sleeping majority of the evening only up for meals/medications and to use the phone. Patient upon approach blunted and flat in affect but calm and cooperative with verbal assessment. Patient denies symptoms of SI/SIB, AH/VH, anxiety, depression or thoughts of harming others but does report that she feels she is experiencing memory loss. Patient oriented x4 and able to recall staff and family when they called. Patient diet and mobility appeared WNL this evening with no stated complaints. Patient accepting of HS medications though confused about reason for taking the medications stating \"how long have I been taking the medications and why do I take them?\" RN writer attempted to educate the patient on the medications though she appeared confused and delayed in responses when RN attempting to use teach back. Patient refused vital sign assessment, not receptive to education concerning importance of monitoring with past hypertension. Patient prompted for ADL's this evening, she declined stating she will complete them tomorrow.      "

## 2021-06-18 NOTE — PLAN OF CARE
Problem: Cognitive Impairment (Psychotic Signs/Symptoms)  Goal: Optimal Cognitive Function (Psychotic Signs/Symptoms)  Outcome: Improving  Intervention: Support and Promote Cognitive Ability  Recent Flowsheet Documentation  Taken 6/18/2021 6393 by Tasha Davis RN  Trust Relationship/Rapport:   care explained   choices provided   emotional support provided   empathic listening provided   questions answered   reassurance provided   thoughts/feelings acknowledged     Problem: Behavioral Health Plan of Care  Goal: Adheres to Safety Considerations for Self and Others  Outcome: No Change     Problem: Behavioral Health Plan of Care  Goal: Optimized Coping Skills in Response to Life Stressors  Outcome: No Change     Pt presented as alert and oriented to place and self throughout shift.  She was primarily isolative to her room, while occasionally presenting in the milieu, withdrawn in a chair.  Pt appeared disheveled and denied ADLs this shift, regardless of staff encouragement.  Her speech was clear and coherent.  Pt ate meals and was medication compliant.  She denied SI/HI/SIB.  She did not appear to be responding to any psychosis.  Pt denied any physical health concerns, pain, or side effects from medications this shift.

## 2021-06-18 NOTE — PLAN OF CARE
Problem: Behavior Regulation Impairment (Psychotic Signs/Symptoms)  Goal: Improved Behavioral Control (Psychotic Signs/Symptoms)  Outcome: No Change    Patient on assault and fall precautions. Appeared sleeping all night. Slept a total of 6.5 hours

## 2021-06-18 NOTE — PROGRESS NOTES
Writer left voice messages for [facility]  and  requesting a call back as patient wants to make sure her belongings at home are secure.

## 2021-06-19 PROCEDURE — 250N000013 HC RX MED GY IP 250 OP 250 PS 637: Performed by: PSYCHIATRY & NEUROLOGY

## 2021-06-19 PROCEDURE — 250N000013 HC RX MED GY IP 250 OP 250 PS 637: Performed by: PHYSICIAN ASSISTANT

## 2021-06-19 PROCEDURE — 124N000002 HC R&B MH UMMC

## 2021-06-19 RX ADMIN — LORAZEPAM 1 MG: 1 TABLET ORAL at 08:02

## 2021-06-19 RX ADMIN — LOSARTAN POTASSIUM 100 MG: 100 TABLET, FILM COATED ORAL at 08:02

## 2021-06-19 RX ADMIN — OLANZAPINE 10 MG: 10 TABLET, ORALLY DISINTEGRATING ORAL at 08:02

## 2021-06-19 RX ADMIN — LORAZEPAM 2 MG: 2 TABLET ORAL at 14:18

## 2021-06-19 RX ADMIN — LORAZEPAM 2 MG: 2 TABLET ORAL at 19:42

## 2021-06-19 RX ADMIN — OLANZAPINE 10 MG: 10 TABLET, ORALLY DISINTEGRATING ORAL at 19:42

## 2021-06-19 RX ADMIN — HYDRALAZINE HYDROCHLORIDE 25 MG: 25 TABLET ORAL at 08:56

## 2021-06-19 RX ADMIN — METOPROLOL SUCCINATE 50 MG: 50 TABLET, EXTENDED RELEASE ORAL at 08:02

## 2021-06-19 ASSESSMENT — ACTIVITIES OF DAILY LIVING (ADL)
DRESS: SCRUBS (BEHAVIORAL HEALTH)
ORAL_HYGIENE: PROMPTS
HYGIENE/GROOMING: PROMPTS

## 2021-06-19 NOTE — PROGRESS NOTES
Patient had blood pressure this morning of 169/89 and 169/90. Pt was given PRN Hydralazine.  Will check blood pressure again in 30-40 minutes. Patient BP was taken again at 1108 and BP was 160/82. On call provider was notified.

## 2021-06-19 NOTE — PLAN OF CARE
"  Problem: Adult Inpatient Plan of Care  Goal: Plan of Care Review  6/19/2021 1823 by Mary Randall RN  Outcome: No Change  Pt was isolative to her room this evening. She had a flat affect, but she was pleasant initially during the assessment. Pt got angry when writer asked her about SI and HI. She raised her voice at writer and said \" Why are you asking me these stupid questions? Who ask stupid questions like this ?\" Pt went on and on repeating herself. She took her HS meds. Pt took a phone call from her son. Pt was concern about weight gain. She wants smaller portions because she has gained a lot of weight according to her. Writer left a sticky note for the MD requesting nutrition consult. Pt denied pain. She napped and  watched TV in room .  "

## 2021-06-19 NOTE — PLAN OF CARE
Problem: Adult Inpatient Plan of Care  Goal: Plan of Care Review  Outcome: No Change  Flowsheets (Taken 6/18/2021 1911)  Plan of Care Reviewed With: patient     Patient isolative to room sleeping and watching television, only in the milieu to use the phone and for meals. Patient upon approach flat in affect brightening at times during socialization. Patient oriented x4, though disoriented to care plan. Patient denies any symptoms of SI/SIB, AH/Vh, anxiety, depression, or thoughts of harming others. Patient accepting of HS medications with no stated or observed side effects. Patient did receive PRN Hydralazine this evening with a systolic over 180. Patient reassessed with systolic dropping back down towards patients trend and normal limits. Patient denied symptoms of SOB, chest pain, or headache. Patient slept the remainder of the evening. Patient declined prompting for ADL's this evening stating plan to complete shower tomorrow morning after removing garry in hair.

## 2021-06-19 NOTE — PROVIDER NOTIFICATION
06/19/21 0600   Sleep/Rest/Relaxation   Sleep/Rest/Relaxation (WDL) WDL   Sleep/Rest/Relaxation appears asleep;no problem identified   Night Time # Hours 6.5 hours   Patient noted as sleeping peacefully through the night with no signs or complaints of distress; no PRN medications given overnight. Will continue to monitor per policy and to provide for safety and for comfort

## 2021-06-19 NOTE — PLAN OF CARE
Patient was out of room for majority of shift. She took her scheduled medications cooperatively.  She ate breakfast with good appetite.  She requested that she be allowed to have some of the bananas and peanuts that were in her locker. Neither of those items should have been allowed on the unit since there is a patient who is allergic to both of those items.  Also the bananas were ripening and should not be stored on unit.  She was unhappy that she was told no.    Patient allowed two of the unit staff to remove the extensions on her hair and unbraid the connections.  Her scalp had lots of left over gel from the placement of the electrodes in her hair during ECT.  She sat patiently for approximately one hour while this was being done.  She was very appreciative of the staff who helped her with this. She also allowed her fingernails to be painted and she seemed pleased when told how beautiful she looked. Patient ate a fair lunch.  She took her 1400 meds without difficulty.  Pt denies suicide ideation.

## 2021-06-20 PROCEDURE — 250N000013 HC RX MED GY IP 250 OP 250 PS 637: Performed by: PHYSICIAN ASSISTANT

## 2021-06-20 PROCEDURE — 124N000002 HC R&B MH UMMC

## 2021-06-20 PROCEDURE — 250N000013 HC RX MED GY IP 250 OP 250 PS 637: Performed by: PSYCHIATRY & NEUROLOGY

## 2021-06-20 RX ADMIN — LOSARTAN POTASSIUM 100 MG: 100 TABLET, FILM COATED ORAL at 08:57

## 2021-06-20 RX ADMIN — OLANZAPINE 10 MG: 10 TABLET, ORALLY DISINTEGRATING ORAL at 19:14

## 2021-06-20 RX ADMIN — LORAZEPAM 2 MG: 2 TABLET ORAL at 14:02

## 2021-06-20 RX ADMIN — HYDRALAZINE HYDROCHLORIDE 25 MG: 25 TABLET ORAL at 19:15

## 2021-06-20 RX ADMIN — OLANZAPINE 10 MG: 10 TABLET, ORALLY DISINTEGRATING ORAL at 08:57

## 2021-06-20 RX ADMIN — HYDRALAZINE HYDROCHLORIDE 25 MG: 25 TABLET ORAL at 08:58

## 2021-06-20 RX ADMIN — LORAZEPAM 2 MG: 2 TABLET ORAL at 19:14

## 2021-06-20 RX ADMIN — LORAZEPAM 1 MG: 1 TABLET ORAL at 08:57

## 2021-06-20 RX ADMIN — METOPROLOL SUCCINATE 50 MG: 50 TABLET, EXTENDED RELEASE ORAL at 08:57

## 2021-06-20 ASSESSMENT — ACTIVITIES OF DAILY LIVING (ADL)
ORAL_HYGIENE: PROMPTS
HYGIENE/GROOMING: PROMPTS
ORAL_HYGIENE: PROMPTS
LAUNDRY: UNABLE TO COMPLETE
HYGIENE/GROOMING: PROMPTS
DRESS: SCRUBS (BEHAVIORAL HEALTH)
DRESS: SCRUBS (BEHAVIORAL HEALTH)

## 2021-06-20 NOTE — CONSULTS
Brief Medicine Note:    Internal medicine re-consulted due to hypertension. Patient previously seen by internal medicine during this admission for hypertension, please see note by Helen Mao PA-C on 05/06/2021.    In review of her BP over past several day, has had majority BP readings ranging from 116/70 to 155/80 with exception of 2 outliers that were more elevated.     Would be hesitant to increase her long acting BP medications at this time given fluctuations in her BP over past week and only meeting criteria for PRN a few times. She is already on maximum dose of losartan with room to increase metoprolol. Otherwise would need to add additional agent.       Plan  - Continue pta losartan and metoprolol  - Hydralazine 25 mg QID PRN for SBP >160 or DBP >110  - Please notify IM if BP is persistently severely elevated and requiring frequent PRN hydralazine (has only met PRN criteria x3)    Internal medicine will sign off at this time. Please reach out with any further questions or concerns.    Alexa Zabala PA-C  Internal Medicine ROBERT Hospitalist  [facility]  Pager: [phone]

## 2021-06-20 NOTE — PLAN OF CARE
"Michelle has spent much of the shift in her room. She was minimally talkative even with encouragement by staff. Affect was flat. She cooperated with having vitals taken and with taking her meds as scheduled.  Patient ate with fair to good appetite.  She denies SI/HI/SIB.  When asked how she was feeling she responded \"I don't know.\"  She was then asked where she was at.  She just stared at writer but did not answer. She was told that she is at [facility] and she smiled slightly and said \"I know.\"     "

## 2021-06-20 NOTE — PLAN OF CARE
Problem: Sleep Disturbance (Psychotic Signs/Symptoms)  Goal: Improved Sleep (Psychotic Signs/Symptoms)  Outcome: Improving      Night Shift Summary (6/19/21 into 06/20/21)     Pt asleep at start of shift. Breathing quiet and unlabored.      Pt denied pain or discomfort during the HS.      Appears to have slept 7 hours.      Pt on ASSAULT,  CHEEKING, ELOPEMENT andFALL precautions in addition to single room order. Any related events noted above.      Will continue to monitor and assess.

## 2021-06-20 NOTE — PROVIDER NOTIFICATION
06/20/21 0600   Sleep/Rest/Relaxation   Sleep/Rest/Relaxation (WDL) WDL   Sleep/Rest/Relaxation appears asleep   Night Time # Hours 7 hours   Patient noted as sleeping throughout the night with no signs of distress or complaints of discomfort.

## 2021-06-21 LAB
BASOPHILS # BLD AUTO: 0 10E9/L (ref 0–0.2)
BASOPHILS NFR BLD AUTO: 0.2 %
DIFFERENTIAL METHOD BLD: NORMAL
EOSINOPHIL # BLD AUTO: 0.1 10E9/L (ref 0–0.7)
EOSINOPHIL NFR BLD AUTO: 1.2 %
ERYTHROCYTE [DISTWIDTH] IN BLOOD BY AUTOMATED COUNT: 12.6 % (ref 10–15)
HCT VFR BLD AUTO: 42.7 % (ref 35–47)
HGB BLD-MCNC: 14.2 G/DL (ref 11.7–15.7)
IMM GRANULOCYTES # BLD: 0 10E9/L (ref 0–0.4)
IMM GRANULOCYTES NFR BLD: 0.2 %
LABORATORY COMMENT REPORT: NORMAL
LYMPHOCYTES # BLD AUTO: 1.8 10E9/L (ref 0.8–5.3)
LYMPHOCYTES NFR BLD AUTO: 29.7 %
MCH RBC QN AUTO: 30.4 PG (ref 26.5–33)
MCHC RBC AUTO-ENTMCNC: 33.3 G/DL (ref 31.5–36.5)
MCV RBC AUTO: 91 FL (ref 78–100)
MONOCYTES # BLD AUTO: 0.4 10E9/L (ref 0–1.3)
MONOCYTES NFR BLD AUTO: 6.1 %
NEUTROPHILS # BLD AUTO: 3.7 10E9/L (ref 1.6–8.3)
NEUTROPHILS NFR BLD AUTO: 62.6 %
NRBC # BLD AUTO: 0 10*3/UL
NRBC BLD AUTO-RTO: 0 /100
PLATELET # BLD AUTO: 185 10E9/L (ref 150–450)
RBC # BLD AUTO: 4.67 10E12/L (ref 3.8–5.2)
SARS-COV-2 RNA RESP QL NAA+PROBE: NEGATIVE
SPECIMEN SOURCE: NORMAL
WBC # BLD AUTO: 5.9 10E9/L (ref 4–11)

## 2021-06-21 PROCEDURE — 36415 COLL VENOUS BLD VENIPUNCTURE: CPT | Performed by: STUDENT IN AN ORGANIZED HEALTH CARE EDUCATION/TRAINING PROGRAM

## 2021-06-21 PROCEDURE — 85025 COMPLETE CBC W/AUTO DIFF WBC: CPT | Performed by: STUDENT IN AN ORGANIZED HEALTH CARE EDUCATION/TRAINING PROGRAM

## 2021-06-21 PROCEDURE — 250N000013 HC RX MED GY IP 250 OP 250 PS 637: Performed by: PHYSICIAN ASSISTANT

## 2021-06-21 PROCEDURE — 124N000002 HC R&B MH UMMC

## 2021-06-21 PROCEDURE — 99231 SBSQ HOSP IP/OBS SF/LOW 25: CPT | Mod: GC | Performed by: PSYCHIATRY & NEUROLOGY

## 2021-06-21 PROCEDURE — 250N000013 HC RX MED GY IP 250 OP 250 PS 637: Performed by: PSYCHIATRY & NEUROLOGY

## 2021-06-21 PROCEDURE — 87635 SARS-COV-2 COVID-19 AMP PRB: CPT | Performed by: STUDENT IN AN ORGANIZED HEALTH CARE EDUCATION/TRAINING PROGRAM

## 2021-06-21 RX ADMIN — METOPROLOL SUCCINATE 50 MG: 50 TABLET, EXTENDED RELEASE ORAL at 08:43

## 2021-06-21 RX ADMIN — LOSARTAN POTASSIUM 100 MG: 100 TABLET, FILM COATED ORAL at 08:43

## 2021-06-21 RX ADMIN — HYDRALAZINE HYDROCHLORIDE 25 MG: 25 TABLET ORAL at 18:55

## 2021-06-21 RX ADMIN — LORAZEPAM 2 MG: 2 TABLET ORAL at 20:38

## 2021-06-21 RX ADMIN — LORAZEPAM 1 MG: 1 TABLET ORAL at 08:42

## 2021-06-21 RX ADMIN — OLANZAPINE 10 MG: 10 TABLET, ORALLY DISINTEGRATING ORAL at 20:38

## 2021-06-21 RX ADMIN — OLANZAPINE 10 MG: 10 TABLET, ORALLY DISINTEGRATING ORAL at 08:43

## 2021-06-21 RX ADMIN — LORAZEPAM 2 MG: 2 TABLET ORAL at 13:05

## 2021-06-21 ASSESSMENT — ACTIVITIES OF DAILY LIVING (ADL)
ORAL_HYGIENE: INDEPENDENT;PROMPTS
DRESS: SCRUBS (BEHAVIORAL HEALTH)
HYGIENE/GROOMING: INDEPENDENT;PROMPTS
LAUNDRY: UNABLE TO COMPLETE
LAUNDRY: UNABLE TO COMPLETE
DRESS: SCRUBS (BEHAVIORAL HEALTH)
HYGIENE/GROOMING: PROMPTS
ORAL_HYGIENE: PROMPTS

## 2021-06-21 NOTE — PLAN OF CARE
Problem: Sleep Disturbance (Psychotic Signs/Symptoms)  Goal: Improved Sleep (Psychotic Signs/Symptoms)  Outcome: Improving       Night Shift Summary (6/20/21 into 06/21/21)    Pt asleep at start of shift. Breathing quiet and unlabored.     Pt had no c/o pain or discomfort during the HS.     Appears to have slept 6 hours.     Pt on SUICIDE, ASSAULT, CHEEKING, ELOPEMENT, FALL and INTRUSIVE precautions in addition to single room order. Any related events noted above.     Will continue to monitor and assess.

## 2021-06-21 NOTE — PLAN OF CARE
Problem: Adult Inpatient Plan of Care  Goal: Optimal Comfort and Wellbeing  Outcome: Improving  Intervention: Provide Person-Centered Care  Recent Flowsheet Documentation  Taken 6/21/2021 0853 by Tasha Davis RN  Trust Relationship/Rapport:    care explained    choices provided    emotional support provided    empathic listening provided    questions answered    questions encouraged    reassurance provided    thoughts/feelings acknowledged     Problem: Cognitive Impairment (Psychotic Signs/Symptoms)  Goal: Optimal Cognitive Function (Psychotic Signs/Symptoms)  Outcome: Improving  Intervention: Support and Promote Cognitive Ability  Recent Flowsheet Documentation  Taken 6/21/2021 0853 by Tasha Davis RN  Trust Relationship/Rapport:    care explained    choices provided    emotional support provided    empathic listening provided    questions answered    questions encouraged    reassurance provided    thoughts/feelings acknowledged     Pt presented as alert and oriented to place and self throughout shift.  She was occasionally visible in the milieu, pacing the halls throughout the day.  Pt's appearance was slightly disheveled, however she requested to shower in the morning without cues.  Pt ate meals and was medication compliant. She denied SI/HI/SIB.  Pt denied psychosis.  She did not appear to have and acute physical health concerns, pain, or side effects to medications during this shift.

## 2021-06-21 NOTE — PLAN OF CARE
Assessment/Intervention/Current Symtoms and Care Coordination:  Attended Team Meeting, Reviewed chart notes: Patient is pleasant and friendly on approach but continues to need prompting to complete ADL's.  ECT treatments have been held due to patient's confusion.     Discharge Plan or Goal:  TBD as original plan was to have patient return home with ACT Team but patient may need a higher level of care due to ongoing confusion and inability to care for herself. [facility] has discussed getting a CADI Waiver with her [facility].     Barriers to Discharge:  Patient remains symptomatic and requires prompting from staff to care for herself. Patient is continuing to stabilize.      Referral Status:  No referrals have been made    Legal Status:  Committed/Hatch/Mcgrath Kohler through [facility]

## 2021-06-21 NOTE — PROGRESS NOTES
"  This patient has been seen and evaluated by me, Kal Reeder MD on the date of this note. I have discussed this patient with the resident, Dr. Lane and I agree with the findings and plan in this note. I have reviewed today's vital signs, medications and labs    [facility], [place]   Psychiatric Progress Note  Hospital Day: 68        Interim History:   The patient's care was discussed with the treatment team during the daily team meeting and/or staff's chart notes were reviewed. There were no instances of agitation or aggression over the weekend. She slept 6 hours overnight. She is taking her medications as prescribed.  Per staff report, Michelle had a good weekend, appears significantly better, and engaged in ADLs related to hygiene.     Upon interview, Michelle stated that she was \"fine\" and wondered where Dr. Zaman was. She was able to identify today as Monday, June 21. When asked if she was feeling sad she stated \"I don't feel sad\".          Medications:       LORazepam  1 mg Oral Daily    Or     LORazepam  1 mg Intramuscular Daily     LORazepam  2 mg Oral BID    Or     LORazepam  2 mg Intramuscular BID     losartan  100 mg Oral Daily     metoprolol succinate ER  50 mg Oral Daily     OLANZapine zydis  10 mg Oral BID    Or     OLANZapine  10 mg Intramuscular BID          Allergies:     Allergies   Allergen Reactions     Haldol [Haloperidol]      Patient previously tolerated haldol, though developed oculogyric crises during hospital stay on 4/26/21 on total daily dose of 10 mg.     Lisinopril Cough          Labs:     Recent Results (from the past 24 hour(s))   CBC with platelets differential    Collection Time: 06/21/21  9:15 AM   Result Value Ref Range    WBC 5.9 4.0 - 11.0 10e9/L    RBC Count 4.67 3.8 - 5.2 10e12/L    Hemoglobin 14.2 11.7 - 15.7 g/dL    Hematocrit 42.7 35.0 - 47.0 %    MCV 91 78 - 100 fl    MCH 30.4 26.5 - 33.0 pg    MCHC 33.3 31.5 - 36.5 g/dL    RDW 12.6 10.0 - 15.0 % "    Platelet Count 185 150 - 450 10e9/L    Diff Method Automated Method     % Neutrophils 62.6 %    % Lymphocytes 29.7 %    % Monocytes 6.1 %    % Eosinophils 1.2 %    % Basophils 0.2 %    % Immature Granulocytes 0.2 %    Nucleated RBCs 0 0 /100    Absolute Neutrophil 3.7 1.6 - 8.3 10e9/L    Absolute Lymphocytes 1.8 0.8 - 5.3 10e9/L    Absolute Monocytes 0.4 0.0 - 1.3 10e9/L    Absolute Eosinophils 0.1 0.0 - 0.7 10e9/L    Absolute Basophils 0.0 0.0 - 0.2 10e9/L    Abs Immature Granulocytes 0.0 0 - 0.4 10e9/L    Absolute Nucleated RBC 0.0           Psychiatric Examination:     BP (!) 160/89 (BP Location: Right arm)   Pulse 62   Temp 98  F (36.7  C) (Tympanic)   Resp 16   Wt 69.5 kg (153 lb 3.2 oz)   SpO2 98%   BMI 26.30 kg/m    Weight is 153 lbs 3.2 oz  Body mass index is 26.3 kg/m .    Weight over time:  Vitals:    05/25/21 0850 06/15/21 0811 06/16/21 1605 06/19/21 0859   Weight: 71 kg (156 lb 8 oz) 69.8 kg (153 lb 12.8 oz) 70.5 kg (155 lb 8 oz) 71.4 kg (157 lb 8 oz)    06/21/21 0805   Weight: 69.5 kg (153 lb 3.2 oz)       Orthostatic Vitals     None            Cardiometabolic risk assessment. 04/15/21      Reviewed patient profile for cardiometabolic risk factors    Date taken /Value  REFERENCE RANGE   Abdominal Obesity  (Waist Circumference)   See nursing flowsheet Women ?35 in (88 cm)   Men ?40 in (102 cm)      Triglycerides  Triglycerides   Date Value Ref Range Status   10/19/2019 117 <150 mg/dL Final       ?150 mg/dL (1.7 mmol/L) or current treatment for elevated triglycerides   HDL cholesterol  HDL Cholesterol   Date Value Ref Range Status   10/19/2019 56 >49 mg/dL Final   ]   Women <50 mg/dL (1.3 mmol/L) in women or current treatment for low HDL cholesterol  Men <40 mg/dL (1 mmol/L) in men or current treatment for low HDL cholesterol     Fasting plasma glucose (FPG) Lab Results   Component Value Date     10/19/2019      FPG ?100 mg/dL (5.6 mmol/L) or treatment for elevated blood glucose   Blood  "pressure  BP Readings from Last 3 Encounters:   06/21/21 (!) 160/89   06/04/19 131/71   04/26/19 164/86    Blood pressure ?130/85 mmHg or treatment for elevated blood pressure   Family History  See family history     Appearance: dressed in hospital scrubs, appeared reported age, appeared well groomed  Attitude: cooperative  Eye Contact: mostly poor/avoidant  Mood: \"fine\"  Affect:  Mostly flat, constricted  Speech: accented, mostly coherent  Language: no obvious receptive or expressive defiits  Psychomotor, Gait, Musculoskeletal: No abnormal movements noted while seated.   Throught Process: linear  Associations:  No loosening of associations present  Thought Content:  No suicidal or homicidal ideation elicited.  Insight:  limited  Judgement:  limited  Oriented to: day of the week, date, and month. Asks where Dr. Zaman is and remembers psychiatry resident's name.   Attention Span and Concentration: poor   Recent and Remote Memory: impaired, worsened with initiation of ECT however appears to be gradually improving  Fund of Knowledge:  normal    Clinical Global Impressions  First:  Considering your total clinical experience with this particular patient population, how severe are the patient's symptoms at this time?: 7 (04/16/21 1428)  Compared to the patient's condition at the START of treatment, this patient's condition is: 4 (04/16/21 1428)  Most recent:  Considering your total clinical experience with this particular patient population, how severe are the patient's symptoms at this time?: 7 (05/13/21 0953)  Compared to the patient's condition at the START of treatment, this patient's condition is: 6 (05/13/21 0953)           Precautions:     Behavioral Orders   Procedures     Assault precautions     Cheeking Precautions (behavioral units)     Patient Observed swallowing PO medications; Patient asked to drink water after swallowing medication; Patient in Staff line of sight for 15 minutes after medication given; " Mouth checks after PO administration (patient asked to open mouth and stick out their tongue).     Code 1 - Restrict to Unit     Code 3     For walks in the [place] with staff and per staff discretion     Electroconvulsive therapy     Series of up to 12 treatments. Begin Date: 5/26/21     Treating Psychiatrist providing ECT:  Dr. Lubin     Notified on:  5/21/21     Electroconvulsive therapy     As long as we get the green light from risk management and pt is medically cleared, begin ECT every Monday, Wednesday, and Friday     Electroconvulsive therapy     Series of up to 12 treatments. Begin Date: 5/25/21     Treating Psychiatrist providing ECT:  Amee     Notified on:  5/24/21     Elopement precautions     Fall precautions     Alcides Calderon     Routine Programming     As clinically indicated     Status 15     Every 15 minutes.          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 Symptoms and presentation at this time is most consistent with Schizoaffective Disorder, Bipolar Type. We have obtained most recent medication regimen from patient's ACT team, and regimen was initially restarted. Inpatient psychiatric hospitalization is warranted at this time for safety, stabilization, and possible adjustment in medications. Pt is committed. We have petitioned for Alcides Calderon due to lack of improvement and side effects from medications.    Hospital Course:  On admission, PTA medications were restarted.  "However, patient had been declining all scheduled medications despite significant encouragement from staff and provider. Psychiatric emergency declared on 4/20 due to aggression toward others in context of severe psychosis and suspected excited catatonia. Ativan 1 mg TID was also added. Discontinued PTA Invega, Zyprexa, and thorazine on 4/20 due to consistent refusal.     On 4/26, it was noted that patient frequently had upward gaze while walking up and down the unit. She did not appear to be distressed. She reported that she was looking at \"my god.\" It was determined to be oculogyric crisis secondary to IM haloperidol. Haldol was subsequently discontinued during day shift on 4/26 and scheduled Zyprexa was initiated on emergency basis. Oral Cogentin was also scheduled, though patient declined. On the evening of 4/26, patient's gaze was fixed in upward position for several hours and she appeared to be experiencing discomfort. IM Cogentin was administered with noted resolution. Partial improvements weew observed after switch to scheduled Zyprexa. After patient improved, she was more receptive to reinitiating oral Invega, which was initiated on 5/3 and titrated to PTA dose of 9 mg daily on 5/10. Plan was for ACT team to bring in loading dose of Invega Sustenna. Patient had signs of oculogyric crisis again with Invega. Oral dose decreased to 6 mg after this. Invega was stopped on 5/14 due to ongoing signs of problems related to eye movement and concerns for oculogyric crisis.    Overall improvement in patient's agitation and suspected catatonia noted on 4/30 after patient accepted two doses of Ativan. She began declining Ativan again with noted decompensation. Patient now accepting, however, she does not have the capacity to consent to treatment with Ativan at this time. She does not believe she has a mental illness, including catatonia. She does not fully understand risks associated with inadequate treatment of " "catatonia. Discussed with her son who is acting as surrogate decision maker and he is in full support of forced scheduled IM Ativan if patient declines oral formulation. Also consulted with our legal team prior to backing oral Ativan with IM.     Ativan was increased on 5/19 due to re-emerging evidence of catatonia (long periods of staring, repetitive movements, echolalia, mutism). Meeting was held with ACT team on 5/20, including ACT team psychiatrist, Dr. Pedraza. He said that he is in \"full support\" of plan to pursue Mcgrath Kohler and ECT at this time. He does feel that in the past she has been discharged from the hospital while still quite symptomatic. He is hoping that ECT will be effective and that with improvement, Michelle would be more receptive to clozapine and weekly blood draws. He said that ideally she would transition to an IRTS before going back to her apartment, but also understands there may be some barriers (I.e. pt's willingness, financial concerns, etc).     ECT consult placed. Please see consult note by Dr. Lubin on 5/21 for details. Mcgrath Kohler was approved on 5/24 and patient was medically cleared on 5/24. ECT initiated on 5/26. She was noted to have a short seizure during ECT on 6/4. Staff note that patient appears more disoriented with memory impairment and sedation on days of ECT. This was noted on my examination again today. Improvements noted in mood, affect, social interactions, paranoia, agitation since initiation of ECT. Reduced Ativan on 6/5 due to sedative effects. Relayed concerns about memory impairment and confusion with Dr. Lubin. Recommended attempting unilateral ECT, which he agreed to do. First unilateral ECT on 6/7. ECT was held on 6/11 and 6/14 due to memory impairment. We will plan to hold it again tomorrow (6/16). There is ongoing discussion regarding whether there is a component of catatonia contributing to her current presentation but this is less likely since it worsened " after ECT was started.  Given her level of cognitive impairment and our belief that this is due to ECT, we will not pursue any further treatments at this time. Since we have held ECT (last treatment on 6/9), her cognitive impairment has slightly improved (more oriented).     Michelle appears to be decompensating somewhat since ECT has been held, however her cognitive impairment persists. If symptoms of psychosis re-emerge, it may be worth starting clozapine, which is something that has previously been considered by her ACT team. ANC obtained on 6/16 was 1800/microL, therefore we would want to involve hematology prior to starting clozapine. Per conversation with pharmacy, neutropenia has been associated with olanzapine and agranulocytosis has been associated with olanzapine, lorazepam, metoprolol, and hydralazine. We will continue to monitor her hematologic labs. At this time, the primary symptoms we are observing are cognitive deficits and inability to care for self, which we would not address by changing her antipsychotic medication.    ANC on 6/21/21 was 3700/microL.     Target psychiatric symptoms and interventions:   - Continue Ativan 2 mg BID and 1 mg in the AM. Plan to taper if patient continues to appear sedated though she does appear to be experiencing ongoing catatonic features. Patient may not decline. Ativan to be given at 1700 on days prior to ECT and will be held on mornings before ECT. Because patient did not accept AM dose, IV Ativan 1 mg one time dose was given on 6/10.   - Oral Zyprexa 10 mg BID OR Zyprexa 10 mg IM. Hatch in place. May consider increasing further though holding off given re-emerging catatonic sx and borderline prolonged QTc   Continue Cogentin 2 mg IM daily prn for evidence of acute dystonic reaction or oculogyric crisis  Continue hydroxyzine 25-50 mg q4h prn for acute anxiety  Continue Trazodone 50 mg at bedtime prn for sleep disturbances  Continue Zyprexa 10 mg TID prn for severe  agitation  Continue Ativan/Benadryl q4h prn for agitation. WOULD GIVE PRN ATIVAN FIRST FOR AGITATION unless it is after 5 pm on day prior to ECT     ECT:  - HOLD ECT DUE TO COGNITIVE IMPAIRMENT.  - Because patient was only intermittently accepting scheduled Ativan and some symptoms of catatonia are re-emerging, pursued Alcides Kohler for ECT. She also continues to exhibit symptoms of psychosis and has not responded or has experienced side effects from multiple neuroleptic medications. Court hearing was held on 5/21. Alcides Kohler is approved. Patient is medically cleared. COVID negative on 5/24. Obtained ECT consult on 5/21/21. Please see Dr. Lubin's consultation note.   - NPO 8 hours prior to scheduled treatment. Clear liquids until 2 hours prior to treatment.   - Discussed with IM. May resume AM antihypertensives, no need to hold prior to ECT  - SIO while patient is NPO, starting at midnight on ECT days (renewed)  - Will complete acute course in the hospital; will likely need maintenance ECT in outpatient setting.   - New concerns about memory impairments: Switched from bilateral to unilateral ECT on 6/7 and continue to monitor closely. Dr. Lubin aware.     Acute Medical Problems and Treatments:  BRANDON, resolved:   Pre-renal in nature, likely secondary to decreased oral intake.   - IM Consulted on 6/10. Appreciate assistance. Follow-up note placed on 6/16 (no further work-up recommended).  - avoid nephrotoxins  - Renal bladder US completed on 6/10  - Encourage fluid intake.     HTN: Patient is now adherent with medication regimen.   - Metoprolol succinate ER 50 mg daily  - Cozaar 100 mg daily  - Please see note from IM dated 5/3/21, 5/6/21, and 5/24.  - Obtained EKG and routine labs on 5/21 in context of pt declining vital sign checks and anti-hypertensives and recently elevated BPs. Reviewed labs and discussed EKG findings with IM on 5/21. No urgent concerns, though IM should be notified if pt develops acute medical  concerns (I.e. heart palpitations, SOB, changes in speech, AMS, confusion, CP, HA, changes in vision).     CVA:  - Aspirin 81 mg daily     Chronic Constipation:  - Miralax 17 mg daily    ANC trending downward since admission, still WNL, resolved  - Per conversation with pharmacy, medications Michelle is taking that are associated with  agranulocytosis include lorazepam (scheduled), metoprolol (scheduled), hydralazine (PRN), and olanzapine (scheduled).   - Will consult internal medicine for further guidance if Michelle declines future blood draws and we are not able to monitor her ANC.   - CBC with differential WNL on 6/21     Behavioral/Psychological/Social:  - Encourage unit programming  - Patient is now Code 3 status and can take walks in the [place] with staff and security present per staff discretion. This appears to be quite therapeutic for her.     Safety:  - Continue precautions as noted above  - Status 15 minute checks  - Safety precautions include: assault and elopement precautions  - Continue precautions as noted above    Legal Status: Committed as MI with Hatch in place for Haldol, Zyprexa, Invega, and Thorazine through [facility]. Filed Mcgrath Edita through [facility] and approved on 5/24/21.     Disposition Plan   Reason for ongoing admission: poses an imminent risk to self, poses an imminent risk to others and is unable to care for self due to severe psychosis or tad  Discharge location: Group home vs home with [facility] ACT team  Discharge Medications: not ordered  Follow-up Appointments: not scheduled     Prior to discharge, treatment team will need to coordinate with ACT team to obtain JESSICA for Michelle's son in order to facilitate ongoing involvement in care.    Entered by: Bhavya Lane on 6/21/2021 at 1:33 PM     The patient was seen and discussed with attending psychiatrist Dr. Reeder.    Bhavya Lane MD  Psychiatry PGY-2

## 2021-06-21 NOTE — PLAN OF CARE
Problem: Adult Inpatient Plan of Care  Goal: Plan of Care Review  Outcome: No Change  Flowsheets  Taken 6/20/2021 1943 by Brady Rodriguez, RN  Plan of Care Reviewed With: patient  Taken 6/14/2021 2204 by Marina Karimi, RN  Progress: no change     Patient isolative to room all evening sleeping majority of the evening only up for assessments, meals, and medications. Patient upon approach flat in affect and short during verbal interactions. Patient oriented to person and place but demonstrates confusion with time and date and overall situation. Patient denies any mental health symptoms with no observed SI/SIB, AH/VH, anxiety, depression, or thoughts of harming others. Patient prompted for activity and to complete ADL's with education encouraging ADL's but she declined. Patient up for dinner, diet appears sufficient. Patient accepting of HS medications with no stated or observed side effects. Patient cooperative with vital sign assessments with hypertension observed with a systolic of 163 and PRN Hydralazine given. Patient slept the remainder of the evening.

## 2021-06-21 NOTE — PLAN OF CARE
"Nursing assessment completed. Patient hypertensive on the day shift (/89 P62). Writer reassessed pt's vital signs at the start of the shift (/73 P71). Although still hypertensive, she is not meeting the criteria for PRN hydralazine at this time. Will continue to reassess.     1845 /93 Pulse 66    PRN hydralazine administered  Patient was seen by internal medicine yesterday regarding hypertension. Per IM:  Plan  - Continue pta losartan and metoprolol  - Hydralazine 25 mg QID PRN for SBP >160 or DBP >110  - Please notify IM if BP is persistently severely elevated and requiring frequent PRN hydralazine (has only met PRN criteria x3)    2023 /84 Pulse 84    Writer collected covid test and sent to the lab. Awaiting results.   Patient pleasant upon approach with a bright affect. She asks writer why her blood pressure is high and if we \"have a gym here\". Patient is offered the stationary bike, which she uses in the lounge for approx one hour. She is cooperative with vitals and medications. Allows writer to change her linens. Patient states she showered earlier, but does complete ADLs this evening. She denies symptoms. No s/s SI/SIB or psychosis. Continue to monitor and assess.   "

## 2021-06-22 LAB
ALBUMIN UR-MCNC: NEGATIVE MG/DL
APPEARANCE UR: CLEAR
BACTERIA #/AREA URNS HPF: ABNORMAL /HPF
BILIRUB UR QL STRIP: NEGATIVE
COLOR UR AUTO: ABNORMAL
GLUCOSE UR STRIP-MCNC: NEGATIVE MG/DL
HGB UR QL STRIP: NEGATIVE
KETONES UR STRIP-MCNC: NEGATIVE MG/DL
LEUKOCYTE ESTERASE UR QL STRIP: NEGATIVE
MUCOUS THREADS #/AREA URNS LPF: PRESENT /LPF
NITRATE UR QL: NEGATIVE
PH UR STRIP: 7 PH (ref 5–7)
RBC #/AREA URNS AUTO: <1 /HPF (ref 0–2)
SOURCE: ABNORMAL
SP GR UR STRIP: 1.02 (ref 1–1.03)
SQUAMOUS #/AREA URNS AUTO: 1 /HPF (ref 0–1)
UROBILINOGEN UR STRIP-MCNC: NORMAL MG/DL (ref 0–2)
WBC #/AREA URNS AUTO: 1 /HPF (ref 0–5)

## 2021-06-22 PROCEDURE — 99233 SBSQ HOSP IP/OBS HIGH 50: CPT | Mod: GC | Performed by: PSYCHIATRY & NEUROLOGY

## 2021-06-22 PROCEDURE — 124N000002 HC R&B MH UMMC

## 2021-06-22 PROCEDURE — 250N000013 HC RX MED GY IP 250 OP 250 PS 637: Performed by: PSYCHIATRY & NEUROLOGY

## 2021-06-22 PROCEDURE — 81001 URINALYSIS AUTO W/SCOPE: CPT | Performed by: STUDENT IN AN ORGANIZED HEALTH CARE EDUCATION/TRAINING PROGRAM

## 2021-06-22 PROCEDURE — 250N000013 HC RX MED GY IP 250 OP 250 PS 637: Performed by: PHYSICIAN ASSISTANT

## 2021-06-22 RX ADMIN — METOPROLOL SUCCINATE 50 MG: 50 TABLET, EXTENDED RELEASE ORAL at 09:28

## 2021-06-22 RX ADMIN — LOSARTAN POTASSIUM 100 MG: 100 TABLET, FILM COATED ORAL at 09:28

## 2021-06-22 RX ADMIN — LORAZEPAM 2 MG: 2 TABLET ORAL at 13:22

## 2021-06-22 RX ADMIN — OLANZAPINE 10 MG: 10 TABLET, ORALLY DISINTEGRATING ORAL at 09:28

## 2021-06-22 RX ADMIN — LORAZEPAM 2 MG: 2 TABLET ORAL at 20:13

## 2021-06-22 RX ADMIN — OLANZAPINE 10 MG: 10 TABLET, ORALLY DISINTEGRATING ORAL at 20:13

## 2021-06-22 RX ADMIN — LORAZEPAM 1 MG: 1 TABLET ORAL at 09:29

## 2021-06-22 ASSESSMENT — ACTIVITIES OF DAILY LIVING (ADL)
HYGIENE/GROOMING: INDEPENDENT
DRESS: INDEPENDENT;SCRUBS (BEHAVIORAL HEALTH)
DRESS: SCRUBS (BEHAVIORAL HEALTH);INDEPENDENT
ORAL_HYGIENE: INDEPENDENT
LAUNDRY: UNABLE TO COMPLETE
ORAL_HYGIENE: INDEPENDENT
HYGIENE/GROOMING: INDEPENDENT

## 2021-06-22 NOTE — PROGRESS NOTES
"  [facility], [place]   Psychiatric Progress Note  Hospital Day: 69        Interim History:   The patient's care was discussed with the treatment team during the daily team meeting and/or staff's chart notes were reviewed. There were no instances of agitation or aggression overnight. She slept 6.5 hours overnight. She is taking her medications as prescribed. She received PRN hydralazine last night for a blood pressure of 182/93 mmHg. Upon recheck, her blood pressure had decreased to 138/84 mmHg. She used the stationary bike in the lounge last night.     Upon interview, Michelle stated that she was \"fine\" and that her night was \"good\". When asked about physical concerns, Michelle stated that she continues to have memory problems but agrees that they are improving. She also reported urinary urgency and frequency. She denied hematuria, dysuria, and suprapubic pain. She was oriented to being in a hospital in [place]. She knew that today's date is June 22, 2021. She is wondering when she will be able to discharge. She denied auditory and visual hallucinations, as well as suicidal ideation.          Medications:      LORazepam  1 mg Oral Daily    Or    LORazepam  1 mg Intramuscular Daily    LORazepam  2 mg Oral BID    Or    LORazepam  2 mg Intramuscular BID    losartan  100 mg Oral Daily    metoprolol succinate ER  50 mg Oral Daily    OLANZapine zydis  10 mg Oral BID    Or    OLANZapine  10 mg Intramuscular BID          Allergies:     Allergies   Allergen Reactions    Haldol [Haloperidol]      Patient previously tolerated haldol, though developed oculogyric crises during hospital stay on 4/26/21 on total daily dose of 10 mg.    Lisinopril Cough          Labs:     Recent Results (from the past 24 hour(s))   Asymptomatic SARS-CoV-2 COVID-19 Virus (Coronavirus) by PCR    Collection Time: 06/21/21  7:00 PM    Specimen: Nasopharyngeal   Result Value Ref Range    SARS-CoV-2 Virus Specimen Source " Nasopharyngeal     SARS-CoV-2 PCR Result NEGATIVE     SARS-CoV-2 PCR Comment (Note)           Psychiatric Examination:     /81 (BP Location: Right arm)   Pulse 66   Temp 98.1  F (36.7  C) (Tympanic)   Resp 16   Wt 69.3 kg (152 lb 11.2 oz)   SpO2 98%   BMI 26.21 kg/m    Weight is 152 lbs 11.2 oz  Body mass index is 26.21 kg/m .    Weight over time:  Vitals:    05/25/21 0850 06/15/21 0811 06/16/21 1605 06/19/21 0859   Weight: 71 kg (156 lb 8 oz) 69.8 kg (153 lb 12.8 oz) 70.5 kg (155 lb 8 oz) 71.4 kg (157 lb 8 oz)    06/21/21 0805 06/22/21 0839   Weight: 69.5 kg (153 lb 3.2 oz) 69.3 kg (152 lb 11.2 oz)       Orthostatic Vitals         Most Recent      Sitting Orthostatic /85 06/22 0839    Sitting Orthostatic Pulse (bpm) 97 06/22 0839    Standing Orthostatic /89 06/22 0839    Standing Orthostatic Pulse (bpm) 111 06/22 0839            Cardiometabolic risk assessment. 04/15/21      Reviewed patient profile for cardiometabolic risk factors    Date taken /Value  REFERENCE RANGE   Abdominal Obesity  (Waist Circumference)   See nursing flowsheet Women ?35 in (88 cm)   Men ?40 in (102 cm)      Triglycerides  Triglycerides   Date Value Ref Range Status   10/19/2019 117 <150 mg/dL Final       ?150 mg/dL (1.7 mmol/L) or current treatment for elevated triglycerides   HDL cholesterol  HDL Cholesterol   Date Value Ref Range Status   10/19/2019 56 >49 mg/dL Final   ]   Women <50 mg/dL (1.3 mmol/L) in women or current treatment for low HDL cholesterol  Men <40 mg/dL (1 mmol/L) in men or current treatment for low HDL cholesterol     Fasting plasma glucose (FPG) Lab Results   Component Value Date     10/19/2019      FPG ?100 mg/dL (5.6 mmol/L) or treatment for elevated blood glucose   Blood pressure  BP Readings from Last 3 Encounters:   06/21/21 138/81   06/04/19 131/71   04/26/19 164/86    Blood pressure ?130/85 mmHg or treatment for elevated blood pressure   Family History  See family history  "    Appearance: dressed in hospital scrubs, appeared reported age, appeared well groomed  Attitude: cooperative  Eye Contact: fair  Mood: \"fine\"  Affect:  Mostly flat, constricted, smiled once  Speech: accented, clear and coherent  Language: no obvious receptive or expressive deficits  Psychomotor, Gait, Musculoskeletal: No abnormal movements noted while seated.   Throught Process: linear  Associations:  No loosening of associations present  Thought Content:  Denied auditory and visual hallucinations. Denied suicidal ideation.   Insight:  limited   Judgement:  limited  Oriented to: date, month, year, and state.  Attention Span and Concentration: attentive to conversation.  Recent and Remote Memory: impaired, worsened with initiation of ECT however appears to be gradually improving  Fund of Knowledge:  normal    Clinical Global Impressions  First:  Considering your total clinical experience with this particular patient population, how severe are the patient's symptoms at this time?: 7 (04/16/21 1428)  Compared to the patient's condition at the START of treatment, this patient's condition is: 4 (04/16/21 1428)  Most recent:  Considering your total clinical experience with this particular patient population, how severe are the patient's symptoms at this time?: 7 (06/22/21 1529)  Compared to the patient's condition at the START of treatment, this patient's condition is: 3 (06/22/21 1529)         Precautions:     Behavioral Orders   Procedures    Assault precautions    Cheeking Precautions (behavioral units)     Patient Observed swallowing PO medications; Patient asked to drink water after swallowing medication; Patient in Staff line of sight for 15 minutes after medication given; Mouth checks after PO administration (patient asked to open mouth and stick out their tongue).    Code 1 - Restrict to Unit    Code 3     For walks in the [place] with staff and per staff discretion    Electroconvulsive therapy     Series " of up to 12 treatments. Begin Date: 5/26/21     Treating Psychiatrist providing ECT:  Dr. Lubin     Notified on:  5/21/21    Electroconvulsive therapy     As long as we get the green light from risk management and pt is medically cleared, begin ECT every Monday, Wednesday, and Friday    Electroconvulsive therapy     Series of up to 12 treatments. Begin Date: 5/25/21     Treating Psychiatrist providing ECT:  Amee     Notified on:  5/24/21    Elopement precautions    Fall precautions    Alcides Calderon    Routine Programming     As clinically indicated    Status 15     Every 15 minutes.          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 Symptoms and presentation at this time is most consistent with Schizoaffective Disorder, Bipolar Type. Obtained most recent medication regimen from patient's ACT team, and regimen was initially restarted. Pt is committed. Petitioned for Alcides Calderon due to lack of improvement and side effects from medications. Inpatient psychiatric hospitalization is warranted at this time for safety, stabilization, and possible adjustment in medications.    Hospital Course:  On admission, PTA medications were restarted. However, patient had been declining all scheduled medications despite significant encouragement from staff and provider. Psychiatric emergency declared on 4/20 due to aggression toward others in context of severe psychosis and suspected excited catatonia. Ativan 1 mg  "TID was also added. Discontinued PTA Invega, Zyprexa, and thorazine on 4/20 due to consistent refusal.     On 4/26, it was noted that patient frequently had upward gaze while walking up and down the unit. She did not appear to be distressed. She reported that she was looking at \"my god.\" It was determined to be oculogyric crisis secondary to IM haloperidol. Haldol was subsequently discontinued during day shift on 4/26 and scheduled Zyprexa was initiated on emergency basis. Oral Cogentin was also scheduled, though patient declined. On the evening of 4/26, patient's gaze was fixed in upward position for several hours and she appeared to be experiencing discomfort. IM Cogentin was administered with noted resolution. Partial improvements were observed after switch to scheduled Zyprexa. After patient improved, she was more receptive to reinitiating oral Invega, which was initiated on 5/3 and titrated to PTA dose of 9 mg daily on 5/10. Plan was for ACT team to bring in loading dose of Invega Sustenna. Patient had signs of oculogyric crisis again with Invega. Oral dose decreased to 6 mg after this. Invega was stopped on 5/14 due to ongoing signs of problems related to eye movement and concerns for oculogyric crisis.    Overall improvement in patient's agitation and suspected catatonia noted on 4/30 after patient accepted two doses of Ativan. She began declining Ativan again with noted decompensation. Patient now accepting, however, she does not have the capacity to consent to treatment with Ativan at this time. She does not believe she has a mental illness, including catatonia. She does not fully understand risks associated with inadequate treatment of catatonia. Discussed with her son who is acting as surrogate decision maker and he is in full support of forced scheduled IM Ativan if patient declines oral formulation. Also consulted with our legal team prior to backing oral Ativan with IM.     Ativan was increased on 5/19 " "due to re-emerging evidence of catatonia (long periods of staring, repetitive movements, echolalia, mutism). Meeting was held with ACT team on 5/20, including ACT team psychiatrist, Dr. Pedraza. He said that he is in \"full support\" of plan to pursue Mcgrath Kohler and ECT at this time. He does feel that in the past she has been discharged from the hospital while still quite symptomatic. He is hoping that ECT will be effective and that with improvement, Michelle would be more receptive to clozapine and weekly blood draws. He said that ideally she would transition to an IRTS before going back to her apartment, but also understands there may be some barriers (I.e. pt's willingness, financial concerns, etc).     ECT consult placed. Please see consult note by Dr. Lubin on 5/21 for details. Alcides Saavedraard was approved on 5/24 and patient was medically cleared on 5/24. ECT initiated on 5/26. She was noted to have a short seizure during ECT on 6/4. Staff note that patient appears more disoriented with memory impairment and sedation on days of ECT. This was noted on my examination again today. Improvements noted in mood, affect, social interactions, paranoia, agitation since initiation of ECT. Reduced Ativan on 6/5 due to sedative effects. Relayed concerns about memory impairment and confusion with Dr. Lubin. Recommended attempting unilateral ECT, which he agreed to do. First unilateral ECT on 6/7. ECT was held on 6/11 and 6/14 due to memory impairment. We will plan to hold it again tomorrow (6/16). There is ongoing discussion regarding whether there is a component of catatonia contributing to her current presentation but this is less likely since it worsened after ECT was started.  Given her level of cognitive impairment and our belief that this is due to ECT, we will not pursue any further treatments at this time. Since we have held ECT (last treatment on 6/9), her cognitive impairment has slightly improved (more oriented). "     Michelle appears to be decompensating somewhat since ECT has been held, however her cognitive impairment persists. If symptoms of psychosis re-emerge, it may be worth starting clozapine, which is something that has previously been considered by her ACT team. ANC obtained on 6/16 was 1800/microL, therefore we would want to involve hematology prior to starting clozapine. Per conversation with pharmacy, neutropenia has been associated with olanzapine and agranulocytosis has been associated with olanzapine, lorazepam, metoprolol, and hydralazine. We will continue to monitor her hematologic labs. At this time, the primary symptoms we are observing are cognitive deficits and inability to care for self, which we would not address by changing her antipsychotic medication.    ANC on 6/21/21 was 3700/microL.     Target psychiatric symptoms and interventions:   - Continue Ativan 2 mg BID and 1 mg in the AM. Plan to taper if patient continues to appear sedated though she does appear to be experiencing ongoing catatonic features. Patient may not decline.   - Continu Zyprexa 10 mg BID  PO or IM. Hatch in place. May consider increasing further though holding off given re-emerging catatonic sx and borderline prolonged QTc   -Continue Cogentin 2 mg IM daily prn for evidence of acute dystonic reaction or oculogyric crisis  -Continue hydroxyzine 25-50 mg q4h prn for acute anxiety  -Continue Trazodone 50 mg at bedtime prn for sleep disturbances  -Continue Zyprexa 10 mg TID prn for severe agitation  -Continue Ativan/Benadryl q4h prn for agitation. WOULD GIVE PRN ATIVAN FIRST FOR AGITATION unless it is after 5 pm on day prior to scheduled ECT     ECT:  - HOLDING ECT DUE TO COGNITIVE IMPAIRMENT.  - Because patient was only intermittently accepting scheduled Ativan and some symptoms of catatonia are re-emerging, pursued Mcgrath Kohler for ECT. She also continues to exhibit symptoms of psychosis and has not responded or has experienced  "side effects from multiple neuroleptic medications. Court hearing was held on 5/21. Alcides Kohler is approved. Patient is medically cleared. COVID negative on 5/24. Obtained ECT consult on 5/21/21. Please see Dr. Lubin's consultation note.   - NPO 8 hours prior to scheduled treatment. Clear liquids until 2 hours prior to treatment.   - Discussed with IM. May resume AM antihypertensives, no need to hold prior to ECT  - SIO while patient is NPO, starting at midnight on ECT days (renewed)  - Will complete acute course in the hospital; will likely need maintenance ECT in outpatient setting.   - New concerns about memory impairments: Switched from bilateral to unilateral ECT on 6/7 and continue to monitor closely, ultimately decided to hold treatments given cognitive impairment.    Acute Medical Problems and Treatments:  BRANDON, resolved:   Pre-renal in nature, likely secondary to decreased oral intake.   - IM Consulted on 6/10. Appreciate assistance. Follow-up note placed on 6/16 (no further work-up recommended).  - avoid nephrotoxins  - Renal bladder US completed on 6/10  - Encourage fluid intake.      HTN: Patient is now adherent with medication regimen.   - Metoprolol succinate ER 50 mg daily  - Cozaar 100 mg daily  - Continue hydralazine 25 mg QID PRN for SBP > 160 mmHg or DBP > 110 mmHg.  - Please see note from IM dated 5/3/21, 5/6/21, 5/24, and 6/20.  - Obtained EKG and routine labs on 5/21 in context of pt declining vital sign checks and anti-hypertensives and recently elevated BPs. Reviewed labs and discussed EKG findings with IM on 5/21. No urgent concerns, though IM should be notified if pt develops acute medical concerns (I.e. heart palpitations, SOB, changes in speech, AMS, confusion, CP, HA, changes in vision).    - Per 6/20 IM note: \"Please notify IM if BP is persistently severely elevated and requiring frequent PRN hydralazine\".    CVA:  - Aspirin 81 mg daily     Chronic Constipation:  - Miralax 17 mg " daily    Urinary frequency and urgency  Reported new-onset urinary frequency and urgency on 6/21. She denied dysuria, hematuria, and suprapubic pain. Will begin by ruling out a urinary tract infection.  - Urinalysis ordered on 6/22    ANC trending downward since admission, still WNL, resolved  - Per conversation with pharmacy, medications Michelle is taking that are associated with  agranulocytosis include lorazepam (scheduled), metoprolol (scheduled), hydralazine (PRN), and olanzapine (scheduled).   - Will consult internal medicine for further guidance if Michelle declines future blood draws and we are not able to monitor her ANC.   - CBC with differential WNL on 6/21     Behavioral/Psychological/Social:  - Encourage unit programming  - Patient is now Code 3 status and can take walks in the [place] with staff and security present per staff discretion. This appears to be quite therapeutic for her.     Safety:  - Continue precautions as noted above  - Status 15 minute checks  - Safety precautions include: assault and elopement precautions  - Continue precautions as noted above    Legal Status: Committed as MI with Hatch in place for Haldol, Zyprexa, Invega, and Thorazine through [facility]. Filed Mcgrath Kohler through [facility] and approved on 5/24/21.     Disposition Plan   Reason for ongoing admission: poses an imminent risk to self, poses an imminent risk to others and is unable to care for self due to severe psychosis or tad  Discharge location: Group home vs home with [facility] ACT team  Discharge Medications: not ordered  Follow-up Appointments: not scheduled     Prior to discharge, treatment team will need to coordinate with ACT team to obtain JESSICA for Michelle's son in order to facilitate ongoing involvement in care.    Entered by: Bhavya Lane on 6/22/2021 at 10:14 AM     The patient was seen and discussed with attending psychiatrist Dr. Oquendo.    Bhavya Lane MD  Psychiatry PGY-2

## 2021-06-22 NOTE — PROGRESS NOTES
Pt observed resting in bed with eyes closed for a majority of the shift. No issues reported or observed. Continue to monitor for safety and changes in medical condition.

## 2021-06-22 NOTE — PLAN OF CARE
Assessment/Intervention/Current Symtoms and Care Coordination:  Attended Team Meeting, Reviewed chart notes: Patient appears to be slightly improved. Pleasant and cooperative. Writer left a voice message for ACT Team CM requesting a call back to discuss an interview for patient with [facility] .     Discharge Plan or Goal:  Unknown at this time as initial plan was for patient to return home with [facility] ACT Team in place but now patient may need a higher level of care.     Barriers to Discharge:  Patient remains unable to care for herself if she return home alone. Patient is continuing to stabilize.      Referral Status:  No referrals have been made.     Legal Status:  Committed/Hatch/Mcgrath Kohler through [facility]

## 2021-06-22 NOTE — PLAN OF CARE
Problem: Behavioral Health Plan of Care  Goal: Adheres to Safety Considerations for Self and Others  Outcome: No Change     Problem: Behavioral Health Plan of Care  Goal: Absence of New-Onset Illness or Injury  Outcome: No Change     Problem: Behavioral Health Plan of Care  Goal: Optimized Coping Skills in Response to Life Stressors  Outcome: No Change     Pt presented as alert and oriented to place and self throughout shift.  She was mostly withdrawn to her room throughout the day.  Pt appeared tidy and her speech was soft but coherent.  She ate meals and was medication compliant.  Pt denied SI/HI/SIB.  She denied psychosis.  Pt did not have any acute physical health concerns, pain, or side effects to medications during this shift.

## 2021-06-23 PROCEDURE — 250N000013 HC RX MED GY IP 250 OP 250 PS 637: Performed by: PSYCHIATRY & NEUROLOGY

## 2021-06-23 PROCEDURE — 250N000013 HC RX MED GY IP 250 OP 250 PS 637: Performed by: PHYSICIAN ASSISTANT

## 2021-06-23 PROCEDURE — 250N000013 HC RX MED GY IP 250 OP 250 PS 637: Performed by: STUDENT IN AN ORGANIZED HEALTH CARE EDUCATION/TRAINING PROGRAM

## 2021-06-23 PROCEDURE — 124N000002 HC R&B MH UMMC

## 2021-06-23 PROCEDURE — 99221 1ST HOSP IP/OBS SF/LOW 40: CPT | Mod: GC | Performed by: PSYCHIATRY & NEUROLOGY

## 2021-06-23 RX ORDER — LORAZEPAM 2 MG/ML
1.5 INJECTION INTRAMUSCULAR DAILY
Status: DISCONTINUED | OUTPATIENT
Start: 2021-06-23 | End: 2021-06-23

## 2021-06-23 RX ORDER — LORAZEPAM 2 MG/1
2 TABLET ORAL EVERY EVENING
Status: DISCONTINUED | OUTPATIENT
Start: 2021-06-23 | End: 2021-07-26

## 2021-06-23 RX ORDER — LORAZEPAM 2 MG/ML
2 INJECTION INTRAMUSCULAR EVERY EVENING
Status: DISCONTINUED | OUTPATIENT
Start: 2021-06-23 | End: 2021-07-26

## 2021-06-23 RX ORDER — LORAZEPAM 2 MG/ML
1.5 INJECTION INTRAMUSCULAR DAILY
Status: DISCONTINUED | OUTPATIENT
Start: 2021-06-23 | End: 2021-06-28

## 2021-06-23 RX ADMIN — OLANZAPINE 10 MG: 10 TABLET, ORALLY DISINTEGRATING ORAL at 19:28

## 2021-06-23 RX ADMIN — LORAZEPAM 2 MG: 2 TABLET ORAL at 19:28

## 2021-06-23 RX ADMIN — LORAZEPAM 1.5 MG: 0.5 TABLET ORAL at 14:08

## 2021-06-23 RX ADMIN — OLANZAPINE 10 MG: 10 TABLET, ORALLY DISINTEGRATING ORAL at 08:45

## 2021-06-23 RX ADMIN — HYDRALAZINE HYDROCHLORIDE 25 MG: 25 TABLET ORAL at 19:28

## 2021-06-23 RX ADMIN — LOSARTAN POTASSIUM 100 MG: 100 TABLET, FILM COATED ORAL at 08:45

## 2021-06-23 RX ADMIN — LORAZEPAM 1 MG: 1 TABLET ORAL at 08:45

## 2021-06-23 RX ADMIN — METOPROLOL SUCCINATE 50 MG: 50 TABLET, EXTENDED RELEASE ORAL at 08:45

## 2021-06-23 ASSESSMENT — ACTIVITIES OF DAILY LIVING (ADL)
HYGIENE/GROOMING: PROMPTS
LAUNDRY: UNABLE TO COMPLETE
DRESS: SCRUBS (BEHAVIORAL HEALTH)
ORAL_HYGIENE: INDEPENDENT
HYGIENE/GROOMING: SHOWER;INDEPENDENT
LAUNDRY: UNABLE TO COMPLETE
DRESS: SCRUBS (BEHAVIORAL HEALTH)
ORAL_HYGIENE: PROMPTS

## 2021-06-23 NOTE — PLAN OF CARE
Pt was isolative to her room for most of the day, declining groups and activities. Affect tense, overall calm and pleasant. She requested to shower and completed independently with staff assist for set-up. Pt ate well at meals today, approximately 100%. Denies experiencing depression or anxiety symptoms, and denies AH/VH. She appears confused at times, and had difficulty remembering she is in [place]. Compliant with all scheduled medications. She had difficulty understanding there was a decrease in afternoon Ativan, and c/o frustration that the pills looked different. No medication side effects observed at this time.

## 2021-06-23 NOTE — PLAN OF CARE
Problem: Adult Inpatient Plan of Care  Goal: Patient-Specific Goal (Individualized)  Outcome: No Change  Patient is on high fall risk. No fall noted or reported during the night shift. Patient appeared asleep all night. Slept a total of 6.5 hour.

## 2021-06-23 NOTE — PROGRESS NOTES
"  ATTESTATION    This patient was seen and evaluated by me, Kal Reeder MD on the date of this note. I have discussed this patient with the resident, Dr. Lane and I agree with the findings and plan in thi note. I have reviewed today's vital signs, medications, labs and imaging.    Kal Reeder MD    [facility], [place]   Psychiatric Progress Note  Hospital Day: 70        Interim History:   The patient's care was discussed with the treatment team during the daily team meeting and/or staff's chart notes were reviewed. There were no instances of agitation or aggression overnight. She slept 6.5 hours overnight. She is taking her medications as prescribed. She remains isolative and withdrawn to her room. Michelle declined to have her vitals checked last night. Per staff report, Michelle was confused, irritable, and dismissive last night. She denied psychiatric symptoms. She was oriented to self and place. She ate all of her breakfast today. She does not look unsteady on her feet.     Upon interview, Michelle stated that was \"good\" and that she slept well. She was wondering where Dr. Zaman was and we discussed that she would be back next week. She does not have any physical pain. We discussed her breakfast and she mentioned that she has to eat everything here, regardless of whether it is good or not. She reported that her urinary urgency has improved and she will tell us if she begins to re-experience it. She feels that her memory is improving. She requested to know what city we were in and wrote it down in her notebook so that she would remember. At the end of the interview, she again asked about discharge and stated that she did not want to work with her ACT team at discharge because she was not working with them prior to hospitalization.          Medications:       LORazepam  1 mg Oral Daily    Or     LORazepam  1 mg Intramuscular Daily     LORazepam  2 mg Oral BID    Or     LORazepam  2 mg " Intramuscular BID     losartan  100 mg Oral Daily     metoprolol succinate ER  50 mg Oral Daily     OLANZapine zydis  10 mg Oral BID    Or     OLANZapine  10 mg Intramuscular BID          Allergies:     Allergies   Allergen Reactions     Haldol [Haloperidol]      Patient previously tolerated haldol, though developed oculogyric crises during hospital stay on 4/26/21 on total daily dose of 10 mg.     Lisinopril Cough          Labs:     Recent Results (from the past 24 hour(s))   UA with Microscopic    Collection Time: 06/22/21  1:15 PM   Result Value Ref Range    Color Urine Light Yellow     Appearance Urine Clear     Glucose Urine Negative NEG^Negative mg/dL    Bilirubin Urine Negative NEG^Negative    Ketones Urine Negative NEG^Negative mg/dL    Specific Gravity Urine 1.017 1.003 - 1.035    Blood Urine Negative NEG^Negative    pH Urine 7.0 5.0 - 7.0 pH    Protein Albumin Urine Negative NEG^Negative mg/dL    Urobilinogen mg/dL Normal 0.0 - 2.0 mg/dL    Nitrite Urine Negative NEG^Negative    Leukocyte Esterase Urine Negative NEG^Negative    Source Midstream Urine     WBC Urine 1 0 - 5 /HPF    RBC Urine <1 0 - 2 /HPF    Bacteria Urine None (A) NEG^Negative /HPF    Squamous Epithelial /HPF Urine 1 0 - 1 /HPF    Mucous Urine Present (A) NEG^Negative /LPF          Psychiatric Examination:     /81 (BP Location: Right arm)   Pulse 66   Temp 98.1  F (36.7  C) (Tympanic)   Resp 16   Wt 69.3 kg (152 lb 11.2 oz)   SpO2 98%   BMI 26.21 kg/m    Weight is 152 lbs 11.2 oz  Body mass index is 26.21 kg/m .    Weight over time:  Vitals:    05/25/21 0850 06/15/21 0811 06/16/21 1605 06/19/21 0859   Weight: 71 kg (156 lb 8 oz) 69.8 kg (153 lb 12.8 oz) 70.5 kg (155 lb 8 oz) 71.4 kg (157 lb 8 oz)    06/21/21 0805 06/22/21 0839   Weight: 69.5 kg (153 lb 3.2 oz) 69.3 kg (152 lb 11.2 oz)       Orthostatic Vitals         Most Recent      Sitting Orthostatic /85 06/22 0839    Sitting Orthostatic Pulse (bpm) 97 06/22 0839     "Standing Orthostatic /89 06/22 0839    Standing Orthostatic Pulse (bpm) 111 06/22 0839            Cardiometabolic risk assessment. 04/15/21      Reviewed patient profile for cardiometabolic risk factors    Date taken /Value  REFERENCE RANGE   Abdominal Obesity  (Waist Circumference)   See nursing flowsheet Women ?35 in (88 cm)   Men ?40 in (102 cm)      Triglycerides  Triglycerides   Date Value Ref Range Status   10/19/2019 117 <150 mg/dL Final       ?150 mg/dL (1.7 mmol/L) or current treatment for elevated triglycerides   HDL cholesterol  HDL Cholesterol   Date Value Ref Range Status   10/19/2019 56 >49 mg/dL Final   ]   Women <50 mg/dL (1.3 mmol/L) in women or current treatment for low HDL cholesterol  Men <40 mg/dL (1 mmol/L) in men or current treatment for low HDL cholesterol     Fasting plasma glucose (FPG) Lab Results   Component Value Date     10/19/2019      FPG ?100 mg/dL (5.6 mmol/L) or treatment for elevated blood glucose   Blood pressure  BP Readings from Last 3 Encounters:   06/21/21 138/81   06/04/19 131/71   04/26/19 164/86    Blood pressure ?130/85 mmHg or treatment for elevated blood pressure   Family History  See family history     Appearance: dressed in hospital scrubs, appeared reported age, appeared well groomed   Attitude: cooperative  Eye Contact: fair  Mood: \"good\"  Affect:  Mostly flat, constricted, smiled and laughed once  Speech: accented, clear and coherent  Language: no obvious receptive or expressive deficits  Psychomotor, Gait, Musculoskeletal: No abnormal movements noted while seated.   Throught Process: linear  Associations:  No loosening of associations present  Thought Content:  Did not appear to be responding to internal stimuli. No SI elicited.  Insight:  limited   Judgement:  Limited, does not feel that she needs ACT team to be involved in her care following discharge.   Oriented to: date, month, year, and state.  Attention Span and Concentration: attentive to " conversation.  Recent and Remote Memory: impaired, worsened with initiation of ECT however appears to be gradually improving  Fund of Knowledge:  normal    Clinical Global Impressions  First:  Considering your total clinical experience with this particular patient population, how severe are the patient's symptoms at this time?: 7 (04/16/21 1428)  Compared to the patient's condition at the START of treatment, this patient's condition is: 4 (04/16/21 1428)  Most recent:  Considering your total clinical experience with this particular patient population, how severe are the patient's symptoms at this time?: 7 (06/22/21 1529)  Compared to the patient's condition at the START of treatment, this patient's condition is: 3 (06/22/21 1529)         Precautions:     Behavioral Orders   Procedures     Assault precautions     Cheeking Precautions (behavioral units)     Patient Observed swallowing PO medications; Patient asked to drink water after swallowing medication; Patient in Staff line of sight for 15 minutes after medication given; Mouth checks after PO administration (patient asked to open mouth and stick out their tongue).     Code 1 - Restrict to Unit     Code 3     For walks in [facility] with staff and per staff discretion     Electroconvulsive therapy     Series of up to 12 treatments. Begin Date: 5/26/21     Treating Psychiatrist providing ECT:  Dr. Lubin     Notified on:  5/21/21     Electroconvulsive therapy     As long as we get the green light from risk management and pt is medically cleared, begin ECT every Monday, Wednesday, and Friday     Electroconvulsive therapy     Series of up to 12 treatments. Begin Date: 5/25/21     Treating Psychiatrist providing ECT:  Amee     Notified on:  5/24/21     Elopement precautions     Fall precautions     Mcgrath Calderon     Routine Programming     As clinically indicated     Status 15     Every 15 minutes.          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 Symptoms and presentation at this time is most consistent with Schizoaffective Disorder, Bipolar Type. Obtained most recent medication regimen from patient's ACT team, and regimen was initially restarted. Pt is committed. Petitioned for Mcgrath Calderon due to lack of improvement and side effects from medications. Inpatient psychiatric hospitalization is warranted at this time for safety, stabilization, and possible adjustment in medications.    Hospital Course:  On admission, PTA medications were restarted. However, patient had been declining all scheduled medications despite significant encouragement from staff and provider. Psychiatric emergency declared on 4/20 due to aggression toward others in context of severe psychosis and suspected excited catatonia. Ativan 1 mg TID was also added. Discontinued PTA Invega, Zyprexa, and thorazine on 4/20 due to consistent refusal.     On 4/26, it was noted that patient frequently had upward gaze while walking up and down the unit. She did not appear to be distressed. She reported that she was looking at \"my god.\" It was determined to be oculogyric crisis secondary to IM haloperidol. Haldol was subsequently discontinued during day shift on 4/26 and scheduled Zyprexa was initiated on emergency basis. Oral Cogentin was also scheduled, though patient declined. On the evening of 4/26, patient's gaze was fixed in upward position for several hours and she appeared to be " "experiencing discomfort. IM Cogentin was administered with noted resolution. Partial improvements were observed after switch to scheduled Zyprexa. After patient improved, she was more receptive to reinitiating oral Invega, which was initiated on 5/3 and titrated to PTA dose of 9 mg daily on 5/10. Plan was for ACT team to bring in loading dose of Invega Sustenna. Patient had signs of oculogyric crisis again with Invega. Oral dose decreased to 6 mg after this. Invega was stopped on 5/14 due to ongoing signs of problems related to eye movement and concerns for oculogyric crisis.    Overall improvement in patient's agitation and suspected catatonia noted on 4/30 after patient accepted two doses of Ativan. She began declining Ativan again with noted decompensation. Patient now accepting, however, she does not have the capacity to consent to treatment with Ativan at this time. She does not believe she has a mental illness, including catatonia. She does not fully understand risks associated with inadequate treatment of catatonia. Discussed with her son who is acting as surrogate decision maker and he is in full support of forced scheduled IM Ativan if patient declines oral formulation. Also consulted with our legal team prior to backing oral Ativan with IM.     Ativan was increased on 5/19 due to re-emerging evidence of catatonia (long periods of staring, repetitive movements, echolalia, mutism). Meeting was held with ACT team on 5/20, including ACT team psychiatrist, Dr. Pedraza. He said that he is in \"full support\" of plan to pursue Mcgrath Kohler and ECT at this time. He does feel that in the past she has been discharged from the hospital while still quite symptomatic. He is hoping that ECT will be effective and that with improvement, Michelle would be more receptive to clozapine and weekly blood draws. He said that ideally she would transition to an IRTS before going back to her apartment, but also understands there may be " some barriers (I.e. pt's willingness, financial concerns, etc).     ECT consult placed. Please see consult note by Dr. Lubin on 5/21 for details. Alcides Kohler was approved on 5/24 and patient was medically cleared on 5/24. ECT initiated on 5/26. She was noted to have a short seizure during ECT on 6/4. Staff note that patient appears more disoriented with memory impairment and sedation on days of ECT. This was noted on my examination again today. Improvements noted in mood, affect, social interactions, paranoia, agitation since initiation of ECT. Reduced Ativan on 6/5 due to sedative effects. Relayed concerns about memory impairment and confusion with Dr. Lubin. Recommended attempting unilateral ECT, which he agreed to do. First unilateral ECT on 6/7. ECT was held on 6/11 and 6/14 due to memory impairment. We will plan to hold it again tomorrow (6/16). There is ongoing discussion regarding whether there is a component of catatonia contributing to her current presentation but this is less likely since it worsened after ECT was started.  Given her level of cognitive impairment and our belief that this is due to ECT, we will not pursue any further treatments at this time. Since we have held ECT (last treatment on 6/9), her cognitive impairment has slightly improved (more oriented).     Michelle appears to be decompensating somewhat since ECT has been held, however her cognitive impairment persists. If symptoms of psychosis re-emerge, it may be worth starting clozapine, which is something that has previously been considered by her ACT team. Her Hatch order would need to be amended as clozapine is not one of the medications listed. ANC obtained on 6/16 was 1800/microL. Per conversation with pharmacy, neutropenia has been associated with olanzapine and agranulocytosis has been associated with olanzapine, lorazepam, metoprolol, and hydralazine. We will continue to monitor her hematologic labs. Upon re-check, ANC was  3700/microL on 6/21/21. At this time, the primary symptoms we are observing are cognitive deficits and inability to care for self, which we would not address by changing her antipsychotic medication.    Michelle's cognitive impairment has been steadily improving since we have held ECT, however it is possible that lorazepam is also playing a role in her cognitive impairment. She is not showing signs of catatonia at this time, therefore we will gradually start to decrease her dose of lorazepam to see if this has an impact on her memory.     Target psychiatric symptoms and interventions:   - Continue 1 mg PO or IM lorazepam in the morning and 2 mg PO or IM lorazepam in the evening. Decrease mid-day lorazepam to 1.5 mg PO or IM. Will re-assess taper on Monday, 6/28. Patient may not decline.   - Continu Zyprexa 10 mg BID  PO or IM. Hatch in place. May consider increasing further though holding off given re-emerging catatonic sx and borderline prolonged QTc   -Continue Cogentin 2 mg IM daily prn for evidence of acute dystonic reaction or oculogyric crisis  -Continue hydroxyzine 25-50 mg q4h prn for acute anxiety  -Continue Trazodone 50 mg at bedtime prn for sleep disturbances  -Continue Zyprexa 10 mg TID prn for severe agitation  -Continue Ativan/Benadryl q4h prn for agitation. WOULD GIVE PRN ATIVAN FIRST FOR AGITATION unless it is after 5 pm on day prior to scheduled ECT     ECT:  - HOLDING ECT DUE TO COGNITIVE IMPAIRMENT.  - Because patient was only intermittently accepting scheduled Ativan and some symptoms of catatonia are re-emerging, pursued Alcides Kohler for ECT. She also continues to exhibit symptoms of psychosis and has not responded or has experienced side effects from multiple neuroleptic medications. Court hearing was held on 5/21. Alcides Kohler is approved. Patient is medically cleared. COVID negative on 5/24. Obtained ECT consult on 5/21/21. Please see Dr. Lubin's consultation note.   - NPO 8 hours prior to  "scheduled treatment. Clear liquids until 2 hours prior to treatment.   - Discussed with IM. May resume AM antihypertensives, no need to hold prior to ECT  - SIO while patient is NPO, starting at midnight on ECT days (renewed)  - Will complete acute course in the hospital; will likely need maintenance ECT in outpatient setting.   - New concerns about memory impairments: Switched from bilateral to unilateral ECT on 6/7 and continue to monitor closely, ultimately decided to hold treatments given cognitive impairment.    Acute Medical Problems and Treatments:  BRANDON, resolved:   Pre-renal in nature, likely secondary to decreased oral intake.   - IM Consulted on 6/10. Appreciate assistance. Follow-up note placed on 6/16 (no further work-up recommended).  - avoid nephrotoxins  - Renal bladder US completed on 6/10  - Encourage fluid intake.      HTN: Patient is now adherent with medication regimen.   - Metoprolol succinate ER 50 mg daily  - Cozaar 100 mg daily  - Continue hydralazine 25 mg QID PRN for SBP > 160 mmHg or DBP > 110 mmHg.  - Please see note from IM dated 5/3/21, 5/6/21, 5/24, and 6/20.  - Obtained EKG and routine labs on 5/21 in context of pt declining vital sign checks and anti-hypertensives and recently elevated BPs. Reviewed labs and discussed EKG findings with IM on 5/21. No urgent concerns, though IM should be notified if pt develops acute medical concerns (I.e. heart palpitations, SOB, changes in speech, AMS, confusion, CP, HA, changes in vision).    - Per 6/20 IM note: \"Please notify IM if BP is persistently severely elevated and requiring frequent PRN hydralazine\".    CVA:  - Aspirin 81 mg daily     Chronic Constipation:  - Miralax 17 mg daily    Urinary frequency and urgency, resolved  Reported new-onset urinary frequency and urgency on 6/21. She denied dysuria, hematuria, and suprapubic pain. Etiology unclear, however Michelle reported that this was time-limited and improved on 6/23.  - Urinalysis ordered " on 6/22 was within normal limits.    ANC trending downward since admission, still WNL, resolved  - Per conversation with pharmacy, medications Michelle is taking that are associated with  agranulocytosis include lorazepam (scheduled), metoprolol (scheduled), hydralazine (PRN), and olanzapine (scheduled).   - Will consult internal medicine for further guidance if Michelle declines future blood draws and we are not able to monitor her ANC.   - CBC with differential WNL on 6/21     Behavioral/Psychological/Social:  - Encourage unit programming  - Patient is now Code 3 status and can take walks in the [place] with staff and security present per staff discretion. This appears to be quite therapeutic for her.     Safety:  - Continue precautions as noted above  - Status 15 minute checks  - Safety precautions include: assault and elopement precautions  - Continue precautions as noted above    Legal Status: Committed as MI with Hatch in place for Haldol, Zyprexa, Invega, and Thorazine through [facility]. Filed Alcides Kohler through [facility] and approved on 5/24/21.     Disposition Plan   Reason for ongoing admission: poses an imminent risk to self, poses an imminent risk to others and is unable to care for self due to severe psychosis or tad  Discharge location: Group home vs home with [facility] ACT team  Discharge Medications: not ordered  Follow-up Appointments: not scheduled     Prior to discharge, treatment team will need to coordinate with ACT team to obtain JESSICA for Michelle's son in order to facilitate ongoing involvement in care.    Entered by: Bhavya Lane on 6/23/2021 at 6:14 AM     The patient was seen and discussed with attending psychiatrist Dr. Reeder.    Bhavya Lane MD  Psychiatry PGY-2

## 2021-06-23 NOTE — PROGRESS NOTES
CLINICAL NUTRITION SERVICES - BRIEF NOTE     Nutrition Prescription    RECOMMENDATIONS FOR MDs/PROVIDERS TO ORDER:  None today    Malnutrition Status:    Patient does not meet two of the established criteria necessary for diagnosing malnutrition    Recommendations already ordered by Registered Dietitian (RD):  Diet education    Future/Additional Recommendations:  Monitor intakes and wt trends     REASON FOR ASSESSMENT  Michelle Pino is a 60 year old female assessed by the dietitian for Patient/Family Request-Concerned about weight gain.  PMH significant for HLD, HTN, and schizophrenia admitted for tad, psychosis, and agitation in context of medication non-adherence and recent expiration of MI commitment  Findings  Per RN note: Pt was concern about weight gain. She wants smaller portions because she has gained a lot of weight according to her.  Pt specifically had questions regarding whether or not hamburgers would cause weight gain. Informed pt that no one food will cause wt gain. Encouraged a varied diet with adequate fruits, vegetables, lean protein and whole grains. Intakes have been good since admit, eating 100% of meals per flowsheet.  4# (2.5%) wt loss since admit/1 month   06/22/21 69.3 kg (152 lb 11.2 oz)   06/21/21 69.5 kg (153 lb 3.2 oz)   06/19/21  71.4 kg (157 lb 8 oz)   06/16/21  70.5 kg (155 lb 8 oz)   06/15/21  69.8 kg (153 lb 12.8 oz)   05/25/21  71 kg (156 lb 8 oz)-admit wt   06/04/19 70.6 kg (155 lb 11.2 oz)   04/27/19 63.5 kg (140 lb)     INTERVENTIONS  Implementation  Nutrition Education: Encouraged a general healthy/varied diet. Encouraged pt to incorporate plenty of fruits, veggies, whole grains and lean protein with her meals. Informed pt that all foods can be incorporated into a healthy diet in moderation    Follow up/Monitoring  No nutrition follow-up warranted at this time. RD to sign off. Please consult if further needs arise    Opal Desouza MS, RD, LDN  Unit Pager [phone]

## 2021-06-24 PROCEDURE — 99233 SBSQ HOSP IP/OBS HIGH 50: CPT | Mod: GC | Performed by: PSYCHIATRY & NEUROLOGY

## 2021-06-24 PROCEDURE — 250N000013 HC RX MED GY IP 250 OP 250 PS 637: Performed by: STUDENT IN AN ORGANIZED HEALTH CARE EDUCATION/TRAINING PROGRAM

## 2021-06-24 PROCEDURE — 250N000013 HC RX MED GY IP 250 OP 250 PS 637: Performed by: PHYSICIAN ASSISTANT

## 2021-06-24 PROCEDURE — 124N000002 HC R&B MH UMMC

## 2021-06-24 PROCEDURE — 250N000013 HC RX MED GY IP 250 OP 250 PS 637: Performed by: PSYCHIATRY & NEUROLOGY

## 2021-06-24 RX ADMIN — METOPROLOL SUCCINATE 50 MG: 50 TABLET, EXTENDED RELEASE ORAL at 08:36

## 2021-06-24 RX ADMIN — OLANZAPINE 10 MG: 10 TABLET, ORALLY DISINTEGRATING ORAL at 08:36

## 2021-06-24 RX ADMIN — LORAZEPAM 1.5 MG: 0.5 TABLET ORAL at 14:27

## 2021-06-24 RX ADMIN — HYDRALAZINE HYDROCHLORIDE 25 MG: 25 TABLET ORAL at 08:36

## 2021-06-24 RX ADMIN — OLANZAPINE 10 MG: 10 TABLET, ORALLY DISINTEGRATING ORAL at 20:29

## 2021-06-24 RX ADMIN — LOSARTAN POTASSIUM 100 MG: 100 TABLET, FILM COATED ORAL at 08:36

## 2021-06-24 RX ADMIN — LORAZEPAM 1 MG: 1 TABLET ORAL at 08:36

## 2021-06-24 RX ADMIN — LORAZEPAM 2 MG: 2 TABLET ORAL at 20:30

## 2021-06-24 ASSESSMENT — ACTIVITIES OF DAILY LIVING (ADL)
ORAL_HYGIENE: PROMPTS
HYGIENE/GROOMING: PROMPTS
HYGIENE/GROOMING: PROMPTS
DRESS: SCRUBS (BEHAVIORAL HEALTH)
LAUNDRY: UNABLE TO COMPLETE
ORAL_HYGIENE: PROMPTS
DRESS: SCRUBS (BEHAVIORAL HEALTH)

## 2021-06-24 NOTE — PLAN OF CARE
Problem: Adult Inpatient Plan of Care  Goal: Plan of Care Review  Outcome: No Change  Flowsheets  Taken 6/23/2021 2005  Plan of Care Reviewed With: patient  Progress: no change  Taken 6/23/2021 1810  Plan of Care Reviewed With: patient      Patient isolative to room all evening only out to retrieve meals, declining staffs attempts to prompt for increased activity and ADL's. Patient upon approach flat in affect, calm and cooperative with verbal assessment though at times appearing delayed and short with responses. Patient oriented to person and place and accepting of current medications but demonstrates no insight and denies all symptoms. Patient indicates plan to return to her home stating she feels ready to go indicating that she will complete all her own cares and continue taking medications. Patient accepting of HS medications with no stated or observed side effects. Patient vitals elevated this evening with systolic of 168, PRN Hydralazine offered and accepted.

## 2021-06-24 NOTE — PROGRESS NOTES
"[facility], [place]   Psychiatric Progress Note  Hospital Day: 71        Interim History:   The patient's care was discussed with the treatment team during the daily team meeting and/or staff's chart notes were reviewed. There were no instances of agitation or aggression overnight. She slept 6.75 hours overnight. She is taking her medications as prescribed. She remains isolative and withdrawn to her room. She declined to engage in ADLs last night. Her blood pressure was elevated last night (168/79 mmHg) and this morning (167/89 mmHg). She received PRN hydralazine in both instances. She feels that she can discharge and care for herself.      Upon interview, Michelle stated that she was \"good\" and that her sleep was \"good\". She is worried about gaining weight. She feels that she does have access to adequate fruits and vegetables. She is planning to ride the stationary bike again today. She notices that her memory is improving. When I asked her what city we are in, she laughed and stated \"[place], [place]\". She was oriented to it being June 24, 2021. She denied auditory and visual hallucinations, as well as suicidal ideation. She reported that she is \"sick and tired of being idle\". She would like to leave and start looking for a job. When asked about what she likes to do for fun outside of the hospital she stated that she does not have time for fun. She denied having any symptoms associated with elevated BPs including headache, vision changes, palpitations, chest pain. Denies any other physical health concerns.          Medications:      LORazepam  1 mg Oral Daily    Or    LORazepam  1 mg Intramuscular Daily    LORazepam  1.5 mg Intramuscular Daily    Or    LORazepam  1.5 mg Oral Daily    LORazepam  2 mg Oral QPM    Or    LORazepam  2 mg Intramuscular QPM    losartan  100 mg Oral Daily    metoprolol succinate ER  50 mg Oral Daily    OLANZapine zydis  10 mg Oral BID    Or    OLANZapine  10 " mg Intramuscular BID          Allergies:     Allergies   Allergen Reactions    Haldol [Haloperidol]      Patient previously tolerated haldol, though developed oculogyric crises during hospital stay on 4/26/21 on total daily dose of 10 mg.    Lisinopril Cough          Labs:     No results found for this or any previous visit (from the past 24 hour(s)).       Psychiatric Examination:     BP (!) 167/89 (BP Location: Left arm)   Pulse 71   Temp 97.7  F (36.5  C) (Tympanic)   Resp 16   Wt 70.7 kg (155 lb 12.8 oz)   SpO2 97%   BMI 26.74 kg/m    Weight is 155 lbs 12.8 oz  Body mass index is 26.74 kg/m .    Weight over time:  Vitals:    05/25/21 0850 06/15/21 0811 06/16/21 1605 06/19/21 0859   Weight: 71 kg (156 lb 8 oz) 69.8 kg (153 lb 12.8 oz) 70.5 kg (155 lb 8 oz) 71.4 kg (157 lb 8 oz)    06/21/21 0805 06/22/21 0839 06/24/21 0800   Weight: 69.5 kg (153 lb 3.2 oz) 69.3 kg (152 lb 11.2 oz) 70.7 kg (155 lb 12.8 oz)       Orthostatic Vitals         Most Recent      Lying Orthostatic /91 06/23 0830    Lying Orthostatic Pulse (bpm) 67 06/23 0830    Sitting Orthostatic /89  Comment: notify RN 06/24 0800    Sitting Orthostatic Pulse (bpm) 71 06/24 0800    Standing Orthostatic /101  Comment: notify RN 06/24 0800    Standing Orthostatic Pulse (bpm) 86 06/24 0800            Cardiometabolic risk assessment. 04/15/21      Reviewed patient profile for cardiometabolic risk factors    Date taken /Value  REFERENCE RANGE   Abdominal Obesity  (Waist Circumference)   See nursing flowsheet Women ?35 in (88 cm)   Men ?40 in (102 cm)      Triglycerides  Triglycerides   Date Value Ref Range Status   10/19/2019 117 <150 mg/dL Final       ?150 mg/dL (1.7 mmol/L) or current treatment for elevated triglycerides   HDL cholesterol  HDL Cholesterol   Date Value Ref Range Status   10/19/2019 56 >49 mg/dL Final   ]   Women <50 mg/dL (1.3 mmol/L) in women or current treatment for low HDL cholesterol  Men <40 mg/dL (1 mmol/L) in  "men or current treatment for low HDL cholesterol     Fasting plasma glucose (FPG) Lab Results   Component Value Date     10/19/2019      FPG ?100 mg/dL (5.6 mmol/L) or treatment for elevated blood glucose   Blood pressure  BP Readings from Last 3 Encounters:   06/24/21 (!) 167/89   06/04/19 131/71   04/26/19 164/86    Blood pressure ?130/85 mmHg or treatment for elevated blood pressure   Family History  See family history     Appearance: dressed in hospital scrubs, appeared reported age, appeared well groomed   Attitude: cooperative  Eye Contact: poor, looks at floor throughout most of the interview  Mood: \"good\"  Affect:  Mostly flat, constricted, smiled and laughed once  Speech: accented, clear and coherent  Language: no obvious receptive or expressive deficits  Psychomotor, Gait, Musculoskeletal: No abnormal movements noted while seated.   Throught Process: linear, future-oriented  Associations:  No loosening of associations present  Thought Content:  Denied auditory hallucinations, visual hallucinations, and suicidal ideation.  Insight:  limited   Judgement:  Limited  Oriented to: date, month, year, city, and state.  Attention Span and Concentration: attentive to conversation.  Recent and Remote Memory: impaired, worsened with initiation of ECT however appears to be gradually improving  Fund of Knowledge:  normal    Clinical Global Impressions  First:  Considering your total clinical experience with this particular patient population, how severe are the patient's symptoms at this time?: 7 (04/16/21 1428)  Compared to the patient's condition at the START of treatment, this patient's condition is: 4 (04/16/21 1428)  Most recent:  Considering your total clinical experience with this particular patient population, how severe are the patient's symptoms at this time?: 7 (06/22/21 1529)  Compared to the patient's condition at the START of treatment, this patient's condition is: 3 (06/22/21 1529)         " Precautions:     Behavioral Orders   Procedures    Assault precautions    Cheeking Precautions (behavioral units)     Patient Observed swallowing PO medications; Patient asked to drink water after swallowing medication; Patient in Staff line of sight for 15 minutes after medication given; Mouth checks after PO administration (patient asked to open mouth and stick out their tongue).    Code 1 - Restrict to Unit    Code 3     For walks in the [place] with staff and per staff discretion    Electroconvulsive therapy     Series of up to 12 treatments. Begin Date: 5/26/21     Treating Psychiatrist providing ECT:  Dr. Lubin     Notified on:  5/21/21    Electroconvulsive therapy     As long as we get the green light from risk management and pt is medically cleared, begin ECT every Monday, Wednesday, and Friday    Electroconvulsive therapy     Series of up to 12 treatments. Begin Date: 5/25/21     Treating Psychiatrist providing ECT:  Amee     Notified on:  5/24/21    Elopement precautions    Fall precautions    Mcgrath Calderon    Routine Programming     As clinically indicated    Status 15     Every 15 minutes.          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 Symptoms and presentation at this time is most consistent with Schizoaffective Disorder, Bipolar Type. Obtained most recent medication regimen from patient's ACT team, and regimen was initially restarted. Pt is committed. Petitioned for Alcides  "Calderon due to lack of improvement and side effects from medications. Inpatient psychiatric hospitalization is warranted at this time for safety, stabilization, and possible adjustment in medications.    Hospital Course:  On admission, PTA medications were restarted. However, patient had been declining all scheduled medications despite significant encouragement from staff and provider. Psychiatric emergency declared on 4/20 due to aggression toward others in context of severe psychosis and suspected excited catatonia. Ativan 1 mg TID was also added. Discontinued PTA Invega, Zyprexa, and thorazine on 4/20 due to consistent refusal.     On 4/26, it was noted that patient frequently had upward gaze while walking up and down the unit. She did not appear to be distressed. She reported that she was looking at \"my god.\" It was determined to be oculogyric crisis secondary to IM haloperidol. Haldol was subsequently discontinued during day shift on 4/26 and scheduled Zyprexa was initiated on emergency basis. Oral Cogentin was also scheduled, though patient declined. On the evening of 4/26, patient's gaze was fixed in upward position for several hours and she appeared to be experiencing discomfort. IM Cogentin was administered with noted resolution. Partial improvements were observed after switch to scheduled Zyprexa. After patient improved, she was more receptive to reinitiating oral Invega, which was initiated on 5/3 and titrated to PTA dose of 9 mg daily on 5/10. Plan was for ACT team to bring in loading dose of Invega Sustenna. Patient had signs of oculogyric crisis again with Invega. Oral dose decreased to 6 mg after this. Invega was stopped on 5/14 due to ongoing signs of problems related to eye movement and concerns for oculogyric crisis.    Overall improvement in patient's agitation and suspected catatonia noted on 4/30 after patient accepted two doses of Ativan. She began declining Ativan again with noted " "decompensation. Patient now accepting, however, she does not have the capacity to consent to treatment with Ativan at this time. She does not believe she has a mental illness, including catatonia. She does not fully understand risks associated with inadequate treatment of catatonia. Discussed with her son who is acting as surrogate decision maker and he is in full support of forced scheduled IM Ativan if patient declines oral formulation. Also consulted with our legal team prior to backing oral Ativan with IM.     Ativan was increased on 5/19 due to re-emerging evidence of catatonia (long periods of staring, repetitive movements, echolalia, mutism). Meeting was held with ACT team on 5/20, including ACT team psychiatrist, Dr. Pedraza. He said that he is in \"full support\" of plan to pursue Mcgrath Kohler and ECT at this time. He does feel that in the past she has been discharged from the hospital while still quite symptomatic. He is hoping that ECT will be effective and that with improvement, Michelle would be more receptive to clozapine and weekly blood draws. He said that ideally she would transition to an IRTS before going back to her apartment, but also understands there may be some barriers (I.e. pt's willingness, financial concerns, etc).     ECT consult placed. Please see consult note by Dr. Lubin on 5/21 for details. Alcides Riverappard was approved on 5/24 and patient was medically cleared on 5/24. ECT initiated on 5/26. She was noted to have a short seizure during ECT on 6/4. Staff note that patient appears more disoriented with memory impairment and sedation on days of ECT. This was noted on my examination again today. Improvements noted in mood, affect, social interactions, paranoia, agitation since initiation of ECT. Reduced Ativan on 6/5 due to sedative effects. Relayed concerns about memory impairment and confusion with Dr. Lubin. Recommended attempting unilateral ECT, which he agreed to do. First unilateral ECT " on 6/7. ECT was held on 6/11 and 6/14 due to memory impairment. We will plan to hold it again tomorrow (6/16). There is ongoing discussion regarding whether there is a component of catatonia contributing to her current presentation but this is less likely since it worsened after ECT was started.  Given her level of cognitive impairment and our belief that this is due to ECT, we will not pursue any further treatments at this time. Since we have held ECT (last treatment on 6/9), her cognitive impairment has slightly improved (more oriented).     Michelle appears to be decompensating somewhat since ECT has been held, however her cognitive impairment persists. If symptoms of psychosis re-emerge, it may be worth starting clozapine, which is something that has previously been considered by her ACT team. Her Hatch order would need to be amended as clozapine is not one of the medications listed. ANC obtained on 6/16 was 1800/microL. Per conversation with pharmacy, neutropenia has been associated with olanzapine and agranulocytosis has been associated with olanzapine, lorazepam, metoprolol, and hydralazine. We will continue to monitor her hematologic labs. Upon re-check, ANC was 3700/microL on 6/21/21. At this time, the primary symptoms we are observing are cognitive deficits and inability to care for self, which we would not address by changing her antipsychotic medication.    Michelle's cognitive impairment has been steadily improving since we have held ECT, however it is possible that lorazepam is also playing a role in her cognitive impairment. She is not showing signs of catatonia at this time, therefore we will gradually start to decrease her dose of lorazepam to see if this has an impact on her memory.     Target psychiatric symptoms and interventions:   - Continue 1 mg PO or IM lorazepam in the morning and 2 mg PO or IM lorazepam in the evening. Continue mid-day lorazepam to 1.5 mg PO or IM. Will re-assess taper on Monday,  6/28. Patient may not decline.   - Continue Zyprexa 10 mg BID  PO or IM. Hatch in place. May consider increasing further though holding off given re-emerging catatonic sx and borderline prolonged QTc   -Continue Cogentin 2 mg IM daily prn for evidence of acute dystonic reaction or oculogyric crisis  -Continue hydroxyzine 25-50 mg q4h prn for acute anxiety  -Continue Trazodone 50 mg at bedtime prn for sleep disturbances  -Continue Zyprexa 10 mg TID prn for severe agitation  -Continue Ativan/Benadryl q4h prn for agitation. WOULD GIVE PRN ATIVAN FIRST FOR AGITATION unless it is after 5 pm on day prior to scheduled ECT     ECT:  - HOLDING ECT DUE TO COGNITIVE IMPAIRMENT.  - Because patient was only intermittently accepting scheduled Ativan and some symptoms of catatonia are re-emerging, pursued Alcides Kohler for ECT. She also continues to exhibit symptoms of psychosis and has not responded or has experienced side effects from multiple neuroleptic medications. Court hearing was held on 5/21. Alcides Kohler is approved. Patient is medically cleared. COVID negative on 5/24. Obtained ECT consult on 5/21/21. Please see Dr. Lubin's consultation note.   - NPO 8 hours prior to scheduled treatment. Clear liquids until 2 hours prior to treatment.   - Discussed with IM. May resume AM antihypertensives, no need to hold prior to ECT  - SIO while patient is NPO, starting at midnight on ECT days (renewed)  - Will complete acute course in the hospital; will likely need maintenance ECT in outpatient setting.   - New concerns about memory impairments: Switched from bilateral to unilateral ECT on 6/7 and continue to monitor closely, ultimately decided to hold treatments given cognitive impairment.    Acute Medical Problems and Treatments:  BRANDON, resolved:   Pre-renal in nature, likely secondary to decreased oral intake.   - IM Consulted on 6/10. Appreciate assistance. Follow-up note placed on 6/16 (no further work-up recommended).  -  "avoid nephrotoxins  - Renal bladder US completed on 6/10  - Encourage fluid intake.      HTN: Patient is now adherent with medication regimen.   - Metoprolol succinate ER 50 mg daily  - Cozaar 100 mg daily  - Continue hydralazine 25 mg QID PRN for SBP > 160 mmHg or DBP > 110 mmHg.  - Please see note from IM dated 5/3/21, 5/6/21, 5/24, and 6/20.  - Obtained EKG and routine labs on 5/21 in context of pt declining vital sign checks and anti-hypertensives and recently elevated BPs. Reviewed labs and discussed EKG findings with IM on 5/21. No urgent concerns, though IM should be notified if pt develops acute medical concerns (I.e. heart palpitations, SOB, changes in speech, AMS, confusion, CP, HA, changes in vision).    - Per 6/20 IM note: \"Please notify IM if BP is persistently severely elevated and requiring frequent PRN hydralazine\".     CVA:  - Aspirin 81 mg daily     Chronic Constipation:  - Miralax 17 mg daily    Urinary frequency and urgency, resolved  Reported new-onset urinary frequency and urgency on 6/21. She denied dysuria, hematuria, and suprapubic pain. Etiology unclear, however Michelle reported that this was time-limited and improved on 6/23.  - Urinalysis ordered on 6/22 was within normal limits.    ANC trending downward since admission, still WNL, resolved  - Per conversation with pharmacy, medications Michelle is taking that are associated with  agranulocytosis include lorazepam (scheduled), metoprolol (scheduled), hydralazine (PRN), and olanzapine (scheduled).   - Will consult internal medicine for further guidance if Michelle declines future blood draws and we are not able to monitor her ANC.   - CBC with differential WNL on 6/21     Behavioral/Psychological/Social:  - Encourage unit programming  - Patient is now Code 3 status and can take walks in the [place] with staff and security present per staff discretion. This appears to be quite therapeutic for her.     Safety:  - Continue precautions as noted " above  - Status 15 minute checks  - Safety precautions include: assault and elopement precautions  - Continue precautions as noted above    Legal Status: Committed as MI with Hatch in place for Haldol, Zyprexa, Invega, and Thorazine through [facility]. Filed Alcides Kohler through [facility] and approved on 5/24/21.     Disposition Plan   Reason for ongoing admission: poses an imminent risk to self, poses an imminent risk to others and is unable to care for self due to severe psychosis or tad  Discharge location: Group home vs home with [facility] ACT team  Discharge Medications: not ordered  Follow-up Appointments: not scheduled     Prior to discharge, treatment team will need to coordinate with ACT team to obtain JESSICA for Michelle's son in order to facilitate ongoing involvement in care.    Entered by: Bhavya Lane on 6/24/2021 at 9:23 AM     The patient was seen and discussed with attending psychiatrist Dr. Oquendo.    Bhavya Lane MD  Psychiatry PGY-2

## 2021-06-24 NOTE — PLAN OF CARE
Problem: Sleep Disturbance (Psychotic Signs/Symptoms)  Goal: Improved Sleep (Psychotic Signs/Symptoms)  Outcome: Improving       Pt slept for 6.75 hours. No concerns noted or reported this shift

## 2021-06-24 NOTE — PLAN OF CARE
Pt has isolated to her room for most of the day, only coming out to get meal trays and make requests. Withdrawn and blunted on approach. Calm and pleasant. Oriented to self and place. Appears confused at times, though this is improving. Pt denies SI/SIB, HI, AH/VH, and denies feeling depressed or anxious. She declined to shower today, though she completed other hygiene. Compliant with scheduled medications and denies any side effects. Appetite and fluid intake good.

## 2021-06-25 PROCEDURE — 250N000013 HC RX MED GY IP 250 OP 250 PS 637: Performed by: PHYSICIAN ASSISTANT

## 2021-06-25 PROCEDURE — 250N000013 HC RX MED GY IP 250 OP 250 PS 637: Performed by: PSYCHIATRY & NEUROLOGY

## 2021-06-25 PROCEDURE — 124N000002 HC R&B MH UMMC

## 2021-06-25 PROCEDURE — 250N000013 HC RX MED GY IP 250 OP 250 PS 637: Performed by: STUDENT IN AN ORGANIZED HEALTH CARE EDUCATION/TRAINING PROGRAM

## 2021-06-25 PROCEDURE — 99232 SBSQ HOSP IP/OBS MODERATE 35: CPT | Mod: GC | Performed by: PSYCHIATRY & NEUROLOGY

## 2021-06-25 RX ADMIN — METOPROLOL SUCCINATE 50 MG: 50 TABLET, EXTENDED RELEASE ORAL at 09:17

## 2021-06-25 RX ADMIN — LOSARTAN POTASSIUM 100 MG: 100 TABLET, FILM COATED ORAL at 09:17

## 2021-06-25 RX ADMIN — LORAZEPAM 2 MG: 2 TABLET ORAL at 20:21

## 2021-06-25 RX ADMIN — LORAZEPAM 1.5 MG: 0.5 TABLET ORAL at 13:48

## 2021-06-25 RX ADMIN — OLANZAPINE 10 MG: 10 TABLET, ORALLY DISINTEGRATING ORAL at 09:17

## 2021-06-25 RX ADMIN — OLANZAPINE 10 MG: 10 TABLET, ORALLY DISINTEGRATING ORAL at 20:21

## 2021-06-25 RX ADMIN — LORAZEPAM 1 MG: 1 TABLET ORAL at 09:17

## 2021-06-25 ASSESSMENT — ACTIVITIES OF DAILY LIVING (ADL)
LAUNDRY: UNABLE TO COMPLETE
ORAL_HYGIENE: INDEPENDENT
HYGIENE/GROOMING: INDEPENDENT;SHOWER
HYGIENE/GROOMING: INDEPENDENT
DRESS: SCRUBS (BEHAVIORAL HEALTH);INDEPENDENT
ORAL_HYGIENE: INDEPENDENT
DRESS: SCRUBS (BEHAVIORAL HEALTH)

## 2021-06-25 NOTE — PLAN OF CARE
Problem: Behavioral Health Plan of Care  Goal: Adheres to Safety Considerations for Self and Others  Outcome: No Change     Problem: Behavioral Health Plan of Care  Goal: Absence of New-Onset Illness or Injury  6/25/2021 1004 by Tasha Davis RN  Outcome: No Change  6/25/2021 0933 by Tasha Davis RN  Outcome: No Change     Pt presented as alert and oriented to place and self throughout shift.  She was mostly isolative and withdrawn to her room during the day.  Pt appeared tidy and speech was clear and coherent.  She showered on this shift.  Pt ate meals and she was medications compliant.  She denied SI/HI/SIB.  Pt denied any psychosis.  She did not present with pain, any physical health concerns, or side effects from medications this shift.

## 2021-06-25 NOTE — PLAN OF CARE
Assessment/Intervention/Current Symtoms and Care Coordination  WR was unable to attend team due to scheduling conflict with another unit.     Discharge Plan or Goal  Unknown at this time as initial plan was for patient to return home with [facility] ACT Team in place but now patient may need a higher level of care.     Barriers to Discharge   Patient remains unable to care for herself if she return home alone. Patient is continuing to stabilize.     Referral Status  None at this time     Legal Status  Commitment/Hatch/Alcides Saavedraard in [place]

## 2021-06-25 NOTE — PROGRESS NOTES
"  Attestation    The patient has been seen and evaluated by me, Kal Reeder MD on the date of this note. I have discussed this patient with the resident, Dr. Bhavya Lane and I agree with the findings and plan in this note. I have reviewed today's vital signs, medications, labs and imaging.    Kal Reeder MD    [facility], [place]   Psychiatric Progress Note  Hospital Day: 72        Interim History:   The patient's care was discussed with the treatment team during the daily team meeting and/or staff's chart notes were reviewed. There were no instances of agitation or aggression overnight. She slept 6.25 hours overnight. She is taking her medications as prescribed. She remains isolative and withdrawn to her room. She declined to shower yesterday but engaged in other ADLs. Her confusion appears to be improving. Her vital signs are stable and she is afebrile. She has not required PRN hydralazine since yesterday morning.     Upon interview, Michelle stated that she was \"good\" and that she had a good night. She feels that she slept well. We asked her if she was feeling sad and she stated that she was not. She wants to be discharged and feels lonely on the unit. She stated that she is \"sick and tired of being in one place\". She wants to look for a job but she does not know what kind of a job she wants until she starts looking. She might ride the stationary bike today. We also discussed the possibility of her going to the [employer].     Michelle does not feel confused. She feels that her memory is improving. She was oriented to it being June 25, 2021 and that she is in [place]. She also remembered that Dr. Zaman is returning on Monday and smiled at this.     We discussed possibly starting lamotrigine to address depression, however she was emphatic that she does not feel sad and expressed that she does not want to start another medication.           Medications:       LORazepam  1 mg Oral " Daily    Or     LORazepam  1 mg Intramuscular Daily     LORazepam  1.5 mg Intramuscular Daily    Or     LORazepam  1.5 mg Oral Daily     LORazepam  2 mg Oral QPM    Or     LORazepam  2 mg Intramuscular QPM     losartan  100 mg Oral Daily     metoprolol succinate ER  50 mg Oral Daily     OLANZapine zydis  10 mg Oral BID    Or     OLANZapine  10 mg Intramuscular BID          Allergies:     Allergies   Allergen Reactions     Haldol [Haloperidol]      Patient previously tolerated haldol, though developed oculogyric crises during hospital stay on 4/26/21 on total daily dose of 10 mg.     Lisinopril Cough          Labs:     No results found for this or any previous visit (from the past 24 hour(s)).       Psychiatric Examination:     BP (!) 145/87 (BP Location: Left arm)   Pulse 69   Temp 98.4  F (36.9  C) (Tympanic)   Resp 16   Wt 70.7 kg (155 lb 12.8 oz)   SpO2 97%   BMI 26.74 kg/m    Weight is 155 lbs 12.8 oz  Body mass index is 26.74 kg/m .    Weight over time:  Vitals:    05/25/21 0850 06/15/21 0811 06/16/21 1605 06/19/21 0859   Weight: 71 kg (156 lb 8 oz) 69.8 kg (153 lb 12.8 oz) 70.5 kg (155 lb 8 oz) 71.4 kg (157 lb 8 oz)    06/21/21 0805 06/22/21 0839 06/24/21 0800   Weight: 69.5 kg (153 lb 3.2 oz) 69.3 kg (152 lb 11.2 oz) 70.7 kg (155 lb 12.8 oz)       Orthostatic Vitals         Most Recent      Lying Orthostatic /91 06/23 0830    Lying Orthostatic Pulse (bpm) 67 06/23 0830    Sitting Orthostatic /89  Comment: notify RN 06/24 0800    Sitting Orthostatic Pulse (bpm) 71 06/24 0800    Standing Orthostatic /101  Comment: notify RN 06/24 0800    Standing Orthostatic Pulse (bpm) 86 06/24 0800            Cardiometabolic risk assessment. 04/15/21      Reviewed patient profile for cardiometabolic risk factors    Date taken /Value  REFERENCE RANGE   Abdominal Obesity  (Waist Circumference)   See nursing flowsheet Women ?35 in (88 cm)   Men ?40 in (102 cm)      Triglycerides  Triglycerides   Date  "Value Ref Range Status   10/19/2019 117 <150 mg/dL Final       ?150 mg/dL (1.7 mmol/L) or current treatment for elevated triglycerides   HDL cholesterol  HDL Cholesterol   Date Value Ref Range Status   10/19/2019 56 >49 mg/dL Final   ]   Women <50 mg/dL (1.3 mmol/L) in women or current treatment for low HDL cholesterol  Men <40 mg/dL (1 mmol/L) in men or current treatment for low HDL cholesterol     Fasting plasma glucose (FPG) Lab Results   Component Value Date     10/19/2019      FPG ?100 mg/dL (5.6 mmol/L) or treatment for elevated blood glucose   Blood pressure  BP Readings from Last 3 Encounters:   06/25/21 (!) 145/87   06/04/19 131/71   04/26/19 164/86    Blood pressure ?130/85 mmHg or treatment for elevated blood pressure   Family History  See family history     Appearance: dressed in hospital scrubs, appeared reported age, appeared well groomed  Attitude: cooperative  Eye Contact: fair  Mood: \"good\"  Affect:  Mostly flat, constricted, smiled and laughed twice  Speech: accented, clear and coherent  Language: no obvious receptive or expressive deficits  Psychomotor, Gait, Musculoskeletal: No abnormal movements noted while seated.   Throught Process: linear, future-oriented  Associations:  No loosening of associations present  Thought Content:  No suicidal ideation elicited. Did not appear to be responding to internal stimuli.  Insight:  limited   Judgement:  Limited  Oriented to: date, month, year, and city.   Attention Span and Concentration: attentive to conversation.  Recent and Remote Memory: impaired, worsened with initiation of ECT however appears to be gradually improving  Fund of Knowledge:  normal    Clinical Global Impressions  First:  Considering your total clinical experience with this particular patient population, how severe are the patient's symptoms at this time?: 7 (04/16/21 1455)  Compared to the patient's condition at the START of treatment, this patient's condition is: 4 (04/16/21 " 1428)  Most recent:  Considering your total clinical experience with this particular patient population, how severe are the patient's symptoms at this time?: 7 (06/22/21 1529)  Compared to the patient's condition at the START of treatment, this patient's condition is: 3 (06/22/21 1529)         Precautions:     Behavioral Orders   Procedures     Assault precautions     Cheeking Precautions (behavioral units)     Patient Observed swallowing PO medications; Patient asked to drink water after swallowing medication; Patient in Staff line of sight for 15 minutes after medication given; Mouth checks after PO administration (patient asked to open mouth and stick out their tongue).     Code 1 - Restrict to Unit     Code 3     For walks in the [place] with staff and per staff discretion     Electroconvulsive therapy     Series of up to 12 treatments. Begin Date: 5/26/21     Treating Psychiatrist providing ECT:  Dr. Lubin     Notified on:  5/21/21     Electroconvulsive therapy     As long as we get the green light from risk management and pt is medically cleared, begin ECT every Monday, Wednesday, and Friday     Electroconvulsive therapy     Series of up to 12 treatments. Begin Date: 5/25/21     Treating Psychiatrist providing ECT:  Amee     Notified on:  5/24/21     Elopement precautions     Fall precautions     Mcgrath Calderon     Routine Programming     As clinically indicated     Status 15     Every 15 minutes.          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 Symptoms and presentation at this time is most consistent with Schizoaffective Disorder, Bipolar Type. Obtained most recent medication regimen from patient's ACT team, and regimen was initially restarted. Pt is committed. Petitioned for Al[facility] [place] due to lack of improvement and side effects from medications. Inpatient psychiatric hospitalization is warranted at this time for safety, stabilization, and possible adjustment in medications.    Hospital Course:  On admission, PTA medications were restarted. However, patient had been declining all scheduled medications despite significant encouragement from staff and provider. Psychiatric emergency declared on 4/20 due to aggression toward others in context of severe psychosis and suspected excited catatonia. Ativan 1 mg TID was also added. Discontinued PTA Invega, Zyprexa, and thorazine on 4/20 due to consistent refusal.     On 4/26, it was noted that patient frequently had upward gaze while walking up and down the unit. She did not appear to be distressed. She reported that she was looking at \"my god.\" It was determined to be oculogyric crisis secondary to IM haloperidol. Haldol was subsequently discontinued during day shift on 4/26 and scheduled Zyprexa was initiated on emergency basis. Oral Cogentin was also scheduled, though patient declined. On the evening of 4/26, patient's gaze was fixed in upward position for several hours and she appeared to be experiencing discomfort. IM Cogentin was administered with noted resolution. Partial improvements were observed after switch to scheduled Zyprexa. After patient improved, she was more receptive to reinitiating oral Invega, which was initiated on 5/3 and titrated to PTA dose of 9 mg daily on 5/10. Plan was for ACT team to bring in loading dose of Invega S[place]. Patient had signs of oculogyric crisis again with Invega. Oral dose decreased to 6 mg after " "this. Invega was stopped on 5/14 due to ongoing signs of problems related to eye movement and concerns for oculogyric crisis.    Overall improvement in patient's agitation and suspected catatonia noted on 4/30 after patient accepted two doses of Ativan. She began declining Ativan again with noted decompensation. Patient now accepting, however, she does not have the capacity to consent to treatment with Ativan at this time. She does not believe she has a mental illness, including catatonia. She does not fully understand risks associated with inadequate treatment of catatonia. Discussed with her son who is acting as surrogate decision maker and he is in full support of forced scheduled IM Ativan if patient declines oral formulation. Also consulted with our legal team prior to backing oral Ativan with IM.     Ativan was increased on 5/19 due to re-emerging evidence of catatonia (long periods of staring, repetitive movements, echolalia, mutism). Meeting was held with ACT team on 5/20, including ACT team psychiatrist, Dr. Pedraza. He said that he is in \"full support\" of plan to pursue Mcgrath Kohler and ECT at this time. He does feel that in the past she has been discharged from the hospital while still quite symptomatic. He is hoping that ECT will be effective and that with improvement, Michelle would be more receptive to clozapine and weekly blood draws. He said that ideally she would transition to an IRTS before going back to her apartment, but also understands there may be some barriers (I.e. pt's willingness, financial concerns, etc).     ECT consult placed. Please see consult note by Dr. Lubin on 5/21 for details. Mcgrath Kohler was approved on 5/24 and patient was medically cleared on 5/24. ECT initiated on 5/26. She was noted to have a short seizure during ECT on 6/4. Staff note that patient appears more disoriented with memory impairment and sedation on days of ECT. This was noted on my examination again today. " Improvements noted in mood, affect, social interactions, paranoia, agitation since initiation of ECT. Reduced Ativan on 6/5 due to sedative effects. Relayed concerns about memory impairment and confusion with Dr. Lubin. Recommended attempting unilateral ECT, which he agreed to do. First unilateral ECT on 6/7. ECT was held on 6/11 and 6/14 due to memory impairment. We will plan to hold it again tomorrow (6/16). There is ongoing discussion regarding whether there is a component of catatonia contributing to her current presentation but this is less likely since it worsened after ECT was started.  Given her level of cognitive impairment and our belief that this is due to ECT, we will not pursue any further treatments at this time. Since we have held ECT (last treatment on 6/9), her cognitive impairment has slightly improved (more oriented).     Michelle appears to be decompensating somewhat since ECT has been held, however her cognitive impairment persists. If symptoms of psychosis re-emerge, it may be worth starting clozapine, which is something that has previously been considered by her ACT team. Her Hatch order would need to be amended as clozapine is not one of the medications listed. ANC obtained on 6/16 was 1800/microL. Per conversation with pharmacy, neutropenia has been associated with olanzapine and agranulocytosis has been associated with olanzapine, lorazepam, metoprolol, and hydralazine. We will continue to monitor her hematologic labs. Upon re-check, ANC was 3700/microL on 6/21/21. At this time, the primary symptoms we are observing are cognitive deficits and inability to care for self, which we would not address by changing her antipsychotic medication.    Michelle's cognitive impairment has been steadily improving since we have held ECT, however it is possible that lorazepam is also playing a role in her cognitive impairment. She is not showing signs of catatonia at this time, therefore we will gradually start to  decrease her dose of lorazepam to see if this has an impact on her memory.     Michelle's affect and behavior is concerning for either depression or negative symptoms of psychosis. We considered starting lamotrigine however Michelle stated that she did not want to add another medication.    Target psychiatric symptoms and interventions:   - Continue 1 mg PO or IM lorazepam in the morning and 2 mg PO or IM lorazepam in the evening. Continue mid-day lorazepam to 1.5 mg PO or IM. Will re-assess taper on Monday, 6/28. Patient may not decline.   - Continue Zyprexa 10 mg BID  PO or IM. Hatch in place. May consider increasing further though holding off given re-emerging catatonic sx and borderline prolonged QTc   -Continue Cogentin 2 mg IM daily prn for evidence of acute dystonic reaction or oculogyric crisis  -Continue hydroxyzine 25-50 mg q4h prn for acute anxiety  -Continue Trazodone 50 mg at bedtime prn for sleep disturbances  -Continue Zyprexa 10 mg TID prn for severe agitation  -Continue Ativan/Benadryl q4h prn for agitation. WOULD GIVE PRN ATIVAN FIRST FOR AGITATION unless it is after 5 pm on day prior to scheduled ECT     ECT:  - HOLDING ECT DUE TO COGNITIVE IMPAIRMENT.  - Because patient was only intermittently accepting scheduled Ativan and some symptoms of catatonia are re-emerging, pursued Alcides Kohler for ECT. She also continues to exhibit symptoms of psychosis and has not responded or has experienced side effects from multiple neuroleptic medications. Court hearing was held on 5/21. Alcides Kohler is approved. Patient is medically cleared. COVID negative on 5/24. Obtained ECT consult on 5/21/21. Please see Dr. Lubin's consultation note.   - NPO 8 hours prior to scheduled treatment. Clear liquids until 2 hours prior to treatment.   - Discussed with IM. May resume AM antihypertensives, no need to hold prior to ECT  - SIO while patient is NPO, starting at midnight on ECT days (renewed)  - Will complete acute course in  "the hospital; will likely need maintenance ECT in outpatient setting.   - New concerns about memory impairments: Switched from bilateral to unilateral ECT on 6/7 and continue to monitor closely, ultimately decided to hold treatments given cognitive impairment.    Acute Medical Problems and Treatments:  RBANDON, resolved:   Pre-renal in nature, likely secondary to decreased oral intake.   - IM Consulted on 6/10. Appreciate assistance. Follow-up note placed on 6/16 (no further work-up recommended).  - avoid nephrotoxins  - Renal bladder US completed on 6/10  - Encourage fluid intake.      HTN: Patient is now adherent with medication regimen.   - Metoprolol succinate ER 50 mg daily  - Cozaar 100 mg daily  - Continue hydralazine 25 mg QID PRN for SBP > 160 mmHg or DBP > 110 mmHg.  - Please see note from IM dated 5/3/21, 5/6/21, 5/24, and 6/20.  - Obtained EKG and routine labs on 5/21 in context of pt declining vital sign checks and anti-hypertensives and recently elevated BPs. Reviewed labs and discussed EKG findings with IM on 5/21. No urgent concerns, though IM should be notified if pt develops acute medical concerns (I.e. heart palpitations, SOB, changes in speech, AMS, confusion, CP, HA, changes in vision).    - Per 6/20 IM note: \"Please notify IM if BP is persistently severely elevated and requiring frequent PRN hydralazine\".     CVA:  - Aspirin 81 mg daily     Chronic Constipation:  - Miralax 17 mg daily    Urinary frequency and urgency, resolved  Reported new-onset urinary frequency and urgency on 6/21. She denied dysuria, hematuria, and suprapubic pain. Etiology unclear, however Michelle reported that this was time-limited and improved on 6/23.  - Urinalysis ordered on 6/22 was within normal limits.    ANC trending downward since admission, still WNL, resolved  - Per conversation with pharmacy, medications Michelle is taking that are associated with  agranulocytosis include lorazepam (scheduled), metoprolol (scheduled), " hydralazine (PRN), and olanzapine (scheduled).   - Will consult internal medicine for further guidance if Michelle declines future blood draws and we are not able to monitor her ANC.   - CBC with differential WNL on 6/21     Behavioral/Psychological/Social:  - Encourage unit programming  - Patient is now Code 3 status and can take walks in the [place] with staff and security present per staff discretion. This appears to be quite therapeutic for her.     Safety:  - Continue precautions as noted above  - Status 15 minute checks  - Safety precautions include: assault and elopement precautions  - Continue precautions as noted above    Legal Status: Committed as MI with Hatch in place for Haldol, Zyprexa, Invega, and Thorazine through [facility]. Filed Alcides Kohler through [employer] and approved on 5/24/21.     Disposition Plan   Reason for ongoing admission: poses an imminent risk to self, poses an imminent risk to others and is unable to care for self due to severe psychosis or tad  Discharge location: Group home vs home with [facility] ACT team  Discharge Medications: not ordered  Follow-up Appointments: not scheduled     Prior to discharge, treatment team will need to coordinate with ACT team to obtain JESSICA for Michelle's son in order to facilitate ongoing involvement in care.    Entered by: Bhavya Lane on 6/25/2021 at 8:35 AM     The patient was seen and discussed with attending psychiatrist Dr. Reeder.    Bhavya Lane MD  Psychiatry PGY-2

## 2021-06-25 NOTE — PLAN OF CARE
Problem: Sleep Disturbance (Psychotic Signs/Symptoms)  Goal: Improved Sleep (Psychotic Signs/Symptoms)  Outcome: Improving    Night Shift Summary (6/24/21 into 06/25/21)     Pt asleep at start of shift. Breathing quiet and unlabored.      Pt had no c/o pain or discomfort during the HS.      Appears to have slept 6.25 hours.      Pt on  ASSAULT, CHEEKING, ELOPEMENT, INTRUSIVE and FALL,  precautions in addition to single room order. Any related events noted above.      Will continue to monitor and assess.

## 2021-06-25 NOTE — PLAN OF CARE
Pt had an uneventful evening.  She was isolated and withdrawn to her room and slept much of the evening.  She continues to have an improved appetite and ate most of her dinner.  She declined to shower today and is slightly untidy and disheveled.  She denied any discomfort.  She is overall pleasant and cooperative.  She presents as slightly confused, but this is improving.  She was medication compliant, wanting to know what she was taking.  She denied SI/SIB/HI or any psychotic symptoms.

## 2021-06-26 PROCEDURE — 250N000013 HC RX MED GY IP 250 OP 250 PS 637: Performed by: STUDENT IN AN ORGANIZED HEALTH CARE EDUCATION/TRAINING PROGRAM

## 2021-06-26 PROCEDURE — 250N000013 HC RX MED GY IP 250 OP 250 PS 637: Performed by: PSYCHIATRY & NEUROLOGY

## 2021-06-26 PROCEDURE — 250N000013 HC RX MED GY IP 250 OP 250 PS 637: Performed by: PHYSICIAN ASSISTANT

## 2021-06-26 PROCEDURE — 124N000002 HC R&B MH UMMC

## 2021-06-26 RX ADMIN — LORAZEPAM 2 MG: 2 TABLET ORAL at 19:54

## 2021-06-26 RX ADMIN — LORAZEPAM 1.5 MG: 0.5 TABLET ORAL at 14:18

## 2021-06-26 RX ADMIN — LOSARTAN POTASSIUM 100 MG: 100 TABLET, FILM COATED ORAL at 09:06

## 2021-06-26 RX ADMIN — LORAZEPAM 1 MG: 1 TABLET ORAL at 09:06

## 2021-06-26 RX ADMIN — METOPROLOL SUCCINATE 50 MG: 50 TABLET, EXTENDED RELEASE ORAL at 09:07

## 2021-06-26 RX ADMIN — OLANZAPINE 10 MG: 10 TABLET, ORALLY DISINTEGRATING ORAL at 09:07

## 2021-06-26 RX ADMIN — OLANZAPINE 10 MG: 10 TABLET, ORALLY DISINTEGRATING ORAL at 19:54

## 2021-06-26 ASSESSMENT — ACTIVITIES OF DAILY LIVING (ADL)
HYGIENE/GROOMING: INDEPENDENT
ORAL_HYGIENE: INDEPENDENT
ORAL_HYGIENE: INDEPENDENT
LAUNDRY: UNABLE TO COMPLETE
DRESS: SCRUBS (BEHAVIORAL HEALTH)
HYGIENE/GROOMING: INDEPENDENT
DRESS: SCRUBS (BEHAVIORAL HEALTH);INDEPENDENT

## 2021-06-26 NOTE — PLAN OF CARE
Pt continues to show overall improvements.  She is less confused and has improved communication.  She continues to be mostly isolated to her room and spends much of her time in bed.  She presents as blunted/flat, but is calm and cooperative.  She spoke with her son this evening.  She denied any discomfort and was medication compliant.  She denied SI/SIB/HI or any psychotic symptoms.

## 2021-06-26 NOTE — PLAN OF CARE
Problem: Behavior Regulation Impairment (Psychotic Signs/Symptoms)  Goal: Improved Behavioral Control (Psychotic Signs/Symptoms)  Outcome: No Change     Problem: Mood Impairment (Psychotic Signs/Symptoms)  Goal: Improved Mood Symptoms (Psychotic Signs/Symptoms)  Outcome: No Change     Problem: Social, Occupational or Functional Impairment (Psychotic Signs/Symptoms)  Goal: Enhanced Social, Occupational or Functional Skills (Psychotic Signs/Symptoms)  Outcome: No Change     Pt presented as alert and oriented to place and self throughout shift.  She was primarily isolative to her room during the day.  Pt appeared neat and tidy and her speech was clear and coherent.  She was able to express her needs as necessary.  Pt ate meals and was medication compliant.  She denied SI/HI/SIB.  Pt denied psychosis.  Pt denied any acute physical health concerns, pain, or side effects to medications during this shift.

## 2021-06-26 NOTE — PLAN OF CARE
Problem: Sleep Disturbance (Psychotic Signs/Symptoms)  Goal: Improved Sleep (Psychotic Signs/Symptoms)  Outcome: Improving     Night Shift Summary (6/25/21 into 06/26/21)    Pt asleep at start of shift. Breathing quiet and unlabored.  .     Pt had no c/o pain or discomfort during the HS.     Appears to have slept 6.25 hours.     Pt on INTRUSIVE, ASSAULT, CHEEKING, ELOPEMENT and FALL precautions in addition to single room order. Any related events noted above.     Will continue to monitor and assess.

## 2021-06-27 PROCEDURE — 250N000013 HC RX MED GY IP 250 OP 250 PS 637: Performed by: PHYSICIAN ASSISTANT

## 2021-06-27 PROCEDURE — 250N000013 HC RX MED GY IP 250 OP 250 PS 637: Performed by: STUDENT IN AN ORGANIZED HEALTH CARE EDUCATION/TRAINING PROGRAM

## 2021-06-27 PROCEDURE — 250N000013 HC RX MED GY IP 250 OP 250 PS 637: Performed by: PSYCHIATRY & NEUROLOGY

## 2021-06-27 PROCEDURE — 124N000002 HC R&B MH UMMC

## 2021-06-27 RX ADMIN — OLANZAPINE 10 MG: 10 TABLET, ORALLY DISINTEGRATING ORAL at 08:42

## 2021-06-27 RX ADMIN — LORAZEPAM 2 MG: 2 TABLET ORAL at 20:38

## 2021-06-27 RX ADMIN — OLANZAPINE 10 MG: 10 TABLET, ORALLY DISINTEGRATING ORAL at 20:38

## 2021-06-27 RX ADMIN — LORAZEPAM 1.5 MG: 0.5 TABLET ORAL at 14:46

## 2021-06-27 RX ADMIN — METOPROLOL SUCCINATE 50 MG: 50 TABLET, EXTENDED RELEASE ORAL at 08:42

## 2021-06-27 RX ADMIN — LORAZEPAM 1 MG: 1 TABLET ORAL at 08:42

## 2021-06-27 RX ADMIN — LOSARTAN POTASSIUM 100 MG: 100 TABLET, FILM COATED ORAL at 08:42

## 2021-06-27 RX ADMIN — HYDRALAZINE HYDROCHLORIDE 25 MG: 25 TABLET ORAL at 08:48

## 2021-06-27 ASSESSMENT — ACTIVITIES OF DAILY LIVING (ADL)
ORAL_HYGIENE: INDEPENDENT
DRESS: SCRUBS (BEHAVIORAL HEALTH)
HYGIENE/GROOMING: INDEPENDENT
HYGIENE/GROOMING: INDEPENDENT
LAUNDRY: UNABLE TO COMPLETE
ORAL_HYGIENE: INDEPENDENT
DRESS: SCRUBS (BEHAVIORAL HEALTH);INDEPENDENT

## 2021-06-27 NOTE — PLAN OF CARE
Pt was again isolated and withdrawn to her room for much of the evening.  She did come out to get her dinner tray and to return it. She is calm and cooperative, but somewhat flat.  Her son sent multiple food items (pt care order that allows this).  She has an improved appetite and ate most of dinner as well as some snacks. She was frustrated that her son did not send her a comb, as the unit hahn don't work for her. She called him and reports that he is going to send her one.  She requested her medications at the appropriate time this evening, something she does not typically do. She also asked what she was taking.  She denied any discomfort.  She denied SI/SIB/HI or any psychotic symptoms.  She was mediation compliant.

## 2021-06-27 NOTE — PLAN OF CARE
"Pt was isolated and withdrawn to his room for most of the evening.  He spent some time in the lounge watching television and made a few phones calls.  He again needed to be woken for his mediations. He presents as slightly anxious, but denied any anxiety, saying \"I didn't talk to my girlfriend right before I went to sleep.\"  When asked for clarification patient said that he's anxious to get back to her and to sleeping with her.  He denied any discomfort.  He ate well. He did not shower.  He denied SI/SIB/HI or any psychotic symptoms.  He was medication compliant.   "

## 2021-06-27 NOTE — PLAN OF CARE
Problem: Sleep Disturbance (Psychotic Signs/Symptoms)  Goal: Improved Sleep (Psychotic Signs/Symptoms)  Outcome: Improving       Pt slept for 6.5 hours. No concerns noted this shift, No PRN administered. Will continue to monitor.

## 2021-06-27 NOTE — PLAN OF CARE
Problem: Adult Inpatient Plan of Care  Goal: Optimal Comfort and Wellbeing  Outcome: No Change  Intervention: Provide Person-Centered Care  Recent Flowsheet Documentation  Taken 6/27/2021 7378 by Tasha Davis RN  Trust Relationship/Rapport:   care explained   choices provided   emotional support provided   empathic listening provided   questions answered   questions encouraged   reassurance provided   thoughts/feelings acknowledged     Problem: Behavioral Health Plan of Care  Goal: Adheres to Safety Considerations for Self and Others  Outcome: No Change     Pt presented as alert and oriented to place and self throughout shift.  She was primarily isolative to her room during the day, with the exception of picking up and returning her meal tray.  Pt appeared neat and tidy and her speech was clear and coherent.  Pt ate meals and was medication compliant.  She received PRN Hydralazine for BP of 166/91.  Pt denied SI/HI/SIB.  She denied psychosis.  Pt did not endorse any physical health concerns, pain or side effects to medications during this shift.

## 2021-06-28 PROCEDURE — 250N000013 HC RX MED GY IP 250 OP 250 PS 637: Performed by: STUDENT IN AN ORGANIZED HEALTH CARE EDUCATION/TRAINING PROGRAM

## 2021-06-28 PROCEDURE — 124N000002 HC R&B MH UMMC

## 2021-06-28 PROCEDURE — 250N000013 HC RX MED GY IP 250 OP 250 PS 637: Performed by: PHYSICIAN ASSISTANT

## 2021-06-28 PROCEDURE — 99233 SBSQ HOSP IP/OBS HIGH 50: CPT | Mod: GC | Performed by: PSYCHIATRY & NEUROLOGY

## 2021-06-28 PROCEDURE — 250N000013 HC RX MED GY IP 250 OP 250 PS 637: Performed by: PSYCHIATRY & NEUROLOGY

## 2021-06-28 RX ORDER — LORAZEPAM 1 MG/1
1 TABLET ORAL 2 TIMES DAILY
Status: DISCONTINUED | OUTPATIENT
Start: 2021-06-28 | End: 2021-07-26

## 2021-06-28 RX ORDER — LORAZEPAM 2 MG/ML
1 INJECTION INTRAMUSCULAR 2 TIMES DAILY
Status: DISCONTINUED | OUTPATIENT
Start: 2021-06-28 | End: 2021-07-26

## 2021-06-28 RX ADMIN — LOSARTAN POTASSIUM 100 MG: 100 TABLET, FILM COATED ORAL at 08:12

## 2021-06-28 RX ADMIN — HYDRALAZINE HYDROCHLORIDE 25 MG: 25 TABLET ORAL at 08:14

## 2021-06-28 RX ADMIN — LORAZEPAM 2 MG: 2 TABLET ORAL at 19:04

## 2021-06-28 RX ADMIN — METOPROLOL SUCCINATE 50 MG: 50 TABLET, EXTENDED RELEASE ORAL at 08:12

## 2021-06-28 RX ADMIN — HYDRALAZINE HYDROCHLORIDE 25 MG: 25 TABLET ORAL at 19:04

## 2021-06-28 RX ADMIN — LORAZEPAM 1 MG: 1 TABLET ORAL at 08:12

## 2021-06-28 RX ADMIN — OLANZAPINE 10 MG: 10 TABLET, ORALLY DISINTEGRATING ORAL at 19:04

## 2021-06-28 RX ADMIN — OLANZAPINE 10 MG: 10 TABLET, ORALLY DISINTEGRATING ORAL at 08:12

## 2021-06-28 RX ADMIN — LORAZEPAM 1 MG: 1 TABLET ORAL at 13:40

## 2021-06-28 ASSESSMENT — ACTIVITIES OF DAILY LIVING (ADL)
ORAL_HYGIENE: PROMPTS;INDEPENDENT
DRESS: INDEPENDENT
HYGIENE/GROOMING: INDEPENDENT;PROMPTS
DRESS: SCRUBS (BEHAVIORAL HEALTH)
ORAL_HYGIENE: INDEPENDENT;PROMPTS
HYGIENE/GROOMING: PROMPTS;INDEPENDENT
LAUNDRY: UNABLE TO COMPLETE

## 2021-06-28 NOTE — PLAN OF CARE
Pt asleep at start of shift. Breathing quiet and unlabored.      Pt had no c/o pain or discomfort during the HS.      Appears to have slept 7 hours.      Pt on  ASSAULT, CHEEKING, ELOPEMENT, and FALL precautions in addition to single room order. Any related events noted above.      Will continue to monitor and assess.   Problem: Sleep Disturbance (Psychotic Signs/Symptoms)  Goal: Improved Sleep (Psychotic Signs/Symptoms)  Outcome: No Change

## 2021-06-28 NOTE — PROGRESS NOTES
"[facility], [place]   Psychiatric Progress Note  Hospital Day: 75        Interim History:   The patient's care was discussed with the treatment team during the daily team meeting and/or staff's chart notes were reviewed. There were no instances of agitation or aggression over the weekend. She slept 7 hours overnight. She is taking her medications as prescribed. She remains isolative and withdrawn to her room. She appears less confused, spoke with her son this weekend, and has been eating well. She did not shower over the weekend. She continues to have intermittent hypertension that is treated with PRN hydralazine (yesterday morning and this morning). Her vital signs are otherwise stable and she is afebrile.      Upon interview, Michelle reported that her mood was \"good\" and that she had slept well. She denied any physical concerns. She has not ridden the stationary bike lately. She stated \"I wanna go home\" and reported that she is \"feeling lonely\". She wants to do stuff around her house instead of \"sitting idle\".     Michelle stated that she feels like she could take her medications daily and would not have problems with bathing or obtaining food. She stated that her \"memory would remind me\" to take her medications. She was able to identify that she took metoprolol and olanzapine this morning and then she pulled out her medication list and stated that she had also taken losartan and lorazepam this morning.     Michelle stated that she would not want to work with her ACT team following discharge. She stated that she stopped taking her medications in March because her commitment ended and that the can take medications without the assistance of her ACT team. We explained that she should continue to take her medications even when her commitment ends and she became upset stating \"you are not the court\" and \"you are just a doctor\" while wagging her finger at us. She was adamant that she was not sick prior " "to her hospitalization stating \"I wasn't sick\" three times. She disagreed with the team, stating that she is not currently committed. When we raised the possibility of her discharging to a form of transitional care she stated \"I'm not going anywhere\".     UPDATE: Michelle approached Dr. Zaman in the hallway later in the morning and stated that she would be willing to work with an ACT team.          Medications:       LORazepam  1 mg Oral Daily    Or     LORazepam  1 mg Intramuscular Daily     LORazepam  1.5 mg Intramuscular Daily    Or     LORazepam  1.5 mg Oral Daily     LORazepam  2 mg Oral QPM    Or     LORazepam  2 mg Intramuscular QPM     losartan  100 mg Oral Daily     metoprolol succinate ER  50 mg Oral Daily     OLANZapine zydis  10 mg Oral BID    Or     OLANZapine  10 mg Intramuscular BID          Allergies:     Allergies   Allergen Reactions     Haldol [Haloperidol]      Patient previously tolerated haldol, though developed oculogyric crises during hospital stay on 4/26/21 on total daily dose of 10 mg.     Lisinopril Cough          Labs:     No results found for this or any previous visit (from the past 24 hour(s)).       Psychiatric Examination:     BP (!) 171/93   Pulse 75   Temp 97.8  F (36.6  C)   Resp 16   Wt 70.7 kg (155 lb 12.8 oz)   SpO2 96%   BMI 26.74 kg/m    Weight is 155 lbs 12.8 oz  Body mass index is 26.74 kg/m .    Weight over time:  Vitals:    05/25/21 0850 06/15/21 0811 06/16/21 1605 06/19/21 0859   Weight: 71 kg (156 lb 8 oz) 69.8 kg (153 lb 12.8 oz) 70.5 kg (155 lb 8 oz) 71.4 kg (157 lb 8 oz)    06/21/21 0805 06/22/21 0839 06/24/21 0800   Weight: 69.5 kg (153 lb 3.2 oz) 69.3 kg (152 lb 11.2 oz) 70.7 kg (155 lb 12.8 oz)       Orthostatic Vitals         Most Recent      Lying Orthostatic /91 06/23 0830    Lying Orthostatic Pulse (bpm) 67 06/23 0830    Sitting Orthostatic /89  Comment: notify RN 06/24 0800    Sitting Orthostatic Pulse (bpm) 71 06/24 0800    Standing " "Orthostatic /101  Comment: notify RN 06/24 0800    Standing Orthostatic Pulse (bpm) 86 06/24 0800            Cardiometabolic risk assessment. 04/15/21      Reviewed patient profile for cardiometabolic risk factors    Date taken /Value  REFERENCE RANGE   Abdominal Obesity  (Waist Circumference)   See nursing flowsheet Women ?35 in (88 cm)   Men ?40 in (102 cm)      Triglycerides  Triglycerides   Date Value Ref Range Status   10/19/2019 117 <150 mg/dL Final       ?150 mg/dL (1.7 mmol/L) or current treatment for elevated triglycerides   HDL cholesterol  HDL Cholesterol   Date Value Ref Range Status   10/19/2019 56 >49 mg/dL Final   ]   Women <50 mg/dL (1.3 mmol/L) in women or current treatment for low HDL cholesterol  Men <40 mg/dL (1 mmol/L) in men or current treatment for low HDL cholesterol     Fasting plasma glucose (FPG) Lab Results   Component Value Date     10/19/2019      FPG ?100 mg/dL (5.6 mmol/L) or treatment for elevated blood glucose   Blood pressure  BP Readings from Last 3 Encounters:   06/28/21 (!) 171/93   06/04/19 131/71   04/26/19 164/86    Blood pressure ?130/85 mmHg or treatment for elevated blood pressure   Family History  See family history     Appearance: dressed in hospital scrubs, appeared reported age, appeared well groomed  Attitude: cooperative with interview  Eye Contact: fair and then intense  Mood: \"good\" and \"lonely\"  Affect:  Initially smiling and mood congruent, then became more irritable and upset as the interview progressed  Speech: accented, clear and coherent, voice became louder as interview progressed  Language: no obvious receptive or expressive deficits  Psychomotor, Gait, Musculoskeletal: No abnormal movements noted while seated.   Throught Process: goal-oriented (discharge)  Associations:  No loosening of associations present  Thought Content:  Did not appear to be responding to internal stimuli.  Insight:  poor; denied that she was sick prior to admission to " hospital   Judgement:  Limited, does not feel that she needs to work with the ACT team, does not feel that she needs to continue taking her medications after commitment ends  Oriented to: day of the wee, date, month, year, city and state, as well as Dr. Zaman.    Attention Span and Concentration: attentive to conversation.  Recent and Remote Memory: impaired but improving  Fund of Knowledge:  normal    Clinical Global Impressions  First:  Considering your total clinical experience with this particular patient population, how severe are the patient's symptoms at this time?: 7 (04/16/21 1428)  Compared to the patient's condition at the START of treatment, this patient's condition is: 4 (04/16/21 1428)  Most recent:  Considering your total clinical experience with this particular patient population, how severe are the patient's symptoms at this time?: 7 (06/22/21 1529)  Compared to the patient's condition at the START of treatment, this patient's condition is: 3 (06/22/21 1529)         Precautions:     Behavioral Orders   Procedures     Assault precautions     Cheeking Precautions (behavioral units)     Patient Observed swallowing PO medications; Patient asked to drink water after swallowing medication; Patient in Staff line of sight for 15 minutes after medication given; Mouth checks after PO administration (patient asked to open mouth and stick out their tongue).     Code 1 - Restrict to Unit     Code 3     For walks in the [place] with staff and per staff discretion     Electroconvulsive therapy     Series of up to 12 treatments. Begin Date: 5/26/21     Treating Psychiatrist providing ECT:  Dr. Lubin     Notified on:  5/21/21     Electroconvulsive therapy     As long as we get the green light from risk management and pt is medically cleared, begin ECT every Monday, Wednesday, and Friday     Electroconvulsive therapy     Series of up to 12 treatments. Begin Date: 5/25/21     Treating Psychiatrist providing ECT:  "Amee     Notified on:  5/24/21     Elopement precautions     Fall precautions     Mcgrath Calderon     Routine Programming     As clinically indicated     Status 15     Every 15 minutes.          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 Symptoms and presentation at this time is most consistent with Schizoaffective Disorder, Bipolar Type. Obtained most recent medication regimen from patient's ACT team, and regimen was initially restarted. Pt is committed. Petitioned for Mcgrath Calderon due to lack of improvement and side effects from medications. Inpatient psychiatric hospitalization is warranted at this time for safety, stabilization, and possible adjustment in medications.    Hospital Course:  On admission, PTA medications were restarted. However, patient had been declining all scheduled medications despite significant encouragement from staff and provider. Psychiatric emergency declared on 4/20 due to aggression toward others in context of severe psychosis and suspected excited catatonia. Ativan 1 mg TID was also added. Discontinued PTA Invega, Zyprexa, and thorazine on 4/20 due to consistent refusal.     On 4/26, it was noted that patient frequently had upward gaze while walking up and down the unit. She did not appear to be distressed. She reported that she was looking at \"my god.\" It was determined to be oculogyric crisis secondary to IM haloperidol. Haldol was subsequently discontinued during " "day shift on 4/26 and scheduled Zyprexa was initiated on emergency basis. Oral Cogentin was also scheduled, though patient declined. On the evening of 4/26, patient's gaze was fixed in upward position for several hours and she appeared to be experiencing discomfort. IM Cogentin was administered with noted resolution. Partial improvements were observed after switch to scheduled Zyprexa. After patient improved, she was more receptive to reinitiating oral Invega, which was initiated on 5/3 and titrated to PTA dose of 9 mg daily on 5/10. Plan was for ACT team to bring in loading dose of Invega Sustenna. Patient had signs of oculogyric crisis again with Invega. Oral dose decreased to 6 mg after this. Invega was stopped on 5/14 due to ongoing signs of problems related to eye movement and concerns for oculogyric crisis.    Overall improvement in patient's agitation and suspected catatonia noted on 4/30 after patient accepted two doses of Ativan. She began declining Ativan again with noted decompensation. Patient now accepting, however, she does not have the capacity to consent to treatment with Ativan at this time. She does not believe she has a mental illness, including catatonia. She does not fully understand risks associated with inadequate treatment of catatonia. Discussed with her son who is acting as surrogate decision maker and he is in full support of forced scheduled IM Ativan if patient declines oral formulation. Also consulted with our legal team prior to backing oral Ativan with IM.     Ativan was increased on 5/19 due to re-emerging evidence of catatonia (long periods of staring, repetitive movements, echolalia, mutism). Meeting was held with ACT team on 5/20, including ACT team psychiatrist, Dr. Pedraza. He said that he is in \"full support\" of plan to pursue Mcgrath Kohler and ECT at this time. He does feel that in the past she has been discharged from the hospital while still quite symptomatic. He is hoping " that ECT will be effective and that with improvement, Michelle would be more receptive to clozapine and weekly blood draws. He said that ideally she would transition to an IRTS before going back to her apartment, but also understands there may be some barriers (I.e. pt's willingness, financial concerns, etc).     ECT consult placed. Please see consult note by Dr. Lubin on 5/21 for details. Alcides Kohler was approved on 5/24 and patient was medically cleared on 5/24. ECT initiated on 5/26. She was noted to have a short seizure during ECT on 6/4. Staff note that patient appears more disoriented with memory impairment and sedation on days of ECT. This was noted on my examination again today. Improvements noted in mood, affect, social interactions, paranoia, agitation since initiation of ECT. Reduced Ativan on 6/5 due to sedative effects. Relayed concerns about memory impairment and confusion with Dr. Lubin. Recommended attempting unilateral ECT, which he agreed to do. First unilateral ECT on 6/7. ECT was held on 6/11 and 6/14 due to memory impairment. We will plan to hold it again tomorrow (6/16). There is ongoing discussion regarding whether there is a component of catatonia contributing to her current presentation but this is less likely since it worsened after ECT was started.  Given her level of cognitive impairment and our belief that this is due to ECT, we will not pursue any further treatments at this time. Since we have held ECT (last treatment on 6/9), her cognitive impairment has slightly improved (more oriented).     Michelle appears to be decompensating somewhat since ECT has been held, however her cognitive impairment persists. If symptoms of psychosis re-emerge, it may be worth starting clozapine, which is something that has previously been considered by her ACT team. Her Hatch order would need to be amended as clozapine is not one of the medications listed. ANC obtained on 6/16 was 1800/microL. Per conversation  with pharmacy, neutropenia has been associated with olanzapine and agranulocytosis has been associated with olanzapine, lorazepam, metoprolol, and hydralazine. We will continue to monitor her hematologic labs. Upon re-check, ANC was 3700/microL on 6/21/21. At this time, the primary symptoms we are observing are cognitive deficits and inability to care for self, which we would not address by changing her antipsychotic medication.    Michelle's cognitive impairment has been steadily improving since we have held ECT, however it is possible that lorazepam is also playing a role in her cognitive impairment. She is not showing signs of catatonia at this time, therefore we will gradually start to decrease her dose of lorazepam to see if this has an impact on her memory. We will decrease her lorazepam by another 0.5 mg today.     Michelle is very focused on discharge. We are working to determine whether a group home vs home with her ACT team would be most appropriate. We are concerned that Michelle would have difficulty taking her medications as prescribed if she were to return home without her ACT team. We are currently in the process of working with her ACT team to determine the next appropriate step for Michelle.    Target psychiatric symptoms and interventions:   - Continue 1 mg PO or IM lorazepam in the morning and 2 mg PO or IM lorazepam in the evening. Decrease mid-day lorazepam to 1 mg PO or IM. Patient may not decline.   - Continue Zyprexa 10 mg BID  PO or IM. Hatch in place. May consider increasing further though holding off given re-emerging catatonic sx and borderline prolonged QTc   -Continue Cogentin 2 mg IM daily prn for evidence of acute dystonic reaction or oculogyric crisis  -Continue hydroxyzine 25-50 mg q4h prn for acute anxiety  -Continue Trazodone 50 mg at bedtime prn for sleep disturbances  -Continue Zyprexa 10 mg TID prn for severe agitation  -Continue Ativan/Benadryl q4h prn for agitation. WOULD GIVE PRN ATIVAN  FIRST FOR AGITATION unless it is after 5 pm on day prior to scheduled ECT     ECT:  - HOLDING ECT DUE TO COGNITIVE IMPAIRMENT.  - Because patient was only intermittently accepting scheduled Ativan and some symptoms of catatonia are re-emerging, pursued Alcides oKhler for ECT. She also continues to exhibit symptoms of psychosis and has not responded or has experienced side effects from multiple neuroleptic medications. Court hearing was held on 5/21. Alcides Kohler is approved. Patient is medically cleared. COVID negative on 5/24. Obtained ECT consult on 5/21/21. Please see Dr. Lubin's consultation note.   - NPO 8 hours prior to scheduled treatment. Clear liquids until 2 hours prior to treatment.   - Discussed with IM. May resume AM antihypertensives, no need to hold prior to ECT  - SIO while patient is NPO, starting at midnight on ECT days (renewed)  - Will complete acute course in the hospital; will likely need maintenance ECT in outpatient setting.   - New concerns about memory impairments: Switched from bilateral to unilateral ECT on 6/7 and continue to monitor closely, ultimately decided to hold treatments given cognitive impairment.    Acute Medical Problems and Treatments:  BRANDON, resolved:   Pre-renal in nature, likely secondary to decreased oral intake.   - IM Consulted on 6/10. Appreciate assistance. Follow-up note placed on 6/16 (no further work-up recommended).  - avoid nephrotoxins  - Renal bladder US completed on 6/10  - Encourage fluid intake.      HTN: Patient is now adherent with medication regimen.   - Metoprolol succinate ER 50 mg daily  - Cozaar 100 mg daily  - Continue hydralazine 25 mg QID PRN for SBP > 160 mmHg or DBP > 110 mmHg.  - Please see note from IM dated 5/3/21, 5/6/21, 5/24, and 6/20.  - Obtained EKG and routine labs on 5/21 in context of pt declining vital sign checks and anti-hypertensives and recently elevated BPs. Reviewed labs and discussed EKG findings with IM on 5/21. No urgent  "concerns, though IM should be notified if pt develops acute medical concerns (I.e. heart palpitations, SOB, changes in speech, AMS, confusion, CP, HA, changes in vision).    - Per 6/20 IM note: \"Please notify IM if BP is persistently severely elevated and requiring frequent PRN hydralazine\".  - Internal medicine consulted again on 6/28 due to consistent use of hydralazine over the past week. Metoprolol increased to 75 mg daily.     CVA:  - Aspirin 81 mg daily     Chronic Constipation:  - Miralax 17 mg daily    Urinary frequency and urgency, resolved  Reported new-onset urinary frequency and urgency on 6/21. She denied dysuria, hematuria, and suprapubic pain. Etiology unclear, however Michelle reported that this was time-limited and improved on 6/23.  - Urinalysis ordered on 6/22 was within normal limits.    ANC trending downward since admission, still WNL, resolved  - Per conversation with pharmacy, medications Michelle is taking that are associated with  agranulocytosis include lorazepam (scheduled), metoprolol (scheduled), hydralazine (PRN), and olanzapine (scheduled).   - Will consult internal medicine for further guidance if Michelle declines future blood draws and we are not able to monitor her ANC.   - CBC with differential WNL on 6/21     Behavioral/Psychological/Social:  - Encourage unit programming  - Patient is now Code 3 status and can take walks in the [place] with staff and security present per staff discretion. This appears to be quite therapeutic for her.     Safety:  - Continue precautions as noted above  - Status 15 minute checks  - Safety precautions include: assault and elopement precautions  - Continue precautions as noted above    Legal Status: Committed as MI with Hatch in place for Haldol, Zyprexa, Invega, and Thorazine through [facility]. Filed Alcides Kohler through [facility] and approved on 5/24/21.     Disposition Plan   Reason for ongoing admission: poses an imminent risk to self, poses " an imminent risk to others and is unable to care for self due to severe psychosis or tad  Discharge location: Group home vs home with [facility] ACT team  Discharge Medications: not ordered  Follow-up Appointments: not scheduled     Prior to discharge, treatment team will need to coordinate with ACT team to obtain JESSICA for Michelle's son in order to facilitate ongoing involvement in care.    Entered by: Bhavya Lane on 6/28/2021 at 9:30 AM     The patient was seen and discussed with attending psychiatrist Dr. Zaman.    Bhavya Lane MD  Psychiatry PGY-2

## 2021-06-28 NOTE — PLAN OF CARE
Pt was again mostly isolated and withdrawn to her room for most of the evening.  Much of that time was spent sleeping.  She did come out of her room asking for dinner and for additional apples and tangerines.  She has overall improved communication, she presents as calm and cooperative.  She continues to by hypertensive, BP = 158/89.  She did not shower. She denied any discomfort.  She denied SI/SIB/HI or any psychotic symptoms.

## 2021-06-28 NOTE — PLAN OF CARE
"  Problem: Adult Inpatient Plan of Care  Goal: Plan of Care Review  Outcome: No Change  Flowsheets (Taken 6/28/2021 0801)  Plan of Care Reviewed With: patient  Progress: improving     Pt denies SI/SIB/HI/hallucinations/anxiety/depression. Pt describes mood as \"Good\" and smiled. Pt has a bright affect during conversation. Pt told RN they removed hair wig over the weekend. Pt remains isolative and withdrawn in their room.     Pt and RN discussed hypertension and pt did not seem to understand. Pt told RN they have low blood pressure and does not understand why they have to take 3 blood pressure medications. RN explained that pt's BP was 171/93 this morning and that their blood pressure medications are indicated for hypertension. RN reassured pt that their BP will be discussed with provider/IM. Pt was accepting.     Appetite = eating and tolerating meals    Sleep = 7hrs    Medication side effects = denies adverse effects    ADLs = independent, may use verbal prompts    Medical concern   Hypertension - pt is on 2 scheduled hypertension medications and has required the use of PRN hydralazine 25mg 8 times since June 19,2021. RN notified provider/IM per IM recommendation. IM increased metoprolol dosage starting tomorrow 6/29/21 8am.      Pt has allergy to Lisinopril.    Pt denies headaches, dizziness, lightheadedness.    PRNs this shift  Hydralazine 25mg tablet at 0814AM for 171/93 - Rechecked BP after PRN hydralazine 25mg 163/85. Pt continues to deny symptoms headache, dizziness, lightheadedness. Will reassess BP since PRN hydralazine is not due until 217pm.  Pt did not meet parameters for PRN with 156/88. Will continue to monitor BP.  Encourage fluids.     Vital signs       06/28/21 0817 06/28/21 1107 06/28/21 1338   Vital Signs   Temp 97.8  F (36.6  C)  --   --    Pulse 75  --  78   BP (!) 171/93 (!) 163/85 (!) 156/88       Plan  Continue with plan of care.  Encourage pt to attend groups.  Continue monitoring BP.  Pt " remains on assault, fall, elopement & cheeking precautions.   Pt has Code 3 for [place] walk.

## 2021-06-28 NOTE — PLAN OF CARE
Assessment/Intervention/Current Symtoms and Care Coordination:  Attended Team Meeting, Reviewed chart notes: Met with patient to ask her if she would agree to an interview with [facility] Intake Staff to start the CADI Waiver process.  Patient did agree to talk with the  briefly so the process was started. CC also spoke with [facility] Team RN/ to request that Guardianship process be initiated as patient will need a higher level of care moving forward. CTC spoke with patient's son (Emelia) and was informed he talked with ACT  and they are applying for Guardianship on his behalf.     Discharge Plan or Goal:  Unknown at this time as if CADI funding is in place patient may return to her apartment with more hands on services. Patient is adamant she will not go to a group home.     Barriers to Discharge:  CADI funding needs to be in place to coordinate a higher level of care and also patient will need to agree to placement other than returning to her own apartment.     Referral Status:  Initial CADI referral initiated through [facility].     Legal Status:  Civil Commitment/Hatch/[facility]grath Kohler through [facility]

## 2021-06-28 NOTE — PROGRESS NOTES
Brief Medicine Note    Please see prior consult note (5/06/2021) and subsequent medicine progress notes for further details. Contacted by Psychiatry resident regarding elevated blood pressure, and requiring 8 doses of PRN hydralazine in the last 10 days (nearly one dose daily).    Reviewed vital signs and blood pressure has been persistently SBP >140, with the occasional normal blood pressure. Per psychiatry patient has been calm and cooperative and compliant with blood pressure medications. Will increase metoprolol XL to 75 mg daily.  Continue PTA losartan 100 mg daily. Continue hydralazine 25 mg QID PRN for SBP >160 or DBP >110.     Medicine will follow up on blood pressure peripherally . No further recommendations at this time. Please page the on-call ROBERT for any intercurrent medical issues which arise.     Nicky Christensen PA-C  Hospitalist Service  Contact information available via [facility] Paging/Directory

## 2021-06-29 PROCEDURE — 124N000002 HC R&B MH UMMC

## 2021-06-29 PROCEDURE — 250N000013 HC RX MED GY IP 250 OP 250 PS 637: Performed by: PHYSICIAN ASSISTANT

## 2021-06-29 PROCEDURE — 87635 SARS-COV-2 COVID-19 AMP PRB: CPT | Performed by: STUDENT IN AN ORGANIZED HEALTH CARE EDUCATION/TRAINING PROGRAM

## 2021-06-29 PROCEDURE — 250N000013 HC RX MED GY IP 250 OP 250 PS 637: Performed by: STUDENT IN AN ORGANIZED HEALTH CARE EDUCATION/TRAINING PROGRAM

## 2021-06-29 PROCEDURE — 99232 SBSQ HOSP IP/OBS MODERATE 35: CPT | Mod: GC | Performed by: PSYCHIATRY & NEUROLOGY

## 2021-06-29 PROCEDURE — 250N000013 HC RX MED GY IP 250 OP 250 PS 637: Performed by: PSYCHIATRY & NEUROLOGY

## 2021-06-29 RX ORDER — METOPROLOL SUCCINATE 50 MG/1
100 TABLET, EXTENDED RELEASE ORAL DAILY
Status: DISCONTINUED | OUTPATIENT
Start: 2021-06-30 | End: 2021-07-02

## 2021-06-29 RX ADMIN — METOPROLOL SUCCINATE 75 MG: 25 TABLET, EXTENDED RELEASE ORAL at 08:45

## 2021-06-29 RX ADMIN — OLANZAPINE 10 MG: 10 TABLET, ORALLY DISINTEGRATING ORAL at 19:38

## 2021-06-29 RX ADMIN — LORAZEPAM 1 MG: 1 TABLET ORAL at 08:46

## 2021-06-29 RX ADMIN — LORAZEPAM 2 MG: 2 TABLET ORAL at 19:38

## 2021-06-29 RX ADMIN — OLANZAPINE 10 MG: 10 TABLET, ORALLY DISINTEGRATING ORAL at 08:46

## 2021-06-29 RX ADMIN — LORAZEPAM 1 MG: 1 TABLET ORAL at 13:49

## 2021-06-29 RX ADMIN — LOSARTAN POTASSIUM 100 MG: 100 TABLET, FILM COATED ORAL at 08:46

## 2021-06-29 ASSESSMENT — ACTIVITIES OF DAILY LIVING (ADL)
DRESS: PROMPTS;WITH SUPERVISION
HYGIENE/GROOMING: PROMPTS;WITH SUPERVISION
LAUNDRY: UNABLE TO COMPLETE
ORAL_HYGIENE: INDEPENDENT
DRESS: SCRUBS (BEHAVIORAL HEALTH)
HYGIENE/GROOMING: INDEPENDENT
ORAL_HYGIENE: INDEPENDENT

## 2021-06-29 NOTE — PLAN OF CARE
BEHAVIORAL TEAM DISCUSSION    Participants:  Dr murillo, Yessica Anne, Nursing supervisor, Rosalinda, RN, Tom, RN, Jan, [facility]  Progress: Continuing to assess  Anticipated length of stay: TBD  Continued Stay Criteria/Rationale: Patient unable to care for herself if she return home as she lives alone.   Medical/Physical: Per Internal Medicine  Precautions:   Behavioral Orders   Procedures     Assault precautions     Cheeking Precautions (behavioral units)     Patient Observed swallowing PO medications; Patient asked to drink water after swallowing medication; Patient in Staff line of sight for 15 minutes after medication given; Mouth checks after PO administration (patient asked to open mouth and stick out their tongue).     Code 1 - Restrict to Unit     Code 3     For walks in the [place] with staff and per staff discretion     Electroconvulsive therapy     Series of up to 12 treatments. Begin Date: 5/26/21     Treating Psychiatrist providing ECT:  Dr. Lubin     Notified on:  5/21/21     Electroconvulsive therapy     As long as we get the green light from risk management and pt is medically cleared, begin ECT every Monday, Wednesday, and Friday     Electroconvulsive therapy     Series of up to 12 treatments. Begin Date: 5/25/21     Treating Psychiatrist providing ECT:  Amee     Notified on:  5/24/21     Elopement precautions     Fall precautions     Mcgrath Calderon     Routine Programming     As clinically indicated     Status 15     Every 15 minutes.     Plan: Attending psychiatrist and Resident will meet with patient daily to assess psychiatric needs. Discharge plan may include group home or if CADI [place] is in place return to apartment with multiple in home services in place. CTC will coordinate disposition and aftercare plan.   Rationale for change in precautions or plan: No change

## 2021-06-29 NOTE — PROGRESS NOTES
"[facility], [place]   Psychiatric Progress Note  Hospital Day: 76        Interim History:   The patient's care was discussed with the treatment team during the daily team meeting and/or staff's chart notes were reviewed. There were no instances of agitation or aggression overnight. She remains isolative and withdrawn to her room. She slept 7 hours overnight and is eating well.. She is taking her medications as prescribed. She received PRN hydralazine for elevated blood pressure last night. Her vital signs are otherwise stable and within normal limits and she is afebrile. Per staff report, Michelle appears to be doing well this morning and appears to have a brighter affect. She also appears to have more organized thought process.     Upon interview, Michelle stated that she was \"good\" and that her night was \"good\". She continues to want to go home. We talked about where she lives and her neighbors. We also explained that we are concerned about her ability to remember to take her medications regularly and the possible risk of withdrawal seizures if she were to miss a dose of lorazepam. She stated that her medications are like her food and water and she does not forget them. She had no physical concerns today and feels like her memory is getting better. We also talked about her grandchild who is due in November and Michelle brightened noticeably during this portion of the conversation.          Medications:       LORazepam  1 mg Oral BID    Or     LORazepam  1 mg Intramuscular BID     LORazepam  2 mg Oral QPM    Or     LORazepam  2 mg Intramuscular QPM     losartan  100 mg Oral Daily     metoprolol succinate ER  75 mg Oral Daily     OLANZapine zydis  10 mg Oral BID    Or     OLANZapine  10 mg Intramuscular BID          Allergies:     Allergies   Allergen Reactions     Haldol [Haloperidol]      Patient previously tolerated haldol, though developed oculogyric crises during hospital stay on 4/26/21 on " total daily dose of 10 mg.     Lisinopril Cough          Labs:     No results found for this or any previous visit (from the past 24 hour(s)).       Psychiatric Examination:     BP (!) 159/88 (BP Location: Left arm)   Pulse 83   Temp 98.7  F (37.1  C) (Tympanic)   Resp 16   Wt 70.7 kg (155 lb 12.8 oz)   SpO2 97%   BMI 26.74 kg/m    Weight is 155 lbs 12.8 oz  Body mass index is 26.74 kg/m .    Weight over time:  Vitals:    05/25/21 0850 06/15/21 0811 06/16/21 1605 06/19/21 0859   Weight: 71 kg (156 lb 8 oz) 69.8 kg (153 lb 12.8 oz) 70.5 kg (155 lb 8 oz) 71.4 kg (157 lb 8 oz)    06/21/21 0805 06/22/21 0839 06/24/21 0800   Weight: 69.5 kg (153 lb 3.2 oz) 69.3 kg (152 lb 11.2 oz) 70.7 kg (155 lb 12.8 oz)       Orthostatic Vitals         Most Recent      Lying Orthostatic /91 06/23 0830    Lying Orthostatic Pulse (bpm) 67 06/23 0830    Sitting Orthostatic /89  Comment: notify RN 06/24 0800    Sitting Orthostatic Pulse (bpm) 71 06/24 0800    Standing Orthostatic /101  Comment: notify RN 06/24 0800    Standing Orthostatic Pulse (bpm) 86 06/24 0800            Cardiometabolic risk assessment. 04/15/21      Reviewed patient profile for cardiometabolic risk factors    Date taken /Value  REFERENCE RANGE   Abdominal Obesity  (Waist Circumference)   See nursing flowsheet Women ?35 in (88 cm)   Men ?40 in (102 cm)      Triglycerides  Triglycerides   Date Value Ref Range Status   10/19/2019 117 <150 mg/dL Final       ?150 mg/dL (1.7 mmol/L) or current treatment for elevated triglycerides   HDL cholesterol  HDL Cholesterol   Date Value Ref Range Status   10/19/2019 56 >49 mg/dL Final   ]   Women <50 mg/dL (1.3 mmol/L) in women or current treatment for low HDL cholesterol  Men <40 mg/dL (1 mmol/L) in men or current treatment for low HDL cholesterol     Fasting plasma glucose (FPG) Lab Results   Component Value Date     10/19/2019      FPG ?100 mg/dL (5.6 mmol/L) or treatment for elevated blood glucose  "  Blood pressure  BP Readings from Last 3 Encounters:   06/29/21 (!) 159/88   06/04/19 131/71   04/26/19 164/86    Blood pressure ?130/85 mmHg or treatment for elevated blood pressure   Family History  See family history     Appearance: dressed in hospital scrubs, appeared reported age, appeared well groomed  Attitude: cooperative with interview  Eye Contact: good  Mood: \"good\"   Affect:  Mood congruent, brightens significantly at certain parts of conversation, laughs and smiles  Speech: accented, clear and coherent  Language: no obvious receptive or expressive deficits  Psychomotor, Gait, Musculoskeletal: No abnormal movements noted while seated.   Thought Process: goal-oriented, linear  Associations:  No loosening of associations present  Thought Content:  Did not appear to be responding to internal stimuli.  Insight:  limited  Judgement:  Limited  Oriented to: date, location, and treatment team.  Attention Span and Concentration: attentive to conversation.  Recent and Remote Memory: impaired but improving  Fund of Knowledge:  normal    Clinical Global Impressions  First:  Considering your total clinical experience with this particular patient population, how severe are the patient's symptoms at this time?: 7 (04/16/21 1428)  Compared to the patient's condition at the START of treatment, this patient's condition is: 4 (04/16/21 1428)  Most recent:  Considering your total clinical experience with this particular patient population, how severe are the patient's symptoms at this time?: 7 (06/22/21 1529)  Compared to the patient's condition at the START of treatment, this patient's condition is: 3 (06/22/21 1529)         Precautions:     Behavioral Orders   Procedures     Assault precautions     Cheeking Precautions (behavioral units)     Patient Observed swallowing PO medications; Patient asked to drink water after swallowing medication; Patient in Staff line of sight for 15 minutes after medication given; Mouth " checks after PO administration (patient asked to open mouth and stick out their tongue).     Code 1 - Restrict to Unit     Code 3     For walks in the [place] with staff and per staff discretion     Electroconvulsive therapy     Series of up to 12 treatments. Begin Date: 5/26/21     Treating Psychiatrist providing ECT:  Dr. Lubin     Notified on:  5/21/21     Electroconvulsive therapy     As long as we get the green light from risk management and pt is medically cleared, begin ECT every Monday, Wednesday, and Friday     Electroconvulsive therapy     Series of up to 12 treatments. Begin Date: 5/25/21     Treating Psychiatrist providing ECT:  Amee     Notified on:  5/24/21     Elopement precautions     Fall precautions     Mcgrath Calderon     Routine Programming     As clinically indicated     Status 15     Every 15 minutes.          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 Symptoms and presentation at this time is most consistent with Schizoaffective Disorder, Bipolar Type. Obtained most recent medication regimen from patient's ACT team, and regimen was initially restarted. Pt is committed. Petitioned for Mcgrath Calderon due to lack of improvement and side effects from medications. Inpatient psychiatric hospitalization is warranted at this time for safety, stabilization, and possible adjustment in medications.    Hospital Course:  On admission, PTA medications were restarted. However, patient had  "been declining all scheduled medications despite significant encouragement from staff and provider. Psychiatric emergency declared on 4/20 due to aggression toward others in context of severe psychosis and suspected excited catatonia. Ativan 1 mg TID was also added. Discontinued PTA Invega, Zyprexa, and thorazine on 4/20 due to consistent refusal.     On 4/26, it was noted that patient frequently had upward gaze while walking up and down the unit. She did not appear to be distressed. She reported that she was looking at \"my god.\" It was determined to be oculogyric crisis secondary to IM haloperidol. Haldol was subsequently discontinued during day shift on 4/26 and scheduled Zyprexa was initiated on emergency basis. Oral Cogentin was also scheduled, though patient declined. On the evening of 4/26, patient's gaze was fixed in upward position for several hours and she appeared to be experiencing discomfort. IM Cogentin was administered with noted resolution. Partial improvements were observed after switch to scheduled Zyprexa. After patient improved, she was more receptive to reinitiating oral Invega, which was initiated on 5/3 and titrated to PTA dose of 9 mg daily on 5/10. Plan was for ACT team to bring in loading dose of Invega Sustenna. Patient had signs of oculogyric crisis again with Invega. Oral dose decreased to 6 mg after this. Invega was stopped on 5/14 due to ongoing signs of problems related to eye movement and concerns for oculogyric crisis.    Overall improvement in patient's agitation and suspected catatonia noted on 4/30 after patient accepted two doses of Ativan. She began declining Ativan again with noted decompensation. Patient now accepting, however, she does not have the capacity to consent to treatment with Ativan at this time. She does not believe she has a mental illness, including catatonia. She does not fully understand risks associated with inadequate treatment of catatonia. Discussed with " "her son who is acting as surrogate decision maker and he is in full support of forced scheduled IM Ativan if patient declines oral formulation. Also consulted with our legal team prior to backing oral Ativan with IM.     Ativan was increased on 5/19 due to re-emerging evidence of catatonia (long periods of staring, repetitive movements, echolalia, mutism). Meeting was held with ACT team on 5/20, including ACT team psychiatrist, Dr. Pedraza. He said that he is in \"full support\" of plan to pursue Mcgrath Kohler and ECT at this time. He does feel that in the past she has been discharged from the hospital while still quite symptomatic. He is hoping that ECT will be effective and that with improvement, Michelle would be more receptive to clozapine and weekly blood draws. He said that ideally she would transition to an IRTS before going back to her apartment, but also understands there may be some barriers (I.e. pt's willingness, financial concerns, etc).     ECT consult placed. Please see consult note by Dr. Lubin on 5/21 for details. Mcgrath Kohler was approved on 5/24 and patient was medically cleared on 5/24. ECT initiated on 5/26. She was noted to have a short seizure during ECT on 6/4. Staff note that patient appears more disoriented with memory impairment and sedation on days of ECT. This was noted on my examination again today. Improvements noted in mood, affect, social interactions, paranoia, agitation since initiation of ECT. Reduced Ativan on 6/5 due to sedative effects. Relayed concerns about memory impairment and confusion with Dr. Lubin. Recommended attempting unilateral ECT, which he agreed to do. First unilateral ECT on 6/7. ECT was held on 6/11 and 6/14 due to memory impairment. We will plan to hold it again tomorrow (6/16). There is ongoing discussion regarding whether there is a component of catatonia contributing to her current presentation but this is less likely since it worsened after ECT was started.  " Given her level of cognitive impairment and our belief that this is due to ECT, we will not pursue any further treatments at this time. Since we have held ECT (last treatment on 6/9), her cognitive impairment has slightly improved (more oriented).     Michelle appears to be decompensating somewhat since ECT has been held, however her cognitive impairment persists. If symptoms of psychosis re-emerge, it may be worth starting clozapine, which is something that has previously been considered by her ACT team. Her Hatch order would need to be amended as clozapine is not one of the medications listed. ANC obtained on 6/16 was 1800/microL. Per conversation with pharmacy, neutropenia has been associated with olanzapine and agranulocytosis has been associated with olanzapine, lorazepam, metoprolol, and hydralazine. We will continue to monitor her hematologic labs. Upon re-check, ANC was 3700/microL on 6/21/21. At this time, the primary symptoms we are observing are cognitive deficits and inability to care for self, which we would not address by changing her antipsychotic medication.    Michelle's cognitive impairment has been steadily improving since we have held ECT, however it is possible that lorazepam is also playing a role in her cognitive impairment. She is not showing signs of catatonia at this time, therefore we will gradually start to decrease her dose of lorazepam to see if this has an impact on her memory. We will decrease her lorazepam by another 0.5 mg today.     Michelle is very focused on discharge. We are working to determine whether a group home vs home with her ACT team would be most appropriate. We are concerned that Michelle would have difficulty taking her medications as prescribed if she were to return home without her ACT team. We are currently in the process of working with her ACT team to determine the next appropriate step for Michelle.    Target psychiatric symptoms and interventions:   - Continue 1 mg PO or IM  lorazepam in the morning and at mid-day and 2 mg PO or IM lorazepam in the evening. Decrease mid-day lorazepam to 1 mg PO or IM. Patient may not decline.   - Continue Zyprexa 10 mg BID  PO or IM. Hatch in place. May consider increasing further though holding off given re-emerging catatonic sx and borderline prolonged QTc   -Continue Cogentin 2 mg IM daily prn for evidence of acute dystonic reaction or oculogyric crisis  -Continue hydroxyzine 25-50 mg q4h prn for acute anxiety  -Continue Trazodone 50 mg at bedtime prn for sleep disturbances  -Continue Zyprexa 10 mg TID prn for severe agitation  -Continue Ativan/Benadryl q4h prn for agitation. WOULD GIVE PRN ATIVAN FIRST FOR AGITATION unless it is after 5 pm on day prior to scheduled ECT     ECT:   - ECT DISCONTINUED DUE TO COGNITIVE IMPAIRMENT.  - Because patient was only intermittently accepting scheduled Ativan and some symptoms of catatonia are re-emerging, pursued Alcides Kohler for ECT. She also continues to exhibit symptoms of psychosis and has not responded or has experienced side effects from multiple neuroleptic medications. Court hearing was held on 5/21. Alcides Kohler is approved. Patient is medically cleared. COVID negative on 5/24. Obtained ECT consult on 5/21/21. Please see Dr. Lubin's consultation note.   - NPO 8 hours prior to scheduled treatment. Clear liquids until 2 hours prior to treatment.   - Discussed with IM. May resume AM antihypertensives, no need to hold prior to ECT  - SIO while patient is NPO, starting at midnight on ECT days (renewed)  - Will complete acute course in the hospital; will likely need maintenance ECT in outpatient setting.   - New concerns about memory impairments: Switched from bilateral to unilateral ECT on 6/7 and continue to monitor closely, ultimately decided to hold treatments given cognitive impairment.    Acute Medical Problems and Treatments:  BRANDON, resolved:   Pre-renal in nature, likely secondary to decreased oral  "intake.   - IM Consulted on 6/10. Appreciate assistance. Follow-up note placed on 6/16 (no further work-up recommended).  - avoid nephrotoxins  - Renal bladder US completed on 6/10  - Encourage fluid intake.      HTN: Patient is now adherent with medication regimen.   - Metoprolol succinate ER 75 mg daily  - Cozaar 100 mg daily  - Continue hydralazine 25 mg QID PRN for SBP > 160 mmHg or DBP > 110 mmHg.  - Please see note from IM dated 5/3/21, 5/6/21, 5/24, and 6/20.  - Obtained EKG and routine labs on 5/21 in context of pt declining vital sign checks and anti-hypertensives and recently elevated BPs. Reviewed labs and discussed EKG findings with IM on 5/21. No urgent concerns, though IM should be notified if pt develops acute medical concerns (I.e. heart palpitations, SOB, changes in speech, AMS, confusion, CP, HA, changes in vision).    - Per 6/20 IM note: \"Please notify IM if BP is persistently severely elevated and requiring frequent PRN hydralazine\".  - Internal medicine consulted again on 6/28 due to consistent use of hydralazine over the past week. Metoprolol increased to 75 mg daily.     CVA:  - Aspirin 81 mg daily     Chronic Constipation:  - Miralax 17 mg daily    Urinary frequency and urgency, resolved  Reported new-onset urinary frequency and urgency on 6/21. She denied dysuria, hematuria, and suprapubic pain. Etiology unclear, however Michelle reported that this was time-limited and improved on 6/23.  - Urinalysis ordered on 6/22 was within normal limits.    ANC trending downward since admission, still WNL, resolved  - Per conversation with pharmacy, medications Michelle is taking that are associated with  agranulocytosis include lorazepam (scheduled), metoprolol (scheduled), hydralazine (PRN), and olanzapine (scheduled).   - Will consult internal medicine for further guidance if Michelle declines future blood draws and we are not able to monitor her ANC.   - CBC with differential WNL on 6/21   "   Behavioral/Psychological/Social:  - Encourage unit programming  - Patient is now Code 3 status and can take walks in the [place] with staff and security present per staff discretion. This appears to be quite therapeutic for her.     Safety:  - Continue precautions as noted above  - Status 15 minute checks  - Safety precautions include: assault and elopement precautions  - Continue precautions as noted above    Legal Status: Committed as MI with Hatch in place for Haldol, Zyprexa, Invega, and Thorazine through [facility]. Filed Alcides Kohler through [facility] and approved on 5/24/21.     Disposition Plan   Reason for ongoing admission: poses an imminent risk to self, poses an imminent risk to others and is unable to care for self due to severe psychosis or tad  Discharge location: Group home (needs CADI waiver) vs home with [facility] ACT team or 24/7 in-home care  Discharge Medications: not ordered  Follow-up Appointments: not scheduled     Prior to discharge, treatment team will need to coordinate with ACT team to obtain JESSICA for Michelle's son in order to facilitate ongoing involvement in care.    Entered by: Bhavya Lane on 6/29/2021 at 9:03 AM     The patient was seen and discussed with attending psychiatrist Dr. Zaman.    Bhavya Lane MD  Psychiatry PGY-2

## 2021-06-29 NOTE — PLAN OF CARE
Pt was again isolated and withdrawn to her room for a majority of the evening.  She presents as blunted and flat, but brightens with staff interaction.  She is overall pleasant and polite.  She continues to have elevated BP = 161/93.   Patient given PRN hydralazine, which she took without incident.  She ate well again this evening.  She denied any discomfort.  She was mediation compliant.  She denied SI/SIB/HI or any psychotic symptoms.

## 2021-06-29 NOTE — PLAN OF CARE
Nursing assessment completed. Patient mostly isolative to her room during the shift. She is pleasant upon approach, and presenting with an increasingly more bright affect. She smiles when she greeted writer. Her memory appears to be improving, as she recalled that writer had not been here in the past week. Michelle requested to shower and was assisted with obtaining shower supplies. She denies SI/SIB. Medication compliant. Continue to monitor and assess.

## 2021-06-29 NOTE — PLAN OF CARE
Assessment/Intervention/Current Symtoms and Care Coordination:  Attended Team Meeting, Reviewed chart notes: Met with patient to check in and talk about discharge plan.  Patient pleasant and cooperative during our meeting.      Discharge Plan or Goal:  Unknown at this time    Barriers to Discharge:  Treatment Team has concern patient will be unable to care for herself if she returns home as she lives alone in an apartment.     Referral Status:  [facility] started the initial CADI Waiver process through [facility]. Patient has an ACT Team through [facility]    Legal Status:  Committed/Hatch/Mcgrath colin

## 2021-06-30 PROCEDURE — 250N000013 HC RX MED GY IP 250 OP 250 PS 637: Performed by: STUDENT IN AN ORGANIZED HEALTH CARE EDUCATION/TRAINING PROGRAM

## 2021-06-30 PROCEDURE — 250N000013 HC RX MED GY IP 250 OP 250 PS 637: Performed by: PHYSICIAN ASSISTANT

## 2021-06-30 PROCEDURE — 250N000013 HC RX MED GY IP 250 OP 250 PS 637: Performed by: PSYCHIATRY & NEUROLOGY

## 2021-06-30 PROCEDURE — 124N000002 HC R&B MH UMMC

## 2021-06-30 RX ORDER — AMLODIPINE BESYLATE 2.5 MG/1
5 TABLET ORAL DAILY
Status: DISCONTINUED | OUTPATIENT
Start: 2021-06-30 | End: 2021-07-04

## 2021-06-30 RX ADMIN — AMLODIPINE BESYLATE 5 MG: 2.5 TABLET ORAL at 13:01

## 2021-06-30 RX ADMIN — METOPROLOL SUCCINATE 100 MG: 50 TABLET, EXTENDED RELEASE ORAL at 08:19

## 2021-06-30 RX ADMIN — HYDRALAZINE HYDROCHLORIDE 25 MG: 25 TABLET ORAL at 10:20

## 2021-06-30 RX ADMIN — LORAZEPAM 1 MG: 1 TABLET ORAL at 08:19

## 2021-06-30 RX ADMIN — HYDRALAZINE HYDROCHLORIDE 25 MG: 25 TABLET ORAL at 21:29

## 2021-06-30 RX ADMIN — OLANZAPINE 10 MG: 10 TABLET, ORALLY DISINTEGRATING ORAL at 19:55

## 2021-06-30 RX ADMIN — OLANZAPINE 10 MG: 10 TABLET, ORALLY DISINTEGRATING ORAL at 08:19

## 2021-06-30 RX ADMIN — LORAZEPAM 1 MG: 1 TABLET ORAL at 13:01

## 2021-06-30 RX ADMIN — HYDRALAZINE HYDROCHLORIDE 25 MG: 25 TABLET ORAL at 16:30

## 2021-06-30 RX ADMIN — LORAZEPAM 2 MG: 2 TABLET ORAL at 19:55

## 2021-06-30 RX ADMIN — LOSARTAN POTASSIUM 100 MG: 100 TABLET, FILM COATED ORAL at 08:19

## 2021-06-30 ASSESSMENT — ACTIVITIES OF DAILY LIVING (ADL)
ORAL_HYGIENE: INDEPENDENT
HYGIENE/GROOMING: PROMPTS
DRESS: SCRUBS (BEHAVIORAL HEALTH)
DRESS: SCRUBS (BEHAVIORAL HEALTH)
LAUNDRY: UNABLE TO COMPLETE
HYGIENE/GROOMING: PROMPTS
ORAL_HYGIENE: INDEPENDENT

## 2021-06-30 NOTE — PLAN OF CARE
Assessment/Intervention/Current Symtoms and Care Coordination:  Attended Team Meeting, Reviewed chart notes: Patient is continuing to improve. Presents with bright affect and pleasant mood.      Discharge Plan or Goal:  Unknown at this time as initial paperwork was completed with [facility] to start CADI funding. Patient could return home to her apartment with multiple services in place or she will need a higher level of care such as a group home as she is unable to return to her apartment as she lives alone and unable to care for herself.     Barriers to Discharge:  As stated above patient is unable to care for herself if she returns home to her apartment as she lives alone with no family in [place].     Referral Status:  Paperwork to start the process of CADI funding has been started. This application for CADI will need to be completed and patient will be required to complete an interview with [facility] .     Legal Status:  Committed/Hatch/Mcgrath Kohler through [facility]

## 2021-06-30 NOTE — PLAN OF CARE
"The patient is doing behaviorally well this shift. She denies any mental health concerns and indicates that she feels \"good\" today. She is cooperative with all medications including BP medications. She remains isolative throughout most of the shift and declines to attend any groups despite prompting. She seems slightly confused and forgetful throughout the day. Her breakfast tray was late and this had to be explained to her multiple times. No outward evidence of positive psychotic symptoms or tad this shift.     Her BP was elevated at the start of the shift. Recheck after scheduled medications showed no improvement. She was given her rescue medications which also demonstrated no change in BP. Internal medicine updated and they ordered another medication which was given to the patient. Psychiatry MD also notified. Other VS were WDL. The patient denies any physical health complaints or side effects from medications this shift.  "

## 2021-06-30 NOTE — PROGRESS NOTES
"[facility], [place]   Psychiatric Progress Note  Hospital Day: 77        Interim History:   The patient's care was discussed with the treatment team during the daily team meeting and/or staff's chart notes were reviewed. There were no instances of agitation or aggression overnight. She continues to be isolative and withdrawn to her room. She slept 5 hours overnight and is eating well.. She is taking her medications as prescribed. She did not receive PRN medications in the past 24 hours. Her vital signs are otherwise stable and within normal limits aside from hypertension and she is afebrile. She was evaluated by internal medicine regarding HTN. Per staff report, Michelle appears to be slightly confused this morning, breakfast was delayed. She also appears to have more organized thought process.     Upon interview, Michelle shared some about her family. She stated that she was \"good\" and that her night was \"good\". She expressed her wish to discharge from the hospital. She reported that she prefers to stay in her apartment and would not want to have an ACT team or move to a group home. She denied any mental health symptoms. She reported feeling that her memory is improving and was oriented x4. She was also able to name what her morning medications are. She denied having any physical concerns. She reports that her appetite is good and that she is maintaining her hygiene.          Medications:       LORazepam  1 mg Oral BID    Or     LORazepam  1 mg Intramuscular BID     LORazepam  2 mg Oral QPM    Or     LORazepam  2 mg Intramuscular QPM     losartan  100 mg Oral Daily     metoprolol succinate ER  100 mg Oral Daily     OLANZapine zydis  10 mg Oral BID    Or     OLANZapine  10 mg Intramuscular BID          Allergies:     Allergies   Allergen Reactions     Haldol [Haloperidol]      Patient previously tolerated haldol, though developed oculogyric crises during hospital stay on 4/26/21 on total daily " dose of 10 mg.     Lisinopril Cough          Labs:     Recent Results (from the past 24 hour(s))   Asymptomatic SARS-CoV-2 COVID-19 Virus (Coronavirus) by PCR    Collection Time: 06/29/21  7:00 PM    Specimen: Nasopharyngeal   Result Value Ref Range    SARS-CoV-2 Virus Specimen Source Nasopharyngeal     SARS-CoV-2 PCR Result NEGATIVE     SARS-CoV-2 PCR Comment (Note)           Psychiatric Examination:     BP (!) 166/90 (BP Location: Left arm)   Pulse 63   Temp 98.4  F (36.9  C) (Tympanic)   Resp 16   Wt 70.7 kg (155 lb 12.8 oz)   SpO2 98%   BMI 26.74 kg/m    Weight is 155 lbs 12.8 oz  Body mass index is 26.74 kg/m .    Weight over time:  Vitals:    05/25/21 0850 06/15/21 0811 06/16/21 1605 06/19/21 0859   Weight: 71 kg (156 lb 8 oz) 69.8 kg (153 lb 12.8 oz) 70.5 kg (155 lb 8 oz) 71.4 kg (157 lb 8 oz)    06/21/21 0805 06/22/21 0839 06/24/21 0800   Weight: 69.5 kg (153 lb 3.2 oz) 69.3 kg (152 lb 11.2 oz) 70.7 kg (155 lb 12.8 oz)       Orthostatic Vitals         Most Recent      Lying Orthostatic /91 06/23 0830    Lying Orthostatic Pulse (bpm) 67 06/23 0830    Sitting Orthostatic /89  Comment: notify RN 06/24 0800    Sitting Orthostatic Pulse (bpm) 71 06/24 0800    Standing Orthostatic /101  Comment: notify RN 06/24 0800    Standing Orthostatic Pulse (bpm) 86 06/24 0800            Cardiometabolic risk assessment. 04/15/21      Reviewed patient profile for cardiometabolic risk factors    Date taken /Value  REFERENCE RANGE   Abdominal Obesity  (Waist Circumference)   See nursing flowsheet Women ?35 in (88 cm)   Men ?40 in (102 cm)      Triglycerides  Triglycerides   Date Value Ref Range Status   10/19/2019 117 <150 mg/dL Final       ?150 mg/dL (1.7 mmol/L) or current treatment for elevated triglycerides   HDL cholesterol  HDL Cholesterol   Date Value Ref Range Status   10/19/2019 56 >49 mg/dL Final   ]   Women <50 mg/dL (1.3 mmol/L) in women or current treatment for low HDL cholesterol  Men <40  "mg/dL (1 mmol/L) in men or current treatment for low HDL cholesterol     Fasting plasma glucose (FPG) Lab Results   Component Value Date     10/19/2019      FPG ?100 mg/dL (5.6 mmol/L) or treatment for elevated blood glucose   Blood pressure  BP Readings from Last 3 Encounters:   06/30/21 (!) 166/90   06/04/19 131/71   04/26/19 164/86    Blood pressure ?130/85 mmHg or treatment for elevated blood pressure   Family History  See family history     Appearance: dressed in hospital scrubs, appeared reported age, appeared well groomed  Attitude: cooperative with interview  Eye Contact: good  Mood: \"good\"   Affect:  Mood congruent, somewhat blunted, brightens at certain parts of conversation.   Speech: accented, clear and coherent  Language: no obvious receptive or expressive deficits  Psychomotor, Gait, Musculoskeletal: No abnormal movements noted while seated.   Thought Process: goal-oriented, linear  Associations:  No loosening of associations present  Thought Content:  Did not appear to be responding to internal stimuli.  Insight:  limited  Judgement:  Limited  Oriented to: date, location, treatment team, current president.   Attention Span and Concentration: attentive to conversation.  Recent and Remote Memory: impaired but improving  Fund of Knowledge:  normal    Clinical Global Impressions  First:  Considering your total clinical experience with this particular patient population, how severe are the patient's symptoms at this time?: 7 (04/16/21 1428)  Compared to the patient's condition at the START of treatment, this patient's condition is: 4 (04/16/21 1428)  Most recent:  Considering your total clinical experience with this particular patient population, how severe are the patient's symptoms at this time?: 7 (06/22/21 1529)  Compared to the patient's condition at the START of treatment, this patient's condition is: 3 (06/22/21 1529)         Precautions:     Behavioral Orders   Procedures     Assault " precautions     Cheeking Precautions (behavioral units)     Patient Observed swallowing PO medications; Patient asked to drink water after swallowing medication; Patient in Staff line of sight for 15 minutes after medication given; Mouth checks after PO administration (patient asked to open mouth and stick out their tongue).     Code 1 - Restrict to Unit     Code 3     For walks in the [place] with staff and per staff discretion     Electroconvulsive therapy     Series of up to 12 treatments. Begin Date: 5/26/21     Treating Psychiatrist providing ECT:  Dr. Lubin     Notified on:  5/21/21     Electroconvulsive therapy     As long as we get the green light from risk management and pt is medically cleared, begin ECT every Monday, Wednesday, and Friday     Electroconvulsive therapy     Series of up to 12 treatments. Begin Date: 5/25/21     Treating Psychiatrist providing ECT:  Amee     Notified on:  5/24/21     Elopement precautions     Fall precautions     Alcides Calderon     Routine Programming     As clinically indicated     Status 15     Every 15 minutes.          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 Symptoms and presentation at this time is most consistent with Schizoaffective Disorder, Bipolar Type. Obtained most recent medication regimen from patient's ACT team, and regimen was initially restarted. Pt is committed. Petitioned for Mcgrath Calderon was filed and granted due to lack of  "improvement and side effects from medications. Inpatient psychiatric hospitalization is warranted at this time for safety, stabilization, possible adjustment in medications and development of a safe discharge plan.     Hospital Course:  On admission, PTA medications were restarted. However, Michelle had been declining all scheduled medications despite significant encouragement from staff and provider. Psychiatric emergency declared on 4/20 due to aggression toward others in context of severe psychosis and suspected excited catatonia. Ativan 1 mg TID was also added. Discontinued PTA Invega, Zyprexa, and thorazine on 4/20 due to consistent refusal.     On 4/26, it was noted that patient frequently had upward gaze while walking up and down the unit. She did not appear to be distressed. She reported that she was looking at \"my god.\" It was determined to be oculogyric crisis secondary to IM haloperidol. Haldol was subsequently discontinued and scheduled Zyprexa was initiated on an emergency basis. Oral Cogentin was also scheduled, though patient declined. On the evening of 4/26, patient's gaze was fixed in upward position for several hours and she appeared to be experiencing discomfort. IM Cogentin was administered with noted resolution. Partial improvements were observed after switching to scheduled Zyprexa. After patient improved, she was more receptive to reinitiating oral Invega, which was initiated on 5/3 and titrated to PTA dose of 9 mg daily on 5/10. Plan was for ACT team to bring in loading dose of Invega S[place]enna. Patient had signs of oculogyric crisis again with Invega. Oral dose decreased to 6 mg after this. Invega was stopped on 5/14 due to ongoing signs of problems related to eye movement and concerns for oculogyric crisis.    Overall improvement in patient's agitation and suspected catatonia noted on 4/30 after patient accepted two doses of Ativan. She began declining Ativan again with noted decompensation. " "Patient began accepting, however, was deemed not to have the capacity to consent to treatment with Ativan. She does not believe she has a mental illness, including catatonia. She does not fully understand risks associated with inadequate treatment of catatonia. Discussed with her son who is acting as surrogate decision maker and he is in full support of forced scheduled IM Ativan if patient declines oral formulation. Also consulted with our legal team prior to backing oral Ativan with IM.     Ativan was increased on 5/19 due to re-emerging evidence of catatonia (long periods of staring, repetitive movements, echolalia, mutism). Meeting was held with ACT team on 5/20, including ACT team psychiatrist, Dr. Pedraza. He said that he is in \"full support\" of plan to pursue Alcides Kohler and ECT at this time. He does feel that in the past she has been discharged from the hospital while still quite symptomatic. He is hoping that ECT will be effective and that with improvement, Michelle would be more receptive to clozapine and weekly blood draws. He said that ideally she would transition to an IRTS before going back to her apartment, but also understands there may be some barriers (I.e. pt's willingness, financial concerns, etc).     ECT consult placed. Please see consult note by Dr. Lubin on 5/21 for details. Alcides Saavedraard was approved on 5/24 and patient was medically cleared on 5/24. ECT initiated on 5/26. She was noted to have a short seizure during ECT on 6/4. Staff note that patient appears more disoriented with memory impairment and sedation on days of ECT. This was noted on my examination again today. Improvements noted in mood, affect, social interactions, paranoia, agitation since initiation of ECT. Reduced Ativan on 6/5 due to sedative effects. Relayed concerns about memory impairment and confusion with Dr. Lubin. Recommended attempting unilateral ECT, which he agreed to do. First unilateral ECT on 6/7. ECT was held on " 6/11 and 6/14 due to memory impairment. We will plan to hold it again tomorrow (6/16). There is ongoing discussion regarding whether there is a component of catatonia contributing to her current presentation but this is less likely since it worsened after ECT was started.  Given her level of cognitive impairment and our belief that this is due to ECT, we will not pursue any further treatments at this time. Since we have held ECT (last treatment on 6/9), her cognitive impairment has slightly improved (more oriented).     Michelle appears to be decompensating somewhat since ECT has been held, though her cognitive impairment has gradually improved. If symptoms of psychosis re-emerge, it may be worth starting clozapine, which is something that has previously been considered by her ACT team. Her Hatch order would need to be amended as clozapine is not one of the medications listed. ANC obtained on 6/16 was 1800/microL. Per conversation with pharmacy, neutropenia has been associated with olanzapine and agranulocytosis has been associated with olanzapine, lorazepam, metoprolol, and hydralazine and hematologic labs have been monitored. Upon re-check, ANC was 3700/microL on 6/21/21. At this time, the primary symptoms we are observing are cognitive deficits and inability to care for self, which we would not address by changing her antipsychotic medication.    Michelle's cognitive impairment has been steadily improving since holding ECT treatments, however it is possible that lorazepam is also playing a role in her cognitive impairment. Given no signs of re-emerging catatonia, a gradual lorazepam taper (decreasing by 0.5 mg) was initiated on 6/28/21 to monitor for potential improvements with regard to memory impairment.      Michelle has remained focused on discharge. The team is working with Michelle's ACT team to to establish a safe discharge plan with options including moving to a group home vs home with her ACT team and additional in  home supports via CADI services. We are concerned that Michelle would have difficulty taking her medications as prescribed if she were to return home without her ACT team.       Target psychiatric symptoms and interventions:   - Continue 1 mg PO or IM lorazepam in the morning and at mid-day and 2 mg PO or IM lorazepam in the evening. Decrease mid-day lorazepam to 1 mg PO or IM. Patient may not decline.   - Continue Zyprexa 10 mg BID  PO or IM. Hatch in place. May consider increasing further though holding off given re-emerging catatonic sx and borderline prolonged QTc   -Continue Cogentin 2 mg IM daily prn for evidence of acute dystonic reaction or oculogyric crisis  -Continue hydroxyzine 25-50 mg q4h prn for acute anxiety  -Continue Trazodone 50 mg at bedtime prn for sleep disturbances  -Continue Zyprexa 10 mg TID prn for severe agitation  -Continue Ativan/Benadryl q4h prn for agitation. WOULD GIVE PRN ATIVAN FIRST FOR AGITATION unless it is after 5 pm on day prior to scheduled ECT     ECT:   - ECT DISCONTINUED DUE TO COGNITIVE IMPAIRMENT.  - Because patient was only intermittently accepting scheduled Ativan and some symptoms of catatonia are re-emerging, pursued Alcides Kohler for ECT. She also continues to exhibit symptoms of psychosis and has not responded or has experienced side effects from multiple neuroleptic medications. Court hearing was held on 5/21. Alcides Kohler is approved. Patient is medically cleared. COVID negative on 5/24. Obtained ECT consult on 5/21/21. Please see Dr. Lubin's consultation note.   - NPO 8 hours prior to scheduled treatment. Clear liquids until 2 hours prior to treatment.   - Discussed with IM. May resume AM antihypertensives, no need to hold prior to ECT  - SIO while patient is NPO, starting at midnight on ECT days (renewed)  - Will complete acute course in the hospital; will likely need maintenance ECT in outpatient setting.   - New concerns about memory impairments: Switched from  "bilateral to unilateral ECT on 6/7 and continue to monitor closely, ultimately decided to hold treatments given cognitive impairment.    Acute Medical Problems and Treatments:  BRANDON, resolved:   Pre-renal in nature, likely secondary to decreased oral intake.   - IM Consulted on 6/10. Appreciate assistance. Follow-up note placed on 6/16 (no further work-up recommended).  - avoid nephrotoxins  - Renal bladder US completed on 6/10  - Encourage fluid intake.      HTN: Patient is now adherent with medication regimen.   - Metoprolol succinate  mg   - Cozaar 100 mg daily  - Continue hydralazine 25 mg QID PRN for SBP > 160 mmHg or DBP > 110 mmHg.  - Please see note from IM dated 5/3, 5/6, 5/24, 6/20 and 6/30/21.  - Obtained EKG and routine labs on 5/21 in context of pt declining vital sign checks and anti-hypertensives and recently elevated BPs. Reviewed labs and discussed EKG findings with IM on 5/21. No urgent concerns, though IM should be notified if pt develops acute medical concerns (I.e. heart palpitations, SOB, changes in speech, AMS, confusion, CP, HA, changes in vision).    - Per 6/20 IM note: \"Please notify IM if BP is persistently severely elevated and requiring frequent PRN hydralazine\".  - Internal medicine consulted again on 6/28 due to consistent use of hydralazine over the past week. Metoprolol increased to 75 mg daily then to 100 mg on 6/30.      CVA:  - Aspirin 81 mg daily     Chronic Constipation:  - Miralax 17 mg daily    Urinary frequency and urgency, resolved  Reported new-onset urinary frequency and urgency on 6/21. She denied dysuria, hematuria, and suprapubic pain. Etiology unclear, however Michelle reported that this was time-limited and improved on 6/23.  - Urinalysis ordered on 6/22 was within normal limits.    ANC trending downward since admission, still WNL, resolved  - Per conversation with pharmacy, medications Michelle is taking that are associated with  agranulocytosis include lorazepam " (scheduled), metoprolol (scheduled), hydralazine (PRN), and olanzapine (scheduled).   - Will consult internal medicine for further guidance if Michelle declines future blood draws and we are not able to monitor her ANC.   - CBC with differential WNL on 6/21     Behavioral/Psychological/Social:  - Encourage unit programming  - Patient is now Code 3 status and can take walks in the [place] with staff and security present per staff discretion. This appears to be quite therapeutic for her.     Safety:  - Continue precautions as noted above  - Status 15 minute checks  - Safety precautions include: assault and elopement precautions  - Continue precautions as noted above    Legal Status: Committed as MI with Hatch in place for Haldol, Zyprexa, Invega, and Thorazine through [facility]. Filed Alcides Kohler through [facility] and approved on 5/24/21.     Disposition Plan   Reason for ongoing admission: poses an imminent risk to self, poses an imminent risk to others and is unable to care for self due to severe psychosis or tad  Discharge location: Group home (needs CADI waiver) vs home with [facility] ACT team or 24/7 in-home care  Discharge Medications: not ordered  Follow-up Appointments: not scheduled     Prior to discharge, treatment team will need to coordinate with ACT team to obtain JESSICA for Michelle's son in order to facilitate ongoing involvement in care.    Entered by: Bijal Varner on 6/30/21 at 8:54 AM     The patient was seen and discussed with attending psychiatrist Dr. Zaman.    Bijal Varner MD  Psychiatry PGY-4

## 2021-06-30 NOTE — PLAN OF CARE
Problem: Behavior Regulation Impairment (Psychotic Signs/Symptoms)  Goal: Improved Behavioral Control (Psychotic Signs/Symptoms)  Outcome: Improving   Pt was withdrawn and isolative to her room all shift. Pt ate supper and took HS meds. Pt was calm and pleasant on approach. Writer obtained Covid sample and sent to lab as ordered by physician. Pt denies AH at this time, although she does appear preoccupied.  Pt also denies SI/SIB/HI. Writer offered to do activity with pt so she wouldn't be so isolative but pt refused. No behavioral issues noted.

## 2021-06-30 NOTE — ANESTHESIA CARE TRANSFER NOTE
Patient: Michelle Pino    * No procedures listed *    Diagnosis: * No pre-op diagnosis entered *  Diagnosis Additional Information: No value filed.    Anesthesia Type:   General     Note:      Level of Consciousness: drowsy  Oxygen Supplementation: room air    Independent Airway: airway patency satisfactory and stable        Patient transferred to: PACU    Handoff Report: Identified the Reponsible Provider      Vitals: (Last set prior to Anesthesia Care Transfer)  CRNA VITALS  5/28/2021 0809 - 5/28/2021 0909      5/28/2021             Pulse:  78    SpO2:  96 %    Resp Rate (observed):  (!) 1        Electronically Signed By: Fartun Love MD  June 29, 2021  7:28 PM

## 2021-06-30 NOTE — PLAN OF CARE
Problem: Sleep Disturbance (Psychotic Signs/Symptoms)  Goal: Improved Sleep (Psychotic Signs/Symptoms)  Outcome: No Change     Pt appeared to be sleeping for 5 hours. NO concerns noted this shift. No PRN administered.

## 2021-06-30 NOTE — PROGRESS NOTES
Brief Internal Medicine Note    Please see prior consult note (5/06/2021) and subsequent medicine progress notes for further details. Medicine has been follow up on blood pressure. Patient continues on losartan 100 mg daily, and metoprolol XL has been increased to 100 mg daily and continues to be hypertensive to SBP >160. Required 3 doses of PRN hydralazine in the last 3 days.     Will start amlodipine 5 mg daily. Will continue to monitor blood pressure and HR, to ensure able to tolerate current regimen.     Medicine will follow up on blood pressure peripherally. No further recommendations at this time. Please page the on-call ROBERT for any intercurrent medical issues which arise.     Nicky Christensen PA-C  Hospitalist Service  Contact information available via [facility] Paging/Directory

## 2021-06-30 NOTE — PLAN OF CARE
Writer had request for COVID vaccine.  Writer spoke with pt and pt states she doesn't want it.  Pt thought it was COVID testing.  Pt refused COVID vaccine.

## 2021-07-01 PROCEDURE — 250N000013 HC RX MED GY IP 250 OP 250 PS 637: Performed by: PHYSICIAN ASSISTANT

## 2021-07-01 PROCEDURE — 124N000002 HC R&B MH UMMC

## 2021-07-01 PROCEDURE — 250N000013 HC RX MED GY IP 250 OP 250 PS 637: Performed by: STUDENT IN AN ORGANIZED HEALTH CARE EDUCATION/TRAINING PROGRAM

## 2021-07-01 PROCEDURE — 96125 COGNITIVE TEST BY HC PRO: CPT | Mod: GO

## 2021-07-01 PROCEDURE — 250N000013 HC RX MED GY IP 250 OP 250 PS 637: Performed by: PSYCHIATRY & NEUROLOGY

## 2021-07-01 PROCEDURE — 99232 SBSQ HOSP IP/OBS MODERATE 35: CPT | Performed by: PSYCHIATRY & NEUROLOGY

## 2021-07-01 RX ADMIN — LORAZEPAM 1 MG: 1 TABLET ORAL at 14:04

## 2021-07-01 RX ADMIN — OLANZAPINE 10 MG: 10 TABLET, ORALLY DISINTEGRATING ORAL at 08:36

## 2021-07-01 RX ADMIN — LORAZEPAM 1 MG: 1 TABLET ORAL at 08:36

## 2021-07-01 RX ADMIN — AMLODIPINE BESYLATE 5 MG: 2.5 TABLET ORAL at 08:36

## 2021-07-01 RX ADMIN — METOPROLOL SUCCINATE 100 MG: 50 TABLET, EXTENDED RELEASE ORAL at 08:36

## 2021-07-01 RX ADMIN — LORAZEPAM 2 MG: 2 TABLET ORAL at 20:41

## 2021-07-01 RX ADMIN — LOSARTAN POTASSIUM 100 MG: 100 TABLET, FILM COATED ORAL at 08:36

## 2021-07-01 RX ADMIN — OLANZAPINE 10 MG: 10 TABLET, ORALLY DISINTEGRATING ORAL at 20:41

## 2021-07-01 ASSESSMENT — ACTIVITIES OF DAILY LIVING (ADL)
LAUNDRY: UNABLE TO COMPLETE
ORAL_HYGIENE: INDEPENDENT
HYGIENE/GROOMING: PROMPTS
DRESS: SCRUBS (BEHAVIORAL HEALTH)
LAUNDRY: UNABLE TO COMPLETE
ORAL_HYGIENE: PROMPTS
HYGIENE/GROOMING: PROMPTS
DRESS: SCRUBS (BEHAVIORAL HEALTH)

## 2021-07-01 NOTE — PLAN OF CARE
Pt asleep at start of shift. Breathing quiet and unlabored.     Pt had no c/o pain or discomfort during the HS.     Appears to have slept 6 hours.     Pt on  ASSAULT, CHEEKING, ELOPEMENT and  FALLprecautions in addition to single room order. Any related events noted above.     Will continue to monitor and assess.   Problem: Sleep Disturbance (Psychotic Signs/Symptoms)  Goal: Improved Sleep (Psychotic Signs/Symptoms)  Outcome: Improving

## 2021-07-01 NOTE — PLAN OF CARE
Problem: Adult Inpatient Plan of Care  Goal: Plan of Care Review  Outcome: No Change  Flowsheets  Taken 6/30/2021 2010  Progress: no change  Taken 6/30/2021 1900  Plan of Care Reviewed With: patient     Problem: Adult Inpatient Plan of Care  Goal: Optimal Comfort and Wellbeing  Intervention: Provide Person-Centered Care  Recent Flowsheet Documentation  Taken 6/30/2021 1900 by Brady Rodriguez RN  Trust Relationship/Rapport:    care explained    choices provided    emotional support provided    questions encouraged    reassurance provided    Patient remains isolative to her room majority of the evening up for meals, to use the restroom, and receive medications. Patient upon approach flat in affect, calm and social during verbal assessment. Patient denies any mental health symptoms and had no active observed symptoms this evening. Patient accepting of HS medications with staff prompting. Patient prompted to complete ADL's this evening but declined.    Patients blood pressure continues to be elevated with systolic consistent in the 160 range. Patient had no observed or reported symptoms of hypertension. PRN Hydralazine given with no observed change in pressures.Patient later in the evening again had vitals reassessed with systolic BP still in the 160 range. PRN Hydralazine again given.  Will continue to monitor.

## 2021-07-01 NOTE — PLAN OF CARE
Nursing assessment completed. Patient awake and visible throughout the shift. Met writer this morning with a bright, full range affect. Smiling. Medication compliant. Met with OT for a Cognitive Performance Test.

## 2021-07-01 NOTE — PROGRESS NOTES
Writer spoke with [facility] ACT Team /Nurse and requested someone from the team visit patient at the hospital if possible. CM/Nurse provided two days next week that may work for a visit; Thursday or Friday next week and writer will be informed by Wednesday of exact day and time.

## 2021-07-01 NOTE — PLAN OF CARE
Assessment/Intervention/Current Symtoms and Care Coordination:  Attended Team Meeting, Reviewed chart notes: Patient appears to be improving: Pleasant and cooperative with writer. CTC spoke with ACT Team /nurse and requested someone from the ACT Team visit patient to provide their opinion of patient's current presentation.     Discharge Plan or Goal:  TBD treatment team has concerns regarding patient returning home as she lives alone and has been needing a lot of support and prompts from staff to complete ADL's.     Barriers to Discharge:  Patient is continuing to stabilize.     Referral Status:  No referrals have been made     Legal Status:  Committed/Hatch/Mcgrath colin through [facility]

## 2021-07-01 NOTE — PROGRESS NOTES
"[facility], [place]   Psychiatric Progress Note  Hospital Day: 78        Interim History:   The patient's care was discussed with the treatment team during the daily team meeting and/or staff's chart notes were reviewed. There were no instances of agitation or aggression overnight. She slept 5 hours overnight and is eating well.. She is taking her medications as prescribed. She received PRN hydralazine for HTN on 3 occasions in the past 24 hours and internal medicine initiated amlodipine. She declined the Covid vaccine. Her vital signs are otherwise stable and within normal limits and she is afebrile. She was evaluated by internal medicine regarding HTN. Per staff report, She continues to be isolative and withdrawn to her room. Michelle appeared somewhat confused yesterday, asking the same question multiple times and displayed no outward evidence of positive psychotic symptoms or tad. She does not independently ask for medications. She declined to attend to ADLs though reported intention to shower today.     Upon interview, Michelle was smiling when writer entered her room. She reported that her mood is \"good.\" She again said that she can care for herself when she leaves and that she will remain safe. She said that she can take medications on her own. She did meet with OT today to complete cognitive performance test. Michelle said \"I did my best!\" She feels that it went well. She stated that she would work with the ACT team upon discharge and would like team to arrange a meeting with them. She was oriented x 4. She denied SI/HI, AH, VH, paranoia. She again reports that her memory is improving. She denies side effects and acute medical concerns.            Medications:       amLODIPine  5 mg Oral Daily     LORazepam  1 mg Oral BID    Or     LORazepam  1 mg Intramuscular BID     LORazepam  2 mg Oral QPM    Or     LORazepam  2 mg Intramuscular QPM     losartan  100 mg Oral Daily     metoprolol " succinate ER  100 mg Oral Daily     OLANZapine zydis  10 mg Oral BID    Or     OLANZapine  10 mg Intramuscular BID          Allergies:     Allergies   Allergen Reactions     Haldol [Haloperidol]      Patient previously tolerated haldol, though developed oculogyric crises during hospital stay on 4/26/21 on total daily dose of 10 mg.     Lisinopril Cough          Labs:     No results found for this or any previous visit (from the past 24 hour(s)).       Psychiatric Examination:     BP (!) 154/78   Pulse 109   Temp 97.3  F (36.3  C) (Tympanic)   Resp 16   Wt 70.7 kg (155 lb 12.8 oz)   SpO2 98%   BMI 26.74 kg/m    Weight is 155 lbs 12.8 oz  Body mass index is 26.74 kg/m .    Weight over time:  Vitals:    05/25/21 0850 06/15/21 0811 06/16/21 1605 06/19/21 0859   Weight: 71 kg (156 lb 8 oz) 69.8 kg (153 lb 12.8 oz) 70.5 kg (155 lb 8 oz) 71.4 kg (157 lb 8 oz)    06/21/21 0805 06/22/21 0839 06/24/21 0800   Weight: 69.5 kg (153 lb 3.2 oz) 69.3 kg (152 lb 11.2 oz) 70.7 kg (155 lb 12.8 oz)       Orthostatic Vitals         Most Recent      Lying Orthostatic /91 06/23 0830    Lying Orthostatic Pulse (bpm) 67 06/23 0830    Sitting Orthostatic /89  Comment: notify RN 06/24 0800    Sitting Orthostatic Pulse (bpm) 71 06/24 0800    Standing Orthostatic /101  Comment: notifkyle RN 06/24 0800    Standing Orthostatic Pulse (bpm) 86 06/24 0800            Cardiometabolic risk assessment. 04/15/21      Reviewed patient profile for cardiometabolic risk factors    Date taken /Value  REFERENCE RANGE   Abdominal Obesity  (Waist Circumference)   See nursing flowsheet Women ?35 in (88 cm)   Men ?40 in (102 cm)      Triglycerides  Triglycerides   Date Value Ref Range Status   10/19/2019 117 <150 mg/dL Final       ?150 mg/dL (1.7 mmol/L) or current treatment for elevated triglycerides   HDL cholesterol  HDL Cholesterol   Date Value Ref Range Status   10/19/2019 56 >49 mg/dL Final   ]   Women <50 mg/dL (1.3 mmol/L) in women or  "current treatment for low HDL cholesterol  Men <40 mg/dL (1 mmol/L) in men or current treatment for low HDL cholesterol     Fasting plasma glucose (FPG) Lab Results   Component Value Date     10/19/2019      FPG ?100 mg/dL (5.6 mmol/L) or treatment for elevated blood glucose   Blood pressure  BP Readings from Last 3 Encounters:   06/30/21 (!) 154/78   06/04/19 131/71   04/26/19 164/86    Blood pressure ?130/85 mmHg or treatment for elevated blood pressure   Family History  See family history       Appearance: dressed in hospital scrubs, appeared reported age, appeared well groomed  Attitude: cooperative with interview  Eye Contact: good  Mood: \"good\"   Affect:  Mood congruent, somewhat blunted, brightens at certain parts of conversation.   Speech: accented, clear and coherent  Language: no obvious receptive or expressive deficits  Psychomotor, Gait, Musculoskeletal: No abnormal movements noted while seated.   Thought Process: goal-oriented, linear  Associations:  No loosening of associations present  Thought Content:  Did not appear to be responding to internal stimuli.  Insight:  limited  Judgement:  fair  Oriented to: date, location, treatment team, current president.   Attention Span and Concentration: attentive to conversation.  Recent and Remote Memory: impaired but improving  Fund of Knowledge:  normal    Clinical Global Impressions  First:  Considering your total clinical experience with this particular patient population, how severe are the patient's symptoms at this time?: 7 (04/16/21 1428)  Compared to the patient's condition at the START of treatment, this patient's condition is: 4 (04/16/21 1428)  Most recent:  Considering your total clinical experience with this particular patient population, how severe are the patient's symptoms at this time?: 7 (06/22/21 1529)  Compared to the patient's condition at the START of treatment, this patient's condition is: 3 (06/22/21 1529)         Precautions: "     Behavioral Orders   Procedures     Assault precautions     Cheeking Precautions (behavioral units)     Patient Observed swallowing PO medications; Patient asked to drink water after swallowing medication; Patient in Staff line of sight for 15 minutes after medication given; Mouth checks after PO administration (patient asked to open mouth and stick out their tongue).     Code 3     For walks in the [place] with staff and per staff discretion     Electroconvulsive therapy     Series of up to 12 treatments. Begin Date: 5/26/21     Treating Psychiatrist providing ECT:  Dr. Lubin     Notified on:  5/21/21     Electroconvulsive therapy     As long as we get the green light from risk management and pt is medically cleared, begin ECT every Monday, Wednesday, and Friday     Electroconvulsive therapy     Series of up to 12 treatments. Begin Date: 5/25/21     Treating Psychiatrist providing ECT:  Amee     Notified on:  5/24/21     Elopement precautions     Fall precautions     Alcides Calderon     Routine Programming     As clinically indicated     Status 15     Every 15 minutes.          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 Symptoms and presentation at this time is most consistent with Schizoaffective Disorder, Bipolar Type. Obtained most recent medication regimen from patient's ACT team, and regimen was initially restarted. Pt is committed. Petitioned for Mcgrath Calderon was filed and granted due  "to lack of improvement and side effects from medications. Inpatient psychiatric hospitalization is warranted at this time for safety, stabilization, possible adjustment in medications and development of a safe discharge plan.     Hospital Course:  On admission, PTA medications were restarted. However, Michelle had been declining all scheduled medications despite significant encouragement from staff and provider. Psychiatric emergency declared on 4/20 due to aggression toward others in context of severe psychosis and suspected excited catatonia. Ativan 1 mg TID was also added. Discontinued PTA Invega, Zyprexa, and thorazine on 4/20 due to consistent refusal.     On 4/26, it was noted that patient frequently had upward gaze while walking up and down the unit. She did not appear to be distressed. She reported that she was looking at \"my god.\" It was determined to be oculogyric crisis secondary to IM haloperidol. Haldol was subsequently discontinued and scheduled Zyprexa was initiated on an emergency basis. Oral Cogentin was also scheduled, though patient declined. On the evening of 4/26, patient's gaze was fixed in upward position for several hours and she appeared to be experiencing discomfort. IM Cogentin was administered with noted resolution. Partial improvements were observed after switching to scheduled Zyprexa. After patient improved, she was more receptive to reinitiating oral Invega, which was initiated on 5/3 and titrated to PTA dose of 9 mg daily on 5/10. Plan was for ACT team to bring in loading dose of Invega Sustenna. Patient had signs of oculogyric crisis again with Invega. Oral dose decreased to 6 mg after this. Invega was stopped on 5/14 due to ongoing signs of problems related to eye movement and concerns for oculogyric crisis.    Overall improvement in patient's agitation and suspected catatonia noted on 4/30 after patient accepted two doses of Ativan. She began declining Ativan again with noted " "decompensation. Patient began accepting, however, was deemed not to have the capacity to consent to treatment with Ativan. She does not believe she has a mental illness, including catatonia. She does not fully understand risks associated with inadequate treatment of catatonia. Discussed with her son who is acting as surrogate decision maker and he is in full support of forced scheduled IM Ativan if patient declines oral formulation. Also consulted with our legal team prior to backing oral Ativan with IM.     Ativan was increased on 5/19 due to re-emerging evidence of catatonia (long periods of staring, repetitive movements, echolalia, mutism). Meeting was held with ACT team on 5/20, including ACT team psychiatrist, Dr. Pedraza. He said that he is in \"full support\" of plan to pursue Mcgrath Kohler and ECT at this time. He does feel that in the past she has been discharged from the hospital while still quite symptomatic. He is hoping that ECT will be effective and that with improvement, Michelle would be more receptive to clozapine and weekly blood draws. He said that ideally she would transition to an IRTS before going back to her apartment, but also understands there may be some barriers (I.e. pt's willingness, financial concerns, etc).     ECT consult placed. Please see consult note by Dr. Lubin on 5/21 for details. Alcides Riverappard was approved on 5/24 and patient was medically cleared on 5/24. ECT initiated on 5/26. She was noted to have a short seizure during ECT on 6/4. Staff note that patient appears more disoriented with memory impairment and sedation on days of ECT. This was noted on my examination again today. Improvements noted in mood, affect, social interactions, paranoia, agitation since initiation of ECT. Reduced Ativan on 6/5 due to sedative effects. Relayed concerns about memory impairment and confusion with Dr. Lubin. Recommended attempting unilateral ECT, which he agreed to do. First unilateral ECT on 6/7. " ECT was held on 6/11 and 6/14 due to memory impairment. We will plan to hold it again tomorrow (6/16). There is ongoing discussion regarding whether there is a component of catatonia contributing to her current presentation but this is less likely since it worsened after ECT was started.  Given her level of cognitive impairment and our belief that this is due to ECT, we will not pursue any further treatments at this time. Since we have held ECT (last treatment on 6/9), her cognitive impairment has slightly improved (more oriented).     Michelle appears to be decompensating somewhat since ECT has been held, though her cognitive impairment has gradually improved. If symptoms of psychosis re-emerge, it may be worth starting clozapine, which is something that has previously been considered by her ACT team. Her Hatch order would need to be amended as clozapine is not one of the medications listed. ANC obtained on 6/16 was 1800/microL. Per conversation with pharmacy, neutropenia has been associated with olanzapine and agranulocytosis has been associated with olanzapine, lorazepam, metoprolol, and hydralazine and hematologic labs have been monitored. Upon re-check, ANC was 3700/microL on 6/21/21. At this time, the primary symptoms we are observing are cognitive deficits and inability to care for self, which we would not address by changing her antipsychotic medication.    Michelle's cognitive impairment has been steadily improving since holding ECT treatments, however it is possible that lorazepam is also playing a role in her cognitive impairment. Given no signs of re-emerging catatonia, a gradual lorazepam taper (decreasing by 0.5 mg) was initiated on 6/28/21 to monitor for potential improvements with regard to memory impairment.      Michelle has remained focused on discharge. The team is working with Michelle's ACT team to to establish a safe discharge plan with options including moving to a group home vs home with her ACT team and  additional in home supports via CADI services. We are concerned that Michelle would have difficulty taking her medications as prescribed if she were to return home without her ACT team.     Target psychiatric symptoms and interventions:   - Continue 1 mg PO or IM lorazepam in the morning and at mid-day and 2 mg PO or IM lorazepam in the evening. Decrease mid-day lorazepam to 1 mg PO or IM. Patient may not decline.   - Continue Zyprexa 10 mg BID  PO or IM. Hatch in place. May consider increasing further though holding off given re-emerging catatonic sx and borderline prolonged QTc   -Continue Cogentin 2 mg IM daily prn for evidence of acute dystonic reaction or oculogyric crisis  -Continue hydroxyzine 25-50 mg q4h prn for acute anxiety  -Continue Trazodone 50 mg at bedtime prn for sleep disturbances  -Continue Zyprexa 10 mg TID prn for severe agitation  -Continue Ativan/Benadryl q4h prn for agitation. WOULD GIVE PRN ATIVAN FIRST FOR AGITATION unless it is after 5 pm on day prior to scheduled ECT    Occupational Therapy Consult Placed to evaluate cognitive functioning/ability to care for self in home environment, ability to manage medications     ECT:   - ECT DISCONTINUED DUE TO COGNITIVE IMPAIRMENT.  - Because patient was only intermittently accepting scheduled Ativan and some symptoms of catatonia are re-emerging, pursued Alcides Kohler for ECT. She also continues to exhibit symptoms of psychosis and has not responded or has experienced side effects from multiple neuroleptic medications. Court hearing was held on 5/21. Alcides Kohelr is approved. Patient is medically cleared. COVID negative on 5/24. Obtained ECT consult on 5/21/21. Please see Dr. Lubin's consultation note.   - NPO 8 hours prior to scheduled treatment. Clear liquids until 2 hours prior to treatment.   - Discussed with IM. May resume AM antihypertensives, no need to hold prior to ECT  - SIO while patient is NPO, starting at midnight on ECT days  "(renewed)  - Will complete acute course in the hospital; will likely need maintenance ECT in outpatient setting.   - New concerns about memory impairments: Switched from bilateral to unilateral ECT on 6/7 and continue to monitor closely, ultimately decided to discontinue treatments given cognitive impairment.    Acute Medical Problems and Treatments:  BRANDON, resolved:   Pre-renal in nature, likely secondary to decreased oral intake.   - IM Consulted on 6/10. Appreciate assistance. Follow-up note placed on 6/16 (no further work-up recommended).  - avoid nephrotoxins  - Renal bladder US completed on 6/10  - Encourage fluid intake.    - Repeat BMP tomorrow AM.     HTN: Patient is now adherent with medication regimen.   - Metoprolol succinate  mg   - Cozaar 100 mg daily  - Amlodipine 5 mg daily  - Continue hydralazine 25 mg QID PRN for SBP > 160 mmHg or DBP > 110 mmHg.  - Please see note from IM dated 5/3, 5/6, 5/24, 6/20 and 6/30/21.  - Obtained EKG and routine labs on 5/21 in context of pt declining vital sign checks and anti-hypertensives and recently elevated BPs. Reviewed labs and discussed EKG findings with IM on 5/21. No urgent concerns, though IM should be notified if pt develops acute medical concerns (I.e. heart palpitations, SOB, changes in speech, AMS, confusion, CP, HA, changes in vision).    - Per 6/20 IM note: \"Please notify IM if BP is persistently severely elevated and requiring frequent PRN hydralazine\".  - Internal medicine consulted again on 6/28 due to consistent use of hydralazine over the past week. Metoprolol increased to 75 mg daily then to 100 mg and amlodipine 5 mg was added on 6/30     CVA:  - Aspirin 81 mg daily     Chronic Constipation:  - Miralax 17 mg daily    Urinary frequency and urgency, resolved  Reported new-onset urinary frequency and urgency on 6/21. She denied dysuria, hematuria, and suprapubic pain. Etiology unclear, however Michelle reported that this was time-limited and " improved on 6/23.  - Urinalysis ordered on 6/22 was within normal limits.    ANC trending downward since admission, still WNL, resolved  - Per conversation with pharmacy, medications Michelle is taking that are associated with  agranulocytosis include lorazepam (scheduled), metoprolol (scheduled), hydralazine (PRN), and olanzapine (scheduled).   - Will consult internal medicine for further guidance if Michelle declines future blood draws and we are not able to monitor her ANC.   - CBC with differential WNL on 6/21     Behavioral/Psychological/Social:  - Encourage unit programming  - Patient is now Code 3 status and can take walks in the [place] with staff and security present per staff discretion. This appears to be quite therapeutic for her.     Safety:  - Continue precautions as noted above  - Status 15 minute checks  - Safety precautions include: assault and elopement precautions  - Continue precautions as noted above    Legal Status: Committed as MI with Hatch in place for Haldol, Zyprexa, Invega, and Thorazine through [facility]. Filed Alcides Kohler through [facility] and approved on 5/24/21.     Disposition Plan   Reason for ongoing admission: poses an imminent risk to self, poses an imminent risk to others and is unable to care for self due to severe psychosis or tad  Discharge location: Group home (needs CADI waiver) vs home with [facility] ACT team or 24/7 in-home care  Discharge Medications: not ordered  Follow-up Appointments: not scheduled     Prior to discharge, treatment team will need to coordinate with ACT team to obtain JESSICA for Michelle's son in order to facilitate ongoing involvement in care.    Gabby Zaman MD  [facility] Psychiatry

## 2021-07-02 LAB
ANION GAP SERPL CALCULATED.3IONS-SCNC: 6 MMOL/L (ref 3–14)
BUN SERPL-MCNC: 17 MG/DL (ref 7–30)
CALCIUM SERPL-MCNC: 9 MG/DL (ref 8.5–10.1)
CHLORIDE SERPL-SCNC: 108 MMOL/L (ref 94–109)
CO2 SERPL-SCNC: 26 MMOL/L (ref 20–32)
CREAT SERPL-MCNC: 1.01 MG/DL (ref 0.52–1.04)
GFR SERPL CREATININE-BSD FRML MDRD: 61 ML/MIN/{1.73_M2}
GLUCOSE SERPL-MCNC: 105 MG/DL (ref 70–99)
POTASSIUM SERPL-SCNC: 3.6 MMOL/L (ref 3.4–5.3)
SODIUM SERPL-SCNC: 140 MMOL/L (ref 133–144)

## 2021-07-02 PROCEDURE — 36415 COLL VENOUS BLD VENIPUNCTURE: CPT | Performed by: STUDENT IN AN ORGANIZED HEALTH CARE EDUCATION/TRAINING PROGRAM

## 2021-07-02 PROCEDURE — 250N000013 HC RX MED GY IP 250 OP 250 PS 637: Performed by: PHYSICIAN ASSISTANT

## 2021-07-02 PROCEDURE — 124N000002 HC R&B MH UMMC

## 2021-07-02 PROCEDURE — 80048 BASIC METABOLIC PNL TOTAL CA: CPT | Performed by: STUDENT IN AN ORGANIZED HEALTH CARE EDUCATION/TRAINING PROGRAM

## 2021-07-02 PROCEDURE — 99233 SBSQ HOSP IP/OBS HIGH 50: CPT | Performed by: PSYCHIATRY & NEUROLOGY

## 2021-07-02 PROCEDURE — 250N000013 HC RX MED GY IP 250 OP 250 PS 637: Performed by: STUDENT IN AN ORGANIZED HEALTH CARE EDUCATION/TRAINING PROGRAM

## 2021-07-02 PROCEDURE — 250N000013 HC RX MED GY IP 250 OP 250 PS 637: Performed by: PSYCHIATRY & NEUROLOGY

## 2021-07-02 RX ADMIN — LORAZEPAM 2 MG: 2 TABLET ORAL at 19:29

## 2021-07-02 RX ADMIN — LORAZEPAM 1 MG: 1 TABLET ORAL at 13:47

## 2021-07-02 RX ADMIN — OLANZAPINE 10 MG: 10 TABLET, ORALLY DISINTEGRATING ORAL at 19:29

## 2021-07-02 RX ADMIN — METOPROLOL SUCCINATE 100 MG: 50 TABLET, EXTENDED RELEASE ORAL at 08:41

## 2021-07-02 RX ADMIN — OLANZAPINE 10 MG: 10 TABLET, ORALLY DISINTEGRATING ORAL at 08:41

## 2021-07-02 RX ADMIN — AMLODIPINE BESYLATE 5 MG: 2.5 TABLET ORAL at 08:41

## 2021-07-02 RX ADMIN — LORAZEPAM 1 MG: 1 TABLET ORAL at 08:41

## 2021-07-02 RX ADMIN — LOSARTAN POTASSIUM 100 MG: 100 TABLET, FILM COATED ORAL at 08:41

## 2021-07-02 RX ADMIN — HYDRALAZINE HYDROCHLORIDE 25 MG: 25 TABLET ORAL at 19:29

## 2021-07-02 ASSESSMENT — ACTIVITIES OF DAILY LIVING (ADL)
ORAL_HYGIENE: INDEPENDENT
LAUNDRY: UNABLE TO COMPLETE
ORAL_HYGIENE: INDEPENDENT
DRESS: SCRUBS (BEHAVIORAL HEALTH)
HYGIENE/GROOMING: INDEPENDENT
LAUNDRY: UNABLE TO COMPLETE
DRESS: SCRUBS (BEHAVIORAL HEALTH)
HYGIENE/GROOMING: INDEPENDENT

## 2021-07-02 NOTE — PLAN OF CARE
Assessment/Intervention/Current Symtoms and Care Coordination:  Attended Team Meeting, Reviewed chart notes: [facility] met with patient to check-in and discuss if she would consider having an interview with [facility].     Discharge Plan or Goal:  Unknown at this time as Treatment Team is concerned that if patient returned home to her apartment she would decompensate as she lives alone and is not able to care for herself.     Barriers to Discharge:  Patient has not been agreeable to interview with [facility] for CADI Funding.      Referral Status:  [facility] has discussed needs of patient with [facility] Intake Staff to see what they could provide for patient regarding in-home-support.  Writer was informed patient would still need to agree to PCA services.  A referral was made to [facility] for their review as they have multiple care facilities in the [facility].     Legal Status:  Committed/Hatch/Mcgrath Kohler through [facility]

## 2021-07-02 NOTE — PLAN OF CARE
Problem: Sleep Disturbance  Goal: Adequate Sleep/Rest  Outcome: Improving   Pt .slept 6.75 hrs during the nite.No psych sxs observed.No report of SI or HI.Observed to be breathing normally.No safety concerns noted. No prn meds used.

## 2021-07-02 NOTE — PLAN OF CARE
Nursing assessment completed. Patient isolative to her room for the majority of the shift. She presents with a bright affect when speaking to writer, but then appears depressed when alone in her room. She declines encouragement to join the milieu or to join groups. She states she would like to go home. Team working on arranging appropriate discharge plan. Pt's BP HTN, but stable at 148/82 this shift, which is an improvement from recent hypertension. Denies SI/SIB. Medication compliant. Continue to monitor and assess.

## 2021-07-02 NOTE — PROGRESS NOTES
"[facility], [place]   Psychiatric Progress Note  Hospital Day: 79        Interim History:   The patient's care was discussed with the treatment team during the daily team meeting and/or staff's chart notes were reviewed. Pt was seen by OT for cognitive assessment, which revealed significant deficits. Michelle requires prompting to complete ADLs. Slept 6.75 hours overnight. No overt sx/signs of psychosis or tad. No aggressive or violent behaviors. Remains isolative to her room. No acute medical concerns. BMP this AM was normal. Eating and drinking good amounts.     Upon interview, Michelle shared that she \"was not happy about\" the cognitive testing with OT yesterday. She added \"She asked how to toast bread and use a microwave. No one has ever tested me before they discharge me.\" She said that her memory has \"fully recovered.\" She repeatedly stated that she can care for herself at home. She will not allow anyone to enter her home, including ACT team or PCA services. She explained that she has nowhere for anyone to sit in her apartment. She has a small chair for herself and a bed. No other furniture. She confirmed that she has never allowed ACT team to come into her home. She is adamantly opposed to GH placement. She said \"I will not like it and that will create sickness for me! That is where you go to die!\" She was becoming increasingly agitated, at one point began yelling. She again said that she can care for herself and she wants to be discharged back to her apartment.  She was oriented x 4. She denied SI/HI, AH, VH, paranoia. She denies side effects and acute medical concerns.            Medications:       amLODIPine  5 mg Oral Daily     LORazepam  1 mg Oral BID    Or     LORazepam  1 mg Intramuscular BID     LORazepam  2 mg Oral QPM    Or     LORazepam  2 mg Intramuscular QPM     losartan  100 mg Oral Daily     metoprolol succinate ER  100 mg Oral Daily     OLANZapine zydis  10 mg Oral BID "    Or     OLANZapine  10 mg Intramuscular BID          Allergies:     Allergies   Allergen Reactions     Haldol [Haloperidol]      Patient previously tolerated haldol, though developed oculogyric crises during hospital stay on 4/26/21 on total daily dose of 10 mg.     Lisinopril Cough          Labs:     Recent Results (from the past 24 hour(s))   Basic metabolic panel    Collection Time: 07/02/21  8:10 AM   Result Value Ref Range    Sodium 140 133 - 144 mmol/L    Potassium 3.6 3.4 - 5.3 mmol/L    Chloride 108 94 - 109 mmol/L    Carbon Dioxide 26 20 - 32 mmol/L    Anion Gap 6 3 - 14 mmol/L    Glucose 105 (H) 70 - 99 mg/dL    Urea Nitrogen 17 7 - 30 mg/dL    Creatinine 1.01 0.52 - 1.04 mg/dL    GFR Estimate 61 >60 mL/min/[1.73_m2]    GFR Estimate If Black 71 >60 mL/min/[1.73_m2]    Calcium 9.0 8.5 - 10.1 mg/dL          Psychiatric Examination:     BP (!) 148/82 (BP Location: Left arm)   Pulse 64   Temp 99  F (37.2  C) (Tympanic)   Resp 16   Wt 70.7 kg (155 lb 12.8 oz)   SpO2 98%   BMI 26.74 kg/m    Weight is 155 lbs 12.8 oz  Body mass index is 26.74 kg/m .    Weight over time:  Vitals:    05/25/21 0850 06/15/21 0811 06/16/21 1605 06/19/21 0859   Weight: 71 kg (156 lb 8 oz) 69.8 kg (153 lb 12.8 oz) 70.5 kg (155 lb 8 oz) 71.4 kg (157 lb 8 oz)    06/21/21 0805 06/22/21 0839 06/24/21 0800 07/02/21 0835   Weight: 69.5 kg (153 lb 3.2 oz) 69.3 kg (152 lb 11.2 oz) 70.7 kg (155 lb 12.8 oz) 70.7 kg (155 lb 12.8 oz)       Orthostatic Vitals         Most Recent      Lying Orthostatic /91 06/23 0830    Lying Orthostatic Pulse (bpm) 67 06/23 0830    Sitting Orthostatic /89  Comment: notify RN 06/24 0800    Sitting Orthostatic Pulse (bpm) 71 06/24 0800    Standing Orthostatic /101  Comment: notify RN 06/24 0800    Standing Orthostatic Pulse (bpm) 86 06/24 0800            Cardiometabolic risk assessment. 04/15/21      Reviewed patient profile for cardiometabolic risk factors    Date taken /Value  REFERENCE  "RANGE   Abdominal Obesity  (Waist Circumference)   See nursing flowsheet Women ?35 in (88 cm)   Men ?40 in (102 cm)      Triglycerides  Triglycerides   Date Value Ref Range Status   10/19/2019 117 <150 mg/dL Final       ?150 mg/dL (1.7 mmol/L) or current treatment for elevated triglycerides   HDL cholesterol  HDL Cholesterol   Date Value Ref Range Status   10/19/2019 56 >49 mg/dL Final   ]   Women <50 mg/dL (1.3 mmol/L) in women or current treatment for low HDL cholesterol  Men <40 mg/dL (1 mmol/L) in men or current treatment for low HDL cholesterol     Fasting plasma glucose (FPG) Lab Results   Component Value Date     10/19/2019      FPG ?100 mg/dL (5.6 mmol/L) or treatment for elevated blood glucose   Blood pressure  BP Readings from Last 3 Encounters:   07/02/21 (!) 148/82   06/04/19 131/71   04/26/19 164/86    Blood pressure ?130/85 mmHg or treatment for elevated blood pressure   Family History  See family history       Appearance: dressed in hospital scrubs, appeared reported age, appeared well groomed  Attitude: cooperative with interview  Eye Contact: good  Mood: \"good\"   Affect:  Mood congruent, more irritable and agitated with writer today  Speech: accented, clear and coherent  Language: no obvious receptive or expressive deficits  Psychomotor, Gait, Musculoskeletal: No abnormal movements noted while seated.   Thought Process: goal-oriented, linear  Associations:  No loosening of associations present  Thought Content:  Did not appear to be responding to internal stimuli.  Insight:  limited  Judgement:  fair  Oriented to: date, location, treatment team, current president.   Attention Span and Concentration: attentive to conversation.  Recent and Remote Memory: impaired but improving  Fund of Knowledge:  normal    Clinical Global Impressions  First:  Considering your total clinical experience with this particular patient population, how severe are the patient's symptoms at this time?: 7 (04/16/21 " 1428)  Compared to the patient's condition at the START of treatment, this patient's condition is: 4 (04/16/21 1428)  Most recent:  Considering your total clinical experience with this particular patient population, how severe are the patient's symptoms at this time?: 7 (06/22/21 1529)  Compared to the patient's condition at the START of treatment, this patient's condition is: 3 (06/22/21 1529)         Precautions:     Behavioral Orders   Procedures     Cheeking Precautions (behavioral units)     Patient Observed swallowing PO medications; Patient asked to drink water after swallowing medication; Patient in Staff line of sight for 15 minutes after medication given; Mouth checks after PO administration (patient asked to open mouth and stick out their tongue).     Code 3     For walks in the [place] with staff and per staff discretion     Electroconvulsive therapy     Series of up to 12 treatments. Begin Date: 5/26/21     Treating Psychiatrist providing ECT:  Dr. Lubin     Notified on:  5/21/21     Electroconvulsive therapy     As long as we get the green light from risk management and pt is medically cleared, begin ECT every Monday, Wednesday, and Friday     Electroconvulsive therapy     Series of up to 12 treatments. Begin Date: 5/25/21     Treating Psychiatrist providing ECT:  Amee     Notified on:  5/24/21     Elopement precautions     Fall precautions     Mcgrath Calderon     Routine Programming     As clinically indicated     Status 15     Every 15 minutes.          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 Symptoms and presentation at this time is most consistent with Schizoaffective Disorder, Bipolar Type. Obtained most recent medication regimen from patient's ACT team, and regimen was initially restarted. Pt is committed. Petitioned for Alcides Calderon was filed and granted due to lack of improvement and side effects from medications. Inpatient psychiatric hospitalization is warranted at this time for safety, stabilization, possible adjustment in medications and development of a safe discharge plan.     Hospital Course:  On admission, PTA medications were restarted. However, Michelle had been declining all scheduled medications despite significant encouragement from staff and provider. Psychiatric emergency declared on 4/20 due to aggression toward others in context of severe psychosis and suspected excited catatonia. Ativan 1 mg TID was also added. Discontinued PTA Invega, Zyprexa, and thorazine on 4/20 due to consistent refusal.     On 4/26, it was noted that patient frequently had upward gaze while walking up and down the unit. She did not appear to be distressed. She reported that she was looking at \"my god.\" It was determined to be oculogyric crisis secondary to IM haloperidol. Haldol was subsequently discontinued and scheduled Zyprexa was initiated on an emergency basis. Oral Cogentin was also scheduled, though patient declined. On the evening of 4/26, patient's gaze was fixed in upward position for several hours and she appeared to be experiencing discomfort. IM Cogentin was administered with noted resolution. Partial improvements were observed after switching to scheduled Zyprexa. After patient improved, she was more receptive to reinitiating oral Invega, which was initiated on 5/3 and titrated to PTA dose of 9 mg daily on 5/10. Plan was for ACT team to bring in loading dose of Invega Sustenna. " "Patient had signs of oculogyric crisis again with Invega. Oral dose decreased to 6 mg after this. Invega was stopped on 5/14 due to ongoing signs of problems related to eye movement and concerns for oculogyric crisis.    Overall improvement in patient's agitation and suspected catatonia noted on 4/30 after patient accepted two doses of Ativan. She began declining Ativan again with noted decompensation. Patient began accepting, however, was deemed not to have the capacity to consent to treatment with Ativan. She does not believe she has a mental illness, including catatonia. She does not fully understand risks associated with inadequate treatment of catatonia. Discussed with her son who is acting as surrogate decision maker and he is in full support of forced scheduled IM Ativan if patient declines oral formulation. Also consulted with our legal team prior to backing oral Ativan with IM.     Ativan was increased on 5/19 due to re-emerging evidence of catatonia (long periods of staring, repetitive movements, echolalia, mutism). Meeting was held with ACT team on 5/20, including ACT team psychiatrist, Dr. Pedraza. He said that he is in \"full support\" of plan to pursue Mcgrath Kohler and ECT at this time. He does feel that in the past she has been discharged from the hospital while still quite symptomatic. He is hoping that ECT will be effective and that with improvement, Michelle would be more receptive to clozapine and weekly blood draws. He said that ideally she would transition to an IRTS before going back to her apartment, but also understands there may be some barriers (I.e. pt's willingness, financial concerns, etc).     ECT consult placed. Please see consult note by Dr. Lubin on 5/21 for details. Mcgrath Kohler was approved on 5/24 and patient was medically cleared on 5/24. ECT initiated on 5/26. She was noted to have a short seizure during ECT on 6/4. Staff note that patient appears more disoriented with memory " impairment and sedation on days of ECT. This was noted on my examination again today. Improvements noted in mood, affect, social interactions, paranoia, agitation since initiation of ECT. Reduced Ativan on 6/5 due to sedative effects. Relayed concerns about memory impairment and confusion with Dr. Lubin. Recommended attempting unilateral ECT, which he agreed to do. First unilateral ECT on 6/7. ECT was held on 6/11 and 6/14 due to memory impairment. We will plan to hold it again tomorrow (6/16). There is ongoing discussion regarding whether there is a component of catatonia contributing to her current presentation but this is less likely since it worsened after ECT was started.  Given her level of cognitive impairment and our belief that this is due to ECT, we will not pursue any further treatments at this time. Since we have held ECT (last treatment on 6/9), her cognitive impairment has slightly improved (more oriented).     Michelle appears to be decompensating somewhat since ECT has been held, though her cognitive impairment has gradually improved. If symptoms of psychosis re-emerge, it may be worth starting clozapine, which is something that has previously been considered by her ACT team. Her Hatch order would need to be amended as clozapine is not one of the medications listed. ANC obtained on 6/16 was 1800/microL. Per conversation with pharmacy, neutropenia has been associated with olanzapine and agranulocytosis has been associated with olanzapine, lorazepam, metoprolol, and hydralazine and hematologic labs have been monitored. Upon re-check, ANC was 3700/microL on 6/21/21. At this time, the primary symptoms we are observing are cognitive deficits and inability to care for self, which we would not address by changing her antipsychotic medication.    Michelle's cognitive impairment has been steadily improving since holding ECT treatments, however it is possible that lorazepam is also playing a role in her cognitive  impairment. Given no signs of re-emerging catatonia, a gradual lorazepam taper (decreasing by 0.5 mg) was initiated on 6/28/21 to monitor for potential improvements with regard to memory impairment.      Michelle has remained focused on discharge. The team is working with Michelle's ACT team to to establish a safe discharge plan with options including moving to a group home vs home with her ACT team and additional in home supports via CADI services. We are concerned that Michelle would have difficulty taking her medications as prescribed if she were to return home without her ACT team.     Target psychiatric symptoms and interventions:   - Continue 1 mg PO or IM lorazepam in the morning and at mid-day and 2 mg PO or IM lorazepam in the evening. Decrease mid-day lorazepam to 1 mg PO or IM. Patient may not decline.   - Continue Zyprexa 10 mg BID  PO or IM. Hatch in place. May consider increasing further though holding off given re-emerging catatonic sx and borderline prolonged QTc   -Continue Cogentin 2 mg IM daily prn for evidence of acute dystonic reaction or oculogyric crisis  -Continue hydroxyzine 25-50 mg q4h prn for acute anxiety  -Continue Trazodone 50 mg at bedtime prn for sleep disturbances  -Continue Zyprexa 10 mg TID prn for severe agitation  -Continue Ativan/Benadryl q4h prn for agitation. WOULD GIVE PRN ATIVAN FIRST FOR AGITATION unless it is after 5 pm on day prior to scheduled ECT    Occupational Therapy Consult Placed to evaluate cognitive functioning/ability to care for self in home environment, ability to manage medications     ECT:   - ECT DISCONTINUED DUE TO COGNITIVE IMPAIRMENT.  - Because patient was only intermittently accepting scheduled Ativan and some symptoms of catatonia are re-emerging, pursued Alcides Kohler for ECT. She also continues to exhibit symptoms of psychosis and has not responded or has experienced side effects from multiple neuroleptic medications. Court hearing was held on 5/21. [place]  "Kohler is approved. Patient is medically cleared. COVID negative on 5/24. Obtained ECT consult on 5/21/21. Please see Dr. Lubin's consultation note.   - NPO 8 hours prior to scheduled treatment. Clear liquids until 2 hours prior to treatment.   - Discussed with IM. May resume AM antihypertensives, no need to hold prior to ECT  - SIO while patient is NPO, starting at midnight on ECT days (renewed)  - Will complete acute course in the hospital; will likely need maintenance ECT in outpatient setting.   - New concerns about memory impairments: Switched from bilateral to unilateral ECT on 6/7 and continue to monitor closely, ultimately decided to discontinue treatments given cognitive impairment.    Acute Medical Problems and Treatments:  BRANDON, resolved:   Pre-renal in nature, likely secondary to decreased oral intake.   - IM Consulted on 6/10. Appreciate assistance. Follow-up note placed on 6/16 (no further work-up recommended).  - avoid nephrotoxins  - Renal bladder US completed on 6/10  - Encourage fluid intake.    - Repeat BMP today was wnl.    HTN: Patient is now adherent with medication regimen.   - Metoprolol succinate  mg   - Cozaar 100 mg daily  - Amlodipine 5 mg daily  - Continue hydralazine 25 mg QID PRN for SBP > 160 mmHg or DBP > 110 mmHg.  - Please see note from IM dated 5/3, 5/6, 5/24, 6/20 and 6/30/21.  - Obtained EKG and routine labs on 5/21 in context of pt declining vital sign checks and anti-hypertensives and recently elevated BPs. Reviewed labs and discussed EKG findings with IM on 5/21. No urgent concerns, though IM should be notified if pt develops acute medical concerns (I.e. heart palpitations, SOB, changes in speech, AMS, confusion, CP, HA, changes in vision).    - Per 6/20 IM note: \"Please notify IM if BP is persistently severely elevated and requiring frequent PRN hydralazine\".  - Internal medicine consulted again on 6/28 due to consistent use of hydralazine over the past week. " Metoprolol increased to 75 mg daily then to 100 mg and amlodipine 5 mg was added on 6/30     CVA:  - Aspirin 81 mg daily     Chronic Constipation:  - Miralax 17 mg daily    Urinary frequency and urgency, resolved  Reported new-onset urinary frequency and urgency on 6/21. She denied dysuria, hematuria, and suprapubic pain. Etiology unclear, however Michelle reported that this was time-limited and improved on 6/23.  - Urinalysis ordered on 6/22 was within normal limits.    ANC trending downward since admission, still WNL, resolved  - Per conversation with pharmacy, medications Michelle is taking that are associated with  agranulocytosis include lorazepam (scheduled), metoprolol (scheduled), hydralazine (PRN), and olanzapine (scheduled).   - Will consult internal medicine for further guidance if Michelle declines future blood draws and we are not able to monitor her ANC.   - CBC with differential WNL on 6/21     Behavioral/Psychological/Social:  - Encourage unit programming  - Patient is now Code 3 status and can take walks in the [place] with staff and security present per staff discretion. This appears to be quite therapeutic for her.     Safety:  - Continue precautions as noted above  - Status 15 minute checks  - Safety precautions include: assault and elopement precautions  - Continue precautions as noted above    Legal Status: Committed as MI with Hatch in place for Haldol, Zyprexa, Invega, and Thorazine through [employer]. Filed Alcides Kohler through [facility] and approved on 5/24/21.     Disposition Plan   Reason for ongoing admission: poses an imminent risk to self, poses an imminent risk to others and is unable to care for self due to severe psychosis or tad  Discharge location: Group home (needs CADI waiver) vs home with [facility] ACT team or 24/7 in-home care. CPT assessment done by OT (tico queen) on 7/1. Please see note for details.   Discharge Medications: not ordered  Follow-up Appointments:  not scheduled     Prior to discharge, treatment team will need to coordinate with ACT team to obtain JESSICA for Michelle's son in order to facilitate ongoing involvement in care.    > 30 minutes total time that was spent and over 50% of this time was spent in counseling and coordination of care.      Gabby Zaman MD  [facility]

## 2021-07-02 NOTE — PLAN OF CARE
"  Problem: Adult Inpatient Plan of Care  Goal: Plan of Care Review  Outcome: No Change  Flowsheets  Taken 7/1/2021 1655  Plan of Care Reviewed With: patient  Taken 6/30/2021 2010  Progress: no change    No significant changes this evening. Patient remains isolative to room majority of the evening up during meals and for a short time to walk in the halls after prompted by RN writer. Patient denies any symptoms only stating \"Im okay\". Patient accepting of HS medications with prompting with no stated or observed side effects. Patient had no observed hypertension this evening. Patient prompted to complete ADL's but declined stating she will complete a shower tomorrow morning.     "

## 2021-07-02 NOTE — PLAN OF CARE
Problem: OT General Care Plan  Goal: OT Goal 1  Description: OT Goal 1  Outcome: No Change     Writer invited pt to group and for individual time to do an activity 1:1 today and throughout the week, though pt consistently declines.  Activities are presented as something for her to do, or asking her for help with things - though either way she declines.

## 2021-07-03 PROCEDURE — 124N000002 HC R&B MH UMMC

## 2021-07-03 PROCEDURE — 250N000013 HC RX MED GY IP 250 OP 250 PS 637: Performed by: STUDENT IN AN ORGANIZED HEALTH CARE EDUCATION/TRAINING PROGRAM

## 2021-07-03 PROCEDURE — 250N000013 HC RX MED GY IP 250 OP 250 PS 637: Performed by: PHYSICIAN ASSISTANT

## 2021-07-03 PROCEDURE — 250N000013 HC RX MED GY IP 250 OP 250 PS 637: Performed by: PSYCHIATRY & NEUROLOGY

## 2021-07-03 RX ADMIN — HYDRALAZINE HYDROCHLORIDE 25 MG: 25 TABLET ORAL at 19:14

## 2021-07-03 RX ADMIN — LORAZEPAM 1 MG: 1 TABLET ORAL at 08:17

## 2021-07-03 RX ADMIN — OLANZAPINE 10 MG: 10 TABLET, ORALLY DISINTEGRATING ORAL at 19:15

## 2021-07-03 RX ADMIN — LORAZEPAM 1 MG: 1 TABLET ORAL at 13:40

## 2021-07-03 RX ADMIN — LORAZEPAM 2 MG: 2 TABLET ORAL at 19:14

## 2021-07-03 RX ADMIN — METOPROLOL SUCCINATE 75 MG: 25 TABLET, EXTENDED RELEASE ORAL at 08:17

## 2021-07-03 RX ADMIN — LOSARTAN POTASSIUM 100 MG: 100 TABLET, FILM COATED ORAL at 08:17

## 2021-07-03 RX ADMIN — OLANZAPINE 10 MG: 10 TABLET, ORALLY DISINTEGRATING ORAL at 08:18

## 2021-07-03 RX ADMIN — AMLODIPINE BESYLATE 5 MG: 2.5 TABLET ORAL at 08:17

## 2021-07-03 ASSESSMENT — ACTIVITIES OF DAILY LIVING (ADL)
ORAL_HYGIENE: INDEPENDENT
HYGIENE/GROOMING: INDEPENDENT
DRESS: SCRUBS (BEHAVIORAL HEALTH)

## 2021-07-03 NOTE — PROVIDER NOTIFICATION
07/03/21 0600   Sleep/Rest/Relaxation   Sleep/Rest/Relaxation (WDL) Ex   Sleep/Rest/Relaxation appears asleep   Night Time # Hours 5.75 hours     NOC Shift Report    Pt in bed at beginning of shift, breathing quiet and unlabored. Pt slept through shift. Pt slept 5.75 hours.     No pt complaints or concerns at this time. Was awake a few times but no issues.

## 2021-07-03 NOTE — PLAN OF CARE
Nursing assessment completed. Patient awake and more visible this shift. She allows staff to help her with cutting, filing, and painting her nails. She has a bright affect and is smiling about having her nails done. Patient engages in pleasant conversation with the nurses helping her with her nails. Patient showered independently and changed her linens, with staff assistance.  BP continues to be elevated, but more stable this shift at 147/80. Denies SI/SIB. Continue to monitor and assess.

## 2021-07-03 NOTE — PLAN OF CARE
Nursing assessment completed. Patient mostly isolative and withdrawn to her room this evening, which is usual for her. She smiles at writer and engages in polite conversation. She tells writer that she would like her nails painted. She approaches the medication window and asks for her HS medications, which she usually does not do. She asks writer what each pill is in an attempt to remember her medications better. PRN hydralazine given for elevated /82 Pulse 71. Denies Si/SIB. Continue to monitor and assess.

## 2021-07-04 PROCEDURE — 250N000013 HC RX MED GY IP 250 OP 250 PS 637: Performed by: PHYSICIAN ASSISTANT

## 2021-07-04 PROCEDURE — 124N000002 HC R&B MH UMMC

## 2021-07-04 PROCEDURE — 250N000013 HC RX MED GY IP 250 OP 250 PS 637: Performed by: STUDENT IN AN ORGANIZED HEALTH CARE EDUCATION/TRAINING PROGRAM

## 2021-07-04 PROCEDURE — 250N000013 HC RX MED GY IP 250 OP 250 PS 637: Performed by: PSYCHIATRY & NEUROLOGY

## 2021-07-04 RX ORDER — AMLODIPINE BESYLATE 10 MG/1
10 TABLET ORAL DAILY
Status: DISCONTINUED | OUTPATIENT
Start: 2021-07-05 | End: 2021-07-04

## 2021-07-04 RX ORDER — AMLODIPINE BESYLATE 2.5 MG/1
7.5 TABLET ORAL DAILY
Status: DISCONTINUED | OUTPATIENT
Start: 2021-07-05 | End: 2021-09-16 | Stop reason: HOSPADM

## 2021-07-04 RX ADMIN — LORAZEPAM 2 MG: 2 TABLET ORAL at 20:27

## 2021-07-04 RX ADMIN — LOSARTAN POTASSIUM 100 MG: 100 TABLET, FILM COATED ORAL at 08:39

## 2021-07-04 RX ADMIN — AMLODIPINE BESYLATE 5 MG: 2.5 TABLET ORAL at 08:39

## 2021-07-04 RX ADMIN — METOPROLOL SUCCINATE 75 MG: 25 TABLET, EXTENDED RELEASE ORAL at 08:39

## 2021-07-04 RX ADMIN — OLANZAPINE 10 MG: 10 TABLET, ORALLY DISINTEGRATING ORAL at 08:39

## 2021-07-04 RX ADMIN — OLANZAPINE 10 MG: 10 TABLET, ORALLY DISINTEGRATING ORAL at 20:27

## 2021-07-04 RX ADMIN — LORAZEPAM 1 MG: 1 TABLET ORAL at 13:59

## 2021-07-04 RX ADMIN — LORAZEPAM 1 MG: 1 TABLET ORAL at 08:39

## 2021-07-04 ASSESSMENT — ACTIVITIES OF DAILY LIVING (ADL)
DRESS: SCRUBS (BEHAVIORAL HEALTH)
DRESS: SCRUBS (BEHAVIORAL HEALTH)
ORAL_HYGIENE: INDEPENDENT
HYGIENE/GROOMING: INDEPENDENT
HYGIENE/GROOMING: INDEPENDENT
ORAL_HYGIENE: INDEPENDENT

## 2021-07-04 NOTE — PLAN OF CARE
Problem: Behavioral Health Plan of Care  Goal: Plan of Care Review  Outcome: Improving  Flowsheets (Taken 7/3/2021 2003)  Plan of Care Reviewed With: patient  Patient Agreement with Plan of Care: agrees   Pt cont to be withdrawn and  isolative to her room watching TV as desired. Her affect was flat. She came out to eat and use the phone. She was medication compliant. She took her scheduled meds and a prn hydralazine for elevated BP. Pt was receptive to answering the assessment questions this evening. She denied SI,HI,hallucination,depression,anxiety and pain. No other concerns noted. She appeared to be using the bathroom fine. No c/o constipation.

## 2021-07-04 NOTE — PLAN OF CARE
Problem: Sleep Disturbance (Psychotic Signs/Symptoms)  Goal: Improved Sleep (Psychotic Signs/Symptoms)  Outcome: Improving    Night Shift Summary (7/3/21 into 07/04/21)    Pt asleep at start of shift. Breathing quiet and unlabored.     Woke up around 0130 to close her room door, and went back to sleep.     Pt had no c/o pain or discomfort during the HS.     Appears to have slept 6 hours.     Pt on CHEEKING, ELOPEMENT, FALL and INTRUSIVE  precautions in addition to single room order. Any related events noted above.     Will continue to monitor and assess.

## 2021-07-04 NOTE — PLAN OF CARE
Patient has spent most of shift in room. Staff encouraged her come out of her room and interact with staff and/or other patients. She said she would rather stay in her room.  She said she liked to watch [employer] on TV.  When asked why she liked CNN she said that it talks about other places.She was willing to talk a little with staff about her family and where they are all living.  Pt took all of her scheduled meds cooperatively. Pt ate with good appetite for both breakfast and lunch.  Her fluid intake was good.    Michelle's son called to see how she was doing.  He asked about discharge and placement issues.  It was suggested that he talk on Monday with Michelle's .

## 2021-07-04 NOTE — PROGRESS NOTES
Brief Medicine Note    Please see prior consult note (5/06/2021) and subsequent medicine progress notes for further details. Medicine has been follow up on blood pressure. Patient on losartan 100 mg daily and metoprolol  mg daily. Amlodipine 5 mg added and patient with SBP >140s persistently, and still requiring PRN hydralazine. Per nursing, patient has been very calm. HR intermittently in 60s.     Continue losartan 100 mg daily, decrease metoprolol XL 75 mg daily, and increase amlodipine 10 mg daily.  Continue PRN hydralazine for SBP >160.     Medicine will follow peripherally on blood pressure. No further recommendations at this time. Please page the on-call ROBERT for any intercurrent medical issues which arise.     Nicky Christensen PA-C  Hospitalist Service  Contact information available via [facility] Paging/Directory

## 2021-07-05 PROCEDURE — 124N000002 HC R&B MH UMMC

## 2021-07-05 PROCEDURE — 250N000013 HC RX MED GY IP 250 OP 250 PS 637: Performed by: STUDENT IN AN ORGANIZED HEALTH CARE EDUCATION/TRAINING PROGRAM

## 2021-07-05 PROCEDURE — 250N000013 HC RX MED GY IP 250 OP 250 PS 637: Performed by: PHYSICIAN ASSISTANT

## 2021-07-05 PROCEDURE — 99232 SBSQ HOSP IP/OBS MODERATE 35: CPT | Performed by: PSYCHIATRY & NEUROLOGY

## 2021-07-05 PROCEDURE — 250N000013 HC RX MED GY IP 250 OP 250 PS 637: Performed by: PSYCHIATRY & NEUROLOGY

## 2021-07-05 RX ADMIN — METOPROLOL SUCCINATE 75 MG: 25 TABLET, EXTENDED RELEASE ORAL at 09:04

## 2021-07-05 RX ADMIN — OLANZAPINE 10 MG: 10 TABLET, ORALLY DISINTEGRATING ORAL at 19:40

## 2021-07-05 RX ADMIN — LORAZEPAM 1 MG: 1 TABLET ORAL at 09:05

## 2021-07-05 RX ADMIN — AMLODIPINE BESYLATE 7.5 MG: 2.5 TABLET ORAL at 09:05

## 2021-07-05 RX ADMIN — LORAZEPAM 2 MG: 2 TABLET ORAL at 19:40

## 2021-07-05 RX ADMIN — OLANZAPINE 10 MG: 10 TABLET, ORALLY DISINTEGRATING ORAL at 09:05

## 2021-07-05 RX ADMIN — LORAZEPAM 1 MG: 1 TABLET ORAL at 13:34

## 2021-07-05 RX ADMIN — LOSARTAN POTASSIUM 100 MG: 100 TABLET, FILM COATED ORAL at 09:05

## 2021-07-05 ASSESSMENT — ACTIVITIES OF DAILY LIVING (ADL)
ORAL_HYGIENE: INDEPENDENT
ORAL_HYGIENE: INDEPENDENT
HYGIENE/GROOMING: INDEPENDENT
DRESS: SCRUBS (BEHAVIORAL HEALTH);INDEPENDENT
LAUNDRY: UNABLE TO COMPLETE
DRESS: SCRUBS (BEHAVIORAL HEALTH)
HYGIENE/GROOMING: INDEPENDENT

## 2021-07-05 NOTE — PLAN OF CARE
Problem: Behavioral Health Plan of Care  Goal: Adheres to Safety Considerations for Self and Others  Outcome: No Change     Problem: Behavioral Health Plan of Care  Goal: Absence of New-Onset Illness or Injury  Outcome: No Change     Problem: Behavioral Health Plan of Care  Goal: Optimized Coping Skills in Response to Life Stressors  Outcome: No Change     Pt alert and oriented to place and self throughout shift.  She was primarily isolative to her room during the day.  Pt appeared neat and tidy and her speech was clear and coherent.  She ate meals and was medication compliment.  Pt denied SI/HI/SIB.  She denied any psychosis.  Pt did not endorse any acute physical health concerns, pain, or side effects to medications during this shift.

## 2021-07-05 NOTE — PROGRESS NOTES
"[facility], [place]   Psychiatric Progress Note  Hospital Day: 82        Interim History:   The patient's care was discussed with the treatment team during the daily team meeting and/or staff's chart notes were reviewed. Pt was seen by OT for cognitive assessment, which revealed significant deficits. Michelle requires prompting to complete ADLs. Sleeping and eating well. No overt sx/signs of psychosis or tad. No aggressive or violent behaviors. Remains isolative to her room. No acute medical concerns.     Upon interview, Michelle was pleasant during interaction until discussing dispo plan/recs at which time she became quite agitated. She reported that her mood is \"good.\" She feels she is at her baseline. She was oriented x 4. She denied SI/HI, AH, VH, paranoia. She denies side effects and acute medical concerns. She said \"I am begging you to allow me to go home.\" She does not believe she has any memory deficits and feels she is fully capable of caring for herself. She said \"You are giving me sickness\" by continuing to recommend higher level of care. She said that she will take her medications and will not forget to do so. She is refusing to participate in a GH interview at this time.            Medications:       amLODIPine  7.5 mg Oral Daily     LORazepam  1 mg Oral BID    Or     LORazepam  1 mg Intramuscular BID     LORazepam  2 mg Oral QPM    Or     LORazepam  2 mg Intramuscular QPM     losartan  100 mg Oral Daily     metoprolol succinate ER  75 mg Oral Daily     OLANZapine zydis  10 mg Oral BID    Or     OLANZapine  10 mg Intramuscular BID          Allergies:     Allergies   Allergen Reactions     Haldol [Haloperidol]      Patient previously tolerated haldol, though developed oculogyric crises during hospital stay on 4/26/21 on total daily dose of 10 mg.     Lisinopril Cough          Labs:     No results found for this or any previous visit (from the past 24 hour(s)).       Psychiatric " Examination:     BP (!) 158/82 (BP Location: Right arm)   Pulse 67   Temp 98.3  F (36.8  C) (Tympanic)   Resp 16   Wt 69.6 kg (153 lb 8 oz)   SpO2 97%   BMI 26.35 kg/m    Weight is 153 lbs 8 oz  Body mass index is 26.35 kg/m .    Weight over time:  Vitals:    05/25/21 0850 06/15/21 0811 06/16/21 1605 06/19/21 0859   Weight: 71 kg (156 lb 8 oz) 69.8 kg (153 lb 12.8 oz) 70.5 kg (155 lb 8 oz) 71.4 kg (157 lb 8 oz)    06/21/21 0805 06/22/21 0839 06/24/21 0800 07/02/21 0835   Weight: 69.5 kg (153 lb 3.2 oz) 69.3 kg (152 lb 11.2 oz) 70.7 kg (155 lb 12.8 oz) 70.7 kg (155 lb 12.8 oz)    07/03/21 0844   Weight: 69.6 kg (153 lb 8 oz)       Orthostatic Vitals         Most Recent      Lying Orthostatic /91 06/23 0830    Lying Orthostatic Pulse (bpm) 67 06/23 0830    Sitting Orthostatic /89  Comment: notify RN 06/24 0800    Sitting Orthostatic Pulse (bpm) 71 06/24 0800    Standing Orthostatic /101  Comment: notify RN 06/24 0800    Standing Orthostatic Pulse (bpm) 86 06/24 0800            Cardiometabolic risk assessment. 04/15/21      Reviewed patient profile for cardiometabolic risk factors    Date taken /Value  REFERENCE RANGE   Abdominal Obesity  (Waist Circumference)   See nursing flowsheet Women ?35 in (88 cm)   Men ?40 in (102 cm)      Triglycerides  Triglycerides   Date Value Ref Range Status   10/19/2019 117 <150 mg/dL Final       ?150 mg/dL (1.7 mmol/L) or current treatment for elevated triglycerides   HDL cholesterol  HDL Cholesterol   Date Value Ref Range Status   10/19/2019 56 >49 mg/dL Final   ]   Women <50 mg/dL (1.3 mmol/L) in women or current treatment for low HDL cholesterol  Men <40 mg/dL (1 mmol/L) in men or current treatment for low HDL cholesterol     Fasting plasma glucose (FPG) Lab Results   Component Value Date     10/19/2019      FPG ?100 mg/dL (5.6 mmol/L) or treatment for elevated blood glucose   Blood pressure  BP Readings from Last 3 Encounters:   07/05/21 (!) 158/82  "  06/04/19 131/71   04/26/19 164/86    Blood pressure ?130/85 mmHg or treatment for elevated blood pressure   Family History  See family history       Appearance: dressed in hospital scrubs, appeared reported age, appeared well groomed  Attitude: cooperative with interview  Eye Contact: good  Mood: \"good\"   Affect:  Mood congruent, more irritable and agitated with writer today when discussing dispo options  Speech: accented, clear and coherent  Language: no obvious receptive or expressive deficits  Psychomotor, Gait, Musculoskeletal: No abnormal movements noted while seated.   Thought Process: goal-oriented, linear  Associations:  No loosening of associations present  Thought Content:  Did not appear to be responding to internal stimuli.  Insight:  limited  Judgement:  fair  Oriented to: date, location, treatment team, current president.   Attention Span and Concentration: attentive to conversation.  Recent and Remote Memory: impaired but improving  Fund of Knowledge:  normal    Clinical Global Impressions  First:  Considering your total clinical experience with this particular patient population, how severe are the patient's symptoms at this time?: 7 (04/16/21 1428)  Compared to the patient's condition at the START of treatment, this patient's condition is: 4 (04/16/21 1428)  Most recent:  Considering your total clinical experience with this particular patient population, how severe are the patient's symptoms at this time?: 7 (06/22/21 1529)  Compared to the patient's condition at the START of treatment, this patient's condition is: 3 (06/22/21 1529)         Precautions:     Behavioral Orders   Procedures     Cheeking Precautions (behavioral units)     Patient Observed swallowing PO medications; Patient asked to drink water after swallowing medication; Patient in Staff line of sight for 15 minutes after medication given; Mouth checks after PO administration (patient asked to open mouth and stick out their tongue). "     Code 3     For walks in the [place] with staff and per staff discretion     Electroconvulsive therapy     Series of up to 12 treatments. Begin Date: 5/26/21     Treating Psychiatrist providing ECT:  Dr. Lubin     Notified on:  5/21/21     Electroconvulsive therapy     As long as we get the green light from risk management and pt is medically cleared, begin ECT every Monday, Wednesday, and Friday     Electroconvulsive therapy     Series of up to 12 treatments. Begin Date: 5/25/21     Treating Psychiatrist providing ECT:  Amee     Notified on:  5/24/21     Elopement precautions     Fall precautions     Alcides Calderon     Routine Programming     As clinically indicated     Status 15     Every 15 minutes.          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 Symptoms and presentation at this time is most consistent with Schizoaffective Disorder, Bipolar Type. Obtained most recent medication regimen from patient's ACT team, and regimen was initially restarted. Pt is committed. Petitioned for Alcides Calderon was filed and granted due to lack of improvement and side effects from medications. Inpatient psychiatric hospitalization is warranted at this time for safety, stabilization, possible adjustment in medications and development of a safe discharge plan.     Hospital Course:  On admission, PTA medications were restarted. However, Michelle had been declining all scheduled medications despite significant  "encouragement from staff and provider. Psychiatric emergency declared on 4/20 due to aggression toward others in context of severe psychosis and suspected excited catatonia. Ativan 1 mg TID was also added. Discontinued PTA Invega, Zyprexa, and thorazine on 4/20 due to consistent refusal.     On 4/26, it was noted that patient frequently had upward gaze while walking up and down the unit. She did not appear to be distressed. She reported that she was looking at \"my god.\" It was determined to be oculogyric crisis secondary to IM haloperidol. Haldol was subsequently discontinued and scheduled Zyprexa was initiated on an emergency basis. Oral Cogentin was also scheduled, though patient declined. On the evening of 4/26, patient's gaze was fixed in upward position for several hours and she appeared to be experiencing discomfort. IM Cogentin was administered with noted resolution. Partial improvements were observed after switching to scheduled Zyprexa. After patient improved, she was more receptive to reinitiating oral Invega, which was initiated on 5/3 and titrated to PTA dose of 9 mg daily on 5/10. Plan was for ACT team to bring in loading dose of Invega Sustenna. Patient had signs of oculogyric crisis again with Invega. Oral dose decreased to 6 mg after this. Invega was stopped on 5/14 due to ongoing signs of problems related to eye movement and concerns for oculogyric crisis.    Overall improvement in patient's agitation and suspected catatonia noted on 4/30 after patient accepted two doses of Ativan. She began declining Ativan again with noted decompensation. Patient began accepting, however, was deemed not to have the capacity to consent to treatment with Ativan. She does not believe she has a mental illness, including catatonia. She does not fully understand risks associated with inadequate treatment of catatonia. Discussed with her son who is acting as surrogate decision maker and he is in full support of forced " "scheduled IM Ativan if patient declines oral formulation. Also consulted with our legal team prior to backing oral Ativan with IM.     Ativan was increased on 5/19 due to re-emerging evidence of catatonia (long periods of staring, repetitive movements, echolalia, mutism). Meeting was held with ACT team on 5/20, including ACT team psychiatrist, Dr. Pedraza. He said that he is in \"full support\" of plan to pursue Mcgrath Kohler and ECT at this time. He does feel that in the past she has been discharged from the hospital while still quite symptomatic. He is hoping that ECT will be effective and that with improvement, Michelle would be more receptive to clozapine and weekly blood draws. He said that ideally she would transition to an IRTS before going back to her apartment, but also understands there may be some barriers (I.e. pt's willingness, financial concerns, etc).     ECT consult placed. Please see consult note by Dr. Lubin on 5/21 for details. Mcgrath Kohler was approved on 5/24 and patient was medically cleared on 5/24. ECT initiated on 5/26. She was noted to have a short seizure during ECT on 6/4. Staff note that patient appears more disoriented with memory impairment and sedation on days of ECT. This was noted on my examination again today. Improvements noted in mood, affect, social interactions, paranoia, agitation since initiation of ECT. Reduced Ativan on 6/5 due to sedative effects. Relayed concerns about memory impairment and confusion with Dr. Lubin. Recommended attempting unilateral ECT, which he agreed to do. First unilateral ECT on 6/7. ECT was held on 6/11 and 6/14 due to memory impairment. We will plan to hold it again tomorrow (6/16). There is ongoing discussion regarding whether there is a component of catatonia contributing to her current presentation but this is less likely since it worsened after ECT was started.  Given her level of cognitive impairment and our belief that this is due to ECT, we will " not pursue any further treatments at this time. Since we have held ECT (last treatment on 6/9), her cognitive impairment has slightly improved (more oriented).     Michelle appears to be decompensating somewhat since ECT has been held, though her cognitive impairment has gradually improved. If symptoms of psychosis re-emerge, it may be worth starting clozapine, which is something that has previously been considered by her ACT team. Her Hatch order would need to be amended as clozapine is not one of the medications listed. ANC obtained on 6/16 was 1800/microL. Per conversation with pharmacy, neutropenia has been associated with olanzapine and agranulocytosis has been associated with olanzapine, lorazepam, metoprolol, and hydralazine and hematologic labs have been monitored. Upon re-check, ANC was 3700/microL on 6/21/21. At this time, the primary symptoms we are observing are cognitive deficits and inability to care for self, which we would not address by changing her antipsychotic medication.    Michelle's cognitive impairment has been steadily improving since holding ECT treatments, however it is possible that lorazepam is also playing a role in her cognitive impairment. Given no signs of re-emerging catatonia, a gradual lorazepam taper (decreasing by 0.5 mg) was initiated on 6/28/21 to monitor for potential improvements with regard to memory impairment.      Michelle has remained focused on discharge. The team is working with Michelle's ACT team to to establish a safe discharge plan with options including moving to a group home vs home with her ACT team and additional in home supports via CADI services. We are concerned that Michelle would have difficulty taking her medications as prescribed if she were to return home without her ACT team.     Target psychiatric symptoms and interventions:   - Continue 1 mg PO or IM lorazepam in the morning and at mid-day and 2 mg PO or IM lorazepam in the evening. Decrease mid-day lorazepam to 1 mg  PO or IM. Patient may not decline.   - Continue Zyprexa 10 mg BID  PO or IM. Hatch in place. May consider increasing further though holding off given re-emerging catatonic sx and borderline prolonged QTc   -Continue Cogentin 2 mg IM daily prn for evidence of acute dystonic reaction or oculogyric crisis  -Continue hydroxyzine 25-50 mg q4h prn for acute anxiety  -Continue Trazodone 50 mg at bedtime prn for sleep disturbances  -Continue Zyprexa 10 mg TID prn for severe agitation  -Continue Ativan/Benadryl q4h prn for agitation. WOULD GIVE PRN ATIVAN FIRST FOR AGITATION unless it is after 5 pm on day prior to scheduled ECT    Occupational Therapy Consult Placed to evaluate cognitive functioning/ability to care for self in home environment, ability to manage medications     ECT:   - ECT DISCONTINUED DUE TO COGNITIVE IMPAIRMENT.  - Because patient was only intermittently accepting scheduled Ativan and some symptoms of catatonia are re-emerging, pursued Alcides Kohler for ECT. She also continues to exhibit symptoms of psychosis and has not responded or has experienced side effects from multiple neuroleptic medications. Court hearing was held on 5/21. Alcides Kohler is approved. Patient is medically cleared. COVID negative on 5/24. Obtained ECT consult on 5/21/21. Please see Dr. Lubin's consultation note.   - NPO 8 hours prior to scheduled treatment. Clear liquids until 2 hours prior to treatment.   - Discussed with IM. May resume AM antihypertensives, no need to hold prior to ECT  - SIO while patient is NPO, starting at midnight on ECT days (renewed)  - Will complete acute course in the hospital; will likely need maintenance ECT in outpatient setting.   - New concerns about memory impairments: Switched from bilateral to unilateral ECT on 6/7 and continue to monitor closely, ultimately decided to discontinue treatments given cognitive impairment.    Acute Medical Problems and Treatments:  BRANDON, resolved:   Pre-renal in  "nature, likely secondary to decreased oral intake.   - IM Consulted on 6/10. Appreciate assistance. Follow-up note placed on 6/16 (no further work-up recommended).  - avoid nephrotoxins  - Renal bladder US completed on 6/10  - Encourage fluid intake.    - Repeat BMP today was wnl.    HTN: Patient is now adherent with medication regimen.   - Metoprolol succinate  mg   - Cozaar 100 mg daily  - Amlodipine 5 mg daily  - Continue hydralazine 25 mg QID PRN for SBP > 160 mmHg or DBP > 110 mmHg.  - Please see note from IM dated 5/3, 5/6, 5/24, 6/20 and 6/30/21.  - Obtained EKG and routine labs on 5/21 in context of pt declining vital sign checks and anti-hypertensives and recently elevated BPs. Reviewed labs and discussed EKG findings with IM on 5/21. No urgent concerns, though IM should be notified if pt develops acute medical concerns (I.e. heart palpitations, SOB, changes in speech, AMS, confusion, CP, HA, changes in vision).    - Per 6/20 IM note: \"Please notify IM if BP is persistently severely elevated and requiring frequent PRN hydralazine\".  - Internal medicine consulted again on 6/28 due to consistent use of hydralazine over the past week. Metoprolol increased to 75 mg daily then to 100 mg and amlodipine 5 mg was added on 6/30     CVA:  - Aspirin 81 mg daily     Chronic Constipation:  - Miralax 17 mg daily    Urinary frequency and urgency, resolved  Reported new-onset urinary frequency and urgency on 6/21. She denied dysuria, hematuria, and suprapubic pain. Etiology unclear, however Michelle reported that this was time-limited and improved on 6/23.  - Urinalysis ordered on 6/22 was within normal limits.    ANC trending downward since admission, still WNL, resolved  - Per conversation with pharmacy, medications Michelle is taking that are associated with  agranulocytosis include lorazepam (scheduled), metoprolol (scheduled), hydralazine (PRN), and olanzapine (scheduled).   - Will consult internal medicine for further " guidance if Michelle declines future blood draws and we are not able to monitor her ANC.   - CBC with differential WNL on 6/21     Behavioral/Psychological/Social:  - Encourage unit programming  - Patient is now Code 3 status and can take walks in the [place] with staff and security present per staff discretion. This appears to be quite therapeutic for her.     Safety:  - Continue precautions as noted above  - Status 15 minute checks  - Safety precautions include: assault and elopement precautions  - Continue precautions as noted above    Legal Status: Committed as MI with Hatch in place for Haldol, Zyprexa, Invega, and Thorazine through [facility]. Filed Mcgrath Edita through [facility] and approved on 5/24/21.     Disposition Plan   Reason for ongoing admission: poses an imminent risk to self, poses an imminent risk to others and is unable to care for self due to severe psychosis or tad  Discharge location: Group home (needs CADI waiver) vs home with [facility] ACT team or 24/7 in-home care. CPT assessment done by OT (tico queen) on 7/1. Please see note for details.   Discharge Medications: not ordered  Follow-up Appointments: not scheduled     Prior to discharge, treatment team will need to coordinate with ACT team to obtain JESSICA for Michelle's son in order to facilitate ongoing involvement in care.    > 25 minutes total time that was spent and over 50% of this time was spent in counseling and coordination of care.      Gabby Zaman MD  [facility] Psychiat[facility]

## 2021-07-05 NOTE — PLAN OF CARE
"  Problem: Sleep Disturbance (Psychotic Signs/Symptoms)  Goal: Improved Sleep (Psychotic Signs/Symptoms)  Outcome: Improving       Night Shift Summary (7/4/21 into 07/05/21)    Pt asleep at start of shift. Breathing quiet and unlabored.   Pt did rouse from her sleep when staff was doing window checks, and told staff to \"get out\", and \"Don't touch the window\". She went back to sleep right after staff left the room.    Pt had no c/o pain or discomfort during the HS.     Appears to have slept 6.75 hours.     Pt on CHEEKING, ELOPEMENT, FALL and INTRUSIVE precautions in addition to single room order. Any related events noted above.     Will continue to monitor and assess.    "

## 2021-07-05 NOTE — PLAN OF CARE
Pt has a flat blunted affect. overnight. Pt has no s/s of psychosis or tad. No aggressive or violent behaviors. Remains isolative to her room and withdrawn to self. Pt rates anxiety at 0/10 and depression 0/10. Pt rates pain at 0/10. Pt reports no SI/HI and contracts for safety. Pt denies any hallucinations. Pt was med compliant with no cheeking noted. No prn needed for BP this shift. Continue current POC.

## 2021-07-05 NOTE — PLAN OF CARE
"The patient stayed in her room the entire evening. She is on her bed watching TV 95% of the shift despite prompting to engage in other activities. She denies all mental health concerns or physical health issues at this time. She repeatedly states, \"I'm fine\" to most questions. She appears flat most of the time and is very isolative. She brightens some on approach but does engage much with RN writer. She took all medications without any issues this shift. She denies side effects at this time and declines to discuss medications further. She was noted to have several outside food items in her room which were returned to her locker at this time. Her VS were WDL with slightly elevated BP but below the parameters set for rescue medication. No other physical health concerns or side effects from medications noted this shift.   "

## 2021-07-05 NOTE — PLAN OF CARE
Assessment/Intervention/Current Symtoms and Care Coordination  -Chart review  -Rounded with team, addressed patient needs/concerns    Current Symptoms include the following: disorganization and patient is irritable    Discharge Plan or Goal  Pending stabilization & development of a safe discharge plan.  Considerations include: Group Home or Assisted Living Facility/Nursing Home    Barriers to Discharge  Patient requires further psychiatric stabilization due to current symptomology    Referral Status  Referrals made to [facility] and [facility]    Legal Status  Committed/Hatch/Mcgrath Kohler in [facility]

## 2021-07-05 NOTE — PROGRESS NOTES
Internal medicine Note    Addendum: Will only increase amlodipine to 7.5 mg and continue to monitor blood pressure. Remaining plan per prior medicine note from today.     Nicky SAL

## 2021-07-06 PROCEDURE — 250N000013 HC RX MED GY IP 250 OP 250 PS 637: Performed by: PHYSICIAN ASSISTANT

## 2021-07-06 PROCEDURE — 99232 SBSQ HOSP IP/OBS MODERATE 35: CPT | Performed by: PSYCHIATRY & NEUROLOGY

## 2021-07-06 PROCEDURE — 250N000013 HC RX MED GY IP 250 OP 250 PS 637: Performed by: PSYCHIATRY & NEUROLOGY

## 2021-07-06 PROCEDURE — 250N000013 HC RX MED GY IP 250 OP 250 PS 637: Performed by: STUDENT IN AN ORGANIZED HEALTH CARE EDUCATION/TRAINING PROGRAM

## 2021-07-06 PROCEDURE — 124N000002 HC R&B MH UMMC

## 2021-07-06 RX ADMIN — LORAZEPAM 1 MG: 1 TABLET ORAL at 08:47

## 2021-07-06 RX ADMIN — OLANZAPINE 10 MG: 10 TABLET, ORALLY DISINTEGRATING ORAL at 20:24

## 2021-07-06 RX ADMIN — LOSARTAN POTASSIUM 100 MG: 100 TABLET, FILM COATED ORAL at 08:47

## 2021-07-06 RX ADMIN — OLANZAPINE 10 MG: 10 TABLET, ORALLY DISINTEGRATING ORAL at 08:47

## 2021-07-06 RX ADMIN — HYDRALAZINE HYDROCHLORIDE 25 MG: 25 TABLET ORAL at 08:47

## 2021-07-06 RX ADMIN — LORAZEPAM 2 MG: 2 TABLET ORAL at 20:24

## 2021-07-06 RX ADMIN — METOPROLOL SUCCINATE 75 MG: 25 TABLET, EXTENDED RELEASE ORAL at 08:47

## 2021-07-06 RX ADMIN — LORAZEPAM 1 MG: 1 TABLET ORAL at 13:11

## 2021-07-06 RX ADMIN — AMLODIPINE BESYLATE 7.5 MG: 2.5 TABLET ORAL at 08:47

## 2021-07-06 ASSESSMENT — ACTIVITIES OF DAILY LIVING (ADL)
DRESS: INDEPENDENT
ORAL_HYGIENE: INDEPENDENT
LAUNDRY: UNABLE TO COMPLETE
HYGIENE/GROOMING: INDEPENDENT
HYGIENE/GROOMING: INDEPENDENT
DRESS: SCRUBS (BEHAVIORAL HEALTH);INDEPENDENT
ORAL_HYGIENE: INDEPENDENT

## 2021-07-06 NOTE — PROGRESS NOTES
Michelle appeared to sleep a total of 6.75 hours this night shift.  No prns or snacks given or requested.

## 2021-07-06 NOTE — PLAN OF CARE
Problem: Social, Occupational or Functional Impairment (Psychotic Signs/Symptoms)  Goal: Enhanced Social, Occupational or Functional Skills (Psychotic Signs/Symptoms)  Outcome: No Change  Intervention: Promote Social, Occupational and Functional Ability  Recent Flowsheet Documentation  Taken 7/6/2021 1030 by Tasha Davis RN  Trust Relationship/Rapport:   care explained   choices provided   emotional support provided   empathic listening provided   questions answered   questions encouraged   reassurance provided   thoughts/feelings acknowledged     Problem: Mood Impairment (Psychotic Signs/Symptoms)  Goal: Improved Mood Symptoms (Psychotic Signs/Symptoms)  Outcome: Improving     Pt presented as alert and oriented to place and self throughout shift.  She was primarily withdrawn to her room during the day, with the exception of retrieving and returning her meal tray.  Pt appeared tidy, but did not participate in any ADLs this shift.  Her speech was clear and coherent.  Pt ate meals and was medication compliant.  She denied SI/HI/SIB.  Pt denied psychosis.  She did not endorse and acute physical health concerns, pain, or side effect to medications this shift.

## 2021-07-06 NOTE — PLAN OF CARE
Assessment/Intervention/Current Symtoms and Care Coordination  Assessment/social functioning: Patient needs prompting to complete ADL's. CTC has been working with ACT Team to address safety concerns treatment team has regarding patient returning home to her apartment as she is unable to care for herself and she lives alone.   -Chart review  -Rounded with team, addressed patient needs/concerns    Discharge Plan or Goal  Pending stabilization & development of a safe discharge plan.  Considerations include:  Adult foster or group home placement.     Barriers to Discharge  Patient requires further psychiatric stabilization due to current symptomology. Patient is unable to return home as she is unable to care for herself.     Referral Status  Referrals to [facility] and [facility] have been made.     Legal Status  CommittedJarvis/Mcgrath Kohler through [facility]

## 2021-07-06 NOTE — PROGRESS NOTES
"[facility], [place]   Psychiatric Progress Note  Hospital Day: 83        Interim History:   The patient's care was discussed with the treatment team during the daily team meeting and/or staff's chart notes were reviewed. Michelle requires prompting to complete ADLs. Has not showered in past couple of days. Sleeping and eating well. No overt sx/signs of psychosis or tad. No aggressive or violent behaviors. Remains isolative to her room. No acute medical concerns. BP remains elevated.     Upon interview, Michelle said that her mood is \"good.\" She feels she is at her baseline. She was oriented x 4. She denied SI/HI, AH, VH, paranoia. She denies side effects and acute medical concerns. Pt again said that she wants to be discharged to her home, and emphasized how important her independence is to her. She said that she is capable of caring for herself. She wants to return home and get a job so that \"I am not idle.\" She is refusing to participate in a GH interview at this time. She repeatedly expressed strong opposition to GH placement.          Medications:       amLODIPine  7.5 mg Oral Daily     LORazepam  1 mg Oral BID    Or     LORazepam  1 mg Intramuscular BID     LORazepam  2 mg Oral QPM    Or     LORazepam  2 mg Intramuscular QPM     losartan  100 mg Oral Daily     metoprolol succinate ER  75 mg Oral Daily     OLANZapine zydis  10 mg Oral BID    Or     OLANZapine  10 mg Intramuscular BID          Allergies:     Allergies   Allergen Reactions     Haldol [Haloperidol]      Patient previously tolerated haldol, though developed oculogyric crises during hospital stay on 4/26/21 on total daily dose of 10 mg.     Lisinopril Cough          Labs:     No results found for this or any previous visit (from the past 24 hour(s)).       Psychiatric Examination:     BP (!) 163/82 (BP Location: Right arm)   Pulse 69   Temp 98.4  F (36.9  C) (Tympanic)   Resp 16   Wt 70.9 kg (156 lb 4.8 oz)   SpO2 98%   " BMI 26.83 kg/m    Weight is 156 lbs 4.8 oz  Body mass index is 26.83 kg/m .    Weight over time:  Vitals:    05/25/21 0850 06/15/21 0811 06/16/21 1605 06/19/21 0859   Weight: 71 kg (156 lb 8 oz) 69.8 kg (153 lb 12.8 oz) 70.5 kg (155 lb 8 oz) 71.4 kg (157 lb 8 oz)    06/21/21 0805 06/22/21 0839 06/24/21 0800 07/02/21 0835   Weight: 69.5 kg (153 lb 3.2 oz) 69.3 kg (152 lb 11.2 oz) 70.7 kg (155 lb 12.8 oz) 70.7 kg (155 lb 12.8 oz)    07/03/21 0844 07/06/21 0838   Weight: 69.6 kg (153 lb 8 oz) 70.9 kg (156 lb 4.8 oz)       Orthostatic Vitals         Most Recent      Lying Orthostatic /91 06/23 0830    Lying Orthostatic Pulse (bpm) 67 06/23 0830    Sitting Orthostatic /89  Comment: notify RN 06/24 0800    Sitting Orthostatic Pulse (bpm) 71 06/24 0800    Standing Orthostatic /101  Comment: notify RN 06/24 0800    Standing Orthostatic Pulse (bpm) 86 06/24 0800            Cardiometabolic risk assessment. 04/15/21      Reviewed patient profile for cardiometabolic risk factors    Date taken /Value  REFERENCE RANGE   Abdominal Obesity  (Waist Circumference)   See nursing flowsheet Women ?35 in (88 cm)   Men ?40 in (102 cm)      Triglycerides  Triglycerides   Date Value Ref Range Status   10/19/2019 117 <150 mg/dL Final       ?150 mg/dL (1.7 mmol/L) or current treatment for elevated triglycerides   HDL cholesterol  HDL Cholesterol   Date Value Ref Range Status   10/19/2019 56 >49 mg/dL Final   ]   Women <50 mg/dL (1.3 mmol/L) in women or current treatment for low HDL cholesterol  Men <40 mg/dL (1 mmol/L) in men or current treatment for low HDL cholesterol     Fasting plasma glucose (FPG) Lab Results   Component Value Date     10/19/2019      FPG ?100 mg/dL (5.6 mmol/L) or treatment for elevated blood glucose   Blood pressure  BP Readings from Last 3 Encounters:   07/06/21 (!) 163/82   06/04/19 131/71   04/26/19 164/86    Blood pressure ?130/85 mmHg or treatment for elevated blood pressure   Family  "History  See family history       Appearance: dressed in hospital scrubs, appeared reported age, appeared well groomed  Attitude: cooperative with interview  Eye Contact: good  Mood: \"good\"   Affect:  Mood congruent, more irritable when discussing dispo options  Speech: accented, clear and coherent  Language: no obvious receptive or expressive deficits  Psychomotor, Gait, Musculoskeletal: No abnormal movements noted while seated.   Thought Process: goal-oriented, linear  Associations:  No loosening of associations present  Thought Content:  Did not appear to be responding to internal stimuli.  Insight:  limited  Judgement:  fair  Oriented to: date, location, treatment team, current president.   Attention Span and Concentration: attentive to conversation.  Recent and Remote Memory: impaired but improving  Fund of Knowledge:  normal    Clinical Global Impressions  First:  Considering your total clinical experience with this particular patient population, how severe are the patient's symptoms at this time?: 7 (04/16/21 1428)  Compared to the patient's condition at the START of treatment, this patient's condition is: 4 (04/16/21 1428)  Most recent:  Considering your total clinical experience with this particular patient population, how severe are the patient's symptoms at this time?: 7 (06/22/21 1529)  Compared to the patient's condition at the START of treatment, this patient's condition is: 3 (06/22/21 1529)         Precautions:     Behavioral Orders   Procedures     Cheeking Precautions (behavioral units)     Patient Observed swallowing PO medications; Patient asked to drink water after swallowing medication; Patient in Staff line of sight for 15 minutes after medication given; Mouth checks after PO administration (patient asked to open mouth and stick out their tongue).     Code 3     For walks in the [place] with staff and per staff discretion     Electroconvulsive therapy     Series of up to 12 treatments. " Begin Date: 5/26/21     Treating Psychiatrist providing ECT:  Dr. Lubin     Notified on:  5/21/21     Electroconvulsive therapy     As long as we get the green light from risk management and pt is medically cleared, begin ECT every Monday, Wednesday, and Friday     Electroconvulsive therapy     Series of up to 12 treatments. Begin Date: 5/25/21     Treating Psychiatrist providing ECT:  Amee     Notified on:  5/24/21     Elopement precautions     Fall precautions     Alcides Calderon     Routine Programming     As clinically indicated     Status 15     Every 15 minutes.          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 Symptoms and presentation at this time is most consistent with Schizoaffective Disorder, Bipolar Type. Obtained most recent medication regimen from patient's ACT team, and regimen was initially restarted. Pt is committed. Petitioned for Alcides Calderon was filed and granted due to lack of improvement and side effects from medications. Inpatient psychiatric hospitalization is warranted at this time for safety, stabilization, possible adjustment in medications and development of a safe discharge plan.     Hospital Course:  On admission, PTA medications were restarted. However, Michelle had been declining all scheduled medications despite significant encouragement from staff and provider. Psychiatric emergency declared on 4/20 due to aggression toward others in context of severe psychosis and  "suspected excited catatonia. Ativan 1 mg TID was also added. Discontinued PTA Invega, Zyprexa, and thorazine on 4/20 due to consistent refusal.     On 4/26, it was noted that patient frequently had upward gaze while walking up and down the unit. She did not appear to be distressed. She reported that she was looking at \"my god.\" It was determined to be oculogyric crisis secondary to IM haloperidol. Haldol was subsequently discontinued and scheduled Zyprexa was initiated on an emergency basis. Oral Cogentin was also scheduled, though patient declined. On the evening of 4/26, patient's gaze was fixed in upward position for several hours and she appeared to be experiencing discomfort. IM Cogentin was administered with noted resolution. Partial improvements were observed after switching to scheduled Zyprexa. After patient improved, she was more receptive to reinitiating oral Invega, which was initiated on 5/3 and titrated to PTA dose of 9 mg daily on 5/10. Plan was for ACT team to bring in loading dose of Invega Sustenna. Patient had signs of oculogyric crisis again with Invega. Oral dose decreased to 6 mg after this. Invega was stopped on 5/14 due to ongoing signs of problems related to eye movement and concerns for oculogyric crisis.    Overall improvement in patient's agitation and suspected catatonia noted on 4/30 after patient accepted two doses of Ativan. She began declining Ativan again with noted decompensation. Patient began accepting, however, was deemed not to have the capacity to consent to treatment with Ativan. She does not believe she has a mental illness, including catatonia. She does not fully understand risks associated with inadequate treatment of catatonia. Discussed with her son who is acting as surrogate decision maker and he is in full support of forced scheduled IM Ativan if patient declines oral formulation. Also consulted with our legal team prior to backing oral Ativan with IM.     Ativan was " "increased on 5/19 due to re-emerging evidence of catatonia (long periods of staring, repetitive movements, echolalia, mutism). Meeting was held with ACT team on 5/20, including ACT team psychiatrist, Dr. Pedraza. He said that he is in \"full support\" of plan to pursue Mcgrath Kohler and ECT at this time. He does feel that in the past she has been discharged from the hospital while still quite symptomatic. He is hoping that ECT will be effective and that with improvement, Michelle would be more receptive to clozapine and weekly blood draws. He said that ideally she would transition to an IRTS before going back to her apartment, but also understands there may be some barriers (I.e. pt's willingness, financial concerns, etc).     ECT consult placed. Please see consult note by Dr. Lubin on 5/21 for details. Alcides Kohler was approved on 5/24 and patient was medically cleared on 5/24. ECT initiated on 5/26. She was noted to have a short seizure during ECT on 6/4. Staff note that patient appears more disoriented with memory impairment and sedation on days of ECT. This was noted on my examination again today. Improvements noted in mood, affect, social interactions, paranoia, agitation since initiation of ECT. Reduced Ativan on 6/5 due to sedative effects. Relayed concerns about memory impairment and confusion with Dr. Lubin. Recommended attempting unilateral ECT, which he agreed to do. First unilateral ECT on 6/7. ECT was held on 6/11 and 6/14 due to memory impairment. We will plan to hold it again tomorrow (6/16). There is ongoing discussion regarding whether there is a component of catatonia contributing to her current presentation but this is less likely since it worsened after ECT was started.  Given her level of cognitive impairment and our belief that this is due to ECT, we will not pursue any further treatments at this time. Since we have held ECT (last treatment on 6/9), her cognitive impairment has slightly improved " (more oriented).     Michelle appears to be decompensating somewhat since ECT has been held, though her cognitive impairment has gradually improved. If symptoms of psychosis re-emerge, it may be worth starting clozapine, which is something that has previously been considered by her ACT team. Her Hatch order would need to be amended as clozapine is not one of the medications listed. ANC obtained on 6/16 was 1800/microL. Per conversation with pharmacy, neutropenia has been associated with olanzapine and agranulocytosis has been associated with olanzapine, lorazepam, metoprolol, and hydralazine and hematologic labs have been monitored. Upon re-check, ANC was 3700/microL on 6/21/21. At this time, the primary symptoms we are observing are cognitive deficits and inability to care for self, which we would not address by changing her antipsychotic medication.    Michelle's cognitive impairment has been steadily improving since holding ECT treatments, however it is possible that lorazepam is also playing a role in her cognitive impairment. Given no signs of re-emerging catatonia, a gradual lorazepam taper (decreasing by 0.5 mg) was initiated on 6/28/21 to monitor for potential improvements with regard to memory impairment.      Michelle has remained focused on discharge. The team is working with Michelle's ACT team to to establish a safe discharge plan with options including moving to a group home vs home with her ACT team and additional in home supports via CADI services. We are concerned that Michelle would have difficulty taking her medications as prescribed if she were to return home without her ACT team.     Target psychiatric symptoms and interventions:   - Continue 1 mg PO or IM lorazepam in the morning and at mid-day and 2 mg PO or IM lorazepam in the evening. Decrease mid-day lorazepam to 1 mg PO or IM. Patient may not decline.   - Continue Zyprexa 10 mg BID  PO or IM. Hatch in place. May consider increasing further though holding  off given re-emerging catatonic sx and borderline prolonged QTc   -Continue Cogentin 2 mg IM daily prn for evidence of acute dystonic reaction or oculogyric crisis  -Continue hydroxyzine 25-50 mg q4h prn for acute anxiety  -Continue Trazodone 50 mg at bedtime prn for sleep disturbances  -Continue Zyprexa 10 mg TID prn for severe agitation  -Continue Ativan/Benadryl q4h prn for agitation. WOULD GIVE PRN ATIVAN FIRST FOR AGITATION unless it is after 5 pm on day prior to scheduled ECT    Occupational Therapy Consult Placed to evaluate cognitive functioning/ability to care for self in home environment, ability to manage medications. See note on 7/1 for details.      ECT:   - ECT DISCONTINUED DUE TO COGNITIVE IMPAIRMENT.  - Because patient was only intermittently accepting scheduled Ativan and some symptoms of catatonia are re-emerging, pursued Alcides Kohler for ECT. She also continues to exhibit symptoms of psychosis and has not responded or has experienced side effects from multiple neuroleptic medications. Court hearing was held on 5/21. Alcides Riverappard is approved. Patient is medically cleared. COVID negative on 5/24. Obtained ECT consult on 5/21/21. Please see Dr. Lubin's consultation note.   - NPO 8 hours prior to scheduled treatment. Clear liquids until 2 hours prior to treatment.   - Discussed with IM. May resume AM antihypertensives, no need to hold prior to ECT  - SIO while patient is NPO, starting at midnight on ECT days (renewed)  - Will complete acute course in the hospital; will likely need maintenance ECT in outpatient setting.   - New concerns about memory impairments: Switched from bilateral to unilateral ECT on 6/7 and continue to monitor closely, ultimately decided to discontinue treatments given cognitive impairment.    Acute Medical Problems and Treatments:  BRANDON, resolved:   Pre-renal in nature, likely secondary to decreased oral intake.   - IM Consulted on 6/10. Appreciate assistance. Follow-up note  "placed on 6/16 (no further work-up recommended).  - avoid nephrotoxins  - Renal bladder US completed on 6/10  - Encourage fluid intake.    - Repeat BMP on 7/2 was wnl.    HTN: Patient is now adherent with medication regimen.   - Metoprolol succinate  mg   - Cozaar 100 mg daily  - Amlodipine 5 mg daily  - Continue hydralazine 25 mg QID PRN for SBP > 160 mmHg or DBP > 110 mmHg.  - Please see note from IM dated 5/3, 5/6, 5/24, 6/20 and 6/30/21.  - Obtained EKG and routine labs on 5/21 in context of pt declining vital sign checks and anti-hypertensives and recently elevated BPs. Reviewed labs and discussed EKG findings with IM on 5/21. No urgent concerns, though IM should be notified if pt develops acute medical concerns (I.e. heart palpitations, SOB, changes in speech, AMS, confusion, CP, HA, changes in vision).    - Per 6/20 IM note: \"Please notify IM if BP is persistently severely elevated and requiring frequent PRN hydralazine\".  - Internal medicine consulted again on 6/28 due to consistent use of hydralazine over the past week. Metoprolol increased to 75 mg daily then to 100 mg and amlodipine 5 mg was added on 6/30. Amlodipine increased to 7.5 mg daily on 7/5.      CVA:  - Aspirin 81 mg daily     Chronic Constipation:  - Miralax 17 mg daily    Urinary frequency and urgency, resolved  Reported new-onset urinary frequency and urgency on 6/21. She denied dysuria, hematuria, and suprapubic pain. Etiology unclear, however Michelle reported that this was time-limited and improved on 6/23.  - Urinalysis ordered on 6/22 was within normal limits.    ANC trending downward since admission, still WNL, resolved  - Per conversation with pharmacy, medications Michelle is taking that are associated with  agranulocytosis include lorazepam (scheduled), metoprolol (scheduled), hydralazine (PRN), and olanzapine (scheduled).   - Will consult internal medicine for further guidance if Michelle declines future blood draws and we are not able to " monitor her ANC.   - CBC with differential WNL on 6/21     Behavioral/Psychological/Social:  - Encourage unit programming  - Patient is now Code 3 status and can take walks in the [place] with staff and security present per staff discretion. This appears to be quite therapeutic for her.     Safety:  - Continue precautions as noted above  - Status 15 minute checks  - Safety precautions include: assault and elopement precautions  - Continue precautions as noted above    Legal Status: Committed as MI with Hatch in place for Haldol, Zyprexa, Invega, and Thorazine through [facility]. Filed Alcides Kohler through [facility] and approved on 5/24/21.     Disposition Plan   Reason for ongoing admission: poses an imminent risk to self, poses an imminent risk to others and is unable to care for self due to severe psychosis or tad  Discharge location: Group home (needs CADI waiver) vs home with [facility] ACT team or 24/7 in-home care. CPT assessment done by OT (tico queen) on 7/1. Please see note for details.   Discharge Medications: not ordered  Follow-up Appointments: not scheduled     > 25 minutes total time that was spent and over 50% of this time was spent in counseling and coordination of care.      Gabby Zaman MD  [facility]

## 2021-07-07 PROCEDURE — 250N000013 HC RX MED GY IP 250 OP 250 PS 637: Performed by: PSYCHIATRY & NEUROLOGY

## 2021-07-07 PROCEDURE — 250N000013 HC RX MED GY IP 250 OP 250 PS 637: Performed by: STUDENT IN AN ORGANIZED HEALTH CARE EDUCATION/TRAINING PROGRAM

## 2021-07-07 PROCEDURE — 250N000013 HC RX MED GY IP 250 OP 250 PS 637: Performed by: PHYSICIAN ASSISTANT

## 2021-07-07 PROCEDURE — 87635 SARS-COV-2 COVID-19 AMP PRB: CPT | Performed by: STUDENT IN AN ORGANIZED HEALTH CARE EDUCATION/TRAINING PROGRAM

## 2021-07-07 PROCEDURE — 99232 SBSQ HOSP IP/OBS MODERATE 35: CPT | Mod: GC | Performed by: PSYCHIATRY & NEUROLOGY

## 2021-07-07 PROCEDURE — 124N000002 HC R&B MH UMMC

## 2021-07-07 RX ORDER — BENZTROPINE MESYLATE 1 MG/1
1 TABLET ORAL 2 TIMES DAILY
Status: DISCONTINUED | OUTPATIENT
Start: 2021-07-07 | End: 2021-09-16 | Stop reason: HOSPADM

## 2021-07-07 RX ADMIN — METOPROLOL SUCCINATE 75 MG: 25 TABLET, EXTENDED RELEASE ORAL at 08:27

## 2021-07-07 RX ADMIN — BENZTROPINE MESYLATE 1 MG: 1 TABLET ORAL at 12:59

## 2021-07-07 RX ADMIN — OLANZAPINE 10 MG: 10 TABLET, ORALLY DISINTEGRATING ORAL at 19:22

## 2021-07-07 RX ADMIN — LORAZEPAM 2 MG: 2 TABLET ORAL at 19:21

## 2021-07-07 RX ADMIN — BENZTROPINE MESYLATE 1 MG: 1 TABLET ORAL at 19:21

## 2021-07-07 RX ADMIN — LORAZEPAM 1 MG: 1 TABLET ORAL at 13:35

## 2021-07-07 RX ADMIN — LORAZEPAM 1 MG: 1 TABLET ORAL at 08:26

## 2021-07-07 RX ADMIN — AMLODIPINE BESYLATE 7.5 MG: 2.5 TABLET ORAL at 08:26

## 2021-07-07 RX ADMIN — OLANZAPINE 10 MG: 10 TABLET, ORALLY DISINTEGRATING ORAL at 08:27

## 2021-07-07 RX ADMIN — LOSARTAN POTASSIUM 100 MG: 100 TABLET, FILM COATED ORAL at 08:27

## 2021-07-07 ASSESSMENT — ACTIVITIES OF DAILY LIVING (ADL)
DRESS: STREET CLOTHES
LAUNDRY: UNABLE TO COMPLETE
DRESS: STREET CLOTHES;INDEPENDENT
LAUNDRY: UNABLE TO COMPLETE
HYGIENE/GROOMING: INDEPENDENT
HYGIENE/GROOMING: TOTAL CARE
ORAL_HYGIENE: INDEPENDENT
ORAL_HYGIENE: INDEPENDENT

## 2021-07-07 NOTE — PROGRESS NOTES
Michelle appeared to sleep a total of 6.5 hours this night shift.  No prns or snacks given or requested.

## 2021-07-07 NOTE — PLAN OF CARE
Assessment/Intervention/Current Symtoms and Care Coordination  -Chart review  -Rounded with team, addressed patient needs/concerns  Met with patient to inform her of [place] Assessment.   Current Symptoms include the following: confusion, patient is irritable, and aggressive    Discharge Plan or Goal  Pending stabilization & development of a safe discharge plan.  Considerations include:  Adult Foster Home/Group Home or Assisted Living     Barriers to Discharge  Patient requires further psychiatric stabilization due to current symptomology    [facility]     Legal Status  Committed/Hatch/Mcgrath Kohler

## 2021-07-07 NOTE — PLAN OF CARE
Pt has a flat blunted affect. No aggressive or violent behaviors. Pt remains isolative to her room and withdrawn to self. Pt rates anxiety at 0/10 and depression 0/10. Pt rates pain at 0/10. Pt reports no SI/HI and contracts for safety. Pt denies any hallucinations. Pt was med compliant with no cheeking noted. BP was 149/87 at the beginning of shift and 122/75 at 1900. Continue current POC.

## 2021-07-07 NOTE — PLAN OF CARE
Patient isolative and withdrawn to her room.  Tense and irritable this shift.  Flat affect, denies SI/SIB, depression/anxiety.  Good appetite, took scheduled medications with no problem.  Showered, denies pain; patient contract for safety.  Will continue to monitor closely.  Covid test done, awaiting result.

## 2021-07-07 NOTE — PROGRESS NOTES
"[facility], [place]   Psychiatric Progress Note  Hospital Day: 84        Interim History:   The patient's care was discussed with the treatment team during the daily team meeting and/or staff's chart notes were reviewed. Michelle continues to require prompting to complete ADLs. She has not showered in past few days. Sleeping and eating well. No overt sx/signs of psychosis or tad. Denied SI/HI/AH/VH. No aggressive or violent behaviors. Remains isolative to her room. No acute medical concerns. BP is intermittently elevated. WNL this AM. She took all medications as scheduled and no PRNs. Slept 6.5 hours. She appeared very upset this AM when informed about [facility] assessment for services. Expressed concerns that things at her apartment may be spoiling.     Upon interview, Michelle reported she \"I'm not good. I want to go home.\" When asked about her hygiene, she reports that she \"showered on Monday\" and that she needs to shower today. She reported that she had an episode of her eyes getting \"stuck\" in an upward position this morning. She states she laid down for 10 minutes then in resolved and did not happen again. She was agreeable to a trial of cogentin 1 mg BID scheduled to target occulogyric crisis. She states she had experienced this in the past though not recently until now. She reported that there is a \"new nurse\" who she does not know and expressed concerned that the nurse may have given her a \"different medication\" though reported she did watch the nurse open her medications. She was assured that the nurse today is a different nurse who works on different units and that no medication changes had been made. We discussed her discharge and the [place]. She stated that she wished to return home and to look for a job. She reports she previously took [employer] to work. She asked when the assessment would be and when told it was next week she stated \"you meet with me every day " "but the  is just keeping me in the hospital.\" She denied any concerns about her safety and denied physical issues. She denies other questions or concerns.          Medications:       amLODIPine  7.5 mg Oral Daily     LORazepam  1 mg Oral BID    Or     LORazepam  1 mg Intramuscular BID     LORazepam  2 mg Oral QPM    Or     LORazepam  2 mg Intramuscular QPM     losartan  100 mg Oral Daily     metoprolol succinate ER  75 mg Oral Daily     OLANZapine zydis  10 mg Oral BID    Or     OLANZapine  10 mg Intramuscular BID          Allergies:     Allergies   Allergen Reactions     Haldol [Haloperidol]      Patient previously tolerated haldol, though developed oculogyric crises during hospital stay on 4/26/21 on total daily dose of 10 mg.     Lisinopril Cough          Labs:     No results found for this or any previous visit (from the past 24 hour(s)).       Psychiatric Examination:     /80 (BP Location: Right arm)   Pulse 85   Temp 97.4  F (36.3  C) (Tympanic)   Resp 16   Wt 70.9 kg (156 lb 4.8 oz)   SpO2 97%   BMI 26.83 kg/m    Weight is 156 lbs 4.8 oz  Body mass index is 26.83 kg/m .    Weight over time:  Vitals:    05/25/21 0850 06/15/21 0811 06/16/21 1605 06/19/21 0859   Weight: 71 kg (156 lb 8 oz) 69.8 kg (153 lb 12.8 oz) 70.5 kg (155 lb 8 oz) 71.4 kg (157 lb 8 oz)    06/21/21 0805 06/22/21 0839 06/24/21 0800 07/02/21 0835   Weight: 69.5 kg (153 lb 3.2 oz) 69.3 kg (152 lb 11.2 oz) 70.7 kg (155 lb 12.8 oz) 70.7 kg (155 lb 12.8 oz)    07/03/21 0844 07/06/21 0838   Weight: 69.6 kg (153 lb 8 oz) 70.9 kg (156 lb 4.8 oz)       Orthostatic Vitals         Most Recent      Lying Orthostatic /91 06/23 0830    Lying Orthostatic Pulse (bpm) 67 06/23 0830    Sitting Orthostatic /89  Comment: notify RN 06/24 0800    Sitting Orthostatic Pulse (bpm) 71 06/24 0800    Standing Orthostatic /101  Comment: notify RN 06/24 0800    Standing Orthostatic Pulse (bpm) 86 06/24 0800            Cardiometabolic " "risk assessment. 04/15/21      Reviewed patient profile for cardiometabolic risk factors    Date taken /Value  REFERENCE RANGE   Abdominal Obesity  (Waist Circumference)   See nursing flowsheet Women ?35 in (88 cm)   Men ?40 in (102 cm)      Triglycerides  Triglycerides   Date Value Ref Range Status   10/19/2019 117 <150 mg/dL Final       ?150 mg/dL (1.7 mmol/L) or current treatment for elevated triglycerides   HDL cholesterol  HDL Cholesterol   Date Value Ref Range Status   10/19/2019 56 >49 mg/dL Final   ]   Women <50 mg/dL (1.3 mmol/L) in women or current treatment for low HDL cholesterol  Men <40 mg/dL (1 mmol/L) in men or current treatment for low HDL cholesterol     Fasting plasma glucose (FPG) Lab Results   Component Value Date     10/19/2019      FPG ?100 mg/dL (5.6 mmol/L) or treatment for elevated blood glucose   Blood pressure  BP Readings from Last 3 Encounters:   07/07/21 136/80   06/04/19 131/71   04/26/19 164/86    Blood pressure ?130/85 mmHg or treatment for elevated blood pressure   Family History  See family history     Appearance: dressed in hospital scrubs, appeared reported age, appeared well groomed  Attitude: cooperative with interview  Eye Contact: minimal, eyes averted downwards most of the conversation   Mood: \"I'm not good\"   Affect:  Mood congruent, less irritable when discussing dispo options than prior conversations   Speech: accented, clear and coherent  Language: no obvious receptive or expressive deficits  Psychomotor, Gait, Musculoskeletal: No abnormal movements noted while seated.   Thought Process: goal-oriented, linear  Associations:  No loosening of associations present  Thought Content:  Did not appear to be responding to internal stimuli.  Insight:  limited  Judgement:  fair  Oriented to: date, location, treatment team, current president.   Attention Span and Concentration: attentive to conversation.  Recent and Remote Memory: impaired but improving  Fund of Knowledge:  " normal    Clinical Global Impressions  First:  Considering your total clinical experience with this particular patient population, how severe are the patient's symptoms at this time?: 7 (04/16/21 1428)  Compared to the patient's condition at the START of treatment, this patient's condition is: 4 (04/16/21 1428)  Most recent:  Considering your total clinical experience with this particular patient population, how severe are the patient's symptoms at this time?: 7 (06/22/21 1529)  Compared to the patient's condition at the START of treatment, this patient's condition is: 3 (06/22/21 1529)         Precautions:     Behavioral Orders   Procedures     Cheeking Precautions (behavioral units)     Patient Observed swallowing PO medications; Patient asked to drink water after swallowing medication; Patient in Staff line of sight for 15 minutes after medication given; Mouth checks after PO administration (patient asked to open mouth and stick out their tongue).     Code 3     For walks in the [place] with staff and per staff discretion     Electroconvulsive therapy     Series of up to 12 treatments. Begin Date: 5/26/21     Treating Psychiatrist providing ECT:  Dr. Lubin     Notified on:  5/21/21     Electroconvulsive therapy     As long as we get the green light from risk management and pt is medically cleared, begin ECT every Monday, Wednesday, and Friday     Electroconvulsive therapy     Series of up to 12 treatments. Begin Date: 5/25/21     Treating Psychiatrist providing ECT:  Amee     Notified on:  5/24/21     Elopement precautions     Fall precautions     Mcgrath Calderon     Routine Programming     As clinically indicated     Status 15     Every 15 minutes.          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 Symptoms and presentation at this time is most consistent with Schizoaffective Disorder, Bipolar Type. Obtained most recent medication regimen from patient's ACT team, and regimen was initially restarted. Pt is committed. Petitioned for Mcgrath Calderon was filed and granted due to lack of improvement and side effects from medications. Inpatient psychiatric hospitalization is warranted at this time for safety, stabilization, possible adjustment in medications and development of a safe discharge plan.     Hospital Course:  On admission, PTA medications were restarted. However, Michelle had been declining all scheduled medications despite significant encouragement from staff and provider. Psychiatric emergency declared on 4/20 due to aggression toward others in context of severe psychosis and suspected excited catatonia. Ativan 1 mg TID was also added. Discontinued PTA Invega, Zyprexa, and thorazine on 4/20 due to consistent refusal.     On 4/26, it was noted that patient frequently had upward gaze while walking up and down the unit. She did not appear to be distressed. She reported that she was looking at \"my god.\" It was determined to be oculogyric crisis secondary to IM haloperidol. Haldol was subsequently discontinued and scheduled Zyprexa was initiated on an emergency basis. Oral Cogentin was also scheduled, though patient declined. On the evening of 4/26, patient's gaze was fixed in upward position for several hours and she appeared to be experiencing discomfort. IM Cogentin was administered with noted resolution. Partial improvements were observed after switching to scheduled Zyprexa. After patient improved, she " "was more receptive to reinitiating oral Invega, which was initiated on 5/3 and titrated to PTA dose of 9 mg daily on 5/10. Plan was for ACT team to bring in loading dose of Invega Sustenna. Patient had signs of oculogyric crisis again with Invega. Oral dose decreased to 6 mg after this. Invega was stopped on 5/14 due to ongoing signs of problems related to eye movement and concerns for oculogyric crisis.    Overall improvement in patient's agitation and suspected catatonia noted on 4/30 after patient accepted two doses of Ativan. She began declining Ativan again with noted decompensation. Patient began accepting, however, was deemed not to have the capacity to consent to treatment with Ativan. She does not believe she has a mental illness, including catatonia. She does not fully understand risks associated with inadequate treatment of catatonia. Discussed with her son who is acting as surrogate decision maker and he is in full support of forced scheduled IM Ativan if patient declines oral formulation. Also consulted with our legal team prior to backing oral Ativan with IM.     Ativan was increased on 5/19 due to re-emerging evidence of catatonia (long periods of staring, repetitive movements, echolalia, mutism). Meeting was held with ACT team on 5/20, including ACT team psychiatrist, Dr. Pedraza. He said that he is in \"full support\" of plan to pursue Mcgrath Kohler and ECT at this time. He does feel that in the past she has been discharged from the hospital while still quite symptomatic. He is hoping that ECT will be effective and that with improvement, Michelle would be more receptive to clozapine and weekly blood draws. He said that ideally she would transition to an IRTS before going back to her apartment, but also understands there may be some barriers (I.e. pt's willingness, financial concerns, etc).     ECT consult placed. Please see consult note by Dr. Lubin on 5/21 for details. Alcides Riverappard was approved on 5/24 " and patient was medically cleared on 5/24. ECT initiated on 5/26. She was noted to have a short seizure during ECT on 6/4. Staff note that patient appears more disoriented with memory impairment and sedation on days of ECT. This was noted on my examination again today. Improvements noted in mood, affect, social interactions, paranoia, agitation since initiation of ECT. Reduced Ativan on 6/5 due to sedative effects. Relayed concerns about memory impairment and confusion with Dr. Lubin. Recommended attempting unilateral ECT, which he agreed to do. First unilateral ECT on 6/7. ECT was held on 6/11 and 6/14 due to memory impairment. We will plan to hold it again tomorrow (6/16). There is ongoing discussion regarding whether there is a component of catatonia contributing to her current presentation but this is less likely since it worsened after ECT was started.  Given her level of cognitive impairment and our belief that this is due to ECT, we will not pursue any further treatments at this time. Since we have held ECT (last treatment on 6/9), her cognitive impairment has slightly improved (more oriented).     Michelle appears to be decompensating somewhat since ECT has been held, though her cognitive impairment has gradually improved. If symptoms of psychosis re-emerge, it may be worth starting clozapine, which is something that has previously been considered by her ACT team. Her Hatch order would need to be amended as clozapine is not one of the medications listed. ANC obtained on 6/16 was 1800/microL. Per conversation with pharmacy, neutropenia has been associated with olanzapine and agranulocytosis has been associated with olanzapine, lorazepam, metoprolol, and hydralazine and hematologic labs have been monitored. Upon re-check, ANC was 3700/microL on 6/21/21. At this time, the primary symptoms we are observing are cognitive deficits and inability to care for self, which we would not address by changing her antipsychotic  medication.    Michelle's cognitive impairment has been steadily improving since holding ECT treatments, however it is possible that lorazepam is also playing a role in her cognitive impairment. Given no signs of re-emerging catatonia, a gradual lorazepam taper (decreasing by 0.5 mg) was initiated on 6/28/21 to monitor for potential improvements with regard to memory impairment.  On 7/7, Michelle reported an incident of oculogyric crisis and Cogentin 1 mg BID was initiated.     Michelle has remained focused on discharge. The team is working with Michelle's ACT team to to establish a safe discharge plan with options including moving to a group home vs home with her ACT team and additional in home supports via CADI services. We are concerned that Michelle would have difficulty taking her medications as prescribed if she were to return home without her ACT team.     Target psychiatric symptoms and interventions:   - Continue 1 mg PO or IM lorazepam in the morning and at mid-day and 2 mg PO or IM lorazepam in the evening. Decrease mid-day lorazepam to 1 mg PO or IM. Patient may not decline.   - Continue Zyprexa 10 mg BID  PO or IM. Hatch in place. May consider increasing further though holding off given re-emerging catatonic sx and borderline prolonged QTc   - Initiate Cogentin 1 mg BID   -Continue Cogentin 2 mg IM daily prn for evidence of acute dystonic reaction or oculogyric crisis  -Continue hydroxyzine 25-50 mg q4h prn for acute anxiety  -Continue Trazodone 50 mg at bedtime prn for sleep disturbances  -Continue Zyprexa 10 mg TID prn for severe agitation  -Continue Ativan/Benadryl q4h prn for agitation. WOULD GIVE PRN ATIVAN FIRST FOR AGITATION unless it is after 5 pm on day prior to scheduled ECT  -     Occupational Therapy Consult Placed to evaluate cognitive functioning/ability to care for self in home environment, ability to manage medications. See note on 7/1 for details.      ECT:   - ECT DISCONTINUED DUE TO COGNITIVE  IMPAIRMENT.  - Because patient was only intermittently accepting scheduled Ativan and some symptoms of catatonia are re-emerging, pursued Alcides Kohler for ECT. She also continues to exhibit symptoms of psychosis and has not responded or has experienced side effects from multiple neuroleptic medications. Court hearing was held on 5/21. Alcides Kohler is approved. Patient is medically cleared. COVID negative on 5/24. Obtained ECT consult on 5/21/21. Please see Dr. Lubin's consultation note.   - NPO 8 hours prior to scheduled treatment. Clear liquids until 2 hours prior to treatment.   - Discussed with IM. May resume AM antihypertensives, no need to hold prior to ECT  - SIO while patient is NPO, starting at midnight on ECT days (renewed)  - Will complete acute course in the hospital; will likely need maintenance ECT in outpatient setting.   - New concerns about memory impairments: Switched from bilateral to unilateral ECT on 6/7 and continue to monitor closely, ultimately decided to discontinue treatments given cognitive impairment.    Acute Medical Problems and Treatments:  BRANDON, resolved:   Pre-renal in nature, likely secondary to decreased oral intake.   - IM Consulted on 6/10. Appreciate assistance. Follow-up note placed on 6/16 (no further work-up recommended).  - avoid nephrotoxins  - Renal bladder US completed on 6/10  - Encourage fluid intake.    - Repeat BMP on 7/2 was wnl.    HTN: Patient is now adherent with medication regimen.   - Metoprolol succinate  mg   - Cozaar 100 mg daily  - Amlodipine 5 mg daily  - Continue hydralazine 25 mg QID PRN for SBP > 160 mmHg or DBP > 110 mmHg.  - Please see note from IM dated 5/3, 5/6, 5/24, 6/20 and 6/30/21.  - Obtained EKG and routine labs on 5/21 in context of pt declining vital sign checks and anti-hypertensives and recently elevated BPs. Reviewed labs and discussed EKG findings with IM on 5/21. No urgent concerns, though IM should be notified if pt develops  "acute medical concerns (I.e. heart palpitations, SOB, changes in speech, AMS, confusion, CP, HA, changes in vision).    - Per 6/20 IM note: \"Please notify IM if BP is persistently severely elevated and requiring frequent PRN hydralazine\".  - Internal medicine consulted again on 6/28 due to consistent use of hydralazine over the past week. Metoprolol increased to 75 mg daily then to 100 mg and amlodipine 5 mg was added on 6/30. Amlodipine increased to 7.5 mg daily on 7/5.      CVA:  - Aspirin 81 mg daily     Chronic Constipation:  - Miralax 17 mg daily    Urinary frequency and urgency, resolved  Reported new-onset urinary frequency and urgency on 6/21. She denied dysuria, hematuria, and suprapubic pain. Etiology unclear, however Michelle reported that this was time-limited and improved on 6/23.  - Urinalysis ordered on 6/22 was within normal limits.    ANC trending downward since admission, still WNL, resolved  - Per conversation with pharmacy, medications Michelle is taking that are associated with  agranulocytosis include lorazepam (scheduled), metoprolol (scheduled), hydralazine (PRN), and olanzapine (scheduled).   - Will consult internal medicine for further guidance if Michelle declines future blood draws and we are not able to monitor her ANC.   - CBC with differential WNL on 6/21     Behavioral/Psychological/Social:  - Encourage unit programming  - Patient is now Code 3 status and can take walks in the [place] with staff and security present per staff discretion. This appears to be quite therapeutic for her.     Safety:  - Continue precautions as noted above  - Status 15 minute checks  - Safety precautions include: assault and elopement precautions  - Continue precautions as noted above    Legal Status: Committed as MI with Hatch in place for Haldol, Zyprexa, Invega, and Thorazine through [facility]. Filed Alcides Kohler through [facility] and approved on 5/24/21.     Disposition Plan   Reason for ongoing " admission: poses an imminent risk to self, poses an imminent risk to others and is unable to care for self due to severe psychosis or tad  Discharge location: Group home (needs CADI waiver) vs home with [facility] ACT team or 24/7 in-home care. CPT assessment done by OT (tico queen) on 7/1. Please see note for details. [place] planned for 7/12  Discharge Medications: not ordered  Follow-up Appointments: not scheduled       Bijal Varner MD  Psychiatry PGY-4 Resident     > 25 minutes total time that was spent and over 50% of this time was spent in counseling and coordination of care.

## 2021-07-08 PROCEDURE — 250N000013 HC RX MED GY IP 250 OP 250 PS 637: Performed by: STUDENT IN AN ORGANIZED HEALTH CARE EDUCATION/TRAINING PROGRAM

## 2021-07-08 PROCEDURE — 250N000013 HC RX MED GY IP 250 OP 250 PS 637: Performed by: PHYSICIAN ASSISTANT

## 2021-07-08 PROCEDURE — 250N000013 HC RX MED GY IP 250 OP 250 PS 637: Performed by: PSYCHIATRY & NEUROLOGY

## 2021-07-08 PROCEDURE — 124N000002 HC R&B MH UMMC

## 2021-07-08 PROCEDURE — 99232 SBSQ HOSP IP/OBS MODERATE 35: CPT | Mod: GC | Performed by: PSYCHIATRY & NEUROLOGY

## 2021-07-08 RX ADMIN — BENZTROPINE MESYLATE 1 MG: 1 TABLET ORAL at 19:05

## 2021-07-08 RX ADMIN — OLANZAPINE 10 MG: 10 TABLET, ORALLY DISINTEGRATING ORAL at 08:25

## 2021-07-08 RX ADMIN — LORAZEPAM 1 MG: 1 TABLET ORAL at 08:24

## 2021-07-08 RX ADMIN — LOSARTAN POTASSIUM 100 MG: 100 TABLET, FILM COATED ORAL at 08:25

## 2021-07-08 RX ADMIN — METOPROLOL SUCCINATE 75 MG: 25 TABLET, EXTENDED RELEASE ORAL at 08:24

## 2021-07-08 RX ADMIN — OLANZAPINE 10 MG: 10 TABLET, ORALLY DISINTEGRATING ORAL at 19:05

## 2021-07-08 RX ADMIN — LORAZEPAM 1 MG: 1 TABLET ORAL at 14:12

## 2021-07-08 RX ADMIN — LORAZEPAM 2 MG: 2 TABLET ORAL at 19:05

## 2021-07-08 RX ADMIN — BENZTROPINE MESYLATE 1 MG: 1 TABLET ORAL at 08:25

## 2021-07-08 RX ADMIN — AMLODIPINE BESYLATE 7.5 MG: 2.5 TABLET ORAL at 08:24

## 2021-07-08 ASSESSMENT — ACTIVITIES OF DAILY LIVING (ADL)
ORAL_HYGIENE: INDEPENDENT
HYGIENE/GROOMING: PROMPTS;INDEPENDENT
DRESS: INDEPENDENT
ORAL_HYGIENE: INDEPENDENT
HYGIENE/GROOMING: INDEPENDENT;PROMPTS
DRESS: SCRUBS (BEHAVIORAL HEALTH)
LAUNDRY: WITH SUPERVISION

## 2021-07-08 NOTE — PLAN OF CARE
Problem: Sleep Disturbance  Goal: Adequate Sleep/Rest  Outcome: Improving       Pt slept for 7 hours. No PRN administered this shift, no concerns noted. Will continue to monitor.

## 2021-07-08 NOTE — PLAN OF CARE
Problem: Behavioral Health Plan of Care  Goal: Optimized Coping Skills in Response to Life Stressors  Outcome: Improving     Behavioral  Pt spent most of the shift sleeping in her room sleeping. Pt got up for dinner and ate 75% of her meal. Pt denies anxiety or depression. Pt mood was calm and polite. Pt denies SI thoughts or AVH. Pt endorse good appetite.  Medical  High blood pressure   Plan  No goals this shift

## 2021-07-08 NOTE — PLAN OF CARE
Assessment/Intervention/Current Symtoms and Care Coordination  -Chart review  -Rounded with team, addressed patient needs/concerns    Current Symptoms include the following: Patient's mood appears to be improving: Patient does remain isolated to room and does not participate  in group activities.     Discharge Plan or Goal  Pending stabilization & development of a safe discharge plan.  Considerations include: Group Home or Assisted     Barriers to Discharge  Patient requires further psychiatric stabilization due to current symptomology    Referral Status  [facility]    Legal Status  Committed/Hatch/Mcgrath Kohelr through [facility]

## 2021-07-08 NOTE — PROGRESS NOTES
Writer spoke with patient's son and he informed writer that he spoke with ACT  and was told the guardianship paperwork has been completed. Patient's son indicated the next step will be to apply for CADI funding.

## 2021-07-08 NOTE — PLAN OF CARE
"  Problem: Adult Inpatient Plan of Care  Goal: Plan of Care Review  Outcome: Improving  Flowsheets (Taken 7/8/2021 0842)  Plan of Care Reviewed With: patient  Progress: improving     Pt denies SI/SIB/HI/hallucinations/anxiety/depression. Pt describes mood as \"Good.\"  Pt spent majority of the day in bedroom, watching tv. Pt was invited to groups. Pt has flat affect, but brightens during conversation.    Appetite = eating and tolerating meals; pt also requested for apple & tangerine from her locker and they were retrieved for pt    Sleep = 7hrs, pt reports no concerns    ADLs = needs encouragement; pt last showered yesterday    Medication side effects = pt denies adverse effects. RN asked pt to report if occulogyric crisis occurs again and pt agreed.     Medical concerns  HTN - managed with medications. Pt reports she has been exercising too.      Vital signs     07/08/21 0744   Vital Signs   Temp 98.8  F (37.1  C)   Temp src Tympanic   Resp 16   Pulse 61   Pulse Rate Source Monitor   /84   BP Location Left arm   Sitting Orthostatic BP   Sitting Orthostatic /84   Sitting Orthostatic Pulse 61 bpm     Plan  Continue with plan of care.     "

## 2021-07-08 NOTE — PROGRESS NOTES
"[facility], [place]   Psychiatric Progress Note  Hospital Day: 85        Interim History:   The patient's care was discussed with the treatment team during the daily team meeting and/or staff's chart notes were reviewed. She had one elevated BP yesterday, otherwise vitals WNL. No sxs of delirium. She took all medications as scheduled and no PRNs. No acute medical concerns. Michelle continues to require prompting to complete ADLs though did shower yesterday. Sleeping and eating well. No overt sx/signs of psychosis or tad. No SI/HI/AH/VH reported. No aggressive or violent behaviors. Remains isolative to her room. She was noted to be tense and irritable earlier in the day, calm and polite in the evening. Slept 7 hours.     Upon interview, Michelle reported that she is doing \"good\" and that she slept well.  She laughed when she reminded the team that we forgot to on made her TV yesterday upon leaving.  She reports no physical problems and denies any further episodes of oculogyric crises.  She also denies any side effects of medications and has tolerated initiation of scheduled Cogentin yesterday.  She denies any issues with her thinking and reports that she feels her memory is getting better.  She was fully oriented.  We discussed her upcoming assessment on Monday and she asked what would happen afterwards.  She expressed her wishes to leave the hospital so she can get a job.  She also expressed concerns that she did not pay taxes last year because she was not working.  She was encouraged to share her wishes to get a job during the assessment.  She denies any thoughts to harm herself or others and denies AH/VH. She reported no further questions or concerns.  She laughed significantly when writer on muted the television prior to leaving.         Medications:       amLODIPine  7.5 mg Oral Daily     benztropine  1 mg Oral BID     LORazepam  1 mg Oral BID    Or     LORazepam  1 mg Intramuscular BID "     LORazepam  2 mg Oral QPM    Or     LORazepam  2 mg Intramuscular QPM     losartan  100 mg Oral Daily     metoprolol succinate ER  75 mg Oral Daily     OLANZapine zydis  10 mg Oral BID    Or     OLANZapine  10 mg Intramuscular BID          Allergies:     Allergies   Allergen Reactions     Haldol [Haloperidol]      Patient previously tolerated haldol, though developed oculogyric crises during hospital stay on 4/26/21 on total daily dose of 10 mg.     Lisinopril Cough          Labs:     Recent Results (from the past 24 hour(s))   Asymptomatic SARS-CoV-2 COVID-19 Virus (Coronavirus) by PCR    Collection Time: 07/07/21  3:00 PM    Specimen: Nasopharyngeal   Result Value Ref Range    SARS-CoV-2 Virus Specimen Source Nasopharyngeal     SARS-CoV-2 PCR Result NEGATIVE     SARS-CoV-2 PCR Comment (Note)           Psychiatric Examination:     /84 (BP Location: Left arm)   Pulse 61   Temp 98.8  F (37.1  C) (Tympanic)   Resp 16   Wt 70.9 kg (156 lb 4.8 oz)   SpO2 98%   BMI 26.83 kg/m    Weight is 156 lbs 4.8 oz  Body mass index is 26.83 kg/m .    Weight over time:  Vitals:    05/25/21 0850 06/15/21 0811 06/16/21 1605 06/19/21 0859   Weight: 71 kg (156 lb 8 oz) 69.8 kg (153 lb 12.8 oz) 70.5 kg (155 lb 8 oz) 71.4 kg (157 lb 8 oz)    06/21/21 0805 06/22/21 0839 06/24/21 0800 07/02/21 0835   Weight: 69.5 kg (153 lb 3.2 oz) 69.3 kg (152 lb 11.2 oz) 70.7 kg (155 lb 12.8 oz) 70.7 kg (155 lb 12.8 oz)    07/03/21 0844 07/06/21 0838   Weight: 69.6 kg (153 lb 8 oz) 70.9 kg (156 lb 4.8 oz)       Orthostatic Vitals         Most Recent      Lying Orthostatic /91 06/23 0830    Lying Orthostatic Pulse (bpm) 67 06/23 0830    Sitting Orthostatic /89  Comment: notify RN 06/24 0800    Sitting Orthostatic Pulse (bpm) 71 06/24 0800    Standing Orthostatic /101  Comment: notify RN 06/24 0800    Standing Orthostatic Pulse (bpm) 86 06/24 0800            Cardiometabolic risk assessment. 04/15/21      Reviewed patient  "profile for cardiometabolic risk factors    Date taken /Value  REFERENCE RANGE   Abdominal Obesity  (Waist Circumference)   See nursing flowsheet Women ?35 in (88 cm)   Men ?40 in (102 cm)      Triglycerides  Triglycerides   Date Value Ref Range Status   10/19/2019 117 <150 mg/dL Final       ?150 mg/dL (1.7 mmol/L) or current treatment for elevated triglycerides   HDL cholesterol  HDL Cholesterol   Date Value Ref Range Status   10/19/2019 56 >49 mg/dL Final   ]   Women <50 mg/dL (1.3 mmol/L) in women or current treatment for low HDL cholesterol  Men <40 mg/dL (1 mmol/L) in men or current treatment for low HDL cholesterol     Fasting plasma glucose (FPG) Lab Results   Component Value Date     10/19/2019      FPG ?100 mg/dL (5.6 mmol/L) or treatment for elevated blood glucose   Blood pressure  BP Readings from Last 3 Encounters:   07/08/21 138/84   06/04/19 131/71   04/26/19 164/86    Blood pressure ?130/85 mmHg or treatment for elevated blood pressure   Family History  See family history     Appearance: dressed in hospital scrubs, appeared reported age, appeared well groomed  Attitude: cooperative with interview  Eye Contact: minimal, eyes averted downwards most of the conversation, looks up occasionally  Mood: \"good\"   Affect:  Somewhat blunted, brightens at certain parts of the conversation, less irritable when discussing dispo options than prior conversations   Speech: accented, clear and coherent  Language: no obvious receptive or expressive deficits  Psychomotor, Gait, Musculoskeletal: No abnormal movements noted while seated.   Thought Process: goal-oriented, linear  Associations:  No loosening of associations present  Thought Content:  Did not appear to be responding to internal stimuli.  Insight:  limited  Judgement:  fair  Oriented to: date, location, treatment team.   Attention Span and Concentration: attentive to conversation.  Recent and Remote Memory: improved, no obvious deficits   Fund of " Knowledge:  normal    Clinical Global Impressions  First:  Considering your total clinical experience with this particular patient population, how severe are the patient's symptoms at this time?: 7 (04/16/21 1428)  Compared to the patient's condition at the START of treatment, this patient's condition is: 4 (04/16/21 1428)  Most recent:  Considering your total clinical experience with this particular patient population, how severe are the patient's symptoms at this time?: 7 (06/22/21 1529)  Compared to the patient's condition at the START of treatment, this patient's condition is: 3 (06/22/21 1529)         Precautions:     Behavioral Orders   Procedures     Cheeking Precautions (behavioral units)     Patient Observed swallowing PO medications; Patient asked to drink water after swallowing medication; Patient in Staff line of sight for 15 minutes after medication given; Mouth checks after PO administration (patient asked to open mouth and stick out their tongue).     Code 3     For walks in the [place] with staff and per staff discretion     Electroconvulsive therapy     Series of up to 12 treatments. Begin Date: 5/26/21     Treating Psychiatrist providing ECT:  Dr. Lubin     Notified on:  5/21/21     Electroconvulsive therapy     As long as we get the green light from risk management and pt is medically cleared, begin ECT every Monday, Wednesday, and Friday     Electroconvulsive therapy     Series of up to 12 treatments. Begin Date: 5/25/21     Treating Psychiatrist providing ECT:  Amee     Notified on:  5/24/21     Elopement precautions     Fall precautions     Mcgrath Calderon     Routine Programming     As clinically indicated     Status 15     Every 15 minutes.          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 Symptoms and presentation at this time is most consistent with Schizoaffective Disorder, Bipolar Type. Obtained most recent medication regimen from patient's ACT team, and regimen was initially restarted. Pt is committed. Petitioned for Mcgrath Calderon was filed and granted due to lack of improvement and side effects from medications. Inpatient psychiatric hospitalization is warranted at this time for safety, stabilization, possible adjustment in medications and development of a safe discharge plan.     Hospital Course:  On admission, PTA medications were restarted. However, Michelle had been declining all scheduled medications despite significant encouragement from staff and provider. Psychiatric emergency declared on 4/20 due to aggression toward others in context of severe psychosis and suspected excited catatonia. Ativan 1 mg TID was also added. Discontinued PTA Invega, Zyprexa, and thorazine on 4/20 due to consistent refusal.     On 4/26, it was noted that patient frequently had upward gaze while walking up and down the unit. She did not appear to be distressed. She reported that she was looking at \"my god.\" It was determined to be oculogyric crisis secondary to IM haloperidol. Haldol was subsequently discontinued and scheduled Zyprexa was initiated on an emergency basis. Oral Cogentin was also scheduled, though patient declined. On the evening of 4/26, patient's gaze was fixed in upward position for several hours and she appeared to be experiencing discomfort. IM Cogentin was administered with noted resolution. Partial improvements were observed after switching to scheduled Zyprexa. After " "patient improved, she was more receptive to reinitiating oral Invega, which was initiated on 5/3 and titrated to PTA dose of 9 mg daily on 5/10. Plan was for ACT team to bring in loading dose of Invega Sustenna. Patient had signs of oculogyric crisis again with Invega. Oral dose decreased to 6 mg after this. Invega was stopped on 5/14 due to ongoing signs of problems related to eye movement and concerns for oculogyric crisis.    Overall improvement in patient's agitation and suspected catatonia noted on 4/30 after patient accepted two doses of Ativan. She began declining Ativan again with noted decompensation. Patient began accepting, however, was deemed not to have the capacity to consent to treatment with Ativan. She does not believe she has a mental illness, including catatonia. She does not fully understand risks associated with inadequate treatment of catatonia. Discussed with her son who is acting as surrogate decision maker and he is in full support of forced scheduled IM Ativan if patient declines oral formulation. Also consulted with our legal team prior to backing oral Ativan with IM.     Ativan was increased on 5/19 due to re-emerging evidence of catatonia (long periods of staring, repetitive movements, echolalia, mutism). Meeting was held with ACT team on 5/20, including ACT team psychiatrist, Dr. Pedraza. He said that he is in \"full support\" of plan to pursue Mcgrath Kohler and ECT at this time. He does feel that in the past she has been discharged from the hospital while still quite symptomatic. He is hoping that ECT will be effective and that with improvement, Michelle would be more receptive to clozapine and weekly blood draws. He said that ideally she would transition to an IRTS before going back to her apartment, but also understands there may be some barriers (I.e. pt's willingness, financial concerns, etc).     ECT consult placed. Please see consult note by Dr. Lubin on 5/21 for details. Mcgrath " Kohler was approved on 5/24 and patient was medically cleared on 5/24. ECT initiated on 5/26. She was noted to have a short seizure during ECT on 6/4. Staff note that patient appears more disoriented with memory impairment and sedation on days of ECT. This was noted on my examination again today. Improvements noted in mood, affect, social interactions, paranoia, agitation since initiation of ECT. Reduced Ativan on 6/5 due to sedative effects. Relayed concerns about memory impairment and confusion with Dr. Lubin. Recommended attempting unilateral ECT, which he agreed to do. First unilateral ECT on 6/7. ECT was held on 6/11 and 6/14 due to memory impairment. We will plan to hold it again tomorrow (6/16). There is ongoing discussion regarding whether there is a component of catatonia contributing to her current presentation but this is less likely since it worsened after ECT was started.  Given her level of cognitive impairment and our belief that this is due to ECT, we will not pursue any further treatments at this time. Since we have held ECT (last treatment on 6/9), her cognitive impairment has slightly improved (more oriented).     Michelle initially appeared to be decompensating somewhat when ECT was held, though her cognitive impairment has gradually improved. If symptoms of psychosis re-emerge, it may be worth starting clozapine, which is something that has previously been considered by her ACT team. Her Hatch order would need to be amended as clozapine is not one of the medications listed. ANC obtained on 6/16 was 1800/microL. Per conversation with pharmacy, neutropenia has been associated with olanzapine and agranulocytosis has been associated with olanzapine, lorazepam, metoprolol, and hydralazine and hematologic labs have been monitored. Upon re-check, ANC was 3700/microL on 6/21/21. At this time, the primary symptoms we are observing are cognitive deficits and inability to care for self, which we would not  address by changing her antipsychotic medication.    Michelle's cognitive impairment has been steadily improving since holding ECT treatments, however it is possible that lorazepam is also playing a role in her cognitive impairment. Given no signs of re-emerging catatonia, a gradual lorazepam taper (decreasing by 0.5 mg) was initiated on 6/28/21 to monitor for potential improvements with regard to memory impairment.  On 7/7, Michelle reported an incident of oculogyric crisis and Cogentin 1 mg BID was initiated with no noted changes in cognition.     Michelle has remained focused on discharge. The team is working with Michelle's ACT team to to establish a safe discharge plan with options including moving to a group home vs home with her ACT team and additional in home supports via CADI services. We are concerned that Michelle would have difficulty taking her medications as prescribed if she were to return home without her ACT team.     Target psychiatric symptoms and interventions:   - Continue 1 mg PO or IM lorazepam in the morning and at mid-day and 2 mg PO or IM lorazepam in the evening. Decrease mid-day lorazepam to 1 mg PO or IM. Patient may not decline.   - Continue Zyprexa 10 mg BID  PO or IM. Hatch in place. May consider increasing further though holding off given re-emerging catatonic sx and borderline prolonged QTc   - Continue Cogentin 1 mg BID   -Continue Cogentin 2 mg IM daily prn for evidence of acute dystonic reaction or oculogyric crisis  -Continue hydroxyzine 25-50 mg q4h prn for acute anxiety  -Continue Trazodone 50 mg at bedtime prn for sleep disturbances  -Continue Zyprexa 10 mg TID prn for severe agitation  -Continue Ativan/Benadryl q4h prn for agitation. WOULD GIVE PRN ATIVAN FIRST FOR AGITATION unless it is after 5 pm on day prior to scheduled ECT    Occupational Therapy Consult Placed to evaluate cognitive functioning/ability to care for self in home environment, ability to manage medications. See note on 7/1  for details.      ECT:   - ECT DISCONTINUED DUE TO COGNITIVE IMPAIRMENT.  - Because patient was only intermittently accepting scheduled Ativan and some symptoms of catatonia are re-emerging, pursued Mcgrath Kohler for ECT. She also continues to exhibit symptoms of psychosis and has not responded or has experienced side effects from multiple neuroleptic medications. Court hearing was held on 5/21. Alcides Kohler is approved. Patient is medically cleared. COVID negative on 5/24. Obtained ECT consult on 5/21/21. Please see Dr. Lubin's consultation note.   - NPO 8 hours prior to scheduled treatment. Clear liquids until 2 hours prior to treatment.   - Discussed with IM. May resume AM antihypertensives, no need to hold prior to ECT  - SIO while patient is NPO, starting at midnight on ECT days (renewed)  - Will complete acute course in the hospital; will likely need maintenance ECT in outpatient setting.   - New concerns about memory impairments: Switched from bilateral to unilateral ECT on 6/7 and continue to monitor closely, ultimately decided to discontinue treatments given cognitive impairment.    Acute Medical Problems and Treatments:  BRANDON, resolved:   Pre-renal in nature, likely secondary to decreased oral intake.   - IM Consulted on 6/10. Appreciate assistance. Follow-up note placed on 6/16 (no further work-up recommended).  - avoid nephrotoxins  - Renal bladder US completed on 6/10  - Encourage fluid intake.    - Repeat BMP on 7/2 was wnl.    HTN: Patient is now adherent with medication regimen.   - Metoprolol succinate  mg   - Cozaar 100 mg daily  - Amlodipine 5 mg daily  - Continue hydralazine 25 mg QID PRN for SBP > 160 mmHg or DBP > 110 mmHg.  - Please see note from IM dated 5/3, 5/6, 5/24, 6/20 and 6/30/21.  - Obtained EKG and routine labs on 5/21 in context of pt declining vital sign checks and anti-hypertensives and recently elevated BPs. Reviewed labs and discussed EKG findings with IM on 5/21. No  "urgent concerns, though IM should be notified if pt develops acute medical concerns (I.e. heart palpitations, SOB, changes in speech, AMS, confusion, CP, HA, changes in vision).    - Per 6/20 IM note: \"Please notify IM if BP is persistently severely elevated and requiring frequent PRN hydralazine\".  - Internal medicine consulted again on 6/28 due to consistent use of hydralazine over the past week. Metoprolol increased to 75 mg daily then to 100 mg and amlodipine 5 mg was added on 6/30. Amlodipine increased to 7.5 mg daily on 7/5.      CVA:  - Aspirin 81 mg daily     Chronic Constipation:  - Miralax 17 mg daily    Urinary frequency and urgency, resolved  Reported new-onset urinary frequency and urgency on 6/21. She denied dysuria, hematuria, and suprapubic pain. Etiology unclear, however Michelle reported that this was time-limited and improved on 6/23.  - Urinalysis ordered on 6/22 was within normal limits.    ANC trending downward since admission, still WNL, resolved  - Per conversation with pharmacy, medications Michelle is taking that are associated with  agranulocytosis include lorazepam (scheduled), metoprolol (scheduled), hydralazine (PRN), and olanzapine (scheduled).   - Will consult internal medicine for further guidance if Michelle declines future blood draws and we are not able to monitor her ANC.   - CBC with differential WNL on 6/21     Behavioral/Psychological/Social:  - Encourage unit programming  - Patient is now Code 3 status and can take walks in the [place] with staff and security present per staff discretion. This appears to be quite therapeutic for her.     Safety:  - Continue precautions as noted above  - Status 15 minute checks  - Safety precautions include: assault and elopement precautions  - Continue precautions as noted above    Legal Status: Committed as MI with Hatch in place for Haldol, Zyprexa, Invega, and Thorazine through [facility]. Filed Alcides Kohler through [facility] and " approved on 5/24/21.     Disposition Plan   Reason for ongoing admission: poses an imminent risk to self, poses an imminent risk to others and is unable to care for self due to severe psychosis or tad  Discharge location: Group home (needs CADI waiver) vs home with [facility] ACT team or 24/7 in-home care. CPT assessment done by OT (tico queen) on 7/1. Please see note for details. [place] Assessment planned for 7/12  Discharge Medications: not ordered  Follow-up Appointments: not scheduled       Bijal Varner MD  Psychiatry PGY-4 Resident

## 2021-07-09 PROCEDURE — 250N000013 HC RX MED GY IP 250 OP 250 PS 637: Performed by: PSYCHIATRY & NEUROLOGY

## 2021-07-09 PROCEDURE — 124N000002 HC R&B MH UMMC

## 2021-07-09 PROCEDURE — 99232 SBSQ HOSP IP/OBS MODERATE 35: CPT | Performed by: PSYCHIATRY & NEUROLOGY

## 2021-07-09 PROCEDURE — 250N000013 HC RX MED GY IP 250 OP 250 PS 637: Performed by: PHYSICIAN ASSISTANT

## 2021-07-09 PROCEDURE — 250N000013 HC RX MED GY IP 250 OP 250 PS 637: Performed by: STUDENT IN AN ORGANIZED HEALTH CARE EDUCATION/TRAINING PROGRAM

## 2021-07-09 RX ADMIN — LORAZEPAM 1 MG: 1 TABLET ORAL at 14:05

## 2021-07-09 RX ADMIN — BENZTROPINE MESYLATE 1 MG: 1 TABLET ORAL at 08:17

## 2021-07-09 RX ADMIN — LORAZEPAM 1 MG: 1 TABLET ORAL at 08:17

## 2021-07-09 RX ADMIN — OLANZAPINE 10 MG: 10 TABLET, ORALLY DISINTEGRATING ORAL at 19:28

## 2021-07-09 RX ADMIN — METOPROLOL SUCCINATE 75 MG: 25 TABLET, EXTENDED RELEASE ORAL at 08:17

## 2021-07-09 RX ADMIN — AMLODIPINE BESYLATE 7.5 MG: 2.5 TABLET ORAL at 08:17

## 2021-07-09 RX ADMIN — OLANZAPINE 10 MG: 10 TABLET, ORALLY DISINTEGRATING ORAL at 08:17

## 2021-07-09 RX ADMIN — LORAZEPAM 2 MG: 2 TABLET ORAL at 19:28

## 2021-07-09 RX ADMIN — BENZTROPINE MESYLATE 1 MG: 1 TABLET ORAL at 19:28

## 2021-07-09 RX ADMIN — LOSARTAN POTASSIUM 100 MG: 100 TABLET, FILM COATED ORAL at 08:17

## 2021-07-09 ASSESSMENT — ACTIVITIES OF DAILY LIVING (ADL)
LAUNDRY: WITH SUPERVISION
HYGIENE/GROOMING: INDEPENDENT
HYGIENE/GROOMING: INDEPENDENT
ORAL_HYGIENE: INDEPENDENT
ORAL_HYGIENE: INDEPENDENT
DRESS: SCRUBS (BEHAVIORAL HEALTH)
DRESS: SCRUBS (BEHAVIORAL HEALTH)

## 2021-07-09 NOTE — PLAN OF CARE
Pt was isolated and withdrawn to her room for most of the evening.  She did come out to get meals and to ask for her medications at the appropriate time.  She did brighten when interacting with staff and smiled when RN was checking in with her.  She ate well and reports that she showers every other day and that she will shower tomorrow.  She denied any discomfort or pain.  She denied any mental health concerns.  She was medication complaint.

## 2021-07-09 NOTE — PROGRESS NOTES
"[facility], [place]   Psychiatric Progress Note  Hospital Day: 86        Interim History:   The patient's care was discussed with the treatment team during the daily team meeting and/or staff's chart notes were reviewed. She had one elevated BP yesterday, otherwise vitals WNL. No sxs of delirium. She took all medications as scheduled and no PRNs. No acute medical concerns. Michelle continues to require prompting to complete ADLs though did shower yesterday. Sleeping and eating well. No overt sx/signs of psychosis or tad. No SI/HI/AH/VH reported. No aggressive or violent behaviors. Remains isolative to her room. Brightens on approach. Slept 3.25 hours.     Upon interview, Michelle reported that she is feeling \"good.\" She said that she feels safe on the unit though does not agree with need for ongoing hospitalization. She would like to be discharged ASAP so that she can find a job. She said that she had a good conversation with Emelia, her son, yesterday. She is planning to contact him again this weekend. We did discuss option of transfer to [facility]. She said that she remembers going there during the last hospital stay. She prefers to remain on our unit, however, because she has a TV in her room and she knows the staff well. She denies any thoughts to harm herself or others and denies AH/VH. She reported no further questions or concerns.           Medications:       amLODIPine  7.5 mg Oral Daily     benztropine  1 mg Oral BID     LORazepam  1 mg Oral BID    Or     LORazepam  1 mg Intramuscular BID     LORazepam  2 mg Oral QPM    Or     LORazepam  2 mg Intramuscular QPM     losartan  100 mg Oral Daily     metoprolol succinate ER  75 mg Oral Daily     OLANZapine zydis  10 mg Oral BID    Or     OLANZapine  10 mg Intramuscular BID          Allergies:     Allergies   Allergen Reactions     Haldol [Haloperidol]      Patient previously tolerated haldol, though developed oculogyric crises during " hospital stay on 4/26/21 on total daily dose of 10 mg.     Lisinopril Cough          Labs:     No results found for this or any previous visit (from the past 24 hour(s)).       Psychiatric Examination:     /71   Pulse 82   Temp 98.3  F (36.8  C) (Tympanic)   Resp 16   Wt 70.9 kg (156 lb 4.8 oz)   SpO2 99%   BMI 26.83 kg/m    Weight is 156 lbs 4.8 oz  Body mass index is 26.83 kg/m .    Weight over time:  Vitals:    05/25/21 0850 06/15/21 0811 06/16/21 1605 06/19/21 0859   Weight: 71 kg (156 lb 8 oz) 69.8 kg (153 lb 12.8 oz) 70.5 kg (155 lb 8 oz) 71.4 kg (157 lb 8 oz)    06/21/21 0805 06/22/21 0839 06/24/21 0800 07/02/21 0835   Weight: 69.5 kg (153 lb 3.2 oz) 69.3 kg (152 lb 11.2 oz) 70.7 kg (155 lb 12.8 oz) 70.7 kg (155 lb 12.8 oz)    07/03/21 0844 07/06/21 0838   Weight: 69.6 kg (153 lb 8 oz) 70.9 kg (156 lb 4.8 oz)       Orthostatic Vitals         Most Recent      Lying Orthostatic /91 06/23 0830    Lying Orthostatic Pulse (bpm) 67 06/23 0830    Sitting Orthostatic /89  Comment: notify RN 06/24 0800    Sitting Orthostatic Pulse (bpm) 71 06/24 0800    Standing Orthostatic /101  Comment: notify RN 06/24 0800    Standing Orthostatic Pulse (bpm) 86 06/24 0800            Cardiometabolic risk assessment. 04/15/21      Reviewed patient profile for cardiometabolic risk factors    Date taken /Value  REFERENCE RANGE   Abdominal Obesity  (Waist Circumference)   See nursing flowsheet Women ?35 in (88 cm)   Men ?40 in (102 cm)      Triglycerides  Triglycerides   Date Value Ref Range Status   10/19/2019 117 <150 mg/dL Final       ?150 mg/dL (1.7 mmol/L) or current treatment for elevated triglycerides   HDL cholesterol  HDL Cholesterol   Date Value Ref Range Status   10/19/2019 56 >49 mg/dL Final   ]   Women <50 mg/dL (1.3 mmol/L) in women or current treatment for low HDL cholesterol  Men <40 mg/dL (1 mmol/L) in men or current treatment for low HDL cholesterol     Fasting plasma glucose (FPG) Lab  "Results   Component Value Date     10/19/2019      FPG ?100 mg/dL (5.6 mmol/L) or treatment for elevated blood glucose   Blood pressure  BP Readings from Last 3 Encounters:   07/08/21 119/71   06/04/19 131/71   04/26/19 164/86    Blood pressure ?130/85 mmHg or treatment for elevated blood pressure   Family History  See family history     Appearance: dressed in hospital scrubs, appeared reported age, appeared well groomed  Attitude: cooperative with interview  Eye Contact: fair, improved today  Mood: \"good\"   Affect:  Somewhat blunted, brightens at certain parts of the conversation, less irritable when discussing dispo options than prior conversations   Speech: accented, clear and coherent  Language: no obvious receptive or expressive deficits  Psychomotor, Gait, Musculoskeletal: No abnormal movements noted while seated.   Thought Process: goal-oriented, linear  Associations:  No loosening of associations present  Thought Content:  Did not appear to be responding to internal stimuli.  Insight:  limited  Judgement:  fair  Oriented to: date, location, treatment team.   Attention Span and Concentration: attentive to conversation.  Recent and Remote Memory: improved, no obvious deficits   Fund of Knowledge:  normal    Clinical Global Impressions  First:  Considering your total clinical experience with this particular patient population, how severe are the patient's symptoms at this time?: 7 (04/16/21 1428)  Compared to the patient's condition at the START of treatment, this patient's condition is: 4 (04/16/21 1428)  Most recent:  Considering your total clinical experience with this particular patient population, how severe are the patient's symptoms at this time?: 7 (06/22/21 1529)  Compared to the patient's condition at the START of treatment, this patient's condition is: 3 (06/22/21 1529)         Precautions:     Behavioral Orders   Procedures     Cheeking Precautions (behavioral units)     Patient Observed " swallowing PO medications; Patient asked to drink water after swallowing medication; Patient in Staff line of sight for 15 minutes after medication given; Mouth checks after PO administration (patient asked to open mouth and stick out their tongue).     Code 3     For walks in the [place] with staff and per staff discretion     Electroconvulsive therapy     Series of up to 12 treatments. Begin Date: 5/26/21     Treating Psychiatrist providing ECT:  Dr. Lubin     Notified on:  5/21/21     Electroconvulsive therapy     As long as we get the green light from risk management and pt is medically cleared, begin ECT every Monday, Wednesday, and Friday     Electroconvulsive therapy     Series of up to 12 treatments. Begin Date: 5/25/21     Treating Psychiatrist providing ECT:  Amee     Notified on:  5/24/21     Elopement precautions     Fall precautions     Alcides Calderon     Routine Programming     As clinically indicated     Status 15     Every 15 minutes.          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 Symptoms and presentation at this time is most consistent with Schizoaffective Disorder, Bipolar Type. Obtained most recent medication regimen from patient's ACT team, and regimen was initially restarted. Pt is committed. Petitioned for Alcides Calderon was filed and granted due to lack of improvement and side effects from medications. Inpatient psychiatric hospitalization is warranted at this time for  "safety, stabilization, possible adjustment in medications and development of a safe discharge plan.     Hospital Course:  On admission, PTA medications were restarted. However, Michelle had been declining all scheduled medications despite significant encouragement from staff and provider. Psychiatric emergency declared on 4/20 due to aggression toward others in context of severe psychosis and suspected excited catatonia. Ativan 1 mg TID was also added. Discontinued PTA Invega, Zyprexa, and thorazine on 4/20 due to consistent refusal.     On 4/26, it was noted that patient frequently had upward gaze while walking up and down the unit. She did not appear to be distressed. She reported that she was looking at \"my god.\" It was determined to be oculogyric crisis secondary to IM haloperidol. Haldol was subsequently discontinued and scheduled Zyprexa was initiated on an emergency basis. Oral Cogentin was also scheduled, though patient declined. On the evening of 4/26, patient's gaze was fixed in upward position for several hours and she appeared to be experiencing discomfort. IM Cogentin was administered with noted resolution. Partial improvements were observed after switching to scheduled Zyprexa. After patient improved, she was more receptive to reinitiating oral Invega, which was initiated on 5/3 and titrated to PTA dose of 9 mg daily on 5/10. Plan was for ACT team to bring in loading dose of Invega Sustenna. Patient had signs of oculogyric crisis again with Invega. Oral dose decreased to 6 mg after this. Invega was stopped on 5/14 due to ongoing signs of problems related to eye movement and concerns for oculogyric crisis.    Overall improvement in patient's agitation and suspected catatonia noted on 4/30 after patient accepted two doses of Ativan. She began declining Ativan again with noted decompensation. Patient began accepting, however, was deemed not to have the capacity to consent to treatment with Ativan. She does " "not believe she has a mental illness, including catatonia. She does not fully understand risks associated with inadequate treatment of catatonia. Discussed with her son who is acting as surrogate decision maker and he is in full support of forced scheduled IM Ativan if patient declines oral formulation. Also consulted with our legal team prior to backing oral Ativan with IM.     Ativan was increased on 5/19 due to re-emerging evidence of catatonia (long periods of staring, repetitive movements, echolalia, mutism). Meeting was held with ACT team on 5/20, including ACT team psychiatrist, Dr. Pedraza. He said that he is in \"full support\" of plan to pursue Mcgrath Kohler and ECT at this time. He does feel that in the past she has been discharged from the hospital while still quite symptomatic. He is hoping that ECT will be effective and that with improvement, Michelle would be more receptive to clozapine and weekly blood draws. He said that ideally she would transition to an IRTS before going back to her apartment, but also understands there may be some barriers (I.e. pt's willingness, financial concerns, etc).     ECT consult placed. Please see consult note by Dr. Lubin on 5/21 for details. Mcgrath Kohler was approved on 5/24 and patient was medically cleared on 5/24. ECT initiated on 5/26. She was noted to have a short seizure during ECT on 6/4. Staff note that patient appears more disoriented with memory impairment and sedation on days of ECT. This was noted on my examination again today. Improvements noted in mood, affect, social interactions, paranoia, agitation since initiation of ECT. Reduced Ativan on 6/5 due to sedative effects. Relayed concerns about memory impairment and confusion with Dr. Lubin. Recommended attempting unilateral ECT, which he agreed to do. First unilateral ECT on 6/7. ECT was held on 6/11 and 6/14 due to memory impairment. We will plan to hold it again tomorrow (6/16). There is ongoing discussion " regarding whether there is a component of catatonia contributing to her current presentation but this is less likely since it worsened after ECT was started.  Given her level of cognitive impairment and our belief that this is due to ECT, we will not pursue any further treatments at this time. Since we have held ECT (last treatment on 6/9), her cognitive impairment has slightly improved (more oriented).     Michelle initially appeared to be decompensating somewhat when ECT was held, though her cognitive impairment has gradually improved. If symptoms of psychosis re-emerge, it may be worth starting clozapine, which is something that has previously been considered by her ACT team. Her Hatch order would need to be amended as clozapine is not one of the medications listed. ANC obtained on 6/16 was 1800/microL. Per conversation with pharmacy, neutropenia has been associated with olanzapine and agranulocytosis has been associated with olanzapine, lorazepam, metoprolol, and hydralazine and hematologic labs have been monitored. Upon re-check, ANC was 3700/microL on 6/21/21. At this time, the primary symptoms we are observing are cognitive deficits and inability to care for self, which we would not address by changing her antipsychotic medication.    Michelle's cognitive impairment has been steadily improving since holding ECT treatments, however it is possible that lorazepam is also playing a role in her cognitive impairment. Given no signs of re-emerging catatonia, a gradual lorazepam taper (decreasing by 0.5 mg) was initiated on 6/28/21 to monitor for potential improvements with regard to memory impairment.  On 7/7, Michelle reported an incident of oculogyric crisis and Cogentin 1 mg BID was initiated with no noted changes in cognition.     Michelle has remained focused on discharge. The team is working with Michelle's ACT team to to establish a safe discharge plan with options including moving to a group home vs home with her ACT team and  additional in home supports via CADI services. We are concerned that Michelle would have difficulty taking her medications as prescribed if she were to return home without her ACT team.     Target psychiatric symptoms and interventions:   - Continue 1 mg PO or IM lorazepam in the morning and at mid-day and 2 mg PO or IM lorazepam in the evening. Decrease mid-day lorazepam to 1 mg PO or IM. Patient may not decline.   - Continue Zyprexa 10 mg BID  PO or IM. Hatch in place. May consider increasing further though holding off given re-emerging catatonic sx and borderline prolonged QTc   - Continue Cogentin 1 mg BID   -Continue Cogentin 2 mg IM daily prn for evidence of acute dystonic reaction or oculogyric crisis  -Continue hydroxyzine 25-50 mg q4h prn for acute anxiety  -Continue Trazodone 50 mg at bedtime prn for sleep disturbances  -Continue Zyprexa 10 mg TID prn for severe agitation  -Continue Ativan/Benadryl q4h prn for agitation. WOULD GIVE PRN ATIVAN FIRST FOR AGITATION unless it is after 5 pm on day prior to scheduled ECT    Occupational Therapy Consult Placed to evaluate cognitive functioning/ability to care for self in home environment, ability to manage medications. See note on 7/1 for details.      ECT:   - ECT DISCONTINUED DUE TO COGNITIVE IMPAIRMENT.  - Because patient was only intermittently accepting scheduled Ativan and some symptoms of catatonia are re-emerging, pursued Alcides Kohler for ECT. She also continues to exhibit symptoms of psychosis and has not responded or has experienced side effects from multiple neuroleptic medications. Court hearing was held on 5/21. Alcides Kohler is approved. Patient is medically cleared. COVID negative on 5/24. Obtained ECT consult on 5/21/21. Please see Dr. Lubin's consultation note.   - NPO 8 hours prior to scheduled treatment. Clear liquids until 2 hours prior to treatment.   - Discussed with IM. May resume AM antihypertensives, no need to hold prior to ECT  - SIO  "while patient is NPO, starting at midnight on ECT days (renewed)  - Will complete acute course in the hospital; will likely need maintenance ECT in outpatient setting.   - New concerns about memory impairments: Switched from bilateral to unilateral ECT on 6/7 and continue to monitor closely, ultimately decided to discontinue treatments given cognitive impairment.    Acute Medical Problems and Treatments:  BRANDON, resolved:   Pre-renal in nature, likely secondary to decreased oral intake.   - IM Consulted on 6/10. Appreciate assistance. Follow-up note placed on 6/16 (no further work-up recommended).  - avoid nephrotoxins  - Renal bladder US completed on 6/10  - Encourage fluid intake.    - Repeat BMP on 7/2 was wnl.    HTN: Patient is now adherent with medication regimen.   - Metoprolol succinate  mg   - Cozaar 100 mg daily  - Amlodipine 5 mg daily  - Continue hydralazine 25 mg QID PRN for SBP > 160 mmHg or DBP > 110 mmHg.  - Please see note from IM dated 5/3, 5/6, 5/24, 6/20 and 6/30/21.  - Obtained EKG and routine labs on 5/21 in context of pt declining vital sign checks and anti-hypertensives and recently elevated BPs. Reviewed labs and discussed EKG findings with IM on 5/21. No urgent concerns, though IM should be notified if pt develops acute medical concerns (I.e. heart palpitations, SOB, changes in speech, AMS, confusion, CP, HA, changes in vision).    - Per 6/20 IM note: \"Please notify IM if BP is persistently severely elevated and requiring frequent PRN hydralazine\".  - Internal medicine consulted again on 6/28 due to consistent use of hydralazine over the past week. Metoprolol increased to 75 mg daily then to 100 mg and amlodipine 5 mg was added on 6/30. Amlodipine increased to 7.5 mg daily on 7/5.      CVA:  - Aspirin 81 mg daily     Chronic Constipation:  - Miralax 17 mg daily    Urinary frequency and urgency, resolved  Reported new-onset urinary frequency and urgency on 6/21. She denied dysuria, " hematuria, and suprapubic pain. Etiology unclear, however Michelle reported that this was time-limited and improved on 6/23.  - Urinalysis ordered on 6/22 was within normal limits.    ANC trending downward since admission, still WNL, resolved  - Per conversation with pharmacy, medications Michelle is taking that are associated with  agranulocytosis include lorazepam (scheduled), metoprolol (scheduled), hydralazine (PRN), and olanzapine (scheduled).   - Will consult internal medicine for further guidance if Michelle declines future blood draws and we are not able to monitor her ANC.   - CBC with differential WNL on 6/21     Behavioral/Psychological/Social:  - Encourage unit programming  - Patient is now Code 3 status and can take walks in the [place] with staff and security present per staff discretion. This appears to be quite therapeutic for her.     Safety:  - Continue precautions as noted above  - Status 15 minute checks  - Safety precautions include: assault and elopement precautions  - Continue precautions as noted above    Legal Status: Committed as MI with Hatch in place for Haldol, Zyprexa, Invega, and Thorazine through [facility]. Filed Alcides Kohler through [facility] and approved on 5/24/21.     Disposition Plan   Reason for ongoing admission: poses an imminent risk to self, poses an imminent risk to others and is unable to care for self due to severe psychosis or tad  Discharge location: Group home (needs CADI waiver) vs home with [facility] ACT team or 24/7 in-home care. CPT assessment done by OT (tico queen) on 7/1. Please see note for details. [place] Choice Assessment planned for 7/12  Discharge Medications: not ordered  Follow-up Appointments: not scheduled     Gabby Zaman MD  [facility]

## 2021-07-09 NOTE — PLAN OF CARE
Assessment/Intervention/Current Symtoms and Care Coordination  -Chart review  -Attended Team Meeting  Current Symptoms include the following: Irritable and confused at times. Patient is isolative to her room and does not participate in unit programming or therapy groups.     Discharge Plan or Goal  Pending stabilization & development of a safe discharge plan.  Considerations include: Group Home or higher level of care as assisted living or nursing home.     Barriers to Discharge  Patient requires further psychiatric stabilization due to current symptomology: Patient needs to complete CADI Assessment Interview in order to obtain the funding needed for group home placement.     Referral Status  [facility]    Legal Status  Committed/Hatch/Mcgrath Kohler through [facility]

## 2021-07-09 NOTE — PLAN OF CARE
Problem: Mood Impairment (Psychotic Signs/Symptoms)  Goal: Improved Mood Symptoms (Psychotic Signs/Symptoms)  Outcome: No Change  Flowsheets (Taken 2021 1346)  Mutually Determined Action Steps (Improved Mood Symptoms): (pt unwilling to speak with writer, unable to assess) other (see comments)    Pt has declined to speak with writer. Pt is observed spending the shift in her room, pacing around at times, but mostly sitting/lying down without appearing to be in distress. Pt took all scheduled medications without issue. BP this morning was elevated (see flowsheet), but did  to WNL by lunchtime. Pt appeared irritable at that time and would not speak with writer while trying to assess. Pt ate meals and took a shower. Affect is flat/blunted. Does not appear to be responding to internal stimuli. Gives no indication toward intent to harm self. PT invited to groups though declined to participate.   No other concerns at this time. Nursing will continue to monitor and assess.

## 2021-07-09 NOTE — PROGRESS NOTES
Writer spoke with owner of [facility] and set-up a meeting with her  and patient on Wednesday, 7/14/21 @ 11am.  The owner of the group home is unable to visit patient but her coworker is also [place] and will be able to communicate with patient in their common language.

## 2021-07-10 PROCEDURE — 250N000013 HC RX MED GY IP 250 OP 250 PS 637: Performed by: STUDENT IN AN ORGANIZED HEALTH CARE EDUCATION/TRAINING PROGRAM

## 2021-07-10 PROCEDURE — 250N000013 HC RX MED GY IP 250 OP 250 PS 637: Performed by: PHYSICIAN ASSISTANT

## 2021-07-10 PROCEDURE — 124N000002 HC R&B MH UMMC

## 2021-07-10 PROCEDURE — 250N000013 HC RX MED GY IP 250 OP 250 PS 637: Performed by: PSYCHIATRY & NEUROLOGY

## 2021-07-10 RX ADMIN — LORAZEPAM 2 MG: 2 TABLET ORAL at 19:45

## 2021-07-10 RX ADMIN — METOPROLOL SUCCINATE 75 MG: 25 TABLET, EXTENDED RELEASE ORAL at 08:56

## 2021-07-10 RX ADMIN — LORAZEPAM 1 MG: 1 TABLET ORAL at 08:57

## 2021-07-10 RX ADMIN — BENZTROPINE MESYLATE 1 MG: 1 TABLET ORAL at 08:56

## 2021-07-10 RX ADMIN — OLANZAPINE 10 MG: 10 TABLET, ORALLY DISINTEGRATING ORAL at 19:45

## 2021-07-10 RX ADMIN — LORAZEPAM 1 MG: 1 TABLET ORAL at 15:04

## 2021-07-10 RX ADMIN — OLANZAPINE 10 MG: 10 TABLET, ORALLY DISINTEGRATING ORAL at 08:57

## 2021-07-10 RX ADMIN — AMLODIPINE BESYLATE 7.5 MG: 2.5 TABLET ORAL at 08:56

## 2021-07-10 RX ADMIN — BENZTROPINE MESYLATE 1 MG: 1 TABLET ORAL at 19:45

## 2021-07-10 RX ADMIN — LOSARTAN POTASSIUM 100 MG: 100 TABLET, FILM COATED ORAL at 08:57

## 2021-07-10 ASSESSMENT — ACTIVITIES OF DAILY LIVING (ADL)
ORAL_HYGIENE: INDEPENDENT
ORAL_HYGIENE: INDEPENDENT
DRESS: SCRUBS (BEHAVIORAL HEALTH)
HYGIENE/GROOMING: INDEPENDENT
HYGIENE/GROOMING: INDEPENDENT
DRESS: SCRUBS (BEHAVIORAL HEALTH);INDEPENDENT

## 2021-07-10 NOTE — PLAN OF CARE
Patient presents with calm mood and blunted affect with notable brightening upon approach.  She slept poorly during the NOC shift of 7/10/21 with only 2 hours of sleep noted.  This appears to be a sleep hygiene issue, as Michelle was noted to be napping and resting in bed for the balance of the morning hours.  Michelle is quite pleasant and cooperative on approach, and she is now accepting all of her scheduled medications without demonstrating evidence of paranoia or agitation.  She has been making appropriate requests of unit staff to obtain  hygiene maintenance products as needed.  She denies any manic, psychotic, or depressive symptoms when asked.  She denies any self harm or aggressive ideation.  She denies any acute physical concerns at this time.

## 2021-07-10 NOTE — PLAN OF CARE
Pt was again isolated and withdrawn to her room for most of the evening.  She presents as overall blunted and flat, but brightened when interacting with RN writer.  She smiled and thanked RN for bring her medication and attempting to check in with her.  She denied any discomfort.  Showered during day shift.  She had a good appetite.  She was medication compliant.  She denied SI/SIB/HI or any psychotic symptoms.  Patient hypertensive. BP = 143/77

## 2021-07-10 NOTE — PLAN OF CARE
Problem: Sleep Disturbance (Psychotic Signs/Symptoms)  Goal: Improved Sleep (Psychotic Signs/Symptoms)  Outcome: Declining     Pt appeared awake most of the night, Pt was seen sitting in bd quietly. Writer offered Pt PRN but Pt declined. No behavioral concerns noted. Will continue to monitor.

## 2021-07-11 PROCEDURE — 250N000013 HC RX MED GY IP 250 OP 250 PS 637: Performed by: PSYCHIATRY & NEUROLOGY

## 2021-07-11 PROCEDURE — 250N000013 HC RX MED GY IP 250 OP 250 PS 637: Performed by: STUDENT IN AN ORGANIZED HEALTH CARE EDUCATION/TRAINING PROGRAM

## 2021-07-11 PROCEDURE — 124N000002 HC R&B MH UMMC

## 2021-07-11 PROCEDURE — 250N000013 HC RX MED GY IP 250 OP 250 PS 637: Performed by: PHYSICIAN ASSISTANT

## 2021-07-11 RX ADMIN — LORAZEPAM 2 MG: 2 TABLET ORAL at 20:28

## 2021-07-11 RX ADMIN — OLANZAPINE 10 MG: 10 TABLET, ORALLY DISINTEGRATING ORAL at 20:28

## 2021-07-11 RX ADMIN — LORAZEPAM 1 MG: 1 TABLET ORAL at 09:17

## 2021-07-11 RX ADMIN — LORAZEPAM 1 MG: 1 TABLET ORAL at 14:05

## 2021-07-11 RX ADMIN — AMLODIPINE BESYLATE 7.5 MG: 2.5 TABLET ORAL at 09:15

## 2021-07-11 RX ADMIN — OLANZAPINE 10 MG: 10 TABLET, ORALLY DISINTEGRATING ORAL at 09:17

## 2021-07-11 RX ADMIN — BENZTROPINE MESYLATE 1 MG: 1 TABLET ORAL at 09:16

## 2021-07-11 RX ADMIN — LOSARTAN POTASSIUM 100 MG: 100 TABLET, FILM COATED ORAL at 09:17

## 2021-07-11 RX ADMIN — METOPROLOL SUCCINATE 75 MG: 25 TABLET, EXTENDED RELEASE ORAL at 09:16

## 2021-07-11 RX ADMIN — BENZTROPINE MESYLATE 1 MG: 1 TABLET ORAL at 20:28

## 2021-07-11 ASSESSMENT — ACTIVITIES OF DAILY LIVING (ADL)
DRESS: SCRUBS (BEHAVIORAL HEALTH)
HYGIENE/GROOMING: INDEPENDENT
ORAL_HYGIENE: INDEPENDENT
ORAL_HYGIENE: INDEPENDENT
DRESS: SCRUBS (BEHAVIORAL HEALTH);INDEPENDENT
HYGIENE/GROOMING: INDEPENDENT

## 2021-07-11 NOTE — PLAN OF CARE
Patient presents as quiet and socially withdrawn with a blunted affect.  She has been mostly isolative to her room this shift, but she has been able to remain awake today after sleeping well last night.  She is calm, pleasant, and cooperative on approach.  She has been attending to her  hygiene maintenance.  She accepted her scheduled medications throughout this shift without incident.  She has not exhibited any signs of agitation or aggressive behavior this shift.  She denies any current symptoms of mental illness.  She denies any dangerous ideations or intent.  She denies any acute physical concerns or medication side effects.  BP elevated prior to the administration of her antihypertensive regimen, but WNL upon reassessment this afternoon.  Her vital signs have otherwise been stable.

## 2021-07-11 NOTE — PLAN OF CARE
Pt was again isolated and withdrawn to her room for much of the evening.  She came out to get and return her meal tray, but otherwise remained in her room.  She presents as overall blunted and flat, but brightens with staff interaction.  She denied feeling depressed, but said that she is tired of being in the hospital.  She was medication compliant without issue.  Her appetite is good.  She denied any discomfort.  She denied SI/SIB/HI or any psychotic symptoms.  V/S/S.

## 2021-07-11 NOTE — PLAN OF CARE
Problem: Sleep Disturbance  Goal: Adequate Sleep/Rest  Outcome: Improving     Pt appeared to be sleeping for 5.75 hours. No concerns noted this shift. Will continue to monitor.

## 2021-07-12 PROCEDURE — 250N000013 HC RX MED GY IP 250 OP 250 PS 637: Performed by: PHYSICIAN ASSISTANT

## 2021-07-12 PROCEDURE — 250N000013 HC RX MED GY IP 250 OP 250 PS 637: Performed by: STUDENT IN AN ORGANIZED HEALTH CARE EDUCATION/TRAINING PROGRAM

## 2021-07-12 PROCEDURE — 250N000013 HC RX MED GY IP 250 OP 250 PS 637: Performed by: PSYCHIATRY & NEUROLOGY

## 2021-07-12 PROCEDURE — 99232 SBSQ HOSP IP/OBS MODERATE 35: CPT | Mod: GC | Performed by: PSYCHIATRY & NEUROLOGY

## 2021-07-12 PROCEDURE — 124N000002 HC R&B MH UMMC

## 2021-07-12 RX ADMIN — METOPROLOL SUCCINATE 75 MG: 25 TABLET, EXTENDED RELEASE ORAL at 09:12

## 2021-07-12 RX ADMIN — LORAZEPAM 1 MG: 1 TABLET ORAL at 13:59

## 2021-07-12 RX ADMIN — LORAZEPAM 1 MG: 1 TABLET ORAL at 09:12

## 2021-07-12 RX ADMIN — BENZTROPINE MESYLATE 1 MG: 1 TABLET ORAL at 09:12

## 2021-07-12 RX ADMIN — OLANZAPINE 10 MG: 10 TABLET, ORALLY DISINTEGRATING ORAL at 09:12

## 2021-07-12 RX ADMIN — LORAZEPAM 2 MG: 2 TABLET ORAL at 20:35

## 2021-07-12 RX ADMIN — LOSARTAN POTASSIUM 100 MG: 100 TABLET, FILM COATED ORAL at 09:12

## 2021-07-12 RX ADMIN — OLANZAPINE 10 MG: 10 TABLET, ORALLY DISINTEGRATING ORAL at 20:35

## 2021-07-12 RX ADMIN — BENZTROPINE MESYLATE 1 MG: 1 TABLET ORAL at 20:35

## 2021-07-12 RX ADMIN — AMLODIPINE BESYLATE 7.5 MG: 2.5 TABLET ORAL at 09:12

## 2021-07-12 ASSESSMENT — ACTIVITIES OF DAILY LIVING (ADL)
ORAL_HYGIENE: INDEPENDENT
HYGIENE/GROOMING: INDEPENDENT
ORAL_HYGIENE: INDEPENDENT
DRESS: SCRUBS (BEHAVIORAL HEALTH)
HYGIENE/GROOMING: INDEPENDENT
DRESS: SCRUBS (BEHAVIORAL HEALTH)

## 2021-07-12 NOTE — PLAN OF CARE
Patient presents as calm, pleasant, and cooperative on approach.  She has been mostly isolative to her room this shift.  Her affect is blunted, but there is brightening of her affect when approached by unit staff.  She continues to accept all of her scheduled medications without incident.  She has also been cooperative with more frequent blood pressure assessments.  She denies that she is experiencing any psychotic symptoms or dangerous ideations at this time.  She did not exhibit any signs of agitation or aggressive behavior this shift.  She denies any acute physical concerns or medications side effects at this time.

## 2021-07-12 NOTE — PLAN OF CARE
Assessment/Intervention/Current Symtoms and Care Coordination  -Chart review  -Rounded with team, addressed patient needs/concerns  Met with patient to discuss [place] that was cancelled today due to patient's unwillingness to agree to consent to support services through [facility]/   Current Symptoms include the following: Irritability and confusion regarding discharge plan of assisted living or group home placement.     Discharge Plan or Goal  Pending stabilization & development of a safe discharge plan.  Considerations include:  Higher level of care such as assisted living or group home placement. At the very least     Barriers to Discharge  Patient requires further psychiatric stabilization due to current symptomology: Patient is unable to care for herself if she returns home alone so the discharge plan will include group home or assisted living/care facility.     Referral Status  Referrals have been made to [facility].     Legal Status  Committed/Hatch/Alcides Kohler

## 2021-07-12 NOTE — PROGRESS NOTES
"[facility], [place]   Psychiatric Progress Note  Hospital Day: 89        Interim History:   The patient's care was discussed with the treatment team during the daily team meeting and/or staff's chart notes were reviewed. Her blood pressure has been intermittently elevated, otherwise vitals WNL. No sxs of delirium. She took all medications as scheduled and no PRNs. No acute medical concerns. She has been completing ADLs. Sleeping and eating well. No overt sx/signs of psychosis or tad. No SI/HI/AH/VH reported. No aggressive or violent behaviors. Remains isolative to her room. Brightens on approach.     Michelle was standing in the milieu prior to our interview.  She asked about the [place] and was upset to learn that it has been canceled.  She reported that she is willing to complete this assessment.  Michelle was informed regarding the upcoming meeting with a member of the group home staff that is planned for Wednesday.  She replied \"I do not want to talked with the group home.  I cannot do that.  You cannot force me.  I am safe in my house. Whatever it is I don't want it.\"  Michelle was informed regarding concerns about her ability to safely live independently and that group home placement may end up being part of the terms of her commitment. She replied to this \" I am not under commitment because I don't have symptoms.\" Team discussed that she was displaying symptoms when she first presented and that some patients have a hard time recognizing her symptoms.  She stated \"I did not have symptoms before the hospital or from the beginning. Why are you keeping me here for 4 months?\"  She again stated that she does not want to go to the group home and that she wants to bring support to her house instead.  When asked about her fears about going to a group home she stated \"I will get sick if I go there\" though did not elaborate further when asked what illness she was concerned about. She did " not express any other concerns today aside from wanting to leave to go home.         Medications:       amLODIPine  7.5 mg Oral Daily     benztropine  1 mg Oral BID     LORazepam  1 mg Oral BID    Or     LORazepam  1 mg Intramuscular BID     LORazepam  2 mg Oral QPM    Or     LORazepam  2 mg Intramuscular QPM     losartan  100 mg Oral Daily     metoprolol succinate ER  75 mg Oral Daily     OLANZapine zydis  10 mg Oral BID    Or     OLANZapine  10 mg Intramuscular BID          Allergies:     Allergies   Allergen Reactions     Haldol [Haloperidol]      Patient previously tolerated haldol, though developed oculogyric crises during hospital stay on 4/26/21 on total daily dose of 10 mg.     Lisinopril Cough          Labs:     No results found for this or any previous visit (from the past 24 hour(s)).       Psychiatric Examination:     BP (!) 143/76   Pulse 62   Temp 97.6  F (36.4  C)   Resp 16   Wt 69.8 kg (153 lb 14.4 oz)   SpO2 98%   BMI 26.42 kg/m    Weight is 153 lbs 14.4 oz  Body mass index is 26.42 kg/m .    Weight over time:  Vitals:    05/25/21 0850 06/15/21 0811 06/16/21 1605 06/19/21 0859   Weight: 71 kg (156 lb 8 oz) 69.8 kg (153 lb 12.8 oz) 70.5 kg (155 lb 8 oz) 71.4 kg (157 lb 8 oz)    06/21/21 0805 06/22/21 0839 06/24/21 0800 07/02/21 0835   Weight: 69.5 kg (153 lb 3.2 oz) 69.3 kg (152 lb 11.2 oz) 70.7 kg (155 lb 12.8 oz) 70.7 kg (155 lb 12.8 oz)    07/03/21 0844 07/06/21 0838 07/10/21 0845   Weight: 69.6 kg (153 lb 8 oz) 70.9 kg (156 lb 4.8 oz) 69.8 kg (153 lb 14.4 oz)       Orthostatic Vitals         Most Recent      Lying Orthostatic /91 06/23 0830    Lying Orthostatic Pulse (bpm) 67 06/23 0830    Sitting Orthostatic /89  Comment: notify RN 06/24 0800    Sitting Orthostatic Pulse (bpm) 71 06/24 0800    Standing Orthostatic /101  Comment: notify RN 06/24 0800    Standing Orthostatic Pulse (bpm) 86 06/24 0800            Cardiometabolic risk assessment. 04/15/21      Reviewed patient  "profile for cardiometabolic risk factors    Date taken /Value  REFERENCE RANGE   Abdominal Obesity  (Waist Circumference)   See nursing flowsheet Women ?35 in (88 cm)   Men ?40 in (102 cm)      Triglycerides  Triglycerides   Date Value Ref Range Status   10/02/2020 86 <=149 mg/dL Final   10/19/2019 117 <150 mg/dL Final       ?150 mg/dL (1.7 mmol/L) or current treatment for elevated triglycerides   HDL cholesterol  HDL Cholesterol   Date Value Ref Range Status   10/19/2019 56 >49 mg/dL Final     Direct Measure HDL   Date Value Ref Range Status   10/02/2020 45 (L) >=50 mg/dL Final   ]   Women <50 mg/dL (1.3 mmol/L) in women or current treatment for low HDL cholesterol  Men <40 mg/dL (1 mmol/L) in men or current treatment for low HDL cholesterol     Fasting plasma glucose (FPG) Lab Results   Component Value Date     10/19/2019      FPG ?100 mg/dL (5.6 mmol/L) or treatment for elevated blood glucose   Blood pressure  BP Readings from Last 3 Encounters:   07/11/21 (!) 143/76   06/04/19 131/71   04/26/19 164/86    Blood pressure ?130/85 mmHg or treatment for elevated blood pressure   Family History  See family history     Appearance: dressed in hospital scrubs, appeared reported age, appeared well groomed  Attitude: cooperative with interview  Eye Contact: fair, looks down, intense at other times   Mood: \"okay\"  Affect:  Somewhat blunted, more irritable regarding discharge   Speech: accented, clear and coherent  Language: no obvious receptive or expressive deficits  Psychomotor, Gait, Musculoskeletal: No abnormal movements noted while seated.   Thought Process: goal-oriented, linear  Associations:  No loosening of associations present  Thought Content:  Did not appear to be responding to internal stimuli.  Insight:  limited  Judgement:  fair  Oriented to: date, location, treatment team.   Attention Span and Concentration: attentive to conversation.  Recent and Remote Memory: improved, no obvious deficits   Fund of " Knowledge:  normal    Clinical Global Impressions  First:  Considering your total clinical experience with this particular patient population, how severe are the patient's symptoms at this time?: 7 (04/16/21 1428)  Compared to the patient's condition at the START of treatment, this patient's condition is: 4 (04/16/21 1428)  Most recent:  Considering your total clinical experience with this particular patient population, how severe are the patient's symptoms at this time?: 7 (06/22/21 1529)  Compared to the patient's condition at the START of treatment, this patient's condition is: 3 (06/22/21 1529)         Precautions:     Behavioral Orders   Procedures     Cheeking Precautions (behavioral units)     Patient Observed swallowing PO medications; Patient asked to drink water after swallowing medication; Patient in Staff line of sight for 15 minutes after medication given; Mouth checks after PO administration (patient asked to open mouth and stick out their tongue).     Code 3     For walks in the [place] with staff and per staff discretion     Electroconvulsive therapy     Series of up to 12 treatments. Begin Date: 5/26/21     Treating Psychiatrist providing ECT:  Dr. Lubin     Notified on:  5/21/21     Electroconvulsive therapy     As long as we get the green light from risk management and pt is medically cleared, begin ECT every Monday, Wednesday, and Friday     Electroconvulsive therapy     Series of up to 12 treatments. Begin Date: 5/25/21     Treating Psychiatrist providing ECT:  Amee     Notified on:  5/24/21     Elopement precautions     Fall precautions     Mcgrath Calderon     Routine Programming     As clinically indicated     Status 15     Every 15 minutes.          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 Symptoms and presentation at this time is most consistent with Schizoaffective Disorder, Bipolar Type. Obtained most recent medication regimen from patient's ACT team, and regimen was initially restarted. Pt is committed. Petitioned for Mcgrath Calderon was filed and granted due to lack of improvement and side effects from medications. Inpatient psychiatric hospitalization is warranted at this time for safety, stabilization, possible adjustment in medications and development of a safe discharge plan.     Hospital Course:  On admission, PTA medications were restarted. However, Michelle had been declining all scheduled medications despite significant encouragement from staff and provider. Psychiatric emergency declared on 4/20 due to aggression toward others in context of severe psychosis and suspected excited catatonia. Ativan 1 mg TID was also added. Discontinued PTA Invega, Zyprexa, and thorazine on 4/20 due to consistent refusal.     On 4/26, it was noted that patient frequently had upward gaze while walking up and down the unit. She did not appear to be distressed. She reported that she was looking at \"my god.\" It was determined to be oculogyric crisis secondary to IM haloperidol. Haldol was subsequently discontinued and scheduled Zyprexa was initiated on an emergency basis. Oral Cogentin was also scheduled, though patient declined. On the evening of 4/26, patient's gaze was fixed in upward position for several hours and she appeared to be experiencing discomfort. IM Cogentin was administered with noted resolution. Partial improvements were observed after switching to scheduled Zyprexa. After " "patient improved, she was more receptive to reinitiating oral Invega, which was initiated on 5/3 and titrated to PTA dose of 9 mg daily on 5/10. Plan was for ACT team to bring in loading dose of Invega Sustenna. Patient had signs of oculogyric crisis again with Invega. Oral dose decreased to 6 mg after this. Invega was stopped on 5/14 due to ongoing signs of problems related to eye movement and concerns for oculogyric crisis.    Overall improvement in patient's agitation and suspected catatonia noted on 4/30 after patient accepted two doses of Ativan. She began declining Ativan again with noted decompensation. Patient began accepting, however, was deemed not to have the capacity to consent to treatment with Ativan. She does not believe she has a mental illness, including catatonia. She does not fully understand risks associated with inadequate treatment of catatonia. Discussed with her son who is acting as surrogate decision maker and he is in full support of forced scheduled IM Ativan if patient declines oral formulation. Also consulted with our legal team prior to backing oral Ativan with IM.     Ativan was increased on 5/19 due to re-emerging evidence of catatonia (long periods of staring, repetitive movements, echolalia, mutism). Meeting was held with ACT team on 5/20, including ACT team psychiatrist, Dr. Pedraza. He said that he is in \"full support\" of plan to pursue Mcgrath Kohler and ECT at this time. He does feel that in the past she has been discharged from the hospital while still quite symptomatic. He is hoping that ECT will be effective and that with improvement, Michelle would be more receptive to clozapine and weekly blood draws. He said that ideally she would transition to an IRTS before going back to her apartment, but also understands there may be some barriers (I.e. pt's willingness, financial concerns, etc).     ECT consult placed. Please see consult note by Dr. Lubin on 5/21 for details. Mcgrath " Kohler was approved on 5/24 and patient was medically cleared on 5/24. ECT initiated on 5/26. She was noted to have a short seizure during ECT on 6/4. Staff note that patient appears more disoriented with memory impairment and sedation on days of ECT. This was noted on my examination again today. Improvements noted in mood, affect, social interactions, paranoia, agitation since initiation of ECT. Reduced Ativan on 6/5 due to sedative effects. Relayed concerns about memory impairment and confusion with Dr. Lubin. Recommended attempting unilateral ECT, which he agreed to do. First unilateral ECT on 6/7. ECT was held on 6/11 and 6/14 due to memory impairment. We will plan to hold it again tomorrow (6/16). There is ongoing discussion regarding whether there is a component of catatonia contributing to her current presentation but this is less likely since it worsened after ECT was started.  Given her level of cognitive impairment and our belief that this is due to ECT, we will not pursue any further treatments at this time. Since we have held ECT (last treatment on 6/9), her cognitive impairment has slightly improved (more oriented).     Michelle initially appeared to be decompensating somewhat when ECT was held, though her cognitive impairment has gradually improved. If symptoms of psychosis re-emerge, it may be worth starting clozapine, which is something that has previously been considered by her ACT team. Her Hatch order would need to be amended as clozapine is not one of the medications listed. ANC obtained on 6/16 was 1800/microL. Per conversation with pharmacy, neutropenia has been associated with olanzapine and agranulocytosis has been associated with olanzapine, lorazepam, metoprolol, and hydralazine and hematologic labs have been monitored. Upon re-check, ANC was 3700/microL on 6/21/21. At this time, the primary symptoms we are observing are cognitive deficits and inability to care for self, which we would not  address by changing her antipsychotic medication.    Michelle's cognitive impairment has been steadily improving since holding ECT treatments, however it is possible that lorazepam is also playing a role in her cognitive impairment. Given no signs of re-emerging catatonia, a gradual lorazepam taper (decreasing by 0.5 mg) was initiated on 6/28/21 to monitor for potential improvements with regard to memory impairment.  On 7/7, Michelle reported an incident of oculogyric crisis and Cogentin 1 mg BID was initiated with no noted changes in cognition.     Michelle has remained focused on discharge. The team is working with Michelle's ACT team to to establish a safe discharge plan with options including moving to a group home vs home with her ACT team and additional in home supports via CADI services. We are concerned that Michelle would have difficulty taking her medications as prescribed if she were to return home without her ACT team.     Target psychiatric symptoms and interventions:   - Continue 1 mg PO or IM lorazepam in the morning and at mid-day and 2 mg PO or IM lorazepam in the evening. Decrease mid-day lorazepam to 1 mg PO or IM. Patient may not decline.   - Continue Zyprexa 10 mg BID  PO or IM. Hatch in place. May consider increasing further though holding off given re-emerging catatonic sx and borderline prolonged QTc   - Continue Cogentin 1 mg BID   -Continue Cogentin 2 mg IM daily prn for evidence of acute dystonic reaction or oculogyric crisis  -Continue hydroxyzine 25-50 mg q4h prn for acute anxiety  -Continue Trazodone 50 mg at bedtime prn for sleep disturbances  -Continue Zyprexa 10 mg TID prn for severe agitation  -Continue Ativan/Benadryl q4h prn for agitation. WOULD GIVE PRN ATIVAN FIRST FOR AGITATION unless it is after 5 pm on day prior to scheduled ECT    Occupational Therapy Consult Placed to evaluate cognitive functioning/ability to care for self in home environment, ability to manage medications. See note on 7/1  for details.      ECT:   - ECT DISCONTINUED DUE TO COGNITIVE IMPAIRMENT.  - Because patient was only intermittently accepting scheduled Ativan and some symptoms of catatonia are re-emerging, pursued Mcgrath Kohler for ECT. She also continues to exhibit symptoms of psychosis and has not responded or has experienced side effects from multiple neuroleptic medications. Court hearing was held on 5/21. Alcides Kohler is approved. Patient is medically cleared. COVID negative on 5/24. Obtained ECT consult on 5/21/21. Please see Dr. Lubin's consultation note.   - NPO 8 hours prior to scheduled treatment. Clear liquids until 2 hours prior to treatment.   - Discussed with IM. May resume AM antihypertensives, no need to hold prior to ECT  - SIO while patient is NPO, starting at midnight on ECT days (renewed)  - Will complete acute course in the hospital; will likely need maintenance ECT in outpatient setting.   - New concerns about memory impairments: Switched from bilateral to unilateral ECT on 6/7 and continue to monitor closely, ultimately decided to discontinue treatments given cognitive impairment.    Acute Medical Problems and Treatments:  BRANDON, resolved:   Pre-renal in nature, likely secondary to decreased oral intake.   - IM Consulted on 6/10. Appreciate assistance. Follow-up note placed on 6/16 (no further work-up recommended).  - avoid nephrotoxins  - Renal bladder US completed on 6/10  - Encourage fluid intake.    - Repeat BMP on 7/2 was wnl.    HTN: Patient is now adherent with medication regimen.   - Metoprolol succinate  mg   - Cozaar 100 mg daily  - Amlodipine 5 mg daily  - Continue hydralazine 25 mg QID PRN for SBP > 160 mmHg or DBP > 110 mmHg.  - Please see note from IM dated 5/3, 5/6, 5/24, 6/20 and 6/30/21.  - Obtained EKG and routine labs on 5/21 in context of pt declining vital sign checks and anti-hypertensives and recently elevated BPs. Reviewed labs and discussed EKG findings with IM on 5/21. No  "urgent concerns, though IM should be notified if pt develops acute medical concerns (I.e. heart palpitations, SOB, changes in speech, AMS, confusion, CP, HA, changes in vision).    - Per 6/20 IM note: \"Please notify IM if BP is persistently severely elevated and requiring frequent PRN hydralazine\".  - Internal medicine consulted again on 6/28 due to consistent use of hydralazine over the past week. Metoprolol increased to 75 mg daily then to 100 mg and amlodipine 5 mg was added on 6/30. Amlodipine increased to 7.5 mg daily on 7/5.      CVA:  - Aspirin 81 mg daily     Chronic Constipation:  - Miralax 17 mg daily    Urinary frequency and urgency, resolved  Reported new-onset urinary frequency and urgency on 6/21. She denied dysuria, hematuria, and suprapubic pain. Etiology unclear, however Michelle reported that this was time-limited and improved on 6/23.  - Urinalysis ordered on 6/22 was within normal limits.    ANC trending downward since admission, still WNL, resolved  - Per conversation with pharmacy, medications Michelle is taking that are associated with  agranulocytosis include lorazepam (scheduled), metoprolol (scheduled), hydralazine (PRN), and olanzapine (scheduled).   - Will consult internal medicine for further guidance if Michelle declines future blood draws and we are not able to monitor her ANC.   - CBC with differential WNL on 6/21     Behavioral/Psychological/Social:  - Encourage unit programming  - Patient is now Code 3 status and can take walks in the [place] with staff and security present per staff discretion. This appears to be quite therapeutic for her.     Safety:  - Continue precautions as noted above  - Status 15 minute checks  - Safety precautions include: assault and elopement precautions  - Continue precautions as noted above    Legal Status: Committed as MI with Hatch in place for Haldol, Zyprexa, Invega, and Thorazine through [facility]. Filed Alcides Kohler through [facility] and " approved on 5/24/21.     Disposition Plan   Reason for ongoing admission: poses an imminent risk to self, poses an imminent risk to others and is unable to care for self due to severe psychosis or tad  Discharge location: Group home (needs CADI waiver) vs home with [facility] ACT team or 24/7 in-home care. CPT assessment done by OT (tico queen) on 7/1. Please see note for details. [place]   Discharge Medications: not ordered  Follow-up Appointments: not scheduled     Bijal Varner MD  Psychiatry PGY-4 Resident

## 2021-07-12 NOTE — PLAN OF CARE
Pt had a typical evening.  She spent much of the evening isolated and withdrawn to her room.  She did call her son this evening and appeared some what tense while on the phone.  She presented as overall blunted and flat, but again brightened with staff interaction. She ate well and continues to have a good appetite.  She was medication compliant.  She denied any discomfort or mental health concerns. She reported wanting to leave the hospital.  She denied SI/SIB/HI or any psychotic symptoms.

## 2021-07-12 NOTE — PROGRESS NOTES
"Pt strongly refused getting her labs completed.  RN writer attempted to explain to patient the purpose of completing the blood draw and patient became agitated, saying \"You are liars!\"    "

## 2021-07-12 NOTE — PROGRESS NOTES
Brief Medicine Note    Medicine following BP peripherally. Is on Amlodipine 7.5mg daily, Metoprolol XL 75mg daily and Losartan 100mg daily (titrated this admission). BP have been relatively stable over the past week with last BP medication change 7/4. Has not required Hydralazine.     A:P  # Hypertension:  - Continue Amlodipine 7.5mg daily, Metoprolol XL 75mg daily and Losartan 100mg daily  - Will obtain BMP, and if stable Medicine will sign off at this time, with plan to please notify medicine if SBP >140 or DBP >90 on persistent basis   - Discontinue Hydralazine as no longer requiring    Please feel free to call with any questions.       Nicole Cadena PA-C  Hospitalist Service  Pager [phone]

## 2021-07-12 NOTE — PLAN OF CARE
Pt asleep at start of shift. Breathing quiet and unlabored.     Pt had no c/o pain or discomfort during the HS.     Appears to have slept 3.25 hours.     Pt on CHEEKING, ELOPEMENT, and FALL precautions in addition to single room order. Any related events noted above.     Will continue to monitor and assess.   Problem: Sleep Disturbance (Psychotic Signs/Symptoms)  Goal: Improved Sleep (Psychotic Signs/Symptoms)  Outcome: Declining

## 2021-07-13 PROCEDURE — 250N000013 HC RX MED GY IP 250 OP 250 PS 637: Performed by: PHYSICIAN ASSISTANT

## 2021-07-13 PROCEDURE — 250N000013 HC RX MED GY IP 250 OP 250 PS 637: Performed by: PSYCHIATRY & NEUROLOGY

## 2021-07-13 PROCEDURE — 124N000002 HC R&B MH UMMC

## 2021-07-13 PROCEDURE — 250N000013 HC RX MED GY IP 250 OP 250 PS 637: Performed by: STUDENT IN AN ORGANIZED HEALTH CARE EDUCATION/TRAINING PROGRAM

## 2021-07-13 PROCEDURE — 99232 SBSQ HOSP IP/OBS MODERATE 35: CPT | Performed by: PSYCHIATRY & NEUROLOGY

## 2021-07-13 RX ADMIN — AMLODIPINE BESYLATE 7.5 MG: 2.5 TABLET ORAL at 08:55

## 2021-07-13 RX ADMIN — LORAZEPAM 2 MG: 2 TABLET ORAL at 19:16

## 2021-07-13 RX ADMIN — BENZTROPINE MESYLATE 1 MG: 1 TABLET ORAL at 08:55

## 2021-07-13 RX ADMIN — LOSARTAN POTASSIUM 100 MG: 100 TABLET, FILM COATED ORAL at 08:55

## 2021-07-13 RX ADMIN — LORAZEPAM 1 MG: 1 TABLET ORAL at 14:01

## 2021-07-13 RX ADMIN — OLANZAPINE 10 MG: 10 TABLET, ORALLY DISINTEGRATING ORAL at 19:16

## 2021-07-13 RX ADMIN — BENZTROPINE MESYLATE 1 MG: 1 TABLET ORAL at 19:16

## 2021-07-13 RX ADMIN — OLANZAPINE 10 MG: 10 TABLET, ORALLY DISINTEGRATING ORAL at 08:55

## 2021-07-13 RX ADMIN — METOPROLOL SUCCINATE 75 MG: 25 TABLET, EXTENDED RELEASE ORAL at 08:55

## 2021-07-13 RX ADMIN — LORAZEPAM 1 MG: 1 TABLET ORAL at 08:55

## 2021-07-13 ASSESSMENT — ACTIVITIES OF DAILY LIVING (ADL)
DRESS: SCRUBS (BEHAVIORAL HEALTH)
ORAL_HYGIENE: INDEPENDENT
HYGIENE/GROOMING: HANDWASHING;INDEPENDENT
LAUNDRY: UNABLE TO COMPLETE
ORAL_HYGIENE: INDEPENDENT
HYGIENE/GROOMING: INDEPENDENT
DRESS: SCRUBS (BEHAVIORAL HEALTH)

## 2021-07-13 NOTE — PLAN OF CARE
Problem: Sleep Disturbance  Goal: Adequate Sleep/Rest  Outcome: Improving   Night Shift Summary (7/12/21 into 07/13/21)    Pt asleep at start of shift. Breathing quiet and unlabored.     Pt had no c/o pain or discomfort during the HS.     Appears to have slept 6.5 hours.     Pt on CHEEKING, ELOPEMENT, FALL and INTRUSIVE precautions in addition to single room order. Any related events noted above.     Will continue to monitor and assess.

## 2021-07-13 NOTE — PROGRESS NOTES
Patient was awake when writer arrived on the unit early this morning. Writer attempted to meet with patient to check in and discuss the visit she will have tomorrow with staff from [facility] but patient would not meet and shut the door on writer. Patient appeared angry and quite irritable.

## 2021-07-13 NOTE — PROGRESS NOTES
"[facility], [place]   Psychiatric Progress Note  Hospital Day: 90        Interim History:   The patient's care was discussed with the treatment team during the daily team meeting and/or staff's chart notes were reviewed. Her blood pressure has been intermittently elevated, otherwise vitals WNL. No sxs of delirium. She took all medications as scheduled and no PRNs. No acute medical concerns. She has been completing ADLs. Sleeping and eating well. No overt sx/signs of psychosis or tad. No SI/HI/AH/VH reported. No aggressive or violent behaviors. Remains isolative to her room. Brightens on approach.     Upon interview, Michelle was sitting in her room watching TV. She initially did not respond to questions and did not greet this writer. She said \"All I want is to go home.\" She did not respond to any additional questions when asked. Affect was tense and mood was irritable. No evidence of acute distress.          Medications:       amLODIPine  7.5 mg Oral Daily     benztropine  1 mg Oral BID     LORazepam  1 mg Oral BID    Or     LORazepam  1 mg Intramuscular BID     LORazepam  2 mg Oral QPM    Or     LORazepam  2 mg Intramuscular QPM     losartan  100 mg Oral Daily     metoprolol succinate ER  75 mg Oral Daily     OLANZapine zydis  10 mg Oral BID    Or     OLANZapine  10 mg Intramuscular BID          Allergies:     Allergies   Allergen Reactions     Haldol [Haloperidol]      Patient previously tolerated haldol, though developed oculogyric crises during hospital stay on 4/26/21 on total daily dose of 10 mg.     Lisinopril Cough          Labs:     No results found for this or any previous visit (from the past 24 hour(s)).       Psychiatric Examination:     /80 (BP Location: Left arm)   Pulse 96   Temp 99.4  F (37.4  C) (Oral)   Resp 16   Wt 69.8 kg (153 lb 14.4 oz)   SpO2 100%   BMI 26.42 kg/m    Weight is 153 lbs 14.4 oz  Body mass index is 26.42 kg/m .    Weight over time:  Vitals: "    05/25/21 0850 06/15/21 0811 06/16/21 1605 06/19/21 0859   Weight: 71 kg (156 lb 8 oz) 69.8 kg (153 lb 12.8 oz) 70.5 kg (155 lb 8 oz) 71.4 kg (157 lb 8 oz)    06/21/21 0805 06/22/21 0839 06/24/21 0800 07/02/21 0835   Weight: 69.5 kg (153 lb 3.2 oz) 69.3 kg (152 lb 11.2 oz) 70.7 kg (155 lb 12.8 oz) 70.7 kg (155 lb 12.8 oz)    07/03/21 0844 07/06/21 0838 07/10/21 0845   Weight: 69.6 kg (153 lb 8 oz) 70.9 kg (156 lb 4.8 oz) 69.8 kg (153 lb 14.4 oz)       Orthostatic Vitals         Most Recent      Lying Orthostatic /91 06/23 0830    Lying Orthostatic Pulse (bpm) 67 06/23 0830    Sitting Orthostatic /89  Comment: notify RN 06/24 0800    Sitting Orthostatic Pulse (bpm) 71 06/24 0800    Standing Orthostatic /101  Comment: notify RN 06/24 0800    Standing Orthostatic Pulse (bpm) 86 06/24 0800            Cardiometabolic risk assessment. 04/15/21      Reviewed patient profile for cardiometabolic risk factors    Date taken /Value  REFERENCE RANGE   Abdominal Obesity  (Waist Circumference)   See nursing flowsheet Women ?35 in (88 cm)   Men ?40 in (102 cm)      Triglycerides  Triglycerides   Date Value Ref Range Status   10/02/2020 86 <=149 mg/dL Final   10/19/2019 117 <150 mg/dL Final       ?150 mg/dL (1.7 mmol/L) or current treatment for elevated triglycerides   HDL cholesterol  HDL Cholesterol   Date Value Ref Range Status   10/19/2019 56 >49 mg/dL Final     Direct Measure HDL   Date Value Ref Range Status   10/02/2020 45 (L) >=50 mg/dL Final   ]   Women <50 mg/dL (1.3 mmol/L) in women or current treatment for low HDL cholesterol  Men <40 mg/dL (1 mmol/L) in men or current treatment for low HDL cholesterol     Fasting plasma glucose (FPG) Lab Results   Component Value Date     10/19/2019      FPG ?100 mg/dL (5.6 mmol/L) or treatment for elevated blood glucose   Blood pressure  BP Readings from Last 3 Encounters:   07/13/21 135/80   06/04/19 131/71   04/26/19 164/86    Blood pressure ?130/85 mmHg or  "treatment for elevated blood pressure   Family History  See family history     Appearance: dressed in hospital scrubs, appeared reported age, appeared well groomed  Attitude: cooperative with interview  Eye Contact: fair, looks down, intense at other times   Mood: \"I just want to go home\"  Affect:  Somewhat blunted, more irritable, tense  Speech: accented, clear and coherent  Language: no obvious receptive or expressive deficits  Psychomotor, Gait, Musculoskeletal: No abnormal movements noted while seated.   Thought Process: goal-oriented, linear  Associations:  No loosening of associations present  Thought Content:  Did not appear to be responding to internal stimuli.  Insight:  limited  Judgement:  fair  Oriented to: date, location, treatment team.   Attention Span and Concentration: attentive to conversation.  Recent and Remote Memory: improved, no obvious deficits   Fund of Knowledge:  normal    Clinical Global Impressions  First:  Considering your total clinical experience with this particular patient population, how severe are the patient's symptoms at this time?: 7 (04/16/21 1428)  Compared to the patient's condition at the START of treatment, this patient's condition is: 4 (04/16/21 1428)  Most recent:  Considering your total clinical experience with this particular patient population, how severe are the patient's symptoms at this time?: 7 (06/22/21 1529)  Compared to the patient's condition at the START of treatment, this patient's condition is: 3 (06/22/21 1529)         Precautions:     Behavioral Orders   Procedures     Cheeking Precautions (behavioral units)     Patient Observed swallowing PO medications; Patient asked to drink water after swallowing medication; Patient in Staff line of sight for 15 minutes after medication given; Mouth checks after PO administration (patient asked to open mouth and stick out their tongue).     Code 3     For walks in the [place] with staff and per staff discretion "     Electroconvulsive therapy     Series of up to 12 treatments. Begin Date: 5/26/21     Treating Psychiatrist providing ECT:  Dr. Lubin     Notified on:  5/21/21     Electroconvulsive therapy     As long as we get the green light from risk management and pt is medically cleared, begin ECT every Monday, Wednesday, and Friday     Electroconvulsive therapy     Series of up to 12 treatments. Begin Date: 5/25/21     Treating Psychiatrist providing ECT:  Amee     Notified on:  5/24/21     Elopement precautions     Fall precautions     Alcides Calderon     Routine Programming     As clinically indicated     Status 15     Every 15 minutes.          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 Symptoms and presentation at this time is most consistent with Schizoaffective Disorder, Bipolar Type. Obtained most recent medication regimen from patient's ACT team, and regimen was initially restarted. Pt is committed. Petitioned for Alcides Calderon was filed and granted due to lack of improvement and side effects from medications. Inpatient psychiatric hospitalization is warranted at this time for safety, stabilization, possible adjustment in medications and development of a safe discharge plan.     Hospital Course:  On admission, PTA medications were restarted. However, Michelle had been declining all scheduled medications despite significant encouragement from staff and provider. Psychiatric emergency declared on 4/20 due  "to aggression toward others in context of severe psychosis and suspected excited catatonia. Ativan 1 mg TID was also added. Discontinued PTA Invega, Zyprexa, and thorazine on 4/20 due to consistent refusal.     On 4/26, it was noted that patient frequently had upward gaze while walking up and down the unit. She did not appear to be distressed. She reported that she was looking at \"my god.\" It was determined to be oculogyric crisis secondary to IM haloperidol. Haldol was subsequently discontinued and scheduled Zyprexa was initiated on an emergency basis. Oral Cogentin was also scheduled, though patient declined. On the evening of 4/26, patient's gaze was fixed in upward position for several hours and she appeared to be experiencing discomfort. IM Cogentin was administered with noted resolution. Partial improvements were observed after switching to scheduled Zyprexa. After patient improved, she was more receptive to reinitiating oral Invega, which was initiated on 5/3 and titrated to PTA dose of 9 mg daily on 5/10. Plan was for ACT team to bring in loading dose of Invega Sustenna. Patient had signs of oculogyric crisis again with Invega. Oral dose decreased to 6 mg after this. Invega was stopped on 5/14 due to ongoing signs of problems related to eye movement and concerns for oculogyric crisis.    Overall improvement in patient's agitation and suspected catatonia noted on 4/30 after patient accepted two doses of Ativan. She began declining Ativan again with noted decompensation. Patient began accepting, however, was deemed not to have the capacity to consent to treatment with Ativan. She does not believe she has a mental illness, including catatonia. She does not fully understand risks associated with inadequate treatment of catatonia. Discussed with her son who is acting as surrogate decision maker and he is in full support of forced scheduled IM Ativan if patient declines oral formulation. Also consulted with our " "legal team prior to backing oral Ativan with IM.     Ativan was increased on 5/19 due to re-emerging evidence of catatonia (long periods of staring, repetitive movements, echolalia, mutism). Meeting was held with ACT team on 5/20, including ACT team psychiatrist, Dr. Pedraza. He said that he is in \"full support\" of plan to pursue Mcgrath Kohler and ECT at this time. He does feel that in the past she has been discharged from the hospital while still quite symptomatic. He is hoping that ECT will be effective and that with improvement, Michelle would be more receptive to clozapine and weekly blood draws. He said that ideally she would transition to an IRTS before going back to her apartment, but also understands there may be some barriers (I.e. pt's willingness, financial concerns, etc).     ECT consult placed. Please see consult note by Dr. Lubin on 5/21 for details. Mcgrath Kohler was approved on 5/24 and patient was medically cleared on 5/24. ECT initiated on 5/26. She was noted to have a short seizure during ECT on 6/4. Staff note that patient appears more disoriented with memory impairment and sedation on days of ECT. This was noted on my examination again today. Improvements noted in mood, affect, social interactions, paranoia, agitation since initiation of ECT. Reduced Ativan on 6/5 due to sedative effects. Relayed concerns about memory impairment and confusion with Dr. Lubin. Recommended attempting unilateral ECT, which he agreed to do. First unilateral ECT on 6/7. ECT was held on 6/11 and 6/14 due to memory impairment. We will plan to hold it again tomorrow (6/16). There is ongoing discussion regarding whether there is a component of catatonia contributing to her current presentation but this is less likely since it worsened after ECT was started.  Given her level of cognitive impairment and our belief that this is due to ECT, we will not pursue any further treatments at this time. Since we have held ECT (last " treatment on 6/9), her cognitive impairment has slightly improved (more oriented).     Michelle initially appeared to be decompensating somewhat when ECT was held, though her cognitive impairment has gradually improved. If symptoms of psychosis re-emerge, it may be worth starting clozapine, which is something that has previously been considered by her ACT team. Her Hatch order would need to be amended as clozapine is not one of the medications listed. ANC obtained on 6/16 was 1800/microL. Per conversation with pharmacy, neutropenia has been associated with olanzapine and agranulocytosis has been associated with olanzapine, lorazepam, metoprolol, and hydralazine and hematologic labs have been monitored. Upon re-check, ANC was 3700/microL on 6/21/21. At this time, the primary symptoms we are observing are cognitive deficits and inability to care for self, which we would not address by changing her antipsychotic medication.    Michelle's cognitive impairment has been steadily improving since holding ECT treatments, however it is possible that lorazepam is also playing a role in her cognitive impairment. Given no signs of re-emerging catatonia, a gradual lorazepam taper (decreasing by 0.5 mg) was initiated on 6/28/21 to monitor for potential improvements with regard to memory impairment.  On 7/7, Michelle reported an incident of oculogyric crisis and Cogentin 1 mg BID was initiated with no noted changes in cognition.     Michelle has remained focused on discharge. The team is working with Michelle's ACT team to to establish a safe discharge plan with options including moving to a group home vs home with her ACT team and additional in home supports via CADI services. We are concerned that Michelle would have difficulty taking her medications as prescribed if she were to return home without her ACT team.     Target psychiatric symptoms and interventions:   - Continue 1 mg PO or IM lorazepam in the morning and at mid-day and 2 mg PO or IM  lorazepam in the evening. Decrease mid-day lorazepam to 1 mg PO or IM. Patient may not decline.   - Continue Zyprexa 10 mg BID  PO or IM. Hatch in place. May consider increasing further though holding off given re-emerging catatonic sx and borderline prolonged QTc   - Continue Cogentin 1 mg BID   -Continue Cogentin 2 mg IM daily prn for evidence of acute dystonic reaction or oculogyric crisis  -Continue hydroxyzine 25-50 mg q4h prn for acute anxiety  -Continue Trazodone 50 mg at bedtime prn for sleep disturbances  -Continue Zyprexa 10 mg TID prn for severe agitation  -Continue Ativan/Benadryl q4h prn for agitation. WOULD GIVE PRN ATIVAN FIRST FOR AGITATION unless it is after 5 pm on day prior to scheduled ECT    Occupational Therapy Consult Placed to evaluate cognitive functioning/ability to care for self in home environment, ability to manage medications. See note on 7/1 for details.      ECT:   - ECT DISCONTINUED DUE TO COGNITIVE IMPAIRMENT.  - Because patient was only intermittently accepting scheduled Ativan and some symptoms of catatonia are re-emerging, pursued Alcides Kohler for ECT. She also continues to exhibit symptoms of psychosis and has not responded or has experienced side effects from multiple neuroleptic medications. Court hearing was held on 5/21. Alcides Kohler is approved. Patient is medically cleared. COVID negative on 5/24. Obtained ECT consult on 5/21/21. Please see Dr. Lubin's consultation note.   - NPO 8 hours prior to scheduled treatment. Clear liquids until 2 hours prior to treatment.   - Discussed with IM. May resume AM antihypertensives, no need to hold prior to ECT  - SIO while patient is NPO, starting at midnight on ECT days (renewed)  - Will complete acute course in the hospital; will likely need maintenance ECT in outpatient setting.   - New concerns about memory impairments: Switched from bilateral to unilateral ECT on 6/7 and continue to monitor closely, ultimately decided to  "discontinue treatments given cognitive impairment.    Acute Medical Problems and Treatments:  HTN: Patient is now adherent with medication regimen.   - Metoprolol succinate ER 75 mg   - Cozaar 100 mg daily  - Amlodipine 7.5 mg daily  - IM discontinued hydralazine prn  - Switched BP checks from QID to BID on 7/13 given overall improvement  - Please see note from IM dated 5/3, 5/6, 5/24, 6/20 and 6/30/21.  - Obtained EKG and routine labs on 5/21 in context of pt declining vital sign checks and anti-hypertensives and recently elevated BPs. Reviewed labs and discussed EKG findings with IM on 5/21. No urgent concerns, though IM should be notified if pt develops acute medical concerns (I.e. heart palpitations, SOB, changes in speech, AMS, confusion, CP, HA, changes in vision).    - Per 6/20 IM note: \"Please notify IM if BP is persistently severely elevated and requiring frequent PRN hydralazine\".  - Internal medicine consulted again on 6/28 due to consistent use of hydralazine over the past week. Metoprolol increased to 75 mg daily then to 100 mg and amlodipine 5 mg was added on 6/30. Amlodipine increased to 7.5 mg daily on 7/5.     For previous medical concerns, please see note dated 7/12/21.    Behavioral/Psychological/Social:  - Encourage unit programming  - Patient is now Code 3 status and can take walks in the [place] with staff and security present per staff discretion. This appears to be quite therapeutic for her.     Safety:  - Continue precautions as noted above  - Status 15 minute checks  - Safety precautions include: assault and elopement precautions  - Continue precautions as noted above    Legal Status: Committed as MI with Hatch in place for Haldol, Zyprexa, Invega, and Thorazine through [facility]. Filed Alcides Kohler through [facility] and approved on 5/24/21.     Disposition Plan   Reason for ongoing admission: poses an imminent risk to self, poses an imminent risk to others and is unable to " care for self due to severe psychosis or tad  Discharge location: Group home (needs CADI waiver) vs home with [facility] ACT team or 24/7 in-home care. CPT assessment done by OT (tico queen) on 7/1. Please see note for details. [place] Choice Assessment deferred   Discharge Medications: not ordered  Follow-up Appointments: not scheduled     Gabby Zaman MD  [facility]

## 2021-07-13 NOTE — PLAN OF CARE
Patient presents as calm, pleasant, and cooperative.  Some mild irritability could be noted during medication pass this AM, but Michelle did not verbalize any specific concerns or problems.  She does verbalize stress related to her prolonged hospitalization, but she has not been perseverating on obtaining discharge.  Michelle denies any psychotic symptoms or dangerous ideations at this time.  She has not demonstrated any signs of agitation or aggressive behavior.  She has been accepting of all her scheduled medications without incident.  Vital signs trends have been improving with regard to her HTN, so we are now monitoring her vital signs twice daily per unit routine.  Michelel had demonstrated improvements in her personal hygiene maintenance and sleep pattern.  She denies any acute physical concerns at this time.

## 2021-07-13 NOTE — PLAN OF CARE
Assessment/Intervention/Current Symtoms and Care Coordination  -Chart review  -Rounded with team, addressed patient needs/concerns  Current Symptoms include the following: Irritable and angry regarding Treatment Plan of higher level of care.     Discharge Plan or Goal  Pending stabilization & development of a safe discharge plan.  Considerations include: Group Home Placement or Assisted Living:  Patient has an ACT Team through [facility]    Barriers to Discharge  Patient requires further psychiatric stabilization due to current symptomology: CADI Funding needs to be in place before referrals can be made to group homes and/or assisted living facilities.     Referral Status  [facility] ACT Team CM has made a referral for emergency guardianship for patient's son.      Legal Status  Committed/Hatch/Mcgrath Kohler through [facility]

## 2021-07-13 NOTE — PLAN OF CARE
Pt was again isolated and withdrawn to her room for the entire evening.  She presents as overall blunted and flat, but does brighten quite a bit after eating and when interacting with select staff.  She became somewhat agitated when asked to give a blood draw, but did not get aggressive. She ate dinner and has a good appetite. She needed to be woken to take her HS medications like many evenings. She was medication compliant. She denied any discomfort. She denied SI/SIB/HI.  She has adequate ADLs.

## 2021-07-14 PROCEDURE — 250N000013 HC RX MED GY IP 250 OP 250 PS 637: Performed by: PSYCHIATRY & NEUROLOGY

## 2021-07-14 PROCEDURE — 250N000013 HC RX MED GY IP 250 OP 250 PS 637: Performed by: PHYSICIAN ASSISTANT

## 2021-07-14 PROCEDURE — 99232 SBSQ HOSP IP/OBS MODERATE 35: CPT | Mod: GC | Performed by: STUDENT IN AN ORGANIZED HEALTH CARE EDUCATION/TRAINING PROGRAM

## 2021-07-14 PROCEDURE — 124N000002 HC R&B MH UMMC

## 2021-07-14 PROCEDURE — 250N000013 HC RX MED GY IP 250 OP 250 PS 637: Performed by: STUDENT IN AN ORGANIZED HEALTH CARE EDUCATION/TRAINING PROGRAM

## 2021-07-14 RX ADMIN — AMLODIPINE BESYLATE 7.5 MG: 2.5 TABLET ORAL at 08:53

## 2021-07-14 RX ADMIN — BENZTROPINE MESYLATE 1 MG: 1 TABLET ORAL at 08:53

## 2021-07-14 RX ADMIN — OLANZAPINE 10 MG: 10 TABLET, ORALLY DISINTEGRATING ORAL at 08:53

## 2021-07-14 RX ADMIN — LOSARTAN POTASSIUM 100 MG: 100 TABLET, FILM COATED ORAL at 08:53

## 2021-07-14 RX ADMIN — BENZTROPINE MESYLATE 1 MG: 1 TABLET ORAL at 19:30

## 2021-07-14 RX ADMIN — METOPROLOL SUCCINATE 75 MG: 25 TABLET, EXTENDED RELEASE ORAL at 08:53

## 2021-07-14 RX ADMIN — LORAZEPAM 2 MG: 2 TABLET ORAL at 19:29

## 2021-07-14 RX ADMIN — LORAZEPAM 1 MG: 1 TABLET ORAL at 14:04

## 2021-07-14 RX ADMIN — LORAZEPAM 1 MG: 1 TABLET ORAL at 08:53

## 2021-07-14 RX ADMIN — OLANZAPINE 10 MG: 10 TABLET, ORALLY DISINTEGRATING ORAL at 19:30

## 2021-07-14 ASSESSMENT — ACTIVITIES OF DAILY LIVING (ADL)
LAUNDRY: UNABLE TO COMPLETE
ORAL_HYGIENE: INDEPENDENT
DRESS: SCRUBS (BEHAVIORAL HEALTH)
HYGIENE/GROOMING: HANDWASHING;SHOWER;INDEPENDENT
HYGIENE/GROOMING: INDEPENDENT
ORAL_HYGIENE: INDEPENDENT
DRESS: SCRUBS (BEHAVIORAL HEALTH)
LAUNDRY: UNABLE TO COMPLETE

## 2021-07-14 NOTE — PROGRESS NOTES
"[facility], [place]   Psychiatric Progress Note  Hospital Day: 91        Interim History:   The patient's care was discussed with the treatment team during the daily team meeting and/or staff's chart notes were reviewed. Her blood pressure and vitals were WNL. No sxs of delirium. She took all medications as scheduled and no PRNs. No acute medical concerns. She is intermittently completing ADLs and has continued to decline a lab draw. Sleeping and eating well. No overt sx/signs of psychosis or tad. No SI/HI/AH/VH reported. No aggressive or violent behaviors. Remains isolative to her room despite encouragement from staff to participate in groups. Brightens on approach.     Upon interview, Michelle was sitting in her room watching TV. She was initially minimally responsive to questions and stated \"there is nothing to talk about. You  just need to discharge me home.\"She appeared tired and was asked about her memory.  She asked if these questions about her memory were impacting her ability to discharge home.  She did not answer when asked about what day it is. She asked about why there were concerns about her safety at home and stated that she was not having any symptoms now or when she came into the hospital.  When discussing her blood pressure and the importance of medications she reported that she had her heart evaluated and that everything looked good.  After explanation that blood pressure is affected by things other than just her heart she was accepting of this information and stated that the medications have helped it get better.  She continued to state that she needs to be discharged to her home.  She denied any further instances of oculogyric crises and denied other physical concerns or side effects at this time.    To staff from the [facility] presented to the unit to have a conversation with Michelle.  She was observed in her room talking with both of them calmly for several " minutes.  After they left her room she came out with her notepad and asked for a phone number from them which she wrote down.         Medications:       amLODIPine  7.5 mg Oral Daily     benztropine  1 mg Oral BID     LORazepam  1 mg Oral BID    Or     LORazepam  1 mg Intramuscular BID     LORazepam  2 mg Oral QPM    Or     LORazepam  2 mg Intramuscular QPM     losartan  100 mg Oral Daily     metoprolol succinate ER  75 mg Oral Daily     OLANZapine zydis  10 mg Oral BID    Or     OLANZapine  10 mg Intramuscular BID          Allergies:     Allergies   Allergen Reactions     Haldol [Haloperidol]      Patient previously tolerated haldol, though developed oculogyric crises during hospital stay on 4/26/21 on total daily dose of 10 mg.     Lisinopril Cough          Labs:     No results found for this or any previous visit (from the past 24 hour(s)).       Psychiatric Examination:     /74 (BP Location: Right arm)   Pulse 82   Temp 99  F (37.2  C) (Tympanic)   Resp 16   Wt 69.8 kg (153 lb 14.4 oz)   SpO2 98%   BMI 26.42 kg/m    Weight is 153 lbs 14.4 oz  Body mass index is 26.42 kg/m .    Weight over time:  Vitals:    05/25/21 0850 06/15/21 0811 06/16/21 1605 06/19/21 0859   Weight: 71 kg (156 lb 8 oz) 69.8 kg (153 lb 12.8 oz) 70.5 kg (155 lb 8 oz) 71.4 kg (157 lb 8 oz)    06/21/21 0805 06/22/21 0839 06/24/21 0800 07/02/21 0835   Weight: 69.5 kg (153 lb 3.2 oz) 69.3 kg (152 lb 11.2 oz) 70.7 kg (155 lb 12.8 oz) 70.7 kg (155 lb 12.8 oz)    07/03/21 0844 07/06/21 0838 07/10/21 0845   Weight: 69.6 kg (153 lb 8 oz) 70.9 kg (156 lb 4.8 oz) 69.8 kg (153 lb 14.4 oz)       Orthostatic Vitals         Most Recent      Lying Orthostatic /91 06/23 0830    Lying Orthostatic Pulse (bpm) 67 06/23 0830    Sitting Orthostatic /89  Comment: notify RN 06/24 0800    Sitting Orthostatic Pulse (bpm) 71 06/24 0800    Standing Orthostatic /101  Comment: notify RN 06/24 0800    Standing Orthostatic Pulse (bpm) 86 06/24  "0800            Cardiometabolic risk assessment. 04/15/21      Reviewed patient profile for cardiometabolic risk factors    Date taken /Value  REFERENCE RANGE   Abdominal Obesity  (Waist Circumference)   See nursing flowsheet Women ?35 in (88 cm)   Men ?40 in (102 cm)      Triglycerides  Triglycerides   Date Value Ref Range Status   10/02/2020 86 <=149 mg/dL Final   10/19/2019 117 <150 mg/dL Final       ?150 mg/dL (1.7 mmol/L) or current treatment for elevated triglycerides   HDL cholesterol  HDL Cholesterol   Date Value Ref Range Status   10/19/2019 56 >49 mg/dL Final     Direct Measure HDL   Date Value Ref Range Status   10/02/2020 45 (L) >=50 mg/dL Final   ]   Women <50 mg/dL (1.3 mmol/L) in women or current treatment for low HDL cholesterol  Men <40 mg/dL (1 mmol/L) in men or current treatment for low HDL cholesterol     Fasting plasma glucose (FPG) Lab Results   Component Value Date     10/19/2019      FPG ?100 mg/dL (5.6 mmol/L) or treatment for elevated blood glucose   Blood pressure  BP Readings from Last 3 Encounters:   07/14/21 139/74   06/04/19 131/71   04/26/19 164/86    Blood pressure ?130/85 mmHg or treatment for elevated blood pressure   Family History  See family history     Appearance: dressed in hospital scrubs, appeared reported age, some food stains on her shirt   Attitude: cooperative with interview  Eye Contact: fair, looks down or straight ahead   Mood: \"I just want to go home\"  Affect:  Somewhat blunted, less irritable, tense at times   Speech: accented, clear and coherent  Language: no obvious receptive or expressive deficits  Psychomotor, Gait, Musculoskeletal: No abnormal movements noted while seated.   Thought Process: goal-oriented, linear  Associations:  No loosening of associations present  Thought Content:  Did not appear to be responding to internal stimuli.  Insight:  limited  Judgement:  fair  Oriented to: date, location, treatment team.   Attention Span and Concentration: " attentive to conversation.  Recent and Remote Memory: improved, no obvious deficits   Fund of Knowledge:  normal    Clinical Global Impressions  First:  Considering your total clinical experience with this particular patient population, how severe are the patient's symptoms at this time?: 7 (04/16/21 1428)  Compared to the patient's condition at the START of treatment, this patient's condition is: 4 (04/16/21 1428)  Most recent:  Considering your total clinical experience with this particular patient population, how severe are the patient's symptoms at this time?: 7 (06/22/21 1529)  Compared to the patient's condition at the START of treatment, this patient's condition is: 3 (06/22/21 1529)         Precautions:     Behavioral Orders   Procedures     Cheeking Precautions (behavioral units)     Patient Observed swallowing PO medications; Patient asked to drink water after swallowing medication; Patient in Staff line of sight for 15 minutes after medication given; Mouth checks after PO administration (patient asked to open mouth and stick out their tongue).     Code 3     For walks in the [place] with staff and per staff discretion     Electroconvulsive therapy     Series of up to 12 treatments. Begin Date: 5/26/21     Treating Psychiatrist providing ECT:  Dr. Lubin     Notified on:  5/21/21     Electroconvulsive therapy     As long as we get the green light from risk management and pt is medically cleared, begin ECT every Monday, Wednesday, and Friday     Electroconvulsive therapy     Series of up to 12 treatments. Begin Date: 5/25/21     Treating Psychiatrist providing ECT:  Amee     Notified on:  5/24/21     Elopement precautions     Fall precautions     Mcgrath Calderon     Routine Programming     As clinically indicated     Status 15     Every 15 minutes.          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 Symptoms and presentation at this time is most consistent with Schizoaffective Disorder, Bipolar Type. Obtained most recent medication regimen from patient's ACT team, and regimen was initially restarted. Pt is committed. Petitioned for Mcgrath Calderon was filed and granted due to lack of improvement and side effects from medications. Inpatient psychiatric hospitalization is warranted at this time for safety, stabilization, possible adjustment in medications and development of a safe discharge plan.     Hospital Course:  On admission, PTA medications were restarted. However, Michelle had been declining all scheduled medications despite significant encouragement from staff and provider. Psychiatric emergency declared on 4/20 due to aggression toward others in context of severe psychosis and suspected excited catatonia. Ativan 1 mg TID was also added. Discontinued PTA Invega, Zyprexa, and thorazine on 4/20 due to consistent refusal.     On 4/26, it was noted that patient frequently had upward gaze while walking up and down the unit. She did not appear to be distressed. She reported that she was looking at \"my god.\" It was determined to be oculogyric crisis secondary to IM haloperidol. Haldol was subsequently discontinued and scheduled Zyprexa was initiated on an emergency basis. Oral Cogentin was also scheduled, though patient declined. On the evening of 4/26, patient's gaze was fixed in upward position for several hours and she appeared to be experiencing discomfort. IM Cogentin was " "administered with noted resolution. Partial improvements were observed after switching to scheduled Zyprexa. After patient improved, she was more receptive to reinitiating oral Invega, which was initiated on 5/3 and titrated to PTA dose of 9 mg daily on 5/10. Plan was for ACT team to bring in loading dose of Invega S[place]enna. Patient had signs of oculogyric crisis again with Invega. Oral dose decreased to 6 mg after this. Invega was stopped on 5/14 due to ongoing signs of problems related to eye movement and concerns for oculogyric crisis.    Overall improvement in patient's agitation and suspected catatonia noted on 4/30 after patient accepted two doses of Ativan. She began declining Ativan again with noted decompensation. Patient began accepting, however, was deemed not to have the capacity to consent to treatment with Ativan. She does not believe she has a mental illness, including catatonia. She does not fully understand risks associated with inadequate treatment of catatonia. Discussed with her son who is acting as surrogate decision maker and he is in full support of forced scheduled IM Ativan if patient declines oral formulation. Also consulted with our legal team prior to backing oral Ativan with IM.     Ativan was increased on 5/19 due to re-emerging evidence of catatonia (long periods of staring, repetitive movements, echolalia, mutism). Meeting was held with ACT team on 5/20, including ACT team psychiatrist, Dr. Pedraza. He said that he is in \"full support\" of plan to pursue Mcgrath Kohler and ECT at this time. He does feel that in the past she has been discharged from the hospital while still quite symptomatic. He is hoping that ECT will be effective and that with improvement, Michelle would be more receptive to clozapine and weekly blood draws. He said that ideally she would transition to an IRTS before going back to her apartment, but also understands there may be some barriers (I.e. pt's willingness, " financial concerns, etc).     ECT consult placed. Please see consult note by Dr. Lubin on 5/21 for details. Alcides Kohler was approved on 5/24 and patient was medically cleared on 5/24. ECT initiated on 5/26. She was noted to have a short seizure during ECT on 6/4. Staff note that patient appears more disoriented with memory impairment and sedation on days of ECT. This was noted on my examination again today. Improvements noted in mood, affect, social interactions, paranoia, agitation since initiation of ECT. Reduced Ativan on 6/5 due to sedative effects. Relayed concerns about memory impairment and confusion with Dr. Lubin. Recommended attempting unilateral ECT, which he agreed to do. First unilateral ECT on 6/7. ECT was held on 6/11 and 6/14 due to memory impairment. We will plan to hold it again tomorrow (6/16). There is ongoing discussion regarding whether there is a component of catatonia contributing to her current presentation but this is less likely since it worsened after ECT was started.  Given her level of cognitive impairment and our belief that this is due to ECT, we will not pursue any further treatments at this time. Since we have held ECT (last treatment on 6/9), her cognitive impairment has slightly improved (more oriented).     Michelle initially appeared to be decompensating somewhat when ECT was held, though her cognitive impairment has gradually improved. If symptoms of psychosis re-emerge, it may be worth starting clozapine, which is something that has previously been considered by her ACT team. Her Hatch order would need to be amended as clozapine is not one of the medications listed. ANC obtained on 6/16 was 1800/microL. Per conversation with pharmacy, neutropenia has been associated with olanzapine and agranulocytosis has been associated with olanzapine, lorazepam, metoprolol, and hydralazine and hematologic labs have been monitored. Upon re-check, ANC was 3700/microL on 6/21/21. At this time,  the primary symptoms we are observing are cognitive deficits and inability to care for self, which we would not address by changing her antipsychotic medication.    Michelle's cognitive impairment has been steadily improving since holding ECT treatments, however it is possible that lorazepam is also playing a role in her cognitive impairment. Given no signs of re-emerging catatonia, a gradual lorazepam taper (decreasing by 0.5 mg) was initiated on 6/28/21 to monitor for potential improvements with regard to memory impairment.  On 7/7, Michelle reported an incident of oculogyric crisis and Cogentin 1 mg BID was initiated with no noted changes in cognition.     Michelle has remained focused on discharge. The team is working with Michelle's ACT team to to establish a safe discharge plan with options including moving to a group home vs home with her ACT team and additional in home supports via CADI services. We are concerned that Michelle would have difficulty taking her medications as prescribed if she were to return home without her ACT team.     Target psychiatric symptoms and interventions:   - Continue 1 mg PO or IM lorazepam in the morning and at mid-day and 2 mg PO or IM lorazepam in the evening. Decrease mid-day lorazepam to 1 mg PO or IM. Patient may not decline.   - Continue Zyprexa 10 mg BID  PO or IM. Hatch in place. May consider increasing further though holding off given re-emerging catatonic sx and borderline prolonged QTc   - Continue Cogentin 1 mg BID   -Continue Cogentin 2 mg IM daily prn for evidence of acute dystonic reaction or oculogyric crisis  -Continue hydroxyzine 25-50 mg q4h prn for acute anxiety  -Continue Trazodone 50 mg at bedtime prn for sleep disturbances  -Continue Zyprexa 10 mg TID prn for severe agitation  -Continue Ativan/Benadryl q4h prn for agitation. WOULD GIVE PRN ATIVAN FIRST FOR AGITATION unless it is after 5 pm on day prior to scheduled ECT    Occupational Therapy Consult Placed to evaluate  cognitive functioning/ability to care for self in home environment, ability to manage medications. See note on 7/1 for details.      ECT:   - ECT DISCONTINUED DUE TO COGNITIVE IMPAIRMENT.  - Because patient was only intermittently accepting scheduled Ativan and some symptoms of catatonia are re-emerging, pursued Alcides Kohler for ECT. She also continues to exhibit symptoms of psychosis and has not responded or has experienced side effects from multiple neuroleptic medications. Court hearing was held on 5/21. Alcides Kohler is approved. Patient is medically cleared. COVID negative on 5/24. Obtained ECT consult on 5/21/21. Please see Dr. Lubin's consultation note.   - NPO 8 hours prior to scheduled treatment. Clear liquids until 2 hours prior to treatment.   - Discussed with IM. May resume AM antihypertensives, no need to hold prior to ECT  - SIO while patient is NPO, starting at midnight on ECT days (renewed)  - Will complete acute course in the hospital; will likely need maintenance ECT in outpatient setting.   - New concerns about memory impairments: Switched from bilateral to unilateral ECT on 6/7 and continue to monitor closely, ultimately decided to discontinue treatments given cognitive impairment.    Acute Medical Problems and Treatments:  HTN: Patient is now adherent with medication regimen.   - Metoprolol succinate ER 75 mg   - Cozaar 100 mg daily  - Amlodipine 7.5 mg daily  - IM discontinued hydralazine prn  - Switched BP checks from QID to BID on 7/13 given overall improvement  - Please see note from IM dated 5/3, 5/6, 5/24, 6/20 and 6/30/21.  - Obtained EKG and routine labs on 5/21 in context of pt declining vital sign checks and anti-hypertensives and recently elevated BPs. Reviewed labs and discussed EKG findings with IM on 5/21. No urgent concerns, though IM should be notified if pt develops acute medical concerns (I.e. heart palpitations, SOB, changes in speech, AMS, confusion, CP, HA, changes in  "vision).    - Per 6/20 IM note: \"Please notify IM if BP is persistently severely elevated and requiring frequent PRN hydralazine\".  - Internal medicine consulted again on 6/28 due to consistent use of hydralazine over the past week. Metoprolol increased to 75 mg daily then to 100 mg and amlodipine 5 mg was added on 6/30. Amlodipine increased to 7.5 mg daily on 7/5.     For previous medical concerns, please see note dated 7/12/21.    Behavioral/Psychological/Social:  - Encourage unit programming  - Patient is now Code 3 status and can take walks in the [place] with staff and security present per staff discretion. This appears to be quite therapeutic for her.     Safety:  - Continue precautions as noted above  - Status 15 minute checks  - Safety precautions include: assault and elopement precautions  - Continue precautions as noted above    Legal Status: Committed as MI with Hatch in place for Haldol, Zyprexa, Invega, and Thorazine through [facility]. Filed Alcides Kohler through [facility] and approved on 5/24/21.     Disposition Plan   Reason for ongoing admission: poses an imminent risk to self, poses an imminent risk to others and is unable to care for self due to severe psychosis or tad  Discharge location: Group home (needs CADI waiver) vs home with [facility] ACT team or 24/7 in-home care. CPT assessment done by OT (tico queen) on 7/1. Please see note for details. [place] Choice Assessment deferred   Discharge Medications: not ordered  Follow-up Appointments: not scheduled     Bijal Varner MD  Psychiatry PGY-4       "

## 2021-07-14 NOTE — PLAN OF CARE
Patient was visited today with staff from [facility] [place].  Writer emailed owner of this group home to see how the meeting went with patient as group home staff left the unit without checking in with CTC so writer was unable to gather this information. Writer will wait for a response from owner of the group home in order to see how this meeting went.

## 2021-07-14 NOTE — PROGRESS NOTES
Brief Medicine Note    Patient continues to refuse labs. BP stable on current regimen. Will continue current BP regimen. Please notify medicine if patient is willing to have laboratory studies completed. Will follow peripherally.     Nicole Cadena PA-C  Hospitalist Service  Pager [phone]

## 2021-07-14 NOTE — PLAN OF CARE
Problem: Adult Inpatient Plan of Care  Goal: Plan of Care Review  Outcome: No Change  Flowsheets (Taken 7/13/2021 1923)  Plan of Care Reviewed With: patient   Patient isolative to room majority of the evening only in the milieu to retrieve meals. Patient upon approach blunted but calm and cooperative with verbal assessment. Patient denies any MH symptoms only stating frustration with continued hospitalization stating she wants to be able to go back to her home stating she wont go anywhere else. Patient prompted for group, increased physical activity, and ADL's but declined all this evening. Patient accepting of HS medications with no stated or observed side effects.Patients vitals monitored and WNL this evening.   complains of pain/discomfort

## 2021-07-14 NOTE — PLAN OF CARE
"Pt was isolative to her room all day. Affect blunted. Pt is pleasant on approach, but quiet and withdrawn. She denies all mental health symptoms. Declined therapeutic groups. Appetite and fluid intake good. She declined to complete ADLs today, stating \"I showered yesterday.\" She declined labs, IM notified. Pt was compliant with all scheduled medications, and had no observed side effects. VSS.   "

## 2021-07-14 NOTE — PLAN OF CARE
Problem: Sleep Disturbance (Psychotic Signs/Symptoms)  Goal: Improved Sleep (Psychotic Signs/Symptoms)  Outcome: No Change     Pt appeared to be sleeping most of the night, and was seen using the bathroom or sitting in bed awake. No concerns noted this shift, No PRN administered.   Pt slept for 5.25 hours.

## 2021-07-14 NOTE — PLAN OF CARE
Assessment/Intervention/Current Symtoms and Care Coordination  -Chart review  -Rounded with team, addressed patient needs/concerns    Current Symptoms include the following:  Irritability and anger about still being hospitalized.     Discharge Plan or Goal  Pending stabilization & development of a safe discharge plan.  Considerations include:  Higher level of care as patient cannot to return home  as she lives alone and is unable to care for herself.     Barriers to Discharge  Patient requires further psychiatric stabilization due to current symptomology    Referral Status  No referrals have been made but [facility] is working with [facility] who will accept patient once the CADI funding is in place.     Legal Status  Committed/Hatch/Mcgrath Kohler

## 2021-07-15 LAB — SARS-COV-2 RNA RESP QL NAA+PROBE: NEGATIVE

## 2021-07-15 PROCEDURE — U0005 INFEC AGEN DETEC AMPLI PROBE: HCPCS | Performed by: STUDENT IN AN ORGANIZED HEALTH CARE EDUCATION/TRAINING PROGRAM

## 2021-07-15 PROCEDURE — 124N000002 HC R&B MH UMMC

## 2021-07-15 PROCEDURE — 250N000013 HC RX MED GY IP 250 OP 250 PS 637: Performed by: PHYSICIAN ASSISTANT

## 2021-07-15 PROCEDURE — 250N000013 HC RX MED GY IP 250 OP 250 PS 637: Performed by: STUDENT IN AN ORGANIZED HEALTH CARE EDUCATION/TRAINING PROGRAM

## 2021-07-15 PROCEDURE — 99232 SBSQ HOSP IP/OBS MODERATE 35: CPT | Mod: GC | Performed by: PSYCHIATRY & NEUROLOGY

## 2021-07-15 PROCEDURE — 250N000013 HC RX MED GY IP 250 OP 250 PS 637: Performed by: PSYCHIATRY & NEUROLOGY

## 2021-07-15 RX ADMIN — AMLODIPINE BESYLATE 7.5 MG: 2.5 TABLET ORAL at 08:52

## 2021-07-15 RX ADMIN — OLANZAPINE 10 MG: 10 TABLET, ORALLY DISINTEGRATING ORAL at 20:26

## 2021-07-15 RX ADMIN — LORAZEPAM 1 MG: 1 TABLET ORAL at 13:56

## 2021-07-15 RX ADMIN — LOSARTAN POTASSIUM 100 MG: 100 TABLET, FILM COATED ORAL at 08:52

## 2021-07-15 RX ADMIN — BENZTROPINE MESYLATE 1 MG: 1 TABLET ORAL at 20:26

## 2021-07-15 RX ADMIN — OLANZAPINE 10 MG: 10 TABLET, ORALLY DISINTEGRATING ORAL at 08:52

## 2021-07-15 RX ADMIN — METOPROLOL SUCCINATE 75 MG: 25 TABLET, EXTENDED RELEASE ORAL at 08:52

## 2021-07-15 RX ADMIN — LORAZEPAM 2 MG: 2 TABLET ORAL at 20:26

## 2021-07-15 RX ADMIN — BENZTROPINE MESYLATE 1 MG: 1 TABLET ORAL at 08:52

## 2021-07-15 RX ADMIN — LORAZEPAM 1 MG: 1 TABLET ORAL at 08:52

## 2021-07-15 ASSESSMENT — ACTIVITIES OF DAILY LIVING (ADL)
HYGIENE/GROOMING: INDEPENDENT;PROMPTS
LAUNDRY: UNABLE TO COMPLETE
DRESS: SCRUBS (BEHAVIORAL HEALTH)
ORAL_HYGIENE: INDEPENDENT

## 2021-07-15 NOTE — PROGRESS NOTES
Pt slept poorly this shift. Was awake quite, sitting in bed for a while. Declined any prn sleep meds. Pt's sleep was apparently disrupted by noise on the unit. Slept approximately 4 hours.

## 2021-07-15 NOTE — PLAN OF CARE
"Pt withdrawn on approach. She isolated to her room all shift. She declined therapeutic groups. Affect blunted. Reports that her mood is \"fine,\" and denies all mental health symptoms. Requests to be discharged this week and states she wants to return to her apartment. When writer asked about meeting with  staff yesterday, she initially just stared at writer, and eventually said \"it was okay.\" Compliant with all scheduled medications. No observed medication side effects. She showered this afternoon.   "

## 2021-07-15 NOTE — PLAN OF CARE
Assessment/Intervention/Current Symtoms and Care Coordination  Met with Treatment Team and discussed patient's care plan.   -Chart review  -Rounded with team, addressed patient needs/concerns  Current Symptoms include the following:  Irritability and anger regarding disposition plan of a higher level of care as patient only wants to return home to her own apartment.     Discharge Plan or Goal  Pending stabilization & development of a safe discharge plan.  Considerations include:  Group Home or Assisted Living Facility.    Barriers to Discharge  Patient requires further psychiatric stabilization due to current symptomology    Referral Status  [facility]:   Patient will need CADI Waiver/Funding in place before she can be admitted to their group home.     Legal Status  Committed/Hatch/Mcgrath colin through [facility]

## 2021-07-15 NOTE — PROGRESS NOTES
"[facility], [place]   Psychiatric Progress Note  Hospital Day: 92        Interim History:   The patient's care was discussed with the treatment team during the daily team meeting and/or staff's chart notes were reviewed. Her blood pressure and vitals were WNL. She took all medications as scheduled and no PRNs. No acute medical concerns. She did not complete ADLs for the last several days and has continued to decline a lab draw. Sleeping and eating well. No overt sx/signs of psychosis or tad. No SI/HI/AH/VH reported. No aggressive or violent behaviors. Remains isolative to her room despite encouragement from staff to participate in groups or walk in the milieu. Did not sleep well overnight, only 4 hours. No signs of delirium noted. Staff from [facility] planning to visit with her again tomorrow.     Prior to the interview, Michelle was sitting on her bed watching TV. She reported that she is \"fine\" today. She denied that she had a difficult time sleeping. She asked about the conversation this writer had with the staff from the [facility] who visited yesterday. She stated that she did not know they were from the [facility] and stated she would not meet with them again. Her questions regarding discharge were answered and she continued to state that she would not go anywhere other than her home. When asked about her fears regarding going to a group home she stated that \"people who go to group homes don't have a place to stay and can't take care of themselves.\" We discussed concerns regarding Michelle's ability to care for herself along with the commitment though she continued to state she is able to care for herself and that she is not going to a group home. She reported that her memory is \"fine\" and was able to give the date, day of the week and location.          Medications:       amLODIPine  7.5 mg Oral Daily     benztropine  1 mg Oral BID     LORazepam  1 mg Oral BID    Or     " LORazepam  1 mg Intramuscular BID     LORazepam  2 mg Oral QPM    Or     LORazepam  2 mg Intramuscular QPM     losartan  100 mg Oral Daily     metoprolol succinate ER  75 mg Oral Daily     OLANZapine zydis  10 mg Oral BID    Or     OLANZapine  10 mg Intramuscular BID          Allergies:     Allergies   Allergen Reactions     Haldol [Haloperidol]      Patient previously tolerated haldol, though developed oculogyric crises during hospital stay on 4/26/21 on total daily dose of 10 mg.     Lisinopril Cough          Labs:     No results found for this or any previous visit (from the past 24 hour(s)).       Psychiatric Examination:     /74 (BP Location: Right arm)   Pulse 64   Temp 97.7  F (36.5  C) (Oral)   Resp 16   Wt 69.8 kg (153 lb 14.4 oz)   SpO2 98%   BMI 26.42 kg/m    Weight is 153 lbs 14.4 oz  Body mass index is 26.42 kg/m .    Weight over time:  Vitals:    05/25/21 0850 06/15/21 0811 06/16/21 1605 06/19/21 0859   Weight: 71 kg (156 lb 8 oz) 69.8 kg (153 lb 12.8 oz) 70.5 kg (155 lb 8 oz) 71.4 kg (157 lb 8 oz)    06/21/21 0805 06/22/21 0839 06/24/21 0800 07/02/21 0835   Weight: 69.5 kg (153 lb 3.2 oz) 69.3 kg (152 lb 11.2 oz) 70.7 kg (155 lb 12.8 oz) 70.7 kg (155 lb 12.8 oz)    07/03/21 0844 07/06/21 0838 07/10/21 0845   Weight: 69.6 kg (153 lb 8 oz) 70.9 kg (156 lb 4.8 oz) 69.8 kg (153 lb 14.4 oz)       Orthostatic Vitals         Most Recent      Lying Orthostatic /91 06/23 0830    Lying Orthostatic Pulse (bpm) 67 06/23 0830    Sitting Orthostatic /89  Comment: notify RN 06/24 0800    Sitting Orthostatic Pulse (bpm) 71 06/24 0800    Standing Orthostatic /101  Comment: notify RN 06/24 0800    Standing Orthostatic Pulse (bpm) 86 06/24 0800            Cardiometabolic risk assessment. 04/15/21      Reviewed patient profile for cardiometabolic risk factors    Date taken /Value  REFERENCE RANGE   Abdominal Obesity  (Waist Circumference)   See nursing flowsheet Women ?35 in (88 cm)   Men  "?40 in (102 cm)      Triglycerides  Triglycerides   Date Value Ref Range Status   10/02/2020 86 <=149 mg/dL Final   10/19/2019 117 <150 mg/dL Final       ?150 mg/dL (1.7 mmol/L) or current treatment for elevated triglycerides   HDL cholesterol  HDL Cholesterol   Date Value Ref Range Status   10/19/2019 56 >49 mg/dL Final     Direct Measure HDL   Date Value Ref Range Status   10/02/2020 45 (L) >=50 mg/dL Final   ]   Women <50 mg/dL (1.3 mmol/L) in women or current treatment for low HDL cholesterol  Men <40 mg/dL (1 mmol/L) in men or current treatment for low HDL cholesterol     Fasting plasma glucose (FPG) Lab Results   Component Value Date     10/19/2019      FPG ?100 mg/dL (5.6 mmol/L) or treatment for elevated blood glucose   Blood pressure  BP Readings from Last 3 Encounters:   07/14/21 137/74   06/04/19 131/71   04/26/19 164/86    Blood pressure ?130/85 mmHg or treatment for elevated blood pressure   Family History  See family history     Appearance: dressed in hospital scrubs, appeared reported age, unkempt hair   Attitude: cooperative with interview  Eye Contact: fair, looks down or straight ahead   Mood: \"Fine\"  Affect:  Somewhat blunted, more irritable, tense at times   Speech: accented, clear and coherent  Language: no obvious receptive or expressive deficits  Psychomotor, Gait, Musculoskeletal: No abnormal movements noted while seated.   Thought Process: goal-oriented, linear, concrete   Associations:  No loosening of associations present  Thought Content:  Did not appear to be responding to internal stimuli.  Insight:  limited  Judgement:  fair  Oriented to: date, location, treatment team.   Attention Span and Concentration: attentive to conversation.  Recent and Remote Memory: improved, no obvious deficits   Fund of Knowledge:  normal    Clinical Global Impressions  First:  Considering your total clinical experience with this particular patient population, how severe are the patient's symptoms at " this time?: 7 (04/16/21 1428)  Compared to the patient's condition at the START of treatment, this patient's condition is: 4 (04/16/21 1428)  Most recent:  Considering your total clinical experience with this particular patient population, how severe are the patient's symptoms at this time?: 7 (06/22/21 1529)  Compared to the patient's condition at the START of treatment, this patient's condition is: 3 (06/22/21 1529)         Precautions:     Behavioral Orders   Procedures     Cheeking Precautions (behavioral units)     Patient Observed swallowing PO medications; Patient asked to drink water after swallowing medication; Patient in Staff line of sight for 15 minutes after medication given; Mouth checks after PO administration (patient asked to open mouth and stick out their tongue).     Code 3     For walks in the [place] with staff and per staff discretion     Electroconvulsive therapy     Series of up to 12 treatments. Begin Date: 5/26/21     Treating Psychiatrist providing ECT:  Dr. Lubin     Notified on:  5/21/21     Electroconvulsive therapy     As long as we get the green light from risk management and pt is medically cleared, begin ECT every Monday, Wednesday, and Friday     Electroconvulsive therapy     Series of up to 12 treatments. Begin Date: 5/25/21     Treating Psychiatrist providing ECT:  Amee     Notified on:  5/24/21     Elopement precautions     Fall precautions     Mcgrath Calderon     Routine Programming     As clinically indicated     Status 15     Every 15 minutes.          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 Symptoms and presentation at this time is most consistent with Schizoaffective Disorder, Bipolar Type. Obtained most recent medication regimen from patient's ACT team, and regimen was initially restarted. Pt is committed. Petitioned for Alcides Calderon was filed and granted due to lack of improvement and side effects from medications. Inpatient psychiatric hospitalization is warranted at this time for safety, stabilization, possible adjustment in medications and development of a safe discharge plan.     Hospital Course:  On admission, PTA medications were restarted. However, Michelle had been declining all scheduled medications despite significant encouragement from staff and provider. Psychiatric emergency declared on 4/20 due to aggression toward others in context of severe psychosis and suspected excited catatonia. Ativan 1 mg TID was also added. Discontinued PTA Invega, Zyprexa, and thorazine on 4/20 due to consistent refusal.     On 4/26, it was noted that patient frequently had upward gaze while walking up and down the unit. She did not appear to be distressed. She reported that she was looking at \"my god.\" It was determined to be oculogyric crisis secondary to IM haloperidol. Haldol was subsequently discontinued and scheduled Zyprexa was initiated on an emergency basis. Oral Cogentin was also scheduled, though patient declined. On the evening of 4/26, patient's gaze was fixed in upward position for several hours and she appeared to be experiencing discomfort. IM Cogentin was administered with noted resolution. Partial improvements were observed after switching to scheduled Zyprexa. After patient improved, she was more receptive to reinitiating oral Invega, which was initiated on 5/3 and titrated to PTA dose of 9 mg daily on 5/10. Plan was for ACT team to bring in " "loading dose of Invega S[place]enna. Patient had signs of oculogyric crisis again with Invega. Oral dose decreased to 6 mg after this. Invega was stopped on 5/14 due to ongoing signs of problems related to eye movement and concerns for oculogyric crisis.    Overall improvement in patient's agitation and suspected catatonia noted on 4/30 after patient accepted two doses of Ativan. She began declining Ativan again with noted decompensation. Patient began accepting, however, was deemed not to have the capacity to consent to treatment with Ativan. She does not believe she has a mental illness, including catatonia. She does not fully understand risks associated with inadequate treatment of catatonia. Discussed with her son who is acting as surrogate decision maker and he is in full support of forced scheduled IM Ativan if patient declines oral formulation. Also consulted with our legal team prior to backing oral Ativan with IM.     Ativan was increased on 5/19 due to re-emerging evidence of catatonia (long periods of staring, repetitive movements, echolalia, mutism). Meeting was held with ACT team on 5/20, including ACT team psychiatrist, Dr. Pedraza. He said that he is in \"full support\" of plan to pursue Mcgrath Kohler and ECT at this time. He does feel that in the past she has been discharged from the hospital while still quite symptomatic. He is hoping that ECT will be effective and that with improvement, Michelle would be more receptive to clozapine and weekly blood draws. He said that ideally she would transition to an IRTS before going back to her apartment, but also understands there may be some barriers (I.e. pt's willingness, financial concerns, etc).     ECT consult placed. Please see consult note by Dr. Lubin on 5/21 for details. Mcgrath Kohler was approved on 5/24 and patient was medically cleared on 5/24. ECT initiated on 5/26. She was noted to have a short seizure during ECT on 6/4. Staff note that patient appears " more disoriented with memory impairment and sedation on days of ECT. This was noted on my examination again today. Improvements noted in mood, affect, social interactions, paranoia, agitation since initiation of ECT. Reduced Ativan on 6/5 due to sedative effects. Relayed concerns about memory impairment and confusion with Dr. Lubin. Recommended attempting unilateral ECT, which he agreed to do. First unilateral ECT on 6/7. ECT was held on 6/11 and 6/14 due to memory impairment. We will plan to hold it again tomorrow (6/16). There is ongoing discussion regarding whether there is a component of catatonia contributing to her current presentation but this is less likely since it worsened after ECT was started.  Given her level of cognitive impairment and our belief that this is due to ECT, we will not pursue any further treatments at this time. Since we have held ECT (last treatment on 6/9), her cognitive impairment has slightly improved (more oriented).     Michelle initially appeared to be decompensating somewhat when ECT was held, though her cognitive impairment has gradually improved. If symptoms of psychosis re-emerge, it may be worth starting clozapine, which is something that has previously been considered by her ACT team. Her Hatch order would need to be amended as clozapine is not one of the medications listed. ANC obtained on 6/16 was 1800/microL. Per conversation with pharmacy, neutropenia has been associated with olanzapine and agranulocytosis has been associated with olanzapine, lorazepam, metoprolol, and hydralazine and hematologic labs have been monitored. Upon re-check, ANC was 3700/microL on 6/21/21. At this time, the primary symptoms we are observing are cognitive deficits and inability to care for self, which we would not address by changing her antipsychotic medication.    Michelle's cognitive impairment has been steadily improving since holding ECT treatments, however it is possible that lorazepam is also  playing a role in her cognitive impairment. Given no signs of re-emerging catatonia, a gradual lorazepam taper (decreasing by 0.5 mg) was initiated on 6/28/21 to monitor for potential improvements with regard to memory impairment.  On 7/7, Michelle reported an incident of oculogyric crisis and Cogentin 1 mg BID was initiated with no noted changes in cognition.     Michelle has remained focused on discharge. The team is working with Michelle's ACT team to to establish a safe discharge plan with options including moving to a group home vs home with her ACT team and additional in home supports via CADI services. We are concerned that Michelle would have difficulty taking her medications as prescribed if she were to return home without her ACT team.     Target psychiatric symptoms and interventions:   - Continue 1 mg PO or IM lorazepam in the morning and at mid-day and 2 mg PO or IM lorazepam in the evening. Decrease mid-day lorazepam to 1 mg PO or IM. Patient may not decline.   - Continue Zyprexa 10 mg BID  PO or IM. Hatch in place. May consider increasing further though holding off given re-emerging catatonic sx and borderline prolonged QTc   - Continue Cogentin 1 mg BID   -Continue Cogentin 2 mg IM daily prn for evidence of acute dystonic reaction or oculogyric crisis  -Continue hydroxyzine 25-50 mg q4h prn for acute anxiety  -Continue Trazodone 50 mg at bedtime prn for sleep disturbances  -Continue Zyprexa 10 mg TID prn for severe agitation  -Continue Ativan/Benadryl q4h prn for agitation. WOULD GIVE PRN ATIVAN FIRST FOR AGITATION unless it is after 5 pm on day prior to scheduled ECT    Occupational Therapy Consult Placed to evaluate cognitive functioning/ability to care for self in home environment, ability to manage medications. See note on 7/1 for details.      ECT:   - ECT DISCONTINUED DUE TO COGNITIVE IMPAIRMENT.  - Because patient was only intermittently accepting scheduled Ativan and some symptoms of catatonia are  "re-emerging, pursued Alcides Kohler for ECT. She also continues to exhibit symptoms of psychosis and has not responded or has experienced side effects from multiple neuroleptic medications. Court hearing was held on 5/21. Alcides Kohler is approved. Patient is medically cleared. COVID negative on 5/24. Obtained ECT consult on 5/21/21. Please see Dr. Lubin's consultation note.   - NPO 8 hours prior to scheduled treatment. Clear liquids until 2 hours prior to treatment.   - Discussed with IM. May resume AM antihypertensives, no need to hold prior to ECT  - SIO while patient is NPO, starting at midnight on ECT days (renewed)  - Will complete acute course in the hospital; will likely need maintenance ECT in outpatient setting.   - New concerns about memory impairments: Switched from bilateral to unilateral ECT on 6/7 and continue to monitor closely, ultimately decided to discontinue treatments given cognitive impairment.    Acute Medical Problems and Treatments:  HTN: Patient is now adherent with medication regimen.   - Metoprolol succinate ER 75 mg   - Cozaar 100 mg daily  - Amlodipine 7.5 mg daily  - IM discontinued hydralazine prn  - Switched BP checks from QID to BID on 7/13 given overall improvement  - Please see note from IM dated 5/3, 5/6, 5/24, 6/20 and 6/30/21.  - Obtained EKG and routine labs on 5/21 in context of pt declining vital sign checks and anti-hypertensives and recently elevated BPs. Reviewed labs and discussed EKG findings with IM on 5/21. No urgent concerns, though IM should be notified if pt develops acute medical concerns (I.e. heart palpitations, SOB, changes in speech, AMS, confusion, CP, HA, changes in vision).    - Per 6/20 IM note: \"Please notify IM if BP is persistently severely elevated and requiring frequent PRN hydralazine\".  - Internal medicine consulted again on 6/28 due to consistent use of hydralazine over the past week. Metoprolol increased to 75 mg daily then to 100 mg and " amlodipine 5 mg was added on 6/30. Amlodipine increased to 7.5 mg daily on 7/5.     For previous medical concerns, please see note dated 7/12/21.    Behavioral/Psychological/Social:  - Encourage unit programming  - Patient is now Code 3 status and can take walks in the [place] with staff and security present per staff discretion. This appears to be quite therapeutic for her.     Safety:  - Continue precautions as noted above  - Status 15 minute checks  - Safety precautions include: assault and elopement precautions  - Continue precautions as noted above    Legal Status: Committed as MI with Hatch in place for Haldol, Zyprexa, Invega, and Thorazine through [facility]. Filed Alcides Kohler through [facility] and approved on 5/24/21.     Disposition Plan   Reason for ongoing admission: poses an imminent risk to self, poses an imminent risk to others and is unable to care for self due to severe psychosis or tad  Discharge location: Group home (needs CADI waiver) vs home with [facility] ACT team or 24/7 in-home care. CPT assessment done by OT (tico queen) on 7/1. Please see note for details. [place] Choice Assessment deferred while her son is pursuing emergency guardianship.   Discharge Medications: not ordered  Follow-up Appointments: not scheduled     Bijal Varner MD  Psychiatry PGY-4

## 2021-07-15 NOTE — PLAN OF CARE
"  Problem: Adult Inpatient Plan of Care  Goal: Plan of Care Review  Outcome: No Change  Flowsheets  Taken 7/14/2021 2152  Progress: no change  Taken 7/14/2021 1815  Plan of Care Reviewed With: patient     Patient isolative to her room all evening resting in bed with periods of pacing when prompted for increased physical activity. Patient upon approach flat in affect calm and cooperative with verbal assessment. Patient reported good mood denying any mental health symptoms. Patient did acknowledge her meeting with the group home director earlier in the day and still insists that she will not go to a group home and she is going back to her home. Patient  accepting of HS medications with no stated or observed side effects. Patient prompted for increased activity and ADL's this evening. She refused to complete ADL's stating \" I will do them tomorrow\". Patient when prompted did walk in her room but declined prompting and reassurance to walk in the milieu.   "

## 2021-07-16 PROCEDURE — 250N000013 HC RX MED GY IP 250 OP 250 PS 637: Performed by: PSYCHIATRY & NEUROLOGY

## 2021-07-16 PROCEDURE — 99232 SBSQ HOSP IP/OBS MODERATE 35: CPT | Performed by: PSYCHIATRY & NEUROLOGY

## 2021-07-16 PROCEDURE — 250N000013 HC RX MED GY IP 250 OP 250 PS 637: Performed by: STUDENT IN AN ORGANIZED HEALTH CARE EDUCATION/TRAINING PROGRAM

## 2021-07-16 PROCEDURE — 124N000002 HC R&B MH UMMC

## 2021-07-16 PROCEDURE — 250N000013 HC RX MED GY IP 250 OP 250 PS 637: Performed by: PHYSICIAN ASSISTANT

## 2021-07-16 RX ADMIN — BENZTROPINE MESYLATE 1 MG: 1 TABLET ORAL at 19:54

## 2021-07-16 RX ADMIN — OLANZAPINE 10 MG: 10 TABLET, ORALLY DISINTEGRATING ORAL at 08:42

## 2021-07-16 RX ADMIN — BENZTROPINE MESYLATE 1 MG: 1 TABLET ORAL at 08:41

## 2021-07-16 RX ADMIN — METOPROLOL SUCCINATE 75 MG: 25 TABLET, EXTENDED RELEASE ORAL at 08:42

## 2021-07-16 RX ADMIN — AMLODIPINE BESYLATE 7.5 MG: 2.5 TABLET ORAL at 08:41

## 2021-07-16 RX ADMIN — LOSARTAN POTASSIUM 100 MG: 100 TABLET, FILM COATED ORAL at 08:42

## 2021-07-16 RX ADMIN — OLANZAPINE 10 MG: 10 TABLET, ORALLY DISINTEGRATING ORAL at 19:54

## 2021-07-16 RX ADMIN — LORAZEPAM 1 MG: 1 TABLET ORAL at 08:41

## 2021-07-16 RX ADMIN — LORAZEPAM 1 MG: 1 TABLET ORAL at 14:40

## 2021-07-16 RX ADMIN — LORAZEPAM 2 MG: 2 TABLET ORAL at 19:54

## 2021-07-16 ASSESSMENT — ACTIVITIES OF DAILY LIVING (ADL)
DRESS: SCRUBS (BEHAVIORAL HEALTH)
ORAL_HYGIENE: INDEPENDENT;PROMPTS
LAUNDRY: UNABLE TO COMPLETE
HYGIENE/GROOMING: INDEPENDENT;PROMPTS
LAUNDRY: UNABLE TO COMPLETE
HYGIENE/GROOMING: INDEPENDENT
ORAL_HYGIENE: INDEPENDENT
DRESS: SCRUBS (BEHAVIORAL HEALTH)

## 2021-07-16 NOTE — PROGRESS NOTES
[facility] group home staff person came to the unit to visit with patient.  Patient became very angry and refused to talk with the person. Patient opened her room door and made the person leave her room.  Writer also attempted to talk with patient but she was very angry and shut her door on writer.

## 2021-07-16 NOTE — PLAN OF CARE
Assessment/Intervention/Current Symtoms and Care Coordination  -Chart review  -Rounded with team, addressed patient needs/concerns  Current Symptoms include the following: Irritable and angry/  Not attending to ADL's    Discharge Plan or Goal  Pending stabilization & development of a safe discharge plan.  Considerations include:  Higher level of care such as group home or assisted living facility    Barriers to Discharge  Patient requires further psychiatric stabilization due to current symptomology    Referral Status  No referrals have been made but writer is in communication with owner of [facility] and when CADI funding is in place patient will be able to admit to their group home    Legal Status  Committed/Hatch/Mcgrath Kohler through [facility]

## 2021-07-16 NOTE — PLAN OF CARE
Problem: Behavioral Health Plan of Care  Goal: Plan of Care Review  Outcome: No Change  Flowsheets (Taken 7/15/2021 1947)  Plan of Care Reviewed With: patient  Patient Agreement with Plan of Care: agrees   Pt .slept 7hrs during the nite.No psych sxs observed.No report of SI or HI.Observed to be breathing normally.No safety concerns noted.No prn meds given.

## 2021-07-16 NOTE — PROGRESS NOTES
"[facility], [place]   Psychiatric Progress Note  Hospital Day: 93        Interim History:   The patient's care was discussed with the treatment team during the daily team meeting and/or staff's chart notes were reviewed. Her blood pressure and vitals were WNL. She took all medications as scheduled and no PRNs. No acute medical concerns. She did not complete ADLs for the last several days. Sleeping and eating well. No overt sx/signs of psychosis or tad. No SI/HI/AH/VH reported. No aggressive or violent behaviors. Remains isolative to her room despite encouragement from staff to participate in groups or walk in the milieu. Did sleep well overnight, 7 hours. No signs of delirium noted. Staff from [facility] planning to visit with her again today.     Prior to the interview, Michelle acknowledged feeling sad that she remains in the hospital. Continues to display very poor insight into mental health. She said that she was \"never sick\" before coming into the hospital. She argued that if she was agitated and experiencing psychosis, she would have \"fought with police and I would have been handcuffed.\" She again states that she is not under any form of commitment and therefore does not need to follow guidelines of the commitment. She repeatedly expressed anger about ongoing recommendation to pursue higher level of care. She said that she will \"never go to a group home.\" She became increasingly agitated and interview was terminated as it was no longer therapeutic for patient.          Medications:       amLODIPine  7.5 mg Oral Daily     benztropine  1 mg Oral BID     LORazepam  1 mg Oral BID    Or     LORazepam  1 mg Intramuscular BID     LORazepam  2 mg Oral QPM    Or     LORazepam  2 mg Intramuscular QPM     losartan  100 mg Oral Daily     metoprolol succinate ER  75 mg Oral Daily     OLANZapine zydis  10 mg Oral BID    Or     OLANZapine  10 mg Intramuscular BID          Allergies: "     Allergies   Allergen Reactions     Haldol [Haloperidol]      Patient previously tolerated haldol, though developed oculogyric crises during hospital stay on 4/26/21 on total daily dose of 10 mg.     Lisinopril Cough          Labs:     Recent Results (from the past 24 hour(s))   SARS-COV2 (COVID-19) Virus RT-PCR    Collection Time: 07/15/21  4:34 PM    Specimen: Nasopharyngeal; Swab   Result Value Ref Range    SARS CoV2 PCR Negative Negative          Psychiatric Examination:     /78 (BP Location: Right arm)   Pulse 72   Temp 98.4  F (36.9  C) (Tympanic)   Resp 16   Wt 69.3 kg (152 lb 12.8 oz)   SpO2 96%   BMI 26.23 kg/m    Weight is 152 lbs 12.8 oz  Body mass index is 26.23 kg/m .    Weight over time:  Vitals:    05/25/21 0850 06/15/21 0811 06/16/21 1605 06/19/21 0859   Weight: 71 kg (156 lb 8 oz) 69.8 kg (153 lb 12.8 oz) 70.5 kg (155 lb 8 oz) 71.4 kg (157 lb 8 oz)    06/21/21 0805 06/22/21 0839 06/24/21 0800 07/02/21 0835   Weight: 69.5 kg (153 lb 3.2 oz) 69.3 kg (152 lb 11.2 oz) 70.7 kg (155 lb 12.8 oz) 70.7 kg (155 lb 12.8 oz)    07/03/21 0844 07/06/21 0838 07/10/21 0845 07/16/21 0821   Weight: 69.6 kg (153 lb 8 oz) 70.9 kg (156 lb 4.8 oz) 69.8 kg (153 lb 14.4 oz) 69.3 kg (152 lb 12.8 oz)       Orthostatic Vitals         Most Recent      Lying Orthostatic /91 06/23 0830    Lying Orthostatic Pulse (bpm) 67 06/23 0830    Sitting Orthostatic /89  Comment: notify RN 06/24 0800    Sitting Orthostatic Pulse (bpm) 71 06/24 0800    Standing Orthostatic /101  Comment: notify RN 06/24 0800    Standing Orthostatic Pulse (bpm) 86 06/24 0800            Cardiometabolic risk assessment. 04/15/21      Reviewed patient profile for cardiometabolic risk factors    Date taken /Value  REFERENCE RANGE   Abdominal Obesity  (Waist Circumference)   See nursing flowsheet Women ?35 in (88 cm)   Men ?40 in (102 cm)      Triglycerides  Triglycerides   Date Value Ref Range Status   10/02/2020 86 <=149 mg/dL  "Final   10/19/2019 117 <150 mg/dL Final       ?150 mg/dL (1.7 mmol/L) or current treatment for elevated triglycerides   HDL cholesterol  HDL Cholesterol   Date Value Ref Range Status   10/19/2019 56 >49 mg/dL Final     Direct Measure HDL   Date Value Ref Range Status   10/02/2020 45 (L) >=50 mg/dL Final   ]   Women <50 mg/dL (1.3 mmol/L) in women or current treatment for low HDL cholesterol  Men <40 mg/dL (1 mmol/L) in men or current treatment for low HDL cholesterol     Fasting plasma glucose (FPG) Lab Results   Component Value Date     10/19/2019      FPG ?100 mg/dL (5.6 mmol/L) or treatment for elevated blood glucose   Blood pressure  BP Readings from Last 3 Encounters:   07/16/21 129/78   06/04/19 131/71   04/26/19 164/86    Blood pressure ?130/85 mmHg or treatment for elevated blood pressure   Family History  See family history     Appearance: dressed in hospital scrubs, appeared reported age, unkempt hair   Attitude: cooperative with interview  Eye Contact: fair, looks down or straight ahead   Mood: \"Fine\"  Affect:  Somewhat blunted, increasingly agitated and tense as interview progress   Speech: accented, clear and coherent  Language: no obvious receptive or expressive deficits  Psychomotor, Gait, Musculoskeletal: No abnormal movements noted while seated.   Thought Process: goal-oriented, linear, concrete   Associations:  No loosening of associations present  Thought Content:  Did not appear to be responding to internal stimuli.  Insight:  limited  Judgement:  fair  Oriented to: date, location, treatment team.   Attention Span and Concentration: attentive to conversation.  Recent and Remote Memory: improved, no overt deficits   Fund of Knowledge:  normal    Clinical Global Impressions  First:  Considering your total clinical experience with this particular patient population, how severe are the patient's symptoms at this time?: 7 (04/16/21 3822)  Compared to the patient's condition at the START of " treatment, this patient's condition is: 4 (04/16/21 1421)  Most recent:  Considering your total clinical experience with this particular patient population, how severe are the patient's symptoms at this time?: 7 (06/22/21 1529)  Compared to the patient's condition at the START of treatment, this patient's condition is: 3 (06/22/21 1529)         Precautions:     Behavioral Orders   Procedures     Cheeking Precautions (behavioral units)     Patient Observed swallowing PO medications; Patient asked to drink water after swallowing medication; Patient in Staff line of sight for 15 minutes after medication given; Mouth checks after PO administration (patient asked to open mouth and stick out their tongue).     Code 3     For walks in the [place] with staff and per staff discretion     Electroconvulsive therapy     Series of up to 12 treatments. Begin Date: 5/26/21     Treating Psychiatrist providing ECT:  Dr. Lubin     Notified on:  5/21/21     Electroconvulsive therapy     As long as we get the green light from risk management and pt is medically cleared, begin ECT every Monday, Wednesday, and Friday     Electroconvulsive therapy     Series of up to 12 treatments. Begin Date: 5/25/21     Treating Psychiatrist providing ECT:  Amee     Notified on:  5/24/21     Elopement precautions     Fall precautions     Mcrgath Calderon     Routine Programming     As clinically indicated     Status 15     Every 15 minutes.          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 Symptoms and presentation at this time is most consistent with Schizoaffective Disorder, Bipolar Type. Obtained most recent medication regimen from patient's ACT team, and regimen was initially restarted. Pt is committed. Petitioned for Alcides Calderon was filed and granted due to lack of improvement and side effects from medications. Inpatient psychiatric hospitalization is warranted at this time for safety, stabilization, possible adjustment in medications and development of a safe discharge plan.     Hospital Course:  On admission, PTA medications were restarted. However, Michelle had been declining all scheduled medications despite significant encouragement from staff and provider. Psychiatric emergency declared on 4/20 due to aggression toward others in context of severe psychosis and suspected excited catatonia. Ativan 1 mg TID was also added. Discontinued PTA Invega, Zyprexa, and thorazine on 4/20 due to consistent refusal.     On 4/26, it was noted that patient frequently had upward gaze while walking up and down the unit. She did not appear to be distressed. She reported that she was looking at \"my god.\" It was determined to be oculogyric crisis secondary to IM haloperidol. Haldol was subsequently discontinued and scheduled Zyprexa was initiated on an emergency basis. Oral Cogentin was also scheduled, though patient declined. On the evening of 4/26, patient's gaze was fixed in upward position for several hours and she appeared to be experiencing discomfort. IM Cogentin was administered with noted resolution. Partial improvements were observed after switching to scheduled Zyprexa. After patient improved, she was more receptive to reinitiating oral Invega, which was initiated on 5/3 and titrated to PTA dose of 9 mg daily on 5/10. Plan was for ACT team to bring in loading dose of Invega Sustenna. Patient had signs of oculogyric crisis again with " "Invega. Oral dose decreased to 6 mg after this. Invega was stopped on 5/14 due to ongoing signs of problems related to eye movement and concerns for oculogyric crisis.    Overall improvement in patient's agitation and suspected catatonia noted on 4/30 after patient accepted two doses of Ativan. She began declining Ativan again with noted decompensation. Patient began accepting, however, was deemed not to have the capacity to consent to treatment with Ativan. She does not believe she has a mental illness, including catatonia. She does not fully understand risks associated with inadequate treatment of catatonia. Discussed with her son who is acting as surrogate decision maker and he is in full support of forced scheduled IM Ativan if patient declines oral formulation. Also consulted with our legal team prior to backing oral Ativan with IM.     Ativan was increased on 5/19 due to re-emerging evidence of catatonia (long periods of staring, repetitive movements, echolalia, mutism). Meeting was held with ACT team on 5/20, including ACT team psychiatrist, Dr. Pedraza. He said that he is in \"full support\" of plan to pursue Mcgrath Kohler and ECT at this time. He does feel that in the past she has been discharged from the hospital while still quite symptomatic. He is hoping that ECT will be effective and that with improvement, Michelle would be more receptive to clozapine and weekly blood draws. He said that ideally she would transition to an IRTS before going back to her apartment, but also understands there may be some barriers (I.e. pt's willingness, financial concerns, etc).     ECT consult placed. Please see consult note by Dr. Lubin on 5/21 for details. Mcgrath Kohler was approved on 5/24 and patient was medically cleared on 5/24. ECT initiated on 5/26. She was noted to have a short seizure during ECT on 6/4. Staff note that patient appears more disoriented with memory impairment and sedation on days of ECT. This was noted " on my examination again today. Improvements noted in mood, affect, social interactions, paranoia, agitation since initiation of ECT. Reduced Ativan on 6/5 due to sedative effects. Relayed concerns about memory impairment and confusion with Dr. Lubin. Recommended attempting unilateral ECT, which he agreed to do. First unilateral ECT on 6/7. ECT was held on 6/11 and 6/14 due to memory impairment. We will plan to hold it again tomorrow (6/16). There is ongoing discussion regarding whether there is a component of catatonia contributing to her current presentation but this is less likely since it worsened after ECT was started.  Given her level of cognitive impairment and our belief that this is due to ECT, we will not pursue any further treatments at this time. Since we have held ECT (last treatment on 6/9), her cognitive impairment has slightly improved (more oriented).     Michelle initially appeared to be decompensating somewhat when ECT was held, though her cognitive impairment has gradually improved. If symptoms of psychosis re-emerge, it may be worth starting clozapine, which is something that has previously been considered by her ACT team. Her Hatch order would need to be amended as clozapine is not one of the medications listed. ANC obtained on 6/16 was 1800/microL. Per conversation with pharmacy, neutropenia has been associated with olanzapine and agranulocytosis has been associated with olanzapine, lorazepam, metoprolol, and hydralazine and hematologic labs have been monitored. Upon re-check, ANC was 3700/microL on 6/21/21. At this time, the primary symptoms we are observing are cognitive deficits and inability to care for self, which we would not address by changing her antipsychotic medication.    Michelle's cognitive impairment has been steadily improving since holding ECT treatments, however it is possible that lorazepam is also playing a role in her cognitive impairment. Given no signs of re-emerging catatonia, a  gradual lorazepam taper (decreasing by 0.5 mg) was initiated on 6/28/21 to monitor for potential improvements with regard to memory impairment.  On 7/7, Michelle reported an incident of oculogyric crisis and Cogentin 1 mg BID was initiated with no noted changes in cognition.     Michelle has remained focused on discharge. The team is working with Michelle's ACT team to to establish a safe discharge plan with options including moving to a group home vs home with her ACT team and additional in home supports via CADI services. We are concerned that Michelle would have difficulty taking her medications as prescribed if she were to return home without her ACT team. This is evidenced by impairments noted in cognitive assessment by OT specialist on 7/1. MOCA score was a 13/30 and CPT score was a 4.7.     Target psychiatric symptoms and interventions:   - Continue 1 mg PO or IM lorazepam in the morning and at mid-day and 2 mg PO or IM lorazepam in the evening. Decrease mid-day lorazepam to 1 mg PO or IM. Patient may not decline.   - Continue Zyprexa 10 mg BID  PO or IM. Hatch in place. May consider increasing further though holding off given re-emerging catatonic sx and borderline prolonged QTc   - Continue Cogentin 1 mg BID   -Continue Cogentin 2 mg IM daily prn for evidence of acute dystonic reaction or oculogyric crisis  -Continue hydroxyzine 25-50 mg q4h prn for acute anxiety  -Continue Trazodone 50 mg at bedtime prn for sleep disturbances  -Continue Zyprexa 10 mg TID prn for severe agitation  -Continue Ativan/Benadryl q4h prn for agitation. WOULD GIVE PRN ATIVAN FIRST FOR AGITATION unless it is after 5 pm on day prior to scheduled ECT    Occupational Therapy Consult Placed to evaluate cognitive functioning/ability to care for self in home environment, ability to manage medications. See note on 7/1 for details.      ECT: Discontinued due to significant cognitive impairment. Please see note dated 7/12/21 for additional details.  "    Acute Medical Problems and Treatments:  HTN, improved: Patient is now adherent with medication regimen.   - Metoprolol succinate ER 75 mg   - Cozaar 100 mg daily  - Amlodipine 7.5 mg daily  - IM discontinued hydralazine prn  - Switched BP checks from QID to BID on 7/13 given overall improvement  - Please see note from IM dated 5/3, 5/6, 5/24, 6/20 and 6/30/21.  - Obtained EKG and routine labs on 5/21 in context of pt declining vital sign checks and anti-hypertensives and recently elevated BPs. Reviewed labs and discussed EKG findings with IM on 5/21. No urgent concerns, though IM should be notified if pt develops acute medical concerns (I.e. heart palpitations, SOB, changes in speech, AMS, confusion, CP, HA, changes in vision).    - Per 6/20 IM note: \"Please notify IM if BP is persistently severely elevated and requiring frequent PRN hydralazine\".  - Internal medicine consulted again on 6/28 due to consistent use of hydralazine over the past week. Metoprolol increased to 75 mg daily then to 100 mg and amlodipine 5 mg was added on 6/30. Amlodipine increased to 7.5 mg daily on 7/5.     Recent history of BRANDON:  - Attempting to repeat BMP though patient declining blood draw  - Encourage fluids  - Attempt repeat blood draw again in AM    For previous medical concerns, please see note dated 7/12/21.    Behavioral/Psychological/Social:  - Encourage unit programming  - Patient is now Code 3 status and can take walks in the [place] with staff and security present per staff discretion. This appears to be quite therapeutic for her.     Safety:  - Continue precautions as noted above  - Status 15 minute checks  - Safety precautions include: assault and elopement precautions  - Continue precautions as noted above    Legal Status: Committed as MI with Hatch in place for Haldol, Zyprexa, Invega, and Thorazine through [facility]. Filed Alcides Kohler through [facility] and approved on 5/24/21.     Disposition Plan "   Reason for ongoing admission: poses an imminent risk to self, poses an imminent risk to others and is unable to care for self due to severe psychosis or tad  Discharge location: Group home (needs CADI waiver) vs home with [facility] ACT team or 24/7 in-home care. CPT assessment done by OT (Tania queen) on 7/1. Please see note for details. [place] Choice Assessment deferred while her son is pursuing emergency guardianship.   Discharge Medications: not ordered  Follow-up Appointments: not scheduled     Gabby Zaman MD  [facility] Psychiatry

## 2021-07-16 NOTE — PLAN OF CARE
Problem: Behavioral Health Plan of Care  Goal: Plan of Care Review  Outcome: No Change  Flowsheets (Taken 7/15/2021 1947)  Plan of Care Reviewed With: patient  Patient Agreement with Plan of Care: agrees   Pt cont to be quiet,withdrawn and isolated to her room.She slept and had to be woken up to eat supper. She presented with flat and blunted affect.Pt said she was doing fine. No complaints reported.She denied SI,HI,depression,anxiety, and pain. No SIB observed. Pt had no concerns regarding sleep,appetite and constipation. No stated goal but she watched the news([employer] ) this evening in her room. Pt took her scheduled meds. No prn meds administered.

## 2021-07-16 NOTE — PLAN OF CARE
Problem: Behavior Regulation Impairment (Psychotic Signs/Symptoms)  Goal: Improved Behavioral Control (Psychotic Signs/Symptoms)  Outcome: Improving    RN Assessment:  SI/Self harm:  pt denies   Aggression/agitation/HI:  pt has not shown any aggression, though she does get angry and yell at staff. Pt becomes upset with staff who try to assess.   AVH:  pt denies, does not appear to be responding to internal stimuli  Sleep: no concerns r/t sleep  PRN Med: No PRNs administered this shift  Medication AE: pt denies any medication concerns  Physical Complaints/Issues: pt denies  I & O: eating and drinking well  ADLs: independent  Visits: pt had a visitor from [facility], whom pt asked to leave. Pt got very upset at visitor and  at this  time, as pt continues to vehemently refuse to go anywhere but to her own apartment  Vitals:  stable and WNL  COVID 19 Assessment: negative on admission. Pt has refused COVID vaccine.  Milieu Participation: minimal, isolative to her room. Pt resists prompts to come out of her room for activity  Behavior: irritable and angry with staff at times. Otherwise overall calm and controlled. Able to get needs met.    Continue to encourage pt to attend to ADLs and pursue activity outside of her room. No other concerns at this time. Nursing will continue to monitor and assess.

## 2021-07-17 PROCEDURE — 124N000002 HC R&B MH UMMC

## 2021-07-17 PROCEDURE — 250N000013 HC RX MED GY IP 250 OP 250 PS 637: Performed by: PHYSICIAN ASSISTANT

## 2021-07-17 PROCEDURE — 250N000013 HC RX MED GY IP 250 OP 250 PS 637: Performed by: PSYCHIATRY & NEUROLOGY

## 2021-07-17 PROCEDURE — 250N000013 HC RX MED GY IP 250 OP 250 PS 637: Performed by: STUDENT IN AN ORGANIZED HEALTH CARE EDUCATION/TRAINING PROGRAM

## 2021-07-17 RX ADMIN — LORAZEPAM 2 MG: 2 TABLET ORAL at 20:09

## 2021-07-17 RX ADMIN — BENZTROPINE MESYLATE 1 MG: 1 TABLET ORAL at 08:59

## 2021-07-17 RX ADMIN — OLANZAPINE 10 MG: 10 TABLET, ORALLY DISINTEGRATING ORAL at 08:59

## 2021-07-17 RX ADMIN — LORAZEPAM 1 MG: 1 TABLET ORAL at 14:00

## 2021-07-17 RX ADMIN — BENZTROPINE MESYLATE 1 MG: 1 TABLET ORAL at 20:09

## 2021-07-17 RX ADMIN — METOPROLOL SUCCINATE 75 MG: 25 TABLET, EXTENDED RELEASE ORAL at 08:59

## 2021-07-17 RX ADMIN — LORAZEPAM 1 MG: 1 TABLET ORAL at 08:58

## 2021-07-17 RX ADMIN — AMLODIPINE BESYLATE 7.5 MG: 2.5 TABLET ORAL at 08:59

## 2021-07-17 RX ADMIN — LOSARTAN POTASSIUM 100 MG: 100 TABLET, FILM COATED ORAL at 09:04

## 2021-07-17 RX ADMIN — OLANZAPINE 10 MG: 10 TABLET, ORALLY DISINTEGRATING ORAL at 20:09

## 2021-07-17 ASSESSMENT — ACTIVITIES OF DAILY LIVING (ADL)
HYGIENE/GROOMING: INDEPENDENT
ORAL_HYGIENE: INDEPENDENT
HYGIENE/GROOMING: SHOWER;INDEPENDENT
LAUNDRY: UNABLE TO COMPLETE
DRESS: SCRUBS (BEHAVIORAL HEALTH);INDEPENDENT
DRESS: SCRUBS (BEHAVIORAL HEALTH)
ORAL_HYGIENE: INDEPENDENT

## 2021-07-17 NOTE — PROVIDER NOTIFICATION
07/17/21 0600   Sleep/Rest/Relaxation   Sleep/Rest/Relaxation (WDL) Ex.   Sleep/Rest/Relaxation unable to stay asleep   Night Time # Hours 4 hours       Pt had a quiet night with no behavior or safety concerns, but pt apparently had some sleep disturbances that prevented her from falling and staying asleep fully last night. Refused prn medications.

## 2021-07-17 NOTE — PLAN OF CARE
"Patient was in her room for majority of shift. She cooperated  with AM vitals and AM medications.  She was minimally conversant when asked how she was feeling. She declined to participate in education. She did shower today.  Patient ate with good appetite for both breakfast and lunch.  Pt denied suicide ideation but did not want to answer anything more than \"Are you feeling suicidal.\"  She was offered opportunity to have her fingernails painted but said \"Maybe later.\"  "

## 2021-07-17 NOTE — PLAN OF CARE
Problem: Behavioral Health Plan of Care  Goal: Optimized Coping Skills in Response to Life Stressors  Outcome: Improving     Behavioral  Pt was her room pacing most of the shift. Pt denies all mental health symptoms. She denies anxiety and depression, Suicidal thoughts or AVH. Pt's mood was calm and affect was flat and blunted. She was polite on approach and compliant with medications. Pt endorse good appetite.  Medical  None  Plan  Pt had no goals this shift

## 2021-07-18 PROCEDURE — 250N000013 HC RX MED GY IP 250 OP 250 PS 637: Performed by: STUDENT IN AN ORGANIZED HEALTH CARE EDUCATION/TRAINING PROGRAM

## 2021-07-18 PROCEDURE — 250N000013 HC RX MED GY IP 250 OP 250 PS 637: Performed by: PHYSICIAN ASSISTANT

## 2021-07-18 PROCEDURE — 250N000013 HC RX MED GY IP 250 OP 250 PS 637: Performed by: PSYCHIATRY & NEUROLOGY

## 2021-07-18 PROCEDURE — 124N000002 HC R&B MH UMMC

## 2021-07-18 RX ADMIN — BENZTROPINE MESYLATE 1 MG: 1 TABLET ORAL at 08:35

## 2021-07-18 RX ADMIN — BENZTROPINE MESYLATE 1 MG: 1 TABLET ORAL at 20:05

## 2021-07-18 RX ADMIN — LORAZEPAM 1 MG: 1 TABLET ORAL at 14:09

## 2021-07-18 RX ADMIN — LOSARTAN POTASSIUM 100 MG: 100 TABLET, FILM COATED ORAL at 08:36

## 2021-07-18 RX ADMIN — LORAZEPAM 1 MG: 1 TABLET ORAL at 08:35

## 2021-07-18 RX ADMIN — OLANZAPINE 10 MG: 10 TABLET, ORALLY DISINTEGRATING ORAL at 08:36

## 2021-07-18 RX ADMIN — OLANZAPINE 10 MG: 10 TABLET, ORALLY DISINTEGRATING ORAL at 20:05

## 2021-07-18 RX ADMIN — LORAZEPAM 2 MG: 2 TABLET ORAL at 20:04

## 2021-07-18 RX ADMIN — METOPROLOL SUCCINATE 75 MG: 25 TABLET, EXTENDED RELEASE ORAL at 08:35

## 2021-07-18 RX ADMIN — AMLODIPINE BESYLATE 7.5 MG: 2.5 TABLET ORAL at 08:35

## 2021-07-18 ASSESSMENT — ACTIVITIES OF DAILY LIVING (ADL)
LAUNDRY: UNABLE TO COMPLETE
ORAL_HYGIENE: INDEPENDENT
HYGIENE/GROOMING: INDEPENDENT
DRESS: SCRUBS (BEHAVIORAL HEALTH)
HYGIENE/GROOMING: INDEPENDENT
DRESS: SCRUBS (BEHAVIORAL HEALTH);INDEPENDENT
ORAL_HYGIENE: INDEPENDENT

## 2021-07-18 NOTE — PLAN OF CARE
Patient was cooperative with taking her scheduled medications today.  She remained in her room all shift. She declined the offer to go out to the garden.  Affect was flat. Patient told writer/RN that she did not want to have 1:1.    She refused to have labs drawn in the afternoon.  This will need to be discussed with MD in the AM.

## 2021-07-18 NOTE — PLAN OF CARE
"Problem: Behavior Regulation Impairment (Psychotic Signs/Symptoms)  Goal: Improved Behavioral Control (Psychotic Signs/Symptoms)  Outcome: Improving  Flowsheets (Taken 7/17/2021 2121)  Mutually Determined Action Steps (Improved Behavioral Control): identifies future-oriented goal  Patient spent most of the shift in her room watching [employer]. She had a flat, blunted affect but was calm and cooperative when engaged. She is hopeful and looks forward to going back to her \"own place.\" She ate meals and snacks was medication compliant. Patient denied all mental health symptoms and had no behavioral or safety concerns.  "

## 2021-07-18 NOTE — PROVIDER NOTIFICATION
07/18/21 0600   Sleep/Rest/Relaxation   Sleep/Rest/Relaxation (WDL) Ex   Sleep/Rest/Relaxation unable to stay asleep   Night Time # Hours 5 hours     Pt had a quiet night with no behavior or safety concerns. Slept 30 minutes longer than she did last night.

## 2021-07-19 PROCEDURE — 250N000013 HC RX MED GY IP 250 OP 250 PS 637: Performed by: PSYCHIATRY & NEUROLOGY

## 2021-07-19 PROCEDURE — 99232 SBSQ HOSP IP/OBS MODERATE 35: CPT | Mod: GC | Performed by: PSYCHIATRY & NEUROLOGY

## 2021-07-19 PROCEDURE — 124N000002 HC R&B MH UMMC

## 2021-07-19 PROCEDURE — 250N000013 HC RX MED GY IP 250 OP 250 PS 637: Performed by: STUDENT IN AN ORGANIZED HEALTH CARE EDUCATION/TRAINING PROGRAM

## 2021-07-19 PROCEDURE — 250N000013 HC RX MED GY IP 250 OP 250 PS 637: Performed by: PHYSICIAN ASSISTANT

## 2021-07-19 RX ADMIN — BENZTROPINE MESYLATE 1 MG: 1 TABLET ORAL at 08:32

## 2021-07-19 RX ADMIN — LOSARTAN POTASSIUM 100 MG: 100 TABLET, FILM COATED ORAL at 08:32

## 2021-07-19 RX ADMIN — OLANZAPINE 10 MG: 10 TABLET, ORALLY DISINTEGRATING ORAL at 19:19

## 2021-07-19 RX ADMIN — LORAZEPAM 1 MG: 1 TABLET ORAL at 08:32

## 2021-07-19 RX ADMIN — METOPROLOL SUCCINATE 75 MG: 25 TABLET, EXTENDED RELEASE ORAL at 08:32

## 2021-07-19 RX ADMIN — LORAZEPAM 1 MG: 1 TABLET ORAL at 14:12

## 2021-07-19 RX ADMIN — LORAZEPAM 2 MG: 2 TABLET ORAL at 19:19

## 2021-07-19 RX ADMIN — BENZTROPINE MESYLATE 1 MG: 1 TABLET ORAL at 19:19

## 2021-07-19 RX ADMIN — OLANZAPINE 10 MG: 10 TABLET, ORALLY DISINTEGRATING ORAL at 08:32

## 2021-07-19 RX ADMIN — AMLODIPINE BESYLATE 7.5 MG: 2.5 TABLET ORAL at 08:32

## 2021-07-19 ASSESSMENT — ACTIVITIES OF DAILY LIVING (ADL)
HYGIENE/GROOMING: INDEPENDENT
ORAL_HYGIENE: INDEPENDENT
DRESS: SCRUBS (BEHAVIORAL HEALTH)

## 2021-07-19 NOTE — PLAN OF CARE
BEHAVIORAL TEAM DISCUSSION    Participants:  Dr Zaman, Dr Varner,(Resident), Kal wilson RN, Sergio Goldberg, [facility]  Progress:  Continuing to assess  Anticipated length of stay: Unknown at this time  Continued Stay Criteria/Rationale:  Patient is unable to return to her apartment as she lives alone and cannot take care of herself. ACT Team has started the process of Emergency Guardianship for patient's son.    Medical/Physical:  Per Internal Medicine  Precautions:   Behavioral Orders   Procedures    Cheeking Precautions (behavioral units)     Patient Observed swallowing PO medications; Patient asked to drink water after swallowing medication; Patient in Staff line of sight for 15 minutes after medication given; Mouth checks after PO administration (patient asked to open mouth and stick out their tongue).    Code 3     For walks in the [place] with staff and per staff discretion    Electroconvulsive therapy     Series of up to 12 treatments. Begin Date: 5/26/21     Treating Psychiatrist providing ECT:  Dr. Lubin     Notified on:  5/21/21    Electroconvulsive therapy     As long as we get the green light from risk management and pt is medically cleared, begin ECT every Monday, Wednesday, and Friday    Electroconvulsive therapy     Series of up to 12 treatments. Begin Date: 5/25/21     Treating Psychiatrist providing ECT:  Amee     Notified on:  5/24/21    Elopement precautions    Fall precautions    Mcgrath Calderon    Routine Programming     As clinically indicated    Status 15     Every 15 minutes.     Plan:  Attending psychiatrist and Resident will see patient daily to assess medication needs and to answer questions patient may have regarding her hospitalization. Patient remains isolative to her room and does not participate in unit programming or therapy groups. CTC will coordinate disposition and aftercare plan.   Rationale for change in precautions or plan:  No change

## 2021-07-19 NOTE — PLAN OF CARE
Problem: Sleep Disturbance (Psychotic Signs/Symptoms)  Goal: Improved Sleep (Psychotic Signs/Symptoms)  Outcome: Improving    Night Shift Summary (7/18/21 into 07/19/21)    Pt awake at start of shift. Pt was able to fall asleep with no concern , and breathing quiet and unlabored.     Pt had no c/o pain or discomfort during the HS.     Appears to have slept 6 hours.     Pt on CHEEKING, ELOPEMENT, FALL and INTRUSIVE precautions in addition to single room order. Any related events noted above.     Will continue to monitor and assess.

## 2021-07-19 NOTE — PLAN OF CARE
Assessment/Intervention/Current Symtoms and Care Coordination  -Rounded with team, addressed patient needs/concerns  -Current Symptoms include the following: Patient remains angry and irritable regarding discharge plan of higher level care such as group home.     Discharge Plan or Goal  Pending stabilization & development of a safe discharge plan.  Considerations include: Group home placement.     Barriers to Discharge  Patient requires further psychiatric stabilization due to current symptomology: CADI Waiver Funding needs to be in place before patient can be accepted to a group home. Patient's son is in the process of applying for emergency guardianship of his mother.      Referral Status  CTC has been in ongoing communication with [facility] owner regarding patient coming to their facility.     Legal Status  Commitment/Hatch/Mcgrath Kohler through [facility]

## 2021-07-19 NOTE — PROGRESS NOTES
"[facility], [place]   Psychiatric Progress Note  Hospital Day: 96        Interim History:   The patient's care was discussed with the treatment team during the daily team meeting and/or staff's chart notes were reviewed. Over the weekend her blood pressure was mildly elevated on 2 occasions otherwise vitals were WNL. She took all medications as scheduled and no PRNs. No acute medical concerns. She has continued to struggle with independently initiating ADLs. Sleeping and eating well. No overt sx/signs of psychosis or tad. No SI/HI/AH/VH reported. No aggressive or violent behaviors. Continues to be isolative to her room despite encouragement from staff to participate in groups or walk in the milieu or garden. She has continued sleeping well overnight. 6 hours last night. No signs of delirium noted. On Friday, she declined to speak with the women from the group home who she had previously spoken with.     Michelle was interviewed in her room where she was watching TV prior to her meeting.  She appeared somewhat sedated though denied any fatigue and states she is feeling \"good.\"  She states that her weekend was fine and that she just watched TV and ate her meals.  She denied having any physical health concerns today.  She was given the option of potentially transferring to a less restrictive unit however she declined this and stated she just wants to discharge home.  We discussed discharge and that arrangements are being made for her to go to a group home due to concerns about her ability to care for herself and maintain her safety at her home in addition to prior issues with Michelle not allowing people to enter her home and also that 24/7 services would likely take a prolonged period to arrange. She stated that this is \"rubbish\"and that it is \"harmful for me to go to the group home \"and \"it is not harmful for me to be home by myself.\"  We discussed that many people find benefit from living in a " "group home setting with more support and connections.  She replied that \"it is not for Michelle.\"She indicates her desire to work and states she cannot work if she lives in a group home.  The team informed Michelle that people are able to work even if living in a group home however she continually stated that this is not possible or true.  She stated that \"I will not go to a group home. I have a choice.\"  She was informed regarding commitment and then continually stated \"I am not under commitment. You are lying.\"           Medications:       amLODIPine  7.5 mg Oral Daily     benztropine  1 mg Oral BID     LORazepam  1 mg Oral BID    Or     LORazepam  1 mg Intramuscular BID     LORazepam  2 mg Oral QPM    Or     LORazepam  2 mg Intramuscular QPM     losartan  100 mg Oral Daily     metoprolol succinate ER  75 mg Oral Daily     OLANZapine zydis  10 mg Oral BID    Or     OLANZapine  10 mg Intramuscular BID          Allergies:     Allergies   Allergen Reactions     Haldol [Haloperidol]      Patient previously tolerated haldol, though developed oculogyric crises during hospital stay on 4/26/21 on total daily dose of 10 mg.     Lisinopril Cough          Labs:     No results found for this or any previous visit (from the past 24 hour(s)).       Psychiatric Examination:     /84 (BP Location: Right arm)   Pulse 91   Temp 98.2  F (36.8  C) (Tympanic)   Resp 16   Wt 70.4 kg (155 lb 4.8 oz)   SpO2 97%   BMI 26.66 kg/m    Weight is 155 lbs 4.8 oz  Body mass index is 26.66 kg/m .    Weight over time:  Vitals:    05/25/21 0850 06/15/21 0811 06/16/21 1605 06/19/21 0859   Weight: 71 kg (156 lb 8 oz) 69.8 kg (153 lb 12.8 oz) 70.5 kg (155 lb 8 oz) 71.4 kg (157 lb 8 oz)    06/21/21 0805 06/22/21 0839 06/24/21 0800 07/02/21 0835   Weight: 69.5 kg (153 lb 3.2 oz) 69.3 kg (152 lb 11.2 oz) 70.7 kg (155 lb 12.8 oz) 70.7 kg (155 lb 12.8 oz)    07/03/21 0844 07/06/21 0838 07/10/21 0845 07/16/21 0821   Weight: 69.6 kg (153 lb 8 oz) 70.9 kg " "(156 lb 4.8 oz) 69.8 kg (153 lb 14.4 oz) 69.3 kg (152 lb 12.8 oz)    07/17/21 0830   Weight: 70.4 kg (155 lb 4.8 oz)       Orthostatic Vitals         Most Recent      Lying Orthostatic /91 06/23 0830    Lying Orthostatic Pulse (bpm) 67 06/23 0830    Sitting Orthostatic /89  Comment: notify RN 06/24 0800    Sitting Orthostatic Pulse (bpm) 71 06/24 0800    Standing Orthostatic /101  Comment: notify RN 06/24 0800    Standing Orthostatic Pulse (bpm) 86 06/24 0800            Cardiometabolic risk assessment. 04/15/21      Reviewed patient profile for cardiometabolic risk factors    Date taken /Value  REFERENCE RANGE   Abdominal Obesity  (Waist Circumference)   See nursing flowsheet Women ?35 in (88 cm)   Men ?40 in (102 cm)      Triglycerides  Triglycerides   Date Value Ref Range Status   10/02/2020 86 <=149 mg/dL Final   10/19/2019 117 <150 mg/dL Final       ?150 mg/dL (1.7 mmol/L) or current treatment for elevated triglycerides   HDL cholesterol  HDL Cholesterol   Date Value Ref Range Status   10/19/2019 56 >49 mg/dL Final     Direct Measure HDL   Date Value Ref Range Status   10/02/2020 45 (L) >=50 mg/dL Final   ]   Women <50 mg/dL (1.3 mmol/L) in women or current treatment for low HDL cholesterol  Men <40 mg/dL (1 mmol/L) in men or current treatment for low HDL cholesterol     Fasting plasma glucose (FPG) Lab Results   Component Value Date     10/19/2019      FPG ?100 mg/dL (5.6 mmol/L) or treatment for elevated blood glucose   Blood pressure  BP Readings from Last 3 Encounters:   07/19/21 138/84   06/04/19 131/71   04/26/19 164/86    Blood pressure ?130/85 mmHg or treatment for elevated blood pressure   Family History  See family history     Appearance: dressed in hospital scrubs, appeared reported age, unkempt hair   Attitude: somewhat cooperative with interview  Eye Contact: fair, looks down or straight ahead   Mood: \"okay\"  Affect:  Somewhat blunted, increasingly agitated and tense as " interview progressed    Speech: accented, clear and coherent  Language: no obvious receptive or expressive deficits  Psychomotor, Gait, Musculoskeletal: No abnormal movements noted while seated.   Thought Process: goal-oriented, linear, concrete   Associations:  No loosening of associations present  Thought Content:  Did not appear to be responding to internal stimuli.  Insight:  poor  Judgement:  fair  Oriented to: date, location, treatment team, current president   Attention Span and Concentration: attentive to conversation.  Recent and Remote Memory: stable, possible difficulty recalling prior conversations vs. Inability to accept information contained in conversations   Fund of Knowledge:  normal    Clinical Global Impressions  First:  Considering your total clinical experience with this particular patient population, how severe are the patient's symptoms at this time?: 7 (04/16/21 1428)  Compared to the patient's condition at the START of treatment, this patient's condition is: 4 (04/16/21 1428)  Most recent:  Considering your total clinical experience with this particular patient population, how severe are the patient's symptoms at this time?: 7 (06/22/21 1529)  Compared to the patient's condition at the START of treatment, this patient's condition is: 3 (06/22/21 1529)         Precautions:     Behavioral Orders   Procedures     Cheeking Precautions (behavioral units)     Patient Observed swallowing PO medications; Patient asked to drink water after swallowing medication; Patient in Staff line of sight for 15 minutes after medication given; Mouth checks after PO administration (patient asked to open mouth and stick out their tongue).     Code 3     For walks in the [place] with staff and per staff discretion     Electroconvulsive therapy     Series of up to 12 treatments. Begin Date: 5/26/21     Treating Psychiatrist providing ECT:  Dr. Lubin     Notified on:  5/21/21     Electroconvulsive therapy     As  long as we get the green light from risk management and pt is medically cleared, begin ECT every Monday, Wednesday, and Friday     Electroconvulsive therapy     Series of up to 12 treatments. Begin Date: 5/25/21     Treating Psychiatrist providing ECT:  Amee     Notified on:  5/24/21     Elopement precautions     Fall precautions     Alcides Calderon     Routine Programming     As clinically indicated     Status 15     Every 15 minutes.          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 Symptoms and presentation at this time is most consistent with Schizoaffective Disorder, Bipolar Type. Obtained most recent medication regimen from patient's ACT team, and regimen was initially restarted. Pt is committed. Petitioned for Mcgrath Calderon was filed and granted due to lack of improvement and side effects from medications. Inpatient psychiatric hospitalization is warranted at this time for safety, stabilization, possible adjustment in medications and development of a safe discharge plan.     Hospital Course:  On admission, PTA medications were restarted. However, Michelle had been declining all scheduled medications despite significant encouragement from staff and provider. Psychiatric emergency declared on 4/20 due to aggression toward others in context of severe psychosis and suspected excited catatonia. Ativan 1 mg TID was also added. Discontinued PTA Invega, Zyprexa, and thorazine on 4/20 due to consistent  "refusal.     On 4/26, it was noted that patient frequently had upward gaze while walking up and down the unit. She did not appear to be distressed. She reported that she was looking at \"my god.\" It was determined to be oculogyric crisis secondary to IM haloperidol. Haldol was subsequently discontinued and scheduled Zyprexa was initiated on an emergency basis. Oral Cogentin was also scheduled, though patient declined. On the evening of 4/26, patient's gaze was fixed in upward position for several hours and she appeared to be experiencing discomfort. IM Cogentin was administered with noted resolution. Partial improvements were observed after switching to scheduled Zyprexa. After patient improved, she was more receptive to reinitiating oral Invega, which was initiated on 5/3 and titrated to PTA dose of 9 mg daily on 5/10. Plan was for ACT team to bring in loading dose of Invega S[place]enna. Patient had signs of oculogyric crisis again with Invega. Oral dose decreased to 6 mg after this. Invega was stopped on 5/14 due to ongoing signs of problems related to eye movement and concerns for oculogyric crisis.    Overall improvement in patient's agitation and suspected catatonia noted on 4/30 after patient accepted two doses of Ativan. She began declining Ativan again with noted decompensation. Patient began accepting, however, was deemed not to have the capacity to consent to treatment with Ativan. She does not believe she has a mental illness, including catatonia. She does not fully understand risks associated with inadequate treatment of catatonia. Discussed with her son who is acting as surrogate decision maker and he is in full support of forced scheduled IM Ativan if patient declines oral formulation. Also consulted with our legal team prior to backing oral Ativan with IM.     Ativan was increased on 5/19 due to re-emerging evidence of catatonia (long periods of staring, repetitive movements, echolalia, mutism). Meeting " "was held with ACT team on 5/20, including ACT team psychiatrist, Dr. Pedraza. He said that he is in \"full support\" of plan to pursue Mcgrath Kolher and ECT at this time. He does feel that in the past she has been discharged from the hospital while still quite symptomatic. He is hoping that ECT will be effective and that with improvement, Michelle would be more receptive to clozapine and weekly blood draws. He said that ideally she would transition to an IRTS before going back to her apartment, but also understands there may be some barriers (I.e. pt's willingness, financial concerns, etc).     ECT consult placed. Please see consult note by Dr. Lubin on 5/21 for details. Alcides Riverappard was approved on 5/24 and patient was medically cleared on 5/24. ECT initiated on 5/26. She was noted to have a short seizure during ECT on 6/4. Staff note that patient appears more disoriented with memory impairment and sedation on days of ECT. This was noted on my examination again today. Improvements noted in mood, affect, social interactions, paranoia, agitation since initiation of ECT. Reduced Ativan on 6/5 due to sedative effects. Relayed concerns about memory impairment and confusion with Dr. Lubin. Recommended attempting unilateral ECT, which he agreed to do. First unilateral ECT on 6/7. ECT was held on 6/11 and 6/14 due to memory impairment. We will plan to hold it again tomorrow (6/16). There is ongoing discussion regarding whether there is a component of catatonia contributing to her current presentation but this is less likely since it worsened after ECT was started.  Given her level of cognitive impairment and our belief that this is due to ECT, we will not pursue any further treatments at this time. Since we have held ECT (last treatment on 6/9), her cognitive impairment has slightly improved (more oriented).     Michelle initially appeared to be decompensating somewhat when ECT was held, though her cognitive impairment has " gradually improved. If symptoms of psychosis re-emerge, it may be worth starting clozapine, which is something that has previously been considered by her ACT team. Her Hatch order would need to be amended as clozapine is not one of the medications listed. ANC obtained on 6/16 was 1800/microL. Per conversation with pharmacy, neutropenia has been associated with olanzapine and agranulocytosis has been associated with olanzapine, lorazepam, metoprolol, and hydralazine and hematologic labs have been monitored. Upon re-check, ANC was 3700/microL on 6/21/21. At this time, the primary symptoms we are observing are cognitive deficits and inability to care for self, which we would not address by changing her antipsychotic medication.    Michelle's cognitive impairment has been steadily improving since holding ECT treatments, however it is possible that lorazepam is also playing a role in her cognitive impairment. Given no signs of re-emerging catatonia, a gradual lorazepam taper (decreasing by 0.5 mg) was initiated on 6/28/21 to monitor for potential improvements with regard to memory impairment.  On 7/7, Michelle reported an incident of oculogyric crisis and Cogentin 1 mg BID was initiated with no noted changes in cognition.     Michelle has remained focused on discharge. The team is working with Michelle's ACT team to to establish a safe discharge plan with options including moving to a group home vs home with her ACT team and additional in home supports via CADI services. We are concerned that Michelle would have difficulty taking her medications as prescribed if she were to return home without her ACT team. This is evidenced by impairments noted in cognitive assessment by OT specialist on 7/1. MOCA score was a 13/30 and CPT score was a 4.7. Physician's statement in support of guardianship was completed on 7/19/21.     Target psychiatric symptoms and interventions:   - Continue 1 mg PO or IM lorazepam in the morning and at mid-day and 2 mg  "PO or IM lorazepam in the evening. Decrease mid-day lorazepam to 1 mg PO or IM. Patient may not decline.   - Continue Zyprexa 10 mg BID  PO or IM. Hatch in place. May consider increasing further though holding off given re-emerging catatonic sx and borderline prolonged QTc   - Continue Cogentin 1 mg BID   -Continue Cogentin 2 mg IM daily prn for evidence of acute dystonic reaction or oculogyric crisis  -Continue hydroxyzine 25-50 mg q4h prn for acute anxiety  -Continue Trazodone 50 mg at bedtime prn for sleep disturbances  -Continue Zyprexa 10 mg TID prn for severe agitation  -Continue Ativan/Benadryl q4h prn for agitation. WOULD GIVE PRN ATIVAN FIRST FOR AGITATION unless it is after 5 pm on day prior to scheduled ECT    Occupational Therapy Consult Placed to evaluate cognitive functioning/ability to care for self in home environment, ability to manage medications. See note on 7/1 for details.      ECT: Discontinued due to significant cognitive impairment. Please see note dated 7/12/21 for additional details.     Acute Medical Problems and Treatments:  HTN, improved: Patient is now adherent with medication regimen.   - Metoprolol succinate ER 75 mg   - Cozaar 100 mg daily  - Amlodipine 7.5 mg daily  - IM discontinued hydralazine prn  - Switched BP checks from QID to BID on 7/13 given overall improvement  - Please see note from IM dated 5/3, 5/6, 5/24, 6/20 and 6/30/21.  - Obtained EKG and routine labs on 5/21 in context of pt declining vital sign checks and anti-hypertensives and recently elevated BPs. Reviewed labs and discussed EKG findings with IM on 5/21. No urgent concerns, though IM should be notified if pt develops acute medical concerns (I.e. heart palpitations, SOB, changes in speech, AMS, confusion, CP, HA, changes in vision).    - Per 6/20 IM note: \"Please notify IM if BP is persistently severely elevated and requiring frequent PRN hydralazine\".  - Internal medicine consulted again on 6/28 due to " consistent use of hydralazine over the past week. Metoprolol increased to 75 mg daily then to 100 mg and amlodipine 5 mg was added on 6/30. Amlodipine increased to 7.5 mg daily on 7/5.     Recent history of BRANDON:  - Attempting to repeat BMP though patient declining blood draw  - Encourage fluids  - Attempt repeat blood draw again in AM    For previous medical concerns, please see note dated 7/12/21.    Behavioral/Psychological/Social:  - Encourage unit programming  - Patient is now Code 3 status and can take walks in the [place] with staff and security present per staff discretion. This appears to be quite therapeutic for her.     Safety:  - Continue precautions as noted above  - Status 15 minute checks  - Safety precautions include: assault and elopement precautions  - Continue precautions as noted above    Legal Status: Committed as MI with Hatch in place for Haldol, Zyprexa, Invega, and Thorazine through [facility]. Filed Alcides Kohler through [facility] and approved on 5/24/21. Physician's statement in support of guardianship was completed on 7/19/21.     Disposition Plan   Reason for ongoing admission: poses an imminent risk to self, poses an imminent risk to others and is unable to care for self due to severe psychosis or tad  Discharge location: Group home (needs CADI waiver) vs home with [facility] ACT team or 24/7 in-home care. CPT assessment done by OT (Tania queen) on 7/1.  Please see note for details. [place] Choice Assessment deferred while her son is pursuing emergency guardianship.   Discharge Medications: not ordered  Follow-up Appointments: not scheduled     Bijal Varner MD  Psychiatry PGY-4

## 2021-07-19 NOTE — PLAN OF CARE
Problem: Behavioral Health Plan of Care  Goal: Plan of Care Review  Outcome: Improving  Flowsheets (Taken 7/18/2021 2100)  Plan of Care Reviewed With: patient  Patient Agreement with Plan of Care: agrees    Patient spent almost the entire shift in her room, either watching tv or napping. She denied all mental health symptoms, was medication compliant, and ate meals and snacks. She presented with a flat affect but was pleasant and polite. She declined an invitation to attend a music therapy group and instead opted to relax in bed. She had no behaviors or other safety concerns.

## 2021-07-19 NOTE — PROGRESS NOTES
Writer left a voice message for patient's son requesting a call back to ask if patient still has her apartment. At this time patient is unable to return to her apartment as she lives alone and is unable to care for herself. Patient's son is in the process of obtaining emergency guardianship.

## 2021-07-19 NOTE — PLAN OF CARE
Patient presents as quiet and socially withdrawn with a blunted affect.  She continues to isolate in her room and watch [employer].  She is unwilling to participate in unit programming despite encouragement from unit staff.  She is calm, pleasant, and cooperative on approach.  She smiled when greeted by RN writer at the start of this evening shift.  She does appear somewhat tense and irritable when discussing her opposition to group home placement and desire to return home.  Apart from her discharge plan, Michelle does verbalize agreement with the plan of care.  She denies that she is experiencing any psychotic symptoms or dangerous ideations.  She did not appear to be responding to internal stimuli during our discussions.  Michelle accepted all of her scheduled medications without incident this evening, and no adverse side effects were reported or observed.  Michelle denies any acute physical concerns at this time.

## 2021-07-20 PROCEDURE — 250N000013 HC RX MED GY IP 250 OP 250 PS 637: Performed by: PHYSICIAN ASSISTANT

## 2021-07-20 PROCEDURE — 250N000013 HC RX MED GY IP 250 OP 250 PS 637: Performed by: STUDENT IN AN ORGANIZED HEALTH CARE EDUCATION/TRAINING PROGRAM

## 2021-07-20 PROCEDURE — 250N000013 HC RX MED GY IP 250 OP 250 PS 637: Performed by: PSYCHIATRY & NEUROLOGY

## 2021-07-20 PROCEDURE — 99233 SBSQ HOSP IP/OBS HIGH 50: CPT | Performed by: PSYCHIATRY & NEUROLOGY

## 2021-07-20 PROCEDURE — 124N000002 HC R&B MH UMMC

## 2021-07-20 RX ADMIN — LORAZEPAM 2 MG: 2 TABLET ORAL at 20:13

## 2021-07-20 RX ADMIN — LOSARTAN POTASSIUM 100 MG: 100 TABLET, FILM COATED ORAL at 08:09

## 2021-07-20 RX ADMIN — METOPROLOL SUCCINATE 75 MG: 25 TABLET, EXTENDED RELEASE ORAL at 08:09

## 2021-07-20 RX ADMIN — BENZTROPINE MESYLATE 1 MG: 1 TABLET ORAL at 20:13

## 2021-07-20 RX ADMIN — LORAZEPAM 1 MG: 1 TABLET ORAL at 14:28

## 2021-07-20 RX ADMIN — LORAZEPAM 1 MG: 1 TABLET ORAL at 08:09

## 2021-07-20 RX ADMIN — BENZTROPINE MESYLATE 1 MG: 1 TABLET ORAL at 08:09

## 2021-07-20 RX ADMIN — OLANZAPINE 10 MG: 10 TABLET, ORALLY DISINTEGRATING ORAL at 08:09

## 2021-07-20 RX ADMIN — OLANZAPINE 10 MG: 10 TABLET, ORALLY DISINTEGRATING ORAL at 20:13

## 2021-07-20 RX ADMIN — AMLODIPINE BESYLATE 7.5 MG: 2.5 TABLET ORAL at 08:09

## 2021-07-20 ASSESSMENT — ACTIVITIES OF DAILY LIVING (ADL)
LAUNDRY: UNABLE TO COMPLETE
HYGIENE/GROOMING: INDEPENDENT
ORAL_HYGIENE: INDEPENDENT;PROMPTS
DRESS: SCRUBS (BEHAVIORAL HEALTH)
DRESS: INDEPENDENT
ORAL_HYGIENE: INDEPENDENT
HYGIENE/GROOMING: INDEPENDENT;PROMPTS

## 2021-07-20 NOTE — PLAN OF CARE
Patient is alert and oriented to person, place, situation. Mood is irritable, slightly anxious in context of discharge planning. Affect tense, guarded. Appears suspicious of this writer as her RN as she is used to regular unit staff caring for her. Has been isolative to room, watching TV. Appetite intact. Denied medication SEs. Was compliant with taking scheduled medications. No reported suicidal or homicidal ideation. Refused lab draw again today and declined to explain rationale for refusal. Napped 1.5 hours in the afternoon. Was encouraged to shower however did not do so today.

## 2021-07-20 NOTE — PROGRESS NOTES
"[facility], [place]   Psychiatric Progress Note  Hospital Day: 97        Interim History:   The patient's care was discussed with the treatment team during the daily team meeting and/or staff's chart notes were reviewed. Over the weekend her blood pressure was mildly elevated on 2 occasions otherwise vitals were WNL. She took all medications as scheduled and no PRNs. No acute medical concerns. She has continued to struggle with independently initiating ADLs. Sleeping and eating well. No overt sx/signs of psychosis or tad. No SI/HI/AH/VH reported. No aggressive or violent behaviors. Continues to be isolative to her room despite encouragement from staff to participate in groups or walk in the milieu or garden. She has continued sleeping well overnight. 5.75 hours last night. No signs of delirium noted.     Michelle was interviewed in her room. She was calmly watching TV. She reports that her mood is \"good.\" Denies SI, HI, symptoms of psychosis, acute medical concerns. She denies side effects from medications. She continues to inquire about returning home with additional supports. We have had multiple discussions about dispo in the recent past, and have informed Michelle that she will be going to a group home upon discharge. She again said that she is not under a commitment and was not ill prior to admission. She continues to display very poor insight. She said that she purposely did not do well on cognitive testing because she was unaware that her cognition was being evaluated. She became increasingly agitated, accusing this writer of formulating a \"hidden agenda to keep me here!\" Interview was subsequently terminated as it was no longer therapeutic.           Medications:       amLODIPine  7.5 mg Oral Daily     benztropine  1 mg Oral BID     LORazepam  1 mg Oral BID    Or     LORazepam  1 mg Intramuscular BID     LORazepam  2 mg Oral QPM    Or     LORazepam  2 mg Intramuscular QPM     losartan  " 100 mg Oral Daily     metoprolol succinate ER  75 mg Oral Daily     OLANZapine zydis  10 mg Oral BID    Or     OLANZapine  10 mg Intramuscular BID          Allergies:     Allergies   Allergen Reactions     Haldol [Haloperidol]      Patient previously tolerated haldol, though developed oculogyric crises during hospital stay on 4/26/21 on total daily dose of 10 mg.     Lisinopril Cough          Labs:     No results found for this or any previous visit (from the past 24 hour(s)).       Psychiatric Examination:     /77 (BP Location: Right arm)   Pulse 88   Temp 98.1  F (36.7  C) (Tympanic)   Resp 16   Wt 70.4 kg (155 lb 4.8 oz)   SpO2 98%   BMI 26.66 kg/m    Weight is 155 lbs 4.8 oz  Body mass index is 26.66 kg/m .    Weight over time:  Vitals:    05/25/21 0850 06/15/21 0811 06/16/21 1605 06/19/21 0859   Weight: 71 kg (156 lb 8 oz) 69.8 kg (153 lb 12.8 oz) 70.5 kg (155 lb 8 oz) 71.4 kg (157 lb 8 oz)    06/21/21 0805 06/22/21 0839 06/24/21 0800 07/02/21 0835   Weight: 69.5 kg (153 lb 3.2 oz) 69.3 kg (152 lb 11.2 oz) 70.7 kg (155 lb 12.8 oz) 70.7 kg (155 lb 12.8 oz)    07/03/21 0844 07/06/21 0838 07/10/21 0845 07/16/21 0821   Weight: 69.6 kg (153 lb 8 oz) 70.9 kg (156 lb 4.8 oz) 69.8 kg (153 lb 14.4 oz) 69.3 kg (152 lb 12.8 oz)    07/17/21 0830   Weight: 70.4 kg (155 lb 4.8 oz)       Orthostatic Vitals         Most Recent      Lying Orthostatic /91 06/23 0830    Lying Orthostatic Pulse (bpm) 67 06/23 0830    Sitting Orthostatic /89  Comment: notify RN 06/24 0800    Sitting Orthostatic Pulse (bpm) 71 06/24 0800    Standing Orthostatic /101  Comment: notify RN 06/24 0800    Standing Orthostatic Pulse (bpm) 86 06/24 0800            Cardiometabolic risk assessment. 04/15/21      Reviewed patient profile for cardiometabolic risk factors    Date taken /Value  REFERENCE RANGE   Abdominal Obesity  (Waist Circumference)   See nursing flowsheet Women ?35 in (88 cm)   Men ?40 in (102 cm)     "  Triglycerides  Triglycerides   Date Value Ref Range Status   10/02/2020 86 <=149 mg/dL Final   10/19/2019 117 <150 mg/dL Final       ?150 mg/dL (1.7 mmol/L) or current treatment for elevated triglycerides   HDL cholesterol  HDL Cholesterol   Date Value Ref Range Status   10/19/2019 56 >49 mg/dL Final     Direct Measure HDL   Date Value Ref Range Status   10/02/2020 45 (L) >=50 mg/dL Final   ]   Women <50 mg/dL (1.3 mmol/L) in women or current treatment for low HDL cholesterol  Men <40 mg/dL (1 mmol/L) in men or current treatment for low HDL cholesterol     Fasting plasma glucose (FPG) Lab Results   Component Value Date     10/19/2019      FPG ?100 mg/dL (5.6 mmol/L) or treatment for elevated blood glucose   Blood pressure  BP Readings from Last 3 Encounters:   07/20/21 133/77   06/04/19 131/71   04/26/19 164/86    Blood pressure ?130/85 mmHg or treatment for elevated blood pressure   Family History  See family history     Appearance: dressed in hospital scrubs, appeared reported age, unkempt hair   Attitude: somewhat cooperative with interview  Eye Contact: fair, looks down or straight ahead   Mood: \"good\"  Affect:  Somewhat blunted, increasingly agitated and tense as interview progressed    Speech: accented, clear and coherent  Language: no obvious receptive or expressive deficits  Psychomotor, Gait, Musculoskeletal: No abnormal movements noted while seated.   Thought Process: goal-oriented, linear, concrete   Associations:  No loosening of associations present  Thought Content:  Did not appear to be responding to internal stimuli.  Insight:  poor  Judgement:  fair  Oriented to: date, location, treatment team, current president   Attention Span and Concentration: attentive to conversation.  Recent and Remote Memory: stable, possible difficulty recalling prior conversations vs. Inability to accept information contained in conversations   Fund of Knowledge:  normal    Clinical Global " Impressions  First:  Considering your total clinical experience with this particular patient population, how severe are the patient's symptoms at this time?: 7 (04/16/21 1428)  Compared to the patient's condition at the START of treatment, this patient's condition is: 4 (04/16/21 1428)  Most recent:  Considering your total clinical experience with this particular patient population, how severe are the patient's symptoms at this time?: 7 (06/22/21 1529)  Compared to the patient's condition at the START of treatment, this patient's condition is: 3 (06/22/21 1529)         Precautions:     Behavioral Orders   Procedures     Cheeking Precautions (behavioral units)     Patient Observed swallowing PO medications; Patient asked to drink water after swallowing medication; Patient in Staff line of sight for 15 minutes after medication given; Mouth checks after PO administration (patient asked to open mouth and stick out their tongue).     Code 3     For walks in the [place] with staff and per staff discretion     Electroconvulsive therapy     Series of up to 12 treatments. Begin Date: 5/26/21     Treating Psychiatrist providing ECT:  Dr. Lubin     Notified on:  5/21/21     Electroconvulsive therapy     As long as we get the green light from risk management and pt is medically cleared, begin ECT every Monday, Wednesday, and Friday     Electroconvulsive therapy     Series of up to 12 treatments. Begin Date: 5/25/21     Treating Psychiatrist providing ECT:  Amee     Notified on:  5/24/21     Elopement precautions     Fall precautions     Mcgrath Calderon     Routine Programming     As clinically indicated     Status 15     Every 15 minutes.          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 Symptoms and presentation at this time is most consistent with Schizoaffective Disorder, Bipolar Type. Obtained most recent medication regimen from patient's ACT team, and regimen was initially restarted. Pt is committed. Petitioned for Mcgrath Calderon was filed and granted due to lack of improvement and side effects from medications. Inpatient psychiatric hospitalization is warranted at this time for safety, stabilization, possible adjustment in medications and development of a safe discharge plan.     Hospital Course:  On admission, PTA medications were restarted. However, Michelle had been declining all scheduled medications despite significant encouragement from staff and provider. Psychiatric emergency declared on 4/20 due to aggression toward others in context of severe psychosis and suspected excited catatonia. Ativan 1 mg TID was also added. Discontinued PTA Invega, Zyprexa, and thorazine on 4/20 due to consistent refusal.     On 4/26, it was noted that patient frequently had upward gaze while walking up and down the unit. She did not appear to be distressed. She reported that she was looking at \"my god.\" It was determined to be oculogyric crisis secondary to IM haloperidol. Haldol was subsequently discontinued and scheduled Zyprexa was initiated on an emergency basis. Oral Cogentin was also scheduled, though patient declined. On the evening of 4/26, patient's gaze was fixed in upward position for several hours and she appeared to be experiencing discomfort. IM Cogentin was administered with noted resolution. Partial improvements were observed after switching to scheduled Zyprexa. After patient improved, she was more receptive to " "reinitiating oral Invega, which was initiated on 5/3 and titrated to PTA dose of 9 mg daily on 5/10. Plan was for ACT team to bring in loading dose of Invega Sustenna. Patient had signs of oculogyric crisis again with Invega. Oral dose decreased to 6 mg after this. Invega was stopped on 5/14 due to ongoing signs of problems related to eye movement and concerns for oculogyric crisis.    Overall improvement in patient's agitation and suspected catatonia noted on 4/30 after patient accepted two doses of Ativan. She began declining Ativan again with noted decompensation. Patient began accepting, however, was deemed not to have the capacity to consent to treatment with Ativan. She does not believe she has a mental illness, including catatonia. She does not fully understand risks associated with inadequate treatment of catatonia. Discussed with her son who is acting as surrogate decision maker and he is in full support of forced scheduled IM Ativan if patient declines oral formulation. Also consulted with our legal team prior to backing oral Ativan with IM.     Ativan was increased on 5/19 due to re-emerging evidence of catatonia (long periods of staring, repetitive movements, echolalia, mutism). Meeting was held with ACT team on 5/20, including ACT team psychiatrist, Dr. Pedraza. He said that he is in \"full support\" of plan to pursue Mcgrath Kohler and ECT at this time. He does feel that in the past she has been discharged from the hospital while still quite symptomatic. He is hoping that ECT will be effective and that with improvement, Michelle would be more receptive to clozapine and weekly blood draws. He said that ideally she would transition to an IRTS before going back to her apartment, but also understands there may be some barriers (I.e. pt's willingness, financial concerns, etc).     ECT consult placed. Please see consult note by Dr. Lubin on 5/21 for details. Mcgrath Kohler was approved on 5/24 and patient was " medically cleared on 5/24. ECT initiated on 5/26. She was noted to have a short seizure during ECT on 6/4. Staff note that patient appears more disoriented with memory impairment and sedation on days of ECT. This was noted on my examination again today. Improvements noted in mood, affect, social interactions, paranoia, agitation since initiation of ECT. Reduced Ativan on 6/5 due to sedative effects. Relayed concerns about memory impairment and confusion with Dr. Lubin. Recommended attempting unilateral ECT, which he agreed to do. First unilateral ECT on 6/7. ECT was held on 6/11 and 6/14 due to memory impairment. We will plan to hold it again tomorrow (6/16). There is ongoing discussion regarding whether there is a component of catatonia contributing to her current presentation but this is less likely since it worsened after ECT was started.  Given her level of cognitive impairment and our belief that this is due to ECT, we will not pursue any further treatments at this time. Since we have held ECT (last treatment on 6/9), her cognitive impairment has slightly improved (more oriented).     Michelle initially appeared to be decompensating somewhat when ECT was held, though her cognitive impairment has gradually improved. If symptoms of psychosis re-emerge, it may be worth starting clozapine, which is something that has previously been considered by her ACT team. Her Hatch order would need to be amended as clozapine is not one of the medications listed. ANC obtained on 6/16 was 1800/microL. Per conversation with pharmacy, neutropenia has been associated with olanzapine and agranulocytosis has been associated with olanzapine, lorazepam, metoprolol, and hydralazine and hematologic labs have been monitored. Upon re-check, ANC was 3700/microL on 6/21/21. At this time, the primary symptoms we are observing are cognitive deficits and inability to care for self, which we would not address by changing her antipsychotic  medication.    Michelle's cognitive impairment has been steadily improving since holding ECT treatments, however it is possible that lorazepam is also playing a role in her cognitive impairment. Given no signs of re-emerging catatonia, a gradual lorazepam taper (decreasing by 0.5 mg) was initiated on 6/28/21 to monitor for potential improvements with regard to memory impairment.  On 7/7, Michelle reported an incident of oculogyric crisis and Cogentin 1 mg BID was initiated with no noted changes in cognition.     Michelle has remained focused on discharge. The team is working with Michelle's ACT team to to establish a safe discharge plan with options including moving to a group home vs home with her ACT team and additional in home supports via CADI services. We are concerned that Michelle would have difficulty taking her medications as prescribed if she were to return home without her ACT team. This is evidenced by impairments noted in cognitive assessment by OT specialist on 7/1. MOCA score was a 13/30 and CPT score was a 4.7. Physician's statement in support of guardianship was completed on 7/19/21.  Patient's son is pursuing guardianship. Patient became quite agitated on 7/16 when  staff from [facility] specific  came to meet with her. She did not allow staff members to enter her room and stated that she will not be going to a group home.     Target psychiatric symptoms and interventions:   - Continue 1 mg PO or IM lorazepam in the morning and at mid-day and 2 mg PO or IM lorazepam in the evening. Decrease mid-day lorazepam to 1 mg PO or IM. Patient may not decline.   - Continue Zyprexa 10 mg BID  PO or IM. Hatch in place. May consider increasing further though holding off given re-emerging catatonic sx and borderline prolonged QTc   - Continue Cogentin 1 mg BID   -Continue Cogentin 2 mg IM daily prn for evidence of acute dystonic reaction or oculogyric crisis  -Continue hydroxyzine 25-50 mg q4h prn for acute anxiety  -Continue  "Trazodone 50 mg at bedtime prn for sleep disturbances  -Continue Zyprexa 10 mg TID prn for severe agitation  -Continue Ativan/Benadryl q4h prn for agitation. WOULD GIVE PRN ATIVAN FIRST FOR AGITATION unless it is after 5 pm on day prior to scheduled ECT    Occupational Therapy Consult Placed to evaluate cognitive functioning/ability to care for self in home environment, ability to manage medications. See note on 7/1 for details.      ECT: Discontinued due to significant cognitive impairment. Please see note dated 7/12/21 for additional details.     Acute Medical Problems and Treatments:  HTN, improved: Patient is now adherent with medication regimen.   - Metoprolol succinate ER 75 mg   - Cozaar 100 mg daily  - Amlodipine 7.5 mg daily  - IM discontinued hydralazine prn  - Switched BP checks from QID to BID on 7/13 given overall improvement  - Please see note from IM dated 5/3, 5/6, 5/24, 6/20 and 6/30/21.  - Obtained EKG and routine labs on 5/21 in context of pt declining vital sign checks and anti-hypertensives and recently elevated BPs. Reviewed labs and discussed EKG findings with IM on 5/21. No urgent concerns, though IM should be notified if pt develops acute medical concerns (I.e. heart palpitations, SOB, changes in speech, AMS, confusion, CP, HA, changes in vision).    - Per 6/20 IM note: \"Please notify IM if BP is persistently severely elevated and requiring frequent PRN hydralazine\".  - Internal medicine consulted again on 6/28 due to consistent use of hydralazine over the past week. Metoprolol increased to 75 mg daily then to 100 mg and amlodipine 5 mg was added on 6/30. Amlodipine increased to 7.5 mg daily on 7/5.     Recent history of BRANDON:  - Attempted to repeat BMP multiple times though patient consistently declining blood draw  - Encourage fluids    For previous medical concerns, please see note dated 7/12/21.    Behavioral/Psychological/Social:  - Encourage unit programming  - Patient is now Code 3 " status and can take walks in the [place] with staff and security present per staff discretion. This appears to be quite therapeutic for her.     Safety:  - Continue precautions as noted above  - Status 15 minute checks  - Safety precautions include: assault and elopement precautions  - Continue precautions as noted above    Legal Status: Committed as MI with Hatch in place for Haldol, Zyprexa, Invega, and Thorazine through [facility]. Filed Mcgrath Kohler through [facility] and approved on 5/24/21. Physician's statement in support of guardianship was completed on 7/19/21.     Disposition Plan   Reason for ongoing admission: poses an imminent risk to self, poses an imminent risk to others and is unable to care for self due to severe psychosis or tad  Discharge location: Group home (needs CADI waiver). CPT assessment done by OT (Tania queen) on 7/1.  Please see note for details. [place] Choice Assessment deferred while her son is pursuing emergency guardianship.   Discharge Medications: not ordered  Follow-up Appointments: not scheduled     Gabby Zaman MD  [facility]     > 35 minutes total time that was spent and over 50% of this time was spent in counseling and coordination of care.

## 2021-07-20 NOTE — PLAN OF CARE
"Pt has a flat blunted affect. Pt was paranoid and dismissive during assessment. Pt initially didn't want to speak with writer stating \"I wanna go home and you won't let me\". Pt was less frustrated after discussing POC, but was irritable. No aggressive or violent behaviors. Pt remains isolative to her room and withdrawn to self. Pt rates anxiety at 3/10 and depression 0/10. Pt rates pain at 0/10. Pt reports no SI/HI and contracts for safety. Pt denies any hallucinations. Pt was med compliant with no cheeking noted. Continue current POC.  "

## 2021-07-20 NOTE — PLAN OF CARE
Assessment/Intervention/Current Symtoms and Care Coordination  -Chart review    -Rounded with team, addressed patient needs/concerns  Current Symptoms include the following: Irritable when discussing higher level of care.     Discharge Plan or Goal  Pending stabilization & development of a safe discharge plan.  Considerations include:  Group Home placement or other higher level of care    Barriers to Discharge  Patient requires further psychiatric stabilization due to current symptomology    Referral Status  [facility] (group home)    Legal Status  Committed/Hatch/Mcgrath Kohler through [facility]

## 2021-07-20 NOTE — PLAN OF CARE
NOC Shift Report     Pt in bed at beginning of shift, breathing quiet and unlabored. Awake in room for a brief period. Pt slept 5.75 hours.      No pt complaints or concerns at this time.      No PRNs given. Will continue to monitor.

## 2021-07-21 PROCEDURE — 250N000013 HC RX MED GY IP 250 OP 250 PS 637: Performed by: STUDENT IN AN ORGANIZED HEALTH CARE EDUCATION/TRAINING PROGRAM

## 2021-07-21 PROCEDURE — 250N000013 HC RX MED GY IP 250 OP 250 PS 637: Performed by: PHYSICIAN ASSISTANT

## 2021-07-21 PROCEDURE — 99232 SBSQ HOSP IP/OBS MODERATE 35: CPT | Mod: GC | Performed by: PSYCHIATRY & NEUROLOGY

## 2021-07-21 PROCEDURE — 124N000002 HC R&B MH UMMC

## 2021-07-21 PROCEDURE — 250N000013 HC RX MED GY IP 250 OP 250 PS 637: Performed by: PSYCHIATRY & NEUROLOGY

## 2021-07-21 RX ADMIN — LOSARTAN POTASSIUM 100 MG: 100 TABLET, FILM COATED ORAL at 08:57

## 2021-07-21 RX ADMIN — LORAZEPAM 2 MG: 2 TABLET ORAL at 20:11

## 2021-07-21 RX ADMIN — METOPROLOL SUCCINATE 75 MG: 25 TABLET, EXTENDED RELEASE ORAL at 08:57

## 2021-07-21 RX ADMIN — LORAZEPAM 1 MG: 1 TABLET ORAL at 08:57

## 2021-07-21 RX ADMIN — OLANZAPINE 10 MG: 10 TABLET, ORALLY DISINTEGRATING ORAL at 08:57

## 2021-07-21 RX ADMIN — AMLODIPINE BESYLATE 7.5 MG: 2.5 TABLET ORAL at 08:56

## 2021-07-21 RX ADMIN — OLANZAPINE 10 MG: 10 TABLET, ORALLY DISINTEGRATING ORAL at 20:11

## 2021-07-21 RX ADMIN — BENZTROPINE MESYLATE 1 MG: 1 TABLET ORAL at 08:57

## 2021-07-21 RX ADMIN — LORAZEPAM 1 MG: 1 TABLET ORAL at 14:14

## 2021-07-21 RX ADMIN — BENZTROPINE MESYLATE 1 MG: 1 TABLET ORAL at 20:11

## 2021-07-21 ASSESSMENT — ACTIVITIES OF DAILY LIVING (ADL)
DRESS: INDEPENDENT;PROMPTS
HYGIENE/GROOMING: INDEPENDENT;PROMPTS
ORAL_HYGIENE: INDEPENDENT;PROMPTS
HYGIENE/GROOMING: INDEPENDENT
ORAL_HYGIENE: INDEPENDENT;PROMPTS
HYGIENE/GROOMING: SHOWER
DRESS: INDEPENDENT;SCRUBS (BEHAVIORAL HEALTH);PROMPTS
LAUNDRY: UNABLE TO COMPLETE

## 2021-07-21 NOTE — PROGRESS NOTES
"[facility], [place]   Psychiatric Progress Note  Hospital Day: 98        Interim History:   The patient's care was discussed with the treatment team during the daily team meeting and/or staff's chart notes were reviewed. Her blood pressures have continued to be intermittently elevated otherwise vitals were WNL. She took all medications as scheduled and no PRNs. No acute medical concerns. She continues to struggle with independently initiating ADLs. She is sleeping and eating well. No overt sx/signs of psychosis or tad observed though she appears suspicious and paranoid when unfamiliar staff are on the unit. No SI/HI/AH/VH reported. No aggressive or violent behaviors. She continues to be isolative to her room despite encouragement from staff to participate in groups or walk in the [facility] or garden. She has continued sleeping well overnight. 5 hours last night. No signs of delirium noted.     Michelle was interviewed in her room where she was laying down watching CNN.  She sat up in bed for the interview.  She reported that she is \"good\" today and that she slept \"good.\"  She denied any physical complaints or pain.  She was encouraged to consider walking outside with staff in the [place].  She replied \"I cannot because I do not know how to get there.\"She was informed that staff could take her there.  She asked why she would go there.  She was also encouraged to consider having her labs completed and was asked if there is a reason why she has declined labs.  She reported that \"I did not get them because last time they did not tell me the results.\"  She was informed that we would provide her with the results.  She also asked what the labs were for and this was explained.  Writer asked if she had called the woman from the group home who is number she took.  She stated \"why when I call them?  I am not going to group [place].\"  She was encouraged to consider this for having social connection and " "she replied \"Michelle does not need social connection.\"  She did appear to contemplate this idea though said nothing else.  She denied having any troubles with her memory at this time.  She was fully oriented.  She asked when she would be discharged home and was informed that the plan of care is for her to go to the group home.  At this point she stopped answering any questions and the interview was ended.         Medications:      amLODIPine  7.5 mg Oral Daily    benztropine  1 mg Oral BID    LORazepam  1 mg Oral BID    Or    LORazepam  1 mg Intramuscular BID    LORazepam  2 mg Oral QPM    Or    LORazepam  2 mg Intramuscular QPM    losartan  100 mg Oral Daily    metoprolol succinate ER  75 mg Oral Daily    OLANZapine zydis  10 mg Oral BID    Or    OLANZapine  10 mg Intramuscular BID          Allergies:     Allergies   Allergen Reactions    Haldol [Haloperidol]      Patient previously tolerated haldol, though developed oculogyric crises during hospital stay on 4/26/21 on total daily dose of 10 mg.    Lisinopril Cough          Labs:     No results found for this or any previous visit (from the past 24 hour(s)).       Psychiatric Examination:     BP (!) 144/80   Pulse 64   Temp 97.1  F (36.2  C) (Tympanic)   Resp 16   Wt 70.4 kg (155 lb 4.8 oz)   SpO2 98%   BMI 26.66 kg/m    Weight is 155 lbs 4.8 oz  Body mass index is 26.66 kg/m .    Weight over time:  Vitals:    05/25/21 0850 06/15/21 0811 06/16/21 1605 06/19/21 0859   Weight: 71 kg (156 lb 8 oz) 69.8 kg (153 lb 12.8 oz) 70.5 kg (155 lb 8 oz) 71.4 kg (157 lb 8 oz)    06/21/21 0805 06/22/21 0839 06/24/21 0800 07/02/21 0835   Weight: 69.5 kg (153 lb 3.2 oz) 69.3 kg (152 lb 11.2 oz) 70.7 kg (155 lb 12.8 oz) 70.7 kg (155 lb 12.8 oz)    07/03/21 0844 07/06/21 0838 07/10/21 0845 07/16/21 0821   Weight: 69.6 kg (153 lb 8 oz) 70.9 kg (156 lb 4.8 oz) 69.8 kg (153 lb 14.4 oz) 69.3 kg (152 lb 12.8 oz)    07/17/21 0830   Weight: 70.4 kg (155 lb 4.8 oz)       Orthostatic " "Vitals         Most Recent      Sitting Orthostatic /73 07/21 0859    Sitting Orthostatic Pulse (bpm) 90 07/21 0859    Standing Orthostatic /73 07/21 0859    Standing Orthostatic Pulse (bpm) 98 07/21 0859            Cardiometabolic risk assessment. 04/15/21      Reviewed patient profile for cardiometabolic risk factors    Date taken /Value  REFERENCE RANGE   Abdominal Obesity  (Waist Circumference)   See nursing flowsheet Women ?35 in (88 cm)   Men ?40 in (102 cm)      Triglycerides  Triglycerides   Date Value Ref Range Status   10/02/2020 86 <=149 mg/dL Final   10/19/2019 117 <150 mg/dL Final       ?150 mg/dL (1.7 mmol/L) or current treatment for elevated triglycerides   HDL cholesterol  HDL Cholesterol   Date Value Ref Range Status   10/19/2019 56 >49 mg/dL Final     Direct Measure HDL   Date Value Ref Range Status   10/02/2020 45 (L) >=50 mg/dL Final   ]   Women <50 mg/dL (1.3 mmol/L) in women or current treatment for low HDL cholesterol  Men <40 mg/dL (1 mmol/L) in men or current treatment for low HDL cholesterol     Fasting plasma glucose (FPG) Lab Results   Component Value Date     10/19/2019      FPG ?100 mg/dL (5.6 mmol/L) or treatment for elevated blood glucose   Blood pressure  BP Readings from Last 3 Encounters:   07/20/21 (!) 144/80   06/04/19 131/71   04/26/19 164/86    Blood pressure ?130/85 mmHg or treatment for elevated blood pressure   Family History  See family history     Appearance: dressed in hospital scrubs, appeared reported age, unkempt hair   Attitude: mostly cooperative with interview, less irritated when discussing the group home today  Eye Contact: fair, looks down or straight ahead   Mood: \"good\"  Affect:  Somewhat blunted, increasingly agitated and tense as interview progressed    Speech: accented, clear and coherent.  She did not raise her voice when discussing the group home as she has done previously.  Language: no obvious receptive or expressive " deficits  Psychomotor, Gait, Musculoskeletal: No abnormal movements noted while seated.   Thought Process: goal-oriented, linear, concrete   Associations:  No loosening of associations present  Thought Content:  Did not appear to be responding to internal stimuli.  Insight:   poor  Judgement:  fair  Oriented to: date, location, treatment team, current president   Attention Span and Concentration: attentive to conversation though at times stares ahead and does not respond to questions.  Recent and Remote Memory: stable, possible difficulty recalling prior conversations vs. Inability to accept information contained in conversations   Fund of Knowledge:  normal    Clinical Global Impressions  First:  Considering your total clinical experience with this particular patient population, how severe are the patient's symptoms at this time?: 7 (04/16/21 1428)  Compared to the patient's condition at the START of treatment, this patient's condition is: 4 (04/16/21 1428)  Most recent:  Considering your total clinical experience with this particular patient population, how severe are the patient's symptoms at this time?: 5 (07/01/21 0947)  Compared to the patient's condition at the START of treatment, this patient's condition is: 2 (07/01/21 0947)           Precautions:     Behavioral Orders   Procedures    Cheeking Precautions (behavioral units)     Patient Observed swallowing PO medications; Patient asked to drink water after swallowing medication; Patient in Staff line of sight for 15 minutes after medication given; Mouth checks after PO administration (patient asked to open mouth and stick out their tongue).    Code 3     For walks in the [place] with staff and per staff discretion    Electroconvulsive therapy     Series of up to 12 treatments. Begin Date: 5/26/21     Treating Psychiatrist providing ECT:  Dr. Lubin     Notified on:  5/21/21    Electroconvulsive therapy     As long as we get the green light from risk  management and pt is medically cleared, begin ECT every Monday, Wednesday, and Friday    Electroconvulsive therapy     Series of up to 12 treatments. Begin Date: 5/25/21     Treating Psychiatrist providing ECT:  Amee     Notified on:  5/24/21    Elopement precautions    Fall precautions    Alcides Calderon    Routine Programming     As clinically indicated    Status 15     Every 15 minutes.          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 Symptoms and presentation at this time is most consistent with Schizoaffective Disorder, Bipolar Type. Obtained most recent medication regimen from patient's ACT team, and regimen was initially restarted. Pt is committed. Petitioned for Mcgrath Calderon was filed and granted due to lack of improvement and side effects from medications. Inpatient psychiatric hospitalization is warranted at this time for safety, stabilization, possible adjustment in medications and development of a safe discharge plan.     Hospital Course:  On admission, PTA medications were restarted. However, Michelle had been declining all scheduled medications despite significant encouragement from staff and provider. Psychiatric emergency declared on 4/20 due to aggression toward others in context of severe psychosis and suspected excited catatonia. Ativan 1 mg TID was also added. Discontinued PTA Invega, Zyprexa, and thorazine on 4/20 due to consistent refusal.     On 4/26, it was noted that patient  "frequently had upward gaze while walking up and down the unit. She did not appear to be distressed. She reported that she was looking at \"my god.\" It was determined to be oculogyric crisis secondary to IM haloperidol. Haldol was subsequently discontinued and scheduled Zyprexa was initiated on an emergency basis. Oral Cogentin was also scheduled, though patient declined. On the evening of 4/26, patient's gaze was fixed in upward position for several hours and she appeared to be experiencing discomfort. IM Cogentin was administered with noted resolution. Partial improvements were observed after switching to scheduled Zyprexa. After patient improved, she was more receptive to reinitiating oral Invega, which was initiated on 5/3 and titrated to PTA dose of 9 mg daily on 5/10. Plan was for ACT team to bring in loading dose of Invega S[place]enna. Patient had signs of oculogyric crisis again with Invega. Oral dose decreased to 6 mg after this. Invega was stopped on 5/14 due to ongoing signs of problems related to eye movement and concerns for oculogyric crisis.    Overall improvement in patient's agitation and suspected catatonia noted on 4/30 after patient accepted two doses of Ativan. She began declining Ativan again with noted decompensation. Patient began accepting, however, was deemed not to have the capacity to consent to treatment with Ativan. She does not believe she has a mental illness, including catatonia. She does not fully understand risks associated with inadequate treatment of catatonia. Discussed with her son who is acting as surrogate decision maker and he is in full support of forced scheduled IM Ativan if patient declines oral formulation. Also consulted with our legal team prior to backing oral Ativan with IM.     Ativan was increased on 5/19 due to re-emerging evidence of catatonia (long periods of staring, repetitive movements, echolalia, mutism). Meeting was held with ACT team on 5/20, including ACT " "team psychiatrist, Dr. Pedraza. He said that he is in \"full support\" of plan to pursue Mcgrath Kohler and ECT at this time. He does feel that in the past she has been discharged from the hospital while still quite symptomatic. He is hoping that ECT will be effective and that with improvement, Michelle would be more receptive to clozapine and weekly blood draws. He said that ideally she would transition to an IRTS before going back to her apartment, but also understands there may be some barriers (I.e. pt's willingness, financial concerns, etc).     ECT consult placed. Please see consult note by Dr. Lubin on 5/21 for details. Alcides Saavedraard was approved on 5/24 and patient was medically cleared on 5/24. ECT initiated on 5/26. She was noted to have a short seizure during ECT on 6/4. Staff note that patient appears more disoriented with memory impairment and sedation on days of ECT. This was noted on my examination again today. Improvements noted in mood, affect, social interactions, paranoia, agitation since initiation of ECT. Reduced Ativan on 6/5 due to sedative effects. Relayed concerns about memory impairment and confusion with Dr. Lubin. Recommended attempting unilateral ECT, which he agreed to do. First unilateral ECT on 6/7. ECT was held on 6/11 and 6/14 due to memory impairment. We will plan to hold it again tomorrow (6/16). There is ongoing discussion regarding whether there is a component of catatonia contributing to her current presentation but this is less likely since it worsened after ECT was started.  Given her level of cognitive impairment and our belief that this is due to ECT, we will not pursue any further treatments at this time. Since we have held ECT (last treatment on 6/9), her cognitive impairment has slightly improved (more oriented).     Michelle initially appeared to be decompensating somewhat when ECT was held, though her cognitive impairment has gradually improved. If symptoms of psychosis " re-emerge, it may be worth starting clozapine, which is something that has previously been considered by her ACT team. Her Hatch order would need to be amended as clozapine is not one of the medications listed. ANC obtained on 6/16 was 1800/microL. Per conversation with pharmacy, neutropenia has been associated with olanzapine and agranulocytosis has been associated with olanzapine, lorazepam, metoprolol, and hydralazine and hematologic labs have been monitored. Upon re-check, ANC was 3700/microL on 6/21/21. At this time, the primary symptoms we are observing are cognitive deficits and inability to care for self, which we would not address by changing her antipsychotic medication.    Michelle's cognitive impairment has been steadily improving since holding ECT treatments, however it is possible that lorazepam is also playing a role in her cognitive impairment. Given no signs of re-emerging catatonia, a gradual lorazepam taper (decreasing by 0.5 mg) was initiated on 6/28/21 to monitor for potential improvements with regard to memory impairment.  On 7/7, Michelle reported an incident of oculogyric crisis and Cogentin 1 mg BID was initiated with no noted changes in cognition.     Michelle has remained focused on discharge. The team is working with Michelle's ACT team to to establish a safe discharge plan with options including moving to a group home vs home with her ACT team and additional in home supports via CADI services. We are concerned that Michelle would have difficulty taking her medications as prescribed if she were to return home without her ACT team. This is evidenced by impairments noted in cognitive assessment by OT specialist on 7/1. MOCA score was a 13/30 and CPT score was a 4.7. Physician's statement in support of guardianship was completed on 7/19/21.  Patient's son is pursuing guardianship. Patient became quite agitated on 7/16 when  staff from [facility] specific  came to meet with her. She did not allow staff  "members to enter her room and stated that she will not be going to a group home.     Target psychiatric symptoms and interventions:   - Continue lorazepam 1 mg PO or IM in the morning and at mid-day and 2 mg PO or IM in the evening. Patient may not decline.   - Continue Zyprexa 10 mg BID  PO or IM. Hatch in place. May consider increasing further though holding off given re-emerging catatonic sx and borderline prolonged QTc   - Continue Cogentin 1 mg PO BID with additional 2 mg IM daily prn for evidence of acute dystonic reaction or oculogyric crisis  -Continue hydroxyzine 25-50 mg q4h prn for acute anxiety  -Continue Trazodone 50 mg at bedtime prn for sleep disturbances  -Continue Zyprexa 10 mg TID prn for severe agitation  -Continue Ativan/Benadryl q4h prn for agitation. WOULD GIVE PRN ATIVAN FIRST FOR AGITATION unless it is after 5 pm on day prior to scheduled ECT    Occupational Therapy Consult Placed to evaluate cognitive functioning/ability to care for self in home environment, ability to manage medications. See note on 7/1 for details.      ECT: Discontinued due to significant cognitive impairment. Please see note dated 7/12/21 for additional details.     Acute Medical Problems and Treatments:  HTN, improved: Patient is now adherent with medication regimen.   - Metoprolol succinate ER 75 mg   - Cozaar 100 mg daily  - Amlodipine 7.5 mg daily  - IM discontinued hydralazine prn  - Switched BP checks from QID to BID on 7/13 given overall improvement  - Please see note from IM dated 5/3, 5/6, 5/24, 6/20 and 6/30/21.  - Obtained EKG and routine labs on 5/21 in context of pt declining vital sign checks and anti-hypertensives and recently elevated BPs. Reviewed labs and discussed EKG findings with IM on 5/21. No urgent concerns, though IM should be notified if pt develops acute medical concerns (I.e. heart palpitations, SOB, changes in speech, AMS, confusion, CP, HA, changes in vision).    - Per 6/20 IM note: \"Please " "notify IM if BP is persistently severely elevated and requiring frequent PRN hydralazine\".  - Internal medicine consulted again on 6/28 due to consistent use of hydralazine over the past week. Metoprolol increased to 75 mg daily then to 100 mg and amlodipine 5 mg was added on 6/30. Amlodipine increased to 7.5 mg daily on 7/5.     Recent history of BRANDON:  - Attempted to repeat BMP multiple times though patient consistently declining blood draw  - Encourage fluids    For previous medical concerns, please see note dated 7/12/21.    Behavioral/Psychological/Social:  - Encourage unit programming  - Patient is now Code 3 status and can take walks in the [place] with staff and security present per staff discretion. This appears to be quite therapeutic for her.     Safety:  - Continue precautions as noted above  - Status 15 minute checks  - Safety precautions include: assault and elopement precautions  - Continue precautions as noted above    Legal Status: Committed as MI with Hatch in place for Haldol, Zyprexa, Invega, and Thorazine through [facility]. Filed Alcides Kohler through [facility] and approved on 5/24/21. Physician's statement in support of guardianship was completed on 7/19/21.     Disposition Plan   Reason for ongoing admission: poses an imminent risk to self, poses an imminent risk to others and is unable to care for self due to severe psychosis or tad  Discharge location: Group home (needs CADI waiver). CPT assessment done by OT (Tania queen) on 7/1.  Please see note for details. [place] Choice Assessment deferred while her son is pursuing emergency guardianship.   Discharge Medications: not ordered  Follow-up Appointments: not scheduled     Bijal Varner MD  Psychiatry PGY-4                "

## 2021-07-21 NOTE — PLAN OF CARE
"Writer was emailed Guardianship Paperwork and per request of the  handling this case paperwork was served to patient.  Writer explained the nature of this paperwork and patient became very angry stating \"I will not accept paperwork.\" Writer stated to patient that this paperwork needed to be given to her on the request of the  who is representing her son (Emelia).   "

## 2021-07-21 NOTE — PLAN OF CARE
Problem: Sleep Disturbance  Goal: Adequate Sleep/Rest  Outcome: Declining  Night Shift Summary (7/20/21 into 07/21/21)    Pt asleep  at start of shift. Breathing quiet and unlabored.       Pt had no c/o pain or discomfort during the HS.     Appears to have slept 5 hours.       Pt on CHEEKING, ELOPEMENT, FALL and INTRUSIVE precautions in addition to single room order. Any related events noted above.     Will continue to monitor and assess.

## 2021-07-21 NOTE — PLAN OF CARE
"Patient alert and oriented to person, place, and time. /73 HR 90 scheduled meds given. Recheck 130/70 HR 82. Pt pleasant and compliant with medications, became irritable when asked about wearing ID and falls risk bands, and completing her menus choices. Pt answered with pressured speech \"I don't do that.\" Pt on falls, cheeking, elopement precautions with no behaviors observed. Pt refuses to wear ID and falls risk bands. Pt denies SI/HI/SIB, denies anxiety and depression. Pt contracts for safety. Pt did not demonstrate delusional thinking, did not appear to be responding to internal stimuli. Pt isolative in room, appropriate with peers and staff. Pt out of room after lunch, showered independently. Pt communicates and verbalizes needs to staff when approached/engaged. No PRN's given. No behaviors noted. Will continue to monitor behavior and encourage engagement.  Pt given copy of guardianship paperwork, became agitated and did not accept paperwork.    Problem: Adult Inpatient Plan of Care  Goal: Optimal Comfort and Wellbeing  Outcome: Improving     Problem: Behavioral Health Plan of Care  Goal: Plan of Care Review  Outcome: Improving     Problem: Behavioral Health Plan of Care  Goal: Adheres to Safety Considerations for Self and Others  Outcome: Improving     Problem: Behavior Regulation Impairment (Psychotic Signs/Symptoms)  Goal: Improved Behavioral Control (Psychotic Signs/Symptoms)  Outcome: Improving     Problem: Decreased Participation and Engagement (Psychotic Signs/Symptoms)  Goal: Increased Participation and Engagement (Psychotic Signs/Symptoms)  Outcome: Improving     Problem: Mood Impairment (Psychotic Signs/Symptoms)  Goal: Improved Mood Symptoms (Psychotic Signs/Symptoms)  Outcome: Improving     Problem: Sleep Disturbance (Psychotic Signs/Symptoms)  Goal: Improved Sleep (Psychotic Signs/Symptoms)  Outcome: Improving     Problem: Sleep Disturbance  Goal: Adequate Sleep/Rest  Outcome: Improving     "

## 2021-07-21 NOTE — PROGRESS NOTES
Writer left patient's son a voice message informing him that guardianship paperwork regarding Zoom hearing was served to patient by [facility]. Writer also stated on the voice message that patient's apartment should be given up at this time as she will not be returning home due to her inability to care for herself. Writer requested a call back to discuss this further.

## 2021-07-22 PROCEDURE — 99232 SBSQ HOSP IP/OBS MODERATE 35: CPT | Mod: GC | Performed by: PSYCHIATRY & NEUROLOGY

## 2021-07-22 PROCEDURE — 124N000002 HC R&B MH UMMC

## 2021-07-22 PROCEDURE — 250N000013 HC RX MED GY IP 250 OP 250 PS 637: Performed by: PSYCHIATRY & NEUROLOGY

## 2021-07-22 PROCEDURE — 250N000013 HC RX MED GY IP 250 OP 250 PS 637: Performed by: STUDENT IN AN ORGANIZED HEALTH CARE EDUCATION/TRAINING PROGRAM

## 2021-07-22 PROCEDURE — 250N000013 HC RX MED GY IP 250 OP 250 PS 637: Performed by: PHYSICIAN ASSISTANT

## 2021-07-22 RX ADMIN — AMLODIPINE BESYLATE 7.5 MG: 2.5 TABLET ORAL at 08:27

## 2021-07-22 RX ADMIN — OLANZAPINE 10 MG: 10 TABLET, ORALLY DISINTEGRATING ORAL at 08:27

## 2021-07-22 RX ADMIN — LORAZEPAM 1 MG: 1 TABLET ORAL at 13:59

## 2021-07-22 RX ADMIN — BENZTROPINE MESYLATE 1 MG: 1 TABLET ORAL at 19:54

## 2021-07-22 RX ADMIN — BENZTROPINE MESYLATE 1 MG: 1 TABLET ORAL at 08:27

## 2021-07-22 RX ADMIN — LOSARTAN POTASSIUM 100 MG: 100 TABLET, FILM COATED ORAL at 08:27

## 2021-07-22 RX ADMIN — LORAZEPAM 1 MG: 1 TABLET ORAL at 08:27

## 2021-07-22 RX ADMIN — METOPROLOL SUCCINATE 75 MG: 25 TABLET, EXTENDED RELEASE ORAL at 08:27

## 2021-07-22 RX ADMIN — OLANZAPINE 10 MG: 10 TABLET, ORALLY DISINTEGRATING ORAL at 19:54

## 2021-07-22 RX ADMIN — LORAZEPAM 2 MG: 2 TABLET ORAL at 19:55

## 2021-07-22 ASSESSMENT — ACTIVITIES OF DAILY LIVING (ADL)
HYGIENE/GROOMING: INDEPENDENT;PROMPTS
HYGIENE/GROOMING: INDEPENDENT
ORAL_HYGIENE: INDEPENDENT
LAUNDRY: WITH SUPERVISION
DRESS: INDEPENDENT
DRESS: INDEPENDENT
ORAL_HYGIENE: INDEPENDENT
LAUNDRY: WITH SUPERVISION

## 2021-07-22 NOTE — PLAN OF CARE
Problem: Behavioral Health Plan of Care  Goal: Plan of Care Review  Recent Flowsheet Documentation  Taken 7/22/2021 1640 by Claudio Lindsey RN  Plan of Care Reviewed With: patient  Progress: no change       Pt continues to deny SI/SIB/HI/Hallucinations/anxiety/depression. Pt was encouraged to join groups this evening. Pt nodded head in agreement, however did not join group. Pt was isolative to room, watching tv or napping.  Pt is calm and pleasant. Pt has a flat blunted affect. Pt is medication compliant. Pt expresses desire to discharge.    Appetite = eating and tolerating meals    ADLs = independent; pt showered yesterday    Medication side effects = denies adverse effects    Medical concerns  HTN - managed with medications. Continue to monitor.     Vital Signs     07/22/21 1925   Vital Signs   Temp 98.3  F (36.8  C)   Pulse 78   /67     Plan  Continue with plan of care. Encourage pt to join groups or play active games or exercise. Pt does have Code 3 for [place].

## 2021-07-22 NOTE — PLAN OF CARE
Pt has a flat blunted affect. Pt was paranoid and resistive during assessment. Pt refused to answer some assessment questions and became irritated. Pt repeated that she want to discharge and go home. Pt is in denial of mental health illness. When brought up son during assessment pt refused to talk and required a few que's to re engage. Pt did lighten up after talking with son this evening stating she had a good talk and smiled. Pt was brighter after talking with son. No aggressive or violent behaviors. Pt remains isolative to her room and withdrawn to self despite encouragement to spend time in the milieu. Pt rates anxiety at 0/10 and depression 0/10. Pt rates pain at 0/10. Pt reports no SI/HI and contracts for safety. Pt denies any hallucinations. Pt was med compliant with no cheeking noted. Continue current POC.

## 2021-07-22 NOTE — PLAN OF CARE
Problem: Sleep Disturbance  Goal: Adequate Sleep/Rest  Outcome: Improving     Night Shift Summary (7/21/21 into 07/22/21)    Pt asleep at start of shift. Breathing quiet and unlabored.     Pt had no c/o pain or discomfort during the HS.     Appears to have slept 4 hours.     Pt on CHEEKING, ELOPEMENT, FALL and INTRUSIVE  precautions in addition to single room order. Any related events noted above.     Will continue to monitor and assess.

## 2021-07-22 NOTE — PLAN OF CARE
Assessment/Intervention/Current Symtoms and Care Coordination  -Chart review  -Rounded with team, addressed patient needs/concerns    Current Symptoms include the following: Irritability in the context of discussing group home placement.     Discharge Plan or Goal  Pending stabilization & development of a safe discharge plan.  Considerations include: Group Home or another higher level of care. Patient has an ACT Team through [facility].     Barriers to Discharge  Patient requires further psychiatric stabilization due to current symptomology    Referral Status  [facility]    Legal Status  Committed/Hatch/Mcgrath colin through [facility]

## 2021-07-22 NOTE — PROGRESS NOTES
"[facility], [place]   Psychiatric Progress Note  Hospital Day: 99        Interim History:   The patient's care was discussed with the treatment team during the daily team meeting and/or staff's chart notes were reviewed. She took all medications as scheduled and no PRNs. Her blood pressures have continued to be intermittently elevated otherwise vitals were WNL. No acute medical concerns. No overt sx/signs of psychosis or tad observed. No SI/HI/AH/VH reported. No aggressive or violent behaviors. She continues to be isolative to her room despite encouragement from staff to participate in groups or walk in the [facility] or garden. She denies having an mental health symptoms. She showered for the first time after several days and did so independently. She continues sleeping and eating well. Slept 4 hours last night. No signs of delirium noted. She was noted to be angry when presented with guardianship paperwork regarding an upcoming court date.  She also reported as being tense and irritable initially during a staff check in the [place] her affect brightened after talking with her son.    Michelle was resting in bed prior to the interview.  She reports she is feeling \"good\" today and that she slept well.  She denies having any physical complaints, mental health symptoms, or difficulty with her thinking.  She was informed regarding prior lab results as discussed yesterday and that follow-up labs were to monitor her kidney function.  To this she replied \"I do not need any labs my kidneys are fine.\" She stated that she wants to leave the hospital and mumbled that she might go outside now.  When offered to go on a walk in the [place] with staff she declined.  She was informed regarding her upcoming court hearing and asked when this would be and also who arranged for the court hearing.  She again stated that she wants to leave the hospital because \"I have been here for for 5 months.\"  She was " "informed that she has been here for 99 days for 3.5 months however she said this was not true.  When asked about the date she stated \"I have been here for so long and I do not have a phone still have you expect me to know the date.\"  She was informed that we are inquiring about the date given that some of her medications can lead to difficulties with thinking. She again reported that she wants to leave the hospital. She was informed that we are working on her discharge to a group home and she again stated that she would not go to a group home.  She denied having any concerns about her safety.           Medications:      amLODIPine  7.5 mg Oral Daily    benztropine  1 mg Oral BID    LORazepam  1 mg Oral BID    Or    LORazepam  1 mg Intramuscular BID    LORazepam  2 mg Oral QPM    Or    LORazepam  2 mg Intramuscular QPM    losartan  100 mg Oral Daily    metoprolol succinate ER  75 mg Oral Daily    OLANZapine zydis  10 mg Oral BID    Or    OLANZapine  10 mg Intramuscular BID          Allergies:     Allergies   Allergen Reactions    Haldol [Haloperidol]      Patient previously tolerated haldol, though developed oculogyric crises during hospital stay on 4/26/21 on total daily dose of 10 mg.    Lisinopril Cough          Labs:     No results found for this or any previous visit (from the past 24 hour(s)).       Psychiatric Examination:     /78 (BP Location: Right arm)   Pulse 70   Temp 98.1  F (36.7  C) (Oral)   Resp 16   Wt 70.4 kg (155 lb 4.8 oz)   SpO2 98%   BMI 26.66 kg/m    Weight is 155 lbs 4.8 oz  Body mass index is 26.66 kg/m .    Weight over time:  Vitals:    05/25/21 0850 06/15/21 0811 06/16/21 1605 06/19/21 0859   Weight: 71 kg (156 lb 8 oz) 69.8 kg (153 lb 12.8 oz) 70.5 kg (155 lb 8 oz) 71.4 kg (157 lb 8 oz)    06/21/21 0805 06/22/21 0839 06/24/21 0800 07/02/21 0835   Weight: 69.5 kg (153 lb 3.2 oz) 69.3 kg (152 lb 11.2 oz) 70.7 kg (155 lb 12.8 oz) 70.7 kg (155 lb 12.8 oz)    07/03/21 0844 07/06/21 " "0838 07/10/21 0845 07/16/21 0821   Weight: 69.6 kg (153 lb 8 oz) 70.9 kg (156 lb 4.8 oz) 69.8 kg (153 lb 14.4 oz) 69.3 kg (152 lb 12.8 oz)    07/17/21 0830   Weight: 70.4 kg (155 lb 4.8 oz)       Orthostatic Vitals         Most Recent      Sitting Orthostatic /80 07/22 0832    Sitting Orthostatic Pulse (bpm) 85 07/22 0832    Standing Orthostatic /73 07/21 0859    Standing Orthostatic Pulse (bpm) 98 07/21 0859              Cardiometabolic risk assessment. 04/15/21      Reviewed patient profile for cardiometabolic risk factors    Date taken /Value  REFERENCE RANGE   Abdominal Obesity  (Waist Circumference)   See nursing flowsheet Women ?35 in (88 cm)   Men ?40 in (102 cm)      Triglycerides  Triglycerides   Date Value Ref Range Status   10/02/2020 86 <=149 mg/dL Final   10/19/2019 117 <150 mg/dL Final       ?150 mg/dL (1.7 mmol/L) or current treatment for elevated triglycerides   HDL cholesterol  HDL Cholesterol   Date Value Ref Range Status   10/19/2019 56 >49 mg/dL Final     Direct Measure HDL   Date Value Ref Range Status   10/02/2020 45 (L) >=50 mg/dL Final   ]   Women <50 mg/dL (1.3 mmol/L) in women or current treatment for low HDL cholesterol  Men <40 mg/dL (1 mmol/L) in men or current treatment for low HDL cholesterol     Fasting plasma glucose (FPG) Lab Results   Component Value Date     10/19/2019      FPG ?100 mg/dL (5.6 mmol/L) or treatment for elevated blood glucose   Blood pressure  BP Readings from Last 3 Encounters:   07/21/21 136/78   06/04/19 131/71   04/26/19 164/86    Blood pressure ?130/85 mmHg or treatment for elevated blood pressure   Family History  See family history     Appearance: dressed in hospital scrubs, appeared reported age, unkempt hair   Attitude: mostly cooperative with interview, less irritated when discussing the group home today  Eye Contact: fair, looks down or straight ahead   Mood: \"good\" \"I want to leave\"   Affect:  Somewhat blunted, overall less agitated " and tense   Speech: accented, clear and coherent.  She did not raise her voice when discussing the group home as she has done previously.  Language: no obvious receptive or expressive deficits  Psychomotor, Gait, Musculoskeletal: No abnormal movements noted while seated.   Thought Process: goal-oriented, linear, concrete   Associations:  No loosening of associations present  Thought Content:  Did not appear to be responding to internal stimuli.  Insight:   poor  Judgement:  fair  Oriented to: date, location, treatment team, current president   Attention Span and Concentration: attentive to conversation though at times stares ahead and does not respond to questions.  Recent and Remote Memory: stable, possible difficulty recalling prior conversations vs. Inability to accept information contained in conversations   Fund of Knowledge:  normal    Clinical Global Impressions  First:  Considering your total clinical experience with this particular patient population, how severe are the patient's symptoms at this time?: 7 (04/16/21 1428)  Compared to the patient's condition at the START of treatment, this patient's condition is: 4 (04/16/21 1428)  Most recent:  Considering your total clinical experience with this particular patient population, how severe are the patient's symptoms at this time?: 5 (07/01/21 0947)  Compared to the patient's condition at the START of treatment, this patient's condition is: 2 (07/01/21 0947)           Precautions:     Behavioral Orders   Procedures    Cheeking Precautions (behavioral units)     Patient Observed swallowing PO medications; Patient asked to drink water after swallowing medication; Patient in Staff line of sight for 15 minutes after medication given; Mouth checks after PO administration (patient asked to open mouth and stick out their tongue).    Code 3     For walks in the [place] with staff and per staff discretion    Electroconvulsive therapy     Series of up to 12  treatments. Begin Date: 5/26/21     Treating Psychiatrist providing ECT:  Dr. Lubin     Notified on:  5/21/21    Electroconvulsive therapy     As long as we get the green light from risk management and pt is medically cleared, begin ECT every Monday, Wednesday, and Friday    Electroconvulsive therapy     Series of up to 12 treatments. Begin Date: 5/25/21     Treating Psychiatrist providing ECT:  Amee     Notified on:  5/24/21    Elopement precautions    Fall precautions    Alcides Calderon    Routine Programming     As clinically indicated    Status 15     Every 15 minutes.          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 Symptoms and presentation at this time is most consistent with Schizoaffective Disorder, Bipolar Type. Obtained most recent medication regimen from patient's ACT team, and regimen was initially restarted. Pt is committed. Petitioned for Alcides Calderon was filed and granted due to lack of improvement and side effects from medications. Inpatient psychiatric hospitalization is warranted at this time for safety, stabilization, possible adjustment in medications and development of a safe discharge plan.     Hospital Course:  On admission, PTA medications were restarted. However, Michelle had been declining all scheduled medications despite significant encouragement from staff and provider. Psychiatric emergency declared on 4/20 due to aggression toward others in context of severe psychosis  "and suspected excited catatonia. Ativan 1 mg TID was also added. Discontinued PTA Invega, Zyprexa, and thorazine on 4/20 due to consistent refusal.     On 4/26, it was noted that patient frequently had upward gaze while walking up and down the unit. She did not appear to be distressed. She reported that she was looking at \"my god.\" It was determined to be oculogyric crisis secondary to IM haloperidol. Haldol was subsequently discontinued and scheduled Zyprexa was initiated on an emergency basis. Oral Cogentin was also scheduled, though patient declined. On the evening of 4/26, patient's gaze was fixed in upward position for several hours and she appeared to be experiencing discomfort. IM Cogentin was administered with noted resolution. Partial improvements were observed after switching to scheduled Zyprexa. After patient improved, she was more receptive to reinitiating oral Invega, which was initiated on 5/3 and titrated to PTA dose of 9 mg daily on 5/10. Plan was for ACT team to bring in loading dose of Invega Sustenna. Patient had signs of oculogyric crisis again with Invega. Oral dose decreased to 6 mg after this. Invega was stopped on 5/14 due to ongoing signs of problems related to eye movement and concerns for oculogyric crisis.    Overall improvement in patient's agitation and suspected catatonia noted on 4/30 after patient accepted two doses of Ativan. She began declining Ativan again with noted decompensation. Patient began accepting, however, was deemed not to have the capacity to consent to treatment with Ativan. She does not believe she has a mental illness, including catatonia. She does not fully understand risks associated with inadequate treatment of catatonia. Discussed with her son who is acting as surrogate decision maker and he is in full support of forced scheduled IM Ativan if patient declines oral formulation. Also consulted with our legal team prior to backing oral Ativan with IM.     Ativan " "was increased on 5/19 due to re-emerging evidence of catatonia (long periods of staring, repetitive movements, echolalia, mutism). Meeting was held with ACT team on 5/20, including ACT team psychiatrist, Dr. Pedraza. He said that he is in \"full support\" of plan to pursue Mcgrath Kohler and ECT at this time. He does feel that in the past she has been discharged from the hospital while still quite symptomatic. He is hoping that ECT will be effective and that with improvement, Michelle would be more receptive to clozapine and weekly blood draws. He said that ideally she would transition to an IRTS before going back to her apartment, but also understands there may be some barriers (I.e. pt's willingness, financial concerns, etc).     ECT consult placed. Please see consult note by Dr. Lubin on 5/21 for details. Alcides Saavedraard was approved on 5/24 and patient was medically cleared on 5/24. ECT initiated on 5/26. She was noted to have a short seizure during ECT on 6/4. Staff note that patient appears more disoriented with memory impairment and sedation on days of ECT. This was noted on my examination again today. Improvements noted in mood, affect, social interactions, paranoia, agitation since initiation of ECT. Reduced Ativan on 6/5 due to sedative effects. Relayed concerns about memory impairment and confusion with Dr. Lubin. Recommended attempting unilateral ECT, which he agreed to do. First unilateral ECT on 6/7. ECT was held on 6/11 and 6/14 due to memory impairment. We will plan to hold it again tomorrow (6/16). There is ongoing discussion regarding whether there is a component of catatonia contributing to her current presentation but this is less likely since it worsened after ECT was started.  Given her level of cognitive impairment and our belief that this is due to ECT, we will not pursue any further treatments at this time. Since we have held ECT (last treatment on 6/9), her cognitive impairment has slightly improved " (more oriented).     Michelle initially appeared to be decompensating somewhat when ECT was held, though her cognitive impairment has gradually improved. If symptoms of psychosis re-emerge, it may be worth starting clozapine, which is something that has previously been considered by her ACT team. Her Hatch order would need to be amended as clozapine is not one of the medications listed. ANC obtained on 6/16 was 1800/microL. Per conversation with pharmacy, neutropenia has been associated with olanzapine and agranulocytosis has been associated with olanzapine, lorazepam, metoprolol, and hydralazine and hematologic labs have been monitored. Upon re-check, ANC was 3700/microL on 6/21/21. At this time, the primary symptoms we are observing are cognitive deficits and inability to care for self, which we would not address by changing her antipsychotic medication.    Michelle's cognitive impairment has been steadily improving since holding ECT treatments, however it is possible that lorazepam is also playing a role in her cognitive impairment. Given no signs of re-emerging catatonia, a gradual lorazepam taper (decreasing by 0.5 mg) was initiated on 6/28/21 to monitor for potential improvements with regard to memory impairment.  On 7/7, Michelle reported an incident of oculogyric crisis and Cogentin 1 mg BID was initiated with no noted changes in cognition.     iMchelle has remained focused on discharge. The team is working with Michelle's ACT team to to establish a safe discharge plan with options including moving to a group home vs home with her ACT team and additional in home supports via CADI services. We are concerned that Michelle would have difficulty taking her medications as prescribed if she were to return home without her ACT team and additional services. This is evidenced by impairments noted in cognitive assessment by OT specialist on 7/1. MOCA score was a 13/30 and CPT score was a 4.7. Physician's statement in support of guardianship  was completed on 7/19/21.  Patient's son is pursuing guardianship. Patient became quite agitated on 7/16 when  staff from [facility] specific  came to meet with her a second time. She did not allow staff members to enter her room and stated that she will not be going to a group home.     Target psychiatric symptoms and interventions:   - Continue lorazepam 1 mg PO or IM in the morning and at mid-day and 2 mg PO or IM in the evening. Patient may not decline.   - Continue Zyprexa 10 mg BID  PO or IM. Hatch in place. May consider increasing further though holding off given re-emerging catatonic sx and borderline prolonged QTc   - Continue Cogentin 1 mg PO BID with additional 2 mg IM daily prn for evidence of acute dystonic reaction or oculogyric crisis  -Continue hydroxyzine 25-50 mg q4h prn for acute anxiety  -Continue Trazodone 50 mg at bedtime prn for sleep disturbances  -Continue Zyprexa 10 mg TID prn for severe agitation  -Continue Ativan/Benadryl q4h prn for agitation. WOULD GIVE PRN ATIVAN FIRST FOR AGITATION unless it is after 5 pm on day prior to scheduled ECT    Occupational Therapy Consult Placed to evaluate cognitive functioning/ability to care for self in home environment, ability to manage medications. See note on 7/1 for details.      ECT: Discontinued due to significant cognitive impairment. Please see note dated 7/12/21 for additional details.     Acute Medical Problems and Treatments:  HTN, improved: Patient is now adherent with medication regimen.   - Metoprolol succinate ER 75 mg   - Cozaar 100 mg daily  - Amlodipine 7.5 mg daily  - IM discontinued hydralazine prn  - Switched BP checks from QID to BID on 7/13 given overall improvement  - Please see note from IM dated 5/3, 5/6, 5/24, 6/20 and 6/30/21.  - Obtained EKG and routine labs on 5/21 in context of pt declining vital sign checks and anti-hypertensives and recently elevated BPs. Reviewed labs and discussed EKG findings with IM on 5/21. No  "urgent concerns, though IM should be notified if pt develops acute medical concerns (I.e. heart palpitations, SOB, changes in speech, AMS, confusion, CP, HA, changes in vision).    - Per 6/20 IM note: \"Please notify IM if BP is persistently severely elevated and requiring frequent PRN hydralazine\".  - Internal medicine consulted again on 6/28 due to consistent use of hydralazine over the past week. Metoprolol increased to 75 mg daily then to 100 mg and amlodipine 5 mg was added on 6/30. Amlodipine increased to 7.5 mg daily on 7/5.     Recent history of BRANDON:  - Attempted to repeat BMP multiple times though patient consistently declining blood draw  - Encourage fluids    For previous medical concerns, please see note dated 7/12/21.    Behavioral/Psychological/Social:  - Encourage unit programming  - Patient is now Code 3 status and can take walks in the [place] with staff and security present per staff discretion. This appears to be quite therapeutic for her.     Safety:  - Continue precautions as noted above  - Status 15 minute checks  - Safety precautions include: assault and elopement precautions  - Continue precautions as noted above    Legal Status: Committed as MI with Hatch in place for Haldol, Zyprexa, Invega, and Thorazine through [facility]. Filed Alcides Kohler through [facility] and approved on 5/24/21. Physician's statement in support of guardianship was completed on 7/19/21. Court hearing for guardianship is scheduled on 7/28/21 at 8:30 AM.    Disposition Plan   Reason for ongoing admission: poses an imminent risk to self, poses an imminent risk to others and is unable to care for self due to severe psychosis or tad  Discharge location: Group home (needs CADI waiver). CPT assessment done by OT (Tania queen) on 7/1.  Please see note for details. [place] Assessment deferred while her son is pursuing emergency guardianship   Discharge Medications: not ordered  Follow-up Appointments: not " scheduled     Bijal Varner MD  Psychiatry PGY-4

## 2021-07-22 NOTE — PLAN OF CARE
Problem: Behavioral Health Plan of Care  Goal: Plan of Care Review  Recent Flowsheet Documentation  Taken 7/22/2021 0840 by Claudio Lindsey, RN  Plan of Care Reviewed With: patient  Progress: no change     Pt denies SI/SIB/HI/hallucinations/anxiety/depression.   Pt is at baseline for mental health symptoms. Pt continues to have poor insight and judgment with her mental health. Pt expresses wish to discharge. Pt was isolative to room, watching tv or either napping. Pt encouraged to join groups. Pt is calm and pleasant. Pt is med compliant.    Sleep = 4hrs    Appetite = eating and tolerating meals    ADLs = independent, needs encouragement    Vital signs     07/22/21 0832   Vital Signs   Temp 98.4  F (36.9  C)   Temp src Tympanic   Resp 16   Pulse 85   Pulse Rate Source Monitor   BP (!) 159/80   BP Location Right arm     Medical concerns  HTN- managed with medications. Pt denies pain, light-headedness, dizziness.    Medication side effects =denies adverse effects    Plan  Continue with plan of care. Encourage pt to join groups.

## 2021-07-23 PROCEDURE — 250N000013 HC RX MED GY IP 250 OP 250 PS 637: Performed by: PHYSICIAN ASSISTANT

## 2021-07-23 PROCEDURE — 124N000002 HC R&B MH UMMC

## 2021-07-23 PROCEDURE — 99232 SBSQ HOSP IP/OBS MODERATE 35: CPT | Performed by: PSYCHIATRY & NEUROLOGY

## 2021-07-23 PROCEDURE — 250N000013 HC RX MED GY IP 250 OP 250 PS 637: Performed by: STUDENT IN AN ORGANIZED HEALTH CARE EDUCATION/TRAINING PROGRAM

## 2021-07-23 PROCEDURE — 250N000013 HC RX MED GY IP 250 OP 250 PS 637: Performed by: PSYCHIATRY & NEUROLOGY

## 2021-07-23 RX ADMIN — LORAZEPAM 2 MG: 2 TABLET ORAL at 20:36

## 2021-07-23 RX ADMIN — BENZTROPINE MESYLATE 1 MG: 1 TABLET ORAL at 08:33

## 2021-07-23 RX ADMIN — AMLODIPINE BESYLATE 7.5 MG: 2.5 TABLET ORAL at 08:32

## 2021-07-23 RX ADMIN — LORAZEPAM 1 MG: 1 TABLET ORAL at 08:33

## 2021-07-23 RX ADMIN — LORAZEPAM 1 MG: 1 TABLET ORAL at 14:02

## 2021-07-23 RX ADMIN — BENZTROPINE MESYLATE 1 MG: 1 TABLET ORAL at 20:36

## 2021-07-23 RX ADMIN — METOPROLOL SUCCINATE 75 MG: 25 TABLET, EXTENDED RELEASE ORAL at 08:32

## 2021-07-23 RX ADMIN — OLANZAPINE 10 MG: 10 TABLET, ORALLY DISINTEGRATING ORAL at 20:36

## 2021-07-23 RX ADMIN — LOSARTAN POTASSIUM 100 MG: 100 TABLET, FILM COATED ORAL at 08:33

## 2021-07-23 RX ADMIN — OLANZAPINE 10 MG: 10 TABLET, ORALLY DISINTEGRATING ORAL at 08:35

## 2021-07-23 ASSESSMENT — ACTIVITIES OF DAILY LIVING (ADL)
LAUNDRY: UNABLE TO COMPLETE
DRESS: SCRUBS (BEHAVIORAL HEALTH)
ORAL_HYGIENE: INDEPENDENT
HYGIENE/GROOMING: INDEPENDENT

## 2021-07-23 NOTE — PROGRESS NOTES
"[facility], [place]   Psychiatric Progress Note  Hospital Day: 100        Interim History:   The patient's care was discussed with the treatment team during the daily team meeting and/or staff's chart notes were reviewed. Staff report patient spent time in room watching TV and resting, did not attend groups, taking medications as prescribed, denying MH symptoms, slept 6.25 hours, no acute events overnight.     Upon interview patient was lying in bed awake in her room watching TV. She sat up and remained in a partial sitting position for duration of interview, did not appear natural sitting position. Denied feeling tired, reports she slept well and that she spends time in her room and in bed \"resting\". Mood is \"good\", denies SI or HI, denies AVH or paranoia. Was oriented to person, place and date/time. She reported she was \"sick of being here\" and \"has been here too long\". Reviewed that team is working with her family to determine safe discharge plan as it is not felt patient would be able to safely return to apartment at this time. She was encouraged to spend time out in the [employer] when offered by staff but she states she is not interested and only wants to discharge home. Tolerating medications, denies side effects. Eating and drinking without difficulty. No additional concerns.          Medications:       amLODIPine  7.5 mg Oral Daily     benztropine  1 mg Oral BID     LORazepam  1 mg Oral BID    Or     LORazepam  1 mg Intramuscular BID     LORazepam  2 mg Oral QPM    Or     LORazepam  2 mg Intramuscular QPM     losartan  100 mg Oral Daily     metoprolol succinate ER  75 mg Oral Daily     OLANZapine zydis  10 mg Oral BID    Or     OLANZapine  10 mg Intramuscular BID          Allergies:     Allergies   Allergen Reactions     Haldol [Haloperidol]      Patient previously tolerated haldol, though developed oculogyric crises during hospital stay on 4/26/21 on total daily dose of 10 " mg.     Lisinopril Cough          Labs:     No results found for this or any previous visit (from the past 24 hour(s)).       Psychiatric Examination:     /67   Pulse 78   Temp 98.3  F (36.8  C)   Resp 16   Wt 70.4 kg (155 lb 4.8 oz)   SpO2 98%   BMI 26.66 kg/m    Weight is 155 lbs 4.8 oz  Body mass index is 26.66 kg/m .    Weight over time:  Vitals:    05/25/21 0850 06/15/21 0811 06/16/21 1605 06/19/21 0859   Weight: 71 kg (156 lb 8 oz) 69.8 kg (153 lb 12.8 oz) 70.5 kg (155 lb 8 oz) 71.4 kg (157 lb 8 oz)    06/21/21 0805 06/22/21 0839 06/24/21 0800 07/02/21 0835   Weight: 69.5 kg (153 lb 3.2 oz) 69.3 kg (152 lb 11.2 oz) 70.7 kg (155 lb 12.8 oz) 70.7 kg (155 lb 12.8 oz)    07/03/21 0844 07/06/21 0838 07/10/21 0845 07/16/21 0821   Weight: 69.6 kg (153 lb 8 oz) 70.9 kg (156 lb 4.8 oz) 69.8 kg (153 lb 14.4 oz) 69.3 kg (152 lb 12.8 oz)    07/17/21 0830   Weight: 70.4 kg (155 lb 4.8 oz)       Orthostatic Vitals       Most Recent      Sitting Orthostatic /80 07/22 0832    Sitting Orthostatic Pulse (bpm) 85 07/22 0832                   Cardiometabolic risk assessment. 04/15/21      Reviewed patient profile for cardiometabolic risk factors    Date taken /Value  REFERENCE RANGE   Abdominal Obesity  (Waist Circumference)   See nursing flowsheet Women ?35 in (88 cm)   Men ?40 in (102 cm)      Triglycerides  Triglycerides   Date Value Ref Range Status   10/02/2020 86 <=149 mg/dL Final   10/19/2019 117 <150 mg/dL Final       ?150 mg/dL (1.7 mmol/L) or current treatment for elevated triglycerides   HDL cholesterol  HDL Cholesterol   Date Value Ref Range Status   10/19/2019 56 >49 mg/dL Final     Direct Measure HDL   Date Value Ref Range Status   10/02/2020 45 (L) >=50 mg/dL Final   ]   Women <50 mg/dL (1.3 mmol/L) in women or current treatment for low HDL cholesterol  Men <40 mg/dL (1 mmol/L) in men or current treatment for low HDL cholesterol     Fasting plasma glucose (FPG) Lab Results   Component Value Date  "    10/19/2019      FPG ?100 mg/dL (5.6 mmol/L) or treatment for elevated blood glucose   Blood pressure  BP Readings from Last 3 Encounters:   07/22/21 111/67   06/04/19 131/71   04/26/19 164/86    Blood pressure ?130/85 mmHg or treatment for elevated blood pressure   Family History  See family history     Appearance: dressed in hospital scrubs, appeared reported age, unkempt hair   Attitude: mostly cooperative with interview, irritable around discharge  Eye Contact: fair, looks down or straight ahead   Mood: \"good\" \"sick of being here\"   Affect:  Somewhat blunted, overall less agitated and tense   Speech: accented, clear and coherent.  She did not raise her voice when discussing the group home/discharge as she has done previously.  Language: no obvious receptive or expressive deficits  Psychomotor, Gait, Musculoskeletal: No abnormal movements noted while seated, did sit on awkward/unnatural position during interview and held posture throughout length of interview.   Thought Process: goal-oriented, linear, concrete   Associations:  No loosening of associations present  Thought Content:  Did not appear to be responding to internal stimuli.  Insight:   poor  Judgement:  fair  Oriented to: person, place, time/date   Attention Span and Concentration: attentive to conversation though at times stares ahead and does not respond to questions.  Recent and Remote Memory: stable, possible difficulty recalling prior conversations vs. Inability to accept information contained in conversations   Fund of Knowledge:  normal    Clinical Global Impressions  First:  Considering your total clinical experience with this particular patient population, how severe are the patient's symptoms at this time?: 7 (04/16/21 1428)  Compared to the patient's condition at the START of treatment, this patient's condition is: 4 (04/16/21 1428)  Most recent:  Considering your total clinical experience with this particular patient population, " how severe are the patient's symptoms at this time?: 5 (07/01/21 0947)  Compared to the patient's condition at the START of treatment, this patient's condition is: 2 (07/01/21 0947)           Precautions:     Behavioral Orders   Procedures     Cheeking Precautions (behavioral units)     Patient Observed swallowing PO medications; Patient asked to drink water after swallowing medication; Patient in Staff line of sight for 15 minutes after medication given; Mouth checks after PO administration (patient asked to open mouth and stick out their tongue).     Code 3     For walks in the [place] with staff and per staff discretion     Electroconvulsive therapy     Series of up to 12 treatments. Begin Date: 5/26/21     Treating Psychiatrist providing ECT:  Dr. Lubin     Notified on:  5/21/21     Electroconvulsive therapy     As long as we get the green light from risk management and pt is medically cleared, begin ECT every Monday, Wednesday, and Friday     Electroconvulsive therapy     Series of up to 12 treatments. Begin Date: 5/25/21     Treating Psychiatrist providing ECT:  Amee     Notified on:  5/24/21     Elopement precautions     Fall precautions     Mcgrath Calderon     Routine Programming     As clinically indicated     Status 15     Every 15 minutes.          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 Symptoms and presentation at this time is most consistent with Schizoaffective Disorder, Bipolar  "Type. Obtained most recent medication regimen from patient's ACT team, and regimen was initially restarted. Pt is committed. Petitioned for Alcides Riverapard was filed and granted due to lack of improvement and side effects from medications. Inpatient psychiatric hospitalization is warranted at this time for safety, stabilization, possible adjustment in medications and development of a safe discharge plan.     Hospital Course:  On admission, PTA medications were restarted. However, Michelle had been declining all scheduled medications despite significant encouragement from staff and provider. Psychiatric emergency declared on 4/20 due to aggression toward others in context of severe psychosis and suspected excited catatonia. Ativan 1 mg TID was also added. Discontinued PTA Invega, Zyprexa, and thorazine on 4/20 due to consistent refusal.     On 4/26, it was noted that patient frequently had upward gaze while walking up and down the unit. She did not appear to be distressed. She reported that she was looking at \"my god.\" It was determined to be oculogyric crisis secondary to IM haloperidol. Haldol was subsequently discontinued and scheduled Zyprexa was initiated on an emergency basis. Oral Cogentin was also scheduled, though patient declined. On the evening of 4/26, patient's gaze was fixed in upward position for several hours and she appeared to be experiencing discomfort. IM Cogentin was administered with noted resolution. Partial improvements were observed after switching to scheduled Zyprexa. After patient improved, she was more receptive to reinitiating oral Invega, which was initiated on 5/3 and titrated to PTA dose of 9 mg daily on 5/10. Plan was for ACT team to bring in loading dose of Invega Sustenna. Patient had signs of oculogyric crisis again with Invega. Oral dose decreased to 6 mg after this. Invega was stopped on 5/14 due to ongoing signs of problems related to eye movement and concerns for oculogyric " "crisis.    Overall improvement in patient's agitation and suspected catatonia noted on 4/30 after patient accepted two doses of Ativan. She began declining Ativan again with noted decompensation. Patient began accepting, however, was deemed not to have the capacity to consent to treatment with Ativan. She does not believe she has a mental illness, including catatonia. She does not fully understand risks associated with inadequate treatment of catatonia. Discussed with her son who is acting as surrogate decision maker and he is in full support of forced scheduled IM Ativan if patient declines oral formulation. Also consulted with our legal team prior to backing oral Ativan with IM.     Ativan was increased on 5/19 due to re-emerging evidence of catatonia (long periods of staring, repetitive movements, echolalia, mutism). Meeting was held with ACT team on 5/20, including ACT team psychiatrist, Dr. Pedraza. He said that he is in \"full support\" of plan to pursue Mcgrath Kohler and ECT at this time. He does feel that in the past she has been discharged from the hospital while still quite symptomatic. He is hoping that ECT will be effective and that with improvement, Michelle would be more receptive to clozapine and weekly blood draws. He said that ideally she would transition to an IRTS before going back to her apartment, but also understands there may be some barriers (I.e. pt's willingness, financial concerns, etc).     ECT consult placed. Please see consult note by Dr. Lubin on 5/21 for details. Mcgrath Kohler was approved on 5/24 and patient was medically cleared on 5/24. ECT initiated on 5/26. She was noted to have a short seizure during ECT on 6/4. Staff note that patient appears more disoriented with memory impairment and sedation on days of ECT. This was noted on my examination again today. Improvements noted in mood, affect, social interactions, paranoia, agitation since initiation of ECT. Reduced Ativan on 6/5 due " to sedative effects. Relayed concerns about memory impairment and confusion with Dr. Lubin. Recommended attempting unilateral ECT, which he agreed to do. First unilateral ECT on 6/7. ECT was held on 6/11 and 6/14 due to memory impairment. We will plan to hold it again tomorrow (6/16). There is ongoing discussion regarding whether there is a component of catatonia contributing to her current presentation but this is less likely since it worsened after ECT was started.  Given her level of cognitive impairment and our belief that this is due to ECT, we will not pursue any further treatments at this time. Since we have held ECT (last treatment on 6/9), her cognitive impairment has slightly improved (more oriented).     Michelle initially appeared to be decompensating somewhat when ECT was held, though her cognitive impairment has gradually improved. If symptoms of psychosis re-emerge, it may be worth starting clozapine, which is something that has previously been considered by her ACT team. Her Hatch order would need to be amended as clozapine is not one of the medications listed. ANC obtained on 6/16 was 1800/microL. Per conversation with pharmacy, neutropenia has been associated with olanzapine and agranulocytosis has been associated with olanzapine, lorazepam, metoprolol, and hydralazine and hematologic labs have been monitored. Upon re-check, ANC was 3700/microL on 6/21/21. At this time, the primary symptoms we are observing are cognitive deficits and inability to care for self, which we would not address by changing her antipsychotic medication.    Michelle's cognitive impairment has been steadily improving since holding ECT treatments, however it is possible that lorazepam is also playing a role in her cognitive impairment. Given no signs of re-emerging catatonia, a gradual lorazepam taper (decreasing by 0.5 mg) was initiated on 6/28/21 to monitor for potential improvements with regard to memory impairment.  On 7/7, Michelle  reported an incident of oculogyric crisis and Cogentin 1 mg BID was initiated with no noted changes in cognition.     Michelle has remained focused on discharge. The team is working with Michelle's ACT team to to establish a safe discharge plan with options including moving to a group home vs home with her ACT team and additional in home supports via CADI services. We are concerned that Michelle would have difficulty taking her medications as prescribed if she were to return home without her ACT team and additional services. This is evidenced by impairments noted in cognitive assessment by OT specialist on 7/1. MOCA score was a 13/30 and CPT score was a 4.7. Physician's statement in support of guardianship was completed on 7/19/21.  Patient's son is pursuing guardianship. Patient became quite agitated on 7/16 when  staff from [facility] came to meet with her a second time. She did not allow staff members to enter her room and stated that she will not be going to a group home.     Target psychiatric symptoms and interventions:   - Continue lorazepam 1 mg PO or IM in the morning and at mid-day and 2 mg PO or IM in the evening. Patient may not decline.   - Continue Zyprexa 10 mg BID  PO or IM. Hatch in place. May consider increasing further though holding off given re-emerging catatonic sx and borderline prolonged QTc   - Continue Cogentin 1 mg PO BID with additional 2 mg IM daily prn for evidence of acute dystonic reaction or oculogyric crisis  -Continue hydroxyzine 25-50 mg q4h prn for acute anxiety  -Continue Trazodone 50 mg at bedtime prn for sleep disturbances  -Continue Zyprexa 10 mg TID prn for severe agitation  -Continue Ativan/Benadryl q4h prn for agitation. WOULD GIVE PRN ATIVAN FIRST FOR AGITATION unless it is after 5 pm on day prior to scheduled ECT    Occupational Therapy Consult Placed to evaluate cognitive functioning/ability to care for self in home environment, ability to manage medications. See  "note on 7/1 for details.      ECT: Discontinued due to significant cognitive impairment. Please see note dated 7/12/21 for additional details.     Acute Medical Problems and Treatments:  HTN, improved: Patient is now adherent with medication regimen.   - Metoprolol succinate ER 75 mg   - Cozaar 100 mg daily  - Amlodipine 7.5 mg daily  - IM discontinued hydralazine prn  - Switched BP checks from QID to BID on 7/13 given overall improvement  - Please see note from IM dated 5/3, 5/6, 5/24, 6/20 and 6/30/21.  - Obtained EKG and routine labs on 5/21 in context of pt declining vital sign checks and anti-hypertensives and recently elevated BPs. Reviewed labs and discussed EKG findings with IM on 5/21. No urgent concerns, though IM should be notified if pt develops acute medical concerns (I.e. heart palpitations, SOB, changes in speech, AMS, confusion, CP, HA, changes in vision).    - Per 6/20 IM note: \"Please notify IM if BP is persistently severely elevated and requiring frequent PRN hydralazine\".  - Internal medicine consulted again on 6/28 due to consistent use of hydralazine over the past week. Metoprolol increased to 75 mg daily then to 100 mg and amlodipine 5 mg was added on 6/30. Amlodipine increased to 7.5 mg daily on 7/5.     Recent history of BRANDON:  - Attempted to repeat BMP multiple times though patient consistently declining blood draw  - Encourage fluids    For previous medical concerns, please see note dated 7/12/21.    Behavioral/Psychological/Social:  - Encourage unit programming  - Patient is now Code 3 status and can take walks in the [place] with staff and security present per staff discretion. This appears to be quite therapeutic for her.     Safety:  - Continue precautions as noted above  - Status 15 minute checks  - Safety precautions include: assault and elopement precautions  - Continue precautions as noted above    Legal Status: Committed as MI with Hatch in place for Haldol, Zyprexa, Invega, " and Dannie through [facility]. Filed Alcides Kohler through [facility] and approved on 5/24/21. Physician's statement in support of guardianship was completed on 7/19/21. Court hearing for guardianship is scheduled on 7/28/21 at 8:30 AM.    Disposition Plan   Reason for ongoing admission: poses an imminent risk to self, poses an imminent risk to others and is unable to care for self due to severe psychosis or tad  Discharge location: [facility] home (needs CADI waiver). CPT assessment done by OT (Tania queen) on 7/1.  Please see note for details. [place] Choice Assessment deferred while her son is pursuing emergency guardianship   Discharge Medications: not ordered  Follow-up Appointments: not scheduled     Patient has been seen and evaluated by me, Verenice Oquendo DO.

## 2021-07-23 NOTE — PLAN OF CARE
Problem: Sleep Disturbance (Psychotic Signs/Symptoms)  Goal: Improved Sleep (Psychotic Signs/Symptoms)  Outcome: Improving     Pt appeared to be sleeping for 6.25 hours. No PRN administered this shift, no concerns noted. Will continue to monitor.

## 2021-07-23 NOTE — PLAN OF CARE
Problem: Adult Inpatient Plan of Care  Goal: Optimal Comfort and Wellbeing  Outcome: Improving   Patient has been isolative to her room most of her stay. She watches tv. Does not attend groups even when prompted. She does come to the medication window for her medications when they are not within a timely manner. She denies depression/SI/SIB today. Denies AH/VH. Needs prompts to complete her ADL'S and clean her room. Eating and drinking in good amounts. Denies any physical concerns.

## 2021-07-24 LAB — SARS-COV-2 RNA RESP QL NAA+PROBE: NEGATIVE

## 2021-07-24 PROCEDURE — 250N000013 HC RX MED GY IP 250 OP 250 PS 637: Performed by: PHYSICIAN ASSISTANT

## 2021-07-24 PROCEDURE — 250N000013 HC RX MED GY IP 250 OP 250 PS 637: Performed by: STUDENT IN AN ORGANIZED HEALTH CARE EDUCATION/TRAINING PROGRAM

## 2021-07-24 PROCEDURE — U0003 INFECTIOUS AGENT DETECTION BY NUCLEIC ACID (DNA OR RNA); SEVERE ACUTE RESPIRATORY SYNDROME CORONAVIRUS 2 (SARS-COV-2) (CORONAVIRUS DISEASE [COVID-19]), AMPLIFIED PROBE TECHNIQUE, MAKING USE OF HIGH THROUGHPUT TECHNOLOGIES AS DESCRIBED BY CMS-2020-01-R: HCPCS | Performed by: PSYCHIATRY & NEUROLOGY

## 2021-07-24 PROCEDURE — 124N000002 HC R&B MH UMMC

## 2021-07-24 PROCEDURE — 250N000013 HC RX MED GY IP 250 OP 250 PS 637: Performed by: PSYCHIATRY & NEUROLOGY

## 2021-07-24 RX ADMIN — OLANZAPINE 10 MG: 10 TABLET, ORALLY DISINTEGRATING ORAL at 08:55

## 2021-07-24 RX ADMIN — LORAZEPAM 1 MG: 1 TABLET ORAL at 14:12

## 2021-07-24 RX ADMIN — METOPROLOL SUCCINATE 75 MG: 25 TABLET, EXTENDED RELEASE ORAL at 08:55

## 2021-07-24 RX ADMIN — OLANZAPINE 10 MG: 10 TABLET, ORALLY DISINTEGRATING ORAL at 19:53

## 2021-07-24 RX ADMIN — LORAZEPAM 2 MG: 2 TABLET ORAL at 19:53

## 2021-07-24 RX ADMIN — LOSARTAN POTASSIUM 100 MG: 100 TABLET, FILM COATED ORAL at 08:55

## 2021-07-24 RX ADMIN — BENZTROPINE MESYLATE 1 MG: 1 TABLET ORAL at 19:53

## 2021-07-24 RX ADMIN — BENZTROPINE MESYLATE 1 MG: 1 TABLET ORAL at 08:55

## 2021-07-24 RX ADMIN — AMLODIPINE BESYLATE 7.5 MG: 2.5 TABLET ORAL at 08:55

## 2021-07-24 RX ADMIN — LORAZEPAM 1 MG: 1 TABLET ORAL at 08:55

## 2021-07-24 ASSESSMENT — ACTIVITIES OF DAILY LIVING (ADL)
LAUNDRY: UNABLE TO COMPLETE
DRESS: SCRUBS (BEHAVIORAL HEALTH)
HYGIENE/GROOMING: INDEPENDENT
ORAL_HYGIENE: INDEPENDENT

## 2021-07-24 NOTE — PLAN OF CARE
Problem: Behavioral Health Plan of Care  Goal: Adheres to Safety Considerations for Self and Others  Outcome: Change based on patient need/priority   Isolative to self most of the shift. Watched TV most of the day. Medication compliant. Able to make her needs known. Covid test completed today. Patient denies depression/SI/SIB. Denies AH/VH. Affect is flat. No physical concerns noted.

## 2021-07-24 NOTE — PLAN OF CARE
Pt isolated to her room all evening. She came out to make a phone call, otherwise she stayed in bed and watched TV. Blunted affect. She was quiet and withdrawn on approach. She reports she has no mental health symptoms and should be discharged back to her apartment. Reports frustration about length of stay and discharge planning. She was compliant with all scheduled medications this evening and denies any side effects. Appetite good. Hygiene disheveled and neglected. She declined to shower when prompted.  She is refusing Covid swab, will continue to attempt. Pt asymptomatic and VSS.

## 2021-07-24 NOTE — PLAN OF CARE
Problem: Sleep Disturbance (Psychotic Signs/Symptoms)  Goal: Improved Sleep (Psychotic Signs/Symptoms)  Outcome: No Change     Pt was awake most of the night, watching TV. No concerns noted. Pt needs a COVID test. No PRN administered this shift. Pt slept for 3.5 hours.

## 2021-07-25 PROCEDURE — 250N000013 HC RX MED GY IP 250 OP 250 PS 637: Performed by: PHYSICIAN ASSISTANT

## 2021-07-25 PROCEDURE — 250N000013 HC RX MED GY IP 250 OP 250 PS 637: Performed by: STUDENT IN AN ORGANIZED HEALTH CARE EDUCATION/TRAINING PROGRAM

## 2021-07-25 PROCEDURE — 124N000002 HC R&B MH UMMC

## 2021-07-25 PROCEDURE — 250N000013 HC RX MED GY IP 250 OP 250 PS 637: Performed by: PSYCHIATRY & NEUROLOGY

## 2021-07-25 RX ADMIN — LOSARTAN POTASSIUM 100 MG: 100 TABLET, FILM COATED ORAL at 08:07

## 2021-07-25 RX ADMIN — LORAZEPAM 1 MG: 1 TABLET ORAL at 08:07

## 2021-07-25 RX ADMIN — BENZTROPINE MESYLATE 1 MG: 1 TABLET ORAL at 20:01

## 2021-07-25 RX ADMIN — METOPROLOL SUCCINATE 75 MG: 25 TABLET, EXTENDED RELEASE ORAL at 08:06

## 2021-07-25 RX ADMIN — OLANZAPINE 10 MG: 10 TABLET, ORALLY DISINTEGRATING ORAL at 08:07

## 2021-07-25 RX ADMIN — OLANZAPINE 10 MG: 10 TABLET, ORALLY DISINTEGRATING ORAL at 20:00

## 2021-07-25 RX ADMIN — LORAZEPAM 2 MG: 2 TABLET ORAL at 20:00

## 2021-07-25 RX ADMIN — AMLODIPINE BESYLATE 7.5 MG: 2.5 TABLET ORAL at 08:06

## 2021-07-25 RX ADMIN — LORAZEPAM 1 MG: 1 TABLET ORAL at 13:59

## 2021-07-25 RX ADMIN — BENZTROPINE MESYLATE 1 MG: 1 TABLET ORAL at 08:06

## 2021-07-25 ASSESSMENT — ACTIVITIES OF DAILY LIVING (ADL)
ORAL_HYGIENE: INDEPENDENT
DRESS: SCRUBS (BEHAVIORAL HEALTH)
HYGIENE/GROOMING: INDEPENDENT;SHOWER
ORAL_HYGIENE: INDEPENDENT
LAUNDRY: UNABLE TO COMPLETE
DRESS: SCRUBS (BEHAVIORAL HEALTH)
HYGIENE/GROOMING: INDEPENDENT
LAUNDRY: UNABLE TO COMPLETE

## 2021-07-25 NOTE — PLAN OF CARE
Pt asleep at start of shift. Breathing quiet and unlabored.     Pt had no c/o pain or discomfort during the HS.     Appears to have slept 5.5 hours.     Pt on CHEEKING, ELOPEMENT, and FALL precautions in addition to single room order. Any related events noted above.     Will continue to monitor and assess.   Problem: Sleep Disturbance (Psychotic Signs/Symptoms)  Goal: Improved Sleep (Psychotic Signs/Symptoms)  Outcome: No Change

## 2021-07-25 NOTE — PLAN OF CARE
Pt spent most of the day isolative and withdrawn to her room. Pt ate her meals in her room. Pt was pleasant and cooperative with VS and medication administration. Pt's affect was flat and mood was calm. Pt denied pain. Pt denied depression and anxiety. Pt denied SI/SIB/HI/AVH. No medication side effects were reported or observed. Encouraged pt to shower and she took a shower after lunch.

## 2021-07-25 NOTE — PLAN OF CARE
Pt declined to participate in group this afternoon. She isolated to her room and watched TV all evening. Affect flat/blunted. She was quiet and withdrawn on approach. She denies all mental health symptoms and states she wants to be discharged. Hygiene disheveled; declined shower this evening. Appetite good. She was compliant with scheduled medications and denies any side effects.

## 2021-07-26 PROCEDURE — 99232 SBSQ HOSP IP/OBS MODERATE 35: CPT | Mod: GC | Performed by: PSYCHIATRY & NEUROLOGY

## 2021-07-26 PROCEDURE — 250N000013 HC RX MED GY IP 250 OP 250 PS 637: Performed by: PHYSICIAN ASSISTANT

## 2021-07-26 PROCEDURE — 124N000002 HC R&B MH UMMC

## 2021-07-26 PROCEDURE — 250N000013 HC RX MED GY IP 250 OP 250 PS 637: Performed by: STUDENT IN AN ORGANIZED HEALTH CARE EDUCATION/TRAINING PROGRAM

## 2021-07-26 PROCEDURE — 250N000013 HC RX MED GY IP 250 OP 250 PS 637: Performed by: PSYCHIATRY & NEUROLOGY

## 2021-07-26 RX ORDER — OLANZAPINE 10 MG/2ML
10 INJECTION, POWDER, FOR SOLUTION INTRAMUSCULAR 2 TIMES DAILY
Status: COMPLETED | OUTPATIENT
Start: 2021-07-26 | End: 2021-07-26

## 2021-07-26 RX ORDER — LORAZEPAM 1 MG/1
1 TABLET ORAL 3 TIMES DAILY
Status: DISCONTINUED | OUTPATIENT
Start: 2021-07-26 | End: 2021-09-16 | Stop reason: HOSPADM

## 2021-07-26 RX ORDER — LORAZEPAM 2 MG/ML
1 INJECTION INTRAMUSCULAR 3 TIMES DAILY
Status: DISCONTINUED | OUTPATIENT
Start: 2021-07-26 | End: 2021-09-16 | Stop reason: HOSPADM

## 2021-07-26 RX ORDER — OLANZAPINE 5 MG/1
5 TABLET, ORALLY DISINTEGRATING ORAL EVERY MORNING
Status: DISCONTINUED | OUTPATIENT
Start: 2021-07-27 | End: 2021-07-29

## 2021-07-26 RX ORDER — OLANZAPINE 10 MG/2ML
15 INJECTION, POWDER, FOR SOLUTION INTRAMUSCULAR AT BEDTIME
Status: DISCONTINUED | OUTPATIENT
Start: 2021-07-27 | End: 2021-07-29

## 2021-07-26 RX ORDER — OLANZAPINE 10 MG/2ML
5 INJECTION, POWDER, FOR SOLUTION INTRAMUSCULAR EVERY MORNING
Status: DISCONTINUED | OUTPATIENT
Start: 2021-07-27 | End: 2021-07-29

## 2021-07-26 RX ORDER — OLANZAPINE 15 MG/1
15 TABLET, ORALLY DISINTEGRATING ORAL AT BEDTIME
Status: DISCONTINUED | OUTPATIENT
Start: 2021-07-27 | End: 2021-07-29

## 2021-07-26 RX ORDER — OLANZAPINE 10 MG/1
10 TABLET, ORALLY DISINTEGRATING ORAL 2 TIMES DAILY
Status: COMPLETED | OUTPATIENT
Start: 2021-07-26 | End: 2021-07-26

## 2021-07-26 RX ADMIN — BENZTROPINE MESYLATE 1 MG: 1 TABLET ORAL at 08:42

## 2021-07-26 RX ADMIN — METOPROLOL SUCCINATE 75 MG: 25 TABLET, EXTENDED RELEASE ORAL at 08:42

## 2021-07-26 RX ADMIN — AMLODIPINE BESYLATE 7.5 MG: 2.5 TABLET ORAL at 08:42

## 2021-07-26 RX ADMIN — BENZTROPINE MESYLATE 1 MG: 1 TABLET ORAL at 19:59

## 2021-07-26 RX ADMIN — LOSARTAN POTASSIUM 100 MG: 100 TABLET, FILM COATED ORAL at 08:42

## 2021-07-26 RX ADMIN — OLANZAPINE 10 MG: 10 TABLET, ORALLY DISINTEGRATING ORAL at 08:42

## 2021-07-26 RX ADMIN — OLANZAPINE 10 MG: 10 TABLET, ORALLY DISINTEGRATING ORAL at 19:58

## 2021-07-26 RX ADMIN — LORAZEPAM 1 MG: 1 TABLET ORAL at 13:51

## 2021-07-26 RX ADMIN — LORAZEPAM 1 MG: 1 TABLET ORAL at 19:59

## 2021-07-26 RX ADMIN — LORAZEPAM 1 MG: 1 TABLET ORAL at 08:42

## 2021-07-26 ASSESSMENT — ACTIVITIES OF DAILY LIVING (ADL)
LAUNDRY: UNABLE TO COMPLETE
ORAL_HYGIENE: INDEPENDENT
DRESS: SCRUBS (BEHAVIORAL HEALTH)
HYGIENE/GROOMING: INDEPENDENT

## 2021-07-26 NOTE — PLAN OF CARE
Problem: Adult Inpatient Plan of Care  Goal: Optimal Comfort and Wellbeing  Outcome: No Change   Patient remained isolative most of the shift. She completed her interview with the courts today via ITV. She is medication compliant. She reports that she is feeling good today. Denies AH/VH. Denies SI/SIB. Did not participate in groups. Eating all meals in her room. Offers not physical concerns.

## 2021-07-26 NOTE — PLAN OF CARE
Assessment/Intervention/Current Symtoms and Care Coordination  -Chart review  -Rounded with team, addressed patient needs/concerns  Current Symptoms include the following: Irritability and angry outbursts    Discharge Plan or Goal  Pending stabilization & development of a safe discharge plan.  Considerations include:  [facility] (group home)    Barriers to Discharge  Patient requires further psychiatric stabilization due to current symptomology    Referral Status  CTC has been in contact with [facility]    Legal Status  Committed/Hatch/Mcgrath Kohler through [facility]

## 2021-07-26 NOTE — PLAN OF CARE
Problem: Sleep Disturbance (Psychotic Signs/Symptoms)  Goal: Improved Sleep (Psychotic Signs/Symptoms)  Outcome: No Change     Pt appeared to be sleeping for 4.25 hours. No PRN administered this shift. No concerns noted. Will continue to monitor.

## 2021-07-26 NOTE — PROGRESS NOTES
"[facility], [place]   Psychiatric Progress Note  Hospital Day: 103        Interim History:   The patient's care was discussed with the treatment team during the daily team meeting and/or staff's chart notes were reviewed. Through the weekend she was compliant with medications and has not taken any PRNs. Vitals have been WNL. She has continued to be isolative to her room spent time in room watching TV and resting, did not attend groups, denying MH symptoms, slept 4.25 hours, no acute events overnight. She has poor ADLs despite encouragement from staff.     Michelle was resting in bed prior to the interview.  She woke easily then sat up.  She smiled upon seeing the team member who had been out for a few days last week.  She reported that her weekend was \"good\", that she is feeling \"good\" and that she slept well.  She denied having any physical complaints or pain at this time though she did endorse feeling tired during the day. She was asked how things are going with her medications and she reported that they are \"too much.\"  She was asked to elaborate on this and continued to say that they are \"too much\" and eventually did say she was referring to her blood pressure medications. However, she was not able to identify why she felt like these medications were too much though later endorsed that there were too many medication tablets. She was agreeable with making adjustments to her Ativan dosing and Zyprexa dosing as outlined below in the plan to target daytime sedation and decrease the number of tablets she is taking daily.  She denied having any difficulties with her thinking or her memory.  We discussed a repeat COVID test and she was agreeable to this however we later learned that she had already completed this.  She denied any concerns about her safety and denied mental health symptoms.  Michelle did not bring up her discharge today.            Medications:       amLODIPine  7.5 mg Oral Daily "     benztropine  1 mg Oral BID     LORazepam  1 mg Oral BID    Or     LORazepam  1 mg Intramuscular BID     LORazepam  2 mg Oral QPM    Or     LORazepam  2 mg Intramuscular QPM     losartan  100 mg Oral Daily     metoprolol succinate ER  75 mg Oral Daily     OLANZapine zydis  10 mg Oral BID    Or     OLANZapine  10 mg Intramuscular BID          Allergies:     Allergies   Allergen Reactions     Haldol [Haloperidol]      Patient previously tolerated haldol, though developed oculogyric crises during hospital stay on 4/26/21 on total daily dose of 10 mg.     Lisinopril Cough          Labs:     No results found for this or any previous visit (from the past 24 hour(s)).       Psychiatric Examination:     /78   Pulse 69   Temp 97.6  F (36.4  C) (Oral)   Resp 16   Wt 70.4 kg (155 lb 4.8 oz)   SpO2 97%   BMI 26.66 kg/m    Weight is 155 lbs 4.8 oz  Body mass index is 26.66 kg/m .    Weight over time:  Vitals:    05/25/21 0850 06/15/21 0811 06/16/21 1605 06/19/21 0859   Weight: 71 kg (156 lb 8 oz) 69.8 kg (153 lb 12.8 oz) 70.5 kg (155 lb 8 oz) 71.4 kg (157 lb 8 oz)    06/21/21 0805 06/22/21 0839 06/24/21 0800 07/02/21 0835   Weight: 69.5 kg (153 lb 3.2 oz) 69.3 kg (152 lb 11.2 oz) 70.7 kg (155 lb 12.8 oz) 70.7 kg (155 lb 12.8 oz)    07/03/21 0844 07/06/21 0838 07/10/21 0845 07/16/21 0821   Weight: 69.6 kg (153 lb 8 oz) 70.9 kg (156 lb 4.8 oz) 69.8 kg (153 lb 14.4 oz) 69.3 kg (152 lb 12.8 oz)    07/17/21 0830   Weight: 70.4 kg (155 lb 4.8 oz)       Orthostatic Vitals       Most Recent      Sitting Orthostatic /80 07/22 0832    Sitting Orthostatic Pulse (bpm) 85 07/22 0832                   Cardiometabolic risk assessment. 04/15/21      Reviewed patient profile for cardiometabolic risk factors    Date taken /Value  REFERENCE RANGE   Abdominal Obesity  (Waist Circumference)   See nursing flowsheet Women ?35 in (88 cm)   Men ?40 in (102 cm)      Triglycerides  Triglycerides   Date Value Ref Range Status  "  10/02/2020 86 <=149 mg/dL Final   10/19/2019 117 <150 mg/dL Final       ?150 mg/dL (1.7 mmol/L) or current treatment for elevated triglycerides   HDL cholesterol  HDL Cholesterol   Date Value Ref Range Status   10/19/2019 56 >49 mg/dL Final     Direct Measure HDL   Date Value Ref Range Status   10/02/2020 45 (L) >=50 mg/dL Final   ]   Women <50 mg/dL (1.3 mmol/L) in women or current treatment for low HDL cholesterol  Men <40 mg/dL (1 mmol/L) in men or current treatment for low HDL cholesterol     Fasting plasma glucose (FPG) Lab Results   Component Value Date     10/19/2019      FPG ?100 mg/dL (5.6 mmol/L) or treatment for elevated blood glucose   Blood pressure  BP Readings from Last 3 Encounters:   07/25/21 135/78   06/04/19 131/71   04/26/19 164/86    Blood pressure ?130/85 mmHg or treatment for elevated blood pressure   Family History  See family history     Appearance: dressed in hospital scrubs, appeared reported age, unkempt hair   Attitude: mostly cooperative with interview, irritable around discharge  Eye Contact: fair, looks down or straight ahead   Mood: \"good\"   Affect:  Somewhat blunted, overall less agitated and tense   Speech: accented, clear and coherent.    Language: no obvious receptive or expressive deficits  Psychomotor, Gait, Musculoskeletal: No abnormal movements noted while seated, did sit in an awkward/unnatural position during interview and held posture throughout length of interview.   Thought Process: goal-oriented, linear, concrete   Associations:  No loosening of associations present  Thought Content:  Did not appear to be responding to internal stimuli.  Insight:   poor  Judgement:  fair  Oriented to: person, place, time/date   Attention Span and Concentration: attentive to conversation though at times stares ahead and does not respond to questions.  Recent and Remote Memory: stable, possible difficulty recalling prior conversations vs. Inability to accept information contained " in conversations   Fund of Knowledge:  normal    Clinical Global Impressions  First:  Considering your total clinical experience with this particular patient population, how severe are the patient's symptoms at this time?: 7 (04/16/21 1428)  Compared to the patient's condition at the START of treatment, this patient's condition is: 4 (04/16/21 1428)  Most recent:  Considering your total clinical experience with this particular patient population, how severe are the patient's symptoms at this time?: 5 (07/01/21 0947)  Compared to the patient's condition at the START of treatment, this patient's condition is: 2 (07/01/21 0947)           Precautions:     Behavioral Orders   Procedures     Cheeking Precautions (behavioral units)     Patient Observed swallowing PO medications; Patient asked to drink water after swallowing medication; Patient in Staff line of sight for 15 minutes after medication given; Mouth checks after PO administration (patient asked to open mouth and stick out their tongue).     Code 3     For walks in the [place] with staff and per staff discretion     Electroconvulsive therapy     Series of up to 12 treatments. Begin Date: 5/26/21     Treating Psychiatrist providing ECT:  Dr. Lubin     Notified on:  5/21/21     Electroconvulsive therapy     As long as we get the green light from risk management and pt is medically cleared, begin ECT every Monday, Wednesday, and Friday     Electroconvulsive therapy     Series of up to 12 treatments. Begin Date: 5/25/21     Treating Psychiatrist providing ECT:  Amee     Notified on:  5/24/21     Elopement precautions     Fall precautions     Mcgrath Calderon     Routine Programming     As clinically indicated     Status 15     Every 15 minutes.          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 Symptoms and presentation at this time is most consistent with Schizoaffective Disorder, Bipolar Type. Obtained most recent medication regimen from patient's ACT team, and regimen was initially restarted. Pt is committed. Petitioned for Mcgrath Calderon was filed and granted due to lack of improvement and side effects from medications. Inpatient psychiatric hospitalization is warranted at this time for safety, stabilization, possible adjustment in medications and development of a safe discharge plan.     Hospital Course:  On admission, PTA medications were restarted. However, Michelle had been declining all scheduled medications despite significant encouragement from staff and provider. Psychiatric emergency declared on 4/20 due to aggression toward others in context of severe psychosis and suspected excited catatonia. Ativan 1 mg TID was also added. Discontinued PTA Invega, Zyprexa, and thorazine on 4/20 due to consistent refusal.     On 4/26, it was noted that patient frequently had upward gaze while walking up and down the unit. She did not appear to be distressed. She reported that she was looking at \"my god.\" It was determined to be oculogyric crisis secondary to IM haloperidol. Haldol was subsequently discontinued and scheduled Zyprexa was initiated on an emergency basis. Oral Cogentin was also scheduled, though patient declined. On the evening of 4/26, patient's gaze was fixed in upward position for several hours and she appeared to be experiencing discomfort. IM Cogentin was administered with noted resolution. Partial improvements were observed after " "switching to scheduled Zyprexa. After patient improved, she was more receptive to reinitiating oral Invega, which was initiated on 5/3 and titrated to PTA dose of 9 mg daily on 5/10. Plan was for ACT team to bring in loading dose of Invega Sustenna. Patient had signs of oculogyric crisis again with Invega. Oral dose decreased to 6 mg after this. Invega was stopped on 5/14 due to ongoing signs of problems related to eye movement and concerns for oculogyric crisis.    Overall improvement in patient's agitation and suspected catatonia noted on 4/30 after patient accepted two doses of Ativan. She began declining Ativan again with noted decompensation. Patient began accepting, however, was deemed not to have the capacity to consent to treatment with Ativan. She does not believe she has a mental illness, including catatonia. She does not fully understand risks associated with inadequate treatment of catatonia. Discussed with her son who is acting as surrogate decision maker and he is in full support of forced scheduled IM Ativan if patient declines oral formulation. Also consulted with our legal team prior to backing oral Ativan with IM.     Ativan was increased on 5/19 due to re-emerging evidence of catatonia (long periods of staring, repetitive movements, echolalia, mutism). Meeting was held with ACT team on 5/20, including ACT team psychiatrist, Dr. Pedraza. He said that he is in \"full support\" of plan to pursue Mcgrath Kohler and ECT at this time. He does feel that in the past she has been discharged from the hospital while still quite symptomatic. He is hoping that ECT will be effective and that with improvement, Michelle would be more receptive to clozapine and weekly blood draws. He said that ideally she would transition to an IRTS before going back to her apartment, but also understands there may be some barriers (I.e. pt's willingness, financial concerns, etc).     ECT consult placed. Please see consult note by  " Amee on 5/21 for details. Alcides Kohler was approved on 5/24 and patient was medically cleared on 5/24. ECT initiated on 5/26. She was noted to have a short seizure during ECT on 6/4. Staff note that patient appears more disoriented with memory impairment and sedation on days of ECT. This was noted on my examination again today. Improvements noted in mood, affect, social interactions, paranoia, agitation since initiation of ECT. Reduced Ativan on 6/5 due to sedative effects. Relayed concerns about memory impairment and confusion with Dr. Lubin. Recommended attempting unilateral ECT, which he agreed to do. First unilateral ECT on 6/7. ECT was held on 6/11 and 6/14 due to memory impairment. We will plan to hold it again tomorrow (6/16). There is ongoing discussion regarding whether there is a component of catatonia contributing to her current presentation but this is less likely since it worsened after ECT was started.  Given her level of cognitive impairment and our belief that this is due to ECT, we will not pursue any further treatments at this time. Since we have held ECT (last treatment on 6/9), her cognitive impairment has slightly improved (more oriented).     Michelle initially appeared to be decompensating somewhat when ECT was held, though her cognitive impairment has gradually improved. If symptoms of psychosis re-emerge, it may be worth starting clozapine, which is something that has previously been considered by her ACT team. Her Hatch order would need to be amended as clozapine is not one of the medications listed. ANC obtained on 6/16 was 1800/microL. Per conversation with pharmacy, neutropenia has been associated with olanzapine and agranulocytosis has been associated with olanzapine, lorazepam, metoprolol, and hydralazine and hematologic labs have been monitored. Upon re-check, ANC was 3700/microL on 6/21/21. At this time, the primary symptoms we are observing are cognitive deficits and inability to care  for self, which we would not address by changing her antipsychotic medication.    Michelle's cognitive impairment has been steadily improving since holding ECT treatments, however it is possible that lorazepam is also playing a role in her cognitive impairment. Given no signs of re-emerging catatonia, a gradual lorazepam taper (decreasing by 0.5 mg) was initiated on 6/28/21 to monitor for potential improvements with regard to memory impairment.  On 7/7, Michelle reported an incident of oculogyric crisis and Cogentin 1 mg BID was initiated with no noted changes in cognition.     Michelle has remained focused on discharge. The team is working with Michelle's ACT team to to establish a safe discharge plan with options including moving to a group home vs home with her ACT team and additional in home supports via CADI services. We are concerned that Michelle would have difficulty taking her medications as prescribed if she were to return home without her ACT team and additional services. This is evidenced by impairments noted in cognitive assessment by OT specialist on 7/1. MOCA score was a 13/30 and CPT score was a 4.7. Physician's statement in support of guardianship was completed on 7/19/21.  Patient's son is pursuing guardianship. Patient became quite agitated on 7/16 when  staff from [facility] came to meet with her a second time. She did not allow staff members to enter her room and stated that she will not be going to a group home.     Due to daytime sedation, on 7/26/21, lorazepam evening dose decreased from 2 mg to 1 mg. On 7/27/21 will transition Zyprexa from 10 mg BID to 5 mg QAM + 15 mg QPM.     Target psychiatric symptoms and interventions:   - Decrease lorazepam from 1 mg PO or IM in the morning and at mid-day and 2 mg PO or IM in the evening to 1 mg PO or IM TID on 7/26. Patient may not decline.   - Change Zyprexa from 10 mg BID  PO or IM to 5 mg QAM + 15 mg QPM starting on 7/27.  Hatch in place. May consider  "increasing further though holding off given re-emerging catatonic sx and borderline prolonged QTc     - Continue Cogentin 1 mg PO BID with additional 2 mg IM daily prn for evidence of acute dystonic reaction or oculogyric crisis  -Continue hydroxyzine 25-50 mg q4h prn for acute anxiety  -Continue Trazodone 50 mg at bedtime prn for sleep disturbances  -Continue Zyprexa 10 mg TID prn for severe agitation  -Continue Ativan/Benadryl q4h prn for agitation. WOULD GIVE PRN ATIVAN FIRST FOR AGITATION unless it is after 5 pm on day prior to scheduled ECT    Occupational Therapy Consult Placed to evaluate cognitive functioning/ability to care for self in home environment, ability to manage medications. See note on 7/1 for details.      ECT: Discontinued due to significant cognitive impairment. Please see note dated 7/12/21 for additional details.     Acute Medical Problems and Treatments:  HTN, improved: Patient is now adherent with medication regimen.   - Metoprolol succinate ER 75 mg   - Cozaar 100 mg daily  - Amlodipine 7.5 mg daily  - IM discontinued hydralazine prn  - Switched BP checks from QID to BID on 7/13 given overall improvement  - Please see note from IM dated 5/3, 5/6, 5/24, 6/20 and 6/30/21.  - Obtained EKG and routine labs on 5/21 in context of pt declining vital sign checks and anti-hypertensives and recently elevated BPs. Reviewed labs and discussed EKG findings with IM on 5/21. No urgent concerns, though IM should be notified if pt develops acute medical concerns (I.e. heart palpitations, SOB, changes in speech, AMS, confusion, CP, HA, changes in vision).    - Per 6/20 IM note: \"Please notify IM if BP is persistently severely elevated and requiring frequent PRN hydralazine\".  - Internal medicine consulted again on 6/28 due to consistent use of hydralazine over the past week. Metoprolol increased to 75 mg daily then to 100 mg and amlodipine 5 mg was added on 6/30. Amlodipine increased to 7.5 mg daily on 7/5. "     Recent history of BRANDON:  - Attempted to repeat BMP multiple times though patient consistently declining blood draw  - Encourage fluids    For previous medical concerns, please see note dated 7/12/21.    Behavioral/Psychological/Social:  - Encourage unit programming  - Patient is now Code 3 status and can take walks in the [place] with staff and security present per staff discretion. This appears to be quite therapeutic for her.     Safety:  - Continue precautions as noted above  - Status 15 minute checks  - Safety precautions include: assault and elopement precautions  - Continue precautions as noted above    Legal Status: Committed as MI with Hatch in place for Haldol, Zyprexa, Invega, and Thorazine through [facility]. Filed Alcides Kohler through [facility] and approved on 5/24/21. Physician's statement in support of guardianship was completed on 7/19/21. Court hearing for guardianship is scheduled on 7/28/21 at 8:30 AM.    Disposition Plan   Reason for ongoing admission: poses an imminent risk to self, poses an imminent risk to others and is unable to care for self due to severe psychosis or tad  Discharge location: Group home (needs CADI waiver). CPT assessment done by OT (Tania queen) on 7/1.  Please see note for details. [place] deferred while her son is pursuing emergency guardianship   Discharge Medications: not ordered  Follow-up Appointments: not scheduled     Bijal Varner MD  Psychiatry PGY-4

## 2021-07-26 NOTE — PLAN OF CARE
Pt declined to participate in group this afternoon. She was isolative to her room all evening, only coming out for meal tray and to make requests. Affect flat. She denies all mental health symptoms, including SI/SIB and HI. Reports she is frustrated about length of stay and would like to discharge home. Pt showered today. She declined when writer offered to help her straighten room and change linens. Compliant with all scheduled medications. Denies any side effects or concerns regarding medications.

## 2021-07-27 PROCEDURE — 250N000013 HC RX MED GY IP 250 OP 250 PS 637: Performed by: STUDENT IN AN ORGANIZED HEALTH CARE EDUCATION/TRAINING PROGRAM

## 2021-07-27 PROCEDURE — 250N000013 HC RX MED GY IP 250 OP 250 PS 637: Performed by: PHYSICIAN ASSISTANT

## 2021-07-27 PROCEDURE — 124N000002 HC R&B MH UMMC

## 2021-07-27 PROCEDURE — 99232 SBSQ HOSP IP/OBS MODERATE 35: CPT | Performed by: PSYCHIATRY & NEUROLOGY

## 2021-07-27 RX ADMIN — OLANZAPINE 15 MG: 15 TABLET, ORALLY DISINTEGRATING ORAL at 20:05

## 2021-07-27 RX ADMIN — BENZTROPINE MESYLATE 1 MG: 1 TABLET ORAL at 08:50

## 2021-07-27 RX ADMIN — LORAZEPAM 1 MG: 1 TABLET ORAL at 08:43

## 2021-07-27 RX ADMIN — BENZTROPINE MESYLATE 1 MG: 1 TABLET ORAL at 20:05

## 2021-07-27 RX ADMIN — LOSARTAN POTASSIUM 100 MG: 100 TABLET, FILM COATED ORAL at 08:43

## 2021-07-27 RX ADMIN — METOPROLOL SUCCINATE 75 MG: 25 TABLET, EXTENDED RELEASE ORAL at 08:43

## 2021-07-27 RX ADMIN — LORAZEPAM 1 MG: 1 TABLET ORAL at 13:59

## 2021-07-27 RX ADMIN — OLANZAPINE 5 MG: 5 TABLET, ORALLY DISINTEGRATING ORAL at 08:43

## 2021-07-27 RX ADMIN — LORAZEPAM 1 MG: 1 TABLET ORAL at 20:05

## 2021-07-27 RX ADMIN — AMLODIPINE BESYLATE 7.5 MG: 2.5 TABLET ORAL at 08:43

## 2021-07-27 ASSESSMENT — ACTIVITIES OF DAILY LIVING (ADL)
LAUNDRY: UNABLE TO COMPLETE
HYGIENE/GROOMING: INDEPENDENT
HYGIENE/GROOMING: INDEPENDENT
ORAL_HYGIENE: INDEPENDENT
DRESS: SCRUBS (BEHAVIORAL HEALTH)
ORAL_HYGIENE: INDEPENDENT
DRESS: SCRUBS (BEHAVIORAL HEALTH)

## 2021-07-27 NOTE — PLAN OF CARE
"Patient presents as calm, pleasant, and cooperative.  She appears at or near her baseline level of functioning.  She remains socially withdrawn and isolative with a blunted affect.  She denies that she is experiencing any psychotic symptoms or dangerous ideations at this time.  She appears to be ambulating with a balanced, steady gait.  Michelle denies any acute physical concerns at this time.  VSS with mildly elevated BP (139/82) noted prior to scheduled AM medication administration.  Michelle was receptive to a review of each of her currently scheduled AM medications, and she was hesitant to accept all of her antihypertensive medications.  She stated, \"Do I really need all of these medications?\".  She agreed to accept them when we discussed how much better control we have of her blood pressure since her antihypertensive regimen was optimized- and how maintaining adequate blood pressure management is critical to preventing another stroke.  Michelle affirmed an understanding of this teaching and then accepted all of her scheduled medications without further incident.    "

## 2021-07-27 NOTE — PLAN OF CARE
Assessment/Intervention/Current Symtoms and Care Coordination  -Chart review  -Rounded with team, addressed patient needs/concerns  Spoke with patient's  and emailed with [facility] Attorney regarding the Emergency Guardianship Hearing that is scheduled for tomorrow.  Although, patient has agreed to do the [place] Assessment the ACT Team CM strongly thinks patient should still have a guardian.  Both 's are discussing what the next steps should be.    Current Symptoms include the following:  Patient was pleasant with writer today. She was out of her room more and appeared less irritable.     Discharge Plan or Goal  Pending stabilization & development of a safe discharge plan.  Considerations include: [facility] (group home)    Barriers to Discharge  Patient requires further psychiatric stabilization due to current symptomology    Referral Status  CTC has been in contact with [facility] owner to keep patient on their list for admission. Patient needs the CADI Waiver funding in place prior to being considered for group home placement.     Legal Status  Committed/Hatch/Mcgrath Kohler through [facility]

## 2021-07-27 NOTE — PROGRESS NOTES
"[facility], [place]   Psychiatric Progress Note  Hospital Day: 104        Interim History:   The patient's care was discussed with the treatment team during the daily team meeting and/or staff's chart notes were reviewed. No acute events overnight. Patient is now agreeing to go to group home upon discharge.     Michelle smiled immediately upon approach. She said that she is now in agreement with plan to pursue GH. When asked what prompted this change in her decision, she said \"Well I had no other decision. Emelia [son] told me that everyone wants me to go to the group home.\" She added that she does not want her son to be her guardian. She said \"I can make decisions for myself.\" She was commended for making a sound decision on her behalf with regards to GH placement. She reports that her mood is \"good.\" She denies all acute medical concerns. Noted to appear more awake and alert today. She had no other questions or concerns for this writer. Denied SI, HI, and symptoms of psychosis. Stated that she is sleeping well. Feels ready for discharge and mentioned that she never wants to return to the group home.          Medications:       amLODIPine  7.5 mg Oral Daily     benztropine  1 mg Oral BID     LORazepam  1 mg Oral TID    Or     LORazepam  1 mg Intramuscular TID     losartan  100 mg Oral Daily     metoprolol succinate ER  75 mg Oral Daily     OLANZapine zydis  15 mg Oral At Bedtime    Or     OLANZapine  15 mg Intramuscular At Bedtime     OLANZapine zydis  5 mg Oral QAM    Or     OLANZapine  5 mg Intramuscular QAM          Allergies:     Allergies   Allergen Reactions     Haldol [Haloperidol]      Patient previously tolerated haldol, though developed oculogyric crises during hospital stay on 4/26/21 on total daily dose of 10 mg.     Lisinopril Cough          Labs:     No results found for this or any previous visit (from the past 24 hour(s)).       Psychiatric Examination:     /82 (BP " Location: Right arm)   Pulse 99   Temp 97.7  F (36.5  C) (Tympanic)   Resp 16   Wt 70.4 kg (155 lb 4.8 oz)   SpO2 100%   BMI 26.66 kg/m    Weight is 155 lbs 4.8 oz  Body mass index is 26.66 kg/m .    Weight over time:  Vitals:    05/25/21 0850 06/15/21 0811 06/16/21 1605 06/19/21 0859   Weight: 71 kg (156 lb 8 oz) 69.8 kg (153 lb 12.8 oz) 70.5 kg (155 lb 8 oz) 71.4 kg (157 lb 8 oz)    06/21/21 0805 06/22/21 0839 06/24/21 0800 07/02/21 0835   Weight: 69.5 kg (153 lb 3.2 oz) 69.3 kg (152 lb 11.2 oz) 70.7 kg (155 lb 12.8 oz) 70.7 kg (155 lb 12.8 oz)    07/03/21 0844 07/06/21 0838 07/10/21 0845 07/16/21 0821   Weight: 69.6 kg (153 lb 8 oz) 70.9 kg (156 lb 4.8 oz) 69.8 kg (153 lb 14.4 oz) 69.3 kg (152 lb 12.8 oz)    07/17/21 0830   Weight: 70.4 kg (155 lb 4.8 oz)       Orthostatic Vitals       Most Recent      Sitting Orthostatic /80 07/22 0832    Sitting Orthostatic Pulse (bpm) 85 07/22 0832                   Cardiometabolic risk assessment. 04/15/21      Reviewed patient profile for cardiometabolic risk factors    Date taken /Value  REFERENCE RANGE   Abdominal Obesity  (Waist Circumference)   See nursing flowsheet Women ?35 in (88 cm)   Men ?40 in (102 cm)      Triglycerides  Triglycerides   Date Value Ref Range Status   10/02/2020 86 <=149 mg/dL Final   10/19/2019 117 <150 mg/dL Final       ?150 mg/dL (1.7 mmol/L) or current treatment for elevated triglycerides   HDL cholesterol  HDL Cholesterol   Date Value Ref Range Status   10/19/2019 56 >49 mg/dL Final     Direct Measure HDL   Date Value Ref Range Status   10/02/2020 45 (L) >=50 mg/dL Final   ]   Women <50 mg/dL (1.3 mmol/L) in women or current treatment for low HDL cholesterol  Men <40 mg/dL (1 mmol/L) in men or current treatment for low HDL cholesterol     Fasting plasma glucose (FPG) Lab Results   Component Value Date     10/19/2019      FPG ?100 mg/dL (5.6 mmol/L) or treatment for elevated blood glucose   Blood pressure  BP Readings from  "Last 3 Encounters:   07/27/21 139/82   06/04/19 131/71   04/26/19 164/86    Blood pressure ?130/85 mmHg or treatment for elevated blood pressure   Family History  See family history     Appearance: dressed in hospital scrubs, appeared reported age, unkempt hair   Attitude: mostly cooperative with interview, irritable around discharge  Eye Contact: fair, looks down or straight ahead   Mood: \"good\"   Affect:  Somewhat blunted, overall less agitated and tense   Speech: accented, clear and coherent.    Language: no obvious receptive or expressive deficits  Psychomotor, Gait, Musculoskeletal: No abnormal movements noted while seated, did sit in an awkward/unnatural position during interview and held posture throughout length of interview.   Thought Process: goal-oriented, linear, concrete   Associations:  No loosening of associations present  Thought Content:  Did not appear to be responding to internal stimuli.  Insight:   poor  Judgement:  fair  Oriented to: person, place, time/date   Attention Span and Concentration: attentive to conversation though at times stares ahead and does not respond to questions.  Recent and Remote Memory: stable, possible difficulty recalling prior conversations vs. Inability to accept information contained in conversations   Fund of Knowledge:  normal    Clinical Global Impressions  First:  Considering your total clinical experience with this particular patient population, how severe are the patient's symptoms at this time?: 7 (04/16/21 1428)  Compared to the patient's condition at the START of treatment, this patient's condition is: 4 (04/16/21 1428)  Most recent:  Considering your total clinical experience with this particular patient population, how severe are the patient's symptoms at this time?: 5 (07/01/21 0947)  Compared to the patient's condition at the START of treatment, this patient's condition is: 2 (07/01/21 0947)           Precautions:     Behavioral Orders   Procedures     " Cheeking Precautions (behavioral units)     Patient Observed swallowing PO medications; Patient asked to drink water after swallowing medication; Patient in Staff line of sight for 15 minutes after medication given; Mouth checks after PO administration (patient asked to open mouth and stick out their tongue).     Code 3     For walks in the [place] with staff and per staff discretion     Electroconvulsive therapy     Series of up to 12 treatments. Begin Date: 5/26/21     Treating Psychiatrist providing ECT:  Dr. Lubin     Notified on:  5/21/21     Electroconvulsive therapy     As long as we get the green light from risk management and pt is medically cleared, begin ECT every Monday, Wednesday, and Friday     Electroconvulsive therapy     Series of up to 12 treatments. Begin Date: 5/25/21     Treating Psychiatrist providing ECT:  Amee     Notified on:  5/24/21     Elopement precautions     Fall precautions     Alcides Calderon     Routine Programming     As clinically indicated     Status 15     Every 15 minutes.          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 Symptoms and presentation at this time is most consistent with Schizoaffective Disorder, Bipolar Type. Obtained most recent medication regimen from patient's ACT team, and regimen was initially restarted. Pt is committed. Petitioned for Mcgrath Calderon was filed and granted due to lack of improvement and side effects from medications.  "Inpatient psychiatric hospitalization is warranted at this time for safety, stabilization, possible adjustment in medications and development of a safe discharge plan.     Hospital Course:  On admission, PTA medications were restarted. However, Michelle had been declining all scheduled medications despite significant encouragement from staff and provider. Psychiatric emergency declared on 4/20 due to aggression toward others in context of severe psychosis and suspected excited catatonia. Ativan 1 mg TID was also added. Discontinued PTA Invega, Zyprexa, and thorazine on 4/20 due to consistent refusal.     On 4/26, it was noted that patient frequently had upward gaze while walking up and down the unit. She did not appear to be distressed. She reported that she was looking at \"my god.\" It was determined to be oculogyric crisis secondary to IM haloperidol. Haldol was subsequently discontinued and scheduled Zyprexa was initiated on an emergency basis. Oral Cogentin was also scheduled, though patient declined. On the evening of 4/26, patient's gaze was fixed in upward position for several hours and she appeared to be experiencing discomfort. IM Cogentin was administered with noted resolution. Partial improvements were observed after switching to scheduled Zyprexa. After patient improved, she was more receptive to reinitiating oral Invega, which was initiated on 5/3 and titrated to PTA dose of 9 mg daily on 5/10. Plan was for ACT team to bring in loading dose of Invega Sustenna. Patient had signs of oculogyric crisis again with Invega. Oral dose decreased to 6 mg after this. Invega was stopped on 5/14 due to ongoing signs of problems related to eye movement and concerns for oculogyric crisis.    Overall improvement in patient's agitation and suspected catatonia noted on 4/30 after patient accepted two doses of Ativan. She began declining Ativan again with noted decompensation. Patient began accepting, however, was deemed not " "to have the capacity to consent to treatment with Ativan. She does not believe she has a mental illness, including catatonia. She does not fully understand risks associated with inadequate treatment of catatonia. Discussed with her son who is acting as surrogate decision maker and he is in full support of forced scheduled IM Ativan if patient declines oral formulation. Also consulted with our legal team prior to backing oral Ativan with IM.     Ativan was increased on 5/19 due to re-emerging evidence of catatonia (long periods of staring, repetitive movements, echolalia, mutism). Meeting was held with ACT team on 5/20, including ACT team psychiatrist, Dr. Pedraza. He said that he is in \"full support\" of plan to pursue Alcides Riverappard and ECT at this time. He does feel that in the past she has been discharged from the hospital while still quite symptomatic. He is hoping that ECT will be effective and that with improvement, Michelle would be more receptive to clozapine and weekly blood draws. He said that ideally she would transition to an IRTS before going back to her apartment, but also understands there may be some barriers (I.e. pt's willingness, financial concerns, etc).     ECT consult placed. Please see consult note by Dr. Lubin on 5/21 for details. Alcides Riverappard was approved on 5/24 and patient was medically cleared on 5/24. ECT initiated on 5/26. She was noted to have a short seizure during ECT on 6/4. Staff note that patient appears more disoriented with memory impairment and sedation on days of ECT. This was noted on my examination again today. Improvements noted in mood, affect, social interactions, paranoia, agitation since initiation of ECT. Reduced Ativan on 6/5 due to sedative effects. Relayed concerns about memory impairment and confusion with Dr. Lubin. Recommended attempting unilateral ECT, which he agreed to do. First unilateral ECT on 6/7. ECT was held on 6/11 and 6/14 due to memory impairment. We will " plan to hold it again tomorrow (6/16). There is ongoing discussion regarding whether there is a component of catatonia contributing to her current presentation but this is less likely since it worsened after ECT was started.  Given her level of cognitive impairment and our belief that this is due to ECT, we will not pursue any further treatments at this time. Since we have held ECT (last treatment on 6/9), her cognitive impairment has slightly improved (more oriented).     Michelle initially appeared to be decompensating somewhat when ECT was held, though her cognitive impairment has gradually improved. If symptoms of psychosis re-emerge, it may be worth starting clozapine, which is something that has previously been considered by her ACT team. Her Hatch order would need to be amended as clozapine is not one of the medications listed. ANC obtained on 6/16 was 1800/microL. Per conversation with pharmacy, neutropenia has been associated with olanzapine and agranulocytosis has been associated with olanzapine, lorazepam, metoprolol, and hydralazine and hematologic labs have been monitored. Upon re-check, ANC was 3700/microL on 6/21/21. At this time, the primary symptoms we are observing are cognitive deficits and inability to care for self, which we would not address by changing her antipsychotic medication.    Michelle's cognitive impairment has been steadily improving since holding ECT treatments, however it is possible that lorazepam is also playing a role in her cognitive impairment. Given no signs of re-emerging catatonia, a gradual lorazepam taper (decreasing by 0.5 mg) was initiated on 6/28/21 to monitor for potential improvements with regard to memory impairment.  On 7/7, Michelle reported an incident of oculogyric crisis and Cogentin 1 mg BID was initiated with no noted changes in cognition.     Michelle has remained focused on discharge. The team is working with Michelle's ACT team to to establish a safe discharge plan with  options including moving to a group home vs home with her ACT team and additional in home supports via CADI services. We are concerned that Michelle would have difficulty taking her medications as prescribed if she were to return home without her ACT team and additional services. This is evidenced by impairments noted in cognitive assessment by OT specialist on 7/1. MOCA score was a 13/30 and CPT score was a 4.7. Physician's statement in support of guardianship was completed on 7/19/21.  Patient's son is pursuing guardianship. Patient became quite agitated on 7/16 when  staff from [facility] came to meet with her a second time. She did not allow staff members to enter her room and stated that she will not be going to a group home.     Due to daytime sedation, on 7/26/21, lorazepam evening dose decreased from 2 mg to 1 mg. On 7/27/21 will transition Zyprexa from 10 mg BID to 5 mg QAM + 15 mg QPM.     Target psychiatric symptoms and interventions:   - Decrease lorazepam from 1 mg PO or IM in the morning and at mid-day and 2 mg PO or IM in the evening to 1 mg PO or IM TID on 7/26. Patient may not decline.   - Change Zyprexa from 10 mg BID  PO or IM to 5 mg QAM + 15 mg QPM starting on 7/27.  Hatch in place. May consider increasing further though holding off given re-emerging catatonic sx and borderline prolonged QTc     - Continue Cogentin 1 mg PO BID with additional 2 mg IM daily prn for evidence of acute dystonic reaction or oculogyric crisis  -Continue hydroxyzine 25-50 mg q4h prn for acute anxiety  -Continue Trazodone 50 mg at bedtime prn for sleep disturbances  -Continue Zyprexa 10 mg TID prn for severe agitation  -Continue Ativan/Benadryl q4h prn for agitation. WOULD GIVE PRN ATIVAN FIRST FOR AGITATION unless it is after 5 pm on day prior to scheduled ECT    Occupational Therapy Consult Placed to evaluate cognitive functioning/ability to care for self in home environment, ability to manage medications.  "See note on 7/1 for details.      ECT: Discontinued due to significant cognitive impairment. Please see note dated 7/12/21 for additional details.     Acute Medical Problems and Treatments:  HTN, improved: Patient is now adherent with medication regimen.   - Metoprolol succinate ER 75 mg   - Cozaar 100 mg daily  - Amlodipine 7.5 mg daily  - IM discontinued hydralazine prn  - Switched BP checks from QID to BID on 7/13 given overall improvement  - Please see note from IM dated 5/3, 5/6, 5/24, 6/20 and 6/30/21.  - Obtained EKG and routine labs on 5/21 in context of pt declining vital sign checks and anti-hypertensives and recently elevated BPs. Reviewed labs and discussed EKG findings with IM on 5/21. No urgent concerns, though IM should be notified if pt develops acute medical concerns (I.e. heart palpitations, SOB, changes in speech, AMS, confusion, CP, HA, changes in vision).    - Per 6/20 IM note: \"Please notify IM if BP is persistently severely elevated and requiring frequent PRN hydralazine\".  - Internal medicine consulted again on 6/28 due to consistent use of hydralazine over the past week. Metoprolol increased to 75 mg daily then to 100 mg and amlodipine 5 mg was added on 6/30. Amlodipine increased to 7.5 mg daily on 7/5.     Recent history of BRANDON:  - Attempted to repeat BMP multiple times though patient consistently declining blood draw  - Encourage fluids    For previous medical concerns, please see note dated 7/12/21.    Behavioral/Psychological/Social:  - Encourage unit programming  - Patient is now Code 3 status and can take walks in the [place] with staff and security present per staff discretion. This appears to be quite therapeutic for her.     Safety:  - Continue precautions as noted above  - Status 15 minute checks  - Safety precautions include: assault and elopement precautions  - Continue precautions as noted above    Legal Status: Committed as MI with Hatch in place for Haldol, Zyprexa, " Invega, and Thorazine through [facility]. Filed Mcgrath Kohler through [facility] and approved on 5/24/21. Physician's statement in support of guardianship was completed on 7/19/21. Court hearing for guardianship is scheduled on 7/28/21 at 8:30 AM.    Disposition Plan   Reason for ongoing admission: poses an imminent risk to self, poses an imminent risk to others and is unable to care for self due to severe psychosis or tad  Discharge location: Group home (needs CADI waiver). CPT assessment done by OT (Tania queen) on 7/1.  Please see note for details. [place] Choice Assessment deferred while her son is pursuing emergency guardianship   Discharge Medications: not ordered  Follow-up Appointments: not scheduled     Gabby Zaman MD  [facility]iat[facility]         Bijal Varner MD  Psychiatry PGY-4

## 2021-07-27 NOTE — PLAN OF CARE
"Pt spent some time talking on the phone this evening, otherwise isolated to her room. Withdrawn and blunted on approach. She reports that her mood is \"okay.\" Denies SI/SIB, HI, and all mental health symptoms. Appetite and fluid intake good. Hygiene adequate. She was pleasant and calm all evening. Compliant with scheduled medications and denies any side effects.    "

## 2021-07-28 PROCEDURE — 250N000013 HC RX MED GY IP 250 OP 250 PS 637: Performed by: PHYSICIAN ASSISTANT

## 2021-07-28 PROCEDURE — 99232 SBSQ HOSP IP/OBS MODERATE 35: CPT | Mod: GC | Performed by: PSYCHIATRY & NEUROLOGY

## 2021-07-28 PROCEDURE — 124N000002 HC R&B MH UMMC

## 2021-07-28 PROCEDURE — 250N000013 HC RX MED GY IP 250 OP 250 PS 637: Performed by: STUDENT IN AN ORGANIZED HEALTH CARE EDUCATION/TRAINING PROGRAM

## 2021-07-28 RX ADMIN — LORAZEPAM 1 MG: 1 TABLET ORAL at 20:05

## 2021-07-28 RX ADMIN — METOPROLOL SUCCINATE 75 MG: 25 TABLET, EXTENDED RELEASE ORAL at 08:24

## 2021-07-28 RX ADMIN — OLANZAPINE 5 MG: 5 TABLET, ORALLY DISINTEGRATING ORAL at 08:24

## 2021-07-28 RX ADMIN — AMLODIPINE BESYLATE 7.5 MG: 2.5 TABLET ORAL at 08:24

## 2021-07-28 RX ADMIN — BENZTROPINE MESYLATE 1 MG: 1 TABLET ORAL at 08:24

## 2021-07-28 RX ADMIN — LOSARTAN POTASSIUM 100 MG: 100 TABLET, FILM COATED ORAL at 08:23

## 2021-07-28 RX ADMIN — LORAZEPAM 1 MG: 1 TABLET ORAL at 08:24

## 2021-07-28 RX ADMIN — OLANZAPINE 15 MG: 15 TABLET, ORALLY DISINTEGRATING ORAL at 20:05

## 2021-07-28 RX ADMIN — LORAZEPAM 1 MG: 1 TABLET ORAL at 14:26

## 2021-07-28 RX ADMIN — BENZTROPINE MESYLATE 1 MG: 1 TABLET ORAL at 20:05

## 2021-07-28 ASSESSMENT — ACTIVITIES OF DAILY LIVING (ADL)
HYGIENE/GROOMING: INDEPENDENT
ORAL_HYGIENE: INDEPENDENT
LAUNDRY: UNABLE TO COMPLETE
DRESS: SCRUBS (BEHAVIORAL HEALTH)
LAUNDRY: UNABLE TO COMPLETE
ORAL_HYGIENE: INDEPENDENT
DRESS: SCRUBS (BEHAVIORAL HEALTH)
HYGIENE/GROOMING: INDEPENDENT

## 2021-07-28 NOTE — PROGRESS NOTES
"[facility], [place]   Psychiatric Progress Note  Hospital Day: 105        Interim History:   The patient's care was discussed with the treatment team during the daily team meeting and/or staff's chart notes were reviewed. Vitals WNL. Adherent with prescribed medications. No acute events overnight. Patient is now agreeing to go to group home upon discharge.     Michelle was sitting in her room prior to the interview and smiled when the team knocked on the door.  She agreed for the new resident on our team to join in on her meeting.  She reported that she is \"feeling good\" today and denies any physical pain or concerns.  She reported that she had a guardianship hearing this morning and that they did not ask her to say anything.  She stated that her son is her son and that he is not her guardian.  She asked how the group home would be paid for and we explained that there was a CADI waiver.  She seemed accepting of this information.  We again discussed the recent changes in her medications and she does endorse feeling less sedated during the daytime.  She wanted to know the details of the medication changes again and talked through these with the team.  She again expressed her desire to leave the hospital.  She asked about the woman at the group home.  She seemed much more receptive to this conversation than in past conversations.  She smiled and laughed when writer brought up the idea of her making a bracelet in one of the groups.         Medications:       amLODIPine  7.5 mg Oral Daily     benztropine  1 mg Oral BID     LORazepam  1 mg Oral TID    Or     LORazepam  1 mg Intramuscular TID     losartan  100 mg Oral Daily     metoprolol succinate ER  75 mg Oral Daily     OLANZapine zydis  15 mg Oral At Bedtime    Or     OLANZapine  15 mg Intramuscular At Bedtime     OLANZapine zydis  5 mg Oral QAM    Or     OLANZapine  5 mg Intramuscular QAM          Allergies:     Allergies   Allergen Reactions "     Haldol [Haloperidol]      Patient previously tolerated haldol, though developed oculogyric crises during hospital stay on 4/26/21 on total daily dose of 10 mg.     Lisinopril Cough          Labs:     No results found for this or any previous visit (from the past 24 hour(s)).       Psychiatric Examination:     /82 (BP Location: Right arm)   Pulse 99   Temp 97.3  F (36.3  C) (Oral)   Resp 16   Wt 70.4 kg (155 lb 4.8 oz)   SpO2 100%   BMI 26.66 kg/m    Weight is 155 lbs 4.8 oz  Body mass index is 26.66 kg/m .    Weight over time:  Vitals:    05/25/21 0850 06/15/21 0811 06/16/21 1605 06/19/21 0859   Weight: 71 kg (156 lb 8 oz) 69.8 kg (153 lb 12.8 oz) 70.5 kg (155 lb 8 oz) 71.4 kg (157 lb 8 oz)    06/21/21 0805 06/22/21 0839 06/24/21 0800 07/02/21 0835   Weight: 69.5 kg (153 lb 3.2 oz) 69.3 kg (152 lb 11.2 oz) 70.7 kg (155 lb 12.8 oz) 70.7 kg (155 lb 12.8 oz)    07/03/21 0844 07/06/21 0838 07/10/21 0845 07/16/21 0821   Weight: 69.6 kg (153 lb 8 oz) 70.9 kg (156 lb 4.8 oz) 69.8 kg (153 lb 14.4 oz) 69.3 kg (152 lb 12.8 oz)    07/17/21 0830   Weight: 70.4 kg (155 lb 4.8 oz)       Orthostatic Vitals       Most Recent      Sitting Orthostatic /80 07/22 0832    Sitting Orthostatic Pulse (bpm) 85 07/22 0832                   Cardiometabolic risk assessment. 04/15/21      Reviewed patient profile for cardiometabolic risk factors    Date taken /Value  REFERENCE RANGE   Abdominal Obesity  (Waist Circumference)   See nursing flowsheet Women ?35 in (88 cm)   Men ?40 in (102 cm)      Triglycerides  Triglycerides   Date Value Ref Range Status   10/02/2020 86 <=149 mg/dL Final   10/19/2019 117 <150 mg/dL Final       ?150 mg/dL (1.7 mmol/L) or current treatment for elevated triglycerides   HDL cholesterol  HDL Cholesterol   Date Value Ref Range Status   10/19/2019 56 >49 mg/dL Final     Direct Measure HDL   Date Value Ref Range Status   10/02/2020 45 (L) >=50 mg/dL Final   ]   Women <50 mg/dL (1.3 mmol/L) in women  "or current treatment for low HDL cholesterol  Men <40 mg/dL (1 mmol/L) in men or current treatment for low HDL cholesterol     Fasting plasma glucose (FPG) Lab Results   Component Value Date     10/19/2019      FPG ?100 mg/dL (5.6 mmol/L) or treatment for elevated blood glucose   Blood pressure  BP Readings from Last 3 Encounters:   07/27/21 139/82   06/04/19 131/71   04/26/19 164/86    Blood pressure ?130/85 mmHg or treatment for elevated blood pressure   Family History  See family history     Appearance: dressed in hospital scrubs, appeared reported age, unkempt hair   Attitude: cooperative with interview, not irritable   Eye Contact: improved, looks up more during the interview   Mood: \"good\"   Affect:  Somewhat blunted, not agitated and tense. Brightens significantly at times   Speech: accented, clear and coherent.    Language: no obvious receptive or expressive deficits  Psychomotor, Gait, Musculoskeletal: No abnormal movements noted while seated, did sit in an awkward/unnatural position during interview and held posture throughout length of interview.   Thought Process: goal-oriented, linear, concrete   Associations:  No loosening of associations present  Thought Content:  Did not appear to be responding to internal stimuli.  Insight:   poor - slightly improved   Judgement:  fair  Oriented to: person, place, time/date   Attention Span and Concentration: attentive to conversation though at times stares ahead and does not respond to questions.  Recent and Remote Memory: stable, possible difficulty recalling prior conversations vs. Inability to accept information contained in conversations   Fund of Knowledge:  normal    Clinical Global Impressions  First:  Considering your total clinical experience with this particular patient population, how severe are the patient's symptoms at this time?: 7 (04/16/21 9727)  Compared to the patient's condition at the START of treatment, this patient's condition is: 4 " (04/16/21 7606)  Most recent:  Considering your total clinical experience with this particular patient population, how severe are the patient's symptoms at this time?: 5 (07/01/21 0947)  Compared to the patient's condition at the START of treatment, this patient's condition is: 2 (07/01/21 0947)           Precautions:     Behavioral Orders   Procedures     Cheeking Precautions (behavioral units)     Patient Observed swallowing PO medications; Patient asked to drink water after swallowing medication; Patient in Staff line of sight for 15 minutes after medication given; Mouth checks after PO administration (patient asked to open mouth and stick out their tongue).     Code 3     For walks in the [place] with staff and per staff discretion     Electroconvulsive therapy     Series of up to 12 treatments. Begin Date: 5/26/21     Treating Psychiatrist providing ECT:  Dr. Lubin     Notified on:  5/21/21     Electroconvulsive therapy     As long as we get the green light from risk management and pt is medically cleared, begin ECT every Monday, Wednesday, and Friday     Electroconvulsive therapy     Series of up to 12 treatments. Begin Date: 5/25/21     Treating Psychiatrist providing ECT:  Amee     Notified on:  5/24/21     Elopement precautions     Fall precautions     Mcgrath Calderon     Routine Programming     As clinically indicated     Status 15     Every 15 minutes.          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 Symptoms and presentation at this time is most consistent with Schizoaffective Disorder, Bipolar Type. Obtained most recent medication regimen from patient's ACT team, and regimen was initially restarted. Pt is committed. Petitioned for Alcides Calderon was filed and granted due to lack of improvement and side effects from medications. Inpatient psychiatric hospitalization is warranted at this time for safety, stabilization, possible adjustment in medications and development of a safe discharge plan.     Hospital Course:  On admission, PTA medications were restarted. However, Michelle had been declining all scheduled medications despite significant encouragement from staff and provider. Psychiatric emergency declared on 4/20 due to aggression toward others in context of severe psychosis and suspected excited catatonia. Ativan 1 mg TID was also added. Discontinued PTA Invega, Zyprexa, and thorazine on 4/20 due to consistent refusal.     On 4/26, it was noted that patient frequently had upward gaze while walking up and down the unit. She did not appear to be distressed. She reported that she was looking at \"my god.\" It was determined to be oculogyric crisis secondary to IM haloperidol. Haldol was subsequently discontinued and scheduled Zyprexa was initiated on an emergency basis. Oral Cogentin was also scheduled, though patient declined. On the evening of 4/26, patient's gaze was fixed in upward position for several hours and she appeared to be experiencing discomfort. IM Cogentin was administered with noted resolution. Partial improvements were observed after switching to scheduled Zyprexa. After patient improved, she was more receptive to reinitiating oral Invega, which was initiated on 5/3 and titrated to PTA dose of 9 mg daily on 5/10. Plan was for ACT team to bring in loading dose of Invega Sustenna. Patient had signs of oculogyric crisis again with Invega. Oral dose decreased to 6 mg after this. " "Invega was stopped on 5/14 due to ongoing signs of problems related to eye movement and concerns for oculogyric crisis.    Overall improvement in patient's agitation and suspected catatonia noted on 4/30 after patient accepted two doses of Ativan. She began declining Ativan again with noted decompensation. Patient began accepting, however, was deemed not to have the capacity to consent to treatment with Ativan. She does not believe she has a mental illness, including catatonia. She does not fully understand risks associated with inadequate treatment of catatonia. Discussed with her son who is acting as surrogate decision maker and he is in full support of forced scheduled IM Ativan if patient declines oral formulation. Also consulted with our legal team prior to backing oral Ativan with IM.     Ativan was increased on 5/19 due to re-emerging evidence of catatonia (long periods of staring, repetitive movements, echolalia, mutism). Meeting was held with ACT team on 5/20, including ACT team psychiatrist, Dr. Pedraza. He said that he is in \"full support\" of plan to pursue Mcgrath Kohler and ECT at this time. He does feel that in the past she has been discharged from the hospital while still quite symptomatic. He is hoping that ECT will be effective and that with improvement, Michelle would be more receptive to clozapine and weekly blood draws. He said that ideally she would transition to an IRTS before going back to her apartment, but also understands there may be some barriers (I.e. pt's willingness, financial concerns, etc).     ECT consult placed. Please see consult note by Dr. Lubin on 5/21 for details. Mcgrath Kohler was approved on 5/24 and patient was medically cleared on 5/24. ECT initiated on 5/26. She was noted to have a short seizure during ECT on 6/4. Staff note that patient appears more disoriented with memory impairment and sedation on days of ECT. This was noted on my examination again today. Improvements " noted in mood, affect, social interactions, paranoia, agitation since initiation of ECT. Reduced Ativan on 6/5 due to sedative effects. Relayed concerns about memory impairment and confusion with Dr. Lubin. Recommended attempting unilateral ECT, which he agreed to do. First unilateral ECT on 6/7. ECT was held on 6/11 and 6/14 due to memory impairment. We will plan to hold it again tomorrow (6/16). There is ongoing discussion regarding whether there is a component of catatonia contributing to her current presentation but this is less likely since it worsened after ECT was started.  Given her level of cognitive impairment and our belief that this is due to ECT, we will not pursue any further treatments at this time. Since we have held ECT (last treatment on 6/9), her cognitive impairment has slightly improved (more oriented).     Michelle initially appeared to be decompensating somewhat when ECT was held, though her cognitive impairment has gradually improved. If symptoms of psychosis re-emerge, it may be worth starting clozapine, which is something that has previously been considered by her ACT team. Her Hatch order would need to be amended as clozapine is not one of the medications listed. ANC obtained on 6/16 was 1800/microL. Per conversation with pharmacy, neutropenia has been associated with olanzapine and agranulocytosis has been associated with olanzapine, lorazepam, metoprolol, and hydralazine and hematologic labs have been monitored. Upon re-check, ANC was 3700/microL on 6/21/21. At this time, the primary symptoms we are observing are cognitive deficits and inability to care for self, which we would not address by changing her antipsychotic medication.    Michelle's cognitive impairment has been steadily improving since holding ECT treatments, however it is possible that lorazepam is also playing a role in her cognitive impairment. Given no signs of re-emerging catatonia, a gradual lorazepam taper (decreasing by 0.5  mg) was initiated on 6/28/21 to monitor for potential improvements with regard to memory impairment.  On 7/7, Michelle reported an incident of oculogyric crisis and Cogentin 1 mg BID was initiated with no noted changes in cognition.     Michelle has remained focused on discharge. The team is working with Michelle's ACT team to to establish a safe discharge plan with options including moving to a group home vs home with her ACT team and additional in home supports via CADI services. We are concerned that Michelle would have difficulty taking her medications as prescribed if she were to return home without her ACT team and additional services. This is evidenced by impairments noted in cognitive assessment by OT specialist on 7/1. MOCA score was a 13/30 and CPT score was a 4.7. Physician's statement in support of guardianship was completed on 7/19/21.  Patient's son is pursuing guardianship. Patient became quite agitated on 7/16 when  staff from [facility] came to meet with her a second time. She did not allow staff members to enter her room and stated that she will not be going to a group home.     Due to daytime sedation, on 7/26/21, lorazepam evening dose decreased from 2 mg to 1 mg. On 7/27/21 transitioned Zyprexa from 10 mg BID to 5 mg QAM + 15 mg QPM and had less daytime sedation with this change.     Target psychiatric symptoms and interventions:   - Continue 1 mg PO or IM TID Patient may not decline.   - Continue Zyprexa 5 mg QAM + 15 mg QPM   Hatch in place. May consider increasing further though holding off given re-emerging catatonic sx and borderline prolonged QTc     - Continue Cogentin 1 mg PO BID with additional 2 mg IM daily prn for evidence of acute dystonic reaction or oculogyric crisis  -Continue hydroxyzine 25-50 mg q4h prn for acute anxiety  -Continue Trazodone 50 mg at bedtime prn for sleep disturbances  -Continue Zyprexa 10 mg TID prn for severe agitation  -Continue Ativan/Benadryl q4h prn for  "agitation. WOULD GIVE PRN ATIVAN FIRST FOR AGITATION unless it is after 5 pm on day prior to scheduled ECT    Occupational Therapy Consult Placed to evaluate cognitive functioning/ability to care for self in home environment, ability to manage medications. See note on 7/1 for details.      ECT: Discontinued due to significant cognitive impairment. Please see note dated 7/12/21 for additional details.     Acute Medical Problems and Treatments:  HTN, improved: Patient is now adherent with medication regimen.   - Metoprolol succinate ER 75 mg   - Cozaar 100 mg daily  - Amlodipine 7.5 mg daily  - IM discontinued hydralazine prn  - Switched BP checks from QID to BID on 7/13 given overall improvement  - Please see note from IM dated 5/3, 5/6, 5/24, 6/20 and 6/30/21.  - Obtained EKG and routine labs on 5/21 in context of pt declining vital sign checks and anti-hypertensives and recently elevated BPs. Reviewed labs and discussed EKG findings with IM on 5/21. No urgent concerns, though IM should be notified if pt develops acute medical concerns (I.e. heart palpitations, SOB, changes in speech, AMS, confusion, CP, HA, changes in vision).    - Per 6/20 IM note: \"Please notify IM if BP is persistently severely elevated and requiring frequent PRN hydralazine\".  - Internal medicine consulted again on 6/28 due to consistent use of hydralazine over the past week. Metoprolol increased to 75 mg daily then to 100 mg and amlodipine 5 mg was added on 6/30. Amlodipine increased to 7.5 mg daily on 7/5.     Recent history of BRANDON:  - Attempted to repeat BMP multiple times though patient consistently declining blood draw  - Encourage fluids    For previous medical concerns, please see note dated 7/12/21.    Behavioral/Psychological/Social:  - Encourage unit programming  - Patient is now Code 3 status and can take walks in the [employer] with staff and security present per staff discretion. This appears to be quite therapeutic for her. "     Safety:  - Continue precautions as noted above  - Status 15 minute checks  - Safety precautions include: assault and elopement precautions  - Continue precautions as noted above    Legal Status: Committed as MI with Hatch in place for Haldol, Zyprexa, Invega, and Thorazine through [facility]. Filed Alcides Kohler through [facility] and approved on 5/24/21. Physician's statement in support of guardianship was completed on 7/19/21. Court hearing for guardianship is scheduled for today 7/28/21 at 8:30 AM.     Disposition Plan   Reason for ongoing admission: poses an imminent risk to self, poses an imminent risk to others and is unable to care for self due to severe psychosis or tad  Discharge location: Group home (needs CADI waiver). CPT assessment done by OT (Tania queen) on 7/1.  Please see note for details. [place] Choice Assessment deferred while her son is pursuing emergency guardianship   Discharge Medications: not ordered  Follow-up Appointments: not scheduled     Bijal Varner MD  Psychiatry PGY-4

## 2021-07-28 NOTE — PLAN OF CARE
Problem: Decreased Participation and Engagement (Psychotic Signs/Symptoms)  Goal: Increased Participation and Engagement (Psychotic Signs/Symptoms)  Outcome: No Change  Intervention: Facilitate Participation and Engagement  Recent Flowsheet Documentation  Taken 7/27/2021 1910 by Teetee Paniagua RN  Diversional Activity: television     Pt observed to be isolative and withdrawn. Spent most of her time in her room watching television or pacing. She was out briefly in the milieu to answer phone calls. Presents with blunted affect, in a calm mood. Pt pleasant and cooperative upon approach.   Pt denies SI/SIB/HI. Denies experiencing any type of hallucinations. Denies having anxiety and depression. Claimed she is sleeping well and she denies any type of pain.   Consumed 100% of share of dinner and took approximately 500 ml of fluids.   Due medications given. Denies experiencing side effects.  Needs attended to. On fall, cheeking, elopement precautions. Staff will continue to monitor. No further concerns noted as of this time.

## 2021-07-28 NOTE — PLAN OF CARE
"Pt has been out in the milieu more frequently today. She spent some time talking on the phone and pacing in the hallway. Affect blunted. Pt was more talkative during interaction today. During check in, Michelle states that she doesn't want to have a guardian. Declined to discuss how court went this morning. Reports that her mood is \"good,\" and denies all mental health symptoms. Appetite and fluid intake good. Hygiene adequate. Pt was compliant with scheduled medications and denies any side effects.   "

## 2021-07-28 NOTE — PLAN OF CARE
Pt asleep at start of shift. Breathing quiet and unlabored.     Pt had no c/o pain or discomfort during the HS.     Appears to have slept 4.5 hours.     Pt on ASSAULT, CHEEKING, and  ELOPEMENT precautions in addition to single room order. Any related events noted above.     Will continue to monitor and assess.   Problem: Sleep Disturbance (Psychotic Signs/Symptoms)  Goal: Improved Sleep (Psychotic Signs/Symptoms)  Outcome: No Change

## 2021-07-29 PROCEDURE — 250N000013 HC RX MED GY IP 250 OP 250 PS 637: Performed by: STUDENT IN AN ORGANIZED HEALTH CARE EDUCATION/TRAINING PROGRAM

## 2021-07-29 PROCEDURE — 99232 SBSQ HOSP IP/OBS MODERATE 35: CPT | Mod: GC | Performed by: PSYCHIATRY & NEUROLOGY

## 2021-07-29 PROCEDURE — 250N000013 HC RX MED GY IP 250 OP 250 PS 637: Performed by: PHYSICIAN ASSISTANT

## 2021-07-29 PROCEDURE — 124N000002 HC R&B MH UMMC

## 2021-07-29 RX ORDER — OLANZAPINE 15 MG/1
15 TABLET, ORALLY DISINTEGRATING ORAL AT BEDTIME
Status: COMPLETED | OUTPATIENT
Start: 2021-07-29 | End: 2021-07-29

## 2021-07-29 RX ORDER — OLANZAPINE 10 MG/2ML
10 INJECTION, POWDER, FOR SOLUTION INTRAMUSCULAR AT BEDTIME
Status: COMPLETED | OUTPATIENT
Start: 2021-07-29 | End: 2021-07-29

## 2021-07-29 RX ORDER — OLANZAPINE 10 MG/2ML
10 INJECTION, POWDER, FOR SOLUTION INTRAMUSCULAR AT BEDTIME
Status: DISCONTINUED | OUTPATIENT
Start: 2021-07-30 | End: 2021-09-16 | Stop reason: HOSPADM

## 2021-07-29 RX ORDER — OLANZAPINE 20 MG/1
20 TABLET, ORALLY DISINTEGRATING ORAL AT BEDTIME
Status: DISCONTINUED | OUTPATIENT
Start: 2021-07-30 | End: 2021-09-16 | Stop reason: HOSPADM

## 2021-07-29 RX ADMIN — LORAZEPAM 1 MG: 1 TABLET ORAL at 08:53

## 2021-07-29 RX ADMIN — OLANZAPINE 15 MG: 15 TABLET, ORALLY DISINTEGRATING ORAL at 20:04

## 2021-07-29 RX ADMIN — LORAZEPAM 1 MG: 1 TABLET ORAL at 20:04

## 2021-07-29 RX ADMIN — BENZTROPINE MESYLATE 1 MG: 1 TABLET ORAL at 08:53

## 2021-07-29 RX ADMIN — METOPROLOL SUCCINATE 75 MG: 25 TABLET, EXTENDED RELEASE ORAL at 08:52

## 2021-07-29 RX ADMIN — AMLODIPINE BESYLATE 7.5 MG: 2.5 TABLET ORAL at 08:52

## 2021-07-29 RX ADMIN — OLANZAPINE 5 MG: 5 TABLET, ORALLY DISINTEGRATING ORAL at 08:53

## 2021-07-29 RX ADMIN — BENZTROPINE MESYLATE 1 MG: 1 TABLET ORAL at 20:04

## 2021-07-29 RX ADMIN — LORAZEPAM 1 MG: 1 TABLET ORAL at 13:55

## 2021-07-29 RX ADMIN — LOSARTAN POTASSIUM 100 MG: 100 TABLET, FILM COATED ORAL at 08:52

## 2021-07-29 ASSESSMENT — ACTIVITIES OF DAILY LIVING (ADL)
DRESS: SCRUBS (BEHAVIORAL HEALTH);INDEPENDENT
HYGIENE/GROOMING: INDEPENDENT
DRESS: SCRUBS (BEHAVIORAL HEALTH)
HYGIENE/GROOMING: INDEPENDENT
ORAL_HYGIENE: INDEPENDENT
LAUNDRY: UNABLE TO COMPLETE
LAUNDRY: UNABLE TO COMPLETE
ORAL_HYGIENE: INDEPENDENT

## 2021-07-29 NOTE — PLAN OF CARE
Pt asleep  at start of shift. Breathing quiet and unlabored.     Pt had no c/o pain or discomfort during the HS.     Appears to have slept 6 hours.     Pt on  CHEEKING, ELOPEMENT, and FALL precautions in addition to single room order. Any related events noted above.     Will continue to monitor and assess.   Problem: Sleep Disturbance (Psychotic Signs/Symptoms)  Goal: Improved Sleep (Psychotic Signs/Symptoms)  Outcome: Improving

## 2021-07-29 NOTE — PROGRESS NOTES
spoke with owner of [facility] and she will visit patient with her staff person (speaks patient's language) tomorrow @ 11am.   also left a voice message for  representing patient's son in the guardianship process requesting a call back to get an update on hearing that was yesterday.

## 2021-07-29 NOTE — PROGRESS NOTES
"[facility], [place]   Psychiatric Progress Note  Hospital Day: 106        Interim History:   The patient's care was discussed with the treatment team during the daily team meeting and/or staff's chart notes were reviewed. Intermittent BP elevation otherwise vitals WNL. She complained of some left ankle discomfort this morning. Adherent with prescribed medications. No acute events overnight. She denies psychiatric symptoms or medical concerns. She is eating and sleeping well. She has remained mostly isolative to her room though did walk in the milieu for a period of time.      Michelle was resting in her room prior to the interview.  She reports that she is doing \"good\" today and that she slept well.  Writer mentioned morning that Michelle was walking in the milieu yesterday and encouraged this locate seemed confused as though she did not recall doing this.  She denies any current psychiatric symptoms including SI/HI/AH/VH.  She feels that her memory and thinking is clear.  She asked if there was any information regarding her court hearing yesterday.  She was accepting that we have not gotten further information at this time.  We discussed her left ankle discomfort as reported to nursing staff today.  She reported that she did not specifically injure her ankle and reports she does exercises in her room each morning to help with her blood pressure and thinks it may be related to her exercises.  She states she mostly walks back and forth in her room as part of her routine.  She stated that she was taking a break today to give her ankle time to rest.  She was agreeable to a physical examination of her ankle and legs with findings as reported below.  She reports pain in her ankle only occurs when she is standing. She denied any as needed pain medications and states she will just take a break from exercising for the next couple of days.  She agrees that she will inform the team if this pain worsens or " does not go away.  She denied having any pain in her calves or elsewhere in her legs or body. We discussed consolidating her Zyprexa dose to bedtime given daytime sedation and she was in agreement with this plan.  Writer informed Michelle that I will be out next week and Michelle recalled that this writer is also out on Fridays.  She denied having any further questions or concerns at this time.     Suicidal ideation: denies current or recent suicidal ideation or behaviors.    Homicidal ideation: denies current or recent homicidal ideation or behaviors.    Psychotic symptoms: Patient denies AH, VH, paranoia, delusions.     Medication side effects reported: sedation during the day. She denies any dizziness or lightheadedness.     Acute medical concerns: pain in left ankle   Physical examination:   slight swelling of the medial aspect of her left ankle approximately 1.5 inches below the medial malleolus. There is no erythema, induration or wounds/abrasions. She denies tenderness to palpation and has full range of motion without pain. She denies pain on foot flexion or extension and is able to ambulate without difficulty. Sensation to touch intact.   There is not tenderness, erythema or induration of either of her calves.     Other issues reported by patient: Patient had no further questions or concerns.           Medications:       amLODIPine  7.5 mg Oral Daily     benztropine  1 mg Oral BID     LORazepam  1 mg Oral TID    Or     LORazepam  1 mg Intramuscular TID     losartan  100 mg Oral Daily     metoprolol succinate ER  75 mg Oral Daily     OLANZapine zydis  15 mg Oral At Bedtime    Or     OLANZapine  15 mg Intramuscular At Bedtime     OLANZapine zydis  5 mg Oral QAM    Or     OLANZapine  5 mg Intramuscular QAM          Allergies:     Allergies   Allergen Reactions     Haldol [Haloperidol]      Patient previously tolerated haldol, though developed oculogyric crises during hospital stay on 4/26/21 on total daily dose of 10  mg.     Lisinopril Cough          Labs:   No results found for this or any previous visit (from the past 24 hour(s)).       Psychiatric Examination:     BP (!) 163/84   Pulse 88   Temp 98.4  F (36.9  C) (Tympanic)   Resp 16   Wt 70.4 kg (155 lb 4.8 oz)   SpO2 99%   BMI 26.66 kg/m    Weight is 155 lbs 4.8 oz  Body mass index is 26.66 kg/m .    Weight over time:  Vitals:    05/25/21 0850 06/15/21 0811 06/16/21 1605 06/19/21 0859   Weight: 71 kg (156 lb 8 oz) 69.8 kg (153 lb 12.8 oz) 70.5 kg (155 lb 8 oz) 71.4 kg (157 lb 8 oz)    06/21/21 0805 06/22/21 0839 06/24/21 0800 07/02/21 0835   Weight: 69.5 kg (153 lb 3.2 oz) 69.3 kg (152 lb 11.2 oz) 70.7 kg (155 lb 12.8 oz) 70.7 kg (155 lb 12.8 oz)    07/03/21 0844 07/06/21 0838 07/10/21 0845 07/16/21 0821   Weight: 69.6 kg (153 lb 8 oz) 70.9 kg (156 lb 4.8 oz) 69.8 kg (153 lb 14.4 oz) 69.3 kg (152 lb 12.8 oz)    07/17/21 0830   Weight: 70.4 kg (155 lb 4.8 oz)       Orthostatic Vitals       Most Recent      Sitting Orthostatic /84 07/29 0800    Sitting Orthostatic Pulse (bpm) 82 07/29 0800    Standing Orthostatic /86 07/29 0800    Standing Orthostatic Pulse (bpm) 88 07/29 0800            Cardiometabolic risk assessment. 07/29/21      Reviewed patient profile for cardiometabolic risk factors    Date taken /Value  REFERENCE RANGE   Abdominal Obesity  (Waist Circumference)   See nursing flowsheet Women ?35 in (88 cm)   Men ?40 in (102 cm)      Triglycerides  Triglycerides   Date Value Ref Range Status   10/02/2020 86 <=149 mg/dL Final   10/19/2019 117 <150 mg/dL Final       ?150 mg/dL (1.7 mmol/L) or current treatment for elevated triglycerides   HDL cholesterol  HDL Cholesterol   Date Value Ref Range Status   10/19/2019 56 >49 mg/dL Final     Direct Measure HDL   Date Value Ref Range Status   10/02/2020 45 (L) >=50 mg/dL Final   ]   Women <50 mg/dL (1.3 mmol/L) in women or current treatment for low HDL cholesterol  Men <40 mg/dL (1 mmol/L) in men or current  "treatment for low HDL cholesterol     Fasting plasma glucose (FPG) Lab Results   Component Value Date     07/02/2021      FPG ?100 mg/dL (5.6 mmol/L) or treatment for elevated blood glucose   Blood pressure  BP Readings from Last 3 Encounters:   07/29/21 (!) 163/84   06/04/19 131/71   04/26/19 164/86    Blood pressure ?130/85 mmHg or treatment for elevated blood pressure   Family History  See family history     Appearance: dressed in hospital scrubs, appeared reported age, unkempt hair   Attitude: cooperative with interview, not irritable   Eye Contact: improved, looks up more during the interview   Mood: \"good\"   Affect:  Somewhat blunted, not agitated and tense. Brightens and smiles at times   Speech: accented, clear and coherent.    Language: no obvious receptive or expressive deficits  Psychomotor, Gait, Musculoskeletal: No abnormal movements noted while seated, did sit in an awkward/unnatural position during interview and held posture throughout length of interview.   Thought Process: goal-oriented, linear, concrete   Associations:  No loosening of associations present  Thought Content:  Did not appear to be responding to internal stimuli.  Insight:   poor - slightly improved   Judgement:  fair  Oriented to: person, place, time/date   Attention Span and Concentration: attentive to conversation though at times stares ahead and does not respond to questions.  Recent and Remote Memory: stable, some difficulty recalling events   Fund of Knowledge:  normal    Clinical Global Impressions  First:  Considering your total clinical experience with this particular patient population, how severe are the patient's symptoms at this time?: 7 (04/16/21 1428)  Compared to the patient's condition at the START of treatment, this patient's condition is: 4 (04/16/21 1428)  Most recent:  Considering your total clinical experience with this particular patient population, how severe are the patient's symptoms at this time?: 7 " (07/22/21 0941)  Compared to the patient's condition at the START of treatment, this patient's condition is: 3 (07/22/21 0941)           Precautions:     Behavioral Orders   Procedures     Cheeking Precautions (behavioral units)     Patient Observed swallowing PO medications; Patient asked to drink water after swallowing medication; Patient in Staff line of sight for 15 minutes after medication given; Mouth checks after PO administration (patient asked to open mouth and stick out their tongue).     Code 3     For walks in the [place] with staff and per staff discretion     Electroconvulsive therapy     Series of up to 12 treatments. Begin Date: 5/26/21     Treating Psychiatrist providing ECT:  Dr. Lubin     Notified on:  5/21/21     Electroconvulsive therapy     As long as we get the green light from risk management and pt is medically cleared, begin ECT every Monday, Wednesday, and Friday     Electroconvulsive therapy     Series of up to 12 treatments. Begin Date: 5/25/21     Treating Psychiatrist providing ECT:  Amee     Notified on:  5/24/21     Elopement precautions     Fall precautions     Mcgrath Calderon     Routine Programming     As clinically indicated     Status 15     Every 15 minutes.          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 Symptoms and presentation at this time is most consistent with Schizoaffective Disorder, Bipolar Type. Obtained most recent medication regimen from  "patient's ACT team, and regimen was initially restarted. Pt is committed. Petitioned for Alcides Calderon was filed and granted due to lack of improvement and side effects from medications. Inpatient psychiatric hospitalization is warranted at this time for safety, stabilization, possible adjustment in medications and development of a safe discharge plan.      Hospital Course:  On admission, PTA medications were restarted. However, Michelle had been declining all scheduled medications despite significant encouragement from staff and provider. Psychiatric emergency declared on 4/20 due to aggression toward others in context of severe psychosis and suspected excited catatonia. Ativan 1 mg TID was also added. Discontinued PTA Invega, Zyprexa, and thorazine on 4/20 due to consistent refusal.      On 4/26, it was noted that patient frequently had upward gaze while walking up and down the unit. She did not appear to be distressed. She reported that she was looking at \"my god.\" It was determined to be oculogyric crisis secondary to IM haloperidol. Haldol was subsequently discontinued and scheduled Zyprexa was initiated on an emergency basis. Oral Cogentin was also scheduled, though patient declined. On the evening of 4/26, patient's gaze was fixed in upward position for several hours and she appeared to be experiencing discomfort. IM Cogentin was administered with noted resolution. Partial improvements were observed after switching to scheduled Zyprexa. After patient improved, she was more receptive to reinitiating oral Invega, which was initiated on 5/3 and titrated to PTA dose of 9 mg daily on 5/10. Plan was for ACT team to bring in loading dose of Invega Sustenna. Patient had signs of oculogyric crisis again with Invega. Oral dose decreased to 6 mg after this. Invega was stopped on 5/14 due to ongoing signs of problems related to eye movement and concerns for oculogyric crisis.     Overall improvement in patient's agitation " "and suspected catatonia noted on 4/30 after patient accepted two doses of Ativan. She began declining Ativan again with noted decompensation. Patient began accepting, however, was deemed not to have the capacity to consent to treatment with Ativan. She does not believe she has a mental illness, including catatonia. She does not fully understand risks associated with inadequate treatment of catatonia. Discussed with her son who is acting as surrogate decision maker and he is in full support of forced scheduled IM Ativan if patient declines oral formulation. Also consulted with our legal team prior to backing oral Ativan with IM.      Ativan was increased on 5/19 due to re-emerging evidence of catatonia (long periods of staring, repetitive movements, echolalia, mutism). Meeting was held with ACT team on 5/20, including ACT team psychiatrist, Dr. Pedraza. He said that he is in \"full support\" of plan to pursue Mcgrath Kohler and ECT at this time. He does feel that in the past she has been discharged from the hospital while still quite symptomatic. He is hoping that ECT will be effective and that with improvement, Michelle would be more receptive to clozapine and weekly blood draws. He said that ideally she would transition to an IRTS before going back to her apartment, but also understands there may be some barriers (I.e. pt's willingness, financial concerns, etc).      ECT consult placed. Please see consult note by Dr. Lubin on 5/21 for details. Mcgrath Kohler was approved on 5/24 and patient was medically cleared on 5/24. ECT initiated on 5/26. She was noted to have a short seizure during ECT on 6/4. Staff note that patient appears more disoriented with memory impairment and sedation on days of ECT. This was noted on my examination again today. Improvements noted in mood, affect, social interactions, paranoia, agitation since initiation of ECT. Reduced Ativan on 6/5 due to sedative effects. Relayed concerns about memory " impairment and confusion with Dr. Lubin. Recommended attempting unilateral ECT, which he agreed to do. First unilateral ECT on 6/7. ECT was held on 6/11 and 6/14 due to memory impairment. We will plan to hold it again tomorrow (6/16). There is ongoing discussion regarding whether there is a component of catatonia contributing to her current presentation but this is less likely since it worsened after ECT was started.  Given her level of cognitive impairment and our belief that this is due to ECT, we will not pursue any further treatments at this time. Since we have held ECT (last treatment on 6/9), her cognitive impairment has slightly improved (more oriented).      Michelle initially appeared to be decompensating somewhat when ECT was held, though her cognitive impairment has gradually improved. If symptoms of psychosis re-emerge, it may be worth starting clozapine, which is something that has previously been considered by her ACT team. Her Hatch order would need to be amended as clozapine is not one of the medications listed. ANC obtained on 6/16 was 1800/microL. Per conversation with pharmacy, neutropenia has been associated with olanzapine and agranulocytosis has been associated with olanzapine, lorazepam, metoprolol, and hydralazine and hematologic labs have been monitored. Upon re-check, ANC was 3700/microL on 6/21/21. At this time, the primary symptoms we are observing are cognitive deficits and inability to care for self, which we would not address by changing her antipsychotic medication.     Michelle's cognitive impairment has been steadily improving since holding ECT treatments, however it is possible that lorazepam is also playing a role in her cognitive impairment. Given no signs of re-emerging catatonia, a gradual lorazepam taper (decreasing by 0.5 mg) was initiated on 6/28/21 to monitor for potential improvements with regard to memory impairment.  On 7/7, Michelle reported an incident of oculogyric crisis and  Cogentin 1 mg BID was initiated with no noted changes in cognition.      Michelle has remained focused on discharge. The team is working with Michelle's ACT team to to establish a safe discharge plan with options including moving to a group home vs home with her ACT team and additional in home supports via CADI services. We are concerned that Michelle would have difficulty taking her medications as prescribed if she were to return home without her ACT team and additional services. This is evidenced by impairments noted in cognitive assessment by OT specialist on 7/1. MOCA score was a 13/30 and CPT score was a 4.7. Physician's statement in support of guardianship was completed on 7/19/21.  Patient's son is pursuing guardianship. Patient became quite agitated on 7/16 when  staff from [facility] came to meet with her a second time. She did not allow staff members to enter her room and stated that she will not be going to a group home.      Due to daytime sedation, on 7/26/21, lorazepam evening dose decreased from 2 mg to 1 mg. On 7/27/21 transitioned Zyprexa from 10 mg BID to 5 mg QAM + 15 mg QPM and had less daytime sedation with this change and no emergence of symptoms concerning for orthostatic hypotension.  She attended her emergency guardianship hearing on 7/28.  Due to ongoing daytime sedation, will trial consolidation of Zyprexa dose at bedtime starting on 7/30.       Target psychiatric symptoms and interventions:   - Continue 1 mg PO or IM TID Patient may not decline.   - Consolidate Zyprexa from 5 mg QAM + 15 mg QPM to 20 mg at bedtime starting 7/30   Hatch in place. May consider increasing further though holding off given re-emerging catatonic sx and borderline prolonged QTc      - Continue Cogentin 1 mg PO BID with additional 2 mg IM daily prn for evidence of acute dystonic reaction or oculogyric crisis  -Continue hydroxyzine 25-50 mg q4h prn for acute anxiety  -Continue Trazodone 50 mg at bedtime prn for  "sleep disturbances  -Continue Zyprexa 10 mg TID prn for severe agitation  -Continue Ativan/Benadryl q4h prn for agitation. WOULD GIVE PRN ATIVAN FIRST FOR AGITATION unless it is after 5 pm on day prior to scheduled ECT     Occupational Therapy Consult Placed to evaluate cognitive functioning/ability to care for self in home environment, ability to manage medications. See note on 7/1 for details.      ECT: Discontinued due to significant cognitive impairment. Please see note dated 7/12/21 for additional details.      Acute Medical Problems and Treatments:  HTN, improved: Patient is now adherent with medication regimen.   - Metoprolol succinate ER 75 mg   - Cozaar 100 mg daily  - Amlodipine 7.5 mg daily  - IM discontinued hydralazine prn  - Switched BP checks from QID to BID on 7/13 given overall improvement  - Please see note from IM dated 5/3, 5/6, 5/24, 6/20 and 6/30/21.  - Obtained EKG and routine labs on 5/21 in context of pt declining vital sign checks and anti-hypertensives and recently elevated BPs. Reviewed labs and discussed EKG findings with IM on 5/21. No urgent concerns, though IM should be notified if pt develops acute medical concerns (I.e. heart palpitations, SOB, changes in speech, AMS, confusion, CP, HA, changes in vision).    - Per 6/20 IM note: \"Please notify IM if BP is persistently severely elevated and requiring frequent PRN hydralazine\".  - Internal medicine consulted again on 6/28 due to consistent use of hydralazine over the past week. Metoprolol increased to 75 mg daily then to 100 mg and amlodipine 5 mg was added on 6/30. Amlodipine increased to 7.5 mg daily on 7/5.      Recent history of BRANDON:  - Attempted to repeat BMP multiple times though patient consistently declining blood draw  - Encourage fluids     For previous medical concerns, please see note dated 7/12/21.     Behavioral/Psychological/Social:  - Encourage unit programming  - Patient is now Code 3 status and can take walks in the " [place] with staff and security present per staff discretion. This appears to be quite therapeutic for her.      Safety:  - Continue precautions as noted above  - Status 15 minute checks  - Safety precautions include: assault and elopement precautions  - Continue precautions as noted above     Legal Status: Committed as MI with Hatch in place for Haldol, Zyprexa, Invega, and Thorazine through [facility]. Filed Alcides Kohler through [facility] and approved on 5/24/21. Physician's statement in support of guardianship was completed on 7/19/21. Court hearing for guardianship is scheduled for today 7/28/21 at 8:30 AM.     Disposition Plan   Reason for ongoing admission: poses an imminent risk to self and is unable to care for self due to severe psychosis or tad  Discharge location: Group home (needs CADI waiver). CPT assessment done by OT (Tania queen) on 7/1.  Please see note for details. [place] Choice Assessment deferred while her son is pursuing emergency guardianship   Discharge Medications: not ordered  Follow-up Appointments: not scheduled     Bijal Varner MD  Psychiatry PGY-4

## 2021-07-29 NOTE — PLAN OF CARE
"Pt spent most of her time in her room resting or watching television. Encouraged to attend group or be out in the milieu. Pt declined. Presents with blunted affect, in a calm mood. Pt was cooperative and pleasant upon approach. Described her day as \"ok\". Pt denies SI/SIB/HI. Denies experiencing any type of hallucinations. Denies having anxiety and depression. Pt claimed that her sleep improved. Denies any type of pain. Appears disheveled. Encouraged to take a shower. Pt refused. Re approached but still refused.   Pt hydrating well and consumed 100% of share of dinner. She ate in her room.   Due medications given. Denies experiencing side effects.  Needs attended to. On fall, cheeking and elopement precautions. Staff will continue to monitor. No further concerns noted as of this time.   "

## 2021-07-29 NOTE — PLAN OF CARE
Assessment/Intervention/Current Symtoms and Care Coordination  -Chart review  -Rounded with team, addressed patient needs/concerns  Spoke with owner of [facility] and she will visit patient with one of her staff tomorrow @ 11am.  Spoke with patient's son's  and requested Court paperwork regarding guardianship.      Current Symptoms include the following:  Pleasant and cooperative with brighter affect.    Discharge Plan or Goal  Pending stabilization & development of a safe discharge plan.  Considerations include: [facility] detention    Barriers to Discharge  Patient requires further psychiatric stabilization due to current symptomology    Referral Status  [facility]    Legal Status  Committed/Hatch/Mcgrath Kohler through [facility]

## 2021-07-29 NOTE — PLAN OF CARE
"  Problem: Behavioral Health Plan of Care  Goal: Absence of New-Onset Illness or Injury  Outcome: No Change     Problem: Behavioral Health Plan of Care  Goal: Optimized Coping Skills in Response to Life Stressors  Outcome: No Change     Pt presented as alert and oriented to place and self throughout shift.  She was untidy and did not participate in any ADLs this shift.  Pt's speech was clear and coherent and was able to express her needs appropriately.  She ate meals and was medication compliant.  Pt stated that she was concerned about her BP being high and writer relayed this information to MD at team meeting this morning.  She denied SI/HI/SIB or psychosis.  Pt endorsed discomfort in her left ankle this morning, which inhibited her from what she stated was her \"morning exercises.\"  She denied any other acute physical health concerns or side effects to medications this shift.  "

## 2021-07-30 PROCEDURE — 124N000002 HC R&B MH UMMC

## 2021-07-30 PROCEDURE — 250N000013 HC RX MED GY IP 250 OP 250 PS 637: Performed by: PHYSICIAN ASSISTANT

## 2021-07-30 PROCEDURE — 250N000013 HC RX MED GY IP 250 OP 250 PS 637: Performed by: STUDENT IN AN ORGANIZED HEALTH CARE EDUCATION/TRAINING PROGRAM

## 2021-07-30 PROCEDURE — 99232 SBSQ HOSP IP/OBS MODERATE 35: CPT | Mod: GC | Performed by: PSYCHIATRY & NEUROLOGY

## 2021-07-30 RX ADMIN — LORAZEPAM 1 MG: 1 TABLET ORAL at 08:26

## 2021-07-30 RX ADMIN — METOPROLOL SUCCINATE 75 MG: 25 TABLET, EXTENDED RELEASE ORAL at 08:24

## 2021-07-30 RX ADMIN — BENZTROPINE MESYLATE 1 MG: 1 TABLET ORAL at 20:08

## 2021-07-30 RX ADMIN — AMLODIPINE BESYLATE 7.5 MG: 2.5 TABLET ORAL at 08:28

## 2021-07-30 RX ADMIN — LORAZEPAM 1 MG: 1 TABLET ORAL at 14:19

## 2021-07-30 RX ADMIN — LORAZEPAM 1 MG: 1 TABLET ORAL at 20:07

## 2021-07-30 RX ADMIN — BENZTROPINE MESYLATE 1 MG: 1 TABLET ORAL at 08:24

## 2021-07-30 RX ADMIN — LOSARTAN POTASSIUM 100 MG: 100 TABLET, FILM COATED ORAL at 08:27

## 2021-07-30 RX ADMIN — OLANZAPINE 20 MG: 20 TABLET, ORALLY DISINTEGRATING ORAL at 20:07

## 2021-07-30 ASSESSMENT — ACTIVITIES OF DAILY LIVING (ADL)
ORAL_HYGIENE: PROMPTS
DRESS: SCRUBS (BEHAVIORAL HEALTH)
HYGIENE/GROOMING: PROMPTS
LAUNDRY: UNABLE TO COMPLETE
ORAL_HYGIENE: PROMPTS
HYGIENE/GROOMING: PROMPTS
DRESS: SCRUBS (BEHAVIORAL HEALTH)

## 2021-07-30 NOTE — PLAN OF CARE
Problem: Psychomotor Impairment (Psychotic Signs/Symptoms)  Goal: Improved Psychomotor Symptoms (Psychotic Signs/Symptoms)  Outcome: No Change    Pt was isolative and withdrawn to her room, resting in bed. She is calm and pleasant upon approach. Flat and blunted affects. She declined to come out of her room to socialize with staff and peers.  She denies all mental health symptoms. She took scheduled medication without any issue, and no side effects were observed or reported. Vital signs this evening 151/72, 71.  Her appetite is good; she is eating and drinking well.

## 2021-07-30 NOTE — PLAN OF CARE
Patient was observed smiling at various times this shift.  She did have visit with staff from her group home. She said that it went well.  She was in her room for most of shift. She denies SI/HI and hallucinations.  She declined opportunity to go outside to garden.  She was encouraged to plan to go outside tomorrow. She was also encouraged to attend groups.  Patient took all scheduled meds cooperatively.

## 2021-07-30 NOTE — PROGRESS NOTES
"[facility], [place]   Psychiatric Progress Note  Hospital Day: 107        Interim History:   The patient's care was discussed with the treatment team during the daily team meeting and/or staff's chart notes were reviewed. Intermittent BP elevation otherwise vitals WNL. She complained of some left ankle discomfort this morning. Adherent with prescribed medications. No acute events overnight. She denies psychiatric symptoms or medical concerns. She is eating and sleeping well. She has remained mostly isolative to her room though did walk in the milieu for a period of time.      Michelle was resting in her room prior to the interview.  She reports that she is doing \"good\" today and that she slept well. She denies any current psychiatric symptoms including SI/HI/AH/VH.  She feels that her memory and thinking is clear. She shared that the meeting with  staff went well, and affect brightened when discussing this. She said that they informed her that \"[facility] hasn't paid the lease yet.\" Discussed [place] assessment on 8/12 and need for funding prior to discharge to . She also said that they informed her that she would have her own room and that she could cook any time she wants. She was pleased to learn this information. She notes overall improvement in ankle pain and swelling since she is no longer doing her exercises in the mornings. She is willing to use ice packs to help manage swelling. Pain has improved. She was also encouraged to go to [place] this weekend, and stated that she would consider.      Suicidal ideation: denies current or recent suicidal ideation or behaviors.    Homicidal ideation: denies current or recent homicidal ideation or behaviors.    Psychotic symptoms: Patient denies AH, VH, paranoia, delusions.     Medication side effects reported: She denies sedation today. She denies any dizziness or lightheadedness.     Acute medical concerns: pain in left ankle "     There is not tenderness, erythema or induration of either of her calves.     Other issues reported by patient: Patient had no further questions or concerns.           Medications:       amLODIPine  7.5 mg Oral Daily     benztropine  1 mg Oral BID     LORazepam  1 mg Oral TID    Or     LORazepam  1 mg Intramuscular TID     losartan  100 mg Oral Daily     metoprolol succinate ER  75 mg Oral Daily     OLANZapine zydis  20 mg Oral At Bedtime    Or     OLANZapine  10 mg Intramuscular At Bedtime          Allergies:     Allergies   Allergen Reactions     Haldol [Haloperidol]      Patient previously tolerated haldol, though developed oculogyric crises during hospital stay on 4/26/21 on total daily dose of 10 mg.     Lisinopril Cough          Labs:   No results found for this or any previous visit (from the past 24 hour(s)).       Psychiatric Examination:     BP (!) 151/72   Pulse 71   Temp 97.2  F (36.2  C)   Resp 16   Wt 70.4 kg (155 lb 4.8 oz)   SpO2 99%   BMI 26.66 kg/m    Weight is 155 lbs 4.8 oz  Body mass index is 26.66 kg/m .    Weight over time:  Vitals:    05/25/21 0850 06/15/21 0811 06/16/21 1605 06/19/21 0859   Weight: 71 kg (156 lb 8 oz) 69.8 kg (153 lb 12.8 oz) 70.5 kg (155 lb 8 oz) 71.4 kg (157 lb 8 oz)    06/21/21 0805 06/22/21 0839 06/24/21 0800 07/02/21 0835   Weight: 69.5 kg (153 lb 3.2 oz) 69.3 kg (152 lb 11.2 oz) 70.7 kg (155 lb 12.8 oz) 70.7 kg (155 lb 12.8 oz)    07/03/21 0844 07/06/21 0838 07/10/21 0845 07/16/21 0821   Weight: 69.6 kg (153 lb 8 oz) 70.9 kg (156 lb 4.8 oz) 69.8 kg (153 lb 14.4 oz) 69.3 kg (152 lb 12.8 oz)    07/17/21 0830   Weight: 70.4 kg (155 lb 4.8 oz)       Orthostatic Vitals       Most Recent      Sitting Orthostatic /84 07/29 0800    Sitting Orthostatic Pulse (bpm) 82 07/29 0800    Standing Orthostatic /86 07/29 0800    Standing Orthostatic Pulse (bpm) 88 07/29 0800            Cardiometabolic risk assessment. 07/29/21      Reviewed patient profile for  "cardiometabolic risk factors    Date taken /Value  REFERENCE RANGE   Abdominal Obesity  (Waist Circumference)   See nursing flowsheet Women ?35 in (88 cm)   Men ?40 in (102 cm)      Triglycerides  Triglycerides   Date Value Ref Range Status   10/02/2020 86 <=149 mg/dL Final   10/19/2019 117 <150 mg/dL Final       ?150 mg/dL (1.7 mmol/L) or current treatment for elevated triglycerides   HDL cholesterol  HDL Cholesterol   Date Value Ref Range Status   10/19/2019 56 >49 mg/dL Final     Direct Measure HDL   Date Value Ref Range Status   10/02/2020 45 (L) >=50 mg/dL Final   ]   Women <50 mg/dL (1.3 mmol/L) in women or current treatment for low HDL cholesterol  Men <40 mg/dL (1 mmol/L) in men or current treatment for low HDL cholesterol     Fasting plasma glucose (FPG) Lab Results   Component Value Date     07/02/2021      FPG ?100 mg/dL (5.6 mmol/L) or treatment for elevated blood glucose   Blood pressure  BP Readings from Last 3 Encounters:   07/29/21 (!) 151/72   06/04/19 131/71   04/26/19 164/86    Blood pressure ?130/85 mmHg or treatment for elevated blood pressure   Family History  See family history     Appearance: dressed in hospital scrubs, appeared reported age, unkempt hair   Attitude: cooperative with interview, not irritable   Eye Contact: improved, looks up more during the interview   Mood: \"good\"   Affect:  Somewhat blunted, not agitated and tense. Brightens and smiles at times   Speech: accented, clear and coherent.    Language: no obvious receptive or expressive deficits  Psychomotor, Gait, Musculoskeletal: No abnormal movements noted while seated, did sit in an awkward/unnatural position during interview and held posture throughout length of interview.   Thought Process: goal-oriented, linear, concrete   Associations:  No loosening of associations present  Thought Content:  Did not appear to be responding to internal stimuli.  Insight:   poor - slightly improved   Judgement:  fair  Oriented to: " person, place, time/date   Attention Span and Concentration: attentive to conversation though at times stares ahead and does not respond to questions.  Recent and Remote Memory: stable, some difficulty recalling events   Fund of Knowledge:  normal    Clinical Global Impressions  First:  Considering your total clinical experience with this particular patient population, how severe are the patient's symptoms at this time?: 7 (04/16/21 1428)  Compared to the patient's condition at the START of treatment, this patient's condition is: 4 (04/16/21 1428)  Most recent:  Considering your total clinical experience with this particular patient population, how severe are the patient's symptoms at this time?: 7 (07/22/21 0941)  Compared to the patient's condition at the START of treatment, this patient's condition is: 3 (07/22/21 0941)           Precautions:     Behavioral Orders   Procedures     Cheeking Precautions (behavioral units)     Patient Observed swallowing PO medications; Patient asked to drink water after swallowing medication; Patient in Staff line of sight for 15 minutes after medication given; Mouth checks after PO administration (patient asked to open mouth and stick out their tongue).     Code 3     For walks in the [place] with staff and per staff discretion     Electroconvulsive therapy     Series of up to 12 treatments. Begin Date: 5/26/21     Treating Psychiatrist providing ECT:  Dr. Lubin     Notified on:  5/21/21     Electroconvulsive therapy     As long as we get the green light from risk management and pt is medically cleared, begin ECT every Monday, Wednesday, and Friday     Electroconvulsive therapy     Series of up to 12 treatments. Begin Date: 5/25/21     Treating Psychiatrist providing ECT:  Amee     Notified on:  5/24/21     Elopement precautions     Fall precautions     Mcgrath Calderon     Routine Programming     As clinically indicated     Status 15     Every 15 minutes.          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 Symptoms and presentation at this time is most consistent with Schizoaffective Disorder, Bipolar Type. Obtained most recent medication regimen from patient's ACT team, and regimen was initially restarted. Pt is committed. Petitioned for Mcgrath Calderon was filed and granted due to lack of improvement and side effects from medications. Inpatient psychiatric hospitalization is warranted at this time for safety, stabilization, possible adjustment in medications and development of a safe discharge plan.      Hospital Course:  On admission, PTA medications were restarted. However, Michelle had been declining all scheduled medications despite significant encouragement from staff and provider. Psychiatric emergency declared on 4/20 due to aggression toward others in context of severe psychosis and suspected excited catatonia. Ativan 1 mg TID was also added. Discontinued PTA Invega, Zyprexa, and thorazine on 4/20 due to consistent refusal.      On 4/26, it was noted that patient frequently had upward gaze while walking up and down the unit. She did not appear to be distressed. She reported that she was looking at \"my god.\" It was determined to be oculogyric crisis secondary to IM haloperidol. Haldol was subsequently discontinued and scheduled Zyprexa was initiated on an emergency basis. Oral Cogentin was also scheduled, though patient declined. On the evening of 4/26, patient's " "gaze was fixed in upward position for several hours and she appeared to be experiencing discomfort. IM Cogentin was administered with noted resolution. Partial improvements were observed after switching to scheduled Zyprexa. After patient improved, she was more receptive to reinitiating oral Invega, which was initiated on 5/3 and titrated to PTA dose of 9 mg daily on 5/10. Plan was for ACT team to bring in loading dose of Invega Sustenna. Patient had signs of oculogyric crisis again with Invega. Oral dose decreased to 6 mg after this. Invega was stopped on 5/14 due to ongoing signs of problems related to eye movement and concerns for oculogyric crisis.     Overall improvement in patient's agitation and suspected catatonia noted on 4/30 after patient accepted two doses of Ativan. She began declining Ativan again with noted decompensation. Patient began accepting, however, was deemed not to have the capacity to consent to treatment with Ativan. She does not believe she has a mental illness, including catatonia. She does not fully understand risks associated with inadequate treatment of catatonia. Discussed with her son who is acting as surrogate decision maker and he is in full support of forced scheduled IM Ativan if patient declines oral formulation. Also consulted with our legal team prior to backing oral Ativan with IM.      Ativan was increased on 5/19 due to re-emerging evidence of catatonia (long periods of staring, repetitive movements, echolalia, mutism). Meeting was held with ACT team on 5/20, including ACT team psychiatrist, Dr. Pedraza. He said that he is in \"full support\" of plan to pursue Mcgrath Kohler and ECT at this time. He does feel that in the past she has been discharged from the hospital while still quite symptomatic. He is hoping that ECT will be effective and that with improvement, Michelle would be more receptive to clozapine and weekly blood draws. He said that ideally she would transition to an " IRTS before going back to her apartment, but also understands there may be some barriers (I.e. pt's willingness, financial concerns, etc).      ECT consult placed. Please see consult note by Dr. Lubin on 5/21 for details. Alcides Kohler was approved on 5/24 and patient was medically cleared on 5/24. ECT initiated on 5/26. She was noted to have a short seizure during ECT on 6/4. Staff note that patient appears more disoriented with memory impairment and sedation on days of ECT. This was noted on my examination again today. Improvements noted in mood, affect, social interactions, paranoia, agitation since initiation of ECT. Reduced Ativan on 6/5 due to sedative effects. Relayed concerns about memory impairment and confusion with Dr. Lubin. Recommended attempting unilateral ECT, which he agreed to do. First unilateral ECT on 6/7. ECT was held on 6/11 and 6/14 due to memory impairment. We will plan to hold it again tomorrow (6/16). There is ongoing discussion regarding whether there is a component of catatonia contributing to her current presentation but this is less likely since it worsened after ECT was started.  Given her level of cognitive impairment and our belief that this is due to ECT, we will not pursue any further treatments at this time. Since we have held ECT (last treatment on 6/9), her cognitive impairment has slightly improved (more oriented).      Michelle initially appeared to be decompensating somewhat when ECT was held, though her cognitive impairment has gradually improved. If symptoms of psychosis re-emerge, it may be worth starting clozapine, which is something that has previously been considered by her ACT team. Her Hatch order would need to be amended as clozapine is not one of the medications listed. ANC obtained on 6/16 was 1800/microL. Per conversation with pharmacy, neutropenia has been associated with olanzapine and agranulocytosis has been associated with olanzapine, lorazepam, metoprolol, and  hydralazine and hematologic labs have been monitored. Upon re-check, ANC was 3700/microL on 6/21/21. At this time, the primary symptoms we are observing are cognitive deficits and inability to care for self, which we would not address by changing her antipsychotic medication.     Michelle's cognitive impairment has been steadily improving since holding ECT treatments, however it is possible that lorazepam is also playing a role in her cognitive impairment. Given no signs of re-emerging catatonia, a gradual lorazepam taper (decreasing by 0.5 mg) was initiated on 6/28/21 to monitor for potential improvements with regard to memory impairment.  On 7/7, Michelle reported an incident of oculogyric crisis and Cogentin 1 mg BID was initiated with no noted changes in cognition.      Michelle has remained focused on discharge. The team is working with Michelle's ACT team to to establish a safe discharge plan with options including moving to a group home vs home with her ACT team and additional in home supports via CADI services. We are concerned that Michelle would have difficulty taking her medications as prescribed if she were to return home without her ACT team and additional services. This is evidenced by impairments noted in cognitive assessment by OT specialist on 7/1. MOCA score was a 13/30 and CPT score was a 4.7. Physician's statement in support of guardianship was completed on 7/19/21.  Patient's son is pursuing guardianship. Patient became quite agitated on 7/16 when  staff from [facility]  came to meet with her a second time. She did not allow staff members to enter her room and stated that she will not be going to a group home.      Due to daytime sedation, on 7/26/21, lorazepam evening dose decreased from 2 mg to 1 mg. On 7/27/21 transitioned Zyprexa from 10 mg BID to 5 mg QAM + 15 mg QPM and had less daytime sedation with this change and no emergence of symptoms concerning for orthostatic hypotension.  She attended  her emergency guardianship hearing on 7/28.  Due to ongoing daytime sedation, will trial consolidation of Zyprexa dose at bedtime starting on 7/30.       Target psychiatric symptoms and interventions:   - Continue 1 mg PO or IM TID Patient may not decline.   - Consolidate Zyprexa from 5 mg QAM + 15 mg QPM to 20 mg at bedtime starting today  Hatch in place. May consider increasing further though holding off given re-emerging catatonic sx and borderline prolonged QTc      - Continue Cogentin 1 mg PO BID with additional 2 mg IM daily prn for evidence of acute dystonic reaction or oculogyric crisis  -Continue hydroxyzine 25-50 mg q4h prn for acute anxiety  -Continue Trazodone 50 mg at bedtime prn for sleep disturbances  -Continue Zyprexa 10 mg TID prn for severe agitation  -Continue Ativan/Benadryl q4h prn for agitation. WOULD GIVE PRN ATIVAN FIRST FOR AGITATION unless it is after 5 pm on day prior to scheduled ECT     Occupational Therapy Consult Placed to evaluate cognitive functioning/ability to care for self in home environment, ability to manage medications. See note on 7/1 for details.      ECT: Discontinued due to significant cognitive impairment. Please see note dated 7/12/21 for additional details.      Acute Medical Problems and Treatments:  Left ankle pain, improved  - Add ice packs prn    HTN, improved: Patient is now adherent with medication regimen.   - Metoprolol succinate ER 75 mg   - Cozaar 100 mg daily  - Amlodipine 7.5 mg daily  - IM discontinued hydralazine prn  - Switched BP checks from QID to BID on 7/13 given overall improvement  - Please see note from IM dated 5/3, 5/6, 5/24, 6/20 and 6/30/21.  - Obtained EKG and routine labs on 5/21 in context of pt declining vital sign checks and anti-hypertensives and recently elevated BPs. Reviewed labs and discussed EKG findings with IM on 5/21. No urgent concerns, though IM should be notified if pt develops acute medical concerns (I.e. heart palpitations,  "SOB, changes in speech, AMS, confusion, CP, HA, changes in vision).    - Per 6/20 IM note: \"Please notify IM if BP is persistently severely elevated and requiring frequent PRN hydralazine\".  - Internal medicine consulted again on 6/28 due to consistent use of hydralazine over the past week. Metoprolol increased to 75 mg daily then to 100 mg and amlodipine 5 mg was added on 6/30. Amlodipine increased to 7.5 mg daily on 7/5.      Recent history of BRANDON:  - Attempted to repeat BMP multiple times though patient consistently declining blood draw  - Encourage fluids     For previous medical concerns, please see note dated 7/12/21.     Behavioral/Psychological/Social:  - Encourage unit programming  - Patient is now Code 3 status and can take walks in the [place] with staff and security present per staff discretion. This appears to be quite therapeutic for her.      Safety:  - Continue precautions as noted above  - Status 15 minute checks  - Safety precautions include: assault and elopement precautions  - Continue precautions as noted above     Legal Status: Committed as MI with Hatch in place for Haldol, Zyprexa, Invega, and Thorazine through [facility]. Filed Alcides Kohler through [facility] and approved on 5/24/21. Physician's statement in support of guardianship was completed on 7/19/21. Court hearing for guardianship is scheduled for today 7/28/21 at 8:30 AM.     Disposition Plan   Reason for ongoing admission: poses an imminent risk to self and is unable to care for self due to severe psychosis or tad  Discharge location: Group home (needs CADI waiver). CPT assessment done by OT (Tania queen) on 7/1.  Please see note for details. [place] deferred while her son is pursuing emergency guardianship   Discharge Medications: not ordered  Follow-up Appointments: not scheduled     Gabby Zaman MD  [facility]                     "

## 2021-07-30 NOTE — PLAN OF CARE
Assessment/Intervention/Current Symtoms and Care Coordination  -Chart review  -Rounded with team, addressed patient needs/concerns  [facility] [place] owner and staff person visited patient today.     Current Symptoms include the following: Patient's affect bright and she appeared happy to have visitors from the group  home she will be living at upon discharge from the hospital.     Discharge Plan or Goal  Pending stabilization & development of a safe discharge plan.  Considerations include: [facility]    Barriers to Discharge  Patient requires further psychiatric stabilization due to current symptomology    Referral Status  CTC made referral to [facility]    Legal Status  Committed/Hatch/Mcgrath colin through [facility]

## 2021-07-30 NOTE — PROVIDER NOTIFICATION
07/30/21 0600   Sleep/Rest/Relaxation   Sleep/Rest/Relaxation (WDL) WDL   Sleep/Rest/Relaxation appears asleep   Night Time # Hours 6 hours     NOC Shift Report    Pt in bed at beginning of shift, breathing quiet and unlabored. Pt slept through shift. Pt slept 6 hours.     No pt complaints or concerns at this time. No PRNs given. Will continue to monitor.

## 2021-07-30 NOTE — PLAN OF CARE
Writer received an email from [place] [employer]  and patient is scheduled for [place] 8/12/21 @ 10am.  Patient's son has been granted emergency guardianship and will be on a conference call with ACT Team CM/RN, CTC,  patient and [place] [employer] .  Once this meeting is complete the process to obtain [facility] funding will move forward.

## 2021-07-31 PROCEDURE — 250N000013 HC RX MED GY IP 250 OP 250 PS 637: Performed by: STUDENT IN AN ORGANIZED HEALTH CARE EDUCATION/TRAINING PROGRAM

## 2021-07-31 PROCEDURE — 250N000013 HC RX MED GY IP 250 OP 250 PS 637: Performed by: PHYSICIAN ASSISTANT

## 2021-07-31 PROCEDURE — 124N000002 HC R&B MH UMMC

## 2021-07-31 RX ADMIN — BENZTROPINE MESYLATE 1 MG: 1 TABLET ORAL at 20:52

## 2021-07-31 RX ADMIN — AMLODIPINE BESYLATE 7.5 MG: 2.5 TABLET ORAL at 08:25

## 2021-07-31 RX ADMIN — LORAZEPAM 1 MG: 1 TABLET ORAL at 20:52

## 2021-07-31 RX ADMIN — METOPROLOL SUCCINATE 75 MG: 25 TABLET, EXTENDED RELEASE ORAL at 08:25

## 2021-07-31 RX ADMIN — BENZTROPINE MESYLATE 1 MG: 1 TABLET ORAL at 08:26

## 2021-07-31 RX ADMIN — LORAZEPAM 1 MG: 1 TABLET ORAL at 14:12

## 2021-07-31 RX ADMIN — LOSARTAN POTASSIUM 100 MG: 100 TABLET, FILM COATED ORAL at 08:26

## 2021-07-31 RX ADMIN — LORAZEPAM 1 MG: 1 TABLET ORAL at 08:26

## 2021-07-31 RX ADMIN — OLANZAPINE 20 MG: 20 TABLET, ORALLY DISINTEGRATING ORAL at 20:52

## 2021-07-31 ASSESSMENT — ACTIVITIES OF DAILY LIVING (ADL)
ORAL_HYGIENE: PROMPTS
ORAL_HYGIENE: PROMPTS
HYGIENE/GROOMING: PROMPTS
DRESS: SCRUBS (BEHAVIORAL HEALTH)
HYGIENE/GROOMING: PROMPTS
DRESS: SCRUBS (BEHAVIORAL HEALTH)

## 2021-07-31 NOTE — PLAN OF CARE
Patient was withdrawn and tired, resting in her bed majority of shift. When approached she is pleasant but tired. She denies hallucinations, suicidal thoughts or any mental health symptoms. She took all scheduled medications cooperatively. Over weekend if BP is consistently >140s/90s notify IM. She has a slight swollen ankle and enjoys a ice pack on it. She had relief from ice pack. Medication compliant and denies SI/SIB. Continue to monitor and assess.

## 2021-07-31 NOTE — PLAN OF CARE
Patient is stating she is ready for discharge.  She continues to remain in her room most of the time. She said she doesn't want to be around some of the other patients.  Pt continues to complain of discomfort in her left ankle.  Ice was applied three times throughout the shift. She said that it helps to put the ice on it. She was asked if it is difficult for her to walk because of the pain. She paused and then said she can walk but it is better to just rest in bed.  She declined the opportunity to go out to the garden in a wheel chair.  She specifically said she doesn't want to use a wheel chair.  Pt did thank nurse for the offer to take her to the garden. Staff will offer her the opportunity again tomorrow.  Pt took her scheduled meds cooperatively.

## 2021-07-31 NOTE — PROVIDER NOTIFICATION
07/31/21 0600   Sleep/Rest/Relaxation   Sleep/Rest/Relaxation (WDL) Ex   Sleep/Rest/Relaxation difficulty falling asleep   Night Time # Hours 4.5 hours     NOC Shift Report    Pt did get some sleep and was observed breathing quiet and unlabored whenever sleeping. Pt once got up to use bathroom and she was there for extended period but reported no distress. Pt had 4.5 hrs of sleep. Pt did not need any PRNs all shift.

## 2021-08-01 PROCEDURE — 250N000013 HC RX MED GY IP 250 OP 250 PS 637: Performed by: STUDENT IN AN ORGANIZED HEALTH CARE EDUCATION/TRAINING PROGRAM

## 2021-08-01 PROCEDURE — 250N000013 HC RX MED GY IP 250 OP 250 PS 637: Performed by: PHYSICIAN ASSISTANT

## 2021-08-01 PROCEDURE — 124N000002 HC R&B MH UMMC

## 2021-08-01 RX ADMIN — AMLODIPINE BESYLATE 7.5 MG: 2.5 TABLET ORAL at 08:14

## 2021-08-01 RX ADMIN — METOPROLOL SUCCINATE 75 MG: 25 TABLET, EXTENDED RELEASE ORAL at 08:15

## 2021-08-01 RX ADMIN — LORAZEPAM 1 MG: 1 TABLET ORAL at 08:16

## 2021-08-01 RX ADMIN — OLANZAPINE 20 MG: 20 TABLET, ORALLY DISINTEGRATING ORAL at 19:39

## 2021-08-01 RX ADMIN — LORAZEPAM 1 MG: 1 TABLET ORAL at 19:39

## 2021-08-01 RX ADMIN — BENZTROPINE MESYLATE 1 MG: 1 TABLET ORAL at 08:15

## 2021-08-01 RX ADMIN — BENZTROPINE MESYLATE 1 MG: 1 TABLET ORAL at 19:39

## 2021-08-01 RX ADMIN — LORAZEPAM 1 MG: 1 TABLET ORAL at 14:45

## 2021-08-01 RX ADMIN — LOSARTAN POTASSIUM 100 MG: 100 TABLET, FILM COATED ORAL at 08:14

## 2021-08-01 ASSESSMENT — ACTIVITIES OF DAILY LIVING (ADL)
ORAL_HYGIENE: PROMPTS
HYGIENE/GROOMING: PROMPTS
ORAL_HYGIENE: PROMPTS
DRESS: SCRUBS (BEHAVIORAL HEALTH)
HYGIENE/GROOMING: PROMPTS
DRESS: SCRUBS (BEHAVIORAL HEALTH)

## 2021-08-01 NOTE — PROGRESS NOTES
"   08/01/21 0805   Vital Signs   Temp 98.1  F (36.7  C)   Temp src Tympanic   Pulse Rate Source Monitor   BP Location Right arm   Sitting Orthostatic BP   Sitting Orthostatic /84   Sitting Orthostatic Pulse 74 bpm   Standing Orthostatic BP   Standing Orthostatic /92   Standing Orthostatic Pulse 84 bpm   Oxygen Therapy   SpO2 97 %   O2 Device None (Room air)   Pain/Comfort   Patient Currently in Pain denies   Height and Weight   Weight   (refused daily weight (\"I did it yesterday\"))     RN informed.   "

## 2021-08-01 NOTE — PLAN OF CARE
Problem: Cognitive Impairment (Psychotic Signs/Symptoms)  Goal: Optimal Cognitive Function (Psychotic Signs/Symptoms)  Outcome: No Change    Pt was isolative and withdrawn to her room. She is calm and pleasant upon approach. She refused to come out of her room to socialize with staff and peers. She continues to complain of discomfort/pain in her left ankle, and an ice pack was applied. She took her scheduled meds without any issue. Vital sign this evening 151/78. Her appetite is good; she is eating and drinking well.  No SI/SIB.

## 2021-08-01 NOTE — PLAN OF CARE
Patient remains in her room majority of shift.  She was compliant with vital signs.  Pt took her scheduled meds cooperatively.  Pt agreed to allow writer/RN to trim her toenails which were in need of attention. Patient ate with good appetite for both breakfast and lunch.  Patient denies suicide ideation. ALso denies hallucinations.

## 2021-08-01 NOTE — PROVIDER NOTIFICATION
08/01/21 0600   Sleep/Rest/Relaxation   Sleep/Rest/Relaxation (WDL) Ex   Sleep/Rest/Relaxation unable to stay asleep   Night Time # Hours 5 hours     NOC Shift Report    Pt in bed at beginning of shift, breathing quiet and unlabored. Pt did get up later and was seen sitting on her bed rocking. She slept 5 hours. No pt complaints or concerns at this time. No PRNs given. Will continue to monitor.

## 2021-08-02 LAB — SARS-COV-2 RNA RESP QL NAA+PROBE: NEGATIVE

## 2021-08-02 PROCEDURE — 124N000002 HC R&B MH UMMC

## 2021-08-02 PROCEDURE — 250N000013 HC RX MED GY IP 250 OP 250 PS 637: Performed by: PHYSICIAN ASSISTANT

## 2021-08-02 PROCEDURE — 99232 SBSQ HOSP IP/OBS MODERATE 35: CPT | Performed by: PSYCHIATRY & NEUROLOGY

## 2021-08-02 PROCEDURE — 250N000013 HC RX MED GY IP 250 OP 250 PS 637: Performed by: STUDENT IN AN ORGANIZED HEALTH CARE EDUCATION/TRAINING PROGRAM

## 2021-08-02 PROCEDURE — U0003 INFECTIOUS AGENT DETECTION BY NUCLEIC ACID (DNA OR RNA); SEVERE ACUTE RESPIRATORY SYNDROME CORONAVIRUS 2 (SARS-COV-2) (CORONAVIRUS DISEASE [COVID-19]), AMPLIFIED PROBE TECHNIQUE, MAKING USE OF HIGH THROUGHPUT TECHNOLOGIES AS DESCRIBED BY CMS-2020-01-R: HCPCS | Performed by: PSYCHIATRY & NEUROLOGY

## 2021-08-02 RX ADMIN — BENZTROPINE MESYLATE 1 MG: 1 TABLET ORAL at 08:44

## 2021-08-02 RX ADMIN — LORAZEPAM 1 MG: 1 TABLET ORAL at 19:43

## 2021-08-02 RX ADMIN — LORAZEPAM 1 MG: 1 TABLET ORAL at 14:10

## 2021-08-02 RX ADMIN — BENZTROPINE MESYLATE 1 MG: 1 TABLET ORAL at 19:45

## 2021-08-02 RX ADMIN — AMLODIPINE BESYLATE 7.5 MG: 2.5 TABLET ORAL at 08:44

## 2021-08-02 RX ADMIN — OLANZAPINE 20 MG: 20 TABLET, ORALLY DISINTEGRATING ORAL at 19:43

## 2021-08-02 RX ADMIN — METOPROLOL SUCCINATE 75 MG: 25 TABLET, EXTENDED RELEASE ORAL at 08:43

## 2021-08-02 RX ADMIN — LOSARTAN POTASSIUM 100 MG: 100 TABLET, FILM COATED ORAL at 08:43

## 2021-08-02 RX ADMIN — LORAZEPAM 1 MG: 1 TABLET ORAL at 08:44

## 2021-08-02 ASSESSMENT — ACTIVITIES OF DAILY LIVING (ADL)
HYGIENE/GROOMING: INDEPENDENT;PROMPTS
DRESS: SCRUBS (BEHAVIORAL HEALTH);INDEPENDENT
DRESS: INDEPENDENT
ORAL_HYGIENE: INDEPENDENT
HYGIENE/GROOMING: INDEPENDENT
LAUNDRY: UNABLE TO COMPLETE
ORAL_HYGIENE: INDEPENDENT

## 2021-08-02 NOTE — PLAN OF CARE
BEHAVIORAL TEAM DISCUSSION    Participants:  Dr Zaman, Jadyn King, RN, VALERIE Sneed, Sergio, TRAVON  Progress: Continuing to assess  Anticipated length of stay: Unknown at this time.   Continued Stay Criteria/Rationale:  Patient has been accepted to a group home but needs the [facility] Funding in place prior to being admitted.  Patient has [place] Choice Assessment 8/12/21 and once this is completed and in process she will be admitted to [facility].   Medical/Physical:  Per Internal Medicine  Precautions:   Behavioral Orders   Procedures     Cheeking Precautions (behavioral units)     Patient Observed swallowing PO medications; Patient asked to drink water after swallowing medication; Patient in Staff line of sight for 15 minutes after medication given; Mouth checks after PO administration (patient asked to open mouth and stick out their tongue).     Code 3     For walks in the [place] with staff and per staff discretion     Electroconvulsive therapy     Series of up to 12 treatments. Begin Date: 5/26/21     Treating Psychiatrist providing ECT:  Dr. Lubin     Notified on:  5/21/21     Electroconvulsive therapy     As long as we get the green light from risk management and pt is medically cleared, begin ECT every Monday, Wednesday, and Friday     Electroconvulsive therapy     Series of up to 12 treatments. Begin Date: 5/25/21     Treating Psychiatrist providing ECT:  Amee     Notified on:  5/24/21     Elopement precautions     Fall precautions     Mcgrath Calderon     Routine Programming     As clinically indicated     Status 15     Every 15 minutes.     Plan: Attending psychiatrist will meet with patient daily to assess medication needs and to discuss treatment plan. During hospitalization patient will be encouraged to attend therapy groups and participate in unit programming. Patient will continue to work with her [facility] ACT Team. CTC will coordinate disposition and aftercare plan.   Rationale for change in  precautions or plan: No change

## 2021-08-02 NOTE — PLAN OF CARE
"Problem: Behavioral Health Plan of Care  Goal: Optimized Coping Skills in Response to Life Stressors  Outcome: No Change  Goal: Develops/Participates in Therapeutic [place] to Support Successful Transition  Outcome: No Change  Intervention: Foster Therapeutic [place]    Patient is flat, blunted, and guarded.  Patient is isolative to room today.  At initial approach patient had limited engagement with writer.  Patient re-approached later and although she did appear irritable, she did answer all assessment questions, take medications, and cooperative with routine covid test.  She denies anxiety, depression, pain, SI, HI, SIB, and hallucinations.  She states, \"I'm good.\"  She is medication complaint.  No aggressive behaviors are observed.  Patient remains safe on unit.  Will continue to monitor.  Nedra Cortez RN   "

## 2021-08-02 NOTE — PLAN OF CARE
Problem: Sleep Disturbance  Goal: Adequate Sleep/Rest  Outcome: No Change     Night Shift Summary (8/1/21 into 08/02/21)    Pt asleep at start of shift. Breathing quiet and unlabored. She kept her TV on with the volume very loud.     Pt had no c/o pain or discomfort during the HS.     Appears to have slept 6 hours.     Pt on INTRUSIVE, CHEEKING, ELOPEMENT and FALL precautions in addition to single room order. Any related events noted above.     Will continue to monitor and assess.

## 2021-08-02 NOTE — PLAN OF CARE
Assessment/Intervention/Current Symtoms and Care Coordination  -Chart review  -Rounded with team, addressed patient needs/concerns  Met with patient to check-in and see how she is doing.     Current Symptoms include the following: Bright affect but still isolative to room and does not  participate in unit programming.     Discharge Plan or Goal  Pending stabilization & development of a safe discharge plan.  Considerations include:  [facility].    Barriers to Discharge  Patient requires further psychiatric stabilization due to current symptomology    Referral Status  [facility]    Legal Status  Committed/Hatch/Mcgrath colin through [facility] [place]

## 2021-08-02 NOTE — PROGRESS NOTES
"[facility], [place]   Psychiatric Progress Note  Hospital Day: 110        Interim History:   The patient's care was discussed with the treatment team during the daily team meeting and/or staff's chart notes were reviewed. Intermittent BP elevation otherwise vitals WNL. She complained of some left ankle discomfort this morning. Adherent with prescribed medications. No acute events overnight. She denies psychiatric symptoms or medical concerns. She is eating and sleeping well. She has remained mostly isolative to her room though did walk in the milieu for a period of time.      Michelle was resting in her room prior to the interview.  She reports that she is doing \"good\" today and that she slept well. She denies any current psychiatric symptoms including SI/HI/AH/VH.  She feels that her memory and thinking is clear. Stated that swelling/pain in her ankle is improving. She was informed of plan for MNChoice assessment on 8/12 and inability for her to GH until this is completed. She tolerated this update quite well, but added \"You need to get discharge plans prepared now.\" She expressed concerns that there would potentially be more barriers for discharge if we do not start dispo planning now. She was again offered transfer to station 3B (senior unit), but she again stated that her strong preference is to remain on station 12 for the remainder of her stay. Michelle was encouraged to leave her room more frequently and attend some groups.      Suicidal ideation: denies current or recent suicidal ideation or behaviors.    Homicidal ideation: denies current or recent homicidal ideation or behaviors.    Psychotic symptoms: Patient denies AH, VH, paranoia, delusions.     Medication side effects reported: She denies sedation today. She denies any dizziness or lightheadedness.     Acute medical concerns: pain in left ankle improved.    There is not tenderness, erythema or induration of either of her calves. "     Other issues reported by patient: Patient had no further questions or concerns.           Medications:       amLODIPine  7.5 mg Oral Daily     benztropine  1 mg Oral BID     LORazepam  1 mg Oral TID    Or     LORazepam  1 mg Intramuscular TID     losartan  100 mg Oral Daily     metoprolol succinate ER  75 mg Oral Daily     OLANZapine zydis  20 mg Oral At Bedtime    Or     OLANZapine  10 mg Intramuscular At Bedtime          Allergies:     Allergies   Allergen Reactions     Haldol [Haloperidol]      Patient previously tolerated haldol, though developed oculogyric crises during hospital stay on 4/26/21 on total daily dose of 10 mg.     Lisinopril Cough          Labs:   No results found for this or any previous visit (from the past 24 hour(s)).       Psychiatric Examination:     BP (!) 148/82   Pulse 79   Temp 97.8  F (36.6  C) (Tympanic)   Resp 16   Wt 70.3 kg (154 lb 14.4 oz)   SpO2 98%   BMI 26.59 kg/m    Weight is 154 lbs 14.4 oz  Body mass index is 26.59 kg/m .    Weight over time:  Vitals:    05/25/21 0850 06/15/21 0811 06/16/21 1605 06/19/21 0859   Weight: 71 kg (156 lb 8 oz) 69.8 kg (153 lb 12.8 oz) 70.5 kg (155 lb 8 oz) 71.4 kg (157 lb 8 oz)    06/21/21 0805 06/22/21 0839 06/24/21 0800 07/02/21 0835   Weight: 69.5 kg (153 lb 3.2 oz) 69.3 kg (152 lb 11.2 oz) 70.7 kg (155 lb 12.8 oz) 70.7 kg (155 lb 12.8 oz)    07/03/21 0844 07/06/21 0838 07/10/21 0845 07/16/21 0821   Weight: 69.6 kg (153 lb 8 oz) 70.9 kg (156 lb 4.8 oz) 69.8 kg (153 lb 14.4 oz) 69.3 kg (152 lb 12.8 oz)    07/17/21 0830 07/31/21 0804   Weight: 70.4 kg (155 lb 4.8 oz) 70.3 kg (154 lb 14.4 oz)       Orthostatic Vitals       Most Recent      Sitting Orthostatic /84 07/29 0800    Sitting Orthostatic Pulse (bpm) 82 07/29 0800    Standing Orthostatic /86 07/29 0800    Standing Orthostatic Pulse (bpm) 88 07/29 0800            Cardiometabolic risk assessment. 07/29/21      Reviewed patient profile for cardiometabolic risk factors     "Date taken /Value  REFERENCE RANGE   Abdominal Obesity  (Waist Circumference)   See nursing flowsheet Women ?35 in (88 cm)   Men ?40 in (102 cm)      Triglycerides  Triglycerides   Date Value Ref Range Status   10/02/2020 86 <=149 mg/dL Final   10/19/2019 117 <150 mg/dL Final       ?150 mg/dL (1.7 mmol/L) or current treatment for elevated triglycerides   HDL cholesterol  HDL Cholesterol   Date Value Ref Range Status   10/19/2019 56 >49 mg/dL Final     Direct Measure HDL   Date Value Ref Range Status   10/02/2020 45 (L) >=50 mg/dL Final   ]   Women <50 mg/dL (1.3 mmol/L) in women or current treatment for low HDL cholesterol  Men <40 mg/dL (1 mmol/L) in men or current treatment for low HDL cholesterol     Fasting plasma glucose (FPG) Lab Results   Component Value Date     07/02/2021      FPG ?100 mg/dL (5.6 mmol/L) or treatment for elevated blood glucose   Blood pressure  BP Readings from Last 3 Encounters:   08/01/21 (!) 148/82   06/04/19 131/71   04/26/19 164/86    Blood pressure ?130/85 mmHg or treatment for elevated blood pressure   Family History  See family history     Appearance: dressed in hospital scrubs, appeared reported age, unkempt hair   Attitude: cooperative with interview, not irritable   Eye Contact: improved, looks up more during the interview   Mood: \"good\"   Affect:  Somewhat blunted, not agitated and tense. Brightens and smiles at times   Speech: accented, clear and coherent.    Language: no obvious receptive or expressive deficits  Psychomotor, Gait, Musculoskeletal: No abnormal movements noted while seated, did sit in an awkward/unnatural position during interview and held posture throughout length of interview.   Thought Process: goal-oriented, linear, concrete   Associations:  No loosening of associations present  Thought Content:  Did not appear to be responding to internal stimuli.  Insight:   poor - slightly improved   Judgement:  fair  Oriented to: person, place, time/date "   Attention Span and Concentration: attentive to conversation though at times stares ahead and does not respond to questions.  Recent and Remote Memory: stable, some difficulty recalling events   Fund of Knowledge:  normal    Clinical Global Impressions  First:  Considering your total clinical experience with this particular patient population, how severe are the patient's symptoms at this time?: 7 (04/16/21 1428)  Compared to the patient's condition at the START of treatment, this patient's condition is: 4 (04/16/21 1428)  Most recent:  Considering your total clinical experience with this particular patient population, how severe are the patient's symptoms at this time?: 7 (07/22/21 0941)  Compared to the patient's condition at the START of treatment, this patient's condition is: 3 (07/22/21 0941)           Precautions:     Behavioral Orders   Procedures     Cheeking Precautions (behavioral units)     Patient Observed swallowing PO medications; Patient asked to drink water after swallowing medication; Patient in Staff line of sight for 15 minutes after medication given; Mouth checks after PO administration (patient asked to open mouth and stick out their tongue).     Code 3     For walks in the [place] with staff and per staff discretion     Electroconvulsive therapy     Series of up to 12 treatments. Begin Date: 5/26/21     Treating Psychiatrist providing ECT:  Dr. Lubin     Notified on:  5/21/21     Electroconvulsive therapy     As long as we get the green light from risk management and pt is medically cleared, begin ECT every Monday, Wednesday, and Friday     Electroconvulsive therapy     Series of up to 12 treatments. Begin Date: 5/25/21     Treating Psychiatrist providing ECT:  Amee     Notified on:  5/24/21     Elopement precautions     Fall precautions     Mcgrath Calderon     Routine Programming     As clinically indicated     Status 15     Every 15 minutes.          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 Symptoms and presentation at this time is most consistent with Schizoaffective Disorder, Bipolar Type. Obtained most recent medication regimen from patient's ACT team, and regimen was initially restarted. Pt is committed. Petitioned for Mcgrath Calderon was filed and granted due to lack of improvement and side effects from medications. Inpatient psychiatric hospitalization is warranted at this time for safety, stabilization, possible adjustment in medications and development of a safe discharge plan.      Hospital Course:  On admission, PTA medications were restarted. However, Michelle had been declining all scheduled medications despite significant encouragement from staff and provider. Psychiatric emergency declared on 4/20 due to aggression toward others in context of severe psychosis and suspected excited catatonia. Ativan 1 mg TID was also added. Discontinued PTA Invega, Zyprexa, and thorazine on 4/20 due to consistent refusal.      On 4/26, it was noted that patient frequently had upward gaze while walking up and down the unit. She did not appear to be distressed. She reported that she was looking at \"my god.\" It was determined to be oculogyric crisis secondary to IM haloperidol. Haldol was subsequently discontinued and scheduled Zyprexa was initiated on an emergency basis. Oral Cogentin was also scheduled, though patient declined. On the evening of 4/26, patient's gaze was fixed in " "upward position for several hours and she appeared to be experiencing discomfort. IM Cogentin was administered with noted resolution. Partial improvements were observed after switching to scheduled Zyprexa. After patient improved, she was more receptive to reinitiating oral Invega, which was initiated on 5/3 and titrated to PTA dose of 9 mg daily on 5/10. Plan was for ACT team to bring in loading dose of Invega Sustenna. Patient had signs of oculogyric crisis again with Invega. Oral dose decreased to 6 mg after this. Invega was stopped on 5/14 due to ongoing signs of problems related to eye movement and concerns for oculogyric crisis.     Overall improvement in patient's agitation and suspected catatonia noted on 4/30 after patient accepted two doses of Ativan. She began declining Ativan again with noted decompensation. Patient began accepting, however, was deemed not to have the capacity to consent to treatment with Ativan. She does not believe she has a mental illness, including catatonia. She does not fully understand risks associated with inadequate treatment of catatonia. Discussed with her son who is acting as surrogate decision maker and he is in full support of forced scheduled IM Ativan if patient declines oral formulation. Also consulted with our legal team prior to backing oral Ativan with IM.      Ativan was increased on 5/19 due to re-emerging evidence of catatonia (long periods of staring, repetitive movements, echolalia, mutism). Meeting was held with ACT team on 5/20, including ACT team psychiatrist, Dr. Pedraza. He said that he is in \"full support\" of plan to pursue Mcgrath Kohler and ECT at this time. He does feel that in the past she has been discharged from the hospital while still quite symptomatic. He is hoping that ECT will be effective and that with improvement, Michelle would be more receptive to clozapine and weekly blood draws. He said that ideally she would transition to an IRTS before going " back to her apartment, but also understands there may be some barriers (I.e. pt's willingness, financial concerns, etc).      ECT consult placed. Please see consult note by Dr. Lubin on 5/21 for details. Alcides Kohler was approved on 5/24 and patient was medically cleared on 5/24. ECT initiated on 5/26. She was noted to have a short seizure during ECT on 6/4. Staff note that patient appears more disoriented with memory impairment and sedation on days of ECT. This was noted on my examination again today. Improvements noted in mood, affect, social interactions, paranoia, agitation since initiation of ECT. Reduced Ativan on 6/5 due to sedative effects. Relayed concerns about memory impairment and confusion with Dr. Lubin. Recommended attempting unilateral ECT, which he agreed to do. First unilateral ECT on 6/7. ECT was held on 6/11 and 6/14 due to memory impairment. We will plan to hold it again tomorrow (6/16). There is ongoing discussion regarding whether there is a component of catatonia contributing to her current presentation but this is less likely since it worsened after ECT was started.  Given her level of cognitive impairment and our belief that this is due to ECT, we will not pursue any further treatments at this time. Since we have held ECT (last treatment on 6/9), her cognitive impairment has slightly improved (more oriented).      Michelle initially appeared to be decompensating somewhat when ECT was held, though her cognitive impairment has gradually improved. If symptoms of psychosis re-emerge, it may be worth starting clozapine, which is something that has previously been considered by her ACT team. Her Hatch order would need to be amended as clozapine is not one of the medications listed. ANC obtained on 6/16 was 1800/microL. Per conversation with pharmacy, neutropenia has been associated with olanzapine and agranulocytosis has been associated with olanzapine, lorazepam, metoprolol, and hydralazine and  hematologic labs have been monitored. Upon re-check, ANC was 3700/microL on 6/21/21. At this time, the primary symptoms we are observing are cognitive deficits and inability to care for self, which we would not address by changing her antipsychotic medication.     Michelle's cognitive impairment has been steadily improving since holding ECT treatments, however it is possible that lorazepam is also playing a role in her cognitive impairment. Given no signs of re-emerging catatonia, a gradual lorazepam taper (decreasing by 0.5 mg) was initiated on 6/28/21 to monitor for potential improvements with regard to memory impairment.  On 7/7, Michelle reported an incident of oculogyric crisis and Cogentin 1 mg BID was initiated with no noted changes in cognition.      Michelle has remained focused on discharge. The team is working with Michelle's ACT team to to establish a safe discharge plan with options including moving to a group home vs home with her ACT team and additional in home supports via CADI services. We are concerned that Michelle would have difficulty taking her medications as prescribed if she were to return home without her ACT team and additional services. This is evidenced by impairments noted in cognitive assessment by OT specialist on 7/1. MOCA score was a 13/30 and CPT score was a 4.7. Physician's statement in support of guardianship was completed on 7/19/21.  Patient's son is pursuing guardianship. Patient became quite agitated on 7/16 when  staff from [facility] specific  came to meet with her a second time. She did not allow staff members to enter her room and stated that she will not be going to a group home.      Due to daytime sedation, on 7/26/21, lorazepam evening dose decreased from 2 mg to 1 mg. On 7/27/21 transitioned Zyprexa from 10 mg BID to 5 mg QAM + 15 mg QPM and had less daytime sedation with this change and no emergence of symptoms concerning for orthostatic hypotension.  She attended her emergency  guardianship hearing on 7/28.  Due to ongoing daytime sedation, will trial consolidation of Zyprexa dose at bedtime starting on 7/30.       Target psychiatric symptoms and interventions:   - Continue Ativan 1 mg PO or IM TID Patient may not decline.   - Resume Zyprexa 20 mg at bedtime  Hatch in place. May consider increasing further though holding off given re-emerging catatonic sx and borderline prolonged QTc      - Continue Cogentin 1 mg PO BID with additional 2 mg IM daily prn for evidence of acute dystonic reaction or oculogyric crisis  -Continue hydroxyzine 25-50 mg q4h prn for acute anxiety  -Continue Trazodone 50 mg at bedtime prn for sleep disturbances  -Continue Zyprexa 10 mg TID prn for severe agitation  -Continue Ativan/Benadryl q4h prn for agitation. WOULD GIVE PRN ATIVAN FIRST FOR AGITATION unless it is after 5 pm on day prior to scheduled ECT     Occupational Therapy Consult Placed to evaluate cognitive functioning/ability to care for self in home environment, ability to manage medications. See note on 7/1 for details.      ECT: Discontinued due to significant cognitive impairment. Please see note dated 7/12/21 for additional details.      Acute Medical Problems and Treatments:  Left ankle pain, improved  - Added ice packs prn    HTN, improved: Patient is now adherent with medication regimen.   - Metoprolol succinate ER 75 mg   - Cozaar 100 mg daily  - Amlodipine 7.5 mg daily  - IM discontinued hydralazine prn  - Switched BP checks from QID to BID on 7/13 given overall improvement  - Please see note from IM dated 5/3, 5/6, 5/24, 6/20 and 6/30/21.  - Obtained EKG and routine labs on 5/21 in context of pt declining vital sign checks and anti-hypertensives and recently elevated BPs. Reviewed labs and discussed EKG findings with IM on 5/21. No urgent concerns, though IM should be notified if pt develops acute medical concerns (I.e. heart palpitations, SOB, changes in speech, AMS, confusion, CP, HA,  "changes in vision).    - Per 6/20 IM note: \"Please notify IM if BP is persistently severely elevated and requiring frequent PRN hydralazine\".  - Internal medicine consulted again on 6/28 due to consistent use of hydralazine over the past week. Metoprolol increased to 75 mg daily then to 100 mg and amlodipine 5 mg was added on 6/30. Amlodipine increased to 7.5 mg daily on 7/5.      Recent history of BRANDON:  - Attempted to repeat BMP multiple times though patient consistently declining blood draw  - Encourage fluids     For previous medical concerns, please see note dated 7/12/21.     Behavioral/Psychological/Social:  - Encourage unit programming  - Patient is now Code 3 status and can take walks in the [place] with staff and security present per staff discretion. This appears to be quite therapeutic for her.      Safety:  - Continue precautions as noted above  - Status 15 minute checks  - Safety precautions include: assault and elopement precautions  - Continue precautions as noted above     Legal Status: Committed as MI with Hatch in place for Haldol, Zyprexa, Invega, and Thorazine through [facility]. Filed Alcides Kohler through [facility] and approved on 5/24/21. Physician's statement in support of guardianship was completed on 7/19/21. Court hearing for guardianship is scheduled for today 7/28/21 at 8:30 AM.     Disposition Plan   Reason for ongoing admission: poses an imminent risk to self and is unable to care for self due to severe psychosis or tad  Discharge location: Group home (needs CADI waiver). CPT assessment done by OT (Tania queen) on 7/1.  Please see note for details. [place] planned on 8/12. Patient's son is now her temporary emergency guardian.   Discharge Medications: not ordered  Follow-up Appointments: not scheduled     Gabby Zaman MD  [facility]                     "

## 2021-08-03 PROCEDURE — 250N000013 HC RX MED GY IP 250 OP 250 PS 637: Performed by: PHYSICIAN ASSISTANT

## 2021-08-03 PROCEDURE — 99231 SBSQ HOSP IP/OBS SF/LOW 25: CPT | Performed by: PSYCHIATRY & NEUROLOGY

## 2021-08-03 PROCEDURE — 250N000013 HC RX MED GY IP 250 OP 250 PS 637: Performed by: STUDENT IN AN ORGANIZED HEALTH CARE EDUCATION/TRAINING PROGRAM

## 2021-08-03 PROCEDURE — 124N000002 HC R&B MH UMMC

## 2021-08-03 RX ADMIN — LORAZEPAM 1 MG: 1 TABLET ORAL at 20:35

## 2021-08-03 RX ADMIN — BENZTROPINE MESYLATE 1 MG: 1 TABLET ORAL at 08:34

## 2021-08-03 RX ADMIN — LOSARTAN POTASSIUM 100 MG: 100 TABLET, FILM COATED ORAL at 08:34

## 2021-08-03 RX ADMIN — OLANZAPINE 20 MG: 20 TABLET, ORALLY DISINTEGRATING ORAL at 20:35

## 2021-08-03 RX ADMIN — LORAZEPAM 1 MG: 1 TABLET ORAL at 08:35

## 2021-08-03 RX ADMIN — LORAZEPAM 1 MG: 1 TABLET ORAL at 14:27

## 2021-08-03 RX ADMIN — METOPROLOL SUCCINATE 75 MG: 25 TABLET, EXTENDED RELEASE ORAL at 08:34

## 2021-08-03 RX ADMIN — AMLODIPINE BESYLATE 7.5 MG: 2.5 TABLET ORAL at 08:34

## 2021-08-03 RX ADMIN — BENZTROPINE MESYLATE 1 MG: 1 TABLET ORAL at 20:35

## 2021-08-03 ASSESSMENT — ACTIVITIES OF DAILY LIVING (ADL)
DRESS: SCRUBS (BEHAVIORAL HEALTH)
HYGIENE/GROOMING: PROMPTS
HYGIENE/GROOMING: PROMPTS;INDEPENDENT
ORAL_HYGIENE: PROMPTS;INDEPENDENT
ORAL_HYGIENE: PROMPTS
DRESS: SCRUBS (BEHAVIORAL HEALTH);PROMPTS

## 2021-08-03 NOTE — PLAN OF CARE
Assessment/Intervention/Current Symtoms and Care Coordination  -Chart review  -Rounded with team, addressed patient needs/concerns    [place] Cho[place] for CADI services is scheduled for 8/12/21. This will need to be completed prior to finalizing plans for [facility].  [facility] continues to coordinate with [facility] Kourtney Richardson [phone].      Discharge Plan or Goal  Pending stabilization & development of a safe discharge plan.  Considerations include:  [facility].     Barriers to Discharge  Patient requires further psychiatric stabilization due to current symptomology     Referral Status  [facility]     Legal Status  Committed/Hatch/Mcgrath colin through [facility]

## 2021-08-03 NOTE — PLAN OF CARE
Patient presents as calm, pleasant, and cooperative.  She is likely at or near her baseline level of functioning.  She remains socially withdrawn and isolative with a blunted affect.  She appeared happy with a very bright affect when greeted by RN writer at the start of this evening shift.  She denies that she is experiencing any psychotic symptoms or dangerous ideations at this time.  She is ambulating with a balanced, steady gait.  Michelle denies any acute physical concerns this evening.  BP mildly elevated at 134/74, but otherwise VSS.  Michelle accepted all of her scheduled medications without incident.  No adverse side effects have been reported by the patient or observed by RN writer.

## 2021-08-03 NOTE — PLAN OF CARE
Michelle appeared to sleep a total of 7 hours this night shift.  No prns or snacks given or requested.

## 2021-08-03 NOTE — PLAN OF CARE
Patient continues to spend most free time in her room. She is taking her scheduled mediations copoeratively. Patient ate with good appetite She reports that her left ankle is better.  She declined the opportunity to go outside.  She denies SI, HI and hallucinations.

## 2021-08-03 NOTE — PROGRESS NOTES
[facility], [place]   Psychiatric Progress Note  Hospital Day: 111        Interim History:   The patient's care was discussed with the treatment team during the daily team meeting and/or staff's chart notes were reviewed. Intermittent BP elevation otherwise vitals WNL. She complained of some left ankle discomfort this morning. Adherent with prescribed medications. No acute events overnight. She denies psychiatric symptoms or medical concerns. She is eating and sleeping well. She has remained mostly isolative to her room though did walk in the milieu for a period of time.      Michelle was sitting calmly on her bed watching TV. She reported improvement in swelling and pain in her ankle. She said that she doesn't want to walk to [place] so that her foot can heal. She again denies any recent injuries or falls.      Suicidal ideation: denies current or recent suicidal ideation or behaviors.    Homicidal ideation: denies current or recent homicidal ideation or behaviors.    Psychotic symptoms: Patient denies AH, VH, paranoia, delusions.     Medication side effects reported: She denies sedation today. She denies any dizziness or lightheadedness.     Acute medical concerns: pain in left ankle improved.    There is not tenderness, erythema or induration of either of her calves.     Other issues reported by patient: Patient had no further questions or concerns.           Medications:       amLODIPine  7.5 mg Oral Daily     benztropine  1 mg Oral BID     LORazepam  1 mg Oral TID    Or     LORazepam  1 mg Intramuscular TID     losartan  100 mg Oral Daily     metoprolol succinate ER  75 mg Oral Daily     OLANZapine zydis  20 mg Oral At Bedtime    Or     OLANZapine  10 mg Intramuscular At Bedtime          Allergies:     Allergies   Allergen Reactions     Haldol [Haloperidol]      Patient previously tolerated haldol, though developed oculogyric crises during hospital stay on 4/26/21 on total daily  dose of 10 mg.     Lisinopril Cough          Labs:   No results found for this or any previous visit (from the past 24 hour(s)).       Psychiatric Examination:     /74   Pulse 76   Temp 97.4  F (36.3  C) (Tympanic)   Resp 16   Wt 70.3 kg (154 lb 14.4 oz)   SpO2 98%   BMI 26.59 kg/m    Weight is 154 lbs 14.4 oz  Body mass index is 26.59 kg/m .    Weight over time:  Vitals:    05/25/21 0850 06/15/21 0811 06/16/21 1605 06/19/21 0859   Weight: 71 kg (156 lb 8 oz) 69.8 kg (153 lb 12.8 oz) 70.5 kg (155 lb 8 oz) 71.4 kg (157 lb 8 oz)    06/21/21 0805 06/22/21 0839 06/24/21 0800 07/02/21 0835   Weight: 69.5 kg (153 lb 3.2 oz) 69.3 kg (152 lb 11.2 oz) 70.7 kg (155 lb 12.8 oz) 70.7 kg (155 lb 12.8 oz)    07/03/21 0844 07/06/21 0838 07/10/21 0845 07/16/21 0821   Weight: 69.6 kg (153 lb 8 oz) 70.9 kg (156 lb 4.8 oz) 69.8 kg (153 lb 14.4 oz) 69.3 kg (152 lb 12.8 oz)    07/17/21 0830 07/31/21 0804   Weight: 70.4 kg (155 lb 4.8 oz) 70.3 kg (154 lb 14.4 oz)       Orthostatic Vitals       Most Recent      Sitting Orthostatic /84 07/29 0800    Sitting Orthostatic Pulse (bpm) 82 07/29 0800    Standing Orthostatic /86 07/29 0800    Standing Orthostatic Pulse (bpm) 88 07/29 0800            Cardiometabolic risk assessment. 07/29/21      Reviewed patient profile for cardiometabolic risk factors    Date taken /Value  REFERENCE RANGE   Abdominal Obesity  (Waist Circumference)   See nursing flowsheet Women ?35 in (88 cm)   Men ?40 in (102 cm)      Triglycerides  Triglycerides   Date Value Ref Range Status   10/02/2020 86 <=149 mg/dL Final   10/19/2019 117 <150 mg/dL Final       ?150 mg/dL (1.7 mmol/L) or current treatment for elevated triglycerides   HDL cholesterol  HDL Cholesterol   Date Value Ref Range Status   10/19/2019 56 >49 mg/dL Final     Direct Measure HDL   Date Value Ref Range Status   10/02/2020 45 (L) >=50 mg/dL Final   ]   Women <50 mg/dL (1.3 mmol/L) in women or current treatment for low HDL  "cholesterol  Men <40 mg/dL (1 mmol/L) in men or current treatment for low HDL cholesterol     Fasting plasma glucose (FPG) Lab Results   Component Value Date     07/02/2021      FPG ?100 mg/dL (5.6 mmol/L) or treatment for elevated blood glucose   Blood pressure  BP Readings from Last 3 Encounters:   08/02/21 134/74   06/04/19 131/71   04/26/19 164/86    Blood pressure ?130/85 mmHg or treatment for elevated blood pressure   Family History  See family history     Appearance: dressed in hospital scrubs, appeared reported age, unkempt hair   Attitude: cooperative with interview, not irritable   Eye Contact: improved, looks up more during the interview   Mood: \"good\"   Affect:  Somewhat blunted, not agitated and tense. Brightens and smiles at times   Speech: accented, clear and coherent.    Language: no obvious receptive or expressive deficits  Psychomotor, Gait, Musculoskeletal: No abnormal movements noted while seated, did sit in an awkward/unnatural position during interview and held posture throughout length of interview.   Thought Process: goal-oriented, linear, concrete   Associations:  No loosening of associations present  Thought Content:  Did not appear to be responding to internal stimuli.  Insight:   poor - slightly improved   Judgement:  fair  Oriented to: person, place, time/date   Attention Span and Concentration: attentive to conversation though at times stares ahead and does not respond to questions.  Recent and Remote Memory: stable, some difficulty recalling events   Fund of Knowledge:  normal    Clinical Global Impressions  First:  Considering your total clinical experience with this particular patient population, how severe are the patient's symptoms at this time?: 7 (04/16/21 1428)  Compared to the patient's condition at the START of treatment, this patient's condition is: 4 (04/16/21 1428)  Most recent:  Considering your total clinical experience with this particular patient population, how " severe are the patient's symptoms at this time?: 7 (07/22/21 0941)  Compared to the patient's condition at the START of treatment, this patient's condition is: 3 (07/22/21 0941)           Precautions:     Behavioral Orders   Procedures     Cheeking Precautions (behavioral units)     Patient Observed swallowing PO medications; Patient asked to drink water after swallowing medication; Patient in Staff line of sight for 15 minutes after medication given; Mouth checks after PO administration (patient asked to open mouth and stick out their tongue).     Code 3     For walks in the [place] with staff and per staff discretion     Electroconvulsive therapy     Series of up to 12 treatments. Begin Date: 5/26/21     Treating Psychiatrist providing ECT:  Dr. Lubin     Notified on:  5/21/21     Electroconvulsive therapy     As long as we get the green light from risk management and pt is medically cleared, begin ECT every Monday, Wednesday, and Friday     Electroconvulsive therapy     Series of up to 12 treatments. Begin Date: 5/25/21     Treating Psychiatrist providing ECT:  Amee     Notified on:  5/24/21     Elopement precautions     Fall precautions     Mcgrath Calderon     Routine Programming     As clinically indicated     Status 15     Every 15 minutes.          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 Symptoms and presentation at this time is most consistent with Schizoaffective Disorder, Bipolar  "Type. Obtained most recent medication regimen from patient's ACT team, and regimen was initially restarted. Pt is committed. Petitioned for Alcides Riverapard was filed and granted due to lack of improvement and side effects from medications. Inpatient psychiatric hospitalization is warranted at this time for safety, stabilization, possible adjustment in medications and development of a safe discharge plan.      Hospital Course:  On admission, PTA medications were restarted. However, Michelle had been declining all scheduled medications despite significant encouragement from staff and provider. Psychiatric emergency declared on 4/20 due to aggression toward others in context of severe psychosis and suspected excited catatonia. Ativan 1 mg TID was also added. Discontinued PTA Invega, Zyprexa, and thorazine on 4/20 due to consistent refusal.      On 4/26, it was noted that patient frequently had upward gaze while walking up and down the unit. She did not appear to be distressed. She reported that she was looking at \"my god.\" It was determined to be oculogyric crisis secondary to IM haloperidol. Haldol was subsequently discontinued and scheduled Zyprexa was initiated on an emergency basis. Oral Cogentin was also scheduled, though patient declined. On the evening of 4/26, patient's gaze was fixed in upward position for several hours and she appeared to be experiencing discomfort. IM Cogentin was administered with noted resolution. Partial improvements were observed after switching to scheduled Zyprexa. After patient improved, she was more receptive to reinitiating oral Invega, which was initiated on 5/3 and titrated to PTA dose of 9 mg daily on 5/10. Plan was for ACT team to bring in loading dose of Invega Sustenna. Patient had signs of oculogyric crisis again with Invega. Oral dose decreased to 6 mg after this. Invega was stopped on 5/14 due to ongoing signs of problems related to eye movement and concerns for oculogyric " "crisis.     Overall improvement in patient's agitation and suspected catatonia noted on 4/30 after patient accepted two doses of Ativan. She began declining Ativan again with noted decompensation. Patient began accepting, however, was deemed not to have the capacity to consent to treatment with Ativan. She does not believe she has a mental illness, including catatonia. She does not fully understand risks associated with inadequate treatment of catatonia. Discussed with her son who is acting as surrogate decision maker and he is in full support of forced scheduled IM Ativan if patient declines oral formulation. Also consulted with our legal team prior to backing oral Ativan with IM.      Ativan was increased on 5/19 due to re-emerging evidence of catatonia (long periods of staring, repetitive movements, echolalia, mutism). Meeting was held with ACT team on 5/20, including ACT team psychiatrist, Dr. Pedraza. He said that he is in \"full support\" of plan to pursue Mcgrath Kohler and ECT at this time. He does feel that in the past she has been discharged from the hospital while still quite symptomatic. He is hoping that ECT will be effective and that with improvement, Michelle would be more receptive to clozapine and weekly blood draws. He said that ideally she would transition to an IRTS before going back to her apartment, but also understands there may be some barriers (I.e. pt's willingness, financial concerns, etc).      ECT consult placed. Please see consult note by Dr. Lubin on 5/21 for details. Mcgrath Kohler was approved on 5/24 and patient was medically cleared on 5/24. ECT initiated on 5/26. She was noted to have a short seizure during ECT on 6/4. Staff note that patient appears more disoriented with memory impairment and sedation on days of ECT. This was noted on my examination again today. Improvements noted in mood, affect, social interactions, paranoia, agitation since initiation of ECT. Reduced Ativan on 6/5 " due to sedative effects. Relayed concerns about memory impairment and confusion with Dr. Lubin. Recommended attempting unilateral ECT, which he agreed to do. First unilateral ECT on 6/7. ECT was held on 6/11 and 6/14 due to memory impairment. We will plan to hold it again tomorrow (6/16). There is ongoing discussion regarding whether there is a component of catatonia contributing to her current presentation but this is less likely since it worsened after ECT was started.  Given her level of cognitive impairment and our belief that this is due to ECT, we will not pursue any further treatments at this time. Since we have held ECT (last treatment on 6/9), her cognitive impairment has slightly improved (more oriented).      Michelle initially appeared to be decompensating somewhat when ECT was held, though her cognitive impairment has gradually improved. If symptoms of psychosis re-emerge, it may be worth starting clozapine, which is something that has previously been considered by her ACT team. Her Hatch order would need to be amended as clozapine is not one of the medications listed. ANC obtained on 6/16 was 1800/microL. Per conversation with pharmacy, neutropenia has been associated with olanzapine and agranulocytosis has been associated with olanzapine, lorazepam, metoprolol, and hydralazine and hematologic labs have been monitored. Upon re-check, ANC was 3700/microL on 6/21/21. At this time, the primary symptoms we are observing are cognitive deficits and inability to care for self, which we would not address by changing her antipsychotic medication.     Michelle's cognitive impairment has been steadily improving since holding ECT treatments, however it is possible that lorazepam is also playing a role in her cognitive impairment. Given no signs of re-emerging catatonia, a gradual lorazepam taper (decreasing by 0.5 mg) was initiated on 6/28/21 to monitor for potential improvements with regard to memory impairment.  On 7/7,  Michelle reported an incident of oculogyric crisis and Cogentin 1 mg BID was initiated with no noted changes in cognition.      Michelle has remained focused on discharge. The team is working with Michelle's ACT team to to establish a safe discharge plan with options including moving to a group home vs home with her ACT team and additional in home supports via CADI services. We are concerned that Michelle would have difficulty taking her medications as prescribed if she were to return home without her ACT team and additional services. This is evidenced by impairments noted in cognitive assessment by OT specialist on 7/1. MOCA score was a 13/30 and CPT score was a 4.7. Physician's statement in support of guardianship was completed on 7/19/21.  Patient's son is pursuing guardianship. Patient became quite agitated on 7/16 when  staff from [facility] came to meet with her a second time. She did not allow staff members to enter her room and stated that she will not be going to a group home.      Due to daytime sedation, on 7/26/21, lorazepam evening dose decreased from 2 mg to 1 mg. On 7/27/21 transitioned Zyprexa from 10 mg BID to 5 mg QAM + 15 mg QPM and had less daytime sedation with this change and no emergence of symptoms concerning for orthostatic hypotension.  She attended her emergency guardianship hearing on 7/28.  Due to ongoing daytime sedation, will trial consolidation of Zyprexa dose at bedtime starting on 7/30.       Target psychiatric symptoms and interventions:   - Continue Ativan 1 mg PO or IM TID Patient may not decline.   - Resume Zyprexa 20 mg at bedtime  Hatch in place. May consider increasing further though holding off given re-emerging catatonic sx and borderline prolonged QTc      - Continue Cogentin 1 mg PO BID with additional 2 mg IM daily prn for evidence of acute dystonic reaction or oculogyric crisis  -Continue hydroxyzine 25-50 mg q4h prn for acute anxiety  -Continue Trazodone 50 mg at  "bedtime prn for sleep disturbances  -Continue Zyprexa 10 mg TID prn for severe agitation  -Continue Ativan/Benadryl q4h prn for agitation. WOULD GIVE PRN ATIVAN FIRST FOR AGITATION unless it is after 5 pm on day prior to scheduled ECT     Occupational Therapy Consult Placed to evaluate cognitive functioning/ability to care for self in home environment, ability to manage medications. See note on 7/1 for details.      ECT: Discontinued due to significant cognitive impairment. Please see note dated 7/12/21 for additional details.      Acute Medical Problems and Treatments:  Left ankle pain, improved  - Added ice packs prn    HTN, improved: Patient is now adherent with medication regimen.   - Metoprolol succinate ER 75 mg   - Cozaar 100 mg daily  - Amlodipine 7.5 mg daily  - IM discontinued hydralazine prn  - Switched BP checks from QID to BID on 7/13 given overall improvement  - Please see note from IM dated 5/3, 5/6, 5/24, 6/20 and 6/30/21.  - Obtained EKG and routine labs on 5/21 in context of pt declining vital sign checks and anti-hypertensives and recently elevated BPs. Reviewed labs and discussed EKG findings with IM on 5/21. No urgent concerns, though IM should be notified if pt develops acute medical concerns (I.e. heart palpitations, SOB, changes in speech, AMS, confusion, CP, HA, changes in vision).    - Per 6/20 IM note: \"Please notify IM if BP is persistently severely elevated and requiring frequent PRN hydralazine\".  - Internal medicine consulted again on 6/28 due to consistent use of hydralazine over the past week. Metoprolol increased to 75 mg daily then to 100 mg and amlodipine 5 mg was added on 6/30. Amlodipine increased to 7.5 mg daily on 7/5.      Recent history of BRANDON:  - Attempted to repeat BMP multiple times though patient consistently declining blood draw  - Encourage fluids     For previous medical concerns, please see note dated 7/12/21.     Behavioral/Psychological/Social:  - Encourage unit " programming  - Patient is now Code 3 status and can take walks in the [place] with staff and security present per staff discretion. This appears to be quite therapeutic for her.      Safety:  - Continue precautions as noted above  - Status 15 minute checks  - Safety precautions include: assault and elopement precautions  - Continue precautions as noted above     Legal Status: Committed as MI with Hatch in place for Haldol, Zyprexa, Invega, and Thorazine through [facility]. Filed Alcides Kohler through [facility] and approved on 5/24/21. Physician's statement in support of guardianship was completed on 7/19/21. Court hearing for guardianship is scheduled for today 7/28/21 at 8:30 AM.     Disposition Plan   Reason for ongoing admission: poses an imminent risk to self and is unable to care for self due to severe psychosis or tad  Discharge location: Group home (needs CADI waiver). CPT assessment done by OT (Tania queen) on 7/1.  Please see note for details. [place] Assessment planned on 8/12. Patient's son is now her temporary emergency guardian.   Discharge Medications: not ordered  Follow-up Appointments: not scheduled     Gabby Zaman MD  [facility]

## 2021-08-04 PROCEDURE — 250N000013 HC RX MED GY IP 250 OP 250 PS 637: Performed by: PHYSICIAN ASSISTANT

## 2021-08-04 PROCEDURE — 250N000013 HC RX MED GY IP 250 OP 250 PS 637: Performed by: STUDENT IN AN ORGANIZED HEALTH CARE EDUCATION/TRAINING PROGRAM

## 2021-08-04 PROCEDURE — 99231 SBSQ HOSP IP/OBS SF/LOW 25: CPT | Performed by: PSYCHIATRY & NEUROLOGY

## 2021-08-04 PROCEDURE — 124N000002 HC R&B MH UMMC

## 2021-08-04 RX ADMIN — LOSARTAN POTASSIUM 100 MG: 100 TABLET, FILM COATED ORAL at 08:45

## 2021-08-04 RX ADMIN — BENZTROPINE MESYLATE 1 MG: 1 TABLET ORAL at 19:57

## 2021-08-04 RX ADMIN — LORAZEPAM 1 MG: 1 TABLET ORAL at 13:59

## 2021-08-04 RX ADMIN — METOPROLOL SUCCINATE 75 MG: 25 TABLET, EXTENDED RELEASE ORAL at 08:44

## 2021-08-04 RX ADMIN — BENZTROPINE MESYLATE 1 MG: 1 TABLET ORAL at 08:44

## 2021-08-04 RX ADMIN — LORAZEPAM 1 MG: 1 TABLET ORAL at 08:44

## 2021-08-04 RX ADMIN — LORAZEPAM 1 MG: 1 TABLET ORAL at 19:57

## 2021-08-04 RX ADMIN — OLANZAPINE 20 MG: 20 TABLET, ORALLY DISINTEGRATING ORAL at 19:57

## 2021-08-04 RX ADMIN — AMLODIPINE BESYLATE 7.5 MG: 2.5 TABLET ORAL at 08:44

## 2021-08-04 ASSESSMENT — ACTIVITIES OF DAILY LIVING (ADL)
LAUNDRY: UNABLE TO COMPLETE
LAUNDRY: UNABLE TO COMPLETE
HYGIENE/GROOMING: INDEPENDENT;PROMPTS
HYGIENE/GROOMING: PROMPTS;INDEPENDENT
DRESS: INDEPENDENT
ORAL_HYGIENE: INDEPENDENT
DRESS: INDEPENDENT
ORAL_HYGIENE: INDEPENDENT

## 2021-08-04 NOTE — PLAN OF CARE
Assessment/Intervention/Current Symtoms and Care Coordination  -Chart review  -Attended team meeting     STEPHEN Mcdowell Assessment for CADI services is scheduled for 8/12/21. This will need to be completed prior to finalizing plans for [facility].  [facility] continues to coordinate with [facility] Kourtney Richardson [phone].      Discharge Plan or Goal  Pending stabilization & development of a safe discharge plan.  Considerations include:  [facility].     Barriers to Discharge  Patient requires further psychiatric stabilization due to current symptomology     Referral Status  [facility]     Legal Status  Committed/Hatch/Mcgrath colin through [facility]

## 2021-08-04 NOTE — PLAN OF CARE
Night Shift Summary (8/3/21 into 08/04/21)    Pt asleep at start of shift. Breathing quiet and unlabored.     Awake between around 0030 to 0100 but did not come out of room and went back to sleep.    Pt had no c/o pain or discomfort during the HS.     Appears to have slept 6.5 hours.

## 2021-08-04 NOTE — PLAN OF CARE
"Problem: Behavior Regulation Impairment (Psychotic Signs/Symptoms)  Goal: Improved Behavioral Control (Psychotic Signs/Symptoms)  Outcome: No Change     Problem: Cognitive Impairment (Psychotic Signs/Symptoms)  Goal: Optimal Cognitive Function (Psychotic Signs/Symptoms)  Outcome: No Change  Intervention: Support and Promote Cognitive Ability    Patient is isolative to room.  She is pleasant and times but can be irritable.  Patient denies anxiety, depression, pain, SI, HI, SIB, and hallucinations.  Appeared agitated with questions stating, \"I'm fine, get me my medications.\"  Patient is medication compliant.  Patient encouraged to shower but refused, continues to isolate to room.  No aggressive behaviors are observed.  Patient remains safe on unit.  Will continue to monitor. Nedra Cortez RN   "

## 2021-08-04 NOTE — PLAN OF CARE
Pt has a flat blunted affect. Mood was labile through shift. Pt was irritable with writer during assessment when discussing POC. No aggressive or violent behaviors. Pt remains isolative to her room and withdrawn to self watching TV. Offered for pt to take a shower but declined.  Pt declined writer to assess ankle. Pt rates anxiety at 0/10 and depression 0/10. Pt rates pain at 0/10. Pt reports no SI/HI and contracts for safety. Pt denies any hallucinations. Pt not noted responding to any internal stimuli. Pt was med compliant with no cheeking noted. Continue current POC.

## 2021-08-04 NOTE — PROGRESS NOTES
"[facility], [place]   Psychiatric Progress Note  Hospital Day: 112        Interim History:   The patient's care was discussed with the treatment team during the daily team meeting and/or staff's chart notes were reviewed. Intermittent BP elevation otherwise vitals WNL. She complained of some left ankle discomfort this morning. Adherent with prescribed medications. No acute events overnight. She denies psychiatric symptoms or medical concerns. She is eating and sleeping well. She has remained mostly isolative to her room though did walk in the milieu for a period of time. Poor hygiene. Declining shower yesterday and today.      Michelle was sitting in her bed watching TV. She reports that her mood is \"good.\" She denies feeling depressed or sad. She is looking forward to discharge. She was again encouraged to leave her room more frequently. Discussed benefits and behavioral activation. She said that she does not want to eat meals around others because she prays before her meals.      Suicidal ideation: denies current or recent suicidal ideation or behaviors.    Homicidal ideation: denies current or recent homicidal ideation or behaviors.    Psychotic symptoms: Patient denies AH, VH, paranoia, delusions.     Medication side effects reported: She denies sedation today. She denies any dizziness or lightheadedness.     Acute medical concerns: pain in left ankle improved.    There is not tenderness, erythema or induration of either of her calves.     Other issues reported by patient: Patient had no further questions or concerns.           Medications:       amLODIPine  7.5 mg Oral Daily     benztropine  1 mg Oral BID     LORazepam  1 mg Oral TID    Or     LORazepam  1 mg Intramuscular TID     losartan  100 mg Oral Daily     metoprolol succinate ER  75 mg Oral Daily     OLANZapine zydis  20 mg Oral At Bedtime    Or     OLANZapine  10 mg Intramuscular At Bedtime          Allergies:     Allergies "   Allergen Reactions     Haldol [Haloperidol]      Patient previously tolerated haldol, though developed oculogyric crises during hospital stay on 4/26/21 on total daily dose of 10 mg.     Lisinopril Cough          Labs:   No results found for this or any previous visit (from the past 24 hour(s)).       Psychiatric Examination:     BP (!) 143/77   Pulse 68   Temp 97.6  F (36.4  C) (Tympanic)   Resp 16   Wt 70.3 kg (154 lb 14.4 oz)   SpO2 99%   BMI 26.59 kg/m    Weight is 154 lbs 14.4 oz  Body mass index is 26.59 kg/m .    Weight over time:  Vitals:    05/25/21 0850 06/15/21 0811 06/16/21 1605 06/19/21 0859   Weight: 71 kg (156 lb 8 oz) 69.8 kg (153 lb 12.8 oz) 70.5 kg (155 lb 8 oz) 71.4 kg (157 lb 8 oz)    06/21/21 0805 06/22/21 0839 06/24/21 0800 07/02/21 0835   Weight: 69.5 kg (153 lb 3.2 oz) 69.3 kg (152 lb 11.2 oz) 70.7 kg (155 lb 12.8 oz) 70.7 kg (155 lb 12.8 oz)    07/03/21 0844 07/06/21 0838 07/10/21 0845 07/16/21 0821   Weight: 69.6 kg (153 lb 8 oz) 70.9 kg (156 lb 4.8 oz) 69.8 kg (153 lb 14.4 oz) 69.3 kg (152 lb 12.8 oz)    07/17/21 0830 07/31/21 0804   Weight: 70.4 kg (155 lb 4.8 oz) 70.3 kg (154 lb 14.4 oz)       Orthostatic Vitals       Most Recent      Sitting Orthostatic /84 07/29 0800    Sitting Orthostatic Pulse (bpm) 82 07/29 0800    Standing Orthostatic /86 07/29 0800    Standing Orthostatic Pulse (bpm) 88 07/29 0800            Cardiometabolic risk assessment. 07/29/21      Reviewed patient profile for cardiometabolic risk factors    Date taken /Value  REFERENCE RANGE   Abdominal Obesity  (Waist Circumference)   See nursing flowsheet Women ?35 in (88 cm)   Men ?40 in (102 cm)      Triglycerides  Triglycerides   Date Value Ref Range Status   10/02/2020 86 <=149 mg/dL Final   10/19/2019 117 <150 mg/dL Final       ?150 mg/dL (1.7 mmol/L) or current treatment for elevated triglycerides   HDL cholesterol  HDL Cholesterol   Date Value Ref Range Status   10/19/2019 56 >49 mg/dL Final  "    Direct Measure HDL   Date Value Ref Range Status   10/02/2020 45 (L) >=50 mg/dL Final   ]   Women <50 mg/dL (1.3 mmol/L) in women or current treatment for low HDL cholesterol  Men <40 mg/dL (1 mmol/L) in men or current treatment for low HDL cholesterol     Fasting plasma glucose (FPG) Lab Results   Component Value Date     07/02/2021      FPG ?100 mg/dL (5.6 mmol/L) or treatment for elevated blood glucose   Blood pressure  BP Readings from Last 3 Encounters:   08/03/21 (!) 143/77   06/04/19 131/71   04/26/19 164/86    Blood pressure ?130/85 mmHg or treatment for elevated blood pressure   Family History  See family history     Appearance: dressed in hospital scrubs, appeared reported age, unkempt hair   Attitude: cooperative with interview, not irritable   Eye Contact: improved, looks up more during the interview   Mood: \"good\"   Affect:  Somewhat blunted, not agitated and tense. Brightens and smiles at times   Speech: accented, clear and coherent.    Language: no obvious receptive or expressive deficits  Psychomotor, Gait, Musculoskeletal: No abnormal movements noted while seated, did sit in an awkward/unnatural position during interview and held posture throughout length of interview.   Thought Process: goal-oriented, linear, concrete   Associations:  No loosening of associations present  Thought Content:  Did not appear to be responding to internal stimuli.  Insight:   poor - slightly improved   Judgement:  fair  Oriented to: person, place, time/date   Attention Span and Concentration: attentive to conversation though at times stares ahead and does not respond to questions.  Recent and Remote Memory: stable, some difficulty recalling events   Fund of Knowledge:  normal    Clinical Global Impressions  First:  Considering your total clinical experience with this particular patient population, how severe are the patient's symptoms at this time?: 7 (04/16/21 8290)  Compared to the patient's condition at the " START of treatment, this patient's condition is: 4 (04/16/21 1428)  Most recent:  Considering your total clinical experience with this particular patient population, how severe are the patient's symptoms at this time?: 7 (07/22/21 0941)  Compared to the patient's condition at the START of treatment, this patient's condition is: 3 (07/22/21 0941)           Precautions:     Behavioral Orders   Procedures     Cheeking Precautions (behavioral units)     Patient Observed swallowing PO medications; Patient asked to drink water after swallowing medication; Patient in Staff line of sight for 15 minutes after medication given; Mouth checks after PO administration (patient asked to open mouth and stick out their tongue).     Code 3     For walks in the [place] with staff and per staff discretion     Electroconvulsive therapy     Series of up to 12 treatments. Begin Date: 5/26/21     Treating Psychiatrist providing ECT:  Dr. Lubin     Notified on:  5/21/21     Electroconvulsive therapy     As long as we get the green light from risk management and pt is medically cleared, begin ECT every Monday, Wednesday, and Friday     Electroconvulsive therapy     Series of up to 12 treatments. Begin Date: 5/25/21     Treating Psychiatrist providing ECT:  Amee     Notified on:  5/24/21     Elopement precautions     Fall precautions     Mcgrath Calderon     Routine Programming     As clinically indicated     Status 15     Every 15 minutes.          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 Symptoms and presentation at this time is most consistent with Schizoaffective Disorder, Bipolar Type. Obtained most recent medication regimen from patient's ACT team, and regimen was initially restarted. Pt is committed. Petitioned for Alcides Calderon was filed and granted due to lack of improvement and side effects from medications. Inpatient psychiatric hospitalization is warranted at this time for safety, stabilization, possible adjustment in medications and development of a safe discharge plan.      Hospital Course:  On admission, PTA medications were restarted. However, Michelle had been declining all scheduled medications despite significant encouragement from staff and provider. Psychiatric emergency declared on 4/20 due to aggression toward others in context of severe psychosis and suspected excited catatonia. Ativan 1 mg TID was also added. Discontinued PTA Invega, Zyprexa, and thorazine on 4/20 due to consistent refusal.      On 4/26, it was noted that patient frequently had upward gaze while walking up and down the unit. She did not appear to be distressed. She reported that she was looking at \"my god.\" It was determined to be oculogyric crisis secondary to IM haloperidol. Haldol was subsequently discontinued and scheduled Zyprexa was initiated on an emergency basis. Oral Cogentin was also scheduled, though patient declined. On the evening of 4/26, patient's gaze was fixed in upward position for several hours and she appeared to be experiencing discomfort. IM Cogentin was administered with noted resolution. Partial improvements were observed after switching to scheduled Zyprexa. After patient improved, she was more receptive to reinitiating oral Invega, which was initiated on 5/3 and titrated to PTA dose of 9 mg daily on 5/10. Plan was for ACT team to bring in loading dose of Invega Sustenna. Patient had signs of oculogyric crisis again with " "Invega. Oral dose decreased to 6 mg after this. Invega was stopped on 5/14 due to ongoing signs of problems related to eye movement and concerns for oculogyric crisis.     Overall improvement in patient's agitation and suspected catatonia noted on 4/30 after patient accepted two doses of Ativan. She began declining Ativan again with noted decompensation. Patient began accepting, however, was deemed not to have the capacity to consent to treatment with Ativan. She does not believe she has a mental illness, including catatonia. She does not fully understand risks associated with inadequate treatment of catatonia. Discussed with her son who is acting as surrogate decision maker and he is in full support of forced scheduled IM Ativan if patient declines oral formulation. Also consulted with our legal team prior to backing oral Ativan with IM.      Ativan was increased on 5/19 due to re-emerging evidence of catatonia (long periods of staring, repetitive movements, echolalia, mutism). Meeting was held with ACT team on 5/20, including ACT team psychiatrist, Dr. Pedraza. He said that he is in \"full support\" of plan to pursue Mcgrath Kohler and ECT at this time. He does feel that in the past she has been discharged from the hospital while still quite symptomatic. He is hoping that ECT will be effective and that with improvement, Michelle would be more receptive to clozapine and weekly blood draws. He said that ideally she would transition to an IRTS before going back to her apartment, but also understands there may be some barriers (I.e. pt's willingness, financial concerns, etc).      ECT consult placed. Please see consult note by Dr. Lubin on 5/21 for details. Mcgrath Kohler was approved on 5/24 and patient was medically cleared on 5/24. ECT initiated on 5/26. She was noted to have a short seizure during ECT on 6/4. Staff note that patient appears more disoriented with memory impairment and sedation on days of ECT. This was " noted on my examination again today. Improvements noted in mood, affect, social interactions, paranoia, agitation since initiation of ECT. Reduced Ativan on 6/5 due to sedative effects. Relayed concerns about memory impairment and confusion with Dr. Lubin. Recommended attempting unilateral ECT, which he agreed to do. First unilateral ECT on 6/7. ECT was held on 6/11 and 6/14 due to memory impairment. We will plan to hold it again tomorrow (6/16). There is ongoing discussion regarding whether there is a component of catatonia contributing to her current presentation but this is less likely since it worsened after ECT was started.  Given her level of cognitive impairment and our belief that this is due to ECT, we will not pursue any further treatments at this time. Since we have held ECT (last treatment on 6/9), her cognitive impairment has slightly improved (more oriented).      Michelle initially appeared to be decompensating somewhat when ECT was held, though her cognitive impairment has gradually improved. If symptoms of psychosis re-emerge, it may be worth starting clozapine, which is something that has previously been considered by her ACT team. Her Hatch order would need to be amended as clozapine is not one of the medications listed. ANC obtained on 6/16 was 1800/microL. Per conversation with pharmacy, neutropenia has been associated with olanzapine and agranulocytosis has been associated with olanzapine, lorazepam, metoprolol, and hydralazine and hematologic labs have been monitored. Upon re-check, ANC was 3700/microL on 6/21/21. At this time, the primary symptoms we are observing are cognitive deficits and inability to care for self, which we would not address by changing her antipsychotic medication.     Michelle's cognitive impairment has been steadily improving since holding ECT treatments, however it is possible that lorazepam is also playing a role in her cognitive impairment. Given no signs of re-emerging  catatonia, a gradual lorazepam taper (decreasing by 0.5 mg) was initiated on 6/28/21 to monitor for potential improvements with regard to memory impairment.  On 7/7, Michelle reported an incident of oculogyric crisis and Cogentin 1 mg BID was initiated with no noted changes in cognition.      Michelle has remained focused on discharge. The team is working with Michelle's ACT team to to establish a safe discharge plan with options including moving to a group home vs home with her ACT team and additional in home supports via CADI services. We are concerned that Michelle would have difficulty taking her medications as prescribed if she were to return home without her ACT team and additional services. This is evidenced by impairments noted in cognitive assessment by OT specialist on 7/1. MOCA score was a 13/30 and CPT score was a 4.7. Physician's statement in support of guardianship was completed on 7/19/21.  Patient's son is pursuing guardianship. Patient became quite agitated on 7/16 when  staff from [facility] came to meet with her a second time. She did not allow staff members to enter her room and stated that she will not be going to a group home.      Due to daytime sedation, on 7/26/21, lorazepam evening dose decreased from 2 mg to 1 mg. On 7/27/21 transitioned Zyprexa from 10 mg BID to 5 mg QAM + 15 mg QPM and had less daytime sedation with this change and no emergence of symptoms concerning for orthostatic hypotension.  She attended her emergency guardianship hearing on 7/28.  Due to ongoing daytime sedation, will trial consolidation of Zyprexa dose at bedtime starting on 7/30.       Target psychiatric symptoms and interventions:   - Continue Ativan 1 mg PO or IM TID Patient may not decline.   - Resume Zyprexa 20 mg at bedtime  Hatch in place. May consider increasing further though holding off given re-emerging catatonic sx and borderline prolonged QTc      - Continue Cogentin 1 mg PO BID with additional 2 mg IM  "daily prn for evidence of acute dystonic reaction or oculogyric crisis  -Continue hydroxyzine 25-50 mg q4h prn for acute anxiety  -Continue Trazodone 50 mg at bedtime prn for sleep disturbances  -Continue Zyprexa 10 mg TID prn for severe agitation  -Continue Ativan/Benadryl q4h prn for agitation. WOULD GIVE PRN ATIVAN FIRST FOR AGITATION unless it is after 5 pm on day prior to scheduled ECT     Occupational Therapy Consult Placed to evaluate cognitive functioning/ability to care for self in home environment, ability to manage medications. See note on 7/1 for details.      ECT: Discontinued due to significant cognitive impairment. Please see note dated 7/12/21 for additional details.      Acute Medical Problems and Treatments:  Left ankle pain, improved  - Added ice packs prn    HTN, improved: Patient is now adherent with medication regimen.   - Metoprolol succinate ER 75 mg   - Cozaar 100 mg daily  - Amlodipine 7.5 mg daily  - IM discontinued hydralazine prn  - Switched BP checks from QID to BID on 7/13 given overall improvement  - Please see note from IM dated 5/3, 5/6, 5/24, 6/20 and 6/30/21.  - Obtained EKG and routine labs on 5/21 in context of pt declining vital sign checks and anti-hypertensives and recently elevated BPs. Reviewed labs and discussed EKG findings with IM on 5/21. No urgent concerns, though IM should be notified if pt develops acute medical concerns (I.e. heart palpitations, SOB, changes in speech, AMS, confusion, CP, HA, changes in vision).    - Per 6/20 IM note: \"Please notify IM if BP is persistently severely elevated and requiring frequent PRN hydralazine\".  - Internal medicine consulted again on 6/28 due to consistent use of hydralazine over the past week. Metoprolol increased to 75 mg daily then to 100 mg and amlodipine 5 mg was added on 6/30. Amlodipine increased to 7.5 mg daily on 7/5.      Recent history of BRANDON:  - Attempted to repeat BMP multiple times though patient consistently " declining blood draw  - Encourage fluids     For previous medical concerns, please see note dated 7/12/21.     Behavioral/Psychological/Social:  - Encourage unit programming  - Patient is now Code 3 status and can take walks in the [place] with staff and security present per staff discretion. This appears to be quite therapeutic for her.      Safety:  - Continue precautions as noted above  - Status 15 minute checks  - Safety precautions include: assault and elopement precautions  - Continue precautions as noted above     Legal Status: Committed as MI with Hatch in place for Haldol, Zyprexa, Invega, and Thorazine through [facility]. Filed Mcgrath Kohler through [facility] and approved on 5/24/21. Physician's statement in support of guardianship was completed on 7/19/21. Court hearing for guardianship is scheduled for today 7/28/21 at 8:30 AM.     Disposition Plan   Reason for ongoing admission: poses an imminent risk to self and is unable to care for self due to severe psychosis or tad  Discharge location: Group home (needs CADI waiver). CPT assessment done by OT (Tania queen) on 7/1.  Please see note for details. [place] Choice Assessment planned on 8/12. Patient's son is now her temporary emergency guardian.   Discharge Medications: not ordered  Follow-up Appointments: not scheduled     Gabby Zaman MD  [facility]

## 2021-08-05 PROCEDURE — 250N000013 HC RX MED GY IP 250 OP 250 PS 637: Performed by: STUDENT IN AN ORGANIZED HEALTH CARE EDUCATION/TRAINING PROGRAM

## 2021-08-05 PROCEDURE — 250N000013 HC RX MED GY IP 250 OP 250 PS 637: Performed by: PHYSICIAN ASSISTANT

## 2021-08-05 PROCEDURE — 99231 SBSQ HOSP IP/OBS SF/LOW 25: CPT | Performed by: PSYCHIATRY & NEUROLOGY

## 2021-08-05 PROCEDURE — 124N000002 HC R&B MH UMMC

## 2021-08-05 RX ADMIN — LORAZEPAM 1 MG: 1 TABLET ORAL at 08:36

## 2021-08-05 RX ADMIN — OLANZAPINE 20 MG: 20 TABLET, ORALLY DISINTEGRATING ORAL at 19:15

## 2021-08-05 RX ADMIN — LORAZEPAM 1 MG: 1 TABLET ORAL at 13:52

## 2021-08-05 RX ADMIN — LOSARTAN POTASSIUM 100 MG: 100 TABLET, FILM COATED ORAL at 08:36

## 2021-08-05 RX ADMIN — BENZTROPINE MESYLATE 1 MG: 1 TABLET ORAL at 08:36

## 2021-08-05 RX ADMIN — LORAZEPAM 1 MG: 1 TABLET ORAL at 19:15

## 2021-08-05 RX ADMIN — BENZTROPINE MESYLATE 1 MG: 1 TABLET ORAL at 19:15

## 2021-08-05 RX ADMIN — METOPROLOL SUCCINATE 75 MG: 25 TABLET, EXTENDED RELEASE ORAL at 08:36

## 2021-08-05 RX ADMIN — AMLODIPINE BESYLATE 7.5 MG: 2.5 TABLET ORAL at 08:36

## 2021-08-05 ASSESSMENT — ACTIVITIES OF DAILY LIVING (ADL)
DRESS: SCRUBS (BEHAVIORAL HEALTH);INDEPENDENT
HYGIENE/GROOMING: INDEPENDENT;PROMPTS
LAUNDRY: UNABLE TO COMPLETE
HYGIENE/GROOMING: INDEPENDENT;PROMPTS
DRESS: SCRUBS (BEHAVIORAL HEALTH)
LAUNDRY: UNABLE TO COMPLETE
ORAL_HYGIENE: INDEPENDENT
ORAL_HYGIENE: INDEPENDENT

## 2021-08-05 NOTE — PROGRESS NOTES
"[facility], [place]   Psychiatric Progress Note  Hospital Day: 113        Interim History:   The patient's care was discussed with the treatment team during the daily team meeting and/or staff's chart notes were reviewed. Intermittent BP elevation otherwise vitals WNL. She complained of some left ankle discomfort this morning. Adherent with prescribed medications. No acute events overnight. She denies psychiatric symptoms or medical concerns. She is eating and sleeping well. She has remained mostly isolative to her room though did walk in the milieu for a period of time. Poor hygiene. Declining shower for > 3 days.      Michelle was sitting in her bed watching TV. She reports that her mood is \"good.\" She denies feeling depressed or sad. She is looking forward to discharge. She was again encouraged to leave her room more frequently. She expressed desire to meet with  staff again prior to her discharge. She enjoyed meeting with them. She smiled when discussing the fact that she is able to make her own meals at the group home.      Suicidal ideation: denies current or recent suicidal ideation or behaviors.    Homicidal ideation: denies current or recent homicidal ideation or behaviors.    Psychotic symptoms: Patient denies AH, VH, paranoia, delusions.     Medication side effects reported: She denies sedation today. She denies any dizziness or lightheadedness.     Acute medical concerns: pain in left ankle improved.    There is not tenderness, erythema or induration of either of her calves.     Other issues reported by patient: Patient had no further questions or concerns.           Medications:       amLODIPine  7.5 mg Oral Daily     benztropine  1 mg Oral BID     LORazepam  1 mg Oral TID    Or     LORazepam  1 mg Intramuscular TID     losartan  100 mg Oral Daily     metoprolol succinate ER  75 mg Oral Daily     OLANZapine zydis  20 mg Oral At Bedtime    Or     OLANZapine  10 mg Intramuscular " At Bedtime          Allergies:     Allergies   Allergen Reactions     Haldol [Haloperidol]      Patient previously tolerated haldol, though developed oculogyric crises during hospital stay on 4/26/21 on total daily dose of 10 mg.     Lisinopril Cough          Labs:   No results found for this or any previous visit (from the past 24 hour(s)).       Psychiatric Examination:     BP (!) 158/80   Pulse 56   Temp 97.8  F (36.6  C) (Tympanic)   Resp 16   Wt 70.3 kg (154 lb 14.4 oz)   SpO2 99%   BMI 26.59 kg/m    Weight is 154 lbs 14.4 oz  Body mass index is 26.59 kg/m .    Weight over time:  Vitals:    05/25/21 0850 06/15/21 0811 06/16/21 1605 06/19/21 0859   Weight: 71 kg (156 lb 8 oz) 69.8 kg (153 lb 12.8 oz) 70.5 kg (155 lb 8 oz) 71.4 kg (157 lb 8 oz)    06/21/21 0805 06/22/21 0839 06/24/21 0800 07/02/21 0835   Weight: 69.5 kg (153 lb 3.2 oz) 69.3 kg (152 lb 11.2 oz) 70.7 kg (155 lb 12.8 oz) 70.7 kg (155 lb 12.8 oz)    07/03/21 0844 07/06/21 0838 07/10/21 0845 07/16/21 0821   Weight: 69.6 kg (153 lb 8 oz) 70.9 kg (156 lb 4.8 oz) 69.8 kg (153 lb 14.4 oz) 69.3 kg (152 lb 12.8 oz)    07/17/21 0830 07/31/21 0804   Weight: 70.4 kg (155 lb 4.8 oz) 70.3 kg (154 lb 14.4 oz)       Orthostatic Vitals       Most Recent      Sitting Orthostatic /84 07/29 0800    Sitting Orthostatic Pulse (bpm) 82 07/29 0800    Standing Orthostatic /86 07/29 0800    Standing Orthostatic Pulse (bpm) 88 07/29 0800            Cardiometabolic risk assessment. 07/29/21      Reviewed patient profile for cardiometabolic risk factors    Date taken /Value  REFERENCE RANGE   Abdominal Obesity  (Waist Circumference)   See nursing flowsheet Women ?35 in (88 cm)   Men ?40 in (102 cm)      Triglycerides  Triglycerides   Date Value Ref Range Status   10/02/2020 86 <=149 mg/dL Final   10/19/2019 117 <150 mg/dL Final       ?150 mg/dL (1.7 mmol/L) or current treatment for elevated triglycerides   HDL cholesterol  HDL Cholesterol   Date Value Ref  "Range Status   10/19/2019 56 >49 mg/dL Final     Direct Measure HDL   Date Value Ref Range Status   10/02/2020 45 (L) >=50 mg/dL Final   ]   Women <50 mg/dL (1.3 mmol/L) in women or current treatment for low HDL cholesterol  Men <40 mg/dL (1 mmol/L) in men or current treatment for low HDL cholesterol     Fasting plasma glucose (FPG) Lab Results   Component Value Date     07/02/2021      FPG ?100 mg/dL (5.6 mmol/L) or treatment for elevated blood glucose   Blood pressure  BP Readings from Last 3 Encounters:   08/05/21 (!) 158/80   06/04/19 131/71   04/26/19 164/86    Blood pressure ?130/85 mmHg or treatment for elevated blood pressure   Family History  See family history     Appearance: dressed in hospital scrubs, appeared reported age, unkempt hair   Attitude: cooperative with interview, not irritable   Eye Contact: improved, looks up more during the interview   Mood: \"good\"   Affect:  Somewhat blunted, not agitated and tense. Brightens and smiles at times   Speech: accented, clear and coherent.    Language: no obvious receptive or expressive deficits  Psychomotor, Gait, Musculoskeletal: No abnormal movements noted while seated, did sit in an awkward/unnatural position during interview and held posture throughout length of interview.   Thought Process: goal-oriented, linear, concrete   Associations:  No loosening of associations present  Thought Content:  Did not appear to be responding to internal stimuli.  Insight:   poor - slightly improved   Judgement:  fair  Oriented to: person, place, time/date   Attention Span and Concentration: attentive to conversation though at times stares ahead and does not respond to questions.  Recent and Remote Memory: stable, some difficulty recalling events   Fund of Knowledge:  normal    Clinical Global Impressions  First:  Considering your total clinical experience with this particular patient population, how severe are the patient's symptoms at this time?: 7 (04/16/21 " 1428)  Compared to the patient's condition at the START of treatment, this patient's condition is: 4 (04/16/21 1428)  Most recent:  Considering your total clinical experience with this particular patient population, how severe are the patient's symptoms at this time?: 7 (07/22/21 0941)  Compared to the patient's condition at the START of treatment, this patient's condition is: 3 (07/22/21 0941)           Precautions:     Behavioral Orders   Procedures     Cheeking Precautions (behavioral units)     Patient Observed swallowing PO medications; Patient asked to drink water after swallowing medication; Patient in Staff line of sight for 15 minutes after medication given; Mouth checks after PO administration (patient asked to open mouth and stick out their tongue).     Code 3     For walks in the [place] with staff and per staff discretion     Electroconvulsive therapy     Series of up to 12 treatments. Begin Date: 5/26/21     Treating Psychiatrist providing ECT:  Dr. Lubin     Notified on:  5/21/21     Electroconvulsive therapy     As long as we get the green light from risk management and pt is medically cleared, begin ECT every Monday, Wednesday, and Friday     Electroconvulsive therapy     Series of up to 12 treatments. Begin Date: 5/25/21     Treating Psychiatrist providing ECT:  Amee     Notified on:  5/24/21     Elopement precautions     Fall precautions     Mcgrath Calderon     Routine Programming     As clinically indicated     Status 15     Every 15 minutes.          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 Symptoms and presentation at this time is most consistent with Schizoaffective Disorder, Bipolar Type. Obtained most recent medication regimen from patient's ACT team, and regimen was initially restarted. Pt is committed. Petitioned for Alcides Calderon was filed and granted due to lack of improvement and side effects from medications. Inpatient psychiatric hospitalization is warranted at this time for safety, stabilization, possible adjustment in medications and development of a safe discharge plan.      Hospital Course:  On admission, PTA medications were restarted. However, Michelle had been declining all scheduled medications despite significant encouragement from staff and provider. Psychiatric emergency declared on 4/20 due to aggression toward others in context of severe psychosis and suspected excited catatonia. Ativan 1 mg TID was also added. Discontinued PTA Invega, Zyprexa, and thorazine on 4/20 due to consistent refusal.      On 4/26, it was noted that patient frequently had upward gaze while walking up and down the unit. She did not appear to be distressed. She reported that she was looking at \"my god.\" It was determined to be oculogyric crisis secondary to IM haloperidol. Haldol was subsequently discontinued and scheduled Zyprexa was initiated on an emergency basis. Oral Cogentin was also scheduled, though patient declined. On the evening of 4/26, patient's gaze was fixed in upward position for several hours and she appeared to be experiencing discomfort. IM Cogentin was administered with noted resolution. Partial improvements were observed after switching to scheduled Zyprexa. After patient improved, she was more receptive to reinitiating oral Invega, which was initiated on 5/3 and titrated to PTA dose of 9 mg daily on 5/10. Plan was for ACT team to bring in loading dose of Invega Sustenna. " "Patient had signs of oculogyric crisis again with Invega. Oral dose decreased to 6 mg after this. Invega was stopped on 5/14 due to ongoing signs of problems related to eye movement and concerns for oculogyric crisis.     Overall improvement in patient's agitation and suspected catatonia noted on 4/30 after patient accepted two doses of Ativan. She began declining Ativan again with noted decompensation. Patient began accepting, however, was deemed not to have the capacity to consent to treatment with Ativan. She does not believe she has a mental illness, including catatonia. She does not fully understand risks associated with inadequate treatment of catatonia. Discussed with her son who is acting as surrogate decision maker and he is in full support of forced scheduled IM Ativan if patient declines oral formulation. Also consulted with our legal team prior to backing oral Ativan with IM.      Ativan was increased on 5/19 due to re-emerging evidence of catatonia (long periods of staring, repetitive movements, echolalia, mutism). Meeting was held with ACT team on 5/20, including ACT team psychiatrist, Dr. Pedraza. He said that he is in \"full support\" of plan to pursue Mcgrath Kohler and ECT at this time. He does feel that in the past she has been discharged from the hospital while still quite symptomatic. He is hoping that ECT will be effective and that with improvement, Michelle would be more receptive to clozapine and weekly blood draws. He said that ideally she would transition to an IRTS before going back to her apartment, but also understands there may be some barriers (I.e. pt's willingness, financial concerns, etc).      ECT consult placed. Please see consult note by Dr. Lubin on 5/21 for details. Mcgrath Kohler was approved on 5/24 and patient was medically cleared on 5/24. ECT initiated on 5/26. She was noted to have a short seizure during ECT on 6/4. Staff note that patient appears more disoriented with memory " impairment and sedation on days of ECT. This was noted on my examination again today. Improvements noted in mood, affect, social interactions, paranoia, agitation since initiation of ECT. Reduced Ativan on 6/5 due to sedative effects. Relayed concerns about memory impairment and confusion with Dr. Lubin. Recommended attempting unilateral ECT, which he agreed to do. First unilateral ECT on 6/7. ECT was held on 6/11 and 6/14 due to memory impairment. We will plan to hold it again tomorrow (6/16). There is ongoing discussion regarding whether there is a component of catatonia contributing to her current presentation but this is less likely since it worsened after ECT was started.  Given her level of cognitive impairment and our belief that this is due to ECT, we will not pursue any further treatments at this time. Since we have held ECT (last treatment on 6/9), her cognitive impairment has slightly improved (more oriented).      Michelle initially appeared to be decompensating somewhat when ECT was held, though her cognitive impairment has gradually improved. If symptoms of psychosis re-emerge, it may be worth starting clozapine, which is something that has previously been considered by her ACT team. Her Hatch order would need to be amended as clozapine is not one of the medications listed. ANC obtained on 6/16 was 1800/microL. Per conversation with pharmacy, neutropenia has been associated with olanzapine and agranulocytosis has been associated with olanzapine, lorazepam, metoprolol, and hydralazine and hematologic labs have been monitored. Upon re-check, ANC was 3700/microL on 6/21/21. At this time, the primary symptoms we are observing are cognitive deficits and inability to care for self, which we would not address by changing her antipsychotic medication.     Michelle's cognitive impairment has been steadily improving since holding ECT treatments, however it is possible that lorazepam is also playing a role in her  cognitive impairment. Given no signs of re-emerging catatonia, a gradual lorazepam taper (decreasing by 0.5 mg) was initiated on 6/28/21 to monitor for potential improvements with regard to memory impairment.  On 7/7, Michelle reported an incident of oculogyric crisis and Cogentin 1 mg BID was initiated with no noted changes in cognition.      Michelle has remained focused on discharge. The team is working with Michelle's ACT team to to establish a safe discharge plan with options including moving to a group home vs home with her ACT team and additional in home supports via CADI services. We are concerned that Michelle would have difficulty taking her medications as prescribed if she were to return home without her ACT team and additional services. This is evidenced by impairments noted in cognitive assessment by OT specialist on 7/1. MOCA score was a 13/30 and CPT score was a 4.7. Physician's statement in support of guardianship was completed on 7/19/21.  Patient's son is pursuing guardianship. Patient became quite agitated on 7/16 when  staff from [facility] came to meet with her a second time. She did not allow staff members to enter her room and stated that she will not be going to a group home.      Due to daytime sedation, on 7/26/21, lorazepam evening dose decreased from 2 mg to 1 mg. On 7/27/21 transitioned Zyprexa from 10 mg BID to 5 mg QAM + 15 mg QPM and had less daytime sedation with this change and no emergence of symptoms concerning for orthostatic hypotension.  She attended her emergency guardianship hearing on 7/28.  Due to ongoing daytime sedation, will trial consolidation of Zyprexa dose at bedtime starting on 7/30.       Target psychiatric symptoms and interventions:   - Continue Ativan 1 mg PO or IM TID Patient may not decline.   - Resume Zyprexa 20 mg at bedtime  Hatch in place. May consider increasing further though holding off given re-emerging catatonic sx and borderline prolonged QTc      -  "Continue Cogentin 1 mg PO BID with additional 2 mg IM daily prn for evidence of acute dystonic reaction or oculogyric crisis  -Continue hydroxyzine 25-50 mg q4h prn for acute anxiety  -Continue Trazodone 50 mg at bedtime prn for sleep disturbances  -Continue Zyprexa 10 mg TID prn for severe agitation  -Continue Ativan/Benadryl q4h prn for agitation. WOULD GIVE PRN ATIVAN FIRST FOR AGITATION unless it is after 5 pm on day prior to scheduled ECT     Occupational Therapy Consult Placed to evaluate cognitive functioning/ability to care for self in home environment, ability to manage medications. See note on 7/1 for details.      ECT: Discontinued due to significant cognitive impairment. Please see note dated 7/12/21 for additional details.      Acute Medical Problems and Treatments:  Left ankle pain, improved  - Added ice packs prn    HTN, improved: Patient is now adherent with medication regimen.   - Metoprolol succinate ER 75 mg   - Cozaar 100 mg daily  - Amlodipine 7.5 mg daily  - IM discontinued hydralazine prn  - Switched BP checks from QID to BID on 7/13 given overall improvement  - Please see note from IM dated 5/3, 5/6, 5/24, 6/20 and 6/30/21.  - Obtained EKG and routine labs on 5/21 in context of pt declining vital sign checks and anti-hypertensives and recently elevated BPs. Reviewed labs and discussed EKG findings with IM on 5/21. No urgent concerns, though IM should be notified if pt develops acute medical concerns (I.e. heart palpitations, SOB, changes in speech, AMS, confusion, CP, HA, changes in vision).    - Per 6/20 IM note: \"Please notify IM if BP is persistently severely elevated and requiring frequent PRN hydralazine\".  - Internal medicine consulted again on 6/28 due to consistent use of hydralazine over the past week. Metoprolol increased to 75 mg daily then to 100 mg and amlodipine 5 mg was added on 6/30. Amlodipine increased to 7.5 mg daily on 7/5.      Recent history of BRANDON:  - Attempted to " repeat BMP multiple times though patient consistently declining blood draw  - Encourage fluids     For previous medical concerns, please see note dated 7/12/21.     Behavioral/Psychological/Social:  - Encourage unit programming  - Patient is now Code 3 status and can take walks in the [place] with staff and security present per staff discretion. This appears to be quite therapeutic for her.      Safety:  - Continue precautions as noted above  - Status 15 minute checks  - Safety precautions include: assault and elopement precautions  - Continue precautions as noted above     Legal Status: Committed as MI with Hatch in place for Haldol, Zyprexa, Invega, and Thorazine through [facility]. Filed Alcides Kohler through [facility] and approved on 5/24/21. Physician's statement in support of guardianship was completed on 7/19/21. Court hearing for guardianship is scheduled for today 7/28/21 at 8:30 AM.     Disposition Plan   Reason for ongoing admission: poses an imminent risk to self and is unable to care for self due to severe psychosis or tad  Discharge location: Group home (needs CADI waiver). CPT assessment done by OT (Tania queen) on 7/1.  Please see note for details. [place]12. Patient's son is now her temporary emergency guardian.   Discharge Medications: not ordered  Follow-up Appointments: not scheduled     Gabby Zaman MD  [facility]iat[facility]

## 2021-08-05 NOTE — PLAN OF CARE
"  Problem: Adult Inpatient Plan of Care  Goal: Plan of Care Review  Outcome: No Change  Flowsheets (Taken 8/5/2021 0825)  Plan of Care Reviewed With: patient  Progress: no change     Pt denies SI/SIB/HI/hallucinations/anxiety/depression. Pt was irritable at writer during assessment and stated \"I see them (doctors) everyday. I will talk to them.\" Affect is flat, blunted.  Pt reports regular bladder & bowel movements. Pt encouraged to join groups. Pt is isolative to room, either watching TV or napping.    Pt's birthday is [birth year]. RN contacted dietary, who stated they will send up a dessert that pt likes - which will be a brownie, cookie, or fruit.    Appetite = eating and tolerating meals    Sleep= 6.5hrs    ADLs = independent, verbal prompts as needed. Pt's room is neat.    Medication side effects = denies adverse effects    Medical concerns  Continue monitoring HTN    Vital signs     08/05/21 0807   Vital Signs   Temp 97.8  F (36.6  C)   Temp src Tympanic   Resp 16   Pulse 56   Pulse Rate Source Monitor   BP (!) 158/80   Sitting Orthostatic BP   Sitting Orthostatic /80   Sitting Orthostatic Pulse 56 bpm   Standing Orthostatic BP   Standing Orthostatic /88   Standing Orthostatic Pulse 56 bpm   Pain/Comfort   Patient Currently in Pain denies     Plan  Continue with plan of care.     "

## 2021-08-05 NOTE — PLAN OF CARE
Assessment/Intervention/Current Symtoms and Care Coordination  -Chart review  -Attended team meeting     -Pt's birthday is tomorrow.     [place] services is scheduled for 8/12/21. This will need to be completed prior to finalizing plans for [facility].  [facility] continues to coordinate with [facility] Kourtney Richardson [phone].      Discharge Plan or Goal  Pending stabilization & development of a safe discharge plan.  Considerations include:  [facility] [place].     Barriers to Discharge  Patient requires further psychiatric stabilization due to current symptomology     Referral Status  [facility]     Legal Status  Committed/Hatch/Mcgrath colin through [facility]

## 2021-08-05 NOTE — PLAN OF CARE
Problem: Behavior Regulation Impairment (Psychotic Signs/Symptoms)  Goal: Improved Behavioral Control (Psychotic Signs/Symptoms)  Outcome: No Change   Michelle was isolative and withdrawn to her room as she spent most of this evening sleeping. Pt declined to come out of her room to socialize. Pt was seen using the phone for about 10 mins and went back to her room after the phone call. Flat and blunted affect. She denies any discomfort or pain.  She denies all mental health symptoms.  Her hygiene remains poor, and she declined to shower. She took scheduled medication without issue and denied SE's.

## 2021-08-06 PROCEDURE — 250N000013 HC RX MED GY IP 250 OP 250 PS 637: Performed by: PHYSICIAN ASSISTANT

## 2021-08-06 PROCEDURE — 250N000013 HC RX MED GY IP 250 OP 250 PS 637: Performed by: STUDENT IN AN ORGANIZED HEALTH CARE EDUCATION/TRAINING PROGRAM

## 2021-08-06 PROCEDURE — 99231 SBSQ HOSP IP/OBS SF/LOW 25: CPT | Performed by: PSYCHIATRY & NEUROLOGY

## 2021-08-06 PROCEDURE — 124N000002 HC R&B MH UMMC

## 2021-08-06 RX ADMIN — BENZTROPINE MESYLATE 1 MG: 1 TABLET ORAL at 20:57

## 2021-08-06 RX ADMIN — BENZTROPINE MESYLATE 1 MG: 1 TABLET ORAL at 08:21

## 2021-08-06 RX ADMIN — AMLODIPINE BESYLATE 7.5 MG: 2.5 TABLET ORAL at 08:21

## 2021-08-06 RX ADMIN — LORAZEPAM 1 MG: 1 TABLET ORAL at 08:21

## 2021-08-06 RX ADMIN — LOSARTAN POTASSIUM 100 MG: 100 TABLET, FILM COATED ORAL at 08:21

## 2021-08-06 RX ADMIN — METOPROLOL SUCCINATE 75 MG: 25 TABLET, EXTENDED RELEASE ORAL at 08:21

## 2021-08-06 RX ADMIN — LORAZEPAM 1 MG: 1 TABLET ORAL at 20:56

## 2021-08-06 RX ADMIN — OLANZAPINE 20 MG: 20 TABLET, ORALLY DISINTEGRATING ORAL at 20:56

## 2021-08-06 RX ADMIN — LORAZEPAM 1 MG: 1 TABLET ORAL at 14:47

## 2021-08-06 NOTE — PLAN OF CARE
Problem: Sleep Disturbance (Psychotic Signs/Symptoms)  Goal: Improved Sleep (Psychotic Signs/Symptoms)  Outcome: Improving      Pt appeared to be sleeping through the night for 7 hours. No concerns noted this shift, No PRN administered. Will continue to monitor.

## 2021-08-06 NOTE — PLAN OF CARE
Assessment/Intervention/Current Symtoms and Care Coordination  WR attended team and reviewed chart.   [place] 8[place]12 to start process for CADI funding.  WR contacted [facility] to request  staff make another visit with pt prior to her admission in an attempt to continue to build rapport. They will come next week on Tuesday at 11:00AM.     Discharge Plan or Goal  Accepted at [facility] pending CADI funding    Barriers to Discharge   Pt will require CADI funding before she can safely discharge to her new [facility].     Referral Status      Legal Status  Commitment/Hatch/Alcides Kohler, son is currently temporary guardian

## 2021-08-06 NOTE — PLAN OF CARE
Problem: Behavior Regulation Impairment (Psychotic Signs/Symptoms)  Goal: Improved Behavioral Control (Psychotic Signs/Symptoms)  Outcome: No Change    Pt had a pleasant evening. She spent most of this evening in her room resting in bed and declined to come out of her room to socialize with staff. Flat and blunted affect. She denies all mental health symptoms. She took scheduled medication without any issue and denied SE's of her medication. She denies pain, SI/SIB.  Blood pressure 138/73.

## 2021-08-06 NOTE — PLAN OF CARE
Pt had a pleasant day. She spent most of the day in her room resting in bed and declined to come out of her room to socialize with staff. Flat and blunted affect. She denies all mental health symptoms. She took scheduled medication without any issue and denied SE's of her medication. She denies pain, SI/SIB. She was happy that staff recognized her birthday. Offer no other concerns at this time.

## 2021-08-06 NOTE — PROGRESS NOTES
[facility], [place]   Psychiatric Progress Note  Hospital Day: 114        Interim History:   The patient's care was discussed with the treatment team during the daily team meeting and/or staff's chart notes were reviewed. Intermittent BP elevation otherwise vitals WNL. She has been pleasant and cooperative with staff and peers. Continues to be quite isolative to room, frequently watching CNN. She is medication adherent. No reported or observed side effects. No symptoms of psychosis or tad. Sleeping and eating well. Not attending to all ADLs. It is her birthday today.      Michelle was sitting in her bed watching TV. She smiled after writer wished her a Happy Birthday. She shared that she was able to speak with her oldest daughter today, which was nice for her. She has three children-two daughters and one son. She said that they all live in different states: [place], [place], and [place]. She raised them in [place] and they all moved out of state for employment opportunities. She is hoping to have another meeting with  staff prior to her MNChoice assessment. More accepting of plan.      Suicidal ideation: denies current or recent suicidal ideation or behaviors.    Homicidal ideation: denies current or recent homicidal ideation or behaviors.    Psychotic symptoms: Patient denies AH, VH, paranoia, delusions.     Medication side effects reported: She denies sedation today. She denies any dizziness or lightheadedness.     Acute medical concerns: pain in left ankle improved.    There is not tenderness, erythema or induration of either of her calves.     Other issues reported by patient: Patient had no further questions or concerns.           Medications:       amLODIPine  7.5 mg Oral Daily     benztropine  1 mg Oral BID     LORazepam  1 mg Oral TID    Or     LORazepam  1 mg Intramuscular TID     losartan  100 mg Oral Daily     metoprolol succinate ER  75 mg Oral Daily     OLANZapine zydis  20 mg  Oral At Bedtime    Or     OLANZapine  10 mg Intramuscular At Bedtime          Allergies:     Allergies   Allergen Reactions     Haldol [Haloperidol]      Patient previously tolerated haldol, though developed oculogyric crises during hospital stay on 4/26/21 on total daily dose of 10 mg.     Lisinopril Cough          Labs:   No results found for this or any previous visit (from the past 24 hour(s)).       Psychiatric Examination:     BP 99/62   Pulse 74   Temp 97.4  F (36.3  C) (Tympanic)   Resp 16   Wt 70.3 kg (154 lb 14.4 oz)   SpO2 100%   BMI 26.59 kg/m    Weight is 154 lbs 14.4 oz  Body mass index is 26.59 kg/m .    Weight over time:  Vitals:    05/25/21 0850 06/15/21 0811 06/16/21 1605 06/19/21 0859   Weight: 71 kg (156 lb 8 oz) 69.8 kg (153 lb 12.8 oz) 70.5 kg (155 lb 8 oz) 71.4 kg (157 lb 8 oz)    06/21/21 0805 06/22/21 0839 06/24/21 0800 07/02/21 0835   Weight: 69.5 kg (153 lb 3.2 oz) 69.3 kg (152 lb 11.2 oz) 70.7 kg (155 lb 12.8 oz) 70.7 kg (155 lb 12.8 oz)    07/03/21 0844 07/06/21 0838 07/10/21 0845 07/16/21 0821   Weight: 69.6 kg (153 lb 8 oz) 70.9 kg (156 lb 4.8 oz) 69.8 kg (153 lb 14.4 oz) 69.3 kg (152 lb 12.8 oz)    07/17/21 0830 07/31/21 0804   Weight: 70.4 kg (155 lb 4.8 oz) 70.3 kg (154 lb 14.4 oz)       Orthostatic Vitals       Most Recent      Sitting Orthostatic /84 07/29 0800    Sitting Orthostatic Pulse (bpm) 82 07/29 0800    Standing Orthostatic /86 07/29 0800    Standing Orthostatic Pulse (bpm) 88 07/29 0800            Cardiometabolic risk assessment. 07/29/21      Reviewed patient profile for cardiometabolic risk factors    Date taken /Value  REFERENCE RANGE   Abdominal Obesity  (Waist Circumference)   See nursing flowsheet Women ?35 in (88 cm)   Men ?40 in (102 cm)      Triglycerides  Triglycerides   Date Value Ref Range Status   10/02/2020 86 <=149 mg/dL Final   10/19/2019 117 <150 mg/dL Final       ?150 mg/dL (1.7 mmol/L) or current treatment for elevated triglycerides  "  HDL cholesterol  HDL Cholesterol   Date Value Ref Range Status   10/19/2019 56 >49 mg/dL Final     Direct Measure HDL   Date Value Ref Range Status   10/02/2020 45 (L) >=50 mg/dL Final   ]   Women <50 mg/dL (1.3 mmol/L) in women or current treatment for low HDL cholesterol  Men <40 mg/dL (1 mmol/L) in men or current treatment for low HDL cholesterol     Fasting plasma glucose (FPG) Lab Results   Component Value Date     07/02/2021      FPG ?100 mg/dL (5.6 mmol/L) or treatment for elevated blood glucose   Blood pressure  BP Readings from Last 3 Encounters:   08/06/21 99/62   06/04/19 131/71   04/26/19 164/86    Blood pressure ?130/85 mmHg or treatment for elevated blood pressure   Family History  See family history     Appearance: dressed in hospital scrubs, appeared reported age, unkempt hair   Attitude: cooperative with interview, pleasant  Eye Contact: improved, looks up more during the interview   Mood: \"good\"   Affect:  Somewhat blunted, not agitated and tense. Brightens and smiles at times   Speech: accented, clear and coherent.    Language: no obvious receptive or expressive deficits  Psychomotor, Gait, Musculoskeletal: No abnormal movements noted while seated, did sit in an awkward/unnatural position during interview and held posture throughout length of interview.   Thought Process: goal-oriented, linear, concrete   Associations:  No loosening of associations present  Thought Content:  Did not appear to be responding to internal stimuli.  Insight:   poor - slightly improved   Judgement:  fair  Oriented to: person, place, time/date   Attention Span and Concentration: attentive to conversation though at times stares ahead and does not respond to questions.  Recent and Remote Memory: stable, some difficulty recalling events   Fund of Knowledge:  normal    Clinical Global Impressions  First:  Considering your total clinical experience with this particular patient population, how severe are the patient's " symptoms at this time?: 7 (04/16/21 1428)  Compared to the patient's condition at the START of treatment, this patient's condition is: 4 (04/16/21 1428)  Most recent:  Considering your total clinical experience with this particular patient population, how severe are the patient's symptoms at this time?: 7 (07/22/21 0941)  Compared to the patient's condition at the START of treatment, this patient's condition is: 3 (07/22/21 0941)           Precautions:     Behavioral Orders   Procedures     Cheeking Precautions (behavioral units)     Patient Observed swallowing PO medications; Patient asked to drink water after swallowing medication; Patient in Staff line of sight for 15 minutes after medication given; Mouth checks after PO administration (patient asked to open mouth and stick out their tongue).     Code 3     For walks in the [place] with staff and per staff discretion     Electroconvulsive therapy     Series of up to 12 treatments. Begin Date: 5/26/21     Treating Psychiatrist providing ECT:  Dr. Lubin     Notified on:  5/21/21     Electroconvulsive therapy     As long as we get the green light from risk management and pt is medically cleared, begin ECT every Monday, Wednesday, and Friday     Electroconvulsive therapy     Series of up to 12 treatments. Begin Date: 5/25/21     Treating Psychiatrist providing ECT:  Amee     Notified on:  5/24/21     Elopement precautions     Fall precautions     Mcgrath Calderon     Routine Programming     As clinically indicated     Status 15     Every 15 minutes.          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 Symptoms and presentation at this time is most consistent with Schizoaffective Disorder, Bipolar Type. Obtained most recent medication regimen from patient's ACT team, and regimen was initially restarted. Pt is committed. Petitioned for Alcides Calderon was filed and granted due to lack of improvement and side effects from medications. Inpatient psychiatric hospitalization is warranted at this time for safety, stabilization, possible adjustment in medications and development of a safe discharge plan.      Hospital Course:  On admission, PTA medications were restarted. However, Michelle had been declining all scheduled medications despite significant encouragement from staff and provider. Psychiatric emergency declared on 4/20 due to aggression toward others in context of severe psychosis and suspected excited catatonia. Ativan 1 mg TID was also added. Discontinued PTA Invega, Zyprexa, and thorazine on 4/20 due to consistent refusal.      On 4/26, it was noted that patient frequently had upward gaze while walking up and down the unit. She did not appear to be distressed. She reported that she was looking at \"my god.\" It was determined to be oculogyric crisis secondary to IM haloperidol. Haldol was subsequently discontinued and scheduled Zyprexa was initiated on an emergency basis. Oral Cogentin was also scheduled, though patient declined. On the evening of 4/26, patient's gaze was fixed in upward position for several hours and she appeared to be experiencing discomfort. IM Cogentin was administered with noted resolution. Partial improvements were observed after switching to scheduled Zyprexa. After patient improved, she was more receptive to reinitiating oral Invega, which was initiated on 5/3 and titrated to PTA dose of 9 mg daily on 5/10. Plan was for ACT team to " "bring in loading dose of Invega Sustenna. Patient had signs of oculogyric crisis again with Invega. Oral dose decreased to 6 mg after this. Invega was stopped on 5/14 due to ongoing signs of problems related to eye movement and concerns for oculogyric crisis.     Overall improvement in patient's agitation and suspected catatonia noted on 4/30 after patient accepted two doses of Ativan. She began declining Ativan again with noted decompensation. Patient began accepting, however, was deemed not to have the capacity to consent to treatment with Ativan. She does not believe she has a mental illness, including catatonia. She does not fully understand risks associated with inadequate treatment of catatonia. Discussed with her son who is acting as surrogate decision maker and he is in full support of forced scheduled IM Ativan if patient declines oral formulation. Also consulted with our legal team prior to backing oral Ativan with IM.      Ativan was increased on 5/19 due to re-emerging evidence of catatonia (long periods of staring, repetitive movements, echolalia, mutism). Meeting was held with ACT team on 5/20, including ACT team psychiatrist, Dr. Pedraza. He said that he is in \"full support\" of plan to pursue Mcgrath Kohler and ECT at this time. He does feel that in the past she has been discharged from the hospital while still quite symptomatic. He is hoping that ECT will be effective and that with improvement, Michelle would be more receptive to clozapine and weekly blood draws. He said that ideally she would transition to an IRTS before going back to her apartment, but also understands there may be some barriers (I.e. pt's willingness, financial concerns, etc).      ECT consult placed. Please see consult note by Dr. Lubin on 5/21 for details. Mcgrath Kohler was approved on 5/24 and patient was medically cleared on 5/24. ECT initiated on 5/26. She was noted to have a short seizure during ECT on 6/4. Staff note that " patient appears more disoriented with memory impairment and sedation on days of ECT. This was noted on my examination again today. Improvements noted in mood, affect, social interactions, paranoia, agitation since initiation of ECT. Reduced Ativan on 6/5 due to sedative effects. Relayed concerns about memory impairment and confusion with Dr. Lubin. Recommended attempting unilateral ECT, which he agreed to do. First unilateral ECT on 6/7. ECT was held on 6/11 and 6/14 due to memory impairment. We will plan to hold it again tomorrow (6/16). There is ongoing discussion regarding whether there is a component of catatonia contributing to her current presentation but this is less likely since it worsened after ECT was started.  Given her level of cognitive impairment and our belief that this is due to ECT, we will not pursue any further treatments at this time. Since we have held ECT (last treatment on 6/9), her cognitive impairment has slightly improved (more oriented).      Michelle initially appeared to be decompensating somewhat when ECT was held, though her cognitive impairment has gradually improved. If symptoms of psychosis re-emerge, it may be worth starting clozapine, which is something that has previously been considered by her ACT team. Her Hatch order would need to be amended as clozapine is not one of the medications listed. ANC obtained on 6/16 was 1800/microL. Per conversation with pharmacy, neutropenia has been associated with olanzapine and agranulocytosis has been associated with olanzapine, lorazepam, metoprolol, and hydralazine and hematologic labs have been monitored. Upon re-check, ANC was 3700/microL on 6/21/21. At this time, the primary symptoms we are observing are cognitive deficits and inability to care for self, which we would not address by changing her antipsychotic medication.     Michelle's cognitive impairment has been steadily improving since holding ECT treatments, however it is possible that  lorazepam is also playing a role in her cognitive impairment. Given no signs of re-emerging catatonia, a gradual lorazepam taper (decreasing by 0.5 mg) was initiated on 6/28/21 to monitor for potential improvements with regard to memory impairment.  On 7/7, Michelle reported an incident of oculogyric crisis and Cogentin 1 mg BID was initiated with no noted changes in cognition.      Michelle has remained focused on discharge. The team is working with Michelle's ACT team to to establish a safe discharge plan with options including moving to a group home vs home with her ACT team and additional in home supports via CADI services. We are concerned that Michelle would have difficulty taking her medications as prescribed if she were to return home without her ACT team and additional services. This is evidenced by impairments noted in cognitive assessment by OT specialist on 7/1. MOCA score was a 13/30 and CPT score was a 4.7. Physician's statement in support of guardianship was completed on 7/19/21.  Patient's son is pursuing guardianship. Patient became quite agitated on 7/16 when  staff from [facility] came to meet with her a second time. She did not allow staff members to enter her room and stated that she will not be going to a group home.      Due to daytime sedation, on 7/26/21, lorazepam evening dose decreased from 2 mg to 1 mg. On 7/27/21 transitioned Zyprexa from 10 mg BID to 5 mg QAM + 15 mg QPM and had less daytime sedation with this change and no emergence of symptoms concerning for orthostatic hypotension.  She attended her emergency guardianship hearing on 7/28.  Due to ongoing daytime sedation, will trial consolidation of Zyprexa dose at bedtime starting on 7/30.       Target psychiatric symptoms and interventions:   - Continue Ativan 1 mg PO or IM TID Patient may not decline.   - Resume Zyprexa 20 mg at bedtime  Hatch in place. May consider increasing further though holding off given re-emerging catatonic  "sx and borderline prolonged QTc      - Continue Cogentin 1 mg PO BID with additional 2 mg IM daily prn for evidence of acute dystonic reaction or oculogyric crisis  -Continue hydroxyzine 25-50 mg q4h prn for acute anxiety  -Continue Trazodone 50 mg at bedtime prn for sleep disturbances  -Continue Zyprexa 10 mg TID prn for severe agitation  -Continue Ativan/Benadryl q4h prn for agitation. WOULD GIVE PRN ATIVAN FIRST FOR AGITATION unless it is after 5 pm on day prior to scheduled ECT     Occupational Therapy Consult Placed to evaluate cognitive functioning/ability to care for self in home environment, ability to manage medications. See note on 7/1 for details.      ECT: Discontinued due to significant cognitive impairment. Please see note dated 7/12/21 for additional details.      Acute Medical Problems and Treatments:  Left ankle pain, improved  - Added ice packs prn    HTN, improved: Patient is now adherent with medication regimen.   - Metoprolol succinate ER 75 mg   - Cozaar 100 mg daily  - Amlodipine 7.5 mg daily  - IM discontinued hydralazine prn  - Switched BP checks from QID to BID on 7/13 given overall improvement  - Please see note from IM dated 5/3, 5/6, 5/24, 6/20 and 6/30/21.  - Obtained EKG and routine labs on 5/21 in context of pt declining vital sign checks and anti-hypertensives and recently elevated BPs. Reviewed labs and discussed EKG findings with IM on 5/21. No urgent concerns, though IM should be notified if pt develops acute medical concerns (I.e. heart palpitations, SOB, changes in speech, AMS, confusion, CP, HA, changes in vision).    - Per 6/20 IM note: \"Please notify IM if BP is persistently severely elevated and requiring frequent PRN hydralazine\".  - Internal medicine consulted again on 6/28 due to consistent use of hydralazine over the past week. Metoprolol increased to 75 mg daily then to 100 mg and amlodipine 5 mg was added on 6/30. Amlodipine increased to 7.5 mg daily on 7/5. "      Recent history of BRANDON:  - Attempted to repeat BMP multiple times though patient consistently declining blood draw  - Encourage fluids     For previous medical concerns, please see note dated 7/12/21.     Behavioral/Psychological/Social:  - Encourage unit programming  - Patient is now Code 3 status and can take walks in the [place] with staff and security present per staff discretion. This appears to be quite therapeutic for her.      Safety:  - Continue precautions as noted above  - Status 15 minute checks  - Safety precautions include: assault and elopement precautions  - Continue precautions as noted above     Legal Status: Committed as MI with Hatch in place for Haldol, Zyprexa, Invega, and Thorazine through [facility]. Filed Alcides Kohler through [facility] and approved on 5/24/21. Physician's statement in support of guardianship was completed on 7/19/21. Patient's son is now her temporary emergency guardian.     Disposition Plan   Reason for ongoing admission: poses an imminent risk to self and is unable to care for self due to severe psychosis or tad  Discharge location: Group home (needs CADI waiver). CPT assessment done by OT (Tania queen) on 7/1.  Please see note for details. [place] Choice Assessment planned on 8/12.   Discharge Medications: not ordered  Follow-up Appointments: not scheduled     Gabby Zaman MD  [facility]iat[facility]

## 2021-08-07 PROCEDURE — 124N000002 HC R&B MH UMMC

## 2021-08-07 PROCEDURE — 250N000013 HC RX MED GY IP 250 OP 250 PS 637: Performed by: PHYSICIAN ASSISTANT

## 2021-08-07 PROCEDURE — 250N000013 HC RX MED GY IP 250 OP 250 PS 637: Performed by: STUDENT IN AN ORGANIZED HEALTH CARE EDUCATION/TRAINING PROGRAM

## 2021-08-07 RX ADMIN — METOPROLOL SUCCINATE 75 MG: 25 TABLET, EXTENDED RELEASE ORAL at 09:03

## 2021-08-07 RX ADMIN — LORAZEPAM 1 MG: 1 TABLET ORAL at 19:51

## 2021-08-07 RX ADMIN — BENZTROPINE MESYLATE 1 MG: 1 TABLET ORAL at 09:03

## 2021-08-07 RX ADMIN — LORAZEPAM 1 MG: 1 TABLET ORAL at 13:57

## 2021-08-07 RX ADMIN — BENZTROPINE MESYLATE 1 MG: 1 TABLET ORAL at 19:51

## 2021-08-07 RX ADMIN — LORAZEPAM 1 MG: 1 TABLET ORAL at 09:03

## 2021-08-07 RX ADMIN — AMLODIPINE BESYLATE 7.5 MG: 2.5 TABLET ORAL at 09:02

## 2021-08-07 RX ADMIN — OLANZAPINE 20 MG: 20 TABLET, ORALLY DISINTEGRATING ORAL at 19:51

## 2021-08-07 RX ADMIN — LOSARTAN POTASSIUM 100 MG: 100 TABLET, FILM COATED ORAL at 09:03

## 2021-08-07 ASSESSMENT — ACTIVITIES OF DAILY LIVING (ADL)
HYGIENE/GROOMING: INDEPENDENT
HYGIENE/GROOMING: INDEPENDENT
DRESS: SCRUBS (BEHAVIORAL HEALTH);INDEPENDENT
ORAL_HYGIENE: INDEPENDENT
DRESS: INDEPENDENT
ORAL_HYGIENE: INDEPENDENT

## 2021-08-07 NOTE — PLAN OF CARE
Patient presents as quiet and socially withdrawn with a blunted affect.  She exhibits a bright affect when approached by RN writer and smiled when given belated birthday wishes.  Michelle denies that she is experiencing any manic or psychotic symptoms at this time.  She has not demonstrated any signs of agitation or irritability this shift.  She continues to isolate in her room, watching CNN and occasionally napping.  She does not appear sedated.  She is accepting her scheduled medications and vital signs assessment without incident.  She seems more accepting of her discharge plan to the group home.  She is eating and drinking in good amounts.  She took a shower this shift.  She denies any acute physical concerns at this time.  VSS.

## 2021-08-07 NOTE — PLAN OF CARE
Problem: Decreased Participation and Engagement (Psychotic Signs/Symptoms)  Goal: Increased Participation and Engagement (Psychotic Signs/Symptoms)  8/6/2021 1255 by Ayala King RN  Outcome: No Change  Flowsheets (Taken 7/20/2021 1133 by Kathie Mares RN)  Individualized Action Step (Increased Participation and Engagement): mostly isoltaive to room   Patient had been isolative to her room most of the shift. She declined to come out to engage with staff or peers. She reported that she is doing fine. Hopeful to get into the  soon. She had phone calls from her family. She enjoyed her cake for her birthday today. She smiled when staff told her Happy Birthday. Offers no other concerns.

## 2021-08-07 NOTE — PLAN OF CARE
Problem: Sleep Disturbance (Psychotic Signs/Symptoms)  Goal: Improved Sleep (Psychotic Signs/Symptoms)  Outcome: Improving      Pt appeared to be sleeping through the night for 6.5 hours. No concerns noted this shift, No PRN administered. Will continue to monitor.

## 2021-08-08 PROCEDURE — 124N000002 HC R&B MH UMMC

## 2021-08-08 PROCEDURE — 250N000013 HC RX MED GY IP 250 OP 250 PS 637: Performed by: PHYSICIAN ASSISTANT

## 2021-08-08 PROCEDURE — 250N000013 HC RX MED GY IP 250 OP 250 PS 637: Performed by: STUDENT IN AN ORGANIZED HEALTH CARE EDUCATION/TRAINING PROGRAM

## 2021-08-08 RX ADMIN — LORAZEPAM 1 MG: 1 TABLET ORAL at 14:27

## 2021-08-08 RX ADMIN — LORAZEPAM 1 MG: 1 TABLET ORAL at 08:54

## 2021-08-08 RX ADMIN — METOPROLOL SUCCINATE 75 MG: 25 TABLET, EXTENDED RELEASE ORAL at 08:54

## 2021-08-08 RX ADMIN — AMLODIPINE BESYLATE 7.5 MG: 2.5 TABLET ORAL at 08:54

## 2021-08-08 RX ADMIN — OLANZAPINE 20 MG: 20 TABLET, ORALLY DISINTEGRATING ORAL at 20:47

## 2021-08-08 RX ADMIN — BENZTROPINE MESYLATE 1 MG: 1 TABLET ORAL at 20:47

## 2021-08-08 RX ADMIN — LOSARTAN POTASSIUM 100 MG: 100 TABLET, FILM COATED ORAL at 08:54

## 2021-08-08 RX ADMIN — BENZTROPINE MESYLATE 1 MG: 1 TABLET ORAL at 08:54

## 2021-08-08 RX ADMIN — LORAZEPAM 1 MG: 1 TABLET ORAL at 20:47

## 2021-08-08 ASSESSMENT — ACTIVITIES OF DAILY LIVING (ADL)
ORAL_HYGIENE: INDEPENDENT
ORAL_HYGIENE: INDEPENDENT
LAUNDRY: UNABLE TO COMPLETE
HYGIENE/GROOMING: INDEPENDENT
HYGIENE/GROOMING: INDEPENDENT
DRESS: SCRUBS (BEHAVIORAL HEALTH);INDEPENDENT
DRESS: SCRUBS (BEHAVIORAL HEALTH);INDEPENDENT

## 2021-08-08 NOTE — PLAN OF CARE
"  Problem: Behavior Regulation Impairment (Psychotic Signs/Symptoms)  Goal: Improved Behavioral Control (Psychotic Signs/Symptoms)  Outcome: Improving    Nursing Assessment    Recent Vitals: B/P:142/78  T:97.4  P:61 R:16     General Shift Summary  Pt isolative to room throughout shift. Pt had flat affect however brightened on approach and was smiling and pleasant with writer. Pt was irritable with staff when attempting to get VS. Pt denies all MH symptoms. Pt is amenable to discharge plan. Pt became guarded when writer asked pt personal questions asking, \"why are you asking me these questions?\" Pt did not have any behavioral concerns this shift.     Patient is medication compliant and reported no side effects. Pt did not receive PRN medications.     Hygiene is poor, appetite is appropriate.     Hai Tellez RN       "

## 2021-08-08 NOTE — PLAN OF CARE
Patient presents as quiet and socially withdrawn with a blunted affect.  She is calm, pleasant, and cooperative on approach with notable mood and affect brightening when greeted by RN writer.  She continues to spend nearly all of her time isolating in her room.  She denies that she is experiencing any psychotic symptoms or dangerous ideations.  She appears more accepting of her discharge plan.  She has been accepting of her scheduled medications without incident.  She denies any SE's or acute physical concerns at this time.  VSS.

## 2021-08-08 NOTE — PLAN OF CARE
"Patient continues to present withdrawn and isolative to room with blunted, flat affect. Patient denies SI/SIB, all psych sx's and pain. Patient declined to state a goal. Patient describes mood as \"ok.\" She spent the shift resting in bed watching CNN. Medications administered without incident and patient is denying any Se's.     /69 (BP Location: Right arm)   Pulse 75   Temp 98.4  F (36.9  C) (Tympanic)   Resp 16   Wt 70.3 kg (154 lb 14.4 oz)   SpO2 97%   BMI 26.59 kg/m      "

## 2021-08-08 NOTE — PLAN OF CARE
Problem: Sleep Disturbance (Psychotic Signs/Symptoms)  Goal: Improved Sleep (Psychotic Signs/Symptoms)  Outcome: Improving     Pt appeared to be sleeping for 6.75 hours. No concerns noted this shift, No PRN administered.

## 2021-08-09 PROCEDURE — 99207 PR CDG-MDM COMPONENT: MEETS LOW - DOWN CODED: CPT | Performed by: PSYCHIATRY & NEUROLOGY

## 2021-08-09 PROCEDURE — 99231 SBSQ HOSP IP/OBS SF/LOW 25: CPT | Mod: GC | Performed by: PSYCHIATRY & NEUROLOGY

## 2021-08-09 PROCEDURE — 124N000002 HC R&B MH UMMC

## 2021-08-09 PROCEDURE — 250N000013 HC RX MED GY IP 250 OP 250 PS 637: Performed by: PHYSICIAN ASSISTANT

## 2021-08-09 PROCEDURE — 250N000013 HC RX MED GY IP 250 OP 250 PS 637: Performed by: STUDENT IN AN ORGANIZED HEALTH CARE EDUCATION/TRAINING PROGRAM

## 2021-08-09 RX ADMIN — LORAZEPAM 1 MG: 1 TABLET ORAL at 13:58

## 2021-08-09 RX ADMIN — LORAZEPAM 1 MG: 1 TABLET ORAL at 20:43

## 2021-08-09 RX ADMIN — BENZTROPINE MESYLATE 1 MG: 1 TABLET ORAL at 20:43

## 2021-08-09 RX ADMIN — METOPROLOL SUCCINATE 75 MG: 25 TABLET, EXTENDED RELEASE ORAL at 08:06

## 2021-08-09 RX ADMIN — LORAZEPAM 1 MG: 1 TABLET ORAL at 08:06

## 2021-08-09 RX ADMIN — AMLODIPINE BESYLATE 7.5 MG: 2.5 TABLET ORAL at 08:06

## 2021-08-09 RX ADMIN — BENZTROPINE MESYLATE 1 MG: 1 TABLET ORAL at 08:07

## 2021-08-09 RX ADMIN — LOSARTAN POTASSIUM 100 MG: 100 TABLET, FILM COATED ORAL at 08:06

## 2021-08-09 RX ADMIN — OLANZAPINE 20 MG: 20 TABLET, ORALLY DISINTEGRATING ORAL at 20:43

## 2021-08-09 ASSESSMENT — ACTIVITIES OF DAILY LIVING (ADL)
DRESS: SCRUBS (BEHAVIORAL HEALTH)
LAUNDRY: UNABLE TO COMPLETE
HYGIENE/GROOMING: INDEPENDENT
ORAL_HYGIENE: INDEPENDENT
DRESS: SCRUBS (BEHAVIORAL HEALTH)
ORAL_HYGIENE: INDEPENDENT
LAUNDRY: UNABLE TO COMPLETE
HYGIENE/GROOMING: INDEPENDENT

## 2021-08-09 NOTE — PLAN OF CARE
Nursing assessment completed. Patient isolative to her room throughout the shift, only coming into the lounge to make phone calls and get her meal trays. She presents with a blunted affect, but does brighten upon approach and at times throughout discussion with writer. She is intermittently attendant to her ADLS. Appears to not be in distress while walking and has a full range gait. Denies pain in ankle. Medication compliant. Denies SI/SIB. Continue to monitor and assess.

## 2021-08-09 NOTE — PLAN OF CARE
Problem: Sleep Disturbance (Psychotic Signs/Symptoms)  Goal: Improved Sleep (Psychotic Signs/Symptoms)  Outcome: Declining     Pt appeared to be awake most of the night, in her room watching TV. No concerns noted. No PRN administered. Pt slept for 2.75 hours.

## 2021-08-09 NOTE — PROGRESS NOTES
"[facility], [place]   Psychiatric Progress Note  Hospital Day: 117        Interim History:   The patient's care was discussed with the treatment team during the daily team meeting and/or staff's chart notes were reviewed. Over the weekend she had one BP elevation otherwise vitals She has been pleasant and cooperative with staff and peers. Continues to be quite isolative to room, including eating meals in there, frequently watching CNN. She is medication adherent. No reported or observed side effects. No symptoms of psychosis or tad. Sleeping and eating well. Intermittently attends to all ADLs.      Michelle was sitting in her bed watching TV prior to the interview.  She reports that she is doing \"good\" today.  She said thank you when writer wished her happy belated birthday.  She asked about how the group home will be paid for.  We discussed her upcoming [place] choice 7assessment and she asked multiple times who the person interviewing her would be.  Writer provided her with as much information as we have at this time though Michelle continued to ask who the person would be.  Her ankle pain has improved however she has continued to abstain from exercising for the time being to prevent another injury.      Suicidal ideation: denies current or recent suicidal ideation or behaviors.    Homicidal ideation: denies current or recent homicidal ideation or behaviors.    Psychotic symptoms: Patient denies AH, VH, paranoia, delusions.     Medication side effects reported: She denies sedation today. She denies any dizziness or lightheadedness.     Acute medical concerns: pain in left ankle has resolved.     Other issues reported by patient: Patient had no further questions or concerns.           Medications:       amLODIPine  7.5 mg Oral Daily     benztropine  1 mg Oral BID     LORazepam  1 mg Oral TID    Or     LORazepam  1 mg Intramuscular TID     losartan  100 mg Oral Daily     metoprolol succinate " ER  75 mg Oral Daily     OLANZapine zydis  20 mg Oral At Bedtime    Or     OLANZapine  10 mg Intramuscular At Bedtime          Allergies:     Allergies   Allergen Reactions     Haldol [Haloperidol]      Patient previously tolerated haldol, though developed oculogyric crises during hospital stay on 4/26/21 on total daily dose of 10 mg.     Lisinopril Cough          Labs:   No results found for this or any previous visit (from the past 24 hour(s)).       Psychiatric Examination:     /72   Pulse 70   Temp 97.7  F (36.5  C)   Resp 16   Wt 70.3 kg (154 lb 14.4 oz)   SpO2 100%   BMI 26.59 kg/m    Weight is 154 lbs 14.4 oz  Body mass index is 26.59 kg/m .    Weight over time:  Vitals:    05/25/21 0850 06/15/21 0811 06/16/21 1605 06/19/21 0859   Weight: 71 kg (156 lb 8 oz) 69.8 kg (153 lb 12.8 oz) 70.5 kg (155 lb 8 oz) 71.4 kg (157 lb 8 oz)    06/21/21 0805 06/22/21 0839 06/24/21 0800 07/02/21 0835   Weight: 69.5 kg (153 lb 3.2 oz) 69.3 kg (152 lb 11.2 oz) 70.7 kg (155 lb 12.8 oz) 70.7 kg (155 lb 12.8 oz)    07/03/21 0844 07/06/21 0838 07/10/21 0845 07/16/21 0821   Weight: 69.6 kg (153 lb 8 oz) 70.9 kg (156 lb 4.8 oz) 69.8 kg (153 lb 14.4 oz) 69.3 kg (152 lb 12.8 oz)    07/17/21 0830 07/31/21 0804   Weight: 70.4 kg (155 lb 4.8 oz) 70.3 kg (154 lb 14.4 oz)       Orthostatic Vitals       Most Recent      Sitting Orthostatic /84 07/29 0800    Sitting Orthostatic Pulse (bpm) 82 07/29 0800    Standing Orthostatic /86 07/29 0800    Standing Orthostatic Pulse (bpm) 88 07/29 0800            Cardiometabolic risk assessment. 07/29/21      Reviewed patient profile for cardiometabolic risk factors    Date taken /Value  REFERENCE RANGE   Abdominal Obesity  (Waist Circumference)   See nursing flowsheet Women ?35 in (88 cm)   Men ?40 in (102 cm)      Triglycerides  Triglycerides   Date Value Ref Range Status   10/02/2020 86 <=149 mg/dL Final   10/19/2019 117 <150 mg/dL Final       ?150 mg/dL (1.7 mmol/L) or current  "treatment for elevated triglycerides   HDL cholesterol  HDL Cholesterol   Date Value Ref Range Status   10/19/2019 56 >49 mg/dL Final     Direct Measure HDL   Date Value Ref Range Status   10/02/2020 45 (L) >=50 mg/dL Final   ]   Women <50 mg/dL (1.3 mmol/L) in women or current treatment for low HDL cholesterol  Men <40 mg/dL (1 mmol/L) in men or current treatment for low HDL cholesterol     Fasting plasma glucose (FPG) Lab Results   Component Value Date     07/02/2021      FPG ?100 mg/dL (5.6 mmol/L) or treatment for elevated blood glucose   Blood pressure  BP Readings from Last 3 Encounters:   08/08/21 129/72   06/04/19 131/71   04/26/19 164/86    Blood pressure ?130/85 mmHg or treatment for elevated blood pressure   Family History  See family history     Appearance: dressed in hospital scrubs, appeared reported age, unkempt hair   Attitude: cooperative with interview, pleasant  Eye Contact: improved, looks up more during the interview   Mood: \"good\"   Affect:  Somewhat blunted, not agitated and tense. Brightens and smiles at times   Speech: accented, clear and coherent.    Language: no obvious receptive or expressive deficits  Psychomotor, Gait, Musculoskeletal: No abnormal movements noted while seated  Thought Process: goal-oriented, linear, concrete   Associations:  No loosening of associations present  Thought Content:  Did not appear to be responding to internal stimuli.  Insight:   poor - slightly improved   Judgement:  fair  Oriented to: person, place, time/date   Attention Span and Concentration: attentive to conversation though at times stares ahead and does not respond to questions.  Recent and Remote Memory: stable, some difficulty recalling events   Fund of Knowledge:  normal    Clinical Global Impressions  First:  Considering your total clinical experience with this particular patient population, how severe are the patient's symptoms at this time?: 7 (04/16/21 6889)  Compared to the patient's " condition at the START of treatment, this patient's condition is: 4 (04/16/21 1428)  Most recent:  Considering your total clinical experience with this particular patient population, how severe are the patient's symptoms at this time?: 7 (07/22/21 0941)  Compared to the patient's condition at the START of treatment, this patient's condition is: 3 (07/22/21 0941)           Precautions:     Behavioral Orders   Procedures     Cheeking Precautions (behavioral units)     Patient Observed swallowing PO medications; Patient asked to drink water after swallowing medication; Patient in Staff line of sight for 15 minutes after medication given; Mouth checks after PO administration (patient asked to open mouth and stick out their tongue).     Code 3     For walks in the [place] with staff and per staff discretion     Electroconvulsive therapy     Series of up to 12 treatments. Begin Date: 5/26/21     Treating Psychiatrist providing ECT:  Dr. Lubin     Notified on:  5/21/21     Electroconvulsive therapy     As long as we get the green light from risk management and pt is medically cleared, begin ECT every Monday, Wednesday, and Friday     Electroconvulsive therapy     Series of up to 12 treatments. Begin Date: 5/25/21     Treating Psychiatrist providing ECT:  Amee     Notified on:  5/24/21     Elopement precautions     Fall precautions     Mcgrath Calderon     Routine Programming     As clinically indicated     Status 15     Every 15 minutes.          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 Symptoms and presentation at this time is most consistent with Schizoaffective Disorder, Bipolar Type. Obtained most recent medication regimen from patient's ACT team, and regimen was initially restarted. Pt is committed. Petitioned for Alcides Calderon was filed and granted due to lack of improvement and side effects from medications. Inpatient psychiatric hospitalization is warranted at this time for safety, stabilization, possible adjustment in medications and development of a safe discharge plan.      Hospital Course:  On admission, PTA medications were restarted. However, Michelle had been declining all scheduled medications despite significant encouragement from staff and provider. Psychiatric emergency declared on 4/20 due to aggression toward others in context of severe psychosis and suspected excited catatonia. Ativan 1 mg TID was also added. Discontinued PTA Invega, Zyprexa, and thorazine on 4/20 due to consistent refusal.      On 4/26, it was noted that patient frequently had upward gaze while walking up and down the unit. She did not appear to be distressed. She reported that she was looking at \"my god.\" It was determined to be oculogyric crisis secondary to IM haloperidol. Haldol was subsequently discontinued and scheduled Zyprexa was initiated on an emergency basis. Oral Cogentin was also scheduled, though patient declined. On the evening of 4/26, patient's gaze was fixed in upward position for several hours and she appeared to be experiencing discomfort. IM Cogentin was administered with noted resolution. Partial improvements were observed after switching to scheduled Zyprexa. After patient improved, she was more receptive to reinitiating oral Invega, which was initiated on 5/3 and titrated to PTA dose of 9 mg daily on 5/10. Plan was for ACT team to bring in loading dose of Invega Sustenna. Patient had signs of " "oculogyric crisis again with Invega. Oral dose decreased to 6 mg after this. Invega was stopped on 5/14 due to ongoing signs of problems related to eye movement and concerns for oculogyric crisis.     Overall improvement in patient's agitation and suspected catatonia noted on 4/30 after patient accepted two doses of Ativan. She began declining Ativan again with noted decompensation. Patient began accepting, however, was deemed not to have the capacity to consent to treatment with Ativan. She does not believe she has a mental illness, including catatonia. She does not fully understand risks associated with inadequate treatment of catatonia. Discussed with her son who is acting as surrogate decision maker and he is in full support of forced scheduled IM Ativan if patient declines oral formulation. Also consulted with our legal team prior to backing oral Ativan with IM.      Ativan was increased on 5/19 due to re-emerging evidence of catatonia (long periods of staring, repetitive movements, echolalia, mutism). Meeting was held with ACT team on 5/20, including ACT team psychiatrist, Dr. Pedraza. He said that he is in \"full support\" of plan to pursue Mcgrath Kohler and ECT at this time. He does feel that in the past she has been discharged from the hospital while still quite symptomatic. He is hoping that ECT will be effective and that with improvement, Michelle would be more receptive to clozapine and weekly blood draws. He said that ideally she would transition to an IRTS before going back to her apartment, but also understands there may be some barriers (I.e. pt's willingness, financial concerns, etc).      ECT consult placed. Please see consult note by Dr. Lubin on 5/21 for details. Mcgrath Kohler was approved on 5/24 and patient was medically cleared on 5/24. ECT initiated on 5/26. She was noted to have a short seizure during ECT on 6/4. Staff note that patient appears more disoriented with memory impairment and sedation " on days of ECT. This was noted on my examination again today. Improvements noted in mood, affect, social interactions, paranoia, agitation since initiation of ECT. Reduced Ativan on 6/5 due to sedative effects. Relayed concerns about memory impairment and confusion with Dr. Lubin. Recommended attempting unilateral ECT, which he agreed to do. First unilateral ECT on 6/7. ECT was held on 6/11 and 6/14 due to memory impairment. We will plan to hold it again tomorrow (6/16). There is ongoing discussion regarding whether there is a component of catatonia contributing to her current presentation but this is less likely since it worsened after ECT was started.  Given her level of cognitive impairment and our belief that this is due to ECT, we will not pursue any further treatments at this time. Since we have held ECT (last treatment on 6/9), her cognitive impairment has slightly improved (more oriented).      Michelle initially appeared to be decompensating somewhat when ECT was held, though her cognitive impairment has gradually improved. If symptoms of psychosis re-emerge, it may be worth starting clozapine, which is something that has previously been considered by her ACT team. Her Hatch order would need to be amended as clozapine is not one of the medications listed. ANC obtained on 6/16 was 1800/microL. Per conversation with pharmacy, neutropenia has been associated with olanzapine and agranulocytosis has been associated with olanzapine, lorazepam, metoprolol, and hydralazine and hematologic labs have been monitored. Upon re-check, ANC was 3700/microL on 6/21/21. At this time, the primary symptoms we are observing are cognitive deficits and inability to care for self, which we would not address by changing her antipsychotic medication.     Michelle's cognitive impairment has been steadily improving since holding ECT treatments, however it is possible that lorazepam is also playing a role in her cognitive impairment. Given no  signs of re-emerging catatonia, a gradual lorazepam taper (decreasing by 0.5 mg) was initiated on 6/28/21 to monitor for potential improvements with regard to memory impairment.  On 7/7, Michelle reported an incident of oculogyric crisis and Cogentin 1 mg BID was initiated with no noted changes in cognition.      Michelle has remained focused on discharge. The team is working with Michelle's ACT team to to establish a safe discharge plan with options including moving to a group home vs home with her ACT team and additional in home supports via CADI services. We are concerned that Michelle would have difficulty taking her medications as prescribed if she were to return home without her ACT team and additional services. This is evidenced by impairments noted in cognitive assessment by OT specialist on 7/1. MOCA score was a 13/30 and CPT score was a 4.7. Physician's statement in support of guardianship was completed on 7/19/21.  Patient's son is pursuing guardianship. Patient became quite agitated on 7/16 when  staff from [facility] came to meet with her a second time. She did not allow staff members to enter her room and stated that she will not be going to a group home.      Due to daytime sedation, on 7/26/21, lorazepam evening dose decreased from 2 mg to 1 mg. On 7/27/21 transitioned Zyprexa from 10 mg BID to 5 mg QAM + 15 mg QPM and had less daytime sedation with this change and no emergence of symptoms concerning for orthostatic hypotension.  She attended her emergency guardianship hearing on 7/28.  Due to ongoing daytime sedation, Zyprexa doses were consolidated to bedtime starting on 7/30.      Target psychiatric symptoms and interventions:   - Continue Ativan 1 mg PO or IM TID Patient may not decline.   - Resume Zyprexa 20 mg at bedtime  Hatch in place. May consider increasing further though holding off given re-emerging catatonic sx and borderline prolonged QTc      - Continue Cogentin 1 mg PO BID with  "additional 2 mg IM daily prn for evidence of acute dystonic reaction or oculogyric crisis  -Continue hydroxyzine 25-50 mg q4h prn for acute anxiety  -Continue Trazodone 50 mg at bedtime prn for sleep disturbances  -Continue Zyprexa 10 mg TID prn for severe agitation  -Continue Ativan/Benadryl q4h prn for agitation. WOULD GIVE PRN ATIVAN FIRST FOR AGITATION unless it is after 5 pm on day prior to scheduled ECT     Occupational Therapy Consult Placed to evaluate cognitive functioning/ability to care for self in home environment, ability to manage medications. See note on 7/1 for details.      ECT: Discontinued due to significant cognitive impairment. Please see note dated 7/12/21 for additional details.      Acute Medical Problems and Treatments:  Left ankle pain, improved  - Added ice packs prn    HTN, improved: Patient is now adherent with medication regimen.   - Metoprolol succinate ER 75 mg   - Cozaar 100 mg daily  - Amlodipine 7.5 mg daily  - IM discontinued hydralazine prn  - Switched BP checks from QID to BID on 7/13 given overall improvement  - Please see note from IM dated 5/3, 5/6, 5/24, 6/20 and 6/30/21.  - Obtained EKG and routine labs on 5/21 in context of pt declining vital sign checks and anti-hypertensives and recently elevated BPs. Reviewed labs and discussed EKG findings with IM on 5/21. No urgent concerns, though IM should be notified if pt develops acute medical concerns (I.e. heart palpitations, SOB, changes in speech, AMS, confusion, CP, HA, changes in vision).    - Per 6/20 IM note: \"Please notify IM if BP is persistently severely elevated and requiring frequent PRN hydralazine\".  - Internal medicine consulted again on 6/28 due to consistent use of hydralazine over the past week. Metoprolol increased to 75 mg daily then to 100 mg and amlodipine 5 mg was added on 6/30. Amlodipine increased to 7.5 mg daily on 7/5.      Recent history of BRANDON:  - Attempted to repeat BMP multiple times though patient " consistently declining blood draw  - Encourage fluids     For previous medical concerns, please see note dated 7/12/21.     Behavioral/Psychological/Social:  - Encourage unit programming  - Patient is Code 3 status and can take walks in the [place] with staff and security present per staff discretion. This appears to be quite therapeutic for her.      Safety:  - Continue precautions as noted above  - Status 15 minute checks  - Safety precautions include: assault and elopement precautions  - Continue precautions as noted above     Legal Status: Committed as MI with Hatch in place for Haldol, Zyprexa, Invega, and Thorazine through [facility]. Filed Mcgrath Kohler through [facility] and approved on 5/24/21. Physician's statement in support of guardianship was completed on 7/19/21. Patient's son is now her temporary emergency guardian.     Disposition Plan   Reason for ongoing admission: poses an imminent risk to self and is unable to care for self due to severe psychosis or tad  Discharge location: Group home (needs CADI waiver). CPT assessment done by OT (Tania queen) on 7/1.  Please see note for details. [place] Choice Assessment planned on 8/12.   Discharge Medications: not ordered  Follow-up Appointments: not scheduled     Bijal Varner MD  Psychiatry PGY-4

## 2021-08-09 NOTE — PLAN OF CARE
Assessment/Intervention/Current Symtoms and Care Coordination  Chart review  -Rounded with team, addressed patient needs/concerns    Current Symptoms include the following: Bright affect looking forward to group home placement.     Discharge Plan or Goal  Pending stabilization & development of a safe discharge plan.  Considerations include: [facility] detention    Barriers to Discharge  Patient requires further psychiatric stabilization due to current symptomology    Referral Status  [facility]    Legal Status  Civil commitment/Hatch/Mcgrath colin through [facility]

## 2021-08-09 NOTE — PLAN OF CARE
Nursing assessment completed. Patient mostly isolative to her room. Pleasant upon approach. Declines dinner, stating she is not hungry. Patient looking forward to hopefully discharging to her new group home soon and presents with a bright affect when discussing this discharge plan. CADI assessment scheduled for 8/12. Patient medication compliant. Denies SI/SIB. Continue to monitor and assess.

## 2021-08-10 PROCEDURE — 250N000013 HC RX MED GY IP 250 OP 250 PS 637: Performed by: PHYSICIAN ASSISTANT

## 2021-08-10 PROCEDURE — 250N000013 HC RX MED GY IP 250 OP 250 PS 637: Performed by: STUDENT IN AN ORGANIZED HEALTH CARE EDUCATION/TRAINING PROGRAM

## 2021-08-10 PROCEDURE — 124N000002 HC R&B MH UMMC

## 2021-08-10 RX ADMIN — LORAZEPAM 1 MG: 1 TABLET ORAL at 14:22

## 2021-08-10 RX ADMIN — METOPROLOL SUCCINATE 75 MG: 25 TABLET, EXTENDED RELEASE ORAL at 08:55

## 2021-08-10 RX ADMIN — BENZTROPINE MESYLATE 1 MG: 1 TABLET ORAL at 08:55

## 2021-08-10 RX ADMIN — LOSARTAN POTASSIUM 100 MG: 100 TABLET, FILM COATED ORAL at 08:54

## 2021-08-10 RX ADMIN — BENZTROPINE MESYLATE 1 MG: 1 TABLET ORAL at 20:46

## 2021-08-10 RX ADMIN — OLANZAPINE 20 MG: 20 TABLET, ORALLY DISINTEGRATING ORAL at 20:46

## 2021-08-10 RX ADMIN — LORAZEPAM 1 MG: 1 TABLET ORAL at 20:46

## 2021-08-10 RX ADMIN — LORAZEPAM 1 MG: 1 TABLET ORAL at 08:55

## 2021-08-10 RX ADMIN — AMLODIPINE BESYLATE 7.5 MG: 2.5 TABLET ORAL at 08:54

## 2021-08-10 ASSESSMENT — ACTIVITIES OF DAILY LIVING (ADL)
HYGIENE/GROOMING: INDEPENDENT
DRESS: SCRUBS (BEHAVIORAL HEALTH)
LAUNDRY: UNABLE TO COMPLETE
LAUNDRY: UNABLE TO COMPLETE
ORAL_HYGIENE: INDEPENDENT
DRESS: SCRUBS (BEHAVIORAL HEALTH)
HYGIENE/GROOMING: INDEPENDENT
ORAL_HYGIENE: INDEPENDENT

## 2021-08-10 NOTE — PLAN OF CARE
Nursing assessment completed. Patient mostly isolative to her room throughout the shift. She cooperatively meets with the group home staff member who comes to visit her today. She is pleasant and brightens upon approach. Michelle's blood pressure is hypertensive upon am vital checks (/80). MD aware during check in. Pt asymptomatic. Upon recheck, BP returns to WDL at 116/77. Michelle is medication compliant. Denies SI/SIB or thoughts to harm others. Continue to monitor and assess.

## 2021-08-10 NOTE — PLAN OF CARE
Problem: Sleep Disturbance (Psychotic Signs/Symptoms)  Goal: Improved Sleep (Psychotic Signs/Symptoms)  Outcome: Improving      Pt appeared to be sleeping through the night for 5 hours. No concerns noted this shift, No PRN administered. Will continue to monitor.

## 2021-08-10 NOTE — PLAN OF CARE
Assessment/Intervention/Current Symtoms and Care Coordination  -Chart review  -Rounded with team, addressed patient needs/concerns  Current Symptoms include the following: Patient presents with a bright affect and very pleasant mood. Patient had a visit today from group [facility] staff and she was pleased with the visit.      Discharge Plan or Goal  Pending stabilization & development of a safe discharge plan.  Considerations include: Patient has been accepted to [facility] pending [place] Choice Assessment that will take place on Thursday.     Barriers to Discharge  Patient requires further psychiatric stabilization due to current symptomology    Referral Status  [facility]    Legal Status  Civil Commitment/Hatch/Mcgrath Kohler through [facility]

## 2021-08-11 ENCOUNTER — DOCUMENTATION ONLY (OUTPATIENT)
Dept: OTHER | Facility: CLINIC | Age: 61
End: 2021-08-11

## 2021-08-11 PROCEDURE — 250N000013 HC RX MED GY IP 250 OP 250 PS 637: Performed by: PHYSICIAN ASSISTANT

## 2021-08-11 PROCEDURE — 250N000013 HC RX MED GY IP 250 OP 250 PS 637: Performed by: STUDENT IN AN ORGANIZED HEALTH CARE EDUCATION/TRAINING PROGRAM

## 2021-08-11 PROCEDURE — 124N000002 HC R&B MH UMMC

## 2021-08-11 RX ADMIN — LORAZEPAM 1 MG: 1 TABLET ORAL at 14:13

## 2021-08-11 RX ADMIN — LOSARTAN POTASSIUM 100 MG: 100 TABLET, FILM COATED ORAL at 09:02

## 2021-08-11 RX ADMIN — OLANZAPINE 20 MG: 20 TABLET, ORALLY DISINTEGRATING ORAL at 20:01

## 2021-08-11 RX ADMIN — LORAZEPAM 1 MG: 1 TABLET ORAL at 09:01

## 2021-08-11 RX ADMIN — AMLODIPINE BESYLATE 7.5 MG: 2.5 TABLET ORAL at 09:02

## 2021-08-11 RX ADMIN — BENZTROPINE MESYLATE 1 MG: 1 TABLET ORAL at 20:01

## 2021-08-11 RX ADMIN — LORAZEPAM 1 MG: 1 TABLET ORAL at 20:01

## 2021-08-11 RX ADMIN — METOPROLOL SUCCINATE 75 MG: 25 TABLET, EXTENDED RELEASE ORAL at 09:02

## 2021-08-11 RX ADMIN — BENZTROPINE MESYLATE 1 MG: 1 TABLET ORAL at 09:02

## 2021-08-11 ASSESSMENT — ACTIVITIES OF DAILY LIVING (ADL)
LAUNDRY: UNABLE TO COMPLETE
HYGIENE/GROOMING: INDEPENDENT
ORAL_HYGIENE: INDEPENDENT
ORAL_HYGIENE: INDEPENDENT
DRESS: SCRUBS (BEHAVIORAL HEALTH)
LAUNDRY: UNABLE TO COMPLETE
HYGIENE/GROOMING: INDEPENDENT
DRESS: SCRUBS (BEHAVIORAL HEALTH)

## 2021-08-11 NOTE — PLAN OF CARE
Patient oriented to person, place, time and situation. Slept 8 hours overnight. Remains isolative to room, watching news and eating meals in room as well. Appetite intact. Affect blunted, guarded. Mood slightly irritable at times, otherwise calm. No overt paranoid statements. Denies any auditory or visual hallucinations. No current SI or self harm ideation. Plan for [facility] funding assessment tomorrow. She denied any pain or other physical symptoms.

## 2021-08-11 NOTE — PLAN OF CARE
Nursing assessment completed. Patient mostly isolative to her room. Pleasant upon approach. Patient met with group home staff during the day shift and she is looking forward to hopefully discharging to her new group home soon and presents with a bright affect when discussing this discharge plan. CADI assessment scheduled for 8/12. Patient medication compliant. Denies SI/SIB. Continue to monitor and assess.

## 2021-08-11 NOTE — PROGRESS NOTES
"[facility], [place]   Psychiatric Progress Note  Hospital Day: 119        Interim History:   The patient's care was discussed with the treatment team during the daily team meeting and/or staff's chart notes were reviewed. No acute medical concerns and, intermittent BP elevation with no symptoms, vitals otherwise WNL. She has been pleasant and cooperative with staff and peers. Continues to be quite isolative to room, including eating meals in there, frequently watching CNN. She is medication adherent. No reported or observed side effects. No symptoms of psychosis or tad. Sleeping and eating well. Intermittently attends to all ADLs. Observed to be sleeping for 5.75 hours. Noted to have bright affect when discussing discharge to group [facility] and had a meeting with  staff members yesterday.      Michelle was sitting in her bed watching TV prior to the interview.  She reports that she is doing \"good\" today and has no concerns.  She reported that her meeting with [facility] staff yesterday went well and that she is looking forward to discharging there now.  She asked how long it would take to go to the group [facility] following her CADI waiver assessment.  She is looking forward to getting out of the hospital.  She reports that she is feeling less sedated during the daytime with consolidation of her Zyprexa to bedtime and is not having any adverse effects.  Her affect was notably brighter today at certain times. Made a joke at the end of the interview.      Suicidal ideation: denies current or recent suicidal ideation or behaviors.    Homicidal ideation: denies current or recent homicidal ideation or behaviors.    Psychotic symptoms: Patient denies AH, VH, paranoia, delusions.     Medication side effects reported: She denies sedation today. She denies any dizziness or lightheadedness.     Acute medical concerns: pain in left ankle has resolved.     Other issues reported by patient: Patient had no " further questions or concerns.           Medications:       amLODIPine  7.5 mg Oral Daily     benztropine  1 mg Oral BID     LORazepam  1 mg Oral TID    Or     LORazepam  1 mg Intramuscular TID     losartan  100 mg Oral Daily     metoprolol succinate ER  75 mg Oral Daily     OLANZapine zydis  20 mg Oral At Bedtime    Or     OLANZapine  10 mg Intramuscular At Bedtime          Allergies:     Allergies   Allergen Reactions     Haldol [Haloperidol]      Patient previously tolerated haldol, though developed oculogyric crises during hospital stay on 4/26/21 on total daily dose of 10 mg.     Lisinopril Cough          Labs:   No results found for this or any previous visit (from the past 24 hour(s)).       Psychiatric Examination:     /71   Pulse 71   Temp 97.8  F (36.6  C) (Oral)   Resp 16   Wt 70.3 kg (154 lb 14.4 oz)   SpO2 100%   BMI 26.59 kg/m    Weight is 154 lbs 14.4 oz  Body mass index is 26.59 kg/m .    Weight over time:  Vitals:    05/25/21 0850 06/15/21 0811 06/16/21 1605 06/19/21 0859   Weight: 71 kg (156 lb 8 oz) 69.8 kg (153 lb 12.8 oz) 70.5 kg (155 lb 8 oz) 71.4 kg (157 lb 8 oz)    06/21/21 0805 06/22/21 0839 06/24/21 0800 07/02/21 0835   Weight: 69.5 kg (153 lb 3.2 oz) 69.3 kg (152 lb 11.2 oz) 70.7 kg (155 lb 12.8 oz) 70.7 kg (155 lb 12.8 oz)    07/03/21 0844 07/06/21 0838 07/10/21 0845 07/16/21 0821   Weight: 69.6 kg (153 lb 8 oz) 70.9 kg (156 lb 4.8 oz) 69.8 kg (153 lb 14.4 oz) 69.3 kg (152 lb 12.8 oz)    07/17/21 0830 07/31/21 0804   Weight: 70.4 kg (155 lb 4.8 oz) 70.3 kg (154 lb 14.4 oz)       Orthostatic Vitals       Most Recent      Sitting Orthostatic /84 07/29 0800    Sitting Orthostatic Pulse (bpm) 82 07/29 0800    Standing Orthostatic /86 07/29 0800    Standing Orthostatic Pulse (bpm) 88 07/29 0800            Cardiometabolic risk assessment. 07/29/21      Reviewed patient profile for cardiometabolic risk factors    Date taken /Value  REFERENCE RANGE   Abdominal  "Obesity  (Waist Circumference)   See nursing flowsheet Women ?35 in (88 cm)   Men ?40 in (102 cm)      Triglycerides  Triglycerides   Date Value Ref Range Status   10/02/2020 86 <=149 mg/dL Final   10/19/2019 117 <150 mg/dL Final       ?150 mg/dL (1.7 mmol/L) or current treatment for elevated triglycerides   HDL cholesterol  HDL Cholesterol   Date Value Ref Range Status   10/19/2019 56 >49 mg/dL Final     Direct Measure HDL   Date Value Ref Range Status   10/02/2020 45 (L) >=50 mg/dL Final   ]   Women <50 mg/dL (1.3 mmol/L) in women or current treatment for low HDL cholesterol  Men <40 mg/dL (1 mmol/L) in men or current treatment for low HDL cholesterol     Fasting plasma glucose (FPG) Lab Results   Component Value Date     07/02/2021      FPG ?100 mg/dL (5.6 mmol/L) or treatment for elevated blood glucose   Blood pressure  BP Readings from Last 3 Encounters:   08/10/21 127/71   06/04/19 131/71   04/26/19 164/86    Blood pressure ?130/85 mmHg or treatment for elevated blood pressure   Family History  See family history     Appearance: dressed in hospital scrubs, appeared reported age, unkempt hair   Attitude: cooperative with interview, pleasant  Eye Contact: improved, looks up more during the interview   Mood: \"good\"   Affect:  Somewhat blunted, not agitated and tense. Brightens and smiles at times   Speech: accented, clear and coherent.    Language: no obvious receptive or expressive deficits  Psychomotor, Gait, Musculoskeletal: No abnormal movements noted while seated  Thought Process: goal-oriented, linear, concrete   Associations:  No loosening of associations present  Thought Content:  Did not appear to be responding to internal stimuli.  Insight:   poor - slightly improved   Judgement:  fair  Oriented to: person, place, time/date   Attention Span and Concentration: attentive to conversation though at times stares ahead and does not respond to questions.  Recent and Remote Memory: stable, some " difficulty recalling events   Fund of Knowledge:  normal    Clinical Global Impressions  First:  Considering your total clinical experience with this particular patient population, how severe are the patient's symptoms at this time?: 7 (04/16/21 1428)  Compared to the patient's condition at the START of treatment, this patient's condition is: 4 (04/16/21 1428)  Most recent:  Considering your total clinical experience with this particular patient population, how severe are the patient's symptoms at this time?: 7 (07/22/21 0941)  Compared to the patient's condition at the START of treatment, this patient's condition is: 3 (07/22/21 0941)           Precautions:     Behavioral Orders   Procedures     Cheeking Precautions (behavioral units)     Patient Observed swallowing PO medications; Patient asked to drink water after swallowing medication; Patient in Staff line of sight for 15 minutes after medication given; Mouth checks after PO administration (patient asked to open mouth and stick out their tongue).     Code 3     For walks in the [place] with staff and per staff discretion     Electroconvulsive therapy     Series of up to 12 treatments. Begin Date: 5/26/21     Treating Psychiatrist providing ECT:  Dr. Lubin     Notified on:  5/21/21     Electroconvulsive therapy     As long as we get the green light from risk management and pt is medically cleared, begin ECT every Monday, Wednesday, and Friday     Electroconvulsive therapy     Series of up to 12 treatments. Begin Date: 5/25/21     Treating Psychiatrist providing ECT:  Amee     Notified on:  5/24/21     Elopement precautions     Fall precautions     Mcgrath Calderon     Routine Programming     As clinically indicated     Status 15     Every 15 minutes.          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 Symptoms and presentation at this time is most consistent with Schizoaffective Disorder, Bipolar Type. Obtained most recent medication regimen from patient's ACT team, and regimen was initially restarted. Pt is committed. Petitioned for Mcgrath Calderon was filed and granted due to lack of improvement and side effects from medications. Inpatient psychiatric hospitalization is warranted at this time for safety, stabilization, possible adjustment in medications and development of a safe discharge plan.      Hospital Course:  On admission, PTA medications were restarted. However, Michelle had been declining all scheduled medications despite significant encouragement from staff and provider. Psychiatric emergency declared on 4/20 due to aggression toward others in context of severe psychosis and suspected excited catatonia. Ativan 1 mg TID was also added. Discontinued PTA Invega, Zyprexa, and thorazine on 4/20 due to consistent refusal.      On 4/26, it was noted that patient frequently had upward gaze while walking up and down the unit. She did not appear to be distressed. She reported that she was looking at \"my god.\" It was determined to be oculogyric crisis secondary to IM haloperidol. Haldol was subsequently discontinued and scheduled Zyprexa was initiated on an emergency basis. Oral Cogentin was also scheduled, though patient declined. On the evening of 4/26, patient's gaze was fixed in upward position for several hours and she appeared to be experiencing discomfort. IM Cogentin was administered with noted resolution. Partial improvements were observed after " "switching to scheduled Zyprexa. After patient improved, she was more receptive to reinitiating oral Invega, which was initiated on 5/3 and titrated to PTA dose of 9 mg daily on 5/10. Plan was for ACT team to bring in loading dose of Invega Sustenna. Patient had signs of oculogyric crisis again with Invega. Oral dose decreased to 6 mg after this. Invega was stopped on 5/14 due to ongoing signs of problems related to eye movement and concerns for oculogyric crisis.     Overall improvement in patient's agitation and suspected catatonia noted on 4/30 after patient accepted two doses of Ativan. She began declining Ativan again with noted decompensation. Patient began accepting, however, was deemed not to have the capacity to consent to treatment with Ativan. She does not believe she has a mental illness, including catatonia. She does not fully understand risks associated with inadequate treatment of catatonia. Discussed with her son who is acting as surrogate decision maker and he is in full support of forced scheduled IM Ativan if patient declines oral formulation. Also consulted with our legal team prior to backing oral Ativan with IM.      Ativan was increased on 5/19 due to re-emerging evidence of catatonia (long periods of staring, repetitive movements, echolalia, mutism). Meeting was held with ACT team on 5/20, including ACT team psychiatrist, Dr. Pedraza. He said that he is in \"full support\" of plan to pursue Mcgrath Kohler and ECT at this time. He does feel that in the past she has been discharged from the hospital while still quite symptomatic. He is hoping that ECT will be effective and that with improvement, Michelle would be more receptive to clozapine and weekly blood draws. He said that ideally she would transition to an IRTS before going back to her apartment, but also understands there may be some barriers (I.e. pt's willingness, financial concerns, etc).      ECT consult placed. Please see consult note by " Dr. Lubin on 5/21 for details. Alcides Kohler was approved on 5/24 and patient was medically cleared on 5/24. ECT initiated on 5/26. She was noted to have a short seizure during ECT on 6/4. Staff note that patient appears more disoriented with memory impairment and sedation on days of ECT. This was noted on my examination again today. Improvements noted in mood, affect, social interactions, paranoia, agitation since initiation of ECT. Reduced Ativan on 6/5 due to sedative effects. Relayed concerns about memory impairment and confusion with Dr. Lubin. Recommended attempting unilateral ECT, which he agreed to do. First unilateral ECT on 6/7. ECT was held on 6/11 and 6/14 due to memory impairment. We will plan to hold it again tomorrow (6/16). There is ongoing discussion regarding whether there is a component of catatonia contributing to her current presentation but this is less likely since it worsened after ECT was started.  Given her level of cognitive impairment and our belief that this is due to ECT, we will not pursue any further treatments at this time. Since we have held ECT (last treatment on 6/9), her cognitive impairment has slightly improved (more oriented).      Michelle initially appeared to be decompensating somewhat when ECT was held, though her cognitive impairment has gradually improved. If symptoms of psychosis re-emerge, it may be worth starting clozapine, which is something that has previously been considered by her ACT team. Her Hatch order would need to be amended as clozapine is not one of the medications listed. ANC obtained on 6/16 was 1800/microL. Per conversation with pharmacy, neutropenia has been associated with olanzapine and agranulocytosis has been associated with olanzapine, lorazepam, metoprolol, and hydralazine and hematologic labs have been monitored. Upon re-check, ANC was 3700/microL on 6/21/21. At this time, the primary symptoms we are observing are cognitive deficits and inability to  care for self, which we would not address by changing her antipsychotic medication.     Michelle's cognitive impairment has been steadily improving since holding ECT treatments, however it is possible that lorazepam is also playing a role in her cognitive impairment. Given no signs of re-emerging catatonia, a gradual lorazepam taper (decreasing by 0.5 mg) was initiated on 6/28/21 to monitor for potential improvements with regard to memory impairment.  On 7/7, Michelle reported an incident of oculogyric crisis and Cogentin 1 mg BID was initiated with no noted changes in cognition.      Michelle has remained focused on discharge. The team is working with Michelle's ACT team to to establish a safe discharge plan with options including moving to a group home vs home with her ACT team and additional in home supports via CADI services. We are concerned that Michelle would have difficulty taking her medications as prescribed if she were to return home without her ACT team and additional services. This is evidenced by impairments noted in cognitive assessment by OT specialist on 7/1. MOCA score was a 13/30 and CPT score was a 4.7. Physician's statement in support of guardianship was completed on 7/19/21.  Patient's son is pursuing guardianship. Patient became quite agitated on 7/16 when  staff from [facility] came to meet with her a second time. She did not allow staff members to enter her room and stated that she will not be going to a group home.      Due to daytime sedation, on 7/26/21, lorazepam evening dose decreased from 2 mg to 1 mg. On 7/27/21 transitioned Zyprexa from 10 mg BID to 5 mg QAM + 15 mg QPM and had less daytime sedation with this change and no emergence of symptoms concerning for orthostatic hypotension.  She attended her emergency guardianship hearing on 7/28.  Due to ongoing daytime sedation, Zyprexa doses were consolidated to bedtime starting on 7/30.      Target psychiatric symptoms and interventions:   -  "Continue Ativan 1 mg PO or IM TID Patient may not decline.   - Resume Zyprexa 20 mg at bedtime  Hatch in place. May consider increasing further though holding off given re-emerging catatonic sx and borderline prolonged QTc      - Continue Cogentin 1 mg PO BID with additional 2 mg IM daily prn for evidence of acute dystonic reaction or oculogyric crisis  -Continue hydroxyzine 25-50 mg q4h prn for acute anxiety  -Continue Trazodone 50 mg at bedtime prn for sleep disturbances  -Continue Zyprexa 10 mg TID prn for severe agitation  -Continue Ativan/Benadryl q4h prn for agitation. WOULD GIVE PRN ATIVAN FIRST FOR AGITATION unless it is after 5 pm on day prior to scheduled ECT     Occupational Therapy Consult Placed to evaluate cognitive functioning/ability to care for self in home environment, ability to manage medications. See note on 7/1 for details.      ECT: Discontinued due to significant cognitive impairment. Please see note dated 7/12/21 for additional details.      Acute Medical Problems and Treatments:  Left ankle pain, resolved   - Added ice packs prn    HTN, improved: Patient is now adherent with medication regimen. Intermittent elevations with no concerning symptoms.  - Metoprolol succinate ER 75 mg   - Cozaar 100 mg daily  - Amlodipine 7.5 mg daily  - IM discontinued hydralazine prn  - Switched BP checks from QID to BID on 7/13 given overall improvement  - Please see note from IM dated 5/3, 5/6, 5/24, 6/20 and 6/30/21.  - Obtained EKG and routine labs on 5/21 in context of pt declining vital sign checks and anti-hypertensives and recently elevated BPs. Reviewed labs and discussed EKG findings with IM on 5/21. No urgent concerns, though IM should be notified if pt develops acute medical concerns (I.e. heart palpitations, SOB, changes in speech, AMS, confusion, CP, HA, changes in vision).    - Per 6/20 IM note: \"Please notify IM if BP is persistently severely elevated and requiring frequent PRN hydralazine\".  - " Internal medicine consulted again on 6/28 due to consistent use of hydralazine over the past week. Metoprolol increased to 75 mg daily then to 100 mg and amlodipine 5 mg was added on 6/30. Amlodipine increased to 7.5 mg daily on 7/5.      Recent history of BRANDON:  - Attempted to repeat BMP multiple times though patient consistently declining blood draw  - Encourage fluids     For previous medical concerns, please see note dated 7/12/21.     Behavioral/Psychological/Social:  - Encourage unit programming  - Patient is Code 3 status and can take walks in the [place] with staff and security present per staff discretion. This appears to be quite therapeutic for her.      Safety:  - Continue precautions as noted above  - Status 15 minute checks  - Safety precautions include: assault and elopement precautions  - Continue precautions as noted above     Legal Status: Committed as MI with Hatch in place for Haldol, Zyprexa, Invega, and Thorazine through [facility]. Filed Alcides Kohler through [facility] and approved on 5/24/21. Physician's statement in support of guardianship was completed on 7/19/21. Patient's son is now her temporary emergency guardian.     Disposition Plan   Reason for ongoing admission: poses an imminent risk to self and is unable to care for self due to severe psychosis or tad  Discharge location: Group home (needs CADI waiver). CPT assessment done by OT (Tania queen) on 7/1.  Please see note for details. [place] planned on 8/12.   Discharge Medications: not ordered  Follow-up Appointments: not scheduled     Bijal Varner MD  Psychiatry PGY-4

## 2021-08-11 NOTE — PLAN OF CARE
Assessment/Intervention/Current Symtoms and Care Coordination  -Chart review  -Rounded with team, addressed patient needs/concerns    Current Symptoms include the following: Patient's mood continues to improve. She is looking forward to [place] Assessment tomorrow.  Once this assessment is completed the funding for CADI will be initiated.      Discharge Plan or Goal  Pending stabilization & development of a safe discharge plan.  Considerations include: [facility]    Barriers to Discharge  Patient requires further psychiatric stabilization due to current symptomology    Referral Status  [facility]    Legal Status  Committed/Hatch/Mcgrath Kohler through [facility]

## 2021-08-12 PROCEDURE — 250N000013 HC RX MED GY IP 250 OP 250 PS 637: Performed by: PHYSICIAN ASSISTANT

## 2021-08-12 PROCEDURE — 124N000002 HC R&B MH UMMC

## 2021-08-12 PROCEDURE — 250N000013 HC RX MED GY IP 250 OP 250 PS 637: Performed by: STUDENT IN AN ORGANIZED HEALTH CARE EDUCATION/TRAINING PROGRAM

## 2021-08-12 PROCEDURE — 99231 SBSQ HOSP IP/OBS SF/LOW 25: CPT | Mod: GC | Performed by: PSYCHIATRY & NEUROLOGY

## 2021-08-12 RX ADMIN — BENZTROPINE MESYLATE 1 MG: 1 TABLET ORAL at 08:33

## 2021-08-12 RX ADMIN — LOSARTAN POTASSIUM 100 MG: 100 TABLET, FILM COATED ORAL at 08:33

## 2021-08-12 RX ADMIN — OLANZAPINE 20 MG: 20 TABLET, ORALLY DISINTEGRATING ORAL at 19:10

## 2021-08-12 RX ADMIN — METOPROLOL SUCCINATE 75 MG: 25 TABLET, EXTENDED RELEASE ORAL at 08:33

## 2021-08-12 RX ADMIN — BENZTROPINE MESYLATE 1 MG: 1 TABLET ORAL at 19:11

## 2021-08-12 RX ADMIN — LORAZEPAM 1 MG: 1 TABLET ORAL at 14:33

## 2021-08-12 RX ADMIN — LORAZEPAM 1 MG: 1 TABLET ORAL at 19:11

## 2021-08-12 RX ADMIN — AMLODIPINE BESYLATE 7.5 MG: 2.5 TABLET ORAL at 08:32

## 2021-08-12 RX ADMIN — LORAZEPAM 1 MG: 1 TABLET ORAL at 08:33

## 2021-08-12 ASSESSMENT — ACTIVITIES OF DAILY LIVING (ADL)
LAUNDRY: UNABLE TO COMPLETE
ORAL_HYGIENE: INDEPENDENT;PROMPTS
ORAL_HYGIENE: INDEPENDENT
DRESS: SCRUBS (BEHAVIORAL HEALTH)
HYGIENE/GROOMING: INDEPENDENT
HYGIENE/GROOMING: INDEPENDENT;PROMPTS
DRESS: SCRUBS (BEHAVIORAL HEALTH)
LAUNDRY: UNABLE TO COMPLETE

## 2021-08-12 NOTE — PLAN OF CARE
BEHAVIORAL TEAM DISCUSSION    Participants:  Dr Zaman, Dr Varner, Resident, Brayden, RN, umu, Nursing Supervisor, VALERIE Tellez, Jan, CTC  Progress: Continuing to assess  Anticipated length of stay: TBD  Continued Stay Criteria/Rationale: Patient cannot return home as she lives alone and unable to care for herself. Patient completed [place] Assessment today.   Medical/Physical:  Per Internal Medicine  Precautions:   Behavioral Orders   Procedures    Cheeking Precautions (behavioral units)     Patient Observed swallowing PO medications; Patient asked to drink water after swallowing medication; Patient in Staff line of sight for 15 minutes after medication given; Mouth checks after PO administration (patient asked to open mouth and stick out their tongue).    Code 3     For walks in the [place] with staff and per staff discretion    Electroconvulsive therapy     Series of up to 12 treatments. Begin Date: 5/26/21     Treating Psychiatrist providing ECT:  Dr. Lubin     Notified on:  5/21/21    Electroconvulsive therapy     As long as we get the green light from risk management and pt is medically cleared, begin ECT every Monday, Wednesday, and Friday    Electroconvulsive therapy     Series of up to 12 treatments. Begin Date: 5/25/21     Treating Psychiatrist providing ECT:  Amee     Notified on:  5/24/21    Elopement precautions    Fall precautions    Mcgrath Calderon    Routine Programming     As clinically indicated    Status 15     Every 15 minutes.     Plan: Attending psychiatrist and Team will meet with patient daily to assess medication needs and to discuss treatment plan. Patient has been accepted to [facility] per CADI Waiver approval. [facility] and [facility]care plan.   Rationale for change in precautions or plan: No change

## 2021-08-12 NOTE — PLAN OF CARE
Nursing assessment completed. Pt mostly isolative and withdrawn to her room watching CNN. Brightens on approach. Denies concerns. Medication compliant. Attended and participated in her CADI assessment with the  today. Plat to discharge to group home once CADI funding is approved. Patient looking forward to this. Denies SI/SIB. Pt symptoms at baseline. Continue to monitor and assess.

## 2021-08-12 NOTE — PROGRESS NOTES
[facility], [place]   Psychiatric Progress Note  Hospital Day: 120        Interim History:   The patient's care was discussed with the treatment team during the daily team meeting and/or staff's chart notes were reviewed. No acute medical concerns and, intermittent BP elevation with no symptoms. She has been pleasant and cooperative with staff and peers. Continues to isolate to her room, including eating meals in there, frequently watching [employer]. She is medication adherent. No reported or observed side effects. No symptoms of psychosis or tad. Intermittently attends to all ADLs. Observed to be sleeping for 4.5 hours. Eating well.      Michelle was sitting in bed watching TV prior to the interview per her usual daily routine.  She reported that she is doing well today with no specific complaints or concerns with regard to her mental or physical health.  She endorses that she completed her son's months over the phone this morning and that they asked her questions and that there were a few different people on the conference call.  She wondered how long it would take for the funding to be secured.  She continues to look forward to discharge and endorses feeling more comfortable with the plan of going to the group home.    Suicidal ideation: denies current or recent suicidal ideation or behaviors.    Homicidal ideation: denies current or recent homicidal ideation or behaviors.    Psychotic symptoms: Patient denies AH, VH, paranoia, delusions.     Medication side effects reported: She denies sedation today. She denies any dizziness or lightheadedness.     Acute medical concerns: pain in left ankle has resolved.     Other issues reported by patient: Patient had no further questions or concerns.           Medications:       amLODIPine  7.5 mg Oral Daily     benztropine  1 mg Oral BID     LORazepam  1 mg Oral TID    Or     LORazepam  1 mg Intramuscular TID     losartan  100 mg Oral Daily      metoprolol succinate ER  75 mg Oral Daily     OLANZapine zydis  20 mg Oral At Bedtime    Or     OLANZapine  10 mg Intramuscular At Bedtime          Allergies:     Allergies   Allergen Reactions     Haldol [Haloperidol]      Patient previously tolerated haldol, though developed oculogyric crises during hospital stay on 4/26/21 on total daily dose of 10 mg.     Lisinopril Cough          Labs:   No results found for this or any previous visit (from the past 24 hour(s)).       Psychiatric Examination:     /75   Pulse 63   Temp (!) 96.3  F (35.7  C) (Tympanic)   Resp 16   Wt 70.3 kg (154 lb 14.4 oz)   SpO2 99%   BMI 26.59 kg/m    Weight is 154 lbs 14.4 oz  Body mass index is 26.59 kg/m .    Weight over time:  Vitals:    05/25/21 0850 06/15/21 0811 06/16/21 1605 06/19/21 0859   Weight: 71 kg (156 lb 8 oz) 69.8 kg (153 lb 12.8 oz) 70.5 kg (155 lb 8 oz) 71.4 kg (157 lb 8 oz)    06/21/21 0805 06/22/21 0839 06/24/21 0800 07/02/21 0835   Weight: 69.5 kg (153 lb 3.2 oz) 69.3 kg (152 lb 11.2 oz) 70.7 kg (155 lb 12.8 oz) 70.7 kg (155 lb 12.8 oz)    07/03/21 0844 07/06/21 0838 07/10/21 0845 07/16/21 0821   Weight: 69.6 kg (153 lb 8 oz) 70.9 kg (156 lb 4.8 oz) 69.8 kg (153 lb 14.4 oz) 69.3 kg (152 lb 12.8 oz)    07/17/21 0830 07/31/21 0804   Weight: 70.4 kg (155 lb 4.8 oz) 70.3 kg (154 lb 14.4 oz)       Orthostatic Vitals       Most Recent      Sitting Orthostatic /84 07/29 0800    Sitting Orthostatic Pulse (bpm) 82 07/29 0800    Standing Orthostatic /86 07/29 0800    Standing Orthostatic Pulse (bpm) 88 07/29 0800            Cardiometabolic risk assessment. 07/29/21      Reviewed patient profile for cardiometabolic risk factors    Date taken /Value  REFERENCE RANGE   Abdominal Obesity  (Waist Circumference)   See nursing flowsheet Women ?35 in (88 cm)   Men ?40 in (102 cm)      Triglycerides  Triglycerides   Date Value Ref Range Status   10/02/2020 86 <=149 mg/dL Final   10/19/2019 117 <150 mg/dL Final        "?150 mg/dL (1.7 mmol/L) or current treatment for elevated triglycerides   HDL cholesterol  HDL Cholesterol   Date Value Ref Range Status   10/19/2019 56 >49 mg/dL Final     Direct Measure HDL   Date Value Ref Range Status   10/02/2020 45 (L) >=50 mg/dL Final   ]   Women <50 mg/dL (1.3 mmol/L) in women or current treatment for low HDL cholesterol  Men <40 mg/dL (1 mmol/L) in men or current treatment for low HDL cholesterol     Fasting plasma glucose (FPG) Lab Results   Component Value Date     07/02/2021      FPG ?100 mg/dL (5.6 mmol/L) or treatment for elevated blood glucose   Blood pressure  BP Readings from Last 3 Encounters:   08/11/21 122/75   06/04/19 131/71   04/26/19 164/86    Blood pressure ?130/85 mmHg or treatment for elevated blood pressure   Family History  See family history     Appearance: dressed in hospital scrubs, appeared reported age, unkempt hair   Attitude: cooperative with interview, pleasant  Eye Contact: improved, looks up more during the interview   Mood: \"good\"    Affect:  Somewhat blunted, no longer agitated or tense. Brightens and smiles at times   Speech: accented, clear and coherent.    Language: no obvious receptive or expressive deficits  Psychomotor, Gait, Musculoskeletal: No abnormal movements noted while seated  Thought Process: goal-oriented, linear, concrete   Associations:  No loosening of associations present  Thought Content:  Did not appear to be responding to internal stimuli.  Insight:   poor - slightly improved   Judgement:  fair  Oriented to: person, place, time/date   Attention Span and Concentration: attentive to conversation though at times stares ahead and does not respond to questions.  Recent and Remote Memory: stable, some difficulty recalling events   Fund of Knowledge:  normal    Clinical Global Impressions  First:  Considering your total clinical experience with this particular patient population, how severe are the patient's symptoms at this time?: 7 " (04/16/21 1428)  Compared to the patient's condition at the START of treatment, this patient's condition is: 4 (04/16/21 1428)  Most recent:  Considering your total clinical experience with this particular patient population, how severe are the patient's symptoms at this time?: 7 (07/22/21 0941)  Compared to the patient's condition at the START of treatment, this patient's condition is: 3 (07/22/21 0941)           Precautions:     Behavioral Orders   Procedures     Cheeking Precautions (behavioral units)     Patient Observed swallowing PO medications; Patient asked to drink water after swallowing medication; Patient in Staff line of sight for 15 minutes after medication given; Mouth checks after PO administration (patient asked to open mouth and stick out their tongue).     Code 3     For walks in the [place] with staff and per staff discretion     Electroconvulsive therapy     Series of up to 12 treatments. Begin Date: 5/26/21     Treating Psychiatrist providing ECT:  Dr. Lubin     Notified on:  5/21/21     Electroconvulsive therapy     As long as we get the green light from risk management and pt is medically cleared, begin ECT every Monday, Wednesday, and Friday     Electroconvulsive therapy     Series of up to 12 treatments. Begin Date: 5/25/21     Treating Psychiatrist providing ECT:  Amee     Notified on:  5/24/21     Elopement precautions     Fall precautions     Mcgrath Calderon     Routine Programming     As clinically indicated     Status 15     Every 15 minutes.          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 Symptoms and presentation at this time is most consistent with Schizoaffective Disorder, Bipolar Type. Obtained most recent medication regimen from patient's ACT team, and regimen was initially restarted. Pt is committed. Petitioned for Alcides Calderon was filed and granted due to lack of improvement and side effects from medications. Inpatient psychiatric hospitalization is warranted at this time for safety, stabilization, possible adjustment in medications and development of a safe discharge plan.      Hospital Course:  On admission, PTA medications were restarted. However, Michelle had been declining all scheduled medications despite significant encouragement from staff and provider. Psychiatric emergency declared on 4/20 due to aggression toward others in context of severe psychosis and suspected excited catatonia. Ativan 1 mg TID was also added. Discontinued PTA Invega, Zyprexa, and thorazine on 4/20 due to consistent refusal.      On 4/26, it was noted that patient frequently had upward gaze while walking up and down the unit. She did not appear to be distressed. She reported that she was looking at \"my god.\" It was determined to be oculogyric crisis secondary to IM haloperidol. Haldol was subsequently discontinued and scheduled Zyprexa was initiated on an emergency basis. Oral Cogentin was also scheduled, though patient declined. On the evening of 4/26, patient's gaze was fixed in upward position for several hours and she appeared to be experiencing discomfort. IM Cogentin was administered with noted resolution. Partial improvements were observed after switching to scheduled Zyprexa. After patient improved, she was more receptive to reinitiating oral Invega, which was initiated on 5/3 and titrated to PTA dose of 9 mg daily on 5/10. Plan was for ACT team to bring in loading dose of " "Invega Sustenna. Patient had signs of oculogyric crisis again with Invega. Oral dose decreased to 6 mg after this. Invega was stopped on 5/14 due to ongoing signs of problems related to eye movement and concerns for oculogyric crisis.     Overall improvement in patient's agitation and suspected catatonia noted on 4/30 after patient accepted two doses of Ativan. She began declining Ativan again with noted decompensation. Patient began accepting, however, was deemed not to have the capacity to consent to treatment with Ativan. She does not believe she has a mental illness, including catatonia. She does not fully understand risks associated with inadequate treatment of catatonia. Discussed with her son who is acting as surrogate decision maker and he is in full support of forced scheduled IM Ativan if patient declines oral formulation. Also consulted with our legal team prior to backing oral Ativan with IM.      Ativan was increased on 5/19 due to re-emerging evidence of catatonia (long periods of staring, repetitive movements, echolalia, mutism). Meeting was held with ACT team on 5/20, including ACT team psychiatrist, Dr. Pedraza. He said that he is in \"full support\" of plan to pursue Mcgrath Kohler and ECT at this time. He does feel that in the past she has been discharged from the hospital while still quite symptomatic. He is hoping that ECT will be effective and that with improvement, Michelle would be more receptive to clozapine and weekly blood draws. He said that ideally she would transition to an IRTS before going back to her apartment, but also understands there may be some barriers (I.e. pt's willingness, financial concerns, etc).      ECT consult placed. Please see consult note by Dr. Lubin on 5/21 for details. Mcgrath Kohler was approved on 5/24 and patient was medically cleared on 5/24. ECT initiated on 5/26. She was noted to have a short seizure during ECT on 6/4. Staff note that patient appears more " disoriented with memory impairment and sedation on days of ECT. This was noted on my examination again today. Improvements noted in mood, affect, social interactions, paranoia, agitation since initiation of ECT. Reduced Ativan on 6/5 due to sedative effects. Relayed concerns about memory impairment and confusion with Dr. Lubin. Recommended attempting unilateral ECT, which he agreed to do. First unilateral ECT on 6/7. ECT was held on 6/11 and 6/14 due to memory impairment. We will plan to hold it again tomorrow (6/16). There is ongoing discussion regarding whether there is a component of catatonia contributing to her current presentation but this is less likely since it worsened after ECT was started.  Given her level of cognitive impairment and our belief that this is due to ECT, we will not pursue any further treatments at this time. Since we have held ECT (last treatment on 6/9), her cognitive impairment has slightly improved (more oriented).      Michelle initially appeared to be decompensating somewhat when ECT was held, though her cognitive impairment has gradually improved. If symptoms of psychosis re-emerge, it may be worth starting clozapine, which is something that has previously been considered by her ACT team. Her Hatch order would need to be amended as clozapine is not one of the medications listed. ANC obtained on 6/16 was 1800/microL. Per conversation with pharmacy, neutropenia has been associated with olanzapine and agranulocytosis has been associated with olanzapine, lorazepam, metoprolol, and hydralazine and hematologic labs have been monitored. Upon re-check, ANC was 3700/microL on 6/21/21. At this time, the primary symptoms we are observing are cognitive deficits and inability to care for self, which we would not address by changing her antipsychotic medication.     Michelle's cognitive impairment has been steadily improving since holding ECT treatments, however it is possible that lorazepam is also  playing a role in her cognitive impairment. Given no signs of re-emerging catatonia, a gradual lorazepam taper (decreasing by 0.5 mg) was initiated on 6/28/21 to monitor for potential improvements with regard to memory impairment.  On 7/7, Michelle reported an incident of oculogyric crisis and Cogentin 1 mg BID was initiated with no noted changes in cognition.      Michelle has remained focused on discharge. The team is working with Michelle's ACT team to to establish a safe discharge plan with options including moving to a group home vs home with her ACT team and additional in home supports via CADI services. We are concerned that Michelle would have difficulty taking her medications as prescribed if she were to return home without her ACT team and additional services. This is evidenced by impairments noted in cognitive assessment by OT specialist on 7/1. MOCA score was a 13/30 and CPT score was a 4.7. Physician's statement in support of guardianship was completed on 7/19/21.  Patient's son is pursuing guardianship. Patient became quite agitated on 7/16 when  staff from [facility] came to meet with her a second time. She did not allow staff members to enter her room and stated that she will not be going to a group home.      Due to daytime sedation, on 7/26/21, lorazepam evening dose decreased from 2 mg to 1 mg. On 7/27/21 transitioned Zyprexa from 10 mg BID to 5 mg QAM + 15 mg QPM and had less daytime sedation with this change and no emergence of symptoms concerning for orthostatic hypotension.  She attended her emergency guardianship hearing on 7/28.  Due to ongoing daytime sedation, Zyprexa doses were consolidated to bedtime starting on 7/30. [place] completed on 8/12 for CADI funding.     Target psychiatric symptoms and interventions:   - Continue Ativan 1 mg PO or IM TID Patient may not decline.   - Resume Zyprexa 20 mg at bedtime  Hatch in place. May consider increasing further though holding off  "given re-emerging catatonic sx and borderline prolonged QTc      - Continue Cogentin 1 mg PO BID with additional 2 mg IM daily prn for evidence of acute dystonic reaction or oculogyric crisis  -Continue hydroxyzine 25-50 mg q4h prn for acute anxiety  -Continue Trazodone 50 mg at bedtime prn for sleep disturbances  -Continue Zyprexa 10 mg TID prn for severe agitation  -Continue Ativan/Benadryl q4h prn for agitation. WOULD GIVE PRN ATIVAN FIRST FOR AGITATION unless it is after 5 pm on day prior to scheduled ECT     Occupational Therapy Consult Placed to evaluate cognitive functioning/ability to care for self in home environment, ability to manage medications. See note on 7/1 for details.      ECT: Discontinued due to significant cognitive impairment. Please see note dated 7/12/21 for additional details.      Acute Medical Problems and Treatments:  Left ankle pain, resolved   - Added ice packs prn    HTN, improved: Patient is now adherent with medication regimen. Intermittent elevations with no concerning symptoms.  - Metoprolol succinate ER 75 mg   - Cozaar 100 mg daily  - Amlodipine 7.5 mg daily  - IM discontinued hydralazine prn  - Switched BP checks from QID to BID on 7/13 given overall improvement  - Please see note from IM dated 5/3, 5/6, 5/24, 6/20 and 6/30/21.  - Obtained EKG and routine labs on 5/21 in context of pt declining vital sign checks and anti-hypertensives and recently elevated BPs. Reviewed labs and discussed EKG findings with IM on 5/21. No urgent concerns, though IM should be notified if pt develops acute medical concerns (I.e. heart palpitations, SOB, changes in speech, AMS, confusion, CP, HA, changes in vision).    - Per 6/20 IM note: \"Please notify IM if BP is persistently severely elevated and requiring frequent PRN hydralazine\".  - Internal medicine consulted again on 6/28 due to consistent use of hydralazine over the past week. Metoprolol increased to 75 mg daily then to 100 mg and " amlodipine 5 mg was added on 6/30. Amlodipine increased to 7.5 mg daily on 7/5.      Recent history of BRANDON:  - Attempted to repeat BMP multiple times though patient consistently declining blood draw  - Encourage fluids     For previous medical concerns, please see note dated 7/12/21.     Behavioral/Psychological/Social:  - Encourage unit programming  - Patient is Code 3 status and can take walks in the [place] with staff and security present per staff discretion. This appears to be quite therapeutic for her.      Safety:  - Continue precautions as noted above  - Status 15 minute checks  - Safety precautions include: assault and elopement precautions  - Continue precautions as noted above     Legal Status: Committed as MI with Hatch in place for Haldol, Zyprexa, Invega, and Thorazine through [facility]. Filed Alcides Kohler through [facility] and approved on 5/24/21. Physician's statement in support of guardianship was completed on 7/19/21. Patient's son is now her temporary emergency guardian.     Disposition Plan   Reason for ongoing admission: poses an imminent risk to self and is unable to care for self due to severe psychosis or tad  Discharge location: Group home (needs CADI waiver). CPT assessment done by OT (Tania queen) on 7/1.  Please see note for details. [place] completed on 8/12.   Discharge Medications: not ordered  Follow-up Appointments: not scheduled     Bijal Varner MD  Psychiatry PGY-4

## 2021-08-12 NOTE — PLAN OF CARE
Problem: Sleep Disturbance (Psychotic Signs/Symptoms)  Goal: Improved Sleep (Psychotic Signs/Symptoms)  Outcome: Improving      Pt appeared to struggle staying asleep through the night. Slept for 4.25 hours. No concerns noted this shift, No PRN administered. Will continue to monitor.

## 2021-08-12 NOTE — PLAN OF CARE
Unremarkable shift. Mostly isolative to room this evening. Ate 100% of her dinner. Blood pressure WNL. Reports a fine mood. Med compliant. No other concerns this evening.

## 2021-08-13 PROCEDURE — 250N000013 HC RX MED GY IP 250 OP 250 PS 637: Performed by: STUDENT IN AN ORGANIZED HEALTH CARE EDUCATION/TRAINING PROGRAM

## 2021-08-13 PROCEDURE — 250N000013 HC RX MED GY IP 250 OP 250 PS 637: Performed by: PHYSICIAN ASSISTANT

## 2021-08-13 PROCEDURE — 124N000002 HC R&B MH UMMC

## 2021-08-13 PROCEDURE — 99231 SBSQ HOSP IP/OBS SF/LOW 25: CPT | Performed by: PSYCHIATRY & NEUROLOGY

## 2021-08-13 RX ADMIN — AMLODIPINE BESYLATE 7.5 MG: 2.5 TABLET ORAL at 09:14

## 2021-08-13 RX ADMIN — METOPROLOL SUCCINATE 75 MG: 25 TABLET, EXTENDED RELEASE ORAL at 09:14

## 2021-08-13 RX ADMIN — BENZTROPINE MESYLATE 1 MG: 1 TABLET ORAL at 19:44

## 2021-08-13 RX ADMIN — BENZTROPINE MESYLATE 1 MG: 1 TABLET ORAL at 09:14

## 2021-08-13 RX ADMIN — LOSARTAN POTASSIUM 100 MG: 100 TABLET, FILM COATED ORAL at 09:14

## 2021-08-13 RX ADMIN — OLANZAPINE 20 MG: 20 TABLET, ORALLY DISINTEGRATING ORAL at 19:44

## 2021-08-13 RX ADMIN — LORAZEPAM 1 MG: 1 TABLET ORAL at 14:52

## 2021-08-13 RX ADMIN — LORAZEPAM 1 MG: 1 TABLET ORAL at 19:44

## 2021-08-13 RX ADMIN — LORAZEPAM 1 MG: 1 TABLET ORAL at 09:14

## 2021-08-13 ASSESSMENT — ACTIVITIES OF DAILY LIVING (ADL)
ORAL_HYGIENE: PROMPTS
LAUNDRY: UNABLE TO COMPLETE
HYGIENE/GROOMING: INDEPENDENT
HYGIENE/GROOMING: PROMPTS
HYGIENE/GROOMING: SHOWER;INDEPENDENT
DRESS: SCRUBS (BEHAVIORAL HEALTH);INDEPENDENT
DRESS: SCRUBS (BEHAVIORAL HEALTH)
ORAL_HYGIENE: PROMPTS
LAUNDRY: UNABLE TO COMPLETE

## 2021-08-13 NOTE — PLAN OF CARE
Problem: Sleep Disturbance (Psychotic Signs/Symptoms)  Goal: Improved Sleep (Psychotic Signs/Symptoms)  Outcome: Improving     Pt appeared to be asleep all night. Slept with no c/o pain or distress. Slept approximately 6.5 hours.

## 2021-08-13 NOTE — PLAN OF CARE
Problem: Behavior Regulation Impairment (Psychotic Signs/Symptoms)  Goal: Improved Behavioral Control (Psychotic Signs/Symptoms)  Outcome: No Change  Flowsheets (Taken 8/13/2021 1022)  Mutually Determined Action Steps (Improved Behavioral Control): identifies future-oriented goal    Mental Health Nursing Assessment    Shift Summary: Michelle had an isolative/withdrawn shift. Pt presented irritable and guarded with a blunted and flat affect. She was alert and oriented to place and time, but appeared to have difficulty conversing with writer. Speech pattern slow, and pt at times needed sentences repeated 2-3 times for comprehension. Pt medication compliant and denied SI, SIB, AVH, HI, and medication side effects during assessment. Pt remained in her room for most of the shift, but was receptive to taking a shower when prompted by writer. VSS with BP of 125/76, P 61. Pt ambulates ad krystina, and gate is slow but steady. Pt declined her Covid test this shift. Discharge plan is for pt to go to [facility] per [facility] Waiver approval. At this time, Michelle continues to be monitored status 15 and is placed on fall, cheeking, and elopement precautions.    Mood/Affect: Irritable/ blunted, flat affect  Behavior: Isolative, withdrawn. Pt spent most of the shift resting in bed watching TV  Milieu Participation: Pt did not attend groups and was not actively present in the milieu. Remained in room for the majority of the shift  SI/SIB: Denies, no unsafe behaviors observed  HI/Aggression/Agitation: Denies, no unsafe behaviors observed  A/V Hallucinations: Denies/does not appear responding  Sleep: WDL, pt resting in room after meals    PRN Medications: none this shift    VS: /76   Pulse 61   Temp 97.6  F (36.4  C)   Resp 16   Wt 70.3 kg (154 lb 14.4 oz)   SpO2 97%   BMI 26.59 kg/m    Physical Complaints: None stated  Medication AE: None stated/observed  I/O: WDL, pt eating % of her meals  ADLS: Pt appeared unkempt this  morning. Receptive to completing a shower this shift  Skin: Pt denies concerns

## 2021-08-13 NOTE — PLAN OF CARE
Problem: Behavior Regulation Impairment (Psychotic Signs/Symptoms)  Goal: Improved Behavioral Control (Psychotic Signs/Symptoms)  Outcome: No Change    Pt had a pleasant evening. She spent the majority of this evening in her room sleeping. Flat and blunt affect. She brighter up when this writer engaged her conversation. Her hygiene remains poor, and she declined to shower this shift. Pt appetite is good; she is getting and drinking well. She ate 100% of her dinner. Vital signs stable (129/72 & 72). She took scheduled medication without any issue and denied SE's of her medication. Pt will be discharged to group home once [facility] funding is approved. Pt denies SI/SIB.

## 2021-08-13 NOTE — PROGRESS NOTES
[facility], [place]   Psychiatric Progress Note  Hospital Day: 121        Interim History:   The patient's care was discussed with the treatment team during the daily team meeting and/or staff's chart notes were reviewed. No acute medical concerns and, intermittent BP elevation with no symptoms. She has been pleasant and cooperative with staff and peers. Continues to isolate to her room, including eating meals in there, frequently watching [employer]. She is medication adherent. No reported or observed side effects. No symptoms of psychosis or tad. Intermittently attends to all ADLs. Showered today. Observed to be sleeping for 6.5 hours. Eating well.      Michelle was sitting in her room when writer entered. She smiled. She noted overall improved energy since transitioning Zyprexa to all at bedtime. She inquired about timeline for discharge. Appetite is good. Sleep is good.     Suicidal ideation: denies current or recent suicidal ideation or behaviors.    Homicidal ideation: denies current or recent homicidal ideation or behaviors.    Psychotic symptoms: Patient denies AH, VH, paranoia, delusions.     Medication side effects reported: She denies sedation today. She denies any dizziness or lightheadedness.     Acute medical concerns: pain in left ankle has resolved.     Other issues reported by patient: Patient had no further questions or concerns.           Medications:       amLODIPine  7.5 mg Oral Daily     benztropine  1 mg Oral BID     LORazepam  1 mg Oral TID    Or     LORazepam  1 mg Intramuscular TID     losartan  100 mg Oral Daily     metoprolol succinate ER  75 mg Oral Daily     OLANZapine zydis  20 mg Oral At Bedtime    Or     OLANZapine  10 mg Intramuscular At Bedtime          Allergies:     Allergies   Allergen Reactions     Haldol [Haloperidol]      Patient previously tolerated haldol, though developed oculogyric crises during hospital stay on 4/26/21 on total daily dose of 10 mg.      Lisinopril Cough          Labs:   No results found for this or any previous visit (from the past 24 hour(s)).       Psychiatric Examination:     /76   Pulse 61   Temp 97.6  F (36.4  C)   Resp 16   Wt 70.3 kg (154 lb 14.4 oz)   SpO2 97%   BMI 26.59 kg/m    Weight is 154 lbs 14.4 oz  Body mass index is 26.59 kg/m .    Weight over time:  Vitals:    05/25/21 0850 06/15/21 0811 06/16/21 1605 06/19/21 0859   Weight: 71 kg (156 lb 8 oz) 69.8 kg (153 lb 12.8 oz) 70.5 kg (155 lb 8 oz) 71.4 kg (157 lb 8 oz)    06/21/21 0805 06/22/21 0839 06/24/21 0800 07/02/21 0835   Weight: 69.5 kg (153 lb 3.2 oz) 69.3 kg (152 lb 11.2 oz) 70.7 kg (155 lb 12.8 oz) 70.7 kg (155 lb 12.8 oz)    07/03/21 0844 07/06/21 0838 07/10/21 0845 07/16/21 0821   Weight: 69.6 kg (153 lb 8 oz) 70.9 kg (156 lb 4.8 oz) 69.8 kg (153 lb 14.4 oz) 69.3 kg (152 lb 12.8 oz)    07/17/21 0830 07/31/21 0804   Weight: 70.4 kg (155 lb 4.8 oz) 70.3 kg (154 lb 14.4 oz)       Orthostatic Vitals       Most Recent      Sitting Orthostatic /84 07/29 0800    Sitting Orthostatic Pulse (bpm) 82 07/29 0800    Standing Orthostatic /86 07/29 0800    Standing Orthostatic Pulse (bpm) 88 07/29 0800            Cardiometabolic risk assessment. 07/29/21      Reviewed patient profile for cardiometabolic risk factors    Date taken /Value  REFERENCE RANGE   Abdominal Obesity  (Waist Circumference)   See nursing flowsheet Women ?35 in (88 cm)   Men ?40 in (102 cm)      Triglycerides  Triglycerides   Date Value Ref Range Status   10/02/2020 86 <=149 mg/dL Final   10/19/2019 117 <150 mg/dL Final       ?150 mg/dL (1.7 mmol/L) or current treatment for elevated triglycerides   HDL cholesterol  HDL Cholesterol   Date Value Ref Range Status   10/19/2019 56 >49 mg/dL Final     Direct Measure HDL   Date Value Ref Range Status   10/02/2020 45 (L) >=50 mg/dL Final   ]   Women <50 mg/dL (1.3 mmol/L) in women or current treatment for low HDL cholesterol  Men <40 mg/dL (1  "mmol/L) in men or current treatment for low HDL cholesterol     Fasting plasma glucose (FPG) Lab Results   Component Value Date     07/02/2021      FPG ?100 mg/dL (5.6 mmol/L) or treatment for elevated blood glucose   Blood pressure  BP Readings from Last 3 Encounters:   08/13/21 125/76   06/04/19 131/71   04/26/19 164/86    Blood pressure ?130/85 mmHg or treatment for elevated blood pressure   Family History  See family history     Appearance: dressed in hospital scrubs, appeared reported age, unkempt hair   Attitude: cooperative with interview, pleasant  Eye Contact: improved, looks up more during the interview   Mood: \"good\"    Affect:  Somewhat blunted, no longer agitated or tense. Brightens and smiles at times   Speech: accented, clear and coherent.    Language: no obvious receptive or expressive deficits  Psychomotor, Gait, Musculoskeletal: No abnormal movements noted while seated  Thought Process: goal-oriented, linear, concrete   Associations:  No loosening of associations present  Thought Content:  Did not appear to be responding to internal stimuli.  Insight:   poor - slightly improved   Judgement:  fair  Oriented to: person, place, time/date   Attention Span and Concentration: attentive to conversation though at times stares ahead and does not respond to questions.  Recent and Remote Memory: stable, some difficulty recalling events   Fund of Knowledge:  normal    Clinical Global Impressions  First:  Considering your total clinical experience with this particular patient population, how severe are the patient's symptoms at this time?: 7 (04/16/21 1428)  Compared to the patient's condition at the START of treatment, this patient's condition is: 4 (04/16/21 1428)  Most recent:  Considering your total clinical experience with this particular patient population, how severe are the patient's symptoms at this time?: 7 (07/22/21 0941)  Compared to the patient's condition at the START of treatment, this " patient's condition is: 3 (07/22/21 0941)           Precautions:     Behavioral Orders   Procedures     Cheeking Precautions (behavioral units)     Patient Observed swallowing PO medications; Patient asked to drink water after swallowing medication; Patient in Staff line of sight for 15 minutes after medication given; Mouth checks after PO administration (patient asked to open mouth and stick out their tongue).     Code 3     For walks in the [place] with staff and per staff discretion     Electroconvulsive therapy     Series of up to 12 treatments. Begin Date: 5/26/21     Treating Psychiatrist providing ECT:  Dr. Lubin     Notified on:  5/21/21     Electroconvulsive therapy     As long as we get the green light from risk management and pt is medically cleared, begin ECT every Monday, Wednesday, and Friday     Electroconvulsive therapy     Series of up to 12 treatments. Begin Date: 5/25/21     Treating Psychiatrist providing ECT:  Amee     Notified on:  5/24/21     Elopement precautions     Fall precautions     Mcgrath Calderon     Routine Programming     As clinically indicated     Status 15     Every 15 minutes.          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 Symptoms and presentation at this time is most consistent with Schizoaffective Disorder, Bipolar Type. Obtained most recent medication regimen from patient's ACT team, and regimen was initially restarted. Pt is committed. Petitioned for  "Mcgrath Calderon was filed and granted due to lack of improvement and side effects from medications. Inpatient psychiatric hospitalization is warranted at this time for safety, stabilization, possible adjustment in medications and development of a safe discharge plan.      Hospital Course:  On admission, PTA medications were restarted. However, Michelle had been declining all scheduled medications despite significant encouragement from staff and provider. Psychiatric emergency declared on 4/20 due to aggression toward others in context of severe psychosis and suspected excited catatonia. Ativan 1 mg TID was also added. Discontinued PTA Invega, Zyprexa, and thorazine on 4/20 due to consistent refusal.      On 4/26, it was noted that patient frequently had upward gaze while walking up and down the unit. She did not appear to be distressed. She reported that she was looking at \"my god.\" It was determined to be oculogyric crisis secondary to IM haloperidol. Haldol was subsequently discontinued and scheduled Zyprexa was initiated on an emergency basis. Oral Cogentin was also scheduled, though patient declined. On the evening of 4/26, patient's gaze was fixed in upward position for several hours and she appeared to be experiencing discomfort. IM Cogentin was administered with noted resolution. Partial improvements were observed after switching to scheduled Zyprexa. After patient improved, she was more receptive to reinitiating oral Invega, which was initiated on 5/3 and titrated to PTA dose of 9 mg daily on 5/10. Plan was for ACT team to bring in loading dose of Invega Sustenna. Patient had signs of oculogyric crisis again with Invega. Oral dose decreased to 6 mg after this. Invega was stopped on 5/14 due to ongoing signs of problems related to eye movement and concerns for oculogyric crisis.     Overall improvement in patient's agitation and suspected catatonia noted on 4/30 after patient accepted two doses of Ativan. She began " "declining Ativan again with noted decompensation. Patient began accepting, however, was deemed not to have the capacity to consent to treatment with Ativan. She does not believe she has a mental illness, including catatonia. She does not fully understand risks associated with inadequate treatment of catatonia. Discussed with her son who is acting as surrogate decision maker and he is in full support of forced scheduled IM Ativan if patient declines oral formulation. Also consulted with our legal team prior to backing oral Ativan with IM.      Ativan was increased on 5/19 due to re-emerging evidence of catatonia (long periods of staring, repetitive movements, echolalia, mutism). Meeting was held with ACT team on 5/20, including ACT team psychiatrist, Dr. Pedraza. He said that he is in \"full support\" of plan to pursue Mcgrath Kohler and ECT at this time. He does feel that in the past she has been discharged from the hospital while still quite symptomatic. He is hoping that ECT will be effective and that with improvement, Michelle would be more receptive to clozapine and weekly blood draws. He said that ideally she would transition to an IRTS before going back to her apartment, but also understands there may be some barriers (I.e. pt's willingness, financial concerns, etc).      ECT consult placed. Please see consult note by Dr. Lubin on 5/21 for details. Mcgrath Kohler was approved on 5/24 and patient was medically cleared on 5/24. ECT initiated on 5/26. She was noted to have a short seizure during ECT on 6/4. Staff note that patient appears more disoriented with memory impairment and sedation on days of ECT. This was noted on my examination again today. Improvements noted in mood, affect, social interactions, paranoia, agitation since initiation of ECT. Reduced Ativan on 6/5 due to sedative effects. Relayed concerns about memory impairment and confusion with Dr. Lubin. Recommended attempting unilateral ECT, which he agreed " to do. First unilateral ECT on 6/7. ECT was held on 6/11 and 6/14 due to memory impairment. We will plan to hold it again tomorrow (6/16). There is ongoing discussion regarding whether there is a component of catatonia contributing to her current presentation but this is less likely since it worsened after ECT was started.  Given her level of cognitive impairment and our belief that this is due to ECT, we will not pursue any further treatments at this time. Since we have held ECT (last treatment on 6/9), her cognitive impairment has slightly improved (more oriented).      Michelle initially appeared to be decompensating somewhat when ECT was held, though her cognitive impairment has gradually improved. If symptoms of psychosis re-emerge, it may be worth starting clozapine, which is something that has previously been considered by her ACT team. Her Hatch order would need to be amended as clozapine is not one of the medications listed. ANC obtained on 6/16 was 1800/microL. Per conversation with pharmacy, neutropenia has been associated with olanzapine and agranulocytosis has been associated with olanzapine, lorazepam, metoprolol, and hydralazine and hematologic labs have been monitored. Upon re-check, ANC was 3700/microL on 6/21/21. At this time, the primary symptoms we are observing are cognitive deficits and inability to care for self, which we would not address by changing her antipsychotic medication.     Michelle's cognitive impairment has been steadily improving since holding ECT treatments, however it is possible that lorazepam is also playing a role in her cognitive impairment. Given no signs of re-emerging catatonia, a gradual lorazepam taper (decreasing by 0.5 mg) was initiated on 6/28/21 to monitor for potential improvements with regard to memory impairment.  On 7/7, Michelle reported an incident of oculogyric crisis and Cogentin 1 mg BID was initiated with no noted changes in cognition.      Michelle has remained focused  on discharge. The team is working with Michelle's ACT team to to establish a safe discharge plan with options including moving to a group home vs home with her ACT team and additional in home supports via CADI services. We are concerned that Michelle would have difficulty taking her medications as prescribed if she were to return home without her ACT team and additional services. This is evidenced by impairments noted in cognitive assessment by OT specialist on 7/1. MOCA score was a 13/30 and CPT score was a 4.7. Physician's statement in support of guardianship was completed on 7/19/21.  Patient's son is pursuing guardianship. Patient became quite agitated on 7/16 when  staff from [facility] came to meet with her a second time. She did not allow staff members to enter her room and stated that she will not be going to a group home.      Due to daytime sedation, on 7/26/21, lorazepam evening dose decreased from 2 mg to 1 mg. On 7/27/21 transitioned Zyprexa from 10 mg BID to 5 mg QAM + 15 mg QPM and had less daytime sedation with this change and no emergence of symptoms concerning for orthostatic hypotension.  She attended her emergency guardianship hearing on 7/28.  Due to ongoing daytime sedation, Zyprexa doses were consolidated to bedtime starting on 7/30. [place] completed on 8/12 for CADI funding.     Target psychiatric symptoms and interventions:   - Continue Ativan 1 mg PO or IM TID Patient may not decline.   - Resume Zyprexa 20 mg at bedtime  Hatch in place. May consider increasing further though holding off given re-emerging catatonic sx and borderline prolonged QTc      - Continue Cogentin 1 mg PO BID with additional 2 mg IM daily prn for evidence of acute dystonic reaction or oculogyric crisis  -Continue hydroxyzine 25-50 mg q4h prn for acute anxiety  -Continue Trazodone 50 mg at bedtime prn for sleep disturbances  -Continue Zyprexa 10 mg TID prn for severe agitation  -Continue  "Ativan/Benadryl q4h prn for agitation. WOULD GIVE PRN ATIVAN FIRST FOR AGITATION unless it is after 5 pm on day prior to scheduled ECT     Occupational Therapy Consult Placed to evaluate cognitive functioning/ability to care for self in home environment, ability to manage medications. See note on 7/1 for details.      ECT: Discontinued due to significant cognitive impairment. Please see note dated 7/12/21 for additional details.      Acute Medical Problems and Treatments:  Left ankle pain, resolved   - Added ice packs prn    HTN, improved: Patient is now adherent with medication regimen. Intermittent elevations with no concerning symptoms.  - Metoprolol succinate ER 75 mg   - Cozaar 100 mg daily  - Amlodipine 7.5 mg daily  - IM discontinued hydralazine prn  - Switched BP checks from QID to BID on 7/13 given overall improvement  - Please see note from IM dated 5/3, 5/6, 5/24, 6/20 and 6/30/21.  - Obtained EKG and routine labs on 5/21 in context of pt declining vital sign checks and anti-hypertensives and recently elevated BPs. Reviewed labs and discussed EKG findings with IM on 5/21. No urgent concerns, though IM should be notified if pt develops acute medical concerns (I.e. heart palpitations, SOB, changes in speech, AMS, confusion, CP, HA, changes in vision).    - Per 6/20 IM note: \"Please notify IM if BP is persistently severely elevated and requiring frequent PRN hydralazine\".  - Internal medicine consulted again on 6/28 due to consistent use of hydralazine over the past week. Metoprolol increased to 75 mg daily then to 100 mg and amlodipine 5 mg was added on 6/30. Amlodipine increased to 7.5 mg daily on 7/5.      Recent history of BRANDON:  - Attempted to repeat BMP multiple times though patient consistently declining blood draw  - Encourage fluids     For previous medical concerns, please see note dated 7/12/21.     Behavioral/Psychological/Social:  - Encourage unit programming  - Patient is Code 3 status and can " take walks in the [place] with staff and security present per staff discretion. This appears to be quite therapeutic for her.      Safety:  - Continue precautions as noted above  - Status 15 minute checks  - Safety precautions include: assault and elopement precautions  - Continue precautions as noted above     Legal Status: Committed as MI with Hatch in place for Haldol, Zyprexa, Invega, and Thorazine through [facility]. Filed Alcides Kohler through [facility] and approved on 5/24/21. Physician's statement in support of guardianship was completed on 7/19/21. Patient's son is now her temporary emergency guardian.     Disposition Plan   Reason for ongoing admission: poses an imminent risk to self and is unable to care for self due to severe psychosis or tad  Discharge location: Group home (needs CADI waiver). CPT assessment done by OT (Tania queen) on 7/1.  Please see note for details. [place] completed on 8/12.   Discharge Medications: not ordered  Follow-up Appointments: not scheduled     Gabby Zaman MD  [facility] Psychiat[facility]

## 2021-08-14 PROCEDURE — 250N000013 HC RX MED GY IP 250 OP 250 PS 637: Performed by: PHYSICIAN ASSISTANT

## 2021-08-14 PROCEDURE — 124N000002 HC R&B MH UMMC

## 2021-08-14 PROCEDURE — 250N000013 HC RX MED GY IP 250 OP 250 PS 637: Performed by: STUDENT IN AN ORGANIZED HEALTH CARE EDUCATION/TRAINING PROGRAM

## 2021-08-14 RX ADMIN — LORAZEPAM 1 MG: 1 TABLET ORAL at 20:00

## 2021-08-14 RX ADMIN — LORAZEPAM 1 MG: 1 TABLET ORAL at 08:58

## 2021-08-14 RX ADMIN — BENZTROPINE MESYLATE 1 MG: 1 TABLET ORAL at 08:58

## 2021-08-14 RX ADMIN — LOSARTAN POTASSIUM 100 MG: 100 TABLET, FILM COATED ORAL at 08:58

## 2021-08-14 RX ADMIN — AMLODIPINE BESYLATE 7.5 MG: 2.5 TABLET ORAL at 08:58

## 2021-08-14 RX ADMIN — BENZTROPINE MESYLATE 1 MG: 1 TABLET ORAL at 20:00

## 2021-08-14 RX ADMIN — OLANZAPINE 20 MG: 20 TABLET, ORALLY DISINTEGRATING ORAL at 20:00

## 2021-08-14 RX ADMIN — LORAZEPAM 1 MG: 1 TABLET ORAL at 16:06

## 2021-08-14 ASSESSMENT — ACTIVITIES OF DAILY LIVING (ADL)
LAUNDRY: UNABLE TO COMPLETE
HYGIENE/GROOMING: INDEPENDENT
DRESS: SCRUBS (BEHAVIORAL HEALTH)
HYGIENE/GROOMING: INDEPENDENT;PROMPTS
LAUNDRY: UNABLE TO COMPLETE
ORAL_HYGIENE: INDEPENDENT;PROMPTS
ORAL_HYGIENE: PROMPTS
DRESS: SCRUBS (BEHAVIORAL HEALTH)

## 2021-08-14 NOTE — PROGRESS NOTES
Pt had calm evening. She spent all of shift in her room either sleeping or watching TV. Flat blunted affect. Pt had good appetite, ate 100% of her meal. Pt denied SI/Hi or any other mental health concerns. Pt denies pain. Pt will be discharging to a group home once CADI funding is approved. Continue to monitor. Nothing remarkable to report.

## 2021-08-14 NOTE — PLAN OF CARE
Nursing assessment completed. Patient am BP hypotensive (108/___). Writer re assed BP at 0900 and it was hypotensive again (/67 Pulse 74). AM metoprolol held for low blood pressure. VSS upon re check.  Patient spent shift isolative to her room, which is her baseline. She is pleasant upon approach. Denies pain, side effects, or any concerns. Medication compliant. Denies SI/SIB. Pt continues to wait CADI funding and discharge to her new group home. Continue to monitor and assess.

## 2021-08-15 PROCEDURE — 250N000013 HC RX MED GY IP 250 OP 250 PS 637: Performed by: PHYSICIAN ASSISTANT

## 2021-08-15 PROCEDURE — 124N000002 HC R&B MH UMMC

## 2021-08-15 PROCEDURE — 250N000013 HC RX MED GY IP 250 OP 250 PS 637: Performed by: STUDENT IN AN ORGANIZED HEALTH CARE EDUCATION/TRAINING PROGRAM

## 2021-08-15 RX ADMIN — LORAZEPAM 1 MG: 1 TABLET ORAL at 14:26

## 2021-08-15 RX ADMIN — METOPROLOL SUCCINATE 75 MG: 25 TABLET, EXTENDED RELEASE ORAL at 08:45

## 2021-08-15 RX ADMIN — BENZTROPINE MESYLATE 1 MG: 1 TABLET ORAL at 08:45

## 2021-08-15 RX ADMIN — AMLODIPINE BESYLATE 7.5 MG: 2.5 TABLET ORAL at 08:45

## 2021-08-15 RX ADMIN — LORAZEPAM 1 MG: 1 TABLET ORAL at 19:31

## 2021-08-15 RX ADMIN — BENZTROPINE MESYLATE 1 MG: 1 TABLET ORAL at 19:31

## 2021-08-15 RX ADMIN — LOSARTAN POTASSIUM 100 MG: 100 TABLET, FILM COATED ORAL at 08:45

## 2021-08-15 RX ADMIN — LORAZEPAM 1 MG: 1 TABLET ORAL at 08:45

## 2021-08-15 RX ADMIN — OLANZAPINE 20 MG: 20 TABLET, ORALLY DISINTEGRATING ORAL at 19:31

## 2021-08-15 ASSESSMENT — ACTIVITIES OF DAILY LIVING (ADL)
HYGIENE/GROOMING: INDEPENDENT
ORAL_HYGIENE: INDEPENDENT
LAUNDRY: UNABLE TO COMPLETE
LAUNDRY: UNABLE TO COMPLETE
HYGIENE/GROOMING: INDEPENDENT
DRESS: SCRUBS (BEHAVIORAL HEALTH)
ORAL_HYGIENE: INDEPENDENT
DRESS: SCRUBS (BEHAVIORAL HEALTH)

## 2021-08-15 NOTE — PLAN OF CARE
Problem: Behavioral Health Plan of Care  Goal: Plan of Care Review  Outcome: No Change   Pt is isolative and withdrawn to her room. She spent most of this evening sleeping on and off. She denies any pain or discomfort. She denied all mental health symptoms and appeared to be at her baseline. Her appetite is good; she is eating and drinking well. She took scheduled medication without any issue and denied SE s of her medication. No SI/SIB.

## 2021-08-15 NOTE — PLAN OF CARE
Nursing assessment completed.  Patient spent shift isolative to her room, which is her baseline. Affect blunted, but she is pleasant upon approach and laughs and smiles when talking with writer. Denies pain, side effects, or any concerns. Medication compliant. Denies SI/SIB. Pt continues to wait CADI funding and discharge to her new group home. Continue to monitor and assess.

## 2021-08-15 NOTE — PROVIDER NOTIFICATION
08/15/21 0600   Sleep/Rest/Relaxation   Sleep/Rest/Relaxation (WDL) WDL   Sleep/Rest/Relaxation appears asleep   Night Time # Hours 6.75 hours     NOC Shift Report    Pt in bed at beginning of shift, breathing quiet and unlabored. Pt slept through shift. Pt slept 6.75 hours.     No pt complaints or concerns at this time. No PRNs given. Will continue to monitor.

## 2021-08-16 LAB — SARS-COV-2 RNA RESP QL NAA+PROBE: NEGATIVE

## 2021-08-16 PROCEDURE — 250N000013 HC RX MED GY IP 250 OP 250 PS 637: Performed by: STUDENT IN AN ORGANIZED HEALTH CARE EDUCATION/TRAINING PROGRAM

## 2021-08-16 PROCEDURE — 99231 SBSQ HOSP IP/OBS SF/LOW 25: CPT | Performed by: PSYCHIATRY & NEUROLOGY

## 2021-08-16 PROCEDURE — 87635 SARS-COV-2 COVID-19 AMP PRB: CPT | Performed by: STUDENT IN AN ORGANIZED HEALTH CARE EDUCATION/TRAINING PROGRAM

## 2021-08-16 PROCEDURE — 250N000013 HC RX MED GY IP 250 OP 250 PS 637: Performed by: PHYSICIAN ASSISTANT

## 2021-08-16 PROCEDURE — 124N000002 HC R&B MH UMMC

## 2021-08-16 RX ADMIN — LORAZEPAM 1 MG: 1 TABLET ORAL at 19:39

## 2021-08-16 RX ADMIN — LOSARTAN POTASSIUM 100 MG: 100 TABLET, FILM COATED ORAL at 08:30

## 2021-08-16 RX ADMIN — BENZTROPINE MESYLATE 1 MG: 1 TABLET ORAL at 19:40

## 2021-08-16 RX ADMIN — BENZTROPINE MESYLATE 1 MG: 1 TABLET ORAL at 08:30

## 2021-08-16 RX ADMIN — AMLODIPINE BESYLATE 7.5 MG: 2.5 TABLET ORAL at 08:30

## 2021-08-16 RX ADMIN — METOPROLOL SUCCINATE 75 MG: 25 TABLET, EXTENDED RELEASE ORAL at 08:30

## 2021-08-16 RX ADMIN — LORAZEPAM 1 MG: 1 TABLET ORAL at 08:30

## 2021-08-16 RX ADMIN — LORAZEPAM 1 MG: 1 TABLET ORAL at 13:55

## 2021-08-16 RX ADMIN — OLANZAPINE 20 MG: 20 TABLET, ORALLY DISINTEGRATING ORAL at 19:40

## 2021-08-16 ASSESSMENT — ACTIVITIES OF DAILY LIVING (ADL)
LAUNDRY: UNABLE TO COMPLETE
DRESS: INDEPENDENT
HYGIENE/GROOMING: INDEPENDENT
ORAL_HYGIENE: INDEPENDENT
LAUNDRY: UNABLE TO COMPLETE
DRESS: SCRUBS (BEHAVIORAL HEALTH)
ORAL_HYGIENE: INDEPENDENT
HYGIENE/GROOMING: INDEPENDENT

## 2021-08-16 NOTE — PLAN OF CARE
Assessment/Intervention/Current Symtoms and Care Coordination  -Chart review  -Rounded with team, addressed patient needs/concerns. CTC contacted [place] [employer]  to check on status of [facility] Funding.  Writer also contacted [facility] owner to follow-up on status of patient being accepted to their home pending [facility] funding in place.     Current Symptoms include the following: Pleasant and cooperative. Patient remains isolative to her room and is waiting for CADI Funding to be in place so she can transfer from the hospital to group [place].     Discharge Plan or Goal  Pending stabilization & development of a safe discharge plan.  Considerations include:  [facility]    Barriers to Discharge  Patient requires further psychiatric stabilization due to current symptomology    Referral Status  No current referrals have been made as patient has been accepted to  [facility]  pending funding being secured    Legal Status  Committed/Hatch/Mcgrath Kohler through [facility]

## 2021-08-16 NOTE — PLAN OF CARE
"Problem: Behavior Regulation Impairment (Psychotic Signs/Symptoms)  Goal: Improved Behavioral Control (Psychotic Signs/Symptoms)  Outcome: No Change     Problem: Decreased Participation and Engagement (Psychotic Signs/Symptoms)  Goal: Increased Participation and Engagement (Psychotic Signs/Symptoms)  Outcome: No Change     Patient is flat, blunted, withdrawn, and isolative to room.  She is dismissive with assessment questions, denying anxiety, depression, pain, SI, HI, and hallucinations.  She states multiple times, \"I'm fine.\"  Patient is medication compliant.  No aggressive behaviors are observed.  Patient remains safe on unit.  Will continue to monitor.  Nedra Cortez RN   "

## 2021-08-16 NOTE — PLAN OF CARE
Pt asleep at start of shift. Breathing quiet and unlabored.     Pt had no c/o pain or discomfort during the HS.     Appears to have slept 6.5 hours.      Pt on  CHEEKING, ELOPEMENT, and FALL  precautions in addition to single room order. Any related events noted above.     Will continue to monitor and assess.  Problem: Sleep Disturbance (Psychotic Signs/Symptoms)  Goal: Improved Sleep (Psychotic Signs/Symptoms)  Outcome: No Change

## 2021-08-16 NOTE — PROGRESS NOTES
Nursing assessment completed, patient was calm and cooperative. Patient spent majority of shift isolated herself in room, which is baseline for her. Pt denies pain HI/SI, or side effects. Pt medication compliant and is waiting for [facility] funding for the group home. Continue to monitor and assess.

## 2021-08-16 NOTE — PROGRESS NOTES
[facility], [place]   Psychiatric Progress Note  Hospital Day: 124        Interim History:   The patient's care was discussed with the treatment team during the daily team meeting and/or staff's chart notes were reviewed. No acute medical concerns and, intermittent BP elevation with no symptoms. She has been pleasant and cooperative with staff and peers. Continues to isolate to her room, including eating meals in there, frequently watching [employer]. She is medication adherent. No reported or observed side effects. No symptoms of psychosis or tad. Intermittently attends to all ADLs. Sleeping and eating well.     Michelle was sitting in her room when writer entered. She smiled. She inquired about timeline for discharge. Appetite is good. Sleep is good. She is looking forward to discharge and is hopeful she can transition to  in the near future.     Suicidal ideation: denies current or recent suicidal ideation or behaviors.    Homicidal ideation: denies current or recent homicidal ideation or behaviors.    Psychotic symptoms: Patient denies AH, VH, paranoia, delusions.     Medication side effects reported: She denies sedation today. She denies any dizziness or lightheadedness.     Acute medical concerns: pain in left ankle has resolved.     Other issues reported by patient: Patient had no further questions or concerns.           Medications:       amLODIPine  7.5 mg Oral Daily     benztropine  1 mg Oral BID     LORazepam  1 mg Oral TID    Or     LORazepam  1 mg Intramuscular TID     losartan  100 mg Oral Daily     metoprolol succinate ER  75 mg Oral Daily     OLANZapine zydis  20 mg Oral At Bedtime    Or     OLANZapine  10 mg Intramuscular At Bedtime          Allergies:     Allergies   Allergen Reactions     Haldol [Haloperidol]      Patient previously tolerated haldol, though developed oculogyric crises during hospital stay on 4/26/21 on total daily dose of 10 mg.     Lisinopril Cough           Labs:   No results found for this or any previous visit (from the past 24 hour(s)).       Psychiatric Examination:     /76 (BP Location: Right arm)   Pulse 76   Temp 96.9  F (36.1  C) (Tympanic)   Resp 18   Wt 70.3 kg (154 lb 14.4 oz)   SpO2 97%   BMI 26.59 kg/m    Weight is 154 lbs 14.4 oz  Body mass index is 26.59 kg/m .    Weight over time:  Vitals:    05/25/21 0850 06/15/21 0811 06/16/21 1605 06/19/21 0859   Weight: 71 kg (156 lb 8 oz) 69.8 kg (153 lb 12.8 oz) 70.5 kg (155 lb 8 oz) 71.4 kg (157 lb 8 oz)    06/21/21 0805 06/22/21 0839 06/24/21 0800 07/02/21 0835   Weight: 69.5 kg (153 lb 3.2 oz) 69.3 kg (152 lb 11.2 oz) 70.7 kg (155 lb 12.8 oz) 70.7 kg (155 lb 12.8 oz)    07/03/21 0844 07/06/21 0838 07/10/21 0845 07/16/21 0821   Weight: 69.6 kg (153 lb 8 oz) 70.9 kg (156 lb 4.8 oz) 69.8 kg (153 lb 14.4 oz) 69.3 kg (152 lb 12.8 oz)    07/17/21 0830 07/31/21 0804   Weight: 70.4 kg (155 lb 4.8 oz) 70.3 kg (154 lb 14.4 oz)       Orthostatic Vitals       Most Recent      Sitting Orthostatic /84 07/29 0800    Sitting Orthostatic Pulse (bpm) 82 07/29 0800    Standing Orthostatic /86 07/29 0800    Standing Orthostatic Pulse (bpm) 88 07/29 0800            Cardiometabolic risk assessment. 07/29/21      Reviewed patient profile for cardiometabolic risk factors    Date taken /Value  REFERENCE RANGE   Abdominal Obesity  (Waist Circumference)   See nursing flowsheet Women ?35 in (88 cm)   Men ?40 in (102 cm)      Triglycerides  Triglycerides   Date Value Ref Range Status   10/02/2020 86 <=149 mg/dL Final   10/19/2019 117 <150 mg/dL Final       ?150 mg/dL (1.7 mmol/L) or current treatment for elevated triglycerides   HDL cholesterol  HDL Cholesterol   Date Value Ref Range Status   10/19/2019 56 >49 mg/dL Final     Direct Measure HDL   Date Value Ref Range Status   10/02/2020 45 (L) >=50 mg/dL Final   ]   Women <50 mg/dL (1.3 mmol/L) in women or current treatment for low HDL cholesterol  Men <40 mg/dL (1  "mmol/L) in men or current treatment for low HDL cholesterol     Fasting plasma glucose (FPG) Lab Results   Component Value Date     07/02/2021      FPG ?100 mg/dL (5.6 mmol/L) or treatment for elevated blood glucose   Blood pressure  BP Readings from Last 3 Encounters:   08/16/21 127/76   06/04/19 131/71   04/26/19 164/86    Blood pressure ?130/85 mmHg or treatment for elevated blood pressure   Family History  See family history     Appearance: dressed in hospital scrubs, appeared reported age, unkempt hair   Attitude: cooperative with interview, pleasant  Eye Contact: improved, looks up more during the interview   Mood: \"good\"    Affect:  Somewhat blunted, no longer agitated or tense. Brightens and smiles at times   Speech: accented, clear and coherent.    Language: no obvious receptive or expressive deficits  Psychomotor, Gait, Musculoskeletal: No abnormal movements noted while seated  Thought Process: goal-oriented, linear, concrete   Associations:  No loosening of associations present  Thought Content:  Did not appear to be responding to internal stimuli.  Insight:   poor - slightly improved   Judgement:  fair  Oriented to: person, place, time/date   Attention Span and Concentration: attentive to conversation though at times stares ahead and does not respond to questions.  Recent and Remote Memory: stable, some difficulty recalling events   Fund of Knowledge:  normal    Clinical Global Impressions  First:  Considering your total clinical experience with this particular patient population, how severe are the patient's symptoms at this time?: 7 (04/16/21 1428)  Compared to the patient's condition at the START of treatment, this patient's condition is: 4 (04/16/21 1428)  Most recent:  Considering your total clinical experience with this particular patient population, how severe are the patient's symptoms at this time?: 7 (07/22/21 0941)  Compared to the patient's condition at the START of treatment, this " patient's condition is: 3 (07/22/21 0941)           Precautions:     Behavioral Orders   Procedures     Cheeking Precautions (behavioral units)     Patient Observed swallowing PO medications; Patient asked to drink water after swallowing medication; Patient in Staff line of sight for 15 minutes after medication given; Mouth checks after PO administration (patient asked to open mouth and stick out their tongue).     Code 3     For walks in the [place] with staff and per staff discretion     Electroconvulsive therapy     Series of up to 12 treatments. Begin Date: 5/26/21     Treating Psychiatrist providing ECT:  Dr. Lubin     Notified on:  5/21/21     Electroconvulsive therapy     As long as we get the green light from risk management and pt is medically cleared, begin ECT every Monday, Wednesday, and Friday     Electroconvulsive therapy     Series of up to 12 treatments. Begin Date: 5/25/21     Treating Psychiatrist providing ECT:  Amee     Notified on:  5/24/21     Elopement precautions     Fall precautions     Mcgrath Calderon     Routine Programming     As clinically indicated     Status 15     Every 15 minutes.          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 Symptoms and presentation at this time is most consistent with Schizoaffective Disorder, Bipolar Type. Obtained most recent medication regimen from patient's ACT team, and regimen was initially restarted. Pt is committed. Petitioned for  "Mcgrath Calderon was filed and granted due to lack of improvement and side effects from medications. Inpatient psychiatric hospitalization is warranted at this time for safety, stabilization, possible adjustment in medications and development of a safe discharge plan.      Hospital Course:  On admission, PTA medications were restarted. However, Michelle had been declining all scheduled medications despite significant encouragement from staff and provider. Psychiatric emergency declared on 4/20 due to aggression toward others in context of severe psychosis and suspected excited catatonia. Ativan 1 mg TID was also added. Discontinued PTA Invega, Zyprexa, and thorazine on 4/20 due to consistent refusal.      On 4/26, it was noted that patient frequently had upward gaze while walking up and down the unit. She did not appear to be distressed. She reported that she was looking at \"my god.\" It was determined to be oculogyric crisis secondary to IM haloperidol. Haldol was subsequently discontinued and scheduled Zyprexa was initiated on an emergency basis. Oral Cogentin was also scheduled, though patient declined. On the evening of 4/26, patient's gaze was fixed in upward position for several hours and she appeared to be experiencing discomfort. IM Cogentin was administered with noted resolution. Partial improvements were observed after switching to scheduled Zyprexa. After patient improved, she was more receptive to reinitiating oral Invega, which was initiated on 5/3 and titrated to PTA dose of 9 mg daily on 5/10. Plan was for ACT team to bring in loading dose of Invega S[place]. Patient had signs of oculogyric crisis again with Invega. Oral dose decreased to 6 mg after this. Invega was stopped on 5/14 due to ongoing signs of problems related to eye movement and concerns for oculogyric crisis.     Overall improvement in patient's agitation and suspected catatonia noted on 4/30 after patient accepted two doses of Ativan. She began " "declining Ativan again with noted decompensation. Patient began accepting, however, was deemed not to have the capacity to consent to treatment with Ativan. She does not believe she has a mental illness, including catatonia. She does not fully understand risks associated with inadequate treatment of catatonia. Discussed with her son who is acting as surrogate decision maker and he is in full support of forced scheduled IM Ativan if patient declines oral formulation. Also consulted with our legal team prior to backing oral Ativan with IM.      Ativan was increased on 5/19 due to re-emerging evidence of catatonia (long periods of staring, repetitive movements, echolalia, mutism). Meeting was held with ACT team on 5/20, including ACT team psychiatrist, Dr. Pedraza. He said that he is in \"full support\" of plan to pursue Mcgrath Kohler and ECT at this time. He does feel that in the past she has been discharged from the hospital while still quite symptomatic. He is hoping that ECT will be effective and that with improvement, Michelle would be more receptive to clozapine and weekly blood draws. He said that ideally she would transition to an IRTS before going back to her apartment, but also understands there may be some barriers (I.e. pt's willingness, financial concerns, etc).      ECT consult placed. Please see consult note by Dr. Lubin on 5/21 for details. Mcgrath Kohler was approved on 5/24 and patient was medically cleared on 5/24. ECT initiated on 5/26. She was noted to have a short seizure during ECT on 6/4. Staff note that patient appears more disoriented with memory impairment and sedation on days of ECT. This was noted on my examination again today. Improvements noted in mood, affect, social interactions, paranoia, agitation since initiation of ECT. Reduced Ativan on 6/5 due to sedative effects. Relayed concerns about memory impairment and confusion with Dr. Lubin. Recommended attempting unilateral ECT, which he agreed " to do. First unilateral ECT on 6/7. ECT was held on 6/11 and 6/14 due to memory impairment. We will plan to hold it again tomorrow (6/16). There is ongoing discussion regarding whether there is a component of catatonia contributing to her current presentation but this is less likely since it worsened after ECT was started.  Given her level of cognitive impairment and our belief that this is due to ECT, we will not pursue any further treatments at this time. Since we have held ECT (last treatment on 6/9), her cognitive impairment has slightly improved (more oriented).      Michelle initially appeared to be decompensating somewhat when ECT was held, though her cognitive impairment has gradually improved. If symptoms of psychosis re-emerge, it may be worth starting clozapine, which is something that has previously been considered by her ACT team. Her Hatch order would need to be amended as clozapine is not one of the medications listed. ANC obtained on 6/16 was 1800/microL. Per conversation with pharmacy, neutropenia has been associated with olanzapine and agranulocytosis has been associated with olanzapine, lorazepam, metoprolol, and hydralazine and hematologic labs have been monitored. Upon re-check, ANC was 3700/microL on 6/21/21. At this time, the primary symptoms we are observing are cognitive deficits and inability to care for self, which we would not address by changing her antipsychotic medication.     Michelle's cognitive impairment has been steadily improving since holding ECT treatments, however it is possible that lorazepam is also playing a role in her cognitive impairment. Given no signs of re-emerging catatonia, a gradual lorazepam taper (decreasing by 0.5 mg) was initiated on 6/28/21 to monitor for potential improvements with regard to memory impairment.  On 7/7, Michelle reported an incident of oculogyric crisis and Cogentin 1 mg BID was initiated with no noted changes in cognition.      Michelle has remained focused  on discharge. The team is working with Michelle's ACT team to to establish a safe discharge plan with options including moving to a group home vs home with her ACT team and additional in home supports via CADI services. We are concerned that Michelle would have difficulty taking her medications as prescribed if she were to return home without her ACT team and additional services. This is evidenced by impairments noted in cognitive assessment by OT specialist on 7/1. MOCA score was a 13/30 and CPT score was a 4.7. Physician's statement in support of guardianship was completed on 7/19/21.  Patient's son is pursuing guardianship. Patient became quite agitated on 7/16 when  staff from [facility] came to meet with her a second time. She did not allow staff members to enter her room and stated that she will not be going to a group home.      Due to daytime sedation, on 7/26/21, lorazepam evening dose decreased from 2 mg to 1 mg. On 7/27/21 transitioned Zyprexa from 10 mg BID to 5 mg QAM + 15 mg QPM and had less daytime sedation with this change and no emergence of symptoms concerning for orthostatic hypotension.  She attended her emergency guardianship hearing on 7/28.  Due to ongoing daytime sedation, Zyprexa doses were consolidated to bedtime starting on 7/30. [place] completed on 8/12 for CADI funding.     Target psychiatric symptoms and interventions:   - Continue Ativan 1 mg PO or IM TID Patient may not decline.   - Resume Zyprexa 20 mg at bedtime  Hatch in place. May consider increasing further though holding off given re-emerging catatonic sx and borderline prolonged QTc      - Continue Cogentin 1 mg PO BID with additional 2 mg IM daily prn for evidence of acute dystonic reaction or oculogyric crisis  -Continue hydroxyzine 25-50 mg q4h prn for acute anxiety  -Continue Trazodone 50 mg at bedtime prn for sleep disturbances  -Continue Zyprexa 10 mg TID prn for severe agitation  -Continue  "Ativan/Benadryl q4h prn for agitation. WOULD GIVE PRN ATIVAN FIRST FOR AGITATION unless it is after 5 pm on day prior to scheduled ECT     Occupational Therapy Consult Placed to evaluate cognitive functioning/ability to care for self in home environment, ability to manage medications. See note on 7/1 for details.      ECT: Discontinued due to significant cognitive impairment. Please see note dated 7/12/21 for additional details.      Acute Medical Problems and Treatments:  Left ankle pain, resolved   - Added ice packs prn    HTN, improved: Patient is now adherent with medication regimen. Intermittent elevations with no concerning symptoms.  - Metoprolol succinate ER 75 mg   - Cozaar 100 mg daily  - Amlodipine 7.5 mg daily  - IM discontinued hydralazine prn  - Switched BP checks from QID to BID on 7/13 given overall improvement  - Please see note from IM dated 5/3, 5/6, 5/24, 6/20 and 6/30/21.  - Obtained EKG and routine labs on 5/21 in context of pt declining vital sign checks and anti-hypertensives and recently elevated BPs. Reviewed labs and discussed EKG findings with IM on 5/21. No urgent concerns, though IM should be notified if pt develops acute medical concerns (I.e. heart palpitations, SOB, changes in speech, AMS, confusion, CP, HA, changes in vision).    - Per 6/20 IM note: \"Please notify IM if BP is persistently severely elevated and requiring frequent PRN hydralazine\".  - Internal medicine consulted again on 6/28 due to consistent use of hydralazine over the past week. Metoprolol increased to 75 mg daily then to 100 mg and amlodipine 5 mg was added on 6/30. Amlodipine increased to 7.5 mg daily on 7/5.      Recent history of BRANDON:  - Attempted to repeat BMP multiple times though patient consistently declining blood draw  - Encourage fluids     For previous medical concerns, please see note dated 7/12/21.     Behavioral/Psychological/Social:  - Encourage unit programming  - Patient is Code 3 status and can " take walks in the [place] with staff and security present per staff discretion. This appears to be quite therapeutic for her.      Safety:  - Continue precautions as noted above  - Status 15 minute checks  - Safety precautions include: assault and elopement precautions  - Continue precautions as noted above     Legal Status: Committed as MI with Hatch in place for Haldol, Zyprexa, Invega, and Thorazine through [facility]. Filed Alcides Kohler through [facility] and approved on 5/24/21. Physician's statement in support of guardianship was completed on 7/19/21. Patient's son is now her temporary emergency guardian.     Disposition Plan   Reason for ongoing admission: poses an imminent risk to self and is unable to care for self due to severe psychosis or tad  Discharge location: Group home (needs CADI waiver). CPT assessment done by OT (Tania queen) on 7/1.  Please see note for details. [place] completed on 8/12.   Discharge Medications: not ordered  Follow-up Appointments: not scheduled     Gabby Zaman MD  [facility]

## 2021-08-17 PROCEDURE — 250N000013 HC RX MED GY IP 250 OP 250 PS 637: Performed by: PHYSICIAN ASSISTANT

## 2021-08-17 PROCEDURE — 99231 SBSQ HOSP IP/OBS SF/LOW 25: CPT | Performed by: PSYCHIATRY & NEUROLOGY

## 2021-08-17 PROCEDURE — 124N000002 HC R&B MH UMMC

## 2021-08-17 PROCEDURE — 250N000013 HC RX MED GY IP 250 OP 250 PS 637: Performed by: STUDENT IN AN ORGANIZED HEALTH CARE EDUCATION/TRAINING PROGRAM

## 2021-08-17 RX ADMIN — METOPROLOL SUCCINATE 75 MG: 25 TABLET, EXTENDED RELEASE ORAL at 08:36

## 2021-08-17 RX ADMIN — LORAZEPAM 1 MG: 1 TABLET ORAL at 08:36

## 2021-08-17 RX ADMIN — LOSARTAN POTASSIUM 100 MG: 100 TABLET, FILM COATED ORAL at 08:36

## 2021-08-17 RX ADMIN — BENZTROPINE MESYLATE 1 MG: 1 TABLET ORAL at 19:50

## 2021-08-17 RX ADMIN — OLANZAPINE 20 MG: 20 TABLET, ORALLY DISINTEGRATING ORAL at 19:50

## 2021-08-17 RX ADMIN — LORAZEPAM 1 MG: 1 TABLET ORAL at 19:50

## 2021-08-17 RX ADMIN — LORAZEPAM 1 MG: 1 TABLET ORAL at 13:45

## 2021-08-17 RX ADMIN — AMLODIPINE BESYLATE 7.5 MG: 2.5 TABLET ORAL at 08:36

## 2021-08-17 RX ADMIN — BENZTROPINE MESYLATE 1 MG: 1 TABLET ORAL at 08:36

## 2021-08-17 ASSESSMENT — ACTIVITIES OF DAILY LIVING (ADL)
HYGIENE/GROOMING: INDEPENDENT
DRESS: SCRUBS (BEHAVIORAL HEALTH)
ORAL_HYGIENE: INDEPENDENT
ORAL_HYGIENE: INDEPENDENT
HYGIENE/GROOMING: INDEPENDENT
LAUNDRY: UNABLE TO COMPLETE
DRESS: SCRUBS (BEHAVIORAL HEALTH)
LAUNDRY: UNABLE TO COMPLETE

## 2021-08-17 NOTE — PLAN OF CARE
Pt isolated to room majority of shift. Pt denies SI/HI or other mental health concerns. Pt denies pain and dismissive of any further questions. Pt finished meal. Pt waiting for CADI approval for a group home. Continue to monitor.  Problem: Behavioral Health Plan of Care  Goal: Plan of Care Review  Recent Flowsheet Documentation  Taken 8/16/2021 1912 by Ammy Tee, RN  Plan of Care Reviewed With: patient  Progress: no change  Taken 8/16/2021 1600 by Ammy Tee, RN  Plan of Care Reviewed With: patient

## 2021-08-17 NOTE — PLAN OF CARE
Assessment/Intervention/Current Symtoms and Care Coordination  -Chart review  -Rounded with team, addressed patient needs/concerns  -  Current Symptoms include the following:  Bright affect, pleasant cooperative     Discharge Plan or Goal  Pending stabilization & development of a safe discharge plan.  Considerations include:  Patient chow been accepted to [facility]    Barriers to Discharge  Patient requires further psychiatric stabilization due to current symptomology    Referral Status  [facility]    Legal Status  Committed/Hatch/Mcgrath Kohler through [facility]

## 2021-08-17 NOTE — PLAN OF CARE
"Patient oriented to person, place, time and aware that team is working on her discharge plan. Affect is restricted, withdrawn, guarded and isolative to room. Mood is irritable, appears depressed. Offered to take her outside this morning to spend some time in the [place], however patient yelled angrily \"NO! I don't wanna go!\" She denies AH or VH. Does not appear to be responding to internal stimuli. No overt paranoid statements made, however cannot rule out that she still has paranoid thoughts based on guarded affect. She ate 100% of breakfast. Spent time in room watching the news ([place]) once awake. Declined to engage in anything other than brief and superficial conversation with this writer. Agitated by questions about mental health. Denied any physical pain.   "

## 2021-08-17 NOTE — PLAN OF CARE
Pt asleep at start of shift. Breathing quiet and unlabored.     Pt had no c/o pain or discomfort during the HS.     Appears to have slept 6.25  hours.     Pt on  CHEEKING, ELOPEMENT, and FALL precautions in addition to single room order. Any related events noted above.     Will continue to monitor and assess.   Problem: Sleep Disturbance  Goal: Adequate Sleep/Rest  Outcome: No Change

## 2021-08-17 NOTE — PROGRESS NOTES
[facility], [place]   Psychiatric Progress Note  Hospital Day: 125        Interim History:   The patient's care was discussed with the treatment team during the daily team meeting and/or staff's chart notes were reviewed. No acute medical concerns and, intermittent BP elevation with no symptoms. She has been pleasant and cooperative with staff and peers. Continues to isolate to her room, including eating meals in there, frequently watching [employer]. She is medication adherent. No reported or observed side effects. No symptoms of psychosis or tad. Intermittently attends to all ADLs. Sleeping and eating well. Slept 6.25 hours overnight.     Michelle was sitting in her room when writer entered. She was watching TV. Slightly more irritable today. She wanted more information about the delay in her discharge. Updated her and reassured her that we are working closely with [facility]  and . She was encouraged to walk to the [place] with staff, though declined. She denies feeling sad or depressed, but said that she is sick of being in the hospital.     Suicidal ideation: denies current or recent suicidal ideation or behaviors.    Homicidal ideation: denies current or recent homicidal ideation or behaviors.    Psychotic symptoms: Patient denies AH, VH, paranoia, delusions.     Medication side effects reported: She denies sedation today. She denies any dizziness or lightheadedness.     Acute medical concerns: pain in left ankle has resolved.     Other issues reported by patient: Patient had no further questions or concerns.           Medications:       amLODIPine  7.5 mg Oral Daily     benztropine  1 mg Oral BID     LORazepam  1 mg Oral TID    Or     LORazepam  1 mg Intramuscular TID     losartan  100 mg Oral Daily     metoprolol succinate ER  75 mg Oral Daily     OLANZapine zydis  20 mg Oral At Bedtime    Or     OLANZapine  10 mg Intramuscular At Bedtime          Allergies:     Allergies    Allergen Reactions     Haldol [Haloperidol]      Patient previously tolerated haldol, though developed oculogyric crises during hospital stay on 4/26/21 on total daily dose of 10 mg.     Lisinopril Cough          Labs:     Recent Results (from the past 24 hour(s))   SARS-COV2 (COVID-19) Virus RT-PCR    Collection Time: 08/16/21  1:50 PM    Specimen: Nasopharyngeal; Swab   Result Value Ref Range    SARS CoV2 PCR Negative Negative          Psychiatric Examination:     /71 (BP Location: Right arm)   Pulse 68   Temp (!) 96.4  F (35.8  C) (Tympanic)   Resp 16   Wt 70.3 kg (154 lb 14.4 oz)   SpO2 100%   BMI 26.59 kg/m    Weight is 154 lbs 14.4 oz  Body mass index is 26.59 kg/m .    Weight over time:  Vitals:    05/25/21 0850 06/15/21 0811 06/16/21 1605 06/19/21 0859   Weight: 71 kg (156 lb 8 oz) 69.8 kg (153 lb 12.8 oz) 70.5 kg (155 lb 8 oz) 71.4 kg (157 lb 8 oz)    06/21/21 0805 06/22/21 0839 06/24/21 0800 07/02/21 0835   Weight: 69.5 kg (153 lb 3.2 oz) 69.3 kg (152 lb 11.2 oz) 70.7 kg (155 lb 12.8 oz) 70.7 kg (155 lb 12.8 oz)    07/03/21 0844 07/06/21 0838 07/10/21 0845 07/16/21 0821   Weight: 69.6 kg (153 lb 8 oz) 70.9 kg (156 lb 4.8 oz) 69.8 kg (153 lb 14.4 oz) 69.3 kg (152 lb 12.8 oz)    07/17/21 0830 07/31/21 0804   Weight: 70.4 kg (155 lb 4.8 oz) 70.3 kg (154 lb 14.4 oz)       Orthostatic Vitals       Most Recent      Sitting Orthostatic /84 07/29 0800    Sitting Orthostatic Pulse (bpm) 82 07/29 0800    Standing Orthostatic /86 07/29 0800    Standing Orthostatic Pulse (bpm) 88 07/29 0800            Cardiometabolic risk assessment. 07/29/21      Reviewed patient profile for cardiometabolic risk factors    Date taken /Value  REFERENCE RANGE   Abdominal Obesity  (Waist Circumference)   See nursing flowsheet Women ?35 in (88 cm)   Men ?40 in (102 cm)      Triglycerides  Triglycerides   Date Value Ref Range Status   10/02/2020 86 <=149 mg/dL Final   10/19/2019 117 <150 mg/dL Final       ?150  "mg/dL (1.7 mmol/L) or current treatment for elevated triglycerides   HDL cholesterol  HDL Cholesterol   Date Value Ref Range Status   10/19/2019 56 >49 mg/dL Final     Direct Measure HDL   Date Value Ref Range Status   10/02/2020 45 (L) >=50 mg/dL Final   ]   Women <50 mg/dL (1.3 mmol/L) in women or current treatment for low HDL cholesterol  Men <40 mg/dL (1 mmol/L) in men or current treatment for low HDL cholesterol     Fasting plasma glucose (FPG) Lab Results   Component Value Date     07/02/2021      FPG ?100 mg/dL (5.6 mmol/L) or treatment for elevated blood glucose   Blood pressure  BP Readings from Last 3 Encounters:   08/17/21 136/71   06/04/19 131/71   04/26/19 164/86    Blood pressure ?130/85 mmHg or treatment for elevated blood pressure   Family History  See family history     Appearance: dressed in hospital scrubs, appeared reported age, unkempt hair   Attitude: cooperative  Eye Contact: improved, looks up more during the interview   Mood: \"good\"    Affect:  Somewhat blunted, no longer agitated or tense. Brightens and smiles at times   Speech: accented, clear and coherent.    Language: no obvious receptive or expressive deficits  Psychomotor, Gait, Musculoskeletal: No abnormal movements noted while seated  Thought Process: goal-oriented, linear, concrete   Associations:  No loosening of associations present  Thought Content:  Did not appear to be responding to internal stimuli.  Insight:   poor - slightly improved   Judgement:  fair  Oriented to: person, place, time/date   Attention Span and Concentration: attentive to conversation though at times stares ahead and does not respond to questions.  Recent and Remote Memory: stable, some difficulty recalling events   Fund of Knowledge:  normal    Clinical Global Impressions  First:  Considering your total clinical experience with this particular patient population, how severe are the patient's symptoms at this time?: 7 (04/16/21 0350)  Compared to the " patient's condition at the START of treatment, this patient's condition is: 4 (04/16/21 1428)  Most recent:  Considering your total clinical experience with this particular patient population, how severe are the patient's symptoms at this time?: 7 (07/22/21 0941)  Compared to the patient's condition at the START of treatment, this patient's condition is: 3 (07/22/21 0941)           Precautions:     Behavioral Orders   Procedures     Cheeking Precautions (behavioral units)     Patient Observed swallowing PO medications; Patient asked to drink water after swallowing medication; Patient in Staff line of sight for 15 minutes after medication given; Mouth checks after PO administration (patient asked to open mouth and stick out their tongue).     Code 3     For walks in the [place] with staff and per staff discretion     Electroconvulsive therapy     Series of up to 12 treatments. Begin Date: 5/26/21     Treating Psychiatrist providing ECT:  Dr. Lubin     Notified on:  5/21/21     Electroconvulsive therapy     As long as we get the green light from risk management and pt is medically cleared, begin ECT every Monday, Wednesday, and Friday     Electroconvulsive therapy     Series of up to 12 treatments. Begin Date: 5/25/21     Treating Psychiatrist providing ECT:  Amee     Notified on:  5/24/21     Elopement precautions     Fall precautions     Mcgrath Calderon     Routine Programming     As clinically indicated     Status 15     Every 15 minutes.          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 Symptoms and presentation at this time is most consistent with Schizoaffective Disorder, Bipolar Type. Obtained most recent medication regimen from patient's ACT team, and regimen was initially restarted. Pt is committed. Petitioned for Alcides Calderon was filed and granted due to lack of improvement and side effects from medications. Inpatient psychiatric hospitalization is warranted at this time for safety, stabilization, possible adjustment in medications and development of a safe discharge plan.      Hospital Course:  On admission, PTA medications were restarted. However, Michelle had been declining all scheduled medications despite significant encouragement from staff and provider. Psychiatric emergency declared on 4/20 due to aggression toward others in context of severe psychosis and suspected excited catatonia. Ativan 1 mg TID was also added. Discontinued PTA Invega, Zyprexa, and thorazine on 4/20 due to consistent refusal.      On 4/26, it was noted that patient frequently had upward gaze while walking up and down the unit. She did not appear to be distressed. She reported that she was looking at \"my god.\" It was determined to be oculogyric crisis secondary to IM haloperidol. Haldol was subsequently discontinued and scheduled Zyprexa was initiated on an emergency basis. Oral Cogentin was also scheduled, though patient declined. On the evening of 4/26, patient's gaze was fixed in upward position for several hours and she appeared to be experiencing discomfort. IM Cogentin was administered with noted resolution. Partial improvements were observed after switching to scheduled Zyprexa. After patient improved, she was more receptive to reinitiating oral Invega, which was initiated on 5/3 and titrated to PTA dose of 9 mg daily on 5/10. Plan was for ACT team to bring in loading dose of Invega S[place]enna. Patient had " "signs of oculogyric crisis again with Invega. Oral dose decreased to 6 mg after this. Invega was stopped on 5/14 due to ongoing signs of problems related to eye movement and concerns for oculogyric crisis.     Overall improvement in patient's agitation and suspected catatonia noted on 4/30 after patient accepted two doses of Ativan. She began declining Ativan again with noted decompensation. Patient began accepting, however, was deemed not to have the capacity to consent to treatment with Ativan. She does not believe she has a mental illness, including catatonia. She does not fully understand risks associated with inadequate treatment of catatonia. Discussed with her son who is acting as surrogate decision maker and he is in full support of forced scheduled IM Ativan if patient declines oral formulation. Also consulted with our legal team prior to backing oral Ativan with IM.      Ativan was increased on 5/19 due to re-emerging evidence of catatonia (long periods of staring, repetitive movements, echolalia, mutism). Meeting was held with ACT team on 5/20, including ACT team psychiatrist, Dr. Pedraza. He said that he is in \"full support\" of plan to pursue Mcgrath Kohler and ECT at this time. He does feel that in the past she has been discharged from the hospital while still quite symptomatic. He is hoping that ECT will be effective and that with improvement, Michelle would be more receptive to clozapine and weekly blood draws. He said that ideally she would transition to an IRTS before going back to her apartment, but also understands there may be some barriers (I.e. pt's willingness, financial concerns, etc).      ECT consult placed. Please see consult note by Dr. Lubin on 5/21 for details. Mcgrath Kohler was approved on 5/24 and patient was medically cleared on 5/24. ECT initiated on 5/26. She was noted to have a short seizure during ECT on 6/4. Staff note that patient appears more disoriented with memory impairment and " sedation on days of ECT. This was noted on my examination again today. Improvements noted in mood, affect, social interactions, paranoia, agitation since initiation of ECT. Reduced Ativan on 6/5 due to sedative effects. Relayed concerns about memory impairment and confusion with Dr. Lubin. Recommended attempting unilateral ECT, which he agreed to do. First unilateral ECT on 6/7. ECT was held on 6/11 and 6/14 due to memory impairment. We will plan to hold it again tomorrow (6/16). There is ongoing discussion regarding whether there is a component of catatonia contributing to her current presentation but this is less likely since it worsened after ECT was started.  Given her level of cognitive impairment and our belief that this is due to ECT, we will not pursue any further treatments at this time. Since we have held ECT (last treatment on 6/9), her cognitive impairment has slightly improved (more oriented).      Michelle initially appeared to be decompensating somewhat when ECT was held, though her cognitive impairment has gradually improved. If symptoms of psychosis re-emerge, it may be worth starting clozapine, which is something that has previously been considered by her ACT team. Her Hatch order would need to be amended as clozapine is not one of the medications listed. ANC obtained on 6/16 was 1800/microL. Per conversation with pharmacy, neutropenia has been associated with olanzapine and agranulocytosis has been associated with olanzapine, lorazepam, metoprolol, and hydralazine and hematologic labs have been monitored. Upon re-check, ANC was 3700/microL on 6/21/21. At this time, the primary symptoms we are observing are cognitive deficits and inability to care for self, which we would not address by changing her antipsychotic medication.     Michelle's cognitive impairment has been steadily improving since holding ECT treatments, however it is possible that lorazepam is also playing a role in her cognitive impairment.  Given no signs of re-emerging catatonia, a gradual lorazepam taper (decreasing by 0.5 mg) was initiated on 6/28/21 to monitor for potential improvements with regard to memory impairment.  On 7/7, Michelle reported an incident of oculogyric crisis and Cogentin 1 mg BID was initiated with no noted changes in cognition.      Michelle has remained focused on discharge. The team is working with Michelle's ACT team to to establish a safe discharge plan with options including moving to a group home vs home with her ACT team and additional in home supports via [facility] services. We are concerned that Michelle would have difficulty taking her medications as prescribed if she were to return home without her ACT team and additional services. This is evidenced by impairments noted in cognitive assessment by OT specialist on 7/1. MOCA score was a 13/30 and CPT score was a 4.7. Physician's statement in support of guardianship was completed on 7/19/21.  Patient's son is pursuing guardianship. Patient became quite agitated on 7/16 when  staff from [facility] came to meet with her a second time. She did not allow staff members to enter her room and stated that she will not be going to a group home.      Due to daytime sedation, on 7/26/21, lorazepam evening dose decreased from 2 mg to 1 mg. On 7/27/21 transitioned Zyprexa from 10 mg BID to 5 mg QAM + 15 mg QPM and had less daytime sedation with this change and no emergence of symptoms concerning for orthostatic hypotension.  She attended her emergency guardianship hearing on 7/28.  Due to ongoing daytime sedation, Zyprexa doses were consolidated to bedtime starting on 7/30. [place] completed on 8/12 for [facility] funding.     Target psychiatric symptoms and interventions:   - Continue Ativan 1 mg PO or IM TID Patient may not decline.   - Resume Zyprexa 20 mg at bedtime  Hatch in place. May consider increasing further though holding off given re-emerging catatonic sx and  "borderline prolonged QTc      - Continue Cogentin 1 mg PO BID with additional 2 mg IM daily prn for evidence of acute dystonic reaction or oculogyric crisis  -Continue hydroxyzine 25-50 mg q4h prn for acute anxiety  -Continue Trazodone 50 mg at bedtime prn for sleep disturbances  -Continue Zyprexa 10 mg TID prn for severe agitation  -Continue Ativan/Benadryl q4h prn for agitation. WOULD GIVE PRN ATIVAN FIRST FOR AGITATION unless it is after 5 pm on day prior to scheduled ECT     Occupational Therapy Consult Placed to evaluate cognitive functioning/ability to care for self in home environment, ability to manage medications. See note on 7/1 for details.      ECT: Discontinued due to significant cognitive impairment. Please see note dated 7/12/21 for additional details.      Acute Medical Problems and Treatments:  Left ankle pain, resolved   - Added ice packs prn    HTN, improved: Patient is now adherent with medication regimen. Intermittent elevations with no concerning symptoms.  - Metoprolol succinate ER 75 mg   - Cozaar 100 mg daily  - Amlodipine 7.5 mg daily  - IM discontinued hydralazine prn  - Switched BP checks from QID to BID on 7/13 given overall improvement  - Please see note from IM dated 5/3, 5/6, 5/24, 6/20 and 6/30/21.  - Obtained EKG and routine labs on 5/21 in context of pt declining vital sign checks and anti-hypertensives and recently elevated BPs. Reviewed labs and discussed EKG findings with IM on 5/21. No urgent concerns, though IM should be notified if pt develops acute medical concerns (I.e. heart palpitations, SOB, changes in speech, AMS, confusion, CP, HA, changes in vision).    - Per 6/20 IM note: \"Please notify IM if BP is persistently severely elevated and requiring frequent PRN hydralazine\".  - Internal medicine consulted again on 6/28 due to consistent use of hydralazine over the past week. Metoprolol increased to 75 mg daily then to 100 mg and amlodipine 5 mg was added on 6/30. " Amlodipine increased to 7.5 mg daily on 7/5.      Recent history of BRANDON:  - Attempted to repeat BMP multiple times though patient consistently declining blood draw  - Encourage fluids     For previous medical concerns, please see note dated 7/12/21.     Behavioral/Psychological/Social:  - Encourage unit programming  - Patient is Code 3 status and can take walks in the [place] with staff and security present per staff discretion. This appears to be quite therapeutic for her.      Safety:  - Continue precautions as noted above  - Status 15 minute checks  - Safety precautions include: assault and elopement precautions  - Continue precautions as noted above     Legal Status: Committed as MI with Hatch in place for Haldol, Zyprexa, Invega, and Thorazine through [facility]. Filed Alcides Kohler through [facility] and approved on 5/24/21. Physician's statement in support of guardianship was completed on 7/19/21. Patient's son is now her temporary emergency guardian.     Disposition Plan   Reason for ongoing admission: poses an imminent risk to self and is unable to care for self due to severe psychosis or tad  Discharge location: Group home (needs [facility] waiver). CPT assessment done by OT (Tania queen) on 7/1.  Please see note for details. [place] completed on 8/12.   Discharge Medications: not ordered  Follow-up Appointments: not scheduled     Gabby Zaman MD  [facility]

## 2021-08-18 PROCEDURE — 124N000002 HC R&B MH UMMC

## 2021-08-18 PROCEDURE — 250N000013 HC RX MED GY IP 250 OP 250 PS 637: Performed by: STUDENT IN AN ORGANIZED HEALTH CARE EDUCATION/TRAINING PROGRAM

## 2021-08-18 PROCEDURE — 99231 SBSQ HOSP IP/OBS SF/LOW 25: CPT | Performed by: PSYCHIATRY & NEUROLOGY

## 2021-08-18 PROCEDURE — 250N000013 HC RX MED GY IP 250 OP 250 PS 637: Performed by: PHYSICIAN ASSISTANT

## 2021-08-18 RX ADMIN — LOSARTAN POTASSIUM 100 MG: 100 TABLET, FILM COATED ORAL at 08:36

## 2021-08-18 RX ADMIN — LORAZEPAM 1 MG: 1 TABLET ORAL at 08:36

## 2021-08-18 RX ADMIN — LORAZEPAM 1 MG: 1 TABLET ORAL at 19:57

## 2021-08-18 RX ADMIN — BENZTROPINE MESYLATE 1 MG: 1 TABLET ORAL at 08:36

## 2021-08-18 RX ADMIN — BENZTROPINE MESYLATE 1 MG: 1 TABLET ORAL at 19:57

## 2021-08-18 RX ADMIN — LORAZEPAM 1 MG: 1 TABLET ORAL at 13:46

## 2021-08-18 RX ADMIN — METOPROLOL SUCCINATE 75 MG: 25 TABLET, EXTENDED RELEASE ORAL at 08:36

## 2021-08-18 RX ADMIN — OLANZAPINE 20 MG: 20 TABLET, ORALLY DISINTEGRATING ORAL at 19:57

## 2021-08-18 RX ADMIN — AMLODIPINE BESYLATE 7.5 MG: 2.5 TABLET ORAL at 08:36

## 2021-08-18 ASSESSMENT — ACTIVITIES OF DAILY LIVING (ADL)
HYGIENE/GROOMING: PROMPTS
DRESS: SCRUBS (BEHAVIORAL HEALTH)
ORAL_HYGIENE: PROMPTS
DRESS: SCRUBS (BEHAVIORAL HEALTH)
HYGIENE/GROOMING: PROMPTS
ORAL_HYGIENE: PROMPTS
LAUNDRY: UNABLE TO COMPLETE
LAUNDRY: UNABLE TO COMPLETE

## 2021-08-18 NOTE — PLAN OF CARE
Pt asleep at start of shift. Breathing quiet and unlabored.     Pt had no c/o pain or discomfort during the HS.     Appears to have slept 6.5 hours.     Pt on  CHEEKING, ELOPEMENT and FALL precautions. Needs attended to. Will continue to monitor and assess.     Problem: Sleep Disturbance  Goal: Adequate Sleep/Rest  Outcome: No Change

## 2021-08-18 NOTE — PLAN OF CARE
Problem: Behavior Regulation Impairment (Psychotic Signs/Symptoms)  Goal: Improved Behavioral Control (Psychotic Signs/Symptoms)  Outcome: Improving    Patient was isolative and withdrawn to her room, sleeping for much of the shift.  Calm when awake.  Flat affect.  Minimal answers to questions - appears bored.  She denied all mental health concerns.  Patient was encouraged to enter the milieu for groups, but she declined.  She declined to walk in the [place], when the opportunity was offered. Declined to shower.  She is [facility] her [facility].     Patient was compliant with her medications.  Denied acute medical concerns and medication side effects.  Denied sedation from medications.  No pain.  Patient VSS.  Declined to shower.  No further concerns for staff.

## 2021-08-18 NOTE — PROGRESS NOTES
[facility], [place]   Psychiatric Progress Note  Hospital Day: 126        Interim History:   The patient's care was discussed with the treatment team during the daily team meeting and/or staff's chart notes were reviewed. No acute medical concerns and, intermittent BP elevation with no symptoms. She has been pleasant and cooperative with staff and peers. Continues to isolate to her room, including eating meals in there, frequently watching [employer]. She is medication adherent. No reported or observed side effects. No symptoms of psychosis or tad. Intermittently attends to all ADLs. Sleeping and eating well. Slept 6.5 hours overnight.     Michelle was sitting in her room when writer entered. She was watching TV. Affect brighter today. She does continue to express strong desire for discharge. Frustrated about length of stay. Updated her and reassured her that we are working closely with [facility]I  and . She denies feeling sad or depressed.    Suicidal ideation: denies current or recent suicidal ideation or behaviors.    Homicidal ideation: denies current or recent homicidal ideation or behaviors.    Psychotic symptoms: Patient denies AH, VH, paranoia, delusions.     Medication side effects reported: She denies sedation today. She denies any dizziness or lightheadedness.     Acute medical concerns: pain in left ankle has resolved.     Other issues reported by patient: Patient had no further questions or concerns.           Medications:       amLODIPine  7.5 mg Oral Daily     benztropine  1 mg Oral BID     LORazepam  1 mg Oral TID    Or     LORazepam  1 mg Intramuscular TID     losartan  100 mg Oral Daily     metoprolol succinate ER  75 mg Oral Daily     OLANZapine zydis  20 mg Oral At Bedtime    Or     OLANZapine  10 mg Intramuscular At Bedtime          Allergies:     Allergies   Allergen Reactions     Haldol [Haloperidol]      Patient previously tolerated haldol, though developed oculogyric  crises during hospital stay on 4/26/21 on total daily dose of 10 mg.     Lisinopril Cough          Labs:     No results found for this or any previous visit (from the past 24 hour(s)).       Psychiatric Examination:     /73 (BP Location: Right arm)   Pulse 64   Temp 96.8  F (36  C) (Tympanic)   Resp 16   Wt 70.8 kg (156 lb)   SpO2 99%   BMI 26.78 kg/m    Weight is 156 lbs 0 oz  Body mass index is 26.78 kg/m .    Weight over time:  Vitals:    05/25/21 0850 06/15/21 0811 06/16/21 1605 06/19/21 0859   Weight: 71 kg (156 lb 8 oz) 69.8 kg (153 lb 12.8 oz) 70.5 kg (155 lb 8 oz) 71.4 kg (157 lb 8 oz)    06/21/21 0805 06/22/21 0839 06/24/21 0800 07/02/21 0835   Weight: 69.5 kg (153 lb 3.2 oz) 69.3 kg (152 lb 11.2 oz) 70.7 kg (155 lb 12.8 oz) 70.7 kg (155 lb 12.8 oz)    07/03/21 0844 07/06/21 0838 07/10/21 0845 07/16/21 0821   Weight: 69.6 kg (153 lb 8 oz) 70.9 kg (156 lb 4.8 oz) 69.8 kg (153 lb 14.4 oz) 69.3 kg (152 lb 12.8 oz)    07/17/21 0830 07/31/21 0804 08/17/21 0845   Weight: 70.4 kg (155 lb 4.8 oz) 70.3 kg (154 lb 14.4 oz) 70.8 kg (156 lb)       Orthostatic Vitals       Most Recent      Sitting Orthostatic /84 07/29 0800    Sitting Orthostatic Pulse (bpm) 82 07/29 0800    Standing Orthostatic /86 07/29 0800    Standing Orthostatic Pulse (bpm) 88 07/29 0800            Cardiometabolic risk assessment. 07/29/21      Reviewed patient profile for cardiometabolic risk factors    Date taken /Value  REFERENCE RANGE   Abdominal Obesity  (Waist Circumference)   See nursing flowsheet Women ?35 in (88 cm)   Men ?40 in (102 cm)      Triglycerides  Triglycerides   Date Value Ref Range Status   10/02/2020 86 <=149 mg/dL Final   10/19/2019 117 <150 mg/dL Final       ?150 mg/dL (1.7 mmol/L) or current treatment for elevated triglycerides   HDL cholesterol  HDL Cholesterol   Date Value Ref Range Status   10/19/2019 56 >49 mg/dL Final     Direct Measure HDL   Date Value Ref Range Status   10/02/2020 45 (L) >=50  "mg/dL Final   ]   Women <50 mg/dL (1.3 mmol/L) in women or current treatment for low HDL cholesterol  Men <40 mg/dL (1 mmol/L) in men or current treatment for low HDL cholesterol     Fasting plasma glucose (FPG) Lab Results   Component Value Date     07/02/2021      FPG ?100 mg/dL (5.6 mmol/L) or treatment for elevated blood glucose   Blood pressure  BP Readings from Last 3 Encounters:   08/18/21 123/73   06/04/19 131/71   04/26/19 164/86    Blood pressure ?130/85 mmHg or treatment for elevated blood pressure   Family History  See family history     Appearance: dressed in hospital scrubs, appeared reported age, unkempt hair   Attitude: cooperative  Eye Contact: improved, looks up more during the interview   Mood: \"good\"    Affect:  Somewhat blunted, no longer agitated or tense. Brightens and smiles at times   Speech: accented, clear and coherent. Soft volume.    Language: no obvious receptive or expressive deficits  Psychomotor, Gait, Musculoskeletal: No abnormal movements noted while seated  Thought Process: goal-oriented, linear, concrete   Associations:  No loosening of associations present  Thought Content:  Did not appear to be responding to internal stimuli.  Insight:   poor - slightly improved   Judgement:  fair  Oriented to: person, place, time/date   Attention Span and Concentration: attentive to conversation though at times stares ahead and does not respond to questions.  Recent and Remote Memory: stable, some difficulty recalling events   Fund of Knowledge:  normal    Clinical Global Impressions  First:  Considering your total clinical experience with this particular patient population, how severe are the patient's symptoms at this time?: 7 (04/16/21 1428)  Compared to the patient's condition at the START of treatment, this patient's condition is: 4 (04/16/21 1428)  Most recent:  Considering your total clinical experience with this particular patient population, how severe are the patient's " symptoms at this time?: 7 (07/22/21 0941)  Compared to the patient's condition at the START of treatment, this patient's condition is: 3 (07/22/21 0941)           Precautions:     Behavioral Orders   Procedures     Cheeking Precautions (behavioral units)     Patient Observed swallowing PO medications; Patient asked to drink water after swallowing medication; Patient in Staff line of sight for 15 minutes after medication given; Mouth checks after PO administration (patient asked to open mouth and stick out their tongue).     Code 3     For walks in the [place] with staff and per staff discretion     Electroconvulsive therapy     Series of up to 12 treatments. Begin Date: 5/26/21     Treating Psychiatrist providing ECT:  Dr. Lubin     Notified on:  5/21/21     Electroconvulsive therapy     As long as we get the green light from risk management and pt is medically cleared, begin ECT every Monday, Wednesday, and Friday     Electroconvulsive therapy     Series of up to 12 treatments. Begin Date: 5/25/21     Treating Psychiatrist providing ECT:  Amee     Notified on:  5/24/21     Elopement precautions     Fall precautions     Mcgrath Calderon     Routine Programming     As clinically indicated     Status 15     Every 15 minutes.          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 Symptoms and presentation at this time is most consistent with Schizoaffective Disorder, Bipolar Type. Obtained most recent  "medication regimen from patient's ACT team, and regimen was initially restarted. Pt is committed. Petitioned for Alcides Calderon was filed and granted due to lack of improvement and side effects from medications. Inpatient psychiatric hospitalization is warranted at this time for safety, stabilization, possible adjustment in medications and development of a safe discharge plan.      Hospital Course:  On admission, PTA medications were restarted. However, Michelle had been declining all scheduled medications despite significant encouragement from staff and provider. Psychiatric emergency declared on 4/20 due to aggression toward others in context of severe psychosis and suspected excited catatonia. Ativan 1 mg TID was also added. Discontinued PTA Invega, Zyprexa, and thorazine on 4/20 due to consistent refusal.      On 4/26, it was noted that patient frequently had upward gaze while walking up and down the unit. She did not appear to be distressed. She reported that she was looking at \"my god.\" It was determined to be oculogyric crisis secondary to IM haloperidol. Haldol was subsequently discontinued and scheduled Zyprexa was initiated on an emergency basis. Oral Cogentin was also scheduled, though patient declined. On the evening of 4/26, patient's gaze was fixed in upward position for several hours and she appeared to be experiencing discomfort. IM Cogentin was administered with noted resolution. Partial improvements were observed after switching to scheduled Zyprexa. After patient improved, she was more receptive to reinitiating oral Invega, which was initiated on 5/3 and titrated to PTA dose of 9 mg daily on 5/10. Plan was for ACT team to bring in loading dose of Invega Sustenna. Patient had signs of oculogyric crisis again with Invega. Oral dose decreased to 6 mg after this. Invega was stopped on 5/14 due to ongoing signs of problems related to eye movement and concerns for oculogyric crisis.     Overall improvement " "in patient's agitation and suspected catatonia noted on 4/30 after patient accepted two doses of Ativan. She began declining Ativan again with noted decompensation. Patient began accepting, however, was deemed not to have the capacity to consent to treatment with Ativan. She does not believe she has a mental illness, including catatonia. She does not fully understand risks associated with inadequate treatment of catatonia. Discussed with her son who is acting as surrogate decision maker and he is in full support of forced scheduled IM Ativan if patient declines oral formulation. Also consulted with our legal team prior to backing oral Ativan with IM.      Ativan was increased on 5/19 due to re-emerging evidence of catatonia (long periods of staring, repetitive movements, echolalia, mutism). Meeting was held with ACT team on 5/20, including ACT team psychiatrist, Dr. Pedraza. He said that he is in \"full support\" of plan to pursue Mcgrath Kohler and ECT at this time. He does feel that in the past she has been discharged from the hospital while still quite symptomatic. He is hoping that ECT will be effective and that with improvement, Michelle would be more receptive to clozapine and weekly blood draws. He said that ideally she would transition to an IRTS before going back to her apartment, but also understands there may be some barriers (I.e. pt's willingness, financial concerns, etc).      ECT consult placed. Please see consult note by Dr. Lubin on 5/21 for details. Mcgrath Kohler was approved on 5/24 and patient was medically cleared on 5/24. ECT initiated on 5/26. She was noted to have a short seizure during ECT on 6/4. Staff note that patient appears more disoriented with memory impairment and sedation on days of ECT. This was noted on my examination again today. Improvements noted in mood, affect, social interactions, paranoia, agitation since initiation of ECT. Reduced Ativan on 6/5 due to sedative effects. Relayed " concerns about memory impairment and confusion with Dr. Lubin. Recommended attempting unilateral ECT, which he agreed to do. First unilateral ECT on 6/7. ECT was held on 6/11 and 6/14 due to memory impairment. We will plan to hold it again tomorrow (6/16). There is ongoing discussion regarding whether there is a component of catatonia contributing to her current presentation but this is less likely since it worsened after ECT was started.  Given her level of cognitive impairment and our belief that this is due to ECT, we will not pursue any further treatments at this time. Since we have held ECT (last treatment on 6/9), her cognitive impairment has slightly improved (more oriented).      Michelle initially appeared to be decompensating somewhat when ECT was held, though her cognitive impairment has gradually improved. If symptoms of psychosis re-emerge, it may be worth starting clozapine, which is something that has previously been considered by her ACT team. Her Hatch order would need to be amended as clozapine is not one of the medications listed. ANC obtained on 6/16 was 1800/microL. Per conversation with pharmacy, neutropenia has been associated with olanzapine and agranulocytosis has been associated with olanzapine, lorazepam, metoprolol, and hydralazine and hematologic labs have been monitored. Upon re-check, ANC was 3700/microL on 6/21/21. At this time, the primary symptoms we are observing are cognitive deficits and inability to care for self, which we would not address by changing her antipsychotic medication.     Michelle's cognitive impairment has been steadily improving since holding ECT treatments, however it is possible that lorazepam is also playing a role in her cognitive impairment. Given no signs of re-emerging catatonia, a gradual lorazepam taper (decreasing by 0.5 mg) was initiated on 6/28/21 to monitor for potential improvements with regard to memory impairment.  On 7/7, Michelle reported an incident of  oculogyric crisis and Cogentin 1 mg BID was initiated with no noted changes in cognition.      Michelle has remained focused on discharge. The team is working with Michelle's ACT team to to establish a safe discharge plan with options including moving to a group home vs home with her ACT team and additional in home supports via [facility] services. We are concerned that Michelle would have difficulty taking her medications as prescribed if she were to return home without her ACT team and additional services. This is evidenced by impairments noted in cognitive assessment by OT specialist on 7/1. MOCA score was a 13/30 and CPT score was a 4.7. Physician's statement in support of guardianship was completed on 7/19/21.  Patient's son is pursuing guardianship. Patient became quite agitated on 7/16 when  staff from [facility] came to meet with her a second time. She did not allow staff members to enter her room and stated that she will not be going to a group home.      Due to daytime sedation, on 7/26/21, lorazepam evening dose decreased from 2 mg to 1 mg. On 7/27/21 transitioned Zyprexa from 10 mg BID to 5 mg QAM + 15 mg QPM and had less daytime sedation with this change and no emergence of symptoms concerning for orthostatic hypotension.  She attended her emergency guardianship hearing on 7/28.  Due to ongoing daytime sedation, Zyprexa doses were consolidated to bedtime starting on 7/30. [place] completed on 8/12 for CADI funding.     Target psychiatric symptoms and interventions:   - Continue Ativan 1 mg PO or IM TID Patient may not decline.   - Resume Zyprexa 20 mg at bedtime  Hatch in place. May consider increasing further though holding off given re-emerging catatonic sx and borderline prolonged QTc      - Continue Cogentin 1 mg PO BID with additional 2 mg IM daily prn for evidence of acute dystonic reaction or oculogyric crisis  -Continue hydroxyzine 25-50 mg q4h prn for acute anxiety  -Continue  "Trazodone 50 mg at bedtime prn for sleep disturbances  -Continue Zyprexa 10 mg TID prn for severe agitation  -Continue Ativan/Benadryl q4h prn for agitation. WOULD GIVE PRN ATIVAN FIRST FOR AGITATION unless it is after 5 pm on day prior to scheduled ECT     Occupational Therapy Consult Placed to evaluate cognitive functioning/ability to care for self in home environment, ability to manage medications. See note on 7/1 for details.      ECT: Discontinued due to significant cognitive impairment. Please see note dated 7/12/21 for additional details.      Acute Medical Problems and Treatments:  Left ankle pain, resolved   - Added ice packs prn    HTN, improved: Patient is now adherent with medication regimen. Intermittent elevations with no concerning symptoms.  - Metoprolol succinate ER 75 mg   - Cozaar 100 mg daily  - Amlodipine 7.5 mg daily  - IM discontinued hydralazine prn  - Switched BP checks from QID to BID on 7/13 given overall improvement  - Please see note from IM dated 5/3, 5/6, 5/24, 6/20 and 6/30/21.  - Obtained EKG and routine labs on 5/21 in context of pt declining vital sign checks and anti-hypertensives and recently elevated BPs. Reviewed labs and discussed EKG findings with IM on 5/21. No urgent concerns, though IM should be notified if pt develops acute medical concerns (I.e. heart palpitations, SOB, changes in speech, AMS, confusion, CP, HA, changes in vision).    - Per 6/20 IM note: \"Please notify IM if BP is persistently severely elevated and requiring frequent PRN hydralazine\".  - Internal medicine consulted again on 6/28 due to consistent use of hydralazine over the past week. Metoprolol increased to 75 mg daily then to 100 mg and amlodipine 5 mg was added on 6/30. Amlodipine increased to 7.5 mg daily on 7/5.      Recent history of BRANDON:  - Attempted to repeat BMP multiple times though patient consistently declining blood draw  - Encourage fluids     For previous medical concerns, please see note " dated 7/12/21.     Behavioral/Psychological/Social:  - Encourage unit programming  - Patient is Code 3 status and can take walks in the [place] with staff and security present per staff discretion. This appears to be quite therapeutic for her.      Safety:  - Continue precautions as noted above  - Status 15 minute checks  - Safety precautions include: assault and elopement precautions  - Continue precautions as noted above     Legal Status: Committed as MI with Hatch in place for Haldol, Zyprexa, Invega, and Thorazine through [facility]. Filed Alcides Kohler through [facility] and approved on 5/24/21. Physician's statement in support of guardianship was completed on 7/19/21. Patient's son is now her temporary emergency guardian.     Disposition Plan   Reason for ongoing admission: poses an imminent risk to self and is unable to care for self due to severe psychosis or tad  Discharge location: Group home (needs CADI waiver). CPT assessment done by OT (Tania queen) on 7/1.  Please see note for details. [place] completed on 8/12.   Discharge Medications: not ordered  Follow-up Appointments: not scheduled     Gabby Zaman MD  [facility]

## 2021-08-18 NOTE — PLAN OF CARE
Assessment/Intervention/Current Symtoms and Care Coordination  -Chart review    -Rounded with team, addressed patient needs/concerns    Current Symptoms include the following: Bright affect and pleasant/cooperative. Patient is waiting initiating of CADI Funding in order to move into group home    Discharge Plan or Goal  Pending stabilization & development of a safe discharge plan.  Considerations include: [facility]    Barriers to Discharge  Patient requires further psychiatric stabilization due to current symptomology    Referral Status  [facility]    Legal Status  Committed/Hatch/Mcgrath Kohler through [facility]

## 2021-08-18 NOTE — PLAN OF CARE
Pt was Ox4, pt isolative and withdrawn to her room majority of shift which is baseline for her. Pt was irritable upon approach. Pt denies pain, SI/HI/SIB or any psychotic symptoms or adverse effects. PT is awaiting CADI approval for group home and Pt is aware. Pt ate all meals and was medication compliant. Pt vitals are stable. Continue to monitor.  Problem: Adult Inpatient Plan of Care  Goal: Plan of Care Review  Outcome: Improving  Flowsheets (Taken 8/17/2021 1958)  Plan of Care Reviewed With: patient  Note: Pt awaiting CADI approval.

## 2021-08-19 PROCEDURE — 250N000013 HC RX MED GY IP 250 OP 250 PS 637: Performed by: PHYSICIAN ASSISTANT

## 2021-08-19 PROCEDURE — 99231 SBSQ HOSP IP/OBS SF/LOW 25: CPT | Performed by: PSYCHIATRY & NEUROLOGY

## 2021-08-19 PROCEDURE — 250N000013 HC RX MED GY IP 250 OP 250 PS 637: Performed by: STUDENT IN AN ORGANIZED HEALTH CARE EDUCATION/TRAINING PROGRAM

## 2021-08-19 PROCEDURE — 124N000002 HC R&B MH UMMC

## 2021-08-19 RX ADMIN — BENZTROPINE MESYLATE 1 MG: 1 TABLET ORAL at 20:15

## 2021-08-19 RX ADMIN — LORAZEPAM 1 MG: 1 TABLET ORAL at 20:15

## 2021-08-19 RX ADMIN — BENZTROPINE MESYLATE 1 MG: 1 TABLET ORAL at 08:33

## 2021-08-19 RX ADMIN — OLANZAPINE 20 MG: 20 TABLET, ORALLY DISINTEGRATING ORAL at 20:15

## 2021-08-19 RX ADMIN — LORAZEPAM 1 MG: 1 TABLET ORAL at 08:33

## 2021-08-19 RX ADMIN — AMLODIPINE BESYLATE 7.5 MG: 2.5 TABLET ORAL at 08:33

## 2021-08-19 RX ADMIN — LOSARTAN POTASSIUM 100 MG: 100 TABLET, FILM COATED ORAL at 08:33

## 2021-08-19 RX ADMIN — METOPROLOL SUCCINATE 75 MG: 25 TABLET, EXTENDED RELEASE ORAL at 08:33

## 2021-08-19 RX ADMIN — LORAZEPAM 1 MG: 1 TABLET ORAL at 14:00

## 2021-08-19 ASSESSMENT — ACTIVITIES OF DAILY LIVING (ADL)
DRESS: SCRUBS (BEHAVIORAL HEALTH)
ORAL_HYGIENE: PROMPTS
ORAL_HYGIENE: PROMPTS
HYGIENE/GROOMING: PROMPTS
LAUNDRY: UNABLE TO COMPLETE
LAUNDRY: UNABLE TO COMPLETE
DRESS: SCRUBS (BEHAVIORAL HEALTH)
HYGIENE/GROOMING: PROMPTS

## 2021-08-19 NOTE — PLAN OF CARE
Assessment/Intervention/Current Symtoms and Care Coordination:    Chart Review    Met with team    Discharge Plan or Goal:  Pending stabilization & development of a safe discharge plan.  Considerations include:  Adult foster care    Barriers to Discharge:  Patient requires further psychiatric stabilization due to current symptomology and Medication management with possible adjustments    Referral Status:  No referrals made today.    Legal Status:  Patient is under MI commitment in [facility] with [place] and Alcides Kohler

## 2021-08-19 NOTE — PROGRESS NOTES
[facility], [place]   Psychiatric Progress Note  Hospital Day: 127        Interim History:   The patient's care was discussed with the treatment team during the daily team meeting and/or staff's chart notes were reviewed. No acute medical concerns and, intermittent BP elevation with no symptoms. She has been pleasant and cooperative with staff and peers. Continues to isolate to her room, including eating meals in there, frequently watching [employer]. She is medication adherent. No reported or observed side effects. No symptoms of psychosis or tad. Intermittently attends to all ADLs. Sleeping and eating well. Slept 7 hours overnight.     Michelle was sitting in her room when writer entered. She was watching TV while peeling an orange. Affect brighter today, especially when discussing her family. She said that she has one grandson and she was able to meet him shortly after he was born in January of this year. She hopes to see him and her children soon. She does continue to express strong desire for discharge. Frustrated about length of stay. Updated her and reassured her that we are working closely with [employer]  and . She denies feeling sad or depressed.    Suicidal ideation: denies current or recent suicidal ideation or behaviors.    Homicidal ideation: denies current or recent homicidal ideation or behaviors.    Psychotic symptoms: Patient denies AH, VH, paranoia, delusions.     Medication side effects reported: She denies sedation today. She denies any dizziness or lightheadedness.     Acute medical concerns: pain in left ankle has resolved.     Other issues reported by patient: Patient had no further questions or concerns.           Medications:       amLODIPine  7.5 mg Oral Daily     benztropine  1 mg Oral BID     LORazepam  1 mg Oral TID    Or     LORazepam  1 mg Intramuscular TID     losartan  100 mg Oral Daily     metoprolol succinate ER  75 mg Oral Daily     OLANZapine zydis  20 mg  Oral At Bedtime    Or     OLANZapine  10 mg Intramuscular At Bedtime          Allergies:     Allergies   Allergen Reactions     Haldol [Haloperidol]      Patient previously tolerated haldol, though developed oculogyric crises during hospital stay on 4/26/21 on total daily dose of 10 mg.     Lisinopril Cough          Labs:     No results found for this or any previous visit (from the past 24 hour(s)).       Psychiatric Examination:     /72 (BP Location: Right arm)   Pulse 76   Temp 97  F (36.1  C) (Tympanic)   Resp 16   Wt 69.2 kg (152 lb 9.6 oz)   SpO2 96%   BMI 26.19 kg/m    Weight is 152 lbs 9.6 oz  Body mass index is 26.19 kg/m .    Weight over time:  Vitals:    05/25/21 0850 06/15/21 0811 06/16/21 1605 06/19/21 0859   Weight: 71 kg (156 lb 8 oz) 69.8 kg (153 lb 12.8 oz) 70.5 kg (155 lb 8 oz) 71.4 kg (157 lb 8 oz)    06/21/21 0805 06/22/21 0839 06/24/21 0800 07/02/21 0835   Weight: 69.5 kg (153 lb 3.2 oz) 69.3 kg (152 lb 11.2 oz) 70.7 kg (155 lb 12.8 oz) 70.7 kg (155 lb 12.8 oz)    07/03/21 0844 07/06/21 0838 07/10/21 0845 07/16/21 0821   Weight: 69.6 kg (153 lb 8 oz) 70.9 kg (156 lb 4.8 oz) 69.8 kg (153 lb 14.4 oz) 69.3 kg (152 lb 12.8 oz)    07/17/21 0830 07/31/21 0804 08/17/21 0845 08/19/21 0800   Weight: 70.4 kg (155 lb 4.8 oz) 70.3 kg (154 lb 14.4 oz) 70.8 kg (156 lb) 69.2 kg (152 lb 9.6 oz)       Orthostatic Vitals       Most Recent      Sitting Orthostatic /84 07/29 0800    Sitting Orthostatic Pulse (bpm) 82 07/29 0800    Standing Orthostatic /86 07/29 0800    Standing Orthostatic Pulse (bpm) 88 07/29 0800            Cardiometabolic risk assessment. 07/29/21      Reviewed patient profile for cardiometabolic risk factors    Date taken /Value  REFERENCE RANGE   Abdominal Obesity  (Waist Circumference)   See nursing flowsheet Women ?35 in (88 cm)   Men ?40 in (102 cm)      Triglycerides  Triglycerides   Date Value Ref Range Status   10/02/2020 86 <=149 mg/dL Final   10/19/2019 117 <150  "mg/dL Final       ?150 mg/dL (1.7 mmol/L) or current treatment for elevated triglycerides   HDL cholesterol  HDL Cholesterol   Date Value Ref Range Status   10/19/2019 56 >49 mg/dL Final     Direct Measure HDL   Date Value Ref Range Status   10/02/2020 45 (L) >=50 mg/dL Final   ]   Women <50 mg/dL (1.3 mmol/L) in women or current treatment for low HDL cholesterol  Men <40 mg/dL (1 mmol/L) in men or current treatment for low HDL cholesterol     Fasting plasma glucose (FPG) Lab Results   Component Value Date     07/02/2021      FPG ?100 mg/dL (5.6 mmol/L) or treatment for elevated blood glucose   Blood pressure  BP Readings from Last 3 Encounters:   08/19/21 118/72   06/04/19 131/71   04/26/19 164/86    Blood pressure ?130/85 mmHg or treatment for elevated blood pressure   Family History  See family history     Appearance: dressed in hospital scrubs, appeared reported age, unkempt hair   Attitude: cooperative  Eye Contact: improved, looks up more during the interview   Mood: \"good\"    Affect:  Somewhat blunted, no longer agitated or tense. Brightens and smiles at times   Speech: accented, clear and coherent. Soft volume.    Language: no obvious receptive or expressive deficits  Psychomotor, Gait, Musculoskeletal: No abnormal movements noted while seated  Thought Process: goal-oriented, linear, concrete   Associations:  No loosening of associations present  Thought Content:  Did not appear to be responding to internal stimuli.  Insight:   poor - slightly improved   Judgement:  fair  Oriented to: person, place, time/date   Attention Span and Concentration: attentive to conversation though at times stares ahead and does not respond to questions.  Recent and Remote Memory: stable, some difficulty recalling events   Fund of Knowledge:  normal    Clinical Global Impressions  First:  Considering your total clinical experience with this particular patient population, how severe are the patient's symptoms at this time?: " 7 (04/16/21 1428)  Compared to the patient's condition at the START of treatment, this patient's condition is: 4 (04/16/21 1428)  Most recent:  Considering your total clinical experience with this particular patient population, how severe are the patient's symptoms at this time?: 7 (07/22/21 0941)  Compared to the patient's condition at the START of treatment, this patient's condition is: 3 (07/22/21 0941)           Precautions:     Behavioral Orders   Procedures     Cheeking Precautions (behavioral units)     Patient Observed swallowing PO medications; Patient asked to drink water after swallowing medication; Patient in Staff line of sight for 15 minutes after medication given; Mouth checks after PO administration (patient asked to open mouth and stick out their tongue).     Code 3     For walks in the [place] with staff and per staff discretion     Electroconvulsive therapy     Series of up to 12 treatments. Begin Date: 5/26/21     Treating Psychiatrist providing ECT:  Dr. Lubin     Notified on:  5/21/21     Electroconvulsive therapy     As long as we get the green light from risk management and pt is medically cleared, begin ECT every Monday, Wednesday, and Friday     Electroconvulsive therapy     Series of up to 12 treatments. Begin Date: 5/25/21     Treating Psychiatrist providing ECT:  Amee     Notified on:  5/24/21     Elopement precautions     Fall precautions     Mcgrath Calderon     Routine Programming     As clinically indicated     Status 15     Every 15 minutes.          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 Symptoms and presentation at this time is most consistent with Schizoaffective Disorder, Bipolar Type. Obtained most recent medication regimen from patient's ACT team, and regimen was initially restarted. Pt is committed. Petitioned for Alcides Calderon was filed and granted due to lack of improvement and side effects from medications. Inpatient psychiatric hospitalization is warranted at this time for safety, stabilization, possible adjustment in medications and development of a safe discharge plan.      Hospital Course:  On admission, PTA medications were restarted. However, Mcihelle had been declining all scheduled medications despite significant encouragement from staff and provider. Psychiatric emergency declared on 4/20 due to aggression toward others in context of severe psychosis and suspected excited catatonia. Ativan 1 mg TID was also added. Discontinued PTA Invega, Zyprexa, and thorazine on 4/20 due to consistent refusal.      On 4/26, it was noted that patient frequently had upward gaze while walking up and down the unit. She did not appear to be distressed. She reported that she was looking at \"my god.\" It was determined to be oculogyric crisis secondary to IM haloperidol. Haldol was subsequently discontinued and scheduled Zyprexa was initiated on an emergency basis. Oral Cogentin was also scheduled, though patient declined. On the evening of 4/26, patient's gaze was fixed in upward position for several hours and she appeared to be experiencing discomfort. IM Cogentin was administered with noted resolution. Partial improvements were observed after switching to scheduled Zyprexa. After patient improved, she was more receptive to reinitiating oral Invega, which was initiated on 5/3 and titrated to PTA dose of 9 mg daily on 5/10. Plan was for ACT team to bring in loading dose of " "Invega Sustenna. Patient had signs of oculogyric crisis again with Invega. Oral dose decreased to 6 mg after this. Invega was stopped on 5/14 due to ongoing signs of problems related to eye movement and concerns for oculogyric crisis.     Overall improvement in patient's agitation and suspected catatonia noted on 4/30 after patient accepted two doses of Ativan. She began declining Ativan again with noted decompensation. Patient began accepting, however, was deemed not to have the capacity to consent to treatment with Ativan. She does not believe she has a mental illness, including catatonia. She does not fully understand risks associated with inadequate treatment of catatonia. Discussed with her son who is acting as surrogate decision maker and he is in full support of forced scheduled IM Ativan if patient declines oral formulation. Also consulted with our legal team prior to backing oral Ativan with IM.      Ativan was increased on 5/19 due to re-emerging evidence of catatonia (long periods of staring, repetitive movements, echolalia, mutism). Meeting was held with ACT team on 5/20, including ACT team psychiatrist, Dr. Pedraza. He said that he is in \"full support\" of plan to pursue Mcgrath Kohler and ECT at this time. He does feel that in the past she has been discharged from the hospital while still quite symptomatic. He is hoping that ECT will be effective and that with improvement, Michelle would be more receptive to clozapine and weekly blood draws. He said that ideally she would transition to an IRTS before going back to her apartment, but also understands there may be some barriers (I.e. pt's willingness, financial concerns, etc).      ECT consult placed. Please see consult note by Dr. Lubin on 5/21 for details. Mcgrath Kohler was approved on 5/24 and patient was medically cleared on 5/24. ECT initiated on 5/26. She was noted to have a short seizure during ECT on 6/4. Staff note that patient appears more " disoriented with memory impairment and sedation on days of ECT. This was noted on my examination again today. Improvements noted in mood, affect, social interactions, paranoia, agitation since initiation of ECT. Reduced Ativan on 6/5 due to sedative effects. Relayed concerns about memory impairment and confusion with Dr. Lubin. Recommended attempting unilateral ECT, which he agreed to do. First unilateral ECT on 6/7. ECT was held on 6/11 and 6/14 due to memory impairment. We will plan to hold it again tomorrow (6/16). There is ongoing discussion regarding whether there is a component of catatonia contributing to her current presentation but this is less likely since it worsened after ECT was started.  Given her level of cognitive impairment and our belief that this is due to ECT, we will not pursue any further treatments at this time. Since we have held ECT (last treatment on 6/9), her cognitive impairment has slightly improved (more oriented).      Michelle initially appeared to be decompensating somewhat when ECT was held, though her cognitive impairment has gradually improved. If symptoms of psychosis re-emerge, it may be worth starting clozapine, which is something that has previously been considered by her ACT team. Her Hatch order would need to be amended as clozapine is not one of the medications listed. ANC obtained on 6/16 was 1800/microL. Per conversation with pharmacy, neutropenia has been associated with olanzapine and agranulocytosis has been associated with olanzapine, lorazepam, metoprolol, and hydralazine and hematologic labs have been monitored. Upon re-check, ANC was 3700/microL on 6/21/21. At this time, the primary symptoms we are observing are cognitive deficits and inability to care for self, which we would not address by changing her antipsychotic medication.     Michelle's cognitive impairment has been steadily improving since holding ECT treatments, however it is possible that lorazepam is also  playing a role in her cognitive impairment. Given no signs of re-emerging catatonia, a gradual lorazepam taper (decreasing by 0.5 mg) was initiated on 6/28/21 to monitor for potential improvements with regard to memory impairment.  On 7/7, Michelle reported an incident of oculogyric crisis and Cogentin 1 mg BID was initiated with no noted changes in cognition.      Michelle has remained focused on discharge. The team is working with Michelle's ACT team to to establish a safe discharge plan with options including moving to a group home vs home with her ACT team and additional in home supports via [facility] services. We are concerned that Michelle would have difficulty taking her medications as prescribed if she were to return home without her ACT team and additional services. This is evidenced by impairments noted in cognitive assessment by OT specialist on 7/1. MOCA score was a 13/30 and CPT score was a 4.7. Physician's statement in support of guardianship was completed on 7/19/21.  Patient's son is pursuing guardianship. Patient became quite agitated on 7/16 when  staff from [facility] came to meet with her a second time. She did not allow staff members to enter her room and stated that she will not be going to a group home.      Due to daytime sedation, on 7/26/21, lorazepam evening dose decreased from 2 mg to 1 mg. On 7/27/21 transitioned Zyprexa from 10 mg BID to 5 mg QAM + 15 mg QPM and had less daytime sedation with this change and no emergence of symptoms concerning for orthostatic hypotension.  She attended her emergency guardianship hearing on 7/28.  Due to ongoing daytime sedation, Zyprexa doses were consolidated to bedtime starting on 7/30. [place] completed on 8/12 for [employer] funding.     Target psychiatric symptoms and interventions:   - Continue Ativan 1 mg PO or IM TID Patient may not decline.   - Resume Zyprexa 20 mg at bedtime  Hatch in place. May consider increasing further though holding off  "given re-emerging catatonic sx and borderline prolonged QTc      - Continue Cogentin 1 mg PO BID with additional 2 mg IM daily prn for evidence of acute dystonic reaction or oculogyric crisis  -Continue hydroxyzine 25-50 mg q4h prn for acute anxiety  -Continue Trazodone 50 mg at bedtime prn for sleep disturbances  -Continue Zyprexa 10 mg TID prn for severe agitation  -Continue Ativan/Benadryl q4h prn for agitation. WOULD GIVE PRN ATIVAN FIRST FOR AGITATION unless it is after 5 pm on day prior to scheduled ECT     Occupational Therapy Consult Placed to evaluate cognitive functioning/ability to care for self in home environment, ability to manage medications. See note on 7/1 for details.      ECT: Discontinued due to significant cognitive impairment. Please see note dated 7/12/21 for additional details.      Acute Medical Problems and Treatments:  Left ankle pain, resolved   - Added ice packs prn    HTN, improved: Patient is now adherent with medication regimen. Intermittent elevations with no concerning symptoms.  - Metoprolol succinate ER 75 mg   - Cozaar 100 mg daily  - Amlodipine 7.5 mg daily  - IM discontinued hydralazine prn  - Switched BP checks from QID to BID on 7/13 given overall improvement  - Please see note from IM dated 5/3, 5/6, 5/24, 6/20 and 6/30/21.  - Obtained EKG and routine labs on 5/21 in context of pt declining vital sign checks and anti-hypertensives and recently elevated BPs. Reviewed labs and discussed EKG findings with IM on 5/21. No urgent concerns, though IM should be notified if pt develops acute medical concerns (I.e. heart palpitations, SOB, changes in speech, AMS, confusion, CP, HA, changes in vision).    - Per 6/20 IM note: \"Please notify IM if BP is persistently severely elevated and requiring frequent PRN hydralazine\".  - Internal medicine consulted again on 6/28 due to consistent use of hydralazine over the past week. Metoprolol increased to 75 mg daily then to 100 mg and " amlodipine 5 mg was added on 6/30. Amlodipine increased to 7.5 mg daily on 7/5.      Recent history of BRANDON:  - Attempted to repeat BMP multiple times though patient consistently declining blood draw  - Encourage fluids     For previous medical concerns, please see note dated 7/12/21.     Behavioral/Psychological/Social:  - Encourage unit programming  - Patient is Code 3 status and can take walks in the [place] with staff and security present per staff discretion. This appears to be quite therapeutic for her.      Safety:  - Continue precautions as noted above  - Status 15 minute checks  - Safety precautions include: assault and elopement precautions  - Continue precautions as noted above     Legal Status: Committed as MI with Hatch in place for Haldol, Zyprexa, Invega, and Thorazine through [facility]. Filed Alcides Kohler through [facility] and approved on 5/24/21. Physician's statement in support of guardianship was completed on 7/19/21. Patient's son is now her temporary emergency guardian.     Disposition Plan   Reason for ongoing admission: poses an imminent risk to self and is unable to care for self due to severe psychosis or tad  Discharge location: Group home (needs [facility] waiver). CPT assessment done by OT (Tania queen) on 7/1.  Please see note for details. [place] completed on 8/12.   Discharge Medications: not ordered  Follow-up Appointments: not scheduled     Gabby Zaman MD  [facility]

## 2021-08-19 NOTE — PLAN OF CARE
Problem: Behavior Regulation Impairment (Psychotic Signs/Symptoms)  Goal: Improved Behavioral Control (Psychotic Signs/Symptoms)  8/18/2021 2106 by Brayden Henry, RN  Outcome: Improving    Patient was isolative to her room, sleeping for much of the shift.  She was calm and cooperative with staff when awake.  Minimal answers to questions.  Contracts for safety.  Denies all mental health concerns.  She knew she was awaiting [employer] funding and asked in frustration  What is taking so long? .  Responded well to validation.  Able to make her needs known to staff - asked for toothpaste.  Declined to shower, stating she had showered earlier this week.  Declined new bed linen.  Declined entering the lounge for groups/meals.  She entered the lounge to speak to her son on the phone.    Patient was compliant with her medications.  She denied pain and acute medical concerns.  Continues to deny sedation, despite sleeping much of the day.

## 2021-08-19 NOTE — PLAN OF CARE
Problem: Sleep Disturbance (Psychotic Signs/Symptoms)  Goal: Improved Sleep (Psychotic Signs/Symptoms)  Outcome: Improving     Pt slept for 7 hours. No concerns noted this shift. No PRN administered.

## 2021-08-19 NOTE — PLAN OF CARE
BEHAVIORAL TEAM DISCUSSION    Participants: Dr. Ida Zaman, Dr. Sari Mccann, Kim Antonio RN, Susan Hernandez CTC  Progress: No Change  Anticipated length of stay: TBD  Continued Stay Criteria/Rationale:  Patient cannot return home as she lives alone and unable to care for herself.  Medical/Physical: No new concerns.  Precautions:   Behavioral Orders   Procedures    Cheeking Precautions (behavioral units)     Patient Observed swallowing PO medications; Patient asked to drink water after swallowing medication; Patient in Staff line of sight for 15 minutes after medication given; Mouth checks after PO administration (patient asked to open mouth and stick out their tongue).    Code 3     For walks in the [place] with staff and per staff discretion    Electroconvulsive therapy     Series of up to 12 treatments. Begin Date: 5/26/21     Treating Psychiatrist providing ECT:  Dr. Lubin     Notified on:  5/21/21    Electroconvulsive therapy     As long as we get the green light from risk management and pt is medically cleared, begin ECT every Monday, Wednesday, and Friday    Electroconvulsive therapy     Series of up to 12 treatments. Begin Date: 5/25/21     Treating Psychiatrist providing ECT:  Amee     Notified on:  5/24/21    Elopement precautions    Fall precautions    Mcgrath Calderon    Routine Programming     As clinically indicated    Status 15     Every 15 minutes.     Plan: Attending psychiatrist and Team will meet with patient daily to assess medication needs and to discuss treatment plan. Patient has been accepted to [facility] per [facility] Waiver approval. CTC will coordinate disposition and aftercare plan.   Rationale for change in precautions or plan: No change

## 2021-08-19 NOTE — PLAN OF CARE
Patient isolative to room. She was somewhat irritable with staff this morning, however compliant with her medications. Good appetite. BP WNL today. Continue with POC, notify MD of changes.

## 2021-08-20 PROCEDURE — 124N000002 HC R&B MH UMMC

## 2021-08-20 PROCEDURE — 250N000013 HC RX MED GY IP 250 OP 250 PS 637: Performed by: PHYSICIAN ASSISTANT

## 2021-08-20 PROCEDURE — 99231 SBSQ HOSP IP/OBS SF/LOW 25: CPT | Performed by: PSYCHIATRY & NEUROLOGY

## 2021-08-20 PROCEDURE — 250N000013 HC RX MED GY IP 250 OP 250 PS 637: Performed by: STUDENT IN AN ORGANIZED HEALTH CARE EDUCATION/TRAINING PROGRAM

## 2021-08-20 RX ADMIN — OLANZAPINE 20 MG: 20 TABLET, ORALLY DISINTEGRATING ORAL at 21:02

## 2021-08-20 RX ADMIN — LORAZEPAM 1 MG: 1 TABLET ORAL at 08:55

## 2021-08-20 RX ADMIN — BENZTROPINE MESYLATE 1 MG: 1 TABLET ORAL at 08:55

## 2021-08-20 RX ADMIN — LORAZEPAM 1 MG: 1 TABLET ORAL at 21:05

## 2021-08-20 RX ADMIN — METOPROLOL SUCCINATE 75 MG: 25 TABLET, EXTENDED RELEASE ORAL at 08:55

## 2021-08-20 RX ADMIN — AMLODIPINE BESYLATE 7.5 MG: 2.5 TABLET ORAL at 08:55

## 2021-08-20 RX ADMIN — BENZTROPINE MESYLATE 1 MG: 1 TABLET ORAL at 21:03

## 2021-08-20 RX ADMIN — LOSARTAN POTASSIUM 100 MG: 100 TABLET, FILM COATED ORAL at 08:55

## 2021-08-20 RX ADMIN — LORAZEPAM 1 MG: 1 TABLET ORAL at 14:07

## 2021-08-20 ASSESSMENT — ACTIVITIES OF DAILY LIVING (ADL)
HYGIENE/GROOMING: PROMPTS
LAUNDRY: UNABLE TO COMPLETE
DRESS: SCRUBS (BEHAVIORAL HEALTH)
ORAL_HYGIENE: PROMPTS

## 2021-08-20 NOTE — PLAN OF CARE
Unremarkable shift. Patient spent a lot of time talking on the phone, otherwise isolative to room. Patient ate all of her dinner. She is denying any MH symptoms at this time. Awaiting funding. Med compliant. Continue with POC.

## 2021-08-20 NOTE — PROGRESS NOTES
[facility], [place]   Psychiatric Progress Note  Hospital Day: 128        Interim History:   The patient's care was discussed with the treatment team during the daily team meeting and/or staff's chart notes were reviewed. No acute medical concerns and, intermittent BP elevation with no symptoms. She has been pleasant and cooperative with staff and peers. Continues to isolate to her room, including eating meals in there, frequently watching [employer]. She is medication adherent. No reported or observed side effects. No symptoms of psychosis or tad. Intermittently attends to all ADLs. Sleeping and eating well. Slept 7 hours overnight.     Michelle was resting in her room when writer entered. We discussed importance of close follow up upon discharge and establishing care with a PCP. She agreed to attend all appointments and take medications. Denies feeling sad or depressed. Requested updated about dispo plan, which were provided.     Suicidal ideation: denies current or recent suicidal ideation or behaviors.    Homicidal ideation: denies current or recent homicidal ideation or behaviors.    Psychotic symptoms: Patient denies AH, VH, paranoia, delusions.     Medication side effects reported: She denies sedation today. She denies any dizziness or lightheadedness.     Acute medical concerns: pain in left ankle has resolved.     Other issues reported by patient: Patient had no further questions or concerns.           Medications:       amLODIPine  7.5 mg Oral Daily     benztropine  1 mg Oral BID     LORazepam  1 mg Oral TID    Or     LORazepam  1 mg Intramuscular TID     losartan  100 mg Oral Daily     metoprolol succinate ER  75 mg Oral Daily     OLANZapine zydis  20 mg Oral At Bedtime    Or     OLANZapine  10 mg Intramuscular At Bedtime          Allergies:     Allergies   Allergen Reactions     Haldol [Haloperidol]      Patient previously tolerated haldol, though developed oculogyric crises during  hospital stay on 4/26/21 on total daily dose of 10 mg.     Lisinopril Cough          Labs:     No results found for this or any previous visit (from the past 24 hour(s)).       Psychiatric Examination:     /73 (BP Location: Right arm)   Pulse 71   Temp 97.1  F (36.2  C) (Tympanic)   Resp 16   Wt 69.2 kg (152 lb 9.6 oz)   SpO2 99%   BMI 26.19 kg/m    Weight is 152 lbs 9.6 oz  Body mass index is 26.19 kg/m .    Weight over time:  Vitals:    05/25/21 0850 06/15/21 0811 06/16/21 1605 06/19/21 0859   Weight: 71 kg (156 lb 8 oz) 69.8 kg (153 lb 12.8 oz) 70.5 kg (155 lb 8 oz) 71.4 kg (157 lb 8 oz)    06/21/21 0805 06/22/21 0839 06/24/21 0800 07/02/21 0835   Weight: 69.5 kg (153 lb 3.2 oz) 69.3 kg (152 lb 11.2 oz) 70.7 kg (155 lb 12.8 oz) 70.7 kg (155 lb 12.8 oz)    07/03/21 0844 07/06/21 0838 07/10/21 0845 07/16/21 0821   Weight: 69.6 kg (153 lb 8 oz) 70.9 kg (156 lb 4.8 oz) 69.8 kg (153 lb 14.4 oz) 69.3 kg (152 lb 12.8 oz)    07/17/21 0830 07/31/21 0804 08/17/21 0845 08/19/21 0800   Weight: 70.4 kg (155 lb 4.8 oz) 70.3 kg (154 lb 14.4 oz) 70.8 kg (156 lb) 69.2 kg (152 lb 9.6 oz)       Orthostatic Vitals       Most Recent      Sitting Orthostatic /84 07/29 0800    Sitting Orthostatic Pulse (bpm) 82 07/29 0800    Standing Orthostatic /86 07/29 0800    Standing Orthostatic Pulse (bpm) 88 07/29 0800            Cardiometabolic risk assessment. 07/29/21      Reviewed patient profile for cardiometabolic risk factors    Date taken /Value  REFERENCE RANGE   Abdominal Obesity  (Waist Circumference)   See nursing flowsheet Women ?35 in (88 cm)   Men ?40 in (102 cm)      Triglycerides  Triglycerides   Date Value Ref Range Status   10/02/2020 86 <=149 mg/dL Final   10/19/2019 117 <150 mg/dL Final       ?150 mg/dL (1.7 mmol/L) or current treatment for elevated triglycerides   HDL cholesterol  HDL Cholesterol   Date Value Ref Range Status   10/19/2019 56 >49 mg/dL Final     Direct Measure HDL   Date Value Ref  "Range Status   10/02/2020 45 (L) >=50 mg/dL Final   ]   Women <50 mg/dL (1.3 mmol/L) in women or current treatment for low HDL cholesterol  Men <40 mg/dL (1 mmol/L) in men or current treatment for low HDL cholesterol     Fasting plasma glucose (FPG) Lab Results   Component Value Date     07/02/2021      FPG ?100 mg/dL (5.6 mmol/L) or treatment for elevated blood glucose   Blood pressure  BP Readings from Last 3 Encounters:   08/20/21 122/73   06/04/19 131/71   04/26/19 164/86    Blood pressure ?130/85 mmHg or treatment for elevated blood pressure   Family History  See family history     Appearance: dressed in hospital scrubs, appeared reported age, unkempt hair   Attitude: cooperative  Eye Contact: improved, looks up more during the interview   Mood: \"good\"    Affect:  Somewhat blunted, no longer agitated or tense. Brightens and smiles at times   Speech: accented, clear and coherent. Soft volume.    Language: no obvious receptive or expressive deficits  Psychomotor, Gait, Musculoskeletal: No abnormal movements noted while seated  Thought Process: goal-oriented, linear, concrete   Associations:  No loosening of associations present  Thought Content:  Did not appear to be responding to internal stimuli.  Insight:   poor - slightly improved   Judgement:  fair  Oriented to: person, place, time/date   Attention Span and Concentration: attentive to conversation though at times stares ahead and does not respond to questions.  Recent and Remote Memory: stable, some difficulty recalling events   Fund of Knowledge:  normal    Clinical Global Impressions  First:  Considering your total clinical experience with this particular patient population, how severe are the patient's symptoms at this time?: 7 (04/16/21 1428)  Compared to the patient's condition at the START of treatment, this patient's condition is: 4 (04/16/21 1428)  Most recent:  Considering your total clinical experience with this particular patient " population, how severe are the patient's symptoms at this time?: 7 (07/22/21 0941)  Compared to the patient's condition at the START of treatment, this patient's condition is: 3 (07/22/21 0941)           Precautions:     Behavioral Orders   Procedures     Cheeking Precautions (behavioral units)     Patient Observed swallowing PO medications; Patient asked to drink water after swallowing medication; Patient in Staff line of sight for 15 minutes after medication given; Mouth checks after PO administration (patient asked to open mouth and stick out their tongue).     Code 3     For walks in the [place] with staff and per staff discretion     Electroconvulsive therapy     Series of up to 12 treatments. Begin Date: 5/26/21     Treating Psychiatrist providing ECT:  Dr. Lubin     Notified on:  5/21/21     Electroconvulsive therapy     As long as we get the green light from risk management and pt is medically cleared, begin ECT every Monday, Wednesday, and Friday     Electroconvulsive therapy     Series of up to 12 treatments. Begin Date: 5/25/21     Treating Psychiatrist providing ECT:  Amee     Notified on:  5/24/21     Elopement precautions     Fall precautions     Mcgrath Calderon     Routine Programming     As clinically indicated     Status 15     Every 15 minutes.          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 Symptoms and presentation at this time is most consistent with Schizoaffective  "Disorder, Bipolar Type. Obtained most recent medication regimen from patient's ACT team, and regimen was initially restarted. Pt is committed. Petitioned for Mcgrath Calderon was filed and granted due to lack of improvement and side effects from medications. Inpatient psychiatric hospitalization is warranted at this time for safety, stabilization, possible adjustment in medications and development of a safe discharge plan.      Hospital Course:  On admission, PTA medications were restarted. However, Michelle had been declining all scheduled medications despite significant encouragement from staff and provider. Psychiatric emergency declared on 4/20 due to aggression toward others in context of severe psychosis and suspected excited catatonia. Ativan 1 mg TID was also added. Discontinued PTA Invega, Zyprexa, and thorazine on 4/20 due to consistent refusal.      On 4/26, it was noted that patient frequently had upward gaze while walking up and down the unit. She did not appear to be distressed. She reported that she was looking at \"my god.\" It was determined to be oculogyric crisis secondary to IM haloperidol. Haldol was subsequently discontinued and scheduled Zyprexa was initiated on an emergency basis. Oral Cogentin was also scheduled, though patient declined. On the evening of 4/26, patient's gaze was fixed in upward position for several hours and she appeared to be experiencing discomfort. IM Cogentin was administered with noted resolution. Partial improvements were observed after switching to scheduled Zyprexa. After patient improved, she was more receptive to reinitiating oral Invega, which was initiated on 5/3 and titrated to PTA dose of 9 mg daily on 5/10. Plan was for ACT team to bring in loading dose of Invega Sustenna. Patient had signs of oculogyric crisis again with Invega. Oral dose decreased to 6 mg after this. Invega was stopped on 5/14 due to ongoing signs of problems related to eye movement and concerns for " "oculogyric crisis.     Overall improvement in patient's agitation and suspected catatonia noted on 4/30 after patient accepted two doses of Ativan. She began declining Ativan again with noted decompensation. Patient began accepting, however, was deemed not to have the capacity to consent to treatment with Ativan. She does not believe she has a mental illness, including catatonia. She does not fully understand risks associated with inadequate treatment of catatonia. Discussed with her son who is acting as surrogate decision maker and he is in full support of forced scheduled IM Ativan if patient declines oral formulation. Also consulted with our legal team prior to backing oral Ativan with IM.      Ativan was increased on 5/19 due to re-emerging evidence of catatonia (long periods of staring, repetitive movements, echolalia, mutism). Meeting was held with ACT team on 5/20, including ACT team psychiatrist, Dr. Pedraza. He said that he is in \"full support\" of plan to pursue Mcgrath Kohler and ECT at this time. He does feel that in the past she has been discharged from the hospital while still quite symptomatic. He is hoping that ECT will be effective and that with improvement, Michelle would be more receptive to clozapine and weekly blood draws. He said that ideally she would transition to an IRTS before going back to her apartment, but also understands there may be some barriers (I.e. pt's willingness, financial concerns, etc).      ECT consult placed. Please see consult note by Dr. Lubin on 5/21 for details. Mcgrath Kohler was approved on 5/24 and patient was medically cleared on 5/24. ECT initiated on 5/26. She was noted to have a short seizure during ECT on 6/4. Staff note that patient appears more disoriented with memory impairment and sedation on days of ECT. This was noted on my examination again today. Improvements noted in mood, affect, social interactions, paranoia, agitation since initiation of ECT. Reduced " Ativan on 6/5 due to sedative effects. Relayed concerns about memory impairment and confusion with Dr. Lubin. Recommended attempting unilateral ECT, which he agreed to do. First unilateral ECT on 6/7. ECT was held on 6/11 and 6/14 due to memory impairment. We will plan to hold it again tomorrow (6/16). There is ongoing discussion regarding whether there is a component of catatonia contributing to her current presentation but this is less likely since it worsened after ECT was started.  Given her level of cognitive impairment and our belief that this is due to ECT, we will not pursue any further treatments at this time. Since we have held ECT (last treatment on 6/9), her cognitive impairment has slightly improved (more oriented).      Michelle initially appeared to be decompensating somewhat when ECT was held, though her cognitive impairment has gradually improved. If symptoms of psychosis re-emerge, it may be worth starting clozapine, which is something that has previously been considered by her ACT team. Her Hatch order would need to be amended as clozapine is not one of the medications listed. ANC obtained on 6/16 was 1800/microL. Per conversation with pharmacy, neutropenia has been associated with olanzapine and agranulocytosis has been associated with olanzapine, lorazepam, metoprolol, and hydralazine and hematologic labs have been monitored. Upon re-check, ANC was 3700/microL on 6/21/21. At this time, the primary symptoms we are observing are cognitive deficits and inability to care for self, which we would not address by changing her antipsychotic medication.     Michelle's cognitive impairment has been steadily improving since holding ECT treatments, however it is possible that lorazepam is also playing a role in her cognitive impairment. Given no signs of re-emerging catatonia, a gradual lorazepam taper (decreasing by 0.5 mg) was initiated on 6/28/21 to monitor for potential improvements with regard to memory  impairment.  On 7/7, Michelle reported an incident of oculogyric crisis and Cogentin 1 mg BID was initiated with no noted changes in cognition.      Michelle has remained focused on discharge. The team is working with Michelle's ACT team to to establish a safe discharge plan with options including moving to a group home vs home with her ACT team and additional in home supports via CADI services. We are concerned that Michelle would have difficulty taking her medications as prescribed if she were to return home without her ACT team and additional services. This is evidenced by impairments noted in cognitive assessment by OT specialist on 7/1. MOCA score was a 13/30 and CPT score was a 4.7. Physician's statement in support of guardianship was completed on 7/19/21.  Patient's son is pursuing guardianship. Patient became quite agitated on 7/16 when  staff from [facility] came to meet with her a second time. She did not allow staff members to enter her room and stated that she will not be going to a group home.      Due to daytime sedation, on 7/26/21, lorazepam evening dose decreased from 2 mg to 1 mg. On 7/27/21 transitioned Zyprexa from 10 mg BID to 5 mg QAM + 15 mg QPM and had less daytime sedation with this change and no emergence of symptoms concerning for orthostatic hypotension.  She attended her emergency guardianship hearing on 7/28.  Due to ongoing daytime sedation, Zyprexa doses were consolidated to bedtime starting on 7/30. [place] completed on 8/12 for CADI funding.     Target psychiatric symptoms and interventions:   - Continue Ativan 1 mg PO or IM TID Patient may not decline.   - Resume Zyprexa 20 mg at bedtime  Hatch in place. May consider increasing further though holding off given re-emerging catatonic sx and borderline prolonged QTc      - Continue Cogentin 1 mg PO BID with additional 2 mg IM daily prn for evidence of acute dystonic reaction or oculogyric crisis  -Continue hydroxyzine  "25-50 mg q4h prn for acute anxiety  -Continue Trazodone 50 mg at bedtime prn for sleep disturbances  -Continue Zyprexa 10 mg TID prn for severe agitation  -Continue Ativan/Benadryl q4h prn for agitation. WOULD GIVE PRN ATIVAN FIRST FOR AGITATION unless it is after 5 pm on day prior to scheduled ECT     Occupational Therapy Consult Placed to evaluate cognitive functioning/ability to care for self in home environment, ability to manage medications. See note on 7/1 for details.      ECT: Discontinued due to significant cognitive impairment. Please see note dated 7/12/21 for additional details.      Acute Medical Problems and Treatments:  Left ankle pain, resolved   - Added ice packs prn    HTN, improved: Patient is now adherent with medication regimen. Intermittent elevations with no concerning symptoms.  - Metoprolol succinate ER 75 mg   - Cozaar 100 mg daily  - Amlodipine 7.5 mg daily  - IM discontinued hydralazine prn  - Switched BP checks from QID to BID on 7/13 given overall improvement  - Please see note from IM dated 5/3, 5/6, 5/24, 6/20 and 6/30/21.  - Obtained EKG and routine labs on 5/21 in context of pt declining vital sign checks and anti-hypertensives and recently elevated BPs. Reviewed labs and discussed EKG findings with IM on 5/21. No urgent concerns, though IM should be notified if pt develops acute medical concerns (I.e. heart palpitations, SOB, changes in speech, AMS, confusion, CP, HA, changes in vision).    - Per 6/20 IM note: \"Please notify IM if BP is persistently severely elevated and requiring frequent PRN hydralazine\".  - Internal medicine consulted again on 6/28 due to consistent use of hydralazine over the past week. Metoprolol increased to 75 mg daily then to 100 mg and amlodipine 5 mg was added on 6/30. Amlodipine increased to 7.5 mg daily on 7/5.      Recent history of BRANDON:  - Attempted to repeat BMP multiple times though patient consistently declining blood draw  - Encourage " fluids     For previous medical concerns, please see note dated 7/12/21.     Behavioral/Psychological/Social:  - Encourage unit programming  - Patient is Code 3 status and can take walks in the [place] with staff and security present per staff discretion. This appears to be quite therapeutic for her.      Safety:  - Continue precautions as noted above  - Status 15 minute checks  - Safety precautions include: assault and elopement precautions  - Continue precautions as noted above     Legal Status: Committed as MI with Hatch in place for Haldol, Zyprexa, Invega, and Thorazine through [facility]. Filed Alcides Kohler through [facility] and approved on 5/24/21. Physician's statement in support of guardianship was completed on 7/19/21. Patient's son is now her temporary emergency guardian.     Disposition Plan   Reason for ongoing admission: poses an imminent risk to self and is unable to care for self due to severe psychosis or tad  Discharge location: Group home (needs CADI waiver). CPT assessment done by OT (Tania queen) on 7/1.  Please see note for details. [place] completed on 8/12.   Discharge Medications: not ordered  Follow-up Appointments: not scheduled     Gabby Zaman MD  [facility] Psychiatry

## 2021-08-20 NOTE — PLAN OF CARE
"Nursing plan of care   Problem: Behavior Regulation Impairment (Psychotic Signs/Symptoms)  Goal: Improved Behavioral Control (Psychotic Signs/Symptoms)  Outcome: No Change  Pt was isolative and withdrawn to her room; OOB for dinner and appetite is good; irritable and declined to check in with this writer, stated, \" I am fine\" with angry affect and turned her face away; medication compliant; VS with in defined limit; No indication of SI/SIB was noted. Will continue to monitor and provide support.        "

## 2021-08-20 NOTE — PLAN OF CARE
Assessment/Intervention/Current Symtoms and Care Coordination  -Chart review    -Rounded with team, addressed patient needs/concerns    Current Symptoms include the following: Bright affect but isolative to her room and does not interact with other peers on the unit.     Discharge Plan or Goal  Pending stabilization & development of a safe discharge plan.  Considerations include:  [facility]    Barriers to Discharge  Patient requires further psychiatric stabilization due to current symptomology    Referral Status  [facility]    Legal Status  Committed/Hatch/Mcgrath Kohler

## 2021-08-20 NOTE — PLAN OF CARE
Patient continues to be isolative and withdrawn. Pleasant and cooperative on approach. Med compliant. Refusing to tend to ADLs. Waiting for funding.

## 2021-08-21 PROCEDURE — 124N000002 HC R&B MH UMMC

## 2021-08-21 PROCEDURE — 250N000013 HC RX MED GY IP 250 OP 250 PS 637: Performed by: PHYSICIAN ASSISTANT

## 2021-08-21 PROCEDURE — 250N000013 HC RX MED GY IP 250 OP 250 PS 637: Performed by: STUDENT IN AN ORGANIZED HEALTH CARE EDUCATION/TRAINING PROGRAM

## 2021-08-21 RX ADMIN — METOPROLOL SUCCINATE 75 MG: 25 TABLET, EXTENDED RELEASE ORAL at 09:03

## 2021-08-21 RX ADMIN — OLANZAPINE 20 MG: 20 TABLET, ORALLY DISINTEGRATING ORAL at 19:27

## 2021-08-21 RX ADMIN — BENZTROPINE MESYLATE 1 MG: 1 TABLET ORAL at 19:27

## 2021-08-21 RX ADMIN — AMLODIPINE BESYLATE 7.5 MG: 2.5 TABLET ORAL at 09:03

## 2021-08-21 RX ADMIN — LOSARTAN POTASSIUM 100 MG: 100 TABLET, FILM COATED ORAL at 09:02

## 2021-08-21 RX ADMIN — LORAZEPAM 1 MG: 1 TABLET ORAL at 19:27

## 2021-08-21 RX ADMIN — BENZTROPINE MESYLATE 1 MG: 1 TABLET ORAL at 09:02

## 2021-08-21 RX ADMIN — LORAZEPAM 1 MG: 1 TABLET ORAL at 09:03

## 2021-08-21 RX ADMIN — LORAZEPAM 1 MG: 1 TABLET ORAL at 14:13

## 2021-08-21 ASSESSMENT — ACTIVITIES OF DAILY LIVING (ADL)
HYGIENE/GROOMING: PROMPTS
DRESS: SCRUBS (BEHAVIORAL HEALTH)
LAUNDRY: UNABLE TO COMPLETE
ORAL_HYGIENE: PROMPTS
HYGIENE/GROOMING: PROMPTS
ORAL_HYGIENE: PROMPTS
DRESS: SCRUBS (BEHAVIORAL HEALTH);PROMPTS

## 2021-08-21 NOTE — PROGRESS NOTES
Pt was asleep at start at shift.     Pt on  CHEEKING, ELOPEMENT, and FALL precautions in addition to single room order. Any related events noted above.     Pt slept 7 hours. Will continue to monitor.

## 2021-08-21 NOTE — PLAN OF CARE
Pt was isolative and withdrawn to her room watching television. Pt's affect was flat. Mood calm. Pt was cooperative. Speech minimal, pt mostly nodded her head. Psychomotor movements were slow. Denied SI, depression and anxiety. No displays of psychosis, paranoia, or tad. Medication compliant. Denied medication side effects. Pt denied pain. VS WNL. Ate all meals. Poor ADLs. Encouraged pt to shower, she declined.

## 2021-08-22 PROCEDURE — 124N000002 HC R&B MH UMMC

## 2021-08-22 PROCEDURE — 250N000013 HC RX MED GY IP 250 OP 250 PS 637: Performed by: STUDENT IN AN ORGANIZED HEALTH CARE EDUCATION/TRAINING PROGRAM

## 2021-08-22 PROCEDURE — 250N000013 HC RX MED GY IP 250 OP 250 PS 637: Performed by: PHYSICIAN ASSISTANT

## 2021-08-22 RX ADMIN — LOSARTAN POTASSIUM 100 MG: 100 TABLET, FILM COATED ORAL at 08:34

## 2021-08-22 RX ADMIN — LORAZEPAM 1 MG: 1 TABLET ORAL at 20:08

## 2021-08-22 RX ADMIN — BENZTROPINE MESYLATE 1 MG: 1 TABLET ORAL at 08:34

## 2021-08-22 RX ADMIN — METOPROLOL SUCCINATE 75 MG: 25 TABLET, EXTENDED RELEASE ORAL at 08:34

## 2021-08-22 RX ADMIN — OLANZAPINE 20 MG: 20 TABLET, ORALLY DISINTEGRATING ORAL at 20:08

## 2021-08-22 RX ADMIN — LORAZEPAM 1 MG: 1 TABLET ORAL at 08:34

## 2021-08-22 RX ADMIN — AMLODIPINE BESYLATE 7.5 MG: 2.5 TABLET ORAL at 08:34

## 2021-08-22 RX ADMIN — LORAZEPAM 1 MG: 1 TABLET ORAL at 14:05

## 2021-08-22 RX ADMIN — BENZTROPINE MESYLATE 1 MG: 1 TABLET ORAL at 20:08

## 2021-08-22 ASSESSMENT — ACTIVITIES OF DAILY LIVING (ADL)
DRESS: SCRUBS (BEHAVIORAL HEALTH)
LAUNDRY: UNABLE TO COMPLETE
HYGIENE/GROOMING: PROMPTS
ORAL_HYGIENE: PROMPTS

## 2021-08-22 NOTE — PLAN OF CARE
Problem: Behavior Regulation Impairment (Psychotic Signs/Symptoms)  Goal: Improved Behavioral Control (Psychotic Signs/Symptoms)  Outcome: Improving  Flowsheets (Taken 8/13/2021 1022 by Laurita Sun RN)  Mutually Determined Action Steps (Improved Behavioral Control): identifies future-oriented goal   Pt has been isolative to her room all shift. Writer tried to engage in conversation with pt but she just mumbled and nodded her head. Pt ate her supper and is med compliant. Pt is flat and blunted. Pt watched TV in her room all shift.

## 2021-08-22 NOTE — PLAN OF CARE
Michelle appeared to sleep a total of 7 hours this night shift.  No prns or snacks given this shift.

## 2021-08-22 NOTE — PLAN OF CARE
Pt spent the day in her room watching television. She was encouraged to come out of her room to join staff and peers; however, she declined. Pt's speech was minimal, sometimes not answering questions at all. Pt encouraged to shower; however, she declined. Pt's affect was flat. Mood calm. Pt denied depression and anxiety. Pt denied SI. Pt medication compliant. No medication side effects reported or noted. VS WNL. Pt denied pain. Appetite good, pt ate all meals.

## 2021-08-23 LAB — SARS-COV-2 RNA RESP QL NAA+PROBE: NEGATIVE

## 2021-08-23 PROCEDURE — 99231 SBSQ HOSP IP/OBS SF/LOW 25: CPT | Performed by: PSYCHIATRY & NEUROLOGY

## 2021-08-23 PROCEDURE — 250N000013 HC RX MED GY IP 250 OP 250 PS 637: Performed by: PHYSICIAN ASSISTANT

## 2021-08-23 PROCEDURE — 124N000002 HC R&B MH UMMC

## 2021-08-23 PROCEDURE — U0003 INFECTIOUS AGENT DETECTION BY NUCLEIC ACID (DNA OR RNA); SEVERE ACUTE RESPIRATORY SYNDROME CORONAVIRUS 2 (SARS-COV-2) (CORONAVIRUS DISEASE [COVID-19]), AMPLIFIED PROBE TECHNIQUE, MAKING USE OF HIGH THROUGHPUT TECHNOLOGIES AS DESCRIBED BY CMS-2020-01-R: HCPCS | Performed by: STUDENT IN AN ORGANIZED HEALTH CARE EDUCATION/TRAINING PROGRAM

## 2021-08-23 PROCEDURE — 250N000013 HC RX MED GY IP 250 OP 250 PS 637: Performed by: STUDENT IN AN ORGANIZED HEALTH CARE EDUCATION/TRAINING PROGRAM

## 2021-08-23 RX ADMIN — BENZTROPINE MESYLATE 1 MG: 1 TABLET ORAL at 08:16

## 2021-08-23 RX ADMIN — AMLODIPINE BESYLATE 7.5 MG: 2.5 TABLET ORAL at 08:16

## 2021-08-23 RX ADMIN — LORAZEPAM 1 MG: 1 TABLET ORAL at 08:16

## 2021-08-23 RX ADMIN — LORAZEPAM 1 MG: 1 TABLET ORAL at 20:34

## 2021-08-23 RX ADMIN — LOSARTAN POTASSIUM 100 MG: 100 TABLET, FILM COATED ORAL at 08:16

## 2021-08-23 RX ADMIN — BENZTROPINE MESYLATE 1 MG: 1 TABLET ORAL at 20:34

## 2021-08-23 RX ADMIN — OLANZAPINE 20 MG: 20 TABLET, ORALLY DISINTEGRATING ORAL at 20:34

## 2021-08-23 RX ADMIN — LORAZEPAM 1 MG: 1 TABLET ORAL at 14:01

## 2021-08-23 ASSESSMENT — ACTIVITIES OF DAILY LIVING (ADL)
HYGIENE/GROOMING: INDEPENDENT
ORAL_HYGIENE: PROMPTS
HYGIENE/GROOMING: PROMPTS
DRESS: INDEPENDENT
LAUNDRY: UNABLE TO COMPLETE
DRESS: INDEPENDENT
ORAL_HYGIENE: INDEPENDENT

## 2021-08-23 NOTE — PLAN OF CARE
"Pt was isolative to room throughout shift, resting and watching the news. Affect was blunted, flat, tense. Mood was irritable. She was compliant with medications, ate breakfast in her room. Pt's HR was below 55 this morning so metoprolol was not given. Pt was only minimally cooperative with check-in, she said she slept well and when asked about her mood she said \"I'm ok.\" No SI/SIB noted. Will continue to monitor.  "

## 2021-08-23 NOTE — PROGRESS NOTES
"[facility], [place]   Psychiatric Progress Note  Hospital Day: 131        Interim History:   The patient's care was discussed with the treatment team during the daily team meeting and/or staff's chart notes were reviewed. No acute medical concerns and, intermittent BP elevation with no symptoms. She has been pleasant and cooperative with staff and peers. Continues to isolate to her room, including eating meals in there, frequently watching [employer]. She is medication adherent. No reported or observed side effects. No symptoms of psychosis or tad. Intermittently attends to all ADLs. Sleeping and eating well. Appeared to be sleeping for 5.75 hours overnight.     Michelle was awake in her room prior to the interview. She smiled and laughed a little when the team entered. She reported that she is feeling well and denies having any complaints or concerns. She states she is \"just waiting\" and looking forward to leaving to go to the group home.     Suicidal ideation: denies current or recent suicidal ideation or behaviors.    Homicidal ideation: denies current or recent homicidal ideation or behaviors.    Psychotic symptoms: Patient denies AH, VH, paranoia, delusions.     Medication side effects reported: She denies sedation today. She denies any dizziness or lightheadedness.     Acute medical concerns: pain in left ankle has resolved.     Other issues reported by patient: Patient had no further questions or concerns.           Medications:       amLODIPine  7.5 mg Oral Daily     benztropine  1 mg Oral BID     LORazepam  1 mg Oral TID    Or     LORazepam  1 mg Intramuscular TID     losartan  100 mg Oral Daily     metoprolol succinate ER  75 mg Oral Daily     OLANZapine zydis  20 mg Oral At Bedtime    Or     OLANZapine  10 mg Intramuscular At Bedtime          Allergies:     Allergies   Allergen Reactions     Haldol [Haloperidol]      Patient previously tolerated haldol, though developed oculogyric " crises during hospital stay on 4/26/21 on total daily dose of 10 mg.     Lisinopril Cough          Labs:     No results found for this or any previous visit (from the past 24 hour(s)).       Psychiatric Examination:     /72 (BP Location: Right arm)   Pulse 58   Temp 96.9  F (36.1  C) (Tympanic)   Resp 16   Wt 69.2 kg (152 lb 9.6 oz)   SpO2 97%   BMI 26.19 kg/m    Weight is 152 lbs 9.6 oz  Body mass index is 26.19 kg/m .    Weight over time:  Vitals:    05/25/21 0850 06/15/21 0811 06/16/21 1605 06/19/21 0859   Weight: 71 kg (156 lb 8 oz) 69.8 kg (153 lb 12.8 oz) 70.5 kg (155 lb 8 oz) 71.4 kg (157 lb 8 oz)    06/21/21 0805 06/22/21 0839 06/24/21 0800 07/02/21 0835   Weight: 69.5 kg (153 lb 3.2 oz) 69.3 kg (152 lb 11.2 oz) 70.7 kg (155 lb 12.8 oz) 70.7 kg (155 lb 12.8 oz)    07/03/21 0844 07/06/21 0838 07/10/21 0845 07/16/21 0821   Weight: 69.6 kg (153 lb 8 oz) 70.9 kg (156 lb 4.8 oz) 69.8 kg (153 lb 14.4 oz) 69.3 kg (152 lb 12.8 oz)    07/17/21 0830 07/31/21 0804 08/17/21 0845 08/19/21 0800   Weight: 70.4 kg (155 lb 4.8 oz) 70.3 kg (154 lb 14.4 oz) 70.8 kg (156 lb) 69.2 kg (152 lb 9.6 oz)       Orthostatic Vitals       Most Recent      Sitting Orthostatic /84 07/29 0800    Sitting Orthostatic Pulse (bpm) 82 07/29 0800    Standing Orthostatic /86 07/29 0800    Standing Orthostatic Pulse (bpm) 88 07/29 0800            Cardiometabolic risk assessment. 07/29/21      Reviewed patient profile for cardiometabolic risk factors    Date taken /Value  REFERENCE RANGE   Abdominal Obesity  (Waist Circumference)   See nursing flowsheet Women ?35 in (88 cm)   Men ?40 in (102 cm)      Triglycerides  Triglycerides   Date Value Ref Range Status   10/02/2020 86 <=149 mg/dL Final   10/19/2019 117 <150 mg/dL Final       ?150 mg/dL (1.7 mmol/L) or current treatment for elevated triglycerides   HDL cholesterol  HDL Cholesterol   Date Value Ref Range Status   10/19/2019 56 >49 mg/dL Final     Direct Measure HDL   Date  "Value Ref Range Status   10/02/2020 45 (L) >=50 mg/dL Final   ]   Women <50 mg/dL (1.3 mmol/L) in women or current treatment for low HDL cholesterol  Men <40 mg/dL (1 mmol/L) in men or current treatment for low HDL cholesterol     Fasting plasma glucose (FPG) Lab Results   Component Value Date     07/02/2021      FPG ?100 mg/dL (5.6 mmol/L) or treatment for elevated blood glucose   Blood pressure  BP Readings from Last 3 Encounters:   08/22/21 123/72   06/04/19 131/71   04/26/19 164/86    Blood pressure ?130/85 mmHg or treatment for elevated blood pressure   Family History  See family history     Appearance: dressed in hospital scrubs, appeared reported age, unkempt hair   Attitude: cooperative  Eye Contact: improved, looks up more during the interview   Mood: \"good\"    Affect:  Somewhat blunted, no longer agitated or tense. Brightens and smiles at times   Speech: accented, clear and coherent. Soft volume.    Language: no obvious receptive or expressive deficits  Psychomotor, Gait, Musculoskeletal: No abnormal movements noted while seated  Thought Process: goal-oriented, linear, concrete   Associations:  No loosening of associations present  Thought Content:  Did not appear to be responding to internal stimuli.  Insight:   poor - slightly improved   Judgement:  fair  Oriented to: person, place, time/date   Attention Span and Concentration: attentive to conversation though at times stares ahead and does not respond to questions.  Recent and Remote Memory: stable, some difficulty recalling events   Fund of Knowledge:  normal    Clinical Global Impressions  First:  Considering your total clinical experience with this particular patient population, how severe are the patient's symptoms at this time?: 7 (04/16/21 1428)  Compared to the patient's condition at the START of treatment, this patient's condition is: 4 (04/16/21 1428)  Most recent:  Considering your total clinical experience with this particular patient " population, how severe are the patient's symptoms at this time?: 7 (07/22/21 0941)  Compared to the patient's condition at the START of treatment, this patient's condition is: 3 (07/22/21 0941)           Precautions:     Behavioral Orders   Procedures     Cheeking Precautions (behavioral units)     Patient Observed swallowing PO medications; Patient asked to drink water after swallowing medication; Patient in Staff line of sight for 15 minutes after medication given; Mouth checks after PO administration (patient asked to open mouth and stick out their tongue).     Code 3     For walks in the [place] with staff and per staff discretion     Electroconvulsive therapy     Series of up to 12 treatments. Begin Date: 5/26/21     Treating Psychiatrist providing ECT:  Dr. Lubin     Notified on:  5/21/21     Electroconvulsive therapy     As long as we get the green light from risk management and pt is medically cleared, begin ECT every Monday, Wednesday, and Friday     Electroconvulsive therapy     Series of up to 12 treatments. Begin Date: 5/25/21     Treating Psychiatrist providing ECT:  Amee     Notified on:  5/24/21     Elopement precautions     Fall precautions     Mcgrath Calderon     Routine Programming     As clinically indicated     Status 15     Every 15 minutes.          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 Symptoms and presentation at this time is most consistent with Schizoaffective  "Disorder, Bipolar Type. Obtained most recent medication regimen from patient's ACT team, and regimen was initially restarted. Pt is committed. Petitioned for Mcgrath Calderon was filed and granted due to lack of improvement and side effects from medications. Inpatient psychiatric hospitalization is warranted at this time for safety, stabilization, possible adjustment in medications and development of a safe discharge plan.      Hospital Course:  On admission, PTA medications were restarted. However, Michelle had been declining all scheduled medications despite significant encouragement from staff and provider. Psychiatric emergency declared on 4/20 due to aggression toward others in context of severe psychosis and suspected excited catatonia. Ativan 1 mg TID was also added. Discontinued PTA Invega, Zyprexa, and thorazine on 4/20 due to consistent refusal.      On 4/26, it was noted that patient frequently had upward gaze while walking up and down the unit. She did not appear to be distressed. She reported that she was looking at \"my god.\" It was determined to be oculogyric crisis secondary to IM haloperidol. Haldol was subsequently discontinued and scheduled Zyprexa was initiated on an emergency basis. Oral Cogentin was also scheduled, though patient declined. On the evening of 4/26, patient's gaze was fixed in upward position for several hours and she appeared to be experiencing discomfort. IM Cogentin was administered with noted resolution. Partial improvements were observed after switching to scheduled Zyprexa. After patient improved, she was more receptive to reinitiating oral Invega, which was initiated on 5/3 and titrated to PTA dose of 9 mg daily on 5/10. Plan was for ACT team to bring in loading dose of Invega S[place]enna. Patient had signs of oculogyric crisis again with Invega. Oral dose decreased to 6 mg after this. Invega was stopped on 5/14 due to ongoing signs of problems related to eye movement and concerns for " "oculogyric crisis.     Overall improvement in patient's agitation and suspected catatonia noted on 4/30 after patient accepted two doses of Ativan. She began declining Ativan again with noted decompensation. Patient began accepting, however, was deemed not to have the capacity to consent to treatment with Ativan. She does not believe she has a mental illness, including catatonia. She does not fully understand risks associated with inadequate treatment of catatonia. Discussed with her son who is acting as surrogate decision maker and he is in full support of forced scheduled IM Ativan if patient declines oral formulation. Also consulted with our legal team prior to backing oral Ativan with IM.      Ativan was increased on 5/19 due to re-emerging evidence of catatonia (long periods of staring, repetitive movements, echolalia, mutism). Meeting was held with ACT team on 5/20, including ACT team psychiatrist, Dr. Pedraza. He said that he is in \"full support\" of plan to pursue Mcgrath Kohler and ECT at this time. He does feel that in the past she has been discharged from the hospital while still quite symptomatic. He is hoping that ECT will be effective and that with improvement, Michelle would be more receptive to clozapine and weekly blood draws. He said that ideally she would transition to an IRTS before going back to her apartment, but also understands there may be some barriers (I.e. pt's willingness, financial concerns, etc).      ECT consult placed. Please see consult note by Dr. Lubin on 5/21 for details. Mcgrath Kohler was approved on 5/24 and patient was medically cleared on 5/24. ECT initiated on 5/26. She was noted to have a short seizure during ECT on 6/4. Staff note that patient appears more disoriented with memory impairment and sedation on days of ECT. This was noted on my examination again today. Improvements noted in mood, affect, social interactions, paranoia, agitation since initiation of ECT. Reduced " Ativan on 6/5 due to sedative effects. Relayed concerns about memory impairment and confusion with Dr. Lubin. Recommended attempting unilateral ECT, which he agreed to do. First unilateral ECT on 6/7. ECT was held on 6/11 and 6/14 due to memory impairment. We will plan to hold it again tomorrow (6/16). There is ongoing discussion regarding whether there is a component of catatonia contributing to her current presentation but this is less likely since it worsened after ECT was started.  Given her level of cognitive impairment and our belief that this is due to ECT, we will not pursue any further treatments at this time. Since we have held ECT (last treatment on 6/9), her cognitive impairment has slightly improved (more oriented).      Michelle initially appeared to be decompensating somewhat when ECT was held, though her cognitive impairment has gradually improved. If symptoms of psychosis re-emerge, it may be worth starting clozapine, which is something that has previously been considered by her ACT team. Her Hatch order would need to be amended as clozapine is not one of the medications listed. ANC obtained on 6/16 was 1800/microL. Per conversation with pharmacy, neutropenia has been associated with olanzapine and agranulocytosis has been associated with olanzapine, lorazepam, metoprolol, and hydralazine and hematologic labs have been monitored. Upon re-check, ANC was 3700/microL on 6/21/21. At this time, the primary symptoms we are observing are cognitive deficits and inability to care for self, which we would not address by changing her antipsychotic medication.     Michelle's cognitive impairment has been steadily improving since holding ECT treatments, however it is possible that lorazepam is also playing a role in her cognitive impairment. Given no signs of re-emerging catatonia, a gradual lorazepam taper (decreasing by 0.5 mg) was initiated on 6/28/21 to monitor for potential improvements with regard to memory  impairment.  On 7/7, Michelle reported an incident of oculogyric crisis and Cogentin 1 mg BID was initiated with no noted changes in cognition.      Michelle has remained focused on discharge. The team is working with Michelle's ACT team to to establish a safe discharge plan with options including moving to a group home vs home with her ACT team and additional in home supports via CADI services. We are concerned that Michelle would have difficulty taking her medications as prescribed if she were to return home without her ACT team and additional services. This is evidenced by impairments noted in cognitive assessment by OT specialist on 7/1. MOCA score was a 13/30 and CPT score was a 4.7. Physician's statement in support of guardianship was completed on 7/19/21.  Patient's son is pursuing guardianship. Patient became quite agitated on 7/16 when  staff from [facility] came to meet with her a second time. She did not allow staff members to enter her room and stated that she will not be going to a group home.      Due to daytime sedation, on 7/26/21, lorazepam evening dose decreased from 2 mg to 1 mg. On 7/27/21 transitioned Zyprexa from 10 mg BID to 5 mg QAM + 15 mg QPM and had less daytime sedation with this change and no emergence of symptoms concerning for orthostatic hypotension.  She attended her emergency guardianship hearing on 7/28.  Due to ongoing daytime sedation, Zyprexa doses were consolidated to bedtime starting on 7/30. [place] completed on 8/12 for CADI funding.     Target psychiatric symptoms and interventions:   - Continue Ativan 1 mg PO or IM TID Patient may not decline.   - Resume Zyprexa 20 mg at bedtime  Hatch in place. May consider increasing further though holding off given re-emerging catatonic sx and borderline prolonged QTc      - Continue Cogentin 1 mg PO BID with additional 2 mg IM daily prn for evidence of acute dystonic reaction or oculogyric crisis  -Continue hydroxyzine  "25-50 mg q4h prn for acute anxiety  -Continue Trazodone 50 mg at bedtime prn for sleep disturbances  -Continue Zyprexa 10 mg TID prn for severe agitation  -Continue Ativan/Benadryl q4h prn for agitation. WOULD GIVE PRN ATIVAN FIRST FOR AGITATION unless it is after 5 pm on day prior to scheduled ECT     Occupational Therapy Consult Placed to evaluate cognitive functioning/ability to care for self in home environment, ability to manage medications. See note on 7/1 for details.      ECT: Discontinued due to significant cognitive impairment. Please see note dated 7/12/21 for additional details.      Acute Medical Problems and Treatments:  Left ankle pain, resolved   - Added ice packs prn    HTN, improved: Patient is now adherent with medication regimen. Intermittent elevations with no concerning symptoms.  - Metoprolol succinate ER 75 mg   - Cozaar 100 mg daily  - Amlodipine 7.5 mg daily  - IM discontinued hydralazine prn  - Switched BP checks from QID to BID on 7/13 given overall improvement  - Please see note from IM dated 5/3, 5/6, 5/24, 6/20 and 6/30/21.  - Obtained EKG and routine labs on 5/21 in context of pt declining vital sign checks and anti-hypertensives and recently elevated BPs. Reviewed labs and discussed EKG findings with IM on 5/21. No urgent concerns, though IM should be notified if pt develops acute medical concerns (I.e. heart palpitations, SOB, changes in speech, AMS, confusion, CP, HA, changes in vision).    - Per 6/20 IM note: \"Please notify IM if BP is persistently severely elevated and requiring frequent PRN hydralazine\".  - Internal medicine consulted again on 6/28 due to consistent use of hydralazine over the past week. Metoprolol increased to 75 mg daily then to 100 mg and amlodipine 5 mg was added on 6/30. Amlodipine increased to 7.5 mg daily on 7/5.      Recent history of BRANDON:  - Attempted to repeat BMP multiple times though patient consistently declining blood draw  - Encourage " fluids     For previous medical concerns, please see note dated 7/12/21.     Behavioral/Psychological/Social:  - Encourage unit programming  - Patient is Code 3 status and can take walks in the [place] with staff and security present per staff discretion. This appears to be quite therapeutic for her.      Safety:  - Continue precautions as noted above  - Status 15 minute checks  - Safety precautions include: assault and elopement precautions  - Continue precautions as noted above     Legal Status: Committed as MI with Hatch in place for Haldol, Zyprexa, Invega, and Thorazine through [facility]. Filed Alcides Kohler through [facility] and approved on 5/24/21. Physician's statement in support of guardianship was completed on 7/19/21. Patient's son is now her temporary emergency guardian.     Disposition Plan   Reason for ongoing admission: poses an imminent risk to self and is unable to care for self due to severe psychosis or tad  Discharge location: Group home (needs CADI waiver). CPT assessment done by OT (Tania queen) on 7/1.  Please see note for details. [place] completed on 8/12.   Discharge Medications: not ordered  Follow-up Appointments: not scheduled          Bijla Varner MD  Psychiatry PGY-4

## 2021-08-23 NOTE — PLAN OF CARE
Nursing Assessment    Pt had an uneventful shift. Pt remains isolative to room and minimally verbal. Pt observed napping in bed and watching tv. Pt did not attend group. Pt denies all mental health symptoms including SI/SIB/HI/AVH. No behavioral concerns. Pt appeared to be repeatedly blessing each item of food before beginning dinner. Pt ate 90% of dinner. Appetite appears WDL. Pt took scheduled medications without issue. Pt did not require any PRNs this shift.     Pt remains on Elopement, Cheeking and Fall precautions. Will continue to monitor and support.

## 2021-08-23 NOTE — PLAN OF CARE
Assessment/Intervention/Current Symtoms and Care Coordination  -Refer to psychosocial completed April on admission for assessment/social functioning  -Chart review;    CTC left a voice message for SMRT Team requesting a call back to discuss time frame for completion of required paperwork for CADI Funding.    -Rounded with team, addressed patient needs/concerns    Current Symptoms include the following: CTC visited with patient this morning and found her depressed and reporting she is very tired of being hospitalized.  CADI Funding is taking longer than we expected and everything regarding this is up to [facility] to approve in order for patient to move into group home.     Discharge Plan or Goal  Pending stabilization & development of a safe discharge plan.  Considerations include: [facility] long term    Barriers to Discharge  Patient requires further psychiatric stabilization due to current symptomology    Referral Status  Referral completed to [facility] and patient has been accepted pending funding.     Legal Status  Committed/Hatch/Mcgrath Kohler through [facility]

## 2021-08-23 NOTE — PLAN OF CARE
BEHAVIORAL TEAM DISCUSSION    Participants: Bijal Driscoll, Resident, Tom MCCONNELL RN, VALERIE Tellez, Sergio, CTC  Progress: Continuing to assess  Anticipated length of stay: TBD as waiting funding for group home  Continued Stay Criteria/Rationale: Patient will remain hospitalized until group home funding is secured as she is unable to care for herself at home and needs a higher level of care.   Medical/Physical:  Per Internal Medicine  Precautions:   Behavioral Orders   Procedures    Cheeking Precautions (behavioral units)     Patient Observed swallowing PO medications; Patient asked to drink water after swallowing medication; Patient in Staff line of sight for 15 minutes after medication given; Mouth checks after PO administration (patient asked to open mouth and stick out their tongue).    Code 3     For walks in the [place] with staff and per staff discretion    Electroconvulsive therapy     Series of up to 12 treatments. Begin Date: 5/26/21     Treating Psychiatrist providing ECT:  Dr. Lubin     Notified on:  5/21/21    Electroconvulsive therapy     As long as we get the green light from risk management and pt is medically cleared, begin ECT every Monday, Wednesday, and Friday    Electroconvulsive therapy     Series of up to 12 treatments. Begin Date: 5/25/21     Treating Psychiatrist providing ECT:  Amee     Notified on:  5/24/21    Elopement precautions    Fall precautions    Mcgrath Calderon    Routine Programming     As clinically indicated    Status 15     Every 15 minutes.     Plan: Attending psychiatrist will meet with patient daily to discuss medication options and treatment plan. Patient has been accepted to [facility] pending CADI Funding and SMRT.  Patient has an ACT Team through [facility] and they will continue to follow-up with her upon discharge. CTC will coordinate disposition and aftercare plan.   Rationale for change in precautions or plan: No change

## 2021-08-23 NOTE — PLAN OF CARE
Problem: Sleep Disturbance (Psychotic Signs/Symptoms)  Goal: Improved Sleep (Psychotic Signs/Symptoms)  Outcome: Improving     Pt appeared to be sleeping for 5.75 hours. No PRN administered this shift. No concerns noted.

## 2021-08-24 PROCEDURE — 250N000013 HC RX MED GY IP 250 OP 250 PS 637: Performed by: PHYSICIAN ASSISTANT

## 2021-08-24 PROCEDURE — 99231 SBSQ HOSP IP/OBS SF/LOW 25: CPT | Performed by: PSYCHIATRY & NEUROLOGY

## 2021-08-24 PROCEDURE — 250N000013 HC RX MED GY IP 250 OP 250 PS 637: Performed by: STUDENT IN AN ORGANIZED HEALTH CARE EDUCATION/TRAINING PROGRAM

## 2021-08-24 PROCEDURE — 124N000002 HC R&B MH UMMC

## 2021-08-24 RX ADMIN — LORAZEPAM 1 MG: 1 TABLET ORAL at 21:19

## 2021-08-24 RX ADMIN — AMLODIPINE BESYLATE 7.5 MG: 2.5 TABLET ORAL at 08:31

## 2021-08-24 RX ADMIN — LORAZEPAM 1 MG: 1 TABLET ORAL at 08:30

## 2021-08-24 RX ADMIN — LORAZEPAM 1 MG: 1 TABLET ORAL at 14:49

## 2021-08-24 RX ADMIN — METOPROLOL SUCCINATE 75 MG: 25 TABLET, EXTENDED RELEASE ORAL at 08:30

## 2021-08-24 RX ADMIN — BENZTROPINE MESYLATE 1 MG: 1 TABLET ORAL at 21:19

## 2021-08-24 RX ADMIN — BENZTROPINE MESYLATE 1 MG: 1 TABLET ORAL at 08:31

## 2021-08-24 RX ADMIN — OLANZAPINE 20 MG: 20 TABLET, ORALLY DISINTEGRATING ORAL at 21:19

## 2021-08-24 RX ADMIN — LOSARTAN POTASSIUM 100 MG: 100 TABLET, FILM COATED ORAL at 08:30

## 2021-08-24 ASSESSMENT — ACTIVITIES OF DAILY LIVING (ADL)
DRESS: INDEPENDENT
LAUNDRY: UNABLE TO COMPLETE
ORAL_HYGIENE: INDEPENDENT
HYGIENE/GROOMING: INDEPENDENT
ORAL_HYGIENE: INDEPENDENT
HYGIENE/GROOMING: INDEPENDENT
LAUNDRY: UNABLE TO COMPLETE
DRESS: SCRUBS (BEHAVIORAL HEALTH)

## 2021-08-24 NOTE — PROGRESS NOTES
Writer left a voice messages for ACT  and  to discuss if they could retrieve clothing from patient's apartment for her to have at her group home. Writer also emailed  providing this same request.

## 2021-08-24 NOTE — PLAN OF CARE
Problem: Behavior Regulation Impairment (Psychotic Signs/Symptoms)  Goal: Improved Behavioral Control (Psychotic Signs/Symptoms)  Outcome: Improving  Pt has not had any behavioral outbursts this shift. Pt was isolative to her room. Pt denies all MH sx at this time. Pt ate her supper, snack and was med compliant. Pt was also cooperative with writer doing a COVID swab on her. COVID swab has been sent to the lab. Pt wasn't receptive to coming out of her room to play a game with writer. VSS

## 2021-08-24 NOTE — PLAN OF CARE
Assessment/Intervention/Current Symtoms and Care Coordination  -Chart review    -Rounded with team, addressed patient needs/concerns  CTC spoke with [facility] Review  and was informed patient is now certified. I also had a conversation with Senthil PAL[facility] to coordinate discharge plan.   Current Symptoms include the following: Patient remains frustrated that it is taking so long to get everything in place for her to move into her group home.     Discharge Plan or Goal  Pending stabilization & development of a safe discharge plan.  Considerations include: [facility]    Barriers to Discharge  Patient requires further psychiatric stabilization due to current symptomology:  Per [place]  there is still process for final step in approval to [facility].     Referral Status  Referral status completed    Legal Status  Committed/Hatch/Mcgrath Kohler through [facility]

## 2021-08-24 NOTE — PROGRESS NOTES
"[facility], [place]   Psychiatric Progress Note  Hospital Day: 132        Interim History:   The patient's care was discussed with the treatment team during the daily team meeting and/or staff's chart notes were reviewed. No acute medical concerns and, intermittent BP elevation with no symptoms. She has been pleasant and cooperative with staff and peers. Continues to isolate to her room, including eating meals in there, frequently watching [employer]. She is medication adherent. No reported or observed side effects. No symptoms of psychosis or tad. Intermittently attends to all ADLs. Sleeping and eating well. Appeared to be sleeping for 7 hours overnight.     Michelle was awake in her room prior to the interview. She said that her mood is \"good.\" She did agree to a blood draw for tomorrow AM to ensure ongoing medical stability. She is hoping for discharge this week.     Suicidal ideation: denies current or recent suicidal ideation or behaviors.    Homicidal ideation: denies current or recent homicidal ideation or behaviors.    Psychotic symptoms: Patient denies AH, VH, paranoia, delusions.     Medication side effects reported: She denies sedation today. She denies any dizziness or lightheadedness.     Acute medical concerns: pain in left ankle has resolved.     Other issues reported by patient: Patient had no further questions or concerns.           Medications:       amLODIPine  7.5 mg Oral Daily     benztropine  1 mg Oral BID     LORazepam  1 mg Oral TID    Or     LORazepam  1 mg Intramuscular TID     losartan  100 mg Oral Daily     metoprolol succinate ER  75 mg Oral Daily     OLANZapine zydis  20 mg Oral At Bedtime    Or     OLANZapine  10 mg Intramuscular At Bedtime          Allergies:     Allergies   Allergen Reactions     Haldol [Haloperidol]      Patient previously tolerated haldol, though developed oculogyric crises during hospital stay on 4/26/21 on total daily dose of 10 mg.     " Lisinopril Cough          Labs:     Recent Results (from the past 24 hour(s))   Asymptomatic COVID-19 Virus (Coronavirus) by PCR Nasopharyngeal    Collection Time: 08/23/21  8:45 PM    Specimen: Nasopharyngeal; Swab   Result Value Ref Range    SARS CoV2 PCR Negative Negative          Psychiatric Examination:     /74 (BP Location: Right arm)   Pulse 94   Temp 98.3  F (36.8  C) (Oral)   Resp 16   Wt 70.3 kg (155 lb)   SpO2 98%   BMI 26.61 kg/m    Weight is 155 lbs 0 oz  Body mass index is 26.61 kg/m .    Weight over time:  Vitals:    05/25/21 0850 06/15/21 0811 06/16/21 1605 06/19/21 0859   Weight: 71 kg (156 lb 8 oz) 69.8 kg (153 lb 12.8 oz) 70.5 kg (155 lb 8 oz) 71.4 kg (157 lb 8 oz)    06/21/21 0805 06/22/21 0839 06/24/21 0800 07/02/21 0835   Weight: 69.5 kg (153 lb 3.2 oz) 69.3 kg (152 lb 11.2 oz) 70.7 kg (155 lb 12.8 oz) 70.7 kg (155 lb 12.8 oz)    07/03/21 0844 07/06/21 0838 07/10/21 0845 07/16/21 0821   Weight: 69.6 kg (153 lb 8 oz) 70.9 kg (156 lb 4.8 oz) 69.8 kg (153 lb 14.4 oz) 69.3 kg (152 lb 12.8 oz)    07/17/21 0830 07/31/21 0804 08/17/21 0845 08/19/21 0800   Weight: 70.4 kg (155 lb 4.8 oz) 70.3 kg (154 lb 14.4 oz) 70.8 kg (156 lb) 69.2 kg (152 lb 9.6 oz)    08/24/21 0700   Weight: 70.3 kg (155 lb)       Orthostatic Vitals       Most Recent      Sitting Orthostatic /84 07/29 0800    Sitting Orthostatic Pulse (bpm) 82 07/29 0800    Standing Orthostatic /86 07/29 0800    Standing Orthostatic Pulse (bpm) 88 07/29 0800            Cardiometabolic risk assessment. 07/29/21      Reviewed patient profile for cardiometabolic risk factors    Date taken /Value  REFERENCE RANGE   Abdominal Obesity  (Waist Circumference)   See nursing flowsheet Women ?35 in (88 cm)   Men ?40 in (102 cm)      Triglycerides  Triglycerides   Date Value Ref Range Status   10/02/2020 86 <=149 mg/dL Final   10/19/2019 117 <150 mg/dL Final       ?150 mg/dL (1.7 mmol/L) or current treatment for elevated triglycerides  "  HDL cholesterol  HDL Cholesterol   Date Value Ref Range Status   10/19/2019 56 >49 mg/dL Final     Direct Measure HDL   Date Value Ref Range Status   10/02/2020 45 (L) >=50 mg/dL Final   ]   Women <50 mg/dL (1.3 mmol/L) in women or current treatment for low HDL cholesterol  Men <40 mg/dL (1 mmol/L) in men or current treatment for low HDL cholesterol     Fasting plasma glucose (FPG) Lab Results   Component Value Date     07/02/2021      FPG ?100 mg/dL (5.6 mmol/L) or treatment for elevated blood glucose   Blood pressure  BP Readings from Last 3 Encounters:   08/24/21 114/74   06/04/19 131/71   04/26/19 164/86    Blood pressure ?130/85 mmHg or treatment for elevated blood pressure   Family History  See family history     Appearance: dressed in hospital scrubs, appeared reported age, unkempt hair   Attitude: cooperative  Eye Contact: improved, looks up more during the interview   Mood: \"good\"    Affect:  Somewhat blunted, no longer agitated or tense. Brightens and smiles at times   Speech: accented, clear and coherent. Soft volume.    Language: no obvious receptive or expressive deficits  Psychomotor, Gait, Musculoskeletal: No abnormal movements noted while seated  Thought Process: goal-oriented, linear, concrete   Associations:  No loosening of associations present  Thought Content:  Did not appear to be responding to internal stimuli.  Insight:   poor - slightly improved   Judgement:  fair  Oriented to: person, place, time/date   Attention Span and Concentration: attentive to conversation though at times stares ahead and does not respond to questions.  Recent and Remote Memory: stable, some difficulty recalling events   Fund of Knowledge:  normal    Clinical Global Impressions  First:  Considering your total clinical experience with this particular patient population, how severe are the patient's symptoms at this time?: 7 (04/16/21 9975)  Compared to the patient's condition at the START of treatment, this " patient's condition is: 4 (04/16/21 1428)  Most recent:  Considering your total clinical experience with this particular patient population, how severe are the patient's symptoms at this time?: 7 (07/22/21 0941)  Compared to the patient's condition at the START of treatment, this patient's condition is: 3 (07/22/21 0941)           Precautions:     Behavioral Orders   Procedures     Cheeking Precautions (behavioral units)     Patient Observed swallowing PO medications; Patient asked to drink water after swallowing medication; Patient in Staff line of sight for 15 minutes after medication given; Mouth checks after PO administration (patient asked to open mouth and stick out their tongue).     Code 3     For walks in the [place] with staff and per staff discretion     Electroconvulsive therapy     Series of up to 12 treatments. Begin Date: 5/26/21     Treating Psychiatrist providing ECT:  Dr. Lubin     Notified on:  5/21/21     Electroconvulsive therapy     As long as we get the green light from risk management and pt is medically cleared, begin ECT every Monday, Wednesday, and Friday     Electroconvulsive therapy     Series of up to 12 treatments. Begin Date: 5/25/21     Treating Psychiatrist providing ECT:  Amee     Notified on:  5/24/21     Elopement precautions     Fall precautions     Mcgrath Calderon     Routine Programming     As clinically indicated     Status 15     Every 15 minutes.          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 Symptoms and presentation at this time is most consistent with Schizoaffective Disorder, Bipolar Type. Obtained most recent medication regimen from patient's ACT team, and regimen was initially restarted. Pt is committed. Petitioned for Alcides Calderon was filed and granted due to lack of improvement and side effects from medications. Inpatient psychiatric hospitalization is warranted at this time for safety, stabilization, possible adjustment in medications and development of a safe discharge plan.      Hospital Course:  On admission, PTA medications were restarted. However, Michelle had been declining all scheduled medications despite significant encouragement from staff and provider. Psychiatric emergency declared on 4/20 due to aggression toward others in context of severe psychosis and suspected excited catatonia. Ativan 1 mg TID was also added. Discontinued PTA Invega, Zyprexa, and thorazine on 4/20 due to consistent refusal.      On 4/26, it was noted that patient frequently had upward gaze while walking up and down the unit. She did not appear to be distressed. She reported that she was looking at \"my god.\" It was determined to be oculogyric crisis secondary to IM haloperidol. Haldol was subsequently discontinued and scheduled Zyprexa was initiated on an emergency basis. Oral Cogentin was also scheduled, though patient declined. On the evening of 4/26, patient's gaze was fixed in upward position for several hours and she appeared to be experiencing discomfort. IM Cogentin was administered with noted resolution. Partial improvements were observed after switching to scheduled Zyprexa. After patient improved, she was more receptive to reinitiating oral Invega, which was initiated on 5/3 and titrated to PTA dose of 9 mg daily on 5/10. Plan was for ACT team to bring in loading dose of Invega [place]. Patient had signs of oculogyric crisis again with Invega. Oral " "dose decreased to 6 mg after this. Invega was stopped on 5/14 due to ongoing signs of problems related to eye movement and concerns for oculogyric crisis.     Overall improvement in patient's agitation and suspected catatonia noted on 4/30 after patient accepted two doses of Ativan. She began declining Ativan again with noted decompensation. Patient began accepting, however, was deemed not to have the capacity to consent to treatment with Ativan. She does not believe she has a mental illness, including catatonia. She does not fully understand risks associated with inadequate treatment of catatonia. Discussed with her son who is acting as surrogate decision maker and he is in full support of forced scheduled IM Ativan if patient declines oral formulation. Also consulted with our legal team prior to backing oral Ativan with IM.      Ativan was increased on 5/19 due to re-emerging evidence of catatonia (long periods of staring, repetitive movements, echolalia, mutism). Meeting was held with ACT team on 5/20, including ACT team psychiatrist, Dr. Pedraza. He said that he is in \"full support\" of plan to pursue Mcgrath Kohler and ECT at this time. He does feel that in the past she has been discharged from the hospital while still quite symptomatic. He is hoping that ECT will be effective and that with improvement, Michelle would be more receptive to clozapine and weekly blood draws. He said that ideally she would transition to an IRTS before going back to her apartment, but also understands there may be some barriers (I.e. pt's willingness, financial concerns, etc).      ECT consult placed. Please see consult note by Dr. Lubin on 5/21 for details. Mcgrath Kohler was approved on 5/24 and patient was medically cleared on 5/24. ECT initiated on 5/26. She was noted to have a short seizure during ECT on 6/4. Staff note that patient appears more disoriented with memory impairment and sedation on days of ECT. This was noted on my " examination again today. Improvements noted in mood, affect, social interactions, paranoia, agitation since initiation of ECT. Reduced Ativan on 6/5 due to sedative effects. Relayed concerns about memory impairment and confusion with Dr. Lubin. Recommended attempting unilateral ECT, which he agreed to do. First unilateral ECT on 6/7. ECT was held on 6/11 and 6/14 due to memory impairment. We will plan to hold it again tomorrow (6/16). There is ongoing discussion regarding whether there is a component of catatonia contributing to her current presentation but this is less likely since it worsened after ECT was started.  Given her level of cognitive impairment and our belief that this is due to ECT, we will not pursue any further treatments at this time. Since we have held ECT (last treatment on 6/9), her cognitive impairment has slightly improved (more oriented).      Michelle initially appeared to be decompensating somewhat when ECT was held, though her cognitive impairment has gradually improved. If symptoms of psychosis re-emerge, it may be worth starting clozapine, which is something that has previously been considered by her ACT team. Her Hatch order would need to be amended as clozapine is not one of the medications listed. ANC obtained on 6/16 was 1800/microL. Per conversation with pharmacy, neutropenia has been associated with olanzapine and agranulocytosis has been associated with olanzapine, lorazepam, metoprolol, and hydralazine and hematologic labs have been monitored. Upon re-check, ANC was 3700/microL on 6/21/21. At this time, the primary symptoms we are observing are cognitive deficits and inability to care for self, which we would not address by changing her antipsychotic medication.     Michelle's cognitive impairment has been steadily improving since holding ECT treatments, however it is possible that lorazepam is also playing a role in her cognitive impairment. Given no signs of re-emerging catatonia, a  gradual lorazepam taper (decreasing by 0.5 mg) was initiated on 6/28/21 to monitor for potential improvements with regard to memory impairment.  On 7/7, Michelle reported an incident of oculogyric crisis and Cogentin 1 mg BID was initiated with no noted changes in cognition.      Michelle has remained focused on discharge. The team is working with Michelle's ACT team to to establish a safe discharge plan with options including moving to a group home vs home with her ACT team and additional in home supports via CADI services. We are concerned that Michelle would have difficulty taking her medications as prescribed if she were to return home without her ACT team and additional services. This is evidenced by impairments noted in cognitive assessment by OT specialist on 7/1. MOCA score was a 13/30 and CPT score was a 4.7. Physician's statement in support of guardianship was completed on 7/19/21.  Patient's son is pursuing guardianship. Patient became quite agitated on 7/16 when  staff from [facility] came to meet with her a second time. She did not allow staff members to enter her room and stated that she will not be going to a group home.      Due to daytime sedation, on 7/26/21, lorazepam evening dose decreased from 2 mg to 1 mg. On 7/27/21 transitioned Zyprexa from 10 mg BID to 5 mg QAM + 15 mg QPM and had less daytime sedation with this change and no emergence of symptoms concerning for orthostatic hypotension.  She attended her emergency guardianship hearing on 7/28.  Due to ongoing daytime sedation, Zyprexa doses were consolidated to bedtime starting on 7/30. [place] completed on 8/12 for CAD funding.     Target psychiatric symptoms and interventions:   - Continue Ativan 1 mg PO or IM TID Patient may not decline.   - Resume Zyprexa 20 mg at bedtime  Hatch in place. May consider increasing further though holding off given re-emerging catatonic sx and borderline prolonged QTc      - Continue Cogentin 1  "mg PO BID with additional 2 mg IM daily prn for evidence of acute dystonic reaction or oculogyric crisis  -Continue hydroxyzine 25-50 mg q4h prn for acute anxiety  -Continue Trazodone 50 mg at bedtime prn for sleep disturbances  -Continue Zyprexa 10 mg TID prn for severe agitation  -Continue Ativan/Benadryl q4h prn for agitation. WOULD GIVE PRN ATIVAN FIRST FOR AGITATION unless it is after 5 pm on day prior to scheduled ECT     Occupational Therapy Consult Placed to evaluate cognitive functioning/ability to care for self in home environment, ability to manage medications. See note on 7/1 for details.      ECT: Discontinued due to significant cognitive impairment. Please see note dated 7/12/21 for additional details.      Acute Medical Problems and Treatments:  Left ankle pain, resolved   - Added ice packs prn    HTN, improved: Patient is now adherent with medication regimen. Intermittent elevations with no concerning symptoms.  - Metoprolol succinate ER 75 mg   - Cozaar 100 mg daily  - Amlodipine 7.5 mg daily  - IM discontinued hydralazine prn  - Switched BP checks from QID to BID on 7/13 given overall improvement  - Please see note from IM dated 5/3, 5/6, 5/24, 6/20 and 6/30/21.  - Obtained EKG and routine labs on 5/21 in context of pt declining vital sign checks and anti-hypertensives and recently elevated BPs. Reviewed labs and discussed EKG findings with IM on 5/21. No urgent concerns, though IM should be notified if pt develops acute medical concerns (I.e. heart palpitations, SOB, changes in speech, AMS, confusion, CP, HA, changes in vision).    - Per 6/20 IM note: \"Please notify IM if BP is persistently severely elevated and requiring frequent PRN hydralazine\".  - Internal medicine consulted again on 6/28 due to consistent use of hydralazine over the past week. Metoprolol increased to 75 mg daily then to 100 mg and amlodipine 5 mg was added on 6/30. Amlodipine increased to 7.5 mg daily on 7/5.      Recent " history of BRANDON:  - Attempted to repeat BMP multiple times though patient consistently declining blood draw  - Encourage fluids  - REPEAT CMP in AM     For previous medical concerns, please see note dated 7/12/21.     Behavioral/Psychological/Social:  - Encourage unit programming  - Patient is Code 3 status and can take walks in the [place] with staff and security present per staff discretion. This appears to be quite therapeutic for her.      Safety:  - Continue precautions as noted above  - Status 15 minute checks  - Safety precautions include: assault and elopement precautions  - Continue precautions as noted above     Legal Status: Committed as MI with Hatch in place for Haldol, Zyprexa, Invega, and Thorazine through [facility]. Filed Mcgrath Kohler through [facility] and approved on 5/24/21. Physician's statement in support of guardianship was completed on 7/19/21. Patient's son is now her temporary emergency guardian.     Disposition Plan   Reason for ongoing admission: poses an imminent risk to self and is unable to care for self due to severe psychosis or tad  Discharge location: Group home (needs CADI waiver). CPT assessment done by OT (Tania queen) on 7/1.  Please see note for details. [place] completed on 8[place]12.   Discharge Medications: not ordered. Will order once we know whether [facility] wants 30 d/s of fill through their pharmacy.   Follow-up Appointments: not scheduled     Gabby Zaman MD  [facility]

## 2021-08-24 NOTE — PLAN OF CARE
Nursing assessment completed. Similar to most other days, patient is isolative to her room throughout the shift. Patient appears depressed, but she brightens upon approach from writer. She shows writer the pictures of her new group home and states she hopes she will be able to go there soon. She feels she has been in the hospital too long. VSS. Medication compliant. Denies SI/SIB. Continue to monitor and assess.

## 2021-08-24 NOTE — PROVIDER NOTIFICATION
08/24/21 0600   Sleep/Rest/Relaxation   Sleep/Rest/Relaxation (WDL) WDL   Sleep/Rest/Relaxation appears asleep   Night Time # Hours 7 hours     NOC Shift Report    Pt in bed at beginning of shift, breathing quiet and unlabored. Pt slept through shift. Pt slept 7 hours.     No pt complaints or concerns at this time. No PRNs given. Will continue to monitor.

## 2021-08-25 PROCEDURE — 250N000013 HC RX MED GY IP 250 OP 250 PS 637: Performed by: STUDENT IN AN ORGANIZED HEALTH CARE EDUCATION/TRAINING PROGRAM

## 2021-08-25 PROCEDURE — 250N000013 HC RX MED GY IP 250 OP 250 PS 637: Performed by: PHYSICIAN ASSISTANT

## 2021-08-25 PROCEDURE — 99231 SBSQ HOSP IP/OBS SF/LOW 25: CPT | Mod: GC | Performed by: PSYCHIATRY & NEUROLOGY

## 2021-08-25 PROCEDURE — 124N000002 HC R&B MH UMMC

## 2021-08-25 RX ORDER — LOSARTAN POTASSIUM 100 MG/1
100 TABLET ORAL DAILY
Qty: 30 TABLET | Refills: 0 | Status: SHIPPED | OUTPATIENT
Start: 2021-08-26 | End: 2021-09-16

## 2021-08-25 RX ORDER — METOPROLOL SUCCINATE 25 MG/1
75 TABLET, EXTENDED RELEASE ORAL DAILY
Qty: 90 TABLET | Refills: 0 | Status: SHIPPED | OUTPATIENT
Start: 2021-08-26 | End: 2021-09-16

## 2021-08-25 RX ORDER — OLANZAPINE 20 MG/1
20 TABLET, ORALLY DISINTEGRATING ORAL AT BEDTIME
Qty: 30 TABLET | Refills: 0 | Status: SHIPPED | OUTPATIENT
Start: 2021-08-25 | End: 2021-08-25

## 2021-08-25 RX ORDER — LORAZEPAM 1 MG/1
1 TABLET ORAL 3 TIMES DAILY
Qty: 90 TABLET | Refills: 0 | Status: SHIPPED | OUTPATIENT
Start: 2021-08-25 | End: 2021-09-16

## 2021-08-25 RX ORDER — OLANZAPINE 20 MG/1
20 TABLET ORAL AT BEDTIME
Qty: 30 TABLET | Refills: 0 | Status: SHIPPED | OUTPATIENT
Start: 2021-08-25

## 2021-08-25 RX ORDER — BENZTROPINE MESYLATE 1 MG/1
1 TABLET ORAL 2 TIMES DAILY
Qty: 60 TABLET | Refills: 0 | Status: SHIPPED | OUTPATIENT
Start: 2021-08-25 | End: 2021-09-16

## 2021-08-25 RX ORDER — AMLODIPINE BESYLATE 2.5 MG/1
7.5 TABLET ORAL DAILY
Qty: 60 TABLET | Refills: 0 | Status: SHIPPED | OUTPATIENT
Start: 2021-08-26 | End: 2021-09-16

## 2021-08-25 RX ORDER — POLYETHYLENE GLYCOL 3350 17 G/17G
17 POWDER, FOR SOLUTION ORAL DAILY PRN
Qty: 510 G | Refills: 0 | Status: SHIPPED | OUTPATIENT
Start: 2021-08-25

## 2021-08-25 RX ADMIN — LOSARTAN POTASSIUM 100 MG: 100 TABLET, FILM COATED ORAL at 08:09

## 2021-08-25 RX ADMIN — OLANZAPINE 20 MG: 20 TABLET, ORALLY DISINTEGRATING ORAL at 20:24

## 2021-08-25 RX ADMIN — METOPROLOL SUCCINATE 75 MG: 25 TABLET, EXTENDED RELEASE ORAL at 08:09

## 2021-08-25 RX ADMIN — BENZTROPINE MESYLATE 1 MG: 1 TABLET ORAL at 20:24

## 2021-08-25 RX ADMIN — BENZTROPINE MESYLATE 1 MG: 1 TABLET ORAL at 08:09

## 2021-08-25 RX ADMIN — LORAZEPAM 1 MG: 1 TABLET ORAL at 20:24

## 2021-08-25 RX ADMIN — AMLODIPINE BESYLATE 7.5 MG: 2.5 TABLET ORAL at 08:10

## 2021-08-25 RX ADMIN — LORAZEPAM 1 MG: 1 TABLET ORAL at 14:18

## 2021-08-25 RX ADMIN — LORAZEPAM 1 MG: 1 TABLET ORAL at 08:09

## 2021-08-25 ASSESSMENT — ACTIVITIES OF DAILY LIVING (ADL)
HYGIENE/GROOMING: INDEPENDENT
DRESS: SCRUBS (BEHAVIORAL HEALTH)
ORAL_HYGIENE: INDEPENDENT
LAUNDRY: UNABLE TO COMPLETE
LAUNDRY: UNABLE TO COMPLETE
ORAL_HYGIENE: INDEPENDENT
DRESS: SCRUBS (BEHAVIORAL HEALTH)
HYGIENE/GROOMING: INDEPENDENT

## 2021-08-25 NOTE — PLAN OF CARE
Nursing assessment completed. Patient is isolative to her room throughout the shift. Patient appears depressed, but she brightens upon approach from writer. Patient hoping to discharge to her new group home soon and is awaiting case management arrangements. HTN with am vs (asymptomatic), recheck WDL. Medication compliant. Denies SI/SIB. Denies Pain or other symptoms. Continue to monitor and assess.

## 2021-08-25 NOTE — PLAN OF CARE
Nursing assessment completed. Patient is isolative to her room throughout the shift. Patient appears depressed, but she brightens upon approach from writer. Patient hoping to discharge to her new group home soon and is awaiting case management arrangements. VSS. Medication compliant. Denies SI/SIB. Denies Pain or other symptoms. Continue to monitor and assess.

## 2021-08-25 NOTE — PROGRESS NOTES
"[facility], [place]   Psychiatric Progress Note  Hospital Day: 133        Interim History:   The patient's care was discussed with the treatment team during the daily team meeting and/or staff's chart notes were reviewed. No acute medical concerns and vitals have been WNL. She has been pleasant and cooperative with staff and peers. Continues to isolate to her room, including eating meals in there, frequently watching CNN. She is medication adherent. No reported or observed side effects. No symptoms of psychosis or tad. Intermittently attends to all ADLs. Sleeping and eating well. Appeared to be sleeping for 5 hours overnight.     Michelle was watching CNN prior to our interview.  She reports that she is \"good\" today and that she slept well.  She was agreeable with showing the team pictures of the group home she will be going to after discharge.  The team noted that there is a garden there and Michelle endorsed having prior experiencing with gardening when she lived in [facility].  We discussed that there is one final step that [place]  [place] to [place] to arrange placement at the group home and Michelle was understanding of this.  When asked if there is a specific reason she wants to return to her apartment to gather her clothes as opposed to having someone else deliver them to her group home she reported that \"I already talked to the group home and she said she will bring me to my apartment to get my clothes after I leave the hospital before going to the group home.\"  She also reported that her son intends to travel to [place] following her discharge from the hospital to move the rest of Michelle's belongings from her apartment to the group home.  We also discussed her labs and informed her of the preference for fasting labs which she agreed to complete tomorrow.  She also asked regarding why she needs to have a follow-up appointment with her primary care doctor if she has been in the " "hospital all this time.  She said she did not want [facility] to arrange this appointment because it was \"too early.\"  She said she would prefer to go to [facility] for [facility] care and was agreeable to having a follow-up appointment in 1 month.     Suicidal ideation: denies current or recent suicidal ideation or behaviors.    Homicidal ideation: denies current or recent homicidal ideation or behaviors.    Psychotic symptoms: Patient denies AH, VH, paranoia, delusions.     Medication side effects reported: She denies sedation today. She denies any dizziness or lightheadedness.     Acute medical concerns: pain in left ankle has resolved.     Other issues reported by patient: Patient had no further questions or concerns.           Medications:       amLODIPine  7.5 mg Oral Daily     benztropine  1 mg Oral BID     LORazepam  1 mg Oral TID    Or     LORazepam  1 mg Intramuscular TID     losartan  100 mg Oral Daily     metoprolol succinate ER  75 mg Oral Daily     OLANZapine zydis  20 mg Oral At Bedtime    Or     OLANZapine  10 mg Intramuscular At Bedtime          Allergies:     Allergies   Allergen Reactions     Haldol [Haloperidol]      Patient previously tolerated haldol, though developed oculogyric crises during hospital stay on 4/26/21 on total daily dose of 10 mg.     Lisinopril Cough          Labs:     No results found for this or any previous visit (from the past 24 hour(s)).       Psychiatric Examination:     /69   Pulse 70   Temp 97.3  F (36.3  C)   Resp 16   Wt 70.3 kg (155 lb)   SpO2 100%   BMI 26.61 kg/m    Weight is 155 lbs 0 oz  Body mass index is 26.61 kg/m .    Weight over time:  Vitals:    05/25/21 0850 06/15/21 0811 06/16/21 1605 06/19/21 0859   Weight: 71 kg (156 lb 8 oz) 69.8 kg (153 lb 12.8 oz) 70.5 kg (155 lb 8 oz) 71.4 kg (157 lb 8 oz)    06/21/21 0805 06/22/21 0839 06/24/21 0800 07/02/21 0835   Weight: 69.5 kg (153 lb 3.2 oz) 69.3 kg (152 lb 11.2 oz) 70.7 kg (155 lb 12.8 oz) 70.7 kg (155 " lb 12.8 oz)    07/03/21 0844 07/06/21 0838 07/10/21 0845 07/16/21 0821   Weight: 69.6 kg (153 lb 8 oz) 70.9 kg (156 lb 4.8 oz) 69.8 kg (153 lb 14.4 oz) 69.3 kg (152 lb 12.8 oz)    07/17/21 0830 07/31/21 0804 08/17/21 0845 08/19/21 0800   Weight: 70.4 kg (155 lb 4.8 oz) 70.3 kg (154 lb 14.4 oz) 70.8 kg (156 lb) 69.2 kg (152 lb 9.6 oz)    08/24/21 0700   Weight: 70.3 kg (155 lb)       Orthostatic Vitals       Most Recent      Sitting Orthostatic /84 07/29 0800    Sitting Orthostatic Pulse (bpm) 82 07/29 0800    Standing Orthostatic /86 07/29 0800    Standing Orthostatic Pulse (bpm) 88 07/29 0800            Cardiometabolic risk assessment. 07/29/21      Reviewed patient profile for cardiometabolic risk factors    Date taken /Value  REFERENCE RANGE   Abdominal Obesity  (Waist Circumference)   See nursing flowsheet Women ?35 in (88 cm)   Men ?40 in (102 cm)      Triglycerides  Triglycerides   Date Value Ref Range Status   10/02/2020 86 <=149 mg/dL Final   10/19/2019 117 <150 mg/dL Final       ?150 mg/dL (1.7 mmol/L) or current treatment for elevated triglycerides   HDL cholesterol  HDL Cholesterol   Date Value Ref Range Status   10/19/2019 56 >49 mg/dL Final     Direct Measure HDL   Date Value Ref Range Status   10/02/2020 45 (L) >=50 mg/dL Final   ]   Women <50 mg/dL (1.3 mmol/L) in women or current treatment for low HDL cholesterol  Men <40 mg/dL (1 mmol/L) in men or current treatment for low HDL cholesterol     Fasting plasma glucose (FPG) Lab Results   Component Value Date     07/02/2021      FPG ?100 mg/dL (5.6 mmol/L) or treatment for elevated blood glucose   Blood pressure  BP Readings from Last 3 Encounters:   08/24/21 130/69   06/04/19 131/71   04/26/19 164/86    Blood pressure ?130/85 mmHg or treatment for elevated blood pressure   Family History  See family history     Appearance: dressed in hospital scrubs, appeared reported age, unkempt hair   Attitude: cooperative  Eye Contact: improved,  "looks up more during the interview   Mood: \"good\"    Affect:  Somewhat blunted, no longer agitated or tense. Brightens and smiles at times   Speech: accented, clear and coherent. Soft volume.    Language: no obvious receptive or expressive deficits  Psychomotor, Gait, Musculoskeletal: No abnormal movements noted while seated  Thought Process: goal-oriented, linear, concrete   Associations:  No loosening of associations present  Thought Content:  Did not appear to be responding to internal stimuli.  Insight:   poor - slightly improved   Judgement:  fair  Oriented to: person, place, time/date   Attention Span and Concentration: attentive to conversation though at times stares ahead and does not respond to questions.  Recent and Remote Memory: stable, some difficulty recalling events   Fund of Knowledge:  normal    Clinical Global Impressions  First:  Considering your total clinical experience with this particular patient population, how severe are the patient's symptoms at this time?: 7 (04/16/21 1428)  Compared to the patient's condition at the START of treatment, this patient's condition is: 4 (04/16/21 1428)  Most recent:  Considering your total clinical experience with this particular patient population, how severe are the patient's symptoms at this time?: 7 (07/22/21 0941)  Compared to the patient's condition at the START of treatment, this patient's condition is: 3 (07/22/21 0941)           Precautions:     Behavioral Orders   Procedures     Cheeking Precautions (behavioral units)     Patient Observed swallowing PO medications; Patient asked to drink water after swallowing medication; Patient in Staff line of sight for 15 minutes after medication given; Mouth checks after PO administration (patient asked to open mouth and stick out their tongue).     Code 3     For walks in the [place] with staff and per staff discretion     Electroconvulsive therapy     Series of up to 12 treatments. Begin Date: 5/26/21   "   Treating Psychiatrist providing ECT:  Dr. Lubin     Notified on:  5/21/21     Electroconvulsive therapy     As long as we get the green light from risk management and pt is medically cleared, begin ECT every Monday, Wednesday, and Friday     Electroconvulsive therapy     Series of up to 12 treatments. Begin Date: 5/25/21     Treating Psychiatrist providing ECT:  Amee     Notified on:  5/24/21     Elopement precautions     Fall precautions     Alcides Calderon     Routine Programming     As clinically indicated     Status 15     Every 15 minutes.          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 Symptoms and presentation at this time is most consistent with Schizoaffective Disorder, Bipolar Type. Obtained most recent medication regimen from patient's ACT team, and regimen was initially restarted. Pt is committed. Petitioned for Mcgrath Calderon was filed and granted due to lack of improvement and side effects from medications. Inpatient psychiatric hospitalization is warranted at this time for safety, stabilization, possible adjustment in medications and development of a safe discharge plan.      Hospital Course:  On admission, PTA medications were restarted. However, Michelle had been declining all scheduled medications despite significant encouragement from staff and provider. Psychiatric emergency declared on 4/20 due to aggression toward others in context of severe psychosis and suspected excited  "catatonia. Ativan 1 mg TID was also added. Discontinued PTA Invega, Zyprexa, and thorazine on 4/20 due to consistent refusal.      On 4/26, it was noted that patient frequently had upward gaze while walking up and down the unit. She did not appear to be distressed. She reported that she was looking at \"my god.\" It was determined to be oculogyric crisis secondary to IM haloperidol. Haldol was subsequently discontinued and scheduled Zyprexa was initiated on an emergency basis. Oral Cogentin was also scheduled, though patient declined. On the evening of 4/26, patient's gaze was fixed in upward position for several hours and she appeared to be experiencing discomfort. IM Cogentin was administered with noted resolution. Partial improvements were observed after switching to scheduled Zyprexa. After patient improved, she was more receptive to reinitiating oral Invega, which was initiated on 5/3 and titrated to PTA dose of 9 mg daily on 5/10. Plan was for ACT team to bring in loading dose of Invega S[place]enna. Patient had signs of oculogyric crisis again with Invega. Oral dose decreased to 6 mg after this. Invega was stopped on 5/14 due to ongoing signs of problems related to eye movement and concerns for oculogyric crisis.     Overall improvement in patient's agitation and suspected catatonia noted on 4/30 after patient accepted two doses of Ativan. She began declining Ativan again with noted decompensation. Patient began accepting, however, was deemed not to have the capacity to consent to treatment with Ativan. She does not believe she has a mental illness, including catatonia. She does not fully understand risks associated with inadequate treatment of catatonia. Discussed with her son who is acting as surrogate decision maker and he is in full support of forced scheduled IM Ativan if patient declines oral formulation. Also consulted with our legal team prior to backing oral Ativan with IM.      Ativan was increased on " "5/19 due to re-emerging evidence of catatonia (long periods of staring, repetitive movements, echolalia, mutism). Meeting was held with ACT team on 5/20, including ACT team psychiatrist, Dr. Pedraza. He said that he is in \"full support\" of plan to pursue Mcgrath Kohler and ECT at this time. He does feel that in the past she has been discharged from the hospital while still quite symptomatic. He is hoping that ECT will be effective and that with improvement, Michelle would be more receptive to clozapine and weekly blood draws. He said that ideally she would transition to an IRTS before going back to her apartment, but also understands there may be some barriers (I.e. pt's willingness, financial concerns, etc).      ECT consult placed. Please see consult note by Dr. Lubin on 5/21 for details. Alcides Saavedraard was approved on 5/24 and patient was medically cleared on 5/24. ECT initiated on 5/26. She was noted to have a short seizure during ECT on 6/4. Staff note that patient appears more disoriented with memory impairment and sedation on days of ECT. This was noted on my examination again today. Improvements noted in mood, affect, social interactions, paranoia, agitation since initiation of ECT. Reduced Ativan on 6/5 due to sedative effects. Relayed concerns about memory impairment and confusion with Dr. Lubin. Recommended attempting unilateral ECT, which he agreed to do. First unilateral ECT on 6/7. ECT was held on 6/11 and 6/14 due to memory impairment. We will plan to hold it again tomorrow (6/16). There is ongoing discussion regarding whether there is a component of catatonia contributing to her current presentation but this is less likely since it worsened after ECT was started.  Given her level of cognitive impairment and our belief that this is due to ECT, we will not pursue any further treatments at this time. Since we have held ECT (last treatment on 6/9), her cognitive impairment has slightly improved (more " oriented).      Michelle initially appeared to be decompensating somewhat when ECT was held, though her cognitive impairment has gradually improved. If symptoms of psychosis re-emerge, it may be worth starting clozapine, which is something that has previously been considered by her ACT team. Her Hatch order would need to be amended as clozapine is not one of the medications listed. ANC obtained on 6/16 was 1800/microL. Per conversation with pharmacy, neutropenia has been associated with olanzapine and agranulocytosis has been associated with olanzapine, lorazepam, metoprolol, and hydralazine and hematologic labs have been monitored. Upon re-check, ANC was 3700/microL on 6/21/21. At this time, the primary symptoms we are observing are cognitive deficits and inability to care for self, which we would not address by changing her antipsychotic medication.     Michelle's cognitive impairment has been steadily improving since holding ECT treatments, however it is possible that lorazepam is also playing a role in her cognitive impairment. Given no signs of re-emerging catatonia, a gradual lorazepam taper (decreasing by 0.5 mg) was initiated on 6/28/21 to monitor for potential improvements with regard to memory impairment.  On 7/7, Michelle reported an incident of oculogyric crisis and Cogentin 1 mg BID was initiated with no noted changes in cognition.      Michelle has remained focused on discharge. The team is working with Michelle's ACT team to to establish a safe discharge plan with options including moving to a group home vs home with her ACT team and additional in home supports via CADI services. We are concerned that Michelle would have difficulty taking her medications as prescribed if she were to return home without her ACT team and additional services. This is evidenced by impairments noted in cognitive assessment by OT specialist on 7/1. MOCA score was a 13/30 and CPT score was a 4.7. Physician's statement in support of guardianship  was completed on 7/19/21.  Patient's son is pursuing guardianship. Patient became quite agitated on 7/16 when  staff from [facility] specific  came to meet with her a second time. She did not allow staff members to enter her room and stated that she will not be going to a group home.      Due to daytime sedation, on 7/26/21, lorazepam evening dose decreased from 2 mg to 1 mg. On 7/27/21 transitioned Zyprexa from 10 mg BID to 5 mg QAM + 15 mg QPM and had less daytime sedation with this change and no emergence of symptoms concerning for orthostatic hypotension.  She attended her emergency guardianship hearing on 7/28.  Due to ongoing daytime sedation, Zyprexa doses were consolidated to bedtime starting on 7/30. [place] completed on 8/12 for [facility] funding.     Target psychiatric symptoms and interventions:   - Continue Ativan 1 mg PO or IM TID Patient may not decline.   - Resume Zyprexa 20 mg at bedtime  Hatch in place. May consider increasing further though holding off given re-emerging catatonic sx and borderline prolonged QTc      - Continue Cogentin 1 mg PO BID with additional 2 mg IM daily prn for evidence of acute dystonic reaction or oculogyric crisis  -Continue hydroxyzine 25-50 mg q4h prn for acute anxiety  -Continue Trazodone 50 mg at bedtime prn for sleep disturbances  -Continue Zyprexa 10 mg TID prn for severe agitation  -Continue Ativan/Benadryl q4h prn for agitation. WOULD GIVE PRN ATIVAN FIRST FOR AGITATION unless it is after 5 pm on day prior to scheduled ECT     Occupational Therapy Consult Placed to evaluate cognitive functioning/ability to care for self in home environment, ability to manage medications. See note on 7/1 for details.      ECT: Discontinued due to significant cognitive impairment. Please see note dated 7/12/21 for additional details.      Acute Medical Problems and Treatments:  Left ankle pain, resolved   - Added ice packs prn    HTN, improved: Patient is now adherent  "with medication regimen. Intermittent elevations with no concerning symptoms.  - Metoprolol succinate ER 75 mg   - Cozaar 100 mg daily  - Amlodipine 7.5 mg daily  - IM discontinued hydralazine prn  - Switched BP checks from QID to BID on 7/13 given overall improvement  - Please see note from IM dated 5/3, 5/6, 5/24, 6/20 and 6/30/21.  - Obtained EKG and routine labs on 5/21 in context of pt declining vital sign checks and anti-hypertensives and recently elevated BPs. Reviewed labs and discussed EKG findings with IM on 5/21. No urgent concerns, though IM should be notified if pt develops acute medical concerns (I.e. heart palpitations, SOB, changes in speech, AMS, confusion, CP, HA, changes in vision).    - Per 6/20 IM note: \"Please notify IM if BP is persistently severely elevated and requiring frequent PRN hydralazine\".  - Internal medicine consulted again on 6/28 due to consistent use of hydralazine over the past week. Metoprolol increased to 75 mg daily then to 100 mg and amlodipine 5 mg was added on 6/30. Amlodipine increased to 7.5 mg daily on 7/5.      Recent history of BRANDON:  - Attempted to repeat BMP multiple times though patient consistently declining blood draw  - Encourage fluids  - REPEAT CMP in AM - declined today - will attempt again tomorrow    Lab monitoring -   - Repeat lipids, CMP, CBC and TSH w/ Reflex ordered       For previous medical concerns, please see note dated 7/12/21.     Behavioral/Psychological/Social:  - Encourage unit programming  - Patient is Code 3 status and can take walks in the [place] with staff and security present per staff discretion. This appears to be quite therapeutic for her.      Safety:  - Continue precautions as noted above  - Status 15 minute checks  - Safety precautions include: assault and elopement precautions  - Continue precautions as noted above     Legal Status: Committed as MI with Hatch in place for Haldol, Zyprexa, Invega, and Thorazine through [facility]. Filed Alcides Kohler through [facility] and approved on 5/24/21. Physician's statement in support of guardianship was completed on 7/19/21. Patient's son is now her temporary emergency guardian.     Disposition Plan   Reason for ongoing admission: pending safe discharge plan  Discharge location: [facility] [place] (needs CADI waiver). CPT assessment done by OT (Tania queen) on 7/1.  Please see note for details. [place] on 8[place]12. Per  - one final step for approval for placement at [facility]  Discharge Medications: not ordered. [facility] prefers 30 d/s  Follow-up Appointments: not scheduled        Bijal Varner MD  Psychiatry PGY-4

## 2021-08-25 NOTE — PLAN OF CARE
Problem: Sleep Disturbance (Psychotic Signs/Symptoms)  Goal: Improved Sleep (Psychotic Signs/Symptoms)  Outcome: No Change     Pt appeared to be sleeping for 5 hours. No PRN administered this shift. No concerns noted.

## 2021-08-25 NOTE — PLAN OF CARE
Problem: Psychomotor Impairment (Psychotic Signs/Symptoms)  Goal: Improved Psychomotor Symptoms (Psychotic Signs/Symptoms)  No Change, Isolative to room, blunt affect, pleasant on approach, minimal responses, no SI/SIB reports or actions, denies any concerns or pain, VSS, appetite good, takes medications and in room watching television withdrawn from others socially; cooperative.

## 2021-08-25 NOTE — PLAN OF CARE
Assessment/Intervention/Current Symtoms and Care Coordination  -Chart review    -Rounded with team, addressed patient needs/concerns    Current Symptoms include the following: Patient appeared with brighter affect today. Met with patient to discuss status of funding approval from [facility].     Discharge Plan or Goal  Pending stabilization & development of a safe discharge plan.  Considerations include:  [facility]    Barriers to Discharge  Patient requires further psychiatric stabilization due to current symptomology    Referral Status  [facility]    Legal Status  Civil Commitment/Hatch/Mcgrath Kohler through [facility]

## 2021-08-26 LAB
ALBUMIN SERPL-MCNC: 3.6 G/DL (ref 3.4–5)
ALBUMIN UR-MCNC: NEGATIVE MG/DL
ALP SERPL-CCNC: 86 U/L (ref 40–150)
ALT SERPL W P-5'-P-CCNC: 24 U/L (ref 0–50)
ANION GAP SERPL CALCULATED.3IONS-SCNC: 5 MMOL/L (ref 3–14)
APPEARANCE UR: CLEAR
AST SERPL W P-5'-P-CCNC: 18 U/L (ref 0–45)
BASOPHILS # BLD AUTO: 0 10E3/UL (ref 0–0.2)
BASOPHILS NFR BLD AUTO: 1 %
BILIRUB SERPL-MCNC: 0.5 MG/DL (ref 0.2–1.3)
BILIRUB UR QL STRIP: NEGATIVE
BUN SERPL-MCNC: 19 MG/DL (ref 7–30)
CALCIUM SERPL-MCNC: 9.2 MG/DL (ref 8.5–10.1)
CHLORIDE BLD-SCNC: 110 MMOL/L (ref 94–109)
CHOLEST SERPL-MCNC: 258 MG/DL
CO2 SERPL-SCNC: 23 MMOL/L (ref 20–32)
COLOR UR AUTO: YELLOW
CREAT SERPL-MCNC: 1.17 MG/DL (ref 0.52–1.04)
EOSINOPHIL # BLD AUTO: 0.1 10E3/UL (ref 0–0.7)
EOSINOPHIL NFR BLD AUTO: 2 %
ERYTHROCYTE [DISTWIDTH] IN BLOOD BY AUTOMATED COUNT: 12.5 % (ref 10–15)
FASTING STATUS PATIENT QL REPORTED: YES
GFR SERPL CREATININE-BSD FRML MDRD: 51 ML/MIN/1.73M2
GLUCOSE BLD-MCNC: 111 MG/DL (ref 70–99)
GLUCOSE UR STRIP-MCNC: NEGATIVE MG/DL
HCT VFR BLD AUTO: 41.1 % (ref 35–47)
HDLC SERPL-MCNC: 55 MG/DL
HGB BLD-MCNC: 14.3 G/DL (ref 11.7–15.7)
HGB UR QL STRIP: NEGATIVE
HYALINE CASTS: 3 /LPF
IMM GRANULOCYTES # BLD: 0 10E3/UL
IMM GRANULOCYTES NFR BLD: 0 %
KETONES UR STRIP-MCNC: NEGATIVE MG/DL
LDLC SERPL CALC-MCNC: 162 MG/DL
LEUKOCYTE ESTERASE UR QL STRIP: ABNORMAL
LYMPHOCYTES # BLD AUTO: 2.1 10E3/UL (ref 0.8–5.3)
LYMPHOCYTES NFR BLD AUTO: 48 %
MCH RBC QN AUTO: 30.9 PG (ref 26.5–33)
MCHC RBC AUTO-ENTMCNC: 34.8 G/DL (ref 31.5–36.5)
MCV RBC AUTO: 89 FL (ref 78–100)
MONOCYTES # BLD AUTO: 0.3 10E3/UL (ref 0–1.3)
MONOCYTES NFR BLD AUTO: 7 %
MUCOUS THREADS #/AREA URNS LPF: PRESENT /LPF
NEUTROPHILS # BLD AUTO: 1.8 10E3/UL (ref 1.6–8.3)
NEUTROPHILS NFR BLD AUTO: 42 %
NITRATE UR QL: NEGATIVE
NONHDLC SERPL-MCNC: 203 MG/DL
NRBC # BLD AUTO: 0 10E3/UL
NRBC BLD AUTO-RTO: 0 /100
PH UR STRIP: 5.5 [PH] (ref 5–7)
PLATELET # BLD AUTO: 156 10E3/UL (ref 150–450)
POTASSIUM BLD-SCNC: 3.5 MMOL/L (ref 3.4–5.3)
PROT SERPL-MCNC: 7.5 G/DL (ref 6.8–8.8)
RBC # BLD AUTO: 4.63 10E6/UL (ref 3.8–5.2)
RBC URINE: 1 /HPF
SODIUM SERPL-SCNC: 138 MMOL/L (ref 133–144)
SP GR UR STRIP: 1.02 (ref 1–1.03)
SQUAMOUS EPITHELIAL: 2 /HPF
TRIGL SERPL-MCNC: 205 MG/DL
TSH SERPL DL<=0.005 MIU/L-ACNC: 0.96 MU/L (ref 0.4–4)
UROBILINOGEN UR STRIP-MCNC: NORMAL MG/DL
WBC # BLD AUTO: 4.3 10E3/UL (ref 4–11)
WBC URINE: 4 /HPF

## 2021-08-26 PROCEDURE — 250N000013 HC RX MED GY IP 250 OP 250 PS 637: Performed by: PHYSICIAN ASSISTANT

## 2021-08-26 PROCEDURE — 99232 SBSQ HOSP IP/OBS MODERATE 35: CPT | Mod: GC | Performed by: PSYCHIATRY & NEUROLOGY

## 2021-08-26 PROCEDURE — 99207 PR NO CHARGE LOS: CPT | Performed by: PHYSICIAN ASSISTANT

## 2021-08-26 PROCEDURE — 82247 BILIRUBIN TOTAL: CPT | Performed by: STUDENT IN AN ORGANIZED HEALTH CARE EDUCATION/TRAINING PROGRAM

## 2021-08-26 PROCEDURE — 80061 LIPID PANEL: CPT | Performed by: STUDENT IN AN ORGANIZED HEALTH CARE EDUCATION/TRAINING PROGRAM

## 2021-08-26 PROCEDURE — 36415 COLL VENOUS BLD VENIPUNCTURE: CPT | Performed by: STUDENT IN AN ORGANIZED HEALTH CARE EDUCATION/TRAINING PROGRAM

## 2021-08-26 PROCEDURE — 84443 ASSAY THYROID STIM HORMONE: CPT | Performed by: STUDENT IN AN ORGANIZED HEALTH CARE EDUCATION/TRAINING PROGRAM

## 2021-08-26 PROCEDURE — 82040 ASSAY OF SERUM ALBUMIN: CPT | Performed by: STUDENT IN AN ORGANIZED HEALTH CARE EDUCATION/TRAINING PROGRAM

## 2021-08-26 PROCEDURE — 81001 URINALYSIS AUTO W/SCOPE: CPT | Performed by: STUDENT IN AN ORGANIZED HEALTH CARE EDUCATION/TRAINING PROGRAM

## 2021-08-26 PROCEDURE — 85025 COMPLETE CBC W/AUTO DIFF WBC: CPT | Performed by: STUDENT IN AN ORGANIZED HEALTH CARE EDUCATION/TRAINING PROGRAM

## 2021-08-26 PROCEDURE — 250N000013 HC RX MED GY IP 250 OP 250 PS 637: Performed by: STUDENT IN AN ORGANIZED HEALTH CARE EDUCATION/TRAINING PROGRAM

## 2021-08-26 PROCEDURE — 124N000002 HC R&B MH UMMC

## 2021-08-26 RX ADMIN — LOSARTAN POTASSIUM 100 MG: 100 TABLET, FILM COATED ORAL at 09:01

## 2021-08-26 RX ADMIN — OLANZAPINE 20 MG: 20 TABLET, ORALLY DISINTEGRATING ORAL at 19:36

## 2021-08-26 RX ADMIN — LORAZEPAM 1 MG: 1 TABLET ORAL at 19:36

## 2021-08-26 RX ADMIN — AMLODIPINE BESYLATE 7.5 MG: 2.5 TABLET ORAL at 09:01

## 2021-08-26 RX ADMIN — LORAZEPAM 1 MG: 1 TABLET ORAL at 09:00

## 2021-08-26 RX ADMIN — BENZTROPINE MESYLATE 1 MG: 1 TABLET ORAL at 09:01

## 2021-08-26 RX ADMIN — LORAZEPAM 1 MG: 1 TABLET ORAL at 14:36

## 2021-08-26 RX ADMIN — METOPROLOL SUCCINATE 75 MG: 25 TABLET, EXTENDED RELEASE ORAL at 09:02

## 2021-08-26 RX ADMIN — BENZTROPINE MESYLATE 1 MG: 1 TABLET ORAL at 19:36

## 2021-08-26 ASSESSMENT — ACTIVITIES OF DAILY LIVING (ADL)
ORAL_HYGIENE: INDEPENDENT
LAUNDRY: UNABLE TO COMPLETE
DRESS: SCRUBS (BEHAVIORAL HEALTH)
DRESS: SCRUBS (BEHAVIORAL HEALTH)
HYGIENE/GROOMING: INDEPENDENT
ORAL_HYGIENE: INDEPENDENT
HYGIENE/GROOMING: INDEPENDENT
LAUNDRY: UNABLE TO COMPLETE

## 2021-08-26 NOTE — PROGRESS NOTES
Brief Medicine Note    Contacted by Dr. Varner of Psychiatry regarding abnormal labs.     1) Abnormal cholesterol panel: Fasting lipid panel this morning with elevated total cholesterol and elevated LDL. Prior lipid panel in 2020 was unremarkable.   - First line management for dyslipidemia is a trial of 6 months of lifestyle changes (dietary modifications, exercise)   - She should follow-up with PCP upon discharge to have further cardiac risk stratification and discussion regarding risks / benefits of starting a statin     2) Elevated creatinine: Cr 1.17 this morning. Patient had BRANDON in June with Cr to peak of 1.83, was felt to be of pre-renal etiology and Cr returned to baseline with IVF. Baseline Cr difficult to determine d/t limited labs but her baseline Cr appears to be ~0.9 - 1.0, therefore does not meet criteria for BRANDON at this time. Her bump in creatinine may be due to fluctuating baseline vs dehydration.    - Encourage oral fluid intake, goal 2 liters daily   - Recheck BMP on 8/28/21    If patient discharged prior to lab check this could alternatively be done as an outpatient as she is medically stable.     Medicine will follow repeat labs peripherally. Please do not hesitate to contact if new questions or concerns arise.     Helen Mao PA-C  Hospitalist Service  Pager: [phone]

## 2021-08-26 NOTE — PLAN OF CARE
Nursing assessment completed. Patient completed fasting am lab draw. Am /74 pulse 84 standing (sitting 120/75 pulse 76). Writer discussed with Provider and received the ok for pt to receive her am metoprolol. Patient given specimen collection cup for UA collection. Patient is isolative to her room. She states she is bored and is hopeful to discharge to her group home soon. She presents with a flat, depressed affect, but does brighten upon approach. She is medication compliant. Denies pain, symptom, or side effects. Denies SI/SIB or thoughts to harm others. Continue to monitor and assess.   Urine collected and sent to the lab.

## 2021-08-26 NOTE — PROGRESS NOTES
"[facility], [place]   Psychiatric Progress Note  Hospital Day: 134        Interim History:   The patient's care was discussed with the treatment team during the daily team meeting and/or staff's chart notes were reviewed. No acute medical concerns. Intermittently elevated BP with no symptoms, other vitals WNL. She has been pleasant and cooperative with staff and peers. Continues to isolate to her room, including eating meals in there, frequently watching [employer]. She is medication adherent. No reported or observed side effects. No symptoms of psychosis or tad. Intermittently attends to all ADLs. Sleeping and eating well. Appeared to be sleeping for 5.25 hours overnight.     Michelle was resting in her bed prior to the interview.  She reports that she is feeling \"good\" today and that her mood is \"good\".  She continues to look forward to discharging to the report.  We discussed her lab results that were drawn this morning.  We also explained that we would be getting a urinalysis completed.  She was also informed regarding potential need for a chest x-ray for medical clearance to the group home given that she is unable to get a Mantoux test due to previously receiving the BCG vaccine for tuberculosis.  She denies any prior history of having tuberculosis.  We also discussed need for follow-up with primary care and she was agreeable to this.    Suicidal ideation: denies current or recent suicidal ideation or behaviors.    Homicidal ideation: denies current or recent homicidal ideation or behaviors.    Psychotic symptoms: Patient denies AH, VH, paranoia, delusions.     Medication side effects reported: She denies sedation today. She denies any dizziness or lightheadedness.     Acute medical concerns: pain in left ankle has resolved.     Other issues reported by patient: Patient had no further questions or concerns.           Medications:       amLODIPine  7.5 mg Oral Daily     benztropine  1 mg Oral " BID     LORazepam  1 mg Oral TID    Or     LORazepam  1 mg Intramuscular TID     losartan  100 mg Oral Daily     metoprolol succinate ER  75 mg Oral Daily     OLANZapine zydis  20 mg Oral At Bedtime    Or     OLANZapine  10 mg Intramuscular At Bedtime          Allergies:     Allergies   Allergen Reactions     Haldol [Haloperidol]      Patient previously tolerated haldol, though developed oculogyric crises during hospital stay on 4/26/21 on total daily dose of 10 mg.     Lisinopril Cough          Labs:     No results found for this or any previous visit (from the past 24 hour(s)).       Psychiatric Examination:     /77 (BP Location: Right arm)   Pulse 69   Temp 98.3  F (36.8  C) (Oral)   Resp 16   Wt 70.3 kg (155 lb)   SpO2 100%   BMI 26.61 kg/m    Weight is 155 lbs 0 oz  Body mass index is 26.61 kg/m .    Weight over time:  Vitals:    05/25/21 0850 06/15/21 0811 06/16/21 1605 06/19/21 0859   Weight: 71 kg (156 lb 8 oz) 69.8 kg (153 lb 12.8 oz) 70.5 kg (155 lb 8 oz) 71.4 kg (157 lb 8 oz)    06/21/21 0805 06/22/21 0839 06/24/21 0800 07/02/21 0835   Weight: 69.5 kg (153 lb 3.2 oz) 69.3 kg (152 lb 11.2 oz) 70.7 kg (155 lb 12.8 oz) 70.7 kg (155 lb 12.8 oz)    07/03/21 0844 07/06/21 0838 07/10/21 0845 07/16/21 0821   Weight: 69.6 kg (153 lb 8 oz) 70.9 kg (156 lb 4.8 oz) 69.8 kg (153 lb 14.4 oz) 69.3 kg (152 lb 12.8 oz)    07/17/21 0830 07/31/21 0804 08/17/21 0845 08/19/21 0800   Weight: 70.4 kg (155 lb 4.8 oz) 70.3 kg (154 lb 14.4 oz) 70.8 kg (156 lb) 69.2 kg (152 lb 9.6 oz)    08/24/21 0700   Weight: 70.3 kg (155 lb)       Orthostatic Vitals       Most Recent      Sitting Orthostatic /84 07/29 0800    Sitting Orthostatic Pulse (bpm) 82 07/29 0800    Standing Orthostatic /86 07/29 0800    Standing Orthostatic Pulse (bpm) 88 07/29 0800            Cardiometabolic risk assessment. 07/29/21      Reviewed patient profile for cardiometabolic risk factors    Date taken /Value  REFERENCE RANGE   Abdominal  "Obesity  (Waist Circumference)   See nursing flowsheet Women ?35 in (88 cm)   Men ?40 in (102 cm)      Triglycerides  Triglycerides   Date Value Ref Range Status   10/02/2020 86 <=149 mg/dL Final   10/19/2019 117 <150 mg/dL Final       ?150 mg/dL (1.7 mmol/L) or current treatment for elevated triglycerides   HDL cholesterol  HDL Cholesterol   Date Value Ref Range Status   10/19/2019 56 >49 mg/dL Final     Direct Measure HDL   Date Value Ref Range Status   10/02/2020 45 (L) >=50 mg/dL Final   ]   Women <50 mg/dL (1.3 mmol/L) in women or current treatment for low HDL cholesterol  Men <40 mg/dL (1 mmol/L) in men or current treatment for low HDL cholesterol     Fasting plasma glucose (FPG) Lab Results   Component Value Date     07/02/2021      FPG ?100 mg/dL (5.6 mmol/L) or treatment for elevated blood glucose   Blood pressure  BP Readings from Last 3 Encounters:   08/25/21 137/77   06/04/19 131/71   04/26/19 164/86    Blood pressure ?130/85 mmHg or treatment for elevated blood pressure   Family History  See family history     Appearance: dressed in hospital scrubs, appeared reported age, unkempt hair   Attitude: cooperative  Eye Contact: improved, looks up more during the interview   Mood: \"good\"    Affect:  Somewhat blunted, no longer agitated or tense. Brightens and smiles at times   Speech: accented, clear and coherent. Soft volume.    Language: no obvious receptive or expressive deficits  Psychomotor, Gait, Musculoskeletal: No abnormal movements noted while seated  Thought Process: goal-oriented, linear, concrete   Associations:  No loosening of associations present  Thought Content:  Did not appear to be responding to internal stimuli.  Insight:   poor - slightly improved   Judgement:  fair  Oriented to: person, place, time/date   Attention Span and Concentration: attentive to conversation though at times stares ahead and does not respond to questions.  Recent and Remote Memory: stable, some difficulty " recalling events   Fund of Knowledge:  normal    Clinical Global Impressions  First:  Considering your total clinical experience with this particular patient population, how severe are the patient's symptoms at this time?: 7 (04/16/21 1428)  Compared to the patient's condition at the START of treatment, this patient's condition is: 4 (04/16/21 1428)  Most recent:  Considering your total clinical experience with this particular patient population, how severe are the patient's symptoms at this time?: 7 (07/22/21 0941)  Compared to the patient's condition at the START of treatment, this patient's condition is: 3 (07/22/21 0941)           Precautions:     Behavioral Orders   Procedures     Cheeking Precautions (behavioral units)     Patient Observed swallowing PO medications; Patient asked to drink water after swallowing medication; Patient in Staff line of sight for 15 minutes after medication given; Mouth checks after PO administration (patient asked to open mouth and stick out their tongue).     Code 3     For walks in the [place] with staff and per staff discretion     Electroconvulsive therapy     Series of up to 12 treatments. Begin Date: 5/26/21     Treating Psychiatrist providing ECT:  Dr. Lubin     Notified on:  5/21/21     Electroconvulsive therapy     As long as we get the green light from risk management and pt is medically cleared, begin ECT every Monday, Wednesday, and Friday     Electroconvulsive therapy     Series of up to 12 treatments. Begin Date: 5/25/21     Treating Psychiatrist providing ECT:  Amee     Notified on:  5/24/21     Elopement precautions     Fall precautions     Mcgrath Calderon     Routine Programming     As clinically indicated     Status 15     Every 15 minutes.          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 Symptoms and presentation at this time is most consistent with Schizoaffective Disorder, Bipolar Type. Obtained most recent medication regimen from patient's ACT team, and regimen was initially restarted. Pt is committed. Petitioned for Mcgrath Calderon was filed and granted due to lack of improvement and side effects from medications. Inpatient psychiatric hospitalization is warranted at this time for safety, stabilization, possible adjustment in medications and development of a safe discharge plan.      Hospital Course:  On admission, PTA medications were restarted. However, Michelle had been declining all scheduled medications despite significant encouragement from staff and provider. Psychiatric emergency declared on 4/20 due to aggression toward others in context of severe psychosis and suspected excited catatonia. Ativan 1 mg TID was also added. Discontinued PTA Invega, Zyprexa, and thorazine on 4/20 due to consistent refusal.      On 4/26, it was noted that patient frequently had upward gaze while walking up and down the unit. She did not appear to be distressed. She reported that she was looking at \"my god.\" It was determined to be oculogyric crisis secondary to IM haloperidol. Haldol was subsequently discontinued and scheduled Zyprexa was initiated on an emergency basis. Oral Cogentin was also scheduled, though patient declined. On the evening of 4/26, patient's gaze was fixed in upward position for several hours and she appeared to be experiencing discomfort. IM Cogentin was administered with noted resolution. Partial improvements were observed after " "switching to scheduled Zyprexa. After patient improved, she was more receptive to reinitiating oral Invega, which was initiated on 5/3 and titrated to PTA dose of 9 mg daily on 5/10. Plan was for ACT team to bring in loading dose of Invega S[place]enna. Patient had signs of oculogyric crisis again with Invega. Oral dose decreased to 6 mg after this. Invega was stopped on 5/14 due to ongoing signs of problems related to eye movement and concerns for oculogyric crisis.     Overall improvement in patient's agitation and suspected catatonia noted on 4/30 after patient accepted two doses of Ativan. She began declining Ativan again with noted decompensation. Patient began accepting, however, was deemed not to have the capacity to consent to treatment with Ativan. She does not believe she has a mental illness, including catatonia. She does not fully understand risks associated with inadequate treatment of catatonia. Discussed with her son who is acting as surrogate decision maker and he is in full support of forced scheduled IM Ativan if patient declines oral formulation. Also consulted with our legal team prior to backing oral Ativan with IM.      Ativan was increased on 5/19 due to re-emerging evidence of catatonia (long periods of staring, repetitive movements, echolalia, mutism). Meeting was held with ACT team on 5/20, including ACT team psychiatrist, Dr. Pedraza. He said that he is in \"full support\" of plan to pursue Mcgrath Kohler and ECT at this time. He does feel that in the past she has been discharged from the hospital while still quite symptomatic. He is hoping that ECT will be effective and that with improvement, Michelle would be more receptive to clozapine and weekly blood draws. He said that ideally she would transition to an IRTS before going back to her apartment, but also understands there may be some barriers (I.e. pt's willingness, financial concerns, etc).      ECT consult placed. Please see consult note by " Dr. Lubin on 5/21 for details. Alcides Kohler was approved on 5/24 and patient was medically cleared on 5/24. ECT initiated on 5/26. She was noted to have a short seizure during ECT on 6/4. Staff note that patient appears more disoriented with memory impairment and sedation on days of ECT. This was noted on my examination again today. Improvements noted in mood, affect, social interactions, paranoia, agitation since initiation of ECT. Reduced Ativan on 6/5 due to sedative effects. Relayed concerns about memory impairment and confusion with Dr. Lubin. Recommended attempting unilateral ECT, which he agreed to do. First unilateral ECT on 6/7. ECT was held on 6/11 and 6/14 due to memory impairment. We will plan to hold it again tomorrow (6/16). There is ongoing discussion regarding whether there is a component of catatonia contributing to her current presentation but this is less likely since it worsened after ECT was started.  Given her level of cognitive impairment and our belief that this is due to ECT, we will not pursue any further treatments at this time. Since we have held ECT (last treatment on 6/9), her cognitive impairment has slightly improved (more oriented).      Michelle initially appeared to be decompensating somewhat when ECT was held, though her cognitive impairment has gradually improved. If symptoms of psychosis re-emerge, it may be worth starting clozapine, which is something that has previously been considered by her ACT team. Her Hatch order would need to be amended as clozapine is not one of the medications listed. ANC obtained on 6/16 was 1800/microL. Per conversation with pharmacy, neutropenia has been associated with olanzapine and agranulocytosis has been associated with olanzapine, lorazepam, metoprolol, and hydralazine and hematologic labs have been monitored. Upon re-check, ANC was 3700/microL on 6/21/21. At this time, the primary symptoms we are observing are cognitive deficits and inability to  care for self, which we would not address by changing her antipsychotic medication.     Michelle's cognitive impairment has been steadily improving since holding ECT treatments, however it is possible that lorazepam is also playing a role in her cognitive impairment. Given no signs of re-emerging catatonia, a gradual lorazepam taper (decreasing by 0.5 mg) was initiated on 6/28/21 to monitor for potential improvements with regard to memory impairment.  On 7/7, Michelle reported an incident of oculogyric crisis and Cogentin 1 mg BID was initiated with no noted changes in cognition.      Michelle has remained focused on discharge. The team is working with Michelle's ACT team to to establish a safe discharge plan with options including moving to a group home vs home with her ACT team and additional in home supports via CADI services. We are concerned that Michelle would have difficulty taking her medications as prescribed if she were to return home without her ACT team and additional services. This is evidenced by impairments noted in cognitive assessment by OT specialist on 7/1. MOCA score was a 13/30 and CPT score was a 4.7. Physician's statement in support of guardianship was completed on 7/19/21.  Patient's son is pursuing guardianship. Patient became quite agitated on 7/16 when  staff from [facility] came to meet with her a second time. She did not allow staff members to enter her room and stated that she will not be going to a group home.      Due to daytime sedation, on 7/26/21, lorazepam evening dose decreased from 2 mg to 1 mg. On 7/27/21 transitioned Zyprexa from 10 mg BID to 5 mg QAM + 15 mg QPM and had less daytime sedation with this change and no emergence of symptoms concerning for orthostatic hypotension.  She attended her emergency guardianship hearing on 7/28.  Due to ongoing daytime sedation, Zyprexa doses were consolidated to bedtime starting on 7/30. [place] completed on 8/12 for CADI funding.  "    Target psychiatric symptoms and interventions:   - Continue Ativan 1 mg PO or IM TID Patient may not decline.   - Resume Zyprexa 20 mg at bedtime  Hatch in place. May consider increasing further though holding off given re-emerging catatonic sx and borderline prolonged QTc      - Continue Cogentin 1 mg PO BID with additional 2 mg IM daily prn for evidence of acute dystonic reaction or oculogyric crisis  -Continue hydroxyzine 25-50 mg q4h prn for acute anxiety  -Continue Trazodone 50 mg at bedtime prn for sleep disturbances  -Continue Zyprexa 10 mg TID prn for severe agitation  -Continue Ativan/Benadryl q4h prn for agitation. WOULD GIVE PRN ATIVAN FIRST FOR AGITATION unless it is after 5 pm on day prior to scheduled ECT     Occupational Therapy Consult Placed to evaluate cognitive functioning/ability to care for self in home environment, ability to manage medications. See note on 7/1 for details.      ECT: Discontinued due to significant cognitive impairment. Please see note dated 7/12/21 for additional details.      Acute Medical Problems and Treatments:  Left ankle pain, resolved   - Added ice packs prn    HTN, improved: Patient is now adherent with medication regimen. Intermittent elevations with no concerning symptoms.  - Metoprolol succinate ER 75 mg   - Cozaar 100 mg daily  - Amlodipine 7.5 mg daily  - IM discontinued hydralazine prn  - Switched BP checks from QID to BID on 7/13 given overall improvement  - Please see note from IM dated 5/3, 5/6, 5/24, 6/20 and 6/30/21.  - Obtained EKG and routine labs on 5/21 in context of pt declining vital sign checks and anti-hypertensives and recently elevated BPs. Reviewed labs and discussed EKG findings with IM on 5/21. No urgent concerns, though IM should be notified if pt develops acute medical concerns (I.e. heart palpitations, SOB, changes in speech, AMS, confusion, CP, HA, changes in vision).    - Per 6/20 IM note: \"Please notify IM if BP is persistently " "severely elevated and requiring frequent PRN hydralazine\".  - Internal medicine consulted again on 6/28 due to consistent use of hydralazine over the past week. Metoprolol increased to 75 mg daily then to 100 mg and amlodipine 5 mg was added on 6/30. Amlodipine increased to 7.5 mg daily on 7/5.      Medicine consulted again on 8/26 given lab abnormalities as discussed below:   1) Abnormal cholesterol panel: Fasting lipid panel this morning with elevated total cholesterol and elevated LDL. Prior lipid panel in 2020 was unremarkable.   - First line management for dyslipidemia is a trial of 6 months of lifestyle changes (dietary modifications, exercise)   - She should follow-up with PCP upon discharge to have further cardiac risk stratification and discussion regarding risks / benefits of starting a statin      2) Elevated creatinine: Cr 1.17 this morning. Patient had BRANDON in June with Cr to peak of 1.83, was felt to be of pre-renal etiology and Cr returned to baseline with IVF. Baseline Cr difficult to determine d/t limited labs but her baseline Cr appears to be ~0.9 - 1.0, therefore does not meet criteria for BRANDON at this time. Her bump in creatinine may be due to fluctuating baseline vs dehydration.    - Encourage oral fluid intake, goal 2 liters daily   - Recheck BMP on 8/28/21     If patient discharged prior to lab check this could alternatively be done as an outpatient as she is medically stable.         For previous medical concerns, please see note dated 7/12/21.     Behavioral/Psychological/Social:  - Encourage unit programming  - Patient is Code 3 status and can take walks in the [place] with staff and security present per staff discretion. This appears to be quite therapeutic for her.      Safety:  - Continue precautions as noted above  - Status 15 minute checks  - Safety precautions include: assault and elopement precautions  - Continue precautions as noted above     Legal Status: Committed as MI with " Hatch in place for Haldol, Zyprexa, Invega, and Thorazine through [facility]. Filed Alcides Kohler through [facility] and approved on 5/24/21. Physician's statement in support of guardianship was completed on 7/19/21. Patient's son is now her temporary emergency guardian.     Disposition Plan   Reason for ongoing admission: pending safe discharge plan  Discharge location: Group home (needs CADI waiver). CPT assessment done by OT (Tania queen) on 7/1.  Please see note for details. [place] completed on 8/12. Per  - one final step for approval for placement at [facility]  Discharge Medications: ordered.. Group [place] prefers 30 d/s  Follow-up Appointments: scheduled        Bijal Varner MD  Psychiatry PGY-4

## 2021-08-26 NOTE — PLAN OF CARE
Problem: Sleep Disturbance  Goal: Adequate Sleep/Rest  Outcome: Improving     Pt appeared sleep during the shift. Woke up and couldn't go back to sleep. Pt was however, calm and quiet in room. Slept a total 5.25 hours.

## 2021-08-27 PROCEDURE — 99232 SBSQ HOSP IP/OBS MODERATE 35: CPT | Performed by: PSYCHIATRY & NEUROLOGY

## 2021-08-27 PROCEDURE — 250N000013 HC RX MED GY IP 250 OP 250 PS 637: Performed by: PHYSICIAN ASSISTANT

## 2021-08-27 PROCEDURE — 250N000013 HC RX MED GY IP 250 OP 250 PS 637: Performed by: STUDENT IN AN ORGANIZED HEALTH CARE EDUCATION/TRAINING PROGRAM

## 2021-08-27 PROCEDURE — 124N000002 HC R&B MH UMMC

## 2021-08-27 RX ADMIN — OLANZAPINE 20 MG: 20 TABLET, ORALLY DISINTEGRATING ORAL at 20:46

## 2021-08-27 RX ADMIN — BENZTROPINE MESYLATE 1 MG: 1 TABLET ORAL at 20:46

## 2021-08-27 RX ADMIN — LORAZEPAM 1 MG: 1 TABLET ORAL at 08:47

## 2021-08-27 RX ADMIN — LORAZEPAM 1 MG: 1 TABLET ORAL at 14:07

## 2021-08-27 RX ADMIN — LORAZEPAM 1 MG: 1 TABLET ORAL at 20:46

## 2021-08-27 RX ADMIN — BENZTROPINE MESYLATE 1 MG: 1 TABLET ORAL at 08:47

## 2021-08-27 RX ADMIN — AMLODIPINE BESYLATE 7.5 MG: 2.5 TABLET ORAL at 08:47

## 2021-08-27 RX ADMIN — LOSARTAN POTASSIUM 100 MG: 100 TABLET, FILM COATED ORAL at 08:47

## 2021-08-27 RX ADMIN — METOPROLOL SUCCINATE 75 MG: 25 TABLET, EXTENDED RELEASE ORAL at 08:49

## 2021-08-27 ASSESSMENT — ACTIVITIES OF DAILY LIVING (ADL)
HYGIENE/GROOMING: INDEPENDENT
HYGIENE/GROOMING: INDEPENDENT
DRESS: SCRUBS (BEHAVIORAL HEALTH)
DRESS: SCRUBS (BEHAVIORAL HEALTH)
LAUNDRY: UNABLE TO COMPLETE
LAUNDRY: UNABLE TO COMPLETE
ORAL_HYGIENE: INDEPENDENT
ORAL_HYGIENE: INDEPENDENT

## 2021-08-27 NOTE — PLAN OF CARE
.Assessment/Intervention/Current Symtoms and Care Coordination    -Chart review  -Rounded with team, addressed patient needs/concerns  Writer has been communication with [facility]  regarding the hold up on patient being admitted to [facility]. [facility] has not offered a rate that will be acceptable by [facility]. Writer will continue to discuss options with [facility] in order to move this process forward.   Current Symptoms include the following:  Isolative to room but pleasant with writer. Patient is indicating frustration due to long hospitalization.     Discharge Plan or Goal  Pending stabilization & development of a safe discharge plan.  Considerations include: Group Home Placement    Barriers to Discharge  Patient requires further psychiatric stabilization due to current symptomology    Referral Status  Referral made to [facility]     Legal Status  Civil Commitment/Hatch/Mcgrath Kohler through [facility]

## 2021-08-27 NOTE — PLAN OF CARE
Writer spoke with supervisor of [place] [employer]  and was informed [facility] will not fund the rate as requested by [facility].  CTC will start looking into other placements options for patient.  Writer also spoke with [facility] who stated she will contact [facility]  and try and work to come up with a rate acceptable for both parties.  At this time there is no admittance date for patient and no other options as yet.  CTC will inform patient's son and  ACT Team  that another placement will need to be secured.

## 2021-08-27 NOTE — PLAN OF CARE
Problem: OT General Care Plan  Goal: OT Goal 1  Description: OT Goal 1  Outcome: No Change     Invited pt to meet with writer, talk/do an activity 1:1, though she declined as she has done for weeks.

## 2021-08-27 NOTE — PLAN OF CARE
Nursing assessment completed. Patient is isolative to her room. Hopeful to discharge to her group home soon. She presents with a flat, depressed affect, but does brighten upon approach. VSS. She is medication compliant. Patient received call from her daughter and spoke to her on the phone .Denies pain, symptom, or side effects. Denies SI/SIB or thoughts to harm others. Continue to monitor and assess.

## 2021-08-27 NOTE — PLAN OF CARE
Nursing assessment completed. Patient is isolative to her room. She states she is bored and is hopeful to discharge to her group home soon. She presents with a flat, depressed affect, but does brighten upon approach. VSS. She is medication compliant. Denies pain, symptom, or side effects. Denies SI/SIB or thoughts to harm others. Continue to monitor and assess.

## 2021-08-27 NOTE — PLAN OF CARE
Problem: Cognitive Impairment (Psychotic Signs/Symptoms)  Goal: Optimal Cognitive Function (Psychotic Signs/Symptoms)  Outcome: No Change   Pt had a pleasant evening. She spent most of this evening in her room watching TV and sleeping on and off. She minimally engaged in conversation and gave short answers to questions. She denied pain or discomfort. Flat and blunt affects. Hygiene maintenance remains poor. She took scheduled medication without any issue and denied SE's of her medication. Vital sign stable. Pt hoping to be discharged soon.       Fasting lipid panel this morning with elevated total cholesterol and elevated LDL. Elevated creatinine: Cr 1.17 this morning.

## 2021-08-27 NOTE — PROGRESS NOTES
[facility], [place]   Psychiatric Progress Note  Hospital Day: 135        Interim History:   The patient's care was discussed with the treatment team during the daily team meeting and/or staff's chart notes were reviewed. No acute medical concerns. Intermittently elevated BP with no symptoms, other vitals WNL. She has been pleasant and cooperative with staff and peers. Continues to isolate to her room, including eating meals in there, frequently watching CNN. She is medication adherent. No reported or observed side effects. No symptoms of psychosis or tad. Intermittently attends to all ADLs, though overall, hygiene maintenance remains poor. Sleeping and eating well. Appeared to be sleeping for 7+ hours overnight.     Michelle was sitting calmly on her bed watching TV. She brightened on approach. She reports that her mood is worsening given extended hospital stay. She wants to be discharged to the group home soon, and is disappointed that she was not discharged this week. Results reviewed with Michelle. She agreed to repeat blood draw tomorrow for quantiferon-TB gold (per GH request) and repeat BMP. She was encouraged to drink adequate fluids.     Suicidal ideation: denies current or recent suicidal ideation or behaviors.    Homicidal ideation: denies current or recent homicidal ideation or behaviors.    Psychotic symptoms: Patient denies AH, VH, paranoia, delusions.     Medication side effects reported: She denies sedation today. She denies any dizziness or lightheadedness. Denies urinary sx.     Acute medical concerns: pain in left ankle has resolved.     Other issues reported by patient: Patient had no further questions or concerns.           Medications:       amLODIPine  7.5 mg Oral Daily     benztropine  1 mg Oral BID     LORazepam  1 mg Oral TID    Or     LORazepam  1 mg Intramuscular TID     losartan  100 mg Oral Daily     metoprolol succinate ER  75 mg Oral Daily     OLANZapine zydis   20 mg Oral At Bedtime    Or     OLANZapine  10 mg Intramuscular At Bedtime          Allergies:     Allergies   Allergen Reactions     Haldol [Haloperidol]      Patient previously tolerated haldol, though developed oculogyric crises during hospital stay on 4/26/21 on total daily dose of 10 mg.     Lisinopril Cough          Labs:     Recent Results (from the past 24 hour(s))   UA reflex to Microscopic    Collection Time: 08/26/21  2:41 PM   Result Value Ref Range    Color Urine Yellow Colorless, Straw, Light Yellow, Yellow    Appearance Urine Clear Clear    Glucose Urine Negative Negative mg/dL    Bilirubin Urine Negative Negative    Ketones Urine Negative Negative mg/dL    Specific Gravity Urine 1.017 1.003 - 1.035    Blood Urine Negative Negative    pH Urine 5.5 5.0 - 7.0    Protein Albumin Urine Negative Negative mg/dL    Urobilinogen Urine Normal Normal, 2.0 mg/dL    Nitrite Urine Negative Negative    Leukocyte Esterase Urine Trace (A) Negative    RBC Urine 1 <=2 /HPF    WBC Urine 4 <=5 /HPF    Squamous Epithelials Urine 2 (H) <=1 /HPF    Mucus Urine Present (A) None Seen /LPF    Hyaline Casts Urine 3 (H) <=2 /LPF          Psychiatric Examination:     /55 (BP Location: Right arm)   Pulse 65   Temp 98.3  F (36.8  C) (Oral)   Resp 16   Wt 69 kg (152 lb 3.2 oz)   SpO2 100%   BMI 26.13 kg/m    Weight is 152 lbs 3.2 oz  Body mass index is 26.13 kg/m .    Weight over time:  Vitals:    05/25/21 0850 06/15/21 0811 06/16/21 1605 06/19/21 0859   Weight: 71 kg (156 lb 8 oz) 69.8 kg (153 lb 12.8 oz) 70.5 kg (155 lb 8 oz) 71.4 kg (157 lb 8 oz)    06/21/21 0805 06/22/21 0839 06/24/21 0800 07/02/21 0835   Weight: 69.5 kg (153 lb 3.2 oz) 69.3 kg (152 lb 11.2 oz) 70.7 kg (155 lb 12.8 oz) 70.7 kg (155 lb 12.8 oz)    07/03/21 0844 07/06/21 0838 07/10/21 0845 07/16/21 0821   Weight: 69.6 kg (153 lb 8 oz) 70.9 kg (156 lb 4.8 oz) 69.8 kg (153 lb 14.4 oz) 69.3 kg (152 lb 12.8 oz)    07/17/21 0830 07/31/21 0804 08/17/21 0845  08/19/21 0800   Weight: 70.4 kg (155 lb 4.8 oz) 70.3 kg (154 lb 14.4 oz) 70.8 kg (156 lb) 69.2 kg (152 lb 9.6 oz)    08/24/21 0700 08/26/21 0800   Weight: 70.3 kg (155 lb) 69 kg (152 lb 3.2 oz)       Orthostatic Vitals       Most Recent      Sitting Orthostatic /84 07/29 0800    Sitting Orthostatic Pulse (bpm) 82 07/29 0800    Standing Orthostatic /86 07/29 0800    Standing Orthostatic Pulse (bpm) 88 07/29 0800            Cardiometabolic risk assessment. 07/29/21      Reviewed patient profile for cardiometabolic risk factors    Date taken /Value  REFERENCE RANGE   Abdominal Obesity  (Waist Circumference)   See nursing flowsheet Women ?35 in (88 cm)   Men ?40 in (102 cm)      Triglycerides  Triglycerides   Date Value Ref Range Status   08/26/2021 205 (H) <150 mg/dL Final     Comment:     0-9 years:  Normal:    Less than 75 mg/dL  Borderline high:  75-99 mg/dL  High:             Greater than or equal to 100 mg/dL    0-19 years:  Normal:    Less than 90 mg/dL  Borderline high:   mg/dL  High:             Greater than or equal to 130 mg/dL    20 years and older:  Normal:    Less than 150 mg/dL  Borderline high:  150-199 mg/dL  High:             200-499 mg/dL  Very high:   Greater than or equal to 500 mg/dL   10/19/2019 117 <150 mg/dL Final       ?150 mg/dL (1.7 mmol/L) or current treatment for elevated triglycerides   HDL cholesterol  HDL Cholesterol   Date Value Ref Range Status   10/19/2019 56 >49 mg/dL Final     Direct Measure HDL   Date Value Ref Range Status   08/26/2021 55 >=50 mg/dL Final     Comment:     0-19 years:       Greater than or equal to 45 mg/dL   Low: Less than 40 mg/dL   Borderline low: 40-44 mg/dL     20 years and older:   Female: Greater than or equal to 50 mg/dL   Male:   Greater than or equal to 40 mg/dL        ]   Women <50 mg/dL (1.3 mmol/L) in women or current treatment for low HDL cholesterol  Men <40 mg/dL (1 mmol/L) in men or current treatment for low HDL cholesterol    "  Fasting plasma glucose (FPG) Lab Results   Component Value Date     07/02/2021      FPG ?100 mg/dL (5.6 mmol/L) or treatment for elevated blood glucose   Blood pressure  BP Readings from Last 3 Encounters:   08/26/21 115/55   06/04/19 131/71   04/26/19 164/86    Blood pressure ?130/85 mmHg or treatment for elevated blood pressure   Family History  See family history     Appearance: dressed in hospital scrubs, appeared reported age, unkempt hair   Attitude: cooperative  Eye Contact: improved, looks up more during the interview   Mood: \"good\"    Affect:  Somewhat blunted, no longer agitated or tense. Brightens and smiles at times   Speech: accented, clear and coherent. Soft volume.    Language: no obvious receptive or expressive deficits  Psychomotor, Gait, Musculoskeletal: No abnormal movements noted while seated  Thought Process: goal-oriented, linear, concrete   Associations:  No loosening of associations present  Thought Content:  Did not appear to be responding to internal stimuli.  Insight:   poor - slightly improved   Judgement:  fair  Oriented to: person, place, time/date   Attention Span and Concentration: attentive to conversation though at times stares ahead and does not respond to questions.  Recent and Remote Memory: stable, some difficulty recalling events   Fund of Knowledge:  normal    Clinical Global Impressions  First:  Considering your total clinical experience with this particular patient population, how severe are the patient's symptoms at this time?: 7 (04/16/21 1428)  Compared to the patient's condition at the START of treatment, this patient's condition is: 4 (04/16/21 1428)  Most recent:  Considering your total clinical experience with this particular patient population, how severe are the patient's symptoms at this time?: 7 (07/22/21 0941)  Compared to the patient's condition at the START of treatment, this patient's condition is: 3 (07/22/21 0941)           Precautions: "     Behavioral Orders   Procedures     Cheeking Precautions (behavioral units)     Patient Observed swallowing PO medications; Patient asked to drink water after swallowing medication; Patient in Staff line of sight for 15 minutes after medication given; Mouth checks after PO administration (patient asked to open mouth and stick out their tongue).     Code 3     For walks in the [place] with staff and per staff discretion     Electroconvulsive therapy     Series of up to 12 treatments. Begin Date: 5/26/21     Treating Psychiatrist providing ECT:  Dr. Lubin     Notified on:  5/21/21     Electroconvulsive therapy     As long as we get the green light from risk management and pt is medically cleared, begin ECT every Monday, Wednesday, and Friday     Electroconvulsive therapy     Series of up to 12 treatments. Begin Date: 5/25/21     Treating Psychiatrist providing ECT:  Amee     Notified on:  5/24/21     Elopement precautions     Fall precautions     Alcides Calderon     Routine Programming     As clinically indicated     Status 15     Every 15 minutes.          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 Symptoms and presentation at this time is most consistent with Schizoaffective Disorder, Bipolar Type. Obtained most recent medication regimen from patient's ACT team, and regimen was initially restarted. Pt is committed. Petitioned for Mcgrath Calderon was filed and granted due to lack of improvement  "and side effects from medications. Inpatient psychiatric hospitalization is warranted at this time for safety, stabilization, possible adjustment in medications and development of a safe discharge plan.      Hospital Course:  On admission, PTA medications were restarted. However, Michelle had been declining all scheduled medications despite significant encouragement from staff and provider. Psychiatric emergency declared on 4/20 due to aggression toward others in context of severe psychosis and suspected excited catatonia. Ativan 1 mg TID was also added. Discontinued PTA Invega, Zyprexa, and thorazine on 4/20 due to consistent refusal.      On 4/26, it was noted that patient frequently had upward gaze while walking up and down the unit. She did not appear to be distressed. She reported that she was looking at \"my god.\" It was determined to be oculogyric crisis secondary to IM haloperidol. Haldol was subsequently discontinued and scheduled Zyprexa was initiated on an emergency basis. Oral Cogentin was also scheduled, though patient declined. On the evening of 4/26, patient's gaze was fixed in upward position for several hours and she appeared to be experiencing discomfort. IM Cogentin was administered with noted resolution. Partial improvements were observed after switching to scheduled Zyprexa. After patient improved, she was more receptive to reinitiating oral Invega, which was initiated on 5/3 and titrated to PTA dose of 9 mg daily on 5/10. Plan was for ACT team to bring in loading dose of Invega Sustenna. Patient had signs of oculogyric crisis again with Invega. Oral dose decreased to 6 mg after this. Invega was stopped on 5/14 due to ongoing signs of problems related to eye movement and concerns for oculogyric crisis.     Overall improvement in patient's agitation and suspected catatonia noted on 4/30 after patient accepted two doses of Ativan. She began declining Ativan again with noted decompensation. Patient " "began accepting, however, was deemed not to have the capacity to consent to treatment with Ativan. She does not believe she has a mental illness, including catatonia. She does not fully understand risks associated with inadequate treatment of catatonia. Discussed with her son who is acting as surrogate decision maker and he is in full support of forced scheduled IM Ativan if patient declines oral formulation. Also consulted with our legal team prior to backing oral Ativan with IM.      Ativan was increased on 5/19 due to re-emerging evidence of catatonia (long periods of staring, repetitive movements, echolalia, mutism). Meeting was held with ACT team on 5/20, including ACT team psychiatrist, Dr. Pedraza. He said that he is in \"full support\" of plan to pursue Alcides Kohler and ECT at this time. He does feel that in the past she has been discharged from the hospital while still quite symptomatic. He is hoping that ECT will be effective and that with improvement, Michelle would be more receptive to clozapine and weekly blood draws. He said that ideally she would transition to an IRTS before going back to her apartment, but also understands there may be some barriers (I.e. pt's willingness, financial concerns, etc).      ECT consult placed. Please see consult note by Dr. Lubin on 5/21 for details. Alcdies Kohler was approved on 5/24 and patient was medically cleared on 5/24. ECT initiated on 5/26. She was noted to have a short seizure during ECT on 6/4. Staff note that patient appears more disoriented with memory impairment and sedation on days of ECT. This was noted on my examination again today. Improvements noted in mood, affect, social interactions, paranoia, agitation since initiation of ECT. Reduced Ativan on 6/5 due to sedative effects. Relayed concerns about memory impairment and confusion with Dr. Lubin. Recommended attempting unilateral ECT, which he agreed to do. First unilateral ECT on 6/7. ECT was held on 6/11 " and 6/14 due to memory impairment. We will plan to hold it again tomorrow (6/16). There is ongoing discussion regarding whether there is a component of catatonia contributing to her current presentation but this is less likely since it worsened after ECT was started.  Given her level of cognitive impairment and our belief that this is due to ECT, we will not pursue any further treatments at this time. Since we have held ECT (last treatment on 6/9), her cognitive impairment has slightly improved (more oriented).      Michelle initially appeared to be decompensating somewhat when ECT was held, though her cognitive impairment has gradually improved. If symptoms of psychosis re-emerge, it may be worth starting clozapine, which is something that has previously been considered by her ACT team. Her Hatch order would need to be amended as clozapine is not one of the medications listed. ANC obtained on 6/16 was 1800/microL. Per conversation with pharmacy, neutropenia has been associated with olanzapine and agranulocytosis has been associated with olanzapine, lorazepam, metoprolol, and hydralazine and hematologic labs have been monitored. Upon re-check, ANC was 3700/microL on 6/21/21. At this time, the primary symptoms we are observing are cognitive deficits and inability to care for self, which we would not address by changing her antipsychotic medication.     Michelle's cognitive impairment has been steadily improving since holding ECT treatments, however it is possible that lorazepam is also playing a role in her cognitive impairment. Given no signs of re-emerging catatonia, a gradual lorazepam taper (decreasing by 0.5 mg) was initiated on 6/28/21 to monitor for potential improvements with regard to memory impairment.  On 7/7, Michelle reported an incident of oculogyric crisis and Cogentin 1 mg BID was initiated with no noted changes in cognition.      Michelle has remained focused on discharge. The team is working with Michelle's ACT team  to to establish a safe discharge plan with options including moving to a group home vs home with her ACT team and additional in home supports via CADI services. We are concerned that Michelle would have difficulty taking her medications as prescribed if she were to return home without her ACT team and additional services. This is evidenced by impairments noted in cognitive assessment by OT specialist on 7/1. MOCA score was a 13/30 and CPT score was a 4.7. Physician's statement in support of guardianship was completed on 7/19/21.  Patient's son is pursuing guardianship. Patient became quite agitated on 7/16 when  staff from [facility] came to meet with her a second time. She did not allow staff members to enter her room and stated that she will not be going to a group home.      Due to daytime sedation, on 7/26/21, lorazepam evening dose decreased from 2 mg to 1 mg. On 7/27/21 transitioned Zyprexa from 10 mg BID to 5 mg QAM + 15 mg QPM and had less daytime sedation with this change and no emergence of symptoms concerning for orthostatic hypotension.  She attended her emergency guardianship hearing on 7/28.  Due to ongoing daytime sedation, Zyprexa doses were consolidated to bedtime starting on 7/30. [place] assessment completed on 8/12 for CADI funding.     Target psychiatric symptoms and interventions:   - Continue Ativan 1 mg PO or IM TID Patient may not decline.   - Resume Zyprexa 20 mg at bedtime  Hatch in place. May consider increasing further though holding off given re-emerging catatonic sx and borderline prolonged QTc      - Continue Cogentin 1 mg PO BID with additional 2 mg IM daily prn for evidence of acute dystonic reaction or oculogyric crisis  -Continue hydroxyzine 25-50 mg q4h prn for acute anxiety  -Continue Trazodone 50 mg at bedtime prn for sleep disturbances  -Continue Zyprexa 10 mg TID prn for severe agitation  -Continue Ativan/Benadryl q4h prn for agitation. WOULD GIVE PRN ATIVAN  "FIRST FOR AGITATION unless it is after 5 pm on day prior to scheduled ECT     Occupational Therapy Consult Placed to evaluate cognitive functioning/ability to care for self in home environment, ability to manage medications. See note on 7/1 for details.      ECT: Discontinued due to significant cognitive impairment. Please see note dated 7/12/21 for additional details.      Acute Medical Problems and Treatments:  Left ankle pain, resolved   - Added ice packs prn    HTN, improved: Patient is now adherent with medication regimen. Intermittent elevations with no concerning symptoms.  - Metoprolol succinate ER 75 mg   - Cozaar 100 mg daily  - Amlodipine 7.5 mg daily  - IM discontinued hydralazine prn  - Switched BP checks from QID to BID on 7/13 given overall improvement  - Please see note from IM dated 5/3, 5/6, 5/24, 6/20 and 6/30/21.  - Obtained EKG and routine labs on 5/21 in context of pt declining vital sign checks and anti-hypertensives and recently elevated BPs. Reviewed labs and discussed EKG findings with IM on 5/21. No urgent concerns, though IM should be notified if pt develops acute medical concerns (I.e. heart palpitations, SOB, changes in speech, AMS, confusion, CP, HA, changes in vision).    - Per 6/20 IM note: \"Please notify IM if BP is persistently severely elevated and requiring frequent PRN hydralazine\".  - Internal medicine consulted again on 6/28 due to consistent use of hydralazine over the past week. Metoprolol increased to 75 mg daily then to 100 mg and amlodipine 5 mg was added on 6/30. Amlodipine increased to 7.5 mg daily on 7/5.      Medicine consulted again on 8/26 given lab abnormalities as discussed below:   1) Abnormal cholesterol panel: Fasting lipid panel this morning with elevated total cholesterol and elevated LDL. Prior lipid panel in 2020 was unremarkable.   - First line management for dyslipidemia is a trial of 6 months of lifestyle changes (dietary modifications, exercise)   - She " should follow-up with PCP upon discharge to have further cardiac risk stratification and discussion regarding risks / benefits of starting a statin      2) Elevated creatinine: Cr 1.17 this morning. Patient had BRANDON in June with Cr to peak of 1.83, was felt to be of pre-renal etiology and Cr returned to baseline with IVF. Baseline Cr difficult to determine d/t limited labs but her baseline Cr appears to be ~0.9 - 1.0, therefore does not meet criteria for BRANDON at this time. Her bump in creatinine may be due to fluctuating baseline vs dehydration.    - Encourage oral fluid intake, goal 2 liters daily   - Recheck BMP on 8/28/21  - UA results reviewed. Trace leukocyte esterase. 4 WBCs. Patient is asymptomatic.      If patient discharged prior to lab check this could alternatively be done as an outpatient as she is medically stable.     Routine Health Maintenance: Will test quantiferon TB gold per GH request.     For previous medical concerns, please see note dated 7/12/21.     Behavioral/Psychological/Social:  - Encourage unit programming  - Patient is Code 3 status and can take walks in the [place] with staff and security present per staff discretion. This appears to be quite therapeutic for her.      Safety:  - Continue precautions as noted above  - Status 15 minute checks  - Safety precautions include: assault and elopement precautions  - Continue precautions as noted above     Legal Status: Committed as MI with Hatch in place for Haldol, Zyprexa, Invega, and Thorazine through [facility]. Filed Mcgrath Kohler through [facility] and approved on 5/24/21. Physician's statement in support of guardianship was completed on 7/19/21. Patient's son is now her temporary emergency guardian.     Disposition Plan   Reason for ongoing admission: pending safe discharge plan  Discharge location: Group home (needs CADI waiver). CPT assessment done by OT (Tania queen) on 7/1.  Please see note for details. [place]  Assessment completed on 8/12. Per  - one final step for approval for placement at [facility]  Discharge Medications: ordered.. Group home prefers 30 d/s. ACT team will take over meds upon discharge.   Follow-up Appointments: scheduled         .Gabby Zaman MD  [facility]

## 2021-08-27 NOTE — PLAN OF CARE
Problem: Sleep Disturbance (Psychotic Signs/Symptoms)  Goal: Improved Sleep (Psychotic Signs/Symptoms)  Outcome: Improving   Pt slept for 7hrs thus far. No observable concerns this shift. The 15mins safety checks cont--Pt cont on assault and suicide precautions with no related problem. The writer administered no medications.

## 2021-08-28 LAB
ANION GAP SERPL CALCULATED.3IONS-SCNC: 3 MMOL/L (ref 3–14)
BUN SERPL-MCNC: 17 MG/DL (ref 7–30)
CALCIUM SERPL-MCNC: 9 MG/DL (ref 8.5–10.1)
CHLORIDE BLD-SCNC: 111 MMOL/L (ref 94–109)
CO2 SERPL-SCNC: 28 MMOL/L (ref 20–32)
CREAT SERPL-MCNC: 1.09 MG/DL (ref 0.52–1.04)
GFR SERPL CREATININE-BSD FRML MDRD: 56 ML/MIN/1.73M2
GLUCOSE BLD-MCNC: 88 MG/DL (ref 70–99)
POTASSIUM BLD-SCNC: 3.4 MMOL/L (ref 3.4–5.3)
SODIUM SERPL-SCNC: 142 MMOL/L (ref 133–144)

## 2021-08-28 PROCEDURE — 250N000013 HC RX MED GY IP 250 OP 250 PS 637: Performed by: PHYSICIAN ASSISTANT

## 2021-08-28 PROCEDURE — 36415 COLL VENOUS BLD VENIPUNCTURE: CPT | Performed by: PHYSICIAN ASSISTANT

## 2021-08-28 PROCEDURE — 86481 TB AG RESPONSE T-CELL SUSP: CPT | Performed by: PSYCHIATRY & NEUROLOGY

## 2021-08-28 PROCEDURE — 80048 BASIC METABOLIC PNL TOTAL CA: CPT | Performed by: PHYSICIAN ASSISTANT

## 2021-08-28 PROCEDURE — 99207 PR NO CHARGE LOS: CPT | Performed by: PHYSICIAN ASSISTANT

## 2021-08-28 PROCEDURE — 250N000013 HC RX MED GY IP 250 OP 250 PS 637: Performed by: STUDENT IN AN ORGANIZED HEALTH CARE EDUCATION/TRAINING PROGRAM

## 2021-08-28 PROCEDURE — 124N000002 HC R&B MH UMMC

## 2021-08-28 RX ADMIN — LORAZEPAM 1 MG: 1 TABLET ORAL at 09:04

## 2021-08-28 RX ADMIN — METOPROLOL SUCCINATE 75 MG: 25 TABLET, EXTENDED RELEASE ORAL at 09:04

## 2021-08-28 RX ADMIN — OLANZAPINE 20 MG: 20 TABLET, ORALLY DISINTEGRATING ORAL at 20:02

## 2021-08-28 RX ADMIN — BENZTROPINE MESYLATE 1 MG: 1 TABLET ORAL at 20:02

## 2021-08-28 RX ADMIN — AMLODIPINE BESYLATE 7.5 MG: 2.5 TABLET ORAL at 09:04

## 2021-08-28 RX ADMIN — LOSARTAN POTASSIUM 100 MG: 100 TABLET, FILM COATED ORAL at 09:04

## 2021-08-28 RX ADMIN — LORAZEPAM 1 MG: 1 TABLET ORAL at 20:02

## 2021-08-28 RX ADMIN — BENZTROPINE MESYLATE 1 MG: 1 TABLET ORAL at 09:04

## 2021-08-28 RX ADMIN — LORAZEPAM 1 MG: 1 TABLET ORAL at 14:24

## 2021-08-28 ASSESSMENT — ACTIVITIES OF DAILY LIVING (ADL)
DRESS: SCRUBS (BEHAVIORAL HEALTH)
LAUNDRY: UNABLE TO COMPLETE
HYGIENE/GROOMING: INDEPENDENT;PROMPTS
ORAL_HYGIENE: INDEPENDENT;PROMPTS
HYGIENE/GROOMING: PROMPTS
ORAL_HYGIENE: PROMPTS
DRESS: SCRUBS (BEHAVIORAL HEALTH);INDEPENDENT

## 2021-08-28 NOTE — PLAN OF CARE
Patient appeared to be asleep for 7 hours during safety checks this shift. No complaints or concerns voiced by patient or noted by staff. Will continue to monitor and update if there are changes.

## 2021-08-28 NOTE — PROGRESS NOTES
Brief Medicine Note    Medicine following creatinine.  Repeat BMP today demonstrates improvement in her creatinine.  Her slightly elevated creatinine and reduced GFR are consistent with chronic kidney disease.  Her creatinine has had some fluctuations over the last 3 months and her baseline appears to be about 0.9-1.1.    Plan:   - Continue oral hydration   - She should follow-up with primary care provider within 1 week of discharge for further management and surveillance. She should have a repeat BMP at this time.     Medicine will sign off. Please do not hesitate to contact if new questions or concerns arise.     Helen Mao PA-C  Hospitalist Service  Pager: [phone]

## 2021-08-28 NOTE — PLAN OF CARE
Patient followed the expectations for NPO labs to be drawn this morning. She was assertive with telling staff that she needed to wait to eat breakfast until after her labs were drawn. She then told RN that she wanted to wait to take her scheduled meds after she ate breakfast. This is her daily routine and seems to work well for her.  Pt did spend a fair amount of time on phone with family members. She denies any current SI,HI or SIB.  She also denies hallucinations. Pateint ate and drank with good appetite.  Patient was asked if she was interested in going outside into the garden. She did not answer the question even when asked a second time.  Pateint spent majority of shift in room.

## 2021-08-29 PROCEDURE — 250N000013 HC RX MED GY IP 250 OP 250 PS 637: Performed by: PHYSICIAN ASSISTANT

## 2021-08-29 PROCEDURE — 250N000013 HC RX MED GY IP 250 OP 250 PS 637: Performed by: STUDENT IN AN ORGANIZED HEALTH CARE EDUCATION/TRAINING PROGRAM

## 2021-08-29 PROCEDURE — 124N000002 HC R&B MH UMMC

## 2021-08-29 RX ADMIN — LORAZEPAM 1 MG: 1 TABLET ORAL at 14:43

## 2021-08-29 RX ADMIN — OLANZAPINE 20 MG: 20 TABLET, ORALLY DISINTEGRATING ORAL at 20:03

## 2021-08-29 RX ADMIN — BENZTROPINE MESYLATE 1 MG: 1 TABLET ORAL at 08:31

## 2021-08-29 RX ADMIN — AMLODIPINE BESYLATE 7.5 MG: 2.5 TABLET ORAL at 08:31

## 2021-08-29 RX ADMIN — BENZTROPINE MESYLATE 1 MG: 1 TABLET ORAL at 20:03

## 2021-08-29 RX ADMIN — LORAZEPAM 1 MG: 1 TABLET ORAL at 08:31

## 2021-08-29 RX ADMIN — LOSARTAN POTASSIUM 100 MG: 100 TABLET, FILM COATED ORAL at 08:31

## 2021-08-29 RX ADMIN — METOPROLOL SUCCINATE 75 MG: 25 TABLET, EXTENDED RELEASE ORAL at 08:31

## 2021-08-29 RX ADMIN — LORAZEPAM 1 MG: 1 TABLET ORAL at 20:03

## 2021-08-29 ASSESSMENT — ACTIVITIES OF DAILY LIVING (ADL)
ORAL_HYGIENE: INDEPENDENT
ORAL_HYGIENE: INDEPENDENT
DRESS: SCRUBS (BEHAVIORAL HEALTH)
HYGIENE/GROOMING: INDEPENDENT
HYGIENE/GROOMING: INDEPENDENT
LAUNDRY: UNABLE TO COMPLETE
DRESS: INDEPENDENT
LAUNDRY: UNABLE TO COMPLETE

## 2021-08-29 NOTE — PLAN OF CARE
Nursing assessment completed. Patient is isolative to her room through out shift. Hopeful to discharge to her group home soon. She presents with a flat, depressed affect, but does brighten upon approach. VSS. She is medication compliant.Denies pain, symptom, or side effects. Denies SI/SIB or thoughts to harm others. Continue to monitor and assess.

## 2021-08-29 NOTE — PLAN OF CARE
"Problem: Behavioral Health Plan of Care  Goal: Optimized Coping Skills in Response to Life Stressors  Outcome: No Change  Goal: Develops/Participates in Therapeutic Viola to Support Successful Transition  Outcome: No Change  Intervention: Foster Therapeutic Viola    Patient is isolative and withdrawn.  She is dismissive upon approach.  She states she has, \"no problems\" this evening.  Patient denies anxiety, depression, SI, HI, SIB, hallucinations, and pain.  Patient is medication compliant.  No aggressive behaviors are observed.  Patient remains safe on unit.  Will continue to monitor.  Nedra Cortez RN   "

## 2021-08-30 ENCOUNTER — APPOINTMENT (OUTPATIENT)
Dept: GENERAL RADIOLOGY | Facility: CLINIC | Age: 61
End: 2021-08-30
Attending: STUDENT IN AN ORGANIZED HEALTH CARE EDUCATION/TRAINING PROGRAM
Payer: COMMERCIAL

## 2021-08-30 LAB
GAMMA INTERFERON BACKGROUND BLD IA-ACNC: 0.14 IU/ML
M TB IFN-G BLD-IMP: POSITIVE
M TB IFN-G CD4+ BCKGRND COR BLD-ACNC: 9.86 IU/ML
MITOGEN IGNF BCKGRD COR BLD-ACNC: 2.83 IU/ML
MITOGEN IGNF BCKGRD COR BLD-ACNC: 3.28 IU/ML
QUANTIFERON MITOGEN: 10 IU/ML
QUANTIFERON NIL TUBE: 0.14 IU/ML
QUANTIFERON TB1 TUBE: 3.42 IU/ML
QUANTIFERON TB2 TUBE: 2.97

## 2021-08-30 PROCEDURE — 250N000013 HC RX MED GY IP 250 OP 250 PS 637: Performed by: STUDENT IN AN ORGANIZED HEALTH CARE EDUCATION/TRAINING PROGRAM

## 2021-08-30 PROCEDURE — 99232 SBSQ HOSP IP/OBS MODERATE 35: CPT | Mod: GC | Performed by: PSYCHIATRY & NEUROLOGY

## 2021-08-30 PROCEDURE — 124N000002 HC R&B MH UMMC

## 2021-08-30 PROCEDURE — 71046 X-RAY EXAM CHEST 2 VIEWS: CPT

## 2021-08-30 PROCEDURE — 250N000013 HC RX MED GY IP 250 OP 250 PS 637: Performed by: PHYSICIAN ASSISTANT

## 2021-08-30 PROCEDURE — 71046 X-RAY EXAM CHEST 2 VIEWS: CPT | Mod: 26 | Performed by: RADIOLOGY

## 2021-08-30 RX ADMIN — LORAZEPAM 1 MG: 1 TABLET ORAL at 20:25

## 2021-08-30 RX ADMIN — LORAZEPAM 1 MG: 1 TABLET ORAL at 14:11

## 2021-08-30 RX ADMIN — OLANZAPINE 20 MG: 20 TABLET, ORALLY DISINTEGRATING ORAL at 20:25

## 2021-08-30 RX ADMIN — METOPROLOL SUCCINATE 75 MG: 25 TABLET, EXTENDED RELEASE ORAL at 08:41

## 2021-08-30 RX ADMIN — AMLODIPINE BESYLATE 7.5 MG: 2.5 TABLET ORAL at 08:41

## 2021-08-30 RX ADMIN — BENZTROPINE MESYLATE 1 MG: 1 TABLET ORAL at 08:41

## 2021-08-30 RX ADMIN — LOSARTAN POTASSIUM 100 MG: 100 TABLET, FILM COATED ORAL at 08:41

## 2021-08-30 RX ADMIN — LORAZEPAM 1 MG: 1 TABLET ORAL at 08:41

## 2021-08-30 RX ADMIN — BENZTROPINE MESYLATE 1 MG: 1 TABLET ORAL at 20:25

## 2021-08-30 ASSESSMENT — ACTIVITIES OF DAILY LIVING (ADL)
LAUNDRY: UNABLE TO COMPLETE
DRESS: INDEPENDENT
ORAL_HYGIENE: INDEPENDENT
DRESS: INDEPENDENT
ORAL_HYGIENE: INDEPENDENT
HYGIENE/GROOMING: INDEPENDENT
HYGIENE/GROOMING: INDEPENDENT
LAUNDRY: UNABLE TO COMPLETE

## 2021-08-30 NOTE — PLAN OF CARE
"Problem: Behavior Regulation Impairment (Psychotic Signs/Symptoms)  Goal: Improved Behavioral Control (Psychotic Signs/Symptoms)  Outcome: No Change     Problem: Cognitive Impairment (Psychotic Signs/Symptoms)  Goal: Optimal Cognitive Function (Psychotic Signs/Symptoms)  Outcome: No Change  Intervention: Support and Promote Cognitive Ability    Problem: Mood Impairment (Psychotic Signs/Symptoms)  Goal: Improved Mood Symptoms (Psychotic Signs/Symptoms)  Outcome: No Change  Intervention: Optimize Emotion and Mood     Patient is withdrawn but pleasant upon approach.  She isolates to her room most the evening and does not attend group.  She is dismissive with mental health symptoms, denying anxiety, depression, SI, HI, SIB, and hallucinations.  She says she is doing \"good.\"  Patient's TB gold plus result did come back positive today so she had an x-ray done.  X-ray negative for active infection.  Patient informed of results and she expressed frustration that this needed to be done due to her lack of symptoms.  Attempted to educate patient on need for this due to discharging to group home, which she ultimately seemed accepting of.  Patient is medication complaint and remains safe on unit.   Order for routine covid test placed but patient sleeping.  Will continue to monitor.  Nedra Cortez RN    "

## 2021-08-30 NOTE — PROGRESS NOTES
"[facility], [place]   Psychiatric Progress Note  Hospital Day: 138        Interim History:   The patient's care was discussed with the treatment team during the daily team meeting and/or staff's chart notes were reviewed. No acute medical concerns. and vitals WNL. She has been pleasant and cooperative with staff and peers. Continues to be isolate to her room despite encouragement to participate in the milieus. She is medication adherent. No reported or observed side effects. No symptoms of psychosis or tad. Intermittently attends to all ADLs, though overall, hygiene maintenance remains poor. Continue to sleep and eat well.     Michelle was resting in her bed prior to the interview. She reports that she is \"fine\" and has no physical concerns or mental health concerns. She was provided with an update regarding the status of her discharge to the group home and that we anticipate it will be soon. We discussed her recent lab results including concern regarding CKD given elevated creatinine and decreased GFR. She was informed regarding recommendation for a follow up lab and to see PCP in 1 week and she initially disagreed with this stating there is nothing wrong, though on further explanation was more accepting.     In the afternoon, Michelle was updated regarding her positive quantiferon gold test returning positive and the recommendation for a chest xray.  She was initially upset reporting that she \"does not have tuberculosis.\"  Upon further explanation regarding active and latent TB infections she appeared to be more understanding.  At 1 point she asked whether it was the correct but that was tested and was reassured regarding this.  Writer did offer to do a repeat blood test if she preferred though Michelle replied she will just go ahead with the chest x-ray.  Shortly later she was provided with an update regarding the group home's response regarding the positive test and that she is still able to " go there though they would prefer antibiotics initiated prior to discharge if possible.     Suicidal ideation: denies current or recent suicidal ideation or behaviors.    Homicidal ideation: denies current or recent homicidal ideation or behaviors.    Psychotic symptoms: Patient denies AH, VH, paranoia, delusions.     Medication side effects reported: She denies sedation today. She denies any dizziness or lightheadedness. Denies urinary sx. she denies any fevers, chills, cough or malaise.    Acute medical concerns: pain in left ankle has resolved.     Other issues reported by patient: Patient had no further questions or concerns.           Medications:       amLODIPine  7.5 mg Oral Daily     benztropine  1 mg Oral BID     LORazepam  1 mg Oral TID    Or     LORazepam  1 mg Intramuscular TID     losartan  100 mg Oral Daily     metoprolol succinate ER  75 mg Oral Daily     OLANZapine zydis  20 mg Oral At Bedtime    Or     OLANZapine  10 mg Intramuscular At Bedtime          Allergies:     Allergies   Allergen Reactions     Haldol [Haloperidol]      Patient previously tolerated haldol, though developed oculogyric crises during hospital stay on 4/26/21 on total daily dose of 10 mg.     Lisinopril Cough          Labs:     No results found for this or any previous visit (from the past 24 hour(s)).       Psychiatric Examination:     /68 (BP Location: Right arm)   Pulse 60   Temp 98.1  F (36.7  C) (Tympanic)   Resp 16   Wt 69 kg (152 lb 3.2 oz)   SpO2 99%   BMI 26.13 kg/m    Weight is 152 lbs 3.2 oz  Body mass index is 26.13 kg/m .    Weight over time:  Vitals:    05/25/21 0850 06/15/21 0811 06/16/21 1605 06/19/21 0859   Weight: 71 kg (156 lb 8 oz) 69.8 kg (153 lb 12.8 oz) 70.5 kg (155 lb 8 oz) 71.4 kg (157 lb 8 oz)    06/21/21 0805 06/22/21 0839 06/24/21 0800 07/02/21 0835   Weight: 69.5 kg (153 lb 3.2 oz) 69.3 kg (152 lb 11.2 oz) 70.7 kg (155 lb 12.8 oz) 70.7 kg (155 lb 12.8 oz)    07/03/21 0844 07/06/21 0838  07/10/21 0845 07/16/21 0821   Weight: 69.6 kg (153 lb 8 oz) 70.9 kg (156 lb 4.8 oz) 69.8 kg (153 lb 14.4 oz) 69.3 kg (152 lb 12.8 oz)    07/17/21 0830 07/31/21 0804 08/17/21 0845 08/19/21 0800   Weight: 70.4 kg (155 lb 4.8 oz) 70.3 kg (154 lb 14.4 oz) 70.8 kg (156 lb) 69.2 kg (152 lb 9.6 oz)    08/24/21 0700 08/26/21 0800   Weight: 70.3 kg (155 lb) 69 kg (152 lb 3.2 oz)       Orthostatic Vitals       Most Recent      Sitting Orthostatic /84 07/29 0800    Sitting Orthostatic Pulse (bpm) 82 07/29 0800    Standing Orthostatic /86 07/29 0800    Standing Orthostatic Pulse (bpm) 88 07/29 0800            Cardiometabolic risk assessment. 07/29/21      Reviewed patient profile for cardiometabolic risk factors    Date taken /Value  REFERENCE RANGE   Abdominal Obesity  (Waist Circumference)   See nursing flowsheet Women ?35 in (88 cm)   Men ?40 in (102 cm)      Triglycerides  Triglycerides   Date Value Ref Range Status   08/26/2021 205 (H) <150 mg/dL Final     Comment:     0-9 years:  Normal:    Less than 75 mg/dL  Borderline high:  75-99 mg/dL  High:             Greater than or equal to 100 mg/dL    0-19 years:  Normal:    Less than 90 mg/dL  Borderline high:   mg/dL  High:             Greater than or equal to 130 mg/dL    20 years and older:  Normal:    Less than 150 mg/dL  Borderline high:  150-199 mg/dL  High:             200-499 mg/dL  Very high:   Greater than or equal to 500 mg/dL   10/19/2019 117 <150 mg/dL Final       ?150 mg/dL (1.7 mmol/L) or current treatment for elevated triglycerides   HDL cholesterol  HDL Cholesterol   Date Value Ref Range Status   10/19/2019 56 >49 mg/dL Final     Direct Measure HDL   Date Value Ref Range Status   08/26/2021 55 >=50 mg/dL Final     Comment:     0-19 years:       Greater than or equal to 45 mg/dL   Low: Less than 40 mg/dL   Borderline low: 40-44 mg/dL     20 years and older:   Female: Greater than or equal to 50 mg/dL   Male:   Greater than or equal to 40 mg/dL  "       ]   Women <50 mg/dL (1.3 mmol/L) in women or current treatment for low HDL cholesterol  Men <40 mg/dL (1 mmol/L) in men or current treatment for low HDL cholesterol     Fasting plasma glucose (FPG) Lab Results   Component Value Date     07/02/2021      FPG ?100 mg/dL (5.6 mmol/L) or treatment for elevated blood glucose   Blood pressure  BP Readings from Last 3 Encounters:   08/30/21 120/68   06/04/19 131/71   04/26/19 164/86    Blood pressure ?130/85 mmHg or treatment for elevated blood pressure   Family History  See family history     Appearance: dressed in hospital scrubs, appeared reported age, unkempt hair   Attitude: cooperative  Eye Contact: improved, looks up more during the interview   Mood: \"good\"    Affect:  Somewhat blunted, no longer agitated or tense. Brightens and smiles at times   Speech: accented, clear and coherent. Soft volume.    Language: no obvious receptive or expressive deficits  Psychomotor, Gait, Musculoskeletal: No abnormal movements noted while seated  Thought Process: goal-oriented, linear, concrete   Associations:  No loosening of associations present  Thought Content:  Did not appear to be responding to internal stimuli.  Insight:   poor - slightly improved   Judgement:  fair  Oriented to: person, place, time/date   Attention Span and Concentration: attentive to conversation though at times stares ahead and does not respond to questions.  Recent and Remote Memory: stable, some difficulty recalling events   Fund of Knowledge:  normal    Clinical Global Impressions  First:  Considering your total clinical experience with this particular patient population, how severe are the patient's symptoms at this time?: 7 (04/16/21 1428)  Compared to the patient's condition at the START of treatment, this patient's condition is: 4 (04/16/21 1428)  Most recent:  Considering your total clinical experience with this particular patient population, how severe are the patient's symptoms at " this time?: 7 (07/22/21 0941)  Compared to the patient's condition at the START of treatment, this patient's condition is: 3 (07/22/21 0941)           Precautions:     Behavioral Orders   Procedures     Cheeking Precautions (behavioral units)     Patient Observed swallowing PO medications; Patient asked to drink water after swallowing medication; Patient in Staff line of sight for 15 minutes after medication given; Mouth checks after PO administration (patient asked to open mouth and stick out their tongue).     Code 3     For walks in the [facility] with staff and per staff discretion     Electroconvulsive therapy     Series of up to 12 treatments. Begin Date: 5/26/21     Treating Psychiatrist providing ECT:  Dr. Lubin     Notified on:  5/21/21     Electroconvulsive therapy     As long as we get the green light from risk management and pt is medically cleared, begin ECT every Monday, Wednesday, and Friday     Electroconvulsive therapy     Series of up to 12 treatments. Begin Date: 5/25/21     Treating Psychiatrist providing ECT:  Amee     Notified on:  5/24/21     Elopement precautions     Fall precautions     Mcgrath Calderon     Routine Programming     As clinically indicated     Status 15     Every 15 minutes.          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 Symptoms and presentation at this time is most consistent with Schizoaffective Disorder, Bipolar Type. Obtained most recent medication  "regimen from patient's ACT team, and regimen was initially restarted. Pt is committed. Petitioned for Alcides Calderon was filed and granted due to lack of improvement and side effects from medications. Inpatient psychiatric hospitalization is warranted at this time for safety, stabilization, possible adjustment in medications and development of a safe discharge plan.      Hospital Course:  On admission, PTA medications were restarted. However, Michelle had been declining all scheduled medications despite significant encouragement from staff and provider. Psychiatric emergency declared on 4/20 due to aggression toward others in context of severe psychosis and suspected excited catatonia. Ativan 1 mg TID was also added. Discontinued PTA Invega, Zyprexa, and thorazine on 4/20 due to consistent refusal.      On 4/26, it was noted that patient frequently had upward gaze while walking up and down the unit. She did not appear to be distressed. She reported that she was looking at \"my god.\" It was determined to be oculogyric crisis secondary to IM haloperidol. Haldol was subsequently discontinued and scheduled Zyprexa was initiated on an emergency basis. Oral Cogentin was also scheduled, though patient declined. On the evening of 4/26, patient's gaze was fixed in upward position for several hours and she appeared to be experiencing discomfort. IM Cogentin was administered with noted resolution. Partial improvements were observed after switching to scheduled Zyprexa. After patient improved, she was more receptive to reinitiating oral Invega, which was initiated on 5/3 and titrated to PTA dose of 9 mg daily on 5/10. Plan was for ACT team to bring in loading dose of Invega Sustenna. Patient had signs of oculogyric crisis again with Invega. Oral dose decreased to 6 mg after this. Invega was stopped on 5/14 due to ongoing signs of problems related to eye movement and concerns for oculogyric crisis.     Overall improvement in patient's " "agitation and suspected catatonia noted on 4/30 after patient accepted two doses of Ativan. She began declining Ativan again with noted decompensation. Patient began accepting, however, was deemed not to have the capacity to consent to treatment with Ativan. She does not believe she has a mental illness, including catatonia. She does not fully understand risks associated with inadequate treatment of catatonia. Discussed with her son who is acting as surrogate decision maker and he is in full support of forced scheduled IM Ativan if patient declines oral formulation. Also consulted with our legal team prior to backing oral Ativan with IM.      Ativan was increased on 5/19 due to re-emerging evidence of catatonia (long periods of staring, repetitive movements, echolalia, mutism). Meeting was held with ACT team on 5/20, including ACT team psychiatrist, Dr. Pedraza. He said that he is in \"full support\" of plan to pursue Mcgrath Kohler and ECT at this time. He does feel that in the past she has been discharged from the hospital while still quite symptomatic. He is hoping that ECT will be effective and that with improvement, Michelle would be more receptive to clozapine and weekly blood draws. He said that ideally she would transition to an IRTS before going back to her apartment, but also understands there may be some barriers (I.e. pt's willingness, financial concerns, etc).      ECT consult placed. Please see consult note by Dr. Lubin on 5/21 for details. Mcgrath Kohler was approved on 5/24 and patient was medically cleared on 5/24. ECT initiated on 5/26. She was noted to have a short seizure during ECT on 6/4. Staff note that patient appears more disoriented with memory impairment and sedation on days of ECT. This was noted on my examination again today. Improvements noted in mood, affect, social interactions, paranoia, agitation since initiation of ECT. Reduced Ativan on 6/5 due to sedative effects. Relayed concerns about " memory impairment and confusion with Dr. Lubin. Recommended attempting unilateral ECT, which he agreed to do. First unilateral ECT on 6/7. ECT was held on 6/11 and 6/14 due to memory impairment. We will plan to hold it again tomorrow (6/16). There is ongoing discussion regarding whether there is a component of catatonia contributing to her current presentation but this is less likely since it worsened after ECT was started.  Given her level of cognitive impairment and our belief that this is due to ECT, we will not pursue any further treatments at this time. Since we have held ECT (last treatment on 6/9), her cognitive impairment has slightly improved (more oriented).      Michelle initially appeared to be decompensating somewhat when ECT was held, though her cognitive impairment has gradually improved. If symptoms of psychosis re-emerge, it may be worth starting clozapine, which is something that has previously been considered by her ACT team. Her Hathc order would need to be amended as clozapine is not one of the medications listed. ANC obtained on 6/16 was 1800/microL. Per conversation with pharmacy, neutropenia has been associated with olanzapine and agranulocytosis has been associated with olanzapine, lorazepam, metoprolol, and hydralazine and hematologic labs have been monitored. Upon re-check, ANC was 3700/microL on 6/21/21. At this time, the primary symptoms we are observing are cognitive deficits and inability to care for self, which we would not address by changing her antipsychotic medication.     Michelle's cognitive impairment has been steadily improving since holding ECT treatments, however it is possible that lorazepam is also playing a role in her cognitive impairment. Given no signs of re-emerging catatonia, a gradual lorazepam taper (decreasing by 0.5 mg) was initiated on 6/28/21 to monitor for potential improvements with regard to memory impairment.  On 7/7, Michelle reported an incident of oculogyric crisis  and Cogentin 1 mg BID was initiated with no noted changes in cognition.      Michelle has remained focused on discharge. The team is working with Michelle's ACT team to to establish a safe discharge plan with options including moving to a group home vs home with her ACT team and additional in home supports via CADI services. We are concerned that Michelle would have difficulty taking her medications as prescribed if she were to return home without her ACT team and additional services. This is evidenced by impairments noted in cognitive assessment by OT specialist on 7/1. MOCA score was a 13/30 and CPT score was a 4.7. Physician's statement in support of guardianship was completed on 7/19/21.  Patient's son is pursuing guardianship. Patient became quite agitated on 7/16 when  staff from [facility] came to meet with her a second time. She did not allow staff members to enter her room and stated that she will not be going to a group home.      Due to daytime sedation, on 7/26/21, lorazepam evening dose decreased from 2 mg to 1 mg. On 7/27/21 transitioned Zyprexa from 10 mg BID to 5 mg QAM + 15 mg QPM and had less daytime sedation with this change and no emergence of symptoms concerning for orthostatic hypotension.  She attended her emergency guardianship hearing on 7/28.  Due to ongoing daytime sedation, Zyprexa doses were consolidated to bedtime starting on 7/30. [place]12 for CADI funding.     Target psychiatric symptoms and interventions:   - Continue Ativan 1 mg PO or IM TID Patient may not decline.   - Resume Zyprexa 20 mg at bedtime  Hatch in place. May consider increasing further though holding off given re-emerging catatonic sx and borderline prolonged QTc      - Continue Cogentin 1 mg PO BID with additional 2 mg IM daily prn for evidence of acute dystonic reaction or oculogyric crisis  -Continue hydroxyzine 25-50 mg q4h prn for acute anxiety  -Continue Trazodone 50 mg at bedtime  "prn for sleep disturbances  -Continue Zyprexa 10 mg TID prn for severe agitation  -Continue Ativan/Benadryl q4h prn for agitation. WOULD GIVE PRN ATIVAN FIRST FOR AGITATION unless it is after 5 pm on day prior to scheduled ECT     Occupational Therapy Consult Placed to evaluate cognitive functioning/ability to care for self in home environment, ability to manage medications. See note on 7/1 for details.      ECT: Discontinued due to significant cognitive impairment. Please see note dated 7/12/21 for additional details.      Acute Medical Problems and Treatments:  Positive quantiferon gold test on 8/26/21 - per bryson with ID, obtain a chest xray to rule out active infection. If no active infection, would be okay for her to follow up for treatment as an outpatient unless treatment must be initiated per group home protocol.   - PA and Lateral chest xray ordered   - If any concerns for active TB based on CXR read, contact ID immediately (patient would need to be placed in an isolation room etc.)  - ID consult placed on 8/30 given group home preference for treatment initiation prior to discharge     Left ankle pain, resolved   - Added ice packs prn    HTN, improved: Patient is now adherent with medication regimen. Intermittent elevations with no concerning symptoms.  - Metoprolol succinate ER 75 mg   - Cozaar 100 mg daily  - Amlodipine 7.5 mg daily  - IM discontinued hydralazine prn  - Switched BP checks from QID to BID on 7/13 given overall improvement  - Please see note from IM dated 5/3, 5/6, 5/24, 6/20 and 6/30/21.  - Obtained EKG and routine labs on 5/21 in context of pt declining vital sign checks and anti-hypertensives and recently elevated BPs. Reviewed labs and discussed EKG findings with IM on 5/21. No urgent concerns, though IM should be notified if pt develops acute medical concerns (I.e. heart palpitations, SOB, changes in speech, AMS, confusion, CP, HA, changes in vision).    - Per 6/20 IM note: \"Please " "notify IM if BP is persistently severely elevated and requiring frequent PRN hydralazine\".  - Internal medicine consulted again on 6/28 due to consistent use of hydralazine over the past week. Metoprolol increased to 75 mg daily then to 100 mg and amlodipine 5 mg was added on 6/30. Amlodipine increased to 7.5 mg daily on 7/5.      Medicine consulted again on 8/26 given lab abnormalities as discussed below:   1) Abnormal cholesterol panel: Fasting lipid panel this morning with elevated total cholesterol and elevated LDL. Prior lipid panel in 2020 was unremarkable.   - First line management for dyslipidemia is a trial of 6 months of lifestyle changes (dietary modifications, exercise)   - She should follow-up with PCP upon discharge to have further cardiac risk stratification and discussion regarding risks / benefits of starting a statin      2) Elevated creatinine: Cr 1.17 this morning. Patient had BRANDON in June with Cr to peak of 1.83, was felt to be of pre-renal etiology and Cr returned to baseline with IVF. Baseline Cr difficult to determine d/t limited labs but her baseline Cr appears to be ~0.9 - 1.0, therefore does not meet criteria for BRANDON at this time. Her bump in creatinine may be due to fluctuating baseline vs dehydration.    - Encourage oral fluid intake, goal 2 liters daily   - Recheck BMP on 8/28/21, Cr down-trending, concern for CKD   - UA results reviewed. Trace leukocyte esterase. 4 WBCs. Patient is asymptomatic.     Routine Health Maintenance: Will test quantiferon TB gold per [facility] request.     For previous medical concerns, please see note dated 7/12/21.     Behavioral/Psychological/Social:  - Encourage unit programming  - Patient is Code 3 status and can take walks in the [place] with staff and security present per staff discretion. This appears to be quite therapeutic for her.      Safety:  - Continue precautions as noted above  - Status 15 minute checks  - Safety precautions include: assault and " elopement precautions  - Continue precautions as noted above     Legal Status: Committed as MI with Hatch in place for Haldol, Zyprexa, Invega, and Thorazine through [facility]. Filed Alcides Kohler through [facility] and approved on 5/24/21. Physician's statement in support of guardianship was completed on 7/19/21. Patient's son is now her temporary emergency guardian.     Disposition Plan   Reason for ongoing admission: pending safe discharge plan  Discharge location: Group home (needs CADI waiver). CPT assessment done by OT (Tania queen) on 7/1.  Please see note for details. [place] completed on 8/12 & funding approved though amount was too low for [facility] to accept. Process to appeal funding amount in progress.   Discharge Medications: ordered.. Group home prefers 30 d/s. ACT team will take over meds upon discharge.   Follow-up Appointments: scheduled         Bijal Varner MD  Psychiatry PGY-4

## 2021-08-30 NOTE — PLAN OF CARE
Problem: Behavioral Health Plan of Care  Goal: Optimized Coping Skills in Response to Life Stressors  Outcome: Improving     Behavioral  Pt was in her room majority of this shift. Pt denies any anxiety, depression, SI/SIB or AVH. was visible in the milieu. Pt mood was calm and affect flat and blunted. She endorses good appetite and ate 100% of her dinner. No behavioral or health concerns this shift.   Medical Alerts  None  Plan  Continue to monitor

## 2021-08-30 NOTE — PLAN OF CARE
Writer called Mati from [facility] group home [phone] at the resident's Doctor's request.  Michelle is needing to have a chest x-ray to see if she has latent or active TB. The doctor has ordered this.     Resident doctor wanted the  to be aware of this and ask what their protocol is around this diagnosis.   Mati reports that they would like to know the results and for us to start her on antibiotics while she is here IF that is necessary or needed.  They will then proceed with the antibiotics and cares upon her discharge.      This should not delay discharge unless she has active illness etc. But  is aware of our findings and that we are conducting x-ray etc.

## 2021-08-30 NOTE — PLAN OF CARE
Assessment/Intervention/Current Symtoms and Care Coordination  -Chart review    -Rounded with team, addressed patient needs/concerns  Met with patient to discuss discharge plan for [facility]. Patient has been officially accepted to this group [facility]. Discharge date to be determined once all paperwork completed by  owner and [facility].     Current Symptoms include the following: Bright affect and pleasant mood. Patient remains isolative to her room not attending groups or engaging with peers on the unit.     Discharge Plan or Goal  Pending stabilization & development of a safe discharge plan.  Considerations include:  [facility]    Barriers to Discharge  Patient requires further psychiatric stabilization due to current symptomology    Referral Status  Referral completed and patient accepted to [facility]    Legal Status  Civil Commitment/[employer] through [facility]

## 2021-08-30 NOTE — PROGRESS NOTES
Infectious Disease Curbside Note  I was called by Dr. Bijal Varner on 8/30/2021 at 2:15 PM to provide input for Michelle Pino MRN [id number]. The patient is located at [facility]. The nature of this request for a curbside consultation does not permit me to perform a comprehensive review of health care records, patient/family interview, nor an examination of the patient. I obtained limited patient information from the provider on the phone call and computer access was not available at the time of the phone call.    Based on only the information I was provided today, I make the following recommendations to the treating provider/team for their review and consideration: 61F immigrant from [place] (years ago) currently admitted to psych unit on [facility] and being planned for transfer to group home. In preparation for going to group home, a quantiferon was checked and was found to be positive. Patient currently reportedly asymptomatic from a TB perspective, no respiratory symptoms, no fevers/night sweats, and no known prior diagnosis or treatment of TB that patient was reportedly aware of, though she does believe she had BCG vaccine as a child. Based on this finding, I recommended an PA/lateral CXR to further evaluate for any signs of active pulmonary TB; if negative, case description would be more consistent with latent TB (though if positive CXR findings or if any concerning symptoms, would then manage as active pulmonary TB). If the CXR findings are consistent with latent TB, patient could be referred for outpatient management in clinic with either her PCP or ID. Though, if group home requires her to be initiated on therapy prior to accepting her thus requiring inpatient initiation of therapy, then could consider full inpatient ID consultation.    These recommendations are not intended to take the place of the care team's clinical judgement, which should always be utilized to provide the most appropriate care to  meet the unique needs of each patient. The recommendations offered were based on the limited scope of information provided as today's date. Should additional guidance be needed or required a formal consultation with infectious diseases is recommended.

## 2021-08-30 NOTE — PLAN OF CARE
Problem: Behavior Regulation Impairment (Psychotic Signs/Symptoms)  Goal: Improved Behavioral Control (Psychotic Signs/Symptoms)  Outcome: No Change    Problem: Cognitive Impairment (Psychotic Signs/Symptoms)  Goal: Optimal Cognitive Function (Psychotic Signs/Symptoms)  Outcome: No Change  Intervention: Support and Promote Cognitive Ability     Patient is flat, withdrawn, and guarded.  She isolates to room most of shift.  She is dismissive with mental health symptoms, denying anxiety, depression, SI, HI, SIB, and hallucinations.  She denies pain.  Medication compliant.  No safety concerns.  Will continue to monitor.  Nedra Cortez RN

## 2021-08-31 PROCEDURE — 99207 PR CDG-MDM COMPONENT: MEETS LOW - DOWN CODED: CPT | Performed by: PSYCHIATRY & NEUROLOGY

## 2021-08-31 PROCEDURE — 99231 SBSQ HOSP IP/OBS SF/LOW 25: CPT | Performed by: PSYCHIATRY & NEUROLOGY

## 2021-08-31 PROCEDURE — 250N000013 HC RX MED GY IP 250 OP 250 PS 637: Performed by: PHYSICIAN ASSISTANT

## 2021-08-31 PROCEDURE — 99253 IP/OBS CNSLTJ NEW/EST LOW 45: CPT | Performed by: INTERNAL MEDICINE

## 2021-08-31 PROCEDURE — 250N000013 HC RX MED GY IP 250 OP 250 PS 637: Performed by: STUDENT IN AN ORGANIZED HEALTH CARE EDUCATION/TRAINING PROGRAM

## 2021-08-31 PROCEDURE — 124N000002 HC R&B MH UMMC

## 2021-08-31 RX ADMIN — LORAZEPAM 1 MG: 1 TABLET ORAL at 20:07

## 2021-08-31 RX ADMIN — BENZTROPINE MESYLATE 1 MG: 1 TABLET ORAL at 08:58

## 2021-08-31 RX ADMIN — LORAZEPAM 1 MG: 1 TABLET ORAL at 13:52

## 2021-08-31 RX ADMIN — AMLODIPINE BESYLATE 7.5 MG: 2.5 TABLET ORAL at 08:58

## 2021-08-31 RX ADMIN — BENZTROPINE MESYLATE 1 MG: 1 TABLET ORAL at 20:07

## 2021-08-31 RX ADMIN — OLANZAPINE 20 MG: 20 TABLET, ORALLY DISINTEGRATING ORAL at 20:07

## 2021-08-31 RX ADMIN — LOSARTAN POTASSIUM 100 MG: 100 TABLET, FILM COATED ORAL at 08:58

## 2021-08-31 RX ADMIN — LORAZEPAM 1 MG: 1 TABLET ORAL at 08:58

## 2021-08-31 RX ADMIN — METOPROLOL SUCCINATE 75 MG: 25 TABLET, EXTENDED RELEASE ORAL at 08:57

## 2021-08-31 ASSESSMENT — ACTIVITIES OF DAILY LIVING (ADL)
ORAL_HYGIENE: INDEPENDENT;PROMPTS
LAUNDRY: UNABLE TO COMPLETE
HYGIENE/GROOMING: INDEPENDENT;PROMPTS
DRESS: SCRUBS (BEHAVIORAL HEALTH)
DRESS: SCRUBS (BEHAVIORAL HEALTH)
ORAL_HYGIENE: INDEPENDENT;PROMPTS
HYGIENE/GROOMING: INDEPENDENT;PROMPTS

## 2021-08-31 NOTE — CONSULTS
"  GENERAL INFECTIOUS DISEASES CONSULTATION     Patient:  Michelle Pino   Date of birth 1960, Medical record number [id number]  Date of Visit:  08/31/2021  Date of Admission: 4/13/2021  Consult Requested by:Ida Zaman MD  Reason for Consult:  quant gold obtained for screening prior to group home admission, returned positive. Additional details below.         Assessment and Recommendations:     Michelle Pino is 61 year old  female, migrated to [place] from [place] many years ago, with history significant for Schizoaffective Disorder, bipolar type, hypertension, hyperlipidemia, CVA 2017, with previous commitments who was admitted on 4/14/21 with tad, psychosis, and agitation, secondary to medication non-adherence. Michelle's psychiatric issues seem to be improving and there is plan to discharge her to a group home this Friday. As part of the work up, a quantiferon TB gold test was done on 8/28/21 and came back positive. ID curbside was called on 8/30/21 and a formal ID consultation was recommended .     1. Latent TB   2. history of non compliance with medications  3. Schizoaffective / Bipolar disorder      RECOMMENDATION:  - patient has decided not to pursue treatment for latent TB     Discussion:  CDC: \"Tuberculosis (TB) is caused by a bacterium called Mycobacterium tuberculosis (M. tuberculosis). Not everyone infected with TB bacteria becomes sick. Two TB-related conditions exist: latent TB infection (LTBI) and active TB disease. It is estimated that up to 13 million people in the [place] have LTBI. People with LTBI are infected with M. tuberculosis, but they do not have TB disease. People with LTBI do not have signs and symptoms of TB disease, and they cannot spread M. tuberculosis to others. While not everyone with LTBI will develop TB disease, about 5-10% of infected people will develop  TB disease over their lifetimes if not treated for LTBI. Progression from untreated LTBI to TB disease " "accounts for approximately  80% of [place] TB cases.\"     Michelle has history of non compliance with medications, making follow-up and lab monitoring for possible adverse effects of medication very challenging. Anyway, she has decided not to take any medication for LTBI. If symptoms develop that are concerning for active TB, evaluation should be pursued with CXR and investigation for active TB.     ID will sign off. Please call with questions     addendum 9/1/21  spoke with Dr Varner . Dr Varner contacted Michelle's guardian and he consented to treatment.   Recommend Rifampin 600 mg po daily x 4 months.  if not able to use Rifampin, alternative would be Isoniazid  300 mg po daily x 9 months ( mnimum 6 months)   Lab: monitor CBC and CMP every 2 weeks while on Rifampin as it can cause elevated LFTs and bone marrow suppression.after discharge, can fax results to me at ID clinic fax [phone]    I discussed potential side effects with patient yesterday. monitor for nausea, vomiting, abdominal pain, diarrhea, lack of appetite     virtual follow-up with ID in 2-4 weeks after discharge. Please let me know when she will be discharged, so I can schedule a video f/u       ID will sign off now.      Thank you for allowing us to participate in the care of this patient    Jaren Celestin MD, M.Med.Sc  Staff, Infectious Diseases   Pager : [phone]         History of Present Illness:     Michelle Pino is 61 year old  female, migrated [place] from [place] many years ago, with history significant for Schizoaffective Disorder, bipolar type, hypertension, hyperlipidemia, CVA 2017, with previous commitments who was admitted on 4/14/21 with tad, psychosis, and agitation, secondary to medication non-adherence. Michelle's psychiatric issues seem to be improving and there is plan to discharge her to a group home this Friday. As part of the work up, a quantiferon TB gold test was done on 8/28/21 and came back positive. ID [facility]son was " called on 8/30/21 and a formal ID consultation was recommended .     Michelel has been afebrile, denies chronic cough, shortness of breath, back/ bony/ joint pain, no headaches, no chills, nightsweats, no weight loss, has good appetite. CXR was negative for active mycobacterial infection on 8/30/21    Discussed with Michelle, the lab, and management for latent TB. She asked me appropriate questions, side effects of medications, and aware that her CXR was negative. She then decided not to take medication for it.      Imagings:  CXR 8/30/21  IMPRESSION: Negative chest without evidence for active mycobacterial infection.         Review of Systems:   CONSTITUTIONAL:  No fevers or chills  EYES: negative for icterus  ENT:  negative for hearing loss, tinnitus and sore throat  RESPIRATORY:  negative for cough with sputum and dyspnea  CARDIOVASCULAR:  negative for chest pain, dyspnea  GASTROINTESTINAL:  negative for nausea, vomiting, diarrhea and constipation  GENITOURINARY:  negative for dysuria  HEME:  No easy bruising  INTEGUMENT:  negative for rash and pruritus  NEURO:  Negative for headache         Past Medical History:     Past Medical History:   Diagnosis Date     CVA (cerebral vascular accident) (H) 04/2017     Dyslipidemia      Hyperlipidemia      Hypertension      Hypertension      Paranoid schizophrenia (H)      Schizophrenia (H)      Vitamin D deficiency           Past Surgical History:     Past Surgical History:   Procedure Laterality Date     OPEN REDUCTION INTERNAL FIXATION TIBIAL PLATEAU Right 01/08/2019    at [facility], fracture occured during psychiatric restraining     ORIF PROXIMAL TIBIAL PLATEAU FRACTURE Right 2019          Family History:   non contributory          Social History:     Social History     Tobacco Use     Smoking status: Never Smoker   Substance Use Topics     Alcohol use: Never     History   Sexual Activity     Sexual activity: Never          Current Medications (antimicrobials listed in bold):        amLODIPine  7.5 mg Oral Daily     benztropine  1 mg Oral BID     LORazepam  1 mg Oral TID    Or     LORazepam  1 mg Intramuscular TID     losartan  100 mg Oral Daily     metoprolol succinate ER  75 mg Oral Daily     OLANZapine zydis  20 mg Oral At Bedtime    Or     OLANZapine  10 mg Intramuscular At Bedtime            Allergies:     Allergies   Allergen Reactions     Haldol [Haloperidol]      Patient previously tolerated haldol, though developed oculogyric crises during hospital stay on 4/26/21 on total daily dose of 10 mg.     Lisinopril Cough          Physical Exam:   Vitals were reviewed  Patient Vitals for the past 24 hrs:   Temp Temp src Resp SpO2   08/31/21 0903 98  F (36.7  C) -- 16 100 %   08/30/21 1900 97.7  F (36.5  C) Tympanic -- 98 %     Ranges for his vital signs:  Temp:  [97.7  F (36.5  C)-98  F (36.7  C)] 98  F (36.7  C)  Resp:  [16] 16  SpO2:  [98 %-100 %] 100 %  Physical Examination:  GENERAL:  well-developed, well-nourished, in bed in no acute distress. a bit irritable  HEENT:  Head is normocephalic, atraumatic   EYES:  Eyes have anicteric sclerae without conjunctival injection  NECK:  Supple. No  Cervical lymphadenopathy  LUNGS:  Clear to auscultation bilateral.   CARDIOVASCULAR:  Regular rate and rhythm with no murmurs, gallops or rubs.  ABDOMEN:  Normal bowel sounds, soft, nontender. No appreciable hepatosplenomegaly  SKIN:  No acute rashes.    Line(s) are in place without any surrounding erythema or exudate. No stigmata of endocarditis.  Extremities : no edema  Spine: non tender   NEUROLOGIC:  Grossly nonfocal. Active x4 extremities         Laboratory Data:   Hematology Studies    Recent Labs   Lab Test 08/26/21  0833 06/21/21  0915 06/17/21  0807 06/09/21  2119 05/21/21  1340 10/01/20  1955 10/19/19  0733 04/29/19  0748 01/25/19  0645   WBC 4.3 5.9 4.2 5.2 6.6 9.1 5.4 5.5  --    ANEU  --  3.7 1.8 2.5 4.1  --  3.6 2.6  --    AEOS  --  0.1 0.1 0.1 0.1  --  0.0 0.1  --    HGB 14.3 14.2  --  13.0  13.6 13.5 12.5 15.2  --    MCV 89 91  --  95 91 93 93 90  --     185  --  219 198 195 167 190   < >    < > = values in this interval not displayed.     Metabolic Studies     Recent Labs   Lab Test 08/28/21  0856 08/26/21  0833 07/02/21  0810 06/16/21  1045 06/10/21  2302 06/10/21  1138    138 140 142 141 140   POTASSIUM 3.4 3.5 3.6 3.8  --  4.0   CHLORIDE 111* 110* 108 112*  --  109   CO2 28 23 26 25  --  23   BUN 17 19 17 12  --  24   CR 1.09* 1.17* 1.01 0.89 1.13* 1.29*   GFRESTIMATED 56* 51* 61 71 53* 45*     Hepatic Studies    Recent Labs   Lab Test 08/26/21  0833 06/09/21  2119 05/21/21  1340 10/02/20  0706 10/01/20  1955 10/19/19  0733   BILITOTAL 0.5 0.2 0.3 0.6 0.6 0.4   ALKPHOS 86 67 91 46 57 69   ALBUMIN 3.6 3.2* 3.6 3.2* 4.1 3.1*   AST 18 14 15 71* 93* 40   ALT 24 26 24 54* 72* 60*     Thyroid Studies    Recent Labs   Lab Test 08/26/21  0833 05/21/21  1340 10/19/19  0733   TSH 0.96 0.53 0.32*   T4  --   --  0.77     Urine Studies    Recent Labs   Lab Test 08/26/21  1441 06/22/21  1315 06/08/21  1615 10/02/20  0758 05/02/19  0715   LEUKEST Trace* Negative Negative Large* Negative   WBCU 4 1 1 10-25* 1

## 2021-08-31 NOTE — PROGRESS NOTES
"[facility], [place]   Psychiatric Progress Note  Hospital Day: 139        Interim History:   The patient's care was discussed with the treatment team during the daily team meeting and/or staff's chart notes were reviewed. No acute medical concerns. and vitals WNL. She has been pleasant and cooperative with staff and peers. Continues to be isolate to her room despite encouragement to participate in the milieus. She is medication adherent. No reported or observed side effects. No symptoms of psychosis or tad. Intermittently attends to all ADLs, though overall, hygiene maintenance remains poor. Continue to sleep and eat well. Michelle is at her baseline.     Michelle was resting in her bed prior to the interview. She reports that she is \"fine\" and has no physical concerns or mental health concerns. She was provided with an update regarding the status of her discharge to the group home and that we anticipate it will be soon. We discussed her recent lab results including concern regarding latent TB. Reviewed results of CXR. She appeared to have difficulty fully understanding, and repeatedly stated that she does not and did not have TB. She denied all respiratory symptoms. She said that she will follow up with PCP as recommended.     Suicidal ideation: denies current or recent suicidal ideation or behaviors.    Homicidal ideation: denies current or recent homicidal ideation or behaviors.    Psychotic symptoms: Patient denies AH, VH, paranoia, delusions.     Medication side effects reported: She denies sedation today. She denies any dizziness or lightheadedness. Denies urinary sx. She denies any fevers, chills, cough or malaise.    Acute medical concerns: pain in left ankle has resolved.     Other issues reported by patient: Patient had no further questions or concerns.           Medications:       amLODIPine  7.5 mg Oral Daily     benztropine  1 mg Oral BID     LORazepam  1 mg Oral TID    Or     " LORazepam  1 mg Intramuscular TID     losartan  100 mg Oral Daily     metoprolol succinate ER  75 mg Oral Daily     OLANZapine zydis  20 mg Oral At Bedtime    Or     OLANZapine  10 mg Intramuscular At Bedtime          Allergies:     Allergies   Allergen Reactions     Haldol [Haloperidol]      Patient previously tolerated haldol, though developed oculogyric crises during hospital stay on 4/26/21 on total daily dose of 10 mg.     Lisinopril Cough          Labs:     No results found for this or any previous visit (from the past 24 hour(s)).       Psychiatric Examination:     /68 (BP Location: Right arm)   Pulse 60   Temp 98  F (36.7  C)   Resp 16   Wt 69 kg (152 lb 3.2 oz)   SpO2 100%   BMI 26.13 kg/m    Weight is 152 lbs 3.2 oz  Body mass index is 26.13 kg/m .    Weight over time:  Vitals:    05/25/21 0850 06/15/21 0811 06/16/21 1605 06/19/21 0859   Weight: 71 kg (156 lb 8 oz) 69.8 kg (153 lb 12.8 oz) 70.5 kg (155 lb 8 oz) 71.4 kg (157 lb 8 oz)    06/21/21 0805 06/22/21 0839 06/24/21 0800 07/02/21 0835   Weight: 69.5 kg (153 lb 3.2 oz) 69.3 kg (152 lb 11.2 oz) 70.7 kg (155 lb 12.8 oz) 70.7 kg (155 lb 12.8 oz)    07/03/21 0844 07/06/21 0838 07/10/21 0845 07/16/21 0821   Weight: 69.6 kg (153 lb 8 oz) 70.9 kg (156 lb 4.8 oz) 69.8 kg (153 lb 14.4 oz) 69.3 kg (152 lb 12.8 oz)    07/17/21 0830 07/31/21 0804 08/17/21 0845 08/19/21 0800   Weight: 70.4 kg (155 lb 4.8 oz) 70.3 kg (154 lb 14.4 oz) 70.8 kg (156 lb) 69.2 kg (152 lb 9.6 oz)    08/24/21 0700 08/26/21 0800   Weight: 70.3 kg (155 lb) 69 kg (152 lb 3.2 oz)       Orthostatic Vitals       Most Recent      Sitting Orthostatic /84 07/29 0800    Sitting Orthostatic Pulse (bpm) 82 07/29 0800    Standing Orthostatic /86 07/29 0800    Standing Orthostatic Pulse (bpm) 88 07/29 0800            Cardiometabolic risk assessment. 07/29/21      Reviewed patient profile for cardiometabolic risk factors    Date taken /Value  REFERENCE RANGE   Abdominal  "Obesity  (Waist Circumference)   See nursing flowsheet Women ?35 in (88 cm)   Men ?40 in (102 cm)      Triglycerides  Triglycerides   Date Value Ref Range Status   08/26/2021 205 (H) <150 mg/dL Final     Comment:     0-9 years:  Normal:    Less than 75 mg/dL  Borderline high:  75-99 mg/dL  High:             Greater than or equal to 100 mg/dL    0-19 years:  Normal:    Less than 90 mg/dL  Borderline high:   mg/dL  High:             Greater than or equal to 130 mg/dL    20 years and older:  Normal:    Less than 150 mg/dL  Borderline high:  150-199 mg/dL  High:             200-499 mg/dL  Very high:   Greater than or equal to 500 mg/dL   10/19/2019 117 <150 mg/dL Final       ?150 mg/dL (1.7 mmol/L) or current treatment for elevated triglycerides   HDL cholesterol  HDL Cholesterol   Date Value Ref Range Status   10/19/2019 56 >49 mg/dL Final     Direct Measure HDL   Date Value Ref Range Status   08/26/2021 55 >=50 mg/dL Final     Comment:     0-19 years:       Greater than or equal to 45 mg/dL   Low: Less than 40 mg/dL   Borderline low: 40-44 mg/dL     20 years and older:   Female: Greater than or equal to 50 mg/dL   Male:   Greater than or equal to 40 mg/dL        ]   Women <50 mg/dL (1.3 mmol/L) in women or current treatment for low HDL cholesterol  Men <40 mg/dL (1 mmol/L) in men or current treatment for low HDL cholesterol     Fasting plasma glucose (FPG) Lab Results   Component Value Date     07/02/2021      FPG ?100 mg/dL (5.6 mmol/L) or treatment for elevated blood glucose   Blood pressure  BP Readings from Last 3 Encounters:   08/30/21 120/68   06/04/19 131/71   04/26/19 164/86    Blood pressure ?130/85 mmHg or treatment for elevated blood pressure   Family History  See family history     Appearance: dressed in hospital scrubs, appeared reported age, unkempt hair   Attitude: cooperative  Eye Contact: improved, looks up more during the interview   Mood: \"good\"    Affect:  Somewhat blunted, no longer " agitated or tense. Brightens and smiles at times   Speech: accented, clear and coherent. Soft volume.    Language: no obvious receptive or expressive deficits  Psychomotor, Gait, Musculoskeletal: No abnormal movements noted while seated  Thought Process: goal-oriented, linear, concrete   Associations:  No loosening of associations present  Thought Content:  Did not appear to be responding to internal stimuli.  Insight:   poor - slightly improved   Judgement:  fair  Oriented to: person, place, time/date   Attention Span and Concentration: attentive to conversation though at times stares ahead and does not respond to questions.  Recent and Remote Memory: stable, some difficulty recalling events   Fund of Knowledge:  normal    Clinical Global Impressions  First:  Considering your total clinical experience with this particular patient population, how severe are the patient's symptoms at this time?: 7 (04/16/21 1428)  Compared to the patient's condition at the START of treatment, this patient's condition is: 4 (04/16/21 1428)  Most recent:  Considering your total clinical experience with this particular patient population, how severe are the patient's symptoms at this time?: 7 (07/22/21 0941)  Compared to the patient's condition at the START of treatment, this patient's condition is: 3 (07/22/21 0941)           Precautions:     Behavioral Orders   Procedures     Cheeking Precautions (behavioral units)     Patient Observed swallowing PO medications; Patient asked to drink water after swallowing medication; Patient in Staff line of sight for 15 minutes after medication given; Mouth checks after PO administration (patient asked to open mouth and stick out their tongue).     Code 2     Code 3     For walks in the [place] with staff and per staff discretion     Electroconvulsive therapy     Series of up to 12 treatments. Begin Date: 5/26/21     Treating Psychiatrist providing ECT:  Dr. Lubin     Notified on:  5/21/21      Electroconvulsive therapy     As long as we get the green light from risk management and pt is medically cleared, begin ECT every Monday, Wednesday, and Friday     Electroconvulsive therapy     Series of up to 12 treatments. Begin Date: 5/25/21     Treating Psychiatrist providing ECT:  Amee     Notified on:  5/24/21     Elopement precautions     Fall precautions     Alcides Calderon     Routine Programming     As clinically indicated     Status 15     Every 15 minutes.          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 Symptoms and presentation at this time is most consistent with Schizoaffective Disorder, Bipolar Type. Obtained most recent medication regimen from patient's ACT team, and regimen was initially restarted. Pt is committed. Petitioned for Mcgrath Calderon was filed and granted due to lack of improvement and side effects from medications. Inpatient psychiatric hospitalization is warranted at this time for safety, stabilization, possible adjustment in medications and development of a safe discharge plan.      Hospital Course:  On admission, PTA medications were restarted. However, Michelle had been declining all scheduled medications despite significant encouragement from staff and provider. Psychiatric emergency declared on 4/20 due to aggression toward others in context of severe psychosis and suspected excited catatonia. Ativan 1 mg TID was also added. Discontinued PTA Invega, Zyprexa, and  "champ on 4/20 due to consistent refusal.      On 4/26, it was noted that patient frequently had upward gaze while walking up and down the unit. She did not appear to be distressed. She reported that she was looking at \"my god.\" It was determined to be oculogyric crisis secondary to IM haloperidol. Haldol was subsequently discontinued and scheduled Zyprexa was initiated on an emergency basis. Oral Cogentin was also scheduled, though patient declined. On the evening of 4/26, patient's gaze was fixed in upward position for several hours and she appeared to be experiencing discomfort. IM Cogentin was administered with noted resolution. Partial improvements were observed after switching to scheduled Zyprexa. After patient improved, she was more receptive to reinitiating oral Invega, which was initiated on 5/3 and titrated to PTA dose of 9 mg daily on 5/10. Plan was for ACT team to bring in loading dose of Invega Sustenna. Patient had signs of oculogyric crisis again with Invega. Oral dose decreased to 6 mg after this. Invega was stopped on 5/14 due to ongoing signs of problems related to eye movement and concerns for oculogyric crisis.     Overall improvement in patient's agitation and suspected catatonia noted on 4/30 after patient accepted two doses of Ativan. She began declining Ativan again with noted decompensation. Patient began accepting, however, was deemed not to have the capacity to consent to treatment with Ativan. She does not believe she has a mental illness, including catatonia. She does not fully understand risks associated with inadequate treatment of catatonia. Discussed with her son who is acting as surrogate decision maker and he is in full support of forced scheduled IM Ativan if patient declines oral formulation. Also consulted with our legal team prior to backing oral Ativan with IM.      Ativan was increased on 5/19 due to re-emerging evidence of catatonia (long periods of staring, repetitive " "movements, echolalia, mutism). Meeting was held with ACT team on 5/20, including ACT team psychiatrist, Dr. Pedraza. He said that he is in \"full support\" of plan to pursue Mcgrath Kohler and ECT at this time. He does feel that in the past she has been discharged from the hospital while still quite symptomatic. He is hoping that ECT will be effective and that with improvement, Michelle would be more receptive to clozapine and weekly blood draws. He said that ideally she would transition to an IRTS before going back to her apartment, but also understands there may be some barriers (I.e. pt's willingness, financial concerns, etc).      ECT consult placed. Please see consult note by Dr. Lubin on 5/21 for details. Alcides Riverappard was approved on 5/24 and patient was medically cleared on 5/24. ECT initiated on 5/26. She was noted to have a short seizure during ECT on 6/4. Staff note that patient appears more disoriented with memory impairment and sedation on days of ECT. This was noted on my examination again today. Improvements noted in mood, affect, social interactions, paranoia, agitation since initiation of ECT. Reduced Ativan on 6/5 due to sedative effects. Relayed concerns about memory impairment and confusion with Dr. Lubin. Recommended attempting unilateral ECT, which he agreed to do. First unilateral ECT on 6/7. ECT was held on 6/11 and 6/14 due to memory impairment. We will plan to hold it again tomorrow (6/16). There is ongoing discussion regarding whether there is a component of catatonia contributing to her current presentation but this is less likely since it worsened after ECT was started.  Given her level of cognitive impairment and our belief that this is due to ECT, we will not pursue any further treatments at this time. Since we have held ECT (last treatment on 6/9), her cognitive impairment has slightly improved (more oriented).      Michelle initially appeared to be decompensating somewhat when ECT was held, " though her cognitive impairment has gradually improved. If symptoms of psychosis re-emerge, it may be worth starting clozapine, which is something that has previously been considered by her ACT team. Her Hatch order would need to be amended as clozapine is not one of the medications listed. ANC obtained on 6/16 was 1800/microL. Per conversation with pharmacy, neutropenia has been associated with olanzapine and agranulocytosis has been associated with olanzapine, lorazepam, metoprolol, and hydralazine and hematologic labs have been monitored. Upon re-check, ANC was 3700/microL on 6/21/21. At this time, the primary symptoms we are observing are cognitive deficits and inability to care for self, which we would not address by changing her antipsychotic medication.     Michelle's cognitive impairment has been steadily improving since holding ECT treatments, however it is possible that lorazepam is also playing a role in her cognitive impairment. Given no signs of re-emerging catatonia, a gradual lorazepam taper (decreasing by 0.5 mg) was initiated on 6/28/21 to monitor for potential improvements with regard to memory impairment.  On 7/7, Michelle reported an incident of oculogyric crisis and Cogentin 1 mg BID was initiated with no noted changes in cognition.      Michelle has remained focused on discharge. The team is working with Michelle's ACT team to to establish a safe discharge plan with options including moving to a group home vs home with her ACT team and additional in home supports via CADI services. We are concerned that Michelle would have difficulty taking her medications as prescribed if she were to return home without her ACT team and additional services. This is evidenced by impairments noted in cognitive assessment by OT specialist on 7/1. MOCA score was a 13/30 and CPT score was a 4.7. Physician's statement in support of guardianship was completed on 7/19/21.  Patient's son is pursuing guardianship. Patient became quite  agitated on 7/16 when  staff from [facility]  came to meet with her a second time. She did not allow staff members to enter her room and stated that she will not be going to a group home.      Due to daytime sedation, on 7/26/21, lorazepam evening dose decreased from 2 mg to 1 mg. On 7/27/21 transitioned Zyprexa from 10 mg BID to 5 mg QAM + 15 mg QPM and had less daytime sedation with this change and no emergence of symptoms concerning for orthostatic hypotension.  She attended her emergency guardianship hearing on 7/28.  Due to ongoing daytime sedation, Zyprexa doses were consolidated to bedtime starting on 7/30. [place]12 for CADI funding.     Target psychiatric symptoms and interventions:   - Continue Ativan 1 mg PO or IM TID Patient may not decline.   - Resume Zyprexa 20 mg at bedtime  Hatch in place. May consider increasing further though holding off given re-emerging catatonic sx and borderline prolonged QTc      - Continue Cogentin 1 mg PO BID with additional 2 mg IM daily prn for evidence of acute dystonic reaction or oculogyric crisis  -Continue hydroxyzine 25-50 mg q4h prn for acute anxiety  -Continue Trazodone 50 mg at bedtime prn for sleep disturbances  -Continue Zyprexa 10 mg TID prn for severe agitation  -Continue Ativan/Benadryl q4h prn for agitation. WOULD GIVE PRN ATIVAN FIRST FOR AGITATION unless it is after 5 pm on day prior to scheduled ECT     Occupational Therapy Consult Placed to evaluate cognitive functioning/ability to care for self in home environment, ability to manage medications. See note on 7/1 for details.      ECT: Discontinued due to significant cognitive impairment. Please see note dated 7/12/21 for additional details.      Acute Medical Problems and Treatments:  Positive quantiferon gold test on 8/26/21 - per bryson with ID, obtain a chest xray to rule out active infection. If no active infection, would be okay for her to follow up for  "treatment as an outpatient unless treatment must be initiated per group home protocol. Please see ID note on 8/30 for details. Appreciate assistance.    - PA and Lateral chest xray ordered. Reviewed and negative.   - ID consult placed on 8/30 given group home preference for treatment initiation prior to discharge      Left ankle pain, resolved   - Added ice packs prn    HTN, improved: Patient is now adherent with medication regimen. Intermittent elevations with no concerning symptoms.  - Metoprolol succinate ER 75 mg   - Cozaar 100 mg daily  - Amlodipine 7.5 mg daily  - IM discontinued hydralazine prn  - Switched BP checks from QID to BID on 7/13 given overall improvement  - Please see note from IM dated 5/3, 5/6, 5/24, 6/20 and 6/30/21.  - Obtained EKG and routine labs on 5/21 in context of pt declining vital sign checks and anti-hypertensives and recently elevated BPs. Reviewed labs and discussed EKG findings with IM on 5/21. No urgent concerns, though IM should be notified if pt develops acute medical concerns (I.e. heart palpitations, SOB, changes in speech, AMS, confusion, CP, HA, changes in vision).    - Per 6/20 IM note: \"Please notify IM if BP is persistently severely elevated and requiring frequent PRN hydralazine\".  - Internal medicine consulted again on 6/28 due to consistent use of hydralazine over the past week. Metoprolol increased to 75 mg daily then to 100 mg and amlodipine 5 mg was added on 6/30. Amlodipine increased to 7.5 mg daily on 7/5.      Medicine consulted again on 8/26 given lab abnormalities as discussed below:   1) Abnormal cholesterol panel: Fasting lipid panel this morning with elevated total cholesterol and elevated LDL. Prior lipid panel in 2020 was unremarkable.   - First line management for dyslipidemia is a trial of 6 months of lifestyle changes (dietary modifications, exercise)   - She should follow-up with PCP upon discharge to have further cardiac risk stratification and " discussion regarding risks / benefits of starting a statin      2) Elevated creatinine: Cr 1.17 this morning. Patient had BRANDON in June with Cr to peak of 1.83, was felt to be of pre-renal etiology and Cr returned to baseline with IVF. Baseline Cr difficult to determine d/t limited labs but her baseline Cr appears to be ~0.9 - 1.0, therefore does not meet criteria for BRANDON at this time. Her bump in creatinine may be due to fluctuating baseline vs dehydration.    - Encourage oral fluid intake, goal 2 liters daily   - Recheck BMP on 8/28/21, Cr down-trending, concern for CKD   - UA results reviewed. Trace leukocyte esterase. 4 WBCs. Patient is asymptomatic.     Routine Health Maintenance: Will test quantiferon TB gold per  request.     For previous medical concerns, please see note dated 7/12/21.     Behavioral/Psychological/Social:  - Encourage unit programming  - Patient is Code 3 status and can take walks in the [place] with staff and security present per staff discretion. This appears to be quite therapeutic for her.      Safety:  - Continue precautions as noted above  - Status 15 minute checks  - Safety precautions include: assault and elopement precautions  - Continue precautions as noted above     Legal Status: Committed as MI with Hatch in place for Haldol, Zyprexa, Invega, and Thorazine through [facility]. Filed Alcides Kohler through [facility] and approved on 5/24/21. Physician's statement in support of guardianship was completed on 7/19/21. Patient's son is now her temporary emergency guardian.     Disposition Plan   Reason for ongoing admission: pending safe discharge plan  Discharge location: Group home (needs CADI waiver). CPT assessment done by OT (Tania queen) on 7/1.  Please see note for details. [place] on 8/12 & funding approved though amount was too low for [facility] to accept. Process to appeal funding amount in progress. Hopefully discharge this week.     Discharge Medications: ordered.. Group home prefers 30 d/s. ACT team will take over meds upon discharge.   Follow-up Appointments: scheduled           Gabby Zaman MD  [facility]

## 2021-08-31 NOTE — PLAN OF CARE
Patient spent entire shift in her room.  Her affect was flat when approached by writer.  Pt was minimally conversant when writer/RN attempted to initiate 1:1.   In early afternoon writer again approached pt. She made minimal eye contact and was minimally verbal. Pt affect was blunted.  Pt ate with good appetite and drank adequately.      Pt denies SI, HI and SIB. Stated she is not hearing voices.  Declined to spend time having 1:1.

## 2021-08-31 NOTE — PLAN OF CARE
Assessment/Intervention/Current Symtoms and Care Coordination  -Chart review  -Rounded with team, addressed patient needs/concerns  Met with patient to inform her that she has been accepted to [facility] and will likely discharge by Friday this week.    Current Symptoms include the following:  Patient appears with bright affect and happy mood. She is looking forward to leaving this hospital and living in group home setting.     Discharge Plan or Goal  Pending stabilization & development of a safe discharge plan.  Considerations include: [facility]    Barriers to Discharge  Patient requires further psychiatric stabilization due to current symptomology    Referral Status  Referral to [facility] complete    Legal Status  Civil Commitment/Hatch/Alcides Mcgrath Kohler: [facility]

## 2021-08-31 NOTE — PLAN OF CARE
Pt asleep at start of shift. Breathing quiet and unlabored.     Pt had no c/o pain or discomfort during the HS.     Appears to have slept 6.75 hours.     Pt on CHEEKING, ELOPEMENT, and FALL precautions in addition to single room order. Any related events noted above.     Will continue to monitor and assess.   Problem: Sleep Disturbance (Psychotic Signs/Symptoms)  Goal: Improved Sleep (Psychotic Signs/Symptoms)  Outcome: No Change

## 2021-09-01 PROCEDURE — 250N000013 HC RX MED GY IP 250 OP 250 PS 637: Performed by: PHYSICIAN ASSISTANT

## 2021-09-01 PROCEDURE — 250N000013 HC RX MED GY IP 250 OP 250 PS 637: Performed by: STUDENT IN AN ORGANIZED HEALTH CARE EDUCATION/TRAINING PROGRAM

## 2021-09-01 PROCEDURE — 124N000002 HC R&B MH UMMC

## 2021-09-01 PROCEDURE — 99232 SBSQ HOSP IP/OBS MODERATE 35: CPT | Mod: GC | Performed by: PSYCHIATRY & NEUROLOGY

## 2021-09-01 RX ADMIN — BENZTROPINE MESYLATE 1 MG: 1 TABLET ORAL at 20:22

## 2021-09-01 RX ADMIN — LORAZEPAM 1 MG: 1 TABLET ORAL at 08:48

## 2021-09-01 RX ADMIN — BENZTROPINE MESYLATE 1 MG: 1 TABLET ORAL at 08:48

## 2021-09-01 RX ADMIN — OLANZAPINE 20 MG: 20 TABLET, ORALLY DISINTEGRATING ORAL at 20:21

## 2021-09-01 RX ADMIN — METOPROLOL SUCCINATE 75 MG: 25 TABLET, EXTENDED RELEASE ORAL at 08:47

## 2021-09-01 RX ADMIN — AMLODIPINE BESYLATE 7.5 MG: 2.5 TABLET ORAL at 08:48

## 2021-09-01 RX ADMIN — LORAZEPAM 1 MG: 1 TABLET ORAL at 14:05

## 2021-09-01 RX ADMIN — LOSARTAN POTASSIUM 100 MG: 100 TABLET, FILM COATED ORAL at 08:48

## 2021-09-01 RX ADMIN — LORAZEPAM 1 MG: 1 TABLET ORAL at 20:21

## 2021-09-01 ASSESSMENT — ACTIVITIES OF DAILY LIVING (ADL)
DRESS: SCRUBS (BEHAVIORAL HEALTH)
HYGIENE/GROOMING: PROMPTS
HYGIENE/GROOMING: PROMPTS
ORAL_HYGIENE: PROMPTS
ORAL_HYGIENE: PROMPTS
DRESS: SCRUBS (BEHAVIORAL HEALTH)

## 2021-09-01 NOTE — PLAN OF CARE
"Pt observed isolative and withdrawn. She spent most of her time in her room, watching television or sleeping. Presents with blunted affect, slightly irritable but cooperative. Appears disheveled. Encouraged pt to take a shower, pt refused.   Pt refused COVID swab. Re approached but still refused. Verbalized \"No! Not today\".   At 1600H, pt was seen by Dr. Su- Infection Control and discussed with her recent lab and xray results. Pt denies having cough, denies any type of pain.   Pt denies SI/SIB/HI. Denies experiencing any type of hallucinations. Denies having anxiety and depression. Claimed having good sleep at night.   Consumed 100% of share of dinner and took approximately 500 ml of fluids.   Due medications given. Denies experiencing side effects.  Needs attended to. On fall, elopement, cheeking precautions. Staff will continue to monitor. No further concerns noted as of this time.   Problem: Mood Impairment (Psychotic Signs/Symptoms)  Goal: Improved Mood Symptoms (Psychotic Signs/Symptoms)  Outcome: No Change  Intervention: Optimize Emotion and Mood  Recent Flowsheet Documentation  Taken 8/31/2021 1916 by Teetee Paniagua, RN  Diversional Activity: television     "

## 2021-09-01 NOTE — PROGRESS NOTES
"[facility], [place]   Psychiatric Progress Note  Hospital Day: 140        Interim History:   The patient's care was discussed with the treatment team during the daily team meeting and/or staff's chart notes were reviewed. No acute medical concerns. Has intermittent elevated BP and one lower temp, otherwise vitals WNL. She is medication adherent. She has been pleasant and cooperative with staff and peers. Continues to be isolate to her room despite encouragement to participate in the milieus. No reported or observed side effects. No symptoms of psychosis or tad. Intermittently attends to all ADLs, though overall, hygiene maintenance remains poor. Continues to sleep and eat well. Michelle is at her baseline. Declined to take medication for TB after infectious disease consult. Will follow up with ID today given that Michelle is under guardianship.     Writer called to give Emelia an update regarding latent TB and group home's preference for Michelle to start treatment for TB as well as Michelle's desire to not take medication. Informed him that infectious disease would call to have a more complete discussion with him. He states that he would like her to receive treatment for it. He felt comfortable with this decision and declined further discussion with ID at this time.     Michelle was resting in her room prior to the interview.  She woke easily and said she is doing \"good\" today and denies any physical complaints or concerns.  She asked regarding the discharge plan and if he knew what date she would be leaving.  We again discussed the current concerns of a latent TB infection and recommendation for treatment and request from the group home that she be initiated on treatment. She initially declined the treatment stating that she does not have tuberculosis despite extensive discussion explaining active versus latent tuberculosis in basic terminology.  She was asked regarding if she had fears about taking the " "medication or a particular concern to which he replied \"I do not have tuberculosis\" and that \"this is rubbish\".  We explained to her concerns about capacity to consent that have led to guardianship including refusing other medical treatments in the past as well.  She was informed regarding her son's decision about the treatment and was initially frustrated with this stating that he is not her guardian. Upon further discussion Michelle did agree to start the recommended treatment for latent TB and asked how long she would need to take medications for. She was informed that we will defer to our infectious disease colleagues regarding their recommendations.     Suicidal ideation: denies current or recent suicidal ideation or behaviors.    Homicidal ideation: denies current or recent homicidal ideation or behaviors.    Psychotic symptoms: Patient denies AH, VH, paranoia, delusions.     Medication side effects reported: She denies sedation today. She denies any dizziness or lightheadedness. Denies urinary sx. She denies any fevers, chills, cough or malaise.    Acute medical concerns: pain in left ankle has resolved.     Other issues reported by patient: Patient had no further questions or concerns.           Medications:       amLODIPine  7.5 mg Oral Daily     benztropine  1 mg Oral BID     LORazepam  1 mg Oral TID    Or     LORazepam  1 mg Intramuscular TID     losartan  100 mg Oral Daily     metoprolol succinate ER  75 mg Oral Daily     OLANZapine zydis  20 mg Oral At Bedtime    Or     OLANZapine  10 mg Intramuscular At Bedtime          Allergies:     Allergies   Allergen Reactions     Haldol [Haloperidol]      Patient previously tolerated haldol, though developed oculogyric crises during hospital stay on 4/26/21 on total daily dose of 10 mg.     Lisinopril Cough          Labs:     No results found for this or any previous visit (from the past 24 hour(s)).       Psychiatric Examination:     BP (!) 149/75 (BP Location: " Right arm)   Pulse 63   Temp (!) 96.7  F (35.9  C) (Tympanic)   Resp 16   Wt 69 kg (152 lb 3.2 oz)   SpO2 100%   BMI 26.13 kg/m    Weight is 152 lbs 3.2 oz  Body mass index is 26.13 kg/m .    Weight over time:  Vitals:    05/25/21 0850 06/15/21 0811 06/16/21 1605 06/19/21 0859   Weight: 71 kg (156 lb 8 oz) 69.8 kg (153 lb 12.8 oz) 70.5 kg (155 lb 8 oz) 71.4 kg (157 lb 8 oz)    06/21/21 0805 06/22/21 0839 06/24/21 0800 07/02/21 0835   Weight: 69.5 kg (153 lb 3.2 oz) 69.3 kg (152 lb 11.2 oz) 70.7 kg (155 lb 12.8 oz) 70.7 kg (155 lb 12.8 oz)    07/03/21 0844 07/06/21 0838 07/10/21 0845 07/16/21 0821   Weight: 69.6 kg (153 lb 8 oz) 70.9 kg (156 lb 4.8 oz) 69.8 kg (153 lb 14.4 oz) 69.3 kg (152 lb 12.8 oz)    07/17/21 0830 07/31/21 0804 08/17/21 0845 08/19/21 0800   Weight: 70.4 kg (155 lb 4.8 oz) 70.3 kg (154 lb 14.4 oz) 70.8 kg (156 lb) 69.2 kg (152 lb 9.6 oz)    08/24/21 0700 08/26/21 0800   Weight: 70.3 kg (155 lb) 69 kg (152 lb 3.2 oz)       Orthostatic Vitals       Most Recent      Sitting Orthostatic /84 07/29 0800    Sitting Orthostatic Pulse (bpm) 82 07/29 0800    Standing Orthostatic /86 07/29 0800    Standing Orthostatic Pulse (bpm) 88 07/29 0800            Cardiometabolic risk assessment. 07/29/21      Reviewed patient profile for cardiometabolic risk factors    Date taken /Value  REFERENCE RANGE   Abdominal Obesity  (Waist Circumference)   See nursing flowsheet Women ?35 in (88 cm)   Men ?40 in (102 cm)      Triglycerides  Triglycerides   Date Value Ref Range Status   08/26/2021 205 (H) <150 mg/dL Final     Comment:     0-9 years:  Normal:    Less than 75 mg/dL  Borderline high:  75-99 mg/dL  High:             Greater than or equal to 100 mg/dL    0-19 years:  Normal:    Less than 90 mg/dL  Borderline high:   mg/dL  High:             Greater than or equal to 130 mg/dL    20 years and older:  Normal:    Less than 150 mg/dL  Borderline high:  150-199 mg/dL  High:             200-499  "mg/dL  Very high:   Greater than or equal to 500 mg/dL   10/19/2019 117 <150 mg/dL Final       ?150 mg/dL (1.7 mmol/L) or current treatment for elevated triglycerides   HDL cholesterol  HDL Cholesterol   Date Value Ref Range Status   10/19/2019 56 >49 mg/dL Final     Direct Measure HDL   Date Value Ref Range Status   08/26/2021 55 >=50 mg/dL Final     Comment:     0-19 years:       Greater than or equal to 45 mg/dL   Low: Less than 40 mg/dL   Borderline low: 40-44 mg/dL     20 years and older:   Female: Greater than or equal to 50 mg/dL   Male:   Greater than or equal to 40 mg/dL        ]   Women <50 mg/dL (1.3 mmol/L) in women or current treatment for low HDL cholesterol  Men <40 mg/dL (1 mmol/L) in men or current treatment for low HDL cholesterol     Fasting plasma glucose (FPG) Lab Results   Component Value Date     07/02/2021      FPG ?100 mg/dL (5.6 mmol/L) or treatment for elevated blood glucose   Blood pressure  BP Readings from Last 3 Encounters:   09/01/21 (!) 149/75   06/04/19 131/71   04/26/19 164/86    Blood pressure ?130/85 mmHg or treatment for elevated blood pressure   Family History  See family history     Appearance: dressed in hospital scrubs, appeared reported age, unkempt hair   Attitude: cooperative  Eye Contact: improved, looks up more during the interview   Mood: \"good\"    Affect:  Somewhat blunted, no longer agitated or tense. Brightens and smiles at times   Speech: accented, clear and coherent. Soft volume.    Language: no obvious receptive or expressive deficits  Psychomotor, Gait, Musculoskeletal: No abnormal movements noted while seated  Thought Process: goal-oriented, linear, concrete   Associations:  No loosening of associations present  Thought Content:  Did not appear to be responding to internal stimuli.  Insight:   poor - slightly improved   Judgement:  fair  Oriented to: person, place, time/date   Attention Span and Concentration: attentive to conversation though at times " stares ahead and does not respond to questions.  Recent and Remote Memory: stable, some difficulty recalling events   Fund of Knowledge:  normal    Clinical Global Impressions  First:  Considering your total clinical experience with this particular patient population, how severe are the patient's symptoms at this time?: 7 (04/16/21 1428)  Compared to the patient's condition at the START of treatment, this patient's condition is: 4 (04/16/21 1428)  Most recent:  Considering your total clinical experience with this particular patient population, how severe are the patient's symptoms at this time?: 7 (07/22/21 0941)  Compared to the patient's condition at the START of treatment, this patient's condition is: 3 (07/22/21 0941)           Precautions:     Behavioral Orders   Procedures     Cheeking Precautions (behavioral units)     Patient Observed swallowing PO medications; Patient asked to drink water after swallowing medication; Patient in Staff line of sight for 15 minutes after medication given; Mouth checks after PO administration (patient asked to open mouth and stick out their tongue).     Code 2     Code 3     For walks in the [place] with staff and per staff discretion     Electroconvulsive therapy     Series of up to 12 treatments. Begin Date: 5/26/21     Treating Psychiatrist providing ECT:  Dr. Lubin     Notified on:  5/21/21     Electroconvulsive therapy     As long as we get the green light from risk management and pt is medically cleared, begin ECT every Monday, Wednesday, and Friday     Electroconvulsive therapy     Series of up to 12 treatments. Begin Date: 5/25/21     Treating Psychiatrist providing ECT:  Amee     Notified on:  5/24/21     Elopement precautions     Fall precautions     Mcgrath Calderon     Routine Programming     As clinically indicated     Status 15     Every 15 minutes.          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 Symptoms and presentation at this time is most consistent with Schizoaffective Disorder, Bipolar Type. Obtained most recent medication regimen from patient's ACT team, and regimen was initially restarted. Pt is committed. Petitioned for Mcgrath Calderon was filed and granted due to lack of improvement and side effects from medications. Inpatient psychiatric hospitalization is warranted at this time for safety, stabilization, possible adjustment in medications and development of a safe discharge plan.      Hospital Course:  On admission, PTA medications were restarted. However, Michelle had been declining all scheduled medications despite significant encouragement from staff and provider. Psychiatric emergency declared on 4/20 due to aggression toward others in context of severe psychosis and suspected excited catatonia. Ativan 1 mg TID was also added. Discontinued PTA Invega, Zyprexa, and thorazine on 4/20 due to consistent refusal.      On 4/26, it was noted that patient frequently had upward gaze while walking up and down the unit. She did not appear to be distressed. She reported that she was looking at \"my god.\" It was determined to be oculogyric crisis secondary to IM haloperidol. Haldol was subsequently discontinued and scheduled Zyprexa was initiated on an emergency basis. Oral Cogentin was also scheduled, though patient declined. On the evening of 4/26, patient's gaze was fixed in upward position for several hours and she appeared to be experiencing " "discomfort. IM Cogentin was administered with noted resolution. Partial improvements were observed after switching to scheduled Zyprexa. After patient improved, she was more receptive to reinitiating oral Invega, which was initiated on 5/3 and titrated to PTA dose of 9 mg daily on 5/10. Plan was for ACT team to bring in loading dose of Invega Sustenna. Patient had signs of oculogyric crisis again with Invega. Oral dose decreased to 6 mg after this. Invega was stopped on 5/14 due to ongoing signs of problems related to eye movement and concerns for oculogyric crisis.     Overall improvement in patient's agitation and suspected catatonia noted on 4/30 after patient accepted two doses of Ativan. She began declining Ativan again with noted decompensation. Patient began accepting, however, was deemed not to have the capacity to consent to treatment with Ativan. She does not believe she has a mental illness, including catatonia. She does not fully understand risks associated with inadequate treatment of catatonia. Discussed with her son who is acting as surrogate decision maker and he is in full support of forced scheduled IM Ativan if patient declines oral formulation. Also consulted with our legal team prior to backing oral Ativan with IM.      Ativan was increased on 5/19 due to re-emerging evidence of catatonia (long periods of staring, repetitive movements, echolalia, mutism). Meeting was held with ACT team on 5/20, including ACT team psychiatrist, Dr. Pedraza. He said that he is in \"full support\" of plan to pursue Mcgrath Kohler and ECT at this time. He does feel that in the past she has been discharged from the hospital while still quite symptomatic. He is hoping that ECT will be effective and that with improvement, Michelle would be more receptive to clozapine and weekly blood draws. He said that ideally she would transition to an IRTS before going back to her apartment, but also understands there may be some barriers " (I.e. pt's willingness, financial concerns, etc).      ECT consult placed. Please see consult note by Dr. Lubin on 5/21 for details. Alcides Kohler was approved on 5/24 and patient was medically cleared on 5/24. ECT initiated on 5/26. She was noted to have a short seizure during ECT on 6/4. Staff note that patient appears more disoriented with memory impairment and sedation on days of ECT. This was noted on my examination again today. Improvements noted in mood, affect, social interactions, paranoia, agitation since initiation of ECT. Reduced Ativan on 6/5 due to sedative effects. Relayed concerns about memory impairment and confusion with Dr. Lubin. Recommended attempting unilateral ECT, which he agreed to do. First unilateral ECT on 6/7. ECT was held on 6/11 and 6/14 due to memory impairment. We will plan to hold it again tomorrow (6/16). There is ongoing discussion regarding whether there is a component of catatonia contributing to her current presentation but this is less likely since it worsened after ECT was started.  Given her level of cognitive impairment and our belief that this is due to ECT, we will not pursue any further treatments at this time. Since we have held ECT (last treatment on 6/9), her cognitive impairment has slightly improved (more oriented).      Michelle initially appeared to be decompensating somewhat when ECT was held, though her cognitive impairment has gradually improved. If symptoms of psychosis re-emerge, it may be worth starting clozapine, which is something that has previously been considered by her ACT team. Her Hatch order would need to be amended as clozapine is not one of the medications listed. ANC obtained on 6/16 was 1800/microL. Per conversation with pharmacy, neutropenia has been associated with olanzapine and agranulocytosis has been associated with olanzapine, lorazepam, metoprolol, and hydralazine and hematologic labs have been monitored. Upon re-check, ANC was 3700/microL  on 6/21/21. At this time, the primary symptoms we are observing are cognitive deficits and inability to care for self, which we would not address by changing her antipsychotic medication.     Michelle's cognitive impairment has been steadily improving since holding ECT treatments, however it is possible that lorazepam is also playing a role in her cognitive impairment. Given no signs of re-emerging catatonia, a gradual lorazepam taper (decreasing by 0.5 mg) was initiated on 6/28/21 to monitor for potential improvements with regard to memory impairment.  On 7/7, Michelle reported an incident of oculogyric crisis and Cogentin 1 mg BID was initiated with no noted changes in cognition.      Michelle has remained focused on discharge. The team is working with Michelle's ACT team to to establish a safe discharge plan with options including moving to a group home vs home with her ACT team and additional in home supports via CADI services. We are concerned that Michelle would have difficulty taking her medications as prescribed if she were to return home without her ACT team and additional services. This is evidenced by impairments noted in cognitive assessment by OT specialist on 7/1. MOCA score was a 13/30 and CPT score was a 4.7. Physician's statement in support of guardianship was completed on 7/19/21.  Patient's son is pursuing guardianship. Patient became quite agitated on 7/16 when  staff from [facility] came to meet with her a second time. She did not allow staff members to enter her room and stated that she will not be going to a group home.      Due to daytime sedation, on 7/26/21, lorazepam evening dose decreased from 2 mg to 1 mg. On 7/27/21 transitioned Zyprexa from 10 mg BID to 5 mg QAM + 15 mg QPM and had less daytime sedation with this change and no emergence of symptoms concerning for orthostatic hypotension.  She attended her emergency guardianship hearing on 7/28.  Due to ongoing daytime sedation, Zyprexa doses  were consolidated to bedtime starting on 7/30. [place] 8/12 for CAD funding.     Target psychiatric symptoms and interventions:   - Continue Ativan 1 mg PO or IM TID Patient may not decline.   - Resume Zyprexa 20 mg at bedtime  Hatch in place. May consider increasing further though holding off given re-emerging catatonic sx and borderline prolonged QTc      - Continue Cogentin 1 mg PO BID with additional 2 mg IM daily prn for evidence of acute dystonic reaction or oculogyric crisis  -Continue hydroxyzine 25-50 mg q4h prn for acute anxiety  -Continue Trazodone 50 mg at bedtime prn for sleep disturbances  -Continue Zyprexa 10 mg TID prn for severe agitation  -Continue Ativan/Benadryl q4h prn for agitation. WOULD GIVE PRN ATIVAN FIRST FOR AGITATION unless it is after 5 pm on day prior to scheduled ECT     Occupational Therapy Consult Placed to evaluate cognitive functioning/ability to care for self in home environment, ability to manage medications. See note on 7/1 for details.      ECT: Discontinued due to significant cognitive impairment. Please see note dated 7/12/21 for additional details.      Acute Medical Problems and Treatments:  Positive quantiferon gold test on 8/26/21 - per bryson with ID, obtain a chest xray to rule out active infection. If no active infection, would be okay for her to follow up for treatment as an outpatient unless treatment must be initiated per group home protocol. Please see ID note on 8/30 for details. Appreciate assistance.    - PA and Lateral chest xray ordered. Reviewed and negative.   - ID consult placed on 8/30 given group home preference for treatment initiation prior to discharge   - Emelia her son/guardian consented to treatment for latent TB on 9/1/21  - Infectious disease team was informed and we are awaiting their recommendations regarding treatment     Left ankle pain, resolved   - Added ice packs prn    HTN, improved: Patient is now adherent  "with medication regimen. Intermittent elevations with no concerning symptoms.  - Metoprolol succinate ER 75 mg   - Cozaar 100 mg daily  - Amlodipine 7.5 mg daily  - IM discontinued hydralazine prn  - Switched BP checks from QID to BID on 7/13 given overall improvement  - Please see note from IM dated 5/3, 5/6, 5/24, 6/20 and 6/30/21.  - Obtained EKG and routine labs on 5/21 in context of pt declining vital sign checks and anti-hypertensives and recently elevated BPs. Reviewed labs and discussed EKG findings with IM on 5/21. No urgent concerns, though IM should be notified if pt develops acute medical concerns (I.e. heart palpitations, SOB, changes in speech, AMS, confusion, CP, HA, changes in vision).    - Per 6/20 IM note: \"Please notify IM if BP is persistently severely elevated and requiring frequent PRN hydralazine\".  - Internal medicine consulted again on 6/28 due to consistent use of hydralazine over the past week. Metoprolol increased to 75 mg daily then to 100 mg and amlodipine 5 mg was added on 6/30. Amlodipine increased to 7.5 mg daily on 7/5.      Medicine consulted again on 8/26 given lab abnormalities as discussed below:   1) Abnormal cholesterol panel: Fasting lipid panel this morning with elevated total cholesterol and elevated LDL. Prior lipid panel in 2020 was unremarkable.   - First line management for dyslipidemia is a trial of 6 months of lifestyle changes (dietary modifications, exercise)   - She should follow-up with PCP upon discharge to have further cardiac risk stratification and discussion regarding risks / benefits of starting a statin      2) Elevated creatinine: Cr 1.17 this morning. Patient had BRANDON in June with Cr to peak of 1.83, was felt to be of pre-renal etiology and Cr returned to baseline with IVF. Baseline Cr difficult to determine d/t limited labs but her baseline Cr appears to be ~0.9 - 1.0, therefore does not meet criteria for BRANDON at this time. Her bump in creatinine may be " due to fluctuating baseline vs dehydration.    - Encourage oral fluid intake, goal 2 liters daily   - Recheck BMP on 8/28/21, Cr down-trending, concern for CKD   - UA results reviewed. Trace leukocyte esterase. 4 WBCs. Patient is asymptomatic.     Routine Health Maintenance: Will test quantiferon TB gold per  request.     For previous medical concerns, please see note dated 7/12/21.     Behavioral/Psychological/Social:  - Encourage unit programming  - Patient is Code 3 status and can take walks in the [place] with staff and security present per staff discretion. This appears to be quite therapeutic for her.      Safety:  - Continue precautions as noted above  - Status 15 minute checks  - Safety precautions include: assault and elopement precautions  - Continue precautions as noted above     Legal Status: Committed as MI with Hatch in place for Haldol, Zyprexa, Invega, and Thorazine through [facility]. Filed Mcgrath Kohler through [facility] and approved on 5/24/21. Physician's statement in support of guardianship was completed on 7/19/21. Patient's son is now her temporary emergency guardian.     Disposition Plan   Reason for ongoing admission: pending safe discharge plan  Discharge location: Group home (needs CADI waiver). CPT assessment done by OT (Tania queen) on 7/1.  Please see note for details. [place] on [place]12 & funding approved though amount was too low for [facility] to accept. Process to appeal funding amount in progress. Hopefully discharge this week.    Discharge Medications: ordered.. Group home prefers 30 d/s. ACT team will take over meds upon discharge.   Follow-up Appointments: scheduled           Bijal Varner MD  Psychiatry PGY-4

## 2021-09-01 NOTE — PLAN OF CARE
Patient rarely leaves room presently. Attempt was made today to get pt to come out of her room and get some exercise.  She was told that minimal exercise can result in blood clots and other medical issues. She responded by saying she walks around the end of her bed and gets her exercise by doing that.   She was also offered the opportunity to go outside to the [place] but decline.  Patient ate breakfast and lunch with good appetite.  Patient denies suicidal ideation, homicidal ideation and hallucinations. Patient was cooperative with taking scheduled medications.  Affect is flat.

## 2021-09-01 NOTE — PLAN OF CARE
Assessment/Intervention/Current Symtoms and Care Coordination  -Chart review    -Rounded with team, addressed patient needs/concerns    Current Symptoms include the following: Patient presents with bright affect: Pleasant and cooperative and looking forward to discharge to group home    Discharge Plan or Goal  Pending stabilization & development of a safe discharge plan.  Considerations include:     Barriers to Discharge  Patient requires further psychiatric stabilization due to current symptomology: Waiting for RACHEAL KNOTT from [facility] for final approval to have patient discharge to group home.    Referral Status  [facility] referral complete    Legal Status  Civil Commitment/Hatch/[employer]ath Kohler through [facility]

## 2021-09-02 LAB — SARS-COV-2 RNA RESP QL NAA+PROBE: NEGATIVE

## 2021-09-02 PROCEDURE — 124N000002 HC R&B MH UMMC

## 2021-09-02 PROCEDURE — 99232 SBSQ HOSP IP/OBS MODERATE 35: CPT | Mod: GC | Performed by: PSYCHIATRY & NEUROLOGY

## 2021-09-02 PROCEDURE — 87635 SARS-COV-2 COVID-19 AMP PRB: CPT | Performed by: STUDENT IN AN ORGANIZED HEALTH CARE EDUCATION/TRAINING PROGRAM

## 2021-09-02 PROCEDURE — 250N000013 HC RX MED GY IP 250 OP 250 PS 637: Performed by: STUDENT IN AN ORGANIZED HEALTH CARE EDUCATION/TRAINING PROGRAM

## 2021-09-02 PROCEDURE — 250N000013 HC RX MED GY IP 250 OP 250 PS 637: Performed by: PHYSICIAN ASSISTANT

## 2021-09-02 RX ADMIN — AMLODIPINE BESYLATE 7.5 MG: 2.5 TABLET ORAL at 08:24

## 2021-09-02 RX ADMIN — LOSARTAN POTASSIUM 100 MG: 100 TABLET, FILM COATED ORAL at 08:24

## 2021-09-02 RX ADMIN — LORAZEPAM 1 MG: 1 TABLET ORAL at 13:45

## 2021-09-02 RX ADMIN — METOPROLOL SUCCINATE 75 MG: 25 TABLET, EXTENDED RELEASE ORAL at 08:24

## 2021-09-02 RX ADMIN — LORAZEPAM 1 MG: 1 TABLET ORAL at 19:13

## 2021-09-02 RX ADMIN — LORAZEPAM 1 MG: 1 TABLET ORAL at 08:25

## 2021-09-02 RX ADMIN — BENZTROPINE MESYLATE 1 MG: 1 TABLET ORAL at 19:13

## 2021-09-02 RX ADMIN — OLANZAPINE 20 MG: 20 TABLET, ORALLY DISINTEGRATING ORAL at 19:13

## 2021-09-02 RX ADMIN — BENZTROPINE MESYLATE 1 MG: 1 TABLET ORAL at 08:24

## 2021-09-02 ASSESSMENT — ACTIVITIES OF DAILY LIVING (ADL)
LAUNDRY: UNABLE TO COMPLETE
HYGIENE/GROOMING: PROMPTS
DRESS: SCRUBS (BEHAVIORAL HEALTH)
LAUNDRY: UNABLE TO COMPLETE
DRESS: SCRUBS (BEHAVIORAL HEALTH)
ORAL_HYGIENE: PROMPTS
ORAL_HYGIENE: PROMPTS
HYGIENE/GROOMING: PROMPTS

## 2021-09-02 NOTE — PLAN OF CARE
Pt is isolative to her room. She spent most of the evening sitting on her bed watching TV. She was encouraged to come out of her room and pace the hallway for 15 minutes to prevent blood clots but declined. She stated that she paced in her room for about ten minutes this evening and during the day shift.      She denied any pain or discomfort during nursing assessment. Flat and blunted affect this shift. She appeared to be at her baseline. She took scheduled medication without any issue and denies the side effects of her medication. Pt approached the medication window around 9:30 pm, asking if she can have Covid test done tomorrow morning. She denied depression, SI/SIB. Her appetite is good and drinking well. Poor hygiene and she declined to shower. Pt s blood pressure this evening 155/79, 159/94 and 152/86. She denies any chest pain or discomfort.

## 2021-09-02 NOTE — PLAN OF CARE
Assessment/Intervention/Current Symtoms and Care Coordination  -Chart review  -Rounded with team, addressed patient needs/concerns  Writer faxed requested paperwork to [facility]. Spoke with [facility] Financial Worker regarding status of MA DX    Current Symptoms include the following: Stable mood but frustration due to long hospitalization. Pleasant and cooperative but remains isolative to room and does not attend unit programming.     Discharge Plan or Goal  Pending stabilization & development of a safe discharge plan.  Considerations include:  [facility]    Barriers to Discharge  Patient requires further psychiatric stabilization due to current symptomology: Waiting on final MA DX from [facility] in order for patient to be admitted to  [facility]     Referral Status  Referral to  [facility] completed and patient has been accepted    Legal Status  Civil Commitment/Hatch/[employer]ath Kohler through [facility]

## 2021-09-02 NOTE — PROGRESS NOTES
[facility], [place]   Psychiatric Progress Note  Hospital Day: 141        Interim History:   The patient's care was discussed with the treatment team during the daily team meeting and/or staff's chart notes were reviewed. No acute medical concerns. Has intermittent elevated BP, asymptomatic, otherwise vitals WNL. She is medication adherent. She has been pleasant and cooperative with staff and peers. Continues to be isolate to her room despite encouragement to participate in the milieus. No reported or observed side effects. No symptoms of psychosis or tad. Intermittently attends to all ADLs, though overall, hygiene maintenance remains poor. Continues to sleep and eat well. Michelle is at her baseline.     Writer spoke with Mati from [facility]. She states that given the negative chest xray, okay to follow up as an outpatient with infectious disease and that Michelle does not need to be started on treatment for latent TB prior to discharge.     Michelle was resting in bed prior to the interview. She awoke easily to the door knock. She reports she is  good  today.  She stated that she was concerned that this writer had told her son yesterday that she refused to get a test done. Writer clarified that I did inform her son that she was declining the treatment for latent TB but that no mention of her declining a test was made. She seemed to accept this information. She was given an update regarding the conversation with Mati from the [facility] and her okay to follow up for latent TB as an outpatient instead of starting the medications in the hospital. She was informed that we are waiting for the [facility] to get approval for the group home and that the group [facility] is ready to take her whenever the [facility] completes their side of the process. She denied any physical complaints or concerns today and had no further questions.     Suicidal ideation: denies current or recent suicidal ideation or  behaviors.    Homicidal ideation: denies current or recent homicidal ideation or behaviors.    Psychotic symptoms: Patient denies AH, VH, paranoia, delusions.     Medication side effects reported: She denies sedation today. She denies any dizziness or lightheadedness. Denies urinary sx. She denies any fevers, chills, cough or malaise.    Acute medical concerns: pain in left ankle has resolved.     Other issues reported by patient: Patient had no further questions or concerns.           Medications:       amLODIPine  7.5 mg Oral Daily     benztropine  1 mg Oral BID     LORazepam  1 mg Oral TID    Or     LORazepam  1 mg Intramuscular TID     losartan  100 mg Oral Daily     metoprolol succinate ER  75 mg Oral Daily     OLANZapine zydis  20 mg Oral At Bedtime    Or     OLANZapine  10 mg Intramuscular At Bedtime          Allergies:     Allergies   Allergen Reactions     Haldol [Haloperidol]      Patient previously tolerated haldol, though developed oculogyric crises during hospital stay on 4/26/21 on total daily dose of 10 mg.     Lisinopril Cough          Labs:     Recent Results (from the past 24 hour(s))   Asymptomatic COVID-19 Virus (Coronavirus) by PCR Nasopharyngeal    Collection Time: 09/02/21  7:30 AM    Specimen: Nasopharyngeal; Swab   Result Value Ref Range    SARS CoV2 PCR Negative Negative          Psychiatric Examination:     /78 (BP Location: Right arm)   Pulse 76   Temp 97.8  F (36.6  C) (Tympanic)   Resp 16   Wt 68.9 kg (152 lb)   SpO2 97%   BMI 26.09 kg/m    Weight is 152 lbs 0 oz  Body mass index is 26.09 kg/m .    Weight over time:  Vitals:    05/25/21 0850 06/15/21 0811 06/16/21 1605 06/19/21 0859   Weight: 71 kg (156 lb 8 oz) 69.8 kg (153 lb 12.8 oz) 70.5 kg (155 lb 8 oz) 71.4 kg (157 lb 8 oz)    06/21/21 0805 06/22/21 0839 06/24/21 0800 07/02/21 0835   Weight: 69.5 kg (153 lb 3.2 oz) 69.3 kg (152 lb 11.2 oz) 70.7 kg (155 lb 12.8 oz) 70.7 kg (155 lb 12.8 oz)    07/03/21 0844 07/06/21 0838  07/10/21 0845 07/16/21 0821   Weight: 69.6 kg (153 lb 8 oz) 70.9 kg (156 lb 4.8 oz) 69.8 kg (153 lb 14.4 oz) 69.3 kg (152 lb 12.8 oz)    07/17/21 0830 07/31/21 0804 08/17/21 0845 08/19/21 0800   Weight: 70.4 kg (155 lb 4.8 oz) 70.3 kg (154 lb 14.4 oz) 70.8 kg (156 lb) 69.2 kg (152 lb 9.6 oz)    08/24/21 0700 08/26/21 0800 09/02/21 0803   Weight: 70.3 kg (155 lb) 69 kg (152 lb 3.2 oz) 68.9 kg (152 lb)       Orthostatic Vitals       Most Recent      Sitting Orthostatic /84 07/29 0800    Sitting Orthostatic Pulse (bpm) 82 07/29 0800    Standing Orthostatic /86 07/29 0800    Standing Orthostatic Pulse (bpm) 88 07/29 0800            Cardiometabolic risk assessment. 07/29/21      Reviewed patient profile for cardiometabolic risk factors    Date taken /Value  REFERENCE RANGE   Abdominal Obesity  (Waist Circumference)   See nursing flowsheet Women ?35 in (88 cm)   Men ?40 in (102 cm)      Triglycerides  Triglycerides   Date Value Ref Range Status   08/26/2021 205 (H) <150 mg/dL Final     Comment:     0-9 years:  Normal:    Less than 75 mg/dL  Borderline high:  75-99 mg/dL  High:             Greater than or equal to 100 mg/dL    0-19 years:  Normal:    Less than 90 mg/dL  Borderline high:   mg/dL  High:             Greater than or equal to 130 mg/dL    20 years and older:  Normal:    Less than 150 mg/dL  Borderline high:  150-199 mg/dL  High:             200-499 mg/dL  Very high:   Greater than or equal to 500 mg/dL   10/19/2019 117 <150 mg/dL Final       ?150 mg/dL (1.7 mmol/L) or current treatment for elevated triglycerides   HDL cholesterol  HDL Cholesterol   Date Value Ref Range Status   10/19/2019 56 >49 mg/dL Final     Direct Measure HDL   Date Value Ref Range Status   08/26/2021 55 >=50 mg/dL Final     Comment:     0-19 years:       Greater than or equal to 45 mg/dL   Low: Less than 40 mg/dL   Borderline low: 40-44 mg/dL     20 years and older:   Female: Greater than or equal to 50 mg/dL   Male:    "Greater than or equal to 40 mg/dL        ]   Women <50 mg/dL (1.3 mmol/L) in women or current treatment for low HDL cholesterol  Men <40 mg/dL (1 mmol/L) in men or current treatment for low HDL cholesterol     Fasting plasma glucose (FPG) Lab Results   Component Value Date     07/02/2021      FPG ?100 mg/dL (5.6 mmol/L) or treatment for elevated blood glucose   Blood pressure  BP Readings from Last 3 Encounters:   09/02/21 125/78   06/04/19 131/71   04/26/19 164/86    Blood pressure ?130/85 mmHg or treatment for elevated blood pressure   Family History  See family history     Appearance: dressed in hospital scrubs, appeared reported age, unkempt hair   Attitude: cooperative  Eye Contact: improved, looks up more during the interview   Mood: \"good\"    Affect:  Somewhat blunted, no longer agitated or tense. Brightens and smiles at times   Speech: accented, clear and coherent. Soft volume.    Language: no obvious receptive or expressive deficits  Psychomotor, Gait, Musculoskeletal: No abnormal movements noted while seated  Thought Process: goal-oriented, linear, concrete   Associations:  No loosening of associations present  Thought Content:  Did not appear to be responding to internal stimuli.  Insight:   poor - slightly improved   Judgement:  fair  Oriented to: person, place, time/date   Attention Span and Concentration: attentive to conversation though at times stares ahead and does not respond to questions.  Recent and Remote Memory: stable, some difficulty recalling events   Fund of Knowledge:  normal    Clinical Global Impressions  First:  Considering your total clinical experience with this particular patient population, how severe are the patient's symptoms at this time?: 7 (04/16/21 1428)  Compared to the patient's condition at the START of treatment, this patient's condition is: 4 (04/16/21 1428)  Most recent:  Considering your total clinical experience with this particular patient population, how " severe are the patient's symptoms at this time?: 7 (07/22/21 0941)  Compared to the patient's condition at the START of treatment, this patient's condition is: 3 (07/22/21 0941)           Precautions:     Behavioral Orders   Procedures     Cheeking Precautions (behavioral units)     Patient Observed swallowing PO medications; Patient asked to drink water after swallowing medication; Patient in Staff line of sight for 15 minutes after medication given; Mouth checks after PO administration (patient asked to open mouth and stick out their tongue).     Code 2     Code 3     For walks in the [place] with staff and per staff discretion     Electroconvulsive therapy     Series of up to 12 treatments. Begin Date: 5/26/21     Treating Psychiatrist providing ECT:  Dr. Lubin     Notified on:  5/21/21     Electroconvulsive therapy     As long as we get the green light from risk management and pt is medically cleared, begin ECT every Monday, Wednesday, and Friday     Electroconvulsive therapy     Series of up to 12 treatments. Begin Date: 5/25/21     Treating Psychiatrist providing ECT:  Amee     Notified on:  5/24/21     Elopement precautions     Fall precautions     Mcgrath Calderon     Routine Programming     As clinically indicated     Status 15     Every 15 minutes.          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 Symptoms and presentation at this time is most consistent with Schizoaffective Disorder,  "Bipolar Type. Obtained most recent medication regimen from patient's ACT team, and regimen was initially restarted. Pt is committed. Petitioned for [facility] was filed and granted due to lack of improvement and side effects from medications. Inpatient psychiatric hospitalization is warranted at this time for safety, stabilization, possible adjustment in medications and development of a safe discharge plan.      Hospital Course:  On admission, PTA medications were restarted. However, Michelle had been declining all scheduled medications despite significant encouragement from staff and provider. Psychiatric emergency declared on 4/20 due to aggression toward others in context of severe psychosis and suspected excited catatonia. Ativan 1 mg TID was also added. Discontinued PTA Invega, Zyprexa, and thorazine on 4/20 due to consistent refusal.      On 4/26, it was noted that patient frequently had upward gaze while walking up and down the unit. She did not appear to be distressed. She reported that she was looking at \"my god.\" It was determined to be oculogyric crisis secondary to IM haloperidol. Haldol was subsequently discontinued and scheduled Zyprexa was initiated on an emergency basis. Oral Cogentin was also scheduled, though patient declined. On the evening of 4/26, patient's gaze was fixed in upward position for several hours and she appeared to be experiencing discomfort. IM Cogentin was administered with noted resolution. Partial improvements were observed after switching to scheduled Zyprexa. After patient improved, she was more receptive to reinitiating oral Invega, which was initiated on 5/3 and titrated to PTA dose of 9 mg daily on 5/10. Plan was for ACT team to bring in loading dose of Invega Sustenna. Patient had signs of oculogyric crisis again with Invega. Oral dose decreased to 6 mg after this. Invega was stopped on 5/14 due to ongoing signs of problems related to eye movement and concerns for " "oculogyric crisis.     Overall improvement in patient's agitation and suspected catatonia noted on 4/30 after patient accepted two doses of Ativan. She began declining Ativan again with noted decompensation. Patient began accepting, however, was deemed not to have the capacity to consent to treatment with Ativan. She does not believe she has a mental illness, including catatonia. She does not fully understand risks associated with inadequate treatment of catatonia. Discussed with her son who is acting as surrogate decision maker and he is in full support of forced scheduled IM Ativan if patient declines oral formulation. Also consulted with our legal team prior to backing oral Ativan with IM.      Ativan was increased on 5/19 due to re-emerging evidence of catatonia (long periods of staring, repetitive movements, echolalia, mutism). Meeting was held with ACT team on 5/20, including ACT team psychiatrist, Dr. Pedraza. He said that he is in \"full support\" of plan to pursue Mcgrath Kohler and ECT at this time. He does feel that in the past she has been discharged from the hospital while still quite symptomatic. He is hoping that ECT will be effective and that with improvement, Michelle would be more receptive to clozapine and weekly blood draws. He said that ideally she would transition to an IRTS before going back to her apartment, but also understands there may be some barriers (I.e. pt's willingness, financial concerns, etc).      ECT consult placed. Please see consult note by Dr. Lubin on 5/21 for details. Mcgrath Kohler was approved on 5/24 and patient was medically cleared on 5/24. ECT initiated on 5/26. She was noted to have a short seizure during ECT on 6/4. Staff note that patient appears more disoriented with memory impairment and sedation on days of ECT. This was noted on my examination again today. Improvements noted in mood, affect, social interactions, paranoia, agitation since initiation of ECT. Reduced " Ativan on 6/5 due to sedative effects. Relayed concerns about memory impairment and confusion with Dr. Lubin. Recommended attempting unilateral ECT, which he agreed to do. First unilateral ECT on 6/7. ECT was held on 6/11 and 6/14 due to memory impairment. We will plan to hold it again tomorrow (6/16). There is ongoing discussion regarding whether there is a component of catatonia contributing to her current presentation but this is less likely since it worsened after ECT was started.  Given her level of cognitive impairment and our belief that this is due to ECT, we will not pursue any further treatments at this time. Since we have held ECT (last treatment on 6/9), her cognitive impairment has slightly improved (more oriented).      Michelle initially appeared to be decompensating somewhat when ECT was held, though her cognitive impairment has gradually improved. If symptoms of psychosis re-emerge, it may be worth starting clozapine, which is something that has previously been considered by her ACT team. Her Hatch order would need to be amended as clozapine is not one of the medications listed. ANC obtained on 6/16 was 1800/microL. Per conversation with pharmacy, neutropenia has been associated with olanzapine and agranulocytosis has been associated with olanzapine, lorazepam, metoprolol, and hydralazine and hematologic labs have been monitored. Upon re-check, ANC was 3700/microL on 6/21/21. At this time, the primary symptoms we are observing are cognitive deficits and inability to care for self, which we would not address by changing her antipsychotic medication.     Michelle's cognitive impairment has been steadily improving since holding ECT treatments, however it is possible that lorazepam is also playing a role in her cognitive impairment. Given no signs of re-emerging catatonia, a gradual lorazepam taper (decreasing by 0.5 mg) was initiated on 6/28/21 to monitor for potential improvements with regard to memory  impairment.  On 7/7, Michelle reported an incident of oculogyric crisis and Cogentin 1 mg BID was initiated with no noted changes in cognition.      Michelle has remained focused on discharge. The team is working with Michelle's ACT team to to establish a safe discharge plan with options including moving to a group home vs home with her ACT team and additional in home supports via CADI services. We are concerned that Michelle would have difficulty taking her medications as prescribed if she were to return home without her ACT team and additional services. This is evidenced by impairments noted in cognitive assessment by OT specialist on 7/1. MOCA score was a 13/30 and CPT score was a 4.7. Physician's statement in support of guardianship was completed on 7/19/21.  Patient's son is pursuing guardianship. Patient became quite agitated on 7/16 when  staff from [facility] came to meet with her a second time. She did not allow staff members to enter her room and stated that she will not be going to a group home.      Due to daytime sedation, on 7/26/21, lorazepam evening dose decreased from 2 mg to 1 mg. On 7/27/21 transitioned Zyprexa from 10 mg BID to 5 mg QAM + 15 mg QPM and had less daytime sedation with this change and no emergence of symptoms concerning for orthostatic hypotension.  She attended her emergency guardianship hearing on 7/28.  Due to ongoing daytime sedation, Zyprexa doses were consolidated to bedtime starting on 7/30. [place] 8/12 for CADI funding.     Target psychiatric symptoms and interventions:   - Continue Ativan 1 mg PO or IM TID Patient may not decline.   - Resume Zyprexa 20 mg at bedtime  Hatch in place. May consider increasing further though holding off given re-emerging catatonic sx and borderline prolonged QTc      - Continue Cogentin 1 mg PO BID with additional 2 mg IM daily prn for evidence of acute dystonic reaction or oculogyric crisis  -Continue hydroxyzine  25-50 mg q4h prn for acute anxiety  -Continue Trazodone 50 mg at bedtime prn for sleep disturbances  -Continue Zyprexa 10 mg TID prn for severe agitation  -Continue Ativan/Benadryl q4h prn for agitation. WOULD GIVE PRN ATIVAN FIRST FOR AGITATION unless it is after 5 pm on day prior to scheduled ECT     Occupational Therapy Consult Placed to evaluate cognitive functioning/ability to care for self in home environment, ability to manage medications. See note on 7/1 for details.      ECT: Discontinued due to significant cognitive impairment. Please see note dated 7/12/21 for additional details.      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 If no active infection, would be okay for her to follow up for treatment as an outpatient unless treatment must be initiated per group [facility] protocol. Please see ID note on 8/30 for details. Appreciate assistance.    - PA and Lateral chest xray ordered. Reviewed and negative.   - ID consult placed on 8/30   - Donitae her son/guardian consented to treatment for latent TB on 9/1/21  - per 9/2 conversation with Mati from [facility], okay to discharge given negative chest x-ray with outpatient follow up   - follow up in outpatient infectious disease clinic in one month with Jaren Celestin MD    Left ankle pain, resolved   - Added ice packs prn    HTN, improved: Patient is now adherent with medication regimen. Intermittent elevations with no concerning symptoms.  - Metoprolol succinate ER 75 mg   - Cozaar 100 mg daily  - Amlodipine 7.5 mg daily  - IM discontinued hydralazine prn  - Switched BP checks from QID to BID on 7/13 given overall improvement  - Please see note from IM dated 5/3, 5/6, 5/24, 6/20 and 6/30/21.  - Obtained EKG and routine labs on 5/21 in context of pt declining vital sign checks and anti-hypertensives and recently elevated BPs. Reviewed  "labs and discussed EKG findings with IM on 5/21. No urgent concerns, though IM should be notified if pt develops acute medical concerns (I.e. heart palpitations, SOB, changes in speech, AMS, confusion, CP, HA, changes in vision).    - Per 6/20 IM note: \"Please notify IM if BP is persistently severely elevated and requiring frequent PRN hydralazine\".  - Internal medicine consulted again on 6/28 due to consistent use of hydralazine over the past week. Metoprolol increased to 75 mg daily then to 100 mg and amlodipine 5 mg was added on 6/30. Amlodipine increased to 7.5 mg daily on 7/5.      Medicine consulted again on 8/26 given lab abnormalities as discussed below:   1) Abnormal cholesterol panel: Fasting lipid panel this morning with elevated total cholesterol and elevated LDL. Prior lipid panel in 2020 was unremarkable.   - First line management for dyslipidemia is a trial of 6 months of lifestyle changes (dietary modifications, exercise)   - She should follow-up with PCP upon discharge to have further cardiac risk stratification and discussion regarding risks / benefits of starting a statin      2) Elevated creatinine: Cr 1.17 this morning. Patient had BRANDON in June with Cr to peak of 1.83, was felt to be of pre-renal etiology and Cr returned to baseline with IVF. Baseline Cr difficult to determine d/t limited labs but her baseline Cr appears to be ~0.9 - 1.0, therefore does not meet criteria for BRANDON at this time. Her bump in creatinine may be due to fluctuating baseline vs dehydration.    - Encourage oral fluid intake, goal 2 liters daily   - Recheck BMP on 8/28/21, Cr down-trending, concern for CKD   - UA results reviewed. Trace leukocyte esterase. 4 WBCs. Patient is asymptomatic.     Routine Health Maintenance: Will test quantiferon TB gold per [facility] request.     For previous medical concerns, please see note dated 7/12/21.     Behavioral/Psychological/Social:  - Encourage unit programming  - Patient is Code 3 status " and can take walks in the [place] with staff and security present per staff discretion. This appears to be quite therapeutic for her.      Safety:  - Continue precautions as noted above  - Status 15 minute checks  - Safety precautions include: assault and elopement precautions  - Continue precautions as noted above     Legal Status: Committed as MI with Hatch in place for Haldol, Zyprexa, Invega, and Thorazine through [facility]. Filed Mcgrath Kohler through [facility] and approved on 5/24/21. Physician's statement in support of guardianship was completed on 7/19/21. Patient's son is now her temporary emergency guardian.     Disposition Plan   Reason for ongoing admission: pending safe discharge plan  Discharge location: Group home (needs CADI waiver). CPT assessment done by OT (Tania queen) on 7/1.  Please see note for details. [place] completed on 8/12 & funding approved though amount was too low for [facility] to accept. Process to appeal funding amount in progress. Hopefully discharge this week.    Discharge Medications: ordered.. Group home prefers 30 d/s. ACT team will take over meds upon discharge.   Follow-up Appointments: scheduled           Bijal Varner MD  Psychiatry PGY-4

## 2021-09-02 NOTE — PLAN OF CARE
Nursing Assessment:  Michelle is up ad krystina, she has spent most of the shift in her room watching TV or resting/sleeping. Michelle's affect is flat, she denies having suicidal ideation or self harm thoughts. Michelle was med adherent, did question writer why she was being offered her 14:00 Ativan at 13:45. Writer offered to come back at 14:00, Michelle opted to take the medication at 13:45.     Writer encouraged Pt to do some walking and to attend groups. Pt declined both.   Michelle's appetite was >75% for both meals and she is drinking fluids well. Michelle had no behavioral dysregulation and she was cooperative.

## 2021-09-02 NOTE — PLAN OF CARE
Problem: Sleep Disturbance (Psychotic Signs/Symptoms)  Goal: Improved Sleep (Psychotic Signs/Symptoms)  Outcome: No Change   Night Shift Summary (9/1/21 into 09/02/21)    Pt asleep at start of shift. Breathing quiet and unlabored.     Pt had no c/o pain or discomfort during the HS.     Appears to have slept 6.75 hours.     Pt on CHEEKING, ELOPEMENT and FALL precautions in addition to single room order. Any related events noted above.     Will continue to monitor and assess.

## 2021-09-03 PROCEDURE — 250N000013 HC RX MED GY IP 250 OP 250 PS 637: Performed by: PHYSICIAN ASSISTANT

## 2021-09-03 PROCEDURE — 250N000013 HC RX MED GY IP 250 OP 250 PS 637: Performed by: STUDENT IN AN ORGANIZED HEALTH CARE EDUCATION/TRAINING PROGRAM

## 2021-09-03 PROCEDURE — 99231 SBSQ HOSP IP/OBS SF/LOW 25: CPT | Performed by: PSYCHIATRY & NEUROLOGY

## 2021-09-03 PROCEDURE — 124N000002 HC R&B MH UMMC

## 2021-09-03 RX ADMIN — LORAZEPAM 1 MG: 1 TABLET ORAL at 20:10

## 2021-09-03 RX ADMIN — LORAZEPAM 1 MG: 1 TABLET ORAL at 08:32

## 2021-09-03 RX ADMIN — BENZTROPINE MESYLATE 1 MG: 1 TABLET ORAL at 20:10

## 2021-09-03 RX ADMIN — METOPROLOL SUCCINATE 75 MG: 25 TABLET, EXTENDED RELEASE ORAL at 08:32

## 2021-09-03 RX ADMIN — LORAZEPAM 1 MG: 1 TABLET ORAL at 14:27

## 2021-09-03 RX ADMIN — BENZTROPINE MESYLATE 1 MG: 1 TABLET ORAL at 08:32

## 2021-09-03 RX ADMIN — OLANZAPINE 20 MG: 20 TABLET, ORALLY DISINTEGRATING ORAL at 20:10

## 2021-09-03 RX ADMIN — LOSARTAN POTASSIUM 100 MG: 100 TABLET, FILM COATED ORAL at 08:32

## 2021-09-03 RX ADMIN — AMLODIPINE BESYLATE 7.5 MG: 2.5 TABLET ORAL at 08:32

## 2021-09-03 ASSESSMENT — ACTIVITIES OF DAILY LIVING (ADL)
LAUNDRY: UNABLE TO COMPLETE
ORAL_HYGIENE: PROMPTS
HYGIENE/GROOMING: PROMPTS
ORAL_HYGIENE: PROMPTS
LAUNDRY: UNABLE TO COMPLETE
HYGIENE/GROOMING: PROMPTS
DRESS: SCRUBS (BEHAVIORAL HEALTH)
DRESS: SCRUBS (BEHAVIORAL HEALTH)

## 2021-09-03 NOTE — PROGRESS NOTES
"[facility], [place]   Psychiatric Progress Note  Hospital Day: 142        Interim History:   The patient's care was discussed with the treatment team during the daily team meeting and/or staff's chart notes were reviewed. No acute medical concerns. Has intermittent elevated BP, asymptomatic, otherwise vitals WNL. She is medication adherent. She has been pleasant and cooperative with staff and peers. Continues to be isolate to her room despite encouragement to participate in the milieus. No reported or observed side effects. No symptoms of psychosis or tad. Intermittently attends to all ADLs, though overall, hygiene maintenance remains poor. Continues to sleep and eat well. Michelle is at her baseline. Slept 6.5 hours overnight.     Michelle reported that she is \"frustrated.\" She was hoping for discharge this week and does not understand why she is still in the hospital. Reassurance provided. She had no requests or concerns for this writer.     Suicidal ideation: denies current or recent suicidal ideation or behaviors.    Homicidal ideation: denies current or recent homicidal ideation or behaviors.    Psychotic symptoms: Patient denies AH, VH, paranoia, delusions.     Medication side effects reported: She denies sedation today. She denies any dizziness or lightheadedness.     Acute medical concerns: pain in left ankle has resolved. Denies urinary sx. She denies any fevers, chills, cough or malaise.    Other issues reported by patient: Patient had no further questions or concerns.           Medications:       amLODIPine  7.5 mg Oral Daily     benztropine  1 mg Oral BID     LORazepam  1 mg Oral TID    Or     LORazepam  1 mg Intramuscular TID     losartan  100 mg Oral Daily     metoprolol succinate ER  75 mg Oral Daily     OLANZapine zydis  20 mg Oral At Bedtime    Or     OLANZapine  10 mg Intramuscular At Bedtime          Allergies:     Allergies   Allergen Reactions     Haldol [Haloperidol]      " Patient previously tolerated haldol, though developed oculogyric crises during hospital stay on 4/26/21 on total daily dose of 10 mg.     Lisinopril Cough          Labs:     No results found for this or any previous visit (from the past 24 hour(s)).       Psychiatric Examination:     /78 (BP Location: Right arm)   Pulse 76   Temp 97.8  F (36.6  C) (Tympanic)   Resp 16   Wt 68.9 kg (152 lb)   SpO2 97%   BMI 26.09 kg/m    Weight is 152 lbs 0 oz  Body mass index is 26.09 kg/m .    Weight over time:  Vitals:    05/25/21 0850 06/15/21 0811 06/16/21 1605 06/19/21 0859   Weight: 71 kg (156 lb 8 oz) 69.8 kg (153 lb 12.8 oz) 70.5 kg (155 lb 8 oz) 71.4 kg (157 lb 8 oz)    06/21/21 0805 06/22/21 0839 06/24/21 0800 07/02/21 0835   Weight: 69.5 kg (153 lb 3.2 oz) 69.3 kg (152 lb 11.2 oz) 70.7 kg (155 lb 12.8 oz) 70.7 kg (155 lb 12.8 oz)    07/03/21 0844 07/06/21 0838 07/10/21 0845 07/16/21 0821   Weight: 69.6 kg (153 lb 8 oz) 70.9 kg (156 lb 4.8 oz) 69.8 kg (153 lb 14.4 oz) 69.3 kg (152 lb 12.8 oz)    07/17/21 0830 07/31/21 0804 08/17/21 0845 08/19/21 0800   Weight: 70.4 kg (155 lb 4.8 oz) 70.3 kg (154 lb 14.4 oz) 70.8 kg (156 lb) 69.2 kg (152 lb 9.6 oz)    08/24/21 0700 08/26/21 0800 09/02/21 0803   Weight: 70.3 kg (155 lb) 69 kg (152 lb 3.2 oz) 68.9 kg (152 lb)       Orthostatic Vitals       Most Recent      Sitting Orthostatic /84 07/29 0800    Sitting Orthostatic Pulse (bpm) 82 07/29 0800    Standing Orthostatic /86 07/29 0800    Standing Orthostatic Pulse (bpm) 88 07/29 0800            Cardiometabolic risk assessment. 07/29/21      Reviewed patient profile for cardiometabolic risk factors    Date taken /Value  REFERENCE RANGE   Abdominal Obesity  (Waist Circumference)   See nursing flowsheet Women ?35 in (88 cm)   Men ?40 in (102 cm)      Triglycerides  Triglycerides   Date Value Ref Range Status   08/26/2021 205 (H) <150 mg/dL Final     Comment:     0-9 years:  Normal:    Less than 75  "mg/dL  Borderline high:  75-99 mg/dL  High:             Greater than or equal to 100 mg/dL    0-19 years:  Normal:    Less than 90 mg/dL  Borderline high:   mg/dL  High:             Greater than or equal to 130 mg/dL    20 years and older:  Normal:    Less than 150 mg/dL  Borderline high:  150-199 mg/dL  High:             200-499 mg/dL  Very high:   Greater than or equal to 500 mg/dL   10/19/2019 117 <150 mg/dL Final       ?150 mg/dL (1.7 mmol/L) or current treatment for elevated triglycerides   HDL cholesterol  HDL Cholesterol   Date Value Ref Range Status   10/19/2019 56 >49 mg/dL Final     Direct Measure HDL   Date Value Ref Range Status   08/26/2021 55 >=50 mg/dL Final     Comment:     0-19 years:       Greater than or equal to 45 mg/dL   Low: Less than 40 mg/dL   Borderline low: 40-44 mg/dL     20 years and older:   Female: Greater than or equal to 50 mg/dL   Male:   Greater than or equal to 40 mg/dL        ]   Women <50 mg/dL (1.3 mmol/L) in women or current treatment for low HDL cholesterol  Men <40 mg/dL (1 mmol/L) in men or current treatment for low HDL cholesterol     Fasting plasma glucose (FPG) Lab Results   Component Value Date     07/02/2021      FPG ?100 mg/dL (5.6 mmol/L) or treatment for elevated blood glucose   Blood pressure  BP Readings from Last 3 Encounters:   09/02/21 125/78   06/04/19 131/71   04/26/19 164/86    Blood pressure ?130/85 mmHg or treatment for elevated blood pressure   Family History  See family history     Appearance: dressed in hospital scrubs, appeared reported age, unkempt hair   Attitude: cooperative  Eye Contact: improved, looks up more during the interview   Mood: \"frustrated\"    Affect:  Somewhat blunted, no longer agitated or tense. Brightens and smiles at times   Speech: accented, clear and coherent. Soft volume.    Language: no obvious receptive or expressive deficits  Psychomotor, Gait, Musculoskeletal: No abnormal movements noted while seated  Thought " Process: goal-oriented, linear, concrete   Associations:  No loosening of associations present  Thought Content:  Did not appear to be responding to internal stimuli.  Insight:   poor - slightly improved   Judgement:  fair  Oriented to: person, place, time/date   Attention Span and Concentration: attentive to conversation though at times stares ahead and does not respond to questions.  Recent and Remote Memory: stable, some difficulty recalling events   Fund of Knowledge:  normal    Clinical Global Impressions  First:  Considering your total clinical experience with this particular patient population, how severe are the patient's symptoms at this time?: 7 (04/16/21 1428)  Compared to the patient's condition at the START of treatment, this patient's condition is: 4 (04/16/21 1428)  Most recent:  Considering your total clinical experience with this particular patient population, how severe are the patient's symptoms at this time?: 7 (07/22/21 0941)  Compared to the patient's condition at the START of treatment, this patient's condition is: 3 (07/22/21 0941)           Precautions:     Behavioral Orders   Procedures     Cheeking Precautions (behavioral units)     Patient Observed swallowing PO medications; Patient asked to drink water after swallowing medication; Patient in Staff line of sight for 15 minutes after medication given; Mouth checks after PO administration (patient asked to open mouth and stick out their tongue).     Code 2     Code 3     For walks in the [place] with staff and per staff discretion     Electroconvulsive therapy     Series of up to 12 treatments. Begin Date: 5/26/21     Treating Psychiatrist providing ECT:  Dr. Lubin     Notified on:  5/21/21     Electroconvulsive therapy     As long as we get the green light from risk management and pt is medically cleared, begin ECT every Monday, Wednesday, and Friday     Electroconvulsive therapy     Series of up to 12 treatments. Begin Date: 5/25/21    "  Treating Psychiatrist providing ECT:  Amee     Notified on:  5/24/21     Elopement precautions     Fall precautions     Mcgrath Calderon     Routine Programming     As clinically indicated     Status 15     Every 15 minutes.          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 Symptoms and presentation at this time is most consistent with Schizoaffective Disorder, Bipolar Type. Obtained most recent medication regimen from patient's ACT team, and regimen was initially restarted. Pt is committed. Petitioned for Mcgrath Calderon was filed and granted due to lack of improvement and side effects from medications. Inpatient psychiatric hospitalization is warranted at this time for safety, stabilization, possible adjustment in medications and development of a safe discharge plan.      Hospital Course:  On admission, PTA medications were restarted. However, Michelle had been declining all scheduled medications despite significant encouragement from staff and provider. Psychiatric emergency declared on 4/20 due to aggression toward others in context of severe psychosis and suspected excited catatonia. Ativan 1 mg TID was also added. Discontinued PTA Invega, Zyprexa, and thorazine on 4/20 due to consistent refusal.      On 4/26, it was noted that patient frequently had upward gaze while walking up and down the unit. She did not appear to be distressed. She reported that she was looking at \"my god.\" It was determined " "to be oculogyric crisis secondary to IM haloperidol. Haldol was subsequently discontinued and scheduled Zyprexa was initiated on an emergency basis. Oral Cogentin was also scheduled, though patient declined. On the evening of 4/26, patient's gaze was fixed in upward position for several hours and she appeared to be experiencing discomfort. IM Cogentin was administered with noted resolution. Partial improvements were observed after switching to scheduled Zyprexa. After patient improved, she was more receptive to reinitiating oral Invega, which was initiated on 5/3 and titrated to PTA dose of 9 mg daily on 5/10. Plan was for ACT team to bring in loading dose of Invega Sustenna. Patient had signs of oculogyric crisis again with Invega. Oral dose decreased to 6 mg after this. Invega was stopped on 5/14 due to ongoing signs of problems related to eye movement and concerns for oculogyric crisis.     Overall improvement in patient's agitation and suspected catatonia noted on 4/30 after patient accepted two doses of Ativan. She began declining Ativan again with noted decompensation. Patient began accepting, however, was deemed not to have the capacity to consent to treatment with Ativan. She does not believe she has a mental illness, including catatonia. She does not fully understand risks associated with inadequate treatment of catatonia. Discussed with her son who is acting as surrogate decision maker and he is in full support of forced scheduled IM Ativan if patient declines oral formulation. Also consulted with our legal team prior to backing oral Ativan with IM.      Ativan was increased on 5/19 due to re-emerging evidence of catatonia (long periods of staring, repetitive movements, echolalia, mutism). Meeting was held with ACT team on 5/20, including ACT team psychiatrist, Dr. Pedraza. He said that he is in \"full support\" of plan to pursue Mcgrath Kohler and ECT at this time. He does feel that in the past she has been " discharged from the hospital while still quite symptomatic. He is hoping that ECT will be effective and that with improvement, Michelle would be more receptive to clozapine and weekly blood draws. He said that ideally she would transition to an IRTS before going back to her apartment, but also understands there may be some barriers (I.e. pt's willingness, financial concerns, etc).      ECT consult placed. Please see consult note by Dr. Lubin on 5/21 for details. Alcides Kohler was approved on 5/24 and patient was medically cleared on 5/24. ECT initiated on 5/26. She was noted to have a short seizure during ECT on 6/4. Staff note that patient appears more disoriented with memory impairment and sedation on days of ECT. This was noted on my examination again today. Improvements noted in mood, affect, social interactions, paranoia, agitation since initiation of ECT. Reduced Ativan on 6/5 due to sedative effects. Relayed concerns about memory impairment and confusion with Dr. Lubin. Recommended attempting unilateral ECT, which he agreed to do. First unilateral ECT on 6/7. ECT was held on 6/11 and 6/14 due to memory impairment. We will plan to hold it again tomorrow (6/16). There is ongoing discussion regarding whether there is a component of catatonia contributing to her current presentation but this is less likely since it worsened after ECT was started.  Given her level of cognitive impairment and our belief that this is due to ECT, we will not pursue any further treatments at this time. Since we have held ECT (last treatment on 6/9), her cognitive impairment has slightly improved (more oriented).      Michelle initially appeared to be decompensating somewhat when ECT was held, though her cognitive impairment has gradually improved. If symptoms of psychosis re-emerge, it may be worth starting clozapine, which is something that has previously been considered by her ACT team. Her Hatch order would need to be amended as clozapine  is not one of the medications listed. ANC obtained on 6/16 was 1800/microL. Per conversation with pharmacy, neutropenia has been associated with olanzapine and agranulocytosis has been associated with olanzapine, lorazepam, metoprolol, and hydralazine and hematologic labs have been monitored. Upon re-check, ANC was 3700/microL on 6/21/21. At this time, the primary symptoms we are observing are cognitive deficits and inability to care for self, which we would not address by changing her antipsychotic medication.     Michelle's cognitive impairment has been steadily improving since holding ECT treatments, however it is possible that lorazepam is also playing a role in her cognitive impairment. Given no signs of re-emerging catatonia, a gradual lorazepam taper (decreasing by 0.5 mg) was initiated on 6/28/21 to monitor for potential improvements with regard to memory impairment.  On 7/7, Michelle reported an incident of oculogyric crisis and Cogentin 1 mg BID was initiated with no noted changes in cognition.      Michelle has remained focused on discharge. The team is working with Michelle's ACT team to to establish a safe discharge plan with options including moving to a group home vs home with her ACT team and additional in home supports via CADI services. We are concerned that Michelle would have difficulty taking her medications as prescribed if she were to return home without her ACT team and additional services. This is evidenced by impairments noted in cognitive assessment by OT specialist on 7/1. MOCA score was a 13/30 and CPT score was a 4.7. Physician's statement in support of guardianship was completed on 7/19/21.  Patient's son is pursuing guardianship. Patient became quite agitated on 7/16 when  staff from [facility]  came to meet with her a second time. She did not allow staff members to enter her room and stated that she will not be going to a group home.      Due to daytime sedation, on 7/26/21, lorazepam  evening dose decreased from 2 mg to 1 mg. On 7/27/21 transitioned Zyprexa from 10 mg BID to 5 mg QAM + 15 mg QPM and had less daytime sedation with this change and no emergence of symptoms concerning for orthostatic hypotension.  She attended her emergency guardianship hearing on 7/28.  Due to ongoing daytime sedation, Zyprexa doses were consolidated to bedtime starting on 7/30. [place] completed on 8/12 for [facility] funding.     Target psychiatric symptoms and interventions:   - Continue Ativan 1 mg PO or IM TID Patient may not decline.   - Resume Zyprexa 20 mg at bedtime  Hatch in place. May consider increasing further though holding off given re-emerging catatonic sx and borderline prolonged QTc      - Continue Cogentin 1 mg PO BID with additional 2 mg IM daily prn for evidence of acute dystonic reaction or oculogyric crisis  -Continue hydroxyzine 25-50 mg q4h prn for acute anxiety  -Continue Trazodone 50 mg at bedtime prn for sleep disturbances  -Continue Zyprexa 10 mg TID prn for severe agitation  -Continue Ativan/Benadryl q4h prn for agitation. WOULD GIVE PRN ATIVAN FIRST FOR AGITATION unless it is after 5 pm on day prior to scheduled ECT     Occupational Therapy Consult Placed to evaluate cognitive functioning/ability to care for self in home environment, ability to manage medications. See note on 7/1 for details.      ECT: Discontinued due to significant cognitive impairment. Please see note dated 7/12/21 for additional details.      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 If no active infection, would be okay for her to follow up for treatment as an outpatient unless treatment must be initiated per group home protocol. Please see ID note on 8/30 for details. Appreciate assistance.    - PA and Lateral chest xray ordered. Reviewed and negative.   - ID consult placed on 8/30   - Emelia  "her son/guardian consented to treatment for latent TB on 9/1/21  - per 9/2 conversation with Mati from [facility], okay to discharge given negative chest x-ray with outpatient follow up   - follow up in outpatient infectious disease clinic in one month with Jaren Celestin MD    Left ankle pain, resolved   - Added ice packs prn    HTN, improved: Patient is now adherent with medication regimen. Intermittent elevations with no concerning symptoms.  - Metoprolol succinate ER 75 mg   - Cozaar 100 mg daily  - Amlodipine 7.5 mg daily  - IM discontinued hydralazine prn  - Switched BP checks from QID to BID on 7/13 given overall improvement  - Please see note from IM dated 5/3, 5/6, 5/24, 6/20 and 6/30/21.  - Obtained EKG and routine labs on 5/21 in context of pt declining vital sign checks and anti-hypertensives and recently elevated BPs. Reviewed labs and discussed EKG findings with IM on 5/21. No urgent concerns, though IM should be notified if pt develops acute medical concerns (I.e. heart palpitations, SOB, changes in speech, AMS, confusion, CP, HA, changes in vision).    - Per 6/20 IM note: \"Please notify IM if BP is persistently severely elevated and requiring frequent PRN hydralazine\".  - Internal medicine consulted again on 6/28 due to consistent use of hydralazine over the past week. Metoprolol increased to 75 mg daily then to 100 mg and amlodipine 5 mg was added on 6/30. Amlodipine increased to 7.5 mg daily on 7/5.      Medicine consulted again on 8/26 given lab abnormalities as discussed below:   1) Abnormal cholesterol panel: Fasting lipid panel this morning with elevated total cholesterol and elevated LDL. Prior lipid panel in 2020 was unremarkable.   - First line management for dyslipidemia is a trial of 6 months of lifestyle changes (dietary modifications, exercise)   - She should follow-up with PCP upon discharge to have further cardiac risk stratification and discussion regarding risks / " benefits of starting a statin      2) Elevated creatinine: Cr 1.17 this morning. Patient had BRANDON in June with Cr to peak of 1.83, was felt to be of pre-renal etiology and Cr returned to baseline with IVF. Baseline Cr difficult to determine d/t limited labs but her baseline Cr appears to be ~0.9 - 1.0, therefore does not meet criteria for BRANDON at this time. Her bump in creatinine may be due to fluctuating baseline vs dehydration.    - Encourage oral fluid intake, goal 2 liters daily   - Recheck BMP on 8/28/21, Cr down-trending, concern for CKD   - UA results reviewed. Trace leukocyte esterase. 4 WBCs. Patient is asymptomatic.     Routine Health Maintenance: Will test quantiferon TB gold per  request.     For previous medical concerns, please see note dated 7/12/21.     Behavioral/Psychological/Social:  - Encourage unit programming  - Patient is Code 3 status and can take walks in the [place] with staff and security present per staff discretion. This appears to be quite therapeutic for her.      Safety:  - Continue precautions as noted above  - Status 15 minute checks  - Safety precautions include: assault and elopement precautions  - Continue precautions as noted above     Legal Status: Committed as MI with Hatch in place for Haldol, Zyprexa, Invega, and Thorazine through [facility]. Filed Alcides Kohler through [facility] and approved on 5/24/21. Physician's statement in support of guardianship was completed on 7/19/21. Patient's son is now her temporary emergency guardian.     Disposition Plan   Reason for ongoing admission: pending safe discharge plan  Discharge location: Group home (needs CADI waiver). CPT assessment done by OT (Tania queen) on 7/1.  Please see note for details. [place] completed on 8/12 & funding approved though amount was too low for [facility] to accept. Process to appeal funding amount in progress. Hopefully discharge this week.    Discharge Medications:  ordered. Group home prefers 30 d/s. ACT team will take over meds upon discharge.   Follow-up Appointments: scheduled           Gabby Zaman MD  [facility]

## 2021-09-03 NOTE — PLAN OF CARE
"  Problem: Behavior Regulation Impairment (Psychotic Signs/Symptoms)  Goal: Improved Behavioral Control (Psychotic Signs/Symptoms)  Outcome: No Change  Flowsheets (Taken 9/3/2021 4348)  Mutually Determined Action Steps (Improved Behavioral Control): identifies future-oriented goal    RN Assessment:  SI/Self harm:  pt denies  Aggression/agitation/HI: none    AVH:  pt denies, does not appear to be responding  Sleep: 7 hours documented on NOC shift  PRN Med: No PRNs administered this shift  Medication AE: none reported or observed  Physical Complaints/Issues: pt denies  I & O: eating and drinking well  ADLs: independent with prompting, as pt shows low motivation for self-care  Vitals:  WNL  COVID 19 Assessment:  negative, no symptoms  Milieu Participation: none, pt isolates to her room and declines to engage in the milieu at all except to take phone calls  Behavior: overall calm, irritable at times, isolative, guarded.pt is quite frustrated with extended hospital stay and says \"they're just trying to frustrate me\".    No other concerns at this time. Nursing will continue to monitor and assess.   "

## 2021-09-03 NOTE — PLAN OF CARE
Assessment/Intervention/Current Symtoms and Care Coordination  -Chart review    -Rounded with team, addressed patient needs/concerns  Writer spoke with Financial Team @ [facility] to check on status of patient's funding.  Things seem to be at a stand still and not moving forward as we anticipated regarding funding for patient to have approval for placement at group home.     Current Symptoms include the following: More depressed and shut  down today.  Patient appears with flat affect.  Sleeping more and not attending to ADL'S    Discharge Plan or Goal  Pending stabilization & development of a safe discharge plan.  Considerations include: Group Home pending funding approval    Barriers to Discharge  Patient requires further psychiatric stabilization due to current symptomology    Referral Status  [facility] referral completed and patient has been accepted pending [facility] Funding.      Legal Status  Civil Commitment/Hatch/Mcgrath colin through [facility].

## 2021-09-03 NOTE — PLAN OF CARE
Problem: Behavioral Health Plan of Care  Goal: Plan of Care Review  Outcome: Improving  Pt is isolative and withdrawn to her room. She spent most of this evening resting in bed and watching TV. She was encouraged to come out of her room and pace the hallway for 15 minutes to prevent blood clots but declined. She is only visible in the lounge area during dinner and snacks time.      Upon nursing assessment, she denies all mental health symptoms, including depression, anxiety, SI, and SIB. No delusional comments/statements this evening. She appeared to be at her baseline. She denies any pain or discomfort. She took scheduled medication without any issue and denied the side effects of her medication.      Pt had covid test done this morning and result was negative. Her appetite is good and drinking well. Poor hygiene, and she declined to shower. Her vital signs stable.

## 2021-09-04 PROCEDURE — 250N000013 HC RX MED GY IP 250 OP 250 PS 637: Performed by: PHYSICIAN ASSISTANT

## 2021-09-04 PROCEDURE — 250N000013 HC RX MED GY IP 250 OP 250 PS 637: Performed by: STUDENT IN AN ORGANIZED HEALTH CARE EDUCATION/TRAINING PROGRAM

## 2021-09-04 PROCEDURE — 124N000002 HC R&B MH UMMC

## 2021-09-04 RX ADMIN — AMLODIPINE BESYLATE 7.5 MG: 2.5 TABLET ORAL at 08:53

## 2021-09-04 RX ADMIN — LOSARTAN POTASSIUM 100 MG: 100 TABLET, FILM COATED ORAL at 08:53

## 2021-09-04 RX ADMIN — METOPROLOL SUCCINATE 75 MG: 25 TABLET, EXTENDED RELEASE ORAL at 08:53

## 2021-09-04 RX ADMIN — LORAZEPAM 1 MG: 1 TABLET ORAL at 20:17

## 2021-09-04 RX ADMIN — OLANZAPINE 20 MG: 20 TABLET, ORALLY DISINTEGRATING ORAL at 20:17

## 2021-09-04 RX ADMIN — BENZTROPINE MESYLATE 1 MG: 1 TABLET ORAL at 20:17

## 2021-09-04 RX ADMIN — BENZTROPINE MESYLATE 1 MG: 1 TABLET ORAL at 08:53

## 2021-09-04 RX ADMIN — LORAZEPAM 1 MG: 1 TABLET ORAL at 14:35

## 2021-09-04 RX ADMIN — LORAZEPAM 1 MG: 1 TABLET ORAL at 08:53

## 2021-09-04 ASSESSMENT — ACTIVITIES OF DAILY LIVING (ADL)
ORAL_HYGIENE: INDEPENDENT
ORAL_HYGIENE: INDEPENDENT
HYGIENE/GROOMING: INDEPENDENT
DRESS: SCRUBS (BEHAVIORAL HEALTH)
LAUNDRY: UNABLE TO COMPLETE
DRESS: SCRUBS (BEHAVIORAL HEALTH)
HYGIENE/GROOMING: INDEPENDENT

## 2021-09-04 NOTE — PLAN OF CARE
Problem: Sleep Disturbance (Psychotic Signs/Symptoms)  Goal: Improved Sleep (Psychotic Signs/Symptoms)  Outcome: Improving   Pt slept for 7hrs thus far. Breathing pattern WNL. The 15 mins safety checks cont. No sign of pain or discomfort. Pt is on fall, Cheeking, and elopement precautions with no related event.

## 2021-09-04 NOTE — PLAN OF CARE
Problem: Behavioral Health Plan of Care  Goal: Optimized Coping Skills in Response to Life Stressors  Outcome: No Change   Nursing Assessment    Recent Vitals: B/P:133/75  T:98.5  P:71     General Shift Summary  Isolative to room throughout shift. Pt did not have any behavioral concerns this shift and was pleasant with writer. Pt minimally participated in check in with writer, although denies any concerns or questions at this time.     Patient is medication compliant and reported no side effects. No PRN medications given this shift.     Hygiene neglected, appetite appropriate.     Hai Tellez RN

## 2021-09-04 NOTE — PLAN OF CARE
"Patient presents as quiet and socially withdrawn with a flat affect.  She is calm, pleasant, and cooperative on approach.  She denies that she is experiencing any psychotic symptoms or dangerous ideations.  She is frustrated with her prolonged hospitalization, but she did affirm an understanding as to why she is still in the hospital with [facility] funding currently pending.  Michelle accepted all of her scheduled medications, but she did have some questions about her antihypertensive regimen; she stated, \"Do I really need all of these blood pressure medications?\".  RN writer reinforced teaching on the therapeutic effects of Amlodipine, Metoprolol, and Losartan and the risks of non-compliance with her BP regimen.  Michelle affirmed an understanding of this teaching and eventually took all of her scheduled medications.  She denies any acute physical concerns at this time.  VSS.  "

## 2021-09-05 PROCEDURE — 250N000013 HC RX MED GY IP 250 OP 250 PS 637: Performed by: PHYSICIAN ASSISTANT

## 2021-09-05 PROCEDURE — 124N000002 HC R&B MH UMMC

## 2021-09-05 PROCEDURE — 250N000013 HC RX MED GY IP 250 OP 250 PS 637: Performed by: STUDENT IN AN ORGANIZED HEALTH CARE EDUCATION/TRAINING PROGRAM

## 2021-09-05 RX ADMIN — LORAZEPAM 1 MG: 1 TABLET ORAL at 08:51

## 2021-09-05 RX ADMIN — LORAZEPAM 1 MG: 1 TABLET ORAL at 19:05

## 2021-09-05 RX ADMIN — OLANZAPINE 20 MG: 20 TABLET, ORALLY DISINTEGRATING ORAL at 19:05

## 2021-09-05 RX ADMIN — LORAZEPAM 1 MG: 1 TABLET ORAL at 14:35

## 2021-09-05 RX ADMIN — LOSARTAN POTASSIUM 100 MG: 100 TABLET, FILM COATED ORAL at 08:51

## 2021-09-05 RX ADMIN — AMLODIPINE BESYLATE 7.5 MG: 2.5 TABLET ORAL at 08:50

## 2021-09-05 RX ADMIN — METOPROLOL SUCCINATE 75 MG: 25 TABLET, EXTENDED RELEASE ORAL at 08:51

## 2021-09-05 RX ADMIN — BENZTROPINE MESYLATE 1 MG: 1 TABLET ORAL at 19:05

## 2021-09-05 RX ADMIN — BENZTROPINE MESYLATE 1 MG: 1 TABLET ORAL at 08:51

## 2021-09-05 ASSESSMENT — ACTIVITIES OF DAILY LIVING (ADL)
ORAL_HYGIENE: INDEPENDENT
HYGIENE/GROOMING: INDEPENDENT
DRESS: SCRUBS (BEHAVIORAL HEALTH);INDEPENDENT
ORAL_HYGIENE: INDEPENDENT
HYGIENE/GROOMING: INDEPENDENT
DRESS: SCRUBS (BEHAVIORAL HEALTH)

## 2021-09-05 NOTE — PLAN OF CARE
Problem: Behavioral Health Plan of Care  Goal: Plan of Care Review  Recent Flowsheet Documentation  Taken 9/5/2021 1714 by Edna Vines RN  Plan of Care Reviewed With: patient  Patient Agreement with Plan of Care: agrees    No change and patient appears to be at baseline awaiting funding for placement; Presents as isolative and withdrawn, denies any concerns including no SI/SIB thoughts, responses somewhat latent or guarded, although cooperative with questions, appetite and sleep are fine; VSS takes medications and goes to bed early.

## 2021-09-05 NOTE — PLAN OF CARE
Patient presents as calm, pleasant, and cooperative with a flat affect.   She did verbalize stress related to her prolonged hospitalization, but she has not been perseverating on obtaining discharge.  Michelle denies any psychotic symptoms or dangerous ideations at this time.  She has not demonstrated any signs of agitation or aggressive behavior this shift.  She has been accepting of all her scheduled medications without incident.  She denies any SE's or acute physical concerns.  VSS.

## 2021-09-05 NOTE — PLAN OF CARE
Problem: General Rehab Plan of Care  Goal: Therapeutic Recreation/Music Therapy Goal  Outcome: No Change   Nursing Assessment    Recent Vitals: B/P:125/84  T:97.6  P:69 R:16     General Shift Summary  Isolative to room throughout shift. Pt did not have any behavioral concerns this shift and was pleasant with writer. Pt minimally participated in check in with writer, although denies any concerns or questions at this time.      Patient is medication compliant and reported no side effects. No PRN medications given this shift.      Hygiene neglected, appetite appropriate.     Hai Tellez, RN

## 2021-09-06 PROCEDURE — 124N000002 HC R&B MH UMMC

## 2021-09-06 PROCEDURE — 250N000013 HC RX MED GY IP 250 OP 250 PS 637: Performed by: PHYSICIAN ASSISTANT

## 2021-09-06 PROCEDURE — 250N000013 HC RX MED GY IP 250 OP 250 PS 637: Performed by: STUDENT IN AN ORGANIZED HEALTH CARE EDUCATION/TRAINING PROGRAM

## 2021-09-06 RX ADMIN — METOPROLOL SUCCINATE 75 MG: 25 TABLET, EXTENDED RELEASE ORAL at 09:09

## 2021-09-06 RX ADMIN — LORAZEPAM 1 MG: 1 TABLET ORAL at 09:09

## 2021-09-06 RX ADMIN — AMLODIPINE BESYLATE 7.5 MG: 2.5 TABLET ORAL at 09:09

## 2021-09-06 RX ADMIN — LORAZEPAM 1 MG: 1 TABLET ORAL at 13:49

## 2021-09-06 RX ADMIN — LORAZEPAM 1 MG: 1 TABLET ORAL at 20:02

## 2021-09-06 RX ADMIN — LOSARTAN POTASSIUM 100 MG: 100 TABLET, FILM COATED ORAL at 09:09

## 2021-09-06 RX ADMIN — BENZTROPINE MESYLATE 1 MG: 1 TABLET ORAL at 20:02

## 2021-09-06 RX ADMIN — BENZTROPINE MESYLATE 1 MG: 1 TABLET ORAL at 09:09

## 2021-09-06 RX ADMIN — OLANZAPINE 20 MG: 20 TABLET, ORALLY DISINTEGRATING ORAL at 20:02

## 2021-09-06 ASSESSMENT — ACTIVITIES OF DAILY LIVING (ADL)
HYGIENE/GROOMING: INDEPENDENT
DRESS: SCRUBS (BEHAVIORAL HEALTH)
LAUNDRY: UNABLE TO COMPLETE
ORAL_HYGIENE: INDEPENDENT

## 2021-09-06 NOTE — PLAN OF CARE
Nursing assessment completed. Patient makes a few phone calls, but is isolative to her room through out shift. Hopeful to discharge to her group home soon and expresses frustration with the length of stay. She presents with a flat, depressed affect, but does brighten a bit upon approach. VSS. She is medication compliant.Denies pain, symptom, or side effects. Denies SI/SIB or thoughts to harm others. Continue to monitor and assess.

## 2021-09-06 NOTE — PLAN OF CARE
Pt asleep at start of shift. Breathing quiet and unlabored.     Pt had no c/o pain or discomfort during the HS.     Appears to have slept 7 hours.     Pt on CHEEKING, ELOPEMENT, and FALL  precautions in addition to single room order. Any related events noted above.     Will continue to monitor and assess.   Problem: Sleep Disturbance  Goal: Adequate Sleep/Rest  9/6/2021 0629 by Kourtney Soria, RN  Outcome: No Change  9/6/2021 0629 by Kourtney Soria, RN  Reactivated

## 2021-09-07 PROCEDURE — 124N000002 HC R&B MH UMMC

## 2021-09-07 PROCEDURE — 250N000013 HC RX MED GY IP 250 OP 250 PS 637: Performed by: STUDENT IN AN ORGANIZED HEALTH CARE EDUCATION/TRAINING PROGRAM

## 2021-09-07 PROCEDURE — 250N000013 HC RX MED GY IP 250 OP 250 PS 637: Performed by: PHYSICIAN ASSISTANT

## 2021-09-07 PROCEDURE — 99231 SBSQ HOSP IP/OBS SF/LOW 25: CPT | Performed by: PSYCHIATRY & NEUROLOGY

## 2021-09-07 RX ADMIN — OLANZAPINE 20 MG: 20 TABLET, ORALLY DISINTEGRATING ORAL at 20:15

## 2021-09-07 RX ADMIN — LORAZEPAM 1 MG: 1 TABLET ORAL at 20:15

## 2021-09-07 RX ADMIN — LORAZEPAM 1 MG: 1 TABLET ORAL at 08:31

## 2021-09-07 RX ADMIN — BENZTROPINE MESYLATE 1 MG: 1 TABLET ORAL at 08:31

## 2021-09-07 RX ADMIN — BENZTROPINE MESYLATE 1 MG: 1 TABLET ORAL at 20:15

## 2021-09-07 RX ADMIN — LOSARTAN POTASSIUM 100 MG: 100 TABLET, FILM COATED ORAL at 08:30

## 2021-09-07 RX ADMIN — LORAZEPAM 1 MG: 1 TABLET ORAL at 13:52

## 2021-09-07 RX ADMIN — AMLODIPINE BESYLATE 7.5 MG: 2.5 TABLET ORAL at 08:30

## 2021-09-07 RX ADMIN — METOPROLOL SUCCINATE 75 MG: 25 TABLET, EXTENDED RELEASE ORAL at 08:31

## 2021-09-07 ASSESSMENT — ACTIVITIES OF DAILY LIVING (ADL)
ORAL_HYGIENE: INDEPENDENT
LAUNDRY: UNABLE TO COMPLETE
LAUNDRY: UNABLE TO COMPLETE
DRESS: SCRUBS (BEHAVIORAL HEALTH)
ORAL_HYGIENE: INDEPENDENT
HYGIENE/GROOMING: INDEPENDENT
HYGIENE/GROOMING: INDEPENDENT
DRESS: SCRUBS (BEHAVIORAL HEALTH);INDEPENDENT

## 2021-09-07 NOTE — PROGRESS NOTES
Writer left voice messages for [employer]  and her Supervisor requesting a call back to discuss status of patient's CADI Waiver.  Writer also faxed documents that patient's son sent to have his mother sign and return  to them so that these form can be forwarded on to [facility] Financial Team for their review.

## 2021-09-07 NOTE — PLAN OF CARE
BEHAVIORAL TEAM DISCUSSION    Participants: Ida Zaman MD, Kourtney PARK, resident doctor, Tom, RN, Rosalinda, RN, Tasha, RN, TRAVON Hill  Progress: Continuing to assess  Anticipated length of stay: Unknown at this time  Continued Stay Criteria/Rationale: Patient is in the process of approval for MADX through [facility]. Once MADX is secured patient can be admitted to [facility]  Medical/Physical: Per Internal Medicine  Precautions:   Behavioral Orders   Procedures    Cheeking Precautions (behavioral units)     Patient Observed swallowing PO medications; Patient asked to drink water after swallowing medication; Patient in Staff line of sight for 15 minutes after medication given; Mouth checks after PO administration (patient asked to open mouth and stick out their tongue).    Code 2    Code 3     For walks in the [place] with staff and per staff discretion    Electroconvulsive therapy     Series of up to 12 treatments. Begin Date: 5/26/21     Treating Psychiatrist providing ECT:  Dr. Lubin     Notified on:  5/21/21    Electroconvulsive therapy     As long as we get the green light from risk management and pt is medically cleared, begin ECT every Monday, Wednesday, and Friday    Electroconvulsive therapy     Series of up to 12 treatments. Begin Date: 5/25/21     Treating Psychiatrist providing ECT:  Amee     Notified on:  5/24/21    Elopement precautions    Fall precautions    [facility]    Routine Programming     As clinically indicated    Status 15     Every 15 minutes.     Plan: Patient will be seen daily by attending psychiatrist and Resident to assess patient and answer questions regarding medications and/or treatment plan. During hospitalization patient will be encouraged to attend therapy groups and participate in unit programming. Plan is for patient to move into [facility]. [facility] will coordinate disposition and aftercare plan.   Rationale for change in precautions or plan:  No  change

## 2021-09-07 NOTE — PLAN OF CARE
Nursing assessment completed. Pt isolative to her room through out shift. Hopeful to discharge to her group home soon and expresses frustration with the length of stay. She presents with a flat, depressed affect, but does brighten a bit upon approach. She is also displaying increased flexibility this morning while taking her medications, even though she is still eating. This is something she has not preferred to do on previous days. VSS. She is medication compliant. Denies pain, symptom, or side effects. Denies SI/SIB or thoughts to harm others. Continue to monitor and assess.

## 2021-09-07 NOTE — PLAN OF CARE
Problem: Sleep Disturbance  Goal: Adequate Sleep/Rest  Outcome: Change based on patient need/priority   Patient offers no concerns today. She presents as isolative most of the shift. Out for phone calls and meal tray. Denies any pain or discomfort. She denies any acute MH symptoms. Affect is flat, depressed. Appears to have little motivation to leave her room. Called her family tonight. No SI/SIB.

## 2021-09-07 NOTE — PROGRESS NOTES
[facility], [place]   Psychiatric Progress Note  Hospital Day: 146        Interim History:   The patient's care was discussed with the treatment team during the daily team meeting and/or staff's chart notes were reviewed. No acute medical concerns. Has intermittent elevated BP, asymptomatic, otherwise vitals WNL. She is medication adherent. She has been pleasant and cooperative with staff and peers. Continues to be isolate to her room despite encouragement to participate in the milieus. No reported or observed side effects. No symptoms of psychosis or tad. Affect is flat, depressed. Expresses frustration r/t length of stay. Intermittently attends to all ADLs, though overall, hygiene maintenance remains poor. Continues to sleep and eat well. Michelle is at her baseline. Slept 7 hours overnight.     Michelle again inquired about discharge plan. She is frustrated about length of stay. She was perseverative about need to reschedule PCP appointment as her original appointment was scheduled for today. She still does not feel she needs to see PCP until one month after discharge. Discussed why it is recommended that she follow up with PCP within 1-2 weeks of discharge. She had no requests or concerns for this writer.     Suicidal ideation: denies current or recent suicidal ideation or behaviors.    Homicidal ideation: denies current or recent homicidal ideation or behaviors.    Psychotic symptoms: Patient denies AH, VH, paranoia, delusions.     Medication side effects reported: She denies sedation today. She denies any dizziness or lightheadedness.     Acute medical concerns: pain in left ankle has resolved. Denies urinary sx. She denies any fevers, chills, cough or malaise.    Other issues reported by patient: Patient had no further questions or concerns.           Medications:       amLODIPine  7.5 mg Oral Daily     benztropine  1 mg Oral BID     LORazepam  1 mg Oral TID    Or     LORazepam  1 mg  Intramuscular TID     losartan  100 mg Oral Daily     metoprolol succinate ER  75 mg Oral Daily     OLANZapine zydis  20 mg Oral At Bedtime    Or     OLANZapine  10 mg Intramuscular At Bedtime          Allergies:     Allergies   Allergen Reactions     Haldol [Haloperidol]      Patient previously tolerated haldol, though developed oculogyric crises during hospital stay on 4/26/21 on total daily dose of 10 mg.     Lisinopril Cough          Labs:     No results found for this or any previous visit (from the past 24 hour(s)).       Psychiatric Examination:     /76 (BP Location: Right arm)   Pulse 66   Temp 98.4  F (36.9  C) (Tympanic)   Resp 16   Wt 68.9 kg (152 lb)   SpO2 100%   BMI 26.09 kg/m    Weight is 152 lbs 0 oz  Body mass index is 26.09 kg/m .    Weight over time:  Vitals:    05/25/21 0850 06/15/21 0811 06/16/21 1605 06/19/21 0859   Weight: 71 kg (156 lb 8 oz) 69.8 kg (153 lb 12.8 oz) 70.5 kg (155 lb 8 oz) 71.4 kg (157 lb 8 oz)    06/21/21 0805 06/22/21 0839 06/24/21 0800 07/02/21 0835   Weight: 69.5 kg (153 lb 3.2 oz) 69.3 kg (152 lb 11.2 oz) 70.7 kg (155 lb 12.8 oz) 70.7 kg (155 lb 12.8 oz)    07/03/21 0844 07/06/21 0838 07/10/21 0845 07/16/21 0821   Weight: 69.6 kg (153 lb 8 oz) 70.9 kg (156 lb 4.8 oz) 69.8 kg (153 lb 14.4 oz) 69.3 kg (152 lb 12.8 oz)    07/17/21 0830 07/31/21 0804 08/17/21 0845 08/19/21 0800   Weight: 70.4 kg (155 lb 4.8 oz) 70.3 kg (154 lb 14.4 oz) 70.8 kg (156 lb) 69.2 kg (152 lb 9.6 oz)    08/24/21 0700 08/26/21 0800 09/02/21 0803   Weight: 70.3 kg (155 lb) 69 kg (152 lb 3.2 oz) 68.9 kg (152 lb)       Orthostatic Vitals       Most Recent      Sitting Orthostatic /84 07/29 0800    Sitting Orthostatic Pulse (bpm) 82 07/29 0800    Standing Orthostatic /86 07/29 0800    Standing Orthostatic Pulse (bpm) 88 07/29 0800            Cardiometabolic risk assessment. 07/29/21      Reviewed patient profile for cardiometabolic risk factors    Date taken /Value  REFERENCE RANGE  "  Abdominal Obesity  (Waist Circumference)   See nursing flowsheet Women ?35 in (88 cm)   Men ?40 in (102 cm)      Triglycerides  Triglycerides   Date Value Ref Range Status   08/26/2021 205 (H) <150 mg/dL Final     Comment:     0-9 years:  Normal:    Less than 75 mg/dL  Borderline high:  75-99 mg/dL  High:             Greater than or equal to 100 mg/dL    0-19 years:  Normal:    Less than 90 mg/dL  Borderline high:   mg/dL  High:             Greater than or equal to 130 mg/dL    20 years and older:  Normal:    Less than 150 mg/dL  Borderline high:  150-199 mg/dL  High:             200-499 mg/dL  Very high:   Greater than or equal to 500 mg/dL   10/19/2019 117 <150 mg/dL Final       ?150 mg/dL (1.7 mmol/L) or current treatment for elevated triglycerides   HDL cholesterol  HDL Cholesterol   Date Value Ref Range Status   10/19/2019 56 >49 mg/dL Final     Direct Measure HDL   Date Value Ref Range Status   08/26/2021 55 >=50 mg/dL Final     Comment:     0-19 years:       Greater than or equal to 45 mg/dL   Low: Less than 40 mg/dL   Borderline low: 40-44 mg/dL     20 years and older:   Female: Greater than or equal to 50 mg/dL   Male:   Greater than or equal to 40 mg/dL        ]   Women <50 mg/dL (1.3 mmol/L) in women or current treatment for low HDL cholesterol  Men <40 mg/dL (1 mmol/L) in men or current treatment for low HDL cholesterol     Fasting plasma glucose (FPG) Lab Results   Component Value Date     07/02/2021      FPG ?100 mg/dL (5.6 mmol/L) or treatment for elevated blood glucose   Blood pressure  BP Readings from Last 3 Encounters:   09/06/21 134/76   06/04/19 131/71   04/26/19 164/86    Blood pressure ?130/85 mmHg or treatment for elevated blood pressure   Family History  See family history     Appearance: dressed in hospital scrubs, appeared reported age, unkempt hair   Attitude: cooperative  Eye Contact: improved, looks up more during the interview   Mood: \"frustrated\"    Affect:  Somewhat " blunted, no longer agitated or tense. Brightens and smiles at times   Speech: accented, clear and coherent. Soft volume.    Language: no obvious receptive or expressive deficits  Psychomotor, Gait, Musculoskeletal: No abnormal movements noted while seated  Thought Process: goal-oriented, linear, concrete   Associations:  No loosening of associations present  Thought Content:  Did not appear to be responding to internal stimuli.  Insight:   poor - slightly improved   Judgement:  fair  Oriented to: person, place, time/date   Attention Span and Concentration: attentive to conversation though at times stares ahead and does not respond to questions.  Recent and Remote Memory: stable, some difficulty recalling events   Fund of Knowledge:  normal    Clinical Global Impressions  First:  Considering your total clinical experience with this particular patient population, how severe are the patient's symptoms at this time?: 7 (04/16/21 1428)  Compared to the patient's condition at the START of treatment, this patient's condition is: 4 (04/16/21 1428)  Most recent:  Considering your total clinical experience with this particular patient population, how severe are the patient's symptoms at this time?: 7 (07/22/21 0941)  Compared to the patient's condition at the START of treatment, this patient's condition is: 3 (07/22/21 0941)           Precautions:     Behavioral Orders   Procedures     Cheeking Precautions (behavioral units)     Patient Observed swallowing PO medications; Patient asked to drink water after swallowing medication; Patient in Staff line of sight for 15 minutes after medication given; Mouth checks after PO administration (patient asked to open mouth and stick out their tongue).     Code 2     Code 3     For walks in the [place] with staff and per staff discretion     Electroconvulsive therapy     Series of up to 12 treatments. Begin Date: 5/26/21     Treating Psychiatrist providing ECT:  Dr. Lubin      Notified on:  5/21/21     Electroconvulsive therapy     As long as we get the green light from risk management and pt is medically cleared, begin ECT every Monday, Wednesday, and Friday     Electroconvulsive therapy     Series of up to 12 treatments. Begin Date: 5/25/21     Treating Psychiatrist providing ECT:  Amee     Notified on:  5/24/21     Elopement precautions     Fall precautions     Mcgrath Calderon     Routine Programming     As clinically indicated     Status 15     Every 15 minutes.          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 Symptoms and presentation at this time is most consistent with Schizoaffective Disorder, Bipolar Type. Obtained most recent medication regimen from patient's ACT team, and regimen was initially restarted. Pt is committed. Petitioned for Mcgrath Calderon was filed and granted due to lack of improvement and side effects from medications. Inpatient psychiatric hospitalization is warranted at this time for safety, stabilization, possible adjustment in medications and development of a safe discharge plan.      Hospital Course:  On admission, PTA medications were restarted. However, Michelle had been declining all scheduled medications despite significant encouragement from staff and provider. Psychiatric emergency declared on 4/20 due to aggression toward others in context of severe psychosis and suspected excited catatonia. Ativan 1 mg TID was also added. Discontinued  "PTA Invega, Zyprexa, and thorazine on 4/20 due to consistent refusal.      On 4/26, it was noted that patient frequently had upward gaze while walking up and down the unit. She did not appear to be distressed. She reported that she was looking at \"my god.\" It was determined to be oculogyric crisis secondary to IM haloperidol. Haldol was subsequently discontinued and scheduled Zyprexa was initiated on an emergency basis. Oral Cogentin was also scheduled, though patient declined. On the evening of 4/26, patient's gaze was fixed in upward position for several hours and she appeared to be experiencing discomfort. IM Cogentin was administered with noted resolution. Partial improvements were observed after switching to scheduled Zyprexa. After patient improved, she was more receptive to reinitiating oral Invega, which was initiated on 5/3 and titrated to PTA dose of 9 mg daily on 5/10. Plan was for ACT team to bring in loading dose of Invega Sustenna. Patient had signs of oculogyric crisis again with Invega. Oral dose decreased to 6 mg after this. Invega was stopped on 5/14 due to ongoing signs of problems related to eye movement and concerns for oculogyric crisis.     Overall improvement in patient's agitation and suspected catatonia noted on 4/30 after patient accepted two doses of Ativan. She began declining Ativan again with noted decompensation. Patient began accepting, however, was deemed not to have the capacity to consent to treatment with Ativan. She does not believe she has a mental illness, including catatonia. She does not fully understand risks associated with inadequate treatment of catatonia. Discussed with her son who is acting as surrogate decision maker and he is in full support of forced scheduled IM Ativan if patient declines oral formulation. Also consulted with our legal team prior to backing oral Ativan with IM.      Ativan was increased on 5/19 due to re-emerging evidence of catatonia (long periods " "of staring, repetitive movements, echolalia, mutism). Meeting was held with ACT team on 5/20, including ACT team psychiatrist, Dr. Pedraza. He said that he is in \"full support\" of plan to pursue Mcgrath Kohler and ECT at this time. He does feel that in the past she has been discharged from the hospital while still quite symptomatic. He is hoping that ECT will be effective and that with improvement, Michelle would be more receptive to clozapine and weekly blood draws. He said that ideally she would transition to an IRTS before going back to her apartment, but also understands there may be some barriers (I.e. pt's willingness, financial concerns, etc).      ECT consult placed. Please see consult note by Dr. Lubin on 5/21 for details. Alcides Saavedraard was approved on 5/24 and patient was medically cleared on 5/24. ECT initiated on 5/26. She was noted to have a short seizure during ECT on 6/4. Staff note that patient appears more disoriented with memory impairment and sedation on days of ECT. This was noted on my examination again today. Improvements noted in mood, affect, social interactions, paranoia, agitation since initiation of ECT. Reduced Ativan on 6/5 due to sedative effects. Relayed concerns about memory impairment and confusion with Dr. Lubin. Recommended attempting unilateral ECT, which he agreed to do. First unilateral ECT on 6/7. ECT was held on 6/11 and 6/14 due to memory impairment. We will plan to hold it again tomorrow (6/16). There is ongoing discussion regarding whether there is a component of catatonia contributing to her current presentation but this is less likely since it worsened after ECT was started.  Given her level of cognitive impairment and our belief that this is due to ECT, we will not pursue any further treatments at this time. Since we have held ECT (last treatment on 6/9), her cognitive impairment has slightly improved (more oriented).      Michelle initially appeared to be decompensating somewhat " when ECT was held, though her cognitive impairment has gradually improved. If symptoms of psychosis re-emerge, it may be worth starting clozapine, which is something that has previously been considered by her ACT team. Her Hatch order would need to be amended as clozapine is not one of the medications listed. ANC obtained on 6/16 was 1800/microL. Per conversation with pharmacy, neutropenia has been associated with olanzapine and agranulocytosis has been associated with olanzapine, lorazepam, metoprolol, and hydralazine and hematologic labs have been monitored. Upon re-check, ANC was 3700/microL on 6/21/21. At this time, the primary symptoms we are observing are cognitive deficits and inability to care for self, which we would not address by changing her antipsychotic medication.     Michelle's cognitive impairment has been steadily improving since holding ECT treatments, however it is possible that lorazepam is also playing a role in her cognitive impairment. Given no signs of re-emerging catatonia, a gradual lorazepam taper (decreasing by 0.5 mg) was initiated on 6/28/21 to monitor for potential improvements with regard to memory impairment.  On 7/7, Michelle reported an incident of oculogyric crisis and Cogentin 1 mg BID was initiated with no noted changes in cognition.      Michelle has remained focused on discharge. The team is working with Michelle's ACT team to to establish a safe discharge plan with options including moving to a group home vs home with her ACT team and additional in home supports via CADI services. We are concerned that Michelle would have difficulty taking her medications as prescribed if she were to return home without her ACT team and additional services. This is evidenced by impairments noted in cognitive assessment by OT specialist on 7/1. MOCA score was a 13/30 and CPT score was a 4.7. Physician's statement in support of guardianship was completed on 7/19/21.  Patient's son is pursuing guardianship.  Patient became quite agitated on 7/16 when  staff from [facility]  came to meet with her a second time. She did not allow staff members to enter her room and stated that she will not be going to a group home.      Due to daytime sedation, on 7/26/21, lorazepam evening dose decreased from 2 mg to 1 mg. On 7/27/21 transitioned Zyprexa from 10 mg BID to 5 mg QAM + 15 mg QPM and had less daytime sedation with this change and no emergence of symptoms concerning for orthostatic hypotension.  She attended her emergency guardianship hearing on 7/28.  Due to ongoing daytime sedation, Zyprexa doses were consolidated to bedtime starting on 7/30. [place] completed on 8/12 for CAD funding.     Target psychiatric symptoms and interventions:   - Continue Ativan 1 mg PO or IM TID Patient may not decline.   - Resume Zyprexa 20 mg at bedtime. Hatch in place.   - Continue Cogentin 1 mg PO BID with additional 2 mg IM daily prn for evidence of acute dystonic reaction or oculogyric crisis  -Continue hydroxyzine 25-50 mg q4h prn for acute anxiety  -Continue Trazodone 50 mg at bedtime prn for sleep disturbances  -Continue Zyprexa 10 mg TID prn for severe agitation  -Continue Ativan/Benadryl q4h prn for agitation. WOULD GIVE PRN ATIVAN FIRST FOR AGITATION unless it is after 5 pm on day prior to scheduled ECT. Of note, she has not required any prn medications for agitation in several weeks.      Occupational Therapy Consult Placed to evaluate cognitive functioning/ability to care for self in home environment, ability to manage medications. See note on 7/1 for details.      ECT: Discontinued due to significant cognitive impairment. Please see note dated 7/12/21 for additional details.      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 If no active infection, would be okay for her to follow up for treatment  "as an outpatient unless treatment must be initiated per group home protocol. Please see ID note on 8/30 for details. Appreciate assistance.    - PA and Lateral chest xray ordered. Reviewed and negative.   - ID consult placed on 8/30   - Emelia her son/guardian consented to treatment for latent TB on 9/1/21  - per 9/2 conversation with Irinaosbaldokyle from [facility], okay to discharge given negative chest x-ray with outpatient follow up   - follow up in outpatient infectious disease clinic in one month with Jaren Celestin MD    HTN, improved: Patient is now adherent with medication regimen. Intermittent elevations with no concerning symptoms.  - Metoprolol succinate ER 75 mg   - Cozaar 100 mg daily  - Amlodipine 7.5 mg daily  - IM discontinued hydralazine prn  - Switched BP checks from QID to BID on 7/13 given overall improvement  - Please see note from IM dated 5/3, 5/6, 5/24, 6/20 and 6/30/21.  - Obtained EKG and routine labs on 5/21 in context of pt declining vital sign checks and anti-hypertensives and recently elevated BPs. Reviewed labs and discussed EKG findings with IM on 5/21. No urgent concerns, though IM should be notified if pt develops acute medical concerns (I.e. heart palpitations, SOB, changes in speech, AMS, confusion, CP, HA, changes in vision).    - Per 6/20 IM note: \"Please notify IM if BP is persistently severely elevated and requiring frequent PRN hydralazine\".  - Internal medicine consulted again on 6/28 due to consistent use of hydralazine over the past week. Metoprolol increased to 75 mg daily then to 100 mg and amlodipine 5 mg was added on 6/30. Amlodipine increased to 7.5 mg daily on 7/5.      Medicine consulted again on 8/26 given lab abnormalities as discussed below:   1) Abnormal cholesterol panel: Fasting lipid panel this morning with elevated total cholesterol and elevated LDL. Prior lipid panel in 2020 was unremarkable.   - First line management for dyslipidemia is a trial of " 6 months of lifestyle changes (dietary modifications, exercise)   - She should follow-up with PCP upon discharge to have further cardiac risk stratification and discussion regarding risks / benefits of starting a statin      2) Elevated creatinine: Cr 1.17 this morning. Patient had BRANDON in June with Cr to peak of 1.83, was felt to be of pre-renal etiology and Cr returned to baseline with IVF. Baseline Cr difficult to determine d/t limited labs but her baseline Cr appears to be ~0.9 - 1.0, therefore does not meet criteria for BRANDON at this time. Her bump in creatinine may be due to fluctuating baseline vs dehydration.    - Encourage oral fluid intake, goal 2 liters daily   - Recheck BMP on 8/28/21, Cr down-trending, concern for CKD   - UA results reviewed. Trace leukocyte esterase. 4 WBCs. Patient is asymptomatic.     For previous medical concerns, please see note dated 7/12/21.     Behavioral/Psychological/Social:  - Encourage unit programming  - Patient is Code 3 status and can take walks in the [place] with staff and security present per staff discretion. This appears to be quite therapeutic for her.      Safety:  - Continue precautions as noted above  - Status 15 minute checks  - Safety precautions include: assault and elopement precautions  - Continue precautions as noted above     Legal Status: Committed as MI with Hatch in place for Haldol, Zyprexa, Invega, and Thorazine through [facility]. Filed Alcides Kohler through [facility] and approved on 5/24/21. Physician's statement in support of guardianship was completed on 7/19/21. Patient's son is now her temporary emergency guardian.     Disposition Plan   Reason for ongoing admission: pending safe discharge plan  Discharge location: Group home (needs CADI waiver). CPT assessment done by OT (Tania queen) on 7/1.  Please see note for details. [place] completed on 8/12 & funding approved though amount was too low for [facility] to  accept. Process to appeal funding amount in progress.    Discharge Medications: ordered. Group home prefers 30 d/s. ACT team will take over meds upon discharge.   Follow-up Appointments: scheduled           Gabby Zaman MD  [facility]iat[facility]

## 2021-09-08 PROCEDURE — 250N000013 HC RX MED GY IP 250 OP 250 PS 637: Performed by: PHYSICIAN ASSISTANT

## 2021-09-08 PROCEDURE — 124N000002 HC R&B MH UMMC

## 2021-09-08 PROCEDURE — 250N000013 HC RX MED GY IP 250 OP 250 PS 637: Performed by: STUDENT IN AN ORGANIZED HEALTH CARE EDUCATION/TRAINING PROGRAM

## 2021-09-08 PROCEDURE — 99231 SBSQ HOSP IP/OBS SF/LOW 25: CPT | Mod: GC | Performed by: PSYCHIATRY & NEUROLOGY

## 2021-09-08 RX ADMIN — BENZTROPINE MESYLATE 1 MG: 1 TABLET ORAL at 10:03

## 2021-09-08 RX ADMIN — BENZTROPINE MESYLATE 1 MG: 1 TABLET ORAL at 20:47

## 2021-09-08 RX ADMIN — LORAZEPAM 1 MG: 1 TABLET ORAL at 14:45

## 2021-09-08 RX ADMIN — AMLODIPINE BESYLATE 7.5 MG: 2.5 TABLET ORAL at 10:04

## 2021-09-08 RX ADMIN — OLANZAPINE 20 MG: 20 TABLET, ORALLY DISINTEGRATING ORAL at 20:47

## 2021-09-08 RX ADMIN — LORAZEPAM 1 MG: 1 TABLET ORAL at 20:46

## 2021-09-08 RX ADMIN — LOSARTAN POTASSIUM 100 MG: 100 TABLET, FILM COATED ORAL at 10:04

## 2021-09-08 RX ADMIN — METOPROLOL SUCCINATE 75 MG: 25 TABLET, EXTENDED RELEASE ORAL at 10:04

## 2021-09-08 RX ADMIN — LORAZEPAM 1 MG: 1 TABLET ORAL at 10:05

## 2021-09-08 ASSESSMENT — ACTIVITIES OF DAILY LIVING (ADL)
HYGIENE/GROOMING: INDEPENDENT
ORAL_HYGIENE: INDEPENDENT
LAUNDRY: UNABLE TO COMPLETE
ORAL_HYGIENE: INDEPENDENT
DRESS: SCRUBS (BEHAVIORAL HEALTH)
LAUNDRY: UNABLE TO COMPLETE
HYGIENE/GROOMING: INDEPENDENT
DRESS: SCRUBS (BEHAVIORAL HEALTH)

## 2021-09-08 NOTE — PLAN OF CARE
Nursing assessment completed. Pt isolative to her room through out shift. Hopeful to discharge to her group home soon and expresses frustration with the length of stay. She presents with a flat, depressed affect, but does brighten a bit upon approach. VSS. She is medication compliant. Denies pain, symptom, or side effects. Denies SI/SIB or thoughts to harm others. Continue to monitor and assess.

## 2021-09-08 NOTE — PLAN OF CARE
Problem: General Rehab Plan of Care  Goal: Therapeutic Recreation/Music Therapy Goal  Description: The patient and/or their representative will achieve their patient-specific goals related to the plan of care.  The patient-specific goals include:  Outcome: No Change    The patient had a typical day spent mostly in her room watching tv or napping. She had no peer interaction but was polite and pleasant when engaged. She denied SI/SIB, depression, anxiety, hallucinations, and racing thoughts. She was medication compliant and ate her dinner and snacks. No incidence of agitation, aggression or other maladaptive behaviors was observed or reported.

## 2021-09-08 NOTE — PLAN OF CARE
Assessment/Intervention/Current Symtoms and Care Coordination  -Chart review    -Rounded with team, addressed patient needs/concerns: Spoke with  u home owner who stated she is waiting for patient to be admitted.    Current Symptoms include the following: Pleasant and cooperative but continues to isolate to her room; does not participate in groups    Discharge Plan or Goal  Pending stabilization & development of a safe discharge plan.  Considerations include: [facility]    Barriers to Discharge  Patient requires further psychiatric stabilization due to current symptomology: [facility] Financial Team still needs to approve MADX    Referral Status  Referral completed for [facility]    Legal Status  Civil Commitment/Hatch/Mcgrath Kohler through [facility]

## 2021-09-08 NOTE — PROGRESS NOTES
"[facility], [place]   Psychiatric Progress Note  Hospital Day: 147        Interim History:   The patient's care was discussed with the treatment team during the daily team meeting and/or staff's chart notes were reviewed. No acute medical concerns. Has intermittent elevated BP, asymptomatic, otherwise vitals WNL. She is medication adherent. She has been pleasant and cooperative with staff and peers. Continues to be isolate to her room despite encouragement to participate in the milieus. No reported or observed side effects. No symptoms of psychosis or tad were observed. Affect is flat, depressed. Expresses frustration r/t length of stay. Intermittently attends to all ADLs, though overall, hygiene maintenance remains poor. Continues to sleep and eat well. Michelle is at her baseline. She appeared to be sleeping for 7 hours overnight.     Today, it was resting in her room prior to the interview.  She smiled when the team entered.  She laughed a little when reporting that she is doing the same and that she is \"still in here.\"  She states that she is looking forward to leaving and going to the group home.  She was asked regarding her grandbaby that is on the way to in November.  She laughed when the team incorrectly asked if this is her fifth or sixth grandchild stating that his only her second.  She endorsed that her other grandchild is her oldest daughter's. She states that her son's wife is doing well and is healthy.  Michelle says that she would like to be able to visit the baby in [place] at some point.  She stated that she has visited [place] in the past and was in [place] though has not been to her son's house.  She said she was in [place] in 2016.  She asked if we had been in communication with the group home and wondered if we had the phone numbers of the staff members who came to visit her the first time.  She stated that Mati was not from the same [place] as her " however that the other staff member was from her same [place] and spoke the same language.  She smiled when talking about this.  She asked if she would be able to get their phone number so she could call to say hi to them and was agreeable with the team asking Sergio about this.  She asked if there was anything the group home needed to do in order for her to go there and was provided the update that we are waiting on the county currently.  She denied any physical concerns or mental health concerns.  She was fully oriented to date, place, city and current president.  She was smiling and laughing occasionally throughout our interview today.    Writer called and spoke to Mati at [facility] to ask if she would be comfortable with Michelle being given her number. Mati said this was okay and provided her number. She also requested this writer to speak with the nurse at the group [place] to provide more detailed information regarding the TB test and recommendations. Writer spoke with the nurse and answered questions. They were comfortable with the plan for Michelle to follow up with the infectious disease clinic as an outpatient.     Suicidal ideation: denies current or recent suicidal ideation or behaviors.    Homicidal ideation: denies current or recent homicidal ideation or behaviors.    Psychotic symptoms: Patient denies AH, VH, paranoia, delusions.     Medication side effects reported: She denies sedation today. She denies any dizziness or lightheadedness.     Acute medical concerns: pain in left ankle has resolved. Denies urinary sx. She denies any fevers, chills, cough or malaise.    Other issues reported by patient: Patient had no further questions or concerns.           Medications:       amLODIPine  7.5 mg Oral Daily     benztropine  1 mg Oral BID     LORazepam  1 mg Oral TID    Or     LORazepam  1 mg Intramuscular TID     losartan  100 mg Oral Daily     metoprolol succinate ER  75 mg Oral Daily     OLANZapine zydis  20  mg Oral At Bedtime    Or     OLANZapine  10 mg Intramuscular At Bedtime          Allergies:     Allergies   Allergen Reactions     Haldol [Haloperidol]      Patient previously tolerated haldol, though developed oculogyric crises during hospital stay on 4/26/21 on total daily dose of 10 mg.     Lisinopril Cough          Labs:     No results found for this or any previous visit (from the past 24 hour(s)).       Psychiatric Examination:     /77   Pulse 67   Temp 98.2  F (36.8  C) (Oral)   Resp 16   Wt 68.9 kg (152 lb)   SpO2 100%   BMI 26.09 kg/m    Weight is 152 lbs 0 oz  Body mass index is 26.09 kg/m .    Weight over time:  Vitals:    05/25/21 0850 06/15/21 0811 06/16/21 1605 06/19/21 0859   Weight: 71 kg (156 lb 8 oz) 69.8 kg (153 lb 12.8 oz) 70.5 kg (155 lb 8 oz) 71.4 kg (157 lb 8 oz)    06/21/21 0805 06/22/21 0839 06/24/21 0800 07/02/21 0835   Weight: 69.5 kg (153 lb 3.2 oz) 69.3 kg (152 lb 11.2 oz) 70.7 kg (155 lb 12.8 oz) 70.7 kg (155 lb 12.8 oz)    07/03/21 0844 07/06/21 0838 07/10/21 0845 07/16/21 0821   Weight: 69.6 kg (153 lb 8 oz) 70.9 kg (156 lb 4.8 oz) 69.8 kg (153 lb 14.4 oz) 69.3 kg (152 lb 12.8 oz)    07/17/21 0830 07/31/21 0804 08/17/21 0845 08/19/21 0800   Weight: 70.4 kg (155 lb 4.8 oz) 70.3 kg (154 lb 14.4 oz) 70.8 kg (156 lb) 69.2 kg (152 lb 9.6 oz)    08/24/21 0700 08/26/21 0800 09/02/21 0803   Weight: 70.3 kg (155 lb) 69 kg (152 lb 3.2 oz) 68.9 kg (152 lb)       Orthostatic Vitals       Most Recent      Sitting Orthostatic /84 07/29 0800    Sitting Orthostatic Pulse (bpm) 82 07/29 0800    Standing Orthostatic /86 07/29 0800    Standing Orthostatic Pulse (bpm) 88 07/29 0800            Cardiometabolic risk assessment. 07/29/21      Reviewed patient profile for cardiometabolic risk factors    Date taken /Value  REFERENCE RANGE   Abdominal Obesity  (Waist Circumference)   See nursing flowsheet Women ?35 in (88 cm)   Men ?40 in (102 cm)      Triglycerides  Triglycerides   Date  "Value Ref Range Status   08/26/2021 205 (H) <150 mg/dL Final     Comment:     0-9 years:  Normal:    Less than 75 mg/dL  Borderline high:  75-99 mg/dL  High:             Greater than or equal to 100 mg/dL    0-19 years:  Normal:    Less than 90 mg/dL  Borderline high:   mg/dL  High:             Greater than or equal to 130 mg/dL    20 years and older:  Normal:    Less than 150 mg/dL  Borderline high:  150-199 mg/dL  High:             200-499 mg/dL  Very high:   Greater than or equal to 500 mg/dL   10/19/2019 117 <150 mg/dL Final       ?150 mg/dL (1.7 mmol/L) or current treatment for elevated triglycerides   HDL cholesterol  HDL Cholesterol   Date Value Ref Range Status   10/19/2019 56 >49 mg/dL Final     Direct Measure HDL   Date Value Ref Range Status   08/26/2021 55 >=50 mg/dL Final     Comment:     0-19 years:       Greater than or equal to 45 mg/dL   Low: Less than 40 mg/dL   Borderline low: 40-44 mg/dL     20 years and older:   Female: Greater than or equal to 50 mg/dL   Male:   Greater than or equal to 40 mg/dL        ]   Women <50 mg/dL (1.3 mmol/L) in women or current treatment for low HDL cholesterol  Men <40 mg/dL (1 mmol/L) in men or current treatment for low HDL cholesterol     Fasting plasma glucose (FPG) Lab Results   Component Value Date     07/02/2021      FPG ?100 mg/dL (5.6 mmol/L) or treatment for elevated blood glucose   Blood pressure  BP Readings from Last 3 Encounters:   09/07/21 137/77   06/04/19 131/71   04/26/19 164/86    Blood pressure ?130/85 mmHg or treatment for elevated blood pressure   Family History  See family history     Appearance: dressed in hospital scrubs, appeared reported age, unkempt hair   Attitude: cooperative  Eye Contact: improved, looks up more during the interview   Mood: \"good\"   Affect:  Somewhat blunted, no longer agitated or tense. Noticeably brighter today.   Speech: accented, clear and coherent. Soft volume.    Language: no obvious receptive or " expressive deficits  Psychomotor, Gait, Musculoskeletal: No abnormal movements noted while seated  Thought Process: goal-oriented, linear, concrete   Associations:  No loosening of associations present  Thought Content:  Did not appear to be responding to internal stimuli.  Insight:   poor - slightly improved   Judgement:  fair  Oriented to: person, place, time/date   Attention Span and Concentration: attentive to conversation though at times stares ahead and does not respond to questions.  Recent and Remote Memory: stable, some difficulty recalling events   Fund of Knowledge:  normal    Clinical Global Impressions  First:  Considering your total clinical experience with this particular patient population, how severe are the patient's symptoms at this time?: 7 (04/16/21 1428)  Compared to the patient's condition at the START of treatment, this patient's condition is: 4 (04/16/21 1428)  Most recent:  Considering your total clinical experience with this particular patient population, how severe are the patient's symptoms at this time?: 7 (07/22/21 0941)  Compared to the patient's condition at the START of treatment, this patient's condition is: 3 (07/22/21 0941)           Precautions:     Behavioral Orders   Procedures     Cheeking Precautions (behavioral units)     Patient Observed swallowing PO medications; Patient asked to drink water after swallowing medication; Patient in Staff line of sight for 15 minutes after medication given; Mouth checks after PO administration (patient asked to open mouth and stick out their tongue).     Code 2     Code 3     For walks in the [place] with staff and per staff discretion     Electroconvulsive therapy     Series of up to 12 treatments. Begin Date: 5/26/21     Treating Psychiatrist providing ECT:  Dr. Lubin     Notified on:  5/21/21     Electroconvulsive therapy     As long as we get the green light from risk management and pt is medically cleared, begin ECT every Monday,  Wednesday, and Friday     Electroconvulsive therapy     Series of up to 12 treatments. Begin Date: 5/25/21     Treating Psychiatrist providing ECT:  Amee     Notified on:  5/24/21     Elopement precautions     Fall precautions     Alcides Calderon     Routine Programming     As clinically indicated     Status 15     Every 15 minutes.          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 Symptoms and presentation at this time is most consistent with Schizoaffective Disorder, Bipolar Type. Obtained most recent medication regimen from patient's ACT team, and regimen was initially restarted. Pt is committed. Petitioned for Alcides Calderon was filed and granted due to lack of improvement and side effects from medications. Inpatient psychiatric hospitalization is warranted at this time for safety, stabilization, possible adjustment in medications and development of a safe discharge plan.      Hospital Course:  On admission, PTA medications were restarted. However, Michelle had been declining all scheduled medications despite significant encouragement from staff and provider. Psychiatric emergency declared on 4/20 due to aggression toward others in context of severe psychosis and suspected excited catatonia. Ativan 1 mg TID was also added. Discontinued PTA Invega, Zyprexa, and thorazine on 4/20 due to consistent refusal.      On 4/26, it was noted that patient frequently had upward gaze while walking up and down the  "unit. She did not appear to be distressed. She reported that she was looking at \"my god.\" It was determined to be oculogyric crisis secondary to IM haloperidol. Haldol was subsequently discontinued and scheduled Zyprexa was initiated on an emergency basis. Oral Cogentin was also scheduled, though patient declined. On the evening of 4/26, patient's gaze was fixed in upward position for several hours and she appeared to be experiencing discomfort. IM Cogentin was administered with noted resolution. Partial improvements were observed after switching to scheduled Zyprexa. After patient improved, she was more receptive to reinitiating oral Invega, which was initiated on 5/3 and titrated to PTA dose of 9 mg daily on 5/10. Plan was for ACT team to bring in loading dose of Invega Sustenna. Patient had signs of oculogyric crisis again with Invega. Oral dose decreased to 6 mg after this. Invega was stopped on 5/14 due to ongoing signs of problems related to eye movement and concerns for oculogyric crisis.     Overall improvement in patient's agitation and suspected catatonia noted on 4/30 after patient accepted two doses of Ativan. She began declining Ativan again with noted decompensation. Patient began accepting, however, was deemed not to have the capacity to consent to treatment with Ativan. She does not believe she has a mental illness, including catatonia. She does not fully understand risks associated with inadequate treatment of catatonia. Discussed with her son who is acting as surrogate decision maker and he is in full support of forced scheduled IM Ativan if patient declines oral formulation. Also consulted with our legal team prior to backing oral Ativan with IM.      Ativan was increased on 5/19 due to re-emerging evidence of catatonia (long periods of staring, repetitive movements, echolalia, mutism). Meeting was held with ACT team on 5/20, including ACT team psychiatrist, Dr. Pedraza. He said that he is in " "\"full support\" of plan to pursue Mcgrath Kohler and ECT at this time. He does feel that in the past she has been discharged from the hospital while still quite symptomatic. He is hoping that ECT will be effective and that with improvement, Michelle would be more receptive to clozapine and weekly blood draws. He said that ideally she would transition to an IRTS before going back to her apartment, but also understands there may be some barriers (I.e. pt's willingness, financial concerns, etc).      ECT consult placed. Please see consult note by Dr. Lubin on 5/21 for details. Alcides Saavedraard was approved on 5/24 and patient was medically cleared on 5/24. ECT initiated on 5/26. She was noted to have a short seizure during ECT on 6/4. Staff note that patient appears more disoriented with memory impairment and sedation on days of ECT. This was noted on my examination again today. Improvements noted in mood, affect, social interactions, paranoia, agitation since initiation of ECT. Reduced Ativan on 6/5 due to sedative effects. Relayed concerns about memory impairment and confusion with Dr. Lubin. Recommended attempting unilateral ECT, which he agreed to do. First unilateral ECT on 6/7. ECT was held on 6/11 and 6/14 due to memory impairment. We will plan to hold it again tomorrow (6/16). There is ongoing discussion regarding whether there is a component of catatonia contributing to her current presentation but this is less likely since it worsened after ECT was started.  Given her level of cognitive impairment and our belief that this is due to ECT, we will not pursue any further treatments at this time. Since we have held ECT (last treatment on 6/9), her cognitive impairment has slightly improved (more oriented).      Michelle initially appeared to be decompensating somewhat when ECT was held, though her cognitive impairment has gradually improved. If symptoms of psychosis re-emerge, it may be worth starting clozapine, which is " something that has previously been considered by her ACT team. Her Hatch order would need to be amended as clozapine is not one of the medications listed. ANC obtained on 6/16 was 1800/microL. Per conversation with pharmacy, neutropenia has been associated with olanzapine and agranulocytosis has been associated with olanzapine, lorazepam, metoprolol, and hydralazine and hematologic labs have been monitored. Upon re-check, ANC was 3700/microL on 6/21/21. At this time, the primary symptoms we are observing are cognitive deficits and inability to care for self, which we would not address by changing her antipsychotic medication.     Michelle's cognitive impairment has been steadily improving since holding ECT treatments, however it is possible that lorazepam is also playing a role in her cognitive impairment. Given no signs of re-emerging catatonia, a gradual lorazepam taper (decreasing by 0.5 mg) was initiated on 6/28/21 to monitor for potential improvements with regard to memory impairment.  On 7/7, Michelle reported an incident of oculogyric crisis and Cogentin 1 mg BID was initiated with no noted changes in cognition.      Michelle has remained focused on discharge. The team is working with Michelle's ACT team to to establish a safe discharge plan with options including moving to a group home vs home with her ACT team and additional in home supports via CADI services. We are concerned that Michelle would have difficulty taking her medications as prescribed if she were to return home without her ACT team and additional services. This is evidenced by impairments noted in cognitive assessment by OT specialist on 7/1. MOCA score was a 13/30 and CPT score was a 4.7. Physician's statement in support of guardianship was completed on 7/19/21.  Patient's son is pursuing guardianship. Patient became quite agitated on 7/16 when  staff from [facility]  came to meet with her a second time. She did not allow staff members to enter her  room and stated that she will not be going to a group home.      Due to daytime sedation, on 7/26/21, lorazepam evening dose decreased from 2 mg to 1 mg. On 7/27/21 transitioned Zyprexa from 10 mg BID to 5 mg QAM + 15 mg QPM and had less daytime sedation with this change and no emergence of symptoms concerning for orthostatic hypotension.  She attended her emergency guardianship hearing on 7/28.  Due to ongoing daytime sedation, Zyprexa doses were consolidated to bedtime starting on 7/30. [place]12 for CADI funding.     Target psychiatric symptoms and interventions:   - Continue Ativan 1 mg PO or IM TID Patient may not decline.   - Resume Zyprexa 20 mg at bedtime. Hatch in place.   - Continue Cogentin 1 mg PO BID with additional 2 mg IM daily prn for evidence of acute dystonic reaction or oculogyric crisis  -Continue hydroxyzine 25-50 mg q4h prn for acute anxiety  -Continue Trazodone 50 mg at bedtime prn for sleep disturbances  -Continue Zyprexa 10 mg TID prn for severe agitation  -Continue Ativan/Benadryl q4h prn for agitation. WOULD GIVE PRN ATIVAN FIRST FOR AGITATION unless it is after 5 pm on day prior to scheduled ECT. Of note, she has not required any prn medications for agitation in several weeks.      Occupational Therapy Consult Placed to evaluate cognitive functioning/ability to care for self in home environment, ability to manage medications. See note on 7/1 for details.      ECT: Discontinued due to significant cognitive impairment. Please see note dated 7/12/21 for additional details.      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 If no active infection, would be okay for her to follow up for treatment as an outpatient unless treatment must be initiated per group home protocol. Please see ID note on 8/30 for details. Appreciate assistance.    - PA and Lateral  "chest xray ordered. Reviewed and negative.   - ID consult placed on 8/30   - Donitae her son/guardian consented to treatment for latent TB on 9/1/21  - per 9/2 conversation with Mati from [facility], okay to discharge given negative chest x-ray with outpatient follow up   - follow up in outpatient infectious disease clinic in one month with Jaren Celestin MD    HTN, improved: Patient is now adherent with medication regimen. Intermittent elevations with no concerning symptoms.  - Metoprolol succinate ER 75 mg   - Cozaar 100 mg daily  - Amlodipine 7.5 mg daily  - IM discontinued hydralazine prn  - Switched BP checks from QID to BID on 7/13 given overall improvement  - Please see note from IM dated 5/3, 5/6, 5/24, 6/20 and 6/30/21.  - Obtained EKG and routine labs on 5/21 in context of pt declining vital sign checks and anti-hypertensives and recently elevated BPs. Reviewed labs and discussed EKG findings with IM on 5/21. No urgent concerns, though IM should be notified if pt develops acute medical concerns (I.e. heart palpitations, SOB, changes in speech, AMS, confusion, CP, HA, changes in vision).    - Per 6/20 IM note: \"Please notify IM if BP is persistently severely elevated and requiring frequent PRN hydralazine\".  - Internal medicine consulted again on 6/28 due to consistent use of hydralazine over the past week. Metoprolol increased to 75 mg daily then to 100 mg and amlodipine 5 mg was added on 6/30. Amlodipine increased to 7.5 mg daily on 7/5.      Medicine consulted again on 8/26 given lab abnormalities as discussed below:   1) Abnormal cholesterol panel: Fasting lipid panel this morning with elevated total cholesterol and elevated LDL. Prior lipid panel in 2020 was unremarkable.   - First line management for dyslipidemia is a trial of 6 months of lifestyle changes (dietary modifications, exercise)   - She should follow-up with PCP upon discharge to have further cardiac risk stratification " and discussion regarding risks / benefits of starting a statin      2) Elevated creatinine: Cr 1.17 this morning. Patient had BRANDON in June with Cr to peak of 1.83, was felt to be of pre-renal etiology and Cr returned to baseline with IVF. Baseline Cr difficult to determine d/t limited labs but her baseline Cr appears to be ~0.9 - 1.0, therefore does not meet criteria for BRANDON at this time. Her bump in creatinine may be due to fluctuating baseline vs dehydration.    - Encourage oral fluid intake, goal 2 liters daily   - Recheck BMP on 8/28/21, Cr down-trending, concern for CKD   - UA results reviewed. Trace leukocyte esterase. 4 WBCs. Patient is asymptomatic.     For previous medical concerns, please see note dated 7/12/21.     Behavioral/Psychological/Social:  - Encourage unit programming  - Patient is Code 3 status and can take walks in the [place] with staff and security present per staff discretion. This appears to be quite therapeutic for her.      Safety:  - Continue precautions as noted above  - Status 15 minute checks  - Safety precautions include: assault and elopement precautions  - Continue precautions as noted above     Legal Status: Committed as MI with Hatch in place for Haldol, Zyprexa, Invega, and Thorazine through [facility]. Filed Mcgrath Kohler through [facility] and approved on 5/24/21. Physician's statement in support of guardianship was completed on 7/19/21. Patient's son is now her temporary emergency guardian.     Disposition Plan   Reason for ongoing admission: pending safe discharge plan  Discharge location: Group home (needs CADI waiver). CPT assessment done by OT (Tania queen) on 7/1.  Please see note for details. [place] 8[place]12 & funding approved though amount was too low for [facility] to accept. Process to appeal funding amount in progress.    Discharge Medications: ordered. Group home prefers 30 d/s. ACT team will take over meds upon discharge.    Follow-up Appointments: scheduled           Bijal Varner MD  Psychiatry PGY-4

## 2021-09-08 NOTE — PLAN OF CARE
Pt asleep at start of shift. Breathing quiet and unlabored.      Pt had no c/o pain or discomfort during the HS.     Appears to have slept 7 hours.     Pt on  CHEEKING, ELOPEMENT, and FALL precautions in addition to single room order. Any related events noted above.     Will continue to monitor and assess.     Problem: Sleep Disturbance  Goal: Adequate Sleep/Rest  9/8/2021 0628 by Kourtney Soria, RN  Outcome: No Change  9/8/2021 0628 by Kourtney Soria, RN  Reactivated

## 2021-09-09 PROCEDURE — 250N000013 HC RX MED GY IP 250 OP 250 PS 637: Performed by: STUDENT IN AN ORGANIZED HEALTH CARE EDUCATION/TRAINING PROGRAM

## 2021-09-09 PROCEDURE — 250N000013 HC RX MED GY IP 250 OP 250 PS 637: Performed by: PHYSICIAN ASSISTANT

## 2021-09-09 PROCEDURE — 124N000002 HC R&B MH UMMC

## 2021-09-09 PROCEDURE — 99231 SBSQ HOSP IP/OBS SF/LOW 25: CPT | Mod: GC | Performed by: PSYCHIATRY & NEUROLOGY

## 2021-09-09 RX ADMIN — AMLODIPINE BESYLATE 7.5 MG: 2.5 TABLET ORAL at 08:44

## 2021-09-09 RX ADMIN — LORAZEPAM 1 MG: 1 TABLET ORAL at 08:44

## 2021-09-09 RX ADMIN — LORAZEPAM 1 MG: 1 TABLET ORAL at 14:06

## 2021-09-09 RX ADMIN — OLANZAPINE 20 MG: 20 TABLET, ORALLY DISINTEGRATING ORAL at 20:01

## 2021-09-09 RX ADMIN — BENZTROPINE MESYLATE 1 MG: 1 TABLET ORAL at 20:01

## 2021-09-09 RX ADMIN — BENZTROPINE MESYLATE 1 MG: 1 TABLET ORAL at 08:44

## 2021-09-09 RX ADMIN — METOPROLOL SUCCINATE 75 MG: 25 TABLET, EXTENDED RELEASE ORAL at 08:45

## 2021-09-09 RX ADMIN — LORAZEPAM 1 MG: 1 TABLET ORAL at 20:00

## 2021-09-09 RX ADMIN — LOSARTAN POTASSIUM 100 MG: 100 TABLET, FILM COATED ORAL at 08:44

## 2021-09-09 ASSESSMENT — ACTIVITIES OF DAILY LIVING (ADL)
DRESS: STREET CLOTHES
HYGIENE/GROOMING: INDEPENDENT
DRESS: SCRUBS (BEHAVIORAL HEALTH)
HYGIENE/GROOMING: INDEPENDENT
LAUNDRY: UNABLE TO COMPLETE
LAUNDRY: UNABLE TO COMPLETE
ORAL_HYGIENE: INDEPENDENT
ORAL_HYGIENE: INDEPENDENT

## 2021-09-09 NOTE — PROGRESS NOTES
[facility], [place]   Psychiatric Progress Note  Hospital Day: 148        Interim History:   The patient's care was discussed with the treatment team during the daily team meeting and/or staff's chart notes were reviewed. No acute medical concerns. Has intermittent elevated BP, asymptomatic, otherwise vitals WNL. She is medication adherent. She has been pleasant and cooperative with staff and peers. Continues to be isolate to her room despite encouragement to participate in the milieus. No reported or observed side effects. No symptoms of psychosis or tad were observed. Affect is flat, depressed. Expresses frustration r/t length of stay. Intermittently attends to all ADLs, though overall, hygiene maintenance remains poor. Continues to sleep and eat well. Michelle is at her baseline. She appeared to be sleeping for 6.75 hours overnight.     Today, Michelle reports that she is doing well and has no new concerns that have risen. She stated that she was able to talk to Mati from the group home on the phone. She smiled when discussing the conversation and stated they are holding a spot for her. She was concerned regarding the CTC being out and that this may impact her discharge and was provided reassurance that there is a CTC who is covering. She was encouraged to consider going outside for a walk and expressed concern that this could delay her discharge. She was reassured that it would not impact her discharge. Writer asked Michelle if she would walk in the garden with this writer and she declined though said she would think about it.     Suicidal ideation: denies current or recent suicidal ideation or behaviors.    Homicidal ideation: denies current or recent homicidal ideation or behaviors.    Psychotic symptoms: Patient denies AH, VH, paranoia, delusions.     Medication side effects reported: She denies sedation today. She denies any dizziness or lightheadedness.     Acute medical concerns: pain  in left ankle has resolved. Denies urinary sx. She denies any fevers, chills, cough or malaise.    Other issues reported by patient: Patient had no further questions or concerns.           Medications:       amLODIPine  7.5 mg Oral Daily     benztropine  1 mg Oral BID     LORazepam  1 mg Oral TID    Or     LORazepam  1 mg Intramuscular TID     losartan  100 mg Oral Daily     metoprolol succinate ER  75 mg Oral Daily     OLANZapine zydis  20 mg Oral At Bedtime    Or     OLANZapine  10 mg Intramuscular At Bedtime          Allergies:     Allergies   Allergen Reactions     Haldol [Haloperidol]      Patient previously tolerated haldol, though developed oculogyric crises during hospital stay on 4/26/21 on total daily dose of 10 mg.     Lisinopril Cough          Labs:     No results found for this or any previous visit (from the past 24 hour(s)).       Psychiatric Examination:     BP (!) 146/60 (BP Location: Right arm)   Pulse 77   Temp 97.7  F (36.5  C) (Tympanic)   Resp 16   Wt 70.9 kg (156 lb 6.4 oz)   SpO2 100%   BMI 26.85 kg/m    Weight is 156 lbs 6.4 oz  Body mass index is 26.85 kg/m .    Weight over time:  Vitals:    05/25/21 0850 06/15/21 0811 06/16/21 1605 06/19/21 0859   Weight: 71 kg (156 lb 8 oz) 69.8 kg (153 lb 12.8 oz) 70.5 kg (155 lb 8 oz) 71.4 kg (157 lb 8 oz)    06/21/21 0805 06/22/21 0839 06/24/21 0800 07/02/21 0835   Weight: 69.5 kg (153 lb 3.2 oz) 69.3 kg (152 lb 11.2 oz) 70.7 kg (155 lb 12.8 oz) 70.7 kg (155 lb 12.8 oz)    07/03/21 0844 07/06/21 0838 07/10/21 0845 07/16/21 0821   Weight: 69.6 kg (153 lb 8 oz) 70.9 kg (156 lb 4.8 oz) 69.8 kg (153 lb 14.4 oz) 69.3 kg (152 lb 12.8 oz)    07/17/21 0830 07/31/21 0804 08/17/21 0845 08/19/21 0800   Weight: 70.4 kg (155 lb 4.8 oz) 70.3 kg (154 lb 14.4 oz) 70.8 kg (156 lb) 69.2 kg (152 lb 9.6 oz)    08/24/21 0700 08/26/21 0800 09/02/21 0803 09/09/21 0800   Weight: 70.3 kg (155 lb) 69 kg (152 lb 3.2 oz) 68.9 kg (152 lb) 70.9 kg (156 lb 6.4 oz)        Orthostatic Vitals       Most Recent      Sitting Orthostatic /84 07/29 0800    Sitting Orthostatic Pulse (bpm) 82 07/29 0800    Standing Orthostatic /86 07/29 0800    Standing Orthostatic Pulse (bpm) 88 07/29 0800            Cardiometabolic risk assessment. 07/29/21      Reviewed patient profile for cardiometabolic risk factors    Date taken /Value  REFERENCE RANGE   Abdominal Obesity  (Waist Circumference)   See nursing flowsheet Women ?35 in (88 cm)   Men ?40 in (102 cm)      Triglycerides  Triglycerides   Date Value Ref Range Status   08/26/2021 205 (H) <150 mg/dL Final     Comment:     0-9 years:  Normal:    Less than 75 mg/dL  Borderline high:  75-99 mg/dL  High:             Greater than or equal to 100 mg/dL    0-19 years:  Normal:    Less than 90 mg/dL  Borderline high:   mg/dL  High:             Greater than or equal to 130 mg/dL    20 years and older:  Normal:    Less than 150 mg/dL  Borderline high:  150-199 mg/dL  High:             200-499 mg/dL  Very high:   Greater than or equal to 500 mg/dL   10/19/2019 117 <150 mg/dL Final       ?150 mg/dL (1.7 mmol/L) or current treatment for elevated triglycerides   HDL cholesterol  HDL Cholesterol   Date Value Ref Range Status   10/19/2019 56 >49 mg/dL Final     Direct Measure HDL   Date Value Ref Range Status   08/26/2021 55 >=50 mg/dL Final     Comment:     0-19 years:       Greater than or equal to 45 mg/dL   Low: Less than 40 mg/dL   Borderline low: 40-44 mg/dL     20 years and older:   Female: Greater than or equal to 50 mg/dL   Male:   Greater than or equal to 40 mg/dL        ]   Women <50 mg/dL (1.3 mmol/L) in women or current treatment for low HDL cholesterol  Men <40 mg/dL (1 mmol/L) in men or current treatment for low HDL cholesterol     Fasting plasma glucose (FPG) Lab Results   Component Value Date     07/02/2021      FPG ?100 mg/dL (5.6 mmol/L) or treatment for elevated blood glucose   Blood pressure  BP Readings from  "Last 3 Encounters:   09/09/21 (!) 146/60   06/04/19 131/71   04/26/19 164/86    Blood pressure ?130/85 mmHg or treatment for elevated blood pressure   Family History  See family history     Appearance: dressed in hospital scrubs, appeared reported age, unkempt hair   Attitude: cooperative  Eye Contact: improved, looks up more during the interview   Mood: \"fine\"   Affect:  Somewhat blunted, no longer agitated or tense. Brightens at times.   Speech: accented, clear and coherent. Soft volume.    Language: no obvious receptive or expressive deficits  Psychomotor, Gait, Musculoskeletal: No abnormal movements noted while seated  Thought Process: goal-oriented, linear, concrete   Associations:  No loosening of associations present  Thought Content:  Did not appear to be responding to internal stimuli.  Insight:   poor - slightly improved   Judgement:  fair  Oriented to: person, place, time/date   Attention Span and Concentration: attentive to conversation though at times stares ahead and does not respond to questions.  Recent and Remote Memory: stable, some difficulty recalling events   Fund of Knowledge:  normal    Clinical Global Impressions  First:  Considering your total clinical experience with this particular patient population, how severe are the patient's symptoms at this time?: 7 (04/16/21 1428)  Compared to the patient's condition at the START of treatment, this patient's condition is: 4 (04/16/21 1428)  Most recent:  Considering your total clinical experience with this particular patient population, how severe are the patient's symptoms at this time?: 7 (07/22/21 0941)  Compared to the patient's condition at the START of treatment, this patient's condition is: 3 (07/22/21 0941)           Precautions:     Behavioral Orders   Procedures     Cheeking Precautions (behavioral units)     Patient Observed swallowing PO medications; Patient asked to drink water after swallowing medication; Patient in Staff line of sight " for 15 minutes after medication given; Mouth checks after PO administration (patient asked to open mouth and stick out their tongue).     Code 2     Code 3     For walks in the [place] with staff and per staff discretion     Electroconvulsive therapy     Series of up to 12 treatments. Begin Date: 5/26/21     Treating Psychiatrist providing ECT:  Dr. Lubin     Notified on:  5/21/21     Electroconvulsive therapy     As long as we get the green light from risk management and pt is medically cleared, begin ECT every Monday, Wednesday, and Friday     Electroconvulsive therapy     Series of up to 12 treatments. Begin Date: 5/25/21     Treating Psychiatrist providing ECT:  Amee     Notified on:  5/24/21     Elopement precautions     Fall precautions     Mcgrath Calderon     Routine Programming     As clinically indicated     Status 15     Every 15 minutes.          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 Symptoms and presentation have been most consistent with Schizoaffective Disorder, Bipolar Type. Inpatient psychiatric hospitalization is warranted at this time for safety, stabilization, possible adjustment in medications and development of a safe discharge plan.      Hospital Course:  Michelle was admitted to station 12 on 4/13/21 on a 72 hour hold under the care of Dr. Mellissa Zaman and a petition for MI commitment was filed and later supported. On admission, [facility] psychiatric  medications including Invega, Zyprexa, thorazine and Artane were restarted. However, Michelle declined all scheduled medications despite significant encouragement from the team. On 4/20 a psychiatric emergency was declared due to aggression toward others in context of severe psychosis and suspected excited catatonia. Her PTA Invega, Zyprexa & Thorazine were discontinued given consistent refusal and PO Haldol 5 mg BID with IM back up was initiated. Ativan 1 mg TID was initiated on 4/21. Michelle continued declining PO medications and received IM haldol doses.      On 4/26, Michelle had oculogyric crisis likely secondary to IM haloperidol. This was alleviated with IM Cogentin which was then initiated as a PRN. Haldol was discontinued and replaced with Zyprexa 10 mg BID scheduled on an emergency basis. PTA Artane was also discontinued and oral Cogentin was scheduled, though Michelle continued declining this.     After accepting 2 doses of Ativan on 4/30, improvement in agitation and catatonic symptoms was observed. She subsequently continued intermittently declining doses of Ativan with noted decompensation. She was deemed to lack capacity to consent to treatment with Ativan and on 5/4, following discussion with our legal team and Michelle's son acting as her surrogate decision maker, forced IM backup for scheduled PO Ativan was initiated. On 5/4, Michelle also requested to switch from Cogentin to Artane (2 mg BID) though continued to frequently decline this and on 5/6 it was switched to a PRN medication.      Following initiation of Zyprexa as discussed above, partial improvements in presenting symptoms were observed. Michelle was more receptive to resuming oral Invega & a titration was initiated on 5/3 then titrated to her PTA dose of 9 mg on 5/10.  The plan for her ACT team to bring in loading dose of Invega Sustenna. On 5/11, Michelle again had signs of oculogyric crisis & her PO Invega was decreased to 6 mg. Due to ongoing observed problems with  "eye movement and concern for oculogyric crisis, PO Invega was stopped on 5/14 (she did not receive Invega Sustenna).       On 5/19, Ativan was increased to 2 mg TID due to re-emerging evidence of catatonia (long periods of staring, repetitive movements, echolalia, mutism). On 5/20, a meeting was held with her ACT team on 5/20, including ACT team psychiatrist, Dr. Pedraza who was in \"full support\" of pursuing ECT under a Mcgrath Kohler petition with hopes that improvement may lead Michelle to be more amenable to clozapine treatment and weekly blood draws in a monitored setting such as an IRTS.     An ECT consult by Dr. Lubin was completed on 5/21. On 5/24, Alcides Riverappard was approved and Michelle was also cleared by internal medicine for ECT. An acute series of bilateral ECT was initiated on 5/26. Michelle had a short seizure during ECT on 6/4. Improvements in mood, affect, social interactions, paranoia, and agitation were observed after initiation of ECT. However, Michelle appeared more disoriented with memory impairment and sedation on days of ECT. Given these concerns, Ativan was reduced to a total daily dose of 4.5 mg on 6/5 and unilateral ECT treatments were completed on 6/7 and 6/9. Due to ongoing memory impairment and sedation, subsequent ECT treatments were held and ultimately discontinued given that these symptoms worsened after initiation of ECT and were thought to be less likely related to catatonia.      Michelle initially appeared to be decompensating when ECT was held, though her cognitive impairment gradually improved and psychotic symptoms remained controlled. If symptoms of psychosis re-emerge, it may be worth starting clozapine, which is something that has previously been considered by her ACT team. Her Hatch order would need to be amended as clozapine is not one of the medications listed. ANC obtained on 6/16 was 1800/microL. Per conversation with pharmacy, neutropenia has been associated with olanzapine and " agranulocytosis has been associated with olanzapine, lorazepam, metoprolol, and hydralazine and hematologic labs have been monitored. Upon re-check, ANC was 3700/microL on 6/21/21.      Given concerns that lorazepam may have also be contributing to cognitive impairment and with no signs of re-emerging catatonia, lorazepam was tapered by an additional 0.5 mg on 6/28/21 to a total daily dose of 4 mg with further benefits in memory impairment subsequently noted. On 7/7, Michelle reported an incident of oculogyric crisis and Cogentin 1 mg BID scheduled was initiated.      The team worked with Michelle's ACT team to to establish a safe discharge plan with options including moving to a group home vs home with her ACT team and additional in home supports via [facility]I services given concerns regarding her ability to reliably take medications independently as evidenced by impairments noted in cognitive assessment by OT specialist on 7/1. MOCA score was a 13/30 and CPT score was a 4.7. Physician's statement in support of guardianship was completed on 7/19/21.  Michelle's son pursued emergency guardianship given the above concerns and to assist with facilitating placement following discharge.     Due to daytime sedation, on 7/26/21, lorazepam evening dose decreased from 2 mg to 1 mg for a total daily dose of 1 mg TID. On 7/27/21 transitioned Zyprexa from 10 mg BID to 5 mg QAM + 15 mg QPM and she had less daytime sedation with this change and no emergence of symptoms concerning for orthostatic hypotension.  She attended her emergency guardianship hearing on 7/28 and emergency guardianship was granted to her son, Alana. Due to ongoing daytime sedation, Zyprexa doses were consolidated to bedtime starting on 7/30 with noted subsequent improvement. [place] 8/12 for CADI funding. The decision to arrange placement in a group home was made as discussed above and in progress notes. Michelle was initially very resistant to  the idea of moving to a group home though gradually became more accepting of this plan. Her discharge has been delayed significantly due to issues with receiving funding.     Target psychiatric symptoms and interventions:   - Continue Ativan 1 mg PO or IM TID Patient may not decline.   - Continue Zyprexa 20 mg at bedtime. Hatch in place.   - Continue Cogentin 1 mg PO BID with additional 2 mg IM daily prn for evidence of acute dystonic reaction or oculogyric crisis  -Continue hydroxyzine 25-50 mg q4h prn for acute anxiety  -Continue Trazodone 50 mg at bedtime prn for sleep disturbances  -Continue Zyprexa 10 mg TID prn for severe agitation  -Continue Ativan/Benadryl q4h prn for agitation. WOULD GIVE PRN ATIVAN FIRST FOR AGITATION unless it is after 5 pm on day prior to scheduled ECT. Of note, she has not required any prn medications for agitation in several weeks.      Occupational Therapy Consult Placed to evaluate cognitive functioning/ability to care for self in home environment, ability to manage medications. See note on 7/1 for details.      ECT: Discontinued due to significant cognitive impairment. Please see note dated 7/12/21 for additional details.      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 If no active infection, would be okay for her to follow up for treatment as an outpatient unless treatment must be initiated per group home protocol. Please see ID note on 8/30 for details. Appreciate assistance.    - PA and Lateral chest xray ordered. Reviewed and negative.   - ID consult placed on 8/30   - Emelia her son/guardian consented to treatment for latent TB on 9/1/21  - per 9/2 conversation with Mati from [facility], okay to discharge given negative chest x-ray with outpatient follow up   - follow up in outpatient infectious disease clinic in one month with Jaren Celestin   "MD    HTN, improved: Patient is now adherent with medication regimen. Intermittent elevations with no concerning symptoms.  - Metoprolol succinate ER 75 mg   - Cozaar 100 mg daily  - Amlodipine 7.5 mg daily  - IM discontinued hydralazine prn  - Switched BP checks from QID to BID on 7/13 given overall improvement  - Please see note from IM dated 5/3, 5/6, 5/24, 6/20 and 6/30/21.  - Obtained EKG and routine labs on 5/21 in context of pt declining vital sign checks and anti-hypertensives and recently elevated BPs. Reviewed labs and discussed EKG findings with IM on 5/21. No urgent concerns, though IM should be notified if pt develops acute medical concerns (I.e. heart palpitations, SOB, changes in speech, AMS, confusion, CP, HA, changes in vision).    - Per 6/20 IM note: \"Please notify IM if BP is persistently severely elevated and requiring frequent PRN hydralazine\".  - Internal medicine consulted again on 6/28 due to consistent use of hydralazine over the past week. Metoprolol increased to 75 mg daily then to 100 mg and amlodipine 5 mg was added on 6/30. Amlodipine increased to 7.5 mg daily on 7/5.      Medicine consulted again on 8/26 given lab abnormalities as discussed below:   1) Abnormal cholesterol panel: Fasting lipid panel on 8/26 notable for elevated total cholesterol and elevated LDL. Prior lipid panel in 2020 was unremarkable.   - First line management for dyslipidemia is a trial of 6 months of lifestyle changes (dietary modifications, exercise)   - follow-up with her PCP will be arranged upon discharge to have further cardiac risk stratification and discussion regarding risks / benefits of starting a statin      2) Elevated creatinine: Cr 1.17 on 8/26. Patient had BRANDON in June with Cr to peak of 1.83, was felt to be of pre-renal etiology and Cr returned to baseline with IVF. Baseline Cr difficult to determine d/t limited labs but her baseline Cr appears to be ~0.9 - 1.0, therefore does not meet criteria " for BRANDON at this time. Her bump in creatinine may be due to fluctuating baseline vs dehydration. UA results were notable for trace leukocyte esterase. 4 WBCs. Patient is asymptomatic. Repeat Cr on 8/28 was 1.09, concern for CKD per medicine.    - Encourage oral fluid intake, goal 2 liters daily    For previous medical concerns, please see note dated 7/12/21.     Behavioral/Psychological/Social:  - Encourage unit programming  - Patient is Code 3 status and can take walks in the [place] with staff and security present per staff discretion. This appears to be quite therapeutic for her.      Safety:  - Continue precautions as noted above  - Status 15 minute checks  - Safety precautions include: assault and elopement precautions  - Continue precautions as noted above     Legal Status: Committed as MI with Hatch in place for Haldol, Zyprexa, Invega, and Thorazine through [facility]. Filed Mcgrath Kohler through [facility] and approved on 5/24/21. Physician's statement in support of guardianship was completed on 7/19/21. Patient's son is now her temporary emergency guardian.     Disposition Plan   Reason for ongoing admission: pending safe discharge plan  Discharge location: Group home - CPT assessment done by OT (Tania queen) on 7/1.  Please see note for details. [place] completed on 8/12 & [facility] funding approved though amount was too low for [address] detention to accept. Process to appeal funding amount in progress.    Discharge Medications: ordered. Group home prefers 30 d/s. ACT team will take over meds upon discharge.   Follow-up Appointments: scheduled           Bijal Varner MD  Psychiatry PGY-4

## 2021-09-09 NOTE — PLAN OF CARE
Patient isolative to her room, watching CNN news.  Bright on approach, irritable at times. Denies SI/SIB, depression/anxiety.  Good appetite, medication compliant.  No aggressive behavior noted.  Patient refused covid test this shift.  Told staff she would do it tomorrow.  Resting comfortably, will continue to monitor closely.

## 2021-09-09 NOTE — PLAN OF CARE
Pt asleep at start of shift. Breathing quiet and unlabored.     Pt had no c/o pain or discomfort during the HS.     Appears to have slept 6.75 hours.     Pt on CHEEKING, ELOPEMENT, and FALL precautions in addition to single room order. Any related events noted above.     Will continue to monitor and assess.   Problem: Sleep Disturbance  Goal: Adequate Sleep/Rest  Outcome: No Change

## 2021-09-09 NOTE — PROGRESS NOTES
"Patient declined 7 day covid-19 test, today 09/09/21. Stated, \"not today but maybe tomorrow.\"    "

## 2021-09-09 NOTE — PLAN OF CARE
Nursing assessment completed. Patient spent the entire evening in her room watching CNN, with the exception of coming to the medication window and requesting her HS medications. Pt presents with a flat affect, but does brighten with conversation. She denies any symptoms, pain, or side effects. Denies SI/SIB. She is medication compliant. Continues to wait on funding for her placement. Continue to monitor and assess.

## 2021-09-09 NOTE — PLAN OF CARE
Assessment/Intervention/Current Symtoms and Care Coordination  -Chart review     -Rounded with team, addressed patient needs/concerns:     Current Symptoms include the following: Pleasant and cooperative but continues to isolate to her room; does not participate in groups     Discharge Plan or Goal  Pending stabilization & development of a safe discharge plan.  Considerations include: [facility]     Barriers to Discharge  Patient requires further psychiatric stabilization due to current symptomology: [facility] Financial Team still needs to approve [facility]     Referral Status  Referral completed for [facility]     Legal Status  Civil Commitment/Hatch/Mcgrath Kohler through [facility]

## 2021-09-10 LAB — SARS-COV-2 RNA RESP QL NAA+PROBE: NEGATIVE

## 2021-09-10 PROCEDURE — 124N000002 HC R&B MH UMMC

## 2021-09-10 PROCEDURE — 250N000013 HC RX MED GY IP 250 OP 250 PS 637: Performed by: STUDENT IN AN ORGANIZED HEALTH CARE EDUCATION/TRAINING PROGRAM

## 2021-09-10 PROCEDURE — 250N000013 HC RX MED GY IP 250 OP 250 PS 637: Performed by: PHYSICIAN ASSISTANT

## 2021-09-10 PROCEDURE — 99231 SBSQ HOSP IP/OBS SF/LOW 25: CPT | Performed by: PSYCHIATRY & NEUROLOGY

## 2021-09-10 PROCEDURE — U0005 INFEC AGEN DETEC AMPLI PROBE: HCPCS | Performed by: STUDENT IN AN ORGANIZED HEALTH CARE EDUCATION/TRAINING PROGRAM

## 2021-09-10 RX ADMIN — BENZTROPINE MESYLATE 1 MG: 1 TABLET ORAL at 08:28

## 2021-09-10 RX ADMIN — METOPROLOL SUCCINATE 75 MG: 25 TABLET, EXTENDED RELEASE ORAL at 08:28

## 2021-09-10 RX ADMIN — OLANZAPINE 20 MG: 20 TABLET, ORALLY DISINTEGRATING ORAL at 20:56

## 2021-09-10 RX ADMIN — LORAZEPAM 1 MG: 1 TABLET ORAL at 13:52

## 2021-09-10 RX ADMIN — LORAZEPAM 1 MG: 1 TABLET ORAL at 08:28

## 2021-09-10 RX ADMIN — AMLODIPINE BESYLATE 7.5 MG: 2.5 TABLET ORAL at 08:28

## 2021-09-10 RX ADMIN — LORAZEPAM 1 MG: 1 TABLET ORAL at 20:57

## 2021-09-10 RX ADMIN — BENZTROPINE MESYLATE 1 MG: 1 TABLET ORAL at 20:57

## 2021-09-10 RX ADMIN — LOSARTAN POTASSIUM 100 MG: 100 TABLET, FILM COATED ORAL at 08:28

## 2021-09-10 ASSESSMENT — ACTIVITIES OF DAILY LIVING (ADL)
LAUNDRY: UNABLE TO COMPLETE
ORAL_HYGIENE: INDEPENDENT
DRESS: INDEPENDENT
LAUNDRY: UNABLE TO COMPLETE
HYGIENE/GROOMING: INDEPENDENT
ORAL_HYGIENE: INDEPENDENT
HYGIENE/GROOMING: INDEPENDENT
DRESS: SCRUBS (BEHAVIORAL HEALTH)

## 2021-09-10 NOTE — PROGRESS NOTES
"[facility], [place]   Psychiatric Progress Note  Hospital Day: 149        Interim History:   The patient's care was discussed with the treatment team during the daily team meeting and/or staff's chart notes were reviewed. No acute medical concerns. Has intermittent elevated BP, asymptomatic, otherwise vitals WNL. She is medication adherent. She has been pleasant and cooperative with staff and peers. Continues to be isolate to her room despite encouragement to participate in the milieus. No reported or observed side effects. No symptoms of psychosis or tad were observed. Affect is flat, depressed. Expresses frustration r/t length of stay. Intermittently attends to all ADLs, though overall, hygiene maintenance remains poor. Continues to sleep and eat well. Michelle is at her baseline. She appeared to be sleeping for 6.75 hours overnight.     Today, Michelle reports that she is doing well and has no new concerns that have risen. She does not want to go to [place] over the weekend because \"I have been waiting here for almost 6 months. What's the point? I am going to just keep waiting.\" Expresses frustration about length of stay though denies feeling depressed or sad.     Suicidal ideation: denies current or recent suicidal ideation or behaviors.    Homicidal ideation: denies current or recent homicidal ideation or behaviors.    Psychotic symptoms: Patient denies AH, VH, paranoia, delusions.     Medication side effects reported: She denies sedation today. She denies any dizziness or lightheadedness.     Acute medical concerns: Denies urinary sx. She denies any fevers, chills, cough or malaise.    Other issues reported by patient: Patient had no further questions or concerns.           Medications:       amLODIPine  7.5 mg Oral Daily     benztropine  1 mg Oral BID     LORazepam  1 mg Oral TID    Or     LORazepam  1 mg Intramuscular TID     losartan  100 mg Oral Daily     metoprolol succinate ER "  75 mg Oral Daily     OLANZapine zydis  20 mg Oral At Bedtime    Or     OLANZapine  10 mg Intramuscular At Bedtime          Allergies:     Allergies   Allergen Reactions     Haldol [Haloperidol]      Patient previously tolerated haldol, though developed oculogyric crises during hospital stay on 4/26/21 on total daily dose of 10 mg.     Lisinopril Cough          Labs:     No results found for this or any previous visit (from the past 24 hour(s)).       Psychiatric Examination:     /77 (BP Location: Right arm)   Pulse 77   Temp 98.3  F (36.8  C) (Oral)   Resp 16   Wt 70.9 kg (156 lb 6.4 oz)   SpO2 99%   BMI 26.85 kg/m    Weight is 156 lbs 6.4 oz  Body mass index is 26.85 kg/m .    Weight over time:  Vitals:    05/25/21 0850 06/15/21 0811 06/16/21 1605 06/19/21 0859   Weight: 71 kg (156 lb 8 oz) 69.8 kg (153 lb 12.8 oz) 70.5 kg (155 lb 8 oz) 71.4 kg (157 lb 8 oz)    06/21/21 0805 06/22/21 0839 06/24/21 0800 07/02/21 0835   Weight: 69.5 kg (153 lb 3.2 oz) 69.3 kg (152 lb 11.2 oz) 70.7 kg (155 lb 12.8 oz) 70.7 kg (155 lb 12.8 oz)    07/03/21 0844 07/06/21 0838 07/10/21 0845 07/16/21 0821   Weight: 69.6 kg (153 lb 8 oz) 70.9 kg (156 lb 4.8 oz) 69.8 kg (153 lb 14.4 oz) 69.3 kg (152 lb 12.8 oz)    07/17/21 0830 07/31/21 0804 08/17/21 0845 08/19/21 0800   Weight: 70.4 kg (155 lb 4.8 oz) 70.3 kg (154 lb 14.4 oz) 70.8 kg (156 lb) 69.2 kg (152 lb 9.6 oz)    08/24/21 0700 08/26/21 0800 09/02/21 0803 09/09/21 0800   Weight: 70.3 kg (155 lb) 69 kg (152 lb 3.2 oz) 68.9 kg (152 lb) 70.9 kg (156 lb 6.4 oz)       Orthostatic Vitals       Most Recent      Sitting Orthostatic /84 07/29 0800    Sitting Orthostatic Pulse (bpm) 82 07/29 0800    Standing Orthostatic /86 07/29 0800    Standing Orthostatic Pulse (bpm) 88 07/29 0800            Cardiometabolic risk assessment. 07/29/21      Reviewed patient profile for cardiometabolic risk factors    Date taken /Value  REFERENCE RANGE   Abdominal Obesity  (Waist  "Circumference)   See nursing flowsheet Women ?35 in (88 cm)   Men ?40 in (102 cm)      Triglycerides  Triglycerides   Date Value Ref Range Status   08/26/2021 205 (H) <150 mg/dL Final     Comment:     0-9 years:  Normal:    Less than 75 mg/dL  Borderline high:  75-99 mg/dL  High:             Greater than or equal to 100 mg/dL    0-19 years:  Normal:    Less than 90 mg/dL  Borderline high:   mg/dL  High:             Greater than or equal to 130 mg/dL    20 years and older:  Normal:    Less than 150 mg/dL  Borderline high:  150-199 mg/dL  High:             200-499 mg/dL  Very high:   Greater than or equal to 500 mg/dL   10/19/2019 117 <150 mg/dL Final       ?150 mg/dL (1.7 mmol/L) or current treatment for elevated triglycerides   HDL cholesterol  HDL Cholesterol   Date Value Ref Range Status   10/19/2019 56 >49 mg/dL Final     Direct Measure HDL   Date Value Ref Range Status   08/26/2021 55 >=50 mg/dL Final     Comment:     0-19 years:       Greater than or equal to 45 mg/dL   Low: Less than 40 mg/dL   Borderline low: 40-44 mg/dL     20 years and older:   Female: Greater than or equal to 50 mg/dL   Male:   Greater than or equal to 40 mg/dL        ]   Women <50 mg/dL (1.3 mmol/L) in women or current treatment for low HDL cholesterol  Men <40 mg/dL (1 mmol/L) in men or current treatment for low HDL cholesterol     Fasting plasma glucose (FPG) Lab Results   Component Value Date     07/02/2021      FPG ?100 mg/dL (5.6 mmol/L) or treatment for elevated blood glucose   Blood pressure  BP Readings from Last 3 Encounters:   09/10/21 124/77   06/04/19 131/71   04/26/19 164/86    Blood pressure ?130/85 mmHg or treatment for elevated blood pressure   Family History  See family history     Appearance: dressed in hospital scrubs, appeared reported age, unkempt hair   Attitude: cooperative  Eye Contact: improved, looks up more during the interview   Mood: \"fine\"   Affect:  Somewhat blunted, no longer agitated or tense. " Brightens on occasion.   Speech: accented, clear and coherent. Soft volume.    Language: no obvious receptive or expressive deficits  Psychomotor, Gait, Musculoskeletal: No abnormal movements noted while seated  Thought Process: goal-oriented, linear, concrete   Associations:  No loosening of associations present  Thought Content:  Did not appear to be responding to internal stimuli.  Insight:   poor - slightly improved   Judgement:  fair  Oriented to: person, place, time/date   Attention Span and Concentration: attentive to conversation though at times stares ahead and does not respond to questions.  Recent and Remote Memory: stable, some difficulty recalling events   Fund of Knowledge:  normal    Clinical Global Impressions  First:  Considering your total clinical experience with this particular patient population, how severe are the patient's symptoms at this time?: 7 (04/16/21 1428)  Compared to the patient's condition at the START of treatment, this patient's condition is: 4 (04/16/21 1428)  Most recent:  Considering your total clinical experience with this particular patient population, how severe are the patient's symptoms at this time?: 7 (07/22/21 0941)  Compared to the patient's condition at the START of treatment, this patient's condition is: 3 (07/22/21 0941)           Precautions:     Behavioral Orders   Procedures     Cheeking Precautions (behavioral units)     Patient Observed swallowing PO medications; Patient asked to drink water after swallowing medication; Patient in Staff line of sight for 15 minutes after medication given; Mouth checks after PO administration (patient asked to open mouth and stick out their tongue).     Code 2     Code 3     For walks in the [place] with staff and per staff discretion     Electroconvulsive therapy     Series of up to 12 treatments. Begin Date: 5/26/21     Treating Psychiatrist providing ECT:  Dr. Lubin     Notified on:  5/21/21     Electroconvulsive therapy      As long as we get the green light from risk management and pt is medically cleared, begin ECT every Monday, Wednesday, and Friday     Electroconvulsive therapy     Series of up to 12 treatments. Begin Date: 5/25/21     Treating Psychiatrist providing ECT:  Amee     Notified on:  5/24/21     Elopement precautions     Fall precautions     Mcgrath Calderon     Routine Programming     As clinically indicated     Status 15     Every 15 minutes.          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 Symptoms and presentation have been most consistent with Schizoaffective Disorder, Bipolar Type. Inpatient psychiatric hospitalization is warranted at this time for safety, stabilization, possible adjustment in medications and development of a safe discharge plan.      Hospital Course:  Michelle was admitted to station 12 on 4/13/21 on a 72 hour hold under the care of Dr. Mellissa Zaman and a petition for MI commitment was filed and later supported. On admission, PTA psychiatric medications including Invega, Zyprexa, thorazine and Artane were restarted. However, Michelle declined all scheduled medications despite significant encouragement from the team. On 4/20 a psychiatric emergency was declared due to aggression toward others in context of severe psychosis and suspected excited catatonia. Her PTA Invega, Zyprexa & Thorazine were discontinued given consistent refusal and PO Haldol 5 mg BID with IM  "back up was initiated. Ativan 1 mg TID was initiated on 4/21. Michelle continued declining PO medications and received IM haldol doses.      On 4/26, Michelle had oculogyric crisis likely secondary to IM haloperidol. This was alleviated with IM Cogentin which was then initiated as a PRN. Haldol was discontinued and replaced with Zyprexa 10 mg BID scheduled on an emergency basis. PTA Artane was also discontinued and oral Cogentin was scheduled, though Michelle continued declining this.     After accepting 2 doses of Ativan on 4/30, improvement in agitation and catatonic symptoms was observed. She subsequently continued intermittently declining doses of Ativan with noted decompensation. She was deemed to lack capacity to consent to treatment with Ativan and on 5/4, following discussion with our legal team and Michelle's son acting as her surrogate decision maker, forced IM backup for scheduled PO Ativan was initiated. On 5/4, Michelle also requested to switch from Cogentin to Artane (2 mg BID) though continued to frequently decline this and on 5/6 it was switched to a PRN medication.      Following initiation of Zyprexa as discussed above, partial improvements in presenting symptoms were observed. Michelle was more receptive to resuming oral Invega & a titration was initiated on 5/3 then titrated to her PTA dose of 9 mg on 5/10.  The plan for her ACT team to bring in loading dose of Invega Sustenna. On 5/11, Michelle again had signs of oculogyric crisis & her PO Invega was decreased to 6 mg. Due to ongoing observed problems with eye movement and concern for oculogyric crisis, PO Invega was stopped on 5/14 (she did not receive Invega Sustenna).       On 5/19, Ativan was increased to 2 mg TID due to re-emerging evidence of catatonia (long periods of staring, repetitive movements, echolalia, mutism). On 5/20, a meeting was held with her ACT team on 5/20, including ACT team psychiatrist, Dr. Pedraza who was in \"full support\" of pursuing ECT under a " Alcides Kohler petition with hopes that improvement may lead Michelle to be more amenable to clozapine treatment and weekly blood draws in a monitored setting such as an IRTS.     An ECT consult by Dr. Lubin was completed on 5/21. On 5/24, Alcides Kohler was approved and Michelle was also cleared by internal medicine for ECT. An acute series of bilateral ECT was initiated on 5/26. Michelle had a short seizure during ECT on 6/4. Improvements in mood, affect, social interactions, paranoia, and agitation were observed after initiation of ECT. However, Michelle appeared more disoriented with memory impairment and sedation on days of ECT. Given these concerns, Ativan was reduced to a total daily dose of 4.5 mg on 6/5 and unilateral ECT treatments were completed on 6/7 and 6/9. Due to ongoing memory impairment and sedation, subsequent ECT treatments were held and ultimately discontinued given that these symptoms worsened after initiation of ECT and were thought to be less likely related to catatonia.      Michelle initially appeared to be decompensating when ECT was held, though her cognitive impairment gradually improved and psychotic symptoms remained controlled. If symptoms of psychosis re-emerge, it may be worth starting clozapine, which is something that has previously been considered by her ACT team. Her Hatch order would need to be amended as clozapine is not one of the medications listed. ANC obtained on 6/16 was 1800/microL. Per conversation with pharmacy, neutropenia has been associated with olanzapine and agranulocytosis has been associated with olanzapine, lorazepam, metoprolol, and hydralazine and hematologic labs have been monitored. Upon re-check, ANC was 3700/microL on 6/21/21.      Given concerns that lorazepam may have also be contributing to cognitive impairment and with no signs of re-emerging catatonia, lorazepam was tapered by an additional 0.5 mg on 6/28/21 to a total daily dose of 4 mg with further benefits in memory  impairment subsequently noted. On 7/7, Michelle reported an incident of oculogyric crisis and Cogentin 1 mg BID scheduled was initiated.      The team worked with Michelle's ACT team to to establish a safe discharge plan with options including moving to a group home vs home with her ACT team and additional in home supports via CADI services given concerns regarding her ability to reliably take medications independently as evidenced by impairments noted in cognitive assessment by OT specialist on 7/1. MOCA score was a 13/30 and CPT score was a 4.7. Physician's statement in support of guardianship was completed on 7/19/21.  Michelle's son pursued emergency guardianship given the above concerns and to assist with facilitating placement following discharge.     Due to daytime sedation, on 7/26/21, lorazepam evening dose decreased from 2 mg to 1 mg for a total daily dose of 1 mg TID. On 7/27/21 transitioned Zyprexa from 10 mg BID to 5 mg QAM + 15 mg QPM and she had less daytime sedation with this change and no emergence of symptoms concerning for orthostatic hypotension.  She attended her emergency guardianship hearing on 7/28 and emergency guardianship was granted to her son, Alana. Due to ongoing daytime sedation, Zyprexa doses were consolidated to bedtime starting on 7/30 with noted subsequent improvement. [place] on 8/12 for CADI funding. The decision to arrange placement in a group home was made as discussed above and in progress notes. Michelle was initially very resistant to the idea of moving to a group home though gradually became more accepting of this plan. Her discharge has been delayed significantly due to issues with receiving funding.     Target psychiatric symptoms and interventions:   - Continue Ativan 1 mg PO or IM TID Patient may not decline.   - Continue Zyprexa 20 mg at bedtime. Hatch in place.   - Continue Cogentin 1 mg PO BID with additional 2 mg IM daily prn for evidence of acute  dystonic reaction or oculogyric crisis  -Continue hydroxyzine 25-50 mg q4h prn for acute anxiety  -Continue Trazodone 50 mg at bedtime prn for sleep disturbances  -Continue Zyprexa 10 mg TID prn for severe agitation  -Continue Ativan/Benadryl q4h prn for agitation. WOULD GIVE PRN ATIVAN FIRST FOR AGITATION unless it is after 5 pm on day prior to scheduled ECT. Of note, she has not required any prn medications for agitation in several weeks.      Occupational Therapy Consult Placed to evaluate cognitive functioning/ability to care for self in home environment, ability to manage medications. See note on 7/1 for details.      ECT: Discontinued due to significant cognitive impairment. Please see note dated 7/12/21 for additional details.      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 If no active infection, would be okay for her to follow up for treatment as an outpatient unless treatment must be initiated per group [place] protocol. Please see ID note on 8/30 for details. Appreciate assistance.    - PA and Lateral chest xray ordered. Reviewed and negative.   - ID consult placed on 8/30   - [place] her son/guardian consented to treatment for latent TB on 9/1/21  - per 9/2 conversation with Mati from [facility], okay to discharge given negative chest x-ray with outpatient follow up   - follow up in outpatient infectious disease clinic in one month with Jaren Celestin MD    HTN, improved: Patient is now adherent with medication regimen. Intermittent elevations with no concerning symptoms.  - Metoprolol succinate ER 75 mg   - Cozaar 100 mg daily  - Amlodipine 7.5 mg daily  - IM discontinued hydralazine prn  - Switched BP checks from QID to BID on 7/13 given overall improvement  - Please see note from IM dated 5/3, 5/6, 5/24, 6/20 and 6/30/21.  - Obtained EKG and routine labs on 5/21 in context  "of pt declining vital sign checks and anti-hypertensives and recently elevated BPs. Reviewed labs and discussed EKG findings with IM on 5/21. No urgent concerns, though IM should be notified if pt develops acute medical concerns (I.e. heart palpitations, SOB, changes in speech, AMS, confusion, CP, HA, changes in vision).    - Per 6/20 IM note: \"Please notify IM if BP is persistently severely elevated and requiring frequent PRN hydralazine\".  - Internal medicine consulted again on 6/28 due to consistent use of hydralazine over the past week. Metoprolol increased to 75 mg daily then to 100 mg and amlodipine 5 mg was added on 6/30. Amlodipine increased to 7.5 mg daily on 7/5.      Medicine consulted again on 8/26 given lab abnormalities as discussed below:   1) Abnormal cholesterol panel: Fasting lipid panel on 8/26 notable for elevated total cholesterol and elevated LDL. Prior lipid panel in 2020 was unremarkable.   - First line management for dyslipidemia is a trial of 6 months of lifestyle changes (dietary modifications, exercise)   - follow-up with her PCP will be arranged upon discharge to have further cardiac risk stratification and discussion regarding risks / benefits of starting a statin      2) Elevated creatinine: Cr 1.17 on 8/26. Patient had BRANDON in June with Cr to peak of 1.83, was felt to be of pre-renal etiology and Cr returned to baseline with IVF. Baseline Cr difficult to determine d/t limited labs but her baseline Cr appears to be ~0.9 - 1.0, therefore does not meet criteria for BRANDON at this time. Her bump in creatinine may be due to fluctuating baseline vs dehydration. UA results were notable for trace leukocyte esterase. 4 WBCs. Patient is asymptomatic. Repeat Cr on 8/28 was 1.09, concern for CKD per medicine.    - Encourage oral fluid intake, goal 2 liters daily    For previous medical concerns, please see note dated 7/12/21.     Behavioral/Psychological/Social:  - Encourage unit programming  - " Patient is Code 3 status and can take walks in the [place] with staff and security present per staff discretion. This appears to be quite therapeutic for her.      Safety:  - Continue precautions as noted above  - Status 15 minute checks  - Safety precautions include: assault and elopement precautions  - Continue precautions as noted above     Legal Status: Committed as MI with Hatch in place for Haldol, Zyprexa, Invega, and Thorazine through [facility]. Filed Alcides Kohler through [facility] and approved on 5/24/21. Physician's statement in support of guardianship was completed on 7/19/21. Patient's son is now her temporary emergency guardian.     Disposition Plan   Reason for ongoing admission: pending safe discharge plan  Discharge location: Group home - CPT assessment done by OT (Tania queen) on 7/1.  Please see note for details. [place] completed on 8/12 & [facility]I funding approved though amount was too low for [address] intermediate to accept. Process to appeal funding amount in progress.    Discharge Medications: ordered. Group home prefers 30 d/s. ACT team will take over meds upon discharge.   Follow-up Appointments: scheduled         Gabby Zaman MD  [facility]

## 2021-09-10 NOTE — PLAN OF CARE
Problem: General Rehab Plan of Care  Goal: Therapeutic Recreation/Music Therapy Goal  Description: The patient and/or their representative will achieve their patient-specific goals related to the plan of care.  The patient-specific goals include:  Outcome: Improving    Pt is calm and pleasant. She is isolative and withdrawn to her room. She refused to come out of her room to socialize with peers and exercise. Affects is bright upon approach. Her appetite is good, and she is drinking well. She took HS medication without any issue and denied SE's of her medication. Pt waiting for placement. She wants Covid test tomorrow.

## 2021-09-10 NOTE — PLAN OF CARE
Problem: Sleep Disturbance  Goal: Adequate Sleep/Rest  Outcome: Improving    Night Shift Summary (9/9/21 into 09/10/21)    Pt asleep at start of shift. Breathing quiet and unlabored.     Pt had no c/o pain or discomfort during the HS.     Appears to have slept 6.75 hours.     Pt on CHEEKING, ELOPEMENT and FALL precautions in addition to single room order. Any related events noted above.     Will continue to monitor and assess.

## 2021-09-10 NOTE — PLAN OF CARE
Problem: General Plan of Care (Inpatient Behavioral)  Goal: Individualization/Patient Specific Goal (IP Behavioral)  Description: The patient and/or their representative will achieve their patient-specific goals related to the plan of care.    The patient-specific goals include:  Outcome: No Change    RN Assessment:  SI/Self harm:  pt denies  Aggression/agitation/HI:  none reported or observed  AVH:  pt denies  Sleep: 6.75 hours documented on NOC shift  PRN Med: No PRNs administered this shift  Medication AE: none reported or observed  Physical Complaints/Issues: pt denies  I & O: eating and drinking well  ADLs: independent  Vitals:  WNL  COVID 19 Assessment:  negative. Pt agreed to weekly routine testing this shift.  Milieu Participation: none, pt isolates to her own room and declines all invitations to engage in the lounge. Pt will leave her room to get her meal trays.  Behavior: calm, quiet, brightens with interactions. Pt is medication compliant.    No other concerns at this time. Nursing will continue to monitor and assess.

## 2021-09-10 NOTE — PLAN OF CARE
Assessment/Intervention/Current Symtoms and Care Coordination  -Chart review  Current Symptoms include the following: Pleasant and cooperative but continues to isolate to her room; does not participate in groups     Discharge Plan or Goal  Pending stabilization & development of a safe discharge plan.  Considerations include: [facility]     Barriers to Discharge  Patient requires further psychiatric stabilization due to current symptomology: [facility] Financial Team still needs to approve MADX     Referral Status  Referral completed for [facility]     Legal Status  Civil Commitment/Hatch/Mcgrath Kohler through [facility]

## 2021-09-11 PROCEDURE — 250N000013 HC RX MED GY IP 250 OP 250 PS 637: Performed by: PHYSICIAN ASSISTANT

## 2021-09-11 PROCEDURE — 124N000002 HC R&B MH UMMC

## 2021-09-11 PROCEDURE — 250N000013 HC RX MED GY IP 250 OP 250 PS 637: Performed by: STUDENT IN AN ORGANIZED HEALTH CARE EDUCATION/TRAINING PROGRAM

## 2021-09-11 RX ADMIN — LORAZEPAM 1 MG: 1 TABLET ORAL at 14:00

## 2021-09-11 RX ADMIN — AMLODIPINE BESYLATE 7.5 MG: 2.5 TABLET ORAL at 08:30

## 2021-09-11 RX ADMIN — LORAZEPAM 1 MG: 1 TABLET ORAL at 08:29

## 2021-09-11 RX ADMIN — OLANZAPINE 20 MG: 20 TABLET, ORALLY DISINTEGRATING ORAL at 20:25

## 2021-09-11 RX ADMIN — LORAZEPAM 1 MG: 1 TABLET ORAL at 20:25

## 2021-09-11 RX ADMIN — BENZTROPINE MESYLATE 1 MG: 1 TABLET ORAL at 08:30

## 2021-09-11 RX ADMIN — METOPROLOL SUCCINATE 75 MG: 25 TABLET, EXTENDED RELEASE ORAL at 08:29

## 2021-09-11 RX ADMIN — LOSARTAN POTASSIUM 100 MG: 100 TABLET, FILM COATED ORAL at 08:30

## 2021-09-11 RX ADMIN — BENZTROPINE MESYLATE 1 MG: 1 TABLET ORAL at 20:25

## 2021-09-11 ASSESSMENT — ACTIVITIES OF DAILY LIVING (ADL)
DRESS: INDEPENDENT
ORAL_HYGIENE: INDEPENDENT
LAUNDRY: UNABLE TO COMPLETE
HYGIENE/GROOMING: INDEPENDENT

## 2021-09-11 NOTE — PLAN OF CARE
Problem: General Rehab Plan of Care  Goal: Therapeutic Recreation/Music Therapy Goal  Description: The patient and/or their representative will achieve their patient-specific goals related to the plan of care.  The patient-specific goals include:  Outcome: Improving    Pt is isolative and withdrawn to her room. She spent the majority of this evening sitting on her bed watching TV. She denied pain or other discomforts. She did not appear to be responding to internal stimuli and appeared to be at her baseline. She took scheduled medication without any issue and denied SE's of her medication. Her appetite is good, and she is drinking well. No SI/SIB.

## 2021-09-11 NOTE — PLAN OF CARE
Problem: General Plan of Care (Inpatient Behavioral)  Goal: Plan of Care Review (Adult,OB,Behavioral)  Description: The patient and/or their representative will communicate an understanding of their plan of care.  Outcome: No Change      Pt is isolative and withdrawn to her room. Offer no concerns.  She denied pain or other discomforts. She did not appear to be responding to internal stimuli. She took scheduled medication without any issue and denied SE's of her medication. Her appetite is good, and she is drinking well. No SI/SIB.  Patient is currently waiting placement.

## 2021-09-11 NOTE — PLAN OF CARE
Problem: Sleep Disturbance  Goal: Adequate Sleep/Rest  Outcome: Improving   Night Shift Summary (9/10/21 into 09/11/21)    Pt asleep  at start of shift. Breathing quiet and unlabored.     Pt had no c/o pain or discomfort during the HS.     Appears to have slept 7 hours.    Pt on CHEEKING, ELOPEMENT and FALL precautions in addition to single room order. Any related events noted above.     Will continue to monitor and assess.

## 2021-09-12 PROCEDURE — 250N000013 HC RX MED GY IP 250 OP 250 PS 637: Performed by: PHYSICIAN ASSISTANT

## 2021-09-12 PROCEDURE — 250N000013 HC RX MED GY IP 250 OP 250 PS 637: Performed by: STUDENT IN AN ORGANIZED HEALTH CARE EDUCATION/TRAINING PROGRAM

## 2021-09-12 PROCEDURE — 124N000002 HC R&B MH UMMC

## 2021-09-12 RX ADMIN — METOPROLOL SUCCINATE 75 MG: 25 TABLET, EXTENDED RELEASE ORAL at 08:00

## 2021-09-12 RX ADMIN — BENZTROPINE MESYLATE 1 MG: 1 TABLET ORAL at 20:16

## 2021-09-12 RX ADMIN — LORAZEPAM 1 MG: 1 TABLET ORAL at 08:01

## 2021-09-12 RX ADMIN — LORAZEPAM 1 MG: 1 TABLET ORAL at 14:10

## 2021-09-12 RX ADMIN — AMLODIPINE BESYLATE 7.5 MG: 2.5 TABLET ORAL at 08:01

## 2021-09-12 RX ADMIN — LOSARTAN POTASSIUM 100 MG: 100 TABLET, FILM COATED ORAL at 08:00

## 2021-09-12 RX ADMIN — LORAZEPAM 1 MG: 1 TABLET ORAL at 20:15

## 2021-09-12 RX ADMIN — BENZTROPINE MESYLATE 1 MG: 1 TABLET ORAL at 08:00

## 2021-09-12 RX ADMIN — OLANZAPINE 20 MG: 20 TABLET, ORALLY DISINTEGRATING ORAL at 20:15

## 2021-09-12 ASSESSMENT — ACTIVITIES OF DAILY LIVING (ADL)
ORAL_HYGIENE: PROMPTS
HYGIENE/GROOMING: PROMPTS
ORAL_HYGIENE: PROMPTS
DRESS: SCRUBS (BEHAVIORAL HEALTH)
DRESS: SCRUBS (BEHAVIORAL HEALTH)
HYGIENE/GROOMING: PROMPTS

## 2021-09-12 NOTE — PLAN OF CARE
Problem: Sleep Disturbance  Goal: Adequate Sleep/Rest  Outcome: No Change   Night Shift Summary (9/11/21 into 09/12/21)    Pt asleep at start of shift. Breathing quiet and unlabored.     Pt had no c/o pain or discomfort during the HS.     Appears to have slept 7 hours.     Pt on CHEEKING, ELOPEMENT and FALL  precautions in addition to single room order. Any related events noted above.     Will continue to monitor and assess.

## 2021-09-12 NOTE — PLAN OF CARE
Problem: General Rehab Plan of Care  Goal: Therapeutic Recreation/Music Therapy Goal  Description: The patient and/or their representative will achieve their patient-specific goals related to the plan of care.  The patient-specific goals include:  Outcome: No Change  Pt remains isolative and withdrawn to her room. She was encouraged to come out of her room several times to pace the hallway for about 15 minutes to prevent blood clots but declined. She stated that she paced in her room this morning. She was only seen in the hallway to  her dinner tray and once at the [facility] to ask if her son had called.      Pt is at her baseline, waiting for placement into a group home/assisted living facility. She is calm and pleasant. Flat and blunt affect. She remains medication compliant and denies the side of her medication. Pt refused to shower and agreed to take one tomorrow.  Her appetite is good, and she is drinking well. No SI/SIB.

## 2021-09-12 NOTE — PLAN OF CARE
"Patient unwilling to participate in discussion about Plan of Care. Writer attempted to talk with pt about discharge but pt was not responding to questions from writer.  Patient affect was flat.  Writer gave patient positive feedback for being out of her room last evening and walking in the [place] for a brief period. She was reminded that she needs to move around so that her blood is moving throughout her body and she doesn't get blood clots.   Patient was observed watching TV for most of the shift.  When given her afternoon scheduled meds writer attempted to converse with pt.  She was again minimally responsive.  Writer then joked with patient about our lack of fingernail polish and she then had a big smile on her face.  Pt and writer have a history of painting our nails on the weekends but have not done so recently.  Writer said to pt \"You really don't like my blue nail polish do you?\"  She shook her head and laughed.    Pt denies SI, HI and SIB. Denies hallucinations.  "

## 2021-09-13 PROCEDURE — 99231 SBSQ HOSP IP/OBS SF/LOW 25: CPT | Mod: GC | Performed by: PSYCHIATRY & NEUROLOGY

## 2021-09-13 PROCEDURE — 250N000013 HC RX MED GY IP 250 OP 250 PS 637: Performed by: STUDENT IN AN ORGANIZED HEALTH CARE EDUCATION/TRAINING PROGRAM

## 2021-09-13 PROCEDURE — 124N000002 HC R&B MH UMMC

## 2021-09-13 PROCEDURE — 250N000013 HC RX MED GY IP 250 OP 250 PS 637: Performed by: PHYSICIAN ASSISTANT

## 2021-09-13 RX ADMIN — OLANZAPINE 20 MG: 20 TABLET, ORALLY DISINTEGRATING ORAL at 20:20

## 2021-09-13 RX ADMIN — LORAZEPAM 1 MG: 1 TABLET ORAL at 20:20

## 2021-09-13 RX ADMIN — BENZTROPINE MESYLATE 1 MG: 1 TABLET ORAL at 20:20

## 2021-09-13 RX ADMIN — AMLODIPINE BESYLATE 7.5 MG: 2.5 TABLET ORAL at 08:28

## 2021-09-13 RX ADMIN — LORAZEPAM 1 MG: 1 TABLET ORAL at 14:06

## 2021-09-13 RX ADMIN — BENZTROPINE MESYLATE 1 MG: 1 TABLET ORAL at 08:28

## 2021-09-13 RX ADMIN — LORAZEPAM 1 MG: 1 TABLET ORAL at 08:28

## 2021-09-13 ASSESSMENT — ACTIVITIES OF DAILY LIVING (ADL)
LAUNDRY: UNABLE TO COMPLETE
DRESS: INDEPENDENT
ORAL_HYGIENE: INDEPENDENT
HYGIENE/GROOMING: INDEPENDENT

## 2021-09-13 NOTE — PLAN OF CARE
Assessment/Intervention/Current Symtoms and Care Coordination  -Chart review  -Rounded with team, addressed patient needs/concerns  Current Symptoms include the following:  Pleasant and cooperative: Continues to be isolative to her room and does not attend unit programming or therapy groups.    Discharge Plan or Goal  Pending stabilization & development of a safe discharge plan.  Considerations include:  [facility]    Barriers to Discharge  Patient requires further psychiatric stabilization due to current symptomology:  Waiting on MAD[facility] to be approved through [facility]    Referral Status  [facility] Referral made and process complete pending funding    Legal Status  Civil Commitment/[facility]/[employer] colin through [facility]

## 2021-09-13 NOTE — PLAN OF CARE
Problem: General Rehab Plan of Care  Goal: Therapeutic Recreation/Music Therapy Goal  Description: The patient and/or their representative will achieve their patient-specific goals related to the plan of care.  The patient-specific goals include:  Outcome: Improving  Pt took HS medication without any issue and denied the side effects of her medication. She is isolative and withdrawn to her room watching TV. She appeared to be at her baseline and did not appear to responding to internal stimuli. Her appetite is good, and she is drinking well. No SI/SIB.

## 2021-09-13 NOTE — PROGRESS NOTES
[facility], [place]   Psychiatric Progress Note  Hospital Day: 152        Interim History:   The patient's care was discussed with the treatment team during the daily team meeting and/or staff's chart notes were reviewed. No acute medical concerns. Has intermittent elevated BP, asymptomatic, otherwise vitals WNL. She is medication adherent. She has been pleasant and cooperative with staff and peers. Continues to be isolate to her room despite encouragement to participate in the milie[place] though did walk for a period of time over the weekend. No reported or observed side effects. No symptoms of psychosis or tad were observed. Affect is flat, depressed. Occasionally expresses frustration r/t length of stay. Intermittently attends to all ADLs, though overall, hygiene maintenance remains poor. Continues to sleep and eat well. Michelle is at her baseline. She appeared to be sleeping for 7 hours overnight.     Today, Michelle reports that she is doing well and has no new concerns that have risen. She laughed when it was mentioned that we heard she had walked up and down the hallway this weekend. She was given positive reenforcement regarding this and the importance of movement. She said she would go on a walk with this writer in the [place] after getting good news regarding the group home and that she can leave.  Expresses frustration about length of stay though denies feeling depressed or sad.     Suicidal ideation: denies current or recent suicidal ideation or behaviors.    Homicidal ideation: denies current or recent homicidal ideation or behaviors.    Psychotic symptoms: Patient denies AH, VH, paranoia, delusions.     Medication side effects reported: She denies sedation today. She denies any dizziness or lightheadedness.     Acute medical concerns: Denies urinary sx. She denies any fevers, chills, cough or malaise.    Other issues reported by patient: Patient had no further questions or  concerns.           Medications:       amLODIPine  7.5 mg Oral Daily     benztropine  1 mg Oral BID     LORazepam  1 mg Oral TID    Or     LORazepam  1 mg Intramuscular TID     losartan  100 mg Oral Daily     metoprolol succinate ER  75 mg Oral Daily     OLANZapine zydis  20 mg Oral At Bedtime    Or     OLANZapine  10 mg Intramuscular At Bedtime          Allergies:     Allergies   Allergen Reactions     Haldol [Haloperidol]      Patient previously tolerated haldol, though developed oculogyric crises during hospital stay on 4/26/21 on total daily dose of 10 mg.     Lisinopril Cough          Labs:     No results found for this or any previous visit (from the past 24 hour(s)).       Psychiatric Examination:     /65 (BP Location: Right arm)   Pulse 70   Temp (!) 96.3  F (35.7  C) (Tympanic)   Resp 17   Wt 69.2 kg (152 lb 9.6 oz)   SpO2 98%   BMI 26.19 kg/m    Weight is 152 lbs 9.6 oz  Body mass index is 26.19 kg/m .    Weight over time:  Vitals:    05/25/21 0850 06/15/21 0811 06/16/21 1605 06/19/21 0859   Weight: 71 kg (156 lb 8 oz) 69.8 kg (153 lb 12.8 oz) 70.5 kg (155 lb 8 oz) 71.4 kg (157 lb 8 oz)    06/21/21 0805 06/22/21 0839 06/24/21 0800 07/02/21 0835   Weight: 69.5 kg (153 lb 3.2 oz) 69.3 kg (152 lb 11.2 oz) 70.7 kg (155 lb 12.8 oz) 70.7 kg (155 lb 12.8 oz)    07/03/21 0844 07/06/21 0838 07/10/21 0845 07/16/21 0821   Weight: 69.6 kg (153 lb 8 oz) 70.9 kg (156 lb 4.8 oz) 69.8 kg (153 lb 14.4 oz) 69.3 kg (152 lb 12.8 oz)    07/17/21 0830 07/31/21 0804 08/17/21 0845 08/19/21 0800   Weight: 70.4 kg (155 lb 4.8 oz) 70.3 kg (154 lb 14.4 oz) 70.8 kg (156 lb) 69.2 kg (152 lb 9.6 oz)    08/24/21 0700 08/26/21 0800 09/02/21 0803 09/09/21 0800   Weight: 70.3 kg (155 lb) 69 kg (152 lb 3.2 oz) 68.9 kg (152 lb) 70.9 kg (156 lb 6.4 oz)    09/12/21 0820   Weight: 69.2 kg (152 lb 9.6 oz)       Orthostatic Vitals       Most Recent      Sitting Orthostatic /84 07/29 0800    Sitting Orthostatic Pulse (bpm) 82  07/29 0800    Standing Orthostatic /86 07/29 0800    Standing Orthostatic Pulse (bpm) 88 07/29 0800            Cardiometabolic risk assessment. 07/29/21      Reviewed patient profile for cardiometabolic risk factors    Date taken /Value  REFERENCE RANGE   Abdominal Obesity  (Waist Circumference)   See nursing flowsheet Women ?35 in (88 cm)   Men ?40 in (102 cm)      Triglycerides  Triglycerides   Date Value Ref Range Status   08/26/2021 205 (H) <150 mg/dL Final     Comment:     0-9 years:  Normal:    Less than 75 mg/dL  Borderline high:  75-99 mg/dL  High:             Greater than or equal to 100 mg/dL    0-19 years:  Normal:    Less than 90 mg/dL  Borderline high:   mg/dL  High:             Greater than or equal to 130 mg/dL    20 years and older:  Normal:    Less than 150 mg/dL  Borderline high:  150-199 mg/dL  High:             200-499 mg/dL  Very high:   Greater than or equal to 500 mg/dL   10/19/2019 117 <150 mg/dL Final       ?150 mg/dL (1.7 mmol/L) or current treatment for elevated triglycerides   HDL cholesterol  HDL Cholesterol   Date Value Ref Range Status   10/19/2019 56 >49 mg/dL Final     Direct Measure HDL   Date Value Ref Range Status   08/26/2021 55 >=50 mg/dL Final     Comment:     0-19 years:       Greater than or equal to 45 mg/dL   Low: Less than 40 mg/dL   Borderline low: 40-44 mg/dL     20 years and older:   Female: Greater than or equal to 50 mg/dL   Male:   Greater than or equal to 40 mg/dL        ]   Women <50 mg/dL (1.3 mmol/L) in women or current treatment for low HDL cholesterol  Men <40 mg/dL (1 mmol/L) in men or current treatment for low HDL cholesterol     Fasting plasma glucose (FPG) Lab Results   Component Value Date     07/02/2021      FPG ?100 mg/dL (5.6 mmol/L) or treatment for elevated blood glucose   Blood pressure  BP Readings from Last 3 Encounters:   09/12/21 127/65   06/04/19 131/71   04/26/19 164/86    Blood pressure ?130/85 mmHg or treatment for elevated  "blood pressure   Family History  See family history     Appearance: dressed in hospital scrubs, appeared reported age, unkempt hair   Attitude: cooperative  Eye Contact: improved, looks up more during the interview   Mood: \"good\"  Affect:  Somewhat blunted, no longer agitated or tense. Brightens on occasion.   Speech: accented, clear and coherent. Soft volume.    Language: no obvious receptive or expressive deficits  Psychomotor, Gait, Musculoskeletal: No abnormal movements noted while seated  Thought Process: goal-oriented, linear, concrete   Associations:  No loosening of associations present  Thought Content:  Did not appear to be responding to internal stimuli.  Insight:   poor - slightly improved   Judgement:  fair  Oriented to: person, place, time/date   Attention Span and Concentration: attentive to conversation though at times stares ahead and does not respond to questions.  Recent and Remote Memory: stable, some difficulty recalling events   Fund of Knowledge:  normal    Clinical Global Impressions  First:  Considering your total clinical experience with this particular patient population, how severe are the patient's symptoms at this time?: 7 (04/16/21 1428)  Compared to the patient's condition at the START of treatment, this patient's condition is: 4 (04/16/21 1428)  Most recent:  Considering your total clinical experience with this particular patient population, how severe are the patient's symptoms at this time?: 7 (07/22/21 0941)  Compared to the patient's condition at the START of treatment, this patient's condition is: 3 (07/22/21 0941)           Precautions:     Behavioral Orders   Procedures     Cheeking Precautions (behavioral units)     Patient Observed swallowing PO medications; Patient asked to drink water after swallowing medication; Patient in Staff line of sight for 15 minutes after medication given; Mouth checks after PO administration (patient asked to open mouth and stick out their " tongue).     Code 2     Code 3     For walks in the [place] with staff and per staff discretion     Electroconvulsive therapy     Series of up to 12 treatments. Begin Date: 5/26/21     Treating Psychiatrist providing ECT:  Dr. Lubin     Notified on:  5/21/21     Electroconvulsive therapy     As long as we get the green light from risk management and pt is medically cleared, begin ECT every Monday, Wednesday, and Friday     Electroconvulsive therapy     Series of up to 12 treatments. Begin Date: 5/25/21     Treating Psychiatrist providing ECT:  Amee     Notified on:  5/24/21     Elopement precautions     Fall precautions     Mcgrath Calderon     Routine Programming     As clinically indicated     Status 15     Every 15 minutes.          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 Symptoms and presentation have been most consistent with Schizoaffective Disorder, Bipolar Type. Inpatient psychiatric hospitalization is warranted at this time for safety, stabilization, possible adjustment in medications and development of a safe discharge plan.      Hospital Course:  Michelle was admitted to station 12 on 4/13/21 on a 72 hour hold under the care of Dr. Mellissa Zaman and a petition for MI commitment was filed and later supported. On admission, PTA psychiatric medications including Invega, Zyprexa, thorazine and Artane were restarted. However, Michelle declined all scheduled medications  despite significant encouragement from the team. On 4/20 a psychiatric emergency was declared due to aggression toward others in context of severe psychosis and suspected excited catatonia. Her PTA Invega, Zyprexa & Thorazine were discontinued given consistent refusal and PO Haldol 5 mg BID with IM back up was initiated. Ativan 1 mg TID was initiated on 4/21. Michelle continued declining PO medications and received IM haldol doses.      On 4/26, Michelle had oculogyric crisis likely secondary to IM haloperidol. This was alleviated with IM Cogentin which was then initiated as a PRN. Haldol was discontinued and replaced with Zyprexa 10 mg BID scheduled on an emergency basis. PTA Artane was also discontinued and oral Cogentin was scheduled, though Michelle continued declining this.     After accepting 2 doses of Ativan on 4/30, improvement in agitation and catatonic symptoms was observed. She subsequently continued intermittently declining doses of Ativan with noted decompensation. She was deemed to lack capacity to consent to treatment with Ativan and on 5/4, following discussion with our legal team and Michelle's son acting as her surrogate decision maker, forced IM backup for scheduled PO Ativan was initiated. On 5/4, Michelle also requested to switch from Cogentin to Artane (2 mg BID) though continued to frequently decline this and on 5/6 it was switched to a PRN medication.      Following initiation of Zyprexa as discussed above, partial improvements in presenting symptoms were observed. Michelle was more receptive to resuming oral Invega & a titration was initiated on 5/3 then titrated to her PTA dose of 9 mg on 5/10.  The plan for her ACT team to bring in loading dose of Invega Sustenna. On 5/11, Michelle again had signs of oculogyric crisis & her PO Invega was decreased to 6 mg. Due to ongoing observed problems with eye movement and concern for oculogyric crisis, PO Invega was stopped on 5/14 (she did not receive Invega Sustenna).  "      On 5/19, Ativan was increased to 2 mg TID due to re-emerging evidence of catatonia (long periods of staring, repetitive movements, echolalia, mutism). On 5/20, a meeting was held with her ACT team on 5/20, including ACT team psychiatrist, Dr. Pedraza who was in \"full support\" of pursuing ECT under a Mcgrath Kohler petition with hopes that improvement may lead Michelle to be more amenable to clozapine treatment and weekly blood draws in a monitored setting such as an IRTS.     An ECT consult by Dr. Lubin was completed on 5/21. On 5/24, Mcgrath Kohler was approved and Michelle was also cleared by internal medicine for ECT. An acute series of bilateral ECT was initiated on 5/26. Michelle had a short seizure during ECT on 6/4. Improvements in mood, affect, social interactions, paranoia, and agitation were observed after initiation of ECT. However, Michelle appeared more disoriented with memory impairment and sedation on days of ECT. Given these concerns, Ativan was reduced to a total daily dose of 4.5 mg on 6/5 and unilateral ECT treatments were completed on 6/7 and 6/9. Due to ongoing memory impairment and sedation, subsequent ECT treatments were held and ultimately discontinued given that these symptoms worsened after initiation of ECT and were thought to be less likely related to catatonia.      Michelle initially appeared to be decompensating when ECT was held, though her cognitive impairment gradually improved and psychotic symptoms remained controlled. If symptoms of psychosis re-emerge, it may be worth starting clozapine, which is something that has previously been considered by her ACT team. Her Hatch order would need to be amended as clozapine is not one of the medications listed. ANC obtained on 6/16 was 1800/microL. Per conversation with pharmacy, neutropenia has been associated with olanzapine and agranulocytosis has been associated with olanzapine, lorazepam, metoprolol, and hydralazine and hematologic labs have been " monitored. Upon re-check, ANC was 3700/microL on 6/21/21.      Given concerns that lorazepam may have also be contributing to cognitive impairment and with no signs of re-emerging catatonia, lorazepam was tapered by an additional 0.5 mg on 6/28/21 to a total daily dose of 4 mg with further benefits in memory impairment subsequently noted. On 7/7, Michelle reported an incident of oculogyric crisis and Cogentin 1 mg BID scheduled was initiated.      The team worked with Michelle's ACT team to to establish a safe discharge plan with options including moving to a group home vs home with her ACT team and additional in home supports via [facility]I services given concerns regarding her ability to reliably take medications independently as evidenced by impairments noted in cognitive assessment by OT specialist on 7/1. MOCA score was a 13/30 and CPT score was a 4.7. Physician's statement in support of guardianship was completed on 7/19/21.  Michelle's son pursued emergency guardianship given the above concerns and to assist with facilitating placement following discharge.     Due to daytime sedation, on 7/26/21, lorazepam evening dose decreased from 2 mg to 1 mg for a total daily dose of 1 mg TID. On 7/27/21 transitioned Zyprexa from 10 mg BID to 5 mg QAM + 15 mg QPM and she had less daytime sedation with this change and no emergence of symptoms concerning for orthostatic hypotension.  She attended her emergency guardianship hearing on 7/28 and emergency guardianship was granted to her son, Alana. Due to ongoing daytime sedation, Zyprexa doses were consolidated to bedtime starting on 7/30 with noted subsequent improvement. [place]12 for [facility]I funding. The decision to arrange placement in a group home was made as discussed above and in progress notes. Michelle was initially very resistant to the idea of moving to a group home though gradually became more accepting of this plan. Her discharge has been delayed  significantly due to issues with receiving funding.     Target psychiatric symptoms and interventions:   - Continue Ativan 1 mg PO or IM TID Patient may not decline.   - Continue Zyprexa 20 mg at bedtime. Hatch in place.   - Continue Cogentin 1 mg PO BID with additional 2 mg IM daily prn for evidence of acute dystonic reaction or oculogyric crisis  -Continue hydroxyzine 25-50 mg q4h prn for acute anxiety  -Continue Trazodone 50 mg at bedtime prn for sleep disturbances  -Continue Zyprexa 10 mg TID prn for severe agitation  -Continue Ativan/Benadryl q4h prn for agitation. WOULD GIVE PRN ATIVAN FIRST FOR AGITATION unless it is after 5 pm on day prior to scheduled ECT. Of note, she has not required any prn medications for agitation in several weeks.      Occupational Therapy Consult Placed to evaluate cognitive functioning/ability to care for self in home environment, ability to manage medications. See note on 7/1 for details.      ECT: Discontinued due to significant cognitive impairment. Please see note dated 7/12/21 for additional details.      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 If no active infection, would be okay for her to follow up for treatment as an outpatient unless treatment must be initiated per group [facility] protocol. Please see ID note on 8/30 for details. Appreciate assistance.    - PA and Lateral chest xray ordered. Reviewed and negative.   - ID consult placed on 8/30   - Emelia her son/guardian consented to treatment for latent TB on 9/1/21  - per 9/2 conversation with Mati from [facility], okay to discharge given negative chest x-ray with outpatient follow up   - follow up in outpatient infectious disease clinic in one month with Jaren Celestin MD    HTN, improved: Patient is now adherent with medication regimen. Intermittent elevations with no concerning  "symptoms.  - Metoprolol succinate ER 75 mg   - Cozaar 100 mg daily  - Amlodipine 7.5 mg daily  - IM discontinued hydralazine prn  - Switched BP checks from QID to BID on 7/13 given overall improvement  - Please see note from IM dated 5/3, 5/6, 5/24, 6/20 and 6/30/21.  - Obtained EKG and routine labs on 5/21 in context of pt declining vital sign checks and anti-hypertensives and recently elevated BPs. Reviewed labs and discussed EKG findings with IM on 5/21. No urgent concerns, though IM should be notified if pt develops acute medical concerns (I.e. heart palpitations, SOB, changes in speech, AMS, confusion, CP, HA, changes in vision).    - Per 6/20 IM note: \"Please notify IM if BP is persistently severely elevated and requiring frequent PRN hydralazine\".  - Internal medicine consulted again on 6/28 due to consistent use of hydralazine over the past week. Metoprolol increased to 75 mg daily then to 100 mg and amlodipine 5 mg was added on 6/30. Amlodipine increased to 7.5 mg daily on 7/5.      Medicine consulted again on 8/26 given lab abnormalities as discussed below:   1) Abnormal cholesterol panel: Fasting lipid panel on 8/26 notable for elevated total cholesterol and elevated LDL. Prior lipid panel in 2020 was unremarkable.   - First line management for dyslipidemia is a trial of 6 months of lifestyle changes (dietary modifications, exercise)   - follow-up with her PCP will be arranged upon discharge to have further cardiac risk stratification and discussion regarding risks / benefits of starting a statin      2) Elevated creatinine: Cr 1.17 on 8/26. Patient had BRANDON in June with Cr to peak of 1.83, was felt to be of pre-renal etiology and Cr returned to baseline with IVF. Baseline Cr difficult to determine d/t limited labs but her baseline Cr appears to be ~0.9 - 1.0, therefore does not meet criteria for BRANDON at this time. Her bump in creatinine may be due to fluctuating baseline vs dehydration. UA results were " notable for trace leukocyte esterase. 4 WBCs. Patient is asymptomatic. Repeat Cr on 8/28 was 1.09, concern for CKD per medicine.    - Encourage oral fluid intake, goal 2 liters daily    For previous medical concerns, please see note dated 7/12/21.     Behavioral/Psychological/Social:  - Encourage unit programming  - Patient is Code 3 status and can take walks in the [place] with staff and security present per staff discretion. This appears to be quite therapeutic for her.      Safety:  - Continue precautions as noted above  - Status 15 minute checks  - Safety precautions include: assault and elopement precautions  - Continue precautions as noted above     Legal Status: Committed as MI with Hatch in place for Haldol, Zyprexa, Invega, and Thorazine through [facility]. Filed Alcides Kohler through [facility] and approved on 5/24/21. Physician's statement in support of guardianship was completed on 7/19/21. Patient's son is now her temporary emergency guardian.     Disposition Plan   Reason for ongoing admission: pending safe discharge plan  Discharge location: Group home - CPT assessment done by OT (Tania queen) on 7/1.  Please see note for details. [place]12 & [facility] funding approved though amount was too low for [address] skilled nursing to accept. Process to appeal funding amount in progress.    Discharge Medications: ordered. Group home prefers 30 d/s. ACT team will take over meds upon discharge.   Follow-up Appointments: scheduled         Bijal Varner MD  Psychiatry PGY-4

## 2021-09-13 NOTE — PLAN OF CARE
Problem: Sleep Disturbance  Goal: Adequate Sleep/Rest  Outcome: Improving   Offer no concerns today. Isolative to her room, watching [employer] most of the shift. Comes out to use the phone and during meal times. Reports she is doing ok. Smiles on approach with certain staff members. Denies pain or discomfort today. Denies SI/SIB. Medication compliant. B/p meds held today as b/p parameters were not met.

## 2021-09-13 NOTE — PLAN OF CARE
Pt asleep  at start of shift. Breathing quiet and unlabored.     Pt had no c/o pain or discomfort during the HS.     Appears to have slept 7 hours.     Pt on  ASSAULT, CHEEKING, and ELOPEMENT  precautions in addition to single room order. Any related events noted above.     Will continue to monitor and assess.     Problem: Sleep Disturbance  Goal: Adequate Sleep/Rest  9/13/2021 0614 by Kourtney Soria, RN  Outcome: No Change

## 2021-09-14 PROCEDURE — 250N000013 HC RX MED GY IP 250 OP 250 PS 637: Performed by: STUDENT IN AN ORGANIZED HEALTH CARE EDUCATION/TRAINING PROGRAM

## 2021-09-14 PROCEDURE — 250N000013 HC RX MED GY IP 250 OP 250 PS 637: Performed by: PHYSICIAN ASSISTANT

## 2021-09-14 PROCEDURE — 99231 SBSQ HOSP IP/OBS SF/LOW 25: CPT | Performed by: PSYCHIATRY & NEUROLOGY

## 2021-09-14 PROCEDURE — 124N000002 HC R&B MH UMMC

## 2021-09-14 RX ADMIN — LORAZEPAM 1 MG: 1 TABLET ORAL at 14:05

## 2021-09-14 RX ADMIN — AMLODIPINE BESYLATE 7.5 MG: 2.5 TABLET ORAL at 08:44

## 2021-09-14 RX ADMIN — OLANZAPINE 20 MG: 20 TABLET, ORALLY DISINTEGRATING ORAL at 20:19

## 2021-09-14 RX ADMIN — BENZTROPINE MESYLATE 1 MG: 1 TABLET ORAL at 08:45

## 2021-09-14 RX ADMIN — METOPROLOL SUCCINATE 75 MG: 25 TABLET, EXTENDED RELEASE ORAL at 08:45

## 2021-09-14 RX ADMIN — BENZTROPINE MESYLATE 1 MG: 1 TABLET ORAL at 20:19

## 2021-09-14 RX ADMIN — LOSARTAN POTASSIUM 100 MG: 100 TABLET, FILM COATED ORAL at 08:46

## 2021-09-14 RX ADMIN — LORAZEPAM 1 MG: 1 TABLET ORAL at 08:46

## 2021-09-14 RX ADMIN — LORAZEPAM 1 MG: 1 TABLET ORAL at 20:19

## 2021-09-14 ASSESSMENT — ACTIVITIES OF DAILY LIVING (ADL)
ORAL_HYGIENE: INDEPENDENT
LAUNDRY: UNABLE TO COMPLETE
LAUNDRY: UNABLE TO COMPLETE
DRESS: SCRUBS (BEHAVIORAL HEALTH);INDEPENDENT
ORAL_HYGIENE: INDEPENDENT
HYGIENE/GROOMING: INDEPENDENT
HYGIENE/GROOMING: INDEPENDENT
DRESS: INDEPENDENT

## 2021-09-14 NOTE — PLAN OF CARE
"Patient alert and oriented to person, place, and time. Pt appears calm, blunted affect, cooperative, medication compliant, ate 100% of meals. No PRN's given. Pt on falls, cheeking, elopement precautions with no behaviors observed. Pt refuses to engage in communication/assessments. The only response from pt was \"I'll tell you if I need something.\" AFSHAN SI/HI/SIB, anxiety and depression. (Pt did not appear anxious, no signs of SIB). Pt did not demonstrate delusional thinking, did not appear to be responding to internal stimuli. Pt isolates in room, appropriate with peers and staff. Pt will communicate needs to staff but continues to refuse any assessment. This writer checked in with pt after lunch, offered assistance/intervention for any needs. Pt asked what the time was then turned away, refused to say anything more. No behaviors noted. Will continue to monitor behavior and encourage engagement.      Problem: Decreased Participation and Engagement (Psychotic Signs/Symptoms)  Goal: Increased Participation and Engagement (Psychotic Signs/Symptoms)  Intervention: Facilitate Participation and Engagement  Recent Flowsheet Documentation  Taken 9/14/2021 1049 by Ese Victoria, RN  Supportive Measures: verbalization of feelings encouraged     Problem: Mood Impairment (Psychotic Signs/Symptoms)  Goal: Improved Mood Symptoms (Psychotic Signs/Symptoms)  Intervention: Optimize Emotion and Mood  Recent Flowsheet Documentation  Taken 9/14/2021 1049 by Ese Victoria, RN  Supportive Measures: verbalization of feelings encouraged     "

## 2021-09-14 NOTE — PROGRESS NOTES
"[facility], [place]   Psychiatric Progress Note  Hospital Day: 153        Interim History:   The patient's care was discussed with the treatment team during the daily team meeting and/or staff's chart notes were reviewed. No acute medical concerns. Has intermittent elevated BP, asymptomatic, otherwise vitals WNL. She is medication adherent. She has been pleasant and cooperative with staff and peers. No reported or observed side effects. No symptoms of psychosis or tad were observed. Affect is flat, depressed. Occasionally expresses frustration r/t length of stay. Intermittently attends to all ADLs, though overall, hygiene maintenance remains poor. Continues to sleep and eat well. Michelle is at her baseline. She appeared to be sleeping for 7 hours overnight. Her son is flying in from [place] next Monday.     Today, Michelle said that she is \"not happy.\" She is quite frustrated about the delay in her discharge. She feels ready to discharge to the [facility]. She is also concerned that her son will be travelling from [place] to come to [place] to help her move her belongings from her apartment to the , yet he will be unable to do so because it has not been officially approved.     Suicidal ideation: denies current or recent suicidal ideation or behaviors.    Homicidal ideation: denies current or recent homicidal ideation or behaviors.    Psychotic symptoms: Patient denies AH, VH, paranoia, delusions.     Medication side effects reported: None    Acute medical concerns: None. She denies any fevers, chills, cough or malaise.    Other issues reported by patient: Patient had no further questions or concerns.           Medications:       amLODIPine  7.5 mg Oral Daily     benztropine  1 mg Oral BID     LORazepam  1 mg Oral TID    Or     LORazepam  1 mg Intramuscular TID     losartan  100 mg Oral Daily     metoprolol succinate ER  75 mg Oral Daily     OLANZapine zydis  20 mg Oral At Bedtime    Or "     OLANZapine  10 mg Intramuscular At Bedtime          Allergies:     Allergies   Allergen Reactions     Haldol [Haloperidol]      Patient previously tolerated haldol, though developed oculogyric crises during hospital stay on 4/26/21 on total daily dose of 10 mg.     Lisinopril Cough          Labs:     No results found for this or any previous visit (from the past 24 hour(s)).       Psychiatric Examination:     /74   Pulse 93   Temp 97.3  F (36.3  C) (Tympanic)   Resp 18   Wt 70 kg (154 lb 6.4 oz)   SpO2 97%   BMI 26.50 kg/m    Weight is 154 lbs 6.4 oz  Body mass index is 26.5 kg/m .    Weight over time:  Vitals:    05/25/21 0850 06/15/21 0811 06/16/21 1605 06/19/21 0859   Weight: 71 kg (156 lb 8 oz) 69.8 kg (153 lb 12.8 oz) 70.5 kg (155 lb 8 oz) 71.4 kg (157 lb 8 oz)    06/21/21 0805 06/22/21 0839 06/24/21 0800 07/02/21 0835   Weight: 69.5 kg (153 lb 3.2 oz) 69.3 kg (152 lb 11.2 oz) 70.7 kg (155 lb 12.8 oz) 70.7 kg (155 lb 12.8 oz)    07/03/21 0844 07/06/21 0838 07/10/21 0845 07/16/21 0821   Weight: 69.6 kg (153 lb 8 oz) 70.9 kg (156 lb 4.8 oz) 69.8 kg (153 lb 14.4 oz) 69.3 kg (152 lb 12.8 oz)    07/17/21 0830 07/31/21 0804 08/17/21 0845 08/19/21 0800   Weight: 70.4 kg (155 lb 4.8 oz) 70.3 kg (154 lb 14.4 oz) 70.8 kg (156 lb) 69.2 kg (152 lb 9.6 oz)    08/24/21 0700 08/26/21 0800 09/02/21 0803 09/09/21 0800   Weight: 70.3 kg (155 lb) 69 kg (152 lb 3.2 oz) 68.9 kg (152 lb) 70.9 kg (156 lb 6.4 oz)    09/12/21 0820 09/14/21 0800   Weight: 69.2 kg (152 lb 9.6 oz) 70 kg (154 lb 6.4 oz)       Orthostatic Vitals       Most Recent      Sitting Orthostatic /84 07/29 0800    Sitting Orthostatic Pulse (bpm) 82 07/29 0800    Standing Orthostatic /86 07/29 0800    Standing Orthostatic Pulse (bpm) 88 07/29 0800            Cardiometabolic risk assessment. 07/29/21      Reviewed patient profile for cardiometabolic risk factors    Date taken /Value  REFERENCE RANGE   Abdominal Obesity  (Waist  "Circumference)   See nursing flowsheet Women ?35 in (88 cm)   Men ?40 in (102 cm)      Triglycerides  Triglycerides   Date Value Ref Range Status   08/26/2021 205 (H) <150 mg/dL Final     Comment:     0-9 years:  Normal:    Less than 75 mg/dL  Borderline high:  75-99 mg/dL  High:             Greater than or equal to 100 mg/dL    0-19 years:  Normal:    Less than 90 mg/dL  Borderline high:   mg/dL  High:             Greater than or equal to 130 mg/dL    20 years and older:  Normal:    Less than 150 mg/dL  Borderline high:  150-199 mg/dL  High:             200-499 mg/dL  Very high:   Greater than or equal to 500 mg/dL   10/19/2019 117 <150 mg/dL Final       ?150 mg/dL (1.7 mmol/L) or current treatment for elevated triglycerides   HDL cholesterol  HDL Cholesterol   Date Value Ref Range Status   10/19/2019 56 >49 mg/dL Final     Direct Measure HDL   Date Value Ref Range Status   08/26/2021 55 >=50 mg/dL Final     Comment:     0-19 years:       Greater than or equal to 45 mg/dL   Low: Less than 40 mg/dL   Borderline low: 40-44 mg/dL     20 years and older:   Female: Greater than or equal to 50 mg/dL   Male:   Greater than or equal to 40 mg/dL        ]   Women <50 mg/dL (1.3 mmol/L) in women or current treatment for low HDL cholesterol  Men <40 mg/dL (1 mmol/L) in men or current treatment for low HDL cholesterol     Fasting plasma glucose (FPG) Lab Results   Component Value Date     07/02/2021      FPG ?100 mg/dL (5.6 mmol/L) or treatment for elevated blood glucose   Blood pressure  BP Readings from Last 3 Encounters:   09/14/21 124/74   06/04/19 131/71   04/26/19 164/86    Blood pressure ?130/85 mmHg or treatment for elevated blood pressure   Family History  See family history     Appearance: dressed in hospital scrubs, appeared reported age, unkempt hair   Attitude: cooperative  Eye Contact: improved, looks up more during the interview   Mood: \"not happy\"  Affect:  Somewhat blunted, no longer agitated or " tense. Brightens on occasion.   Speech: accented, clear and coherent. Soft volume.    Language: no obvious receptive or expressive deficits  Psychomotor, Gait, Musculoskeletal: No abnormal movements noted while seated  Thought Process: goal-oriented, linear, concrete   Associations:  No loosening of associations present  Thought Content:  Did not appear to be responding to internal stimuli.  Insight:   poor - slightly improved   Judgement:  fair  Oriented to: person, place, time/date   Attention Span and Concentration: attentive to conversation though at times stares ahead and does not respond to questions.  Recent and Remote Memory: stable, some difficulty recalling events   Fund of Knowledge:  normal    Clinical Global Impressions  First:  Considering your total clinical experience with this particular patient population, how severe are the patient's symptoms at this time?: 7 (04/16/21 1428)  Compared to the patient's condition at the START of treatment, this patient's condition is: 4 (04/16/21 1428)  Most recent:  Considering your total clinical experience with this particular patient population, how severe are the patient's symptoms at this time?: 7 (07/22/21 0941)  Compared to the patient's condition at the START of treatment, this patient's condition is: 3 (07/22/21 0941)           Precautions:     Behavioral Orders   Procedures     Cheeking Precautions (behavioral units)     Patient Observed swallowing PO medications; Patient asked to drink water after swallowing medication; Patient in Staff line of sight for 15 minutes after medication given; Mouth checks after PO administration (patient asked to open mouth and stick out their tongue).     Code 2     Code 3     For walks in the [place] with staff and per staff discretion     Electroconvulsive therapy     Series of up to 12 treatments. Begin Date: 5/26/21     Treating Psychiatrist providing ECT:  Dr. Lubin     Notified on:  5/21/21     Electroconvulsive  therapy     As long as we get the green light from risk management and pt is medically cleared, begin ECT every Monday, Wednesday, and Friday     Electroconvulsive therapy     Series of up to 12 treatments. Begin Date: 5/25/21     Treating Psychiatrist providing ECT:  Amee     Notified on:  5/24/21     Elopement precautions     Fall precautions     Mcgrath Calderon     Routine Programming     As clinically indicated     Status 15     Every 15 minutes.          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 Symptoms and presentation have been most consistent with Schizoaffective Disorder, Bipolar Type. Inpatient psychiatric hospitalization is warranted at this time for safety, stabilization, possible adjustment in medications and development of a safe discharge plan.      Hospital Course:  Michelle was admitted to station 12 on 4/13/21 on a 72 hour hold under the care of Dr. Mellissa Zaman and a petition for MI commitment was filed and later supported. On admission, PTA psychiatric medications including Invega, Zyprexa, thorazine and Artane were restarted. However, Michelle declined all scheduled medications despite significant encouragement from the team. On 4/20 a psychiatric emergency was declared due to aggression toward others in context of severe psychosis and suspected excited catatonia. Her PTA Invega, Zyprexa & Thorazine were discontinued given consistent refusal and PO Haldol 5 mg BID  "with IM back up was initiated. Ativan 1 mg TID was initiated on 4/21. Michelle continued declining PO medications and received IM haldol doses.      On 4/26, Michelle had oculogyric crisis likely secondary to IM haloperidol. This was alleviated with IM Cogentin which was then initiated as a PRN. Haldol was discontinued and replaced with Zyprexa 10 mg BID scheduled on an emergency basis. PTA Artane was also discontinued and oral Cogentin was scheduled, though Michelle continued declining this.     After accepting 2 doses of Ativan on 4/30, improvement in agitation and catatonic symptoms was observed. She subsequently continued intermittently declining doses of Ativan with noted decompensation. She was deemed to lack capacity to consent to treatment with Ativan and on 5/4, following discussion with our legal team and Michelle's son acting as her surrogate decision maker, forced IM backup for scheduled PO Ativan was initiated. On 5/4, Michelle also requested to switch from Cogentin to Artane (2 mg BID) though continued to frequently decline this and on 5/6 it was switched to a PRN medication.      Following initiation of Zyprexa as discussed above, partial improvements in presenting symptoms were observed. Michelle was more receptive to resuming oral Invega & a titration was initiated on 5/3 then titrated to her PTA dose of 9 mg on 5/10.  The plan for her ACT team to bring in loading dose of Invega Sustenna. On 5/11, Michelle again had signs of oculogyric crisis & her PO Invega was decreased to 6 mg. Due to ongoing observed problems with eye movement and concern for oculogyric crisis, PO Invega was stopped on 5/14 (she did not receive Invega Sustenna).       On 5/19, Ativan was increased to 2 mg TID due to re-emerging evidence of catatonia (long periods of staring, repetitive movements, echolalia, mutism). On 5/20, a meeting was held with her ACT team on 5/20, including ACT team psychiatrist, Dr. Pedraza who was in \"full support\" of pursuing ECT " under a Alcides Kohler petition with hopes that improvement may lead Michelle to be more amenable to clozapine treatment and weekly blood draws in a monitored setting such as an IRTS.     An ECT consult by Dr. Lubin was completed on 5/21. On 5/24, Alcides Kohler was approved and Michelle was also cleared by internal medicine for ECT. An acute series of bilateral ECT was initiated on 5/26. Michelle had a short seizure during ECT on 6/4. Improvements in mood, affect, social interactions, paranoia, and agitation were observed after initiation of ECT. However, Michelle appeared more disoriented with memory impairment and sedation on days of ECT. Given these concerns, Ativan was reduced to a total daily dose of 4.5 mg on 6/5 and unilateral ECT treatments were completed on 6/7 and 6/9. Due to ongoing memory impairment and sedation, subsequent ECT treatments were held and ultimately discontinued given that these symptoms worsened after initiation of ECT and were thought to be less likely related to catatonia.      Michelle initially appeared to be decompensating when ECT was held, though her cognitive impairment gradually improved and psychotic symptoms remained controlled. If symptoms of psychosis re-emerge, it may be worth starting clozapine, which is something that has previously been considered by her ACT team. Her Hatch order would need to be amended as clozapine is not one of the medications listed. ANC obtained on 6/16 was 1800/microL. Per conversation with pharmacy, neutropenia has been associated with olanzapine and agranulocytosis has been associated with olanzapine, lorazepam, metoprolol, and hydralazine and hematologic labs have been monitored. Upon re-check, ANC was 3700/microL on 6/21/21.      Given concerns that lorazepam may have also be contributing to cognitive impairment and with no signs of re-emerging catatonia, lorazepam was tapered by an additional 0.5 mg on 6/28/21 to a total daily dose of 4 mg with further benefits in  memory impairment subsequently noted. On 7/7, Michelle reported an incident of oculogyric crisis and Cogentin 1 mg BID scheduled was initiated.      The team worked with Michelle's ACT team to to establish a safe discharge plan with options including moving to a group home vs home with her ACT team and additional in home supports via [facility] services given concerns regarding her ability to reliably take medications independently as evidenced by impairments noted in cognitive assessment by OT specialist on 7/1. MOCA score was a 13/30 and CPT score was a 4.7. Physician's statement in support of guardianship was completed on 7/19/21.  Michelle's son pursued emergency guardianship given the above concerns and to assist with facilitating placement following discharge.     Due to daytime sedation, on 7/26/21, lorazepam evening dose decreased from 2 mg to 1 mg for a total daily dose of 1 mg TID. On 7/27/21 transitioned Zyprexa from 10 mg BID to 5 mg QAM + 15 mg QPM and she had less daytime sedation with this change and no emergence of symptoms concerning for orthostatic hypotension.  She attended her emergency guardianship hearing on 7/28 and emergency guardianship was granted to her son, Alana. Due to ongoing daytime sedation, Zyprexa doses were consolidated to bedtime starting on 7/30 with noted subsequent improvement. A [place] Choice assessment was completed on 8/12 for CADI funding. The decision to arrange placement in a group home was made as discussed above and in progress notes. Michelle was initially very resistant to the idea of moving to a group home though gradually became more accepting of this plan. Her discharge has been delayed significantly due to issues with receiving funding.     Target psychiatric symptoms and interventions:   - Continue Ativan 1 mg PO or IM TID Patient may not decline.   - Continue Zyprexa 20 mg at bedtime. Hatch in place.   - Continue Cogentin 1 mg PO BID with additional 2 mg IM daily prn for evidence of  acute dystonic reaction or oculogyric crisis  -Continue hydroxyzine 25-50 mg q4h prn for acute anxiety  -Continue Trazodone 50 mg at bedtime prn for sleep disturbances  -Continue Zyprexa 10 mg TID prn for severe agitation  -Continue Ativan/Benadryl q4h prn for agitation. WOULD GIVE PRN ATIVAN FIRST FOR AGITATION unless it is after 5 pm on day prior to scheduled ECT. Of note, she has not required any prn medications for agitation in several weeks.      Occupational Therapy Consult Placed to evaluate cognitive functioning/ability to care for self in home environment, ability to manage medications. See note on 7/1 for details.      ECT: Discontinued due to significant cognitive impairment. Please see note dated 7/12/21 for additional details.      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 If no active infection, would be okay for her to follow up for treatment as an outpatient unless treatment must be initiated per group [place] protocol. Please see ID note on 8/30 for details. Appreciate assistance.    - PA and Lateral chest xray ordered. Reviewed and negative.   - ID consult placed on 8/30   - [place] her son/guardian consented to treatment for latent TB on 9/1/21  - per 9/2 conversation with Mati from [facility], okay to discharge given negative chest x-ray with outpatient follow up   - follow up in outpatient infectious disease clinic in one month with Jaren Celestin MD    HTN, improved: Patient is now adherent with medication regimen. Intermittent elevations with no concerning symptoms.  - Metoprolol succinate ER 75 mg   - Cozaar 100 mg daily  - Amlodipine 7.5 mg daily  - IM discontinued hydralazine prn  - Switched BP checks from QID to BID on 7/13 given overall improvement  - Please see note from IM dated 5/3, 5/6, 5/24, 6/20 and 6/30/21.  - Obtained EKG and routine labs on 5/21 in  "context of pt declining vital sign checks and anti-hypertensives and recently elevated BPs. Reviewed labs and discussed EKG findings with IM on 5/21. No urgent concerns, though IM should be notified if pt develops acute medical concerns (I.e. heart palpitations, SOB, changes in speech, AMS, confusion, CP, HA, changes in vision).    - Per 6/20 IM note: \"Please notify IM if BP is persistently severely elevated and requiring frequent PRN hydralazine\".  - Internal medicine consulted again on 6/28 due to consistent use of hydralazine over the past week. Metoprolol increased to 75 mg daily then to 100 mg and amlodipine 5 mg was added on 6/30. Amlodipine increased to 7.5 mg daily on 7/5.      Medicine consulted again on 8/26 given lab abnormalities as discussed below:   1) Abnormal cholesterol panel: Fasting lipid panel on 8/26 notable for elevated total cholesterol and elevated LDL. Prior lipid panel in 2020 was unremarkable.   - First line management for dyslipidemia is a trial of 6 months of lifestyle changes (dietary modifications, exercise)   - follow-up with her PCP will be arranged upon discharge to have further cardiac risk stratification and discussion regarding risks / benefits of starting a statin      2) Elevated creatinine: Cr 1.17 on 8/26. Patient had BRANDON in June with Cr to peak of 1.83, was felt to be of pre-renal etiology and Cr returned to baseline with IVF. Baseline Cr difficult to determine d/t limited labs but her baseline Cr appears to be ~0.9 - 1.0, therefore does not meet criteria for BRANDON at this time. Her bump in creatinine may be due to fluctuating baseline vs dehydration. UA results were notable for trace leukocyte esterase. 4 WBCs. Patient is asymptomatic. Repeat Cr on 8/28 was 1.09, concern for CKD per medicine.    - Encourage oral fluid intake, goal 2 liters daily    For previous medical concerns, please see note dated 7/12/21.     Behavioral/Psychological/Social:  - Encourage unit " programming  - Patient is Code 3 status and can take walks in the [place] with staff and security present per staff discretion. This appears to be quite therapeutic for her.      Safety:  - Continue precautions as noted above  - Status 15 minute checks  - Safety precautions include: assault and elopement precautions  - Continue precautions as noted above     Legal Status: Committed as MI with Hatch in place for Haldol, Zyprexa, Invega, and Thorazine through [facility]. Filed Alcides Kohler through [facility] and approved on 5/24/21. Physician's statement in support of guardianship was completed on 7/19/21. Patient's son is now her temporary emergency guardian.     Disposition Plan   Reason for ongoing admission: pending safe discharge plan  Discharge location: Group home - CPT assessment done by OT (Tania queen) on 7/1.  Please see note for details. [place] Choice Assessment completed on 8/12 & [facility] funding approved though amount was too low for [facility] nursing home to accept. Process to appeal funding amount in progress.    Discharge Medications: ordered. Group home prefers 30 d/s. ACT team will take over meds upon discharge.   Follow-up Appointments: scheduled         Gabby Zaman MD  [facility]

## 2021-09-14 NOTE — PLAN OF CARE
Problem: OT General Care Plan  Goal: OT Goal 1  Description: OT Goal 1  Outcome: No Change     Pt continues to decline offers to attend OT group or to do something individually with writer, or for an activity to do in her room.

## 2021-09-14 NOTE — PLAN OF CARE
Problem: General Plan of Care (Inpatient Behavioral)  Goal: Individualization/Patient Specific Goal (IP Behavioral)  Description: The patient and/or their representative will achieve their patient-specific goals related to the plan of care.    The patient-specific goals include:  Outcome: Improving  Pt had a pleasant evening. She spent about an hour on the phone talking with her son. Bright affect this shift. She spent the rest of this evening in her room watching TV. She denies any pain or discomfort. She denied depression or anxiety. She took HS medication without any issue and denies SE's of her medication. Her appetite is good, and she is drinking well. No SI/SIB

## 2021-09-15 PROCEDURE — 250N000013 HC RX MED GY IP 250 OP 250 PS 637: Performed by: PHYSICIAN ASSISTANT

## 2021-09-15 PROCEDURE — 99232 SBSQ HOSP IP/OBS MODERATE 35: CPT | Mod: GC | Performed by: PSYCHIATRY & NEUROLOGY

## 2021-09-15 PROCEDURE — 250N000013 HC RX MED GY IP 250 OP 250 PS 637: Performed by: STUDENT IN AN ORGANIZED HEALTH CARE EDUCATION/TRAINING PROGRAM

## 2021-09-15 PROCEDURE — 124N000002 HC R&B MH UMMC

## 2021-09-15 RX ADMIN — LORAZEPAM 1 MG: 1 TABLET ORAL at 20:21

## 2021-09-15 RX ADMIN — BENZTROPINE MESYLATE 1 MG: 1 TABLET ORAL at 20:21

## 2021-09-15 RX ADMIN — LOSARTAN POTASSIUM 100 MG: 100 TABLET, FILM COATED ORAL at 08:58

## 2021-09-15 RX ADMIN — AMLODIPINE BESYLATE 7.5 MG: 2.5 TABLET ORAL at 08:58

## 2021-09-15 RX ADMIN — OLANZAPINE 20 MG: 20 TABLET, ORALLY DISINTEGRATING ORAL at 20:21

## 2021-09-15 RX ADMIN — METOPROLOL SUCCINATE 75 MG: 25 TABLET, EXTENDED RELEASE ORAL at 08:58

## 2021-09-15 RX ADMIN — LORAZEPAM 1 MG: 1 TABLET ORAL at 08:59

## 2021-09-15 RX ADMIN — LORAZEPAM 1 MG: 1 TABLET ORAL at 14:32

## 2021-09-15 RX ADMIN — BENZTROPINE MESYLATE 1 MG: 1 TABLET ORAL at 08:59

## 2021-09-15 ASSESSMENT — ACTIVITIES OF DAILY LIVING (ADL)
ORAL_HYGIENE: INDEPENDENT
ORAL_HYGIENE: INDEPENDENT;PROMPTS
HYGIENE/GROOMING: INDEPENDENT
DRESS: SCRUBS (BEHAVIORAL HEALTH)
HYGIENE/GROOMING: INDEPENDENT;PROMPTS
DRESS: SCRUBS (BEHAVIORAL HEALTH)

## 2021-09-15 NOTE — PLAN OF CARE
Pt isolated to room entire shift. Pt denies SI/HI/SIB or other psychotic symptoms. Pt denies pain or discomfort and denies any medication side effects. Pt was medication compliant and ate 100% of her meal. Vitals were stable throughout shift, no other issues. Continue to monitor and assess.   Problem: General Rehab Plan of Care  Goal: Therapeutic Recreation/Music Therapy Goal  Description: The patient and/or their representative will achieve their patient-specific goals related to the plan of care.  The patient-specific goals include:  Outcome: Improving     Problem: Sleep Disturbance  Goal: Adequate Sleep/Rest  Outcome: Improving     Problem: General Plan of Care (Inpatient Behavioral)  Goal: Individualization/Patient Specific Goal (IP Behavioral)  Description: The patient and/or their representative will achieve their patient-specific goals related to the plan of care.    The patient-specific goals include:  Outcome: No Change  Goal: Patient Schedule  Description: Patient's Schedule:  Outcome: No Change

## 2021-09-15 NOTE — PLAN OF CARE
Assessment/Intervention/Current Symtoms and Care Coordination  -Chart review    -Rounded with team, addressed patient needs/concerns    Current Symptoms include the following:  Patient appears more depressed and shut down.  Isolative to room and does not participate in therapy groups or unit programming.     Discharge Plan or Goal  Pending stabilization & development of a safe discharge plan.  Considerations include: [facility]    Barriers to Discharge  Patient requires further psychiatric stabilization due to current symptomology    Referral Status  Referral to [facility] complete    Legal Status  Civil Commitment/Hatch/Mcgrath Kohler through [facility]

## 2021-09-15 NOTE — PLAN OF CARE
Patient remained in room all shift. She was observed sitting up in bed watching TV. Writer/RN encouraged pt to walk laps in hallway but pt declined. Her affect is flat.  She denies suicidal ideation, homicidal ideation and hallucinations.  Pt declined to talk with staff. She came to med room window to request her meds.

## 2021-09-15 NOTE — PROGRESS NOTES
"[facility], [employer]   Psychiatric Progress Note  Hospital Day: 154        Interim History:   The patient's care was discussed with the treatment team during the daily team meeting and/or staff's chart notes were reviewed. No acute medical concerns. Has intermittent elevated BP, asymptomatic, otherwise vitals WNL. She is medication adherent. She has been pleasant and cooperative with staff and peers. No reported or observed side effects. No symptoms of psychosis or tad were observed. Affect is flat, depressed. Occasionally expresses frustration r/t length of stay. Intermittently attends to all ADLs, though overall, hygiene maintenance remains poor. Continues to sleep and eat well. Michelle is at her baseline. She appeared to be sleeping for 7 hours overnight. Her son is flying in from [place] next Monday.      Today Michelle reports that she is \"sad\" and that \"this is rubbish.\" She expressed frustration that her son is coming from [place] soon to help her move and that if her group home placement is not approved that he will not have a place to bring her things. She again asked why this process has taken so long. Her feelings were validated.     Suicidal ideation: denies current or recent suicidal ideation or behaviors.    Homicidal ideation: denies current or recent homicidal ideation or behaviors.    Psychotic symptoms: Patient denies AH, VH, paranoia, delusions.     Medication side effects reported: None    Acute medical concerns: None. She denies any fevers, chills, cough or malaise.    Other issues reported by patient: Patient had no further questions or concerns.           Medications:      amLODIPine  7.5 mg Oral Daily    benztropine  1 mg Oral BID    LORazepam  1 mg Oral TID    Or    LORazepam  1 mg Intramuscular TID    losartan  100 mg Oral Daily    metoprolol succinate ER  75 mg Oral Daily    OLANZapine zydis  20 mg Oral At Bedtime    Or    OLANZapine  10 mg Intramuscular At " Bedtime          Allergies:     Allergies   Allergen Reactions    Haldol [Haloperidol]      Patient previously tolerated haldol, though developed oculogyric crises during hospital stay on 4/26/21 on total daily dose of 10 mg.    Lisinopril Cough          Labs:     No results found for this or any previous visit (from the past 24 hour(s)).       Psychiatric Examination:     /76 (BP Location: Right arm)   Pulse 74   Temp 98.7  F (37.1  C) (Oral)   Resp 18   Wt 70 kg (154 lb 6.4 oz)   SpO2 98%   BMI 26.50 kg/m    Weight is 154 lbs 6.4 oz  Body mass index is 26.5 kg/m .    Weight over time:  Vitals:    05/25/21 0850 06/15/21 0811 06/16/21 1605 06/19/21 0859   Weight: 71 kg (156 lb 8 oz) 69.8 kg (153 lb 12.8 oz) 70.5 kg (155 lb 8 oz) 71.4 kg (157 lb 8 oz)    06/21/21 0805 06/22/21 0839 06/24/21 0800 07/02/21 0835   Weight: 69.5 kg (153 lb 3.2 oz) 69.3 kg (152 lb 11.2 oz) 70.7 kg (155 lb 12.8 oz) 70.7 kg (155 lb 12.8 oz)    07/03/21 0844 07/06/21 0838 07/10/21 0845 07/16/21 0821   Weight: 69.6 kg (153 lb 8 oz) 70.9 kg (156 lb 4.8 oz) 69.8 kg (153 lb 14.4 oz) 69.3 kg (152 lb 12.8 oz)    07/17/21 0830 07/31/21 0804 08/17/21 0845 08/19/21 0800   Weight: 70.4 kg (155 lb 4.8 oz) 70.3 kg (154 lb 14.4 oz) 70.8 kg (156 lb) 69.2 kg (152 lb 9.6 oz)    08/24/21 0700 08/26/21 0800 09/02/21 0803 09/09/21 0800   Weight: 70.3 kg (155 lb) 69 kg (152 lb 3.2 oz) 68.9 kg (152 lb) 70.9 kg (156 lb 6.4 oz)    09/12/21 0820 09/14/21 0800   Weight: 69.2 kg (152 lb 9.6 oz) 70 kg (154 lb 6.4 oz)       Orthostatic Vitals         Most Recent      Sitting Orthostatic /76 09/15 0837    Sitting Orthostatic Pulse (bpm) 74 09/15 0837    Standing Orthostatic /78 09/15 0837    Standing Orthostatic Pulse (bpm) 83 09/15 0837            Cardiometabolic risk assessment. 07/29/21      Reviewed patient profile for cardiometabolic risk factors    Date taken /Value  REFERENCE RANGE   Abdominal Obesity  (Waist Circumference)   See nursing  "flowsheet Women ?35 in (88 cm)   Men ?40 in (102 cm)      Triglycerides  Triglycerides   Date Value Ref Range Status   08/26/2021 205 (H) <150 mg/dL Final     Comment:     0-9 years:  Normal:    Less than 75 mg/dL  Borderline high:  75-99 mg/dL  High:             Greater than or equal to 100 mg/dL    0-19 years:  Normal:    Less than 90 mg/dL  Borderline high:   mg/dL  High:             Greater than or equal to 130 mg/dL    20 years and older:  Normal:    Less than 150 mg/dL  Borderline high:  150-199 mg/dL  High:             200-499 mg/dL  Very high:   Greater than or equal to 500 mg/dL   10/19/2019 117 <150 mg/dL Final       ?150 mg/dL (1.7 mmol/L) or current treatment for elevated triglycerides   HDL cholesterol  HDL Cholesterol   Date Value Ref Range Status   10/19/2019 56 >49 mg/dL Final     Direct Measure HDL   Date Value Ref Range Status   08/26/2021 55 >=50 mg/dL Final     Comment:     0-19 years:       Greater than or equal to 45 mg/dL   Low: Less than 40 mg/dL   Borderline low: 40-44 mg/dL     20 years and older:   Female: Greater than or equal to 50 mg/dL   Male:   Greater than or equal to 40 mg/dL        ]   Women <50 mg/dL (1.3 mmol/L) in women or current treatment for low HDL cholesterol  Men <40 mg/dL (1 mmol/L) in men or current treatment for low HDL cholesterol     Fasting plasma glucose (FPG) Lab Results   Component Value Date     07/02/2021      FPG ?100 mg/dL (5.6 mmol/L) or treatment for elevated blood glucose   Blood pressure  BP Readings from Last 3 Encounters:   09/15/21 119/76   06/04/19 131/71   04/26/19 164/86    Blood pressure ?130/85 mmHg or treatment for elevated blood pressure   Family History  See family history     Appearance: dressed in hospital scrubs, appeared reported age, unkempt hair   Attitude: cooperative  Eye Contact: improved, looks up more during the interview   Mood: \"sad\"  Affect:  Somewhat blunted, no longer agitated or tense. Brightens on occasion. "   Speech: accented, clear and coherent. Soft volume.    Language: no obvious receptive or expressive deficits  Psychomotor, Gait, Musculoskeletal: No abnormal movements noted while seated  Thought Process: goal-oriented, linear, concrete   Associations:  No loosening of associations present  Thought Content:  Did not appear to be responding to internal stimuli.  Insight:   poor - slightly improved   Judgement:  fair  Oriented to: person, place, time/date   Attention Span and Concentration: attentive to conversation though at times stares ahead and does not respond to questions.  Recent and Remote Memory: stable, some difficulty recalling events   Fund of Knowledge:  normal    Clinical Global Impressions  First:  Considering your total clinical experience with this particular patient population, how severe are the patient's symptoms at this time?: 7 (04/16/21 1428)  Compared to the patient's condition at the START of treatment, this patient's condition is: 4 (04/16/21 1428)  Most recent:  Considering your total clinical experience with this particular patient population, how severe are the patient's symptoms at this time?: 5 (09/15/21 1756)  Compared to the patient's condition at the START of treatment, this patient's condition is: 2 (09/15/21 1756)           Precautions:     Behavioral Orders   Procedures    Cheeking Precautions (behavioral units)     Patient Observed swallowing PO medications; Patient asked to drink water after swallowing medication; Patient in Staff line of sight for 15 minutes after medication given; Mouth checks after PO administration (patient asked to open mouth and stick out their tongue).    Code 2    Code 3     For walks in the [place] with staff and per staff discretion    Electroconvulsive therapy     Series of up to 12 treatments. Begin Date: 5/26/21     Treating Psychiatrist providing ECT:  Dr. Lubin     Notified on:  5/21/21    Electroconvulsive therapy     As long as we get the  green light from risk management and pt is medically cleared, begin ECT every Monday, Wednesday, and Friday    Electroconvulsive therapy     Series of up to 12 treatments. Begin Date: 5/25/21     Treating Psychiatrist providing ECT:  Amee     Notified on:  5/24/21    Elopement precautions    Fall precautions    Mcgrath Calderon    Routine Programming     As clinically indicated    Status 15     Every 15 minutes.          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 Symptoms and presentation have been most consistent with Schizoaffective Disorder, Bipolar Type. Inpatient psychiatric hospitalization is warranted at this time for safety, stabilization, possible adjustment in medications and development of a safe discharge plan.      Hospital Course:  Michelle was admitted to station 12 on 4/13/21 on a 72 hour hold under the care of Dr. Mellissa Zaman and a petition for MI commitment was filed and later supported. On admission, PTA psychiatric medications including Invega, Zyprexa, thorazine and Artane were restarted. However, Michelle declined all scheduled medications despite significant encouragement from the team. On 4/20 a psychiatric emergency was declared due to aggression toward others in context of severe psychosis and suspected excited catatonia. Her PTA Invega, Zyprexa & Thorazine were discontinued given consistent refusal and PO Haldol 5 mg BID with IM back up was initiated. Ativan 1  "mg TID was initiated on 4/21. Michelle continued declining PO medications and received IM haldol doses.      On 4/26, Michelle had oculogyric crisis likely secondary to IM haloperidol. This was alleviated with IM Cogentin which was then initiated as a PRN. Haldol was discontinued and replaced with Zyprexa 10 mg BID scheduled on an emergency basis. PTA Artane was also discontinued and oral Cogentin was scheduled, though Michelle continued declining this.     After accepting 2 doses of Ativan on 4/30, improvement in agitation and catatonic symptoms was observed. She subsequently continued intermittently declining doses of Ativan with noted decompensation. She was deemed to lack capacity to consent to treatment with Ativan and on 5/4, following discussion with our legal team and Michelle's son acting as her surrogate decision maker, forced IM backup for scheduled PO Ativan was initiated. On 5/4, Michelle also requested to switch from Cogentin to Artane (2 mg BID) though continued to frequently decline this and on 5/6 it was switched to a PRN medication.      Following initiation of Zyprexa as discussed above, partial improvements in presenting symptoms were observed. Michelle was more receptive to resuming oral Invega & a titration was initiated on 5/3 then titrated to her PTA dose of 9 mg on 5/10.  The plan for her ACT team to bring in loading dose of Invega Sustenna. On 5/11, Michelle again had signs of oculogyric crisis & her PO Invega was decreased to 6 mg. Due to ongoing observed problems with eye movement and concern for oculogyric crisis, PO Invega was stopped on 5/14 (she did not receive Invega Sustenna).       On 5/19, Ativan was increased to 2 mg TID due to re-emerging evidence of catatonia (long periods of staring, repetitive movements, echolalia, mutism). On 5/20, a meeting was held with her ACT team on 5/20, including ACT team psychiatrist, Dr. Pedraza who was in \"full support\" of pursuing ECT under a Mcgrath Kohler petition with " hopes that improvement may lead Michelle to be more amenable to clozapine treatment and weekly blood draws in a monitored setting such as an IRTS.     An ECT consult by Dr. Lubin was completed on 5/21. On 5/24, Alcides Kohler was approved and Michelle was also cleared by internal medicine for ECT. An acute series of bilateral ECT was initiated on 5/26. Michelle had a short seizure during ECT on 6/4. Improvements in mood, affect, social interactions, paranoia, and agitation were observed after initiation of ECT. However, Michelle appeared more disoriented with memory impairment and sedation on days of ECT. Given these concerns, Ativan was reduced to a total daily dose of 4.5 mg on 6/5 and unilateral ECT treatments were completed on 6/7 and 6/9. Due to ongoing memory impairment and sedation, subsequent ECT treatments were held and ultimately discontinued given that these symptoms worsened after initiation of ECT and were thought to be less likely related to catatonia.      Michelle initially appeared to be decompensating when ECT was held, though her cognitive impairment gradually improved and psychotic symptoms remained controlled. If symptoms of psychosis re-emerge, it may be worth starting clozapine, which is something that has previously been considered by her ACT team. Her Hatch order would need to be amended as clozapine is not one of the medications listed. ANC obtained on 6/16 was 1800/microL. Per conversation with pharmacy, neutropenia has been associated with olanzapine and agranulocytosis has been associated with olanzapine, lorazepam, metoprolol, and hydralazine and hematologic labs have been monitored. Upon re-check, ANC was 3700/microL on 6/21/21.      Given concerns that lorazepam may have also be contributing to cognitive impairment and with no signs of re-emerging catatonia, lorazepam was tapered by an additional 0.5 mg on 6/28/21 to a total daily dose of 4 mg with further benefits in memory impairment subsequently  noted. On 7/7, Michelle reported an incident of oculogyric crisis and Cogentin 1 mg BID scheduled was initiated.      The team worked with Michelle's ACT team to to establish a safe discharge plan with options including moving to a group home vs home with her ACT team and additional in home supports via CADI services given concerns regarding her ability to reliably take medications independently as evidenced by impairments noted in cognitive assessment by OT specialist on 7/1. MOCA score was a 13/30 and CPT score was a 4.7. Physician's statement in support of guardianship was completed on 7/19/21.  Michelle's son pursued emergency guardianship given the above concerns and to assist with facilitating placement following discharge.     Due to daytime sedation, on 7/26/21, lorazepam evening dose decreased from 2 mg to 1 mg for a total daily dose of 1 mg TID. On 7/27/21 transitioned Zyprexa from 10 mg BID to 5 mg QAM + 15 mg QPM and she had less daytime sedation with this change and no emergence of symptoms concerning for orthostatic hypotension.  She attended her emergency guardianship hearing on 7/28 and emergency guardianship was granted to her son, Alana. Due to ongoing daytime sedation, Zyprexa doses were consolidated to bedtime starting on 7/30 with noted subsequent improvement. [place] 8/12 for CADI funding. The decision to arrange placement in a group home was made as discussed above and in progress notes. Michelle was initially very resistant to the idea of moving to a group home though gradually became more accepting of this plan. Her discharge has been delayed significantly due to issues with receiving funding.     Target psychiatric symptoms and interventions:   - Continue Ativan 1 mg PO or IM TID Patient may not decline.   - Continue Zyprexa 20 mg at bedtime. Hatch in place.   - Continue Cogentin 1 mg PO BID with additional 2 mg IM daily prn for evidence of acute dystonic reaction or  oculogyric crisis  -Continue hydroxyzine 25-50 mg q4h prn for acute anxiety  -Continue Trazodone 50 mg at bedtime prn for sleep disturbances  -Continue Zyprexa 10 mg TID prn for severe agitation  -Continue Ativan/Benadryl q4h prn for agitation. WOULD GIVE PRN ATIVAN FIRST FOR AGITATION unless it is after 5 pm on day prior to scheduled ECT. Of note, she has not required any prn medications for agitation in several weeks.      Occupational Therapy Consult Placed to evaluate cognitive functioning/ability to care for self in home environment, ability to manage medications. See note on 7/1 for details.      ECT: Discontinued due to significant cognitive impairment. Please see note dated 7/12/21 for additional details.      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 If no active infection, would be okay for her to follow up for treatment as an outpatient unless treatment must be initiated per group [place] protocol. Please see ID note on 8/30 for details. Appreciate assistance.    - PA and Lateral chest xray ordered. Reviewed and negative.   - ID consult placed on 8/30   - Dozie her son/guardian consented to treatment for latent TB on 9/1/21  - per 9/2 conversation with Mati from [facility], okay to discharge given negative chest x-ray with outpatient follow up   - follow up in outpatient infectious disease clinic in one month with Jaren Celestin MD    HTN, improved: Patient is now adherent with medication regimen. Intermittent elevations with no concerning symptoms.  - Metoprolol succinate ER 75 mg   - Cozaar 100 mg daily  - Amlodipine 7.5 mg daily  - IM discontinued hydralazine prn  - Switched BP checks from QID to BID on 7/13 given overall improvement  - Please see note from IM dated 5/3, 5/6, 5/24, 6/20 and 6/30/21.  - Obtained EKG and routine labs on 5/21 in context of pt declining vital  "sign checks and anti-hypertensives and recently elevated BPs. Reviewed labs and discussed EKG findings with IM on 5/21. No urgent concerns, though IM should be notified if pt develops acute medical concerns (I.e. heart palpitations, SOB, changes in speech, AMS, confusion, CP, HA, changes in vision).    - Per 6/20 IM note: \"Please notify IM if BP is persistently severely elevated and requiring frequent PRN hydralazine\".  - Internal medicine consulted again on 6/28 due to consistent use of hydralazine over the past week. Metoprolol increased to 75 mg daily then to 100 mg and amlodipine 5 mg was added on 6/30. Amlodipine increased to 7.5 mg daily on 7/5.      Medicine consulted again on 8/26 given lab abnormalities as discussed below:   1) Abnormal cholesterol panel: Fasting lipid panel on 8/26 notable for elevated total cholesterol and elevated LDL. Prior lipid panel in 2020 was unremarkable.   - First line management for dyslipidemia is a trial of 6 months of lifestyle changes (dietary modifications, exercise)   - follow-up with her PCP will be arranged upon discharge to have further cardiac risk stratification and discussion regarding risks / benefits of starting a statin      2) Elevated creatinine: Cr 1.17 on 8/26. Patient had BRANDON in June with Cr to peak of 1.83, was felt to be of pre-renal etiology and Cr returned to baseline with IVF. Baseline Cr difficult to determine d/t limited labs but her baseline Cr appears to be ~0.9 - 1.0, therefore does not meet criteria for BRANDON at this time. Her bump in creatinine may be due to fluctuating baseline vs dehydration. UA results were notable for trace leukocyte esterase. 4 WBCs. Patient is asymptomatic. Repeat Cr on 8/28 was 1.09, concern for CKD per medicine.    - Encourage oral fluid intake, goal 2 liters daily    For previous medical concerns, please see note dated 7/12/21.     Behavioral/Psychological/Social:  - Encourage unit programming  - Patient is Code 3 status " and can take walks in the [place] with staff and security present per staff discretion. This appears to be quite therapeutic for her.      Safety:  - Continue precautions as noted above  - Status 15 minute checks  - Safety precautions include: assault and elopement precautions  - Continue precautions as noted above     Legal Status: Committed as MI with Hatch in place for Haldol, Zyprexa, Invega, and Thorazine through [facility]. Filed Mcgrath Kohler through [facility] and approved on 5/24/21. Physician's statement in support of guardianship was completed on 7/19/21. Patient's son is now her temporary emergency guardian.     Disposition Plan   Reason for ongoing admission:  pending safe discharge plan  Discharge location: Group home - CPT assessment done by OT (Tania queen) on 7/1.  Please see note for details. [place] completed on 8/12 & CADI funding approved though amount was too low for [facility] to accept. Process to appeal funding amount in progress.    Discharge Medications: ordered. Group home prefers 30 d/s. ACT team will take over meds upon discharge.   Follow-up Appointments: scheduled         Bijal Varner MD  Psychiatry PGY-4

## 2021-09-16 VITALS
DIASTOLIC BLOOD PRESSURE: 79 MMHG | RESPIRATION RATE: 18 BRPM | HEART RATE: 81 BPM | TEMPERATURE: 98.2 F | WEIGHT: 154.8 LBS | SYSTOLIC BLOOD PRESSURE: 134 MMHG | BODY MASS INDEX: 26.57 KG/M2 | OXYGEN SATURATION: 95 %

## 2021-09-16 PROCEDURE — 250N000013 HC RX MED GY IP 250 OP 250 PS 637: Performed by: PHYSICIAN ASSISTANT

## 2021-09-16 PROCEDURE — 250N000013 HC RX MED GY IP 250 OP 250 PS 637: Performed by: STUDENT IN AN ORGANIZED HEALTH CARE EDUCATION/TRAINING PROGRAM

## 2021-09-16 PROCEDURE — 99239 HOSP IP/OBS DSCHRG MGMT >30: CPT | Mod: GC | Performed by: PSYCHIATRY & NEUROLOGY

## 2021-09-16 RX ORDER — LOSARTAN POTASSIUM 100 MG/1
100 TABLET ORAL DAILY
Qty: 30 TABLET | Refills: 0 | Status: SHIPPED | OUTPATIENT
Start: 2021-09-16

## 2021-09-16 RX ORDER — LORAZEPAM 1 MG/1
1 TABLET ORAL 3 TIMES DAILY
Qty: 90 TABLET | Refills: 0 | Status: SHIPPED | OUTPATIENT
Start: 2021-09-16

## 2021-09-16 RX ORDER — METOPROLOL SUCCINATE 25 MG/1
75 TABLET, EXTENDED RELEASE ORAL DAILY
Qty: 90 TABLET | Refills: 0 | Status: SHIPPED | OUTPATIENT
Start: 2021-09-16

## 2021-09-16 RX ORDER — AMLODIPINE BESYLATE 2.5 MG/1
7.5 TABLET ORAL DAILY
Qty: 60 TABLET | Refills: 0 | Status: SHIPPED | OUTPATIENT
Start: 2021-09-16

## 2021-09-16 RX ORDER — BENZTROPINE MESYLATE 1 MG/1
1 TABLET ORAL 2 TIMES DAILY
Qty: 60 TABLET | Refills: 0 | Status: SHIPPED | OUTPATIENT
Start: 2021-09-16

## 2021-09-16 RX ADMIN — BENZTROPINE MESYLATE 1 MG: 1 TABLET ORAL at 08:30

## 2021-09-16 RX ADMIN — LOSARTAN POTASSIUM 100 MG: 100 TABLET, FILM COATED ORAL at 08:30

## 2021-09-16 RX ADMIN — METOPROLOL SUCCINATE 75 MG: 25 TABLET, EXTENDED RELEASE ORAL at 08:30

## 2021-09-16 RX ADMIN — LORAZEPAM 1 MG: 1 TABLET ORAL at 08:30

## 2021-09-16 RX ADMIN — AMLODIPINE BESYLATE 7.5 MG: 2.5 TABLET ORAL at 08:30

## 2021-09-16 ASSESSMENT — ACTIVITIES OF DAILY LIVING (ADL)
LAUNDRY: UNABLE TO COMPLETE
ORAL_HYGIENE: INDEPENDENT
HYGIENE/GROOMING: INDEPENDENT
DRESS: STREET CLOTHES

## 2021-09-16 NOTE — PLAN OF CARE
Problem: General Rehab Plan of Care  Goal: Therapeutic Recreation/Music Therapy Goal  Description: The patient and/or their representative will achieve their patient-specific goals related to the plan of care.  The patient-specific goals include:  Outcome: Improving    Pt spent about 30 minutes on the phone talking with her son this evening. She was calm and pleasant upon approach and spent the rest of the evening on her bed watching TV. She was encouraged to participate in group but declined. She declined to pace the hallway for few minutes to exercise her legs. Pt denies pain or discomfort and denies any medication side effects. She took scheduled HS medication without any issue. Appetite is good as she ate 100% of her dinner and drinking well. She refused to shower this evening. No SI/SIB. Vital signs stable.

## 2021-09-16 NOTE — PLAN OF CARE
Assessment/Intervention/Current Symtoms and Care Coordination  -Chart review  -Rounded with team, addressed patient needs/concerns    Current Symptoms include the following: Bright affect, pleasant and happy    Discharge Plan or Goal  Pending stabilization & development of a safe discharge plan.  Considerations include:  [facility]    Barriers to Discharge  No barriers at this time, patient will discharge today    Referral Status  Referral to [facility] completed and patient will be admitted to  [facility] today    Legal Status  Civil Commitment/Hatch/Mcgrath colin through [facility]

## 2021-09-16 NOTE — PLAN OF CARE
Unremarkable day, med compliant. Patient discharged today and is feeling very excited about it. Patient off unit with belongs and medications around 1400.

## 2021-09-16 NOTE — PLAN OF CARE
Problem: Sleep Disturbance  Goal: Adequate Sleep/Rest  Outcome: No Change      Night Shift Summary (9/15/21 into 09/16/21)    Pt asleep  at start of shift. Breathing quiet and unlabored.     Pt had no c/o pain or discomfort during the HS.     Appears to have slept 6.75  hours.     Pt on CHEEKING, ELOPEMENT and FALL precautions in addition to single room order. Any related events noted above.     Will continue to monitor and assess.

## 2021-09-16 NOTE — DISCHARGE SUMMARY
"Psychiatric Discharge Summary    Michelle Pino MRN# [id number]   Age: 61 year old YOB: 1960     Date of Admission:  4/13/2021  Date of Discharge:  9/16/2021  Admitting Physician:  Ida Zaman MD  Discharge Physician:  Verenice Oquendo DO         Event Leading to Hospitalization:   \"The patient presented to [facility] ED on 4/13/21 with symptoms of psychosis, agitation, and tad.      Records indicate that EMS went to patient's home for a welfare check.  Patient resides alone in an apartment where over the past few days her family had been receiving on phone calls from her.  Of note, her daughter states that yesterday, \"my mother called me yesterday and sounded so agitated and angry just the way she behaves when she stops taking her medications.  Her conversation with me today was just her yelling and screaming at me and I believe she needs medical attention.\"  Michelle has care through the [facility] where her caretaker noted she was also isolating in her apartment and she was very dismissive and agitated with staff.  They quickly found out that she had been not taking her medications and called her family who agreed that she needed to go in for inpatient care to get stabilized and regulated with her medications.  Her care staff placed her on a transport hold and offered her a ride into the hospital.  She declined and at that time she grew increasingly agitated.  Her staff called 911 at that point for transport and evaluation.  Police showed up and the patient sat down on the floor, refusing to move.  Of note, she had done this a year ago.      Per EMS ambulance note, the patient was found sitting on the floor in the living room of her apartment with whitney CLARKE and her caretaker at her side.  Patient was refusing to cooperate/speak with EMS and police.  Verbal de-escalation was used in an attempt to get patient to leave her residence.  She continued to resist and at that point, " "police and EMS picked up patient and patient became agitated and combative, swinging her arms and kicking her legs.  Patient was carried out of her apartment and placed on the stretcher, placed in restraints.  Patient was then brought to [facility] emergency department.     Upon arrival to the emergency department, it is noted that patient was nonverbal with staff, refusing to answer questions.  At times, her fists were clenched and her arms were crossed.  Patient continued to refuse vitals and would pull away from RN staff when attempting to do so.  Patient was offered p.o. medications but would not state whether she would take it or not.  ED provider was made aware and code green was called to administer IM medications for agitation.  Patient also refused to go to station 12 this afternoon.  Show force with other staff was there to encourage patient cooperate.  She allow the staff to place her in restraints and on the bed in order to transport her safely to the unit seclusion room.  Of note, she declined all labs in the emergency department with the exception of a Covid screen, which was negative.     I met with patient while she was in the seclusion room.  I have cared for this patient in the past and did establish therapeutic alliance with patient at that time, however, the patient does not recognize this writer.  She repeatedly said \"I am not mental!\"  She did say that her commitment recently  in March and that she has not been taking medications since that time.  I mentioned that her children have recently expressed concerns about her mental health, and she quickly replied \"I do not have children!\"  She demanded to be removed from the seclusion room immediately so that she could eat lunch.  She denied suicidal and homicidal ideation.  She became increasingly agitated with further questioning, referred to this writer as \"Dr. Alegria\" repeatedly, and then said \"go away right now!  Leave right " "now!\"  Per patient request, writer terminated interview at that point.\"         See Admission note by Ida Goldsmith MD on 4/14/21 for additional details.          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 Range 5/21/2021 13:40 5/26/2021 08:24 6/9/2021 21:19 6/10/2021 11:38 6/10/2021 21:45 6/10/2021 23:02 6/16/2021 10:45 7/2/2021 08:10 8/26/2021 08:33 8/28/2021 08:56   Sodium Latest Ref Range: 133 - 144 mmol/L 139  141 140  141 142 140 138 142   Potassium Latest Ref Range: 3.4 - 5.3 mmol/L 3.9  4.4 4.0   3.8 3.6 3.5 3.4   Chloride Latest Ref Range: 94 - 109 mmol/L 108  110 (H) 109   112 (H) 108 110 (H) 111 (H)   Carbon Dioxide Latest Ref Range: 20 - 32 mmol/L 23  23 23   25 26 23 28   Urea Nitrogen Latest Ref Range: 7 - 30 mg/dL 16  31 (H) 24   12 17 19 17   Creatinine Latest Ref Range: 0.52 - 1.04 mg/dL 0.88  1.83 (H) 1.29 (H)  1.13 (H) 0.89 1.01 1.17 (H) 1.09 (H)   GFR Estimate Latest Ref Range: >60 mL/min/1.73m2 72  30 (L) 45 (L)  53 (L) 71 61 51 (L) 56 (L)   GFR Estimate If Black Latest Ref Range: >60 mL/min/1.73_m2 83  34 (L) 53 (L)  62 82 71     Calcium Latest Ref Range: 8.5 - 10.1 mg/dL 8.7  8.5 9.5   8.9 9.0 9.2 9.0   Anion Gap Latest Ref Range: 3 - 14 mmol/L 8  8 8   5 6 5 3   Magnesium Latest Ref Range: 1.6 - 2.3 mg/dL  2.2           Phosphorus Latest Ref Range: 2.5 - 4.5 mg/dL  4.3           Albumin Latest Ref Range: 3.4 - 5.0 g/dL 3.6  3.2 (L)      3.6    Protein Total Latest Ref Range: 6.8 - 8.8 g/dL 7.0  6.6 (L)      7.5    Bilirubin Total Latest Ref Range: 0.2 - 1.3 mg/dL 0.3  0.2      0.5    Alkaline Phosphatase Latest Ref Range: 40 - 150 U/L 91  67          Alkaline " Phosphatase Latest Ref Range: 40 - 150 U/L         86    ALT Latest Ref Range: 0 - 50 U/L 24  26      24    AST Latest Ref Range: 0 - 45 U/L 15  14      18    %FENA Latest Units: %     0.4        Cholesterol Latest Ref Range: <200 mg/dL         258 (H)    CK Total Latest Ref Range: 30 - 225 U/L    121         Creatinine Urine Latest Units: mg/dL     122        Patient Fasting > 8hrs? Unknown         Yes    HDL Cholesterol Latest Ref Range: >=50 mg/dL         55    LDL Cholesterol Calculated Latest Ref Range: <=100 mg/dL         162 (H)    Non HDL Cholesterol Latest Ref Range: <130 mg/dL         203 (H)    Sodium Urine mmol/L Latest Units: mmol/L     56        Triglycerides Latest Ref Range: <150 mg/dL         205 (H)    TSH Latest Ref Range: 0.40 - 4.00 mU/L 0.53        0.96       Ref. Range 6/8/2021 16:15 6/22/2021 13:15 8/26/2021 14:41   Color Urine Latest Ref Range: Colorless, Straw, Light Yellow, Yellow  Yellow Light Yellow Yellow   Appearance Urine Latest Ref Range: Clear  Clear Clear Clear   Glucose Urine Latest Ref Range: Negative mg/dL Negative Negative Negative   Bilirubin Urine Latest Ref Range: Negative  Negative Negative Negative   Ketones Urine Latest Ref Range: Negative mg/dL Negative Negative Negative   Specific Gravity Urine Latest Ref Range: 1.003 - 1.035  1.019 1.017 1.017   pH Urine Latest Ref Range: 5.0 - 7.0  6.5 7.0 5.5   Protein Albumin Urine Latest Ref Range: Negative mg/dL 10 (A) Negative Negative   Urobilinogen mg/dL Latest Ref Range: Normal, 2.0 mg/dL Normal Normal Normal   Nitrite Urine Latest Ref Range: Negative  Negative Negative Negative   Blood Urine Latest Ref Range: Negative  Negative Negative Negative   Leukocyte Esterase Urine Latest Ref Range: Negative  Negative Negative Trace (A)   Source Unknown Urine Midstream Urine    WBC Urine Latest Ref Range: <=5 /HPF 1 1 4   RBC Urine Latest Ref Range: <=2 /HPF 1 <1 1   Bacteria Urine Latest Ref Range: NEG^Negative /HPF None (A) None (A)     Squamous Epithelial /HPF Urine Latest Ref Range: <=1 /HPF 0 1 2 (H)   Mucous Urine Latest Ref Range: NEG^Negative /LPF Present (A) Present (A)    Mucus Urine Latest Ref Range: None Seen /LPF   Present (A)   Hyaline Casts Latest Ref Range: <=2 /LPF 5 (H)  3 (H)     Results for CHRISTOPHER RAMON (MRN [id number]) as of 9/16/2021 11:35   Ref. Range 6/2/2021 11:13 6/4/2021 12:24 6/7/2021 09:50 6/21/2021 19:00 6/29/2021 19:00 7/7/2021 15:00 7/15/2021 16:34 7/24/2021 13:27 8/2/2021 09:15 8/16/2021 13:50 8/23/2021 20:45 9/2/2021 07:30 9/10/2021 13:26   SARS CoV2 PCR Latest Ref Range: Negative        Negative Negative Negative Negative Negative Negative Negative   SARS-CoV-2 PCR Result Unknown NEGATIVE NEGATIVE NEGATIVE NEGATIVE NEGATIVE NEGATIVE          SARS-CoV-2 Virus Specimen Source Unknown Nasopharyngeal Nasopharyngeal Nasopharyngeal Nasopharyngeal Nasopharyngeal Nasopharyngeal                Ref Range & Units 2 wk ago - 08/28/21  8:56 AM    Quantiferon-TB Gold Plus Negative PositiveAbnormal     Comment: Interferon gamma response to M.tuberculosis antigens was detected,suggesting infection with M.tuberculosis. Positive results in patients at low risk for infection should be interpreted with caution and repeat testing on a new sample should be considered as recommended by the 2017 ATS/IDSA/CDC Clinical Prac   mac Guidelines for Diagnosis of Tuberculosis in Adults and Children     TB1 Ag minus Nil Value IU/mL 3.28     TB2 Ag minus Nil Value IU/mL 2.83     Mitogen minus Nil Result IU/mL 9.86     Nil Result IU/mL 0.14            Results for orders placed or performed during the hospital encounter of 04/13/21   US Renal Complete    Narrative    EXAMINATION: US RENAL COMPLETE  6/10/2021 11:10 AM      CLINICAL HISTORY: BRANDON, poor historian, poor lab compliance    COMPARISON: None    FINDINGS:  Right renal length: 9.7 cm.   Previous length: [N/A] cm.    Left renal length: 9.1 cm.   Previous length: [N/A] cm.    The kidneys are  normal in position with borderline increased  echogenicity. Lobulated contour of the left kidney with cortical  thinning and scarring in the upper pole. There is no evident calculus.  Mild right-sided hydronephrosis persists on postvoid. The urinary  bladder is well distended and normal in morphology. The bladder wall  is normal.          Impression    IMPRESSION:  1. Borderline echogenic kidneys with mild right-sided hydronephrosis.  2. Lobulated left kidney with cortical thinning/scarring.    ETHAN CACERES MD   XR Chest 2 Views    Narrative    EXAM: XR CHEST 2 VW 8/30/2021 4:04 PM    HISTORY: Rule out active tuberculosis infection, asymptomatic. Patient  immigrated from [facility] several years ago, no known history of TB. Had  a positive quantiferon gold test from 8/26 (required for admission to  a group home)..    COMPARISON: none.    TECHNIQUE: Upright frontal and lateral views of the chest.    FINDINGS: Midline trachea. Normal cardiomediastinal silhouette.  Distinct pulmonary vasculature. No focal airspace consolidation.  Calcified granulomas in the right lower lobe. No appreciable  pneumothorax. No pleural effusion. Unremarkable upper abdomen. No  acute osseous findings.      Impression    IMPRESSION: Negative chest without evidence for active mycobacterial  infection.    I have personally reviewed the examination and initial interpretation  and I agree with the findings.    YEHUDA RODRIGUEZ MD      SYSTEM ID:  [id number]          Consults:   Consultations during this admission as follows - please see consult notes for additional details:       5/6/21 - Internal Medicine - DORON Hoover   Hypertension: Her agitation, psychiatric decompensation are likely driving her BP elevations. She is asymptomatic.     - Treatment of agitation per Psychiatry   - I would expect her blood pressure to improve as her agitation and psychiatric decompensation resolve. In the interim it is most appropriate to treat with PRN  hydralazine, will monitor BP trend over the next few days and consider increasing metoprolol dose if BP is persistently severely elevated and requiring frequent PRN hydralazine.    - Continue PTA losartan, metoprolol   - Hydralazine 25 mg QID PRN for SBP >160 or DBP >110     Tachycardia: Occurring intermittently, most likely secondary to agitation. Invega can also cause tachycardia (up to 16%) but would expect her to be persistently tachycardic.    - Continue to monitor   -  Notify Medicine for HR sustained >130 and/or if she becomes symptomatic (chest pain/pressure, palpitations, etc.)    21 - ECT Consult - Angel Lubin MD   ASSESSMENT:  Ms. Pino is a 60-year-old woman with a history of schizoaffective disorder.  She had been off medications since her commitment  2021 and was quite agitated, manic and psychotic on admission.  She has been frequently refusing medications.  She apparently started taking some psychiatric medications, but continues to refuse her blood pressure medications and refused vital signs.  She has a court hearing today for ECT.  Dr. Lubin discussed basics of ECT with the patient.  She did not understand what he was talking about.  She does not have the capacity to give informed consent for ECT or for treatment of general.  ECT seems a reasonable treatment option in light of the severity and treatment refractory nature of her illness.        PLAN:    1.  Await court decision on Alcides Kohler.  2.  Ms. Pino would have to be medically cleared for ECT.  3.  Ms. Pino's blood pressure cannot be completely out of control when she begins ECT due to the risk of stroke.  She has had a stroke.  4.  If she begins ECT, expect stabilization, improvement in mood stability and compliance, and hopefully discharged to an IRTS facility before going back to her apartment.  5.  Follow up with Dr. Pedraza and her ACT team after discharge.    21 - Internal Medicine ECT Clearance - Nya  LILIANA Henderson  Recommendations:   Diuretics and oral hypoglycemics should be held the morning of ECT.   All other antihypertensive medications can be continued.      Patient does not have absolute medical contraindications to proceeding with ECT at this time. Treatment plan per psychiatry.     6/20/21 - Internal Medicine - Alexa Zabala PA-C  Internal medicine re-consulted due to hypertension. Patient previously seen by internal medicine during this admission for hypertension, please see note by Helen Mao PA-C on 05/06/2021.     In review of her BP over past several day, has had majority BP readings ranging from 116/70 to 155/80 with exception of 2 outliers that were more elevated.      Would be hesitant to increase her long acting BP medications at this time given fluctuations in her BP over past week and only meeting criteria for PRN a few times. She is already on maximum dose of losartan with room to increase metoprolol. Otherwise would need to add additional agent.       Plan  - Continue pta losartan and metoprolol  - Hydralazine 25 mg QID PRN for SBP >160 or DBP >110  - Please notify IM if BP is persistently severely elevated and requiring frequent PRN hydralazine (has only met PRN criteria x3)    7/1/2021 - Occupational Therapy - Independent Living Skills Evaluation / CPT / MoCA - Tania Jama OT  ASSESSMENT/RECOMMENDATIONS (based upon assessment/group observation)     Michelle was cooperative during this assessment session though asked several times why she had to do this as she feels she has no difficulties in managing her cares independently. The purpose of the testing was explained on several occasions, in looking at her memory and thinking to see if there were any additional services or needs to consider at time of discharge. On several occasions, when asked to perform a specific task, she explained she does not participate in this task at home, with the opinion of this author that her thinking appeared  concrete in not seeming able to extrapolate the understanding this being part of the test, and with a different purpose of the tasks. This OT author questions if at times the words used were difficult for her to understand. However, with her history of completing some college here, and working at [employer] for some time, this may not have been the case. At times there were a few second delays in her responses.     Michelle stated she had been living independently with services. She stated she was preparing all her own meals, using mostly the microwave and some stove top cooking. She feels she could manage her own medications though is willing to receive help in having her medications administered. She explained she does not pay her own bills and has assistance with managing her finances.  I go to the grocery store and do my own shopping .      Michelle refused to complete the toast subtask of the CPT because  I don t make toast in my home . The incompletion of the 5 subtasks, makes it impossible to provide a valid standardized score on the CPT though with the observation of the testing, one may question she would score possibly and approximately 4.7 on the CPT which is well below the normal range. She scored on the MoCA 13/30 which is well below the normal range. Note the MoCA score on the Visualspatial/Executive question of 2/5. Note the score on Delayed Recall of 0/5. The Memory Index Score was 4/15 with spontaneous recall of 0 words, recalling 1 word with general cues and 2 words with multiple choice cues, which may question impaired retrieval memory. The scores on these assessments may indicate mild to moderate functional decline.     If Michelle continues to perform at the level demonstrated during this assessment it is the opinion of this OT author to recommend her living in a setting with 24 hour supervision to provide a safe living environment. Recommend all meals provided, or close supervision with meal  preparation, finances being managed, medications being administered, and frequent checks to assist in increasing her comfort in surroundings, increased guidance of structure of time. Encourage active involvement in structured activities as comfort allows, understanding lower stimulation and quieter situations may feel more comfortable. She may experience increased difficulties with new learning, memory, problem solving, judgment, organization and planning. She may need cues in performing familiar tasks and need assistance in initiating tasks. She may benefit in receiving support and reassurance in performance of tasks.      If it is decided for Michelle to return home to an apartment, recommendations include unplugging the stove to use only the microwave, assistance with shopping and meal planning, finances being managed for her, medications being administered, (due to her memory, appeared task initiation and judgment difficulty), frequent check ins (several times a day), structuring of her time,  and assistance with ADL and self-care initiation and follow through.     9/1/21 - Infectious Disease - Jaren Celestin MD  DX: Latent TB     Michelle Pino is 61 year old  female, migrated to [place] from [place] many years ago, with history significant for Schizoaffective Disorder, bipolar type, hypertension, hyperlipidemia, CVA 2017, with previous commitments who was admitted on 4/14/21 with tad, psychosis, and agitation, secondary to medication non-adherence. Michelle's psychiatric issues seem to be improving and there is plan to discharge her to a group home this Friday. As part of the work up, a quantiferon TB gold test was done on 8/28/21 and came back positive. ID bryson was called on 8/30/21 and a formal ID consultation was recommended .      Addendum 9/1/21 spoke with Dr Varner. Dr Varner contacted Michelle's guardian and he consented to treatment. Recommend Rifampin 600 mg po daily x 4 months.  if not able to use  Rifampin, alternative would be Isoniazid  300 mg po daily x 9 months ( mnimum 6 months)   Lab: monitor CBC and CMP every 2 weeks while on Rifampin as it can cause elevated LFTs and bone marrow suppression.after discharge, can fax results to me at ID clinic fax [phone]    I discussed potential side effects with patient yesterday. monitor for nausea, vomiting, abdominal pain, diarrhea, lack of appetite      virtual follow-up with ID in 2-4 weeks after discharge. Please let me know when she will be discharged, so I can schedule a video f/u       per 9/2 conversation with Mati from [facility], dickson to discharge without initiating antibiotic therapy given negative chest x-ray. Dr. Varner discussed further with Dr. Celestin who will plan to follow up with Michelle as an outpatient after discharge to discuss antibiotic recommendation. Per Dr. Celestin, given importance of antibiotic compliance and need for lab monitoring when treating latent TB, it would ideal for Michelle to undergo treatment when she is in the monitored setting such as the [facility].      Follow up with Jaren Osei MD scheduled on 10/29/21 @ 10am for a Video Appointment         Hospital Course:     Michelle Pino was admitted to station 12 on 4/13/21 on a 72 hour hold under the care of Dr. Mellissa Zaman and a petition for MI commitment was filed and later supported. On admission, PTA psychiatric medications including Invega, Zyprexa, thorazine and Artane were restarted. However, Michelle declined all scheduled medications despite significant encouragement from the team. On 4/20 a psychiatric emergency was declared due to aggression toward others in context of severe psychosis and suspected excited catatonia. Her PTA Invega, Zyprexa & Thorazine were discontinued given Michelle consistently declining them and PO Haldol 5 mg BID with IM back up was initiated. Ativan 1 mg TID was initiated on 4/21 due to concerns for  "catatonia. Michelle continued declining all PO medications and received IM haldol doses.       On 4/26, Michelle had oculogyric crisis likely secondary to IM haloperidol. This was alleviated with IM Cogentin which was then initiated as a PRN. Haldol was discontinued and replaced with Zyprexa 10 mg BID scheduled on an emergency basis. PTA Artane was also discontinued and oral Cogentin was scheduled, though Michelle continued declining this.      After accepting 2 doses of Ativan on 4/30, improvement in agitation and catatonic symptoms was observed. She subsequently continued intermittently declining doses of Ativan with noted decompensation after missed doses. She was deemed to lack capacity to consent to treatment with Ativan and on 5/4, following discussion with our legal team and Michelle's son acting as her surrogate decision maker, forced IM backup for scheduled PO Ativan was initiated. On 5/4, Michelle also requested to switch from Cogentin to Artane (2 mg BID) though continued to frequently decline this and on 5/6 Artane was switched to a PRN medication.       Following initiation of Zyprexa as discussed above, partial improvements in presenting symptoms were observed. Michelle was more receptive to resuming oral Invega & a titration was initiated on 5/3 then titrated to her PTA dose of 9 mg on 5/10.  The plan was for her ACT team to bring in loading dose of Invega Sustenna. On 5/11, Michelle again had signs of oculogyric crisis & her PO Invega was decreased to 6 mg. Due to ongoing observed problems with eye movement and concern for oculogyric crisis, PO Invega was stopped on 5/14 (she did not end up receiving Invega Sustenna).       On 5/19, Ativan was increased to 2 mg TID due to re-emerging evidence of catatonia (long periods of staring, repetitive movements, echolalia, mutism). On 5/20, a meeting was held with her ACT team, including the psychiatrist, Dr. Pedraza who was in \"full support\" of pursuing ECT under a Mcgrath Kohler " petition with hopes that improvement may lead Michelle to be more amenable to clozapine treatment and weekly blood draws in a monitored setting such as an IRTS.      An ECT consult by Dr. Lubin was completed on 5/21. On 5/24, Alcides Kohler was approved and Michelle was also cleared by internal medicine for ECT. An acute series of bilateral ECT was initiated on 5/26. Michelle had a short seizure during ECT on 6/4. Improvements in mood, affect, social interactions, paranoia, and agitation were observed after initiation of ECT. However, Michelle appeared more disoriented with memory impairment and sedation on days of ECT. Given these concerns, Ativan was reduced to a total daily dose of 4.5 mg on 6/5 and unilateral ECT treatments were completed on 6/7 and 6/9. Due to ongoing memory impairment and sedation, subsequent ECT treatments were held and ultimately discontinued given that these symptoms worsened after initiation of ECT and were thought to be less likely related to catatonia.      Michelle initially appeared to decompensate when ECT was held, though her cognitive impairment gradually improved and psychotic symptoms remained controlled. ANC obtained on 6/16 was 1800/microL. Per conversation with pharmacy, neutropenia has been associated with olanzapine and agranulocytosis has been associated with olanzapine, lorazepam, metoprolol, and hydralazine and hematologic labs have been monitored. Upon re-check, ANC was 3700/microL on 6/21/21.      Given concerns that lorazepam may have also be contributing to cognitive impairment and with no signs of re-emerging catatonia, lorazepam was tapered by an additional 0.5 mg on 6/28/21 to a total daily dose of 4 mg with further benefits in memory impairment subsequently noted. On 7/7, Michelle reported an incident of oculogyric crisis and Cogentin 1 mg PO BID was initiated which Michelle did subsequently take without issues.      The team worked with Michelle's ACT team to to establish a safe discharge plan  with options including moving to a group home vs home with her ACT team and additional in home supports via [facility] services given concerns regarding her ability live and take medications independently as evidenced by impairments noted in cognitive assessment by OT on 7/1. MOCA score was a 13/30 and CPT score was a 4.7. Physician's statement in support of guardianship was completed on 7/19/21.  Michelle's son pursued emergency guardianship given the above concerns and to assist with facilitating placement following discharge.     Due to daytime sedation, on 7/26/21, lorazepam evening dose decreased from 2 mg to 1 mg for a total daily dose of 1 mg TID. On 7/27/21 transitioned Zyprexa from 10 mg BID to 5 mg QAM + 15 mg QPM and she had less daytime sedation with this change and no emergence of symptoms concerning for orthostatic hypotension.  She attended her emergency guardianship hearing on 7/28 and emergency guardianship was granted to her son, Alana. Michelle has expressed disagreement with the guardianship. Due to ongoing daytime sedation, Zyprexa was consolidated to be 20 mg at bedtime starting on 7/30 with noted subsequent improvement. A [place] assessment was completed on 8/12 for [facility]I funding. The decision to arrange placement in a group home was made as discussed above and in progress notes. Michelle was initially very resistant to the idea of moving to a group home though gradually became more accepting of this plan. Her hospitalization was prolonged significantly due to delays from the [facility]'s in getting funding approved for group home placement.     During this hospitalization, Michelle did not participate in groups and was minimally visible in the milieu.     The patient's symptoms of psychosis and catatonia improved though she has continued to demonstrate very poor insight into her mental illness and medical conditions.     Today Michelle Pino denies any thoughts of harm to self or others. She has notable risk factors  for self-harm, including single status and psychosis. However, risk is mitigated by commitment to family, sobriety, absence of past attempts and discharge to a monitored environment. She has had no further aggression since her acute psychotic symptoms resolved. Therefore, based on all available evidence including the factors cited above, she does not appear to be at imminent risk of harm to self or others, does not meet criteria for a 72-hr hold, and therefore remains appropriate for ongoing outpatient level of care. Referral for admission to a group home and ongoing psychiatric care through her ACT team were offered to and accepted by Michelle's son, Alana Pino, who is her temporary emergency guardian. She has a follow up guardianship hearing on 9/24/21 as noted below.     On the day of discharge, Michelle was looking forward to transferring to the group home and leaving the hospital. She was informed of her follow up appointments and did not make any statements opposing these. She was also amenable to a discussion regarding receiving the Covid 19 vaccine and reasons for its recommendation. She declined the vaccine today though said she would consider this later at her PCP appointment. She was also aware of the guardianship hearing on 9/24.    Michelle Pino was transfered to group home under a provisional discharge. At the time of discharge, Michelle Pino was determined to not be a danger to herself or others.          Discharge Medications:     Current Discharge Medication List        START taking these medications    Details   amLODIPine (NORVASC) 2.5 MG tablet Take 3 tablets (7.5 mg) by mouth daily  Qty: 60 tablet, Refills: 0    Associated Diagnoses: Hypertension, unspecified type      benztropine (COGENTIN) 1 MG tablet Take 1 tablet (1 mg) by mouth 2 times daily  Qty: 60 tablet, Refills: 0    Associated Diagnoses: Schizophrenia, paranoid type (H)      LORazepam (ATIVAN) 1 MG tablet Take 1 tablet (1 mg) by mouth 3  times daily  Qty: 90 tablet, Refills: 0    Associated Diagnoses: Schizophrenia, paranoid type (H)           CONTINUE these medications which have CHANGED    Details   losartan (COZAAR) 100 MG tablet Take 1 tablet (100 mg) by mouth daily  Qty: 30 tablet, Refills: 0    Associated Diagnoses: Hypertension, unspecified type      metoprolol succinate ER (TOPROL-XL) 25 MG 24 hr tablet Take 3 tablets (75 mg) by mouth daily  Qty: 90 tablet, Refills: 0    Associated Diagnoses: Hypertension, unspecified type      OLANZapine (ZYPREXA) 20 MG tablet Take 1 tablet (20 mg) by mouth At Bedtime  Qty: 30 tablet, Refills: 0    Associated Diagnoses: Paranoid schizophrenia (H)      polyethylene glycol (MIRALAX) 17 GM/Dose powder Take 17 g by mouth daily as needed for constipation  Qty: 510 g, Refills: 0    Associated Diagnoses: Schizophrenia, paranoid type (H)           STOP taking these medications       chlorproMAZINE (THORAZINE) 100 MG tablet Comments:   Reason for Stopping:         paliperidone ER (INVEGA) 9 MG 24 hr tablet Comments:   Reason for Stopping:         trihexyphenidyl (ARTANE) 2 MG tablet Comments:   Reason for Stopping:                  Psychiatric Examination:   Appearance:  awake, alert, dressed in hospital scrubs, appeared older than stated age and slightly unkempt She requested to change into her regular clothes and did change.   Attitude:  cooperative  Eye Contact:  good  Mood:  good  Affect:  intensity is blunted somewhat though significantly brighter today when discussing her discharge and after putting on her regular clothes. She was smiling and laughing in discussions.   Speech:   accented and soft   Psychomotor Behavior:  no evidence of tardive dyskinesia, dystonia, or tics  Thought Process:  logical, linear and goal oriented  Associations:  no loose associations  Thought Content:  no evidence of suicidal ideation or homicidal ideation, no auditory hallucinations present, no visual hallucinations present and no  overt evidence of psychotic thought  Insight:  limited denies having a mental illness, struggles to accept and/or understand medical illnesses despite extensive conversations at her level   Judgment:  Social judgement intact, judgement with respect to medical treatments is poor   Oriented to:  time, person, and place  Attention Span and Concentration:  intact  Recent and Remote Memory:  fair  Language: not formally tested, able to engage in a conversation with some difficulty  Fund of Knowledge: low-normal  Muscle Strength and Tone: normal  Gait and Station: Normal         Discharge Plan:     Discharged on 9/16/21 from [address] to:  [facility]:  [address], [place]:  # [phone]:  Provisionally Discharge and Change of Status Report Completed and sent to [facility].    Medical Follow-up Appointment:   Monday, 10/11/21 @ 10am:  In Person Appointment:  Provider:  Janey Booker MD  [facility]:  [address] [facility] [address], [place], [place] , [address]:  Phone:  [phone]    Fax:  [phone]:     [facility]:  # [phone]:   Clinic Address:  [address] [place] [address], [address], [facility]    Provider:  Jaren Osei MD:  Video Appointment:  10/29/21 @ 10am:  The clinic will call group home to set-up this appointment:  The number provided to the clinic is the group home number. Please ensure treatment decisions are approved by her son if Michelle remains under guardianship.    [facility] ACT Team:   Act Team will continue to work with patient upon her discharge from the hospital and coordinate care with [facility] staff.  /RN Kourtney Rizo will call the group home directly and set-up psychiatry/medication management follow-up appointment.     [facility] ACT Team    Psychiatrist;   Stevenson Pedraza @ [phone]   CM/RN:  Kourtney Rizo @ [phone]  :  Mary Kay Lynch @  [phone]     There is a Guardianship Hearing scheduled September 24, 2021 @ 8:30am:  Contact the Court @ [phone] if you do not have access to the internet or unable to connect by video and audio.      Resident Attestation:  Bijal Varner MD  Psychiatry PGY-4     Attending Attestation: Patient has been seen and evaluated by me, Verenice Oquendo DO on day of discharge. I have discussed this patient with the resident and I agree with the findings and plan in this note. 35 minutes were spent in coordination of discharge planning.       Verenice Oquendo DO

## 2021-09-22 ENCOUNTER — VIRTUAL VISIT (OUTPATIENT)
Dept: INFECTIOUS DISEASES | Facility: CLINIC | Age: 61
End: 2021-09-22
Attending: INTERNAL MEDICINE
Payer: COMMERCIAL

## 2021-09-22 DIAGNOSIS — F25.0 SCHIZOAFFECTIVE DISORDER, BIPOLAR TYPE (H): ICD-10-CM

## 2021-09-22 DIAGNOSIS — F20.0 PARANOID SCHIZOPHRENIA (H): ICD-10-CM

## 2021-09-22 DIAGNOSIS — Z22.7 LATENT TUBERCULOSIS INFECTION: Primary | ICD-10-CM

## 2021-09-22 PROCEDURE — 99214 OFFICE O/P EST MOD 30 MIN: CPT | Mod: 95 | Performed by: INTERNAL MEDICINE

## 2021-09-22 RX ORDER — DOCUSATE SODIUM 100 MG/1
100 CAPSULE, LIQUID FILLED ORAL
COMMUNITY

## 2021-09-22 ASSESSMENT — PAIN SCALES - GENERAL: PAINLEVEL: NO PAIN (0)

## 2021-09-22 NOTE — PROGRESS NOTES
Michelle is a 61 year old who is being evaluated via a billable video visit.      ** patient can do Doximity **    How would you like to obtain your AVS? MyChart  If the video visit is dropped, the invitation should be resent by: [phone]  Will anyone else be joining your video visit? No    Video-Visit Details  Video time: 6 min  Originating Location (pt. Location): Other assisted  Distant Location (provider location):  [facility]   Platform used for Video Visit: Doximity-    INFECTIOUS DISEASES PROGRESS NOTE    SUBJECTIVE:                                                      Michelle Pino is a 61 year old female who presents to clinic today for the following health issues: post hospital follow-up for latent TB     Michelle Pino is 61 year old  female, migrated [place] from [place] many years ago, with history significant for Schizoaffective Disorder, bipolar type, hypertension, hyperlipidemia, CVA 2017, with previous commitments who was admitted on 4/14/21 with tad, psychosis, and agitation, secondary to medication non-adherence. Michelle's psychiatric issues seem to be improving and there is plan to discharge her to a group home this Friday. As part of the work up, a quantiferon TB gold test was done on 8/28/21 and came back positive. ID bryson was called on 8/30/21 and a formal ID consultation was recommended . I saw patient on 8/31/21. Michelle asked me appropriate questions about latent TB and did not have any symptoms of active TB. with history of non compliance and her decision not to take medication for latent TB was discussed with resident Dr Varner.  The next day, I was contacted by Dr Varner who mentioned that patient was not capable of making decision, and her son, being the POA was contacted by Dr Varner and gave the consent to start treatment. I was told no need to contact her son. I recommended that if she was tos atrt medication, would choose Rifampin and should  "start in hospital and monitor for any side effects. However, she did not received Rifampin in hospital and was discharged to [facility]    Today, a video follow-up was scheduled with Michelle and her  Marely. Reviewing her med list, I was told that Michelle was not on Rifampin. Michelle got agitated and kept saying \"I dont  have TB\" \"I told you in hospital\".  \" Why do I need medication\" I told her she did not have active TB but latent / inactive TB. medication for latent TB will cut down risk of active TB in the future. To this, she replied \" in the future? I dont live in the future\" .      Problem list and histories reviewed & adjusted, as indicated.  Additional history: as documented    PAST MEDICAL HISTORY:  Patient  has a past medical history of CVA (cerebral vascular accident) (H) (04/2017), Dyslipidemia, Hyperlipidemia, Hypertension, Hypertension, Paranoid schizophrenia (H), Schizophrenia (H), and Vitamin D deficiency.    PAST SURGICAL HISTORY:  Patient  has a past surgical history that includes Open reduction internal fixation tibial plateau (Right, 01/08/2019) and Orif Proximal Tibial Plateau Fracture (Right, 2019).    CURRENT MEDICATIONS:  Current Outpatient Medications   Medication Sig Dispense Refill     amLODIPine (NORVASC) 2.5 MG tablet Take 3 tablets (7.5 mg) by mouth daily 60 tablet 0     benztropine (COGENTIN) 1 MG tablet Take 1 tablet (1 mg) by mouth 2 times daily 60 tablet 0     docusate sodium (DSS) 100 MG capsule Take 100 mg by mouth       LORazepam (ATIVAN) 1 MG tablet Take 1 tablet (1 mg) by mouth 3 times daily 90 tablet 0     losartan (COZAAR) 100 MG tablet Take 1 tablet (100 mg) by mouth daily 30 tablet 0     metoprolol succinate ER (TOPROL-XL) 25 MG 24 hr tablet Take 3 tablets (75 mg) by mouth daily 90 tablet 0     OLANZapine (ZYPREXA) 20 MG tablet Take 1 tablet (20 mg) by mouth At Bedtime 30 tablet 0     polyethylene glycol (MIRALAX) 17 GM/Dose powder Take 17 g by mouth daily as " needed for constipation 510 g 0     ALLERGY:  Allergies   Allergen Reactions     Haldol [Haloperidol]      Patient previously tolerated haldol, though developed oculogyric crises during hospital stay on 4/26/21 on total daily dose of 10 mg.     Lisinopril Cough     IMMUNIZATION HISTORY:  Immunization History   Administered Date(s) Administered     Tdap (Adacel,Boostrix) 01/20/2021     SOCIAL HISTORY:  Patient  reports that she has never smoked. She has never used smokeless tobacco. She reports that she does not drink alcohol and does not use drugs.    Labs reviewed in EPIC    OBJECTIVE:                                                    GENERAL: healthy, alert and no distress, appears agitated  EYES: Eyes grossly normal to inspection  NECK: supple  RESP: no signs of shortness of breath. no cough. breathing comfortably on room air  NEURO: mentation intact and speech fast  PSYCH: mentation appears agitated       ASSESSMENT AND PLAN:                                                      1. Latent TB   2. history of non compliance with medications  3. Schizoaffective / Bipolar disorder      Recommendations:   - Patient refused to take antibiotic for latent TB and able to ask appropriate questions.   - I  will not treat her latent TB at this time. She is not on any medications that can increase her risk of reactivating latent TB at this time  - monitor for signs and symptoms of active TB. NONE at present     Discussed with      ID will sign off now.      Jaren Celestin MD, M.Med.Sc  Division of Infectious Diseases and International Medicine  [facility]      video time : 6 min, chart review and documentation : 15 min

## 2021-09-22 NOTE — LETTER
9/22/2021       RE: Michelle Pino  [address]  [facility] [address]     Dear Colleague,    Thank you for referring your patient, Michelle Pino, to the [facility] [place] at [facility]. Please see a copy of my visit note below.    Michelle is a 61 year old who is being evaluated via a billable video visit.      ** patient can do Doximity **    How would you like to obtain your AVS? MyChart  If the video visit is dropped, the invitation should be resent by: [phone]  Will anyone else be joining your video visit? No    Video-Visit Details  Video time: 6 min  Originating Location (pt. Location): Other detention  Distant Location (provider location):  [facility] [place]   Platform used for Video Visit: Doximity-    INFECTIOUS DISEASES PROGRESS NOTE    SUBJECTIVE:                                                      Michelle Pino is a 61 year old female who presents to clinic today for the following health issues: post hospital follow-up for latent TB     Michelle Pino is 61 year old  female, [place] from [place] many [place] ago, with history significant for Schizoaffective Disorder, bipolar type, hypertension, hyperlipidemia, CVA 2017, with previous commitments who was admitted on 4/14/21 with tad, psychosis, and agitation, secondary to medication non-adherence. Michelle's psychiatric issues seem to be improving and there is plan to discharge her to a group home this Friday. As part of the work up, a quantiferon TB gold test was done on 8/28/21 and came back positive. ID bryson was called on 8/30/21 and a formal ID consultation was recommended . I saw patient on 8/31/21. Michelle asked me appropriate questions about latent TB and did not have any symptoms of active TB. with history of non compliance and her decision not to take medication for latent TB was discussed with resident Dr Varner.   "The next day, I was contacted by Dr Varner who mentioned that patient was not capable of making decision, and her son, being the POA was contacted by Dr Varner and gave the consent to start treatment. I was told no need to contact her son. I recommended that if she was tos atrt medication, would choose Rifampin and should start in hospital and monitor for any side effects. However, she did not received Rifampin in hospital and was discharged to [facility]    Today, a video follow-up was scheduled with Michelle and her  Marely. Reviewing her med list, I was told that Michelle was not on Rifampin. Michelle got agitated and kept saying \"I dont  have TB\" \"I told you in hospital\".  \" Why do I need medication\" I told her she did not have active TB but latent / inactive TB. medication for latent TB will cut down risk of active TB in the future. To this, she replied \" in the future? I dont live in the future\" .      Problem list and histories reviewed & adjusted, as indicated.  Additional history: as documented    PAST MEDICAL HISTORY:  Patient  has a past medical history of CVA (cerebral vascular accident) (H) (04/2017), Dyslipidemia, Hyperlipidemia, Hypertension, Hypertension, Paranoid schizophrenia (H), Schizophrenia (H), and Vitamin D deficiency.    PAST SURGICAL HISTORY:  Patient  has a past surgical history that includes Open reduction internal fixation tibial plateau (Right, 01/08/2019) and Orif Proximal Tibial Plateau Fracture (Right, 2019).    CURRENT MEDICATIONS:  Current Outpatient Medications   Medication Sig Dispense Refill     amLODIPine (NORVASC) 2.5 MG tablet Take 3 tablets (7.5 mg) by mouth daily 60 tablet 0     benztropine (COGENTIN) 1 MG tablet Take 1 tablet (1 mg) by mouth 2 times daily 60 tablet 0     docusate sodium (DSS) 100 MG capsule Take 100 mg by mouth       LORazepam (ATIVAN) 1 MG tablet Take 1 tablet (1 mg) by mouth 3 times daily 90 tablet 0     losartan (COZAAR) 100 MG tablet Take 1 " tablet (100 mg) by mouth daily 30 tablet 0     metoprolol succinate ER (TOPROL-XL) 25 MG 24 hr tablet Take 3 tablets (75 mg) by mouth daily 90 tablet 0     OLANZapine (ZYPREXA) 20 MG tablet Take 1 tablet (20 mg) by mouth At Bedtime 30 tablet 0     polyethylene glycol (MIRALAX) 17 GM/Dose powder Take 17 g by mouth daily as needed for constipation 510 g 0     ALLERGY:  Allergies   Allergen Reactions     Haldol [Haloperidol]      Patient previously tolerated haldol, though developed oculogyric crises during hospital stay on 4/26/21 on total daily dose of 10 mg.     Lisinopril Cough     IMMUNIZATION HISTORY:  Immunization History   Administered Date(s) Administered     Tdap (Adacel,Boostrix) 01/20/2021     SOCIAL HISTORY:  Patient  reports that she has never smoked. She has never used smokeless tobacco. She reports that she does not drink alcohol and does not use drugs.    Labs reviewed in EPIC    OBJECTIVE:                                                    GENERAL: healthy, alert and no distress, appears agitated  EYES: Eyes grossly normal to inspection  NECK: supple  RESP: no signs of shortness of breath. no cough. breathing comfortably on room air  NEURO: mentation intact and speech fast  PSYCH: mentation appears agitated       ASSESSMENT AND PLAN:                                                      1. Latent TB   2. history of non compliance with medications  3. Schizoaffective / Bipolar disorder      Recommendations:   - Patient refused to take antibiotic for latent TB and able to ask appropriate questions.   - I  will not treat her latent TB at this time. She is not on any medications that can increase her risk of reactivating latent TB at this time  - monitor for signs and symptoms of active TB. NONE at present     Discussed with      ID will sign off now.      Jaren Celestin MD, M.Med.Sc  Division of Infectious Diseases and International Medicine  [facility]      video  time : 6 min, chart review and documentation : 15 min
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
## 2023-01-26 NOTE — PROGRESS NOTES
Writer received an email from [facility] staff who visited patient on the unit yesterday.  Email indicated patient was very angry in the beginning of the visit as she is adamant that she just wants to go home to her own apartment.  Staff report that as the visit went on patient began to be open to the visit.  Staff were communicating with patient in their Native language and patient appeared to like this. Group home staff will visit patient again tomorrow (Friday) @ 12 noon to continue to build the therapeutic relationship needed in order to have patient begin to trust this staff and come to the realization that the only safe  discharge plan will be for a higher level of care such as a group home or assisted living situation.    Orbital.../Triceps.../Fat overlying ribcage.../Buccal...

## 2023-05-10 NOTE — PROVIDER NOTIFICATION
04/22/21 2025   Seclusion or Restraint Order   In Person Face to Face Assessment Conducted Yes-Eval of pt's immediate situation, reaction to intervention, complete review of systems assessment, behavioral assessment & review/assessment of hx, drugs & meds, recent labs, etc, behavioral condition, need to continue/terminate restraint/seclusion   Patient Experienced No adverse physical outcome from seclusion/restraint initiation   Continuation of Seclus/Restraint indicated at this time Yes     Patient was standing at the seclusion door.  She was asked if she had an injury, or pain, and she shook her head side to side (appearing to be no).  She would not respond to questions about processing out of seclusion.  She appeared internally preoccupied.  Her hair was draped over her face.  She was unable to process further.  Seclusion still indicated as she had attempted to strike staff during the medication administration.    On-call provider (Dr. Reeder) was notified of the results of the face to face and that there were no apparent injuries to the patient and none to staff.   Patient ID: Sonia Norton is a 81 y o  female  Assessment/Plan:    Memory loss  Memory loss  To date, no new sxs  Labs from dec 2022- vit b12 nl at 948, tsh nl 1 69, rpr nr  Pt had neuroquant mri- stable chronic ms findings and normal neuroquant without evidence of neurodegeneration  Neurocog eval whenever she does it as her memory has been stable and can defer for now given handling a lot of the dermatology issues  Multiple sclerosis ([facility] [address] )  82 yo PMH of multiple concussions (8) due to multiple injuries in the past, MS with a fairly benign course (not any any IMD therapy), past memory and concentration issues (previous visits increased disorganized thinking and diff focusing) here for f/u for her MS and memory issues  Since then she had melanoma dx and got Mohs surgery and had it fully resected  Her neuro exam is stable and has been infextions/illness free/trips/falls  No new neurological symptoms  She has been stable since last visit MS symptom wise  She wears bifocals  W/U: Imaging personally reviewed and reviewed Records from Care everywhere from [facility] [address] in regards to Patient's Moh's Microsurgery  MRI brain neuroquant: 12/2022 Stable white matter lesions consistent with mild chronic demyelinating disease  NeuroQuant analysis was performed: Normal study; Does not support neurodegeneration      03/16/2023 Labs   CBC WBC 7 3   BMP glucose 108 otherwise WNL, GFR 79  Labs from dec 2022- vit b12 nl at 948, tsh nl 1 69, rpr nr    Impression: MS, very stable and off DMT    Plan   Staffed w/ Dr Shaw Matthews at [place]   - off DMT/IMD and has had stable imaging and neuro exams  - continue to monitor symptoms   - continue fo f[facility]u with Derm [facility]   - f/u in 1 year              Problem List Items Addressed This Visit        Nervous and Auditory    Multiple sclerosis ([facility] [address] ) - Primary     82 yo PMH of multiple concussions (8) due to multiple injuries in the past, MS with a fairly benign course (not any any IMD therapy), past memory and concentration issues (previous visits increased disorganized thinking and diff focusing) here for f/u for her MS and memory issues  Since then she had melanoma dx and got Mohs surgery and had it fully resected  Her neuro exam is stable and has been infextions/illness free/trips/falls  No new neurological symptoms  She has been stable since last visit MS symptom wise  She wears bifocals  W/U: Imaging personally reviewed and reviewed Records from Care everywhere from [address] in regards to Patient's Moh's Microsurgery  MRI brain neuroquant: 12/2022 Stable white matter lesions consistent with mild chronic demyelinating disease  NeuroQuant analysis was performed: Normal study; Does not support neurodegeneration  03/16/2023 Labs   CBC WBC 7 3   BMP glucose 108 otherwise WNL, GFR 79  Labs from dec 2022- vit b12 nl at 948, tsh nl 1 69, rpr nr    Impression: MS, very stable and off DMT    Plan   Staffed w/ Dr Marylee Cabot at [facility] [place] [facility]   - off DMT/IMD and has had stable imaging and neuro exams  - continue to monitor symptoms   - continue fo f/u with Derm at [facility]   - f/u in 1 year                 Other    Memory loss     Memory loss  To date, no new sxs  Labs from dec 2022- vit b12 nl at 948, tsh nl 1 69, rpr nr  Pt had neuroquant mri- stable chronic ms findings and normal neuroquant without evidence of neurodegeneration  Neurocog eval whenever she does it as her memory has been stable and can defer for now given handling a lot of the dermatology issues  Subjective:    HPI        80 yo PMH of multiple concussions (8) due to multiple injuries in the past, MS with a fairly benign course (not any any IMD therapy), past memory and concentration issues (previous visits increased disorganized thinking and diff focusing)  Last office visit was 02/08/2023  She saw a psychiatrist in past and off all SSRIs and feels her mood is better   Pt denies any safety issues in home or driving  Pt had updated mr head with neuroquant testing  Study done and reviewed in detail  Study from dec 31 2022- stable white matter lesions c/w demylinating disease  neuroquant normal study - not supportive of neurodegenerative  Pt relieved by studies but still concerned about issues with getting timeliness of her work and feeling some delay in understanding of tasks  Since last visit, had melanoma dx in 02/28/2023 and had bx and the MMO surgery and reconstruction at [facility]  No issues otherwise  Expected to get neurocog eval in 2/2023  She was going to reschedule due to recent cancer diagnosis and melanoma resection  She had a neuropsych eval 8 years ago in 2014  She had one head injury  She goes to the ophthalmologist once a year but has one in 08 2023 and mentions like waterfall vision once a / year  Today, her memory issues are about the same as the last visit  She would have moments of taking longer to answer questions  She is driving and working w/o much issue  She usually does a reminder via writing  She does not get lost familiar places  She is not missing any medications/bills  She denies behavioral issues  No hallucinations  She does state she sometimes get easily distracted when she decides to watch tv in the middle of doing things  ,     No recent sickness, urgent care visits, no trips, no loss or bowel or bladder  Sje denies any fevers/chill, chest pain, shortness of breath, abdominal pain, nausea, vomiting, diarrhea, problems urinating, problems  With bowel movements, and is eating/drinking without problem      SHe denies any headaches, syncope, paresthesias, diplopia, visual changes, tinnitus, LOC, sudden onset weakness, garbled speech, dysarthria/slurring of words,  Loss of bowel or bladder, no trouble w/ speech or swallowing         Labs from dec 2022- vit b12 nl at 948, tsh nl 1 69, rpr nr          Past info:   prior  MRI c-spine from January 2016 compared to Nov 2014  Stable MR appearance of the cervical cord  Minor prominence of the central canal  No pathological enhancement  Mild cervical spondylosis and osteoarthritis  Re rev last MRI t-spine on 1/23/17 compared to 9/2015  Stable appearance of the thoracic cord  Prominent central canal T7-T9  No indication of demyelinating disease  Re rev MRI brain done in Sept 2015 and stable compared to Nov 2014 with mild degree of chronic demyelinating disease  She remains off IMD therapy for her MS    MRI brain neuroquant: 12/2022 Stable white matter lesions consistent with mild chronic demyelinating disease  NeuroQuant analysis was performed: Normal study; Does not support neurodegeneration  03/16/2023 Labs   CBC WBC 7 3   BMP glucose 108 otherwise WNL, GFR 79      The following portions of the patient's history were reviewed and updated as appropriate:   She  has a past medical history of Asthma, Hypertension, Multiple sclerosis ([address] ), Post concussive syndrome, and Psychiatric disorder  She   Patient Active Problem List    Diagnosis Date Noted   • Memory loss 12/16/2022   • Simple febrile convulsions ([address] ) 07/29/2021   • Neck pain 07/29/2021   • Nocturnal hypoxemia    • CURT (obstructive sleep apnea)    • Snoring    • Hyperverbal speech 03/21/2019   • Bipolar affective disorder, current episode hypomanic ([address] ) 03/21/2019   • Hypomania ([address] ) 03/14/2019   • Multiple sclerosis ([address] ) 12/16/2013   • Postconcussion syndrome 12/16/2013     She  has a past surgical history that includes Cataract extraction (Left, 12/27/2022)  Her family history includes Heart failure in her father and mother; Muscular dystrophy in her father; Pneumonia in her father and mother  She  reports that she has never smoked  She has never used smokeless tobacco  She reports current alcohol use  She reports that she does not use drugs    Current Outpatient Medications   Medication Sig Dispense Refill   • albuterol (PROVENTIL HFA,VENTOLIN HFA) 90 mcg/act inhaler Inhale 1 puff every 4 (four) hours as needed      • cycloSPORINE (RESTASIS) 0 05 % ophthalmic emulsion Apply 0 05 % to eye 2 (two) times a day     • Digestive Enzymes (DIGESTIVE ENZYME ULTRA PO) Use daily      • erythromycin (ILOTYCIN) ophthalmic ointment APPLY THIN FILM TO YOUR FACIAL AND EYELID WOUNDS THREE TIMES PER DAY FOR 1 WEEK     • Glucosamine-Chondroitin 750-600 MG TABS Take by mouth daily      • Homeopathic Products (SIMILASAN DRY EYE RELIEF) SOLN Apply to eye if needed     • ibuprofen (MOTRIN) 600 mg tablet Take 600 mg by mouth every 4 (four) hours as needed      • Loratadine 10 MG CAPS Take 10 mg by mouth as needed      • metoprolol succinate (TOPROL-XL) 25 mg 24 hr tablet Take 25 mg by mouth Take 1/2 tablet in the morning and half a tablet at night     • Multiple Vitamins-Minerals (MULTIVITAMIN ADULT PO) 2 (two) times a day      • omeprazole (PriLOSEC) 20 mg delayed release capsule Take 20 mg by mouth daily     • ACIDOPHILUS LACTOBACILLUS PO daily  (Patient not taking: Reported on 2/20/2023)     • Cholecalciferol (VITAMIN D3) 2000 units capsule Take 2,000 Units by mouth daily  (Patient not taking: Reported on 5/10/2023)     • Gvykkimqv-Ettjpusaf-Mkisbosnnu (METAFOLBIC PLUS) 6-2-600 MG TABS daily  (Patient not taking: Reported on 2/20/2023)  1   • Omega-3 1000 MG CAPS Take by mouth in the morning (Patient not taking: Reported on 5/10/2023)     • triamterene-hydrochlorothiazide (DYAZIDE) 37 5-25 mg per capsule TK 1 C PO QD (Patient not taking: Reported on 5/10/2023)     • Turmeric Curcumin 500 MG CAPS Take by mouth (Patient not taking: Reported on 12/16/2022)     • Vitamin E 400 units TABS Take by mouth (Patient not taking: Reported on 5/10/2023)       No current facility-administered medications for this visit       Current Outpatient Medications on File Prior to Visit   Medication Sig   • albuterol (PROVENTIL HFA,VENTOLIN HFA) 90 mcg/act inhaler Inhale 1 puff every 4 (four) hours as needed    • cycloSPORINE (RESTASIS) 0 05 % ophthalmic emulsion Apply 0 05 % to eye 2 (two) times a day   • Digestive Enzymes (DIGESTIVE ENZYME ULTRA PO) Use daily    • erythromycin (ILOTYCIN) ophthalmic ointment APPLY THIN FILM TO YOUR FACIAL AND EYELID WOUNDS THREE TIMES PER DAY FOR 1 WEEK   • Glucosamine-Chondroitin 750-600 MG TABS Take by mouth daily    • Homeopathic Products (SIMILASAN DRY EYE RELIEF) SOLN Apply to eye if needed   • ibuprofen (MOTRIN) 600 mg tablet Take 600 mg by mouth every 4 (four) hours as needed    • Loratadine 10 MG CAPS Take 10 mg by mouth as needed    • metoprolol succinate (TOPROL-XL) 25 mg 24 hr tablet Take 25 mg by mouth Take 1/2 tablet in the morning and half a tablet at night   • Multiple Vitamins-Minerals (MULTIVITAMIN ADULT PO) 2 (two) times a day    • omeprazole (PriLOSEC) 20 mg delayed release capsule Take 20 mg by mouth daily   • ACIDOPHILUS LACTOBACILLUS PO daily  (Patient not taking: Reported on 2/20/2023)   • Cholecalciferol (VITAMIN D3) 2000 units capsule Take 2,000 Units by mouth daily  (Patient not taking: Reported on 5/10/2023)   • Yhxdpcuht-Ntnhngyha-Srrwjbnuot (METAFOLBIC PLUS) 6-2-600 MG TABS daily  (Patient not taking: Reported on 2/20/2023)   • Omega-3 1000 MG CAPS Take by mouth in the morning (Patient not taking: Reported on 5/10/2023)   • triamterene-hydrochlorothiazide (DYAZIDE) 37 5-25 mg per capsule TK 1 C PO QD (Patient not taking: Reported on 5/10/2023)   • Turmeric Curcumin 500 MG CAPS Take by mouth (Patient not taking: Reported on 12/16/2022)   • Vitamin E 400 units TABS Take by mouth (Patient not taking: Reported on 5/10/2023)     No current facility-administered medications on file prior to visit  She is allergic to bee venom, latex, mold extract [trichophyton], and sulfa antibiotics            Objective:    Blood pressure 122/88, pulse 66, temperature 97 7 °F (36 5 °C), height 5' (1 524 m), weight 78 5 kg (173 lb)      Physical Exam  Eyes:      General: Lids are normal       Extraocular Movements: Extraocular movements intact  Pupils: Pupils are equal, round, and reactive to light  Neurological:      Motor: Motor strength is normal       Deep Tendon Reflexes:      Reflex Scores:       Tricep reflexes are 2+ on the right side and 2+ on the left side  Bicep reflexes are 2+ on the right side and 2+ on the left side  Brachioradialis reflexes are 2+ on the right side and 2+ on the left side  Patellar reflexes are 1+ on the right side and 1+ on the left side  Achilles reflexes are 2+ on the right side and 2+ on the left side  GENERAL EXAM:    CONSTITUTIONAL: Well developed, well nourished, well groomed  No dysmorphic features  Eyes:  PERRLA, EOM normal      Neck:  Normal ROM, neck supple  HEENT:  Normocephalic atraumatic  No meningismus  Oropharynx is clear and moist  No oral mucosal lesions  Chest:  Respirations regular and unlabored  Cardiovascular:  Distal extremities warm without palpable edema or tenderness, no observed significant swelling  Musculoskeletal:  Full range of motion  Skin:  warm and dry   Psychiatric:  Normal behavior and appropriate affect          Neurological Exam  Mental Status  Awake, alert and oriented to person, place and time  Oriented to person, place, time and situation  Able to name objects, name parts of objects, repeat and read  Follows three-step commands  Able to spell words backwards  Cranial Nerves  CN II: Visual acuity is normal  Visual fields full to confrontation  CN III, IV, VI: Extraocular movements intact bilaterally  Normal lids and orbits bilaterally  Pupils equal round and reactive to light bilaterally  CN V: Facial sensation is normal   CN VII: Full and symmetric facial movement  CN VIII: Hearing is normal   CN IX, X: Palate elevates symmetrically  Normal gag reflex    CN XI: Shoulder shrug strength is normal   CN XII: Tongue midline without atrophy or fasciculations  Motor   Strength is 5/5 throughout all four extremities  Sensory  Sensation is intact to light touch, pinprick, vibration and proprioception in all four extremities  Reflexes                                            Right                      Left  Brachioradialis                    2+                         2+  Biceps                                 2+                         2+  Triceps                                2+                         2+  Finger flex                           2+                         2+  Hamstring                            2+                         2+  Patellar                                1+                         1+  Achilles                                2+                         2+  Plantar                           Downgoing                Downgoing    Coordination  Right: Finger-to-nose normal  Rapid alternating movement normal  Heel-to-shin normal Left: Finger-to-nose normal  Rapid alternating movement normal  Heel-to-shin normal     Gait  Casual gait is normal including stance, stride, and arm swing  ROS:    Review of Systems   Constitutional: Negative  Negative for appetite change and fever  HENT: Negative  Negative for hearing loss, tinnitus, trouble swallowing and voice change  Eyes: Negative  Negative for photophobia, pain and visual disturbance  Respiratory: Negative  Negative for shortness of breath  Cardiovascular: Negative  Negative for palpitations  Gastrointestinal: Negative  Negative for nausea and vomiting  Endocrine: Negative  Negative for cold intolerance  Genitourinary: Negative  Negative for dysuria, frequency and urgency  Musculoskeletal: Negative  Negative for gait problem, myalgias and neck pain  Skin: Negative  Negative for rash  Allergic/Immunologic: Negative  Neurological: Negative    Negative for dizziness, tremors, seizures, syncope, facial asymmetry, speech difficulty, weakness, light-headedness, numbness and headaches  Hematological: Negative  Does not bruise/bleed easily  Psychiatric/Behavioral: Negative  Negative for confusion, hallucinations and sleep disturbance  All other systems reviewed and are negative      No issues to address

## 2024-06-03 NOTE — PLAN OF CARE
"Pt continues to remain isolated and withdrawn to her room.  She is refusing labs, vitals signs, medications, and assessments.  She is quite malodorous and has very poor ADLs.  She will not answer assessment questions and just repeats the same thing over and over.  When RN writer attempted to give pt her AM medications patient kept repeating, \"I am hungry!\"  Patient told breakfast would be available soon, but patient continued to repeat herself.  Patient dismissed RN writer when attempting further assessment questions and just asked \"Where is Lucille?\"  Patient is focused on discharge and believe her  can make this happen.  Patient appears to be responding to internal stimuli.  She has had no aggression toward staff and remains isolated to her room.  2:1 SIO discontinued.  Patient has a good appetite and ate most of her breakfast and lunch.    " n/a

## 2024-08-27 NOTE — PLAN OF CARE
Assessment/Intervention/Current Symtoms and Care Coordination  -Chart review  -Rounded with team, addressed patient needs/concerns  Current Symptoms include the following: Continued irritability when higher level of care is discussed.     Discharge Plan or Goal  Pending stabilization & development of a safe discharge plan.  Considerations include:  [facility] (Group Home)     Barriers to Discharge  Patient requires further psychiatric stabilization due to current symptomology    Referral Status  [facility]    Legal Status  Committed/[facility] through [facility]    Per primary

## 2024-10-13 NOTE — PLAN OF CARE
Problem: Sleep Disturbance (Psychotic Signs/Symptoms)  Goal: Improved Sleep (Psychotic Signs/Symptoms)  Outcome: Improving     Patient appeared to be asleep for 7 hours during safety checks this shift. No complaints or concerns voiced by patient or noted by staff. Will continue to monitor and update if there are changes.    Patent

## 2025-07-22 NOTE — PLAN OF CARE
"Participated in 3 out of 3 OT groups. Groups focused on cognitive processing, visual perceptual skills, attention, tracking, and creative expression. Group activity requiring visual perceptual skills was challenging for patient, her motor skills and processing appeared slow. She required several cues for assistance and mental rotation. She was able to carry over scoring when prompted and did demonstrate some independence in participation. Reported feeling tired. Limited eye contact, flat affect, droopy lids.   Participated in OT clinic group which provided creative expression tasks to promote goal-direction, focus and concentration, therapeutic engagement, and emotional regulation. Continued on familiar task and focused  quietly after writer helped set up. She required cue for clean up.   Participated in afternoon group with creative writing and expression. She demonstrated attention and remained in group for duration of time. She expressed some comprehension and understanding of 1 out of 3 poems. She participated in creative writing portion, writing her own poem and sharing it with the group. She participated in giving \"snaps\" after participant read their own poem and this made her smile. She reported it felt good to share her poem and she wanted to keep a copy of the poems read during group.  " Spoke to dtr Lou to discuss    Pt was scheduled for appt with Dr. Clay today at 3pm but dtr canceled appt as she reports that he \"had a very bad night.\" Dtr reports that pt has been having issues sleeping and eating and that he fell last night and she had to call for help. She reports that pt \"did not hurt anything\" when he fell.    Dtr states that pt is very weak and fatigued and she is unsure how to proceed. Pt's appt was rescheduled from today to 8/29 and advised dtr that it would not be recommended to wait to be seen if pt is declining and experiencing potential cardiac symptoms. Dtr reports she cannot bring pt in today as he \"has more urgent issues.\" Dtr states pt requires a late afternoon appt d/t pt's sleep schedule. Dtr declined sooner availability with Dr. Clay d/t location and time.    Dtr inquired about recommendations for new PCP for pt. Advised dtr that there in an [facility] primary group within the same building as Dr. Clay at [address] [place] [address] and other locations including Dr. العلي, Dr. Hernandez, and Dr. Reeves. Dtr verbalized understanding and will call for an appt.    Pt's upcoming appt added to wait list per dtr's request

## (undated) RX ORDER — LIDOCAINE HYDROCHLORIDE 20 MG/ML
INJECTION, SOLUTION EPIDURAL; INFILTRATION; INTRACAUDAL; PERINEURAL
Status: DISPENSED
Start: 2021-06-09

## (undated) RX ORDER — LABETALOL HYDROCHLORIDE 5 MG/ML
INJECTION, SOLUTION INTRAVENOUS
Status: DISPENSED
Start: 2021-05-28

## (undated) RX ORDER — LABETALOL HYDROCHLORIDE 5 MG/ML
INJECTION, SOLUTION INTRAVENOUS
Status: DISPENSED
Start: 2021-06-04

## (undated) RX ORDER — FLUMAZENIL 0.1 MG/ML
INJECTION, SOLUTION INTRAVENOUS
Status: DISPENSED
Start: 2021-05-26

## (undated) RX ORDER — CAFFEINE AND SODIUM BENZOATE 125 MG/ML
INJECTION, SOLUTION INTRAMUSCULAR; INTRAVENOUS
Status: DISPENSED
Start: 2021-06-07

## (undated) RX ORDER — ETOMIDATE 2 MG/ML
INJECTION INTRAVENOUS
Status: DISPENSED
Start: 2021-06-07

## (undated) RX ORDER — CAFFEINE AND SODIUM BENZOATE 125 MG/ML
INJECTION, SOLUTION INTRAMUSCULAR; INTRAVENOUS
Status: DISPENSED
Start: 2021-06-09

## (undated) RX ORDER — DEXMEDETOMIDINE HYDROCHLORIDE 100 UG/ML
INJECTION, SOLUTION INTRAVENOUS
Status: DISPENSED
Start: 2021-05-28

## (undated) RX ORDER — FLUMAZENIL 0.1 MG/ML
INJECTION, SOLUTION INTRAVENOUS
Status: DISPENSED
Start: 2021-06-07

## (undated) RX ORDER — ETOMIDATE 2 MG/ML
INJECTION INTRAVENOUS
Status: DISPENSED
Start: 2021-06-02

## (undated) RX ORDER — FLUMAZENIL 0.1 MG/ML
INJECTION, SOLUTION INTRAVENOUS
Status: DISPENSED
Start: 2021-06-09

## (undated) RX ORDER — HYDRALAZINE HYDROCHLORIDE 20 MG/ML
INJECTION INTRAMUSCULAR; INTRAVENOUS
Status: DISPENSED
Start: 2021-06-07

## (undated) RX ORDER — CAFFEINE AND SODIUM BENZOATE 125 MG/ML
INJECTION, SOLUTION INTRAMUSCULAR; INTRAVENOUS
Status: DISPENSED
Start: 2021-06-04

## (undated) RX ORDER — ETOMIDATE 2 MG/ML
INJECTION INTRAVENOUS
Status: DISPENSED
Start: 2021-05-26

## (undated) RX ORDER — LABETALOL HYDROCHLORIDE 5 MG/ML
INJECTION, SOLUTION INTRAVENOUS
Status: DISPENSED
Start: 2021-06-09

## (undated) RX ORDER — HYDRALAZINE HYDROCHLORIDE 20 MG/ML
INJECTION INTRAMUSCULAR; INTRAVENOUS
Status: DISPENSED
Start: 2021-06-09

## (undated) RX ORDER — LABETALOL HYDROCHLORIDE 5 MG/ML
INJECTION, SOLUTION INTRAVENOUS
Status: DISPENSED
Start: 2021-05-26

## (undated) RX ORDER — LIDOCAINE HYDROCHLORIDE 20 MG/ML
INJECTION, SOLUTION EPIDURAL; INFILTRATION; INTRACAUDAL; PERINEURAL
Status: DISPENSED
Start: 2021-06-07

## (undated) RX ORDER — ETOMIDATE 2 MG/ML
INJECTION INTRAVENOUS
Status: DISPENSED
Start: 2021-06-04

## (undated) RX ORDER — ETOMIDATE 2 MG/ML
INJECTION INTRAVENOUS
Status: DISPENSED
Start: 2021-05-28

## (undated) RX ORDER — LABETALOL HYDROCHLORIDE 5 MG/ML
INJECTION, SOLUTION INTRAVENOUS
Status: DISPENSED
Start: 2021-06-07

## (undated) RX ORDER — FLUMAZENIL 0.1 MG/ML
INJECTION, SOLUTION INTRAVENOUS
Status: DISPENSED
Start: 2021-06-02

## (undated) RX ORDER — LABETALOL HYDROCHLORIDE 5 MG/ML
INJECTION, SOLUTION INTRAVENOUS
Status: DISPENSED
Start: 2021-06-02

## (undated) RX ORDER — FLUMAZENIL 0.1 MG/ML
INJECTION, SOLUTION INTRAVENOUS
Status: DISPENSED
Start: 2021-05-28

## (undated) RX ORDER — ETOMIDATE 2 MG/ML
INJECTION INTRAVENOUS
Status: DISPENSED
Start: 2021-06-09